# Patient Record
Sex: FEMALE | Race: WHITE | NOT HISPANIC OR LATINO | Employment: UNEMPLOYED | URBAN - METROPOLITAN AREA
[De-identification: names, ages, dates, MRNs, and addresses within clinical notes are randomized per-mention and may not be internally consistent; named-entity substitution may affect disease eponyms.]

---

## 2017-01-03 ENCOUNTER — TELEPHONE (OUTPATIENT)
Dept: UROGYNECOLOGY | Facility: CLINIC | Age: 32
End: 2017-01-03

## 2017-01-03 NOTE — TELEPHONE ENCOUNTER
Spoke with pt's mother regarding pt being sick pre surgery. Pt's mother Ruthie stated pt has been very sick with vomiting and wanted Dr Ventura to know due to her having surgery on 1/9/17. Informed pt's mother that Dr Ventura is out of clinic until next week but I'd relay the message to her and the NP Julianna Gomez and someone will get back with her. Pt voiced understanding and call ended.

## 2017-01-05 ENCOUNTER — OFFICE VISIT (OUTPATIENT)
Dept: INTERNAL MEDICINE | Facility: CLINIC | Age: 32
End: 2017-01-05
Payer: MEDICAID

## 2017-01-05 ENCOUNTER — HOSPITAL ENCOUNTER (OUTPATIENT)
Dept: CARDIOLOGY | Facility: CLINIC | Age: 32
Discharge: HOME | End: 2017-01-05
Payer: MEDICAID

## 2017-01-05 ENCOUNTER — TELEPHONE (OUTPATIENT)
Dept: UROGYNECOLOGY | Facility: CLINIC | Age: 32
End: 2017-01-05

## 2017-01-05 ENCOUNTER — HOSPITAL ENCOUNTER (OUTPATIENT)
Dept: RADIOLOGY | Facility: HOSPITAL | Age: 32
Discharge: HOME | End: 2017-01-05
Attending: INTERNAL MEDICINE
Payer: MEDICAID

## 2017-01-05 DIAGNOSIS — I10 ESSENTIAL HYPERTENSION: ICD-10-CM

## 2017-01-05 DIAGNOSIS — I10 ESSENTIAL HYPERTENSION: Primary | ICD-10-CM

## 2017-01-05 PROCEDURE — 99214 OFFICE O/P EST MOD 30 MIN: CPT | Mod: S$PBB,,, | Performed by: INTERNAL MEDICINE

## 2017-01-05 PROCEDURE — 99999 PR PBB SHADOW E&M-EST. PATIENT-LVL V: CPT | Mod: PBBFAC,,, | Performed by: INTERNAL MEDICINE

## 2017-01-05 PROCEDURE — 93010 ELECTROCARDIOGRAM REPORT: CPT | Mod: S$PBB,,, | Performed by: INTERNAL MEDICINE

## 2017-01-05 PROCEDURE — 71020 XR CHEST PA AND LATERAL: CPT | Mod: 26,,, | Performed by: RADIOLOGY

## 2017-01-05 PROCEDURE — 93005 ELECTROCARDIOGRAM TRACING: CPT | Mod: PBBFAC | Performed by: INTERNAL MEDICINE

## 2017-01-05 NOTE — TELEPHONE ENCOUNTER
----- Message from Benjamin Samuel sent at 1/5/2017  1:25 PM CST -----  Contact: pt mother Ruthie Ac  x_ 1st Request   _ 2nd Request   _ 3rd Request     Who: PAUL AC [[id number]]    Why: Pt mother is requesting a call back to confirm the date of the procedure scheduled    What Number to Call Back: [phone]    When to Expect a call back: (Before the end of the day)   -- if call after 3:00 call back will be tomorrow.

## 2017-01-05 NOTE — MR AVS SNAPSHOT
[facility] - Internal Medicine  [address]  [facility] [address]  Phone: [phone]  Fax: [phone]                  Maria Ines Ac   2017 11:00 AM   Office Visit    Description:  Female : 1985   Provider:  Luann Raymond MD   Department:  [facility]           Reason for Visit     Pre-op Exam           Diagnoses this Visit        Comments    Essential hypertension    -  Primary            To Do List           Future Appointments        Provider Department Dept Phone    2017 11:50 AM LAB, APPOINTMENT [facility] INTMED [facility] [phone]    2017 12:00 PM [facility] [id number] 485 LB LIMIT [facility] [phone]    2017 2:00 PM EKG, APPT [facility] - [phone]    2017 3:00 PM Julianna Gomze NP [facility] [phone]    2017 9:30 AM Julianna Gomez NP [facility] [phone]      Your Future Surgeries/Procedures     2017   Surgery with Ninoska Ventura DO   [facility]    [address]  [facility] [id number] [phone]              Goals (5 Years of Data)     None      [facility]     [facility] Care [facility] -  Assistance  Registered nurses in the [facility] provide clinical advisement, health education, appointment booking, and other advisory services.  Call for this free service at [phone].             Medications           Message regarding Medications     Verify the changes and/or additions to your medication regime listed below are the same as discussed with your clinician today.  If any of these changes or additions are incorrect, please notify your healthcare provider.             Verify that the below list of medications is an accurate representation of the medications you are currently taking.  If none reported, the list may be blank. If incorrect,  please contact your healthcare provider. Carry this list with you in case of emergency.           Current Medications     atorvastatin (LIPITOR) 40 MG tablet Take 40 mg by mouth once daily.    calcium-vitamin D3 500 mg(1,250mg) -200 unit per tablet Take 1 tablet by mouth 2 (two) times daily with meals.    dicyclomine (BENTYL) 10 MG capsule Take 2 capsules (20 mg total) by mouth 3 (three) times daily before meals.    docusate sodium (COLACE) 100 MG capsule Take 1 capsule (100 mg total) by mouth 2 (two) times daily.    estradiol (ESTRACE) 0.01 % (0.1 mg/gram) vaginal cream Place 1 g vaginally twice a week.    estradiol (VIVELLE-DOT) 0.1 mg/24 hr PTSW APPLY 1 PATCH EXTERNALLY TO THE SKIN 2 TIMES A WEEK    FARXIGA 10 mg Tab once daily.     fish oil-omega-3 fatty acids 300-1,000 mg capsule Take 2 g by mouth once daily.    GLUCOPHAGE 500 mg tablet 500 mg 2 (two) times daily with meals.     hydrocodone-ibuprofen 7.5-200 mg (VICOPROFEN) 7.5-200 mg per tablet Take 1 tablet by mouth every 8 (eight) hours as needed for Pain.    lurasidone (LATUDA) 60 mg Tab tablet Take 60 mg by mouth every evening.     metoprolol succinate (TOPROL-XL) 25 MG 24 hr tablet 100 mg once daily.     MULTIVIT-IRON-MIN-FOLIC ACID 3,500-18-0.4 UNIT-MG-MG ORAL CHEW Take by mouth.    nystatin-triamcinolone (MYCOLOG II) cream Apply topically 2 (two) times daily.    ONETOUCH ULTRA TEST Strp     oxybutynin (DITROPAN-XL) 10 MG 24 hr tablet Take 1 tablet (10 mg total) by mouth once daily.    phentermine (ADIPEX-P) 37.5 mg tablet Take 37.5 mg by mouth before breakfast.    PROTONIX 40 mg tablet Take 1 tablet (40 mg total) by mouth once daily. On an empty stomach and 30-45 minutes before breakfast    spironolactone (ALDACTONE) 25 MG tablet TK 1 T PO HS    SYNTHROID 50 mcg tablet Take 50 mcg by mouth before breakfast.     TANZEUM 50 mg/0.5 mL PnIj once a week.     TOPAMAX 25 mg tablet 25 mg once daily.     ZOLOFT 100 mg tablet 100 mg once daily.           "  Clinical Reference Information           Vital Signs - Last Recorded  Most recent update: 1/5/2017 11:16 AM by Iram Osorio MA    BP Pulse Ht Wt LMP SpO2    112/80 79 5' 2" (1.575 m) 100.9 kg (222 lb 7.1 oz) 11/17/2012 98%    BMI                40.69 kg/m2          Blood Pressure          Most Recent Value    BP  112/80      Allergies as of 1/5/2017     No Known Allergies      Immunizations Administered on Date of Encounter - 1/5/2017     None      Orders Placed During Today's Visit     Future Labs/Procedures Expected by Expires    APTT  1/5/2017 3/6/2018    CBC auto differential  1/5/2017 3/6/2018    Comprehensive metabolic panel  1/5/2017 1/5/2018    Protime-INR  1/5/2017 3/6/2018    X-Ray Chest PA And Lateral  1/5/2017 1/5/2018    EKG 12-lead  As directed 1/5/2018      [facility] Sign-Up     Activating your [facility] account is as easy as 1-2-3!     1) Visit [email or web address], select Sign Up Now, enter this activation code and your date of birth, then select Next.  [email or web address]  Expires: 2/19/2017 11:45 AM      2) Create a username and password to use when you visit [facility] in the future and select a security question in case you lose your password and select Next.    3) Enter your e-mail address and click Sign Up!    Additional Information  If you have questions, please e-mail [email or web address] or call [phone] to talk to our [facility] staff. Remember, [facility] is NOT to be used for urgent needs. For medical emergencies, dial 911.         "

## 2017-01-05 NOTE — TELEPHONE ENCOUNTER
Called Pt's mother back after consulting with Julianna regarding pt's new surgery day. Informed pt's mother the surgery day is set for 1/23/17 and Julianna will give her a call a week before surgery to confirm the time. Pt's mother voiced understanding and call ended.

## 2017-01-08 ENCOUNTER — TELEPHONE (OUTPATIENT)
Dept: INTERNAL MEDICINE | Facility: CLINIC | Age: 32
End: 2017-01-08

## 2017-01-08 VITALS
HEIGHT: 62 IN | OXYGEN SATURATION: 98 % | SYSTOLIC BLOOD PRESSURE: 112 MMHG | WEIGHT: 222.44 LBS | TEMPERATURE: 99 F | BODY MASS INDEX: 40.93 KG/M2 | HEART RATE: 79 BPM | DIASTOLIC BLOOD PRESSURE: 80 MMHG

## 2017-01-08 NOTE — PROGRESS NOTES
Subjective:       Patient ID: Maria Ines Ac is a 31 y.o. female.    Chief Complaint: Hypertension    HPI: She returns for management of hypertension.  She has had hypertension for over a year.  Current treatment has included medications outlined in medication list.  She denies chest pain or shortness of breath.  No palpitations.  Denies left arm or neck pain.  She is scheduled for surgery January 9, 2017, and is in need of a preop clearance.  She states nausea and vomiting have resolved    Past medical history: Hypertension, hyperlipidemia, diabetes, hypothyroidism, bipolar disorder, migraine headaches, status post hysterectomy      Medications: Synthroid 0.05 mg daily, metformin, Toprol-XL,Lipitor 40 mg daily, lurasidone,farxiga,topamax      NO KNOWN DRUG ALLERGIES        Review of Systems   Constitutional: Negative for chills, fatigue, fever and unexpected weight change.   Respiratory: Negative for chest tightness and shortness of breath.    Cardiovascular: Negative for chest pain and palpitations.   Gastrointestinal: Negative for abdominal pain and blood in stool.   Neurological: Negative for dizziness, syncope, numbness and headaches.       Objective:      Physical Exam   HENT:   Right Ear: External ear normal.   Left Ear: External ear normal.   Nose: Nose normal.   Mouth/Throat: Oropharynx is clear and moist.   Eyes: Pupils are equal, round, and reactive to light.   Neck: Normal range of motion.   Cardiovascular: Normal rate and regular rhythm.    No murmur heard.  Pulmonary/Chest: Breath sounds normal.   Abdominal: She exhibits no distension. There is no hepatosplenomegaly. There is no tenderness.   Lymphadenopathy:     She has no cervical adenopathy.     She has no axillary adenopathy.   Neurological: She has normal strength and normal reflexes. No cranial nerve deficit or sensory deficit.       Assessment:     assessment and plan: 1.  Hypertension: Check CMP  2.  Preop clearance: Check CBC, PT, PTT,  EKG, chest x-ray  3.  She was here for an urgent care only appointment.  She will return to clinic for a physical      Plan:       As above

## 2017-01-18 ENCOUNTER — TELEPHONE (OUTPATIENT)
Dept: UROGYNECOLOGY | Facility: CLINIC | Age: 32
End: 2017-01-18

## 2017-01-18 NOTE — TELEPHONE ENCOUNTER
----- Message from Marla Contreras sent at 1/17/2017  4:40 PM CST -----  Contact: pt   X_  1st Request  _  2nd Request  _  3rd Request        Who: PAUL ARTHUR [[id number]]    Why: pt is needing a call back in regards to what time her procedure is for on 1/23/17    What Number to Call Back: [phone]    When to Expect a call back: (Before the end of the day)   -- if call after 3:00 call back will be tomorrow.

## 2017-01-18 NOTE — TELEPHONE ENCOUNTER
Spoke to patient mother and informed them that a surgery schedule will call them the day before to let them know what time to come in for the surgery on Monday.

## 2017-01-23 ENCOUNTER — ANESTHESIA (OUTPATIENT)
Dept: SURGERY | Facility: OTHER | Age: 32
End: 2017-01-23
Payer: MEDICAID

## 2017-01-23 PROBLEM — N39.46 MIXED INCONTINENCE: Status: ACTIVE | Noted: 2017-01-23

## 2017-01-23 PROCEDURE — 25000003 PHARM REV CODE 250: Performed by: NURSE ANESTHETIST, CERTIFIED REGISTERED

## 2017-01-23 PROCEDURE — 63600175 PHARM REV CODE 636 W HCPCS: Performed by: NURSE ANESTHETIST, CERTIFIED REGISTERED

## 2017-01-23 PROCEDURE — 63600175 PHARM REV CODE 636 W HCPCS: Performed by: OBSTETRICS & GYNECOLOGY

## 2017-01-23 RX ORDER — GLYCOPYRROLATE 0.2 MG/ML
INJECTION INTRAMUSCULAR; INTRAVENOUS
Status: DISCONTINUED | OUTPATIENT
Start: 2017-01-23 | End: 2017-01-23

## 2017-01-23 RX ORDER — FENTANYL CITRATE 50 UG/ML
INJECTION, SOLUTION INTRAMUSCULAR; INTRAVENOUS
Status: DISCONTINUED | OUTPATIENT
Start: 2017-01-23 | End: 2017-01-23

## 2017-01-23 RX ORDER — ROCURONIUM BROMIDE 10 MG/ML
INJECTION, SOLUTION INTRAVENOUS
Status: DISCONTINUED | OUTPATIENT
Start: 2017-01-23 | End: 2017-01-23

## 2017-01-23 RX ORDER — PROPOFOL 10 MG/ML
VIAL (ML) INTRAVENOUS
Status: DISCONTINUED | OUTPATIENT
Start: 2017-01-23 | End: 2017-01-23

## 2017-01-23 RX ORDER — SODIUM CHLORIDE, SODIUM LACTATE, POTASSIUM CHLORIDE, CALCIUM CHLORIDE 600; 310; 30; 20 MG/100ML; MG/100ML; MG/100ML; MG/100ML
INJECTION, SOLUTION INTRAVENOUS CONTINUOUS PRN
Status: DISCONTINUED | OUTPATIENT
Start: 2017-01-23 | End: 2017-01-23

## 2017-01-23 RX ORDER — NEOSTIGMINE METHYLSULFATE 1 MG/ML
INJECTION, SOLUTION INTRAVENOUS
Status: DISCONTINUED | OUTPATIENT
Start: 2017-01-23 | End: 2017-01-23

## 2017-01-23 RX ORDER — LIDOCAINE HCL/PF 100 MG/5ML
SYRINGE (ML) INTRAVENOUS
Status: DISCONTINUED | OUTPATIENT
Start: 2017-01-23 | End: 2017-01-23

## 2017-01-23 RX ORDER — ONDANSETRON 2 MG/ML
INJECTION INTRAMUSCULAR; INTRAVENOUS
Status: DISCONTINUED | OUTPATIENT
Start: 2017-01-23 | End: 2017-01-23

## 2017-01-23 RX ORDER — MIDAZOLAM HYDROCHLORIDE 1 MG/ML
INJECTION INTRAMUSCULAR; INTRAVENOUS
Status: DISCONTINUED | OUTPATIENT
Start: 2017-01-23 | End: 2017-01-23

## 2017-01-23 RX ADMIN — GLYCOPYRROLATE 0.8 MG: 0.2 INJECTION, SOLUTION INTRAMUSCULAR; INTRAVENOUS at 03:01

## 2017-01-23 RX ADMIN — PROPOFOL 200 MG: 10 INJECTION, EMULSION INTRAVENOUS at 02:01

## 2017-01-23 RX ADMIN — MIDAZOLAM HYDROCHLORIDE 2 MG: 1 INJECTION, SOLUTION INTRAMUSCULAR; INTRAVENOUS at 02:01

## 2017-01-23 RX ADMIN — NEOSTIGMINE METHYLSULFATE 5 MG: 1 INJECTION INTRAVENOUS at 03:01

## 2017-01-23 RX ADMIN — ROCURONIUM BROMIDE 50 MG: 10 INJECTION, SOLUTION INTRAVENOUS at 02:01

## 2017-01-23 RX ADMIN — LIDOCAINE HYDROCHLORIDE 40 MG: 20 INJECTION, SOLUTION INTRAVENOUS at 02:01

## 2017-01-23 RX ADMIN — FENTANYL CITRATE 50 MCG: 50 INJECTION, SOLUTION INTRAMUSCULAR; INTRAVENOUS at 02:01

## 2017-01-23 RX ADMIN — ONDANSETRON 4 MG: 2 INJECTION INTRAMUSCULAR; INTRAVENOUS at 03:01

## 2017-01-23 RX ADMIN — SODIUM CHLORIDE, SODIUM LACTATE, POTASSIUM CHLORIDE, AND CALCIUM CHLORIDE: 600; 310; 30; 20 INJECTION, SOLUTION INTRAVENOUS at 02:01

## 2017-01-23 RX ADMIN — SODIUM CHLORIDE, SODIUM LACTATE, POTASSIUM CHLORIDE, AND CALCIUM CHLORIDE: 600; 310; 30; 20 INJECTION, SOLUTION INTRAVENOUS at 03:01

## 2017-01-23 RX ADMIN — FENTANYL CITRATE 50 MCG: 50 INJECTION, SOLUTION INTRAMUSCULAR; INTRAVENOUS at 03:01

## 2017-01-23 RX ADMIN — CEFAZOLIN SODIUM 2 G: 2 SOLUTION INTRAVENOUS at 02:01

## 2017-01-24 ENCOUNTER — HOSPITAL ENCOUNTER (EMERGENCY)
Facility: OTHER | Age: 32
Discharge: HOME | End: 2017-01-24
Attending: EMERGENCY MEDICINE
Payer: MEDICAID

## 2017-01-24 ENCOUNTER — TELEPHONE (OUTPATIENT)
Dept: UROGYNECOLOGY | Facility: CLINIC | Age: 32
End: 2017-01-24

## 2017-01-24 VITALS
HEIGHT: 62 IN | BODY MASS INDEX: 41.96 KG/M2 | RESPIRATION RATE: 33 BRPM | TEMPERATURE: 98 F | OXYGEN SATURATION: 92 % | SYSTOLIC BLOOD PRESSURE: 109 MMHG | HEART RATE: 72 BPM | WEIGHT: 228 LBS | DIASTOLIC BLOOD PRESSURE: 62 MMHG

## 2017-01-24 DIAGNOSIS — G62.9 NEUROPATHY: ICD-10-CM

## 2017-01-24 DIAGNOSIS — R51.9 HEADACHE: ICD-10-CM

## 2017-01-24 DIAGNOSIS — R06.02 SHORTNESS OF BREATH: ICD-10-CM

## 2017-01-24 DIAGNOSIS — R06.00 DYSPNEA, UNSPECIFIED TYPE: Primary | ICD-10-CM

## 2017-01-24 LAB
ALBUMIN SERPL BCP-MCNC: 3.6 G/DL
ALP SERPL-CCNC: 70 U/L
ALT SERPL W/O P-5'-P-CCNC: 13 U/L
ANION GAP SERPL CALC-SCNC: 7 MMOL/L
AST SERPL-CCNC: 14 U/L
B-HCG UR QL: NEGATIVE
BASOPHILS # BLD AUTO: 0.03 K/UL
BASOPHILS NFR BLD: 0.3 %
BILIRUB SERPL-MCNC: 0.2 MG/DL
BUN SERPL-MCNC: 8 MG/DL
CALCIUM SERPL-MCNC: 9.1 MG/DL
CHLORIDE SERPL-SCNC: 106 MMOL/L
CO2 SERPL-SCNC: 23 MMOL/L
CREAT SERPL-MCNC: 0.9 MG/DL
CTP QC/QA: YES
D DIMER PPP IA.FEU-MCNC: 0.56 MG/L FEU
DIFFERENTIAL METHOD: NORMAL
EOSINOPHIL # BLD AUTO: 0.3 K/UL
EOSINOPHIL NFR BLD: 2.9 %
ERYTHROCYTE [DISTWIDTH] IN BLOOD BY AUTOMATED COUNT: 13.7 %
EST. GFR  (AFRICAN AMERICAN): >60 ML/MIN/1.73 M^2
EST. GFR  (NON AFRICAN AMERICAN): >60 ML/MIN/1.73 M^2
GLUCOSE SERPL-MCNC: 90 MG/DL
HCT VFR BLD AUTO: 41.9 %
HGB BLD-MCNC: 13.5 G/DL
LYMPHOCYTES # BLD AUTO: 2.4 K/UL
LYMPHOCYTES NFR BLD: 25.1 %
MCH RBC QN AUTO: 28.1 PG
MCHC RBC AUTO-ENTMCNC: 32.2 %
MCV RBC AUTO: 87 FL
MONOCYTES # BLD AUTO: 0.5 K/UL
MONOCYTES NFR BLD: 5.8 %
NEUTROPHILS # BLD AUTO: 6.2 K/UL
NEUTROPHILS NFR BLD: 65.8 %
PLATELET # BLD AUTO: 232 K/UL
PMV BLD AUTO: 11.1 FL
POTASSIUM SERPL-SCNC: 3.9 MMOL/L
PROT SERPL-MCNC: 7.4 G/DL
RBC # BLD AUTO: 4.81 M/UL
SODIUM SERPL-SCNC: 136 MMOL/L
TSH SERPL DL<=0.005 MIU/L-ACNC: 0.86 UIU/ML
WBC # BLD AUTO: 9.39 K/UL

## 2017-01-24 PROCEDURE — 96360 HYDRATION IV INFUSION INIT: CPT

## 2017-01-24 PROCEDURE — 93005 ELECTROCARDIOGRAM TRACING: CPT

## 2017-01-24 PROCEDURE — 84443 ASSAY THYROID STIM HORMONE: CPT

## 2017-01-24 PROCEDURE — 93010 ELECTROCARDIOGRAM REPORT: CPT | Mod: ,,, | Performed by: INTERNAL MEDICINE

## 2017-01-24 PROCEDURE — 80053 COMPREHEN METABOLIC PANEL: CPT

## 2017-01-24 PROCEDURE — 99284 EMERGENCY DEPT VISIT MOD MDM: CPT | Mod: 25

## 2017-01-24 PROCEDURE — 25000003 PHARM REV CODE 250: Performed by: EMERGENCY MEDICINE

## 2017-01-24 PROCEDURE — 85379 FIBRIN DEGRADATION QUANT: CPT

## 2017-01-24 PROCEDURE — 81025 URINE PREGNANCY TEST: CPT | Performed by: EMERGENCY MEDICINE

## 2017-01-24 PROCEDURE — 25500020 PHARM REV CODE 255: Performed by: EMERGENCY MEDICINE

## 2017-01-24 PROCEDURE — 85025 COMPLETE CBC W/AUTO DIFF WBC: CPT

## 2017-01-24 RX ORDER — LURASIDONE HYDROCHLORIDE 80 MG/1
80 TABLET, FILM COATED ORAL DAILY
COMMUNITY
End: 2017-05-08

## 2017-01-24 RX ORDER — DOCUSATE SODIUM 100 MG/1
100 CAPSULE, LIQUID FILLED ORAL 2 TIMES DAILY
COMMUNITY
End: 2018-08-23

## 2017-01-24 RX ADMIN — SODIUM CHLORIDE 1000 ML: 0.9 INJECTION, SOLUTION INTRAVENOUS at 05:01

## 2017-01-24 RX ADMIN — IOHEXOL 75 ML: 350 INJECTION, SOLUTION INTRAVENOUS at 06:01

## 2017-01-24 NOTE — ED NOTES
"Pt to ED c/o SOB. Pt had bladder sling surgery yesterday, and reports started feeling SOB last night after being discharged home. Pt reports was able to sleep, and was still SOB after waking up this AM + headache and dizziness, right forearm and hand feeling "tingly and numb". Mother at bedside reports pt is confused, and did not know "where she was going" this morning. Pt reports feeling confused. Pt alert and oriented to person, place, month, age, birthday, and president. Pt not oriented to year- stated it was currently 2002. Pt resting comfortably and in no acute distress. Fall risk band applied per protocol.  "

## 2017-01-24 NOTE — TELEPHONE ENCOUNTER
----- Message from Tesha Carey sent at 1/24/2017  2:54 PM CST -----  Contact: mother  Patient's mother calling in to express patient is having symptoms of right hand numbness and trouble breathing. Per Jennie RN Supervisor patient was advised to go to ED for evaluation. Patient's mother verbalized understanding and said she will be going to [facility] ED.

## 2017-01-24 NOTE — ED PROVIDER NOTES
Encounter Date: 1/24/2017    SCRIBE #1 NOTE: I, Luisa Pepe, am scribing for, and in the presence of, Dr. Sandoval.       History     Chief Complaint   Patient presents with    Shortness of Breath     + increased SOB last night, recent bladder sx yesterday.      Review of patient's allergies indicates:  No Known Allergies  HPI Comments: Time seen by provider: 4:08 PM    This is a 32 y.o. female who presents to the ED on the referral of Dr. Ventura with complaint of SOB that began yesterday.  The patient reports associated dizziness, a frontal HA, mid-sternal CP, and numbness to the dorsal aspect of the right hand that began today.  As per her mother, the patient has been confused lately and has a non-productive cough.  The patient admits to mild dysuria; however, she states that she had outpatient transvaginal mesh surgery yesterday, which was performed by Dr. Ventura.  She reports no changes in appetite, fever, chills, congestion, abdominal pain, nausea, vomiting, neck pain, or gait abnormalities.  She mentions no identifying, alleviating, or exacerbating factors.  The patient denies any prior episodes of this symptomology.  As per her mother, the patient has history of bipolar disorder, NIDDM, thyroid disease, and high cholesterol.  They deny that the patient suffered any recent trauma, including falls or other injuries.  She admits that she has taken her pain medication today, but she is not sure as to what time she took the medication.  Her father admits that he has been sick recently.    The history is provided by the patient and a parent (mother and father).     Past Medical History   Diagnosis Date    Blood in stool     Constipation     Depression     Diabetes mellitus, type 2     Dysphagia     GERD (gastroesophageal reflux disease)     Hypertension     Palpitations     Premature menopause on hormone replacement therapy     Thyroid disease      No past medical history pertinent  negatives.  Past Surgical History   Procedure Laterality Date    Ooptherectomy      Right knee  scoped    Gallbladder surgery      Shoulder surgery  right    Carpal tunnel release       right    Hysterectomy  2013     Bess velasquez Dr. Champlain    Oophorectomy  2005     LSO- benign cyst    Oophorectomy  2013     RSO- endo    Colonoscopy N/A 8/30/2016     Procedure: COLONOSCOPY;  Surgeon: Slick Herron MD;  Location: [facility] ([address] [place] [address]);  Service: Endoscopy;  Laterality: N/A;  PM prep     Family History   Problem Relation Age of Onset    Breast cancer Mother     Cervical cancer Maternal Grandmother     Colon cancer Neg Hx     Ovarian cancer Neg Hx     Endometrial cancer Neg Hx     Vaginal cancer Neg Hx     Crohn's disease Neg Hx     Ulcerative colitis Neg Hx     Stomach cancer Neg Hx     Esophageal cancer Neg Hx     Irritable bowel syndrome Neg Hx     Celiac disease Neg Hx      Social History   Substance Use Topics    Smoking status: Never Smoker    Smokeless tobacco: Never Used    Alcohol use No     Review of Systems   Constitutional: Negative for activity change, appetite change, chills, diaphoresis and fever.   HENT: Negative for congestion, sore throat and trouble swallowing.    Eyes: Negative for photophobia and visual disturbance.   Respiratory: Positive for cough (non-productive) and shortness of breath. Negative for chest tightness.    Cardiovascular: Positive for chest pain (mid-sternal).   Gastrointestinal: Negative for abdominal pain, nausea and vomiting.   Endocrine: Negative for polydipsia and polyuria.   Genitourinary: Positive for dysuria. Negative for difficulty urinating and flank pain.   Musculoskeletal: Negative for back pain, gait problem and neck pain.   Skin: Negative for rash.   Neurological: Positive for dizziness, numbness (dorsum of right hand) and headaches. Negative for weakness.   Psychiatric/Behavioral: Positive for confusion.       Physical Exam   Initial  Vitals   BP Pulse Resp Temp SpO2   01/24/17 1600 01/24/17 1600 01/24/17 1600 01/24/17 1600 01/24/17 1600   111/79 77 18 98 °F (36.7 °C) 98 %     Physical Exam    Nursing note and vitals reviewed.  Constitutional: She appears well-developed. She is cooperative.  Non-toxic appearance. No distress.   Obese.  Non-toxic appearance.   HENT:   Head: Normocephalic and atraumatic.   Mouth/Throat: Oropharynx is clear and moist.   Eyes: Conjunctivae and EOM are normal. Pupils are equal, round, and reactive to light. Right eye exhibits no nystagmus. Left eye exhibits no nystagmus.   No nystagmus.   Neck: Normal range of motion and full passive range of motion without pain. Neck supple. No thyromegaly present. No JVD present.   Cardiovascular: Normal rate, regular rhythm, normal heart sounds and normal pulses.   Pulmonary/Chest: Effort normal and breath sounds normal. No respiratory distress.   Abdominal: Soft. Bowel sounds are normal. She exhibits no distension and no mass. There is no tenderness.   Obese abdomen.  Non-tender.  Williamson catheter in place.  Clear urine in bag.     Musculoskeletal: Normal range of motion.   Neurological: She is alert and oriented to person, place, and time. She has normal strength. A sensory deficit is present. No cranial nerve deficit.   No cranial nerve deficits.  Full  strength in bilateral upper extremities.  Full motor strength in ulnar, radial, and medial distributions bilaterally.  Decreased 2 point discrimination distal to wrist.   Skin: Skin is warm, dry and intact. No rash noted.   Psychiatric: Her speech is normal and behavior is normal. Judgment and thought content normal.   Flattened affect.         ED Course   Procedures  Labs Reviewed   COMPREHENSIVE METABOLIC PANEL - Abnormal; Notable for the following:        Result Value    Anion Gap 7 (*)     All other components within normal limits   D DIMER, QUANTITATIVE - Abnormal; Notable for the following:     D-Dimer 0.56 (*)     All  other components within normal limits   POCT URINE PREGNANCY - Normal   CBC W/ AUTO DIFFERENTIAL   TSH     Imaging Results         CTA Chest Non-Coronary - PE Study (Final result) Result time:  01/24/17 18:20:57    Final result by Brock Dan DO (01/24/17 18:20:57)    Impression:         1. No evidence to suggest pulmonary embolism.    2. Mild mosaic attenuation of the lung parenchyma otherwise essentially unremarkable study.      Electronically signed by: BROCK DAN D.O.  Date:     01/24/17  Time:    18:20     Narrative:    Ct angiogram of the chest utilizing a pulmonary embolism protocol    Clinical history: Shortness of breath    Comparison:  Plain films from earlier the same day.    Technique:  CT of the chest was acquired helically from the lung apices through the posterior costophrenic angles status post administration of 75 cc of Omnipaque 350.  Bolus timing was utilized to optimize opacification of the pulmonary arterial system. 3-D maximum intensity projection and multiplanar reconstructions were created from the source data set and interpreted.    Findings:   There is mild mosaic attenuation of the lung parenchyma diffusely.  No discrete pulmonary nodules or masses are identified.    The aorta demonstrates normal caliber and contour. There is good opacification of the pulmonary arterial system. No intraluminal filling defects are notified within the pulmonary arterial system to suggest pulmonary embolism. There is no pericardial effusion.  No enlarged mediastinal, hilar or axillary lymph nodes are identified.    There is evidence for prior cholecystectomy.    The osseous structures are unremarkable in appearance.            CT Head Without Contrast (Final result) Result time:  01/24/17 17:27:22    Final result by Sachin Buitrago MD (01/24/17 17:27:22)    Impression:        No acute intracranial abnormality identified.      Electronically signed by: SACHIN BUITRAGO MD, MD  Date:      01/24/17  Time:    17:27     Narrative:    COMPARISON: MRI brain 6/22/15    FINDINGS: The brain appears normally formed without evidence of hemorrhage, mass, midline shift or acute major vascular territory infarct.  Gray-white interface appears intact.  The ventricular system is normal in size for age and demonstrates no distortion by mass effect.      No extra-axial hemorrhage or mass. The extracranial structures are within normal limits.  No displaced calvarial fracture.  Visualized portions of the paranasal sinuses and mastoid air cells are clear. Visualized portions of the orbits are within normal limits.            X-Ray Chest PA And Lateral (Final result) Result time:  01/24/17 17:21:36    Final result by Jan Gaona MD (01/24/17 17:21:36)    Impression:         No significant change from prior study.          Electronically signed by: JAN GAONA MD  Date:     01/24/17  Time:    17:21     Narrative:    Chest PA and Lateral    Indication:.    Comparison:January 5, 2017.    Findings:     Heart and lungs unchanged when allowing for differences in technique and positioning.              EKG Readings: (Independently Interpreted)   Initial Reading: No STEMI.   Normal Sinus Rhythm at rate of 75.  Left axis deviation.  T wave inversions in lead III.  RBBB.  No STEMI.       X-Rays:   Independently Interpreted Readings:   Chest X-Ray: X-Ray Chest PA And Lateral: Trachea midline. No cardiomegaly. No pleural effusion. No pneumothorax. Increased bowel gas.  Decreased lung volumes.     Medical Decision Making:   Initial Assessment:   Urgent evaluation of 32-year-old female with history of htn, hld, dm, migraine headaches, hypothyroidism, bipolar disorder, endometriosis sp hysterectomy and BSO, here after urologic procedure yesterday presenting with multiple complaints including shortness of breath, headache, right hand numbness since 2 PM.  Patient has an indwelling Williamson in place after yesterday's surgery.  Patient denies any head trauma, not complaining of any fevers or chills, slow onset frontal headache, no cough or recent illness, no history of DVT or PE.  Per Epic Review, patient has followed-up with neurology- Dr. Laura for these daily headaches.  Vital signs within normal limits, on exam patient is nontoxic-appearing,  flat affect, neuro exam notable for diminished 2 point discrimination loss distal to right wrist, the full neuro testing intact with finger opposition and , Mental status notable for inability to recall year, but otherwise appropriate yet answers in very simple terms.  Given multiple complaints and seemingly diminished mental capacity, I doubt acute stroke, and have low suspicion for pulmonary issue, I will obtain labs, chest x-ray, d-dimer, head CT and reassess.  Clinical Tests:   Lab Tests: Ordered and Reviewed  Radiological Study: Ordered and Reviewed  Medical Tests: Ordered and Reviewed  ED Management:  Patient's labs without abnormalities, normal TSH, elevated d-dimer 0.56, decision made to obtain an CTA to ensure no PE.  CT head negative for acute CVA, and patient endorsing improvement in her paresthesias.    Discussed with her OB/GYN, need for UA testing, given that urine appears clear in the Williamson bag, but likely contaminated given recent procedure.   Patient currently follows with neurology, and mom agrees to follow with Dr. Ambrose for the neuropathy and headache concerns.   Other:   I have discussed this case with another health care provider.       <> Summary of the Discussion: 5:30 PM.  I received a call from Dr. Ventura and informed her of the patient's status.            Scribe Attestation:   Scribe #1: I performed the above scribed service and the documentation accurately describes the services I performed. I attest to the accuracy of the note.    Attending Attestation:           Physician Attestation for Scribe:  Physician Attestation Statement for Scribe #1: I,   Lori, reviewed documentation, as scribed by Luisa Pepe in my presence, and it is both accurate and complete.                 ED Course     Clinical Impression:     1. Dyspnea, unspecified type    2. Headache    3. Shortness of breath    4. Neuropathy        Disposition:   Disposition: Discharged  Condition: Stable       Cherie Sandoval MD  01/24/17 2517

## 2017-01-24 NOTE — TELEPHONE ENCOUNTER
Spoke with Maria Ines's mother, patient is s/p retropubic mid urethral sling 1/23/2017 now with complaints chest pain and changes in mental status.  Patient currently being seen in the ED with complaints of right arm numbness and Chest pain.

## 2017-01-24 NOTE — ED AVS SNAPSHOT
[facility]  [address]  [facility] [address]               Maria Ines Ac   2017  4:01 PM   ED    Description:  Female : 1985   Department:  [facility]           Your Care was Coordinated By:     Provider Role From To    Cherie Sandoval MD Attending Provider [phone] --      Reason for Visit     Shortness of Breath           Diagnoses this Visit        Comments    Dyspnea, unspecified type    -  Primary     Headache         Shortness of breath         Neuropathy           ED Disposition     ED Disposition Condition Comment    Discharge             To Do List           Follow-up Information     Follow up with Luann Raymond MD. Schedule an appointment as soon as possible for a visit in 2 days.    Specialty:  Internal Medicine    Contact information:    [address] [facility] [address]  [phone]          Follow up with Donovan Laura MD. Schedule an appointment as soon as possible for a visit in 2 days.    Specialty:  Neurology    Contact information:    [address] [facility] [address]  [phone]        [facility]     [facility] -  Assistance  Registered nurses in the [facility] provide clinical advisement, health education, appointment booking, and other advisory services.  Call for this free service at [phone].             Medications           Message regarding Medications     Verify the changes and/or additions to your medication regime listed below are the same as discussed with your clinician today.  If any of these changes or additions are incorrect, please notify your healthcare provider.        These medications were administered today        Dose Freq    sodium chloride 0.9% bolus 1,000 mL 1,000 mL ED 1 Time    Sig: Inject 1,000 mLs into the vein ED 1 Time.    Class: Normal    Route: Intravenous    omnipaque 350 iohexol 350 mg iodine/mL       Notes to Pharmacy: Created by cabinet override    omnipaque 350 iohexol 75 mL 75 mL IMG once as needed    Sig: Inject 75 mLs into the vein ONCE PRN for contrast.    Class: Normal    Route: Intravenous      STOP taking these medications     estradiol (ESTRACE) 0.01 % (0.1 mg/gram) vaginal cream Place 1 g vaginally twice a week.    ONETOUCH ULTRA TEST Strp            Verify that the below list of medications is an accurate representation of the medications you are currently taking.  If none reported, the list may be blank. If incorrect, please contact your healthcare provider. Carry this list with you in case of emergency.           Current Medications     atorvastatin (LIPITOR) 40 MG tablet Take 40 mg by mouth once daily.    BLOOD SUGAR DIAGNOSTIC (ACCU-CHEK AMAURY PLUS TEST STRP MISC) 1 strip by Misc.(Non-Drug; Combo Route) route 3 (three) times daily.    calcium-vitamin D3 500 mg(1,250mg) -200 unit per tablet Take 1 tablet by mouth 2 (two) times daily with meals.    estradiol (VIVELLE-DOT) 0.1 mg/24 hr PTSW APPLY 1 PATCH EXTERNALLY TO THE SKIN 2 TIMES A WEEK    FARXIGA 10 mg Tab Take 1 tablet by mouth once daily.     fish oil-omega-3 fatty acids 300-1,000 mg capsule Take 2 g by mouth once daily.    GLUCOPHAGE 500 mg tablet Take 500 mg by mouth 2 (two) times daily with meals.     hydrocodone-ibuprofen 7.5-200 mg (VICOPROFEN) 7.5-200 mg per tablet Take 1 tablet by mouth every 8 (eight) hours as needed for Pain.    lurasidone (LATUDA) 80 mg Tab tablet Take 80 mg by mouth once daily.    metoprolol succinate (TOPROL-XL) 25 MG 24 hr tablet Take 25 mg by mouth once daily.     MULTIVIT-IRON-MIN-FOLIC ACID 3,500-18-0.4 UNIT-MG-MG ORAL CHEW Take 1 tablet by mouth once daily.     nitrofurantoin, macrocrystal-monohydrate, (MACROBID) 100 MG capsule Take 1 capsule (100 mg total) by mouth every evening.    nystatin-triamcinolone (MYCOLOG II) cream Apply topically 2 (two) times daily.    oxybutynin (DITROPAN-XL) 10 MG 24 hr tablet  "Take 1 tablet (10 mg total) by mouth once daily.    phentermine (ADIPEX-P) 37.5 mg tablet Take 37.5 mg by mouth before breakfast.    PROTONIX 40 mg tablet Take 1 tablet (40 mg total) by mouth once daily. On an empty stomach and 30-45 minutes before breakfast    spironolactone (ALDACTONE) 25 MG tablet TAKE1 TABLET BY MOUTH AT BEDTIME    SYNTHROID 50 mcg tablet Take 50 mcg by mouth before breakfast.     TANZEUM 50 mg/0.5 mL PnIj Inject 50 mg into the skin once a week. On friday    TOPAMAX 25 mg tablet Take 25 mg by mouth once daily.     ZOLOFT 100 mg tablet Take 200 mg by mouth once daily.     docusate sodium (COLACE) 100 MG capsule Take 100 mg by mouth 2 (two) times daily.    omnipaque 350 iohexol 350 mg iodine/mL            Clinical Reference Information           Your Vitals Were     BP Pulse Temp Resp Height Weight    104/55 72 98 °F (36.7 °C) (Oral) 20 5' 2" (1.575 m) 103.4 kg (228 lb)    Last Period SpO2 BMI          11/17/2012 94% 41.7 kg/m2        Allergies as of 1/24/2017     No Known Allergies      Immunizations Administered on Date of Encounter - 1/24/2017     None      ED Micro, Lab, POCT     Start Ordered       Status Ordering Provider    01/24/17 1624 01/24/17 1623  CBC auto differential  STAT      Final result     01/24/17 1624 01/24/17 1623  Comprehensive metabolic panel  STAT      Final result     01/24/17 1624 01/24/17 1623  TSH  STAT      Final result     01/24/17 1624 01/24/17 1623  D dimer, quantitative  STAT      Final result     01/24/17 1607 01/24/17 1606  POCT urine pregnancy  Once      Final result       ED Imaging Orders     Start Ordered       Status Ordering Provider    01/24/17 1733 01/24/17 1732  CTA Chest Non-Coronary - PE Study  1 time imaging      Final result     01/24/17 1640 01/24/17 1640  X-Ray Chest PA And Lateral  1 time imaging      Final result     01/24/17 1624 01/24/17 1623  CT Head Without Contrast  1 time imaging      Final result         Discharge Instructions     " "    Headache, Unspecified    Headaches can be caused by a number of things. The cause of your headache isnt clear. But it doesnt seem to be a sign of any serious illness.  You could have a tension headache or a migraine headache.  Stress can cause a tension headache. This can happen if you tense the muscles of your shoulders, neck, and scalp without knowing it. If this stress lasts long enough, you may develop a tension headache.  It is not clear why migraines occur, but transient factors called" triggers" can raise the risk of having a migraine attack. Migraine triggers may include emotional stress or depression, or by hormone changes during the menstrual cycle. Other triggers include birth control pills and other medicines, alcohol or caffeine, foods with tyramine, eye strain, weather changes, missed meals, and lack of sleep or oversleeping.  Other causes of headache include:  · Viral illness with high fever  · Head injury with concussion  · Sinus, ear, or throat infection  · Dental pain and jaw joint (TMJ) pain  More serious but less common causes of headache include stroke, brain hemorrhage, brain tumor, meningitis, and encephalitis.  Home care  Follow these tips when taking care of yourself at home:  · Dont drive yourself home if you were given pain medicine for your headache. Instead, have someone else drive you home. Try to sleep when you get home. You should feel much better when you wake up.  · Apply heat to the back of your neck to ease a neck muscle spasm. Take care of a migraine headache by putting an ice pack on your forehead or at the base of your skull.  · If you have nausea or vomiting, eat a light diet until your headache eases.  · If you have a migraine headache, use sunglasses when in the daylight or around bright indoor lighting until your symptoms get better. Bright glaring light can make this type of headache worse.  Follow-up care  Follow up with your health care provider if your headache " doesnt get better within the next 24 hours. Talk with your provider If you have frequent headaches. He or she can help figure out a treatment plan. By knowing the earliest signs of headache, and starting treatment right away, you may be able to stop the pain yourself.  When to seek medical advice  Call your health care provider right away if any of these occur:  · Your head pain gets worse  · Your head pain doesnt get better within 24 hours  · You arent able to keep liquids down (repeated vomiting)  · Fever of 100.4ºF (38ºC) or higher, or as directed by your health care provider  · Stiff neck  · Extreme drowsiness, confusion, or fainting  · Dizziness or dizziness with spinning sensation (vertigo)  · Weakness in an arm or leg or one side of your face  · You have difficulty talking or seeing  © 5733-8256 [employer]. [address] [place] [address]. All rights reserved. This information is not intended as a substitute for professional medical care. Always follow your healthcare professional's instructions.          Discharge References/Attachments     SHORTNESS OF BREATH (DYSPNEA) (ENGLISH)      Your Scheduled Appointments     Feb 06, 2017 10:00 AM CST   Post OP with Julianna Gomez NP   [facility] ([facility])    [address] [place] [address]   [phone]            Feb 20, 2017  9:30 AM CST   Post OP with Julianna Gomez NP   [facility] ([facility])    [address] [place] [address]   [phone]              [facility] Sign-Up     Activating your [facility] account is as easy as 1-2-3!     1) Visit [email or web address], select Sign Up Now, enter this activation code and your date of birth, then select Next.  [email or web address]  Expires: 2/19/2017 11:45 AM      2) Create a username and password to use when you visit [facility] in the future and select a security question in case you lose your password and select Next.    3) Enter  your e-mail address and click Sign Up!    Additional Information  If you have questions, please e-mail [email or web address] or call [phone] to talk to our [facility] staff. Remember, [facility] is NOT to be used for urgent needs. For medical emergencies, dial 911.          [facility] complies with applicable Federal civil rights laws and does not discriminate on the basis of race, color, national origin, age, disability, or sex.        Language Assistance Services     ATTENTION: Language assistance services are available, free of charge. Please call [phone].      ATENCIÓN: Si habla español, tiene a muniz disposición servicios gratuitos de asistencia lingüística. Llame al [phone].     CHÚ Ý: N?u b?n nói Ti?ng Vi?t, có các d?ch v? h? tr? ngôn ng? mi?n phí dành cho b?n. G?i s? [phone].

## 2017-01-25 ENCOUNTER — OFFICE VISIT (OUTPATIENT)
Dept: UROGYNECOLOGY | Facility: CLINIC | Age: 32
End: 2017-01-25
Payer: MEDICAID

## 2017-01-25 DIAGNOSIS — Z98.890 POST-OPERATIVE STATE: Primary | ICD-10-CM

## 2017-01-25 DIAGNOSIS — R33.9 INCOMPLETE BLADDER EMPTYING: ICD-10-CM

## 2017-01-25 DIAGNOSIS — G89.18 POST-OP PAIN: ICD-10-CM

## 2017-01-25 PROCEDURE — 99024 POSTOP FOLLOW-UP VISIT: CPT | Mod: ,,, | Performed by: NURSE PRACTITIONER

## 2017-01-25 RX ORDER — IBUPROFEN 800 MG/1
800 TABLET ORAL 3 TIMES DAILY
Qty: 30 TABLET | Refills: 1 | Status: SHIPPED | OUTPATIENT
Start: 2017-01-25 | End: 2017-02-13 | Stop reason: SDUPTHER

## 2017-01-25 NOTE — PROGRESS NOTES
Urogyn follow up  01/25/2017  .  [facility]  [address] [facility] [address]    Maria Ines Ac  [id number]  1985      Maria Ines Ac is a 32 y.o.  here for a post op visit.      Past Medical History   Diagnosis Date    Blood in stool     Constipation     Depression     Diabetes mellitus, type 2     Dysphagia     GERD (gastroesophageal reflux disease)     Hypertension     Palpitations     Premature menopause on hormone replacement therapy     Thyroid disease        Past Surgical History   Procedure Laterality Date    Ooptherectomy      Right knee  scoped    Gallbladder surgery      Shoulder surgery  right    Carpal tunnel release       right    Hysterectomy  2013     Bess velasquez Dr. [place]    Oophorectomy  2005     LSO- benign cyst    Oophorectomy  2013     RSO- endo    Colonoscopy N/A 8/30/2016     Procedure: COLONOSCOPY;  Surgeon: Slick Herron MD;  Location: [facility] ([address] [place] [address]);  Service: Endoscopy;  Laterality: N/A;  PM prep       History since last visit: Was seen in ER yesterday for mental status changes.  Workup negative thus far.  Family states she has been more coherent today.  Complains of vaginal bleeding.   Bladder issues: cheng catheter in place.   Bowel issues: Denies constipation or straining.      Current Outpatient Prescriptions   Medication Sig    atorvastatin (LIPITOR) 40 MG tablet Take 40 mg by mouth once daily.    BLOOD SUGAR DIAGNOSTIC (ACCU-CHEK AMAURY PLUS TEST STRP MISC) 1 strip by Misc.(Non-Drug; Combo Route) route 3 (three) times daily.    calcium-vitamin D3 500 mg(1,250mg) -200 unit per tablet Take 1 tablet by mouth 2 (two) times daily with meals.    docusate sodium (COLACE) 100 MG capsule Take 100 mg by mouth 2 (two) times daily.    estradiol (VIVELLE-DOT) 0.1 mg/24 hr PTSW APPLY 1 PATCH EXTERNALLY TO THE SKIN 2 TIMES A WEEK (Patient taking differently: APPLY 1 PATCH EXTERNALLY TO THE SKIN 2 TIMES A WEEK ON  TUESDAY AND FRIDAY)    FARXIGA 10 mg Tab Take 1 tablet by mouth once daily.     fish oil-omega-3 fatty acids 300-1,000 mg capsule Take 2 g by mouth once daily.    GLUCOPHAGE 500 mg tablet Take 500 mg by mouth 2 (two) times daily with meals.     hydrocodone-ibuprofen 7.5-200 mg (VICOPROFEN) 7.5-200 mg per tablet Take 1 tablet by mouth every 8 (eight) hours as needed for Pain.    ibuprofen (ADVIL,MOTRIN) 800 MG tablet Take 1 tablet (800 mg total) by mouth 3 (three) times daily.    lurasidone (LATUDA) 80 mg Tab tablet Take 80 mg by mouth once daily.    metoprolol succinate (TOPROL-XL) 25 MG 24 hr tablet Take 25 mg by mouth once daily.     MULTIVIT-IRON-MIN-FOLIC ACID 3,500-18-0.4 UNIT-MG-MG ORAL CHEW Take 1 tablet by mouth once daily.     nitrofurantoin, macrocrystal-monohydrate, (MACROBID) 100 MG capsule Take 1 capsule (100 mg total) by mouth every evening.    nystatin-triamcinolone (MYCOLOG II) cream Apply topically 2 (two) times daily.    oxybutynin (DITROPAN-XL) 10 MG 24 hr tablet Take 1 tablet (10 mg total) by mouth once daily.    phentermine (ADIPEX-P) 37.5 mg tablet Take 37.5 mg by mouth before breakfast.    PROTONIX 40 mg tablet Take 1 tablet (40 mg total) by mouth once daily. On an empty stomach and 30-45 minutes before breakfast    spironolactone (ALDACTONE) 25 MG tablet TAKE1 TABLET BY MOUTH AT BEDTIME    SYNTHROID 50 mcg tablet Take 50 mcg by mouth before breakfast.     TANZEUM 50 mg/0.5 mL PnIj Inject 50 mg into the skin once a week. On friday    TOPAMAX 25 mg tablet Take 25 mg by mouth once daily.     ZOLOFT 100 mg tablet Take 200 mg by mouth once daily.      No current facility-administered medications for this visit.        ROS:  As per HPI.      Exam  Visit Vitals    LMP 11/17/2012     General: alert and oriented, no acute distress  Respiratory: normal respiratory effort  Abd: soft, non-tender, non-distended    Pelvic  Ext. Genitalia: normal external genitalia. Normal bartholin's and  skeens glands  Vagina: -- atrophy. Normal vaginal mucosa without lesions. Few clots noted in vagina.No discharge noted.  Incison with sutures intact.  Non-tender bladder base without palpable mass.  Cervix: absent  Uterus:  absent   Urethra: no masses or tenderness  Urethral meatus: no lesions, caruncle or prolapse.    Dr. Ventura present during exam.    Impression  1. Post-operative state     2. Post-op pain  ibuprofen (ADVIL,MOTRIN) 800 MG tablet   3. Incomplete bladder emptying       We reviewed the above issues and discussed options for short-term versus long-term management of her problems.   Plan:   1. Post op healing appropriately-- continue post op instructions.   2. Will keep cheng in until next week.   3. She will follow up with us in 1 weeks.  30 minutes were spent in face to face time with this patient  75 % of this time was spent in counseling and/or coordination of care    TRACEY Ryan-BC [facility]  Division of Female Pelvic Medicine and Reconstructive Surgery  Department of Obstetrics & Gynecology

## 2017-01-25 NOTE — DISCHARGE INSTRUCTIONS
"  Headache, Unspecified    Headaches can be caused by a number of things. The cause of your headache isnt clear. But it doesnt seem to be a sign of any serious illness.  You could have a tension headache or a migraine headache.  Stress can cause a tension headache. This can happen if you tense the muscles of your shoulders, neck, and scalp without knowing it. If this stress lasts long enough, you may develop a tension headache.  It is not clear why migraines occur, but transient factors called" triggers" can raise the risk of having a migraine attack. Migraine triggers may include emotional stress or depression, or by hormone changes during the menstrual cycle. Other triggers include birth control pills and other medicines, alcohol or caffeine, foods with tyramine, eye strain, weather changes, missed meals, and lack of sleep or oversleeping.  Other causes of headache include:  · Viral illness with high fever  · Head injury with concussion  · Sinus, ear, or throat infection  · Dental pain and jaw joint (TMJ) pain  More serious but less common causes of headache include stroke, brain hemorrhage, brain tumor, meningitis, and encephalitis.  Home care  Follow these tips when taking care of yourself at home:  · Dont drive yourself home if you were given pain medicine for your headache. Instead, have someone else drive you home. Try to sleep when you get home. You should feel much better when you wake up.  · Apply heat to the back of your neck to ease a neck muscle spasm. Take care of a migraine headache by putting an ice pack on your forehead or at the base of your skull.  · If you have nausea or vomiting, eat a light diet until your headache eases.  · If you have a migraine headache, use sunglasses when in the daylight or around bright indoor lighting until your symptoms get better. Bright glaring light can make this type of headache worse.  Follow-up care  Follow up with your health care provider if your headache " doesnt get better within the next 24 hours. Talk with your provider If you have frequent headaches. He or she can help figure out a treatment plan. By knowing the earliest signs of headache, and starting treatment right away, you may be able to stop the pain yourself.  When to seek medical advice  Call your health care provider right away if any of these occur:  · Your head pain gets worse  · Your head pain doesnt get better within 24 hours  · You arent able to keep liquids down (repeated vomiting)  · Fever of 100.4ºF (38ºC) or higher, or as directed by your health care provider  · Stiff neck  · Extreme drowsiness, confusion, or fainting  · Dizziness or dizziness with spinning sensation (vertigo)  · Weakness in an arm or leg or one side of your face  · You have difficulty talking or seeing  © 3009-5295 [employer]. [address] [place] [address]. All rights reserved. This information is not intended as a substitute for professional medical care. Always follow your healthcare professional's instructions.

## 2017-01-29 PROBLEM — N39.46 MIXED INCONTINENCE: Status: RESOLVED | Noted: 2017-01-23 | Resolved: 2017-01-29

## 2017-01-30 ENCOUNTER — OFFICE VISIT (OUTPATIENT)
Dept: UROGYNECOLOGY | Facility: CLINIC | Age: 32
End: 2017-01-30
Payer: MEDICAID

## 2017-01-30 VITALS
HEIGHT: 64 IN | SYSTOLIC BLOOD PRESSURE: 104 MMHG | DIASTOLIC BLOOD PRESSURE: 80 MMHG | BODY MASS INDEX: 39.06 KG/M2 | WEIGHT: 228.81 LBS

## 2017-01-30 DIAGNOSIS — Z98.890 POST-OPERATIVE STATE: Primary | ICD-10-CM

## 2017-01-30 DIAGNOSIS — R33.9 INCOMPLETE BLADDER EMPTYING: ICD-10-CM

## 2017-01-30 LAB — POC RESIDUAL URINE VOLUME: 50 ML (ref 0–100)

## 2017-01-30 PROCEDURE — 99214 OFFICE O/P EST MOD 30 MIN: CPT | Mod: PBBFAC | Performed by: NURSE PRACTITIONER

## 2017-01-30 PROCEDURE — 99999 PR PBB SHADOW E&M-EST. PATIENT-LVL IV: CPT | Mod: PBBFAC,,, | Performed by: NURSE PRACTITIONER

## 2017-01-30 PROCEDURE — 99024 POSTOP FOLLOW-UP VISIT: CPT | Mod: ,,, | Performed by: NURSE PRACTITIONER

## 2017-01-30 PROCEDURE — 87086 URINE CULTURE/COLONY COUNT: CPT

## 2017-01-30 PROCEDURE — 51700 IRRIGATION OF BLADDER: CPT | Mod: PBBFAC | Performed by: NURSE PRACTITIONER

## 2017-01-30 PROCEDURE — 51700 IRRIGATION OF BLADDER: CPT | Mod: S$PBB,,, | Performed by: NURSE PRACTITIONER

## 2017-01-30 PROCEDURE — 51798 US URINE CAPACITY MEASURE: CPT | Mod: PBBFAC | Performed by: NURSE PRACTITIONER

## 2017-01-31 NOTE — PROGRESS NOTES
The patient was placed in dorsal lithotomy position with feet in stirrups.  The bladder was filled with 240 mL sterile water to gravity with a 60 mL cheng tipped syringe, at which point she voiced a strong desire to void.  The catheter was removed. She voided 350 cc. Bladder scan showed 50 cc. She tolerated the procedure well.      TRACEY Ryan-BC

## 2017-02-01 LAB — BACTERIA UR CULT: NO GROWTH

## 2017-02-03 ENCOUNTER — DOCUMENTATION ONLY (OUTPATIENT)
Dept: REHABILITATION | Facility: HOSPITAL | Age: 32
End: 2017-02-03

## 2017-02-03 NOTE — PROGRESS NOTES
2/3/2017    Pt has not attended PT sessions since 11/17/2016 and will be discharged from PT services at this time, with goal status unknown.

## 2017-02-04 DIAGNOSIS — R35.0 URINARY FREQUENCY: ICD-10-CM

## 2017-02-04 DIAGNOSIS — N39.46 MIXED URGE AND STRESS INCONTINENCE: ICD-10-CM

## 2017-02-06 ENCOUNTER — OFFICE VISIT (OUTPATIENT)
Dept: UROGYNECOLOGY | Facility: CLINIC | Age: 32
End: 2017-02-06
Payer: MEDICAID

## 2017-02-06 VITALS
HEIGHT: 62 IN | BODY MASS INDEX: 41.38 KG/M2 | SYSTOLIC BLOOD PRESSURE: 108 MMHG | WEIGHT: 224.88 LBS | DIASTOLIC BLOOD PRESSURE: 80 MMHG

## 2017-02-06 DIAGNOSIS — N89.8 VAGINAL DISCHARGE: ICD-10-CM

## 2017-02-06 DIAGNOSIS — Z98.890 POST-OPERATIVE STATE: Primary | ICD-10-CM

## 2017-02-06 DIAGNOSIS — B37.2 YEAST DERMATITIS: ICD-10-CM

## 2017-02-06 DIAGNOSIS — N39.41 URGE INCONTINENCE: ICD-10-CM

## 2017-02-06 LAB
CANDIDA RRNA VAG QL PROBE: NEGATIVE
G VAGINALIS RRNA GENITAL QL PROBE: NEGATIVE
T VAGINALIS RRNA GENITAL QL PROBE: NEGATIVE

## 2017-02-06 PROCEDURE — 99024 POSTOP FOLLOW-UP VISIT: CPT | Mod: ,,, | Performed by: NURSE PRACTITIONER

## 2017-02-06 PROCEDURE — 99999 PR PBB SHADOW E&M-EST. PATIENT-LVL IV: CPT | Mod: PBBFAC,,, | Performed by: NURSE PRACTITIONER

## 2017-02-06 PROCEDURE — 87480 CANDIDA DNA DIR PROBE: CPT

## 2017-02-06 PROCEDURE — 99214 OFFICE O/P EST MOD 30 MIN: CPT | Mod: PBBFAC | Performed by: NURSE PRACTITIONER

## 2017-02-06 RX ORDER — OXYBUTYNIN CHLORIDE 15 MG/1
15 TABLET, EXTENDED RELEASE ORAL DAILY
Qty: 30 TABLET | Refills: 12 | Status: SHIPPED | OUTPATIENT
Start: 2017-02-06 | End: 2017-03-08

## 2017-02-06 RX ORDER — FLUCONAZOLE 150 MG/1
150 TABLET ORAL
Qty: 3 TABLET | Refills: 0 | Status: SHIPPED | OUTPATIENT
Start: 2017-02-06 | End: 2017-02-11

## 2017-02-06 RX ORDER — NYSTATIN AND TRIAMCINOLONE ACETONIDE 100000; 1 [USP'U]/G; MG/G
CREAM TOPICAL 2 TIMES DAILY
Qty: 30 G | Refills: 2 | Status: SHIPPED | OUTPATIENT
Start: 2017-02-06 | End: 2017-06-05 | Stop reason: SDUPTHER

## 2017-02-06 RX ORDER — NYSTATIN 100000 [USP'U]/G
POWDER TOPICAL 2 TIMES DAILY
Qty: 30 G | Refills: 3 | Status: SHIPPED | OUTPATIENT
Start: 2017-02-06 | End: 2018-08-23

## 2017-02-06 NOTE — MR AVS SNAPSHOT
[facility]  [address] [facility] [address]  Phone: [phone]                  Maria Ines Ac   2017 10:00 AM   Office Visit    Description:  Female : 1985   Provider:  Julianna Gomez NP   Department:  [facility]           Reason for Visit     Post-op Evaluation           Diagnoses this Visit        Comments    Vaginal discharge    -  Primary     Yeast dermatitis         Urge incontinence                To Do List           Future Appointments        Provider Department Dept Phone    2017 9:30 AM Julianna Gomez NP [facility] [phone]    3/6/2017 10:00 AM Julianna Gomez NP [facility] [phone]      Goals (5 Years of Data)     None       These Medications        Disp Refills Start End    fluconazole (DIFLUCAN) 150 MG Tab 3 tablet 0 2017    Take 1 tablet (150 mg total) by mouth every 48 hours. - Oral    Pharmacy: [facility] [address] [facility] Ph #: [phone]       nystatin (MYCOSTATIN) powder 30 g 3 2017     Apply topically 2 (two) times daily. - Topical (Top)    Pharmacy: [facility] [address] [facility] Ph #: [phone]       nystatin-triamcinolone (MYCOLOG II) cream 30 g 2 2017     Apply topically 2 (two) times daily. - Topical (Top)    Pharmacy: [facility] [address] AT [facility] Ph #: [phone]       oxybutynin (DITROPAN XL) 15 MG TR24 30 tablet 12 2017 3/8/2017    Take 1 tablet (15 mg total) by mouth once daily. - Oral    Pharmacy: [facility] [address] AT [facility] Ph #: [phone]         [facility]     [facility] Care [facility] - 24/7 Assistance  Registered nurses in the [facility] provide clinical advisement, health education, appointment booking, and other advisory services.  Call for this free service at [phone].             Medications           Message regarding Medications     Verify the changes and/or additions to your medication regime listed below are the same as discussed with your clinician today.  If any of these changes or additions are incorrect, please notify your healthcare provider.        START taking these NEW medications        Refills    fluconazole (DIFLUCAN) 150 MG Tab 0    Sig: Take 1 tablet (150 mg total) by mouth every 48 hours.    Class: Normal    Route: Oral    nystatin (MYCOSTATIN) powder 3    Sig: Apply topically 2 (two) times daily.    Class: Normal    Route: Topical (Top)    nystatin-triamcinolone (MYCOLOG II) cream 2    Sig: Apply topically 2 (two) times daily.    Class: Normal    Route: Topical (Top)    oxybutynin (DITROPAN XL) 15 MG TR24 12    Sig: Take 1 tablet (15 mg total) by mouth once daily.    Class: Normal    Route: Oral           Verify that the below list of medications is an accurate representation of the medications you are currently taking.  If none reported, the list may be blank. If incorrect, please contact your healthcare provider. Carry this list with you in case of emergency.           Current Medications     atorvastatin (LIPITOR) 40 MG tablet Take 40 mg by mouth once daily.    BLOOD SUGAR DIAGNOSTIC (ACCU-CHEK AMAURY PLUS TEST STRP MISC) 1 strip by Misc.(Non-Drug; Combo Route) route 3 (three) times daily.    calcium-vitamin D3 500 mg(1,250mg) -200 unit per tablet Take 1 tablet by mouth 2 (two) times daily with meals.    docusate sodium (COLACE) 100 MG capsule Take 100 mg by mouth 2 (two) times daily.    estradiol (VIVELLE-DOT) 0.1 mg/24 hr PTSW APPLY 1 PATCH EXTERNALLY TO THE SKIN 2 TIMES A WEEK    FARXIGA 10 mg Tab Take 1 tablet by mouth once  "daily.     fish oil-omega-3 fatty acids 300-1,000 mg capsule Take 2 g by mouth once daily.    GLUCOPHAGE 500 mg tablet Take 500 mg by mouth 2 (two) times daily with meals.     hydrocodone-ibuprofen 7.5-200 mg (VICOPROFEN) 7.5-200 mg per tablet Take 1 tablet by mouth every 8 (eight) hours as needed for Pain.    ibuprofen (ADVIL,MOTRIN) 800 MG tablet Take 1 tablet (800 mg total) by mouth 3 (three) times daily.    lurasidone (LATUDA) 80 mg Tab tablet Take 80 mg by mouth once daily.    metoprolol succinate (TOPROL-XL) 25 MG 24 hr tablet Take 25 mg by mouth once daily.     MULTIVIT-IRON-MIN-FOLIC ACID 3,500-18-0.4 UNIT-MG-MG ORAL CHEW Take 1 tablet by mouth once daily.     nystatin-triamcinolone (MYCOLOG II) cream Apply topically 2 (two) times daily.    phentermine (ADIPEX-P) 37.5 mg tablet Take 37.5 mg by mouth before breakfast.    PROTONIX 40 mg tablet Take 1 tablet (40 mg total) by mouth once daily. On an empty stomach and 30-45 minutes before breakfast    spironolactone (ALDACTONE) 25 MG tablet TAKE1 TABLET BY MOUTH AT BEDTIME    SYNTHROID 50 mcg tablet Take 50 mcg by mouth before breakfast.     TANZEUM 50 mg/0.5 mL PnIj Inject 50 mg into the skin once a week. On friday    TOPAMAX 25 mg tablet Take 25 mg by mouth once daily.     ZOLOFT 100 mg tablet Take 200 mg by mouth once daily.     fluconazole (DIFLUCAN) 150 MG Tab Take 1 tablet (150 mg total) by mouth every 48 hours.    nystatin (MYCOSTATIN) powder Apply topically 2 (two) times daily.    nystatin-triamcinolone (MYCOLOG II) cream Apply topically 2 (two) times daily.    oxybutynin (DITROPAN XL) 15 MG TR24 Take 1 tablet (15 mg total) by mouth once daily.           Clinical Reference Information           Your Vitals Were     BP Height Weight Last Period BMI    108/80 (BP Location: Left arm, Patient Position: Sitting) 5' 2" (1.575 m) 102 kg (224 lb 13.9 oz) 11/17/2012 41.13 kg/m2      Blood Pressure          Most Recent Value    BP  108/80      Allergies as of " 2/6/2017     No Known Allergies      Immunizations Administered on Date of Encounter - 2/6/2017     None      Orders Placed During Today's Visit      Normal Orders This Visit    Vaginosis Screen by DNA Probe       [facility] Sign-Up     Activating your [facility] account is as easy as 1-2-3!     1) Visit [email or web address], select Sign Up Now, enter this activation code and your date of birth, then select Next.  [email or web address]  Expires: 2/19/2017 11:45 AM      2) Create a username and password to use when you visit [facility] in the future and select a security question in case you lose your password and select Next.    3) Enter your e-mail address and click Sign Up!    Additional Information  If you have questions, please e-mail [email or web address] or call [phone] to talk to our [facility] staff. Remember, [facility] is NOT to be used for urgent needs. For medical emergencies, dial 911.         Instructions    1. Post op healing appropriately-- continue post op instructions.   2. Skin dermatitis  --diflucan 150 mg every other day for 3 doses  --mycolog II cream to vulva and groin twice daily  --apply nystop cream twice daily to groin--wash and dry between uses  3. Restart oxybutynin xl 15 mg  4. She will follow up with us in 4 weeks.       Language Assistance Services     ATTENTION: Language assistance services are available, free of charge. Please call [phone].      ATENCIÓN: Si habla singh, tiene a muniz disposición servicios gratuitos de asistencia lingüística. Llame al [phone].     CHÚ Ý: N?u b?n nói Ti?ng Vi?t, có các d?ch v? h? tr? ngôn ng? mi?n phí dành cho b?n. G?i s? [phone].         [facility] complies with applicable Federal civil rights laws and does not discriminate on the basis of race, color, national origin, age, disability, or sex.

## 2017-02-06 NOTE — PATIENT INSTRUCTIONS
1. Post op healing appropriately-- continue post op instructions.   2. Skin dermatitis  --diflucan 150 mg every other day for 3 doses  --mycolog II cream to vulva and groin twice daily  --apply nystop cream twice daily to groin--wash and dry between uses  3. Restart oxybutynin xl 15 mg  4. She will follow up with us in 4 weeks.

## 2017-02-06 NOTE — PROGRESS NOTES
"Urogyn follow up  01/25/2017  .  [facility]  [address] [address] [facility] [address]    Maria Ines Ac  [id number]  1985      Maria Ines Ac is a 32 y.o.  here for a post op visit.      Past Medical History   Diagnosis Date    Blood in stool     Constipation     Depression     Diabetes mellitus, type 2     Dysphagia     GERD (gastroesophageal reflux disease)     Hypertension     Palpitations     Premature menopause on hormone replacement therapy     Thyroid disease        Past Surgical History   Procedure Laterality Date    Ooptherectomy      Right knee  scoped    Gallbladder surgery      Shoulder surgery  right    Carpal tunnel release       right    Hysterectomy  2013     Bess velasquez Dr. Champlain    Oophorectomy  2005     LSO- benign cyst    Oophorectomy  2013     RSO- endo    Colonoscopy N/A 8/30/2016     Procedure: COLONOSCOPY;  Surgeon: Slick Herron MD;  Location: [facility] ([address] [place] [address]);  Service: Endoscopy;  Laterality: N/A;  PM prep       History since last visit: Complains of vaginal itching.  "I feel like something is coming out"  Bladder issues: Denies JACINDA, urgency, or frequency.  Has occasional UUI-- she stopped taking oxybutynin  Bowel issues: Denies constipation or straining.      Current Outpatient Prescriptions   Medication Sig    atorvastatin (LIPITOR) 40 MG tablet Take 40 mg by mouth once daily.    BLOOD SUGAR DIAGNOSTIC (ACCU-CHEK AMAURY PLUS TEST STRP MISC) 1 strip by Misc.(Non-Drug; Combo Route) route 3 (three) times daily.    calcium-vitamin D3 500 mg(1,250mg) -200 unit per tablet Take 1 tablet by mouth 2 (two) times daily with meals.    docusate sodium (COLACE) 100 MG capsule Take 100 mg by mouth 2 (two) times daily.    estradiol (VIVELLE-DOT) 0.1 mg/24 hr PTSW APPLY 1 PATCH EXTERNALLY TO THE SKIN 2 TIMES A WEEK (Patient taking differently: APPLY 1 PATCH EXTERNALLY TO THE SKIN 2 TIMES A WEEK ON TUESDAY AND FRIDAY)    FARXIGA " "10 mg Tab Take 1 tablet by mouth once daily.     fish oil-omega-3 fatty acids 300-1,000 mg capsule Take 2 g by mouth once daily.    GLUCOPHAGE 500 mg tablet Take 500 mg by mouth 2 (two) times daily with meals.     hydrocodone-ibuprofen 7.5-200 mg (VICOPROFEN) 7.5-200 mg per tablet Take 1 tablet by mouth every 8 (eight) hours as needed for Pain.    ibuprofen (ADVIL,MOTRIN) 800 MG tablet Take 1 tablet (800 mg total) by mouth 3 (three) times daily.    lurasidone (LATUDA) 80 mg Tab tablet Take 80 mg by mouth once daily.    metoprolol succinate (TOPROL-XL) 25 MG 24 hr tablet Take 25 mg by mouth once daily.     MULTIVIT-IRON-MIN-FOLIC ACID 3,500-18-0.4 UNIT-MG-MG ORAL CHEW Take 1 tablet by mouth once daily.     nystatin-triamcinolone (MYCOLOG II) cream Apply topically 2 (two) times daily.    phentermine (ADIPEX-P) 37.5 mg tablet Take 37.5 mg by mouth before breakfast.    PROTONIX 40 mg tablet Take 1 tablet (40 mg total) by mouth once daily. On an empty stomach and 30-45 minutes before breakfast    spironolactone (ALDACTONE) 25 MG tablet TAKE1 TABLET BY MOUTH AT BEDTIME    SYNTHROID 50 mcg tablet Take 50 mcg by mouth before breakfast.     TANZEUM 50 mg/0.5 mL PnIj Inject 50 mg into the skin once a week. On friday    TOPAMAX 25 mg tablet Take 25 mg by mouth once daily.     ZOLOFT 100 mg tablet Take 200 mg by mouth once daily.     nystatin (MYCOSTATIN) powder Apply topically 2 (two) times daily.    nystatin-triamcinolone (MYCOLOG II) cream Apply topically 2 (two) times daily.    oxybutynin (DITROPAN XL) 15 MG TR24 Take 1 tablet (15 mg total) by mouth once daily.     No current facility-administered medications for this visit.        ROS:  As per HPI.      Exam  Visit Vitals    /80 (BP Location: Left arm, Patient Position: Sitting)    Ht 5' 2" (1.575 m)    Wt 102 kg (224 lb 13.9 oz)    LMP 11/17/2012    BMI 41.13 kg/m2     General: alert and oriented, no acute distress  Respiratory: normal " respiratory effort  Abd: soft, non-tender, non-distended    Pelvic  Ext. Genitalia:Vagina with red raised rash extending to groin folds. Normal bartholin's and skeens glands  Vagina: -- atrophy. Normal vaginal mucosa without lesions  No discharge noted.  Incison with sutures intact.  Non-tender bladder base without palpable mass.  Cervix: absent  Uterus:  absent   Urethra: no masses or tenderness  Urethral meatus: no lesions, caruncle or prolapse.     cc.    Dr. Ventura present during exam.        Impression  1. Post-operative state     2. Vaginal discharge  Vaginosis Screen by DNA Probe   3. Yeast dermatitis  fluconazole (DIFLUCAN) 150 MG Tab    nystatin (MYCOSTATIN) powder    nystatin-triamcinolone (MYCOLOG II) cream   4. Urge incontinence  oxybutynin (DITROPAN XL) 15 MG TR24     We reviewed the above issues and discussed options for short-term versus long-term management of her problems.   Plan:   1. Post op healing appropriately-- continue post op instructions.   2. Skin dermatitis  --diflucan 150 mg every other day for 3 doses  --mycolog II cream to vulva and groin twice daily  --apply nystop cream twice daily to groin--wash and dry between uses  3. Restart oxybutynin xl 15 mg  4. She will follow up with us in 4 weeks.  30 minutes were spent in face to face time with this patient  75 % of this time was spent in counseling and/or coordination of care    TRACEY Ryan-BC [facility]  Division of Female Pelvic Medicine and Reconstructive Surgery  Department of Obstetrics & Gynecology

## 2017-02-13 ENCOUNTER — TELEPHONE (OUTPATIENT)
Dept: UROGYNECOLOGY | Facility: CLINIC | Age: 32
End: 2017-02-13

## 2017-02-13 DIAGNOSIS — G89.18 POST-OP PAIN: ICD-10-CM

## 2017-02-13 RX ORDER — IBUPROFEN 800 MG/1
TABLET ORAL
Qty: 30 TABLET | Refills: 0 | Status: SHIPPED | OUTPATIENT
Start: 2017-02-13 | End: 2017-02-21 | Stop reason: SDUPTHER

## 2017-02-13 RX ORDER — OXYBUTYNIN CHLORIDE 10 MG/1
TABLET, EXTENDED RELEASE ORAL
Qty: 30 TABLET | Refills: 11 | Status: SHIPPED | OUTPATIENT
Start: 2017-02-13 | End: 2018-01-15 | Stop reason: CLARIF

## 2017-02-13 NOTE — TELEPHONE ENCOUNTER
Spoke to pt and informed her that the pharmacy stated they had her rx ready for her this morning. Pt voiced understanding and call ended.

## 2017-02-13 NOTE — TELEPHONE ENCOUNTER
----- Message from Benjamin Samuel sent at 2/13/2017  9:36 AM CST -----  Contact: pt  x_ 1st Request   _ 2nd Request   _ 3rd Request     Who: PAUL ARTHUR [[id number]]    Why: pt pharmacy needs a call back in reference to the Rx  fluconazole (DIFLUCAN) 150 MG Tab at [facility] at [phone]    What Number to Call Back: [phone]    When to Expect a call back: (Before the end of the day)   -- if call after 3:00 call back will be tomorrow.

## 2017-02-13 NOTE — TELEPHONE ENCOUNTER
----- Message from Tesha Carey sent at 2/13/2017 11:20 AM CST -----  Contact: mom  Patient is calling to check the status of a prior authorization for her Rx for Flucononaze, Patient states the pharmacy has been waiting to receive the prior authorization. Patient can be reached at [phone].

## 2017-02-15 DIAGNOSIS — K58.0 IRRITABLE BOWEL SYNDROME WITH DIARRHEA: ICD-10-CM

## 2017-02-15 RX ORDER — DICYCLOMINE HYDROCHLORIDE 10 MG/1
CAPSULE ORAL
Qty: 60 CAPSULE | Refills: 0 | Status: SHIPPED | OUTPATIENT
Start: 2017-02-15 | End: 2017-07-07 | Stop reason: SDUPTHER

## 2017-02-20 ENCOUNTER — TELEPHONE (OUTPATIENT)
Dept: UROGYNECOLOGY | Facility: CLINIC | Age: 32
End: 2017-02-20

## 2017-02-20 NOTE — TELEPHONE ENCOUNTER
Returned pt call no answer. Left an voice message for pt to call the office back regarding having pain and scheduling apt.

## 2017-02-20 NOTE — TELEPHONE ENCOUNTER
----- Message from Loy Meyer sent at 2/17/2017  4:31 PM CST -----  Contact: mother  Pt is having pain in lower part of stomach. Pt would like to come in on 02/24/17 in the afternoon. Pt can be reached at [phone].

## 2017-02-20 NOTE — TELEPHONE ENCOUNTER
Left voice message on pts home and cell for pt to give the office a call back at [phone] to get R/s for her PO appointment today or on another date.

## 2017-02-21 DIAGNOSIS — G89.18 POST-OP PAIN: ICD-10-CM

## 2017-02-23 RX ORDER — IBUPROFEN 800 MG/1
TABLET ORAL
Qty: 30 TABLET | Refills: 0 | Status: SHIPPED | OUTPATIENT
Start: 2017-02-23 | End: 2017-03-02 | Stop reason: SDUPTHER

## 2017-03-01 ENCOUNTER — OFFICE VISIT (OUTPATIENT)
Dept: NEUROLOGY | Facility: CLINIC | Age: 32
End: 2017-03-01
Payer: MEDICAID

## 2017-03-01 VITALS
WEIGHT: 229.5 LBS | HEIGHT: 62 IN | HEART RATE: 81 BPM | SYSTOLIC BLOOD PRESSURE: 119 MMHG | BODY MASS INDEX: 42.23 KG/M2 | DIASTOLIC BLOOD PRESSURE: 81 MMHG

## 2017-03-01 DIAGNOSIS — R51.9 NONINTRACTABLE HEADACHE, UNSPECIFIED CHRONICITY PATTERN, UNSPECIFIED HEADACHE TYPE: Primary | ICD-10-CM

## 2017-03-01 PROCEDURE — 99999 PR PBB SHADOW E&M-EST. PATIENT-LVL IV: CPT | Mod: PBBFAC,,, | Performed by: PHYSICIAN ASSISTANT

## 2017-03-01 PROCEDURE — 99213 OFFICE O/P EST LOW 20 MIN: CPT | Mod: S$PBB,,, | Performed by: PHYSICIAN ASSISTANT

## 2017-03-01 PROCEDURE — 99214 OFFICE O/P EST MOD 30 MIN: CPT | Mod: PBBFAC | Performed by: PHYSICIAN ASSISTANT

## 2017-03-01 RX ORDER — AZITHROMYCIN 250 MG/1
TABLET, FILM COATED ORAL
Refills: 0 | COMMUNITY
Start: 2017-02-16 | End: 2017-03-06 | Stop reason: ALTCHOICE

## 2017-03-01 RX ORDER — BENZONATATE 100 MG/1
CAPSULE ORAL
Refills: 1 | COMMUNITY
Start: 2017-02-16 | End: 2017-09-08

## 2017-03-01 RX ORDER — FLUCONAZOLE 150 MG/1
TABLET ORAL
Refills: 0 | COMMUNITY
Start: 2017-02-14 | End: 2017-03-06 | Stop reason: ALTCHOICE

## 2017-03-01 RX ORDER — TOPIRAMATE 25 MG/1
25 TABLET, COATED ORAL DAILY
Qty: 30 TABLET | Refills: 6 | Status: SHIPPED | OUTPATIENT
Start: 2017-03-01 | End: 2017-10-18 | Stop reason: DRUGHIGH

## 2017-03-01 RX ORDER — BLOOD SUGAR DIAGNOSTIC
STRIP MISCELLANEOUS
Refills: 4 | COMMUNITY
Start: 2017-02-04 | End: 2019-05-16

## 2017-03-01 NOTE — PROGRESS NOTES
"[facility], Department of Neurology   [address] [place] [address]  Phone:[phone]  Fax: [phone]    Chief Complaint   Patient presents with    Follow-up         A/P:      ICD-10-CM ICD-9-CM   1. Nonintractable headache, unspecified chronicity pattern, unspecified headache type R51 784.0     Discussion with mother and patient again. States in past 2 weeks having HA lasting 5-8 mins. NO nidus identified. Mother states "do some labs, run some tests." Discussed with her that she has had a full work up in Jan 2017, and if she is feeling light headed/dizzy, despite the normal work up and not taking the medications (i.e., topamax) that was Rx'd, I am unclear what else she is looking for..      The patient's mother states "I didn't know she had all that work up. Okay, I am not concerned anymore."     I refilled her topamax, and gave a 6 month supply. Again, as her HA only last 5-8 minutes, I wouldn't make any medication changes. Will release all care back to her PCP  Including Rxs.         Patient agreed to above plan along with voiced understanding of medications.  F/U: PRN, can follow up with colleagues if needed d/t my departure from the practice.       S/O:   Presents with mother, whom again acts as primary historian. Having HA for past two weeks, lasting 5-8 mins, and resolving spontaneously. States last Friday, was at a resteraunt, had HA, went to stand up and felt dizzy/light headed. No vision changes, falls, confusion, etc mentioned. States she has been drinking fluids and eating all day. No pyrexia or illness. When asked, her mother states "I came because she needed topamax, and we were told we needed an appt." Asked when she went off topamax, both the patient and her mother didn't know. Patient's mother states "I am worried about her, and no one is doing anything, she hasn't had imaging or labs in forever" (to which I showed her the chart including recent labs and " "imaging).    Medications and allergies are up to date.     Reviewed her CBC/CMP/TSH and CT head (1/24/17): all unremarkable.     /81  Pulse 81  Ht 5' 2" (1.575 m)  Wt 104.1 kg (229 lb 8 oz)  LMP 11/17/2012  BMI 41.98 kg/m2    NAD  NC/AT  AOx3, speech fluent, affect flat  EOMI, VFF,  no facial weakness, shoulder shrug symmetrical, tongue in midline  5/5 bilat UE/LE  Patellar 1+ bilat  Gait is NOT wide based         Raul Chacon MPA, PA-C     03/01/2017 1:31 PM    CC: Luann Raymond MD        "

## 2017-03-01 NOTE — LETTER
March 1, 2017      Luann Raymond MD  [address]  [place] [address]           [facility]  [address]  [place] [address]  Phone: [phone]  Fax: [phone]          Patient: Maria Ines Ac   MR Number: [id number]   YOB: 1985   Date of Visit: 3/1/2017       Dear Dr. Luann Raymond:    Thank you for referring Maria Ines Ac to me for evaluation. Attached you will find relevant portions of my assessment and plan of care.    If you have questions, please do not hesitate to call me. I look forward to following Maria Ines Ac along with you.    Sincerely,    Raul Chacon PA-C    Enclosure  CC:  No Recipients    If you would like to receive this communication electronically, please contact [email or web address] or [phone] to request more information on [employer] access.    For providers and/or their staff who would like to refer a patient to [facility], please contact us through our one-stop-shop provider referral line, [facility], at [phone].    If you feel you have received this communication in error or would no longer like to receive these types of communications, please e-mail [email or web address]

## 2017-03-02 DIAGNOSIS — G89.18 POST-OP PAIN: ICD-10-CM

## 2017-03-06 ENCOUNTER — OFFICE VISIT (OUTPATIENT)
Dept: UROGYNECOLOGY | Facility: CLINIC | Age: 32
End: 2017-03-06
Payer: MEDICAID

## 2017-03-06 VITALS
SYSTOLIC BLOOD PRESSURE: 126 MMHG | DIASTOLIC BLOOD PRESSURE: 70 MMHG | BODY MASS INDEX: 42.63 KG/M2 | WEIGHT: 231.69 LBS | HEIGHT: 62 IN

## 2017-03-06 DIAGNOSIS — R33.9 INCOMPLETE BLADDER EMPTYING: ICD-10-CM

## 2017-03-06 DIAGNOSIS — N89.8 VAGINAL DISCHARGE: ICD-10-CM

## 2017-03-06 DIAGNOSIS — N39.46 MIXED INCONTINENCE: Primary | ICD-10-CM

## 2017-03-06 DIAGNOSIS — B37.31 YEAST VAGINITIS: ICD-10-CM

## 2017-03-06 LAB
BILIRUB SERPL-MCNC: NORMAL MG/DL
BLOOD URINE, POC: NORMAL
CANDIDA RRNA VAG QL PROBE: POSITIVE
COLOR, POC UA: YELLOW
G VAGINALIS RRNA GENITAL QL PROBE: NEGATIVE
GLUCOSE UR QL STRIP: 2
KETONES UR QL STRIP: NORMAL
LEUKOCYTE ESTERASE URINE, POC: NORMAL
NITRITE, POC UA: NORMAL
PH, POC UA: 5
PROTEIN, POC: NORMAL
SPECIFIC GRAVITY, POC UA: 1
T VAGINALIS RRNA GENITAL QL PROBE: NEGATIVE
UROBILINOGEN, POC UA: NORMAL

## 2017-03-06 PROCEDURE — 99215 OFFICE O/P EST HI 40 MIN: CPT | Mod: PBBFAC | Performed by: NURSE PRACTITIONER

## 2017-03-06 PROCEDURE — 99213 OFFICE O/P EST LOW 20 MIN: CPT | Mod: 24,S$PBB,, | Performed by: NURSE PRACTITIONER

## 2017-03-06 PROCEDURE — 99999 PR PBB SHADOW E&M-EST. PATIENT-LVL V: CPT | Mod: PBBFAC,,, | Performed by: NURSE PRACTITIONER

## 2017-03-06 PROCEDURE — 87480 CANDIDA DNA DIR PROBE: CPT

## 2017-03-06 PROCEDURE — 81002 URINALYSIS NONAUTO W/O SCOPE: CPT | Mod: PBBFAC | Performed by: NURSE PRACTITIONER

## 2017-03-06 PROCEDURE — 87086 URINE CULTURE/COLONY COUNT: CPT

## 2017-03-06 RX ORDER — IBUPROFEN 800 MG/1
TABLET ORAL
Qty: 30 TABLET | Refills: 0 | Status: SHIPPED | OUTPATIENT
Start: 2017-03-06 | End: 2017-06-05

## 2017-03-06 RX ORDER — AMOXICILLIN 500 MG/1
CAPSULE ORAL
Refills: 0 | COMMUNITY
Start: 2017-02-27 | End: 2017-06-05

## 2017-03-06 RX ORDER — FLUCONAZOLE 150 MG/1
150 TABLET ORAL
Qty: 3 TABLET | Refills: 0 | Status: SHIPPED | OUTPATIENT
Start: 2017-03-06 | End: 2017-03-11

## 2017-03-06 NOTE — MR AVS SNAPSHOT
[facility]  [address] [address]  Phone: [phone]                  Maria Ines Ac   3/6/2017 10:00 AM   Office Visit    Description:  Female : 1985   Provider:  Julianna Gomez NP   Department:  [facility]           Reason for Visit     Post-op Evaluation           Diagnoses this Visit        Comments    Mixed incontinence    -  Primary     Yeast vaginitis         Incomplete bladder emptying         Vaginal discharge                To Do List           Future Appointments        Provider Department Dept Phone    4/3/2017 11:30 AM Julianna Gomez NP [facility] [phone]      Goals (5 Years of Data)     None      Follow-Up and Disposition     Return in about 4 weeks (around 4/3/2017).       These Medications        Disp Refills Start End    fluconazole (DIFLUCAN) 150 MG Tab 3 tablet 0 3/6/2017 3/11/2017    Take 1 tablet (150 mg total) by mouth every 48 hours. - Oral    [facility]: [facility] [address] [place] [address] AT [facility] #: [phone]         [facility]     [facility] -  Assistance  Registered nurses in the [facility] provide clinical advisement, health education, appointment booking, and other advisory services.  Call for this free service at [phone].             Medications           Message regarding Medications     Verify the changes and/or additions to your medication regime listed below are the same as discussed with your clinician today.  If any of these changes or additions are incorrect, please notify your healthcare provider.        START taking these NEW medications        Refills    fluconazole (DIFLUCAN) 150 MG Tab 0    Sig: Take 1 tablet (150 mg total) by mouth every 48 hours.    Class: Normal    Route: Oral      STOP taking these medications     azithromycin  (Z-ANUSHKA) 250 MG tablet     hydrocodone-ibuprofen 7.5-200 mg (VICOPROFEN) 7.5-200 mg per tablet Take 1 tablet by mouth every 8 (eight) hours as needed for Pain.    TANZEUM 50 mg/0.5 mL PnIj Inject 50 mg into the skin once a week. On friday           Verify that the below list of medications is an accurate representation of the medications you are currently taking.  If none reported, the list may be blank. If incorrect, please contact your healthcare provider. Carry this list with you in case of emergency.           Current Medications     ACCU-CHEK AMAURY PLUS TEST STRP Strp USE TO TEST BLOOD GLUCOSE TID    amoxicillin (AMOXIL) 500 MG capsule TK ONE C PO  TID TAT    atorvastatin (LIPITOR) 40 MG tablet Take 40 mg by mouth once daily.    BLOOD SUGAR DIAGNOSTIC (ACCU-CHEK AMAURY PLUS TEST STRP MISC) 1 strip by Misc.(Non-Drug; Combo Route) route 3 (three) times daily.    calcium-vitamin D3 500 mg(1,250mg) -200 unit per tablet Take 1 tablet by mouth 2 (two) times daily with meals.    dicyclomine (BENTYL) 10 MG capsule TAKE ONE CAPSULE BY MOUTH TWICE DAILY    docusate sodium (COLACE) 100 MG capsule Take 100 mg by mouth 2 (two) times daily.    dulaglutide (TRULICITY) 0.75 mg/0.5 mL PnIj Inject 0.75 mg into the skin every 7 days.    estradiol (VIVELLE-DOT) 0.1 mg/24 hr PTSW APPLY 1 PATCH EXTERNALLY TO THE SKIN 2 TIMES A WEEK    FARXIGA 10 mg Tab Take 1 tablet by mouth once daily.     fish oil-omega-3 fatty acids 300-1,000 mg capsule Take 2 g by mouth once daily.    GLUCOPHAGE 500 mg tablet Take 500 mg by mouth 2 (two) times daily with meals.     ibuprofen (ADVIL,MOTRIN) 800 MG tablet TAKE 1 TABLET BY MOUTH THREE TIMES DAILY    lurasidone (LATUDA) 80 mg Tab tablet Take 80 mg by mouth once daily.    metoprolol succinate (TOPROL-XL) 25 MG 24 hr tablet Take 25 mg by mouth once daily.     MULTIVIT-IRON-MIN-FOLIC ACID 3,500-18-0.4 UNIT-MG-MG ORAL CHEW Take 1 tablet by mouth once daily.     nystatin (MYCOSTATIN) powder Apply topically  "2 (two) times daily.    nystatin-triamcinolone (MYCOLOG II) cream Apply topically 2 (two) times daily.    oxybutynin (DITROPAN XL) 15 MG TR24 Take 1 tablet (15 mg total) by mouth once daily.    phentermine (ADIPEX-P) 37.5 mg tablet Take 37.5 mg by mouth before breakfast.    PROTONIX 40 mg tablet Take 1 tablet (40 mg total) by mouth once daily. On an empty stomach and 30-45 minutes before breakfast    spironolactone (ALDACTONE) 25 MG tablet TAKE1 TABLET BY MOUTH AT BEDTIME    SYNTHROID 50 mcg tablet Take 50 mcg by mouth before breakfast.     TOPAMAX 25 mg tablet Take 1 tablet (25 mg total) by mouth once daily. 1 pill at bedtime    ZOLOFT 100 mg tablet Take 200 mg by mouth once daily.     benzonatate (TESSALON) 100 MG capsule TK 1 C PO TID PRF COUGH    fluconazole (DIFLUCAN) 150 MG Tab Take 1 tablet (150 mg total) by mouth every 48 hours.    oxybutynin (DITROPAN-XL) 10 MG 24 hr tablet TAKE 1 TABLET BY MOUTH EVERY DAY           Clinical Reference Information           Your Vitals Were     BP Height Weight Last Period BMI    126/70 5' 2" (1.575 m) 105.1 kg (231 lb 11.3 oz) 11/17/2012 42.38 kg/m2      Blood Pressure          Most Recent Value    BP  126/70      Allergies as of 3/6/2017     No Known Allergies      Immunizations Administered on Date of Encounter - 3/6/2017     None      Orders Placed During Today's Visit      Normal Orders This Visit    Ambulatory consult to Physical Therapy     POCT URINE DIPSTICK WITHOUT MICROSCOPE     Urine culture     Vaginosis Screen by DNA Probe          3/6/2017 10:37 AM - Phong Herron      Component Results     Component    Color    yellow    Spec Grav    1.000    pH, UA    5    WBC, UA    neg    Nitrite    neg    Protein    neg    Glucose, UA    2    Ketones, UA    neg    Urobilinogen    neg    Bilirubin    neg    Blood, UA    neg            Instructions    1. Post op healing appropriately-- continue post op instructions.   2. Yeast vaginitis  --diflucan 150 mg every other day for 3 " doses  3. Continue oxybutynin xl 15 mg  4. Stop wearing pads-- use panty liner instead  5. Start pelvic floor PT.  Gabby Quintero or Lauren Shepley ([facility]).  (p) [phone].   6. She will follow up with us in 4 weeks.       Language Assistance Services     ATTENTION: Language assistance services are available, free of charge. Please call [phone].      ATENCIÓN: Si habla español, tiene a muniz disposición servicios gratuitos de asistencia lingüística. Llame al [phone].     CHÚ Ý: N?u b?n nói Ti?ng Vi?t, có các d?ch v? h? tr? ngôn ng? mi?n phí dành cho b?n. G?i s? [phone].         [facility] complies with applicable Federal civil rights laws and does not discriminate on the basis of race, color, national origin, age, disability, or sex.

## 2017-03-06 NOTE — PROGRESS NOTES
Urogyn follow up  03/06/2017  .  [facility]  [address] [facility] [address]    Maria Ines Ac  [id number]  1985      Maria Ines Ac is a 32 y.o.  here for a post op visit.      Past Medical History:   Diagnosis Date    Blood in stool     Constipation     Depression     Diabetes mellitus, type 2     Dysphagia     GERD (gastroesophageal reflux disease)     Hypertension     Palpitations     Premature menopause on hormone replacement therapy     Thyroid disease        Past Surgical History:   Procedure Laterality Date    BLADDER SUSPENSION      CARPAL TUNNEL RELEASE      right    COLONOSCOPY N/A 8/30/2016    Procedure: COLONOSCOPY;  Surgeon: Slick Herron MD;  Location: [facility] ([address] [place] [address]);  Service: Endoscopy;  Laterality: N/A;  PM prep    GALLBLADDER SURGERY      HYSTERECTOMY  2013    Bess velasquez Dr. Champlain    OOPHORECTOMY  2005    LSO- benign cyst    OOPHORECTOMY  2013    RSO- endo    ooptherectomy      right knee  scoped    SHOULDER SURGERY  right       History since last visit:Denies pain or bleeding.  Bladder issues: 3 episodes of UUI in the past week. Does not always feel like she is emptying her bladder. Taking oxybutynin.  Bowel issues: Denies constipation or straining.      Current Outpatient Prescriptions   Medication Sig    ACCU-CHEK AMAURY PLUS TEST STRP Strp USE TO TEST BLOOD GLUCOSE TID    amoxicillin (AMOXIL) 500 MG capsule TK ONE C PO  TID TAT    atorvastatin (LIPITOR) 40 MG tablet Take 40 mg by mouth once daily.    BLOOD SUGAR DIAGNOSTIC (ACCU-CHEK AMAURY PLUS TEST STRP MISC) 1 strip by Misc.(Non-Drug; Combo Route) route 3 (three) times daily.    calcium-vitamin D3 500 mg(1,250mg) -200 unit per tablet Take 1 tablet by mouth 2 (two) times daily with meals.    dicyclomine (BENTYL) 10 MG capsule TAKE ONE CAPSULE BY MOUTH TWICE DAILY    docusate sodium (COLACE) 100 MG capsule Take 100 mg by mouth 2 (two) times daily.     dulaglutide (TRULICITY) 0.75 mg/0.5 mL PnIj Inject 0.75 mg into the skin every 7 days.    estradiol (VIVELLE-DOT) 0.1 mg/24 hr PTSW APPLY 1 PATCH EXTERNALLY TO THE SKIN 2 TIMES A WEEK (Patient taking differently: APPLY 1 PATCH EXTERNALLY TO THE SKIN 2 TIMES A WEEK ON TUESDAY AND FRIDAY)    FARXIGA 10 mg Tab Take 1 tablet by mouth once daily.     fish oil-omega-3 fatty acids 300-1,000 mg capsule Take 2 g by mouth once daily.    GLUCOPHAGE 500 mg tablet Take 500 mg by mouth 2 (two) times daily with meals.     ibuprofen (ADVIL,MOTRIN) 800 MG tablet TAKE 1 TABLET BY MOUTH THREE TIMES DAILY    lurasidone (LATUDA) 80 mg Tab tablet Take 80 mg by mouth once daily.    metoprolol succinate (TOPROL-XL) 25 MG 24 hr tablet Take 25 mg by mouth once daily.     MULTIVIT-IRON-MIN-FOLIC ACID 3,500-18-0.4 UNIT-MG-MG ORAL CHEW Take 1 tablet by mouth once daily.     nystatin (MYCOSTATIN) powder Apply topically 2 (two) times daily.    nystatin-triamcinolone (MYCOLOG II) cream Apply topically 2 (two) times daily.    oxybutynin (DITROPAN XL) 15 MG TR24 Take 1 tablet (15 mg total) by mouth once daily.    phentermine (ADIPEX-P) 37.5 mg tablet Take 37.5 mg by mouth before breakfast.    PROTONIX 40 mg tablet Take 1 tablet (40 mg total) by mouth once daily. On an empty stomach and 30-45 minutes before breakfast    spironolactone (ALDACTONE) 25 MG tablet TAKE1 TABLET BY MOUTH AT BEDTIME    SYNTHROID 50 mcg tablet Take 50 mcg by mouth before breakfast.     TOPAMAX 25 mg tablet Take 1 tablet (25 mg total) by mouth once daily. 1 pill at bedtime    ZOLOFT 100 mg tablet Take 200 mg by mouth once daily.     benzonatate (TESSALON) 100 MG capsule TK 1 C PO TID PRF COUGH    fluconazole (DIFLUCAN) 150 MG Tab Take 1 tablet (150 mg total) by mouth every 48 hours.    oxybutynin (DITROPAN-XL) 10 MG 24 hr tablet TAKE 1 TABLET BY MOUTH EVERY DAY     No current facility-administered medications for this visit.        ROS:  As per HPI.   "    Exam  /70  Ht 5' 2" (1.575 m)  Wt 105.1 kg (231 lb 11.3 oz)  LMP 11/17/2012  BMI 42.38 kg/m2  General: alert and oriented, no acute distress  Respiratory: normal respiratory effort  Abd: soft, non-tender, non-distended    Pelvic  Ext. Genitalia:Normal labia without lesions. Normal bartholin's and skeens glands  Vagina: -- atrophy. Normal vaginal mucosa without lesions Thick white discharge noted.  Incison healing with few sutures intact.  Non-tender bladder base without palpable mass.  Cervix: absent  Uterus:  absent   Urethra: no masses or tenderness  Urethral meatus: no lesions, caruncle or prolapse.     cc.    Dr. Ventura present during exam.        Impression  1. Mixed incontinence  POCT URINE DIPSTICK WITHOUT MICROSCOPE    Ambulatory consult to Physical Therapy   2. Yeast vaginitis  Ambulatory consult to Physical Therapy    fluconazole (DIFLUCAN) 150 MG Tab   3. Incomplete bladder emptying     4. Vaginal discharge  Vaginosis Screen by DNA Probe     We reviewed the above issues and discussed options for short-term versus long-term management of her problems.   Plan:   1. Post op healing appropriately-- continue post op instructions.   2. Yeast vaginitis  --diflucan 150 mg every other day for 3 doses  3. Continue oxybutynin xl 15 mg  4. Stop wearing pads-- use panty liner instead  5. Start pelvic floor PT.  Gabby Quintero or [facility] ([facility]  (p) [phone].   6. She will follow up with us in 4 weeks.      30 minutes were spent in face to face time with this patient  75 % of this time was spent in counseling and/or coordination of care    TRACEY Ryan-BC [facility]  Division of Female Pelvic Medicine and Reconstructive Surgery  Department of Obstetrics & Gynecology  "

## 2017-03-06 NOTE — PATIENT INSTRUCTIONS
1. Post op healing appropriately-- continue post op instructions.   2. Yeast vaginitis  --diflucan 150 mg every other day for 3 doses  3. Continue oxybutynin xl 15 mg  4. Stop wearing pads-- use panty liner instead  5. Start pelvic floor PT.  Gabby Quintero or Lauren Shepley ([facility]).  (p) [phone].   6. She will follow up with us in 4 weeks.

## 2017-03-07 LAB — BACTERIA UR CULT: NO GROWTH

## 2017-03-20 ENCOUNTER — CLINICAL SUPPORT (OUTPATIENT)
Dept: REHABILITATION | Facility: HOSPITAL | Age: 32
End: 2017-03-20
Attending: NURSE PRACTITIONER
Payer: MEDICAID

## 2017-03-20 DIAGNOSIS — E11.9 TYPE 2 DIABETES MELLITUS WITHOUT COMPLICATION: ICD-10-CM

## 2017-03-20 DIAGNOSIS — M62.838 SPASM OF MUSCLE: ICD-10-CM

## 2017-03-20 DIAGNOSIS — N81.89 WEAKNESS OF PELVIC FLOOR: Primary | ICD-10-CM

## 2017-03-20 DIAGNOSIS — N39.8 DYSFUNCTIONAL VOIDING OF URINE: ICD-10-CM

## 2017-03-20 PROCEDURE — 97163 PT EVAL HIGH COMPLEX 45 MIN: CPT | Mod: PO | Performed by: PHYSICAL THERAPIST

## 2017-03-20 PROCEDURE — 97110 THERAPEUTIC EXERCISES: CPT | Mod: PO | Performed by: PHYSICAL THERAPIST

## 2017-03-20 NOTE — PROGRESS NOTES
Patient[facility]   [facility] #:  [id number]    Date of treatment: 03/20/2017   Time in: 4:10  Time out: 5:00  # Visits: 1/12  Auth: (13) 12/31/7  POC expiration: 6/20/17    Outpatient Physical Therapy   Re-evaluation    Maria Ines is a 32 y.o. female evaluated on 03/20/2017    Physician:  Julianna Gomez NP   Diagnosis:   Encounter Diagnoses   Name Primary?    Spasm of muscle     Dysfunctional voiding of urine     Weakness of pelvic floor Yes        Treatment ordered: Physical Therapy     History     Medical History:   Past Medical History:   Diagnosis Date    Blood in stool     Constipation     Depression     Diabetes mellitus, type 2     Dysphagia     GERD (gastroesophageal reflux disease)     Hypertension     Palpitations     Premature menopause on hormone replacement therapy     Thyroid disease         Surgical History:   Past Surgical History:   Procedure Laterality Date    BLADDER SUSPENSION      CARPAL TUNNEL RELEASE      right    COLONOSCOPY N/A 8/30/2016    Procedure: COLONOSCOPY;  Surgeon: Slick Herron MD;  Location: [facility] ([address] [place] [address]);  Service: Endoscopy;  Laterality: N/A;  PM prep    GALLBLADDER SURGERY      HYSTERECTOMY  2013    Bess velasquez Dr. Champlain    OOPHORECTOMY  2005    LSO- benign cyst    OOPHORECTOMY  2013    RSO- endo    ooptherectomy      right knee  scoped    SHOULDER SURGERY  right        Medications:   Current Outpatient Prescriptions   Medication Sig    ACCU-CHEK AMAURY PLUS TEST STRP Strp USE TO TEST BLOOD GLUCOSE TID    amoxicillin (AMOXIL) 500 MG capsule TK ONE C PO  TID TAT    atorvastatin (LIPITOR) 40 MG tablet Take 40 mg by mouth once daily.    benzonatate (TESSALON) 100 MG capsule TK 1 C PO TID PRF COUGH    BLOOD SUGAR DIAGNOSTIC (ACCU-CHEK AMAURY PLUS TEST STRP MISC) 1 strip by Misc.(Non-Drug; Combo Route) route 3 (three) times daily.    calcium-vitamin D3 500 mg(1,250mg) -200 unit per tablet Take 1 tablet by mouth 2 (two)  times daily with meals.    dicyclomine (BENTYL) 10 MG capsule TAKE ONE CAPSULE BY MOUTH TWICE DAILY    docusate sodium (COLACE) 100 MG capsule Take 100 mg by mouth 2 (two) times daily.    dulaglutide (TRULICITY) 0.75 mg/0.5 mL PnIj Inject 0.75 mg into the skin every 7 days.    estradiol (VIVELLE-DOT) 0.1 mg/24 hr PTSW APPLY 1 PATCH EXTERNALLY TO THE SKIN 2 TIMES A WEEK (Patient taking differently: APPLY 1 PATCH EXTERNALLY TO THE SKIN 2 TIMES A WEEK ON TUESDAY AND FRIDAY)    FARXIGA 10 mg Tab Take 1 tablet by mouth once daily.     fish oil-omega-3 fatty acids 300-1,000 mg capsule Take 2 g by mouth once daily.    GLUCOPHAGE 500 mg tablet Take 500 mg by mouth 2 (two) times daily with meals.     ibuprofen (ADVIL,MOTRIN) 800 MG tablet TAKE 1 TABLET BY MOUTH THREE TIMES DAILY    lurasidone (LATUDA) 80 mg Tab tablet Take 80 mg by mouth once daily.    metoprolol succinate (TOPROL-XL) 25 MG 24 hr tablet Take 25 mg by mouth once daily.     MULTIVIT-IRON-MIN-FOLIC ACID 3,500-18-0.4 UNIT-MG-MG ORAL CHEW Take 1 tablet by mouth once daily.     nystatin (MYCOSTATIN) powder Apply topically 2 (two) times daily.    nystatin-triamcinolone (MYCOLOG II) cream Apply topically 2 (two) times daily.    oxybutynin (DITROPAN-XL) 10 MG 24 hr tablet TAKE 1 TABLET BY MOUTH EVERY DAY    phentermine (ADIPEX-P) 37.5 mg tablet Take 37.5 mg by mouth before breakfast.    PROTONIX 40 mg tablet Take 1 tablet (40 mg total) by mouth once daily. On an empty stomach and 30-45 minutes before breakfast    spironolactone (ALDACTONE) 25 MG tablet TAKE1 TABLET BY MOUTH AT BEDTIME    SYNTHROID 50 mcg tablet Take 50 mcg by mouth before breakfast.     TOPAMAX 25 mg tablet Take 1 tablet (25 mg total) by mouth once daily. 1 pill at bedtime    ZOLOFT 100 mg tablet Take 200 mg by mouth once daily.      No current facility-administered medications for this visit.        Allergies: Review of patient's allergies indicates:  No Known Allergies  "    OB/GYN History: ELISSA-BSO due to endometriosis and PCOS, recurrent vaginitis, vaginal atrophy,   Bladder/Bowel History: trouble emptying bladder completely, urinary incontinence and urethral hypermobility    Precautions: universal        Subjective     Pt is s/p retropubic sling 17. Since Sx, pt reports no pain with vaginal exams or BMs. Pt is now not fully emptying well with good sensation of incomplete emptying. Urinary leakage complete resolved with Sx.     Pain: Patient reports 0/10 with 0 being the lowest and 10 being the highest.     Previous treatment included PFPT 2 visits last year    Frequency of Urination: Daytime: every 30-60 min        Nighttime: 5, pt inconsistent answering if wakes to urge or not, pt wakes dry    Urine Stream: strong    Bladder Leakage: No    Frequency of Bowel Movements: once a day  Blanco Stool Chart: Type(s) 7 - new since Sx    Colon Leakage: No    Form of Protection: pad  Number of Pads required in 24 hours: 2 - just in case    Types of Fluid Intake: water, coke zero   Current Exercise: "sometimes walk"    Occupation: Pt is not working.     PLOF: Pt was independent with all ADLs and iADLs without pain, ambulating without pain or deviation.    Patient's Goals: empty bladder better    Objective     ORTHO SCREEN  Posture: decreased lumbar lordosis and forward head     Pelvic Alignment: no deviations noted in supine  SFMA Screen: Notable for DNA    ABDOMINALS  Scarring: abdominal incision to L with superficial restrictions. Vertical and horizontal lower abdominal incisions with superficial and deep restrictions, worse at intersection.    Palpation: tenderness over surgical area     Diastasis: 4/4.5/4 finger widths   Abdominal strength: Rectus 1/5     Transverse trace palpation, inconsistent       VAGINAL PELVIC FLOOR EXAM  EXTERNAL ASSESSMENT  Skin Condition: redness noted  Scarring: none  Sensation: WNL  Pain: Q-tip testing as follows: entire vestibule  Introitus: WNL "   Voluntary Contraction: nil  Voluntary Relaxation: nil  Involuntary Contraction: nil  Bearing Down: reflex tightening  Perineal Descent: DNA      INTERNAL ASSESSMENT  Pain: tender areas noted as follows: throughout, worse of post pubic rami where anchors attach  Sensation: able to localize pressure appropriately    Vaginal Vault: WNL  Muscle Bulk: hypertonus  Muscle Power: 0/5  Muscle Endurance: 0 sec  # Reps To Fatigue: n/a    Fast Contractions: n/a     Involuntary Contraction: nil  Bearing Down: nil  Quality of Contraction: slow rise, decreased hold, slow relaxation and incomplete relaxation  Specificity: patient contracts: full body brace   Coordination: tends to hold breath during PFM contration  Prolapse Check: DNA    Comments: pt with dehydrated tissues vaginally    SEMG EVALUATION: Deferred due to time constraints      Functional Limitations Reports - JUSTINE-6  Score: 8/18  Current: 44%  Goal: 6/18   <33%  Category: Other    G-Code Modifiers  CH  0% Impaired, limited, or restricted    CI  19% - 1%  Impaired, limited, or restricted    CJ  20% - 39% Impaired, limited, or restricted    CK  40% - 59% Impaired, limited, or restricted    CL  60% - 79% Impaired, limited, or restricted    CM  80% - 99% Impaired, limited, or restricted    CN  100% Impaired, limited, or restricted        TREATMENT  Therapeutic Exercise x10 min  TPR bilat pubococcygeus  Down training bilat - coordinated with DB      Education: Instructed on general anatomy/physiology of urinary/bowel system; discussed plan of care with patient; instructed in purpose of physical therapy and the  benefits/risks of treatment; instructed in risks of refusing treatment. Patient agreed to treatment plan. Maria Ines demonstrated and verbalized understanding of all instruction and was provided with a handout of HEP (see Patient Instructions).    Assessment     This is a 32 y.o. female referred to outpatient physical therapy and presents with a medical diagnosis of  mixed incontinence and demonstrates limitations as described in the problem list. Pt presented today with PFM hypertonicity, excessive PFM weakness, and pain associated with Sx. Pt will benefit from physcial therapy services in order to maximize pain free functional independence. The following goals were discussed with the patient and patient is in agreement with them as to be addressed in the treatment plan. Pt was given a HEP as described in Patient Instruction. Pt verbally understood the instructions as they were given and demonstrated proper form and technique during therapy. Pt was advise to perform these exercises free of pain and to stop performing them if pain occurs.     Prognosis: good    Medical necessity is demonstrated by the following IMPAIRMENTS/PROBLEM LIST:   adhered abdominal scar, poor trunk stability, pelvic floor tenderness, decreased pelvic muscle strength, decreased endurance of the pelvic muscles, decreased phasic ability of the pelvid cmuscles, increased tension of the pelvic muscles, poor quality of pelvic muscle contraction, increased frequency of urination, increased nocturia, incomplete urination, dysfunctional voiding and dysfunctional defecation    Co-morbidities and personal factors: ELISSA-BSO, endometriosis, PCOS, urethral hypermobility, vaginal atrophy, obesity, recurrent vaginitis, bipolar disorder  Activity Limitations: bladder emptying, protection of Sx  Participation restrictions: ADLs/iADLs uninterrupted by excessive urination, sleep uninterrupted by excessive nocturia  Clinical presentation: unstable  Complexity: high    Barriers to Learning: none - questionable emotional maturity  Educational/Spiritual/Cultural needs: none  Environmental Barriers: none noted  Abuse/Neglect: no signs  Nutritional Status: well developed, well nourished  Fall Risk: none    GOALS  Short Term Goals: 1 month  1. Pt will verbalize improved awareness of PFM activity as palpated by PT in order to  improve activity involvement with HEP.  2. Pt will tolerate HEP to improve impairments and independence with ADL's.    Long Term Goals: 3 months  1. Pt will report <33% on JUSTINE-6 to demonstrate a decrease in disability and improvement in independence with functional activities.   2. Able to maintain normal breathing pattern during pelvic floor muscle contraction.,   3. Able to maintain voiding interval of 2-3 hours for driving, movie, or work meeting.,   4. Urinate without crede/valsalva.,   5. Pt. to be I with techniques for double voiding.,   6. Pt to be I with self mgmt. of symptoms.    7. Pt to be I with HEP.    Plan     Pt will be treated by physical therapy 1 time(s) a week for 3 months for Pt Education, HEP, therapeutic exercises, neuromuscular re-education, therapeutic activity, gait training, manual therapy, and modalities (including SEMG) PRN to achieve established goals.     I agree with the above plan of care.  Julianna Gomez, TRACEY-BC

## 2017-03-20 NOTE — PATIENT INSTRUCTIONS
"Home Exercise Program: 03/20/2017      DIAPHRAGMATIC BREATHING     The diaphragm is a dome shaped muscle that forms the floor of the rib cage. It is the most efficient muscle for breathing and relaxation, although most people are not used to using the diaphragm. Diaphragmatic or belly breathing is an important technique to learn because it helps settle down or relax the autonomic nervous system. The correct use of diaphragmatic breathing can help to quiet brain activity resulting in the relaxation of all the muscles and organs of the body. This is accomplished by slow rhythmic breathing concentrated in the diaphragm muscle rather than the chest.    How to do proper relaxation breathing:     Start by lying on your back or reclining in a chair in a relaxed position. Place one hand on your chest and the other on your abdomen.   Relax your jaw by placing your tongue on the roof of your mouth and keeping your teeth slightly apart.    Take a deep breath in through your nose, letting the abdomen expand and rise while you keep your upper chest, neck and shoulders relaxed.    As you breathe out through your mouth, allow your abdomen and chest to fall. Exhale completely.   Remember to breathe slowly.  Do not force your breathing. Do not hold your breath.   Repeat for 10 minutes every day.                        Double Voiding - to be done after initial urine stream has ended    1. Sit comfortably on toilet.  2. Do a body scan - make sure your legs, buttocks, and abdominals are relaxed.  3. Take a couple deep, SLOW breaths to encourage your pelvic floor muscles to DROP.  4. Wait at least 2 minutes to see if a second urine stream begins.     Do not stop your urine stream or push/strain!        FROM HEP2GO:  Supine add stretch 3x30"  "

## 2017-03-23 ENCOUNTER — TELEPHONE (OUTPATIENT)
Dept: UROGYNECOLOGY | Facility: CLINIC | Age: 32
End: 2017-03-23

## 2017-03-23 NOTE — TELEPHONE ENCOUNTER
----- Message from Ileana Quinn MA sent at 3/23/2017  3:02 PM CDT -----  Contact: Ruthie pt mom  Ruthie states Maria Ines is in severe pelvic and vaginal pain. Ruthie would like a call back immediately in regards to this matter. She says Maria Ines was told by physical therapist that there is scar tissue growing over her stitches. Ruthie can be reached at [phone].

## 2017-03-24 ENCOUNTER — OFFICE VISIT (OUTPATIENT)
Dept: UROGYNECOLOGY | Facility: CLINIC | Age: 32
End: 2017-03-24
Payer: MEDICAID

## 2017-03-24 VITALS
BODY MASS INDEX: 42.96 KG/M2 | WEIGHT: 233.44 LBS | SYSTOLIC BLOOD PRESSURE: 120 MMHG | DIASTOLIC BLOOD PRESSURE: 90 MMHG | HEIGHT: 62 IN

## 2017-03-24 DIAGNOSIS — N39.41 URGE INCONTINENCE: ICD-10-CM

## 2017-03-24 DIAGNOSIS — R10.2 VAGINAL PAIN: ICD-10-CM

## 2017-03-24 DIAGNOSIS — N95.2 VAGINAL ATROPHY: Primary | ICD-10-CM

## 2017-03-24 DIAGNOSIS — M62.89 PELVIC FLOOR TENSION: ICD-10-CM

## 2017-03-24 PROCEDURE — 99213 OFFICE O/P EST LOW 20 MIN: CPT | Mod: PBBFAC | Performed by: NURSE PRACTITIONER

## 2017-03-24 PROCEDURE — 99024 POSTOP FOLLOW-UP VISIT: CPT | Mod: ,,, | Performed by: NURSE PRACTITIONER

## 2017-03-24 PROCEDURE — 99999 PR PBB SHADOW E&M-EST. PATIENT-LVL III: CPT | Mod: PBBFAC,,, | Performed by: NURSE PRACTITIONER

## 2017-03-24 RX ORDER — ESTRADIOL 0.1 MG/D
FILM, EXTENDED RELEASE TRANSDERMAL
Refills: 10 | COMMUNITY
Start: 2016-12-18 | End: 2017-06-05 | Stop reason: SDUPTHER

## 2017-03-24 RX ORDER — ATORVASTATIN CALCIUM 40 MG/1
TABLET, FILM COATED ORAL
Refills: 2 | COMMUNITY
Start: 2016-12-20 | End: 2017-06-05 | Stop reason: SDUPTHER

## 2017-03-24 RX ORDER — PANTOPRAZOLE SODIUM 40 MG/1
TABLET, DELAYED RELEASE ORAL
Refills: 11 | COMMUNITY
Start: 2016-12-20 | End: 2017-08-31 | Stop reason: SDUPTHER

## 2017-03-24 RX ORDER — METOPROLOL SUCCINATE 25 MG/1
TABLET, EXTENDED RELEASE ORAL
Refills: 1 | COMMUNITY
Start: 2016-12-20 | End: 2017-06-05 | Stop reason: SDUPTHER

## 2017-03-24 RX ORDER — SPIRONOLACTONE 25 MG/1
TABLET ORAL
Refills: 5 | COMMUNITY
Start: 2016-12-20 | End: 2017-06-05 | Stop reason: SDUPTHER

## 2017-03-24 RX ORDER — METFORMIN HYDROCHLORIDE 500 MG/1
TABLET ORAL
Refills: 4 | COMMUNITY
Start: 2016-12-20 | End: 2017-11-22 | Stop reason: SDUPTHER

## 2017-03-24 RX ORDER — LEVOTHYROXINE SODIUM 50 UG/1
TABLET ORAL
Refills: 8 | COMMUNITY
Start: 2016-12-20 | End: 2020-07-10

## 2017-03-24 NOTE — PATIENT INSTRUCTIONS
1. Post op well healed.   3. Vaginal atrophy:  Estrogen cream 0.5 g nightly x 2 weeks then twice weekly.  4. Continue oxybutynin xl 15 mg  5. Stop wearing pads-- use panty liner instead  6. Continue pelvic floor PT.  Gabby Quintero or Lauren Shepley ([facility]). (p) [phone].   7. Vaginal valium suppository-- use 1-2 times daily-- use before PT.  8. She will follow up with us in 2 weeks.

## 2017-03-24 NOTE — MR AVS SNAPSHOT
[facility]  [address] [address]  Phone: [phone]                  Maria Ines Ac   3/24/2017 10:00 AM   Office Visit    Description:  Female : 1985   Provider:  Julianna Gomez NP   Department:  [facility]           Reason for Visit     Pelvic Pain     Vaginal Pain     Dysuria           Diagnoses this Visit        Comments    Vaginal atrophy    -  Primary            To Do List           Goals (5 Years of Data)     None       These Medications        Disp Refills Start End    conjugated estrogens (PREMARIN) vaginal cream 30 g 0 3/24/2017     Use 0.5 gram of estrogen cream in vagina nightly x 2 weeks, then twice a week thereafter.    Pharmacy: [facility] [address] [address] AT [facility] #: [phone]         [facility] On Call     [facility] -  Assistance  Registered nurses in the [facility] provide clinical advisement, health education, appointment booking, and other advisory services.  Call for this free service at [phone].             Medications           START taking these NEW medications        Refills    conjugated estrogens (PREMARIN) vaginal cream 0    Sig: Use 0.5 gram of estrogen cream in vagina nightly x 2 weeks, then twice a week thereafter.    Class: Normal           Verify that the below list of medications is an accurate representation of the medications you are currently taking.  If none reported, the list may be blank. If incorrect, please contact your healthcare provider. Carry this list with you in case of emergency.           Current Medications     atorvastatin (LIPITOR) 40 MG tablet TK 1 T PO QD    estradiol (VIVELLE-DOT) 0.1 mg/24 hr PTSW NATALEE 1 PA EXT TO THE SKIN 2 TIMES A WEEK    levothyroxine (SYNTHROID) 50 MCG tablet TK 1 T PO QAM    metformin (GLUCOPHAGE) 500 MG tablet TK 1 T PO  BID     "metoprolol succinate (TOPROL-XL) 25 MG 24 hr tablet TK 1 T PO QD    pantoprazole (PROTONIX) 40 MG tablet TK 1 T PO  QD    spironolactone (ALDACTONE) 25 MG tablet TK 1 T PO HS    conjugated estrogens (PREMARIN) vaginal cream Use 0.5 gram of estrogen cream in vagina nightly x 2 weeks, then twice a week thereafter.           Clinical Reference Information           Your Vitals Were     BP Height Weight BMI       120/90 (BP Location: Right arm, Patient Position: Sitting) 5' 2" (1.575 m) 105.9 kg (233 lb 7.5 oz) 42.7 kg/m2       Blood Pressure          Most Recent Value    BP  (!)  120/90      Allergies as of 3/24/2017     No Known Allergies      Immunizations Administered on Date of Encounter - 3/24/2017     None      [facility] Sign-Up     Activating your [facility] account is as easy as 1-2-3!     1) Visit [email or web address], select Sign Up Now, enter this activation code and your date of birth, then select Next.  [email or web address]  Expires: 5/8/2017 11:07 AM      2) Create a username and password to use when you visit [facility] in the future and select a security question in case you lose your password and select Next.    3) Enter your e-mail address and click Sign Up!    Additional Information  If you have questions, please e-mail [email or web address] or call [phone] to talk to our [facility] staff. Remember, [facility] is NOT to be used for urgent needs. For medical emergencies, dial 911.         Instructions    1. Post op well healed.   3. Vaginal atrophy:  Estrogen cream 0.5 g nightly x 2 weeks then twice weekly.  4. Continue oxybutynin xl 15 mg  5. Stop wearing pads-- use panty liner instead  6. Continue pelvic floor PT.  Gabby Quintero or Lauren Shepley ([facility]). (p) [phone].   7. Vaginal valium suppository-- use 1-2 times daily-- use before PT.  8. She will follow up with us in 2 weeks.       Language Assistance Services     ATTENTION: Language assistance services are available, free of charge. Please " call [phone].      ATENCIÓN: Si habla español, tiene a muniz disposición servicios gratuitos de asistencia lingüística. Llame al [phone].     CHÚ Ý: N?u b?n nói Ti?ng Vi?t, có các d?ch v? h? tr? ngôn ng? mi?n phí dành cho b?n. G?i s? [phone].         [facility] complies with applicable Federal civil rights laws and does not discriminate on the basis of race, color, national origin, age, disability, or sex.

## 2017-03-24 NOTE — PROGRESS NOTES
Urogyn follow up  03/24/2017  .  [facility]  [address] [address]     Maria Ines Ac  [id number]  1985        Maria Ines Ac is a 32 y.o.  here for a post op visit.             Past Medical History:   Diagnosis Date    Blood in stool      Constipation      Depression      Diabetes mellitus, type 2      Dysphagia      GERD (gastroesophageal reflux disease)      Hypertension      Palpitations      Premature menopause on hormone replacement therapy      Thyroid disease                 Past Surgical History:   Procedure Laterality Date    BLADDER SUSPENSION        CARPAL TUNNEL RELEASE         right    COLONOSCOPY N/A 8/30/2016     Procedure: COLONOSCOPY; Surgeon: Slick Herron MD; Location: [facility] ([address] [place] [address]); Service: Endoscopy; Laterality: N/A; PM prep    GALLBLADDER SURGERY        HYSTERECTOMY   2013     Bess velasquez Dr. [place]amplain    OOPHORECTOMY   2005     LSO- benign cyst    OOPHORECTOMY   2013     RSO- endo    ooptherectomy        right knee   scoped    SHOULDER SURGERY   right         History since last visit:Denies vaginal bleeding.  Complains of vaginal pain- worse with PT.  Bladder issues: Reports + JACINDA occasionally.  + UUI -- seems to have improved.  Taking oxybutynin.  Bowel issues: Denies constipation or straining.  Complains of pain in lower abdomen and vagina.               Current Outpatient Prescriptions   Medication Sig    ACCU-CHEK AMAURY PLUS TEST STRP Strp USE TO TEST BLOOD GLUCOSE TID    amoxicillin (AMOXIL) 500 MG capsule TK ONE C PO TID TAT    atorvastatin (LIPITOR) 40 MG tablet Take 40 mg by mouth once daily.    BLOOD SUGAR DIAGNOSTIC (ACCU-CHEK AMAURY PLUS TEST STRP MISC) 1 strip by Misc.(Non-Drug; Combo Route) route 3 (three) times daily.    calcium-vitamin D3 500 mg(1,250mg) -200 unit per tablet Take 1 tablet by mouth 2 (two) times daily with meals.    dicyclomine (BENTYL) 10 MG capsule TAKE ONE  CAPSULE BY MOUTH TWICE DAILY    docusate sodium (COLACE) 100 MG capsule Take 100 mg by mouth 2 (two) times daily.    dulaglutide (TRULICITY) 0.75 mg/0.5 mL PnIj Inject 0.75 mg into the skin every 7 days.    estradiol (VIVELLE-DOT) 0.1 mg/24 hr PTSW APPLY 1 PATCH EXTERNALLY TO THE SKIN 2 TIMES A WEEK (Patient taking differently: APPLY 1 PATCH EXTERNALLY TO THE SKIN 2 TIMES A WEEK ON TUESDAY AND FRIDAY)    FARXIGA 10 mg Tab Take 1 tablet by mouth once daily.     fish oil-omega-3 fatty acids 300-1,000 mg capsule Take 2 g by mouth once daily.    GLUCOPHAGE 500 mg tablet Take 500 mg by mouth 2 (two) times daily with meals.     ibuprofen (ADVIL,MOTRIN) 800 MG tablet TAKE 1 TABLET BY MOUTH THREE TIMES DAILY    lurasidone (LATUDA) 80 mg Tab tablet Take 80 mg by mouth once daily.    metoprolol succinate (TOPROL-XL) 25 MG 24 hr tablet Take 25 mg by mouth once daily.     MULTIVIT-IRON-MIN-FOLIC ACID 3,500-18-0.4 UNIT-MG-MG ORAL CHEW Take 1 tablet by mouth once daily.     nystatin (MYCOSTATIN) powder Apply topically 2 (two) times daily.    nystatin-triamcinolone (MYCOLOG II) cream Apply topically 2 (two) times daily.    oxybutynin (DITROPAN XL) 15 MG TR24 Take 1 tablet (15 mg total) by mouth once daily.    phentermine (ADIPEX-P) 37.5 mg tablet Take 37.5 mg by mouth before breakfast.    PROTONIX 40 mg tablet Take 1 tablet (40 mg total) by mouth once daily. On an empty stomach and 30-45 minutes before breakfast    spironolactone (ALDACTONE) 25 MG tablet TAKE1 TABLET BY MOUTH AT BEDTIME    SYNTHROID 50 mcg tablet Take 50 mcg by mouth before breakfast.     TOPAMAX 25 mg tablet Take 1 tablet (25 mg total) by mouth once daily. 1 pill at bedtime    ZOLOFT 100 mg tablet Take 200 mg by mouth once daily.     benzonatate (TESSALON) 100 MG capsule TK 1 C PO TID PRF COUGH    fluconazole (DIFLUCAN) 150 MG Tab Take 1 tablet (150 mg total) by mouth every 48 hours.    oxybutynin (DITROPAN-XL) 10 MG 24 hr tablet TAKE 1  "TABLET BY MOUTH EVERY DAY      No current facility-administered medications for this visit.          ROS: As per HPI.      Exam  /70  Ht 5' 2" (1.575 m)  Wt 105.1 kg (231 lb 11.3 oz)  LMP 11/17/2012  BMI 42.38 kg/m2  General: alert and oriented, no acute distress  Respiratory: normal respiratory effort  Abd: soft, non-distended.  + TTP from RLQ along suprapubic area     Pelvic  Ext. Genitalia:Normal labia without lesions. Normal bartholin's and skeens glands.  Sharp pain all along vulva externally expending to gluteus  Vagina: -- atrophy. Normal vaginal mucosa without lesions No discharge noted. Incison well healed. Non-tender bladder base without palpable mass.  + TTP in bilateral levator ani.  Cervix: absent  Uterus: absent   Urethra: no masses or tenderness  Urethral meatus: no lesions, caruncle or prolapse.        Dr. Ventura present during exam.           Impression  1. Vaginal atrophy  conjugated estrogens (PREMARIN) vaginal cream   2. Vaginal pain     3. Pelvic floor tension     4. Urge incontinence       We reviewed the above issues and discussed options for short-term versus long-term management of her problems.   Plan:   1. Post op well healed.   3. Vaginal atrophy:  Estrogen cream 0.5 g nightly x 2 weeks then twice weekly.  4. Continue oxybutynin xl 15 mg  5. Stop wearing pads-- use panty liner instead  6. Continue pelvic floor PT.  Gabby Quintero or Lauren Shepley ([facility]). p) [phone].   7. Vaginal valium suppository-- use 1-2 times daily-- use before PT.  8. She will follow up with us in 4 weeks.        30 minutes were spent in face to face time with this patient  75 % of this time was spent in counseling and/or coordination of care     TRACEY Ryan-BC [facility]  Division of Female Pelvic Medicine and Reconstructive Surgery  Department of Obstetrics & Gynecology    "

## 2017-04-03 ENCOUNTER — TELEPHONE (OUTPATIENT)
Dept: REHABILITATION | Facility: HOSPITAL | Age: 32
End: 2017-04-03

## 2017-04-10 ENCOUNTER — CLINICAL SUPPORT (OUTPATIENT)
Dept: REHABILITATION | Facility: HOSPITAL | Age: 32
End: 2017-04-10
Attending: NURSE PRACTITIONER
Payer: MEDICAID

## 2017-04-10 ENCOUNTER — OFFICE VISIT (OUTPATIENT)
Dept: UROGYNECOLOGY | Facility: CLINIC | Age: 32
End: 2017-04-10
Payer: MEDICAID

## 2017-04-10 VITALS
WEIGHT: 232.13 LBS | BODY MASS INDEX: 42.72 KG/M2 | HEIGHT: 62 IN | DIASTOLIC BLOOD PRESSURE: 80 MMHG | SYSTOLIC BLOOD PRESSURE: 126 MMHG

## 2017-04-10 DIAGNOSIS — M62.838 SPASM OF MUSCLE: ICD-10-CM

## 2017-04-10 DIAGNOSIS — M62.838 MUSCLE SPASM: ICD-10-CM

## 2017-04-10 DIAGNOSIS — M62.89 PELVIC FLOOR TENSION: ICD-10-CM

## 2017-04-10 DIAGNOSIS — N39.8 DYSFUNCTIONAL VOIDING OF URINE: ICD-10-CM

## 2017-04-10 DIAGNOSIS — E66.9 OBESITY (BMI 30-39.9): ICD-10-CM

## 2017-04-10 DIAGNOSIS — N81.89 WEAKNESS OF PELVIC FLOOR: ICD-10-CM

## 2017-04-10 DIAGNOSIS — N76.0 VULVOVAGINITIS: Primary | ICD-10-CM

## 2017-04-10 PROCEDURE — 97140 MANUAL THERAPY 1/> REGIONS: CPT | Mod: PO | Performed by: PHYSICAL THERAPIST

## 2017-04-10 PROCEDURE — 97110 THERAPEUTIC EXERCISES: CPT | Mod: PO | Performed by: PHYSICAL THERAPIST

## 2017-04-10 PROCEDURE — 99215 OFFICE O/P EST HI 40 MIN: CPT | Mod: PBBFAC | Performed by: OBSTETRICS & GYNECOLOGY

## 2017-04-10 PROCEDURE — 87480 CANDIDA DNA DIR PROBE: CPT

## 2017-04-10 PROCEDURE — 99999 PR PBB SHADOW E&M-EST. PATIENT-LVL V: CPT | Mod: PBBFAC,,, | Performed by: OBSTETRICS & GYNECOLOGY

## 2017-04-10 PROCEDURE — 99024 POSTOP FOLLOW-UP VISIT: CPT | Mod: S$PBB,,, | Performed by: OBSTETRICS & GYNECOLOGY

## 2017-04-10 RX ORDER — TERCONAZOLE 8 MG/G
1 CREAM VAGINAL DAILY
Qty: 20 G | Refills: 0 | Status: SHIPPED | OUTPATIENT
Start: 2017-04-10 | End: 2018-08-23

## 2017-04-10 RX ORDER — OXYBUTYNIN CHLORIDE 15 MG/1
TABLET, EXTENDED RELEASE ORAL
Refills: 11 | COMMUNITY
Start: 2017-04-03 | End: 2017-06-05

## 2017-04-10 RX ORDER — FLUCONAZOLE 150 MG/1
150 TABLET ORAL DAILY
Qty: 1 TABLET | Refills: 0 | Status: SHIPPED | OUTPATIENT
Start: 2017-04-10 | End: 2017-04-11

## 2017-04-10 NOTE — PROGRESS NOTES
Urogyn follow up  03/24/2017  .  [facility]  [address] [address]     Maria Ines Ac  [id number]  1985        Maria Ines Ac is a 32 y.o.  here for  follow up.  She has a history of retropubic sling/ cystourethroscopy for DILIP 1/2017 with worsening urinary urgency.  She was seen The groin pain which was evaluated for 4 wks ago has resolved, she is using the vaginal suppositories a few times a week. She has persistent urinary urgency which has improved slightly since starting pelvic floor PT.      Interval  HP since the last visit:    1)  UI:  (--) JACINDA  (+) UUI- once a day   (+) pads: once a day  Daytime frequency: Q 4 -5 hours.  Nocturia: No:    (-) dysuria-  (--) hematuria,  (--) frequent UTIs.  (+) complete bladder emptying.     2)  POP:  Absent    Symptoms:(--)  .  (--) vaginal bleeding. (+) vaginal discharge. (-) sexually active.  (--) dyspareunia.   (--)  Vaginal dryness.  (+) vaginal estrogen use. Using trimosan with pure coconut oil.    3)  BM:  (-) constipation/straining.  (--) chronic diarrhea. (--) hematochezia.  (--) fecal incontinence.  (--) fecal smearing/urgency.  (--) incomplete evacuation.      4) pelvic pain improved with the vaginal suppositories, pain in groin region now resolved                 Past Medical History:   Diagnosis Date    Blood in stool      Constipation      Depression      Diabetes mellitus, type 2      Dysphagia      GERD (gastroesophageal reflux disease)      Hypertension      Palpitations      Premature menopause on hormone replacement therapy      Thyroid disease                 Past Surgical History:   Procedure Laterality Date    BLADDER SUSPENSION        CARPAL TUNNEL RELEASE         right    COLONOSCOPY N/A 8/30/2016     Procedure: COLONOSCOPY; Surgeon: Slick Herron MD; Location: [facility] ([address] [place] [address]); Service: Endoscopy; Laterality: N/A; PM prep    GALLBLADDER SURGERY        HYSTERECTOMY   2013      Bess velasquez Dr. [place]amplain    OOPHORECTOMY   2005     LSO- benign cyst    OOPHORECTOMY   2013     RSO- endo    ooptherectomy        right knee   scoped    SHOULDER SURGERY   right         History since last visit:Denies vaginal bleeding.  Complains of vaginal pain- improved since starting PT.  Bladder issues: Reports - JACINDA   + UUI +  seems to have improved.  Taking oxybutynin.  Bowel issues: Denies constipation or straining.  Complains of  Vaginal discharge              Current Outpatient Prescriptions   Medication Sig    ACCU-CHEK AMAURY PLUS TEST STRP Strp USE TO TEST BLOOD GLUCOSE TID    amoxicillin (AMOXIL) 500 MG capsule TK ONE C PO TID TAT    atorvastatin (LIPITOR) 40 MG tablet Take 40 mg by mouth once daily.    BLOOD SUGAR DIAGNOSTIC (ACCU-CHEK AMAURY PLUS TEST STRP MISC) 1 strip by Misc.(Non-Drug; Combo Route) route 3 (three) times daily.    calcium-vitamin D3 500 mg(1,250mg) -200 unit per tablet Take 1 tablet by mouth 2 (two) times daily with meals.    dicyclomine (BENTYL) 10 MG capsule TAKE ONE CAPSULE BY MOUTH TWICE DAILY    docusate sodium (COLACE) 100 MG capsule Take 100 mg by mouth 2 (two) times daily.    dulaglutide (TRULICITY) 0.75 mg/0.5 mL PnIj Inject 0.75 mg into the skin every 7 days.    estradiol (VIVELLE-DOT) 0.1 mg/24 hr PTSW APPLY 1 PATCH EXTERNALLY TO THE SKIN 2 TIMES A WEEK (Patient taking differently: APPLY 1 PATCH EXTERNALLY TO THE SKIN 2 TIMES A WEEK ON TUESDAY AND FRIDAY)    FARXIGA 10 mg Tab Take 1 tablet by mouth once daily.     fish oil-omega-3 fatty acids 300-1,000 mg capsule Take 2 g by mouth once daily.    GLUCOPHAGE 500 mg tablet Take 500 mg by mouth 2 (two) times daily with meals.     ibuprofen (ADVIL,MOTRIN) 800 MG tablet TAKE 1 TABLET BY MOUTH THREE TIMES DAILY    lurasidone (LATUDA) 80 mg Tab tablet Take 80 mg by mouth once daily.    metoprolol succinate (TOPROL-XL) 25 MG 24 hr tablet Take 25 mg by mouth once daily.     MULTIVIT-IRON-MIN-FOLIC ACID  "3,500-18-0.4 UNIT-MG-MG ORAL CHEW Take 1 tablet by mouth once daily.     nystatin (MYCOSTATIN) powder Apply topically 2 (two) times daily.    nystatin-triamcinolone (MYCOLOG II) cream Apply topically 2 (two) times daily.    oxybutynin (DITROPAN XL) 15 MG TR24 Take 1 tablet (15 mg total) by mouth once daily.    phentermine (ADIPEX-P) 37.5 mg tablet Take 37.5 mg by mouth before breakfast.    PROTONIX 40 mg tablet Take 1 tablet (40 mg total) by mouth once daily. On an empty stomach and 30-45 minutes before breakfast    spironolactone (ALDACTONE) 25 MG tablet TAKE1 TABLET BY MOUTH AT BEDTIME    SYNTHROID 50 mcg tablet Take 50 mcg by mouth before breakfast.     TOPAMAX 25 mg tablet Take 1 tablet (25 mg total) by mouth once daily. 1 pill at bedtime    ZOLOFT 100 mg tablet Take 200 mg by mouth once daily.     benzonatate (TESSALON) 100 MG capsule TK 1 C PO TID PRF COUGH    fluconazole (DIFLUCAN) 150 MG Tab Take 1 tablet (150 mg total) by mouth every 48 hours.    oxybutynin (DITROPAN-XL) 10 MG 24 hr tablet TAKE 1 TABLET BY MOUTH EVERY DAY      No current facility-administered medications for this visit.          ROS: As per HPI.      Exam  Vitals:    04/10/17 1044   BP: 126/80   Weight: 105.3 kg (232 lb 2.3 oz)   Height: 5' 2" (1.575 m)       General: alert and oriented, no acute distress  Respiratory: normal respiratory effort  Abd: soft, non-distended.  ND/NT     Pelvic  Ext. Genitalia:Normal labia without lesions. Normal bartholin's and skeens glands.  Sharp pain all along vulva externally expending to gluteus  Vagina: -- atrophy. Normal vaginal mucosa without lesions, + clumpy white discharge noted. Incison well healed. Non-tender bladder base without palpable mass.  + TTP in bilateral levator ani.  Cervix: absent  Uterus: absent   Urethra: no masses or tenderness  Urethral meatus: no lesions, caruncle or prolapse.     PVR: 75 mL,  Urine dipstick shows negative for all components.  Micro exam: not done.   "         Impression  1. Vulvovaginitis  POCT Vaginosis Screen by DNA Probe (Affirm)    terconazole (TERAZOL 3) 0.8 % vaginal cream    fluconazole (DIFLUCAN) 150 MG Tab   2. Pelvic floor tension     3. Obesity (BMI 30-39.9)     4. Muscle spasm     5. Dysfunctional voiding of urine         We reviewed the above issues and discussed options for short-term versus long-term management of her problems.     Plan:     1. Affirm today, clumpy vaginal discharge consistent with candida, terazole cream nightly for 5 days , Diflucan 150 mg one tablet today   3. Vaginal atrophy:  Estrogen cream 0.5 g 2 week aftercompleting course of terazole   4. Continue oxybutynin xl 15 mg  5. Stop wearing pads-- use panty liner instead  6. Continue pelvic floor PT.  Gabby Quintero or Lauren Shepley ([facility]). (p) [phone].   7. Vaginal valium suppository-- use 1-2 times daily-- use before PT.  8. She will follow up with us in 8 weeks. Discussed the benefits of SNS vs Botox vs PTNS       30 minutes were spent in face to face time with this patient  75 % of this time was spent in counseling and/or coordination of care     Ninoska Ventura DO  Female Pelvic Medicine and Reconstructive Surgery  [facility] [place], [place]

## 2017-04-10 NOTE — PROGRESS NOTES
"[facility]   [facility] #: [id number]   Diagnosis:   Encounter Diagnoses   Name Primary?    Spasm of muscle     Dysfunctional voiding of urine     Weakness of pelvic floor       Date of start of care: 3/20/17  Date of treatment: 04/10/2017   Time in: 9:05  Time out: 9:45  # Visits: 2/12  Auth: (13) 12/31/7  POC expiration: 6/20/17  Outpatient Physical Therapy   Daily Note    Subjective     Pt reports she has been trying to do her HEP "not every day but good". She hasn't noticed any changes in emptying her bladder better. Pt reports still pushing to empty fulling. Pt has been doing the double voiding with a second urine every time. Pt unable to answer which of the double voiding techniques she uses.     Pain: Current: 0/10 vaginally    Objective     TREATMENT  Therapeutic Exercise x30 min  DB supine 15'  DB prone 15'  Add stretch supine 3x1'          Manual Therapy x10 min  TPR bilat pubococcygeus 10'        Education: Discussed progression of plan of care with patient; educated pt in activity modification; reviewed HEP with pt. Pt demonstrated and verbalized understanding of all instruction and was provided with a handout of FULL HEP (see Patient Instructions). Provided pt with print out of next appt and contact information to call if she can't come to next appt.     Assessment     Pt tolerated treatment well with no visible adverse affects. Pt required max cueing for all exercises with limited recall of HEP. Pt with limited response to manual techniques as she continues to hold tension in her PFMs making release of the trigger points very difficult Poor DB technique with minimal improvements in prone (inhale/exhale 2"). Reinforced the importance of doing the entire HEP daily to have improvements and to stop pushing to urinate to avoid harming her Sx. Will continue to progress pt to tolerance to improve PFM control and strength in order to allow full bladder emptying.     Plan     Continue with " established POC, working toward PT goals.

## 2017-04-10 NOTE — PATIENT INSTRUCTIONS
1. Affirm today, clumpy vaginal discharge consistent with candida, terazole cream nightly for 5 days , Diflucan 150 mg one tablet today   3. Vaginal atrophy:  Estrogen cream 0.5 g 2 week after completing course of terazole   4. Continue oxybutynin xl 15 mg  5. Stop wearing pads-- use panty liner instead  6. Continue pelvic floor PT.  Gabby Quintero or Lauren Shepley ([facility]). (p) [phone].   7. Vaginal valium suppository-- use 1-2 times daily-- use before PT.  8. She will follow up with us in 8 weeks. Discussed the benefits of SNS vs Botox vs PTNS

## 2017-04-10 NOTE — PATIENT INSTRUCTIONS
Home Exercise Program: 04/10/2017      DIAPHRAGMATIC BREATHING ON YOUR TUMMY     The diaphragm is a dome shaped muscle that forms the floor of the rib cage. It is the most efficient muscle for breathing and relaxation, although most people are not used to using the diaphragm. Diaphragmatic or belly breathing is an important technique to learn because it helps settle down or relax the autonomic nervous system. The correct use of diaphragmatic breathing can help to quiet brain activity resulting in the relaxation of all the muscles and organs of the body. This is accomplished by slow rhythmic breathing concentrated in the diaphragm muscle rather than the chest.    How to do proper relaxation breathing:     Start by lying on your TUMMY in a relaxed position.    Relax your jaw by placing your tongue on the roof of your mouth and keeping your teeth slightly apart.    Take a deep breath in through your nose, letting the abdomen expand and rise while you keep your upper chest, neck and shoulders relaxed.    As you breathe out through your mouth, allow your abdomen and chest to fall. Exhale completely.   Remember to breathe slowly AND DEEP BREATHS.  Do not force your breathing. Do not hold your breath.   Repeat for 10 minutes every day.                        Double Voiding - to be done after initial urine stream has ended    1. Sit comfortably on toilet.  2. Do a body scan - make sure your legs, buttocks, and abdominals are relaxed.  3. Take a couple deep, SLOW breaths to encourage your pelvic floor muscles to DROP.  4. Wait at least 2 minutes to see if a second urine stream begins.     Do not stop your urine stream. Do not push to pee!        FROM HEP2GO:  Supine add stretch 3x1 minute

## 2017-04-10 NOTE — MR AVS SNAPSHOT
[facility]  [address] [facility] [address]  Phone: [phone]                  Maria Ines Ac   4/10/2017 11:00 AM   Office Visit    Description:  Female : 1985   Provider:  Ninoska Ventura DO   Department:  [facility]           Reason for Visit     Post-op Evaluation     Vaginal Itching           Diagnoses this Visit        Comments    Vulvovaginitis    -  Primary            To Do List           Future Appointments        Provider Department Dept Phone    2017 9:00 AM Teresa Quintero PT [facility] [phone]    2017 1:30 PM Luann Raymond MD [facility] - Internal Medicine [phone]      Goals (5 Years of Data)     None       These Medications        Disp Refills Start End    terconazole (TERAZOL 3) 0.8 % vaginal cream 20 g 0 4/10/2017     Place 1 applicator vaginally once daily. - Vaginal    Pharmacy: [facility] [address] [place] [address] AT [facility] Ph #: [phone]       fluconazole (DIFLUCAN) 150 MG Tab 1 tablet 0 4/10/2017 2017    Take 1 tablet (150 mg total) by mouth once daily. - Oral    Pharmacy: [facility] [address] [place] [address] AT [facility] Ph #: [phone]         [facility]     [facility] Care [facility] -  Assistance  Unless otherwise directed by your provider, please contact [facility], our nurse care line that is available for 24/ assistance.     Registered nurses in the [facility] provide: appointment scheduling, clinical advisement, health education, and other advisory services.  Call: [phone] (toll free)               Medications           Message regarding Medications     Verify the changes and/or additions to your medication regime listed below are the same as discussed with your clinician today.  If any of  these changes or additions are incorrect, please notify your healthcare provider.        START taking these NEW medications        Refills    terconazole (TERAZOL 3) 0.8 % vaginal cream 0    Sig: Place 1 applicator vaginally once daily.    Class: Normal    Route: Vaginal    fluconazole (DIFLUCAN) 150 MG Tab 0    Sig: Take 1 tablet (150 mg total) by mouth once daily.    Class: Normal    Route: Oral           Verify that the below list of medications is an accurate representation of the medications you are currently taking.  If none reported, the list may be blank. If incorrect, please contact your healthcare provider. Carry this list with you in case of emergency.           Current Medications     ACCU-CHEK AMAURY PLUS TEST STRP Strp USE TO TEST BLOOD GLUCOSE TID    amoxicillin (AMOXIL) 500 MG capsule TK ONE C PO  TID TAT    atorvastatin (LIPITOR) 40 MG tablet Take 40 mg by mouth once daily.    atorvastatin (LIPITOR) 40 MG tablet TK 1 T PO QD    benzonatate (TESSALON) 100 MG capsule TK 1 C PO TID PRF COUGH    BLOOD SUGAR DIAGNOSTIC (ACCU-CHEK AMAURY PLUS TEST STRP MISC) 1 strip by Misc.(Non-Drug; Combo Route) route 3 (three) times daily.    calcium-vitamin D3 500 mg(1,250mg) -200 unit per tablet Take 1 tablet by mouth 2 (two) times daily with meals.    conjugated estrogens (PREMARIN) vaginal cream Use 0.5 gram of estrogen cream in vagina nightly x 2 weeks, then twice a week thereafter.    dicyclomine (BENTYL) 10 MG capsule TAKE ONE CAPSULE BY MOUTH TWICE DAILY    docusate sodium (COLACE) 100 MG capsule Take 100 mg by mouth 2 (two) times daily.    dulaglutide (TRULICITY) 0.75 mg/0.5 mL PnIj Inject 0.75 mg into the skin every 7 days.    estradiol (VIVELLE-DOT) 0.1 mg/24 hr PTSW APPLY 1 PATCH EXTERNALLY TO THE SKIN 2 TIMES A WEEK    estradiol (VIVELLE-DOT) 0.1 mg/24 hr PTSW NATALEE 1 PA EXT TO THE SKIN 2 TIMES A WEEK    FARXIGA 10 mg Tab Take 1 tablet by mouth once daily.     fish oil-omega-3 fatty acids 300-1,000 mg capsule  "Take 2 g by mouth once daily.    fluconazole (DIFLUCAN) 150 MG Tab Take 1 tablet (150 mg total) by mouth once daily.    GLUCOPHAGE 500 mg tablet Take 500 mg by mouth 2 (two) times daily with meals.     ibuprofen (ADVIL,MOTRIN) 800 MG tablet TAKE 1 TABLET BY MOUTH THREE TIMES DAILY    levothyroxine (SYNTHROID) 50 MCG tablet TK 1 T PO QAM    lurasidone (LATUDA) 80 mg Tab tablet Take 80 mg by mouth once daily.    metformin (GLUCOPHAGE) 500 MG tablet TK 1 T PO  BID    metoprolol succinate (TOPROL-XL) 25 MG 24 hr tablet Take 25 mg by mouth once daily.     metoprolol succinate (TOPROL-XL) 25 MG 24 hr tablet TK 1 T PO QD    MULTIVIT-IRON-MIN-FOLIC ACID 3,500-18-0.4 UNIT-MG-MG ORAL CHEW Take 1 tablet by mouth once daily.     nystatin (MYCOSTATIN) powder Apply topically 2 (two) times daily.    nystatin-triamcinolone (MYCOLOG II) cream Apply topically 2 (two) times daily.    oxybutynin (DITROPAN XL) 15 MG TR24     oxybutynin (DITROPAN-XL) 10 MG 24 hr tablet TAKE 1 TABLET BY MOUTH EVERY DAY    pantoprazole (PROTONIX) 40 MG tablet TK 1 T PO  QD    phentermine (ADIPEX-P) 37.5 mg tablet Take 37.5 mg by mouth before breakfast.    PROTONIX 40 mg tablet Take 1 tablet (40 mg total) by mouth once daily. On an empty stomach and 30-45 minutes before breakfast    spironolactone (ALDACTONE) 25 MG tablet TAKE1 TABLET BY MOUTH AT BEDTIME    spironolactone (ALDACTONE) 25 MG tablet TK 1 T PO HS    SYNTHROID 50 mcg tablet Take 50 mcg by mouth before breakfast.     terconazole (TERAZOL 3) 0.8 % vaginal cream Place 1 applicator vaginally once daily.    TOPAMAX 25 mg tablet Take 1 tablet (25 mg total) by mouth once daily. 1 pill at bedtime    ZOLOFT 100 mg tablet Take 200 mg by mouth once daily.            Clinical Reference Information           Your Vitals Were     BP Height Weight Last Period BMI    126/80 5' 2" (1.575 m) 105.3 kg (232 lb 2.3 oz) 11/17/2012 42.46 kg/m2      Blood Pressure          Most Recent Value    BP  126/80      Allergies " as of 4/10/2017     No Known Allergies      Immunizations Administered on Date of Encounter - 4/10/2017     None      Orders Placed During Today's Visit      Normal Orders This Visit    POCT Vaginosis Screen by DNA Probe (Affirm)       Instructions    1. Affirm today, clumpy vaginal discharge consistent with candida, terazole cream nightly for 5 days , Diflucan 150 mg one tablet today   3. Vaginal atrophy:  Estrogen cream 0.5 g 2 week after completing course of terazole   4. Continue oxybutynin xl 15 mg  5. Stop wearing pads-- use panty liner instead  6. Continue pelvic floor PT.  Gabby Quintero or Lauren Shepley ([facility]). (p) [phone].   7. Vaginal valium suppository-- use 1-2 times daily-- use before PT.  8. She will follow up with us in 8 weeks. Discussed the benefits of SNS vs Botox vs PTNS         Language Assistance Services     ATTENTION: Language assistance services are available, free of charge. Please call [phone].      ATENCIÓN: Si navjot singh, tiene a muniz disposición servicios gratuitos de asistencia lingüística. Llame al [phone].     [facility] Ý: N?u b?n nói Ti?ng Vi?t, có các d?ch v? h? tr? ngôn ng? mi?n phí dành cho b?n. G?i s? [phone].         [facility] complies with applicable Federal civil rights laws and does not discriminate on the basis of race, color, national origin, age, disability, or sex.

## 2017-04-16 ENCOUNTER — TELEPHONE (OUTPATIENT)
Dept: UROGYNECOLOGY | Facility: CLINIC | Age: 32
End: 2017-04-16

## 2017-04-17 ENCOUNTER — TELEPHONE (OUTPATIENT)
Dept: UROGYNECOLOGY | Facility: CLINIC | Age: 32
End: 2017-04-17

## 2017-04-17 ENCOUNTER — CLINICAL SUPPORT (OUTPATIENT)
Dept: REHABILITATION | Facility: HOSPITAL | Age: 32
End: 2017-04-17
Attending: NURSE PRACTITIONER
Payer: MEDICAID

## 2017-04-17 DIAGNOSIS — N81.89 WEAKNESS OF PELVIC FLOOR: ICD-10-CM

## 2017-04-17 DIAGNOSIS — N39.8 DYSFUNCTIONAL VOIDING OF URINE: ICD-10-CM

## 2017-04-17 DIAGNOSIS — M62.838 SPASM OF MUSCLE: ICD-10-CM

## 2017-04-17 PROCEDURE — 97110 THERAPEUTIC EXERCISES: CPT | Mod: PO | Performed by: PHYSICAL THERAPIST

## 2017-04-17 NOTE — PATIENT INSTRUCTIONS
"Home Exercise Program: 4/17/17    Clams 2x10 bilat  Bridge 2x10  Hooklying add with pillow 2x10x5"  "

## 2017-04-17 NOTE — TELEPHONE ENCOUNTER
----- Message from Diandra Weiss sent at 4/17/2017  3:17 PM CDT -----  Contact: PAUL ARTHUR [[id number]]  _  1st Request  _  2nd Request  _  3rd Request        Who: PAUL ARTHUR [[id number]]    Why: Patient is returning a call in regards to results      What Number to Call Back: [phone]     When to Expect a call back: (Before the end of the day)   -- if call after 3:00 call back will be tomorrow.

## 2017-04-17 NOTE — PROGRESS NOTES
"[facility]   [facility] #: [id number]   Diagnosis:   Encounter Diagnoses   Name Primary?    Spasm of muscle     Dysfunctional voiding of urine     Weakness of pelvic floor       Date of start of care: 3/20/17  Date of treatment: 04/17/2017   Time in: 9:00  Time out: 10:00  # Visits: 3/12  Auth: (13) 12/31/7  POC expiration: 6/20/17  Outpatient Physical Therapy   Daily Note    Subjective     Pt reports she leaked yesterday - a couple drops with laughing. This is the first time this has happened in a while. Pt states she still pushes to empty bladder, unable to double void. She is still ingesting high volumes of water quickly at one time.     Pain: Current: 0/10 vaginally    Objective     Comment: erythema of perianal area that was damp    TREATMENT  Therapeutic Exercise with SEMG x60 min  PF Electrode: external perianal  Pt. Position: sidelying knees to chest  Kegels 5"W/10"R x10 - with graph visual  Kegels 10"W/10"R x15 - with [place] visual          WITHOUT SEMG:  Clams x10 bilat  Bridge x10  Hooklying add with pillow 10x5"      Did not perform today:  DB supine/prone 15'  Add stretch supine 3x1'        TPR bilat pubococcygeus 10'        Education: Discussed progression of plan of care with patient; educated pt in activity modification; reviewed HEP with pt. Pt demonstrated and verbalized understanding of all instruction and was provided with a handout of HEP (see Patient Instructions). Again reinforced the importance for pt to stop pushing to empty bladder and try the double voiding techniques discussed in PT. Pt verbalized understanding.    Assessment     Pt tolerated treatment well with no visible adverse affects. Some increased skin irritation and erythema noted under electrode placement perianally > hip. Pt had a very difficult time differentiating between PFM lift and drop voluntarily, however had a very strong reflexive tightening consistently when cued to leg go. Will continue SEMG training, " "trying electrode placement on the perineal body instead next visit. Pt did not respond particularly well to any verbal cue to improve lift nor with tug of war with leads. Pt was able to demo full, slightly unstable drop secondary to fatigue. DB continues to be ~2". Will continue to progress pt to tolerance to improve PFM control and strength in order to allow full bladder emptying.     Plan     Continue with established POC, working toward PT goals.          "

## 2017-04-19 ENCOUNTER — TELEPHONE (OUTPATIENT)
Dept: UROGYNECOLOGY | Facility: CLINIC | Age: 32
End: 2017-04-19

## 2017-04-19 NOTE — TELEPHONE ENCOUNTER
----- Message from Rafael Santiago sent at 4/19/2017  1:15 PM CDT -----  Pt returning your call [phone]

## 2017-04-26 DIAGNOSIS — N95.2 VAGINAL ATROPHY: ICD-10-CM

## 2017-04-26 NOTE — TELEPHONE ENCOUNTER
Pt Pharmacy sent a Fax requesting a refill of Premarin vaginal cream 30GM. Pt last seen on 4/10/17 by Dr. Ventura.

## 2017-05-01 ENCOUNTER — CLINICAL SUPPORT (OUTPATIENT)
Dept: REHABILITATION | Facility: HOSPITAL | Age: 32
End: 2017-05-01
Attending: NURSE PRACTITIONER
Payer: MEDICAID

## 2017-05-01 DIAGNOSIS — M62.838 SPASM OF MUSCLE: ICD-10-CM

## 2017-05-01 DIAGNOSIS — N39.8 DYSFUNCTIONAL VOIDING OF URINE: ICD-10-CM

## 2017-05-01 DIAGNOSIS — N81.89 WEAKNESS OF PELVIC FLOOR: ICD-10-CM

## 2017-05-01 PROCEDURE — 97110 THERAPEUTIC EXERCISES: CPT | Mod: PO | Performed by: PHYSICAL THERAPIST

## 2017-05-01 NOTE — PROGRESS NOTES
"[facility]   [facility] #: [id number]   Diagnosis:   Encounter Diagnoses   Name Primary?    Spasm of muscle     Dysfunctional voiding of urine     Weakness of pelvic floor       Date of start of care: 3/20/17  Date of treatment: 05/01/2017   Time in: 2:50  Time out: 3:50  # Visits: 4/12  Auth: (13) 12/31/7  POC expiration: 6/20/17  Outpatient Physical Therapy   Daily Note    Subjective     Pt reports she had no leakage this past week. Pt continues to voice difficulty emptying but denies pushing to empty. Pt says she's trying to drink her water slower than before, but that it's very hard. Pt reports compliance with HEP given last week.     Pain: Current: 0/10 vaginally    Objective     Comment: significant erythema and dampness of perianal area. No erythema of perineal body.    TREATMENT  Therapeutic Exercise with SEMG x60 min  PF Electrode: external perineal body  Pt. Position: supine, semi-trevino's, prone  Kegels in semi-trevino's 10"W/10"R x15 - with [employer] visual   DB supine/prone 35' - with mindfulness training, visualization, meditation        WITHOUT SEMG:  Down training with internal palpation 10'      Did not perform today:  Add stretch supine 3x1'        TPR bilat pubococcygeus 10'  Kegels 5"W/10"R x10 - with graph visual  Clams x10 bilat  Bridge x10  Hooklying add with pillow 10x5"      Education: Discussed progression of plan of care with patient; educated pt in activity modification; reviewed HEP with pt. Pt demonstrated and verbalized understanding of all instruction and was provided with a handout of HEP (see Patient Instructions).     Assessment     Pt tolerated treatment well with no visible adverse affects. Pt again had a very difficult time differentiating between PFM lift and drop voluntarily, however had a very strong reflexive tightening consistently when cued to leg go. Again, pt with highly unstable and elevated PFM activity at rest. With prolonged time, pt is able to bring the " activity down, however it remains moderately elevated. Questionable if this pt is appropriate for PFT at this time. Pt was able to perform DB for 2-3 seconds inconsistently when cued. Will perform re-assess next visit.     Plan     Continue with established POC, working toward PT goals.

## 2017-05-03 ENCOUNTER — TELEPHONE (OUTPATIENT)
Dept: INTERNAL MEDICINE | Facility: CLINIC | Age: 32
End: 2017-05-03

## 2017-05-03 NOTE — TELEPHONE ENCOUNTER
Please let them know that they will need to fax us a release of information form signed by the patient to release that.    Once we obtain that, please fax them copy of lab report and EKG of 1-5-17

## 2017-05-03 NOTE — TELEPHONE ENCOUNTER
----- Message from Toya Howell sent at 5/2/2017  2:53 PM CDT -----  Contact: [place]/ Regina/[phone]/fax  Regina said that she is calling in regards needing to get a copy of patient EKG and lab report. Please call and advise.               Thank you.

## 2017-05-04 DIAGNOSIS — N95.2 VAGINAL ATROPHY: ICD-10-CM

## 2017-05-04 NOTE — TELEPHONE ENCOUNTER
Pt Pharmacy sent a Fax requesting a refill of Premarin Vaginal Cream. Pt last seen on 4/10/17 by Dr Crawford.  .

## 2017-05-08 ENCOUNTER — OFFICE VISIT (OUTPATIENT)
Dept: INTERNAL MEDICINE | Facility: CLINIC | Age: 32
End: 2017-05-08
Payer: MEDICAID

## 2017-05-08 ENCOUNTER — LAB VISIT (OUTPATIENT)
Dept: LAB | Facility: HOSPITAL | Age: 32
End: 2017-05-08
Attending: INTERNAL MEDICINE
Payer: MEDICAID

## 2017-05-08 DIAGNOSIS — I10 ESSENTIAL HYPERTENSION: ICD-10-CM

## 2017-05-08 DIAGNOSIS — E11.9 TYPE 2 DIABETES MELLITUS WITHOUT COMPLICATION, WITHOUT LONG-TERM CURRENT USE OF INSULIN: Primary | ICD-10-CM

## 2017-05-08 DIAGNOSIS — E11.9 TYPE 2 DIABETES MELLITUS WITHOUT COMPLICATION, WITHOUT LONG-TERM CURRENT USE OF INSULIN: ICD-10-CM

## 2017-05-08 LAB
ALBUMIN SERPL BCP-MCNC: 4 G/DL
ALP SERPL-CCNC: 67 U/L
ALT SERPL W/O P-5'-P-CCNC: 13 U/L
ANION GAP SERPL CALC-SCNC: 12 MMOL/L
AST SERPL-CCNC: 18 U/L
BILIRUB SERPL-MCNC: 0.2 MG/DL
BUN SERPL-MCNC: 11 MG/DL
CALCIUM SERPL-MCNC: 9.8 MG/DL
CHLORIDE SERPL-SCNC: 100 MMOL/L
CO2 SERPL-SCNC: 20 MMOL/L
CREAT SERPL-MCNC: 1.3 MG/DL
EST. GFR  (AFRICAN AMERICAN): >60 ML/MIN/1.73 M^2
EST. GFR  (NON AFRICAN AMERICAN): 53.7 ML/MIN/1.73 M^2
GLUCOSE SERPL-MCNC: 84 MG/DL
POTASSIUM SERPL-SCNC: 4.2 MMOL/L
PROT SERPL-MCNC: 8.1 G/DL
SODIUM SERPL-SCNC: 132 MMOL/L

## 2017-05-08 PROCEDURE — 36415 COLL VENOUS BLD VENIPUNCTURE: CPT

## 2017-05-08 PROCEDURE — 80053 COMPREHEN METABOLIC PANEL: CPT

## 2017-05-08 PROCEDURE — 99999 PR PBB SHADOW E&M-EST. PATIENT-LVL III: CPT | Mod: PBBFAC,,, | Performed by: INTERNAL MEDICINE

## 2017-05-08 PROCEDURE — 99214 OFFICE O/P EST MOD 30 MIN: CPT | Mod: S$PBB,,, | Performed by: INTERNAL MEDICINE

## 2017-05-08 PROCEDURE — 83036 HEMOGLOBIN GLYCOSYLATED A1C: CPT

## 2017-05-08 PROCEDURE — 99213 OFFICE O/P EST LOW 20 MIN: CPT | Mod: PBBFAC | Performed by: INTERNAL MEDICINE

## 2017-05-08 NOTE — MR AVS SNAPSHOT
[facility]  [address]  [facility] [address]  Phone: [phone]  Fax: [phone]                  Maria Ines Ac   2017 1:30 PM   Office Visit    Description:  Female : 1985   Provider:  Luann Raymond MD   Department:  [facility]           Reason for Visit     Dizziness           Diagnoses this Visit        Comments    Type 2 diabetes mellitus without complication, without long-term current use of insulin    -  Primary            To Do List           Future Appointments        Provider Department Dept Phone    2017 3:00 PM Teresa Quintero, PT [facility] [phone]    2017 1:30 PM Ninoska Ventura DO [facility] [phone]    2017 3:00 PM Teresa Quintero, PT [facility] [phone]      Goals (5 Years of Data)     None      Follow-Up and Disposition     Return for EPP  4 months.      [facility]     [facility] -  Assistance  Unless otherwise directed by your provider, please contact [facility], our nurse care line that is available for  assistance.     Registered nurses in the [facility] provide: appointment scheduling, clinical advisement, health education, and other advisory services.  Call: [phone] (toll free)               Medications           Message regarding Medications     Verify the changes and/or additions to your medication regime listed below are the same as discussed with your clinician today.  If any of these changes or additions are incorrect, please notify your healthcare provider.        STOP taking these medications     lurasidone (LATUDA) 80 mg Tab tablet Take 80 mg by mouth once daily.           Verify that the below list of medications is an accurate representation of the medications you are currently taking.  If none reported, the list may be blank. If incorrect, please contact  your healthcare provider. Carry this list with you in case of emergency.           Current Medications     ACCU-CHEK AMAURY PLUS TEST STRP Strp USE TO TEST BLOOD GLUCOSE TID    amoxicillin (AMOXIL) 500 MG capsule TK ONE C PO  TID TAT    atorvastatin (LIPITOR) 40 MG tablet Take 40 mg by mouth once daily.    atorvastatin (LIPITOR) 40 MG tablet TK 1 T PO QD    benzonatate (TESSALON) 100 MG capsule TK 1 C PO TID PRF COUGH    BLOOD SUGAR DIAGNOSTIC (ACCU-CHEK AMAURY PLUS TEST STRP MISC) 1 strip by Misc.(Non-Drug; Combo Route) route 3 (three) times daily.    calcium-vitamin D3 500 mg(1,250mg) -200 unit per tablet Take 1 tablet by mouth 2 (two) times daily with meals.    conjugated estrogens (PREMARIN) vaginal cream Use 0.5 gram of estrogen cream in vagina nightly x 2 weeks, then twice a week thereafter.    conjugated estrogens (PREMARIN) vaginal cream Use 0.5 gram of estrogen cream in vagina nightly x 2 weeks, then twice a week thereafter.    dicyclomine (BENTYL) 10 MG capsule TAKE ONE CAPSULE BY MOUTH TWICE DAILY    docusate sodium (COLACE) 100 MG capsule Take 100 mg by mouth 2 (two) times daily.    dulaglutide (TRULICITY) 0.75 mg/0.5 mL PnIj Inject 0.75 mg into the skin every 7 days.    estradiol (VIVELLE-DOT) 0.1 mg/24 hr PTSW APPLY 1 PATCH EXTERNALLY TO THE SKIN 2 TIMES A WEEK    estradiol (VIVELLE-DOT) 0.1 mg/24 hr PTSW NATALEE 1 PA EXT TO THE SKIN 2 TIMES A WEEK    FARXIGA 10 mg Tab Take 1 tablet by mouth once daily.     fish oil-omega-3 fatty acids 300-1,000 mg capsule Take 2 g by mouth once daily.    GLUCOPHAGE 500 mg tablet Take 500 mg by mouth 2 (two) times daily with meals.     ibuprofen (ADVIL,MOTRIN) 800 MG tablet TAKE 1 TABLET BY MOUTH THREE TIMES DAILY    levothyroxine (SYNTHROID) 50 MCG tablet TK 1 T PO QAM    metformin (GLUCOPHAGE) 500 MG tablet TK 1 T PO  BID    metoprolol succinate (TOPROL-XL) 25 MG 24 hr tablet Take 25 mg by mouth once daily.     metoprolol succinate (TOPROL-XL) 25 MG 24 hr tablet TK 1 T PO  "QD    MULTIVIT-IRON-MIN-FOLIC ACID 3,500-18-0.4 UNIT-MG-MG ORAL CHEW Take 1 tablet by mouth once daily.     nystatin (MYCOSTATIN) powder Apply topically 2 (two) times daily.    nystatin-triamcinolone (MYCOLOG II) cream Apply topically 2 (two) times daily.    oxybutynin (DITROPAN XL) 15 MG TR24     oxybutynin (DITROPAN-XL) 10 MG 24 hr tablet TAKE 1 TABLET BY MOUTH EVERY DAY    pantoprazole (PROTONIX) 40 MG tablet TK 1 T PO  QD    phentermine (ADIPEX-P) 37.5 mg tablet Take 37.5 mg by mouth before breakfast.    PROTONIX 40 mg tablet Take 1 tablet (40 mg total) by mouth once daily. On an empty stomach and 30-45 minutes before breakfast    spironolactone (ALDACTONE) 25 MG tablet TAKE1 TABLET BY MOUTH AT BEDTIME    spironolactone (ALDACTONE) 25 MG tablet TK 1 T PO HS    SYNTHROID 50 mcg tablet Take 50 mcg by mouth before breakfast.     terconazole (TERAZOL 3) 0.8 % vaginal cream Place 1 applicator vaginally once daily.    TOPAMAX 25 mg tablet Take 1 tablet (25 mg total) by mouth once daily. 1 pill at bedtime    ZOLOFT 100 mg tablet Take 200 mg by mouth once daily.            Clinical Reference Information           Your Vitals Were     BP Pulse Temp Height Weight Last Period    130/80 (BP Location: Left arm, Patient Position: Sitting, BP Method: Manual) 73 98.4 °F (36.9 °C) (Oral) 5' 2" (1.575 m) 103 kg (227 lb) 11/17/2012    SpO2 BMI             93% 41.52 kg/m2         Blood Pressure          Most Recent Value    BP  130/80      Allergies as of 5/8/2017     No Known Allergies      Immunizations Administered on Date of Encounter - 5/8/2017     None      Orders Placed During Today's Visit     Future Labs/Procedures Expected by Expires    Comprehensive metabolic panel  5/8/2017 5/8/2018    Hemoglobin A1c  5/8/2017 5/8/2018      Language Assistance Services     ATTENTION: Language assistance services are available, free of charge. Please call [phone].      ATENCIÓN: Si tete lee a muniz disposición servicios " kyaos de asistencia lingüística. Catina wan [phone].     ARNIE Ý: N?u b?n nói Ti?ng Vi?t, có các d?ch v? h? tr? ngôn ng? mi?n phí dành cho b?n. G?i s? [phone].         [facility] complies with applicable Federal civil rights laws and does not discriminate on the basis of race, color, national origin, age, disability, or sex.

## 2017-05-09 LAB
ESTIMATED AVG GLUCOSE: 111 MG/DL
HBA1C MFR BLD HPLC: 5.5 %

## 2017-05-13 VITALS
SYSTOLIC BLOOD PRESSURE: 130 MMHG | HEART RATE: 73 BPM | WEIGHT: 227 LBS | HEIGHT: 62 IN | OXYGEN SATURATION: 95 % | TEMPERATURE: 98 F | DIASTOLIC BLOOD PRESSURE: 80 MMHG | BODY MASS INDEX: 41.77 KG/M2

## 2017-05-14 NOTE — PROGRESS NOTES
Subjective:       Patient ID: Maria Ines Ac is a 32 y.o. female.    Chief Complaint: Hypertension    HPI: She returns for management of hypertension.  She has had hypertension for over a year.  Current treatment has included medications outlined in medication list.  She denies chest pain or shortness of breath.  No palpitations.  Denies left arm or neck pain.  She has diabetes.  Denies polyuria, polydipsia    Past medical history: Hypertension, hyperlipidemia, diabetes, hypothyroidism, bipolar disorder, migraine headaches, status post hysterectomy      Medications: Synthroid 0.05 mg daily, metformin, Toprol-XL,Lipitor 40 mg daily,farxiga,topamax, trulicity      NO KNOWN DRUG ALLERGIES        Review of Systems   Constitutional: Negative for chills, fatigue, fever and unexpected weight change.   Respiratory: Negative for chest tightness and shortness of breath.    Cardiovascular: Negative for chest pain and palpitations.   Gastrointestinal: Negative for abdominal pain and blood in stool.   Neurological: Negative for dizziness, syncope, numbness and headaches.       Objective:      Physical Exam   HENT:   Right Ear: External ear normal.   Left Ear: External ear normal.   Nose: Nose normal.   Mouth/Throat: Oropharynx is clear and moist.   Eyes: Pupils are equal, round, and reactive to light.   Neck: Normal range of motion.   Cardiovascular: Normal rate and regular rhythm.    No murmur heard.  Pulmonary/Chest: Breath sounds normal.   Abdominal: She exhibits no distension. There is no hepatosplenomegaly. There is no tenderness.   Lymphadenopathy:     She has no cervical adenopathy.     She has no axillary adenopathy.   Neurological: She has normal strength and normal reflexes. No cranial nerve deficit or sensory deficit.       Assessment:     assessment and plan: 1.  Hypertension: Check CMP  2.  Diabetes: Check glycosylated hemoglobin.  She reports seeing a podiatrist within the past year      Plan:       As  above

## 2017-05-16 ENCOUNTER — CLINICAL SUPPORT (OUTPATIENT)
Dept: REHABILITATION | Facility: HOSPITAL | Age: 32
End: 2017-05-16
Attending: NURSE PRACTITIONER
Payer: MEDICAID

## 2017-05-16 DIAGNOSIS — N81.89 WEAKNESS OF PELVIC FLOOR: ICD-10-CM

## 2017-05-16 DIAGNOSIS — M62.838 SPASM OF MUSCLE: ICD-10-CM

## 2017-05-16 DIAGNOSIS — N39.8 DYSFUNCTIONAL VOIDING OF URINE: ICD-10-CM

## 2017-05-16 PROCEDURE — 97110 THERAPEUTIC EXERCISES: CPT | Mod: PO | Performed by: PHYSICAL THERAPIST

## 2017-05-16 NOTE — PROGRESS NOTES
"[facility]   [facility] #: [id number]   Diagnosis:   Encounter Diagnoses   Name Primary?    Spasm of muscle     Dysfunctional voiding of urine     Weakness of pelvic floor       Date of start of care: 3/20/17  Date of treatment: 05/16/2017   Time in: 3:05  Time out: 4:00  # Visits: 4/12  Auth: ([email or web address]) 12/31/7  POC expiration: 6/20/17  Outpatient Physical Therapy   Discharge Note      Subjective     Pt reports she had 1 incidence of stress leakage this past week when she was in the shower and bent over to wash her feet. She feels as though she does empty her bladder. She confirms double voiding by waiting extra time on the toilet with a medium second urine stream.     Pain: Current: 0/10 vaginally    Objective     Comment: significant erythema and dampness of perianal area. No erythema of perineal body.    Internal vaginal re-assess: significantly hypertonic PFMs with 1+/5 MMT bilaterally today.     Functional Limitations Reports - JUSTINE-6  Score: 2/18  Current: 11%  Goal: 6/18 <33% GOAL MET 5/16/17  Category: Other     G-Code Modifiers  CH 0% Impaired, limited, or restricted    CI 19% - 1% Impaired, limited, or restricted    CJ 20% - 39% Impaired, limited, or restricted    CK 40% - 59% Impaired, limited, or restricted    CL 60% - 79% Impaired, limited, or restricted    CM 80% - 99% Impaired, limited, or restricted    % Impaired, limited, or restricted           TREATMENT  Therapeutic Exercise with SEMG x50 min  PF Electrode: external perineal body  Pt. Position: supine, semi-trevino's, prone  Kegels in semi-trevino's 10"W/20"R 2x10  - with internal cueing x10, with TA cueing x10   Down training with internal palpation 5' - "drop into my hand"  DB prone 10' - cueing to slow speed         Did not perform today:  Add stretch supine 3x1'        TPR bilat pubococcygeus 10'  Kegels 5"W/10"R x10 - with graph visual  Clams x10 bilat  Bridge x10  Hooklying add with pillow 10x5"      Education: Discussed " "progression of plan of care with patient; educated pt in activity modification; reviewed HEP with pt. Pt demonstrated and verbalized understanding of all instruction and was not provided with a handout of HEP (see Patient Instructions). Reviewed entire HEP per previous handouts. All pt's questions answered today.     Assessment     Pt tolerated treatment well with no visible adverse affects from electrode placement or tx. Pt was able to demo a partial PFM relaxation with external cues on perineum, however pt was unsuccessful in actively haroon PFMs at any point today. DB in prone remains 2" inhale/exhale consistently, however this is minimally improved from beginning PFT.  Pt has met 1/2 STGs and 3/7 LTGs. Pt has partially met LTG #3. Pt has actively chosen to not comply with PT's recommendation to not perform valsalva voiding, not meeting LTG #4. Pt has maximized PT at this time and will d/c to HEP.       GOALS  Short Term Goals: 1 month  1. Pt will verbalize improved awareness of PFM activity as palpated by PT in order to improve activity involvement with HEP. NOT MET  2. Pt will tolerate HEP to improve impairments and independence with ADL's. MET 5/16/17     Long Term Goals: 3 months  1. Pt will report <33% on JUSTINE-6 to demonstrate a decrease in disability and improvement in independence with functional activities. MET 5/16/17  2. Able to maintain normal breathing pattern during pelvic floor muscle contraction., NOT MET  3. Able to maintain voiding interval of 2-3 hours for driving, movie, or work meeting., NOT MET - 1.5-2 hours  4. Urinate without crede/valsalva., NOT MET  5. Pt. to be I with techniques for double voiding., MET 5/16/17  6. Pt to be I with self mgmt. of symptoms. NOT MET  7. Pt to be I with HEP. MET, except for not performing valsalva voiding     Plan     D/C from PT.        "

## 2017-05-20 ENCOUNTER — TELEPHONE (OUTPATIENT)
Dept: INTERNAL MEDICINE | Facility: CLINIC | Age: 32
End: 2017-05-20

## 2017-05-20 NOTE — TELEPHONE ENCOUNTER
Have attempted to contact patient by phone numerous times about her lab result-no answer.Will mail letter asking her to contact our office

## 2017-05-24 RX ORDER — PANTOPRAZOLE SODIUM 40 MG/1
TABLET, DELAYED RELEASE ORAL
Qty: 90 TABLET | Refills: 0 | Status: SHIPPED | OUTPATIENT
Start: 2017-05-24 | End: 2017-06-05 | Stop reason: SDUPTHER

## 2017-06-05 ENCOUNTER — OFFICE VISIT (OUTPATIENT)
Dept: UROGYNECOLOGY | Facility: CLINIC | Age: 32
End: 2017-06-05
Payer: MEDICAID

## 2017-06-05 VITALS
HEIGHT: 62 IN | WEIGHT: 225.75 LBS | BODY MASS INDEX: 41.54 KG/M2 | DIASTOLIC BLOOD PRESSURE: 70 MMHG | SYSTOLIC BLOOD PRESSURE: 110 MMHG

## 2017-06-05 DIAGNOSIS — B37.2 YEAST DERMATITIS: Primary | ICD-10-CM

## 2017-06-05 DIAGNOSIS — N89.8 VAGINAL ITCHING: ICD-10-CM

## 2017-06-05 DIAGNOSIS — N39.41 URGE INCONTINENCE: ICD-10-CM

## 2017-06-05 PROCEDURE — 99213 OFFICE O/P EST LOW 20 MIN: CPT | Mod: PBBFAC | Performed by: NURSE PRACTITIONER

## 2017-06-05 PROCEDURE — 99999 PR PBB SHADOW E&M-EST. PATIENT-LVL III: CPT | Mod: PBBFAC,,, | Performed by: NURSE PRACTITIONER

## 2017-06-05 PROCEDURE — 99213 OFFICE O/P EST LOW 20 MIN: CPT | Mod: S$PBB,,, | Performed by: NURSE PRACTITIONER

## 2017-06-05 RX ORDER — NYSTATIN AND TRIAMCINOLONE ACETONIDE 100000; 1 [USP'U]/G; MG/G
CREAM TOPICAL 2 TIMES DAILY
Qty: 30 G | Refills: 2 | Status: SHIPPED | OUTPATIENT
Start: 2017-06-05 | End: 2018-08-23

## 2017-06-05 RX ORDER — SULFAMETHOXAZOLE AND TRIMETHOPRIM 800; 160 MG/1; MG/1
TABLET ORAL
Refills: 0 | COMMUNITY
Start: 2017-05-05 | End: 2017-06-05

## 2017-06-05 RX ORDER — PROMETHAZINE HYDROCHLORIDE 25 MG/1
TABLET ORAL
Refills: 0 | COMMUNITY
Start: 2017-05-05 | End: 2018-08-23

## 2017-06-05 RX ORDER — LURASIDONE HYDROCHLORIDE 80 MG/1
TABLET, FILM COATED ORAL
Refills: 2 | COMMUNITY
Start: 2017-05-09 | End: 2019-05-16

## 2017-06-05 RX ORDER — FLUCONAZOLE 150 MG/1
150 TABLET ORAL
Qty: 3 TABLET | Refills: 0 | Status: SHIPPED | OUTPATIENT
Start: 2017-06-05 | End: 2017-06-10

## 2017-06-05 RX ORDER — HYDROCODONE BITARTRATE AND ACETAMINOPHEN 5; 325 MG/1; MG/1
TABLET ORAL
Refills: 0 | COMMUNITY
Start: 2017-05-05 | End: 2017-06-19

## 2017-06-05 NOTE — PROGRESS NOTES
Urogyn follow up  03/24/2017  .  [facility]  [address] [id number]     Maria Ines Ac  [id number]  1985        Maria Ines Ac is a 32 y.o.  here for  follow up.  She has a history of retropubic sling/ cystourethroscopy for DILIP 1/2017 with worsening urinary urgency.  She was seen The groin pain which was evaluated for 4 wks ago has resolved, she is using the vaginal suppositories a few times a week. She has persistent urinary urgency which has improved slightly since starting pelvic floor PT.      Interval  HP since the last visit:    1)  UI:  (--) JACINDA  (+) UUI- once a day   (+) pads: once a day  Daytime frequency: Q 4 -5 hours.  Nocturia: No:    (-) dysuria-  (--) hematuria,  (--) frequent UTIs.  (+) complete bladder emptying.     2)  POP:  Absent    Symptoms:(--)  .  (--) vaginal bleeding. (+) vaginal discharge. (-) sexually active.  (--) dyspareunia.   (--)  Vaginal dryness.  (+) vaginal estrogen use. Using trimosan with pure coconut oil.    3)  BM:  (-) constipation/straining.  (--) chronic diarrhea. (--) hematochezia.  (--) fecal incontinence.  (--) fecal smearing/urgency.  (--) incomplete evacuation.      4) pelvic pain improved with the vaginal suppositories, pain in groin region now resolved                 Past Medical History:   Diagnosis Date    Blood in stool      Constipation      Depression      Diabetes mellitus, type 2      Dysphagia      GERD (gastroesophageal reflux disease)      Hypertension      Palpitations      Premature menopause on hormone replacement therapy      Thyroid disease                 Past Surgical History:   Procedure Laterality Date    BLADDER SUSPENSION        CARPAL TUNNEL RELEASE         right    COLONOSCOPY N/A 8/30/2016     Procedure: COLONOSCOPY; Surgeon: Slick Herron MD; Location: [facility] ([address] [place] [address]); Service: Endoscopy; Laterality: N/A; PM prep    GALLBLADDER SURGERY        HYSTERECTOMY   2013      Bess velasquez Dr. Champlain    OOPHORECTOMY   2005     [facility]- benign cyst    OOPHORECTOMY   2013     [facility]- endo    ooptherectomy        right knee   scoped    SHOULDER SURGERY   right         History since last visit:Denies vaginal bleeding.  Complains of vaginal itching x 1 week.  Not using estrogen cream.  Using liliana's butt paste.  Bladder issues: Reports - JACINDA   + UUI only when in the tub.  Taking oxybutynin.  Bowel issues: Denies constipation or straining.       Current Outpatient Prescriptions   Medication Sig    ACCU-CHEK AMAURY PLUS TEST STRP Strp USE TO TEST BLOOD GLUCOSE TID    atorvastatin (LIPITOR) 40 MG tablet Take 40 mg by mouth once daily.    benzonatate (TESSALON) 100 MG capsule TK 1 C PO TID PRF COUGH    BLOOD SUGAR DIAGNOSTIC (ACCU-CHEK AMAURY PLUS TEST STRP MISC) 1 strip by Misc.(Non-Drug; Combo Route) route 3 (three) times daily.    calcium-vitamin D3 500 mg(1,250mg) -200 unit per tablet Take 1 tablet by mouth 2 (two) times daily with meals.    conjugated estrogens (PREMARIN) vaginal cream Use 0.5 gram of estrogen cream in vagina nightly x 2 weeks, then twice a week thereafter.    dicyclomine (BENTYL) 10 MG capsule TAKE ONE CAPSULE BY MOUTH TWICE DAILY    docusate sodium (COLACE) 100 MG capsule Take 100 mg by mouth 2 (two) times daily.    dulaglutide (TRULICITY) 0.75 mg/0.5 mL PnIj Inject 0.75 mg into the skin every 7 days.    estradiol (VIVELLE-DOT) 0.1 mg/24 hr PTSW APPLY 1 PATCH EXTERNALLY TO THE SKIN 2 TIMES A WEEK (Patient taking differently: APPLY 1 PATCH EXTERNALLY TO THE SKIN 2 TIMES A WEEK ON TUESDAY AND FRIDAY)    FARXIGA 10 mg Tab Take 1 tablet by mouth once daily.     fish oil-omega-3 fatty acids 300-1,000 mg capsule Take 2 g by mouth once daily.    GLUCOPHAGE 500 mg tablet Take 500 mg by mouth 2 (two) times daily with meals.     hydrocodone-acetaminophen 5-325mg (NORCO) 5-325 mg per tablet TK 1 T PO  Q 4 TO 6 H PRN P    LATUDA 80 mg Tab tablet TK 1 T PO D     "metformin (GLUCOPHAGE) 500 MG tablet TK 1 T PO  BID    metoprolol succinate (TOPROL-XL) 25 MG 24 hr tablet Take 25 mg by mouth once daily.     MULTIVIT-IRON-MIN-FOLIC ACID 3,500-18-0.4 UNIT-MG-MG ORAL CHEW Take 1 tablet by mouth once daily.     nystatin (MYCOSTATIN) powder Apply topically 2 (two) times daily.    nystatin-triamcinolone (MYCOLOG II) cream Apply topically 2 (two) times daily.    oxybutynin (DITROPAN-XL) 10 MG 24 hr tablet TAKE 1 TABLET BY MOUTH EVERY DAY    pantoprazole (PROTONIX) 40 MG tablet TK 1 T PO  QD    phentermine (ADIPEX-P) 37.5 mg tablet Take 37.5 mg by mouth before breakfast.    promethazine (PHENERGAN) 25 MG tablet TK 1 T PO  Q 4 TO 6 H PRN P    spironolactone (ALDACTONE) 25 MG tablet TAKE1 TABLET BY MOUTH AT BEDTIME    terconazole (TERAZOL 3) 0.8 % vaginal cream Place 1 applicator vaginally once daily.    TOPAMAX 25 mg tablet Take 1 tablet (25 mg total) by mouth once daily. 1 pill at bedtime    ZOLOFT 100 mg tablet Take 200 mg by mouth once daily.     fluconazole (DIFLUCAN) 150 MG Tab Take 1 tablet (150 mg total) by mouth every 48 hours.    levothyroxine (SYNTHROID) 50 MCG tablet TK 1 T PO QAM     No current facility-administered medications for this visit.         Exam  Vitals:    06/05/17 1315   BP: 110/70   Weight: 102.4 kg (225 lb 12 oz)   Height: 5' 2" (1.575 m)       General: alert and oriented, no acute distress  Respiratory: normal respiratory effort  Abd: soft, non-distended.  ND/NT     Pelvic  Ext. Genitalia:  Labia reddened/ excoriated with red raised satellite lesions consistent with yeast dermatitis. Normal bartholin's and skeens glands.    Vagina: -- atrophy. Normal vaginal mucosa without lesions, No discharge  Cervix: absent  Uterus: absent   Urethra: no masses or tenderness  Urethral meatus: no lesions, caruncle or prolapse.             Impression  1. Yeast dermatitis  fluconazole (DIFLUCAN) 150 MG Tab    nystatin-triamcinolone (MYCOLOG II) cream   2. Vaginal " itching  POCT URINE DIPSTICK WITHOUT MICROSCOPE   3. Urge incontinence         We reviewed the above issues and discussed options for short-term versus long-term management of her problems.     Plan:     1. Diflucan 150 mg every 48 hours x 3 doses  2. mycolog II to labia twice daily x 2 weeks.  3. Vaginal atrophy:  Estrogen cream 0.5 g twice weekly  4. Continue oxybutynin xl 15 mg  5. Stop wearing pads-- use panty liner instead  6. Continue pelvic floor PT exercises  7. Vaginal valium suppository 1-2 times daily as needed.  8. Work on lowering your blood glucose  9. She will follow up with us in 2 weeks. Discussed the benefits of SNS vs Botox vs PTNS        30 minutes were spent in face to face time with this patient  75 % of this time was spent in counseling and/or coordination of care     TRACEY Ryan-BC  Female Pelvic Medicine and Reconstructive Surgery  [facility] [place], [place]

## 2017-06-05 NOTE — PATIENT INSTRUCTIONS
1. Diflucan 150 mg every 48 hours x 3 doses  2. mycolog II to labia twice daily x 2 weeks.  3. Vaginal atrophy:  Estrogen cream 0.5 g twice weekly  4. Continue oxybutynin xl 15 mg  5. Stop wearing pads-- use panty liner instead  6. Continue pelvic floor PT exercises  7. Vaginal valium suppository 1-2 times daily as needed.  8. Work on lowering your blood glucose  9. She will follow up with us in 2 weeks. Discussed the benefits of SNS vs Botox vs PTNS

## 2017-06-07 ENCOUNTER — OFFICE VISIT (OUTPATIENT)
Dept: GASTROENTEROLOGY | Facility: CLINIC | Age: 32
End: 2017-06-07
Payer: MEDICAID

## 2017-06-07 VITALS
HEIGHT: 62 IN | HEART RATE: 77 BPM | SYSTOLIC BLOOD PRESSURE: 114 MMHG | DIASTOLIC BLOOD PRESSURE: 81 MMHG | BODY MASS INDEX: 41.38 KG/M2 | WEIGHT: 224.88 LBS

## 2017-06-07 DIAGNOSIS — K58.0 IRRITABLE BOWEL SYNDROME WITH DIARRHEA: Primary | ICD-10-CM

## 2017-06-07 DIAGNOSIS — K21.9 GASTROESOPHAGEAL REFLUX DISEASE WITHOUT ESOPHAGITIS: ICD-10-CM

## 2017-06-07 DIAGNOSIS — R10.10 UPPER ABDOMINAL PAIN: ICD-10-CM

## 2017-06-07 DIAGNOSIS — R19.7 DIARRHEA, UNSPECIFIED TYPE: ICD-10-CM

## 2017-06-07 PROCEDURE — 99215 OFFICE O/P EST HI 40 MIN: CPT | Mod: PBBFAC | Performed by: NURSE PRACTITIONER

## 2017-06-07 PROCEDURE — 99214 OFFICE O/P EST MOD 30 MIN: CPT | Mod: S$PBB,,, | Performed by: NURSE PRACTITIONER

## 2017-06-07 PROCEDURE — 99999 PR PBB SHADOW E&M-EST. PATIENT-LVL V: CPT | Mod: PBBFAC,,, | Performed by: NURSE PRACTITIONER

## 2017-06-07 RX ORDER — CHOLESTYRAMINE 4 G/4.8G
4 POWDER, FOR SUSPENSION ORAL 2 TIMES DAILY
Qty: 60 PACKET | Refills: 1 | Status: SHIPPED | OUTPATIENT
Start: 2017-06-07 | End: 2017-08-03 | Stop reason: SDUPTHER

## 2017-06-07 NOTE — PROGRESS NOTES
[facility] Established Patient Visit    Reason for Visit:  The primary encounter diagnosis was Irritable bowel syndrome with diarrhea. Diagnoses of Upper abdominal pain, Diarrhea, unspecified type, and Gastroesophageal reflux disease without esophagitis were also pertinent to this visit.    PCP: Luann Raymond    HPI: This is a 32 y.o. female here for f/u IBS.  Ms. Ac is here with her father and was last seen in GI by myself in 12/2016 . She has been taking 20 mg bentyl TID and a daily probiotic (unsure of brand) since her last GI clinic visit with me. Bentyl helps sometimes with the abd cramping, but no change in stools. Currently with 5 loose BM/day. No blood, pus, mucus. Not black. Some nocturnal stools- twice in the last 5 months. Worse in the evening. Postprandial. W/ urgency. Currently taking pepto bismol twice daily w/o relief.      Abdominal pain- + chronic upper abd pain. 5/10 Cramping. Intermittent; daily. Not worsening. Better with bentyl- it shortens the length of time the pain lasts.  Worse immediately after meals lasting for 5 minutes. Sometimes better after BM.   No n/v/fevers.   Reflux - + hx GERD-retrosternal pyrosis with belching daily.  taking 40 mg Protonix once daily about 1 hour b/f breakfast and 75 mg Zantac HS.  Dysphagia/odynophagia - + hx dysphagia. S/p dilation x 2. No problems since most recent dilation in 8/2016.   GI bleeding - denies hematochezia, hematemesis, melena, BRBPR, black/tarry stools, and coffee ground emesis  NSAID usage - denies    ROS:  Constitutional: No fevers, no chills, No unintentional/unexplained weight loss, no fatigue,   ENT: No allergies  CV: No chest pain, no palpitations, no perif. edema, no sob on exertion  Pulm: No cough, No shortness of breath, no wheezes, no sputum  Ophtho: No vision changes  GI: see HPI; also no nausea, no vomiting, no change in appetite  Derm: No rash  Heme: No lymphadenopathy, No bruising  MSK: No arthritis, no muscle  pain, no muscle weakness  : No dysuria, No hematuria  Endo: No hot or cold intolerance  Neuro: No syncope, No seizure,     PMHX:  has a past medical history of Blood in stool; Constipation; Depression; Diabetes mellitus, type 2; Dysphagia; GERD (gastroesophageal reflux disease); Hypertension; Palpitations; Premature menopause on hormone replacement therapy; and Thyroid disease.    PSHX:  has a past surgical history that includes ooptherectomy; right knee (scoped); Gallbladder surgery; Shoulder surgery (right); Carpal tunnel release; Hysterectomy (2013); Oophorectomy (2005); Oophorectomy (2013); Colonoscopy (N/A, 8/30/2016); Bladder suspension; and Cholecystectomy.    The patient's social and family histories were reviewed by me and updated in the appropriate section of the electronic medical record.    Review of patient's allergies indicates:  No Known Allergies    Current Outpatient Prescriptions   Medication Sig    ACCU-CHEK AMAURY PLUS TEST STRP Strp USE TO TEST BLOOD GLUCOSE TID    atorvastatin (LIPITOR) 40 MG tablet Take 40 mg by mouth once daily.    benzonatate (TESSALON) 100 MG capsule TK 1 C PO TID PRF COUGH    BLOOD SUGAR DIAGNOSTIC (ACCU-CHEK AMAURY PLUS TEST STRP MISC) 1 strip by Misc.(Non-Drug; Combo Route) route 3 (three) times daily.    calcium-vitamin D3 500 mg(1,250mg) -200 unit per tablet Take 1 tablet by mouth 2 (two) times daily with meals.    conjugated estrogens (PREMARIN) vaginal cream Use 0.5 gram of estrogen cream in vagina nightly x 2 weeks, then twice a week thereafter.    dicyclomine (BENTYL) 10 MG capsule TAKE ONE CAPSULE BY MOUTH TWICE DAILY    dulaglutide (TRULICITY) 0.75 mg/0.5 mL PnIj Inject 0.75 mg into the skin every 7 days.    estradiol (VIVELLE-DOT) 0.1 mg/24 hr PTSW APPLY 1 PATCH EXTERNALLY TO THE SKIN 2 TIMES A WEEK (Patient taking differently: APPLY 1 PATCH EXTERNALLY TO THE SKIN 2 TIMES A WEEK ON TUESDAY AND FRIDAY)    FARXIGA 10 mg Tab Take 1 tablet by mouth once  "daily.     fish oil-omega-3 fatty acids 300-1,000 mg capsule Take 2 g by mouth once daily.    fluconazole (DIFLUCAN) 150 MG Tab Take 1 tablet (150 mg total) by mouth every 48 hours.    GLUCOPHAGE 500 mg tablet Take 500 mg by mouth 2 (two) times daily with meals.     hydrocodone-acetaminophen 5-325mg (NORCO) 5-325 mg per tablet TK 1 T PO  Q 4 TO 6 H PRN P    LATUDA 80 mg Tab tablet TK 1 T PO D    levothyroxine (SYNTHROID) 50 MCG tablet TK 1 T PO QAM    metformin (GLUCOPHAGE) 500 MG tablet TK 1 T PO  BID    metoprolol succinate (TOPROL-XL) 25 MG 24 hr tablet Take 25 mg by mouth once daily.     MULTIVIT-IRON-MIN-FOLIC ACID 3,500-18-0.4 UNIT-MG-MG ORAL CHEW Take 1 tablet by mouth once daily.     nystatin (MYCOSTATIN) powder Apply topically 2 (two) times daily.    nystatin-triamcinolone (MYCOLOG II) cream Apply topically 2 (two) times daily.    oxybutynin (DITROPAN-XL) 10 MG 24 hr tablet TAKE 1 TABLET BY MOUTH EVERY DAY    pantoprazole (PROTONIX) 40 MG tablet TK 1 T PO  QD    phentermine (ADIPEX-P) 37.5 mg tablet Take 37.5 mg by mouth before breakfast.    promethazine (PHENERGAN) 25 MG tablet TK 1 T PO  Q 4 TO 6 H PRN P    ranitidine (ZANTAC) 75 MG tablet Take 75 mg by mouth 2 (two) times daily.    spironolactone (ALDACTONE) 25 MG tablet TAKE1 TABLET BY MOUTH AT BEDTIME    terconazole (TERAZOL 3) 0.8 % vaginal cream Place 1 applicator vaginally once daily.    TOPAMAX 25 mg tablet Take 1 tablet (25 mg total) by mouth once daily. 1 pill at bedtime    ZOLOFT 100 mg tablet Take 200 mg by mouth once daily.     cholestyramine-aspartame (QUESTRAN LIGHT) 4 gram PwPk Take 1 packet (4 g total) by mouth 2 (two) times daily.    docusate sodium (COLACE) 100 MG capsule Take 100 mg by mouth 2 (two) times daily.     No current facility-administered medications for this visit.          Objective Findings:    Vital Signs:  /81   Pulse 77   Ht 5' 2" (1.575 m)   Wt 102 kg (224 lb 13.9 oz)   LMP 11/17/2012   " BMI 41.13 kg/m²  Body mass index is 41.13 kg/m².    Physical Exam:  General Appearance: Well appearing in no acute distress  Head:   Normocephalic, without obvious abnormality  Eyes:    No scleral icterus, EOMI  Throat: Lips, mucosa, and tongue normal; teeth and gums normal  Lungs: CTA bilaterally in anterior and posterior fields, no wheezes, no crackles.  Heart:  Regular rate and rhythm, S1, S2 normal, no murmurs heard  Abdomen: Soft, obese, non tender, non distended with positive bowel sounds in all four quadrants. No hepatosplenomegaly, ascites, or mass  Extremities:    2+ radial pulses, no clubbing, cyanosis or edema  Skin: No rash to exposed areas  Neurologic: A&Ox4      Labs:  Lab Results   Component Value Date    WBC 9.39 01/24/2017    HGB 13.5 01/24/2017    HCT 41.9 01/24/2017     01/24/2017    CHOL 163 09/07/2016    TRIG 203 (H) 09/07/2016    HDL 37 (L) 09/07/2016    ALT 13 05/08/2017    AST 18 05/08/2017     (L) 05/08/2017    K 4.2 05/08/2017     05/08/2017    CREATININE 1.3 05/08/2017    BUN 11 05/08/2017    CO2 20 (L) 05/08/2017    TSH 0.858 01/24/2017    INR 0.9 01/05/2017    HGBA1C 5.5 05/08/2017       Endoscopy:   8/30/2016 EGD Dr. Herron-normal esophagus. Dilation w/ 50 fr rogers. Normal stomach. Normal duodenum.  8/30/2016 Colonoscopy Dr. Herron-normal. Random bx-negative.   10/23/2014 EGD [place]-normal esophagus. Dilated with a 50 fr rogers. Normal stomach. Normal duodenum. bx-wnl  1/10/2013 EGD [facility]- normal study.   1/9/2013 colonoscopy [place]-2 mm sigmoid colon polyp. bx- Hyperplastic. Repeat in 10 years.  6/13/2012 esophageal manometry [place]-normal  4/2/2012 EGD [place]- normal duodenum. Gastritis. HH. White nummular lesions in the esophagus. bx-mild chronic inflammation/reactive changes. Otherwise WNL.    Assessment:    1. Irritable bowel syndrome with diarrhea    2. Upper abdominal pain    3. Diarrhea, unspecified type    4. Gastroesophageal reflux disease without  esophagitis          Recommendations:  1. IBS-D- also s/p louise. Trial questran BID. Can decrease dose if constipation occurs. abd us for abd pain. Continue bentyl, probiotics. Can stop Pepto bismol.  2.  GERD-continue daily Protonix. Switch HS Zantac to OTC Prilosec either 30-5 min b/f dinner or HS and use Zantac PRN.   Written instructions provided.    Return in about 1 month (around 7/7/2017) for IBS, GERD.    Order summary:  Orders Placed This Encounter    US Abdomen Complete    cholestyramine-aspartame (QUESTRAN LIGHT) 4 gram PwPk       Thank you for allowing me to participate in the care of Maria Inescody Mcclendone Doreenrehan Fan, APRN, FNP-C

## 2017-06-07 NOTE — PATIENT INSTRUCTIONS
For reflux continue to take 40 mg Protonix/pantoprazole every morning 30-45 minutes before breakfast.  Start taking over the counter Prilosec every evening either 30-45 minutes before dinner or before bedtime.  Take Zantac as needed.     An abdominal ultrasound has been ordered for the upper abdominal cramping. Continue taking bentyl/dicylcomine three times daily.    Questran/choletyramine has been ordered for the diarrhea. Take it twice daily. You may decrease it to once daily if you become constipated. Continue taking your daily probiotic.

## 2017-06-13 ENCOUNTER — HOSPITAL ENCOUNTER (OUTPATIENT)
Dept: RADIOLOGY | Facility: OTHER | Age: 32
Discharge: HOME | End: 2017-06-13
Attending: NURSE PRACTITIONER
Payer: MEDICAID

## 2017-06-13 ENCOUNTER — TELEPHONE (OUTPATIENT)
Dept: GASTROENTEROLOGY | Facility: CLINIC | Age: 32
End: 2017-06-13

## 2017-06-13 DIAGNOSIS — R10.10 UPPER ABDOMINAL PAIN: ICD-10-CM

## 2017-06-13 PROCEDURE — 76700 US EXAM ABDOM COMPLETE: CPT | Mod: 26,,, | Performed by: RADIOLOGY

## 2017-06-13 PROCEDURE — 76700 US EXAM ABDOM COMPLETE: CPT | Mod: TC

## 2017-06-19 ENCOUNTER — OFFICE VISIT (OUTPATIENT)
Dept: UROGYNECOLOGY | Facility: CLINIC | Age: 32
End: 2017-06-19
Payer: MEDICAID

## 2017-06-19 VITALS
WEIGHT: 226.44 LBS | SYSTOLIC BLOOD PRESSURE: 102 MMHG | BODY MASS INDEX: 41.67 KG/M2 | DIASTOLIC BLOOD PRESSURE: 70 MMHG | HEIGHT: 62 IN

## 2017-06-19 DIAGNOSIS — N90.89 VULVAR IRRITATION: Primary | ICD-10-CM

## 2017-06-19 PROBLEM — M62.838 SPASM OF MUSCLE: Status: RESOLVED | Noted: 2017-03-20 | Resolved: 2017-06-19

## 2017-06-19 PROBLEM — N39.8 DYSFUNCTIONAL VOIDING OF URINE: Status: RESOLVED | Noted: 2017-03-20 | Resolved: 2017-06-19

## 2017-06-19 PROCEDURE — 99215 OFFICE O/P EST HI 40 MIN: CPT | Mod: PBBFAC | Performed by: NURSE PRACTITIONER

## 2017-06-19 PROCEDURE — 99213 OFFICE O/P EST LOW 20 MIN: CPT | Mod: S$PBB,,, | Performed by: NURSE PRACTITIONER

## 2017-06-19 PROCEDURE — 99999 PR PBB SHADOW E&M-EST. PATIENT-LVL V: CPT | Mod: PBBFAC,,, | Performed by: NURSE PRACTITIONER

## 2017-06-19 RX ORDER — OXYBUTYNIN CHLORIDE 15 MG/1
TABLET, EXTENDED RELEASE ORAL
Refills: 8 | COMMUNITY
Start: 2017-05-28 | End: 2017-10-11 | Stop reason: ALTCHOICE

## 2017-06-19 NOTE — PATIENT INSTRUCTIONS
1. Shazia's Butt Paste daily to vulva.  3. Vaginal atrophy:  Estrogen cream 0.5 g twice weekly  4. Continue oxybutynin xl 15 mg  5. Stop wearing pads-- use panty liner instead  6. Continue pelvic floor PT exercises  7. Vaginal valium suppository 1-2 times daily as needed.  8. Work on lowering your blood glucose  9. She will follow up with us in January of 2018

## 2017-06-19 NOTE — PROGRESS NOTES
Urogyn follow up  06/19/2017  .  [facility]  [address] [facility] [address]     Maria Ines Ac  [id number]  1985        Maria Ines Ac is a 32 y.o.  here for  follow up.  She has a history of retropubic sling/ cystourethroscopy for DILIP 1/2017 with worsening urinary urgency.  She was seen The groin pain which was evaluated for 4 wks ago has resolved, she is using the vaginal suppositories a few times a week. She has persistent urinary urgency which has improved slightly since starting pelvic floor PT.      Interval  HP since the last visit:    1)  UI:  (--) JACINDA  (+) UUI- once a day   (+) pads: once a day  Daytime frequency: Q 4 -5 hours.  Nocturia: No:    (-) dysuria-  (--) hematuria,  (--) frequent UTIs.  (+) complete bladder emptying.     2)  POP:  Absent    Symptoms:(--)  .  (--) vaginal bleeding. (+) vaginal discharge. (-) sexually active.  (--) dyspareunia.   (--)  Vaginal dryness.  (+) vaginal estrogen use. Using trimosan with pure coconut oil.    3)  BM:  (-) constipation/straining.  (--) chronic diarrhea. (--) hematochezia.  (--) fecal incontinence.  (--) fecal smearing/urgency.  (--) incomplete evacuation.      4) pelvic pain improved with the vaginal suppositories, pain in groin region now resolved                 Past Medical History:   Diagnosis Date    Blood in stool      Constipation      Depression      Diabetes mellitus, type 2      Dysphagia      GERD (gastroesophageal reflux disease)      Hypertension      Palpitations      Premature menopause on hormone replacement therapy      Thyroid disease                 Past Surgical History:   Procedure Laterality Date    BLADDER SUSPENSION        CARPAL TUNNEL RELEASE         right    COLONOSCOPY N/A 8/30/2016     Procedure: COLONOSCOPY; Surgeon: Slick Herron MD; Location: [facility] ([address] [place] [address]); Service: Endoscopy; Laterality: N/A; PM prep    GALLBLADDER SURGERY        HYSTERECTOMY   2013      Bess velasquez Dr. [place]amplain    OOPHORECTOMY   2005     [facility]- benign cyst    OOPHORECTOMY   2013     [facility]- endo    ooptherectomy        right knee   scoped    SHOULDER SURGERY   right         History since last visit:Denies vaginal bleeding.  Complains of occsional vaginal itching.   Using liliana's butt paste.  Bladder issues: Reports - JACINDA   + UUI only when in the tub.  Taking oxybutynin.  Bowel issues: Denies constipation or straining.       Current Outpatient Prescriptions   Medication Sig    ACCU-CHEK AMAURY PLUS TEST STRP Strp USE TO TEST BLOOD GLUCOSE TID    atorvastatin (LIPITOR) 40 MG tablet Take 40 mg by mouth once daily.    benzonatate (TESSALON) 100 MG capsule TK 1 C PO TID PRF COUGH    BLOOD SUGAR DIAGNOSTIC (ACCU-CHEK AMAURY PLUS TEST STRP MISC) 1 strip by Misc.(Non-Drug; Combo Route) route 3 (three) times daily.    calcium-vitamin D3 500 mg(1,250mg) -200 unit per tablet Take 1 tablet by mouth 2 (two) times daily with meals.    cholestyramine-aspartame (QUESTRAN LIGHT) 4 gram PwPk Take 1 packet (4 g total) by mouth 2 (two) times daily.    conjugated estrogens (PREMARIN) vaginal cream Use 0.5 gram of estrogen cream in vagina nightly x 2 weeks, then twice a week thereafter.    dicyclomine (BENTYL) 10 MG capsule TAKE ONE CAPSULE BY MOUTH TWICE DAILY    docusate sodium (COLACE) 100 MG capsule Take 100 mg by mouth 2 (two) times daily.    dulaglutide (TRULICITY) 0.75 mg/0.5 mL PnIj Inject 0.75 mg into the skin every 7 days.    estradiol (VIVELLE-DOT) 0.1 mg/24 hr PTSW APPLY 1 PATCH EXTERNALLY TO THE SKIN 2 TIMES A WEEK (Patient taking differently: APPLY 1 PATCH EXTERNALLY TO THE SKIN 2 TIMES A WEEK ON TUESDAY AND FRIDAY)    FARXIGA 10 mg Tab Take 1 tablet by mouth once daily.     fish oil-omega-3 fatty acids 300-1,000 mg capsule Take 2 g by mouth once daily.    GLUCOPHAGE 500 mg tablet Take 500 mg by mouth 2 (two) times daily with meals.     LATUDA 80 mg Tab tablet TK 1 T PO D     "levothyroxine (SYNTHROID) 50 MCG tablet TK 1 T PO QAM    metformin (GLUCOPHAGE) 500 MG tablet TK 1 T PO  BID    metoprolol succinate (TOPROL-XL) 25 MG 24 hr tablet Take 25 mg by mouth once daily.     MULTIVIT-IRON-MIN-FOLIC ACID 3,500-18-0.4 UNIT-MG-MG ORAL CHEW Take 1 tablet by mouth once daily.     nystatin (MYCOSTATIN) powder Apply topically 2 (two) times daily.    nystatin-triamcinolone (MYCOLOG II) cream Apply topically 2 (two) times daily.    OMEPRAZOLE (PRILOSEC ORAL) Take by mouth.    oxybutynin (DITROPAN XL) 15 MG TR24     oxybutynin (DITROPAN-XL) 10 MG 24 hr tablet TAKE 1 TABLET BY MOUTH EVERY DAY    pantoprazole (PROTONIX) 40 MG tablet TK 1 T PO  QD    phentermine (ADIPEX-P) 37.5 mg tablet Take 37.5 mg by mouth before breakfast.    promethazine (PHENERGAN) 25 MG tablet TK 1 T PO  Q 4 TO 6 H PRN P    spironolactone (ALDACTONE) 25 MG tablet TAKE1 TABLET BY MOUTH AT BEDTIME    terconazole (TERAZOL 3) 0.8 % vaginal cream Place 1 applicator vaginally once daily.    TOPAMAX 25 mg tablet Take 1 tablet (25 mg total) by mouth once daily. 1 pill at bedtime    ZOLOFT 100 mg tablet Take 200 mg by mouth once daily.     ranitidine (ZANTAC) 75 MG tablet Take 75 mg by mouth 2 (two) times daily.     No current facility-administered medications for this visit.         Exam  Vitals:    06/19/17 1444   BP: 102/70   Weight: 102.7 kg (226 lb 6.6 oz)   Height: 5' 2" (1.575 m)       General: alert and oriented, no acute distress  Respiratory: normal respiratory effort  Abd: soft, non-distended.  ND/NT     Pelvic  Ext. Genitalia:  Labia slightly irritated.  No evidence of yeast. Normal bartholin's and skeens glands.    Vagina: -- atrophy. Normal vaginal mucosa without lesions, No discharge  Cervix: absent  Uterus: absent   Urethra: no masses or tenderness  Urethral meatus: no lesions, caruncle or prolapse.             Impression  1. Vulvar irritation         We reviewed the above issues and discussed options for " short-term versus long-term management of her problems.     Plan:     1. Shazia's Butt Paste daily to vulva.  3. Vaginal atrophy:  Estrogen cream 0.5 g twice weekly  4. Continue oxybutynin xl 15 mg  5. Stop wearing pads-- use panty liner instead  6. Continue pelvic floor PT exercises  7. Vaginal valium suppository 1-2 times daily as needed.  8. Work on lowering your blood glucose  9. She will follow up with us in January of 2018     30 minutes were spent in face to face time with this patient  75 % of this time was spent in counseling and/or coordination of care     TRACEY Ryan-BC  Female Pelvic Medicine and Reconstructive Surgery  [facility] [place], [place]

## 2017-06-20 ENCOUNTER — OFFICE VISIT (OUTPATIENT)
Dept: INTERNAL MEDICINE | Facility: CLINIC | Age: 32
End: 2017-06-20
Payer: MEDICAID

## 2017-06-20 VITALS
TEMPERATURE: 98 F | DIASTOLIC BLOOD PRESSURE: 74 MMHG | HEIGHT: 62 IN | BODY MASS INDEX: 41.59 KG/M2 | WEIGHT: 226 LBS | SYSTOLIC BLOOD PRESSURE: 115 MMHG | HEART RATE: 86 BPM

## 2017-06-20 DIAGNOSIS — J02.9 PHARYNGITIS, UNSPECIFIED ETIOLOGY: Primary | ICD-10-CM

## 2017-06-20 PROCEDURE — 99213 OFFICE O/P EST LOW 20 MIN: CPT | Mod: S$PBB,,, | Performed by: STUDENT IN AN ORGANIZED HEALTH CARE EDUCATION/TRAINING PROGRAM

## 2017-06-20 PROCEDURE — 99999 PR PBB SHADOW E&M-EST. PATIENT-LVL V: CPT | Mod: PBBFAC,,, | Performed by: STUDENT IN AN ORGANIZED HEALTH CARE EDUCATION/TRAINING PROGRAM

## 2017-06-20 PROCEDURE — 99215 OFFICE O/P EST HI 40 MIN: CPT | Mod: PBBFAC | Performed by: STUDENT IN AN ORGANIZED HEALTH CARE EDUCATION/TRAINING PROGRAM

## 2017-06-20 NOTE — PROGRESS NOTES
Subjective:       Patient ID: Maria Ines Ac is a 32 y.o. female.    Chief Complaint: Sore Throat    HPI   Ms Ac comes to clinic with her father for evaluation of a sore throat. She states it has been present for one day, symptoms started yesterday with runny nose, nonproductive cough which was worse at night, and sneezing. She reports a long history of seasonal allergies, for which she does not take any daily antihistamines. She denies any fevers. She lives at home with her parents who are feeling well, and spends a good deal of time around her 5 year old twins nephews, unsure if they have been sick recently.     Review of Systems   Constitutional: Negative for activity change, appetite change, fatigue, fever and unexpected weight change.   HENT: Positive for postnasal drip, rhinorrhea, sneezing, sore throat and trouble swallowing. Negative for congestion and hearing loss.    Eyes: Negative for discharge and visual disturbance.   Respiratory: Positive for cough. Negative for apnea, choking and shortness of breath.    Cardiovascular: Negative for chest pain, palpitations and leg swelling.   Gastrointestinal: Positive for diarrhea. Negative for abdominal distention, abdominal pain, blood in stool and nausea.   Endocrine: Negative for cold intolerance, heat intolerance, polydipsia, polyphagia and polyuria.   Genitourinary: Negative for difficulty urinating, dysuria, flank pain and hematuria.   Musculoskeletal: Negative for arthralgias, back pain, joint swelling and myalgias.   Skin: Negative for color change, pallor and rash.   Allergic/Immunologic: Negative for environmental allergies and food allergies.   Neurological: Negative for dizziness and headaches.   Hematological: Negative for adenopathy. Does not bruise/bleed easily.   Psychiatric/Behavioral: Negative for behavioral problems, decreased concentration, dysphoric mood and sleep disturbance. The patient is not nervous/anxious.        Objective:       Physical Exam   Constitutional: She is oriented to person, place, and time. She appears well-developed and well-nourished. No distress.   HENT:   Head: Normocephalic and atraumatic.   Right Ear: External ear normal.   Left Ear: External ear normal.   Mouth/Throat: No oropharyngeal exudate.   Eyes: Conjunctivae and EOM are normal. Pupils are equal, round, and reactive to light. Right eye exhibits no discharge. Left eye exhibits no discharge. No scleral icterus.   Neck: Normal range of motion. Neck supple. No JVD present. No tracheal deviation present. No thyromegaly present.   Cardiovascular: Normal rate and regular rhythm.  Exam reveals no gallop and no friction rub.    No murmur heard.  Pulmonary/Chest: Effort normal and breath sounds normal. No respiratory distress. She has no wheezes. She has no rales.   Abdominal: Soft. Bowel sounds are normal. She exhibits no distension and no mass. There is no tenderness. There is no rebound and no guarding.   Musculoskeletal: Normal range of motion. She exhibits no edema or tenderness.   Lymphadenopathy:     She has no cervical adenopathy.   Neurological: She is alert and oriented to person, place, and time.   Skin: Skin is warm and dry. No rash noted. She is not diaphoretic. No erythema. No pallor.   Psychiatric: She has a normal mood and affect. Her behavior is normal. Judgment and thought content normal.       Assessment:       1. Pharyngitis, unspecified etiology        Plan:       Maria Ines was seen today for sore throat.    Diagnoses and all orders for this visit:    Pharyngitis, unspecified etiology  - Likely viral +/- allergies in etiology, given symptoms and duration  - 1/4 centor criteria, no indication for strep testing  - Discussed conservative management at this time including:   Daily antihistamine (allegra, claritin, zyrtec)   Delsym for cough   Tylenol/Nsaid's for throat pain, along with salt water rinses  - Discussed that she should return to clinic for  further evaluation if she develops fevers or if symptoms worsen or persist after 7 days.      I have personally seen and examined patient and agree with the A/P as noted above.    Christopher Mcdaniel

## 2017-06-20 NOTE — PATIENT INSTRUCTIONS
1. Daily Antihistamine (Claritin or Allegra or Zyrtec)  2. Delsym for cough  3. Tylenol or NSAID's for throat pain, along with salt water gargle for throat

## 2017-07-07 ENCOUNTER — OFFICE VISIT (OUTPATIENT)
Dept: GASTROENTEROLOGY | Facility: CLINIC | Age: 32
End: 2017-07-07
Payer: MEDICAID

## 2017-07-07 VITALS
HEIGHT: 63 IN | SYSTOLIC BLOOD PRESSURE: 104 MMHG | DIASTOLIC BLOOD PRESSURE: 72 MMHG | WEIGHT: 222.44 LBS | BODY MASS INDEX: 39.41 KG/M2 | HEART RATE: 86 BPM

## 2017-07-07 DIAGNOSIS — K58.0 IRRITABLE BOWEL SYNDROME WITH DIARRHEA: ICD-10-CM

## 2017-07-07 DIAGNOSIS — K21.9 GASTROESOPHAGEAL REFLUX DISEASE WITHOUT ESOPHAGITIS: Primary | ICD-10-CM

## 2017-07-07 PROCEDURE — 99999 PR PBB SHADOW E&M-EST. PATIENT-LVL V: CPT | Mod: PBBFAC,,, | Performed by: NURSE PRACTITIONER

## 2017-07-07 PROCEDURE — 99214 OFFICE O/P EST MOD 30 MIN: CPT | Mod: S$PBB,,, | Performed by: NURSE PRACTITIONER

## 2017-07-07 PROCEDURE — 99215 OFFICE O/P EST HI 40 MIN: CPT | Mod: PBBFAC | Performed by: NURSE PRACTITIONER

## 2017-07-07 RX ORDER — DICYCLOMINE HYDROCHLORIDE 10 MG/1
20 CAPSULE ORAL 3 TIMES DAILY
Qty: 60 CAPSULE | Refills: 0 | Status: SHIPPED | OUTPATIENT
Start: 2017-07-07 | End: 2017-12-07 | Stop reason: SDUPTHER

## 2017-07-07 NOTE — PATIENT INSTRUCTIONS
For Diarrhea:  Stop questran since it did not help.  Avoid all sugar free/diet drinks and candy. Avoid all sugar substitutes like Splendas or Equals etc. Because they may cause diarrhea. You may take Stevia instead.   Continue your daily probiotic.     For abdominal pain:  Increase Bentyl/dicyclomine to 20 mg three times daily. You may decrease the dose if constipation occurs.    For acid reflux:  Take pantoprazole/Protonix 30 minutes before breakfast and Prilosec/omeprazole 30 minutes before dinner so that the medication can work best.

## 2017-07-07 NOTE — PROGRESS NOTES
[facility] Established Patient Visit    Reason for Visit:  The primary encounter diagnosis was Gastroesophageal reflux disease without esophagitis. A diagnosis of Irritable bowel syndrome with diarrhea was also pertinent to this visit.    PCP: Luann Raymond    HPI: This is a 32 y.o. female here for f/u IBS-D.  Ms. Ac is here with her father and was last seen in GI by myself about one month ago. Subsequent abd US was normal. She has been taking BID questran in diet soda with no improvement in diarrhea. Further questioning reveals she drinks about 12 diet sodas per day and also eats sugar free candy at times. She is currently having 4 loose-watery BM/day. She has been taking 10 mg bentyl TID and a daily probiotic.      Abdominal pain- + chronic upper abd pain. 5/10 Cramping. Intermittent; daily. Not worsening. Better with bentyl- it shortens the length of time the pain lasts.  Worse immediately after meals lasting for 5 minutes. Sometimes better after BM.   No n/v/fevers.   Reflux - + hx GERD-retrosternal pyrosis with belching every other day (improved from daily).  taking 40 mg Protonix immediately b/f breakfast. and 20 mg Prilosec immediately before dinner.   Dysphagia/odynophagia - + hx dysphagia. S/p dilation x 2. No problems since most recent dilation in 8/2016.   GI bleeding - denies hematochezia, hematemesis, melena, BRBPR, black/tarry stools, and coffee ground emesis  NSAID usage - denies    ROS:  Constitutional: No fevers, no chills, No unintentional/unexplained weight loss, no fatigue,   ENT: No allergies  CV: No chest pain, no palpitations, no perif. edema, no sob on exertion  Pulm: No cough, No shortness of breath, no wheezes, no sputum  Ophtho: No vision changes  GI: see HPI; also no nausea, no vomiting, no change in appetite  Derm: No rash  Heme: No lymphadenopathy, No bruising  MSK: No arthritis, no muscle pain, no muscle weakness  : No dysuria, No hematuria  Endo: No hot or cold  intolerance  Neuro: No syncope, No seizure,     PMHX:  has a past medical history of Blood in stool; Constipation; Depression; Diabetes mellitus, type 2; Dysphagia; GERD (gastroesophageal reflux disease); Hypertension; Palpitations; Premature menopause on hormone replacement therapy; and Thyroid disease.    PSHX:  has a past surgical history that includes ooptherectomy; right knee (scoped); Gallbladder surgery; Shoulder surgery (right); Carpal tunnel release; Hysterectomy (2013); Oophorectomy (2005); Oophorectomy (2013); Colonoscopy (N/A, 8/30/2016); Bladder suspension; and Cholecystectomy.    The patient's social and family histories were reviewed by me and updated in the appropriate section of the electronic medical record.    Review of patient's allergies indicates:  No Known Allergies    Current Outpatient Prescriptions   Medication Sig    ACCU-CHEK AMAURY PLUS TEST STRP Strp USE TO TEST BLOOD GLUCOSE TID    atorvastatin (LIPITOR) 40 MG tablet Take 40 mg by mouth once daily.    benzonatate (TESSALON) 100 MG capsule TK 1 C PO TID PRF COUGH    BLOOD SUGAR DIAGNOSTIC (ACCU-CHEK AMAURY PLUS TEST STRP MISC) 1 strip by Misc.(Non-Drug; Combo Route) route 3 (three) times daily.    calcium-vitamin D3 500 mg(1,250mg) -200 unit per tablet Take 1 tablet by mouth 2 (two) times daily with meals.    cholestyramine-aspartame (QUESTRAN LIGHT) 4 gram PwPk Take 1 packet (4 g total) by mouth 2 (two) times daily.    conjugated estrogens (PREMARIN) vaginal cream Use 0.5 gram of estrogen cream in vagina nightly x 2 weeks, then twice a week thereafter.    dicyclomine (BENTYL) 10 MG capsule Take 2 capsules (20 mg total) by mouth 3 (three) times daily.    docusate sodium (COLACE) 100 MG capsule Take 100 mg by mouth 2 (two) times daily.    dulaglutide (TRULICITY) 0.75 mg/0.5 mL PnIj Inject 0.75 mg into the skin every 7 days.    estradiol (VIVELLE-DOT) 0.1 mg/24 hr PTSW APPLY 1 PATCH EXTERNALLY TO THE SKIN 2 TIMES A WEEK (Patient  "taking differently: APPLY 1 PATCH EXTERNALLY TO THE SKIN 2 TIMES A WEEK ON TUESDAY AND FRIDAY)    FARXIGA 10 mg Tab Take 1 tablet by mouth once daily.     fish oil-omega-3 fatty acids 300-1,000 mg capsule Take 2 g by mouth once daily.    GLUCOPHAGE 500 mg tablet Take 500 mg by mouth 2 (two) times daily with meals.     LATUDA 80 mg Tab tablet TK 1 T PO D    levothyroxine (SYNTHROID) 50 MCG tablet TK 1 T PO QAM    metformin (GLUCOPHAGE) 500 MG tablet TK 1 T PO  BID    metoprolol succinate (TOPROL-XL) 25 MG 24 hr tablet Take 25 mg by mouth once daily.     MULTIVIT-IRON-MIN-FOLIC ACID 3,500-18-0.4 UNIT-MG-MG ORAL CHEW Take 1 tablet by mouth once daily.     nystatin (MYCOSTATIN) powder Apply topically 2 (two) times daily.    nystatin-triamcinolone (MYCOLOG II) cream Apply topically 2 (two) times daily.    OMEPRAZOLE (PRILOSEC ORAL) Take by mouth.    oxybutynin (DITROPAN XL) 15 MG TR24     oxybutynin (DITROPAN-XL) 10 MG 24 hr tablet TAKE 1 TABLET BY MOUTH EVERY DAY    pantoprazole (PROTONIX) 40 MG tablet TK 1 T PO  QD    phentermine (ADIPEX-P) 37.5 mg tablet Take 37.5 mg by mouth before breakfast.    promethazine (PHENERGAN) 25 MG tablet TK 1 T PO  Q 4 TO 6 H PRN P    ranitidine (ZANTAC) 75 MG tablet Take 75 mg by mouth 2 (two) times daily.    spironolactone (ALDACTONE) 25 MG tablet TAKE1 TABLET BY MOUTH AT BEDTIME    terconazole (TERAZOL 3) 0.8 % vaginal cream Place 1 applicator vaginally once daily.    TOPAMAX 25 mg tablet Take 1 tablet (25 mg total) by mouth once daily. 1 pill at bedtime    ZOLOFT 100 mg tablet Take 200 mg by mouth once daily.      No current facility-administered medications for this visit.          Objective Findings:    Vital Signs:  /72   Pulse 86   Ht 5' 3" (1.6 m)   Wt 100.9 kg (222 lb 7.1 oz)   LMP 11/17/2012   BMI 39.40 kg/m²  Body mass index is 39.4 kg/m².    Physical Exam:  General Appearance: Well appearing in no acute distress  Head:   Normocephalic, without " obvious abnormality  Eyes:    No scleral icterus, EOMI  Throat: Lips, mucosa, and tongue normal; teeth and gums normal  Extremities:    2+ radial pulses, no clubbing, cyanosis or edema  Skin: No rash to exposed areas  Neurologic: A&Ox4      Labs:  Lab Results   Component Value Date    WBC 9.39 01/24/2017    HGB 13.5 01/24/2017    HCT 41.9 01/24/2017     01/24/2017    CHOL 163 09/07/2016    TRIG 203 (H) 09/07/2016    HDL 37 (L) 09/07/2016    ALT 13 05/08/2017    AST 18 05/08/2017     (L) 05/08/2017    K 4.2 05/08/2017     05/08/2017    CREATININE 1.3 05/08/2017    BUN 11 05/08/2017    CO2 20 (L) 05/08/2017    TSH 0.858 01/24/2017    INR 0.9 01/05/2017    HGBA1C 5.5 05/08/2017       Endoscopy:   8/30/2016 EGD Dr. Herron-normal esophagus. Dilation w/ 50 fr rogers. Normal stomach. Normal duodenum.  8/30/2016 Colonoscopy Dr. Herron-normal. Random bx-negative.   10/23/2014 EGD [facility]-normal esophagus. Dilated with a 50 fr rogers. Normal stomach. Normal duodenum. bx-wnl  1/10/2013 EGD [facility]- normal study.   1/9/2013 colonoscopy [facility]-2 mm sigmoid colon polyp. bx- Hyperplastic. Repeat in 10 years.  6/13/2012 esophageal manometry [place]-normal  4/2/2012 EGD [facility]- normal duodenum. Gastritis. HH. White nummular lesions in the esophagus. bx-mild chronic inflammation/reactive changes. Otherwise WNL.    Assessment:    1. Gastroesophageal reflux disease without esophagitis    2. Irritable bowel syndrome with diarrhea          Recommendations:  1. IBS-D-still with abd pain and diarrhea.  increase bentyl to 20 mg TID. Can decrease if constipation ensues. Stop diet sodas/sugar free candy/sugar substitutes as they can cause diarrhea. Stop questran since it is not helping. Continue daily probiotic.   2.  GERD-some improvement. continue daily Protonix and Prilosec to be taken 30 min b/f meals for maximal efficacy.   Written instructions provided.    Return in about 2 months (around 9/7/2017) for  IBS-D.    Order summary:  Orders Placed This Encounter    dicyclomine (BENTYL) 10 MG capsule       Thank you for allowing me to participate in the care of TOBIAS Moyer, FNP-C

## 2017-07-10 ENCOUNTER — TELEPHONE (OUTPATIENT)
Dept: OBSTETRICS AND GYNECOLOGY | Facility: CLINIC | Age: 32
End: 2017-07-10

## 2017-07-10 NOTE — TELEPHONE ENCOUNTER
----- Message from India Navarro sent at 7/10/2017 11:26 AM CDT -----  Contact: Ruthienena Ac (Patient Mother)  x_ 1st Request  _ 2nd Request  _ 3rd Request    Who: Ruthie Ac (Patient Mother)    Why: Ruthie Ac (Patient Mother) is requesting for the patient to be seen soon in office since the patient was instructed to reschedule.     What Number to Call Back: [phone]    When to Expect a call back: (Before the end of the day)  -- if call after 3:00 call back will be tomorrow.

## 2017-07-10 NOTE — TELEPHONE ENCOUNTER
Patient was reschedule for wwe on 07/20/2017- a message to schedule appointment was sent to supervisor Fiordaliza LAYNE Patient was notified

## 2017-07-10 NOTE — TELEPHONE ENCOUNTER
----- Message from Teresa Ly sent at 7/10/2017  8:57 AM CDT -----  Contact: pt  X_  1st Request  _  2nd Request  _  3rd Request    Who:PAUL ARTHUR [[id number]]    Why: Patient states she is returning a call     What Number to Call Back: [phone]    When to Expect a call back: (Before the end of the day)   -- if call after 3:00 call back will be tomorrow.

## 2017-07-24 RX ORDER — ESTRADIOL 0.1 MG/D
FILM, EXTENDED RELEASE TRANSDERMAL
Qty: 8 PATCH | Refills: 0 | OUTPATIENT
Start: 2017-07-24

## 2017-08-01 DIAGNOSIS — N95.2 VAGINAL ATROPHY: ICD-10-CM

## 2017-08-01 NOTE — TELEPHONE ENCOUNTER
Left an message informing pt rx refill request was  Approved and to check with pharmacy availability.

## 2017-08-01 NOTE — TELEPHONE ENCOUNTER
Pt is requesting an refill on Premarin vaginal cream 30 gm. Pt last seen on 6/19/17 by Julianna, Please advise

## 2017-08-03 DIAGNOSIS — K58.0 IRRITABLE BOWEL SYNDROME WITH DIARRHEA: ICD-10-CM

## 2017-08-03 DIAGNOSIS — R19.7 DIARRHEA, UNSPECIFIED TYPE: ICD-10-CM

## 2017-08-03 RX ORDER — CHOLESTYRAMINE LIGHT 4 G/5.7G
POWDER, FOR SUSPENSION ORAL
Qty: 60 PACKET | Refills: 1 | Status: SHIPPED | OUTPATIENT
Start: 2017-08-03 | End: 2017-09-08

## 2017-08-15 ENCOUNTER — OFFICE VISIT (OUTPATIENT)
Dept: OBSTETRICS AND GYNECOLOGY | Facility: CLINIC | Age: 32
End: 2017-08-15
Payer: MEDICAID

## 2017-08-15 VITALS
HEIGHT: 63 IN | SYSTOLIC BLOOD PRESSURE: 114 MMHG | WEIGHT: 220.88 LBS | BODY MASS INDEX: 39.14 KG/M2 | DIASTOLIC BLOOD PRESSURE: 70 MMHG

## 2017-08-15 DIAGNOSIS — Z01.419 ENCOUNTER FOR GYNECOLOGICAL EXAMINATION: Primary | ICD-10-CM

## 2017-08-15 PROCEDURE — 99213 OFFICE O/P EST LOW 20 MIN: CPT | Mod: PBBFAC | Performed by: OBSTETRICS & GYNECOLOGY

## 2017-08-15 PROCEDURE — 99999 PR PBB SHADOW E&M-EST. PATIENT-LVL III: CPT | Mod: PBBFAC,,, | Performed by: OBSTETRICS & GYNECOLOGY

## 2017-08-15 PROCEDURE — 99395 PREV VISIT EST AGE 18-39: CPT | Mod: S$PBB,,, | Performed by: OBSTETRICS & GYNECOLOGY

## 2017-08-15 RX ORDER — METHYLPREDNISOLONE 4 MG/1
TABLET ORAL
Refills: 0 | COMMUNITY
Start: 2017-07-28 | End: 2017-09-08

## 2017-08-15 RX ORDER — ESTRADIOL 0.1 MG/D
FILM, EXTENDED RELEASE TRANSDERMAL
Qty: 26 PATCH | Refills: 3 | Status: SHIPPED | OUTPATIENT
Start: 2017-08-15 | End: 2017-11-22 | Stop reason: SDUPTHER

## 2017-08-15 RX ORDER — ONDANSETRON 4 MG/1
TABLET, ORALLY DISINTEGRATING ORAL
Refills: 0 | COMMUNITY
Start: 2017-07-27 | End: 2018-08-23

## 2017-08-15 RX ORDER — LANCETS
EACH MISCELLANEOUS
Refills: 3 | COMMUNITY
Start: 2017-08-03

## 2017-08-15 RX ORDER — CEPHALEXIN 500 MG/1
CAPSULE ORAL
Refills: 0 | COMMUNITY
Start: 2017-07-28 | End: 2018-01-18

## 2017-08-15 RX ORDER — HYDROCODONE BITARTRATE AND ACETAMINOPHEN 7.5; 325 MG/1; MG/1
TABLET ORAL
Refills: 0 | COMMUNITY
Start: 2017-07-27 | End: 2017-11-22

## 2017-08-15 RX ORDER — SULFAMETHOXAZOLE AND TRIMETHOPRIM 800; 160 MG/1; MG/1
TABLET ORAL
Refills: 0 | COMMUNITY
Start: 2017-06-23 | End: 2017-09-08

## 2017-08-15 RX ORDER — HYDROCODONE BITARTRATE AND ACETAMINOPHEN 10; 325 MG/1; MG/1
TABLET ORAL
Refills: 0 | COMMUNITY
Start: 2017-06-23 | End: 2018-03-28

## 2017-08-15 NOTE — PROGRESS NOTES
HPI: Pt is a 32 y.o.  female who presents for routine annual exam. She has hx of hysterectomy/BSO in 2012 for PCOS (???). On vivelle dot for HRT and doing well. Needs refill.     No complaints.     ROS:  GENERAL: Feeling well overall. Denies fever or chills.   SKIN: Denies rash or lesions.   HEAD: Denies head injury or headache.   NODES: Denies enlarged lymph nodes.   CHEST: Denies chest pain or shortness of breath.   CARDIOVASCULAR: Denies palpitations or left sided chest pain.   ABDOMEN: No abdominal pain, constipation, diarrhea, nausea, vomiting or rectal bleeding.   URINARY: No dysuria, hematuria, or burning on urination.  REPRODUCTIVE: See HPI.   BREASTS: Denies pain, lumps, or nipple discharge.   HEMATOLOGIC: No easy bruisability or excessive bleeding.   MUSCULOSKELETAL: Denies joint pain or swelling.   NEUROLOGIC: Denies syncope or weakness.   PSYCHIATRIC: Denies depression, anxiety or mood swings.    PE:   APPEARANCE: Well nourished, well developed, White female in no acute distress.  NODES: no cervical, supraclavicular, or inguinal lymphadenopathy  BREASTS: Symmetrical, no skin changes or visible lesions. No palpable masses, nipple discharge or adenopathy bilaterally.  ABDOMEN: Soft. No tenderness or masses. No distention. No hernias palpated. No CVA tenderness.  PELVIC: Normal external female genitalia without lesions. Normal hair distribution. Adequate perineal body, normal urethral meatus. Vagina moist and without lesions or discharge. No significant cystocele or rectocele. Uterus and cervix surgically absent. Vaginal cuff intact and appears normal. Bimanual exam revealed no masses, tenderness or abnormality.              Diagnosis:  1. Encounter for gynecological examination    2. Hx of hysterectomy with oophorectomy        Plan:     Orders Placed This Encounter    estradiol (VIVELLE-DOT) 0.1 mg/24 hr PTSW       Patient was counseled today on the new ACS guidelines for cervical cytology screening as  well as the current recommendations for breast cancer screening. She was counseled to follow up with her PCP for other routine health maintenance. Counseling session lasted approximately 10 minutes, and all her questions were answered.    Follow-up with me in 1 year for routine exam

## 2017-08-31 RX ORDER — PANTOPRAZOLE SODIUM 40 MG/1
40 TABLET, DELAYED RELEASE ORAL DAILY
Qty: 90 TABLET | Refills: 3 | Status: SHIPPED | OUTPATIENT
Start: 2017-08-31 | End: 2019-06-14 | Stop reason: SDUPTHER

## 2017-09-08 ENCOUNTER — LAB VISIT (OUTPATIENT)
Dept: LAB | Facility: HOSPITAL | Age: 32
End: 2017-09-08
Attending: INTERNAL MEDICINE
Payer: MEDICAID

## 2017-09-08 ENCOUNTER — OFFICE VISIT (OUTPATIENT)
Dept: INTERNAL MEDICINE | Facility: CLINIC | Age: 32
End: 2017-09-08
Payer: MEDICAID

## 2017-09-08 ENCOUNTER — TELEPHONE (OUTPATIENT)
Dept: ENDOSCOPY | Facility: HOSPITAL | Age: 32
End: 2017-09-08

## 2017-09-08 ENCOUNTER — OFFICE VISIT (OUTPATIENT)
Dept: GASTROENTEROLOGY | Facility: CLINIC | Age: 32
End: 2017-09-08
Payer: MEDICAID

## 2017-09-08 VITALS
HEIGHT: 62 IN | WEIGHT: 213 LBS | HEART RATE: 69 BPM | SYSTOLIC BLOOD PRESSURE: 124 MMHG | OXYGEN SATURATION: 96 % | TEMPERATURE: 99 F | DIASTOLIC BLOOD PRESSURE: 70 MMHG | BODY MASS INDEX: 39.2 KG/M2

## 2017-09-08 VITALS
BODY MASS INDEX: 39.76 KG/M2 | HEART RATE: 75 BPM | DIASTOLIC BLOOD PRESSURE: 74 MMHG | HEIGHT: 62 IN | WEIGHT: 216.06 LBS | SYSTOLIC BLOOD PRESSURE: 106 MMHG

## 2017-09-08 DIAGNOSIS — K21.9 GASTROESOPHAGEAL REFLUX DISEASE WITHOUT ESOPHAGITIS: ICD-10-CM

## 2017-09-08 DIAGNOSIS — R51.9 HEADACHE, UNSPECIFIED HEADACHE TYPE: Primary | ICD-10-CM

## 2017-09-08 DIAGNOSIS — E11.9 TYPE 2 DIABETES MELLITUS WITHOUT COMPLICATION, WITHOUT LONG-TERM CURRENT USE OF INSULIN: ICD-10-CM

## 2017-09-08 DIAGNOSIS — R13.14 PHARYNGOESOPHAGEAL DYSPHAGIA: ICD-10-CM

## 2017-09-08 DIAGNOSIS — K58.0 IRRITABLE BOWEL SYNDROME WITH DIARRHEA: Primary | ICD-10-CM

## 2017-09-08 DIAGNOSIS — I10 ESSENTIAL HYPERTENSION: ICD-10-CM

## 2017-09-08 LAB
ALBUMIN SERPL BCP-MCNC: 4.1 G/DL
ALP SERPL-CCNC: 64 U/L
ALT SERPL W/O P-5'-P-CCNC: 14 U/L
ANION GAP SERPL CALC-SCNC: 9 MMOL/L
AST SERPL-CCNC: 19 U/L
BILIRUB SERPL-MCNC: 0.3 MG/DL
BUN SERPL-MCNC: 9 MG/DL
CALCIUM SERPL-MCNC: 9.8 MG/DL
CHLORIDE SERPL-SCNC: 105 MMOL/L
CO2 SERPL-SCNC: 23 MMOL/L
CREAT SERPL-MCNC: 1.1 MG/DL
EST. GFR  (AFRICAN AMERICAN): >60 ML/MIN/1.73 M^2
EST. GFR  (NON AFRICAN AMERICAN): >60 ML/MIN/1.73 M^2
GLUCOSE SERPL-MCNC: 85 MG/DL
POTASSIUM SERPL-SCNC: 4.4 MMOL/L
PROT SERPL-MCNC: 8.5 G/DL
SODIUM SERPL-SCNC: 137 MMOL/L

## 2017-09-08 PROCEDURE — 80053 COMPREHEN METABOLIC PANEL: CPT

## 2017-09-08 PROCEDURE — 3008F BODY MASS INDEX DOCD: CPT | Mod: ,,, | Performed by: INTERNAL MEDICINE

## 2017-09-08 PROCEDURE — 99215 OFFICE O/P EST HI 40 MIN: CPT | Mod: PBBFAC | Performed by: NURSE PRACTITIONER

## 2017-09-08 PROCEDURE — 99214 OFFICE O/P EST MOD 30 MIN: CPT | Mod: S$PBB,,, | Performed by: INTERNAL MEDICINE

## 2017-09-08 PROCEDURE — 3074F SYST BP LT 130 MM HG: CPT | Mod: ,,, | Performed by: INTERNAL MEDICINE

## 2017-09-08 PROCEDURE — 3078F DIAST BP <80 MM HG: CPT | Mod: ,,, | Performed by: NURSE PRACTITIONER

## 2017-09-08 PROCEDURE — 99214 OFFICE O/P EST MOD 30 MIN: CPT | Mod: S$PBB,,, | Performed by: NURSE PRACTITIONER

## 2017-09-08 PROCEDURE — 99215 OFFICE O/P EST HI 40 MIN: CPT | Mod: PBBFAC,27 | Performed by: INTERNAL MEDICINE

## 2017-09-08 PROCEDURE — 3008F BODY MASS INDEX DOCD: CPT | Mod: ,,, | Performed by: NURSE PRACTITIONER

## 2017-09-08 PROCEDURE — 36415 COLL VENOUS BLD VENIPUNCTURE: CPT

## 2017-09-08 PROCEDURE — 99999 PR PBB SHADOW E&M-EST. PATIENT-LVL V: CPT | Mod: PBBFAC,,, | Performed by: NURSE PRACTITIONER

## 2017-09-08 PROCEDURE — 3044F HG A1C LEVEL LT 7.0%: CPT | Mod: ,,, | Performed by: INTERNAL MEDICINE

## 2017-09-08 PROCEDURE — 99999 PR PBB SHADOW E&M-EST. PATIENT-LVL V: CPT | Mod: PBBFAC,,, | Performed by: INTERNAL MEDICINE

## 2017-09-08 PROCEDURE — 3078F DIAST BP <80 MM HG: CPT | Mod: ,,, | Performed by: INTERNAL MEDICINE

## 2017-09-08 PROCEDURE — 83036 HEMOGLOBIN GLYCOSYLATED A1C: CPT

## 2017-09-08 PROCEDURE — 3074F SYST BP LT 130 MM HG: CPT | Mod: ,,, | Performed by: NURSE PRACTITIONER

## 2017-09-08 NOTE — PROGRESS NOTES
[facility] Established Patient Visit    Reason for Visit:  The primary encounter diagnosis was Irritable bowel syndrome with diarrhea. Diagnoses of Pharyngoesophageal dysphagia and Gastroesophageal reflux disease without esophagitis were also pertinent to this visit.    PCP: Luann Raymond    HPI: This is a 32 y.o. female here for f/u IBS-D.  Ms. Ac is here with her father and was last seen in GI by myself about 3 months ago. She has been previously evaluated per Dr. Herron in GI as well. She currently reports she is no longer taking questran (it did not help) and has increased her bentyl to 20 mg BID. She continues her daily probiotic and has not decreased her intake of diet sodas as previously advised. She states she did stop eating sugar free candy. She states the abdominal pain and diarrhea are unchanged from the last GI clinic visit.     Abdominal pain- + chronic upper abd pain. 5/10 Cramping. Intermittent; daily. Not worsening. Better with bentyl- it shortens the length of time the pain lasts.  Worse immediately after meals lasting for 5 minutes. Sometimes better after BM. + nausea with migraines-Takes Zofran with good control. No vomiting. No fevers.  Reflux - + hx GERD-retrosternal pyrosis with belching every two days (improved from QOD).  taking 40 mg Protonix 30 minutes b/f breakfast. and 20 mg Prilosec 30 mintues before dinner.   Dysphagia/odynophagia - + hx dysphagia. S/p dilation x 2 with improvement. Last dilation about 1 year ago. Currently with dysphagia to solids occurring once daily since last week. Solids get stuck in cervical esophagus. She feels she needs another dilation.  GI bleeding - denies hematochezia, hematemesis, melena, BRBPR, black/tarry stools, and coffee ground emesis  NSAID usage - denies    ROS:  Constitutional: No fevers, no chills, No unintentional/unexplained weight loss, no fatigue,   ENT: No allergies  CV: No chest pain, no palpitations, no perif. edema, no  sob on exertion  Pulm: No cough, No shortness of breath, no wheezes, no sputum  Ophtho: No vision changes  GI: see HPI; also + nausea associated w/ migraines, no vomiting, no change in appetite  Derm: No rash  Heme: No lymphadenopathy, No bruising  MSK: No arthritis, no muscle pain, no muscle weakness  : No dysuria, No hematuria  Endo: No hot or cold intolerance  Neuro: No syncope, No seizure,     PMHX:  has a past medical history of Blood in stool; Constipation; Depression; Diabetes mellitus, type 2; Dysphagia; GERD (gastroesophageal reflux disease); Hypertension; Palpitations; Premature menopause on hormone replacement therapy; and Thyroid disease.    PSHX:  has a past surgical history that includes ooptherectomy; right knee (scoped); Gallbladder surgery; Shoulder surgery (right); Carpal tunnel release; Hysterectomy (2013); Oophorectomy (2005); Oophorectomy (2013); Colonoscopy (N/A, 8/30/2016); Bladder suspension; and Cholecystectomy.    The patient's social and family histories were reviewed by me and updated in the appropriate section of the electronic medical record.    Review of patient's allergies indicates:  No Known Allergies    Current Outpatient Prescriptions   Medication Sig    ACCU-CHEK AMAURY PLUS TEST STRP Strp USE TO TEST BLOOD GLUCOSE TID    ACCU-CHEK SOFTCLIX LANCETS Misc USE TO TEST BLOOD GLUCOSE TID    atorvastatin (LIPITOR) 40 MG tablet Take 40 mg by mouth once daily.    BLOOD SUGAR DIAGNOSTIC (ACCU-CHEK AMAURY PLUS TEST STRP MISC) 1 strip by Misc.(Non-Drug; Combo Route) route 3 (three) times daily.    calcium-vitamin D3 500 mg(1,250mg) -200 unit per tablet Take 1 tablet by mouth 2 (two) times daily with meals.    cephALEXin (KEFLEX) 500 MG capsule TK ONE C PO  BID    conjugated estrogens (PREMARIN) vaginal cream Use 0.5 gram of estrogen cream in vagina nightly x 2 weeks, then twice a week thereafter.    dicyclomine (BENTYL) 10 MG capsule Take 2 capsules (20 mg total) by mouth 3 (three)  times daily.    dulaglutide (TRULICITY) 0.75 mg/0.5 mL PnIj Inject 0.75 mg into the skin every 7 days.    estradiol (VIVELLE-DOT) 0.1 mg/24 hr PTSW APPLY 1 PATCH EXTERNALLY TO THE SKIN 2 TIMES A WEEK    FARXIGA 10 mg Tab Take 1 tablet by mouth once daily.     fish oil-omega-3 fatty acids 300-1,000 mg capsule Take 2 g by mouth once daily.    GLUCOPHAGE 500 mg tablet Take 500 mg by mouth 2 (two) times daily with meals.     hydrocodone-acetaminophen 10-325mg (NORCO)  mg Tab TK 1 T PO Q 4 TO 6 H PRN P    hydrocodone-acetaminophen 7.5-325mg (NORCO) 7.5-325 mg per tablet TK 1 T PO Q 6 H PRN P. DO NOT TAKE MORE THAN 6 TS IN 24 H    LATUDA 80 mg Tab tablet TK 1 T PO D    levothyroxine (SYNTHROID) 50 MCG tablet TK 1 T PO QAM    metformin (GLUCOPHAGE) 500 MG tablet TK 1 T PO  BID    metoprolol succinate (TOPROL-XL) 25 MG 24 hr tablet Take 25 mg by mouth once daily.     MULTIVIT-IRON-MIN-FOLIC ACID 3,500-18-0.4 UNIT-MG-MG ORAL CHEW Take 1 tablet by mouth once daily.     nystatin (MYCOSTATIN) powder Apply topically 2 (two) times daily.    nystatin-triamcinolone (MYCOLOG II) cream Apply topically 2 (two) times daily.    OMEPRAZOLE (PRILOSEC ORAL) Take by mouth.    ondansetron (ZOFRAN-ODT) 4 MG TbDL DISSOLVE 1 T PO Q 8 H PRN N    oxybutynin (DITROPAN XL) 15 MG TR24     oxybutynin (DITROPAN-XL) 10 MG 24 hr tablet TAKE 1 TABLET BY MOUTH EVERY DAY    pantoprazole (PROTONIX) 40 MG tablet Take 1 tablet (40 mg total) by mouth once daily.    phentermine (ADIPEX-P) 37.5 mg tablet Take 37.5 mg by mouth before breakfast.    terconazole (TERAZOL 3) 0.8 % vaginal cream Place 1 applicator vaginally once daily.    TOPAMAX 25 mg tablet Take 1 tablet (25 mg total) by mouth once daily. 1 pill at bedtime    ZOLOFT 100 mg tablet Take 200 mg by mouth once daily.     docusate sodium (COLACE) 100 MG capsule Take 100 mg by mouth 2 (two) times daily.    promethazine (PHENERGAN) 25 MG tablet TK 1 T PO  Q 4 TO 6 H PRN P     "rifAXIMin (XIFAXAN) 550 mg Tab Take 1 tablet (550 mg total) by mouth 3 (three) times daily.     No current facility-administered medications for this visit.          Objective Findings:    Vital Signs:  /74   Pulse 75   Ht 5' 2" (1.575 m)   Wt 98 kg (216 lb 0.8 oz)   LMP 11/17/2012   BMI 39.52 kg/m²  Body mass index is 39.52 kg/m².    Physical Exam:  General Appearance: Well appearing in no acute distress  Head:   Normocephalic, without obvious abnormality  Eyes:    No scleral icterus, EOMI  Throat: Lips, mucosa, and tongue normal; teeth and gums normal  Lungs: CTA bilaterally in anterior and posterior fields, no wheezes, no crackles.  Heart:  Regular rate and rhythm, S1, S2 normal, no murmurs heard  Abdomen: Soft, obese, mild LUQ tenderness, non distended with positive bowel sounds in all four quadrants. No hepatosplenomegaly, ascites, or mass  Extremities:    2+ radial pulses, no clubbing, cyanosis or edema  Skin: No rash to exposed areas  Neurologic: A&Ox4      Labs:  Lab Results   Component Value Date    WBC 9.39 01/24/2017    HGB 13.5 01/24/2017    HCT 41.9 01/24/2017     01/24/2017    CHOL 163 09/07/2016    TRIG 203 (H) 09/07/2016    HDL 37 (L) 09/07/2016    ALT 13 05/08/2017    AST 18 05/08/2017     (L) 05/08/2017    K 4.2 05/08/2017     05/08/2017    CREATININE 1.3 05/08/2017    BUN 11 05/08/2017    CO2 20 (L) 05/08/2017    TSH 0.858 01/24/2017    INR 0.9 01/05/2017    HGBA1C 5.5 05/08/2017       Endoscopy:   8/30/2016 EGD Dr. Herron-normal esophagus. Dilation w/ 50 fr rogers. Normal stomach. Normal duodenum.  8/30/2016 Colonoscopy Dr. Herron-normal. Random bx-negative.   10/23/2014 EGD [facility]-normal esophagus. Dilated with a 50 fr rogers. Normal stomach. Normal duodenum. bx-wnl  1/10/2013 EGD [facility]- normal study.   1/9/2013 colonoscopy Gibbons-2 mm sigmoid colon polyp. bx- Hyperplastic. Repeat in 10 years.  6/13/2012 esophageal manometry [place]-normal  4/2/2012 EGD [facility]- " normal duodenum. Gastritis. HH. White nummular lesions in the esophagus. bx-mild chronic inflammation/reactive changes. Otherwise WNL.    Assessment:    1. Irritable bowel syndrome with diarrhea    2. Pharyngoesophageal dysphagia    3. Gastroesophageal reflux disease without esophagitis          Recommendations:  1. IBS-D-still with abd pain and diarrhea  Unchanged from the last GI clinic visit. Has not decreased diet soda intake as previously advised. importance of avoidinglimiting diet sodas reieterated. Continue bentyl and probiotics. Trial Xifaxan x 2 weeks as RX. Pt to call with update after course complete. If not better, will consider CT abd.  2.  Dysphagia-h/o dysphagia w/ improvement after dilation. Pt feels she needs repeat. EGD w/ dilation.   3.  GERD-some improvement. continue daily Protonix and Prilosec 30 min b/f meals. Stop carbonated beverages.      Written instructions provided.    F/u pending results.     Order summary:  Orders Placed This Encounter    rifAXIMin (XIFAXAN) 550 mg Tab    Case request GI: ESOPHAGOGASTRODUODENOSCOPY (EGD)       Thank you for allowing me to participate in the care of Maria Ines LiliaJeanette Fan, TOBIAS, FNP-C

## 2017-09-08 NOTE — PATIENT INSTRUCTIONS
Limit/avoid diet sodas because this can be causing a lot of your symptoms (diarrhea, abdominal discomfort, reflux).  Call with an update after completing the two week course of Xifaxan.  Continue dicyclomine/bentyl and your acid reflux medications.

## 2017-09-09 LAB
ESTIMATED AVG GLUCOSE: 100 MG/DL
HBA1C MFR BLD HPLC: 5.1 %

## 2017-09-11 DIAGNOSIS — E11.9 TYPE 2 DIABETES MELLITUS WITHOUT COMPLICATION: ICD-10-CM

## 2017-09-11 NOTE — PROGRESS NOTES
Subjective:       Patient ID: Maria Ines Ac is a 32 y.o. female.    Chief Complaint: Hypertension    HPI: She returns for management of hypertension.  She has had hypertension for over a year.  Current treatment has included medications outlined in medication list.  She denies chest pain or shortness of breath.  No palpitations.  Denies left arm or neck pain.  She has diabetes.  Denies polyuria, polydipsia    Past medical history: Hypertension, hyperlipidemia, diabetes, hypothyroidism, bipolar disorder, migraine headaches, status post hysterectomy      Medications: Synthroid 0.05 mg daily, metformin, Toprol-XL,Lipitor 40 mg daily,farxiga,topamax, trulicity      NO KNOWN DRUG ALLERGIES        Review of Systems   Constitutional: Negative for chills, fatigue, fever and unexpected weight change.   Respiratory: Negative for chest tightness and shortness of breath.    Cardiovascular: Negative for chest pain and palpitations.   Gastrointestinal: Negative for abdominal pain and blood in stool.   Neurological: Negative for dizziness, syncope, numbness and headaches.       Objective:      Physical Exam   HENT:   Right Ear: External ear normal.   Left Ear: External ear normal.   Nose: Nose normal.   Mouth/Throat: Oropharynx is clear and moist.   Eyes: Pupils are equal, round, and reactive to light.   Neck: Normal range of motion.   Cardiovascular: Normal rate and regular rhythm.    No murmur heard.  Pulmonary/Chest: Breath sounds normal.   Abdominal: She exhibits no distension. There is no hepatosplenomegaly. There is no tenderness.   Lymphadenopathy:     She has no cervical adenopathy.     She has no axillary adenopathy.   Neurological: She has normal strength and normal reflexes. No cranial nerve deficit or sensory deficit.       Assessment:     assessment and plan: 1.  Hypertension: Check CMP  2.  Diabetes: Check glycosylated hemoglobin and urine microalbumin      Plan:       As above

## 2017-09-12 ENCOUNTER — TELEPHONE (OUTPATIENT)
Dept: UROGYNECOLOGY | Facility: CLINIC | Age: 32
End: 2017-09-12

## 2017-09-12 NOTE — TELEPHONE ENCOUNTER
Left voice message informing the pt that Dr. Ventura is going to the [facility] in addition to seeing adins at [facility] and if she has any other questions give the office a call back at [phone].

## 2017-09-12 NOTE — TELEPHONE ENCOUNTER
----- Message from Rafael Santiago sent at 9/12/2017  9:37 AM CDT -----  PT HAS A APPT ON 11/22 SHE  RECEIVED A LETTER SAYING  IS MOVING TO THE [facility] SHE NEEDS TO SEE HER SHE IS UNABLE TO GO TO THE [facility] PLEASE CALL PT @ [phone]

## 2017-09-14 ENCOUNTER — HOSPITAL ENCOUNTER (OUTPATIENT)
Facility: HOSPITAL | Age: 32
Discharge: HOME | End: 2017-09-14
Attending: INTERNAL MEDICINE | Admitting: INTERNAL MEDICINE
Payer: MEDICAID

## 2017-09-14 ENCOUNTER — SURGERY (OUTPATIENT)
Age: 32
End: 2017-09-14

## 2017-09-14 ENCOUNTER — ANESTHESIA (OUTPATIENT)
Dept: ENDOSCOPY | Facility: HOSPITAL | Age: 32
End: 2017-09-14
Payer: MEDICAID

## 2017-09-14 ENCOUNTER — ANESTHESIA EVENT (OUTPATIENT)
Dept: ENDOSCOPY | Facility: HOSPITAL | Age: 32
End: 2017-09-14
Payer: MEDICAID

## 2017-09-14 VITALS
DIASTOLIC BLOOD PRESSURE: 62 MMHG | OXYGEN SATURATION: 94 % | HEART RATE: 76 BPM | BODY MASS INDEX: 39.2 KG/M2 | TEMPERATURE: 98 F | WEIGHT: 213 LBS | RESPIRATION RATE: 16 BRPM | RESPIRATION RATE: 34 BRPM | SYSTOLIC BLOOD PRESSURE: 98 MMHG | HEIGHT: 62 IN

## 2017-09-14 DIAGNOSIS — R13.14 PHARYNGOESOPHAGEAL DYSPHAGIA: Primary | ICD-10-CM

## 2017-09-14 LAB — POCT GLUCOSE: 103 MG/DL (ref 70–110)

## 2017-09-14 PROCEDURE — 37000008 HC ANESTHESIA 1ST 15 MINUTES: Performed by: INTERNAL MEDICINE

## 2017-09-14 PROCEDURE — 43450 DILATE ESOPHAGUS 1/MULT PASS: CPT | Performed by: INTERNAL MEDICINE

## 2017-09-14 PROCEDURE — C1726 CATH, BAL DIL, NON-VASCULAR: HCPCS | Performed by: INTERNAL MEDICINE

## 2017-09-14 PROCEDURE — 25000003 PHARM REV CODE 250: Performed by: INTERNAL MEDICINE

## 2017-09-14 PROCEDURE — D9220A PRA ANESTHESIA: Mod: CRNA,,, | Performed by: NURSE ANESTHETIST, CERTIFIED REGISTERED

## 2017-09-14 PROCEDURE — 37000009 HC ANESTHESIA EA ADD 15 MINS: Performed by: INTERNAL MEDICINE

## 2017-09-14 PROCEDURE — D9220A PRA ANESTHESIA: Mod: ANES,,, | Performed by: ANESTHESIOLOGY

## 2017-09-14 PROCEDURE — 43450 DILATE ESOPHAGUS 1/MULT PASS: CPT | Mod: 51,,, | Performed by: INTERNAL MEDICINE

## 2017-09-14 PROCEDURE — 43235 EGD DIAGNOSTIC BRUSH WASH: CPT | Performed by: INTERNAL MEDICINE

## 2017-09-14 PROCEDURE — 43235 EGD DIAGNOSTIC BRUSH WASH: CPT | Mod: ,,, | Performed by: INTERNAL MEDICINE

## 2017-09-14 PROCEDURE — 63600175 PHARM REV CODE 636 W HCPCS: Performed by: NURSE ANESTHETIST, CERTIFIED REGISTERED

## 2017-09-14 PROCEDURE — 82962 GLUCOSE BLOOD TEST: CPT | Performed by: INTERNAL MEDICINE

## 2017-09-14 RX ORDER — PROPOFOL 10 MG/ML
VIAL (ML) INTRAVENOUS
Status: DISCONTINUED | OUTPATIENT
Start: 2017-09-14 | End: 2017-09-14

## 2017-09-14 RX ORDER — SODIUM CHLORIDE 9 MG/ML
INJECTION, SOLUTION INTRAVENOUS CONTINUOUS
Status: DISCONTINUED | OUTPATIENT
Start: 2017-09-14 | End: 2017-09-14 | Stop reason: HOSPADM

## 2017-09-14 RX ORDER — PROPOFOL 10 MG/ML
VIAL (ML) INTRAVENOUS CONTINUOUS PRN
Status: DISCONTINUED | OUTPATIENT
Start: 2017-09-14 | End: 2017-09-14

## 2017-09-14 RX ORDER — LIDOCAINE HCL/PF 100 MG/5ML
SYRINGE (ML) INTRAVENOUS
Status: DISCONTINUED | OUTPATIENT
Start: 2017-09-14 | End: 2017-09-14

## 2017-09-14 RX ADMIN — PROPOFOL 150 MCG/KG/MIN: 10 INJECTION, EMULSION INTRAVENOUS at 02:09

## 2017-09-14 RX ADMIN — PROPOFOL 30 MG: 10 INJECTION, EMULSION INTRAVENOUS at 02:09

## 2017-09-14 RX ADMIN — LIDOCAINE HYDROCHLORIDE 50 MG: 20 INJECTION, SOLUTION INTRAVENOUS at 02:09

## 2017-09-14 RX ADMIN — SODIUM CHLORIDE: 900 INJECTION, SOLUTION INTRAVENOUS at 01:09

## 2017-09-14 RX ADMIN — SODIUM CHLORIDE: 900 INJECTION, SOLUTION INTRAVENOUS at 02:09

## 2017-09-14 NOTE — ANESTHESIA POSTPROCEDURE EVALUATION
"Anesthesia Post Evaluation    Patient: Maria Ines Ac    Procedure(s) Performed: Procedure(s) (LRB):  ESOPHAGOGASTRODUODENOSCOPY (EGD) (N/A)    Final Anesthesia Type: general  Patient location during evaluation: GI PACU  Patient participation: Yes- Able to Participate  Level of consciousness: awake and alert and oriented  Post-procedure vital signs: reviewed and stable  Pain management: adequate  Airway patency: patent  PONV status at discharge: No PONV  Anesthetic complications: no      Cardiovascular status: stable  Respiratory status: unassisted, spontaneous ventilation and room air  Hydration status: euvolemic  Follow-up not needed.        Visit Vitals  BP 98/62 (BP Location: Left arm, Patient Position: Lying)   Pulse 76   Temp 36.5 °C (97.7 °F) (Temporal)   Resp 16   Ht 5' 2" (1.575 m)   Wt 96.6 kg (213 lb)   LMP 11/17/2012   SpO2 (!) 94%   Breastfeeding? No   BMI 38.96 kg/m²       Pain/Mac Score: Pain Assessment Performed: Yes (9/14/2017  3:15 PM)  Mac Score: 10 (9/14/2017  3:28 PM)      "

## 2017-09-14 NOTE — ANESTHESIA PREPROCEDURE EVALUATION
09/14/2017  Maria Ines Ac is a 32 y.o., female   with a pre-operative diagnosis of Pharyngoesophageal dysphagia [R13.14] who is scheduled for Procedure(s) (LRB):  ESOPHAGOGASTRODUODENOSCOPY (EGD) (N/A).     Requested anesthesia type: General  Surgeon: Slick Herron MD  Allergies: Review of patient's allergies indicates:  No Known Allergies  Vital Sign Range:    Chronic Medications:   Prescriptions Prior to Admission   Medication Sig Dispense Refill Last Dose    ACCU-CHEK AMAURY PLUS TEST STRP Strp USE TO TEST BLOOD GLUCOSE TID  4 Taking    ACCU-CHEK SOFTCLIX LANCETS Misc USE TO TEST BLOOD GLUCOSE TID  3 Taking    atorvastatin (LIPITOR) 40 MG tablet Take 40 mg by mouth once daily.   Taking    BLOOD SUGAR DIAGNOSTIC (ACCU-CHEK AMAURY PLUS TEST STRP MISC) 1 strip by Misc.(Non-Drug; Combo Route) route 3 (three) times daily.   Taking    calcium-vitamin D3 500 mg(1,250mg) -200 unit per tablet Take 1 tablet by mouth 2 (two) times daily with meals.   Taking    cephALEXin (KEFLEX) 500 MG capsule TK ONE C PO  BID  0 Taking    conjugated estrogens (PREMARIN) vaginal cream Use 0.5 gram of estrogen cream in vagina nightly x 2 weeks, then twice a week thereafter. 30 g 0 Taking    dicyclomine (BENTYL) 10 MG capsule Take 2 capsules (20 mg total) by mouth 3 (three) times daily. 60 capsule 0 Taking    docusate sodium (COLACE) 100 MG capsule Take 100 mg by mouth 2 (two) times daily.   Not Taking    dulaglutide (TRULICITY) 0.75 mg/0.5 mL PnIj Inject 0.75 mg into the skin every 7 days.   Taking    estradiol (VIVELLE-DOT) 0.1 mg/24 hr PTSW APPLY 1 PATCH EXTERNALLY TO THE SKIN 2 TIMES A WEEK 26 patch 3 Taking    FARXIGA 10 mg Tab Take 1 tablet by mouth once daily.    Taking    fish oil-omega-3 fatty acids 300-1,000 mg capsule Take 2 g by mouth once daily.   Taking    GLUCOPHAGE 500 mg tablet Take 500 mg by  mouth 2 (two) times daily with meals.    Taking    hydrocodone-acetaminophen 10-325mg (NORCO)  mg Tab TK 1 T PO Q 4 TO 6 H PRN P  0 Taking    hydrocodone-acetaminophen 7.5-325mg (NORCO) 7.5-325 mg per tablet TK 1 T PO Q 6 H PRN P. DO NOT TAKE MORE THAN 6 TS IN 24 H  0 Taking    LATUDA 80 mg Tab tablet TK 1 T PO D  2 Taking    levothyroxine (SYNTHROID) 50 MCG tablet TK 1 T PO QAM  8 Taking    metformin (GLUCOPHAGE) 500 MG tablet TK 1 T PO  BID  4 Taking    metoprolol succinate (TOPROL-XL) 25 MG 24 hr tablet Take 25 mg by mouth once daily.    Taking    MULTIVIT-IRON-MIN-FOLIC ACID 3,500-18-0.4 UNIT-MG-MG ORAL CHEW Take 1 tablet by mouth once daily.    Taking    nystatin (MYCOSTATIN) powder Apply topically 2 (two) times daily. 30 g 3 Taking    nystatin-triamcinolone (MYCOLOG II) cream Apply topically 2 (two) times daily. 30 g 2 Taking    OMEPRAZOLE (PRILOSEC ORAL) Take by mouth.   Taking    ondansetron (ZOFRAN-ODT) 4 MG TbDL DISSOLVE 1 T PO Q 8 H PRN N  0 Taking    oxybutynin (DITROPAN XL) 15 MG TR24   8 Taking    oxybutynin (DITROPAN-XL) 10 MG 24 hr tablet TAKE 1 TABLET BY MOUTH EVERY DAY 30 tablet 11 Taking    pantoprazole (PROTONIX) 40 MG tablet Take 1 tablet (40 mg total) by mouth once daily. 90 tablet 3 Taking    phentermine (ADIPEX-P) 37.5 mg tablet Take 37.5 mg by mouth before breakfast.   Taking    promethazine (PHENERGAN) 25 MG tablet TK 1 T PO  Q 4 TO 6 H PRN P  0 Taking    rifAXIMin (XIFAXAN) 550 mg Tab Take 1 tablet (550 mg total) by mouth 3 (three) times daily. 42 tablet 0 Taking    terconazole (TERAZOL 3) 0.8 % vaginal cream Place 1 applicator vaginally once daily. 20 g 0 Taking    TOPAMAX 25 mg tablet Take 1 tablet (25 mg total) by mouth once daily. 1 pill at bedtime 30 tablet 6 Taking    ZOLOFT 100 mg tablet Take 200 mg by mouth once daily.    Taking     Current Medications:   No current facility-administered medications for this encounter.      Medical History:   Past Medical  History:   Diagnosis Date    Blood in stool     Constipation     Depression     Diabetes mellitus, type 2     Dysphagia     GERD (gastroesophageal reflux disease)     Hypertension     Palpitations     Premature menopause on hormone replacement therapy     Thyroid disease      .    Anesthesia Evaluation    I have reviewed the Patient Summary Reports.    I have reviewed the Nursing Notes.   I have reviewed the Medications.     Review of Systems  Anesthesia Hx:  No problems with previous Anesthesia  Denies Family Hx of Anesthesia complications.   Denies Personal Hx of Anesthesia complications.   Hematology/Oncology:  Hematology Normal   Oncology Normal     EENT/Dental:EENT/Dental Normal   Cardiovascular:   Hypertension, well controlled    Pulmonary:   Shortness of breath    Renal/:  Renal/ Normal     Hepatic/GI:   GERD    Musculoskeletal:  Musculoskeletal Normal    Neurological:   Headaches    Endocrine:   Diabetes, well controlled, type 2    Dermatological:  Skin Normal    Psych:   Psychiatric History depression          Physical Exam  General:  Morbid Obesity    Airway/Jaw/Neck:  Airway Findings: Mouth Opening: Normal Tongue: Normal  General Airway Assessment: Adult  Mallampati: II  Improves to II with phonation.  TM Distance: Normal, at least 6 cm  Jaw/Neck Findings:     Neck ROM: Normal ROM      Dental:  Dental Findings: In tact   Chest/Lungs:  Chest/Lungs Findings: Clear to auscultation, Normal Respiratory Rate     Heart/Vascular:  Heart Findings: Rate: Normal  Rhythm: Regular Rhythm  Sounds: Normal     Abdomen:  Abdomen Findings:  Normal, Soft, Nontender       Mental Status:  Mental Status Findings:  Cooperative, Alert and Oriented         Anesthesia Plan  Type of Anesthesia, risks & benefits discussed:  Anesthesia Type:  general, MAC  Patient's Preference: as indicated  Intra-op Monitoring Plan: standard ASA monitors  Intra-op Monitoring Plan Comments:   Post Op Pain Control Plan:   Post Op Pain  Control Plan Comments:   Induction:   IV  Beta Blocker:  Patient is not currently on a Beta-Blocker (No further documentation required).       Informed Consent: Patient understands risks and agrees with Anesthesia plan.  Questions answered. Anesthesia consent signed with patient.  ASA Score: 3     Day of Surgery Review of History & Physical:  There are no significant changes.  H&P update referred to the provider.     Anesthesia Plan Notes: Risks of dental and eye injury reviewed with patient, agrees to proceed. Reassurance given.        Ready For Surgery From Anesthesia Perspective.

## 2017-09-14 NOTE — PATIENT INSTRUCTIONS
Discharge Summary/Instructions after an Endoscopic Procedure  Patient Name: Maria Ines Ac  Patient MRN: [id number]  Patient YOB: 1985 Thursday, September 14, 2017  Slick Herron MD  RESTRICTIONS:  During your procedure today, you received medications for sedation.  These   medications may affect your judgment, balance and coordination.  Therefore,   for 24 hours, you have the following restrictions:   - DO NOT drive a car, operate machinery, make legal/financial decisions,   sign important papers or drink alcohol.    ACTIVITY:  The following day: return to full activity including work, except no heavy   lifting, straining or running for 3 days if polyps were removed.  DIET:  Eat and drink normally unless instructed otherwise.  TREATMENT FOR COMMON SIDE EFFECTS:  - Mild abdominal pain, belching, bloating or excessive gas: rest, eat   lightly and use a heating pad.  - Sore Throat: treat with throat lozenges and/or gargle with warm salt   water.  SYMPTOMS TO WATCH FOR AND REPORT TO YOUR PHYSICIAN:  1. Abdominal pain or bloating, other than gas cramps.  2. Chest pain.  3. Back pain.  4. Chills or fever occurring within 24 hours after the procedure.  5. Rectal bleeding, which would show as bright red, maroon, or black stools.   (A tablespoon of blood from the rectum is not serious, especially if   hemorrhoids are present.)  6. Vomiting.  7. Weakness or dizziness.  8. Because air was used during the procedure, expelling large amounts of air   from your rectum or belching is normal.  9. If a bowel prep was taken, you may not have a bowel movement for 1-3   days.  This is normal.  GO DIRECTLY TO THE EMERGENCY ROOM IF YOU HAVE ANY OF THE FOLLOWING:   Difficulty breathing   Chills and/or fever over 101 F   Persistent vomiting and/or vomiting blood   Severe abdominal pain   Severe chest pain   Black, tarry stools   Bleeding- more than one tablespoon  Your doctor recommends these additional instructions:  If any  biopsies were taken, your doctors clinic will call you in 1 to 2   weeks with any results.  You have a contact number available for emergencies.  The signs and symptoms   of potential delayed complications were discussed with you.  You may return   to normal activities tomorrow.  Written discharge instructions were   provided to you.   You are being discharged to home.   Resume your previous diet.   Continue your present medications.   Your physician has recommended a repeat upper endoscopy as needed for   retreatment.  For questions, problems or results please call your physician - Slick Herron MD at Work:  [phone].  [facility], EMERGENCY ROOM PHONE NUMBER: [phone]  IF A COMPLICATION OR EMERGENCY SITUATION ARISES AND YOU ARE UNABLE TO REACH   YOUR PHYSICIAN - GO DIRECTLY TO THE EMERGENCY ROOM.  Slick Herron MD  9/14/2017 3:10:52 PM  This report has been verified and signed electronically.

## 2017-09-14 NOTE — ANESTHESIA RELEASE NOTE
"Anesthesia Release from PACU Note    Patient: Maria Ines Ac    Procedure(s) Performed: Procedure(s) (LRB):  ESOPHAGOGASTRODUODENOSCOPY (EGD) (N/A)    Anesthesia type: GEN    Post pain: Adequate analgesia reported    Post assessment: no apparent anesthetic complications, tolerated procedure well and no evidence of recall    Post vital signs: BP 98/62 (BP Location: Left arm, Patient Position: Lying)   Pulse 76   Temp 36.5 °C (97.7 °F) (Temporal)   Resp 16   Ht 5' 2" (1.575 m)   Wt 96.6 kg (213 lb)   LMP 11/17/2012   SpO2 (!) 94%   Breastfeeding? No   BMI 38.96 kg/m²     Level of consciousness: awake, alert and oriented    Nausea/Vomiting: no nausea/no vomiting    Complications: none    Airway Patency: patent    Respiratory: unassisted, spontaneous ventilation, room air    Cardiovascular: stable and blood pressure at baseline    Hydration: euvolemic    "

## 2017-09-14 NOTE — TRANSFER OF CARE
"Anesthesia Transfer of Care Note    Patient: Maria Ines Ac    Procedure(s) Performed: Procedure(s) (LRB):  ESOPHAGOGASTRODUODENOSCOPY (EGD) (N/A)    Patient location: GI    Anesthesia Type: MAC    Transport from OR: Transported from OR on room air with adequate spontaneous ventilation    Post pain: adequate analgesia    Post assessment: no apparent anesthetic complications and tolerated procedure well    Post vital signs: stable    Level of consciousness: awake    Nausea/Vomiting: no nausea/vomiting    Complications: none    Transfer of care protocol was followed      Last vitals:   Visit Vitals  BP 96/60 (Patient Position: Lying)   Pulse 73   Temp 37 °C (98.6 °F) (Oral)   Resp 16   Ht 5' 2" (1.575 m)   Wt 96.6 kg (213 lb)   LMP 11/17/2012   SpO2 (!) 94%   Breastfeeding? No   BMI 38.96 kg/m²     "

## 2017-09-14 NOTE — H&P
Short Stay Endoscopy History and Physical    PCP - Luann Raymond MD     Procedure - EGD  ASA - per anesthesia  Mallampati - per anesthesia  History of Anesthesia problems - no  Family history Anesthesia problems -  no   Plan of anesthesia - General    HPI:  This is a 32 y.o. female here for evaluation of dysphagia and for dilation.    Reflux - yes  Dysphagia - yes  Abdominal pain - no  Diarrhea - no    ROS:  Constitutional: No fevers, chills, No weight loss  CV: No chest pain  Pulm: No cough, No shortness of breath  Ophtho: No vision changes  GI: see HPI  Derm: No rash    Medical History:  has a past medical history of Blood in stool; Constipation; Depression; Diabetes mellitus, type 2; Dysphagia; GERD (gastroesophageal reflux disease); Hypertension; Palpitations; Premature menopause on hormone replacement therapy; and Thyroid disease.    Surgical History:  has a past surgical history that includes ooptherectomy; right knee (scoped); Gallbladder surgery; Shoulder surgery (right); Carpal tunnel release; Hysterectomy (2013); Oophorectomy (2005); Oophorectomy (2013); Colonoscopy (N/A, 8/30/2016); Bladder suspension; and Cholecystectomy.    Family History: family history includes Breast cancer in her mother; Cervical cancer in her maternal grandmother.. Otherwise no colon cancer, inflammatory bowel disease, or GI malignancies.    Social History:  reports that she has never smoked. She has never used smokeless tobacco. She reports that she does not drink alcohol or use drugs.    Review of patient's allergies indicates:  No Known Allergies    Medications:   Prescriptions Prior to Admission   Medication Sig Dispense Refill Last Dose    atorvastatin (LIPITOR) 40 MG tablet Take 40 mg by mouth once daily.   9/13/2017 at Unknown time    calcium-vitamin D3 500 mg(1,250mg) -200 unit per tablet Take 1 tablet by mouth 2 (two) times daily with meals.   9/13/2017 at Unknown time    cephALEXin (KEFLEX) 500 MG capsule TK ONE C  PO  BID  0 9/13/2017 at Unknown time    dicyclomine (BENTYL) 10 MG capsule Take 2 capsules (20 mg total) by mouth 3 (three) times daily. 60 capsule 0 9/13/2017 at Unknown time    docusate sodium (COLACE) 100 MG capsule Take 100 mg by mouth 2 (two) times daily.   9/13/2017 at Unknown time    FARXIGA 10 mg Tab Take 1 tablet by mouth once daily.    9/13/2017 at Unknown time    fish oil-omega-3 fatty acids 300-1,000 mg capsule Take 2 g by mouth once daily.   9/13/2017 at Unknown time    GLUCOPHAGE 500 mg tablet Take 500 mg by mouth 2 (two) times daily with meals.    9/13/2017 at Unknown time    LATUDA 80 mg Tab tablet TK 1 T PO D  2 9/13/2017 at Unknown time    levothyroxine (SYNTHROID) 50 MCG tablet TK 1 T PO QAM  8 9/14/2017 at Unknown time    metoprolol succinate (TOPROL-XL) 25 MG 24 hr tablet Take 25 mg by mouth once daily.    9/14/2017 at Unknown time    MULTIVIT-IRON-MIN-FOLIC ACID 3,500-18-0.4 UNIT-MG-MG ORAL CHEW Take 1 tablet by mouth once daily.    9/13/2017 at Unknown time    OMEPRAZOLE (PRILOSEC ORAL) Take by mouth.   9/13/2017 at Unknown time    oxybutynin (DITROPAN-XL) 10 MG 24 hr tablet TAKE 1 TABLET BY MOUTH EVERY DAY 30 tablet 11 9/13/2017 at Unknown time    pantoprazole (PROTONIX) 40 MG tablet Take 1 tablet (40 mg total) by mouth once daily. 90 tablet 3 9/14/2017 at Unknown time    phentermine (ADIPEX-P) 37.5 mg tablet Take 37.5 mg by mouth before breakfast.   9/14/2017 at Unknown time    rifAXIMin (XIFAXAN) 550 mg Tab Take 1 tablet (550 mg total) by mouth 3 (three) times daily. 42 tablet 0 9/13/2017 at Unknown time    TOPAMAX 25 mg tablet Take 1 tablet (25 mg total) by mouth once daily. 1 pill at bedtime 30 tablet 6 9/14/2017 at Unknown time    ZOLOFT 100 mg tablet Take 200 mg by mouth once daily.    9/14/2017 at Unknown time    ACCU-CHEK AMAURY PLUS TEST STRP Strp USE TO TEST BLOOD GLUCOSE TID  4 Taking    ACCU-CHEK SOFTCLIX LANCETS Misc USE TO TEST BLOOD GLUCOSE TID  3 Taking     BLOOD SUGAR DIAGNOSTIC (ACCU-CHEK AMAURY PLUS TEST STRP MISC) 1 strip by Misc.(Non-Drug; Combo Route) route 3 (three) times daily.   Taking    conjugated estrogens (PREMARIN) vaginal cream Use 0.5 gram of estrogen cream in vagina nightly x 2 weeks, then twice a week thereafter. 30 g 0 Taking    dulaglutide (TRULICITY) 0.75 mg/0.5 mL PnIj Inject 0.75 mg into the skin every 7 days.   9/8/2017    estradiol (VIVELLE-DOT) 0.1 mg/24 hr PTSW APPLY 1 PATCH EXTERNALLY TO THE SKIN 2 TIMES A WEEK 26 patch 3 Taking    hydrocodone-acetaminophen 10-325mg (NORCO)  mg Tab TK 1 T PO Q 4 TO 6 H PRN P  0 More than a month at Unknown time    hydrocodone-acetaminophen 7.5-325mg (NORCO) 7.5-325 mg per tablet TK 1 T PO Q 6 H PRN P. DO NOT TAKE MORE THAN 6 TS IN 24 H  0 More than a month at Unknown time    metformin (GLUCOPHAGE) 500 MG tablet TK 1 T PO  BID  4 Taking    nystatin (MYCOSTATIN) powder Apply topically 2 (two) times daily. 30 g 3 Taking    nystatin-triamcinolone (MYCOLOG II) cream Apply topically 2 (two) times daily. 30 g 2 Taking    ondansetron (ZOFRAN-ODT) 4 MG TbDL DISSOLVE 1 T PO Q 8 H PRN N  0 More than a month at Unknown time    oxybutynin (DITROPAN XL) 15 MG TR24   8 Taking    promethazine (PHENERGAN) 25 MG tablet TK 1 T PO  Q 4 TO 6 H PRN P  0 More than a month at Unknown time    terconazole (TERAZOL 3) 0.8 % vaginal cream Place 1 applicator vaginally once daily. 20 g 0 Taking       Physical Exam:    Vital Signs:   Vitals:    09/14/17 1341   BP: 96/60   Pulse: 73   Resp: 16   Temp: 98.6 °F (37 °C)       General Appearance: Well appearing in no acute distress  Eyes:    No scleral icterus  ENT: Neck supple, Lips, mucosa, and tongue normal; teeth and gums normal  Lungs: CTA anteriorly  Heart:  Regular rate, S1, S2 normal, no murmurs heard.  Abdomen: Soft, non tender, non distended with normal bowel sounds. No hepatosplenomegaly, ascites, or mass.  Extremities: No edema  Skin: No rash    Labs:  Lab Results    Component Value Date    WBC 9.39 01/24/2017    HGB 13.5 01/24/2017    HCT 41.9 01/24/2017     01/24/2017    CHOL 163 09/07/2016    TRIG 203 (H) 09/07/2016    HDL 37 (L) 09/07/2016    ALT 14 09/08/2017    AST 19 09/08/2017     09/08/2017    K 4.4 09/08/2017     09/08/2017    CREATININE 1.1 09/08/2017    BUN 9 09/08/2017    CO2 23 09/08/2017    TSH 0.858 01/24/2017    INR 0.9 01/05/2017    HGBA1C 5.1 09/08/2017       I have explained the risks and benefits of endoscopy procedures to the patient including but not limited to bleeding, perforation, infection, and death.      Alex Garcia MD PGY-IV  Gastroenterology Fellow  p [phone]

## 2017-09-21 ENCOUNTER — TELEPHONE (OUTPATIENT)
Dept: ENDOSCOPY | Facility: HOSPITAL | Age: 32
End: 2017-09-21

## 2017-09-27 ENCOUNTER — TELEPHONE (OUTPATIENT)
Dept: OBSTETRICS AND GYNECOLOGY | Facility: CLINIC | Age: 32
End: 2017-09-27

## 2017-09-27 NOTE — TELEPHONE ENCOUNTER
----- Message from Rafael Santiago sent at 9/27/2017  9:21 AM CDT -----  Please call and speak with pt mother Ruthie [phone]

## 2017-09-27 NOTE — TELEPHONE ENCOUNTER
Advised pt mother that she has been on HRT for some time from Dr Wynn and that she should contact the [facility] doctor on this matter because with her being on them so long I would say this is not the HRT she is on. PT mother verbalized understanding

## 2017-09-27 NOTE — TELEPHONE ENCOUNTER
"Patient saw Dr Guerrero 08/15/2017 for annual exam and prescription for Vivelle patch. Her mother nichole has questions " If the patch is patch causing patient to desire men in [employer].  "

## 2017-09-28 ENCOUNTER — TELEPHONE (OUTPATIENT)
Dept: OBSTETRICS AND GYNECOLOGY | Facility: CLINIC | Age: 32
End: 2017-09-28

## 2017-09-28 NOTE — TELEPHONE ENCOUNTER
"----- Message from Diandra Abhishek sent at 9/28/2017  2:07 PM CDT -----  Contact: Ruthie ambrocio_  1st Request  _  2nd Request  _  3rd Request        Who: Ruthie     Why: patient's mother would like a call back in regards to her daughter "being overwhelmed with sexual feelings". She would like a call back to discuss    What Number to Call Back: [phone]    When to Expect a call back: (Before the end of the day)   -- if call after 3:00 call back will be tomorrow.    "

## 2017-09-28 NOTE — TELEPHONE ENCOUNTER
Patient is requesting a refill on her Topamax.Patient has been discharged from Neurology-was advised to follow up with Primary care for refills and follow up.Please advise.

## 2017-09-28 NOTE — TELEPHONE ENCOUNTER
Not appropriate- she has not been discharged from Neurology clinic, will be seen in a few weeks. It is the responsibility of the covering neurologist to refill until her appointment

## 2017-09-29 RX ORDER — TOPIRAMATE 25 MG/1
25 TABLET, COATED ORAL DAILY
Qty: 30 TABLET | Refills: 6 | OUTPATIENT
Start: 2017-09-29

## 2017-09-29 NOTE — TELEPHONE ENCOUNTER
Primary care refused medication refill-after notes stated by Ej Chacon PA-C that she is to refer to PCP for further refills.Please advise.

## 2017-10-03 DIAGNOSIS — R19.7 DIARRHEA, UNSPECIFIED TYPE: ICD-10-CM

## 2017-10-03 DIAGNOSIS — K58.0 IRRITABLE BOWEL SYNDROME WITH DIARRHEA: ICD-10-CM

## 2017-10-03 RX ORDER — TOPIRAMATE 25 MG/1
TABLET ORAL
Qty: 30 TABLET | Refills: 0 | OUTPATIENT
Start: 2017-10-03

## 2017-10-03 RX ORDER — CHOLESTYRAMINE LIGHT 4 G/5.7G
POWDER, FOR SUSPENSION ORAL
Refills: 0 | OUTPATIENT
Start: 2017-10-03

## 2017-10-09 ENCOUNTER — TELEPHONE (OUTPATIENT)
Dept: UROGYNECOLOGY | Facility: CLINIC | Age: 32
End: 2017-10-09

## 2017-10-09 NOTE — TELEPHONE ENCOUNTER
Spoke to pt and scheduled pt appointment with MAGDA Gomez on 10/11/17. Pt voiced understanding and call ended.

## 2017-10-09 NOTE — TELEPHONE ENCOUNTER
----- Message from Lo Baumann MA sent at 10/9/2017  3:50 PM CDT -----  Contact: Self  Pt states that she is having bladder leakage and is requesting a phone call.  The pt can be reached at [phone].  Thanks FL

## 2017-10-11 ENCOUNTER — OFFICE VISIT (OUTPATIENT)
Dept: UROGYNECOLOGY | Facility: CLINIC | Age: 32
End: 2017-10-11
Payer: MEDICAID

## 2017-10-11 VITALS
HEIGHT: 62 IN | WEIGHT: 217.63 LBS | BODY MASS INDEX: 40.05 KG/M2 | DIASTOLIC BLOOD PRESSURE: 70 MMHG | SYSTOLIC BLOOD PRESSURE: 100 MMHG

## 2017-10-11 DIAGNOSIS — B37.31 YEAST VAGINITIS: ICD-10-CM

## 2017-10-11 DIAGNOSIS — R39.15 URINARY URGENCY: Primary | ICD-10-CM

## 2017-10-11 LAB
BILIRUB SERPL-MCNC: NORMAL MG/DL
BLOOD URINE, POC: NORMAL
COLOR, POC UA: NORMAL
GLUCOSE UR QL STRIP: 500
KETONES UR QL STRIP: NORMAL
LEUKOCYTE ESTERASE URINE, POC: NORMAL
NITRITE, POC UA: NORMAL
PH, POC UA: 6
PROTEIN, POC: NORMAL
SPECIFIC GRAVITY, POC UA: 1
UROBILINOGEN, POC UA: NORMAL

## 2017-10-11 PROCEDURE — 87147 CULTURE TYPE IMMUNOLOGIC: CPT

## 2017-10-11 PROCEDURE — 51701 INSERT BLADDER CATHETER: CPT | Mod: S$PBB,,, | Performed by: NURSE PRACTITIONER

## 2017-10-11 PROCEDURE — 99215 OFFICE O/P EST HI 40 MIN: CPT | Mod: PBBFAC,25 | Performed by: NURSE PRACTITIONER

## 2017-10-11 PROCEDURE — 81002 URINALYSIS NONAUTO W/O SCOPE: CPT | Mod: PBBFAC | Performed by: NURSE PRACTITIONER

## 2017-10-11 PROCEDURE — 51701 INSERT BLADDER CATHETER: CPT | Mod: PBBFAC | Performed by: NURSE PRACTITIONER

## 2017-10-11 PROCEDURE — 87086 URINE CULTURE/COLONY COUNT: CPT

## 2017-10-11 PROCEDURE — 87088 URINE BACTERIA CULTURE: CPT

## 2017-10-11 PROCEDURE — 99999 PR PBB SHADOW E&M-EST. PATIENT-LVL V: CPT | Mod: PBBFAC,,, | Performed by: NURSE PRACTITIONER

## 2017-10-11 PROCEDURE — 99213 OFFICE O/P EST LOW 20 MIN: CPT | Mod: 25,S$PBB,, | Performed by: NURSE PRACTITIONER

## 2017-10-11 RX ORDER — FLUCONAZOLE 150 MG/1
150 TABLET ORAL
Qty: 3 TABLET | Refills: 0 | Status: SHIPPED | OUTPATIENT
Start: 2017-10-11 | End: 2017-10-16

## 2017-10-11 RX ORDER — SPIRONOLACTONE 25 MG/1
TABLET ORAL
Refills: 2 | COMMUNITY
Start: 2017-09-26 | End: 2022-10-03

## 2017-10-11 RX ORDER — LURASIDONE HYDROCHLORIDE 20 MG/1
TABLET, FILM COATED ORAL
Refills: 1 | COMMUNITY
Start: 2017-10-10 | End: 2017-10-11

## 2017-10-11 NOTE — PROGRESS NOTES
Urogyn follow up  10/11/2017  .  [facility]  [address] [address]     Maria Ines Ac  [id number]  1985        Maria Ines Ac is a 32 y.o.  here for  follow up.  She has a history of retropubic sling/ cystourethroscopy for DILIP 1/2017 with worsening urinary urgency.  She was seen The groin pain which was evaluated for 4 wks ago has resolved, she is using the vaginal suppositories a few times a week. She has persistent urinary urgency which has improved slightly since starting pelvic floor PT.      Interval  HP since the last visit:    1)  UI:  (+) JACINDA  (+) UUI  Several times da day. (+) pads: 3/ day  Daytime frequency: Q 1 hours.  Nocturia: 4/night    (--) dysuria-  (--) hematuria,  (--) frequent UTIs.  (+) complete bladder emptying. + suprapubic pressure    2)  POP:  Absent    Symptoms:(--)  .  (--) vaginal bleeding. (+) vaginal discharge. (-) sexually active.  (--) dyspareunia.   (--)  Vaginal dryness.  (+) vaginal estrogen use.     3)  BM:  (-) constipation/straining.  (--) chronic diarrhea. (--) hematochezia.  (--) fecal incontinence.  (--) fecal smearing/urgency.  (--) incomplete evacuation.      4) pelvic pain improved with the vaginal suppositories, pain in groin region now resolved                 Past Medical History:   Diagnosis Date    Blood in stool      Constipation      Depression      Diabetes mellitus, type 2      Dysphagia      GERD (gastroesophageal reflux disease)      Hypertension      Palpitations      Premature menopause on hormone replacement therapy      Thyroid disease                 Past Surgical History:   Procedure Laterality Date    BLADDER SUSPENSION        CARPAL TUNNEL RELEASE         right    COLONOSCOPY N/A 8/30/2016     Procedure: COLONOSCOPY; Surgeon: Slick Herron MD; Location: [facility] ([address] [place] [address]); Service: Endoscopy; Laterality: N/A; PM prep    GALLBLADDER SURGERY        HYSTERECTOMY   2013     endo,  Dr. Ashley Smith    OOPHORECTOMY   2005     [facility]- benign cyst    OOPHORECTOMY   2013     [facility]- endo    ooptherectomy        right knee   scoped    SHOULDER SURGERY   right              Current Outpatient Prescriptions   Medication Sig    ACCU-CHEK AMAURY PLUS TEST STRP Strp USE TO TEST BLOOD GLUCOSE TID    ACCU-CHEK SOFTCLIX LANCETS Misc USE TO TEST BLOOD GLUCOSE TID    atorvastatin (LIPITOR) 40 MG tablet Take 40 mg by mouth once daily.    BLOOD SUGAR DIAGNOSTIC (ACCU-CHEK AMAURY PLUS TEST STRP MISC) 1 strip by Misc.(Non-Drug; Combo Route) route 3 (three) times daily.    calcium-vitamin D3 500 mg(1,250mg) -200 unit per tablet Take 1 tablet by mouth 2 (two) times daily with meals.    cephALEXin (KEFLEX) 500 MG capsule TK ONE C PO  BID    conjugated estrogens (PREMARIN) vaginal cream Use 0.5 gram of estrogen cream in vagina nightly x 2 weeks, then twice a week thereafter.    dicyclomine (BENTYL) 10 MG capsule Take 2 capsules (20 mg total) by mouth 3 (three) times daily.    docusate sodium (COLACE) 100 MG capsule Take 100 mg by mouth 2 (two) times daily.    dulaglutide (TRULICITY) 0.75 mg/0.5 mL PnIj Inject 0.75 mg into the skin every 7 days.    estradiol (VIVELLE-DOT) 0.1 mg/24 hr PTSW APPLY 1 PATCH EXTERNALLY TO THE SKIN 2 TIMES A WEEK    FARXIGA 10 mg Tab Take 1 tablet by mouth once daily.     fish oil-omega-3 fatty acids 300-1,000 mg capsule Take 2 g by mouth once daily.    GLUCOPHAGE 500 mg tablet Take 500 mg by mouth 2 (two) times daily with meals.     hydrocodone-acetaminophen 7.5-325mg (NORCO) 7.5-325 mg per tablet TK 1 T PO Q 6 H PRN P. DO NOT TAKE MORE THAN 6 TS IN 24 H    LATUDA 80 mg Tab tablet TK 1 T PO D    levothyroxine (SYNTHROID) 50 MCG tablet TK 1 T PO QAM    metformin (GLUCOPHAGE) 500 MG tablet TK 1 T PO  BID    metoprolol succinate (TOPROL-XL) 25 MG 24 hr tablet Take 25 mg by mouth once daily.     MULTIVIT-IRON-MIN-FOLIC ACID 3,500-18-0.4 UNIT-MG-MG ORAL CHEW Take 1  "tablet by mouth once daily.     nystatin (MYCOSTATIN) powder Apply topically 2 (two) times daily.    nystatin-triamcinolone (MYCOLOG II) cream Apply topically 2 (two) times daily.    OMEPRAZOLE (PRILOSEC ORAL) Take by mouth.    ondansetron (ZOFRAN-ODT) 4 MG TbDL DISSOLVE 1 T PO Q 8 H PRN N    oxybutynin (DITROPAN-XL) 10 MG 24 hr tablet TAKE 1 TABLET BY MOUTH EVERY DAY    pantoprazole (PROTONIX) 40 MG tablet Take 1 tablet (40 mg total) by mouth once daily.    phentermine (ADIPEX-P) 37.5 mg tablet Take 37.5 mg by mouth before breakfast.    promethazine (PHENERGAN) 25 MG tablet TK 1 T PO  Q 4 TO 6 H PRN P    spironolactone (ALDACTONE) 25 MG tablet TK 1 T PO HS    terconazole (TERAZOL 3) 0.8 % vaginal cream Place 1 applicator vaginally once daily.    TOPAMAX 25 mg tablet Take 1 tablet (25 mg total) by mouth once daily. 1 pill at bedtime    ZOLOFT 100 mg tablet Take 200 mg by mouth once daily.     hydrocodone-acetaminophen 10-325mg (NORCO)  mg Tab TK 1 T PO Q 4 TO 6 H PRN P     No current facility-administered medications for this visit.         Exam  Vitals:    10/11/17 1409   BP: 100/70   BP Location: Right arm   Patient Position: Sitting   Weight: 98.7 kg (217 lb 9.5 oz)   Height: 5' 2" (1.575 m)       General: alert and oriented, no acute distress  Respiratory: normal respiratory effort  Abd: soft, non-distended.  ND/NT     Pelvic  Ext. Genitalia:  Labia slightly irritated.  No evidence of yeast. Normal bartholin's and skeens glands.    Vagina: -- atrophy. Normal vaginal mucosa without lesions, No discharge  Cervix: absent  Uterus: absent   Urethra: no masses or tenderness  Urethral meatus: no lesions, caruncle or prolapse.        PVR 80 mL     Impression  1. Urinary urgency  POCT URINE DIPSTICK WITHOUT MICROSCOPE       We reviewed the above issues and discussed options for short-term versus long-term management of her problems.     Plan:     1. Shazia's Butt Paste daily to vulva.  3. Vaginal " atrophy:  Estrogen cream 0.5 g twice weekly  4. Bladder diary x 3 days  5. Continue oxybutynin xl 15 mg--start myrbetriq 50 mg once filled  6. Urine culture today- cath specimen--will treat per sensitivity  7. Will start pelvic floor pt if urine negative  8. Vaginal valium suppository 1-2 times daily as needed.  9. Work on lowering your blood glucose  10. RTC on 10/16/2017     30 minutes were spent in face to face time with this patient  75 % of this time was spent in counseling and/or coordination of care     TRACEY Ryan-BC  Female Pelvic Medicine and Reconstructive Surgery  [facility] [place], [place]

## 2017-10-11 NOTE — PATIENT INSTRUCTIONS
1. Shazia's Butt Paste daily to vulva.  3. Vaginal atrophy:  Estrogen cream 0.5 g twice weekly  4. Bladder diary x 3 days  5. Continue oxybutynin xl 15 mg--start myrbetriq 50 mg once filled  6. Urine culture today- cath specimen--will treat per sensitivity  7. Will start pelvic floor pt if urine negative  8. Vaginal valium suppository 1-2 times daily as needed.  9. Work on lowering your blood glucose  10. RTC on 10/16/2017

## 2017-10-12 LAB — BACTERIA UR CULT: NORMAL

## 2017-10-13 ENCOUNTER — TELEPHONE (OUTPATIENT)
Dept: UROGYNECOLOGY | Facility: CLINIC | Age: 32
End: 2017-10-13

## 2017-10-13 DIAGNOSIS — N30.00 ACUTE CYSTITIS WITHOUT HEMATURIA: Primary | ICD-10-CM

## 2017-10-13 RX ORDER — AMOXICILLIN AND CLAVULANATE POTASSIUM 875; 125 MG/1; MG/1
1 TABLET, FILM COATED ORAL EVERY 12 HOURS
Qty: 14 TABLET | Refills: 0 | Status: SHIPPED | OUTPATIENT
Start: 2017-10-13 | End: 2018-01-18

## 2017-10-13 NOTE — TELEPHONE ENCOUNTER
Notified mother that we will change antibiotics due to urine culture results.  Julianna Gomez, FNP-BC

## 2017-10-16 ENCOUNTER — OFFICE VISIT (OUTPATIENT)
Dept: UROGYNECOLOGY | Facility: CLINIC | Age: 32
End: 2017-10-16
Payer: MEDICAID

## 2017-10-16 ENCOUNTER — LAB VISIT (OUTPATIENT)
Dept: LAB | Facility: OTHER | Age: 32
End: 2017-10-16
Attending: NURSE PRACTITIONER
Payer: MEDICAID

## 2017-10-16 VITALS
SYSTOLIC BLOOD PRESSURE: 100 MMHG | WEIGHT: 217.63 LBS | DIASTOLIC BLOOD PRESSURE: 70 MMHG | BODY MASS INDEX: 40.05 KG/M2 | HEIGHT: 62 IN

## 2017-10-16 DIAGNOSIS — N39.41 URGE INCONTINENCE: ICD-10-CM

## 2017-10-16 DIAGNOSIS — R35.0 URINARY FREQUENCY: Primary | ICD-10-CM

## 2017-10-16 DIAGNOSIS — R35.0 URINARY FREQUENCY: ICD-10-CM

## 2017-10-16 DIAGNOSIS — M62.89 PELVIC FLOOR TENSION: ICD-10-CM

## 2017-10-16 PROCEDURE — 83036 HEMOGLOBIN GLYCOSYLATED A1C: CPT

## 2017-10-16 PROCEDURE — 99999 PR PBB SHADOW E&M-EST. PATIENT-LVL V: CPT | Mod: PBBFAC,,, | Performed by: NURSE PRACTITIONER

## 2017-10-16 PROCEDURE — 99215 OFFICE O/P EST HI 40 MIN: CPT | Mod: PBBFAC | Performed by: NURSE PRACTITIONER

## 2017-10-16 PROCEDURE — 99213 OFFICE O/P EST LOW 20 MIN: CPT | Mod: S$PBB,,, | Performed by: NURSE PRACTITIONER

## 2017-10-16 PROCEDURE — 36415 COLL VENOUS BLD VENIPUNCTURE: CPT

## 2017-10-16 RX ORDER — OXYBUTYNIN CHLORIDE 15 MG/1
TABLET, EXTENDED RELEASE ORAL
Refills: 8 | COMMUNITY
Start: 2017-09-26 | End: 2017-11-22

## 2017-10-16 NOTE — PROGRESS NOTES
Urogyn follow up  10/16/2017  .  [facility]  [address] [facility] [address]     Maria Ines Ac  [id number]  1985        Maria Ines Ac is a 32 y.o.  here for  follow up.  She has a history of retropubic sling/ cystourethroscopy for DILIP 1/2017 with worsening urinary urgency.  She was seen The groin pain which was evaluated for 4 wks ago has resolved, she is using the vaginal suppositories a few times a week. She has persistent urinary urgency which has improved slightly since starting pelvic floor PT.      Interval  HP since the last visit:    1)  UI:  (+) JACINDA  (+) UUI  Several times da day. (+) pads: 3/ day  Daytime frequency: Q 1 hours.  Nocturia: 4/night    (--) dysuria-  (--) hematuria,  (--) frequent UTIs.  (+) complete bladder emptying. + suprapubic pressure    2)  POP:  Absent    Symptoms:(--)  .  (--) vaginal bleeding. (+) vaginal discharge. (-) sexually active.  (--) dyspareunia.   (--)  Vaginal dryness.  (+) vaginal estrogen use.     3)  BM:  (-) constipation/straining.  (--) chronic diarrhea. (--) hematochezia.  (--) fecal incontinence.  (--) fecal smearing/urgency.  (--) incomplete evacuation.      4) pelvic pain improved with the vaginal suppositories, pain in groin region now resolved     Review of bladder diary;    Drinking 8-12 ounces every hour while awake up to 11 regular cokes/day.  Voiding up to 27 times / day              Past Medical History:   Diagnosis Date    Blood in stool      Constipation      Depression      Diabetes mellitus, type 2      Dysphagia      GERD (gastroesophageal reflux disease)      Hypertension      Palpitations      Premature menopause on hormone replacement therapy      Thyroid disease                 Past Surgical History:   Procedure Laterality Date    BLADDER SUSPENSION        CARPAL TUNNEL RELEASE         right    COLONOSCOPY N/A 8/30/2016     Procedure: COLONOSCOPY; Surgeon: Slick Herron MD; [facility] ([facility]); Service: Endoscopy; Laterality: N/A; PM prep    GALLBLADDER SURGERY        HYSTERECTOMY   2013     [facility] Dr. Champlain    OOPHORECTOMY   2005     [facility]- benign cyst    OOPHORECTOMY   2013     [facility]- endo    ooptherectomy        right knee   scoped    SHOULDER SURGERY   right              Current Outpatient Prescriptions   Medication Sig    ACCU-CHEK AMAURY PLUS TEST STRP Strp USE TO TEST BLOOD GLUCOSE TID    ACCU-CHEK SOFTCLIX LANCETS Misc USE TO TEST BLOOD GLUCOSE TID    amoxicillin-clavulanate 875-125mg (AUGMENTIN) 875-125 mg per tablet Take 1 tablet by mouth every 12 (twelve) hours.    atorvastatin (LIPITOR) 40 MG tablet Take 40 mg by mouth once daily.    BLOOD SUGAR DIAGNOSTIC (ACCU-CHEK AMAURY PLUS TEST STRP MISC) 1 strip by Misc.(Non-Drug; Combo Route) route 3 (three) times daily.    calcium-vitamin D3 500 mg(1,250mg) -200 unit per tablet Take 1 tablet by mouth 2 (two) times daily with meals.    cephALEXin (KEFLEX) 500 MG capsule TK ONE C PO  BID    conjugated estrogens (PREMARIN) vaginal cream Use 0.5 gram of estrogen cream in vagina nightly x 2 weeks, then twice a week thereafter.    dicyclomine (BENTYL) 10 MG capsule Take 2 capsules (20 mg total) by mouth 3 (three) times daily.    docusate sodium (COLACE) 100 MG capsule Take 100 mg by mouth 2 (two) times daily.    dulaglutide (TRULICITY) 0.75 mg/0.5 mL PnIj Inject 0.75 mg into the skin every 7 days.    estradiol (VIVELLE-DOT) 0.1 mg/24 hr PTSW APPLY 1 PATCH EXTERNALLY TO THE SKIN 2 TIMES A WEEK    FARXIGA 10 mg Tab Take 1 tablet by mouth once daily.     fish oil-omega-3 fatty acids 300-1,000 mg capsule Take 2 g by mouth once daily.    GLUCOPHAGE 500 mg tablet Take 500 mg by mouth 2 (two) times daily with meals.     hydrocodone-acetaminophen 10-325mg (NORCO)  mg Tab TK 1 T PO Q 4 TO 6 H PRN P    hydrocodone-acetaminophen 7.5-325mg (NORCO) 7.5-325 mg per tablet TK 1 T PO Q 6 H PRN P. DO NOT TAKE MORE  "THAN 6 TS IN 24 H    LATUDA 80 mg Tab tablet TK 1 T PO D    levothyroxine (SYNTHROID) 50 MCG tablet TK 1 T PO QAM    metformin (GLUCOPHAGE) 500 MG tablet TK 1 T PO  BID    metoprolol succinate (TOPROL-XL) 25 MG 24 hr tablet Take 25 mg by mouth once daily.     mirabegron 50 mg Tb24 Take 1 tablet (50 mg total) by mouth once daily.    MULTIVIT-IRON-MIN-FOLIC ACID 3,500-18-0.4 UNIT-MG-MG ORAL CHEW Take 1 tablet by mouth once daily.     nystatin (MYCOSTATIN) powder Apply topically 2 (two) times daily.    nystatin-triamcinolone (MYCOLOG II) cream Apply topically 2 (two) times daily.    OMEPRAZOLE (PRILOSEC ORAL) Take by mouth.    ondansetron (ZOFRAN-ODT) 4 MG TbDL DISSOLVE 1 T PO Q 8 H PRN N    oxybutynin (DITROPAN XL) 15 MG TR24     oxybutynin (DITROPAN-XL) 10 MG 24 hr tablet TAKE 1 TABLET BY MOUTH EVERY DAY    pantoprazole (PROTONIX) 40 MG tablet Take 1 tablet (40 mg total) by mouth once daily.    phentermine (ADIPEX-P) 37.5 mg tablet Take 37.5 mg by mouth before breakfast.    promethazine (PHENERGAN) 25 MG tablet TK 1 T PO  Q 4 TO 6 H PRN P    spironolactone (ALDACTONE) 25 MG tablet TK 1 T PO HS    terconazole (TERAZOL 3) 0.8 % vaginal cream Place 1 applicator vaginally once daily.    ZOLOFT 100 mg tablet Take 200 mg by mouth once daily.     magnesium oxide (MAG-OX) 400 mg tablet Take 1 tablet (400 mg total) by mouth once daily.    topiramate (TOPAMAX) 50 MG tablet Take 2 tablets (100 mg total) by mouth once daily.     No current facility-administered medications for this visit.         Exam  Vitals:    10/16/17 1306   BP: 100/70   BP Location: Right arm   Patient Position: Sitting   Weight: 98.7 kg (217 lb 9.5 oz)   Height: 5' 2" (1.575 m)       General: alert and oriented, no acute distress  Respiratory: normal respiratory effort  Abd: soft, non-distended.  ND/NT     Pelvic--deferred       Impression  1. Urinary frequency  Hemoglobin A1c    Ambulatory consult to Physical Therapy   2. Pelvic floor " tension  Ambulatory consult to Physical Therapy   3. Urge incontinence  Ambulatory consult to Physical Therapy       We reviewed the above issues and discussed options for short-term versus long-term management of her problems.     Plan:     1. Shazia's Butt Paste daily to vulva.  3. Vaginal atrophy:  Estrogen cream 0.5 g twice weekly  4. Only have coke zero with meds at meal time-- limit 3/day  5. Continue oxybutynin xl 15 mg--start myrbetriq 50 mg once filled (we will try to get a prior authorization)  6. Limit water to 5 glasses/day  7. Will start pelvic floor pt with Gabby Cedeno ([facility]  p) [phone].   8. Vaginal valium suppository 1-2 times daily as needed.  9. Work on lowering your blood glucose  10. RTC 01/2018   30 minutes were spent in face to face time with this patient  75 % of this time was spent in counseling and/or coordination of care     Julianna Gomez, TRACEY-BC  Female Pelvic Medicine and Reconstructive Surgery  [facility] [place], [place]

## 2017-10-16 NOTE — PATIENT INSTRUCTIONS
1. Shazia's Butt Paste daily to vulva.  3. Vaginal atrophy:  Estrogen cream 0.5 g twice weekly  4. Only have coke zero with meds at meal time-- limit 3/day  5. Continue oxybutynin xl 15 mg--start myrbetriq 50 mg once filled (we will try to get a prior authorization)  6. Limit water to 5 glasses/day  7. Will start pelvic floor pt with Gabby Cedeno ([facility]).  (p) [phone].   8. Vaginal valium suppository 1-2 times daily as needed.  9. Work on lowering your blood glucose  10. RTC 01/2018

## 2017-10-17 LAB
ESTIMATED AVG GLUCOSE: 105 MG/DL
HBA1C MFR BLD HPLC: 5.3 %

## 2017-10-18 ENCOUNTER — OFFICE VISIT (OUTPATIENT)
Dept: NEUROLOGY | Facility: CLINIC | Age: 32
End: 2017-10-18
Payer: MEDICAID

## 2017-10-18 VITALS
HEART RATE: 82 BPM | SYSTOLIC BLOOD PRESSURE: 105 MMHG | DIASTOLIC BLOOD PRESSURE: 71 MMHG | HEIGHT: 62 IN | WEIGHT: 217.38 LBS | BODY MASS INDEX: 40 KG/M2

## 2017-10-18 DIAGNOSIS — G43.009 MIGRAINE WITHOUT AURA AND WITHOUT STATUS MIGRAINOSUS, NOT INTRACTABLE: Primary | ICD-10-CM

## 2017-10-18 PROCEDURE — 99999 PR PBB SHADOW E&M-EST. PATIENT-LVL V: CPT | Mod: PBBFAC,,, | Performed by: STUDENT IN AN ORGANIZED HEALTH CARE EDUCATION/TRAINING PROGRAM

## 2017-10-18 PROCEDURE — 99215 OFFICE O/P EST HI 40 MIN: CPT | Mod: PBBFAC | Performed by: STUDENT IN AN ORGANIZED HEALTH CARE EDUCATION/TRAINING PROGRAM

## 2017-10-18 PROCEDURE — 99214 OFFICE O/P EST MOD 30 MIN: CPT | Mod: S$PBB,,, | Performed by: STUDENT IN AN ORGANIZED HEALTH CARE EDUCATION/TRAINING PROGRAM

## 2017-10-18 RX ORDER — TOPIRAMATE 50 MG/1
100 TABLET, FILM COATED ORAL DAILY
Qty: 60 TABLET | Refills: 11 | Status: SHIPPED | OUTPATIENT
Start: 2017-10-18 | End: 2018-06-07 | Stop reason: SDUPTHER

## 2017-10-18 RX ORDER — LANOLIN ALCOHOL/MO/W.PET/CERES
400 CREAM (GRAM) TOPICAL DAILY
Refills: 0 | COMMUNITY
Start: 2017-10-18 | End: 2017-11-22

## 2017-10-18 NOTE — PATIENT INSTRUCTIONS
Increase topamax to 100 mg qd  Start Mg oxide 400 mg po qd  Counseled on decreasing caffeine, improving sleep  Continuing to exercise  Will work on decreasing Aleve use  Follow up in 6 months

## 2017-10-21 NOTE — PROGRESS NOTES
"NEUROLOGY OUTPATIENT CLINIC NOTE    Patient Name:  Maria Ines Ac  Patient MRN:  [id number]    HPI:  Patient is a very pleasant 32 y.o. female with PMHx of HTN, DM II with gastroparesis, obesity, and hypothyroidism who presents to [facility] 10/21/2017 for headaches.  Patient states that she has had headaches since about 03/2017 but they have gottne much worse over past 3-4 months.  She says they start in the middle of her forehead and radiate throughout the head to the occiput.  She describes the pain as sharp.  They are relieved by Aleve after about an hour but will recur, sometimes up to 4 times per day.  She notes associated n/v and rhinorrhea with headaches. She also notes dizziness associated with the headaches.  This is described as room spinning and is associated with tinnitus but not with hearing loss, ear fullness.  Denies recent infections, does not have sensation of hearing her pulse in her ear or other intrinsic sounds. Denies photophobia, phonophobia, lacrimation, injection of the eyes with headaches.  Unable to describe any triggers.  States that she had headaches during adolescence as well but they were not this frequent and usually would just go away with OTC NSAIDs.  She is accompanied by her dad who assists with the history and he notes that she drinks several Coke Zero soft drinks during the day, stating "one every hour."  Patient acknowledges drinking several soft drinks per day.  She also does not sleep very well and gets maybe 4 hours of sleep per night with a lot of tossing and turning.  Her father notes cell phone and tablet use before bed.  She does do a lot of walking during the day for exercise which father confirms.  Patient has not had any medication changes recently and was prescribed topiramate back in March 2017 and has not been titrated up--still on 25 mg po qHS which does not seem to be preventing these headaches.    ROS:  General:  No fever, no chills, no fatigue, " no change in weight  HEENT:  +headache, no changes in vision  Respiratory:  No cough, no SOB  Cardiovascular:  No chest pain, no palpitations  GI:  No abdominal pain, no c/d, +n/v with headache  :  No dysuria, no change in frequency, no hematuria  Skin:  No rashes, no pruritus, no wounds  Musculoskeletal:  No myalgias, no arthralgias  Hematologic:  No easy bruising or bleeding  Endocrine:  No heat or cold intolerance, no polyuria/polydipsia  Neuro:  No tremors, no focal weakness, no paresthesias  Psych:  No anxiety, no depression    PMHx:  Patient Active Problem List   Diagnosis    Abdominal pain, RLQ (right lower quadrant)    Dysphagia    Diabetes mellitus type II    Blood in stool    Constipation, chronic    Right hip pain    Migraine without aura    Gait abnormality    Hypertension    Palpitations    Chest pain of unknown etiology    Dyspnea on exertion    Urinary frequency    Vulvovaginitis    Obesity (BMI 30-39.9)    Muscle spasm     PSHx:  Past Surgical History:   Procedure Laterality Date    BLADDER SUSPENSION      CARPAL TUNNEL RELEASE      right    CHOLECYSTECTOMY      COLONOSCOPY N/A 8/30/2016    Procedure: COLONOSCOPY;  Surgeon: Slick Herron MD;  Location: [facility] ([address] [place] [address]);  Service: Endoscopy;  Laterality: N/A;  PM prep    GALLBLADDER SURGERY      HYSTERECTOMY  2013    Bess velasquez Dr. Champlain    OOPHORECTOMY  2005    [facility]- benign cyst    OOPHORECTOMY  2013    [facility]- endo    ooptherectomy      right knee  scoped    SHOULDER SURGERY  right     Medications:  Current Outpatient Prescriptions on File Prior to Visit   Medication Sig Dispense Refill    ACCU-CHEK AMAURY PLUS TEST STRP Strp USE TO TEST BLOOD GLUCOSE TID  4    ACCU-CHEK SOFTCLIX LANCETS Misc USE TO TEST BLOOD GLUCOSE TID  3    amoxicillin-clavulanate 875-125mg (AUGMENTIN) 875-125 mg per tablet Take 1 tablet by mouth every 12 (twelve) hours. 14 tablet 0    atorvastatin (LIPITOR) 40 MG tablet Take 40 mg by  mouth once daily.      BLOOD SUGAR DIAGNOSTIC (ACCU-CHEK AMAURY PLUS TEST STRP MISC) 1 strip by Misc.(Non-Drug; Combo Route) route 3 (three) times daily.      calcium-vitamin D3 500 mg(1,250mg) -200 unit per tablet Take 1 tablet by mouth 2 (two) times daily with meals.      cephALEXin (KEFLEX) 500 MG capsule TK ONE C PO  BID  0    conjugated estrogens (PREMARIN) vaginal cream Use 0.5 gram of estrogen cream in vagina nightly x 2 weeks, then twice a week thereafter. 30 g 0    dicyclomine (BENTYL) 10 MG capsule Take 2 capsules (20 mg total) by mouth 3 (three) times daily. 60 capsule 0    docusate sodium (COLACE) 100 MG capsule Take 100 mg by mouth 2 (two) times daily.      dulaglutide (TRULICITY) 0.75 mg/0.5 mL PnIj Inject 0.75 mg into the skin every 7 days.      estradiol (VIVELLE-DOT) 0.1 mg/24 hr PTSW APPLY 1 PATCH EXTERNALLY TO THE SKIN 2 TIMES A WEEK 26 patch 3    FARXIGA 10 mg Tab Take 1 tablet by mouth once daily.       fish oil-omega-3 fatty acids 300-1,000 mg capsule Take 2 g by mouth once daily.      GLUCOPHAGE 500 mg tablet Take 500 mg by mouth 2 (two) times daily with meals.       hydrocodone-acetaminophen 10-325mg (NORCO)  mg Tab TK 1 T PO Q 4 TO 6 H PRN P  0    hydrocodone-acetaminophen 7.5-325mg (NORCO) 7.5-325 mg per tablet TK 1 T PO Q 6 H PRN P. DO NOT TAKE MORE THAN 6 TS IN 24 H  0    LATUDA 80 mg Tab tablet TK 1 T PO D  2    levothyroxine (SYNTHROID) 50 MCG tablet TK 1 T PO QAM  8    metformin (GLUCOPHAGE) 500 MG tablet TK 1 T PO  BID  4    metoprolol succinate (TOPROL-XL) 25 MG 24 hr tablet Take 25 mg by mouth once daily.       mirabegron 50 mg Tb24 Take 1 tablet (50 mg total) by mouth once daily. 30 tablet 11    MULTIVIT-IRON-MIN-FOLIC ACID 3,500-18-0.4 UNIT-MG-MG ORAL CHEW Take 1 tablet by mouth once daily.       nystatin (MYCOSTATIN) powder Apply topically 2 (two) times daily. 30 g 3    nystatin-triamcinolone (MYCOLOG II) cream Apply topically 2 (two) times daily. 30 g 2  "   OMEPRAZOLE (PRILOSEC ORAL) Take by mouth.      ondansetron (ZOFRAN-ODT) 4 MG TbDL DISSOLVE 1 T PO Q 8 H PRN N  0    oxybutynin (DITROPAN XL) 15 MG TR24   8    oxybutynin (DITROPAN-XL) 10 MG 24 hr tablet TAKE 1 TABLET BY MOUTH EVERY DAY 30 tablet 11    pantoprazole (PROTONIX) 40 MG tablet Take 1 tablet (40 mg total) by mouth once daily. 90 tablet 3    phentermine (ADIPEX-P) 37.5 mg tablet Take 37.5 mg by mouth before breakfast.      promethazine (PHENERGAN) 25 MG tablet TK 1 T PO  Q 4 TO 6 H PRN P  0    spironolactone (ALDACTONE) 25 MG tablet TK 1 T PO HS  2    terconazole (TERAZOL 3) 0.8 % vaginal cream Place 1 applicator vaginally once daily. 20 g 0    ZOLOFT 100 mg tablet Take 200 mg by mouth once daily.        No current facility-administered medications on file prior to visit.        Allergies:  Review of patient's allergies indicates:  No Known Allergies    Physical Exam:  /71   Pulse 82   Ht 5' 2" (1.575 m)   Wt 98.6 kg (217 lb 6 oz)   LMP 11/17/2012   BMI 39.76 kg/m²   General:  Well-developed, obese, nad  HEENT:  NCAT, PERRL, EOMI, oropharygneal membranes non-erythematous/without exudate  Neck:  Supple, no palpable lad, no carotid bruits, no apparent JVD  Respiratory:  Symmetric expansion, no increased wob  CVS:  RRR, no m/r/g.  No LE edema.  Extremities warm, well-perfused  GI:  Abd soft, non-distended, non-tender to palpation, +BS, no HSM  Skin:  No visible rashes or wounds  Psych:  Pleasant, cooperative with exam.  Speech and thought content appropriate.  Neurologic Exam:  Mental Status:  AAOx3.  Converses easily.  Able to spell 'world' forward but not backward, not able to do serial 7s appropriately.  Possibly some baseline learning deficits but speech and thought content are generally appropriate.  Cranial Nerves:  PERRLA, EOMI.  Visual fields intact to confrontation.  Facial movement and sensation intact and symmetric.  Tongue protrudes midline, palate raises symmetrically.  " Trapezius and SCM strength 5/5 bilaterally.  Motor:  Normal muscle bulk and tone.  Strength 5/5 throughout.  Sensory:  Sensation intact to light touch at all extremities.  Vibratory sensation intact and symmetric at ankles and wrists.  Reflexes:  Reflexes 2+--biceps, brachioradialis, patellar, Achilles.  No ankle clonus.  Downgoing toe on Babinski.    Coordination:  No resting tremor noted, able to hold posture without tremor.  FTN, HTS, LYNN wnl--no ataxia, dysmetria, or dysdiadochokinesia.  Gait:  Appropriate gait, appropriate arm swing.  No shuffling, magnetic gait, imbalance, weakness, or foot drop noted.  Tandem gait with good balance.  *HINTS exam negative    Labs:  Results: CBC:   Lab Results   Component Value Date/Time    WBC 9.39 01/24/2017 04:45 PM    RBC 4.81 01/24/2017 04:45 PM    HGB 13.5 01/24/2017 04:45 PM    HCT 41.9 01/24/2017 04:45 PM     01/24/2017 04:45 PM    MCV 87 01/24/2017 04:45 PM    MCH 28.1 01/24/2017 04:45 PM    MCHC 32.2 01/24/2017 04:45 PM     CMP:   Lab Results   Component Value Date/Time    GLU 85 09/08/2017 03:38 PM    CALCIUM 9.8 09/08/2017 03:38 PM    ALBUMIN 4.1 09/08/2017 03:38 PM    PROT 8.5 (H) 09/08/2017 03:38 PM     09/08/2017 03:38 PM    K 4.4 09/08/2017 03:38 PM    CO2 23 09/08/2017 03:38 PM     09/08/2017 03:38 PM    BUN 9 09/08/2017 03:38 PM    CREATININE 1.1 09/08/2017 03:38 PM    ALKPHOS 64 09/08/2017 03:38 PM    ALT 14 09/08/2017 03:38 PM    AST 19 09/08/2017 03:38 PM    BILITOT 0.3 09/08/2017 03:38 PM     Imaging:  Imaging Results    None       Additional Diagnotic Testing:  N/a    ASSESSMENT/PLAN:  Patient is a 32 y.o. female with a PMHx of HTN, DM II with gastroparesis, obesity, and hypothyroidism who presents to [facility] 10/21/2017 due to headaches.    Migraine headache  -Will increase topiramate from 25 mg po qHS to 100 mg po qHS   -Increased dose may also help with sleep and weight loss  -Counseled on decreasing caffeine use and  increasing water intake  -Also discussed sleep hygiene--limiting screen time before bed, decreasing caffeine intake, continuing to exercise, going to bed/waking up at similar times each day  -Encouraged patient to continue good habits like walking for exercise  -Patient also advised to try to reduce amount of OTC Aleve she is taking to avoid rebound headaches  -Follow up in 6 months--patient has [facility]/Patient Portal and advised to message or call the office for continued headaches or progression of symptoms in the interim    Dizziness  -No nystagmus on exam, HINTS exam negative, dizziness stated to only occur with headaches per patient  -Hx given not c/w BPPV, Meniere's, Labyrinthitis, Vestibular neuritis, Superior canal dehiscence, Presyncope/Orthostatic hTN  -Suspect dizziness will improve with decreasing caffeine and controlling headaches  -Patient has very good glucose control with Hgb A1c 5.3%   -Pt will check blood glucose when she feels dizzy with a headache next few days to ensure symptoms are not related to hypoglycemia  -Will re-evaluate dizziness in follow up appt in 6 months    Mena Lambert MD  Pager:  [phone]  [facility] [place], [place] [id number] [phone]

## 2017-10-25 NOTE — PROGRESS NOTES
I have seen the patient, reviewed the Resident's history and physical, assessment and plan. I have personally interviewed and examined the patient at bedside and agree with the findings.     MD Zhang Arnold - Neurology

## 2017-10-26 ENCOUNTER — TELEPHONE (OUTPATIENT)
Dept: UROGYNECOLOGY | Facility: CLINIC | Age: 32
End: 2017-10-26

## 2017-10-26 NOTE — TELEPHONE ENCOUNTER
Patient authorization for Myrbetriq er 50 mg was denied by her insurance due to pt not trying and failing at least one drug in formulary (Tolterodine-ER, Trospium 20 mg).  [facility] #[phone]

## 2017-10-30 ENCOUNTER — CLINICAL SUPPORT (OUTPATIENT)
Dept: REHABILITATION | Facility: HOSPITAL | Age: 32
End: 2017-10-30
Payer: MEDICAID

## 2017-10-30 DIAGNOSIS — N81.89 PELVIC FLOOR WEAKNESS: Primary | ICD-10-CM

## 2017-10-30 DIAGNOSIS — R27.8 DECREASED COORDINATION: ICD-10-CM

## 2017-10-30 DIAGNOSIS — M79.18 MYOFASCIAL PAIN: ICD-10-CM

## 2017-10-30 DIAGNOSIS — N39.46 MIXED STRESS AND URGE URINARY INCONTINENCE: ICD-10-CM

## 2017-10-30 DIAGNOSIS — N95.2 VAGINAL ATROPHY: ICD-10-CM

## 2017-10-30 DIAGNOSIS — M62.89 PELVIC FLOOR DYSFUNCTION: ICD-10-CM

## 2017-10-30 PROCEDURE — 97110 THERAPEUTIC EXERCISES: CPT | Mod: PO | Performed by: PHYSICAL THERAPIST

## 2017-10-30 PROCEDURE — 97161 PT EVAL LOW COMPLEX 20 MIN: CPT | Mod: PO | Performed by: PHYSICAL THERAPIST

## 2017-10-30 RX ORDER — TOPIRAMATE 25 MG/1
TABLET ORAL
Refills: 6 | COMMUNITY
Start: 2017-08-23 | End: 2017-11-22

## 2017-10-30 NOTE — PROGRESS NOTES
"  OUTPATIENT PHYSICAL THERAPY TREATMENT NOTE     [facility] #:  [id number]    Diagnosis:   Encounter Diagnoses   Name Primary?    Mixed stress and urge urinary incontinence     Pelvic floor weakness Yes    Decreased coordination     Pelvic floor dysfunction     Myofascial pain         Initial visit: 10/30/17  Date of treatment: 10/30/2017     Time in: 11:00  Time out: 11:50  Billable time: 50 min  # Visits: 1/7  Auth: 8 (12/31/2017)  POC expiration: 1/30/2017    SUBJECTIVE     Pt reports see POC  Pain: 7/10 on TODD   Pain location: suprapubic    OBJECTIVE     TREATMENT  Therapeutic Exercise to develop multiple selection list strength, endurance, ROM, flexibility and core stabilization for 10 minutes including:   Hooklying add with pillow 10x5"   Hookyling ER GTB 10x5"  Supine glute set 10x5"  Wall squat 10x5" hold at bottom  Tandem stance 1x45" bilat      Education: Discussed progression of plan of care with patient; educated pt in activity modification; reviewed HEP with pt. Pt demonstrated and verbalized understanding of all instruction and was provided with a handout of HEP (see Patient Instructions). Pt provided with GTB for compliance with HEP.    ASSESSMENT      Pt tolerated entire treatment well with no visible adverse effects. See POC    Will the patient continue to benefit from skilled PT intervention? Yes  Progress towards goals:  good    PLAN     Continue with established Plan of Care, working toward established PT goals.         "

## 2017-10-30 NOTE — TELEPHONE ENCOUNTER
Pt Pharmacy sent a Fax requesting a refill of Premarin Vaginal Cream 30 GM . Pt last seen on 10/16/17 by MAGDA Gomez.

## 2017-10-30 NOTE — PLAN OF CARE
OUTPATIENT PHYSICAL THERAPY EVALUATION      Patient: Maria Ines [facility]   [facility] #:  [id number]    Date of treatment: 10/30/2017     Time in: 11:00  Time out: 11:50  Billable time: 50  # Visits: 1/7  Auth: 8 (12/31/2017)  POC expiration: 1/30/2017    HISTORY      Maria Ines is a 32 y.o. female evaluated on 10/30/2017    Physician:  Julianna Gomez NP   Diagnosis:   Encounter Diagnoses   Name Primary?    Mixed stress and urge urinary incontinence     Pelvic floor weakness Yes    Decreased coordination     Pelvic floor dysfunction     Myofascial pain       Treatment ordered: Physical Therapy  Medical History:   Past Medical History:   Diagnosis Date    Blood in stool     Constipation     Depression     Diabetes mellitus, type 2     Dysphagia     GERD (gastroesophageal reflux disease)     Hypertension     Palpitations     Premature menopause on hormone replacement therapy     Thyroid disease       Surgical History:   Past Surgical History:   Procedure Laterality Date    BLADDER SUSPENSION      CARPAL TUNNEL RELEASE      right    CHOLECYSTECTOMY      COLONOSCOPY N/A 8/30/2016    Procedure: COLONOSCOPY;  Surgeon: Slick Herron MD;  Location: [address]);  Service: Endoscopy;  Laterality: N/A;  PM prep    GALLBLADDER SURGERY      HYSTERECTOMY  2013    Bess velasquez Dr. Champlain    OOPHORECTOMY  2005    [facility]- benign cyst    OOPHORECTOMY  2013    [facility]- endo    ooptherectomy      right knee  scoped    SHOULDER SURGERY  right      Medications:   Current Outpatient Prescriptions   Medication Sig    ACCU-CHEK AMAURY PLUS TEST STRP Strp USE TO TEST BLOOD GLUCOSE TID    ACCU-CHEK SOFTCLIX LANCETS Misc USE TO TEST BLOOD GLUCOSE TID    amoxicillin-clavulanate 875-125mg (AUGMENTIN) 875-125 mg per tablet Take 1 tablet by mouth every 12 (twelve) hours.    atorvastatin (LIPITOR) 40 MG tablet Take 40 mg by mouth once daily.    BLOOD SUGAR DIAGNOSTIC (ACCU-CHEK AMAURY PLUS TEST STRP MISC)  1 strip by Misc.(Non-Drug; Combo Route) route 3 (three) times daily.    calcium-vitamin D3 500 mg(1,250mg) -200 unit per tablet Take 1 tablet by mouth 2 (two) times daily with meals.    cephALEXin (KEFLEX) 500 MG capsule TK ONE C PO  BID    conjugated estrogens (PREMARIN) vaginal cream Use 0.5 gram of estrogen cream in vagina nightly x 2 weeks, then twice a week thereafter.    dicyclomine (BENTYL) 10 MG capsule Take 2 capsules (20 mg total) by mouth 3 (three) times daily.    docusate sodium (COLACE) 100 MG capsule Take 100 mg by mouth 2 (two) times daily.    dulaglutide (TRULICITY) 0.75 mg/0.5 mL PnIj Inject 0.75 mg into the skin every 7 days.    estradiol (VIVELLE-DOT) 0.1 mg/24 hr PTSW APPLY 1 PATCH EXTERNALLY TO THE SKIN 2 TIMES A WEEK    FARXIGA 10 mg Tab Take 1 tablet by mouth once daily.     fish oil-omega-3 fatty acids 300-1,000 mg capsule Take 2 g by mouth once daily.    GLUCOPHAGE 500 mg tablet Take 500 mg by mouth 2 (two) times daily with meals.     hydrocodone-acetaminophen 10-325mg (NORCO)  mg Tab TK 1 T PO Q 4 TO 6 H PRN P    hydrocodone-acetaminophen 7.5-325mg (NORCO) 7.5-325 mg per tablet TK 1 T PO Q 6 H PRN P. DO NOT TAKE MORE THAN 6 TS IN 24 H    LATUDA 80 mg Tab tablet TK 1 T PO D    levothyroxine (SYNTHROID) 50 MCG tablet TK 1 T PO QAM    magnesium oxide (MAG-OX) 400 mg tablet Take 1 tablet (400 mg total) by mouth once daily.    metformin (GLUCOPHAGE) 500 MG tablet TK 1 T PO  BID    metoprolol succinate (TOPROL-XL) 25 MG 24 hr tablet Take 25 mg by mouth once daily.     mirabegron 50 mg Tb24 Take 1 tablet (50 mg total) by mouth once daily.    MULTIVIT-IRON-MIN-FOLIC ACID 3,500-18-0.4 UNIT-MG-MG ORAL CHEW Take 1 tablet by mouth once daily.     nystatin (MYCOSTATIN) powder Apply topically 2 (two) times daily.    nystatin-triamcinolone (MYCOLOG II) cream Apply topically 2 (two) times daily.    OMEPRAZOLE (PRILOSEC ORAL) Take by mouth.    ondansetron (ZOFRAN-ODT) 4 MG TbDL  DISSOLVE 1 T PO Q 8 H PRN N    oxybutynin (DITROPAN XL) 15 MG TR24     oxybutynin (DITROPAN-XL) 10 MG 24 hr tablet TAKE 1 TABLET BY MOUTH EVERY DAY    pantoprazole (PROTONIX) 40 MG tablet Take 1 tablet (40 mg total) by mouth once daily.    phentermine (ADIPEX-P) 37.5 mg tablet Take 37.5 mg by mouth before breakfast.    promethazine (PHENERGAN) 25 MG tablet TK 1 T PO  Q 4 TO 6 H PRN P    spironolactone (ALDACTONE) 25 MG tablet TK 1 T PO HS    terconazole (TERAZOL 3) 0.8 % vaginal cream Place 1 applicator vaginally once daily.    topiramate (TOPAMAX) 50 MG tablet Take 2 tablets (100 mg total) by mouth once daily.    ZOLOFT 100 mg tablet Take 200 mg by mouth once daily.      No current facility-administered medications for this visit.        Allergies: Review of patient's allergies indicates:  No Known Allergies     Precautions: universal    OB/GYN History: ELISSA-BSO due to endometriosis and PCOS, recurrent vaginitis, vaginal atrophy,     Bladder/Bowel History: trouble emptying bladder completely, urinary incontinence and urethral hypermobility    Abuse History: None reported     SUBJECTIVE     Date of onset: Started 8 weeks ago - however chart reviews shows this problem to occur over last year  History of current complaint: Pt reports bladder leakage when she coughs, sneezes, lifts, walking to bathroom with full bladder. Pt reports she is checking her glucose once per day and her numbers have been high. She is not reporting numbers to her doctor.    Patient's goals for therapy: To reduce leakage in order to perform her normal ADL's.  Pain: Current: 7/10 on TODD. Highest: 7/10. Lowest: 7/10. Sharp pain felt in the bladder that is relieved when she urinates.    Previous treatment included medical management. Had PT this calendar year.      Sexually active? No    Frequency of urination   Daytime: Every hour           Nighttime: 4 times per night due to urge. Pt experiences leakage during the  night.    Difficulty initiating urine stream: No  Urine stream: strong  Complete emptying: No  Bladder leakage: Yes  Frequency of incidents: Constant  Amount leaked (urine): drops    Frequency of bowel movements: once a day  Difficulty initiating BM: No  Quality/Shape of BM: [place] Stool Chart type(s) 7    Form of protection: pad  Number of pads required in 24 hours: 4 times per day, wears one at night    Fluid intake: coke zero, water  Diet: Normal, fruits and veggies  Current exercise: Walk once per day for a mile    Occupation: Pt is unemployed but cleans during the day.    Home Environment: Pt lives in a one story home with 0 steps to enter and has no medical equipment. Pt has no difficulty accessing home secondary to current condition.     PLOF: Pt was independent with all ADLs and iADLs without pain, ambulating without pain or deviation, driving independently.      OBJECTIVE     Chaperone: Physical Therapy student present  Consent signed: Yes    ORTHO SCREEN  Posture: flexed posturing  Pelvic alignment: no sign of deviations noted in supine  Pubic symphysis: no pain, unable to assess laxity due to excessive body habitus     ABDOMINALS  Scarring: Transverse suprapubic and vertical infraumbilical with impaired sensation (pins and needles)  Diastasis: 3 fingers above umbilicus, 3 fingers at umbilicus, 3 fingers below umbilicus   Abdominal strength: Rectus: 2/5     TA: Palpable      VAGINAL PELVIC FLOOR EXAM    EXTERNAL ASSESSMENT  Introitus: WNL  Skin condition: redness noted, reabsorption of inf labia minora bilat  Scarring: none   Sensation: WNL   Pain: none  Voluntary contraction: absent  Voluntary relaxation: absent, inconsistent results  Involuntary contraction: present but partial  Bearing down: absent  Perineal descent: DNA      INTERNAL ASSESSMENT  Palpation: dehydrated tissues  Pain: none   Sensation: able to sense generalized pressure, unable to identify location   Vaginal vault: roomy   Muscle Bulk:  overactive  Muscle Power: 0/5 voluntary contraction, 1/5 with cough, 2/5 with TA  Muscle Endurance: 3 sec  # Reps To Fatigue: DNA    Fast Contractions: DNA   Involuntary Contraction: contraction  Bearing Down: excursion, fair     Quality of contraction: slow rise, decreased hold, slow relaxation and incomplete relaxation   Specificity: WNL as pt has no accessory muscle use with voluntary contraction attempts   Coordination: tends to hold breath during PFM contration   Prolapse check: redundant tissue noted anteriorly        SEMG EVALUATION: Deferred due to time constraints            Functional Limitations Reports (G code) - JUSTINE 6  Score: 13/18  Current: 72%  Goal: 8/18   <44%  Category: Other    G-Code Modifiers  CH  0% Impaired, limited, or restricted    CI  19% - 1%  Impaired, limited, or restricted    CJ  20% - 39% Impaired, limited, or restricted    CK  40% - 59% Impaired, limited, or restricted    CL  60% - 79% Impaired, limited, or restricted    CM  80% - 99% Impaired, limited, or restricted    CN  100% Impaired, limited, or restricted        TREATMENT      Education: instructed on general anatomy/physiology of urinary/bowel system and pelvic floor muscles; anatomy/physiology of pelvic floor and isometric abdominal exercises; discussed plan of care with patient and parent/guardian; instructed in benefits/risks of treatment; instructed in alternative methods of treatment; instructed in risks of refusing treatment; patient a parent agreed to treatment plan. Maria Ines demonstrated and verbalized understanding of all instruction and was provided with a handout of HEP (see Patient Instructions).    ASSESSMENT      This is a 32 y.o. female referred to outpatient physical therapy and presents with a medical diagnosis of urinary frequency, pelvic floor tension, and urge incontinence. Patient will benefit from skilled physical therapy to address PFM weakness, decreased endurance, decreased sensation, decreased  coordination, and abdominal scar hypomobility in order to decrease her incontinent episodes and increase her ability to perform ADL's comfortably to improve her quality of life.     Pt presented with decreased PFM strength, endurance, sensation, and coordination all contributing to her urinary incontinence. Pt also demonstrated altered sensation over lower abdominal scars with adhesions present. Pt with questionable effort during voluntary PFM contraction as no accessory muscle use was noted and no activity of PFMs. Will trial intravaginal sensor for increased tactile cueing with biofeedback vs e-stim at next visit. Pt's spiritual, cultural and educational needs considered, and pt agreeable to plan of care and goals as stated below.    Prognosis: good    Educational/Spiritual/Cultural needs: none  Barriers to Learning: decreased memory/cognition  Environmental Barriers: none noted  Abuse/Neglect: no signs  Nutritional Status: obese   Fall Risk: None    Medical necessity is demonstrated by the following IMPAIRMENTS/PROBLEMS     History  Co-morbidities and personal factors that may impact the plan of care Examination  Body Structures and Functions, activity limitations and participation restrictions that may impact the   plan of care   Clinical Presentation Decision Making/ Complexity Score   Co-morbidities:   high BMI, prior abdominal surgery, prior pelvic surgery, recurrent urinary infections and DM2, gastroparesis, HTN, hypothyroid, TVH, Pharyngoesophageal dysphagia and Gastroesophageal reflux disease without esophagitis    Personal Factors:   consumption of bladder irritants, education level and poor medication/medical compliance Body Regions/Systems/Functions:  poor knowledge of body mechanics and posture, adhered abdominal scar, decreased pelvic muscle strength, decreased endurance of the pelvic muscles, decreased phasic ability of the pelvic muscles, poor quality of pelvic muscle contraction, increased  frequency of urination, increased nocturia, poor coordination of pelvic floor muscles during ADL's leading to urinary or fecal leakage, poor fluid intake, incomplete urination, dysfunctional voiding and chronic UTI due to dysfunctional voiding     Activity Limitations:  cough/sneeze/laugh/yell without leakage, bear down for BM and put off urge      Participation Restrictions:  all ADLs/iADLs uninterrupted by frequency/urgency/incontinence and bathroom mapping     stable low       GOALS   Short Term Goals: 1 month  1. Pt will verbalize improved awareness of PFM activity as palpated by PT in order to improve activity involvement with HEP.  2. Pt will demonstrate appropriate diaphragmatic breathing technique to prevent adverse affects to adjacent structures.   3. Pt will tolerate HEP to improve impairments and independence with ADL's.    Long Term Goals: 3 months  1. Pt will report <44% on JUSTINE-6 to demonstrate a decrease in disability and improvement in independence with functional activities.   2. Pt will report improved ability to perform ADLs (ie. dressing, bathing, functional transfers) with little (drops) to no urinary leakage 4/7 days per week.  3. Pt will verbalize improved awareness of PFM activity as palpated by PT in order to improve activity involvement with HEP.  4. Pt will report improved ability to perform iADLs (ie. driving, home chores, grocery shopping) with little (drops) to no urinary leakage 4/7 days per week.   5. Pt will report improved ability to cough/sneeze/laugh/yell with little (drops) to no urinary leakage 4/7 days per week.   6. Pt will report improved ability to sleep uninterrupted by excessive nocturia 4/7 days per week.   7. Pt will report a decrease in pad use to 2 pads per day.  8. Pt will be independent with double voiding techniques 100% of the time to ensure full bladder emptying and decrease pt's risk of infection.   9. Pt/family will be independent with HEP for continued  self-management of symptoms.    PLAN    Frequency: 1 time(s) every 2 week(s)  Duration: 3 months    Physical therapy may include patient education, HEP, therapeutic exercises, neuromuscular re-education, therapeutic activity, gait training, manual therapy, and modalities PRN to achieve established goals.     Tesha Hayes, SPT    I certify that I was present in the room directing the student in service delivery and guiding them using my skilled judgment. As the co-signing therapist I have reviewed the students documentation and am responsible for the treatment, assessment, and plan.     Therapist: Teresa Cedeno, PT  10/30/2017     I agree with the above plan of care.  Julianna Gomez, BOBP-BC

## 2017-10-30 NOTE — PATIENT INSTRUCTIONS
ADDUCTION: HOOKLYING    Lie with hips and knees bent with feet flat on the floor. Place a rolled up towel, ball, or pillow between your knees and press your knees together so that you squeeze the object firmly. Do 50% of a full contraction.    Hold for 5 seconds. Repeat 10 times. Do 3 set per day.    ABDUCTION/EXTERNAL ROTATION: HOOKLYING    Lie down on your back with your knees bent and feet flat on the floor. Place an elastic band around your knees. Separate your knees, then bring them back together slowly.    Hold for 5 seconds. Repeat 10 times. Do 3 sets per day.       GLUTE SQUEEZE supine    Lie face up and squeeze your buttocks together. Do not hold your breath.     Hold for 5 seconds. Repeat 10 times. Do 3 sets per day.    WALL SQUAT    Leaning up against a wall with your feet about a foot away from the wall. Slide your body downward and then return back to upright position. Knees should bend in line with the 2nd toe (ie. don't become knocked-knee). Don't let your knees pass in front of your toes. Don't bend down so far that you cause any knee pain.     Repeat 10 times. Do 3 sets per day.      Tandem Stand     front of a chair, table or counter top for support. Then place your feet so they are heel-to-toe (one foot in front of the other). Maintain your balance in this position while trying not to hold on.   Switch your feet so the other foot is in front.    Hold for 45 seconds. Repeat 3 times with each foot in front (6 total).

## 2017-11-03 ENCOUNTER — TELEPHONE (OUTPATIENT)
Dept: UROGYNECOLOGY | Facility: CLINIC | Age: 32
End: 2017-11-03

## 2017-11-03 ENCOUNTER — TELEPHONE (OUTPATIENT)
Dept: OBSTETRICS AND GYNECOLOGY | Facility: CLINIC | Age: 32
End: 2017-11-03

## 2017-11-03 NOTE — TELEPHONE ENCOUNTER
Spoke with pt's mother regarding her daughter having an allergic reaction to her hormone patch that was prescribed by , Pt's stated she called our office because no one was calling her back, explain to office that  office had just tried to contact her and she needs to give them a call back before they leave for the day. She voiced understanding and the call was ended.

## 2017-11-03 NOTE — TELEPHONE ENCOUNTER
----- Message from Heron Vaughan sent at 11/3/2017  4:38 PM CDT -----  _X  1st Request  _  2nd Request  _  3rd Request        Who: Ruthie(Pt's mom)    Why: Requesting a call back in regards to her daughter's hormone patch. She states her daughter had an allergic reaction to her hormone patch. Please call to discuss.    What Number to Call Back:[phone]    When to Expect a call back: (Within 24 hours)    Please return the call at earliest convenience. Thanks!

## 2017-11-03 NOTE — TELEPHONE ENCOUNTER
----- Message from Tesha Carey sent at 11/3/2017  1:32 PM CDT -----  Contact: mom  Patient's mom states that this patient is breaking out from her patches. Patient originally had the patch in the abdominal area but moved it to the arm to see if it was site oriented. Mom states both spots the patch has been on has broken out in a rash. Patient can be reached at [phone].

## 2017-11-22 ENCOUNTER — OFFICE VISIT (OUTPATIENT)
Dept: UROGYNECOLOGY | Facility: CLINIC | Age: 32
End: 2017-11-22
Payer: MEDICAID

## 2017-11-22 VITALS
DIASTOLIC BLOOD PRESSURE: 80 MMHG | HEIGHT: 62 IN | SYSTOLIC BLOOD PRESSURE: 110 MMHG | WEIGHT: 215.63 LBS | BODY MASS INDEX: 39.68 KG/M2

## 2017-11-22 DIAGNOSIS — R35.1 NOCTURIA: ICD-10-CM

## 2017-11-22 DIAGNOSIS — N39.41 URGE INCONTINENCE: Primary | ICD-10-CM

## 2017-11-22 DIAGNOSIS — N32.81 OAB (OVERACTIVE BLADDER): ICD-10-CM

## 2017-11-22 PROCEDURE — 99215 OFFICE O/P EST HI 40 MIN: CPT | Mod: PBBFAC,25 | Performed by: OBSTETRICS & GYNECOLOGY

## 2017-11-22 PROCEDURE — 51701 INSERT BLADDER CATHETER: CPT | Mod: PBBFAC | Performed by: OBSTETRICS & GYNECOLOGY

## 2017-11-22 PROCEDURE — 99214 OFFICE O/P EST MOD 30 MIN: CPT | Mod: S$PBB,25,, | Performed by: OBSTETRICS & GYNECOLOGY

## 2017-11-22 PROCEDURE — 51701 INSERT BLADDER CATHETER: CPT | Mod: S$PBB,,, | Performed by: OBSTETRICS & GYNECOLOGY

## 2017-11-22 PROCEDURE — 99999 PR PBB SHADOW E&M-EST. PATIENT-LVL V: CPT | Mod: PBBFAC,,, | Performed by: OBSTETRICS & GYNECOLOGY

## 2017-11-22 RX ORDER — ESTRADIOL 0.1 MG/D
FILM, EXTENDED RELEASE TRANSDERMAL
Qty: 26 PATCH | Refills: 3 | Status: SHIPPED | OUTPATIENT
Start: 2017-11-22 | End: 2018-03-19 | Stop reason: SINTOL

## 2017-11-22 RX ORDER — LURASIDONE HYDROCHLORIDE 20 MG/1
TABLET, FILM COATED ORAL
Refills: 1 | COMMUNITY
Start: 2017-11-08 | End: 2018-04-02

## 2017-11-22 NOTE — PROGRESS NOTES
Maria Ines Ac is a 32 y.o.  here for  follow up.  She has a history of retropubic sling/ cystourethroscopy for DILIP 1/2017. She has persistent urinary urgency which has not improved since starting pelvic floor PT. She is going to PT twice a week. Doing exercises at home as well.      Interval  HP since the last visit: (updates in bold)  Started oxybutynin xl 10 mg--start myrbetriq 50 mg once filled. She is tolerating the medication well, but it is not improving symptoms at all.   1)  UI:  (+) JACINDA rarely  (+) UUI  Several times da day. (+) pads: 4/ day  Daytime frequency: Q 1 hours.  Nocturia: 3/night    (+) dysuria (burning at beginning)  (--) hematuria,  (--) frequent UTIs.  not complete bladder emptying. No abdominal pain   2)  POP:  Absent    Symptoms:(--)  .  (--) vaginal bleeding. (+) vaginal discharge. (-) sexually active.  (--) dyspareunia.   (--)  Vaginal dryness.  (+) vaginal estrogen use. Also using Shazia's Butt Pste. Dryness improved.  3)  BM:  (-) constipation/straining.  (+) chronic diarrhea, 4 loose stools per day. (--) hematochezia.  (--) fecal incontinence.  (--) fecal smearing/urgency.  (--) incomplete evacuation.    4) pelvic pain improved with the vaginal suppositories, pain in groin region now resolved   Now having 4 coke zeros a day (was 11 regular cokes). 8 glasses of water each day.   Has not identified dietary triggers with her diary.            Past Medical History:   Diagnosis Date    Blood in stool      Constipation      Depression      Diabetes mellitus, type 2      Dysphagia      GERD (gastroesophageal reflux disease)      Hypertension      Palpitations      Premature menopause on hormone replacement therapy      Thyroid disease                 Past Surgical History:   Procedure Laterality Date    BLADDER SUSPENSION        CARPAL TUNNEL RELEASE         right    COLONOSCOPY N/A 8/30/2016     Procedure: COLONOSCOPY; Surgeon: Slick Herron MD; Location: [facility]  (4TH F[facility]); Service: Endoscopy; Laterality: N/A; PM prep    GALLBLADDER SURGERY        HYSTERECTOMY   2013     Bess velasquez Dr. [facility]amplain    OOPHORECTOMY   2005     [facility]- benign cyst    OOPHORECTOMY   2013     [facility]- endo    ooptherectomy        right knee   scoped    SHOULDER SURGERY   right         Current Outpatient Prescriptions   Medication Sig    ACCU-CHEK AMAURY PLUS TEST STRP Strp USE TO TEST BLOOD GLUCOSE TID    ACCU-CHEK SOFTCLIX LANCETS Misc USE TO TEST BLOOD GLUCOSE TID    amoxicillin-clavulanate 875-125mg (AUGMENTIN) 875-125 mg per tablet Take 1 tablet by mouth every 12 (twelve) hours.    atorvastatin (LIPITOR) 40 MG tablet Take 40 mg by mouth once daily.    BLOOD SUGAR DIAGNOSTIC (ACCU-CHEK AMAURY PLUS TEST STRP MISC) 1 strip by Misc.(Non-Drug; Combo Route) route 3 (three) times daily.    calcium-vitamin D3 500 mg(1,250mg) -200 unit per tablet Take 1 tablet by mouth 2 (two) times daily with meals.    cephALEXin (KEFLEX) 500 MG capsule TK ONE C PO  BID    conjugated estrogens (PREMARIN) vaginal cream Use 0.5 gram of estrogen cream in vagina nightly x 2 weeks, then twice a week thereafter.    dicyclomine (BENTYL) 10 MG capsule Take 2 capsules (20 mg total) by mouth 3 (three) times daily.    docusate sodium (COLACE) 100 MG capsule Take 100 mg by mouth 2 (two) times daily.    dulaglutide (TRULICITY) 0.75 mg/0.5 mL PnIj Inject 0.75 mg into the skin every 7 days.    estradiol (VIVELLE-DOT) 0.1 mg/24 hr PTSW APPLY 1 PATCH EXTERNALLY TO THE SKIN 2 TIMES A WEEK    FARXIGA 10 mg Tab Take 1 tablet by mouth once daily.     fish oil-omega-3 fatty acids 300-1,000 mg capsule Take 2 g by mouth once daily.    GLUCOPHAGE 500 mg tablet Take 500 mg by mouth 2 (two) times daily with meals.     hydrocodone-acetaminophen 10-325mg (NORCO)  mg Tab TK 1 T PO Q 4 TO 6 H PRN P    LATUDA 20 mg Tab tablet TK 1 T PO D    LATUDA 80 mg Tab tablet TK 1 T PO D    levothyroxine (SYNTHROID) 50 MCG tablet  "TK 1 T PO QAM    metoprolol succinate (TOPROL-XL) 25 MG 24 hr tablet Take 25 mg by mouth once daily.     MULTIVIT-IRON-MIN-FOLIC ACID 3,500-18-0.4 UNIT-MG-MG ORAL CHEW Take 1 tablet by mouth once daily.     nystatin (MYCOSTATIN) powder Apply topically 2 (two) times daily.    nystatin-triamcinolone (MYCOLOG II) cream Apply topically 2 (two) times daily.    OMEPRAZOLE (PRILOSEC ORAL) Take by mouth.    ondansetron (ZOFRAN-ODT) 4 MG TbDL DISSOLVE 1 T PO Q 8 H PRN N    oxybutynin (DITROPAN-XL) 10 MG 24 hr tablet TAKE 1 TABLET BY MOUTH EVERY DAY    pantoprazole (PROTONIX) 40 MG tablet Take 1 tablet (40 mg total) by mouth once daily.    phentermine (ADIPEX-P) 37.5 mg tablet Take 37.5 mg by mouth before breakfast.    promethazine (PHENERGAN) 25 MG tablet TK 1 T PO  Q 4 TO 6 H PRN P    spironolactone (ALDACTONE) 25 MG tablet TK 1 T PO HS    terconazole (TERAZOL 3) 0.8 % vaginal cream Place 1 applicator vaginally once daily.    topiramate (TOPAMAX) 50 MG tablet Take 2 tablets (100 mg total) by mouth once daily.    ZOLOFT 100 mg tablet Take 200 mg by mouth once daily.      No current facility-administered medications for this visit.         Exam  Vitals:    11/22/17 1423   BP: 110/80   BP Location: Right arm   Patient Position: Sitting   BP Method: Large (Manual)   Weight: 97.8 kg (215 lb 9.8 oz)   Height: 5' 2" (1.575 m)       General: alert and oriented, no acute distress  Respiratory: normal respiratory effort  Abd: soft, non-distended.  ND/NT     Pelvic:  PVR -60 mL  External genitalia normal vulvar irritation- resolved   No prolapse   + atrophy         Impression  1. Urge incontinence     2. Nocturia     3. OAB (overactive bladder)         We reviewed the above issues and discussed options for short-term versus long-term management of her problems.     Plan:     1. Shazia's Butt Paste daily to vulva.  3. Vaginal atrophy:  Estrogen cream 0.5 g twice weekly  4. Only have coke zero with meds at meal time-- " limit 3/day  5. Continue oxybutynin xl 10 mg and myrbetriq 50 mg once filled (we will try to get a prior authorization). Discussed long term options for controlling urinary urge: PTNS vs Botox vs SNS. Information pamphlets given.   6. Limit water to 5 glasses/day  7. Continue pelvic floor pt with Gabby Cedeno ([facility]  (p) [phone].   8. Vaginal valium suppository 1-2 times daily as needed.  9. Continue glucose A1C- 5.3  10. RTC 01/2018     30 minutes were spent in face to face time with this patient  75 % of this time was spent in counseling and/or coordination of care

## 2017-11-27 ENCOUNTER — CLINICAL SUPPORT (OUTPATIENT)
Dept: REHABILITATION | Facility: HOSPITAL | Age: 32
End: 2017-11-27
Payer: MEDICAID

## 2017-11-27 DIAGNOSIS — N81.89 PELVIC FLOOR WEAKNESS: ICD-10-CM

## 2017-11-27 DIAGNOSIS — N39.46 MIXED STRESS AND URGE URINARY INCONTINENCE: ICD-10-CM

## 2017-11-27 DIAGNOSIS — R27.8 DECREASED COORDINATION: ICD-10-CM

## 2017-11-27 DIAGNOSIS — M79.18 MYOFASCIAL PAIN: ICD-10-CM

## 2017-11-27 DIAGNOSIS — M62.89 PELVIC FLOOR DYSFUNCTION: ICD-10-CM

## 2017-11-27 PROCEDURE — 97110 THERAPEUTIC EXERCISES: CPT | Mod: PO | Performed by: PHYSICAL THERAPIST

## 2017-11-27 NOTE — PROGRESS NOTES
"  OUTPATIENT PHYSICAL THERAPY TREATMENT NOTE     Patient: [facility]   [facility] #:  [id number]    Diagnosis:   Encounter Diagnoses   Name Primary?    Mixed stress and urge urinary incontinence     Pelvic floor weakness     Decreased coordination     Pelvic floor dysfunction     Myofascial pain         Initial visit: 10/30/17  Date of treatment: 11/27/2017     Time in: 2:00  Time out: 2:45  Billable time: 45 min  # Visits: 2/7  Auth: 8 (12/31/2017)  POC expiration: 1/30/2017    SUBJECTIVE     Pt reports no change with symptoms. She is still having incontinent episodes on a daily basis. She reports being compliant with her HEP daily.    Pain: 0/10 on TODD   Pain location:    OBJECTIVE     TREATMENT  Therapeutic Exercise: to develop multiple selection list strength, endurance, ROM, flexibility and core stabilization for 45 minutes including:     Kegels with SEMG assist. Pt positioned in hooklyng with intravaginal probe placed by PT. Pt performed 5"W/10"R in hooklying x 2 and supine x 1. Pt demonstrated elevated resting baseline. Pt demonstrated a relaxation of the PF with verbal cues to "work" or "squeeze" and then returned to elevated baseline with verbal cueing to "rest' or "relax." Pt demonstrates no PFM coordination or active control.    Supine clam shells x 10  Glut Squeeze x 5  Wall squat x 5  Tandem stance x 20 sec B    Education: Discussed progression of plan of care with patient; educated pt in activity modification; reviewed HEP with pt. Pt demonstrated and verbalized understanding of all instruction and was not provided with a handout of HEP (see Patient Instructions).     Pt was given the soiled internal vaginal sensor, used in today's therapy visit, in a plastic biohazard bag. Instructed pt in directions to clean the device at home:  Remove device from plastic bag, and discard the plastic bag. Wash the device thoroughly with antibacterial soap and water. Let air dry whenever possible. Store in a " dry location.   Continue to bring the device to subsequent therapy visits unless otherwise instructed. Pt verbalized understanding of all instruction.     ASSESSMENT      Pt tolerated entire treatment well with no visible adverse effects. Pt continues to demonstrate poor PFM control with inability to follow verbal cues to squeeze or relax her PFM even with sensory feedback provided by internal vaginal probe. Pt was able to demonstrate 75% of HEP independently.    Potential small abrasion at 5 o'clock in vestibule however difficult to discern from tissue erythema vs. abrasion from internal vaginal sensor guard. Will monitor.    Will the patient continue to benefit from skilled PT intervention? Yes  Progress towards goals: fair    PLAN     Continue with established Plan of Care, working toward established PT goals.     Tesha Hayes, SPT  I certify that I was present in the room directing the student in service delivery and guiding them using my skilled judgment. As the co-signing therapist I have reviewed the students documentation and am responsible for the treatment, assessment, and plan.   Teresa Cedeno, PT

## 2017-12-07 DIAGNOSIS — R10.84 GENERALIZED ABDOMINAL PAIN: ICD-10-CM

## 2017-12-07 DIAGNOSIS — K58.0 IRRITABLE BOWEL SYNDROME WITH DIARRHEA: ICD-10-CM

## 2017-12-07 RX ORDER — DICYCLOMINE HYDROCHLORIDE 10 MG/1
CAPSULE ORAL
Qty: 180 CAPSULE | Refills: 0 | Status: SHIPPED | OUTPATIENT
Start: 2017-12-07 | End: 2018-01-03 | Stop reason: SDUPTHER

## 2017-12-12 ENCOUNTER — OFFICE VISIT (OUTPATIENT)
Dept: INTERNAL MEDICINE | Facility: CLINIC | Age: 32
End: 2017-12-12
Payer: MEDICAID

## 2017-12-12 VITALS
WEIGHT: 212.31 LBS | HEART RATE: 92 BPM | BODY MASS INDEX: 39.07 KG/M2 | OXYGEN SATURATION: 96 % | HEIGHT: 62 IN | DIASTOLIC BLOOD PRESSURE: 74 MMHG | SYSTOLIC BLOOD PRESSURE: 110 MMHG

## 2017-12-12 DIAGNOSIS — J06.9 VIRAL URI WITH COUGH: Primary | ICD-10-CM

## 2017-12-12 PROCEDURE — 99213 OFFICE O/P EST LOW 20 MIN: CPT | Mod: S$PBB,,, | Performed by: STUDENT IN AN ORGANIZED HEALTH CARE EDUCATION/TRAINING PROGRAM

## 2017-12-12 PROCEDURE — 99999 PR PBB SHADOW E&M-EST. PATIENT-LVL V: CPT | Mod: PBBFAC,,, | Performed by: STUDENT IN AN ORGANIZED HEALTH CARE EDUCATION/TRAINING PROGRAM

## 2017-12-12 PROCEDURE — 99215 OFFICE O/P EST HI 40 MIN: CPT | Mod: PBBFAC | Performed by: STUDENT IN AN ORGANIZED HEALTH CARE EDUCATION/TRAINING PROGRAM

## 2017-12-12 RX ORDER — OXYBUTYNIN CHLORIDE 15 MG/1
TABLET, EXTENDED RELEASE ORAL
Refills: 3 | COMMUNITY
Start: 2017-11-22 | End: 2018-01-15 | Stop reason: CLARIF

## 2017-12-12 NOTE — PROGRESS NOTES
Subjective:       Patient ID: Maria Ines Ac is a 32 y.o. female.    Chief Complaint: Cough    HPI: Patient is 31 yo female with HTN, HLD, hypothyroidism, migraines and bipolar disorder. She is here with 3 day history of URI like symptoms of congestion, rhinorrhea, productive cough of green sputum, HA, sore throat as well as diarrhea. Denies fever, myalgia, arthralgia, n/v, ear fullness. Her mother has similar symptoms. She otherwise has no complaints.     Review of Systems   Constitutional: Positive for chills. Negative for fatigue and fever.   HENT: Positive for rhinorrhea and sore throat. Negative for ear pain, postnasal drip, sneezing and tinnitus.    Eyes: Negative for photophobia and visual disturbance.   Respiratory: Positive for cough. Negative for chest tightness and shortness of breath.    Cardiovascular: Negative for chest pain, palpitations and leg swelling.   Gastrointestinal: Negative for abdominal pain, blood in stool, nausea and vomiting.   Genitourinary: Negative for dysuria, frequency and urgency.   Musculoskeletal: Negative for arthralgias and myalgias.   Skin: Negative for pallor and rash.   Neurological: Negative for dizziness, weakness and headaches.       Objective:       Vitals:    12/12/17 1439   BP: 110/74   Pulse: 92     Past Medical History:   Diagnosis Date    Blood in stool     Constipation     Depression     Diabetes mellitus, type 2     Dysphagia     GERD (gastroesophageal reflux disease)     Hypertension     Palpitations     Premature menopause on hormone replacement therapy     Thyroid disease      Past Surgical History:   Procedure Laterality Date    BLADDER SUSPENSION      CARPAL TUNNEL RELEASE      right    CHOLECYSTECTOMY      COLONOSCOPY N/A 8/30/2016    Procedure: COLONOSCOPY;  Surgeon: Slick Herron MD;  Location: [facility] ([address] [place] [address]);  Service: Endoscopy;  Laterality: N/A;  PM prep    GALLBLADDER SURGERY      HYSTERECTOMY  2013    Bess velasquez,  Dr. Ramirez    OOPHORECTOMY  2005    [facility]- benign cyst    OOPHORECTOMY  2013    [facility]- endo    ooptherectomy      right knee  scoped    SHOULDER SURGERY  right       Physical Exam   Constitutional: She is oriented to person, place, and time. She appears well-developed and well-nourished. No distress.   HENT:   Head: Normocephalic and atraumatic.   Right Ear: External ear normal.   Left Ear: External ear normal.   Nose: Nose normal.   Mouth/Throat: Oropharynx is clear and moist.   Eyes: EOM are normal. Pupils are equal, round, and reactive to light.   Neck: Normal range of motion. Neck supple. No JVD present.   Cardiovascular: Normal rate, regular rhythm and normal heart sounds.    No murmur heard.  Pulmonary/Chest: Effort normal and breath sounds normal. No respiratory distress. She has no wheezes. She has no rales.   Abdominal: Soft. Bowel sounds are normal.   Musculoskeletal: Normal range of motion. She exhibits no edema or deformity.   Neurological: She is alert and oriented to person, place, and time. She has normal reflexes. No cranial nerve deficit.   Skin: Skin is warm and dry. Capillary refill takes less than 2 seconds. No erythema.       Assessment:       1. Viral URI     Plan:       Symptoms most likely secondary to viral etiologies. Reasses if patient symptoms get worse/does not get better in 1-2 weeks.     1. Saline nasal spray (Oceans) or nasal rinse (NeilMed) at least 2-3 times/day  2. Flonase (fluticasone) or other steroid nasal spray - twice a day for 1 week, then once a day thereafter  3. Claritin (loratadine), Zyrtec (cetirizine), or Allegra (fexofenadine) once a day  4. Mucinex (guaifenesin) every 8-12 hours with plenty of water or Delsym for cough.   5. Hot soup, hot tea with honey and lemon.  6. Plenty of sleep!

## 2017-12-12 NOTE — PATIENT INSTRUCTIONS
For your symptoms (sinusitis / cold, congestion, cough):  1. Saline nasal spray (Oceans) or nasal rinse (NeilMed) at least 2-3 times/day  2. Flonase (fluticasone) or other steroid nasal spray - twice a day for 1 week, then once a day thereafter  3. Claritin (loratadine), Zyrtec (cetirizine), or Allegra (fexofenadine) once a day  4. Mucinex (guaifenesin) every 8-12 hours with plenty of water. If you are having a cough, you can use Mucinex-DM (guaifenesin-dextromethorphan).   5. Hot soup, hot tea with honey and lemon.  6. Plenty of sleep!    You can also take Delsym can also help for cough.     Do not take the Afrin nasal spray. It can increase your blood pressure.   If your symptoms get worse such fever, increased productive cough, chills, sob you may go to the ED or come back to primary care.

## 2018-01-03 DIAGNOSIS — R10.84 GENERALIZED ABDOMINAL PAIN: ICD-10-CM

## 2018-01-03 DIAGNOSIS — K58.0 IRRITABLE BOWEL SYNDROME WITH DIARRHEA: ICD-10-CM

## 2018-01-03 RX ORDER — DICYCLOMINE HYDROCHLORIDE 10 MG/1
CAPSULE ORAL
Qty: 180 CAPSULE | Refills: 5 | Status: SHIPPED | OUTPATIENT
Start: 2018-01-03 | End: 2018-06-28 | Stop reason: SDUPTHER

## 2018-01-08 DIAGNOSIS — Z13.5 DIABETIC RETINOPATHY SCREENING: ICD-10-CM

## 2018-01-15 ENCOUNTER — TELEPHONE (OUTPATIENT)
Dept: UROGYNECOLOGY | Facility: CLINIC | Age: 33
End: 2018-01-15

## 2018-01-15 ENCOUNTER — OFFICE VISIT (OUTPATIENT)
Dept: UROGYNECOLOGY | Facility: CLINIC | Age: 33
End: 2018-01-15
Payer: MEDICAID

## 2018-01-15 VITALS
HEIGHT: 62 IN | SYSTOLIC BLOOD PRESSURE: 100 MMHG | BODY MASS INDEX: 39.23 KG/M2 | WEIGHT: 213.19 LBS | DIASTOLIC BLOOD PRESSURE: 70 MMHG

## 2018-01-15 DIAGNOSIS — N39.41 URGE INCONTINENCE: ICD-10-CM

## 2018-01-15 DIAGNOSIS — N30.00 ACUTE CYSTITIS WITHOUT HEMATURIA: ICD-10-CM

## 2018-01-15 DIAGNOSIS — R39.15 URINARY URGENCY: Primary | ICD-10-CM

## 2018-01-15 LAB
BILIRUB SERPL-MCNC: ABNORMAL MG/DL
BLOOD URINE, POC: ABNORMAL
COLOR, POC UA: ABNORMAL
GLUCOSE UR QL STRIP: 1000
KETONES UR QL STRIP: ABNORMAL
LEUKOCYTE ESTERASE URINE, POC: ABNORMAL
NITRITE, POC UA: ABNORMAL
PH, POC UA: 5
PROTEIN, POC: ABNORMAL
SPECIFIC GRAVITY, POC UA: 1.01
UROBILINOGEN, POC UA: ABNORMAL

## 2018-01-15 PROCEDURE — 99215 OFFICE O/P EST HI 40 MIN: CPT | Mod: PBBFAC | Performed by: NURSE PRACTITIONER

## 2018-01-15 PROCEDURE — 87186 SC STD MICRODIL/AGAR DIL: CPT

## 2018-01-15 PROCEDURE — 81002 URINALYSIS NONAUTO W/O SCOPE: CPT | Mod: PBBFAC | Performed by: NURSE PRACTITIONER

## 2018-01-15 PROCEDURE — 87086 URINE CULTURE/COLONY COUNT: CPT

## 2018-01-15 PROCEDURE — 99999 PR PBB SHADOW E&M-EST. PATIENT-LVL V: CPT | Mod: PBBFAC,,, | Performed by: NURSE PRACTITIONER

## 2018-01-15 PROCEDURE — 87077 CULTURE AEROBIC IDENTIFY: CPT

## 2018-01-15 PROCEDURE — 99213 OFFICE O/P EST LOW 20 MIN: CPT | Mod: S$PBB,,, | Performed by: NURSE PRACTITIONER

## 2018-01-15 PROCEDURE — 87088 URINE BACTERIA CULTURE: CPT

## 2018-01-15 RX ORDER — SULFAMETHOXAZOLE AND TRIMETHOPRIM 800; 160 MG/1; MG/1
1 TABLET ORAL 2 TIMES DAILY
Qty: 6 TABLET | Refills: 0 | Status: SHIPPED | OUTPATIENT
Start: 2018-01-15 | End: 2018-01-29 | Stop reason: ALTCHOICE

## 2018-01-15 RX ORDER — TOLTERODINE 4 MG/1
4 CAPSULE, EXTENDED RELEASE ORAL DAILY
Qty: 30 CAPSULE | Refills: 11 | Status: SHIPPED | OUTPATIENT
Start: 2018-01-15 | End: 2018-01-29 | Stop reason: SDUPTHER

## 2018-01-15 NOTE — PATIENT INSTRUCTIONS
1. Shazia's Butt Paste daily to vulva.  3. Vaginal atrophy:  Estrogen cream 0.5 g twice weekly  4. Only have coke zero with meds at meal time-- limit 3/day  5. Stop oxybutynin. Start tolterodine 4 mg daily. Discussed long term options for controlling urinary urge: PTNS vs Botox vs SNS. Information pamphlets given.   6. Limit water to 3 bottles  7. Continue pelvic floor pt with Gabby Cedeno or Kala Carmichael ([facility]).  (p) [phone]. Please call to schedule  8. Vaginal valium suppository 1-2 times daily as needed.  9. Continue glucose A1C- 5.3  10. RTC 3 weeks

## 2018-01-15 NOTE — PROGRESS NOTES
Maria Ines Ac is a 32 y.o.  here for  follow up.  She has a history of retropubic sling/ cystourethroscopy for DILIP 1/2017. She has persistent urinary urgency which has not improved since starting pelvic floor PT. She is going to PT twice a week. Doing exercises at home as well.      Interval  HP since the last visit: (updates in bold)  Taking oxybutynin xl 10 mg and myrbetriq 50 mg per patient report-- spoke with pharmacy-- myrbetriq not filled  Needs to try tolterodine first. She is tolerating the medication well, but it is not improving symptoms at all. Symptoms worsened last week (nitrate positive)  1)  UI:  (+) JACINDA rarely  (+) UUI  Several times day. (+) pads: 4/ day  Daytime frequency: Q 1 hours.  Nocturia: 3/night    (+) dysuria (burning at beginning)  (--) hematuria,  (--) frequent UTIs.  not complete bladder emptying. No abdominal pain   2)  POP:  Absent    Symptoms:(--)  .  (--) vaginal bleeding. (+) vaginal discharge. (-) sexually active.  (--) dyspareunia.   (--)  Vaginal dryness.  (+) vaginal estrogen use. Also using Shazia's Butt Pste. Dryness improved.  3)  BM:  (-) constipation/straining.  (+) chronic diarrhea, 4 loose stools per day. (--) hematochezia.  (--) fecal incontinence.  (--) fecal smearing/urgency.  (--) incomplete evacuation.    4) pelvic pain improved with the vaginal suppositories, pain in groin region now resolved   Now having 4 coke zeros a day (was 11 regular cokes). 8 glasses of water each day.               Past Medical History:   Diagnosis Date    Blood in stool      Constipation      Depression      Diabetes mellitus, type 2      Dysphagia      GERD (gastroesophageal reflux disease)      Hypertension      Palpitations      Premature menopause on hormone replacement therapy      Thyroid disease                 Past Surgical History:   Procedure Laterality Date    BLADDER SUSPENSION        CARPAL TUNNEL RELEASE         right    COLONOSCOPY N/A 8/30/2016      Procedure: COLONOSCOPY; Surgeon: Slick Herron MD; Location: [facility] ([address] [place] [address]); Service: Endoscopy; Laterality: N/A; PM prep    GALLBLADDER SURGERY        HYSTERECTOMY   2013     Bess velasquez Dr. Champlain    OOPHORECTOMY   2005     [facility]- benign cyst    OOPHORECTOMY   2013     [facility]- endo    ooptherectomy        right knee   scoped    SHOULDER SURGERY   right         Current Outpatient Prescriptions   Medication Sig    ACCU-CHEK AMAURY PLUS TEST STRP Strp USE TO TEST BLOOD GLUCOSE TID    ACCU-CHEK SOFTCLIX LANCETS Misc USE TO TEST BLOOD GLUCOSE TID    amoxicillin-clavulanate 875-125mg (AUGMENTIN) 875-125 mg per tablet Take 1 tablet by mouth every 12 (twelve) hours.    atorvastatin (LIPITOR) 40 MG tablet Take 40 mg by mouth once daily.    BLOOD SUGAR DIAGNOSTIC (ACCU-CHEK AMAURY PLUS TEST STRP MISC) 1 strip by Misc.(Non-Drug; Combo Route) route 3 (three) times daily.    calcium-vitamin D3 500 mg(1,250mg) -200 unit per tablet Take 1 tablet by mouth 2 (two) times daily with meals.    cephALEXin (KEFLEX) 500 MG capsule TK ONE C PO  BID    conjugated estrogens (PREMARIN) vaginal cream Use 0.5 gram of estrogen cream in vagina nightly x 2 weeks, then twice a week thereafter.    dicyclomine (BENTYL) 10 MG capsule TAKE 2 CAPSULES(20 MG) BY MOUTH THREE TIMES DAILY BEFORE MEALS    docusate sodium (COLACE) 100 MG capsule Take 100 mg by mouth 2 (two) times daily.    dulaglutide (TRULICITY) 0.75 mg/0.5 mL PnIj Inject 0.75 mg into the skin every 7 days.    estradiol (VIVELLE-DOT) 0.1 mg/24 hr PTSW APPLY 1 PATCH EXTERNALLY TO THE SKIN 2 TIMES A WEEK    FARXIGA 10 mg Tab Take 1 tablet by mouth once daily.     fish oil-omega-3 fatty acids 300-1,000 mg capsule Take 2 g by mouth once daily.    GLUCOPHAGE 500 mg tablet Take 500 mg by mouth 2 (two) times daily with meals.     hydrocodone-acetaminophen 10-325mg (NORCO)  mg Tab TK 1 T PO Q 4 TO 6 H PRN P    LATUDA 20 mg Tab tablet TK 1 T PO D    LATUDA  "80 mg Tab tablet TK 1 T PO D    levothyroxine (SYNTHROID) 50 MCG tablet TK 1 T PO QAM    metoprolol succinate (TOPROL-XL) 25 MG 24 hr tablet Take 25 mg by mouth once daily.     MULTIVIT-IRON-MIN-FOLIC ACID 3,500-18-0.4 UNIT-MG-MG ORAL CHEW Take 1 tablet by mouth once daily.     nystatin (MYCOSTATIN) powder Apply topically 2 (two) times daily.    nystatin-triamcinolone (MYCOLOG II) cream Apply topically 2 (two) times daily.    OMEPRAZOLE (PRILOSEC ORAL) Take by mouth.    ondansetron (ZOFRAN-ODT) 4 MG TbDL DISSOLVE 1 T PO Q 8 H PRN N    pantoprazole (PROTONIX) 40 MG tablet Take 1 tablet (40 mg total) by mouth once daily.    phentermine (ADIPEX-P) 37.5 mg tablet Take 37.5 mg by mouth before breakfast.    promethazine (PHENERGAN) 25 MG tablet TK 1 T PO  Q 4 TO 6 H PRN P    spironolactone (ALDACTONE) 25 MG tablet TK 1 T PO HS    terconazole (TERAZOL 3) 0.8 % vaginal cream Place 1 applicator vaginally once daily.    topiramate (TOPAMAX) 50 MG tablet Take 2 tablets (100 mg total) by mouth once daily.    ZOLOFT 100 mg tablet Take 200 mg by mouth once daily.     sulfamethoxazole-trimethoprim 800-160mg (BACTRIM DS) 800-160 mg Tab Take 1 tablet by mouth 2 (two) times daily.    tolterodine (DETROL LA) 4 MG 24 hr capsule Take 1 capsule (4 mg total) by mouth once daily.     No current facility-administered medications for this visit.         Exam  Vitals:    01/15/18 1324   BP: 100/70   BP Location: Right arm   Patient Position: Sitting   Weight: 96.7 kg (213 lb 3 oz)   Height: 5' 2" (1.575 m)       General: alert and oriented, no acute distress  Respiratory: normal respiratory effort  Abd: soft, non-distended.  ND/NT     Pelvic:deferred         Impression  1. Urinary urgency  POCT URINE DIPSTICK WITHOUT MICROSCOPE   2. Acute cystitis without hematuria  Urine culture    sulfamethoxazole-trimethoprim 800-160mg (BACTRIM DS) 800-160 mg Tab   3. Urge incontinence  tolterodine (DETROL LA) 4 MG 24 hr capsule       We " reviewed the above issues and discussed options for short-term versus long-term management of her problems.     Plan:     1. Shazia's Butt Paste daily to vulva.  3. Vaginal atrophy:  Estrogen cream 0.5 g twice weekly  4. Only have coke zero with meds at meal time-- limit 3/day  5. Stop oxybutynin. Start tolterodine 4 mg daily. Discussed long term options for controlling urinary urge: PTNS vs Botox vs SNS. Information pamphlets given.   6. Limit water to 3 bottles  7. Continue pelvic floor pt with Gabby Cedeno or Kala Carmichael ([facility]).  (p) [phone]. Please call to schedule  8. Vaginal valium suppository 1-2 times daily as needed.  9. Continue glucose A1C- 5.3  10. RTC 3 weeks    30 minutes were spent in face to face time with this patient  75 % of this time was spent in counseling and/or coordination of care

## 2018-01-16 NOTE — TELEPHONE ENCOUNTER
Called pt;s mother to informed her a new prescription was sent to her daughters pharmacy to replace mirabegron, no answer left message to contact the office.

## 2018-01-18 ENCOUNTER — OFFICE VISIT (OUTPATIENT)
Dept: INTERNAL MEDICINE | Facility: CLINIC | Age: 33
End: 2018-01-18
Payer: MEDICAID

## 2018-01-18 DIAGNOSIS — E03.9 HYPOTHYROIDISM, UNSPECIFIED TYPE: ICD-10-CM

## 2018-01-18 DIAGNOSIS — I10 HYPERTENSION, UNSPECIFIED TYPE: ICD-10-CM

## 2018-01-18 DIAGNOSIS — E11.9 TYPE 2 DIABETES MELLITUS WITHOUT COMPLICATION, WITHOUT LONG-TERM CURRENT USE OF INSULIN: Primary | ICD-10-CM

## 2018-01-18 LAB — BACTERIA UR CULT: NORMAL

## 2018-01-18 PROCEDURE — 99215 OFFICE O/P EST HI 40 MIN: CPT | Mod: PBBFAC | Performed by: INTERNAL MEDICINE

## 2018-01-18 PROCEDURE — 99214 OFFICE O/P EST MOD 30 MIN: CPT | Mod: S$PBB,,, | Performed by: INTERNAL MEDICINE

## 2018-01-18 PROCEDURE — 99999 PR PBB SHADOW E&M-EST. PATIENT-LVL V: CPT | Mod: PBBFAC,,, | Performed by: INTERNAL MEDICINE

## 2018-01-21 VITALS
TEMPERATURE: 98 F | SYSTOLIC BLOOD PRESSURE: 110 MMHG | OXYGEN SATURATION: 96 % | DIASTOLIC BLOOD PRESSURE: 72 MMHG | BODY MASS INDEX: 38.83 KG/M2 | WEIGHT: 211 LBS | HEART RATE: 95 BPM | HEIGHT: 62 IN

## 2018-01-21 NOTE — PROGRESS NOTES
Subjective:       Patient ID: Maria Ines Ac is a 33 y.o. female.    Chief Complaint: Hypertension    HPI  She returns for management of hypertension.  She has had hypertension for over a year.  Current treatment has included medications outlined in medication list.  She denies chest pain or shortness of breath.  No palpitations.  Denies left arm or neck pain.  She has diabetes.  Denies polyuria, polydipsia    Past medical history: Hypertension, hyperlipidemia, diabetes, hypothyroidism, bipolar disorder, migraine headaches, status post hysterectomy      Medications: Synthroid 0.05 mg daily, metformin, Toprol-XL,Lipitor 40 mg daily,farxiga,topamax, trulicity,latuda      NO KNOWN DRUG ALLERGIES        Review of Systems   Constitutional: Negative for chills, fatigue, fever and unexpected weight change.   Respiratory: Negative for chest tightness and shortness of breath.    Cardiovascular: Negative for chest pain and palpitations.   Gastrointestinal: Negative for abdominal pain and blood in stool.   Neurological: Negative for dizziness, syncope, numbness and headaches.       Objective:      Physical Exam   HENT:   Right Ear: External ear normal.   Left Ear: External ear normal.   Nose: Nose normal.   Mouth/Throat: Oropharynx is clear and moist.   Eyes: Pupils are equal, round, and reactive to light.   Neck: Normal range of motion.   Cardiovascular: Normal rate and regular rhythm.    No murmur heard.  Pulmonary/Chest: Breath sounds normal.   Abdominal: She exhibits no distension. There is no hepatosplenomegaly. There is no tenderness.   Lymphadenopathy:     She has no cervical adenopathy.     She has no axillary adenopathy.   Neurological: She has normal strength and normal reflexes. No cranial nerve deficit or sensory deficit.       Assessment/Plan       Assessment and plan: 1.  Hypertension: Check CMP, lipid panel, CBC  2.  Diabetes: Check glycosylated hemoglobin and TSH.  She reports having her eye exam within  the past year

## 2018-01-22 ENCOUNTER — LAB VISIT (OUTPATIENT)
Dept: LAB | Facility: HOSPITAL | Age: 33
End: 2018-01-22
Attending: INTERNAL MEDICINE
Payer: MEDICAID

## 2018-01-22 DIAGNOSIS — E11.9 TYPE 2 DIABETES MELLITUS WITHOUT COMPLICATION, WITHOUT LONG-TERM CURRENT USE OF INSULIN: ICD-10-CM

## 2018-01-22 DIAGNOSIS — E03.9 HYPOTHYROIDISM, UNSPECIFIED TYPE: ICD-10-CM

## 2018-01-22 LAB
ALBUMIN SERPL BCP-MCNC: 4.2 G/DL
ALP SERPL-CCNC: 55 U/L
ALT SERPL W/O P-5'-P-CCNC: 17 U/L
ANION GAP SERPL CALC-SCNC: 9 MMOL/L
AST SERPL-CCNC: 16 U/L
BASOPHILS # BLD AUTO: 0.04 K/UL
BASOPHILS NFR BLD: 0.5 %
BILIRUB SERPL-MCNC: 0.3 MG/DL
BUN SERPL-MCNC: 10 MG/DL
CALCIUM SERPL-MCNC: 10.3 MG/DL
CHLORIDE SERPL-SCNC: 107 MMOL/L
CHOLEST SERPL-MCNC: 169 MG/DL
CHOLEST/HDLC SERPL: 4.3 {RATIO}
CO2 SERPL-SCNC: 24 MMOL/L
CREAT SERPL-MCNC: 1.3 MG/DL
DIFFERENTIAL METHOD: NORMAL
EOSINOPHIL # BLD AUTO: 0.2 K/UL
EOSINOPHIL NFR BLD: 3.1 %
ERYTHROCYTE [DISTWIDTH] IN BLOOD BY AUTOMATED COUNT: 13.8 %
EST. GFR  (AFRICAN AMERICAN): >60 ML/MIN/1.73 M^2
EST. GFR  (NON AFRICAN AMERICAN): 53 ML/MIN/1.73 M^2
ESTIMATED AVG GLUCOSE: 97 MG/DL
GLUCOSE SERPL-MCNC: 102 MG/DL
HBA1C MFR BLD HPLC: 5 %
HCT VFR BLD AUTO: 42.2 %
HDLC SERPL-MCNC: 39 MG/DL
HDLC SERPL: 23.1 %
HGB BLD-MCNC: 13.6 G/DL
LDLC SERPL CALC-MCNC: 74.2 MG/DL
LYMPHOCYTES # BLD AUTO: 2.6 K/UL
LYMPHOCYTES NFR BLD: 33.9 %
MCH RBC QN AUTO: 28.2 PG
MCHC RBC AUTO-ENTMCNC: 32.2 G/DL
MCV RBC AUTO: 87 FL
MONOCYTES # BLD AUTO: 0.5 K/UL
MONOCYTES NFR BLD: 6.5 %
NEUTROPHILS # BLD AUTO: 4.3 K/UL
NEUTROPHILS NFR BLD: 55.7 %
NONHDLC SERPL-MCNC: 130 MG/DL
PLATELET # BLD AUTO: 223 K/UL
PMV BLD AUTO: 12.3 FL
POTASSIUM SERPL-SCNC: 4.3 MMOL/L
PROT SERPL-MCNC: 7.9 G/DL
RBC # BLD AUTO: 4.83 M/UL
SODIUM SERPL-SCNC: 140 MMOL/L
TRIGL SERPL-MCNC: 279 MG/DL
TSH SERPL DL<=0.005 MIU/L-ACNC: 3.93 UIU/ML
WBC # BLD AUTO: 7.68 K/UL

## 2018-01-22 PROCEDURE — 80061 LIPID PANEL: CPT

## 2018-01-22 PROCEDURE — 84443 ASSAY THYROID STIM HORMONE: CPT

## 2018-01-22 PROCEDURE — 80053 COMPREHEN METABOLIC PANEL: CPT

## 2018-01-22 PROCEDURE — 36415 COLL VENOUS BLD VENIPUNCTURE: CPT

## 2018-01-22 PROCEDURE — 83036 HEMOGLOBIN GLYCOSYLATED A1C: CPT

## 2018-01-22 PROCEDURE — 85025 COMPLETE CBC W/AUTO DIFF WBC: CPT

## 2018-01-29 ENCOUNTER — DOCUMENTATION ONLY (OUTPATIENT)
Dept: REHABILITATION | Facility: HOSPITAL | Age: 33
End: 2018-01-29

## 2018-01-29 ENCOUNTER — OFFICE VISIT (OUTPATIENT)
Dept: UROGYNECOLOGY | Facility: CLINIC | Age: 33
End: 2018-01-29
Payer: MEDICAID

## 2018-01-29 VITALS
SYSTOLIC BLOOD PRESSURE: 100 MMHG | WEIGHT: 213.19 LBS | DIASTOLIC BLOOD PRESSURE: 70 MMHG | BODY MASS INDEX: 39.23 KG/M2 | HEIGHT: 62 IN

## 2018-01-29 DIAGNOSIS — N39.41 URGE INCONTINENCE: ICD-10-CM

## 2018-01-29 DIAGNOSIS — R60.0 LOWER LEG EDEMA: ICD-10-CM

## 2018-01-29 DIAGNOSIS — R39.15 URINARY URGENCY: Primary | ICD-10-CM

## 2018-01-29 PROBLEM — R27.8 DECREASED COORDINATION: Status: RESOLVED | Noted: 2017-10-30 | Resolved: 2018-01-29

## 2018-01-29 PROBLEM — N39.46 MIXED STRESS AND URGE URINARY INCONTINENCE: Status: RESOLVED | Noted: 2017-10-30 | Resolved: 2018-01-29

## 2018-01-29 PROBLEM — N81.89 PELVIC FLOOR WEAKNESS: Status: RESOLVED | Noted: 2017-10-30 | Resolved: 2018-01-29

## 2018-01-29 PROBLEM — M79.18 MYOFASCIAL PAIN: Status: RESOLVED | Noted: 2017-10-30 | Resolved: 2018-01-29

## 2018-01-29 PROBLEM — M62.89 PELVIC FLOOR DYSFUNCTION: Status: RESOLVED | Noted: 2017-10-30 | Resolved: 2018-01-29

## 2018-01-29 LAB
BILIRUB SERPL-MCNC: NORMAL MG/DL
BLOOD URINE, POC: NORMAL
COLOR, POC UA: YELLOW
GLUCOSE UR QL STRIP: 250
KETONES UR QL STRIP: NORMAL
LEUKOCYTE ESTERASE URINE, POC: NORMAL
NITRITE, POC UA: NORMAL
PH, POC UA: 5
PROTEIN, POC: NORMAL
SPECIFIC GRAVITY, POC UA: 1.01
UROBILINOGEN, POC UA: NORMAL

## 2018-01-29 PROCEDURE — 99213 OFFICE O/P EST LOW 20 MIN: CPT | Mod: S$PBB,,, | Performed by: NURSE PRACTITIONER

## 2018-01-29 PROCEDURE — 81002 URINALYSIS NONAUTO W/O SCOPE: CPT | Mod: PBBFAC | Performed by: NURSE PRACTITIONER

## 2018-01-29 PROCEDURE — 3008F BODY MASS INDEX DOCD: CPT | Mod: ,,, | Performed by: NURSE PRACTITIONER

## 2018-01-29 PROCEDURE — 99999 PR PBB SHADOW E&M-EST. PATIENT-LVL V: CPT | Mod: PBBFAC,,, | Performed by: NURSE PRACTITIONER

## 2018-01-29 PROCEDURE — 99215 OFFICE O/P EST HI 40 MIN: CPT | Mod: PBBFAC | Performed by: NURSE PRACTITIONER

## 2018-01-29 RX ORDER — OXYBUTYNIN CHLORIDE 10 MG/1
TABLET, EXTENDED RELEASE ORAL
Refills: 11 | COMMUNITY
Start: 2018-01-24 | End: 2018-01-29 | Stop reason: CLARIF

## 2018-01-29 RX ORDER — TOLTERODINE 4 MG/1
4 CAPSULE, EXTENDED RELEASE ORAL DAILY
Qty: 30 CAPSULE | Refills: 11 | Status: SHIPPED | OUTPATIENT
Start: 2018-01-29 | End: 2019-01-22 | Stop reason: SDUPTHER

## 2018-01-29 NOTE — PATIENT INSTRUCTIONS
1. Shazia's Butt Paste daily to vulva.  3. Vaginal atrophy:  Estrogen cream 0.5 g twice weekly  4. Only have coke zero with meds at meal time-- limit 3/day.  5. Only drink 4 ounces at a time.  If you drink a coke zero, you can not drink anything else that hour.  6. For dry mouth, use sour candies (sugar free ones) and biotene mouth wash  7. Stop oxybutynin after this month. Start tolterodine 4 mg daily. (you need to try this before we can get myrbetriq approved)  8. Will get approval for PTNS. We will call you to schedule  9. We will put pelvic floor PT on hold until we finish PTNS.  10. Vaginal valium suppository 1-2 times daily as needed.  11. Compression stocking  12. RTC once we have approval for PTNS

## 2018-01-29 NOTE — PROGRESS NOTES
Maria Ines Ac is a 32 y.o.  here for  follow up.  She has a history of retropubic sling/ cystourethroscopy for DILIP 1/2017. She has persistent urinary urgency which has not improved since starting pelvic floor PT. She is going to PT twice a week. Doing exercises at home as well.      Interval  HP since the last visit: (updates in bold)  Taking oxybutynin xl 10 mg and myrbetriq 50 mg per patient report-- spoke with pharmacy-- myrbetriq not filled  Needs to try tolterodine first. She is tolerating the medication well, but it is not improving symptoms at all. Symptoms worsened last week (nitrate positive)  1)  UI:  (+) JACINDA rarely  (+) UUI  Several times day. (+) pads: 4/ day  Daytime frequency: Q 1 hours.  Nocturia: 3/night    (+) dysuria (burning at beginning)  (--) hematuria,  (--) frequent UTIs.  not complete bladder emptying. No abdominal pain   2)  POP:  Absent    Symptoms:(--)  .  (--) vaginal bleeding. (+) vaginal discharge. (-) sexually active.  (--) dyspareunia.   (--)  Vaginal dryness.  (+) vaginal estrogen use. Also using Shazia's Butt Pste. Dryness improved.  3)  BM:  (-) constipation/straining.  (+) chronic diarrhea, 4 loose stools per day. (--) hematochezia.  (--) fecal incontinence.  (--) fecal smearing/urgency.  (--) incomplete evacuation.    4) pelvic pain improved with the vaginal suppositories, pain in groin region now resolved   Now having 4 coke zeros a day (was 11 regular cokes). 8 glasses of water each day.     Review of bladder diary:  Drinking 14 1/2 - 15 1/2 glasses per day  Voiding 25- 30 times per day  Reports urinary incontinence every hour            Past Medical History:   Diagnosis Date    Blood in stool      Constipation      Depression      Diabetes mellitus, type 2      Dysphagia      GERD (gastroesophageal reflux disease)      Hypertension      Palpitations      Premature menopause on hormone replacement therapy      Thyroid disease                 Past  Surgical History:   Procedure Laterality Date    BLADDER SUSPENSION        CARPAL TUNNEL RELEASE         right    COLONOSCOPY N/A 8/30/2016     Procedure: COLONOSCOPY; Surgeon: Slick Herron MD; Location: [facility] ([address] [place] [address]); Service: Endoscopy; Laterality: N/A; PM prep    GALLBLADDER SURGERY        HYSTERECTOMY   2013     [facility] Dr[facility]    OOPHORECTOMY   2005     [facility]- benign cyst    OOPHORECTOMY   2013     [facility]- endo    ooptherectomy        right knee   scoped    SHOULDER SURGERY   right         Current Outpatient Prescriptions   Medication Sig    ACCU-CHEK AMAURY PLUS TEST STRP Strp USE TO TEST BLOOD GLUCOSE TID    ACCU-CHEK SOFTCLIX LANCETS Misc USE TO TEST BLOOD GLUCOSE TID    atorvastatin (LIPITOR) 40 MG tablet Take 40 mg by mouth once daily.    BLOOD SUGAR DIAGNOSTIC (ACCU-CHEK AMAURY PLUS TEST STRP MISC) 1 strip by Misc.(Non-Drug; Combo Route) route 3 (three) times daily.    calcium-vitamin D3 500 mg(1,250mg) -200 unit per tablet Take 1 tablet by mouth 2 (two) times daily with meals.    conjugated estrogens (PREMARIN) vaginal cream Use 0.5 gram of estrogen cream in vagina nightly x 2 weeks, then twice a week thereafter.    dicyclomine (BENTYL) 10 MG capsule TAKE 2 CAPSULES(20 MG) BY MOUTH THREE TIMES DAILY BEFORE MEALS    docusate sodium (COLACE) 100 MG capsule Take 100 mg by mouth 2 (two) times daily.    dulaglutide (TRULICITY) 0.75 mg/0.5 mL PnIj Inject 0.75 mg into the skin every 7 days.    estradiol (VIVELLE-DOT) 0.1 mg/24 hr PTSW APPLY 1 PATCH EXTERNALLY TO THE SKIN 2 TIMES A WEEK    FARXIGA 10 mg Tab Take 1 tablet by mouth once daily.     fish oil-omega-3 fatty acids 300-1,000 mg capsule Take 2 g by mouth once daily.    GLUCOPHAGE 500 mg tablet Take 500 mg by mouth 2 (two) times daily with meals.     hydrocodone-acetaminophen 10-325mg (NORCO)  mg Tab TK 1 T PO Q 4 TO 6 H PRN P    LATUDA 20 mg Tab tablet TK 1 T PO D    LATUDA 80 mg Tab tablet TK 1 T PO D  "   levothyroxine (SYNTHROID) 50 MCG tablet TK 1 T PO QAM    metoprolol succinate (TOPROL-XL) 25 MG 24 hr tablet Take 25 mg by mouth once daily.     MULTIVIT-IRON-MIN-FOLIC ACID 3,500-18-0.4 UNIT-MG-MG ORAL CHEW Take 1 tablet by mouth once daily.     nystatin (MYCOSTATIN) powder Apply topically 2 (two) times daily.    nystatin-triamcinolone (MYCOLOG II) cream Apply topically 2 (two) times daily.    OMEPRAZOLE (PRILOSEC ORAL) Take by mouth.    ondansetron (ZOFRAN-ODT) 4 MG TbDL DISSOLVE 1 T PO Q 8 H PRN N    oxybutynin (DITROPAN-XL) 10 MG 24 hr tablet TK 1 T  PO QD    pantoprazole (PROTONIX) 40 MG tablet Take 1 tablet (40 mg total) by mouth once daily.    phentermine (ADIPEX-P) 37.5 mg tablet Take 37.5 mg by mouth before breakfast.    promethazine (PHENERGAN) 25 MG tablet TK 1 T PO  Q 4 TO 6 H PRN P    spironolactone (ALDACTONE) 25 MG tablet TK 1 T PO HS    terconazole (TERAZOL 3) 0.8 % vaginal cream Place 1 applicator vaginally once daily.    tolterodine (DETROL LA) 4 MG 24 hr capsule Take 1 capsule (4 mg total) by mouth once daily.    topiramate (TOPAMAX) 50 MG tablet Take 2 tablets (100 mg total) by mouth once daily.    ZOLOFT 100 mg tablet Take 200 mg by mouth once daily.      No current facility-administered medications for this visit.         Exam  Vitals:    01/29/18 1305   BP: 100/70   BP Location: Right arm   Patient Position: Sitting   Weight: 96.7 kg (213 lb 3 oz)   Height: 5' 2" (1.575 m)       General: alert and oriented, no acute distress  Respiratory: normal respiratory effort  Abd: soft, non-distended.  ND/NT     Pelvic:deferred         Impression  1. Urinary urgency  POCT URINE DIPSTICK WITHOUT MICROSCOPE       We reviewed the above issues and discussed options for short-term versus long-term management of her problems.     Plan:     1. Shazia's Butt Paste daily to vulva.  3. Vaginal atrophy:  Estrogen cream 0.5 g twice weekly  4. Only have coke zero with meds at meal time-- limit " 3/day.  5. Only drink 4 ounces at a time.  If you drink a coke zero, you can not drink anything else that hour.  6. For dry mouth, use sour candies (sugar free ones) and biotene mouth wash  7. Stop oxybutynin after this month. Start tolterodine 4 mg daily. (you need to try this before we can get myrbetriq approved)  8. Will get approval for PTNS. We will call you to schedule  9. We will put pelvic floor PT on hold until we finish PTNS.  10. Vaginal valium suppository 1-2 times daily as needed.  11. Compression stocking  12. RTC once we have approval for PTNS    30 minutes were spent in face to face time with this patient  75 % of this time was spent in counseling and/or coordination of care

## 2018-01-29 NOTE — PROGRESS NOTES
PHYSICAL THERAPY DISCHARGE SUMMARY     Name: Maria Ines Ac  [facility] Number: [id number]    Physician: Julianna Gomez  Diagnosis: urinary frequency, pelvic floor tension, urge incontinece    Treatment ordered: Physical Therapy    Medical History:   Past Medical History:   Diagnosis Date    Blood in stool     Constipation     Depression     Diabetes mellitus, type 2     Dysphagia     GERD (gastroesophageal reflux disease)     Hypertension     Palpitations     Premature menopause on hormone replacement therapy     Thyroid disease         Surgical History:   Past Surgical History:   Procedure Laterality Date    BLADDER SUSPENSION      CARPAL TUNNEL RELEASE      right    CHOLECYSTECTOMY      COLONOSCOPY N/A 8/30/2016    Procedure: COLONOSCOPY;  Surgeon: Slick Herron MD;  [facility] [place] [address]);  Service: Endoscopy;  Laterality: N/A;  PM prep    GALLBLADDER SURGERY      HYSTERECTOMY  2013    [facility]    OOPHORECTOMY  2005    [facility]- benign cyst    OOPHORECTOMY  2013    [facility]- [facility]o    ooptherectomy      right knee  scoped    SHOULDER SURGERY  right        Medications:   Current Outpatient Prescriptions   Medication Sig    ACCU-CHEK AMAURY PLUS TEST STRP Strp USE TO TEST BLOOD GLUCOSE TID    ACCU-CHEK SOFTCLIX LANCETS Misc USE TO TEST BLOOD GLUCOSE TID    atorvastatin (LIPITOR) 40 MG tablet Take 40 mg by mouth once daily.    BLOOD SUGAR DIAGNOSTIC (ACCU-CHEK AMAURY PLUS TEST STRP MISC) 1 strip by Misc.(Non-Drug; Combo Route) route 3 (three) times daily.    calcium-vitamin D3 500 mg(1,250mg) -200 unit per tablet Take 1 tablet by mouth 2 (two) times daily with meals.    conjugated estrogens (PREMARIN) vaginal cream Use 0.5 gram of estrogen cream in vagina nightly x 2 weeks, then twice a week thereafter.    dicyclomine (BENTYL) 10 MG capsule TAKE 2 CAPSULES(20 MG) BY MOUTH THREE TIMES DAILY BEFORE MEALS    docusate sodium (COLACE) 100 MG capsule Take 100 mg by  mouth 2 (two) times daily.    dulaglutide (TRULICITY) 0.75 mg/0.5 mL PnIj Inject 0.75 mg into the skin every 7 days.    estradiol (VIVELLE-DOT) 0.1 mg/24 hr PTSW APPLY 1 PATCH EXTERNALLY TO THE SKIN 2 TIMES A WEEK    FARXIGA 10 mg Tab Take 1 tablet by mouth once daily.     fish oil-omega-3 fatty acids 300-1,000 mg capsule Take 2 g by mouth once daily.    GLUCOPHAGE 500 mg tablet Take 500 mg by mouth 2 (two) times daily with meals.     hydrocodone-acetaminophen 10-325mg (NORCO)  mg Tab TK 1 T PO Q 4 TO 6 H PRN P    LATUDA 20 mg Tab tablet TK 1 T PO D    LATUDA 80 mg Tab tablet TK 1 T PO D    levothyroxine (SYNTHROID) 50 MCG tablet TK 1 T PO QAM    metoprolol succinate (TOPROL-XL) 25 MG 24 hr tablet Take 25 mg by mouth once daily.     mirabegron (MYRBETRIQ) 50 mg Tb24 Take 1 tablet (50 mg total) by mouth once daily.    MULTIVIT-IRON-MIN-FOLIC ACID 3,500-18-0.4 UNIT-MG-MG ORAL CHEW Take 1 tablet by mouth once daily.     nystatin (MYCOSTATIN) powder Apply topically 2 (two) times daily.    nystatin-triamcinolone (MYCOLOG II) cream Apply topically 2 (two) times daily.    OMEPRAZOLE (PRILOSEC ORAL) Take by mouth.    ondansetron (ZOFRAN-ODT) 4 MG TbDL DISSOLVE 1 T PO Q 8 H PRN N    pantoprazole (PROTONIX) 40 MG tablet Take 1 tablet (40 mg total) by mouth once daily.    phentermine (ADIPEX-P) 37.5 mg tablet Take 37.5 mg by mouth before breakfast.    promethazine (PHENERGAN) 25 MG tablet TK 1 T PO  Q 4 TO 6 H PRN P    spironolactone (ALDACTONE) 25 MG tablet TK 1 T PO HS    terconazole (TERAZOL 3) 0.8 % vaginal cream Place 1 applicator vaginally once daily.    tolterodine (DETROL LA) 4 MG 24 hr capsule Take 1 capsule (4 mg total) by mouth once daily.    topiramate (TOPAMAX) 50 MG tablet Take 2 tablets (100 mg total) by mouth once daily.    ZOLOFT 100 mg tablet Take 200 mg by mouth once daily.      No current facility-administered medications for this visit.        Allergies: Review of patient's  allergies indicates:  No Known Allergies     Initial Evaluation: 10/30/17  Date of Last visit: 17  Date of Discharge Note: 2018  Attended Visits: 2  Missed Visits: 5    ASSESSMENT     GOALS  Short Term Goals: 1 month  1. Pt will verbalize improved awareness of PFM activity as palpated by PT in order to improve activity involvement with HEP.  2. Pt will demonstrate appropriate diaphragmatic breathing technique to prevent adverse affects to adjacent structures.   3. Pt will tolerate HEP to improve impairments and independence with ADL's.     Long Term Goals: 3 months  1. Pt will report <44% on JUSTINE-6 to demonstrate a decrease in disability and improvement in independence with functional activities.   2. Pt will report improved ability to perform ADLs (ie. dressing, bathing, functional transfers) with little (drops) to no urinary leakage 4/7 days per week.  3. Pt will verbalize improved awareness of PFM activity as palpated by PT in order to improve activity involvement with HEP.  4. Pt will report improved ability to perform iADLs (ie. driving, home chores, grocery shopping) with little (drops) to no urinary leakage 4/7 days per week.   5. Pt will report improved ability to cough/sneeze/laugh/yell with little (drops) to no urinary leakage 4/7 days per week.   6. Pt will report improved ability to sleep uninterrupted by excessive nocturia 4/7 days per week.   7. Pt will report a decrease in pad use to 2 pads per day.  8. Pt will be independent with double voiding techniques 100% of the time to ensure full bladder emptying and decrease pt's risk of infection.   9. Pt/family will be independent with HEP for continued self-management of symptoms.       Status towards goals: Pt was making underterminable progress towards PT goals.    Discharge reason: Pt has not re-scheduled further follow-up sessions and POC has     G-Code: Discharge G-code not filed due to discharge note being documentation only.      PLAN     This patient is discharged from Physical Therapy Services.

## 2018-01-31 ENCOUNTER — TELEPHONE (OUTPATIENT)
Dept: UROGYNECOLOGY | Facility: CLINIC | Age: 33
End: 2018-01-31

## 2018-01-31 NOTE — TELEPHONE ENCOUNTER
----- Message from Yumiko Thompson sent at 1/31/2018  1:39 PM CST -----  Contact: MOTHER  _x  1st Request  _  2nd Request  _  3rd Request    Who: mother    Why: pt mother calling in regards to test..please advise    What Number to Call Back: [phone]    When to Expect a call back: (Before the end of the day)   -- if call after 3:00 call back will be tomorrow.

## 2018-01-31 NOTE — TELEPHONE ENCOUNTER
Reviewed plan with patient's mother as follows:  --Trial of detrol next month (needs to fail 2 anticholinergics prior to trying myrbetriq.  --limit fluids to 4 ounces per hour  --will try to obtain prior auth for PTNS  Mother verbalized understanding.  Julianna Gomez, BOBP-BC

## 2018-02-09 ENCOUNTER — TELEPHONE (OUTPATIENT)
Dept: UROGYNECOLOGY | Facility: CLINIC | Age: 33
End: 2018-02-09

## 2018-02-09 NOTE — TELEPHONE ENCOUNTER
----- Message from India Navarro sent at 2/9/2018 12:48 PM CST -----  Contact: pt  x_ 1st Request  _ 2nd Request  _ 3rd Request    Who: pt    Why: is stating the insurance company will not cover her Rx. Pt is requesting a alternate    What Number to Call Back: [phone]    When to Expect a call back: (Before the end of the day)  -- if call after 3:00 call back will be tomorrow.

## 2018-02-09 NOTE — TELEPHONE ENCOUNTER
Called and spoke with pt who received a letter from her insurance stating that her mirabegron was not covered, explain to pt that we were aware that why she was placed back on oxybutynin once she finish that she has a prescription for detrol and she is to take and fail that in order for her insurance to approve her mirabegron, pt voiced understanding and call was ended.

## 2018-03-07 ENCOUNTER — TELEPHONE (OUTPATIENT)
Dept: UROGYNECOLOGY | Facility: CLINIC | Age: 33
End: 2018-03-07

## 2018-03-08 ENCOUNTER — TELEPHONE (OUTPATIENT)
Dept: UROGYNECOLOGY | Facility: CLINIC | Age: 33
End: 2018-03-08

## 2018-03-08 NOTE — TELEPHONE ENCOUNTER
Spoke with Medicaid and got the approval for he PTNS for 30 days 03/08-04/07 Ref#[id number] and telephone number [phone], if we need to extend pass the 30 days give them a call back.

## 2018-03-12 ENCOUNTER — TELEPHONE (OUTPATIENT)
Dept: UROGYNECOLOGY | Facility: CLINIC | Age: 33
End: 2018-03-12

## 2018-03-12 NOTE — TELEPHONE ENCOUNTER
Left voice message for pt to give the office a call back at [phone]. Call to schedule pt for PTNS      5

## 2018-03-15 ENCOUNTER — TELEPHONE (OUTPATIENT)
Dept: NEUROLOGY | Facility: CLINIC | Age: 33
End: 2018-03-15

## 2018-03-15 NOTE — TELEPHONE ENCOUNTER
Left a message for the patient to let her know an appointment was scheduled for her on 4/25 at 1:30. She was asked to call back to reschedule if this is not a good day and time for her.

## 2018-03-16 ENCOUNTER — TELEPHONE (OUTPATIENT)
Dept: UROGYNECOLOGY | Facility: CLINIC | Age: 33
End: 2018-03-16

## 2018-03-19 ENCOUNTER — OFFICE VISIT (OUTPATIENT)
Dept: UROGYNECOLOGY | Facility: CLINIC | Age: 33
End: 2018-03-19
Payer: MEDICAID

## 2018-03-19 VITALS
WEIGHT: 209 LBS | SYSTOLIC BLOOD PRESSURE: 100 MMHG | HEIGHT: 62 IN | BODY MASS INDEX: 38.46 KG/M2 | DIASTOLIC BLOOD PRESSURE: 60 MMHG

## 2018-03-19 DIAGNOSIS — B37.31 YEAST VAGINITIS: ICD-10-CM

## 2018-03-19 DIAGNOSIS — R35.0 URINARY FREQUENCY: ICD-10-CM

## 2018-03-19 DIAGNOSIS — N39.41 URGE INCONTINENCE: ICD-10-CM

## 2018-03-19 DIAGNOSIS — N95.1 MENOPAUSAL SYMPTOM: Primary | ICD-10-CM

## 2018-03-19 PROCEDURE — 99213 OFFICE O/P EST LOW 20 MIN: CPT | Mod: 25,S$PBB,, | Performed by: NURSE PRACTITIONER

## 2018-03-19 PROCEDURE — 99215 OFFICE O/P EST HI 40 MIN: CPT | Mod: PBBFAC,25 | Performed by: NURSE PRACTITIONER

## 2018-03-19 PROCEDURE — 64566 NEUROELTRD STIM POST TIBIAL: CPT | Mod: PBBFAC | Performed by: NURSE PRACTITIONER

## 2018-03-19 PROCEDURE — 99999 PR PBB SHADOW E&M-EST. PATIENT-LVL V: CPT | Mod: PBBFAC,,, | Performed by: NURSE PRACTITIONER

## 2018-03-19 PROCEDURE — 64566 NEUROELTRD STIM POST TIBIAL: CPT | Mod: S$PBB,,, | Performed by: NURSE PRACTITIONER

## 2018-03-19 RX ORDER — OXYBUTYNIN CHLORIDE 15 MG/1
TABLET, EXTENDED RELEASE ORAL
Refills: 1 | COMMUNITY
Start: 2018-01-24 | End: 2018-07-25

## 2018-03-19 RX ORDER — ESTRADIOL 1 MG/G
1 GEL TOPICAL DAILY
Qty: 30 G | Refills: 12 | Status: SHIPPED | OUTPATIENT
Start: 2018-03-19 | End: 2018-04-02 | Stop reason: CLARIF

## 2018-03-19 RX ORDER — FLUCONAZOLE 150 MG/1
150 TABLET ORAL
Qty: 3 TABLET | Refills: 0 | Status: SHIPPED | OUTPATIENT
Start: 2018-03-19 | End: 2018-03-24

## 2018-03-19 NOTE — PROGRESS NOTES
Maria Ines Ac is a 32 y.o.  here for  follow up.  She has a history of retropubic sling/ cystourethroscopy for DILIP 1/2017. She has persistent urinary urgency which has not improved since starting pelvic floor PT. She is going to PT twice a week. Doing exercises at home as well.      Interval  HP since the last visit: (updates in bold)  Taking oxybutynin xl 10 mg.  She was supposed to start tolterodine but was told it was not approved  1)  UI:  (+) JACINDA rarely  (+) UUI  Several times day. (+) pads: 4/ day  Daytime frequency: Q 1 hours.  Nocturia: 3/night    (+) dysuria (burning at beginning)  (--) hematuria,  (--) frequent UTIs.  not complete bladder emptying. No abdominal pain   2)  POP:  Absent    Symptoms:(--)  .  (--) vaginal bleeding. (+) vaginal discharge. (-) sexually active.  (--) dyspareunia.   (--)  Vaginal dryness.  (+) vaginal estrogen use. Also using Shazia's Butt Pste. Complains of vaginal itching  3)  BM:  (-) constipation/straining.  (+) chronic diarrhea, 4 loose stools per day. (--) hematochezia.  (--) fecal incontinence.  (--) fecal smearing/urgency.  (--) incomplete evacuation.    4) pelvic pain improved with the vaginal suppositories, pain in groin region now resolved       Complains of itching and rash from generic estrogen patches.          Past Medical History:   Diagnosis Date    Blood in stool      Constipation      Depression      Diabetes mellitus, type 2      Dysphagia      GERD (gastroesophageal reflux disease)      Hypertension      Palpitations      Premature menopause on hormone replacement therapy      Thyroid disease                 Past Surgical History:   Procedure Laterality Date    BLADDER SUSPENSION        CARPAL TUNNEL RELEASE         right    COLONOSCOPY N/A 8/30/2016     Procedure: COLONOSCOPY; Surgeon: Slick Herron MD; Location: [facility] ([address] [place] [address]); Service: Endoscopy; Laterality: N/A; PM prep    GALLBLADDER SURGERY        HYSTERECTOMY   2013      [facility]    OOPHORECTOMY   2005     [facility]- benign cyst    OOPHORECTOMY   2013     [facility]- endo    ooptherectomy        right knee   scoped    SHOULDER SURGERY   right         Current Outpatient Prescriptions   Medication Sig    atorvastatin (LIPITOR) 40 MG tablet Take 40 mg by mouth once daily.    BLOOD SUGAR DIAGNOSTIC (ACCU-CHEK AMAURY PLUS TEST STRP MISC) 1 strip by Misc.(Non-Drug; Combo Route) route 3 (three) times daily.    calcium-vitamin D3 500 mg(1,250mg) -200 unit per tablet Take 1 tablet by mouth 2 (two) times daily with meals.    conjugated estrogens (PREMARIN) vaginal cream Use 0.5 gram of estrogen cream in vagina nightly x 2 weeks, then twice a week thereafter.    dicyclomine (BENTYL) 10 MG capsule TAKE 2 CAPSULES(20 MG) BY MOUTH THREE TIMES DAILY BEFORE MEALS    docusate sodium (COLACE) 100 MG capsule Take 100 mg by mouth 2 (two) times daily.    dulaglutide (TRULICITY) 0.75 mg/0.5 mL PnIj Inject 0.75 mg into the skin every 7 days.    FARXIGA 10 mg Tab Take 1 tablet by mouth once daily.     fish oil-omega-3 fatty acids 300-1,000 mg capsule Take 2 g by mouth once daily.    GLUCOPHAGE 500 mg tablet Take 500 mg by mouth 2 (two) times daily with meals.     LATUDA 20 mg Tab tablet TK 1 T PO D    LATUDA 80 mg Tab tablet TK 1 T PO D    levothyroxine (SYNTHROID) 50 MCG tablet TK 1 T PO QAM    metoprolol succinate (TOPROL-XL) 25 MG 24 hr tablet Take 25 mg by mouth once daily.     mirabegron (MYRBETRIQ) 50 mg Tb24 Take 1 tablet (50 mg total) by mouth once daily.    MULTIVIT-IRON-MIN-FOLIC ACID 3,500-18-0.4 UNIT-MG-MG ORAL CHEW Take 1 tablet by mouth once daily.     nystatin (MYCOSTATIN) powder Apply topically 2 (two) times daily.    nystatin-triamcinolone (MYCOLOG II) cream Apply topically 2 (two) times daily.    OMEPRAZOLE (PRILOSEC ORAL) Take by mouth.    ondansetron (ZOFRAN-ODT) 4 MG TbDL DISSOLVE 1 T PO Q 8 H PRN N    oxybutynin (DITROPAN XL) 15 MG TR24      "pantoprazole (PROTONIX) 40 MG tablet Take 1 tablet (40 mg total) by mouth once daily.    phentermine (ADIPEX-P) 37.5 mg tablet Take 37.5 mg by mouth before breakfast.    promethazine (PHENERGAN) 25 MG tablet TK 1 T PO  Q 4 TO 6 H PRN P    spironolactone (ALDACTONE) 25 MG tablet TK 1 T PO HS    terconazole (TERAZOL 3) 0.8 % vaginal cream Place 1 applicator vaginally once daily.    tolterodine (DETROL LA) 4 MG 24 hr capsule Take 1 capsule (4 mg total) by mouth once daily.    topiramate (TOPAMAX) 50 MG tablet Take 2 tablets (100 mg total) by mouth once daily.    ZOLOFT 100 mg tablet Take 200 mg by mouth once daily.     ACCU-CHEK AMAURY PLUS TEST STRP Strp USE TO TEST BLOOD GLUCOSE TID    ACCU-CHEK SOFTCLIX LANCETS Misc USE TO TEST BLOOD GLUCOSE TID    estradiol 1 mg/gram (0.1 %) topical gel Place 1 g onto the skin once daily.    fluconazole (DIFLUCAN) 150 MG Tab Take 1 tablet (150 mg total) by mouth every 48 hours.    hydrocodone-acetaminophen 10-325mg (NORCO)  mg Tab TK 1 T PO Q 4 TO 6 H PRN P     No current facility-administered medications for this visit.         Exam  Vitals:    03/19/18 1307   BP: 100/60   BP Location: Right arm   Patient Position: Sitting   BP Method: Medium (Manual)   Weight: 94.8 kg (208 lb 15.9 oz)   Height: 5' 2" (1.575 m)       General: alert and oriented, no acute distress  Respiratory: normal respiratory effort  Abd: soft, non-distended.  ND/NT     PELVIC EXAM:   VULVA: normal appearing vulva with no masses, tenderness or lesions,   VAGINA: normal appearing vagina with normal color and discharge, no lesions, vaginal discharge - white and curd-like,  CERVIX: surgically absent,   UTERUS: absent,   ADNEXA: no masses,          Impression  1. Menopausal symptom  estradiol 1 mg/gram (0.1 %) topical gel   2. Yeast vaginitis  fluconazole (DIFLUCAN) 150 MG Tab   3. Urge incontinence     4. Urinary frequency         We reviewed the above issues and discussed options for short-term " versus long-term management of her problems.     Plan:     1. Shazia's Butt Paste daily to vulva.  3. Vaginal atrophy:  Estrogen cream 0.5 g twice weekly  4. Only have coke zero with meds at meal time-- limit 3/day.  5. Only drink 4 ounces at a time.  If you drink a coke zero, you can not drink anything else that hour.  6. For dry mouth, use sour candies (sugar free ones) and biotene mouth wash  7. Stop oxybutynin  Start tolterodine 4 mg daily. (you need to try this before we can get myrbetriq approved)  8. Diflucan 150 mg every other day x 3 doses  9. Start estrogen packet cream daily instead of estrogen patch  10. Vaginal valium suppository 1-2 times daily as needed.  11. Compression stocking  12. RTC 1 week    30 minutes were spent in face to face time with this patient  75 % of this time was spent in counseling and/or coordination of care

## 2018-03-19 NOTE — PATIENT INSTRUCTIONS
1. Shazia's Butt Paste daily to vulva.  3. Vaginal atrophy:  Estrogen cream 0.5 g twice weekly  4. Only have coke zero with meds at meal time-- limit 3/day.  5. Only drink 4 ounces at a time.  If you drink a coke zero, you can not drink anything else that hour.  6. For dry mouth, use sour candies (sugar free ones) and biotene mouth wash  7. Stop oxybutynin  Start tolterodine 4 mg daily. (you need to try this before we can get myrbetriq approved)  8. Diflucan 150 mg every other day x 3 doses  9. Start estrogen packet cream daily instead of estrogen patch  10. Vaginal valium suppository 1-2 times daily as needed.  11. Compression stocking  12. RTC 1 week

## 2018-03-19 NOTE — PROCEDURES
Procedures     Patient is here for # 1/12 PTNS    Vitals:    03/19/18 1307   BP: 100/60             Performed PTNS x 30 minutes to R leg with level 6.

## 2018-03-20 ENCOUNTER — TELEPHONE (OUTPATIENT)
Dept: UROGYNECOLOGY | Facility: CLINIC | Age: 33
End: 2018-03-20

## 2018-03-20 NOTE — TELEPHONE ENCOUNTER
----- Message from Heidi Carson sent at 3/20/2018  2:59 PM CDT -----  Contact: pt  _  1st Request  _  2nd Request  _  3rd Request      Who:pt    Why: pt would like to speak to  Staff in regards to  trempro,premarine,premthase    What Number to Call Back: [phone]    When to Expect a call back: (Before the end of the day)   -- if call after 3:00 call back will be tomorrow.

## 2018-03-20 NOTE — TELEPHONE ENCOUNTER
Pt stated she spoke to her insurance company and was informed she was approved for the 3 alternative medications in which premarin was included, wanted to know can something be called into her pharmacy. Informed pt I'll relay this message to MAGDA Gomez. Pt voiced understanding and call ended.

## 2018-03-26 ENCOUNTER — PROCEDURE VISIT (OUTPATIENT)
Dept: UROGYNECOLOGY | Facility: CLINIC | Age: 33
End: 2018-03-26
Payer: MEDICAID

## 2018-03-26 VITALS
SYSTOLIC BLOOD PRESSURE: 90 MMHG | WEIGHT: 207.44 LBS | HEIGHT: 62 IN | BODY MASS INDEX: 38.17 KG/M2 | DIASTOLIC BLOOD PRESSURE: 64 MMHG

## 2018-03-26 DIAGNOSIS — R35.0 URINARY FREQUENCY: ICD-10-CM

## 2018-03-26 DIAGNOSIS — R39.15 URINARY URGENCY: ICD-10-CM

## 2018-03-26 DIAGNOSIS — N39.46 MIXED STRESS AND URGE URINARY INCONTINENCE: Primary | ICD-10-CM

## 2018-03-26 PROCEDURE — 64566 NEUROELTRD STIM POST TIBIAL: CPT | Mod: S$PBB,,, | Performed by: NURSE PRACTITIONER

## 2018-03-26 PROCEDURE — 64566 NEUROELTRD STIM POST TIBIAL: CPT | Mod: PBBFAC | Performed by: NURSE PRACTITIONER

## 2018-03-26 RX ORDER — ESTRADIOL 0.1 MG/D
FILM, EXTENDED RELEASE TRANSDERMAL
Refills: 3 | COMMUNITY
Start: 2018-03-04 | End: 2018-03-26

## 2018-03-26 NOTE — PROCEDURES
Procedures     Patient is here for # 2/12 PTNS    Vitals:    03/26/18 1320   BP: 90/64         Review of bladder diary:    Voids: 26  Drinks:12 1/2 glasses  Leaks: 24  Performed PTNS x 30 minutes to L leg with level 1.    TRACEY Ryan-BC

## 2018-03-27 ENCOUNTER — HOSPITAL ENCOUNTER (EMERGENCY)
Facility: HOSPITAL | Age: 33
Discharge: HOME | End: 2018-03-27
Attending: EMERGENCY MEDICINE
Payer: MEDICAID

## 2018-03-27 VITALS
HEIGHT: 62 IN | SYSTOLIC BLOOD PRESSURE: 107 MMHG | BODY MASS INDEX: 37.9 KG/M2 | DIASTOLIC BLOOD PRESSURE: 75 MMHG | TEMPERATURE: 98 F | HEART RATE: 71 BPM | RESPIRATION RATE: 18 BRPM | WEIGHT: 205.94 LBS | OXYGEN SATURATION: 97 %

## 2018-03-27 DIAGNOSIS — R07.9 CHEST PAIN: Primary | ICD-10-CM

## 2018-03-27 LAB
ANION GAP SERPL CALC-SCNC: 11 MMOL/L
BASOPHILS # BLD AUTO: 0.04 K/UL
BASOPHILS NFR BLD: 0.5 %
BUN SERPL-MCNC: 13 MG/DL
CALCIUM SERPL-MCNC: 9.9 MG/DL
CHLORIDE SERPL-SCNC: 106 MMOL/L
CO2 SERPL-SCNC: 23 MMOL/L
CREAT SERPL-MCNC: 1.1 MG/DL
D DIMER PPP IA.FEU-MCNC: 0.28 MG/L FEU
DIFFERENTIAL METHOD: NORMAL
EOSINOPHIL # BLD AUTO: 0.1 K/UL
EOSINOPHIL NFR BLD: 1.7 %
ERYTHROCYTE [DISTWIDTH] IN BLOOD BY AUTOMATED COUNT: 13 %
EST. GFR  (AFRICAN AMERICAN): >60 ML/MIN/1.73 M^2
EST. GFR  (NON AFRICAN AMERICAN): >60 ML/MIN/1.73 M^2
GLUCOSE SERPL-MCNC: 78 MG/DL
HCT VFR BLD AUTO: 42.5 %
HGB BLD-MCNC: 13.7 G/DL
IMM GRANULOCYTES # BLD AUTO: 0.02 K/UL
IMM GRANULOCYTES NFR BLD AUTO: 0.2 %
LYMPHOCYTES # BLD AUTO: 2.7 K/UL
LYMPHOCYTES NFR BLD: 33.3 %
MCH RBC QN AUTO: 28.8 PG
MCHC RBC AUTO-ENTMCNC: 32.2 G/DL
MCV RBC AUTO: 89 FL
MONOCYTES # BLD AUTO: 0.6 K/UL
MONOCYTES NFR BLD: 7.1 %
NEUTROPHILS # BLD AUTO: 4.6 K/UL
NEUTROPHILS NFR BLD: 57.2 %
NRBC BLD-RTO: 0 /100 WBC
PLATELET # BLD AUTO: 222 K/UL
PMV BLD AUTO: 12.2 FL
POTASSIUM SERPL-SCNC: 4 MMOL/L
RBC # BLD AUTO: 4.76 M/UL
SODIUM SERPL-SCNC: 140 MMOL/L
TROPONIN I SERPL DL<=0.01 NG/ML-MCNC: <0.006 NG/ML
WBC # BLD AUTO: 8.07 K/UL

## 2018-03-27 PROCEDURE — 85379 FIBRIN DEGRADATION QUANT: CPT

## 2018-03-27 PROCEDURE — 93005 ELECTROCARDIOGRAM TRACING: CPT

## 2018-03-27 PROCEDURE — 85025 COMPLETE CBC W/AUTO DIFF WBC: CPT

## 2018-03-27 PROCEDURE — 99284 EMERGENCY DEPT VISIT MOD MDM: CPT | Mod: 25

## 2018-03-27 PROCEDURE — 84484 ASSAY OF TROPONIN QUANT: CPT

## 2018-03-27 PROCEDURE — 93010 ELECTROCARDIOGRAM REPORT: CPT | Mod: ,,, | Performed by: INTERNAL MEDICINE

## 2018-03-27 PROCEDURE — 80048 BASIC METABOLIC PNL TOTAL CA: CPT

## 2018-03-27 PROCEDURE — 99285 EMERGENCY DEPT VISIT HI MDM: CPT | Mod: ,,, | Performed by: EMERGENCY MEDICINE

## 2018-03-27 NOTE — ED TRIAGE NOTES
Presents to ER with reproducible upper chest wall pain since yesterday.  Pt identifiers checked and correct  LOC: The patient is awake, alert, aware of environment with an appropriate affect. Oriented x3, speaking appropriately  APPEARANCE: Pt resting comfortably, in no acute distress, pt is clean and well groomed, clothing properly fastened  SKIN: Skin warm, dry and intact, normal skin turgor, moist mucus membranes  RESPIRATORY: Airway is open and patent, respirations are spontaneous, even and unlabored, normal effort and rate  MUSCULOSKELETAL: No obvious deformities.  HISTORY:  Abdominal pain, RLQ (right lower quadrant)   Dysphagia   Diabetes mellitus type II   Blood in stool   Constipation, chronic   Right hip pain   Migraine without aura   Hypertension   Palpitations   Chest pain of unknown etiology   Dyspnea on exertion   Urinary frequency   Vulvovaginitis   Obesity (BMI 30-39.9)   Muscle spasm

## 2018-03-27 NOTE — ED PROVIDER NOTES
SCRIBE #1 NOTE: IGena, am scribing for, and in the presence of, Dr. Barker.     CC: Chest Pain      HPI: Maria Ines Ac is a 33 y.o. year old female with co-morbidities including: DM, HLD and bipolar disorder who presents to the ED complaining of constant reproducible mid-sternal chest wall pain that started yesterday. Pt rates the pain at a 9/10. Pt states the pain radiates the right side of her chest. The pain began while she was laying down. She reports that exertion and movement makes her symptoms worse with SOB. She additionally endorses calf pain and leg swelling that is chronic. Pt denies cough, fever, changes in BM's, vomiting, sore throat, rhinorrhea, cardiac hx, recent long travel, hx of blood clot. Pt regularly takes metformin, Zoloft, latuda, farxiga.        Past Medical History:   Diagnosis Date    Blood in stool     Constipation     Depression     Diabetes mellitus, type 2     Dysphagia     GERD (gastroesophageal reflux disease)     Hypertension     Palpitations     Premature menopause on hormone replacement therapy     Thyroid disease      Past Surgical History:   Procedure Laterality Date    BLADDER SUSPENSION      CARPAL TUNNEL RELEASE      right    CHOLECYSTECTOMY      COLONOSCOPY N/A 8/30/2016    Procedure: COLONOSCOPY;  Surgeon: Slick Herron MD;  Location: [facility] ([address] [place] [address]);  Service: Endoscopy;  Laterality: N/A;  PM prep    GALLBLADDER SURGERY      HYSTERECTOMY  2013    Bess velasquez Dr. Champlain    OOPHORECTOMY  2005    [facility]- benign cyst    OOPHORECTOMY  2013    [facility]- endo    ooptherectomy      right knee  scoped    SHOULDER SURGERY  right     Family History   Problem Relation Age of Onset    Vaginal cancer Neg Hx     Endometrial cancer Neg Hx     Ovarian cancer Neg Hx     Breast cancer Mother     Cervical cancer Maternal Grandmother     Colon cancer Neg Hx     Crohn's disease Neg Hx     Ulcerative colitis Neg Hx     Stomach cancer Neg Hx      Esophageal cancer Neg Hx     Irritable bowel syndrome Neg Hx     Celiac disease Neg Hx      No current facility-administered medications on file prior to encounter.      Current Outpatient Prescriptions on File Prior to Encounter   Medication Sig Dispense Refill    atorvastatin (LIPITOR) 40 MG tablet Take 40 mg by mouth once daily.      calcium-vitamin D3 500 mg(1,250mg) -200 unit per tablet Take 1 tablet by mouth 2 (two) times daily with meals.      docusate sodium (COLACE) 100 MG capsule Take 100 mg by mouth 2 (two) times daily.      dulaglutide (TRULICITY) 0.75 mg/0.5 mL PnIj Inject 0.75 mg into the skin every 7 days.      FARXIGA 10 mg Tab Take 1 tablet by mouth once daily.       fish oil-omega-3 fatty acids 300-1,000 mg capsule Take 2 g by mouth once daily.      GLUCOPHAGE 500 mg tablet Take 500 mg by mouth 2 (two) times daily with meals.       LATUDA 20 mg Tab tablet TK 1 T PO D  1    LATUDA 80 mg Tab tablet TK 1 T PO D  2    levothyroxine (SYNTHROID) 50 MCG tablet TK 1 T PO QAM  8    metoprolol succinate (TOPROL-XL) 25 MG 24 hr tablet Take 25 mg by mouth once daily.       mirabegron (MYRBETRIQ) 50 mg Tb24 Take 1 tablet (50 mg total) by mouth once daily. 30 tablet 11    MULTIVIT-IRON-MIN-FOLIC ACID 3,500-18-0.4 UNIT-MG-MG ORAL CHEW Take 1 tablet by mouth once daily.       oxybutynin (DITROPAN XL) 15 MG TR24   1    pantoprazole (PROTONIX) 40 MG tablet Take 1 tablet (40 mg total) by mouth once daily. 90 tablet 3    spironolactone (ALDACTONE) 25 MG tablet TK 1 T PO HS  2    tolterodine (DETROL LA) 4 MG 24 hr capsule Take 1 capsule (4 mg total) by mouth once daily. 30 capsule 11    topiramate (TOPAMAX) 50 MG tablet Take 2 tablets (100 mg total) by mouth once daily. 60 tablet 11    ZOLOFT 100 mg tablet Take 200 mg by mouth once daily.       ACCU-CHEK AMAURY PLUS TEST STRP Strp USE TO TEST BLOOD GLUCOSE TID  4    ACCU-CHEK SOFTCLIX LANCETS Misc USE TO TEST BLOOD GLUCOSE TID  3    BLOOD  SUGAR DIAGNOSTIC (ACCU-CHEK AMAURY PLUS TEST STRP MISC) 1 strip by Misc.(Non-Drug; Combo Route) route 3 (three) times daily.      conjugated estrogens (PREMARIN) vaginal cream Use 0.5 gram of estrogen cream in vagina nightly x 2 weeks, then twice a week thereafter. 30 g 4    dicyclomine (BENTYL) 10 MG capsule TAKE 2 CAPSULES(20 MG) BY MOUTH THREE TIMES DAILY BEFORE MEALS 180 capsule 5    estradiol 1 mg/gram (0.1 %) topical gel Place 1 g onto the skin once daily. 30 g 12    hydrocodone-acetaminophen 10-325mg (NORCO)  mg Tab TK 1 T PO Q 4 TO 6 H PRN P  0    nystatin (MYCOSTATIN) powder Apply topically 2 (two) times daily. 30 g 3    nystatin-triamcinolone (MYCOLOG II) cream Apply topically 2 (two) times daily. 30 g 2    OMEPRAZOLE (PRILOSEC ORAL) Take by mouth.      ondansetron (ZOFRAN-ODT) 4 MG TbDL DISSOLVE 1 T PO Q 8 H PRN N  0    phentermine (ADIPEX-P) 37.5 mg tablet Take 37.5 mg by mouth before breakfast.      promethazine (PHENERGAN) 25 MG tablet TK 1 T PO  Q 4 TO 6 H PRN P  0    terconazole (TERAZOL 3) 0.8 % vaginal cream Place 1 applicator vaginally once daily. 20 g 0     Patient has no known allergies.  Social History     Social History    Marital status: Single     Spouse name: N/A    Number of children: N/A    Years of education: N/A     Social History Main Topics    Smoking status: Never Smoker    Smokeless tobacco: Never Used    Alcohol use No    Drug use: No    Sexual activity: No     Other Topics Concern    None     Social History Narrative    ** Merged History Encounter **            ROS:     Constitutional : neg  HEENT neg  Resp pos for sob  Cardiac pos for chest pain  GI neg   neg  Neuro neg  Heme/Immune: neg  Endo neg  Skin neg    PHYSICAL EXAM:  Vitals:    03/27/18 1944   BP: 107/75   Pulse: 71   Resp: 18   Temp: 97.8 °F (36.6 °C)         PHYSICAL EXAM:   general: comfortable, in no acute distress  VS: triage VS reviewed  HEENT: NC/AT, PERRL, EOMI  Neck: trachea  midline  CV: RRR, no m/r/g, + mid sternal reproducible chest pain to palpation no LE pitting edema/erythema + b/l calf tenderness  Resp: CTAB  ABD:  ND, + normal BS, NT  Renal: No CVAT  Neuro: AAO x 3, 5/5 muscle strength in upper and lower extremities, sensation grossly intact, face symmetric, speech normal  Ext: no deformity, +2 symmetrical peripheral pulses          DATA & INTERVENTIONS:    LABS reviewed:  Labs Reviewed   BASIC METABOLIC PANEL   CBC W/ AUTO DIFFERENTIAL   D DIMER, QUANTITATIVE   TROPONIN I       RADIOLOGY reviewed:  Imaging Results          X-Ray Chest PA And Lateral (Final result)  Result time 03/27/18 19:06:27    Final result by Malick Buitrago MD (03/27/18 19:06:27)                 Impression:      No detrimental change or radiographic acute intrathoracic process seen.      Electronically signed by: Malick Buitrago MD  Date:    03/27/2018  Time:    19:06             Narrative:    EXAMINATION:  XR CHEST PA AND LATERAL    CLINICAL HISTORY:  Chest pain, unspecified    TECHNIQUE:  PA and lateral views of the chest were performed.    COMPARISON:  Chest radiograph and CT thorax 01/24/2017.    FINDINGS:  EKG stickers overlie the chest.  Large body habitus.  Cardiomediastinal silhouette is within normal limits.  The lungs are well expanded and clear.  No pleural effusion or pneumothorax.  No acute osseous process seen.  Cholecystectomy clips.                                MEDICATIONS/FLUIDS:  Medications - No data to display      MDM:   32 yo F w/ continuous midsternal chest pain since yesterday w/ sob. + chronic lower extremity pain, no edema/erythema present on physical exam  Vitals stable, patietn looks comfortable  EKG w/ hr 74, nsr, + incomplete RBBB w/ TWI avr, V1-V4, III (except V2 all other changes were present on EKG from Jan 2017    Will check d-dimer, trop, cxr, cbc/bmp      8:20 pm: trop/d-dimer/cbc/bmp neg  cxr no acute      The patient has been carefully educated about symptoms and  conditions that should prompt immediate return to the ED for recheck or further evaluation. Told to return immediately for any new or worsening or progressive symptoms. In addition, patient told to return to the ED if they are unable to obtain adequate o/p follow-up as they have been directed. Patient expressed good undertanding of these instructions.      IMPRESSION:  1.) chest pain of unknown etiology      Dispo: Discharge    Critical Care Time: N/A         Leighann Barker MD  03/28/18 6789

## 2018-03-28 ENCOUNTER — OFFICE VISIT (OUTPATIENT)
Dept: GASTROENTEROLOGY | Facility: CLINIC | Age: 33
End: 2018-03-28
Payer: MEDICAID

## 2018-03-28 VITALS
WEIGHT: 208.75 LBS | HEIGHT: 62 IN | BODY MASS INDEX: 38.42 KG/M2 | DIASTOLIC BLOOD PRESSURE: 75 MMHG | HEART RATE: 93 BPM | SYSTOLIC BLOOD PRESSURE: 113 MMHG

## 2018-03-28 DIAGNOSIS — R13.19 ESOPHAGEAL DYSPHAGIA: ICD-10-CM

## 2018-03-28 DIAGNOSIS — K21.9 GASTROESOPHAGEAL REFLUX DISEASE WITHOUT ESOPHAGITIS: Primary | ICD-10-CM

## 2018-03-28 DIAGNOSIS — K58.0 IRRITABLE BOWEL SYNDROME WITH DIARRHEA: ICD-10-CM

## 2018-03-28 PROCEDURE — 99215 OFFICE O/P EST HI 40 MIN: CPT | Mod: PBBFAC | Performed by: INTERNAL MEDICINE

## 2018-03-28 PROCEDURE — 99999 PR PBB SHADOW E&M-EST. PATIENT-LVL V: CPT | Mod: PBBFAC,,, | Performed by: INTERNAL MEDICINE

## 2018-03-28 PROCEDURE — 99215 OFFICE O/P EST HI 40 MIN: CPT | Mod: S$PBB,,, | Performed by: INTERNAL MEDICINE

## 2018-03-28 NOTE — PROGRESS NOTES
Subjective:       Patient ID: Maria Ines Ac is a 33 y.o. female.    Chief Complaint: Follow-up    HPI  Review of Systems   Constitutional: Negative for activity change, appetite change, chills, diaphoresis, fatigue, fever and unexpected weight change.   HENT: Positive for sore throat. Negative for congestion, ear pain, mouth sores, nosebleeds, postnasal drip, rhinorrhea, sinus pressure, trouble swallowing and voice change.    Eyes: Negative for pain.   Respiratory: Positive for cough. Negative for shortness of breath and wheezing.    Cardiovascular: Negative for chest pain, palpitations and leg swelling.   Genitourinary: Negative for difficulty urinating, dysuria, flank pain, hematuria and menstrual problem.   Musculoskeletal: Negative for arthralgias, back pain, gait problem, joint swelling, myalgias and neck pain.   Skin: Negative for rash.   Neurological: Negative for dizziness, tremors, syncope, numbness and headaches.   Hematological: Negative for adenopathy. Does not bruise/bleed easily.   Psychiatric/Behavioral: Negative for agitation, behavioral problems, confusion, decreased concentration and dysphoric mood. The patient is not nervous/anxious.        Objective:      Physical Exam   Constitutional: She is oriented to person, place, and time. She appears well-developed and well-nourished. No distress.   HENT:   Head: Normocephalic and atraumatic.   Right Ear: External ear normal.   Left Ear: External ear normal.   Nose: Nose normal.   Mouth/Throat: Oropharynx is clear and moist. No oropharyngeal exudate.   Eyes: Conjunctivae are normal. Pupils are equal, round, and reactive to light. No scleral icterus.   Neck: Normal range of motion. Neck supple. No thyromegaly present.   Cardiovascular: Normal rate, regular rhythm, normal heart sounds and intact distal pulses.  Exam reveals no gallop.    No murmur heard.  Pulmonary/Chest: Effort normal and breath sounds normal. She has no wheezes. She has no rales.    Abdominal: Soft. Bowel sounds are normal. She exhibits no distension and no mass. There is no tenderness. There is no rebound and no guarding.   Musculoskeletal: Normal range of motion. She exhibits no edema or tenderness.   Lymphadenopathy:     She has no cervical adenopathy.   Neurological: She is alert and oriented to person, place, and time. No cranial nerve deficit.   Skin: Skin is warm and dry. No rash noted.   Psychiatric: She has a normal mood and affect. Her behavior is normal. Judgment and thought content normal.       Assessment:       1. Gastroesophageal reflux disease without esophagitis    2. Esophageal dysphagia    3. Irritable bowel syndrome with diarrhea        Plan:         HISTORY OF PRESENT ILLNESS:  This is a followup visit for Maria Ines.  She is a   33-year-old woman with IBS, diarrhea predominant, and subjective dysphagia.    She comes in today with a chief complaint of reflux.  She is having bad acid   reflux.  She has a long history of heartburn and she describes typical heartburn   and pyrosis.  She also describes regurgitation.  These symptoms seem to be   worse in the morning, but they are present all day long and she has nocturnal   episodes as well.  Sometimes the episodes wake her up at night.  They are   happening despite taking Protonix every morning and Prilosec every evening.  She   has associated with the reflux, some sore throat and coughing.    The dysphagia is doing better.  We did an empiric dilation of a normal esophagus   last September and the dysphagia is not much of an issue.  She continues to   have diarrhea.  She has had in the past.  I have used Bentyl and Questran for   this in the past.  The Questran did not help.  The Bentyl did seem to help.  She   has four bowel movements today.  They are all loose to watery.  This is similar   to what she has had in the past.  She also describes definitely early satiety   and postprandial nausea and discomfort.    ASSESSMENT AND  DECISION MAKIN.  Gastroesophageal reflux.  At her endoscopy, she has had shown sign of   esophagitis.  Subjectively, she is describing significant reflux.  I wonder if   there is a component of diabetic gastroparesis.  Also, reflux may be made worse   by her obesity.  If there is no gastroparesis and this is true refractory   reflux, then one might consider fundoplication that will be more complicating in   her case because she already has subjective dysphagia and likely an esophageal   dysmotility disorder.  If she does have gastroparesis, then one could cautiously   try low doses of Reglan to see if that helped her symptoms.  I do not think we   need an EGD at this time since we just did one 16 months ago but I think a   gastric emptying scan would be helpful.  2.  Esophageal dysphagia.  This is subjective dysphagia, likely a motility   problem.  No reason for further evaluation.  Four years ago, she did have a   normal barium swallow, one might consider motility testing if this problem gets   worse.  3.  IBS with diarrhea.  This problem continues.  Since her main problem is   reflux we will address that now if we get that improved, then we will talk to   her more about the diarrhea.  She did have a colonoscopy with random biopsies in   May of 2016.    RECOMMENDATIONS:  1.  Gastric emptying scan.  2.  Follow up after that.      LEONID  dd: 2018 15:50:48 (CDT)  td: 2018 12:19:36 (CDT)  Doc ID   #[id number]  Job ID #[id number]    CC:

## 2018-04-02 ENCOUNTER — PROCEDURE VISIT (OUTPATIENT)
Dept: UROGYNECOLOGY | Facility: CLINIC | Age: 33
End: 2018-04-02
Payer: MEDICAID

## 2018-04-02 VITALS
SYSTOLIC BLOOD PRESSURE: 100 MMHG | HEIGHT: 62 IN | DIASTOLIC BLOOD PRESSURE: 70 MMHG | BODY MASS INDEX: 38.67 KG/M2 | WEIGHT: 210.13 LBS

## 2018-04-02 DIAGNOSIS — N95.1 MENOPAUSAL SYMPTOM: ICD-10-CM

## 2018-04-02 DIAGNOSIS — R35.0 URINARY FREQUENCY: ICD-10-CM

## 2018-04-02 DIAGNOSIS — N39.41 URGE INCONTINENCE: Primary | ICD-10-CM

## 2018-04-02 PROCEDURE — 64566 NEUROELTRD STIM POST TIBIAL: CPT | Mod: PBBFAC | Performed by: NURSE PRACTITIONER

## 2018-04-02 PROCEDURE — 64566 NEUROELTRD STIM POST TIBIAL: CPT | Mod: S$PBB,,, | Performed by: NURSE PRACTITIONER

## 2018-04-02 RX ORDER — LURASIDONE HYDROCHLORIDE 40 MG/1
TABLET, FILM COATED ORAL
Refills: 1 | Status: ON HOLD | COMMUNITY
Start: 2018-03-30 | End: 2018-11-05

## 2018-04-02 NOTE — PROCEDURES
Procedures     Patient is here for # 3/12 PTNS    Vitals:    04/02/18 1310   BP: 100/70         Review of bladder diary:    Voiding 24 times/ day  Leaking 24 times/ day  Drinking 7 1/2 -9 1/2 glasses/day      Performed PTNS x 30 minutes to R leg with level 3.    She has started detrol this week.    Julianna Gomez, TRACEY-BC

## 2018-04-06 ENCOUNTER — HOSPITAL ENCOUNTER (OUTPATIENT)
Dept: RADIOLOGY | Facility: HOSPITAL | Age: 33
Discharge: HOME | End: 2018-04-06
Attending: INTERNAL MEDICINE
Payer: MEDICAID

## 2018-04-06 DIAGNOSIS — K21.9 GASTROESOPHAGEAL REFLUX DISEASE WITHOUT ESOPHAGITIS: ICD-10-CM

## 2018-04-06 PROCEDURE — 78264 GASTRIC EMPTYING IMG STUDY: CPT | Mod: 26,,, | Performed by: RADIOLOGY

## 2018-04-06 PROCEDURE — A9541 TC99M SULFUR COLLOID: HCPCS

## 2018-04-06 PROCEDURE — 78264 GASTRIC EMPTYING IMG STUDY: CPT | Mod: TC

## 2018-04-09 ENCOUNTER — PROCEDURE VISIT (OUTPATIENT)
Dept: UROGYNECOLOGY | Facility: CLINIC | Age: 33
End: 2018-04-09
Payer: MEDICAID

## 2018-04-09 ENCOUNTER — TELEPHONE (OUTPATIENT)
Dept: UROGYNECOLOGY | Facility: CLINIC | Age: 33
End: 2018-04-09

## 2018-04-09 VITALS
DIASTOLIC BLOOD PRESSURE: 80 MMHG | WEIGHT: 211.19 LBS | BODY MASS INDEX: 38.86 KG/M2 | SYSTOLIC BLOOD PRESSURE: 106 MMHG | HEIGHT: 62 IN

## 2018-04-09 DIAGNOSIS — N39.41 URGE INCONTINENCE: ICD-10-CM

## 2018-04-09 DIAGNOSIS — N32.81 OVERACTIVE BLADDER: Primary | ICD-10-CM

## 2018-04-09 PROCEDURE — 64566 NEUROELTRD STIM POST TIBIAL: CPT | Mod: S$PBB,,, | Performed by: NURSE PRACTITIONER

## 2018-04-09 PROCEDURE — 64566 NEUROELTRD STIM POST TIBIAL: CPT | Mod: PBBFAC | Performed by: NURSE PRACTITIONER

## 2018-04-09 NOTE — TELEPHONE ENCOUNTER
Pharmacy sent a request for Premarin cream. Request denied by MAGDA Gomez Premarin tablets were sent on 4/2/18 by MAGDA Gomez.

## 2018-04-09 NOTE — PROCEDURES
Procedures     Patient is here for # 4/12 PTNS    Vitals:    04/09/18 1302   BP: 106/80         Review of bladder diary:    Voiding 23-24 times daily  Drinking 5 -9 1/2 glasses/day  Leaking 23-24 times daily      Performed PTNS x 30 minutes to L leg with level 5.    TRACEY Ryan-BC

## 2018-04-10 ENCOUNTER — TELEPHONE (OUTPATIENT)
Dept: GASTROENTEROLOGY | Facility: CLINIC | Age: 33
End: 2018-04-10

## 2018-04-10 NOTE — TELEPHONE ENCOUNTER
----- Message from Slick Herron MD sent at 4/6/2018  2:22 PM CDT -----  Please call, the emptying scan was normal.

## 2018-04-16 ENCOUNTER — OFFICE VISIT (OUTPATIENT)
Dept: UROGYNECOLOGY | Facility: CLINIC | Age: 33
End: 2018-04-16
Payer: MEDICAID

## 2018-04-16 VITALS
SYSTOLIC BLOOD PRESSURE: 100 MMHG | HEIGHT: 62 IN | DIASTOLIC BLOOD PRESSURE: 70 MMHG | WEIGHT: 211.44 LBS | BODY MASS INDEX: 38.91 KG/M2

## 2018-04-16 DIAGNOSIS — N39.41 URGE INCONTINENCE: Primary | ICD-10-CM

## 2018-04-16 PROCEDURE — 99214 OFFICE O/P EST MOD 30 MIN: CPT | Mod: PBBFAC | Performed by: NURSE PRACTITIONER

## 2018-04-16 PROCEDURE — 99499 UNLISTED E&M SERVICE: CPT | Mod: S$PBB,,, | Performed by: NURSE PRACTITIONER

## 2018-04-16 PROCEDURE — 99999 PR PBB SHADOW E&M-EST. PATIENT-LVL IV: CPT | Mod: PBBFAC,,, | Performed by: NURSE PRACTITIONER

## 2018-04-16 PROCEDURE — 64566 NEUROELTRD STIM POST TIBIAL: CPT | Mod: S$PBB,,, | Performed by: NURSE PRACTITIONER

## 2018-04-16 PROCEDURE — 64566 NEUROELTRD STIM POST TIBIAL: CPT | Mod: PBBFAC | Performed by: NURSE PRACTITIONER

## 2018-04-16 NOTE — PROCEDURES
Procedures     Patient is here for # 5/12 PTNS    Vitals:    04/16/18 1312   BP: 100/70         Review of bladder diary:  Voiding 24 times/ day  Drinking 7 1/2 glasses/ day  Leaking 24 times/ day      Performed PTNS x 30 minutes to R leg with level 8.  Patient tolerated without any complaints.    TRACEY Ryan-BC

## 2018-04-23 ENCOUNTER — OFFICE VISIT (OUTPATIENT)
Dept: UROGYNECOLOGY | Facility: CLINIC | Age: 33
End: 2018-04-23
Payer: MEDICAID

## 2018-04-23 VITALS
SYSTOLIC BLOOD PRESSURE: 110 MMHG | DIASTOLIC BLOOD PRESSURE: 80 MMHG | HEIGHT: 62 IN | BODY MASS INDEX: 39.11 KG/M2 | WEIGHT: 212.5 LBS

## 2018-04-23 DIAGNOSIS — N95.2 VAGINAL ATROPHY: ICD-10-CM

## 2018-04-23 DIAGNOSIS — N39.41 URGE INCONTINENCE: Primary | ICD-10-CM

## 2018-04-23 PROCEDURE — 99214 OFFICE O/P EST MOD 30 MIN: CPT | Mod: PBBFAC | Performed by: NURSE PRACTITIONER

## 2018-04-23 PROCEDURE — 99213 OFFICE O/P EST LOW 20 MIN: CPT | Mod: 25,S$PBB,, | Performed by: NURSE PRACTITIONER

## 2018-04-23 PROCEDURE — 64566 NEUROELTRD STIM POST TIBIAL: CPT | Mod: PBBFAC | Performed by: NURSE PRACTITIONER

## 2018-04-23 PROCEDURE — 64566 NEUROELTRD STIM POST TIBIAL: CPT | Mod: S$PBB,,, | Performed by: NURSE PRACTITIONER

## 2018-04-23 PROCEDURE — 99999 PR PBB SHADOW E&M-EST. PATIENT-LVL IV: CPT | Mod: PBBFAC,,, | Performed by: NURSE PRACTITIONER

## 2018-04-24 NOTE — PATIENT INSTRUCTIONS
1. Shazia's Butt Paste daily to vulva.  3. Vaginal atrophy:  Estrogen cream 0.5 g twice weekly  4. Only have coke zero with meds at meal time-- limit 3/day.  5. Only drink 4 ounces at a time.  If you drink a coke zero, you can not drink anything else that hour.  6. For dry mouth, use sour candies (sugar free ones) and biotene mouth wash  7. Continue tolterodine 4 mg daily.   8. Vaginal valium suppository 1-2 times daily as needed.  9. Compression stocking  10. RTC 1 week

## 2018-04-24 NOTE — PROCEDURES
Procedures     Patient is here for # 6/12 PTNS    Vitals:    04/23/18 1445   BP: 110/80         Review of bladder diary:  Patient did not bring bladder diaries.  States she is not drinking more than 4 ounces of fluid per hour. Reports voiding every hour and still leaking several times daily.    Performed PTNS x 30 minutes to L leg with level 1.    Patient tolerated without complaints.  TRACEY Ryan-BC

## 2018-04-24 NOTE — PROGRESS NOTES
Maria Ines Ac is a 32 y.o.  here for  follow up.  She has a history of retropubic sling/ cystourethroscopy for DILIP 1/2017. She has persistent urinary urgency which has not improved since starting pelvic floor PT.  She is here for #6/12 PTNS     Interval  HP since the last visit: (updates in bold)    1)  UI:  (+) JACINDA rarely  (+) UUI  Several times day. (+) pads: 4/ day  Daytime frequency: Q 1 hours.  Nocturia: 3/night    (--)dysuria  (--) hematuria,  (--) frequent UTIs.  not complete bladder emptying. No abdominal pain   2)  POP:  Absent    Symptoms:(--)  .  (--) vaginal bleeding. (+) vaginal discharge. (-) sexually active.  (--) dyspareunia.   (--)  Vaginal dryness.  (+) vaginal estrogen use. Also using Shazia's Butt Pste. Complains of vaginal itching  3)  BM:  (-) constipation/straining.  (+) chronic diarrhea, 4 loose stools per day. (--) hematochezia.  (--) fecal incontinence.  (--) fecal smearing/urgency.  (--) incomplete evacuation.    4) pelvic pain improved with the vaginal suppositories, pain in groin region now resolved             Past Medical History:   Diagnosis Date    Blood in stool      Constipation      Depression      Diabetes mellitus, type 2      Dysphagia      GERD (gastroesophageal reflux disease)      Hypertension      Palpitations      Premature menopause on hormone replacement therapy      Thyroid disease                 Past Surgical History:   Procedure Laterality Date    BLADDER SUSPENSION        CARPAL TUNNEL RELEASE         right    COLONOSCOPY N/A 8/30/2016     Procedure: COLONOSCOPY; Surgeon: Slick Herron MD; Location: [facility] ([address] [place] [address]); Service: Endoscopy; Laterality: N/A; PM prep    GALLBLADDER SURGERY        HYSTERECTOMY   2013     ron, Dr. Ashley Smith    OOPHORECTOMY   2005     [facility]- benign cyst    OOPHORECTOMY   2013     [facility]- endo    ooptherectomy        right knee   scoped    SHOULDER SURGERY   right         Current Outpatient  Prescriptions   Medication Sig    ACCU-CHEK AMAURY PLUS TEST STRP Strp USE TO TEST BLOOD GLUCOSE TID    ACCU-CHEK SOFTCLIX LANCETS Misc USE TO TEST BLOOD GLUCOSE TID    atorvastatin (LIPITOR) 40 MG tablet Take 40 mg by mouth once daily.    BLOOD SUGAR DIAGNOSTIC (ACCU-CHEK AMAURY PLUS TEST STRP MISC) 1 strip by Misc.(Non-Drug; Combo Route) route 3 (three) times daily.    calcium-vitamin D3 500 mg(1,250mg) -200 unit per tablet Take 1 tablet by mouth 2 (two) times daily with meals.    conjugated estrogens (PREMARIN) vaginal cream Use 0.5 gram of estrogen cream in vagina nightly x 2 weeks, then twice a week thereafter.    dicyclomine (BENTYL) 10 MG capsule TAKE 2 CAPSULES(20 MG) BY MOUTH THREE TIMES DAILY BEFORE MEALS    docusate sodium (COLACE) 100 MG capsule Take 100 mg by mouth 2 (two) times daily.    dulaglutide (TRULICITY) 0.75 mg/0.5 mL PnIj Inject 0.75 mg into the skin every 7 days.    estrogens, conjugated, (PREMARIN) 0.3 MG tablet Take 1 tablet (0.3 mg total) by mouth once daily.    FARXIGA 10 mg Tab Take 1 tablet by mouth once daily.     fish oil-omega-3 fatty acids 300-1,000 mg capsule Take 2 g by mouth once daily.    GLUCOPHAGE 500 mg tablet Take 500 mg by mouth 2 (two) times daily with meals.     LATUDA 40 mg Tab tablet TK 1 T PO  QAM    LATUDA 80 mg Tab tablet TK 1 T PO D    levothyroxine (SYNTHROID) 50 MCG tablet TK 1 T PO QAM    metoprolol succinate (TOPROL-XL) 25 MG 24 hr tablet Take 25 mg by mouth once daily.     mirabegron (MYRBETRIQ) 50 mg Tb24 Take 1 tablet (50 mg total) by mouth once daily.    MULTIVIT-IRON-MIN-FOLIC ACID 3,500-18-0.4 UNIT-MG-MG ORAL CHEW Take 1 tablet by mouth once daily.     nystatin (MYCOSTATIN) powder Apply topically 2 (two) times daily.    nystatin-triamcinolone (MYCOLOG II) cream Apply topically 2 (two) times daily.    OMEPRAZOLE (PRILOSEC ORAL) Take by mouth.    ondansetron (ZOFRAN-ODT) 4 MG TbDL DISSOLVE 1 T PO Q 8 H PRN N    oxybutynin (DITROPAN XL)  "15 MG TR24     pantoprazole (PROTONIX) 40 MG tablet Take 1 tablet (40 mg total) by mouth once daily.    phentermine (ADIPEX-P) 37.5 mg tablet Take 37.5 mg by mouth before breakfast.    promethazine (PHENERGAN) 25 MG tablet TK 1 T PO  Q 4 TO 6 H PRN P    spironolactone (ALDACTONE) 25 MG tablet TK 1 T PO HS    terconazole (TERAZOL 3) 0.8 % vaginal cream Place 1 applicator vaginally once daily.    tolterodine (DETROL LA) 4 MG 24 hr capsule Take 1 capsule (4 mg total) by mouth once daily.    topiramate (TOPAMAX) 50 MG tablet Take 2 tablets (100 mg total) by mouth once daily.    ZOLOFT 100 mg tablet Take 200 mg by mouth once daily.      No current facility-administered medications for this visit.         Exam  Vitals:    04/23/18 1445   BP: 110/80   BP Location: Right arm   Patient Position: Sitting   BP Method: Large (Manual)   Weight: 96.4 kg (212 lb 8.4 oz)   Height: 5' 2" (1.575 m)       General: alert and oriented, no acute distress  Respiratory: normal respiratory effort  Abd: soft, non-distended.  ND/NT     PELVIC EXAM: --deferred     Impression  1. Urge incontinence     2. Vaginal atrophy         We reviewed the above issues and discussed options for short-term versus long-term management of her problems.     Plan:     1. Shazia's Butt Paste daily to vulva.  3. Vaginal atrophy:  Estrogen cream 0.5 g twice weekly  4. Only have coke zero with meds at meal time-- limit 3/day.  5. Only drink 4 ounces at a time.  If you drink a coke zero, you can not drink anything else that hour.  6. For dry mouth, use sour candies (sugar free ones) and biotene mouth wash  7. Continue tolterodine 4 mg daily.   8. Vaginal valium suppository 1-2 times daily as needed.  9. Compression stocking  10. RTC 1 week    30 minutes were spent in face to face time with this patient  75 % of this time was spent in counseling and/or coordination of care        "

## 2018-04-25 ENCOUNTER — OFFICE VISIT (OUTPATIENT)
Dept: NEUROLOGY | Facility: CLINIC | Age: 33
End: 2018-04-25
Payer: MEDICAID

## 2018-04-25 VITALS
BODY MASS INDEX: 38.62 KG/M2 | DIASTOLIC BLOOD PRESSURE: 79 MMHG | WEIGHT: 209.88 LBS | SYSTOLIC BLOOD PRESSURE: 104 MMHG | HEIGHT: 62 IN | HEART RATE: 84 BPM

## 2018-04-25 DIAGNOSIS — R51.9 UNCONTROLLED MORNING HEADACHE: Primary | ICD-10-CM

## 2018-04-25 DIAGNOSIS — G44.52 NEW DAILY PERSISTENT HEADACHE: ICD-10-CM

## 2018-04-25 DIAGNOSIS — G43.109 MIGRAINOUS VERTIGO: ICD-10-CM

## 2018-04-25 PROCEDURE — 99215 OFFICE O/P EST HI 40 MIN: CPT | Mod: PBBFAC | Performed by: STUDENT IN AN ORGANIZED HEALTH CARE EDUCATION/TRAINING PROGRAM

## 2018-04-25 PROCEDURE — 99999 PR PBB SHADOW E&M-EST. PATIENT-LVL V: CPT | Mod: PBBFAC,,, | Performed by: STUDENT IN AN ORGANIZED HEALTH CARE EDUCATION/TRAINING PROGRAM

## 2018-04-25 PROCEDURE — 99214 OFFICE O/P EST MOD 30 MIN: CPT | Mod: S$PBB,,, | Performed by: STUDENT IN AN ORGANIZED HEALTH CARE EDUCATION/TRAINING PROGRAM

## 2018-04-25 RX ORDER — AMITRIPTYLINE HYDROCHLORIDE 25 MG/1
25 TABLET, FILM COATED ORAL NIGHTLY PRN
Qty: 30 TABLET | Refills: 11 | Status: SHIPPED | OUTPATIENT
Start: 2018-04-25 | End: 2018-07-25

## 2018-04-25 RX ORDER — METHYLPREDNISOLONE 4 MG/1
TABLET ORAL
Qty: 7 TABLET | Refills: 0 | Status: SHIPPED | OUTPATIENT
Start: 2018-04-25 | End: 2018-05-16

## 2018-04-25 NOTE — ASSESSMENT & PLAN NOTE
-Encouraged patient to continue her working out routine  -Noted patient can have 2-3 Coke Zeros per day   -Will continue topiramate for now as patient has a previous hx of migraines per Dr. Gee   -Patient has also had some weight loss since increasing the topiramate   -Will discuss tapering off topiramate on next visit  -Advised patient to avoid taking so much Aleve, counseled on rebound headache  -Pt will try to avoid taking OTC meds for headache as possible for next wk or will alternate Aleve and Tylenol or Excedrin Migraine  -Medrol dosepak x 7 days while trying to wean off of NSAIDs for the next week.  Short course, pt with very good glucose control at baseline.  -Ordered polysomnography due to concern for sleep apnea w/ am headaches, + family history

## 2018-04-25 NOTE — ASSESSMENT & PLAN NOTE
-Patient notes that her dizziness only occurs with headaches, not interfering with function  -Will continue to try treating headaches and maintain topiramate dosing for now  -Will re-evaluate for other causes of vertigo again on next visit, 2-3 months

## 2018-04-25 NOTE — PROGRESS NOTES
"NEUROLOGY OUTPATIENT CLINIC NOTE    Patient Name:  Maria Ines Ac  Patient MRN:  [id number]    Interval History 04/25/18:  Patient notes continued headaches, states that she has headache currently estimates pain of 6/10.  She has been doing wonderfully with cutting down on caffeine and walking a lot and has actually lost 10 lbs.  Now drinks 3 Coke Zero cans per day instead of several ("one every hour") mentioned at last visit.  She continues to have poor sleep.  Notes that she often wakes up with her headache.  It is generally in the forehead region and radiates toward crown of head becoming a holocephalic headache.  Still has dizziness, described as room spinning, that occurs with her headache.  She currently takes Aleve and has her first Coke Zero of the day when she has her headache, estimates taking 3 Aleve per day.  States she is having headache every day.  Did not notice major change with the topiramate despite increasing up to 100 mg qHS.  No changes otherwise.  Some recent stress related to her mother being sick and in the hospital.  Patient denies feeling more stressed than usual.  Discussed rebound headache, caffeine withdrawal headache, and ZAC with am headache as potential causes of patient's continued daily am headaches.  Her father is with her and states that he thinks she may have some vision changes and needs some glasses, thinks she is straining to see and having headaches due to straining her eyes.    HPI--from initial visit 10/18/18:  Patient is a very pleasant 32 y.o. female with PMHx of HTN, DM II with gastroparesis, obesity, and hypothyroidism who presents to [facility] [place] [facility] 10/21/2017 for headaches.  Patient states that she has had headaches since about 03/2017 but they have gottne much worse over past 3-4 months.  She says they start in the middle of her forehead and radiate throughout the head to the occiput.  She describes the pain as sharp.  They are relieved by Aleve after about " "an hour but will recur, sometimes up to 4 times per day.  She notes associated n/v and rhinorrhea with headaches. She also notes dizziness associated with the headaches.  This is described as room spinning and is associated with tinnitus but not with hearing loss, ear fullness.  Denies recent infections, does not have sensation of hearing her pulse in her ear or other intrinsic sounds. Denies photophobia, phonophobia, lacrimation, injection of the eyes with headaches.  Unable to describe any triggers.  States that she had headaches during adolescence as well but they were not this frequent and usually would just go away with OTC NSAIDs.  She is accompanied by her dad who assists with the history and he notes that she drinks several Coke Zero soft drinks during the day, stating "one every hour."  Patient acknowledges drinking several soft drinks per day.  She also does not sleep very well and gets maybe 4 hours of sleep per night with a lot of tossing and turning.  Her father notes cell phone and tablet use before bed.  She does do a lot of walking during the day for exercise which father confirms.  Patient has not had any medication changes recently and was prescribed topiramate back in March 2017 and has not been titrated up--still on 25 mg po qHS which does not seem to be preventing these headaches.    ROS:  General:  No fever, no chills, no fatigue, +10 lb weight loss (intentional)  HEENT:  + headache, no changes in vision  Respiratory:  No cough, no SOB  Cardiovascular:  No chest pain, no palpitations  GI:  No abdominal pain, no n/v/c/d  :  No dysuria, no change in frequency, no hematuria  Skin:  No rashes, no pruritus, no wounds  Musculoskeletal:  No myalgias, no arthralgias  Hematologic:  No easy bruising or bleeding  Endocrine:  No heat or cold intolerance, no polyuria/polydipsia  Neuro:  No tremors, no focal weakness, no paresthesias  Psych:  No anxiety, no depression    PMHx:  Patient Active Problem List "   Diagnosis    Abdominal pain, RLQ (right lower quadrant)    Dysphagia    Diabetes mellitus type II    Blood in stool    Constipation, chronic    Right hip pain    Migraine without aura    Hypertension    Palpitations    Chest pain of unknown etiology    Dyspnea on exertion    Urinary frequency    Vulvovaginitis    Obesity (BMI 30-39.9)    Muscle spasm    Gastroesophageal reflux disease without esophagitis    Irritable bowel syndrome with diarrhea     PSHx:  Past Surgical History:   Procedure Laterality Date    BLADDER SUSPENSION      CARPAL TUNNEL RELEASE      right    CHOLECYSTECTOMY      COLONOSCOPY N/A 8/30/2016    Procedure: COLONOSCOPY;  Surgeon: Slick Herron MD;  Location: [facility] ([address] [place] [address]);  Service: Endoscopy;  Laterality: N/A;  PM prep    GALLBLADDER SURGERY      HYSTERECTOMY  2013    Bess velasquez Dr. Champlain    OOPHORECTOMY  2005    [facility]- benign cyst    OOPHORECTOMY  2013    [facility]- endo    ooptherectomy      right knee  scoped    SHOULDER SURGERY  right     Medications:  Current Outpatient Prescriptions on File Prior to Visit   Medication Sig Dispense Refill    ACCU-CHEK AMAURY PLUS TEST STRP Strp USE TO TEST BLOOD GLUCOSE TID  4    ACCU-CHEK SOFTCLIX LANCETS Misc USE TO TEST BLOOD GLUCOSE TID  3    atorvastatin (LIPITOR) 40 MG tablet Take 40 mg by mouth once daily.      BLOOD SUGAR DIAGNOSTIC (ACCU-CHEK AMAURY PLUS TEST STRP MISC) 1 strip by Misc.(Non-Drug; Combo Route) route 3 (three) times daily.      calcium-vitamin D3 500 mg(1,250mg) -200 unit per tablet Take 1 tablet by mouth 2 (two) times daily with meals.      conjugated estrogens (PREMARIN) vaginal cream Use 0.5 gram of estrogen cream in vagina nightly x 2 weeks, then twice a week thereafter. 30 g 4    dicyclomine (BENTYL) 10 MG capsule TAKE 2 CAPSULES(20 MG) BY MOUTH THREE TIMES DAILY BEFORE MEALS 180 capsule 5    docusate sodium (COLACE) 100 MG capsule Take 100 mg by mouth 2 (two) times daily.       dulaglutide (TRULICITY) 0.75 mg/0.5 mL PnIj Inject 0.75 mg into the skin every 7 days.      estrogens, conjugated, (PREMARIN) 0.3 MG tablet Take 1 tablet (0.3 mg total) by mouth once daily. 30 tablet 11    FARXIGA 10 mg Tab Take 1 tablet by mouth once daily.       fish oil-omega-3 fatty acids 300-1,000 mg capsule Take 2 g by mouth once daily.      GLUCOPHAGE 500 mg tablet Take 500 mg by mouth 2 (two) times daily with meals.       LATUDA 40 mg Tab tablet TK 1 T PO  QAM  1    LATUDA 80 mg Tab tablet TK 1 T PO D  2    levothyroxine (SYNTHROID) 50 MCG tablet TK 1 T PO QAM  8    metoprolol succinate (TOPROL-XL) 25 MG 24 hr tablet Take 25 mg by mouth once daily.       mirabegron (MYRBETRIQ) 50 mg Tb24 Take 1 tablet (50 mg total) by mouth once daily. 30 tablet 11    MULTIVIT-IRON-MIN-FOLIC ACID 3,500-18-0.4 UNIT-MG-MG ORAL CHEW Take 1 tablet by mouth once daily.       nystatin (MYCOSTATIN) powder Apply topically 2 (two) times daily. 30 g 3    nystatin-triamcinolone (MYCOLOG II) cream Apply topically 2 (two) times daily. 30 g 2    OMEPRAZOLE (PRILOSEC ORAL) Take by mouth.      ondansetron (ZOFRAN-ODT) 4 MG TbDL DISSOLVE 1 T PO Q 8 H PRN N  0    oxybutynin (DITROPAN XL) 15 MG TR24   1    pantoprazole (PROTONIX) 40 MG tablet Take 1 tablet (40 mg total) by mouth once daily. 90 tablet 3    phentermine (ADIPEX-P) 37.5 mg tablet Take 37.5 mg by mouth before breakfast.      promethazine (PHENERGAN) 25 MG tablet TK 1 T PO  Q 4 TO 6 H PRN P  0    spironolactone (ALDACTONE) 25 MG tablet TK 1 T PO HS  2    terconazole (TERAZOL 3) 0.8 % vaginal cream Place 1 applicator vaginally once daily. 20 g 0    tolterodine (DETROL LA) 4 MG 24 hr capsule Take 1 capsule (4 mg total) by mouth once daily. 30 capsule 11    topiramate (TOPAMAX) 50 MG tablet Take 2 tablets (100 mg total) by mouth once daily. 60 tablet 11    ZOLOFT 100 mg tablet Take 200 mg by mouth once daily.        No current facility-administered medications on  "file prior to visit.      Allergies:  Review of patient's allergies indicates:  No Known Allergies    Physical Exam:  /79   Pulse 84   Ht 5' 2" (1.575 m)   Wt 95.2 kg (209 lb 14.1 oz)   LMP 11/17/2012 (LMP Unknown)   BMI 38.39 kg/m²   General:  Well-developed, obese, nad  HEENT:  NCAT, PERRL, EOMI, oropharygneal membranes non-erythematous/without exudate  Neck:  Supple, normal ROM without nuchal rigidity  Respiratory:  Symmetric expansion, no increased wob  CVS:  No LE edema.  Extremities warm, well-perfused.  GI:  Abd soft, non-distended, non-tender to palpation  Skin:  No visible rashes or wounds  Psych: Very pleasant, cooperative with exam.   Neurologic Exam:  Mental Status:  AAOx3. Converses easily. Able to spell 'world' forward but not backward. Possibly some baseline ID but speech, thought content appropriate.  Cranial Nerves:  PERRLA, EOMI.  Visual fields intact to confrontation. Facial movement intact and symmetric. Tongue protrudes midline, palate raises symmetrically.  Trapezius 5/5 bilaterally.  Motor:  Normal muscle bulk and tone.  Strength 5/5 throughout.  Sensory:  Sensation intact to light touch at all extremities.  Vibratory sensation intact and symmetric at BUE/BLE digits.  Reflexes:  Reflexes 2+--biceps, brachioradialis, patellar, Achilles.  No ankle clonus.     Coordination:  No resting tremor noted.  FTN, HTS, LYNN wnl--no ataxia, dysmetria, or dysdiadochokinesia.  Gait:  Appropriate gait, appropriate arm swing.  No shuffling, magnetic gait, imbalance, weakness, or foot drop noted.    Labs:  Results: CBC:   Lab Results   Component Value Date/Time    WBC 8.07 03/27/2018 06:04 PM    RBC 4.76 03/27/2018 06:04 PM    HGB 13.7 03/27/2018 06:04 PM    HCT 42.5 03/27/2018 06:04 PM     03/27/2018 06:04 PM    MCV 89 03/27/2018 06:04 PM    MCH 28.8 03/27/2018 06:04 PM    MCHC 32.2 03/27/2018 06:04 PM     CMP:   Lab Results   Component Value Date/Time    GLU 78 03/27/2018 06:04 PM    CALCIUM " 9.9 03/27/2018 06:04 PM    ALBUMIN 4.2 01/22/2018 07:43 AM    PROT 7.9 01/22/2018 07:43 AM     03/27/2018 06:04 PM    K 4.0 03/27/2018 06:04 PM    CO2 23 03/27/2018 06:04 PM     03/27/2018 06:04 PM    BUN 13 03/27/2018 06:04 PM    CREATININE 1.1 03/27/2018 06:04 PM    ALKPHOS 55 01/22/2018 07:43 AM    ALT 17 01/22/2018 07:43 AM    AST 16 01/22/2018 07:43 AM    BILITOT 0.3 01/22/2018 07:43 AM     Imaging:  Imaging Results    None       Additional Diagnotic Testing:  N/a    ASSESSMENT/PLAN:  Patient is a 32 y.o. female with a PMHx of HTN, DM II with gastroparesis, obesity, and hypothyroidism who presents to [facility] 4/25/2018 for 6 month follow up appointment due to daily headache.    Problem List Items Addressed This Visit        Neuro    New daily persistent headache    Overview     Originally diagnosed as migraine headache with Dr. Gee.  Now gives hx of headache in forehead that becomes holocephalic.  It specifically presents in the mornings. Suspect some components of rebound headache and caffeine withdrawal headache.  Question whether patient has sleep apnea.         Current Assessment & Plan     -Encouraged patient to continue her working out routine  -Noted patient can have 2-3 Coke Zeros per day   -Will continue topiramate for now as patient has a previous hx of migraines per Dr. Gee   -Patient has also had some weight loss since increasing the topiramate   -Will discuss tapering off topiramate on next visit  -Advised patient to avoid taking so much Aleve, counseled on rebound headache  -Pt will try to avoid taking OTC meds for headache as possible for next wk or will alternate Aleve and Tylenol or Excedrin Migraine  -Medrol dosepak x 7 days while trying to wean off of NSAIDs for the next week.  Short course, pt with very good glucose control at baseline.  -Ordered polysomnography due to concern for sleep apnea w/ am headaches, + family history  -Patient also has an  "Ophthalmology appt coming up, defer to them to address vision changes which may contribute to tension headache         Migrainous vertigo    Overview     Pt reports feeling "room spinning" during her headaches.         Current Assessment & Plan     -Patient notes that her dizziness only occurs with headaches, not interfering with function  -Will continue to try treating headaches and maintain topiramate dosing for now  -Will re-evaluate for other causes of vertigo again on next visit, 2-3 months           Other Visit Diagnoses     Uncontrolled morning headache    -  Primary    Relevant Orders    Polysomnogram (CPAP will be added if patient meets diagnostic criteria.)        Mena Lambert MD  Pager:  [phone] [place] [address]  [phone]  "

## 2018-04-25 NOTE — PATIENT INSTRUCTIONS
Order sleep study.  Change to amitriptyline 25 mg at night for headaches.  Ok to continue 2-3 caffeine drinks per day for now.  Try to cut back on Aleve--alternate with Tylenol or Excedrine Migraine.  Will prescribed Medrol Dosepak while patient is cutting back on Aleve.

## 2018-04-30 ENCOUNTER — PROCEDURE VISIT (OUTPATIENT)
Dept: UROGYNECOLOGY | Facility: CLINIC | Age: 33
End: 2018-04-30
Payer: MEDICAID

## 2018-04-30 VITALS
DIASTOLIC BLOOD PRESSURE: 70 MMHG | SYSTOLIC BLOOD PRESSURE: 110 MMHG | HEIGHT: 62 IN | BODY MASS INDEX: 38.67 KG/M2 | WEIGHT: 210.13 LBS

## 2018-04-30 DIAGNOSIS — N32.81 OVERACTIVE BLADDER: ICD-10-CM

## 2018-04-30 DIAGNOSIS — N39.41 URGE INCONTINENCE: Primary | ICD-10-CM

## 2018-04-30 PROCEDURE — 64566 NEUROELTRD STIM POST TIBIAL: CPT | Mod: S$PBB,,, | Performed by: NURSE PRACTITIONER

## 2018-04-30 PROCEDURE — 64566 NEUROELTRD STIM POST TIBIAL: CPT | Mod: PBBFAC | Performed by: NURSE PRACTITIONER

## 2018-04-30 NOTE — PROCEDURES
Procedures     Patient is here for # 7/12 PTNS    Vitals:    04/30/18 1252   BP: 110/70         Review of bladder diary:  Patient voiding every hour and leaking every hours.  Not following recommendations for fluid intake.  Drinking 16 ounces every other hour.  Total intake 7 1/2 glasses daily.      Performed PTNS x 30 minutes to L leg with level 5.

## 2018-05-04 ENCOUNTER — TELEPHONE (OUTPATIENT)
Dept: SLEEP MEDICINE | Facility: CLINIC | Age: 33
End: 2018-05-04

## 2018-05-07 ENCOUNTER — OFFICE VISIT (OUTPATIENT)
Dept: UROGYNECOLOGY | Facility: CLINIC | Age: 33
End: 2018-05-07
Payer: MEDICAID

## 2018-05-07 VITALS
WEIGHT: 209.44 LBS | DIASTOLIC BLOOD PRESSURE: 70 MMHG | BODY MASS INDEX: 38.54 KG/M2 | SYSTOLIC BLOOD PRESSURE: 110 MMHG | HEIGHT: 62 IN

## 2018-05-07 DIAGNOSIS — N39.41 URGE INCONTINENCE: Primary | ICD-10-CM

## 2018-05-07 DIAGNOSIS — R35.0 URINARY FREQUENCY: ICD-10-CM

## 2018-05-07 DIAGNOSIS — R39.15 URINARY URGENCY: ICD-10-CM

## 2018-05-07 PROCEDURE — 99999 PR PBB SHADOW E&M-EST. PATIENT-LVL IV: CPT | Mod: PBBFAC,,, | Performed by: NURSE PRACTITIONER

## 2018-05-07 PROCEDURE — 64566 NEUROELTRD STIM POST TIBIAL: CPT | Mod: PBBFAC | Performed by: NURSE PRACTITIONER

## 2018-05-07 PROCEDURE — 64566 NEUROELTRD STIM POST TIBIAL: CPT | Mod: S$PBB,,, | Performed by: NURSE PRACTITIONER

## 2018-05-07 PROCEDURE — 99214 OFFICE O/P EST MOD 30 MIN: CPT | Mod: PBBFAC,25 | Performed by: NURSE PRACTITIONER

## 2018-05-07 PROCEDURE — 99499 UNLISTED E&M SERVICE: CPT | Mod: S$PBB,,, | Performed by: NURSE PRACTITIONER

## 2018-05-07 NOTE — PROCEDURES
Procedures     Patient is here for # 8/12 PTNS    Vitals:    05/07/18 1302   BP: 110/70         Review of bladder diary:  Voiding 24 times/day  Leaking 24 times/day  Drinking 3 glasses/day (she is limiting to 4 oz at a time)    She does not notice improvement on detrol.  Will try to fill myrbetriq.    Performed PTNS x 30 minutes to L leg with level 1.    TRACEY Ryan-BC

## 2018-05-10 ENCOUNTER — TELEPHONE (OUTPATIENT)
Dept: SLEEP MEDICINE | Facility: CLINIC | Age: 33
End: 2018-05-10

## 2018-05-15 ENCOUNTER — PATIENT OUTREACH (OUTPATIENT)
Dept: ADMINISTRATIVE | Facility: HOSPITAL | Age: 33
End: 2018-05-15

## 2018-05-15 DIAGNOSIS — Z79.4 TYPE 2 DIABETES MELLITUS WITHOUT COMPLICATION, WITH LONG-TERM CURRENT USE OF INSULIN: ICD-10-CM

## 2018-05-15 DIAGNOSIS — E11.9 TYPE 2 DIABETES MELLITUS WITHOUT COMPLICATION, WITH LONG-TERM CURRENT USE OF INSULIN: ICD-10-CM

## 2018-05-15 DIAGNOSIS — Z13.5 SCREENING FOR DIABETIC RETINOPATHY: Primary | ICD-10-CM

## 2018-05-15 NOTE — PROGRESS NOTES
VM left reminded pt of RTC appt with PCP 5-24-18, also left number to schedule EYE EXAM appt. I spoke with Opth Scheduling and new orders were requested for Photo Eye Exam and Optometry. She stated they were outdated.

## 2018-05-16 ENCOUNTER — OFFICE VISIT (OUTPATIENT)
Dept: UROGYNECOLOGY | Facility: CLINIC | Age: 33
End: 2018-05-16
Payer: MEDICAID

## 2018-05-16 VITALS
DIASTOLIC BLOOD PRESSURE: 70 MMHG | SYSTOLIC BLOOD PRESSURE: 110 MMHG | HEIGHT: 62 IN | WEIGHT: 207 LBS | BODY MASS INDEX: 38.09 KG/M2

## 2018-05-16 DIAGNOSIS — N39.41 URGE INCONTINENCE: Primary | ICD-10-CM

## 2018-05-16 PROCEDURE — 99999 PR PBB SHADOW E&M-EST. PATIENT-LVL IV: CPT | Mod: PBBFAC,,, | Performed by: NURSE PRACTITIONER

## 2018-05-16 PROCEDURE — 99214 OFFICE O/P EST MOD 30 MIN: CPT | Mod: PBBFAC | Performed by: NURSE PRACTITIONER

## 2018-05-16 PROCEDURE — 64566 NEUROELTRD STIM POST TIBIAL: CPT | Mod: PBBFAC | Performed by: NURSE PRACTITIONER

## 2018-05-16 PROCEDURE — 64566 NEUROELTRD STIM POST TIBIAL: CPT | Mod: S$PBB,,, | Performed by: NURSE PRACTITIONER

## 2018-05-16 PROCEDURE — 99499 UNLISTED E&M SERVICE: CPT | Mod: S$PBB,,, | Performed by: NURSE PRACTITIONER

## 2018-05-23 ENCOUNTER — OFFICE VISIT (OUTPATIENT)
Dept: UROGYNECOLOGY | Facility: CLINIC | Age: 33
End: 2018-05-23
Payer: MEDICAID

## 2018-05-23 VITALS
SYSTOLIC BLOOD PRESSURE: 90 MMHG | HEIGHT: 62 IN | BODY MASS INDEX: 38.01 KG/M2 | WEIGHT: 206.56 LBS | DIASTOLIC BLOOD PRESSURE: 60 MMHG

## 2018-05-23 DIAGNOSIS — R35.0 URINARY FREQUENCY: ICD-10-CM

## 2018-05-23 DIAGNOSIS — N39.46 MIXED STRESS AND URGE URINARY INCONTINENCE: Primary | ICD-10-CM

## 2018-05-23 PROCEDURE — 99214 OFFICE O/P EST MOD 30 MIN: CPT | Mod: PBBFAC,25 | Performed by: NURSE PRACTITIONER

## 2018-05-23 PROCEDURE — 64566 NEUROELTRD STIM POST TIBIAL: CPT | Mod: PBBFAC | Performed by: NURSE PRACTITIONER

## 2018-05-23 PROCEDURE — 99499 UNLISTED E&M SERVICE: CPT | Mod: S$PBB,,, | Performed by: NURSE PRACTITIONER

## 2018-05-23 PROCEDURE — 64566 NEUROELTRD STIM POST TIBIAL: CPT | Mod: S$PBB,,, | Performed by: NURSE PRACTITIONER

## 2018-05-23 PROCEDURE — 99999 PR PBB SHADOW E&M-EST. PATIENT-LVL IV: CPT | Mod: PBBFAC,,, | Performed by: NURSE PRACTITIONER

## 2018-05-23 NOTE — PROCEDURES
Procedures     Patient is here for # 10/12 PTNS    Vitals:    05/23/18 1414   BP: 90/60         Review of bladder diary:    Drinking 3 glases/day  Voiding 23-24 times daily  Leaking 24 times daily      Performed PTNS x 30 minutes to L leg with level 5.    Julianna Gomez, TRACEY-BC

## 2018-05-24 ENCOUNTER — LAB VISIT (OUTPATIENT)
Dept: LAB | Facility: HOSPITAL | Age: 33
End: 2018-05-24
Attending: INTERNAL MEDICINE
Payer: MEDICAID

## 2018-05-24 ENCOUNTER — OFFICE VISIT (OUTPATIENT)
Dept: INTERNAL MEDICINE | Facility: CLINIC | Age: 33
End: 2018-05-24
Payer: MEDICAID

## 2018-05-24 ENCOUNTER — TELEPHONE (OUTPATIENT)
Dept: SLEEP MEDICINE | Facility: CLINIC | Age: 33
End: 2018-05-24

## 2018-05-24 DIAGNOSIS — E11.9 DIABETES MELLITUS WITHOUT COMPLICATION: Primary | ICD-10-CM

## 2018-05-24 DIAGNOSIS — E11.9 DIABETES MELLITUS WITHOUT COMPLICATION: ICD-10-CM

## 2018-05-24 DIAGNOSIS — I10 HYPERTENSION, UNSPECIFIED TYPE: ICD-10-CM

## 2018-05-24 LAB
ALBUMIN SERPL BCP-MCNC: 4.1 G/DL
ALP SERPL-CCNC: 57 U/L
ALT SERPL W/O P-5'-P-CCNC: 16 U/L
ANION GAP SERPL CALC-SCNC: 8 MMOL/L
AST SERPL-CCNC: 15 U/L
BILIRUB SERPL-MCNC: 0.2 MG/DL
BUN SERPL-MCNC: 12 MG/DL
CALCIUM SERPL-MCNC: 9.9 MG/DL
CHLORIDE SERPL-SCNC: 108 MMOL/L
CO2 SERPL-SCNC: 25 MMOL/L
CREAT SERPL-MCNC: 1.2 MG/DL
EST. GFR  (AFRICAN AMERICAN): >60 ML/MIN/1.73 M^2
EST. GFR  (NON AFRICAN AMERICAN): 59 ML/MIN/1.73 M^2
ESTIMATED AVG GLUCOSE: 103 MG/DL
GLUCOSE SERPL-MCNC: 84 MG/DL
HBA1C MFR BLD HPLC: 5.2 %
POTASSIUM SERPL-SCNC: 3.9 MMOL/L
PROT SERPL-MCNC: 7.9 G/DL
SODIUM SERPL-SCNC: 141 MMOL/L

## 2018-05-24 PROCEDURE — 83036 HEMOGLOBIN GLYCOSYLATED A1C: CPT

## 2018-05-24 PROCEDURE — 99999 PR PBB SHADOW E&M-EST. PATIENT-LVL V: CPT | Mod: PBBFAC,,, | Performed by: INTERNAL MEDICINE

## 2018-05-24 PROCEDURE — 99214 OFFICE O/P EST MOD 30 MIN: CPT | Mod: S$PBB,,, | Performed by: INTERNAL MEDICINE

## 2018-05-24 PROCEDURE — 80053 COMPREHEN METABOLIC PANEL: CPT

## 2018-05-24 PROCEDURE — 36415 COLL VENOUS BLD VENIPUNCTURE: CPT

## 2018-05-24 PROCEDURE — 99215 OFFICE O/P EST HI 40 MIN: CPT | Mod: PBBFAC | Performed by: INTERNAL MEDICINE

## 2018-05-24 RX ORDER — METOPROLOL SUCCINATE 25 MG/1
25 TABLET, EXTENDED RELEASE ORAL DAILY
Qty: 30 TABLET | Refills: 4 | Status: SHIPPED | OUTPATIENT
Start: 2018-05-24 | End: 2018-12-18 | Stop reason: SDUPTHER

## 2018-05-25 NOTE — PROCEDURES
Patient is here for # 9/12 PTNS    Vitals:    05/16/18 1316   BP: 110/70         Review of bladder diary:    Drinking 3-4 glasses/day  Voiding 24 times / day  Leaking 24 times/day      Performed PTNS x 30 minutes to L leg with level 2.  Julianna Gomez, BOBP-BC

## 2018-05-27 VITALS
SYSTOLIC BLOOD PRESSURE: 110 MMHG | OXYGEN SATURATION: 97 % | WEIGHT: 207 LBS | TEMPERATURE: 98 F | BODY MASS INDEX: 38.09 KG/M2 | HEIGHT: 62 IN | DIASTOLIC BLOOD PRESSURE: 60 MMHG | HEART RATE: 96 BPM

## 2018-05-27 NOTE — PROGRESS NOTES
Subjective:       Patient ID: Maria Ines Ac is a 33 y.o. female.    Chief Complaint: Hypertension    HPI  She returns for management of hypertension.  She has had hypertension for over a year.  Current treatment has included medications outlined in medication list.  She denies chest pain or shortness of breath.  No palpitations.  Denies left arm or neck pain. She has diabetes.  Denies polyuria, polydipsia    Past medical history: Hypertension, hyperlipidemia, diabetes, hypothyroidism, bipolar disorder, migraine headaches, status post hysterectomy      Medications: Synthroid 0.05 mg daily, metformin, Toprol-XL,Lipitor 40 mg daily,farxiga,topamax, trulicity,latuda      NO KNOWN DRUG ALLERGIES       Review of Systems   Constitutional: Negative for chills, fatigue, fever and unexpected weight change.   Respiratory: Negative for chest tightness and shortness of breath.    Cardiovascular: Negative for chest pain and palpitations.   Gastrointestinal: Negative for abdominal pain and blood in stool.   Neurological: Negative for dizziness, syncope, numbness and headaches.       Objective:      Physical Exam   HENT:   Right Ear: External ear normal.   Left Ear: External ear normal.   Nose: Nose normal.   Mouth/Throat: Oropharynx is clear and moist.   Eyes: Pupils are equal, round, and reactive to light.   Neck: Normal range of motion.   Cardiovascular: Normal rate and regular rhythm.    No murmur heard.  Pulmonary/Chest: Breath sounds normal.   Abdominal: She exhibits no distension. There is no hepatosplenomegaly. There is no tenderness.   Lymphadenopathy:     She has no cervical adenopathy.     She has no axillary adenopathy.   Neurological: She has normal strength and normal reflexes. No cranial nerve deficit or sensory deficit.       Assessment/Plan       Assessment and plan:  1.  Hypertension:  Check CMP  2.  Diabetes:  Check A1c

## 2018-05-28 ENCOUNTER — TELEPHONE (OUTPATIENT)
Dept: SLEEP MEDICINE | Facility: CLINIC | Age: 33
End: 2018-05-28

## 2018-05-28 ENCOUNTER — PROCEDURE VISIT (OUTPATIENT)
Dept: UROGYNECOLOGY | Facility: CLINIC | Age: 33
End: 2018-05-28
Payer: MEDICAID

## 2018-05-28 VITALS
HEIGHT: 62 IN | DIASTOLIC BLOOD PRESSURE: 70 MMHG | BODY MASS INDEX: 38.25 KG/M2 | WEIGHT: 207.88 LBS | SYSTOLIC BLOOD PRESSURE: 110 MMHG

## 2018-05-28 DIAGNOSIS — N39.41 URGE INCONTINENCE: Primary | ICD-10-CM

## 2018-05-28 DIAGNOSIS — R35.0 URINARY FREQUENCY: ICD-10-CM

## 2018-05-28 PROCEDURE — 64566 NEUROELTRD STIM POST TIBIAL: CPT | Mod: S$PBB,,, | Performed by: NURSE PRACTITIONER

## 2018-05-28 PROCEDURE — 64566 NEUROELTRD STIM POST TIBIAL: CPT | Mod: PBBFAC | Performed by: NURSE PRACTITIONER

## 2018-05-28 NOTE — PROGRESS NOTES
Patient is here for #11/12 PTNS.    Review of bladder diary:    Drinkin glasses/day  Voidin-26 times/day  Leakin times/day    Performed PTNS for 30 minutes to right leg with level 6.

## 2018-05-30 ENCOUNTER — TELEPHONE (OUTPATIENT)
Dept: NEUROLOGY | Facility: CLINIC | Age: 33
End: 2018-05-30

## 2018-06-06 ENCOUNTER — PROCEDURE VISIT (OUTPATIENT)
Dept: UROGYNECOLOGY | Facility: CLINIC | Age: 33
End: 2018-06-06
Payer: MEDICAID

## 2018-06-06 VITALS
SYSTOLIC BLOOD PRESSURE: 110 MMHG | BODY MASS INDEX: 38.3 KG/M2 | DIASTOLIC BLOOD PRESSURE: 60 MMHG | WEIGHT: 208.13 LBS | HEIGHT: 62 IN

## 2018-06-06 DIAGNOSIS — N39.41 URGE INCONTINENCE: Primary | ICD-10-CM

## 2018-06-06 DIAGNOSIS — R35.1 NOCTURIA: ICD-10-CM

## 2018-06-06 PROCEDURE — 64566 NEUROELTRD STIM POST TIBIAL: CPT | Mod: S$PBB,,, | Performed by: NURSE PRACTITIONER

## 2018-06-06 PROCEDURE — 64566 NEUROELTRD STIM POST TIBIAL: CPT | Mod: PBBFAC | Performed by: NURSE PRACTITIONER

## 2018-06-06 RX ORDER — OXCARBAZEPINE 150 MG/1
TABLET, FILM COATED ORAL
Refills: 1 | COMMUNITY
Start: 2018-05-29 | End: 2018-08-23

## 2018-06-07 RX ORDER — TOPIRAMATE 50 MG/1
100 TABLET, FILM COATED ORAL DAILY
Qty: 60 TABLET | Refills: 11 | Status: SHIPPED | OUTPATIENT
Start: 2018-06-07 | End: 2019-06-26 | Stop reason: SDUPTHER

## 2018-06-07 NOTE — TELEPHONE ENCOUNTER
Spoke to the patient to let her know an appointment will be scheduled for her when the July schedule is opened.

## 2018-06-07 NOTE — TELEPHONE ENCOUNTER
----- Message from Kathrin Hill sent at 6/6/2018 10:51 AM CDT -----  Patient Requesting Appointment.     Reason for appt.:follow up  When is the first available appointment?No available appts in the system  Communication Preference:phone# [phone]  Additional Information:

## 2018-06-07 NOTE — TELEPHONE ENCOUNTER
----- Message from Kathrin Hill sent at 6/7/2018 11:24 AM CDT -----  Rx Refill/Request     Is this a Refill or New Rx:  refill  Rx Name and Strength: topiramate (TOPAMAX) 50 MG tablet   Preferred Pharmacy with phone number:     [id number]  [facility] [address] AT [facility] & [address] [address]  Phone: [phone] Fax: [phone]    Communication Preference:phone# [phone]  Additional Information:

## 2018-06-10 NOTE — PROCEDURES
Procedures     Patient is here for # 12/12 PTNS    Vitals:    06/06/18 1321   BP: 110/60         Review of bladder diary:  Voiding q 1 hour with reported leaking every hour.  Drinking approximately 6 glasses/day.    Performed PTNS x 30 minutes to L leg with level 6.    TRACEY Ryan-BC

## 2018-06-14 ENCOUNTER — HOSPITAL ENCOUNTER (OUTPATIENT)
Dept: SLEEP MEDICINE | Facility: OTHER | Age: 33
Discharge: HOME | End: 2018-06-14
Attending: INTERNAL MEDICINE
Payer: MEDICAID

## 2018-06-14 DIAGNOSIS — G47.8 SLEEP AROUSAL DISORDER: ICD-10-CM

## 2018-06-14 DIAGNOSIS — R51.9 UNCONTROLLED MORNING HEADACHE: ICD-10-CM

## 2018-06-14 PROCEDURE — 95810 POLYSOM 6/> YRS 4/> PARAM: CPT

## 2018-06-14 PROCEDURE — 95810 POLYSOM 6/> YRS 4/> PARAM: CPT | Mod: 26,,, | Performed by: INTERNAL MEDICINE

## 2018-06-15 NOTE — PROGRESS NOTES
This is a Screen study for 33 year old Maria Ines Ac  The procedure was explained to the patient upon arrival. This included the set - up process and what to expect during the night.   It was also explained to the patient that the test will be interpreted by a physician and the results will be sent to her PCP.  Her EKG appeared to be NSR.  Occasional soft snoring.  She had a hard time sleep in the first half of the study, she stated she was not tired and the bed was uncomfortable.  She did not meet medicare criteria for a split night study,  A thank you letter was given to the patient upon leaving the sleep lab.

## 2018-06-28 ENCOUNTER — OFFICE VISIT (OUTPATIENT)
Dept: GASTROENTEROLOGY | Facility: CLINIC | Age: 33
End: 2018-06-28
Payer: MEDICAID

## 2018-06-28 VITALS
DIASTOLIC BLOOD PRESSURE: 71 MMHG | HEIGHT: 62 IN | BODY MASS INDEX: 37.73 KG/M2 | SYSTOLIC BLOOD PRESSURE: 102 MMHG | WEIGHT: 205 LBS | HEART RATE: 100 BPM

## 2018-06-28 DIAGNOSIS — K21.9 GASTROESOPHAGEAL REFLUX DISEASE WITHOUT ESOPHAGITIS: Primary | ICD-10-CM

## 2018-06-28 DIAGNOSIS — R10.84 GENERALIZED ABDOMINAL PAIN: ICD-10-CM

## 2018-06-28 DIAGNOSIS — K58.0 IRRITABLE BOWEL SYNDROME WITH DIARRHEA: ICD-10-CM

## 2018-06-28 PROCEDURE — 99215 OFFICE O/P EST HI 40 MIN: CPT | Mod: PBBFAC | Performed by: NURSE PRACTITIONER

## 2018-06-28 PROCEDURE — 99214 OFFICE O/P EST MOD 30 MIN: CPT | Mod: S$PBB,,, | Performed by: NURSE PRACTITIONER

## 2018-06-28 PROCEDURE — 99999 PR PBB SHADOW E&M-EST. PATIENT-LVL V: CPT | Mod: PBBFAC,,, | Performed by: NURSE PRACTITIONER

## 2018-06-28 RX ORDER — DICYCLOMINE HYDROCHLORIDE 10 MG/1
10 CAPSULE ORAL
Qty: 180 CAPSULE | Refills: 5 | Status: SHIPPED | OUTPATIENT
Start: 2018-06-28 | End: 2018-08-20

## 2018-06-28 NOTE — LETTER
June 28, 2018      Luann Raymond MD  [address]  [place] [address]           [facility]  [address]  [place] [address]  Phone: [phone]  Fax: [phone]          Patient: Maria Ines Ac   MR Number: [id number]   YOB: 1985   Date of Visit: 6/28/2018       Dear Dr. Luann Raymond:    Thank you for referring Maria Ines Ac to me for evaluation. Attached you will find relevant portions of my assessment and plan of care.    If you have questions, please do not hesitate to call me. I look forward to following Maria Ines Ac along with you.    Sincerely,    Lisa Aranda M.D.  Enclosure  CC:  No Recipients    If you would like to receive this communication electronically, please contact [email or web address] or [phone] to request more information on [facility] [employer] access.    For providers and/or their staff who would like to refer a patient to [facility], please contact us through our one-stop-shop provider referral line, [facility], at [phone].    If you feel you have received this communication in error or would no longer like to receive these types of communications, please e-mail [email or web address]

## 2018-06-28 NOTE — PATIENT INSTRUCTIONS
Take the Protonix 40 mg 30- 45 minutes before breakfast and increase the Prilosec to 40 mg (two tablets) and take it 30-45 minutes before dinner every day.    Also, start taking over the counter gaviscon with alginic acid for the reflux.    Increase the bentyl 10 mg to four times a day to help with abdominal pain.    Ask your neurologist about taking the elavil every night because this can help with the abdominal discomfort as well.

## 2018-06-28 NOTE — PROGRESS NOTES
[facility] Established Patient Visit    Reason for Visit:  Diagnoses of Irritable bowel syndrome with diarrhea and Generalized abdominal pain were pertinent to this visit.    PCP: Luann Raymond    HPI:   This is a 33 y.o. female here for f/u IBS-D.  Ms. Ac is here with her father and was last seen in GI by Dr. Herron in 3/2018. I have also seen her in clinic in the past. She has had a normal GES since her last visit with Dr. Herron. She currently reports:    pyrosis and regurgitation daily. Taking Protonix 40 mg with breakfast and omeprazole 20 mg with dinner with out much improvement.  Has decreased sodas to 3 per day.     IBS-D- still having 4 loose-watery BM/day. Will have rare intermittent constipation. Takes colace with relief.  Taking bentyl 10 mg once daily with slight improvement. Still with upper abd cramping pain. Daily. No improvement with two week course of xifaxan last year.  5/10 Cramping. I Worse immediately after meals . Sometimes better after BM. + nausea with migraines-Takes Zofran with good control. No vomiting. No fevers.    Dysphagia/odynophagia - + hx dysphagia. S/p dilation x 3 with improvement. Swallowing well since last EGD w/ dilation in 9/2017     GI bleeding - denies hematochezia, hematemesis, melena, BRBPR, black/tarry stools, and coffee ground emesis    NSAID usage - aleve once or twice daily for 1 week tooth ache. Usually avoids.    ROS:  Constitutional: No fevers, no chills, No unintentional/unexplained weight loss, no fatigue,   ENT: No allergies  CV: No chest pain, no palpitations, no perif. edema, no sob on exertion  Pulm: No cough, No shortness of breath, no wheezes, no sputum  Ophtho: No vision changes  GI: see HPI; also + nausea associated w/ migraines, no vomiting, no change in appetite  Derm: No rash  Heme: No lymphadenopathy, No bruising  MSK: No arthritis, no muscle pain, no muscle weakness  : No dysuria, No hematuria  Endo: No hot or cold  intolerance  Neuro: No syncope, No seizure,     PMHX:  has a past medical history of Blood in stool; Constipation; Depression; Diabetes mellitus, type 2; Dysphagia; GERD (gastroesophageal reflux disease); Hypertension; Palpitations; Premature menopause on hormone replacement therapy; and Thyroid disease.    PSHX:  has a past surgical history that includes ooptherectomy; right knee (scoped); Gallbladder surgery; Shoulder surgery (right); Carpal tunnel release; Hysterectomy (2013); Oophorectomy (2005); Oophorectomy (2013); Colonoscopy (N/A, 8/30/2016); Bladder suspension; and Cholecystectomy.    The patient's social and family histories were reviewed by me and updated in the appropriate section of the electronic medical record.    Review of patient's allergies indicates:  No Known Allergies    Current Outpatient Prescriptions   Medication Sig    ACCU-CHEK AMAURY PLUS TEST STRP Strp USE TO TEST BLOOD GLUCOSE TID    ACCU-CHEK SOFTCLIX LANCETS Misc USE TO TEST BLOOD GLUCOSE TID    amitriptyline (ELAVIL) 25 MG tablet Take 1 tablet (25 mg total) by mouth nightly as needed for Insomnia.    atorvastatin (LIPITOR) 40 MG tablet Take 40 mg by mouth once daily.    BLOOD SUGAR DIAGNOSTIC (ACCU-CHEK AMAURY PLUS TEST STRP MISC) 1 strip by Misc.(Non-Drug; Combo Route) route 3 (three) times daily.    calcium-vitamin D3 500 mg(1,250mg) -200 unit per tablet Take 1 tablet by mouth 2 (two) times daily with meals.    conjugated estrogens (PREMARIN) vaginal cream Use 0.5 gram of estrogen cream in vagina nightly x 2 weeks, then twice a week thereafter.    dicyclomine (BENTYL) 10 MG capsule Take 1 capsule (10 mg total) by mouth 4 (four) times daily before meals and nightly.    docusate sodium (COLACE) 100 MG capsule Take 100 mg by mouth 2 (two) times daily.    dulaglutide (TRULICITY) 0.75 mg/0.5 mL PnIj Inject 0.75 mg into the skin every 7 days.    estrogens, conjugated, (PREMARIN) 0.3 MG tablet Take 1 tablet (0.3 mg total) by mouth  "once daily.    FARXIGA 10 mg Tab Take 1 tablet by mouth once daily.     fish oil-omega-3 fatty acids 300-1,000 mg capsule Take 2 g by mouth once daily.    GLUCOPHAGE 500 mg tablet Take 500 mg by mouth 2 (two) times daily with meals.     LATUDA 40 mg Tab tablet TK 1 T PO  QAM    LATUDA 80 mg Tab tablet TK 1 T PO D    levothyroxine (SYNTHROID) 50 MCG tablet TK 1 T PO QAM    metoprolol succinate (TOPROL-XL) 25 MG 24 hr tablet Take 1 tablet (25 mg total) by mouth once daily.    mirabegron (MYRBETRIQ) 50 mg Tb24 Take 1 tablet (50 mg total) by mouth once daily.    mirabegron 50 mg Tb24 Take 1 tablet (50 mg total) by mouth once daily.    MULTIVIT-IRON-MIN-FOLIC ACID 3,500-18-0.4 UNIT-MG-MG ORAL CHEW Take 1 tablet by mouth once daily.     nystatin (MYCOSTATIN) powder Apply topically 2 (two) times daily.    nystatin-triamcinolone (MYCOLOG II) cream Apply topically 2 (two) times daily.    OMEPRAZOLE (PRILOSEC ORAL) Take by mouth.    ondansetron (ZOFRAN-ODT) 4 MG TbDL DISSOLVE 1 T PO Q 8 H PRN N    OXcarbazepine (TRILEPTAL) 150 MG Tab TK 1 T PO BID    oxybutynin (DITROPAN XL) 15 MG TR24     pantoprazole (PROTONIX) 40 MG tablet Take 1 tablet (40 mg total) by mouth once daily.    phentermine (ADIPEX-P) 37.5 mg tablet Take 37.5 mg by mouth before breakfast.    promethazine (PHENERGAN) 25 MG tablet TK 1 T PO  Q 4 TO 6 H PRN P    spironolactone (ALDACTONE) 25 MG tablet TK 1 T PO HS    terconazole (TERAZOL 3) 0.8 % vaginal cream Place 1 applicator vaginally once daily.    tolterodine (DETROL LA) 4 MG 24 hr capsule Take 1 capsule (4 mg total) by mouth once daily.    topiramate (TOPAMAX) 50 MG tablet Take 2 tablets (100 mg total) by mouth once daily.    ZOLOFT 100 mg tablet Take 200 mg by mouth once daily.      No current facility-administered medications for this visit.          Objective Findings:    Vital Signs:  /71   Pulse 100   Ht 5' 2" (1.575 m)   Wt 93 kg (205 lb 0.4 oz)   LMP 11/17/2012 (LMP " Unknown)   BMI 37.50 kg/m²  Body mass index is 37.5 kg/m².    Physical Exam:  General Appearance: Well appearing in no acute distress  Head:   Normocephalic, without obvious abnormality  Eyes:    No scleral icterus, EOMI  Throat: Lips, mucosa, and tongue normal; teeth and gums normal  Lungs: CTA bilaterally in anterior and posterior fields, no wheezes, no crackles.  Heart:  Regular rate and rhythm, S1, S2 normal, no murmurs heard  Abdomen: Soft, obese, non tender, non distended with positive bowel sounds in all four quadrants. No hepatosplenomegaly, ascites, or mass  Extremities:    2+ radial pulses, no clubbing, cyanosis or edema  Skin: No rash to exposed areas  Neurologic: A&Ox4      Labs:  Lab Results   Component Value Date    WBC 8.07 03/27/2018    HGB 13.7 03/27/2018    HCT 42.5 03/27/2018     03/27/2018    CHOL 169 01/22/2018    TRIG 279 (H) 01/22/2018    HDL 39 (L) 01/22/2018    ALT 16 05/24/2018    AST 15 05/24/2018     05/24/2018    K 3.9 05/24/2018     05/24/2018    CREATININE 1.2 05/24/2018    BUN 12 05/24/2018    CO2 25 05/24/2018    TSH 3.931 01/22/2018    INR 0.9 01/05/2017    HGBA1C 5.2 05/24/2018     Imaging:  GES 4/6/18: nl   abd us 6/13/17: NL. S/p louise  CT abd/pelvis 12/10/2012: mildly prominent cecal wall likely r/t nondistention  Endoscopy:   9/14/2017 EGD Dr. Herron-NL esophagus. Dilated with a 50 fr rogers. Nl stomach. Nl duodenum.  8/30/2016 EGD Dr. Herron-normal esophagus. Dilation w/ 50 fr rogers. Normal stomach. Normal duodenum.  8/30/2016 Colonoscopy Dr. Herron-normal. Random bx-negative.   10/23/2014 EGD [facility]-normal esophagus. Dilated with a 50 fr rogers. Normal stomach. Normal duodenum. bx-wnl  1/10/2013 EGD [facility]- normal study.   1/9/2013 colonoscopy [facility]-2 mm sigmoid colon polyp. bx- Hyperplastic. Repeat in 10 years.  6/13/2012 esophageal manometry [place]-normal  4/2/2012 EGD [facility]- normal duodenum. Gastritis. HH. White nummular lesions in the  esophagus. bx-mild chronic inflammation/reactive changes. Otherwise WNL.    Assessment:    1. Irritable bowel syndrome with diarrhea    2. Generalized abdominal pain          Recommendations:  1,e . IBS-D-still with abd pain and diarrhea  Unchanged from the last GI clinic visit. Increase bentyl 10 mg QID. Take IBgard PRN. Pt to s/w neurologist about increasing elavil, as this may help her GI s/s.   3.  GERD-take pantoprazole 30-45 min b/f breakfast for maximal efficacy. increase Prilosec to 40 mg 30-45 min b/f dinner. Take otc gaviscon with alginic acid PRN.    Written instructions provided.    F/u in 3 months for IBS, GERD.    Order summary:  Orders Placed This Encounter    dicyclomine (BENTYL) 10 MG capsule       Thank you for allowing me to participate in the care of Maria Ines Fan, APRN, FNP-C

## 2018-06-30 ENCOUNTER — HOSPITAL ENCOUNTER (EMERGENCY)
Facility: HOSPITAL | Age: 33
Discharge: HOME | End: 2018-06-30
Attending: EMERGENCY MEDICINE
Payer: MEDICAID

## 2018-06-30 VITALS
OXYGEN SATURATION: 98 % | HEIGHT: 62 IN | HEART RATE: 68 BPM | TEMPERATURE: 98 F | WEIGHT: 205 LBS | SYSTOLIC BLOOD PRESSURE: 123 MMHG | BODY MASS INDEX: 37.73 KG/M2 | DIASTOLIC BLOOD PRESSURE: 86 MMHG | RESPIRATION RATE: 17 BRPM

## 2018-06-30 DIAGNOSIS — R10.13 EPIGASTRIC PAIN: Primary | ICD-10-CM

## 2018-06-30 DIAGNOSIS — R07.89 CHEST DISCOMFORT: ICD-10-CM

## 2018-06-30 DIAGNOSIS — N39.0 URINARY TRACT INFECTION WITHOUT HEMATURIA, SITE UNSPECIFIED: ICD-10-CM

## 2018-06-30 LAB
ALBUMIN SERPL BCP-MCNC: 4.2 G/DL
ALP SERPL-CCNC: 64 U/L
ALT SERPL W/O P-5'-P-CCNC: 14 U/L
AMPHET+METHAMPHET UR QL: NEGATIVE
ANION GAP SERPL CALC-SCNC: 9 MMOL/L
AST SERPL-CCNC: 13 U/L
B-HCG UR QL: NEGATIVE
BACTERIA #/AREA URNS AUTO: ABNORMAL /HPF
BARBITURATES UR QL SCN>200 NG/ML: NEGATIVE
BASOPHILS # BLD AUTO: 0.04 K/UL
BASOPHILS NFR BLD: 0.6 %
BENZODIAZ UR QL SCN>200 NG/ML: NEGATIVE
BILIRUB SERPL-MCNC: 0.2 MG/DL
BILIRUB UR QL STRIP: NEGATIVE
BUN SERPL-MCNC: 10 MG/DL
BZE UR QL SCN: NEGATIVE
CALCIUM SERPL-MCNC: 10 MG/DL
CANNABINOIDS UR QL SCN: NEGATIVE
CHLORIDE SERPL-SCNC: 110 MMOL/L
CLARITY UR REFRACT.AUTO: ABNORMAL
CO2 SERPL-SCNC: 22 MMOL/L
COLOR UR AUTO: YELLOW
CREAT SERPL-MCNC: 1.1 MG/DL
CREAT UR-MCNC: 79 MG/DL
CTP QC/QA: YES
DIFFERENTIAL METHOD: NORMAL
EOSINOPHIL # BLD AUTO: 0.1 K/UL
EOSINOPHIL NFR BLD: 1.4 %
ERYTHROCYTE [DISTWIDTH] IN BLOOD BY AUTOMATED COUNT: 13.1 %
EST. GFR  (AFRICAN AMERICAN): >60 ML/MIN/1.73 M^2
EST. GFR  (NON AFRICAN AMERICAN): >60 ML/MIN/1.73 M^2
GLUCOSE SERPL-MCNC: 85 MG/DL
GLUCOSE UR QL STRIP: ABNORMAL
HCT VFR BLD AUTO: 42.3 %
HGB BLD-MCNC: 13.6 G/DL
HGB UR QL STRIP: NEGATIVE
IMM GRANULOCYTES # BLD AUTO: 0.03 K/UL
IMM GRANULOCYTES NFR BLD AUTO: 0.5 %
KETONES UR QL STRIP: NEGATIVE
LEUKOCYTE ESTERASE UR QL STRIP: ABNORMAL
LIPASE SERPL-CCNC: 56 U/L
LYMPHOCYTES # BLD AUTO: 2 K/UL
LYMPHOCYTES NFR BLD: 32.2 %
MCH RBC QN AUTO: 28.6 PG
MCHC RBC AUTO-ENTMCNC: 32.2 G/DL
MCV RBC AUTO: 89 FL
METHADONE UR QL SCN>300 NG/ML: NEGATIVE
MICROSCOPIC COMMENT: ABNORMAL
MONOCYTES # BLD AUTO: 0.5 K/UL
MONOCYTES NFR BLD: 8.2 %
NEUTROPHILS # BLD AUTO: 3.6 K/UL
NEUTROPHILS NFR BLD: 57.1 %
NITRITE UR QL STRIP: POSITIVE
NRBC BLD-RTO: 0 /100 WBC
OPIATES UR QL SCN: NEGATIVE
PCP UR QL SCN>25 NG/ML: NEGATIVE
PH UR STRIP: 6 [PH] (ref 5–8)
PLATELET # BLD AUTO: 241 K/UL
PMV BLD AUTO: 11.6 FL
POCT GLUCOSE: 106 MG/DL (ref 70–110)
POTASSIUM SERPL-SCNC: 4.1 MMOL/L
PROT SERPL-MCNC: 8 G/DL
PROT UR QL STRIP: NEGATIVE
RBC # BLD AUTO: 4.76 M/UL
RBC #/AREA URNS AUTO: 1 /HPF (ref 0–4)
SALICYLATES SERPL-MCNC: <5 MG/DL
SODIUM SERPL-SCNC: 141 MMOL/L
SP GR UR STRIP: 1.01 (ref 1–1.03)
SQUAMOUS #/AREA URNS AUTO: 2 /HPF
TOXICOLOGY INFORMATION: NORMAL
TROPONIN I SERPL DL<=0.01 NG/ML-MCNC: 0.01 NG/ML
TROPONIN I SERPL DL<=0.01 NG/ML-MCNC: <0.006 NG/ML
TSH SERPL DL<=0.005 MIU/L-ACNC: 2.15 UIU/ML
URN SPEC COLLECT METH UR: ABNORMAL
UROBILINOGEN UR STRIP-ACNC: NEGATIVE EU/DL
WBC # BLD AUTO: 6.25 K/UL
WBC #/AREA URNS AUTO: 74 /HPF (ref 0–5)
YEAST UR QL AUTO: ABNORMAL

## 2018-06-30 PROCEDURE — 96360 HYDRATION IV INFUSION INIT: CPT

## 2018-06-30 PROCEDURE — 83690 ASSAY OF LIPASE: CPT

## 2018-06-30 PROCEDURE — 80307 DRUG TEST PRSMV CHEM ANLYZR: CPT

## 2018-06-30 PROCEDURE — 87186 SC STD MICRODIL/AGAR DIL: CPT

## 2018-06-30 PROCEDURE — 85025 COMPLETE CBC W/AUTO DIFF WBC: CPT

## 2018-06-30 PROCEDURE — 87088 URINE BACTERIA CULTURE: CPT

## 2018-06-30 PROCEDURE — 93005 ELECTROCARDIOGRAM TRACING: CPT

## 2018-06-30 PROCEDURE — 84443 ASSAY THYROID STIM HORMONE: CPT

## 2018-06-30 PROCEDURE — 81001 URINALYSIS AUTO W/SCOPE: CPT | Mod: 59

## 2018-06-30 PROCEDURE — 84484 ASSAY OF TROPONIN QUANT: CPT

## 2018-06-30 PROCEDURE — 99284 EMERGENCY DEPT VISIT MOD MDM: CPT | Mod: 25

## 2018-06-30 PROCEDURE — 80053 COMPREHEN METABOLIC PANEL: CPT

## 2018-06-30 PROCEDURE — 81025 URINE PREGNANCY TEST: CPT | Performed by: PHYSICIAN ASSISTANT

## 2018-06-30 PROCEDURE — 87086 URINE CULTURE/COLONY COUNT: CPT

## 2018-06-30 PROCEDURE — 93010 ELECTROCARDIOGRAM REPORT: CPT | Mod: ,,, | Performed by: INTERNAL MEDICINE

## 2018-06-30 PROCEDURE — 82962 GLUCOSE BLOOD TEST: CPT

## 2018-06-30 PROCEDURE — 99284 EMERGENCY DEPT VISIT MOD MDM: CPT | Mod: ,,, | Performed by: PHYSICIAN ASSISTANT

## 2018-06-30 PROCEDURE — 25000003 PHARM REV CODE 250: Performed by: PHYSICIAN ASSISTANT

## 2018-06-30 PROCEDURE — 87077 CULTURE AEROBIC IDENTIFY: CPT

## 2018-06-30 RX ORDER — CEPHALEXIN 500 MG/1
500 CAPSULE ORAL
Status: COMPLETED | OUTPATIENT
Start: 2018-06-30 | End: 2018-06-30

## 2018-06-30 RX ORDER — CEPHALEXIN 500 MG/1
500 CAPSULE ORAL 4 TIMES DAILY
Qty: 27 CAPSULE | Refills: 0 | Status: SHIPPED | OUTPATIENT
Start: 2018-06-30 | End: 2018-07-07

## 2018-06-30 RX ORDER — FAMOTIDINE 20 MG/1
20 TABLET, FILM COATED ORAL 2 TIMES DAILY
Qty: 28 TABLET | Refills: 0 | Status: SHIPPED | OUTPATIENT
Start: 2018-06-30 | End: 2018-08-23

## 2018-06-30 RX ADMIN — ALUMINUM HYDROXIDE, MAGNESIUM HYDROXIDE, AND SIMETHICONE 50 ML: 200; 200; 20 SUSPENSION ORAL at 04:06

## 2018-06-30 RX ADMIN — CEPHALEXIN 500 MG: 500 CAPSULE ORAL at 05:06

## 2018-06-30 RX ADMIN — SODIUM CHLORIDE 1000 ML: 0.9 INJECTION, SOLUTION INTRAVENOUS at 04:06

## 2018-06-30 NOTE — PROVIDER PROGRESS NOTES - EMERGENCY DEPT.
Encounter Date: 6/30/2018    ED Physician Progress Notes         EKG - STEMI Decision  Initial Reading: No STEMI present.     Normal sinus rhythm. Multiple chronic changes are unchanged from prior EKGs

## 2018-06-30 NOTE — ED PROVIDER NOTES
Encounter Date: 6/30/2018       History     Chief Complaint   Patient presents with    Chest Pain     intermittent CP x 1 week. Having nausea, sweating and SOB w/ pain. Sternal pain radiating to left jaw. Feels like her CBG was low this am - 106 now.      Patient is a 33-year-old female with history of bipolar disorder, GERD, thyroid disease, type 2 diabetes is presenting to the ER for evaluation of chest pain.  Patient states that this chest discomfort has been present for the last 1 week intermittently.  She describes it as sharp pain. She states she does have associated shortness of breath sometimes.  She also complains of abdominal pain that has been present for the last couple of days.  Denies nausea vomiting.  She has had diarrhea today, 4 episodes.  No blood in stool or black tarry stool.  She denies cough congestion URI symptoms. No sore throat or runny nose. No prior cardiac history.  No stent placement.  No prior history of PE or DVT.  Does not use blood thinners.    Patient states that she has been taking Aleve and Advil every 4-6 hours for the last week for a toothache.  No prior history of PUD.  No tinnitus.  She has had a recent change in medication.  She was initiated on Trileptal about 3 weeks ago by Psychiatry.  No recent travel or sick contact.  No recent antibiotic use.  Denies fever chills at home      The history is provided by the patient and a relative.     Review of patient's allergies indicates:  No Known Allergies  Past Medical History:   Diagnosis Date    Blood in stool     Constipation     Depression     Diabetes mellitus, type 2     Dysphagia     GERD (gastroesophageal reflux disease)     Hypertension     Palpitations     Premature menopause on hormone replacement therapy     Thyroid disease      Past Surgical History:   Procedure Laterality Date    BLADDER SUSPENSION      CARPAL TUNNEL RELEASE      right    CHOLECYSTECTOMY      COLONOSCOPY N/A 8/30/2016    Procedure:  COLONOSCOPY;  Surgeon: Slick Herron MD;  Location: [facility] ([address] [place] [address]);  Service: Endoscopy;  Laterality: N/A;  PM prep    GALLBLADDER SURGERY      HYSTERECTOMY  2013    Bess velasquez Dr. Champlain    OOPHORECTOMY  2005    [facility]- benign cyst    OOPHORECTOMY  2013    [facility]- endo    ooptherectomy      right knee  scoped    SHOULDER SURGERY  right     Family History   Problem Relation Age of Onset    Breast cancer Mother     Esophageal cancer Mother 63    Cervical cancer Maternal Grandmother     Colon cancer Brother 40    Vaginal cancer Neg Hx     Endometrial cancer Neg Hx     Ovarian cancer Neg Hx     Crohn's disease Neg Hx     Ulcerative colitis Neg Hx     Stomach cancer Neg Hx     Irritable bowel syndrome Neg Hx     Celiac disease Neg Hx      Social History   Substance Use Topics    Smoking status: Never Smoker    Smokeless tobacco: Never Used    Alcohol use No     Review of Systems   Constitutional: Negative for chills and fever.   HENT: Positive for dental problem. Negative for congestion.    Respiratory: Positive for shortness of breath. Negative for cough.    Cardiovascular: Positive for chest pain. Negative for palpitations and leg swelling.   Gastrointestinal: Positive for abdominal pain and diarrhea. Negative for blood in stool, constipation, nausea and vomiting.   Genitourinary: Negative for dysuria, flank pain and hematuria.   Musculoskeletal: Negative for back pain.   Skin: Negative for wound.   Allergic/Immunologic: Negative for immunocompromised state.   Neurological: Negative for dizziness, weakness and headaches.   Psychiatric/Behavioral: Negative for confusion.       Physical Exam     Initial Vitals [06/30/18 1508]   BP Pulse Resp Temp SpO2   103/63 92 16 98.2 °F (36.8 °C) 97 %      MAP       --         Physical Exam    Constitutional: She appears well-developed and well-nourished. She is not diaphoretic. No distress.   HENT:   Head: Normocephalic and atraumatic.    Mouth/Throat: Oropharynx is clear and moist.   Eyes: Conjunctivae and EOM are normal. Pupils are equal, round, and reactive to light.   Neck: Neck supple.   Cardiovascular: Normal rate, regular rhythm, normal heart sounds and intact distal pulses.   Pulmonary/Chest: Breath sounds normal. No respiratory distress.   Abdominal: Soft. Normal appearance. She exhibits no distension. There is tenderness in the epigastric area. There is no rigidity, no rebound, no guarding and no CVA tenderness.   Neurological: She is alert and oriented to person, place, and time.   Skin: Skin is warm and dry.         ED Course   Procedures  Labs Reviewed   COMPREHENSIVE METABOLIC PANEL - Abnormal; Notable for the following:        Result Value    CO2 22 (*)     All other components within normal limits   URINALYSIS, REFLEX TO URINE CULTURE - Abnormal; Notable for the following:     Appearance, UA Hazy (*)     Glucose, UA 3+ (*)     Nitrite, UA Positive (*)     Leukocytes, UA 2+ (*)     All other components within normal limits    Narrative:     Preferred Collection Type->Urine, Clean Catch   SALICYLATE LEVEL - Abnormal; Notable for the following:     Salicylate Lvl <5.0 (*)     All other components within normal limits   URINALYSIS MICROSCOPIC - Abnormal; Notable for the following:     WBC, UA 74 (*)     Bacteria, UA Many (*)     All other components within normal limits    Narrative:     Preferred Collection Type->Urine, Clean Catch   CULTURE, URINE   CBC W/ AUTO DIFFERENTIAL   LIPASE   TROPONIN I   DRUG SCREEN PANEL, URINE EMERGENCY    Narrative:     Preferred Collection Type->Urine, Clean Catch   TSH   TROPONIN I   POCT URINE PREGNANCY   POCT GLUCOSE          Imaging Results          X-Ray Chest PA And Lateral (Final result)  Result time 06/30/18 16:41:16    Final result by Jason Canada MD (06/30/18 16:41:16)                 Impression:      1. No convincing acute cardiopulmonary process, noting hypoventilatory  exam.      Electronically signed by: Jason Canada MD  Date:    06/30/2018  Time:    16:41             Narrative:    EXAMINATION:  XR CHEST PA AND LATERAL    CLINICAL HISTORY:  Other chest pain    TECHNIQUE:  PA and lateral views of the chest were performed.    COMPARISON:  03/27/2018    FINDINGS:  Body habitus limits evaluation.  The cardiomediastinal silhouette is not enlarged.  There is no pleural effusion.  The trachea is midline.  The lungs are symmetrically expanded bilaterally without evidence of acute parenchymal process.  Slight vascular crowding projects over the right lower lung zone, likely related to shallow inspiratory effort.  No large focal consolidation seen.  There is no pneumothorax.  The osseous structures are unremarkable.  Postsurgical change overlies the right upper quadrant.                                       APC / Resident Notes:   Patient was seen in the ER promptly upon arrival.  She is afebrile, no acute distress. Physical examination reveals minimal tenderness on palpation over the epigastric region.  Abdomen otherwise soft, nondistended.  Lung auscultation clear.  IV access was established labs ordered fluids given.  Patient was given a GI cocktail in ED.    Laboratory studies show normal white count 6.2.  Hemoglobin stable.  Chemistries are unremarkable. Lipase was normal. Salicylate level unremarkable. Cardiac workup unremarkable. Urinalysis does reveal 74 WBC, negative for blood.  Given Keflex in ED and will prescribed home on the same.    X-ray of chest unremarkable.  Upon reassessment after the GI cocktail was given she does have significant alleviation of her discomfort.  I do feel patient's symptoms likely secondary to gastritis given the excessive use of Advil and Aleve.  I did advise patient stop using these medications.  Tried use over-the-counter Orajel for the toothache.  She was given Pepcid in addition to her Protonix that she takes.  She was advised to have a bland  diet.  Repeat troponin was unremarkable. I do feel that she is stable for outpatient therapy.      The care of this patient was overseen by attending physician who agrees with treatment, plan, and disposition.           Attending Attestation:             Attending ED Notes:   PATIENT IS HERE FOR GASTRITIS TYPE SYMPTOMS CONSISTENT WITH PREVIOUS RESOLVED WITH GI COCKTAIL.  WE WILL DO 2 TROPONINS ARE NEGATIVE WILL GO HOME.  PAIN IS FROM EPIGASTRIC AREA TO CHEST.  PAIN FREE THROUGHOUT STAY AFTER GI COCKTAIL.  EKG NO CHANGE.  SHE HAS HAD HER GALLBLADDER TAKEN OUT.  PATIENT UNDERSTANDS WHEN TO RETURN WANT TO FOLLOW-UP.  NONTOXIC APPEARS WELL WITH NORMAL EXAM.  NO ABDOMINAL TENDERNESS. NO UTI OR VAGINAL DISCHARGE SYMPTOMS REPORTED TO ME.             Clinical Impression:   The primary encounter diagnosis was Epigastric pain. Diagnoses of Chest discomfort and Urinary tract infection without hematuria, site unspecified were also pertinent to this visit.      Disposition:   Disposition: Discharged  Condition: Stable                        Sunita Lizarraga PA-C  06/30/18 0286

## 2018-06-30 NOTE — LETTER
July 9, 2018    [facility] [id number]                   [address]  [place] [address]  Phone: [phone]  Fax: [phone]   Dear Mr. Maria Ines Ac:    I am following up from your recent emergency room visit. I reviewed your test results that were not available during your visit and there is an abnormal result that may require a change in your treatment. I attempted to contact you at the number [email or web address] in your chart but I was unable to reach you. Please contact us at your earliest convenience [phone].        Sincerely,        Sara Yan PA-C

## 2018-06-30 NOTE — ED NOTES
"States she feels "jittery".  Been taking advil and aleve every 4-6 hours for last week for a tooth ache.  Had cap that feel off but has dental appt Monday.  Started trileptal 3 weeks ago, 150mg BID by  (at [facility])     Reports diarrhea since yesterday.  +CP Mother states she had been sweating in the house that was "cold"  "

## 2018-07-01 NOTE — DISCHARGE INSTRUCTIONS
STOP taking Advil / Aleve / Motrin as you are taking this in excess.Try topical orajel for toothache. Take medication as prescribed.

## 2018-07-02 ENCOUNTER — TELEPHONE (OUTPATIENT)
Dept: NEUROLOGY | Facility: CLINIC | Age: 33
End: 2018-07-02

## 2018-07-02 LAB — BACTERIA UR CULT: NORMAL

## 2018-07-02 NOTE — TELEPHONE ENCOUNTER
----- Message from Shaun Rodriguez sent at 7/2/2018 11:25 AM CDT -----  Contact: Ruthie (Mother) [phone]  Needs Advice    Reason for call:   The patient would like to speak to someone regarding her medication     Communication Preference:PHONE     Additional Information: Please contact the patient' mom to discuss further.

## 2018-07-17 NOTE — PROVIDER PROGRESS NOTES - EMERGENCY DEPT.
Encounter Date: 6/30/2018    ED Physician Progress Notes        Physician Note:   3:31 PM  Patient came to the emergency department with a letter that told her she had abnormal lab results.  Patient states that she still has dysuria despite finishing Keflex from recent ED visit 8 days ago.  Otherwise she feels okay.  I discussed urine culture results with patient and that Keflex has intermediate sensitivity.  I will prescribe patient on Macrobid bid for 5 days.  She follows up with a urologist for many urological conditions, and she was advised to follow up with them in 3 days if her symptoms not improved.  I called in Macrobid to [facility] of her choice in [place].  All questions answered.    3:33 PM  Krystle Gonzales PA-C  Emergent Department  [facility]  Spectralink #[id number] or #[phone]

## 2018-07-20 DIAGNOSIS — K58.0 IRRITABLE BOWEL SYNDROME WITH DIARRHEA: ICD-10-CM

## 2018-07-20 DIAGNOSIS — R10.84 GENERALIZED ABDOMINAL PAIN: ICD-10-CM

## 2018-07-20 RX ORDER — DICYCLOMINE HYDROCHLORIDE 10 MG/1
CAPSULE ORAL
Qty: 180 CAPSULE | Refills: 0 | Status: SHIPPED | OUTPATIENT
Start: 2018-07-20 | End: 2018-07-25 | Stop reason: SDUPTHER

## 2018-07-25 ENCOUNTER — OFFICE VISIT (OUTPATIENT)
Dept: UROGYNECOLOGY | Facility: CLINIC | Age: 33
End: 2018-07-25
Payer: MEDICAID

## 2018-07-25 VITALS
WEIGHT: 203.69 LBS | SYSTOLIC BLOOD PRESSURE: 110 MMHG | HEIGHT: 62 IN | DIASTOLIC BLOOD PRESSURE: 80 MMHG | BODY MASS INDEX: 37.48 KG/M2

## 2018-07-25 DIAGNOSIS — N39.41 URGE INCONTINENCE: Primary | ICD-10-CM

## 2018-07-25 DIAGNOSIS — R35.0 URINARY FREQUENCY: ICD-10-CM

## 2018-07-25 PROBLEM — N76.0 VULVOVAGINITIS: Status: RESOLVED | Noted: 2017-04-10 | Resolved: 2018-07-25

## 2018-07-25 PROCEDURE — 99999 PR PBB SHADOW E&M-EST. PATIENT-LVL V: CPT | Mod: PBBFAC,,, | Performed by: NURSE PRACTITIONER

## 2018-07-25 PROCEDURE — 99213 OFFICE O/P EST LOW 20 MIN: CPT | Mod: S$PBB,,, | Performed by: NURSE PRACTITIONER

## 2018-07-25 PROCEDURE — 99215 OFFICE O/P EST HI 40 MIN: CPT | Mod: PBBFAC | Performed by: NURSE PRACTITIONER

## 2018-07-25 RX ORDER — NITROFURANTOIN 25; 75 MG/1; MG/1
CAPSULE ORAL
Refills: 0 | COMMUNITY
Start: 2018-07-17 | End: 2018-08-23

## 2018-07-25 NOTE — PROGRESS NOTES
Maria Ines Ac is a 32 y.o.  here for  follow up.  She has a history of retropubic sling/ cystourethroscopy for DILIP 1/2017. She has persistent urinary urgency which has not improved since starting pelvic floor PT or PTNS. Interval  HP since the last visit: (updates in bold)    1)  UI:  (+) JACINDA rarely  (+) UUI  Several times day. (+) pads: 4/ day  Daytime frequency: Q 1 hours.  Nocturia: every hour.  Wakes up wet.    (--)dysuria  (--) hematuria,  (--) frequent UTIs.  not complete bladder emptying. No abdominal pain.  Taking detrol-- did not get myrbetriq filled.      2)  POP:  Absent    Symptoms:(--)  .  (--) vaginal bleeding. (+) vaginal discharge. (-) sexually active.  (--) dyspareunia.   (--)  Vaginal dryness.  (+) vaginal estrogen use. Also using Shazia's Butt Paste. Denies vaginal pain or itching.    3)  BM:  (-) constipation/straining.  (--) chronic diarrhea (--) hematochezia.  (--) fecal incontinence.  (--) fecal smearing/urgency.  (--) incomplete evacuation.      4) pelvic pain improved with the vaginal suppositories          Past Medical History:   Diagnosis Date    Blood in stool      Constipation      Depression      Diabetes mellitus, type 2      Dysphagia      GERD (gastroesophageal reflux disease)      Hypertension      Palpitations      Premature menopause on hormone replacement therapy      Thyroid disease                 Past Surgical History:   Procedure Laterality Date    BLADDER SUSPENSION        CARPAL TUNNEL RELEASE         right    COLONOSCOPY N/A 8/30/2016     Procedure: COLONOSCOPY; Surgeon: Slick Herron MD; Location: [facility] ([address] [place] [address]); Service: Endoscopy; Laterality: N/A; PM prep    GALLBLADDER SURGERY        HYSTERECTOMY   2013     ron, Dr. Ashley Smith    OOPHORECTOMY   2005     [facility]- benign cyst    OOPHORECTOMY   2013     [facility]- endo    ooptherectomy        right knee   scoped    SHOULDER SURGERY   right         Current Outpatient  Prescriptions   Medication Sig    ACCU-CHEK AMAURY PLUS TEST STRP Strp USE TO TEST BLOOD GLUCOSE TID    ACCU-CHEK SOFTCLIX LANCETS Misc USE TO TEST BLOOD GLUCOSE TID    atorvastatin (LIPITOR) 40 MG tablet Take 40 mg by mouth once daily.    BLOOD SUGAR DIAGNOSTIC (ACCU-CHEK AMAURY PLUS TEST STRP MISC) 1 strip by Misc.(Non-Drug; Combo Route) route 3 (three) times daily.    calcium-vitamin D3 500 mg(1,250mg) -200 unit per tablet Take 1 tablet by mouth 2 (two) times daily with meals.    conjugated estrogens (PREMARIN) vaginal cream Use 0.5 gram of estrogen cream in vagina nightly x 2 weeks, then twice a week thereafter.    dicyclomine (BENTYL) 10 MG capsule Take 1 capsule (10 mg total) by mouth 4 (four) times daily before meals and nightly.    docusate sodium (COLACE) 100 MG capsule Take 100 mg by mouth 2 (two) times daily.    dulaglutide (TRULICITY) 0.75 mg/0.5 mL PnIj Inject 0.75 mg into the skin every 7 days.    estrogens, conjugated, (PREMARIN) 0.3 MG tablet Take 1 tablet (0.3 mg total) by mouth once daily.    famotidine (PEPCID) 20 MG tablet Take 1 tablet (20 mg total) by mouth 2 (two) times daily. for 14 days    FARXIGA 10 mg Tab Take 1 tablet by mouth once daily.     fish oil-omega-3 fatty acids 300-1,000 mg capsule Take 2 g by mouth once daily.    GLUCOPHAGE 500 mg tablet Take 500 mg by mouth 2 (two) times daily with meals.     LATUDA 40 mg Tab tablet TK 1 T PO  QAM    LATUDA 80 mg Tab tablet TK 1 T PO D    levothyroxine (SYNTHROID) 50 MCG tablet TK 1 T PO QAM    metoprolol succinate (TOPROL-XL) 25 MG 24 hr tablet Take 1 tablet (25 mg total) by mouth once daily.    mirabegron (MYRBETRIQ) 50 mg Tb24 Take 1 tablet (50 mg total) by mouth once daily.    MULTIVIT-IRON-MIN-FOLIC ACID 3,500-18-0.4 UNIT-MG-MG ORAL CHEW Take 1 tablet by mouth once daily.     nitrofurantoin, macrocrystal-monohydrate, (MACROBID) 100 MG capsule TK ONE C PO BID FOR 5 DAYS    nystatin (MYCOSTATIN) powder Apply topically 2  "(two) times daily.    nystatin-triamcinolone (MYCOLOG II) cream Apply topically 2 (two) times daily.    ondansetron (ZOFRAN-ODT) 4 MG TbDL DISSOLVE 1 T PO Q 8 H PRN N    OXcarbazepine (TRILEPTAL) 150 MG Tab TK 1 T PO BID    pantoprazole (PROTONIX) 40 MG tablet Take 1 tablet (40 mg total) by mouth once daily.    phentermine (ADIPEX-P) 37.5 mg tablet Take 37.5 mg by mouth before breakfast.    promethazine (PHENERGAN) 25 MG tablet TK 1 T PO  Q 4 TO 6 H PRN P    spironolactone (ALDACTONE) 25 MG tablet TK 1 T PO HS    terconazole (TERAZOL 3) 0.8 % vaginal cream Place 1 applicator vaginally once daily.    tolterodine (DETROL LA) 4 MG 24 hr capsule Take 1 capsule (4 mg total) by mouth once daily.    topiramate (TOPAMAX) 50 MG tablet Take 2 tablets (100 mg total) by mouth once daily.    ZOLOFT 100 mg tablet Take 200 mg by mouth once daily.      No current facility-administered medications for this visit.         Exam  Vitals:    07/25/18 1358   BP: 110/80   BP Location: Right arm   Patient Position: Sitting   BP Method: Large (Manual)   Weight: 92.4 kg (203 lb 11.3 oz)   Height: 5' 2" (1.575 m)       Well Woman:  Pap:post hysterectomy  Mammo:n/a  Colonoscopy:n/a  Dexa:n/a    General: alert and oriented, no acute distress  Respiratory: normal respiratory effort  Abd: soft, non-distended.  ND/NT     PELVIC EXAM: --deferred     Impression  1. Urge incontinence  mirabegron (MYRBETRIQ) 50 mg Tb24   2. Urinary frequency  mirabegron (MYRBETRIQ) 50 mg Tb24       We reviewed the above issues and discussed options for short-term versus long-term management of her problems.     Plan:     1. Shazia's Butt Paste daily to vulva.  3. Vaginal atrophy:  Estrogen cream 0.5 g twice weekly  4. Only have coke zero with meds at meal time-- limit 3/day.  5. Only drink 4 ounces at a time.  If you drink a coke zero, you can not drink anything else that hour.  6. For dry mouth, use sour candies (sugar free ones) and biotene mouth " wash  7. Continue tolterodine 4 mg daily. Add myrbetriq 50 mg daily for combo therapy.  8. Vaginal valium suppository 1-2 times daily as needed.  9. Compression stocking  10. 3 months    30 minutes were spent in face to face time with this patient  75 % of this time was spent in counseling and/or coordination of care

## 2018-07-25 NOTE — PATIENT INSTRUCTIONS
1. Shazia's Butt Paste daily to vulva.  3. Vaginal atrophy:  Estrogen cream 0.5 g twice weekly  4. Only have coke zero with meds at meal time-- limit 3/day.  5. Only drink 4 ounces at a time.  If you drink a coke zero, you can not drink anything else that hour.  6. For dry mouth, use sour candies (sugar free ones) and biotene mouth wash  7. Continue tolterodine 4 mg daily. Add myrbetriq 50 mg daily for combo therapy.  8. Vaginal valium suppository 1-2 times daily as needed.  9. Compression stocking  10. 3 months

## 2018-08-01 ENCOUNTER — OFFICE VISIT (OUTPATIENT)
Dept: NEUROLOGY | Facility: CLINIC | Age: 33
End: 2018-08-01
Payer: MEDICAID

## 2018-08-01 VITALS — HEIGHT: 62 IN | WEIGHT: 204.81 LBS | BODY MASS INDEX: 37.69 KG/M2

## 2018-08-01 DIAGNOSIS — E66.01 SEVERE OBESITY (BMI 35.0-39.9) WITH COMORBIDITY: ICD-10-CM

## 2018-08-01 DIAGNOSIS — G43.109 MIGRAINOUS VERTIGO: ICD-10-CM

## 2018-08-01 DIAGNOSIS — G47.8 SLEEP AROUSAL DISORDER: ICD-10-CM

## 2018-08-01 DIAGNOSIS — G44.52 NEW DAILY PERSISTENT HEADACHE: Primary | ICD-10-CM

## 2018-08-01 DIAGNOSIS — E11.8 TYPE 2 DIABETES MELLITUS WITH COMPLICATION, WITHOUT LONG-TERM CURRENT USE OF INSULIN: ICD-10-CM

## 2018-08-01 PROCEDURE — 99214 OFFICE O/P EST MOD 30 MIN: CPT | Mod: S$PBB,,, | Performed by: STUDENT IN AN ORGANIZED HEALTH CARE EDUCATION/TRAINING PROGRAM

## 2018-08-01 PROCEDURE — 99999 PR PBB SHADOW E&M-EST. PATIENT-LVL V: CPT | Mod: PBBFAC,,, | Performed by: STUDENT IN AN ORGANIZED HEALTH CARE EDUCATION/TRAINING PROGRAM

## 2018-08-01 PROCEDURE — 99215 OFFICE O/P EST HI 40 MIN: CPT | Mod: PBBFAC | Performed by: STUDENT IN AN ORGANIZED HEALTH CARE EDUCATION/TRAINING PROGRAM

## 2018-08-01 RX ORDER — LURASIDONE HYDROCHLORIDE 60 MG/1
60 TABLET, FILM COATED ORAL
Refills: 0 | COMMUNITY
Start: 2018-07-26 | End: 2018-08-23

## 2018-08-01 RX ORDER — IBUPROFEN 800 MG/1
800 TABLET ORAL EVERY 4 HOURS PRN
Refills: 0 | COMMUNITY
Start: 2018-07-24 | End: 2024-03-15

## 2018-08-01 RX ORDER — HYDROCODONE BITARTRATE AND ACETAMINOPHEN 5; 325 MG/1; MG/1
325 TABLET ORAL
Refills: 0 | COMMUNITY
Start: 2018-07-24 | End: 2018-08-23

## 2018-08-01 NOTE — PROGRESS NOTES
"NEUROLOGY OUTPATIENT CLINIC NOTE    Patient Name:  Maria Ines Ac  Patient MRN:  [id number]    Interval History 08/03/18:  Patient still having daily frontal headache.  Takes OTC Aleve daily and headache goes away within an hour.  Again, discussed rebound headache.  Concern for some level of ID in this patient and her father with poor understanding as well.  She is very pleasant but tends to nod head to all questions unless asked more than once or asked in a leading manner.  Reinforced that patient needs to try to cut down on NSAID use to 3 times weekly and explained that she is almost certainly having daily headaches due to some level of medication dependence.      Interval History 04/25/18:  Patient notes continued headaches, states that she has headache currently estimates pain of 6/10.  She has been doing wonderfully with cutting down on caffeine and walking a lot and has actually lost 10 lbs.  Now drinks 3 Coke Zero cans per day instead of several ("one every hour") mentioned at last visit.  She continues to have poor sleep.  Notes that she often wakes up with her headache.  It is generally in the forehead region and radiates toward crown of head becoming a holocephalic headache.  Still has dizziness, described as room spinning, that occurs with her headache.  She currently takes Aleve and has her first Coke Zero of the day when she has her headache, estimates taking 3 Aleve per day.  States she is having headache every day.  Did not notice major change with the topiramate despite increasing up to 100 mg qHS.  No changes otherwise.  Some recent stress related to her mother being sick and in the hospital.  Patient denies feeling more stressed than usual.  Discussed rebound headache, caffeine withdrawal headache, and ZAC with am headache as potential causes of patient's continued daily am headaches.  Her father is with her and states that he thinks she may have some vision changes and needs some glasses, " "thinks she is straining to see and having headaches due to straining her eyes.     HPI--from initial visit 10/18/18:  Patient is a very pleasant 32 y.o. female with PMHx of HTN, DM II with gastroparesis, obesity, and hypothyroidism who presents to [facility] 10/21/2017 for headaches.  Patient states that she has had headaches since about 03/2017 but they have gottne much worse over past 3-4 months.  She says they start in the middle of her forehead and radiate throughout the head to the occiput.  She describes the pain as sharp.  They are relieved by Aleve after about an hour but will recur, sometimes up to 4 times per day.  She notes associated n/v and rhinorrhea with headaches. She also notes dizziness associated with the headaches.  This is described as room spinning and is associated with tinnitus but not with hearing loss, ear fullness.  Denies recent infections, does not have sensation of hearing her pulse in her ear or other intrinsic sounds. Denies photophobia, phonophobia, lacrimation, injection of the eyes with headaches.  Unable to describe any triggers.  States that she had headaches during adolescence as well but they were not this frequent and usually would just go away with OTC NSAIDs.  She is accompanied by her dad who assists with the history and he notes that she drinks several Coke Zero soft drinks during the day, stating "one every hour."  Patient acknowledges drinking several soft drinks per day.  She also does not sleep very well and gets maybe 4 hours of sleep per night with a lot of tossing and turning.  Her father notes cell phone and tablet use before bed.  She does do a lot of walking during the day for exercise which father confirms.  Patient has not had any medication changes recently and was prescribed topiramate back in March 2017 and has not been titrated up--still on 25 mg po qHS which does not seem to be preventing these headaches.    ROS:  General:  No fever, no chills, " no fatigue  HEENT:  + headache, no changes in vision  Respiratory:  No cough, no SOB  Cardiovascular:  No chest pain, no palpitations  GI:  No abdominal pain, no n/v/c/d  :  No dysuria, no change in frequency, no hematuria  Skin:  No rashes, no pruritus, no wounds  Musculoskeletal:  No myalgias, no arthralgias  Hematologic:  No easy bruising or bleeding  Neuro:  No tremors, no focal weakness, no paresthesias  Psych:  No anxiety, no depression    PMHx:  Patient Active Problem List   Diagnosis    Abdominal pain, RLQ (right lower quadrant)    Dysphagia    Diabetes mellitus type II    Blood in stool    Constipation, chronic    Right hip pain    New daily persistent headache    Hypertension    Palpitations    Chest pain of unknown etiology    Dyspnea on exertion    Urinary frequency    Obesity (BMI 30-39.9)    Muscle spasm    Gastroesophageal reflux disease without esophagitis    Irritable bowel syndrome with diarrhea    Migrainous vertigo    Uncontrolled morning headache    Sleep arousal disorder     PSHx:  Past Surgical History:   Procedure Laterality Date    BLADDER SUSPENSION      CARPAL TUNNEL RELEASE      right    CHOLECYSTECTOMY      COLONOSCOPY N/A 8/30/2016    Procedure: COLONOSCOPY;  Surgeon: Slick Herron MD;  Location: [facility] ([address] [place] [address]);  Service: Endoscopy;  Laterality: N/A;  PM prep    GALLBLADDER SURGERY      HYSTERECTOMY  2013    Bess velasquez Dr. Champlain    OOPHORECTOMY  2005    [facility]- benign cyst    OOPHORECTOMY  2013    [facility]- endo    ooptherectomy      right knee  scoped    SHOULDER SURGERY  right     Medications:  Current Outpatient Prescriptions on File Prior to Visit   Medication Sig Dispense Refill    ACCU-CHEK AMAURY PLUS TEST STRP Strp USE TO TEST BLOOD GLUCOSE TID  4    ACCU-CHEK SOFTCLIX LANCETS Misc USE TO TEST BLOOD GLUCOSE TID  3    atorvastatin (LIPITOR) 40 MG tablet Take 40 mg by mouth once daily.      BLOOD SUGAR DIAGNOSTIC (ACCU-CHEK AMAURY PLUS  TEST STRP MISC) 1 strip by Misc.(Non-Drug; Combo Route) route 3 (three) times daily.      calcium-vitamin D3 500 mg(1,250mg) -200 unit per tablet Take 1 tablet by mouth 2 (two) times daily with meals.      conjugated estrogens (PREMARIN) vaginal cream Use 0.5 gram of estrogen cream in vagina nightly x 2 weeks, then twice a week thereafter. 30 g 4    dicyclomine (BENTYL) 10 MG capsule Take 1 capsule (10 mg total) by mouth 4 (four) times daily before meals and nightly. 180 capsule 5    docusate sodium (COLACE) 100 MG capsule Take 100 mg by mouth 2 (two) times daily.      dulaglutide (TRULICITY) 0.75 mg/0.5 mL PnIj Inject 0.75 mg into the skin every 7 days.      estrogens, conjugated, (PREMARIN) 0.3 MG tablet Take 1 tablet (0.3 mg total) by mouth once daily. 30 tablet 11    famotidine (PEPCID) 20 MG tablet Take 1 tablet (20 mg total) by mouth 2 (two) times daily. for 14 days 28 tablet 0    FARXIGA 10 mg Tab Take 1 tablet by mouth once daily.       fish oil-omega-3 fatty acids 300-1,000 mg capsule Take 2 g by mouth once daily.      GLUCOPHAGE 500 mg tablet Take 500 mg by mouth 2 (two) times daily with meals.       LATUDA 40 mg Tab tablet TK 1 T PO  QAM  1    LATUDA 80 mg Tab tablet TK 1 T PO D  2    levothyroxine (SYNTHROID) 50 MCG tablet TK 1 T PO QAM  8    metoprolol succinate (TOPROL-XL) 25 MG 24 hr tablet Take 1 tablet (25 mg total) by mouth once daily. 30 tablet 4    mirabegron (MYRBETRIQ) 50 mg Tb24 Take 1 tablet (50 mg total) by mouth once daily. 30 tablet 11    MULTIVIT-IRON-MIN-FOLIC ACID 3,500-18-0.4 UNIT-MG-MG ORAL CHEW Take 1 tablet by mouth once daily.       nitrofurantoin, macrocrystal-monohydrate, (MACROBID) 100 MG capsule TK ONE C PO BID FOR 5 DAYS  0    nystatin (MYCOSTATIN) powder Apply topically 2 (two) times daily. 30 g 3    nystatin-triamcinolone (MYCOLOG II) cream Apply topically 2 (two) times daily. 30 g 2    ondansetron (ZOFRAN-ODT) 4 MG TbDL DISSOLVE 1 T PO Q 8 H PRN N  0     OXcarbazepine (TRILEPTAL) 150 MG Tab TK 1 T PO BID  1    pantoprazole (PROTONIX) 40 MG tablet Take 1 tablet (40 mg total) by mouth once daily. 90 tablet 3    phentermine (ADIPEX-P) 37.5 mg tablet Take 37.5 mg by mouth before breakfast.      promethazine (PHENERGAN) 25 MG tablet TK 1 T PO  Q 4 TO 6 H PRN P  0    spironolactone (ALDACTONE) 25 MG tablet TK 1 T PO HS  2    terconazole (TERAZOL 3) 0.8 % vaginal cream Place 1 applicator vaginally once daily. 20 g 0    tolterodine (DETROL LA) 4 MG 24 hr capsule Take 1 capsule (4 mg total) by mouth once daily. 30 capsule 11    topiramate (TOPAMAX) 50 MG tablet Take 2 tablets (100 mg total) by mouth once daily. 60 tablet 11    ZOLOFT 100 mg tablet Take 200 mg by mouth once daily.        No current facility-administered medications on file prior to visit.      Allergies:  Review of patient's allergies indicates:  No Known Allergies    Social Hx:   Patient lives at home with her parents.  Some concern for ID, though no documentation.  She is not working, often plays video games and goes on walks at home during the day.  Her mother currently has cancer, but she and her father (who always accompanies her on visits) are hopeful that she is almost done with chemotherapy and has a hysterectomy coming up which may be curative.  Patient has never used tobacco, EtOH, or illicits.    Physical Exam:  General:  Well-developed, well-nourished, nad  HEENT:  NCAT, PERRL, EOMI, oropharygneal membranes non-erythematous/without exudate  Neck:  Supple, normal ROM without nuchal rigidity  Respiratory:  Symmetric expansion, no increased wob, CTAB  CVS:  No LE edema. Extremities warm, well-perfused.  GI:  Abd soft, non-distended  Skin:  No visible rashes or wounds  Psych:  Pleasant, cooperative with exam.  Speech and thought content appropriate.  Neurologic Exam:  Mental Status:  AAOx3.  Converses easily.  Able to spell 'world' forward and backward with 1 error.  Difficulty with serial 7s,  inattention with poor math as well.  Cranial Nerves:  PERRLA, EOMI. Facial movement intact and symmetric.  Tongue protrudes midline, palate raises symmetrically.  Trapezius 5/5 bilaterally.  Motor:  Normal muscle bulk and tone.  Strength 5/5 throughout.  Sensory:  Sensation intact to light touch at all extremities.  Vibratory sensation intact and symmetric at BUE/BLE digits.  Reflexes:  Reflexes 2+--biceps, brachioradialis, patellar.  No ankle clonus.  Coordination:  No resting tremor or myoclonus.  FTN, HTS, LYNN wnl--no ataxia, dysmetria, or dysdiadochokinesia.  Gait:  Appropriate gait, appropriate arm swing.  No shuffling, magnetic gait, imbalance, weakness, or foot drop noted.    Labs:  Results: CBC:   Lab Results   Component Value Date/Time    WBC 6.25 06/30/2018 03:58 PM    RBC 4.76 06/30/2018 03:58 PM    HGB 13.6 06/30/2018 03:58 PM    HCT 42.3 06/30/2018 03:58 PM     06/30/2018 03:58 PM    MCV 89 06/30/2018 03:58 PM    MCH 28.6 06/30/2018 03:58 PM    MCHC 32.2 06/30/2018 03:58 PM     CMP:   Lab Results   Component Value Date/Time    GLU 85 06/30/2018 03:58 PM    CALCIUM 10.0 06/30/2018 03:58 PM    ALBUMIN 4.2 06/30/2018 03:58 PM    PROT 8.0 06/30/2018 03:58 PM     06/30/2018 03:58 PM    K 4.1 06/30/2018 03:58 PM    CO2 22 (L) 06/30/2018 03:58 PM     06/30/2018 03:58 PM    BUN 10 06/30/2018 03:58 PM    CREATININE 1.1 06/30/2018 03:58 PM    ALKPHOS 64 06/30/2018 03:58 PM    ALT 14 06/30/2018 03:58 PM    AST 13 06/30/2018 03:58 PM    BILITOT 0.2 06/30/2018 03:58 PM     Imaging:  Imaging Results    None       Additional Diagnotic Testing:  N/a    ASSESSMENT/PLAN:  Patient is a 33 y.o. female with a PMHx of HTN, DM II with gastroparesis, obesity, and hypothyroidism who presents to [facility] 8/1/2018 for follow up of daily frontal headaches.    Problem List Items Addressed This Visit        Neuro    New daily persistent headache - Primary    Overview     Originally diagnosed as  "migraine headache with Dr. Gee.  Now gives hx of headache in forehead that becomes holocephalic.  It specifically presents in the mornings. Suspect components of rebound headache and caffeine withdrawal headache.  No ZAC on PSG.         Current Assessment & Plan     -Patient continues to take NSAIDs (Aleve) daily.  Counseled again on rebound headache.   -Pt doing well with limiting caffeine intake to one coke per day.   -Polysomnography done since last visit negative for ZAC.  -Patient will try to cut down to NSAIDs to no more than 3 days per week.   -Given difficulty of patient cutting down on NSAIDs when discussed previously, will call in 3-4 weeks to check in.  -Pt's father reports they have an ENT appt to look into whether pt may have sinusitis as well  -Given hx of DM II, cannot give steroid taper to help with withdrawal headaches w/ weaning NSAIDs         Migrainous vertigo    Overview     Pt reports feeling "room spinning" during her headaches.         Current Assessment & Plan     -Discontinue amitriptyline as pt was told that it will interfere with other medication and has been ineffective  -Continue topiramate 50 mg po bid  -Suspect primarily rebound headaches at this point--if well-controlled w/ NSAID monitoring, will trial off topiramate            Endocrine    Diabetes mellitus, type II    Severe obesity (BMI 35.0-39.9) with comorbidity       Other    Sleep arousal disorder    Overview     Polysomnography negative for ZAC as suspected initially, but concerning for sleep arousal disorder.             Mena Lambert MD  Pager:  [phone] [place] [address]  [phone]  "

## 2018-08-01 NOTE — PATIENT INSTRUCTIONS
-Only take Aleve or ibuprofen or other over the counter medications 3 days per week.  Try to decrease medication use slowly.  -Will call in 3-4 weeks to check up on symptoms

## 2018-08-03 NOTE — ASSESSMENT & PLAN NOTE
-Discontinue amitriptyline as pt was told that it will interfere with other medication and has been ineffective  -Continue topiramate 50 mg po bid  -Suspect primarily rebound headaches at this point--if well-controlled w/ NSAID monitoring, will trial off topiramate

## 2018-08-03 NOTE — ASSESSMENT & PLAN NOTE
-Patient continues to take NSAIDs (Aleve) daily.  Counseled again on rebound headache.   -Pt doing well with limiting caffeine intake to one coke per day.   -Polysomnography done since last visit negative for ZAC.  -Patient will try to cut down to NSAIDs to no more than 3 days per week.   -Given difficulty of patient cutting down on NSAIDs when discussed previously, will call in 3-4 weeks to check in.  -Pt's father reports they have an ENT appt to look into whether pt may have sinusitis as well  -Given hx of DM II, cannot give steroid taper to help with withdrawal headaches w/ weaning NSAIDs

## 2018-08-16 ENCOUNTER — OFFICE VISIT (OUTPATIENT)
Dept: OBSTETRICS AND GYNECOLOGY | Facility: CLINIC | Age: 33
End: 2018-08-16
Payer: MEDICAID

## 2018-08-16 VITALS
BODY MASS INDEX: 36.92 KG/M2 | SYSTOLIC BLOOD PRESSURE: 116 MMHG | HEIGHT: 62 IN | WEIGHT: 200.63 LBS | DIASTOLIC BLOOD PRESSURE: 70 MMHG

## 2018-08-16 DIAGNOSIS — Z01.419 WELL WOMAN EXAM WITH ROUTINE GYNECOLOGICAL EXAM: Primary | ICD-10-CM

## 2018-08-16 DIAGNOSIS — B37.31 VAGINAL YEAST INFECTION: Primary | ICD-10-CM

## 2018-08-16 PROCEDURE — 99395 PREV VISIT EST AGE 18-39: CPT | Mod: S$PBB,,, | Performed by: OBSTETRICS & GYNECOLOGY

## 2018-08-16 PROCEDURE — 99213 OFFICE O/P EST LOW 20 MIN: CPT | Mod: PBBFAC | Performed by: OBSTETRICS & GYNECOLOGY

## 2018-08-16 PROCEDURE — 99999 PR PBB SHADOW E&M-EST. PATIENT-LVL III: CPT | Mod: PBBFAC,,, | Performed by: OBSTETRICS & GYNECOLOGY

## 2018-08-16 NOTE — PROGRESS NOTES
Subjective:       Patient ID: Maria Ines Ac is a 33 y.o. female.    Chief Complaint:  Well Woman      History of Present Illness  HPI  SUBJECTIVE:   33 y.o. female  here for annual. Vaginal itching for months. Reports vaginal discharge as well. History of TLH/BSO for endometriosis in 2013 - currently on premarin and doing well.    complains of vaginal itching or irritation.  complains of vaginal discharge.    She is not sexually active.  She uses no method for contraception.    History of abnormal pap: No  Last Pap: N/A s/p hyst for endometriosis  Last MMG: N/A  Last Colonoscopy:  - normal    FH: Mother breast cancer early 50s. Brother colon cancer early 40s. MGM cervical cancer.    GYN & OB History  Patient's last menstrual period was 2012 (lmp unknown).   Date of Last Pap: No result found    OB History    Para Term  AB Living   0 0 0 0 0 0   SAB TAB Ectopic Multiple Live Births   0 0 0 0               Review of Systems  Review of Systems   Constitutional: Negative for chills, diaphoresis, fatigue and fever.   Respiratory: Negative for cough and shortness of breath.    Cardiovascular: Negative for chest pain and palpitations.   Gastrointestinal: Negative for abdominal pain, constipation, diarrhea, nausea and vomiting.   Genitourinary: Negative for pelvic pain, vaginal bleeding, vaginal discharge, vaginal pain and urinary incontinence.   Neurological: Negative for headaches.   Psychiatric/Behavioral: Negative for depression. The patient is not nervous/anxious.    Breast: Negative for breast mass, breast pain, nipple discharge and skin changes          Objective:    Physical Exam:   Constitutional: She is oriented to person, place, and time. She appears well-developed and well-nourished. No distress.    HENT:   Head: Normocephalic and atraumatic.     Neck: Normal range of motion.     Pulmonary/Chest: Effort normal. She exhibits no mass and no tenderness. Right breast exhibits  no inverted nipple, no mass, no nipple discharge, no skin change, no tenderness and no swelling. Left breast exhibits no inverted nipple, no mass, no nipple discharge, no skin change, no tenderness and no swelling. Breasts are symmetrical.        Abdominal: Soft. She exhibits no distension. There is no tenderness.     Genitourinary:   Genitourinary Comments: Normal external female genitalia. Vagina normal, cuff intact and without lesions. Uterus/cervix surgically absent. No adnexal masses palpated.             Musculoskeletal: Normal range of motion and moves all extremeties.       Neurological: She is alert and oriented to person, place, and time.    Skin: Skin is warm.    Psychiatric: She has a normal mood and affect. Her behavior is normal. Judgment and thought content normal.          Assessment:        1. Well woman exam with routine gynecological exam             Plan:      Maria Ines was seen today for well woman.    Diagnoses and all orders for this visit:    Well woman exam with routine gynecological exam  - Pap guidelines discussed. Pap not indicated.  - MMG age 38.   - Cscope per PCP.  - Contraception - N/A s/p hyst.  - STD screening - declined.    No orders of the defined types were placed in this encounter.      Follow-up in about 1 year (around 8/16/2019) for annual.

## 2018-08-17 ENCOUNTER — LAB VISIT (OUTPATIENT)
Dept: LAB | Facility: HOSPITAL | Age: 33
End: 2018-08-17
Attending: OBSTETRICS & GYNECOLOGY
Payer: MEDICAID

## 2018-08-17 DIAGNOSIS — B37.31 VAGINAL YEAST INFECTION: ICD-10-CM

## 2018-08-17 PROCEDURE — 87660 TRICHOMONAS VAGIN DIR PROBE: CPT

## 2018-08-20 ENCOUNTER — TELEPHONE (OUTPATIENT)
Dept: OBSTETRICS AND GYNECOLOGY | Facility: CLINIC | Age: 33
End: 2018-08-20

## 2018-08-20 DIAGNOSIS — R10.84 GENERALIZED ABDOMINAL PAIN: ICD-10-CM

## 2018-08-20 DIAGNOSIS — K58.0 IRRITABLE BOWEL SYNDROME WITH DIARRHEA: ICD-10-CM

## 2018-08-20 RX ORDER — DICYCLOMINE HYDROCHLORIDE 10 MG/1
CAPSULE ORAL
Qty: 180 CAPSULE | Refills: 0 | Status: SHIPPED | OUTPATIENT
Start: 2018-08-20 | End: 2018-09-22 | Stop reason: SDUPTHER

## 2018-08-20 NOTE — TELEPHONE ENCOUNTER
----- Message from Mae Dao sent at 8/20/2018  3:29 PM CDT -----  Contact: PAUL ARTHUR [[id number]]            Name of Who is Calling:PAUL ARTHUR [[id number]]    What is the request in detail: Pt is returning clinical staff call regarding lab results.  Please contact pt to further discuss and advise.    Can the clinic reply by [facility]: No      What Number to Call Back if not in [facility]: [phone]

## 2018-08-20 NOTE — TELEPHONE ENCOUNTER
----- Message from Enedelia Clark MD sent at 8/20/2018 12:39 PM CDT -----  Please let patient know that her vaginosis swab was negative for yeast, BV and trichomonas.

## 2018-08-20 NOTE — TELEPHONE ENCOUNTER
Left vm on house phone. Called mobile number and spoke with pt mother. I asked pt mother to have her daughter call us back. She verbalized understanding.

## 2018-08-22 ENCOUNTER — TELEPHONE (OUTPATIENT)
Dept: OBSTETRICS AND GYNECOLOGY | Facility: CLINIC | Age: 33
End: 2018-08-22

## 2018-08-22 NOTE — TELEPHONE ENCOUNTER
----- Message from Teresita Gudino sent at 8/22/2018  2:34 PM CDT -----  Contact: self  Pt returning a missed call from Monday, she can be reached at [phone].

## 2018-08-23 ENCOUNTER — OFFICE VISIT (OUTPATIENT)
Dept: CARDIOLOGY | Facility: CLINIC | Age: 33
End: 2018-08-23
Payer: MEDICAID

## 2018-08-23 ENCOUNTER — TELEPHONE (OUTPATIENT)
Dept: CARDIOLOGY | Facility: CLINIC | Age: 33
End: 2018-08-23

## 2018-08-23 ENCOUNTER — TELEPHONE (OUTPATIENT)
Dept: NEUROLOGY | Facility: CLINIC | Age: 33
End: 2018-08-23

## 2018-08-23 VITALS
WEIGHT: 203.94 LBS | HEIGHT: 62 IN | BODY MASS INDEX: 37.53 KG/M2 | DIASTOLIC BLOOD PRESSURE: 73 MMHG | SYSTOLIC BLOOD PRESSURE: 111 MMHG | HEART RATE: 88 BPM

## 2018-08-23 DIAGNOSIS — R07.9 CHEST PAIN, UNSPECIFIED TYPE: Primary | ICD-10-CM

## 2018-08-23 DIAGNOSIS — E78.5 HYPERLIPIDEMIA, UNSPECIFIED HYPERLIPIDEMIA TYPE: ICD-10-CM

## 2018-08-23 DIAGNOSIS — K21.9 GASTROESOPHAGEAL REFLUX DISEASE WITHOUT ESOPHAGITIS: ICD-10-CM

## 2018-08-23 DIAGNOSIS — E11.8 TYPE 2 DIABETES MELLITUS WITH COMPLICATION, WITHOUT LONG-TERM CURRENT USE OF INSULIN: ICD-10-CM

## 2018-08-23 DIAGNOSIS — E66.01 SEVERE OBESITY (BMI 35.0-39.9) WITH COMORBIDITY: ICD-10-CM

## 2018-08-23 DIAGNOSIS — I10 HYPERTENSION, UNSPECIFIED TYPE: ICD-10-CM

## 2018-08-23 DIAGNOSIS — R07.9 CHEST PAIN, UNSPECIFIED TYPE: ICD-10-CM

## 2018-08-23 DIAGNOSIS — R07.89 CHEST PAIN, NON-CARDIAC: Primary | ICD-10-CM

## 2018-08-23 PROCEDURE — 93010 ELECTROCARDIOGRAM REPORT: CPT | Mod: S$PBB,,, | Performed by: INTERNAL MEDICINE

## 2018-08-23 PROCEDURE — 99214 OFFICE O/P EST MOD 30 MIN: CPT | Mod: S$PBB,,, | Performed by: INTERNAL MEDICINE

## 2018-08-23 PROCEDURE — 99213 OFFICE O/P EST LOW 20 MIN: CPT | Mod: PBBFAC,25 | Performed by: INTERNAL MEDICINE

## 2018-08-23 PROCEDURE — 93005 ELECTROCARDIOGRAM TRACING: CPT | Mod: PBBFAC | Performed by: INTERNAL MEDICINE

## 2018-08-23 PROCEDURE — 99999 PR PBB SHADOW E&M-EST. PATIENT-LVL III: CPT | Mod: PBBFAC,,, | Performed by: INTERNAL MEDICINE

## 2018-08-23 RX ORDER — OMEPRAZOLE 20 MG/1
20 TABLET, DELAYED RELEASE ORAL DAILY
COMMUNITY
End: 2024-02-21

## 2018-08-23 NOTE — TELEPHONE ENCOUNTER
----- Message from Sharon Guerrero sent at 8/22/2018  2:30 PM CDT -----  Contact: self @ [phone]  Pt says Dr Lambert wanted her to call to let her know how she is doing.  Pls call to discuss pts condition.

## 2018-08-23 NOTE — Clinical Note
Thank you for referring Maria Ines Ac for follow-up of chest pain. Please see my note for details of this encounter. If you have any questions, please contact me.  Thank you again for the referral.

## 2018-08-23 NOTE — PROGRESS NOTES
"  Subjective:   Patient ID:  Maria Ines Ac is a 33 y.o. female who presents for follow-up of Chest Pain (x 1 week) and Essential hypertension      HPI: Patient with polycystic ovarian disease, chest pain of unknown etiology, hypertension, palpitations, and dyspnea on exertion.  Patient returns c/o sharp chest pain lasting an hour.  Was seen in ED at the end of June with no evidence of cardiac problem.  She gets the chest pain when she lies down at night and feels like hot water running up and down chest. When she has chest pain it is "hard to breath" and "hurts to breath". Will get lightheaded at night.     Past Medical History:   Diagnosis Date    Blood in stool     Constipation     Depression     Diabetes mellitus, type 2     Dysphagia     GERD (gastroesophageal reflux disease)     Hypertension     Palpitations     Premature menopause on hormone replacement therapy     Thyroid disease        Outpatient Medications Prior to Visit   Medication Sig Dispense Refill    ACCU-CHEK AMAURY PLUS TEST STRP Strp USE TO TEST BLOOD GLUCOSE TID  4    ACCU-CHEK SOFTCLIX LANCETS Misc USE TO TEST BLOOD GLUCOSE TID  3    atorvastatin (LIPITOR) 40 MG tablet Take 40 mg by mouth once daily.      BLOOD SUGAR DIAGNOSTIC (ACCU-CHEK AMAURY PLUS TEST STRP MISC) 1 strip by Misc.(Non-Drug; Combo Route) route 3 (three) times daily.      calcium-vitamin D3 500 mg(1,250mg) -200 unit per tablet Take 1 tablet by mouth 2 (two) times daily with meals.      conjugated estrogens (PREMARIN) vaginal cream Use 0.5 gram of estrogen cream in vagina nightly x 2 weeks, then twice a week thereafter. 30 g 4    dicyclomine (BENTYL) 10 MG capsule TAKE 2 CAPSULES(20 MG) BY MOUTH THREE TIMES DAILY BEFORE MEALS 180 capsule 0    dulaglutide (TRULICITY) 0.75 mg/0.5 mL PnIj Inject 0.75 mg into the skin every 7 days.      estrogens, conjugated, (PREMARIN) 0.3 MG tablet Take 1 tablet (0.3 mg total) by mouth once daily. 30 tablet 11    FARXIGA " 10 mg Tab Take 1 tablet by mouth once daily.       fish oil-omega-3 fatty acids 300-1,000 mg capsule Take 2 g by mouth once daily.      GLUCOPHAGE 500 mg tablet Take 500 mg by mouth 2 (two) times daily with meals.       ibuprofen (ADVIL,MOTRIN) 800 MG tablet 800 mg every 4 (four) hours as needed.   0    LATUDA 40 mg Tab tablet TK 1 T PO  QAM  1    LATUDA 80 mg Tab tablet TK 1 T PO D  2    levothyroxine (SYNTHROID) 50 MCG tablet TK 1 T PO QAM  8    metoprolol succinate (TOPROL-XL) 25 MG 24 hr tablet Take 1 tablet (25 mg total) by mouth once daily. 30 tablet 4    MULTIVIT-IRON-MIN-FOLIC ACID 3,500-18-0.4 UNIT-MG-MG ORAL CHEW Take 1 tablet by mouth once daily.       omeprazole (PRILOSEC OTC) 20 MG tablet Take 20 mg by mouth once daily.      pantoprazole (PROTONIX) 40 MG tablet Take 1 tablet (40 mg total) by mouth once daily. 90 tablet 3    spironolactone (ALDACTONE) 25 MG tablet TK 1 T PO HS  2    tolterodine (DETROL LA) 4 MG 24 hr capsule Take 1 capsule (4 mg total) by mouth once daily. 30 capsule 11    topiramate (TOPAMAX) 50 MG tablet Take 2 tablets (100 mg total) by mouth once daily. 60 tablet 11    ZOLOFT 100 mg tablet Take 200 mg by mouth once daily.       mirabegron (MYRBETRIQ) 50 mg Tb24 Take 1 tablet (50 mg total) by mouth once daily. 30 tablet 11    docusate sodium (COLACE) 100 MG capsule Take 100 mg by mouth 2 (two) times daily.      famotidine (PEPCID) 20 MG tablet Take 1 tablet (20 mg total) by mouth 2 (two) times daily. for 14 days 28 tablet 0    HYDROcodone-acetaminophen (NORCO) 5-325 mg per tablet 325 tablets.  0    LATUDA 60 mg Tab tablet 60 mg.  0    nitrofurantoin, macrocrystal-monohydrate, (MACROBID) 100 MG capsule TK ONE C PO BID FOR 5 DAYS  0    nystatin (MYCOSTATIN) powder Apply topically 2 (two) times daily. 30 g 3    nystatin-triamcinolone (MYCOLOG II) cream Apply topically 2 (two) times daily. 30 g 2    ondansetron (ZOFRAN-ODT) 4 MG TbDL DISSOLVE 1 T PO Q 8 H PRN N  0  "   OXcarbazepine (TRILEPTAL) 150 MG Tab TK 1 T PO BID  1    phentermine (ADIPEX-P) 37.5 mg tablet Take 37.5 mg by mouth before breakfast.      promethazine (PHENERGAN) 25 MG tablet TK 1 T PO  Q 4 TO 6 H PRN P  0    terconazole (TERAZOL 3) 0.8 % vaginal cream Place 1 applicator vaginally once daily. 20 g 0     No facility-administered medications prior to visit.        ROS  - Not done.  The way the patient is answering, not sure answers would be reliable.  Objective:   Physical Exam   Constitutional: She is oriented to person, place, and time. She appears well-developed and well-nourished.   /73 (BP Location: Left arm, Patient Position: Sitting, BP Method: X-Large (Automatic))   Pulse 88   Ht 5' 2" (1.575 m)   Wt 92.5 kg (203 lb 14.8 oz)   LMP 11/17/2012 (LMP Unknown)   BMI 37.30 kg/m²   Obese     Neck: Neck supple. No JVD present. Carotid bruit is not present.   Cardiovascular: Normal rate, regular rhythm, normal heart sounds and intact distal pulses. Exam reveals no gallop and no friction rub.   No murmur heard.  Pulmonary/Chest: Effort normal and breath sounds normal. She has no wheezes. She has no rales.   Abdominal: Soft. Bowel sounds are normal. She exhibits no abdominal bruit. There is no hepatomegaly. There is no tenderness.   Musculoskeletal: She exhibits no edema.   Neurological: She is alert and oriented to person, place, and time. She has normal strength.   Skin: No cyanosis. Nails show no clubbing.   Psychiatric: Her affect is blunt.         Lab Results   Component Value Date     06/30/2018    K 4.1 06/30/2018    BUN 10 06/30/2018    CREATININE 1.1 06/30/2018    GLU 85 06/30/2018    HGBA1C 5.2 05/24/2018    CHOL 169 01/22/2018    HDL 39 (L) 01/22/2018    LDLCALC 74.2 01/22/2018    TRIG 279 (H) 01/22/2018    CHOLHDL 23.1 01/22/2018    HGB 13.6 06/30/2018    HCT 42.3 06/30/2018     06/30/2018    INR 0.9 01/05/2017       Assessment:     1. Chest pain, non-cardiac    2. " Hyperlipidemia, unspecified hyperlipidemia type    3. Severe obesity (BMI 35.0-39.9) with comorbidity    4. Type 2 diabetes mellitus with complication, without long-term current use of insulin    5. Hypertension, unspecified type    6. Gastroesophageal reflux disease without esophagitis      The patient's symptoms are not indicative of myocardial ischemia, and are more compatible with GERD.  Her cardica evaluation 2 years was unremarkable, and there is no suggestion that repeating that evaluation would result in a different outcome.  Discussed with patient and father.  No further testing or additional treatment at this time. The importance of weight loss was again stressed.  The patient should return as needed.  Plan:     Maria Ines was seen today for chest pain and essential hypertension.    Diagnoses and all orders for this visit:    Chest pain, non-cardiac    Hyperlipidemia, unspecified hyperlipidemia type    Severe obesity (BMI 35.0-39.9) with comorbidity    Type 2 diabetes mellitus with complication, without long-term current use of insulin    Hypertension, unspecified type    Gastroesophageal reflux disease without esophagitis    Other orders  -     omeprazole (PRILOSEC OTC) 20 MG tablet; Take 20 mg by mouth once daily.          Miki Brown MD  Consultative Cardiology

## 2018-08-23 NOTE — TELEPHONE ENCOUNTER
Left a message for the patient to see if she want to leave a detailed message for Dr. Lambert. She was asked to return my call.

## 2018-08-29 ENCOUNTER — OFFICE VISIT (OUTPATIENT)
Dept: PODIATRY | Facility: CLINIC | Age: 33
End: 2018-08-29
Payer: MEDICAID

## 2018-08-29 VITALS
DIASTOLIC BLOOD PRESSURE: 70 MMHG | HEART RATE: 92 BPM | WEIGHT: 203 LBS | SYSTOLIC BLOOD PRESSURE: 102 MMHG | HEIGHT: 62 IN | BODY MASS INDEX: 37.36 KG/M2

## 2018-08-29 DIAGNOSIS — E11.49 TYPE II DIABETES MELLITUS WITH NEUROLOGICAL MANIFESTATIONS: Primary | ICD-10-CM

## 2018-08-29 PROCEDURE — 99999 PR PBB SHADOW E&M-EST. PATIENT-LVL III: CPT | Mod: PBBFAC,,, | Performed by: PODIATRIST

## 2018-08-29 PROCEDURE — 99213 OFFICE O/P EST LOW 20 MIN: CPT | Mod: PBBFAC | Performed by: PODIATRIST

## 2018-08-29 PROCEDURE — 99202 OFFICE O/P NEW SF 15 MIN: CPT | Mod: S$PBB,,, | Performed by: PODIATRIST

## 2018-08-29 NOTE — LETTER
August 29, 2018      Luann Raymond MD  [address]  [place] [address]           [facility]  [address]  [place] [address]  Phone: [phone]          Patient: Maria Ines Ac   MR Number: [id number]   YOB: 1985   Date of Visit: 8/29/2018       Dear Dr. Luann Raymond:    Thank you for referring Maria Ines Ac to me for evaluation. Attached you will find relevant portions of my assessment and plan of care.    If you have questions, please do not hesitate to call me. I look forward to following Maria Ines Ac along with you.    Sincerely,    Justen Solis, JANAK    Enclosure  CC:  No Recipients    If you would like to receive this communication electronically, please contact [email or web address] or [phone] to request more information on [facility] Link access.    For providers and/or their staff who would like to refer a patient to [facility], please contact us through our one-stop-shop provider referral line, [facility] , at [phone].    If you feel you have received this communication in error or would no longer like to receive these types of communications, please e-mail [email or web address]

## 2018-08-29 NOTE — PROGRESS NOTES
Subjective:      Patient ID: Maria Ines Ac is a 33 y.o. female.    Chief Complaint: Diabetes Mellitus (dr villegas 05/24/2018) and Diabetic Foot Exam    Maria Ines is a 33 y.o. female who presents to the clinic upon referral from Dr. Villegas  for evaluation and treatment of diabetic feet. Maria Ines has a past medical history of Blood in stool, Constipation, Depression, Diabetes mellitus, type 2, Dysphagia, GERD (gastroesophageal reflux disease), Hypertension, Palpitations, Premature menopause on hormone replacement therapy, and Thyroid disease. Patient relates no major problem with feet. Only complaints today consist of Diabetes, increased risk amputation needing evaluation/management/optomization of foot care.  No current symptoms.    PCP: Luann Villegas MD    Date Last Seen by PCP:   Chief Complaint   Patient presents with    Diabetes Mellitus     dr villegas 05/24/2018    Diabetic Foot Exam         Current shoe gear: Casual shoes    Hemoglobin A1C   Date Value Ref Range Status   05/24/2018 5.2 4.0 - 5.6 % Final     Comment:     According to ADA guidelines, hemoglobin A1c <7.0% represents  optimal control in non-pregnant diabetic patients. Different  metrics may apply to specific patient populations.   Standards of Medical Care in Diabetes-2016.  For the purpose of screening for the presence of diabetes:  <5.7%     Consistent with the absence of diabetes  5.7-6.4%  Consistent with increasing risk for diabetes   (prediabetes)  >or=6.5%  Consistent with diabetes  Currently, no consensus exists for use of hemoglobin A1c  for diagnosis of diabetes for children.  This Hemoglobin A1c assay has significant interference with fetal   hemoglobin   (HbF). The results are invalid for patients with abnormal amounts of   HbF,   including those with known Hereditary Persistence   of Fetal Hemoglobin. Heterozygous hemoglobin variants (HbAS, HbAC,   HbAD, HbAE, HbA2) do not significantly interfere with this assay;    however, presence of multiple variants in a sample may impact the %   interference.     01/22/2018 5.0 4.0 - 5.6 % Final     Comment:     According to ADA guidelines, hemoglobin A1c <7.0% represents  optimal control in non-pregnant diabetic patients. Different  metrics may apply to specific patient populations.   Standards of Medical Care in Diabetes-2016.  For the purpose of screening for the presence of diabetes:  <5.7%     Consistent with the absence of diabetes  5.7-6.4%  Consistent with increasing risk for diabetes   (prediabetes)  >or=6.5%  Consistent with diabetes  Currently, no consensus exists for use of hemoglobin A1c  for diagnosis of diabetes for children.  This Hemoglobin A1c assay has significant interference with fetal   hemoglobin   (HbF). The results are invalid for patients with abnormal amounts of   HbF,   including those with known Hereditary Persistence   of Fetal Hemoglobin. Heterozygous hemoglobin variants (HbAS, HbAC,   HbAD, HbAE, HbA2) do not significantly interfere with this assay;   however, presence of multiple variants in a sample may impact the %   interference.     10/16/2017 5.3 4.0 - 5.6 % Final     Comment:     According to ADA guidelines, hemoglobin A1c <7.0% represents  optimal control in non-pregnant diabetic patients. Different  metrics may apply to specific patient populations.   Standards of Medical Care in Diabetes-2016.  For the purpose of screening for the presence of diabetes:  <5.7%     Consistent with the absence of diabetes  5.7-6.4%  Consistent with increasing risk for diabetes   (prediabetes)  >or=6.5%  Consistent with diabetes  Currently, no consensus exists for use of hemoglobin A1c  for diagnosis of diabetes for children.  This Hemoglobin A1c assay has significant interference with fetal   hemoglobin   (HbF). The results are invalid for patients with abnormal amounts of   HbF,   including those with known Hereditary Persistence   of Fetal Hemoglobin.  Heterozygous hemoglobin variants (HbAS, HbAC,   HbAD, HbAE, HbA2) do not significantly interfere with this assay;   however, presence of multiple variants in a sample may impact the %   interference.             Review of Systems   Constitution: Negative for chills, diaphoresis, fever, malaise/fatigue and night sweats.   Cardiovascular: Negative for claudication, cyanosis, leg swelling and syncope.   Skin: Negative for color change, dry skin, nail changes, rash, suspicious lesions and unusual hair distribution.   Musculoskeletal: Negative for falls, joint pain, joint swelling, muscle cramps, muscle weakness and stiffness.   Gastrointestinal: Negative for constipation, diarrhea, nausea and vomiting.   Neurological: Positive for numbness, paresthesias and sensory change. Negative for brief paralysis, disturbances in coordination, focal weakness and tremors.           Objective:      Physical Exam   Constitutional: She is oriented to person, place, and time. She appears well-developed and well-nourished. She is cooperative. No distress.   Cardiovascular:   Pulses:       Popliteal pulses are 2+ on the right side, and 2+ on the left side.        Dorsalis pedis pulses are 2+ on the right side, and 2+ on the left side.        Posterior tibial pulses are 2+ on the right side, and 2+ on the left side.   Capillary refill 3 seconds all toes/distal feet, all toes/both feet warm to touch.      Negative lymphadenopathy bilateral popliteal fossa and tarsal tunnel.      Negavie lower extremity edema bilateral.     Musculoskeletal:        Right ankle: She exhibits normal range of motion, no swelling, no ecchymosis, no deformity, no laceration and normal pulse. Achilles tendon normal. Achilles tendon exhibits no pain, no defect and normal Noel's test results.   Normal angle, base, station of gait. All ten toes without clubbing, cyanosis, or signs of ischemia.  No pain to palpation bilateral lower extremities.  Range of motion,  stability, muscle strength, and muscle tone normal bilateral feet and legs.     Lymphadenopathy: No inguinal adenopathy noted on the right or left side.   Negative lymphadenopathy bilateral popliteal fossa and tarsal tunnel.    Negative lymphangitic streaking bilateral feet/ankles/legs.   Neurological: She is alert and oriented to person, place, and time. She has normal strength. She displays no atrophy and no tremor. No sensory deficit. She exhibits normal muscle tone. Gait normal.   Reflex Scores:       Patellar reflexes are 2+ on the right side and 2+ on the left side.       Achilles reflexes are 2+ on the right side and 2+ on the left side.  Diminished/loss of protective sensation all toes bilateral to 10 gram monofilament.    Paresthesias all  Toes intermittently.   Skin: Skin is warm, dry and intact. Capillary refill takes 2 to 3 seconds. No abrasion, no bruising, no burn, no ecchymosis, no laceration, no lesion and no rash noted. She is not diaphoretic. No cyanosis or erythema. No pallor. Nails show no clubbing.     Skin is normal age and health appropriate color, turgor, texture, and temperature bilateral lower extremities without ulceration, hyperpigmentation, discoloration, masses nodules or cords palpated.  No ecchymosis, erythema, edema, or cardinal signs of infection bilateral lower extremities.    All toenails normal color and trophic qualities.    Psychiatric: She has a normal mood and affect.             Assessment:       Encounter Diagnosis   Name Primary?    Type II diabetes mellitus with neurological manifestations Yes         Plan:       Maria Ines was seen today for diabetes mellitus and diabetic foot exam.    Diagnoses and all orders for this visit:    Type II diabetes mellitus with neurological manifestations      I counseled the patient on her conditions, their implications and medical management.        - Shoe inspection. Diabetic Foot Education. Patient reminded of the importance of good  nutrition and blood sugar control to help prevent podiatric complications of diabetes. Patient instructed on proper foot hygeine. We discussed wearing proper shoe gear, daily foot inspections, never walking without protective shoe gear, never putting sharp instruments to feet, routine podiatric visits at least annually.      Discussed conservative treatment with shoes of adequate dimensions, material, and style to alleviate symptoms and delay or prevent surgical intervention.          Follow-up in about 1 year (around 8/29/2019).

## 2018-08-30 ENCOUNTER — OFFICE VISIT (OUTPATIENT)
Dept: INTERNAL MEDICINE | Facility: CLINIC | Age: 33
End: 2018-08-30
Payer: MEDICAID

## 2018-08-30 DIAGNOSIS — N64.4 BREAST PAIN: Primary | ICD-10-CM

## 2018-08-30 PROCEDURE — 99999 PR PBB SHADOW E&M-EST. PATIENT-LVL V: CPT | Mod: PBBFAC,,, | Performed by: INTERNAL MEDICINE

## 2018-08-30 PROCEDURE — 99213 OFFICE O/P EST LOW 20 MIN: CPT | Mod: S$PBB,,, | Performed by: INTERNAL MEDICINE

## 2018-08-30 PROCEDURE — 99215 OFFICE O/P EST HI 40 MIN: CPT | Mod: PBBFAC | Performed by: INTERNAL MEDICINE

## 2018-08-30 RX ORDER — LURASIDONE HYDROCHLORIDE 60 MG/1
TABLET, FILM COATED ORAL
Refills: 0 | COMMUNITY
Start: 2018-08-23 | End: 2018-10-24

## 2018-09-03 VITALS
WEIGHT: 203.06 LBS | TEMPERATURE: 99 F | HEIGHT: 62 IN | HEART RATE: 96 BPM | DIASTOLIC BLOOD PRESSURE: 64 MMHG | SYSTOLIC BLOOD PRESSURE: 102 MMHG | BODY MASS INDEX: 37.37 KG/M2

## 2018-09-03 NOTE — PROGRESS NOTES
Subjective:       Patient ID: Maria Ines Ac is a 33 y.o. female.    Chief Complaint: Breast Pain    HPI   she complains of right breast pain.  Denies nipple discharge  Review of Systems   Constitutional: Negative for chills, fatigue, fever and unexpected weight change.   Respiratory: Negative for chest tightness and shortness of breath.    Cardiovascular: Negative for chest pain and palpitations.   Gastrointestinal: Negative for abdominal pain and blood in stool.   Neurological: Negative for dizziness, syncope, numbness and headaches.       Objective:      Physical Exam   HENT:   Right Ear: External ear normal.   Left Ear: External ear normal.   Nose: Nose normal.   Mouth/Throat: Oropharynx is clear and moist.   Eyes: Pupils are equal, round, and reactive to light.   Neck: Normal range of motion.   Cardiovascular: Normal rate and regular rhythm.   No murmur heard.  Pulmonary/Chest: Breath sounds normal.   Abdominal: She exhibits no distension. There is no hepatosplenomegaly. There is no tenderness.   Lymphadenopathy:     She has no cervical adenopathy.     She has no axillary adenopathy.   Neurological: She has normal strength and normal reflexes. No cranial nerve deficit or sensory deficit.      Right breast no masses palpated  Assessment/Plan      Assessment and plan: Breast pain:  Schedule  Appointment with breast Center.  She was here for urgent care only appointment.  She will return to clinic for a physical

## 2018-09-22 DIAGNOSIS — R10.84 GENERALIZED ABDOMINAL PAIN: ICD-10-CM

## 2018-09-22 DIAGNOSIS — K58.0 IRRITABLE BOWEL SYNDROME WITH DIARRHEA: ICD-10-CM

## 2018-09-24 ENCOUNTER — OFFICE VISIT (OUTPATIENT)
Dept: INTERNAL MEDICINE | Facility: CLINIC | Age: 33
End: 2018-09-24
Payer: MEDICAID

## 2018-09-24 ENCOUNTER — LAB VISIT (OUTPATIENT)
Dept: LAB | Facility: HOSPITAL | Age: 33
End: 2018-09-24
Attending: INTERNAL MEDICINE
Payer: MEDICAID

## 2018-09-24 VITALS
HEIGHT: 62 IN | SYSTOLIC BLOOD PRESSURE: 110 MMHG | HEART RATE: 96 BPM | BODY MASS INDEX: 37.53 KG/M2 | WEIGHT: 203.94 LBS | DIASTOLIC BLOOD PRESSURE: 78 MMHG

## 2018-09-24 DIAGNOSIS — I10 HYPERTENSION, UNSPECIFIED TYPE: ICD-10-CM

## 2018-09-24 DIAGNOSIS — E11.9 DIABETES MELLITUS WITHOUT COMPLICATION: Primary | ICD-10-CM

## 2018-09-24 DIAGNOSIS — E11.9 DIABETES MELLITUS WITHOUT COMPLICATION: ICD-10-CM

## 2018-09-24 LAB
ALBUMIN SERPL BCP-MCNC: 4.3 G/DL
ALP SERPL-CCNC: 58 U/L
ALT SERPL W/O P-5'-P-CCNC: 14 U/L
ANION GAP SERPL CALC-SCNC: 7 MMOL/L
AST SERPL-CCNC: 15 U/L
BILIRUB SERPL-MCNC: 0.2 MG/DL
BUN SERPL-MCNC: 13 MG/DL
CALCIUM SERPL-MCNC: 9.9 MG/DL
CHLORIDE SERPL-SCNC: 108 MMOL/L
CO2 SERPL-SCNC: 25 MMOL/L
CREAT SERPL-MCNC: 1.2 MG/DL
EST. GFR  (AFRICAN AMERICAN): >60 ML/MIN/1.73 M^2
EST. GFR  (NON AFRICAN AMERICAN): 59 ML/MIN/1.73 M^2
ESTIMATED AVG GLUCOSE: 100 MG/DL
GLUCOSE SERPL-MCNC: 90 MG/DL
HBA1C MFR BLD HPLC: 5.1 %
POTASSIUM SERPL-SCNC: 4.3 MMOL/L
PROT SERPL-MCNC: 8.2 G/DL
SODIUM SERPL-SCNC: 140 MMOL/L

## 2018-09-24 PROCEDURE — 36415 COLL VENOUS BLD VENIPUNCTURE: CPT

## 2018-09-24 PROCEDURE — 99214 OFFICE O/P EST MOD 30 MIN: CPT | Mod: S$PBB,,, | Performed by: INTERNAL MEDICINE

## 2018-09-24 PROCEDURE — 83036 HEMOGLOBIN GLYCOSYLATED A1C: CPT

## 2018-09-24 PROCEDURE — 99215 OFFICE O/P EST HI 40 MIN: CPT | Mod: PBBFAC | Performed by: INTERNAL MEDICINE

## 2018-09-24 PROCEDURE — 80053 COMPREHEN METABOLIC PANEL: CPT

## 2018-09-24 PROCEDURE — 99999 PR PBB SHADOW E&M-EST. PATIENT-LVL V: CPT | Mod: PBBFAC,,, | Performed by: INTERNAL MEDICINE

## 2018-09-24 RX ORDER — DICYCLOMINE HYDROCHLORIDE 10 MG/1
CAPSULE ORAL
Qty: 180 CAPSULE | Refills: 0 | Status: SHIPPED | OUTPATIENT
Start: 2018-09-24 | End: 2018-10-24 | Stop reason: SDUPTHER

## 2018-09-28 NOTE — PROGRESS NOTES
Subjective:       Patient ID: Maria Ines Ac is a 33 y.o. female.    Chief Complaint: Hypertension    HPI  She returns for management of hypertension.  She has had hypertension for over a year.  Current treatment has included medications outlined in medication list.  She denies chest pain or shortness of breath.  No palpitations.  Denies left arm or neck pain. She has diabetes.  Denies polyuria, polydipsia    Past medical history: Hypertension, hyperlipidemia, diabetes, hypothyroidism, bipolar disorder, migraine headaches, status post hysterectomy      Medications: Synthroid 0.05 mg daily, metformin, Toprol-XL 25mg daily,Lipitor 40 mg daily,farxiga,topamax, trulicity,latuda      NO KNOWN DRUG ALLERGIES     Review of Systems   Constitutional: Negative for chills, fatigue, fever and unexpected weight change.   Respiratory: Negative for chest tightness and shortness of breath.    Cardiovascular: Negative for chest pain and palpitations.   Gastrointestinal: Negative for abdominal pain and blood in stool.   Neurological: Negative for dizziness, syncope, numbness and headaches.       Objective:      Physical Exam   HENT:   Right Ear: External ear normal.   Left Ear: External ear normal.   Nose: Nose normal.   Mouth/Throat: Oropharynx is clear and moist.   Eyes: Pupils are equal, round, and reactive to light.   Neck: Normal range of motion.   Cardiovascular: Normal rate and regular rhythm.   No murmur heard.  Pulmonary/Chest: Breath sounds normal.   Abdominal: She exhibits no distension. There is no hepatosplenomegaly. There is no tenderness.   Lymphadenopathy:     She has no cervical adenopathy.     She has no axillary adenopathy.   Neurological: She has normal strength and normal reflexes. No cranial nerve deficit or sensory deficit.       Assessment/Plan       Assessment and plan:  1.  Hypertension:  Check CMP  2.  Diabetes:  Check A1c and urine microalbumin.  She reports having her eye exam within the past  year

## 2018-10-05 ENCOUNTER — TELEPHONE (OUTPATIENT)
Dept: ENDOSCOPY | Facility: HOSPITAL | Age: 33
End: 2018-10-05

## 2018-10-05 NOTE — TELEPHONE ENCOUNTER
----- Message from Teresita Kong MA sent at 10/5/2018  3:53 PM CDT -----  Contact: [phone]  Patient Returning Call from [facility]    Who Left Message for Patient: Lizz    Communication Preference: [phone]    Additional Information: not sure what the call was regarding

## 2018-10-05 NOTE — TELEPHONE ENCOUNTER
----- Message from Marla Richardson sent at 10/5/2018 12:30 PM CDT -----  Contact: Self- [phone]  Mita- pt called to schedule her f/u appt- pt has Medicaid and unable to schedule- please contact pt at [phone]

## 2018-10-24 ENCOUNTER — OFFICE VISIT (OUTPATIENT)
Dept: UROGYNECOLOGY | Facility: CLINIC | Age: 33
End: 2018-10-24
Payer: MEDICAID

## 2018-10-24 VITALS
WEIGHT: 201.25 LBS | DIASTOLIC BLOOD PRESSURE: 80 MMHG | SYSTOLIC BLOOD PRESSURE: 100 MMHG | BODY MASS INDEX: 37.04 KG/M2 | HEIGHT: 62 IN

## 2018-10-24 DIAGNOSIS — R10.84 GENERALIZED ABDOMINAL PAIN: ICD-10-CM

## 2018-10-24 DIAGNOSIS — R35.0 URINARY FREQUENCY: Primary | ICD-10-CM

## 2018-10-24 DIAGNOSIS — N39.41 URINARY INCONTINENCE, URGE: ICD-10-CM

## 2018-10-24 DIAGNOSIS — K58.0 IRRITABLE BOWEL SYNDROME WITH DIARRHEA: ICD-10-CM

## 2018-10-24 PROCEDURE — 99999 PR PBB SHADOW E&M-EST. PATIENT-LVL IV: CPT | Mod: PBBFAC,,, | Performed by: NURSE PRACTITIONER

## 2018-10-24 PROCEDURE — 99214 OFFICE O/P EST MOD 30 MIN: CPT | Mod: S$PBB,,, | Performed by: NURSE PRACTITIONER

## 2018-10-24 PROCEDURE — 99214 OFFICE O/P EST MOD 30 MIN: CPT | Mod: PBBFAC | Performed by: NURSE PRACTITIONER

## 2018-10-24 RX ORDER — DICYCLOMINE HYDROCHLORIDE 10 MG/1
CAPSULE ORAL
Qty: 180 CAPSULE | Refills: 0 | Status: SHIPPED | OUTPATIENT
Start: 2018-10-24 | End: 2018-11-26 | Stop reason: SDUPTHER

## 2018-10-24 NOTE — PROGRESS NOTES
Maria Ines Ac is a 32 y.o.  here for  follow up.  She has a history of retropubic sling/ cystourethroscopy for DILIP 1/2017. She has persistent urinary urgency/ urge incontincence which has not improved since starting pelvic floor PT or PTNS.     Interval  HPI since the last visit: (updates in bold)    1)  UI:  (+) JACINDA rarely  (+) UUI  Several times day. (+) pads: 4/ day  Daytime frequency: Q 1 hours.  Nocturia: every hour.  Wakes up wet.    (--)dysuria  (--) hematuria,  (--) frequent UTIs.  not complete bladder emptying. No abdominal pain.  Taking detrol and myrbetriq--no change in UI      2)  POP:  Absent    Symptoms:(--)  .  (--) vaginal bleeding. (+) vaginal discharge. (-) sexually active.  (--) dyspareunia.   (--)  Vaginal dryness.  (+) vaginal estrogen use. Also using Shazia's Butt Paste. Denies vaginal pain or itching.    3)  BM:  (--) constipation/straining.  (--) chronic diarrhea (--) hematochezia.  (--) fecal incontinence.  (--) fecal smearing/urgency.  (--) incomplete evacuation.      4) pelvic pain despite using vaginal suppositories twice daily          Past Medical History:   Diagnosis Date    Blood in stool      Constipation      Depression      Diabetes mellitus, type 2      Dysphagia      GERD (gastroesophageal reflux disease)      Hypertension      Palpitations      Premature menopause on hormone replacement therapy      Thyroid disease                 Past Surgical History:   Procedure Laterality Date    BLADDER SUSPENSION        CARPAL TUNNEL RELEASE         right    COLONOSCOPY N/A 8/30/2016     Procedure: COLONOSCOPY; Surgeon: Slick Herron MD; Location: [facility] ([address] [place] [address]); Service: Endoscopy; Laterality: N/A; PM prep    GALLBLADDER SURGERY        HYSTERECTOMY   2013     ron, Dr. Ashley Smith    OOPHORECTOMY   2005     [facility]- benign cyst    OOPHORECTOMY   2013     [facility]- endo    ooptherectomy        right knee   scoped    SHOULDER SURGERY   right          Current Outpatient Medications   Medication Sig    ACCU-CHEK AMAURY PLUS TEST STRP Strp USE TO TEST BLOOD GLUCOSE TID    ACCU-CHEK SOFTCLIX LANCETS Misc USE TO TEST BLOOD GLUCOSE TID    atorvastatin (LIPITOR) 40 MG tablet Take 40 mg by mouth once daily.    BLOOD SUGAR DIAGNOSTIC (ACCU-CHEK AMAURY PLUS TEST STRP MISC) 1 strip by Misc.(Non-Drug; Combo Route) route 3 (three) times daily.    calcium-vitamin D3 500 mg(1,250mg) -200 unit per tablet Take 1 tablet by mouth 2 (two) times daily with meals.    conjugated estrogens (PREMARIN) vaginal cream Use 0.5 gram of estrogen cream in vagina nightly x 2 weeks, then twice a week thereafter.    dicyclomine (BENTYL) 10 MG capsule TAKE 2 CAPSULES(20 MG) BY MOUTH THREE TIMES DAILY BEFORE MEALS    dulaglutide (TRULICITY) 0.75 mg/0.5 mL PnIj Inject 0.75 mg into the skin every 7 days.    estrogens, conjugated, (PREMARIN) 0.3 MG tablet Take 1 tablet (0.3 mg total) by mouth once daily.    FARXIGA 10 mg Tab Take 1 tablet by mouth once daily.     fish oil-omega-3 fatty acids 300-1,000 mg capsule Take 2 g by mouth once daily.    GLUCOPHAGE 500 mg tablet Take 500 mg by mouth 2 (two) times daily with meals.     ibuprofen (ADVIL,MOTRIN) 800 MG tablet 800 mg every 4 (four) hours as needed.     LATUDA 40 mg Tab tablet TK 1 T PO  QAM    LATUDA 80 mg Tab tablet TK 1 T PO D    levothyroxine (SYNTHROID) 50 MCG tablet TK 1 T PO QAM    metoprolol succinate (TOPROL-XL) 25 MG 24 hr tablet Take 1 tablet (25 mg total) by mouth once daily.    mirabegron (MYRBETRIQ) 50 mg Tb24 Take 1 tablet (50 mg total) by mouth once daily.    MULTIVIT-IRON-MIN-FOLIC ACID 3,500-18-0.4 UNIT-MG-MG ORAL CHEW Take 1 tablet by mouth once daily.     omeprazole (PRILOSEC OTC) 20 MG tablet Take 20 mg by mouth once daily.    pantoprazole (PROTONIX) 40 MG tablet Take 1 tablet (40 mg total) by mouth once daily.    spironolactone (ALDACTONE) 25 MG tablet TK 1 T PO HS    tolterodine (DETROL LA) 4 MG 24  "hr capsule Take 1 capsule (4 mg total) by mouth once daily.    topiramate (TOPAMAX) 50 MG tablet Take 2 tablets (100 mg total) by mouth once daily.    ZOLOFT 100 mg tablet Take 200 mg by mouth once daily.      No current facility-administered medications for this visit.         Exam  Vitals:    10/24/18 1418   BP: 100/80   BP Location: Right arm   Patient Position: Sitting   BP Method: Medium (Manual)   Weight: 91.3 kg (201 lb 4.5 oz)   Height: 5' 2" (1.575 m)       Well Woman:  Pap:post hysterectomy  Mammo:n/a  Colonoscopy:n/a  Dexa:n/a    General: alert and oriented, no acute distress  Respiratory: normal respiratory effort  Abd: soft, non-distended.  ND/NT     PELVIC EXAM:   VULVA: normal appearing vulva with no masses, tenderness or lesions,   VAGINA: normal appearing vagina with normal color and discharge, no lesions, no mesh visible/ palpable,  CERVIX: surgically absent,   UTERUS: absent,   ADNEXA: no masses,   RECTAL: deferred         Impression  No diagnosis found.    We reviewed the above issues and discussed options for short-term versus long-term management of her problems.     Plan:     1. Shazia's Butt Paste daily to vulva.  3. Vaginal atrophy:  Estrogen cream 0.5 g twice weekly  4. Only have coke zero with meds at meal time-- limit 3/day.  5. Only drink 4 ounces at a time.  If you drink a coke zero, you can not drink anything else that hour.  6. For dry mouth, use sour candies (sugar free ones) and biotene mouth wash  7. Continue tolterodine 4 mg daily. Add myrbetriq 50 mg daily for combo therapy.  8. Vaginal valium suppository 1-2 times daily as needed.  9. Compression stocking  10. Consult with Dr. Ventura-- will repeat suds/ cysto    30 minutes were spent in face to face time with this patient  75 % of this time was spent in counseling and/or coordination of care        "

## 2018-10-26 ENCOUNTER — OFFICE VISIT (OUTPATIENT)
Dept: GASTROENTEROLOGY | Facility: CLINIC | Age: 33
End: 2018-10-26
Payer: MEDICAID

## 2018-10-26 ENCOUNTER — TELEPHONE (OUTPATIENT)
Dept: ENDOSCOPY | Facility: HOSPITAL | Age: 33
End: 2018-10-26

## 2018-10-26 VITALS
HEIGHT: 62 IN | HEART RATE: 91 BPM | BODY MASS INDEX: 37.56 KG/M2 | DIASTOLIC BLOOD PRESSURE: 75 MMHG | SYSTOLIC BLOOD PRESSURE: 103 MMHG | WEIGHT: 204.13 LBS

## 2018-10-26 DIAGNOSIS — R13.19 ESOPHAGEAL DYSPHAGIA: Primary | ICD-10-CM

## 2018-10-26 PROCEDURE — 99999 PR PBB SHADOW E&M-EST. PATIENT-LVL V: CPT | Mod: PBBFAC,,, | Performed by: NURSE PRACTITIONER

## 2018-10-26 PROCEDURE — 99214 OFFICE O/P EST MOD 30 MIN: CPT | Mod: S$PBB,,, | Performed by: NURSE PRACTITIONER

## 2018-10-26 PROCEDURE — 99215 OFFICE O/P EST HI 40 MIN: CPT | Mod: PBBFAC | Performed by: NURSE PRACTITIONER

## 2018-10-26 NOTE — PROGRESS NOTES
[facility] Established Patient Visit    Reason for Visit:  The encounter diagnosis was Esophageal dysphagia.    PCP: Luann Raymond    HPI:   This is a 33 y.o. female here for f/u IBS-D.  Ms. Ac is here with her mother and was last seen in GI by myself about 3 months ago. She has been seen by Dr. Herron as well.     GERD. Pyrosis and regurgitation daily. Taking Protonix 40 mg with breakfast and omeprazole 40 mg (otc) with dinner with out much improvement.  Has decreased sodas to 3 per day.     IBS-D- still having 4 loose-watery BM/day. Taking bentyl 10 mg TID daily with out improvement. Still with upper abd cramping pain. Daily. No improvement with two week course of xifaxan last year.  5/10 Cramping. Worse immediately after meals . Sometimes better after BM. + nausea with migraines-Takes Zofran with good control. No vomiting. No fevers. Has not tried IBgard as previously advised.    Dysphagia/odynophagia - + hx dysphagia. S/p dilation x 3 with improvement. Was swallowing well after last EGD w/ dilation in 9/2017, but started having recurrence last month. Occurring Daily. Solids and liquids get stuck in upper esophagus. Washes with fluids for relief. Has not had to go to ED for this. Some coughing and choking when eating. Feels like materials go down the wrong pipe at times.      GI bleeding - denies hematochezia, hematemesis, melena, BRBPR, black/tarry stools, and coffee ground emesis    NSAID usage - aleve once or twice daily for tooth ache.    ROS:  Constitutional: No fevers, no chills, No unintentional/unexplained weight loss, no fatigue,   ENT: No allergies  CV: No chest pain, no palpitations, no perif. edema, no sob on exertion  Pulm: No cough, No shortness of breath, no wheezes, no sputum  Ophtho: No vision changes  GI: see HPI; also + nausea associated w/ migraines, no vomiting, no change in appetite  Derm: No rash  Heme: No lymphadenopathy, No bruising  MSK: No arthritis, no muscle pain,  no muscle weakness  : No dysuria, No hematuria  Endo: No hot or cold intolerance  Neuro: No syncope, No seizure,     PMHX:  has a past medical history of Blood in stool, Constipation, Depression, Diabetes mellitus, type 2, Dysphagia, GERD (gastroesophageal reflux disease), Hypertension, Palpitations, Premature menopause on hormone replacement therapy, and Thyroid disease.    PSHX:  has a past surgical history that includes ooptherectomy; right knee (scoped); Gallbladder surgery; Shoulder surgery (right); Carpal tunnel release; Hysterectomy (2013); Oophorectomy (2005); Oophorectomy (2013); Bladder suspension; Cholecystectomy; ESOPHAGOGASTRODUODENOSCOPY (EGD) (N/A, 9/14/2017); RETROPUBIC SLING (N/A, 1/23/2017); CYSTOSCOPY (N/A, 1/23/2017); ESOPHAGOGASTRODUODENOSCOPY (EGD) (N/A, 8/30/2016); COLONOSCOPY (N/A, 8/30/2016); ESOPHAGOGASTRODUODENOSCOPY (EGD) (N/A, 10/23/2014); EGD (ESOPHAGOGASTRODUODENOSCOPY) (N/A, 1/10/2013); and COLONOSCOPY (N/A, 1/9/2013).    The patient's social and family histories were reviewed by me and updated in the appropriate section of the electronic medical record.    Review of patient's allergies indicates:  No Known Allergies    Current Outpatient Medications   Medication Sig    ACCU-CHEK AMAURY PLUS TEST STRP Strp USE TO TEST BLOOD GLUCOSE TID    ACCU-CHEK SOFTCLIX LANCETS Misc USE TO TEST BLOOD GLUCOSE TID    atorvastatin (LIPITOR) 40 MG tablet Take 40 mg by mouth once daily.    BLOOD SUGAR DIAGNOSTIC (ACCU-CHEK AMAURY PLUS TEST STRP MISC) 1 strip by Misc.(Non-Drug; Combo Route) route 3 (three) times daily.    calcium-vitamin D3 500 mg(1,250mg) -200 unit per tablet Take 1 tablet by mouth 2 (two) times daily with meals.    conjugated estrogens (PREMARIN) vaginal cream Use 0.5 gram of estrogen cream in vagina nightly x 2 weeks, then twice a week thereafter.    dicyclomine (BENTYL) 10 MG capsule TAKE 2 CAPSULES(20 MG) BY MOUTH THREE TIMES DAILY BEFORE MEALS    dulaglutide (TRULICITY)  "0.75 mg/0.5 mL PnIj Inject 0.75 mg into the skin every 7 days.    estrogens, conjugated, (PREMARIN) 0.3 MG tablet Take 1 tablet (0.3 mg total) by mouth once daily.    FARXIGA 10 mg Tab Take 1 tablet by mouth once daily.     fish oil-omega-3 fatty acids 300-1,000 mg capsule Take 2 g by mouth once daily.    GLUCOPHAGE 500 mg tablet Take 500 mg by mouth 2 (two) times daily with meals.     ibuprofen (ADVIL,MOTRIN) 800 MG tablet 800 mg every 4 (four) hours as needed.     LATUDA 40 mg Tab tablet TK 1 T PO  QAM    LATUDA 80 mg Tab tablet TK 1 T PO D    levothyroxine (SYNTHROID) 50 MCG tablet TK 1 T PO QAM    metoprolol succinate (TOPROL-XL) 25 MG 24 hr tablet Take 1 tablet (25 mg total) by mouth once daily.    mirabegron (MYRBETRIQ) 50 mg Tb24 Take 1 tablet (50 mg total) by mouth once daily.    MULTIVIT-IRON-MIN-FOLIC ACID 3,500-18-0.4 UNIT-MG-MG ORAL CHEW Take 1 tablet by mouth once daily.     omeprazole (PRILOSEC OTC) 20 MG tablet Take 20 mg by mouth once daily.    pantoprazole (PROTONIX) 40 MG tablet Take 1 tablet (40 mg total) by mouth once daily.    spironolactone (ALDACTONE) 25 MG tablet TK 1 T PO HS    tolterodine (DETROL LA) 4 MG 24 hr capsule Take 1 capsule (4 mg total) by mouth once daily.    topiramate (TOPAMAX) 50 MG tablet Take 2 tablets (100 mg total) by mouth once daily.    ZOLOFT 100 mg tablet Take 200 mg by mouth once daily.      No current facility-administered medications for this visit.          Objective Findings:    Vital Signs:  /75   Pulse 91   Ht 5' 2" (1.575 m)   Wt 92.6 kg (204 lb 2.3 oz)   LMP 11/17/2012 (LMP Unknown)   BMI 37.34 kg/m²  Body mass index is 37.34 kg/m².    Physical Exam:  General Appearance: Well appearing in no acute distress  Head:   Normocephalic, without obvious abnormality  Eyes:    No scleral icterus, EOMI  Throat: Lips, mucosa, and tongue normal; teeth and gums normal  Lungs: CTA bilaterally in anterior and posterior fields, no wheezes, no " crackles.  Heart:  Regular rate and rhythm, S1, S2 normal, no murmurs heard  Abdomen: Soft, mild diffuse tenderness, non distended with positive bowel sounds in all four quadrants. No hepatosplenomegaly, ascites, or mass  Extremities:    2+ radial pulses, no clubbing, cyanosis or edema  Skin: No rash to exposed areas  Neurologic: A&Ox4      Labs:  Lab Results   Component Value Date    WBC 6.25 06/30/2018    HGB 13.6 06/30/2018    HCT 42.3 06/30/2018     06/30/2018    CHOL 169 01/22/2018    TRIG 279 (H) 01/22/2018    HDL 39 (L) 01/22/2018    ALT 14 09/24/2018    AST 15 09/24/2018     09/24/2018    K 4.3 09/24/2018     09/24/2018    CREATININE 1.2 09/24/2018    BUN 13 09/24/2018    CO2 25 09/24/2018    TSH 2.151 06/30/2018    INR 0.9 01/05/2017    HGBA1C 5.1 09/24/2018     Imaging:  GES 4/6/18: nl   abd us 6/13/17: NL. S/p louise  CT abd/pelvis 12/10/2012: mildly prominent cecal wall likely r/t nondistention  Endoscopy:   9/14/2017 EGD Dr. Herron-NL esophagus. Dilated with a 50 fr rogers. Nl stomach. Nl duodenum.  8/30/2016 EGD Dr. Herron-normal esophagus. Dilation w/ 50 fr rogers. Normal stomach. Normal duodenum.  8/30/2016 Colonoscopy Dr. Herron-normal. Random bx-negative.   10/23/2014 EGD [facility]-normal esophagus. Dilated with a 50 fr rogers. Normal stomach. Normal duodenum. bx-wnl  1/10/2013 EGD [facility]- normal study.   1/9/2013 colonoscopy [facility]-2 mm sigmoid colon polyp. bx- Hyperplastic. Repeat in 10 years.  6/13/2012 esophageal manometry [place]-normal  4/2/2012 EGD [facility]- normal duodenum. Gastritis. HH. White nummular lesions in the esophagus. bx-mild chronic inflammation/reactive changes. Otherwise WNL.    Assessment:    1. Esophageal dysphagia          Recommendations:  1. dysphagia MBSS and esophagram with 13 mm BT. esophageal manometry. Pt consented today during clinic. Consider rpt empiric dilation if studies unrevealing.  2. IBS-M- diarrhea. Eliminate artificial sugars (ie diet  sodas). Start a daily fiber supplement. OTC IBgard as previously advised.  3. GERD- continue PPI BID. Add gaviscon w/ alginic acid up to QID.     F/u in 3-6 months for IBS, GERD, dysphagia w/ Dr. Herron.    Order summary:  Orders Placed This Encounter    Fl Modified Barium Swallow Speech    FL Esophagram Complete    SLP video swallow    Case request GI: MANOMETRY, ESOPHAGUS, WITH IMPEDANCE MEASUREMENT       Thank you for allowing me to participate in the care of Maria Ines Fan, APRN, FNP-C

## 2018-10-26 NOTE — PATIENT INSTRUCTIONS
Take over the counter gaviscon with alginic acid up to 4 times daily for reflux (in addition to the pantoprazole and omeprazole).    Take over the counter IBgard for abdominal pain.    Take a daily fiber supplement for diarrhea (Metamucil, or Fibercon, or Citrucel etc)    Eliminate  artificial sweeteners (splenda, equal, stevia, truvia, crystal lite, diet sodas, sugar free candy/gum etc) from your diet because they can cause diarrhea.

## 2018-10-29 ENCOUNTER — TELEPHONE (OUTPATIENT)
Dept: SPEECH THERAPY | Facility: HOSPITAL | Age: 33
End: 2018-10-29

## 2018-10-30 ENCOUNTER — TELEPHONE (OUTPATIENT)
Dept: UROGYNECOLOGY | Facility: CLINIC | Age: 33
End: 2018-10-30

## 2018-10-30 NOTE — TELEPHONE ENCOUNTER
Returned pt call no answer, Left voice message for pt to give the office a call back at [phone].

## 2018-10-30 NOTE — TELEPHONE ENCOUNTER
----- Message from Tona Garduno sent at 10/30/2018 11:33 AM CDT -----  Contact: Patient's Mother (Ruthie Ac)   Patient's mother called to request a call back regarding a doctor referral for a second opinion. The mother can be reached at [phone].

## 2018-11-01 ENCOUNTER — TELEPHONE (OUTPATIENT)
Dept: UROGYNECOLOGY | Facility: CLINIC | Age: 33
End: 2018-11-01

## 2018-11-01 NOTE — TELEPHONE ENCOUNTER
----- Message from Itzel López sent at 11/1/2018  1:02 PM CDT -----  Name of Who is Calling: Ruthie (Mother)    What is the request in detail: Returning a call.       Can the clinic reply by [facility]:   No       What Number to Call Back if not in [place]:[phone]

## 2018-11-01 NOTE — TELEPHONE ENCOUNTER
Returned pt call no answer, Left voice message for pt to give the office a call back at [phone].

## 2018-11-05 ENCOUNTER — HOSPITAL ENCOUNTER (OUTPATIENT)
Facility: HOSPITAL | Age: 33
Discharge: HOME | End: 2018-11-05
Attending: INTERNAL MEDICINE | Admitting: INTERNAL MEDICINE
Payer: MEDICAID

## 2018-11-05 VITALS
DIASTOLIC BLOOD PRESSURE: 72 MMHG | WEIGHT: 204 LBS | SYSTOLIC BLOOD PRESSURE: 122 MMHG | OXYGEN SATURATION: 96 % | HEART RATE: 91 BPM | BODY MASS INDEX: 37.54 KG/M2 | RESPIRATION RATE: 16 BRPM | HEIGHT: 62 IN | TEMPERATURE: 98 F

## 2018-11-05 DIAGNOSIS — K21.9 GERD (GASTROESOPHAGEAL REFLUX DISEASE): ICD-10-CM

## 2018-11-05 PROCEDURE — 25000003 PHARM REV CODE 250: Performed by: INTERNAL MEDICINE

## 2018-11-05 PROCEDURE — 91010 ESOPHAGUS MOTILITY STUDY: CPT | Performed by: INTERNAL MEDICINE

## 2018-11-05 PROCEDURE — 91010 ESOPHAGUS MOTILITY STUDY: CPT | Mod: 26,,, | Performed by: INTERNAL MEDICINE

## 2018-11-05 RX ORDER — LIDOCAINE HYDROCHLORIDE 20 MG/ML
JELLY TOPICAL ONCE
Status: COMPLETED | OUTPATIENT
Start: 2018-11-05 | End: 2018-11-05

## 2018-11-05 RX ADMIN — LIDOCAINE HYDROCHLORIDE 10 ML: 20 JELLY TOPICAL at 07:11

## 2018-11-05 NOTE — PLAN OF CARE
ID band applied and verified. POC and procedure reviewed with patient.  Patient verbalizes understanding and has no questions at this time.

## 2018-11-05 NOTE — [employer] PATIENT INSTRUCTIONS
Discharge Summary/Instructions after an Endoscopic Procedure  Patient Name: Maria Ines Ac  Patient MRN: [id number]  Patient YOB: 1985 Monday, November 05, 2018  Slick Herron MD  RESTRICTIONS:  During your procedure today, you received medications for sedation.  These   medications may affect your judgment, balance and coordination.  Therefore,   for 24 hours, you have the following restrictions:   - DO NOT drive a car, operate machinery, make legal/financial decisions,   sign important papers or drink alcohol.    ACTIVITY:  Today: no heavy lifting, straining or running due to procedural   sedation/anesthesia.  The following day: return to full activity including work.  DIET:  Eat and drink normally unless instructed otherwise.     TREATMENT FOR COMMON SIDE EFFECTS:  - Mild abdominal pain, nausea, belching, bloating or excessive gas:  rest,   eat lightly and use a heating pad.  - Sore Throat: treat with throat lozenges and/or gargle with warm salt   water.  - Because air was used during the procedure, expelling large amounts of air   from your rectum or belching is normal.  - If a bowel prep was taken, you may not have a bowel movement for 1-3 days.    This is normal.  SYMPTOMS TO WATCH FOR AND REPORT TO YOUR PHYSICIAN:  1. Abdominal pain or bloating, other than gas cramps.  2. Chest pain.  3. Back pain.  4. Signs of infection such as: chills or fever occurring within 24 hours   after the procedure.  5. Rectal bleeding, which would show as bright red, maroon, or black stools.   (A tablespoon of blood from the rectum is not serious, especially if   hemorrhoids are present.)  6. Vomiting.  7. Weakness or dizziness.  GO DIRECTLY TO THE NEAREST EMERGENCY ROOM IF YOU HAVE ANY OF THE FOLLOWING:      Difficulty breathing              Chills and/or fever over 101 F   Persistent vomiting and/or vomiting blood   Severe abdominal pain   Severe chest pain   Black, tarry stools   Bleeding- more than one  tablespoon   Any other symptom or condition that you feel may need urgent attention  Your doctor recommends these additional instructions:  If any biopsies were taken, your doctors clinic will contact you in 1 to 2   weeks with any results.  - Discharge patient to home.   - Patient has a contact number available for emergencies.  The signs and   symptoms of potential delayed complications were discussed with the   patient.  Return to normal activities tomorrow.  Written discharge   instructions were provided to the patient.   - Resume previous diet.   - Return to referring physician as previously scheduled.  For questions, problems or results please call your physician - Slick Herron MD at Work:  [phone].  [facility] [place], EMERGENCY ROOM PHONE NUMBER: [phone]  IF A COMPLICATION OR EMERGENCY SITUATION ARISES AND YOU ARE UNABLE TO REACH   YOUR PHYSICIAN - GO DIRECTLY TO THE EMERGENCY ROOM.  Slick Herron MD  11/5/2018 1:02:21 PM  This report has been verified and signed electronically.  [employer]

## 2018-11-05 NOTE — DISCHARGE INSTRUCTIONS
Esophageal Manometry     A catheter measures pressure along the esophagus.     Esophageal manometry is a test to measure the strength and function of the esophagus (the food pipe). Results can help identify causes of heartburn, swallowing problems, or chest pain. The test can also help plan surgery and determine the success of previous surgery.  Preparing for the test  Be sure to talk to your healthcare provider about any medicines you take. Some medicines can affect the test results. Also ask any questions you have about the risks of the test. These include irritation to the nose and throat. Be sure not to smoke, eat, or drink for up to 12 hours before the test.  During the test  Manometry takes about an hour. Usually you lie down during the test. Your nose and throat are numbed. Then a soft, thin tube is placed through the nose and down the esophagus. At first you may notice a gagging feeling. You will be asked to swallow several times. Holes along the tube measure the pressure while you swallow. Measurements are printed out as tracings, much like a heart test tracing. After the test, another catheter may be left in the esophagus for up to 24 hours to measure acid (pH) levels.  After esophageal manometry  Youll probably discuss the results of the test with your healthcare provider at another appointment. This is because time is needed to review the tracings. You may have a mild sore throat for a short time. As soon as the numbness in your throat is gone, you can return to eating and your normal activities.  Date Last Reviewed: 6/1/2016  © 3062-2557 [employer]. [address] [place] [address], [place] [address]. All rights reserved. This information is not intended as a substitute for professional medical care. Always follow your healthcare professional's instructions.

## 2018-11-26 DIAGNOSIS — R10.84 GENERALIZED ABDOMINAL PAIN: ICD-10-CM

## 2018-11-26 DIAGNOSIS — K58.0 IRRITABLE BOWEL SYNDROME WITH DIARRHEA: ICD-10-CM

## 2018-11-26 RX ORDER — DICYCLOMINE HYDROCHLORIDE 10 MG/1
CAPSULE ORAL
Qty: 180 CAPSULE | Refills: 0 | Status: SHIPPED | OUTPATIENT
Start: 2018-11-26 | End: 2018-12-28 | Stop reason: SDUPTHER

## 2018-11-27 ENCOUNTER — HOSPITAL ENCOUNTER (OUTPATIENT)
Dept: RADIOLOGY | Facility: HOSPITAL | Age: 33
Discharge: HOME | End: 2018-11-27
Attending: NURSE PRACTITIONER
Payer: MEDICAID

## 2018-11-27 ENCOUNTER — CLINICAL SUPPORT (OUTPATIENT)
Dept: SPEECH THERAPY | Facility: HOSPITAL | Age: 33
End: 2018-11-27
Attending: NURSE PRACTITIONER
Payer: MEDICAID

## 2018-11-27 DIAGNOSIS — R13.19 ESOPHAGEAL DYSPHAGIA: ICD-10-CM

## 2018-11-27 PROCEDURE — 92611 MOTION FLUOROSCOPY/SWALLOW: CPT | Mod: GN | Performed by: SPEECH-LANGUAGE PATHOLOGIST

## 2018-11-27 PROCEDURE — 74230 X-RAY XM SWLNG FUNCJ C+: CPT | Mod: TC

## 2018-11-27 PROCEDURE — 74220 X-RAY XM ESOPHAGUS 1CNTRST: CPT | Mod: 26,,, | Performed by: RADIOLOGY

## 2018-11-27 PROCEDURE — 74230 X-RAY XM SWLNG FUNCJ C+: CPT | Mod: 26,,, | Performed by: RADIOLOGY

## 2018-11-27 PROCEDURE — 74220 X-RAY XM ESOPHAGUS 1CNTRST: CPT | Mod: TC

## 2018-11-27 NOTE — PROGRESS NOTES
Referring provider: April Fan  Reason for visit:  Modified barium swallow study (CPT 02143)    Report Summary   --Date: 11/27/2018  --Purpose: to evaluate anatomy and physiology of the oropharyngeal swallow, to determine effectiveness of rehabilitation strategies, and to determine diet consistency and intervention recommendations.   --Diet recommendations: regular as tolerated    Subjective / History    Maria Ines Ac is a 33 y.o. female referred by Dr. Fan for Modified Barium Swallow Study (24630).  She is currently on a regular diet.  She reports difficulty swallowing solids, liquids, and pills.  She reports she has not made any changes to her diet as a result of her dysphagia because no item in particular is more difficult to swallow.  She reports sensation intermittently that food/drink go down the wrong pipe and that she chokes/coughs.  She denies ever needing the Heimlich, history of aspiration pna, or significant weight loss resulting from the dysphagia.  She does endorse some weight loss but isn't sure how much.  She does report regurgitation.      Past Medical History:   Diagnosis Date    Blood in stool     Constipation     Depression     Diabetes mellitus, type 2     Dysphagia     GERD (gastroesophageal reflux disease)     Hypertension     Palpitations     Premature menopause on hormone replacement therapy     Thyroid disease      Current Outpatient Medications on File Prior to Visit   Medication Sig Dispense Refill    ACCU-CHEK AMAURY PLUS TEST STRP Strp USE TO TEST BLOOD GLUCOSE TID  4    ACCU-CHEK SOFTCLIX LANCETS Misc USE TO TEST BLOOD GLUCOSE TID  3    atorvastatin (LIPITOR) 40 MG tablet Take 40 mg by mouth once daily.      BLOOD SUGAR DIAGNOSTIC (ACCU-CHEK AMAURY PLUS TEST STRP MISC) 1 strip by Misc.(Non-Drug; Combo Route) route 3 (three) times daily.      calcium-vitamin D3 500 mg(1,250mg) -200 unit per tablet Take 1 tablet by mouth 2 (two) times daily with meals.       conjugated estrogens (PREMARIN) vaginal cream Use 0.5 gram of estrogen cream in vagina nightly x 2 weeks, then twice a week thereafter. 30 g 4    dicyclomine (BENTYL) 10 MG capsule TAKE 2 CAPSULES(20 MG) BY MOUTH THREE TIMES DAILY BEFORE MEALS 180 capsule 0    dulaglutide (TRULICITY) 0.75 mg/0.5 mL PnIj Inject 0.75 mg into the skin every 7 days.      estrogens, conjugated, (PREMARIN) 0.3 MG tablet Take 1 tablet (0.3 mg total) by mouth once daily. 30 tablet 11    FARXIGA 10 mg Tab Take 1 tablet by mouth once daily.       fish oil-omega-3 fatty acids 300-1,000 mg capsule Take 2 g by mouth once daily.      GLUCOPHAGE 500 mg tablet Take 500 mg by mouth 2 (two) times daily with meals.       ibuprofen (ADVIL,MOTRIN) 800 MG tablet 800 mg every 4 (four) hours as needed.   0    LATUDA 80 mg Tab tablet TK 1 T PO D  2    levothyroxine (SYNTHROID) 50 MCG tablet TK 1 T PO QAM  8    metoprolol succinate (TOPROL-XL) 25 MG 24 hr tablet Take 1 tablet (25 mg total) by mouth once daily. 30 tablet 4    mirabegron (MYRBETRIQ) 50 mg Tb24 Take 1 tablet (50 mg total) by mouth once daily. 30 tablet 11    MULTIVIT-IRON-MIN-FOLIC ACID 3,500-18-0.4 UNIT-MG-MG ORAL CHEW Take 1 tablet by mouth once daily.       omeprazole (PRILOSEC OTC) 20 MG tablet Take 20 mg by mouth once daily.      pantoprazole (PROTONIX) 40 MG tablet Take 1 tablet (40 mg total) by mouth once daily. 90 tablet 3    spironolactone (ALDACTONE) 25 MG tablet TK 1 T PO HS  2    tolterodine (DETROL LA) 4 MG 24 hr capsule Take 1 capsule (4 mg total) by mouth once daily. 30 capsule 11    topiramate (TOPAMAX) 50 MG tablet Take 2 tablets (100 mg total) by mouth once daily. 60 tablet 11    ZOLOFT 100 mg tablet Take 200 mg by mouth once daily.        No current facility-administered medications on file prior to visit.        Objective   Lateral videofluorographic views were obtained. The radiologist and speech pathologist were present for the entire procedure.  "    Consistencies assessed / method of administration  Thin liquid/6 mL  Thin liquid/cup sip  Thin liquid/sequential cup sips  Applesauce/tsp  Cracker  Barium tablet w/ water    Oral phase  - Grossly WNL with adequate/timely lip closure, tongue control during bolus hold, bolus preparation, and bolus transport/lingual motion.    - Mild posterior tongue residue with solids only    Pharyngeal phase  - Timely initiation  - Adequate soft palate elevation  - Adequate laryngeal elevation and anterior hyoid excursion  - Complete tongue base retraction  - Complete epiglottic movement  - Adequate laryngeal vestibular closure: no pen/asp observed  - Adequate pharyngeal stripping wave  - Minimal pharyngeal residue: posterior tongue, posterior pharyngeal wall; cleared with repeat swallow  - Unobstructed PE segment opening    Strategies/therapeutic interventions attempted  multiple swallows per bolus.  Strategies were effective in decreasing/eliminating risk for pharyngeal residue.  Strategy was pt-initiated.  On 1-2 trials pt pointed to her larynx as the location of "food/drink feeling stuck" but entire bolus was noted to have already cleared.     Rosenbek Penetration-Aspiration Scale (Rosenbek et al 1996)  Best Trial: 1. Contrast does not enter airway.   Worst Trial: 1. Contrast does not enter airway.     Assessment / Impressions   The results of this MBSS indicate that patient demonstrates oropharyngeal swallowing function appropriate for a full PO diet w/o significant restriction.  No aspiration observed.     Recommendations / POC   -Diet: recommend regular as tolerated   -Therapeutic technique: recommend alternate solid with liquid wash and multiple swallows per bolus   -Contact clinician or referring provider for repeat MBSS if swallowing status changes or worsens    "

## 2018-11-28 ENCOUNTER — PROCEDURE VISIT (OUTPATIENT)
Dept: UROGYNECOLOGY | Facility: CLINIC | Age: 33
End: 2018-11-28
Payer: MEDICAID

## 2018-11-28 VITALS
HEIGHT: 62 IN | DIASTOLIC BLOOD PRESSURE: 80 MMHG | WEIGHT: 205.5 LBS | BODY MASS INDEX: 37.81 KG/M2 | SYSTOLIC BLOOD PRESSURE: 118 MMHG

## 2018-11-28 DIAGNOSIS — N39.41 URINARY INCONTINENCE, URGE: ICD-10-CM

## 2018-11-28 DIAGNOSIS — R35.0 URINARY FREQUENCY: ICD-10-CM

## 2018-11-28 LAB
BILIRUB SERPL-MCNC: NORMAL MG/DL
BLOOD URINE, POC: NORMAL
COLOR, POC UA: NORMAL
GLUCOSE UR QL STRIP: 1000
KETONES UR QL STRIP: NORMAL
LEUKOCYTE ESTERASE URINE, POC: NORMAL
NITRITE, POC UA: NORMAL
PH, POC UA: 5
PROTEIN, POC: NORMAL
SPECIFIC GRAVITY, POC UA: 1
UROBILINOGEN, POC UA: NORMAL

## 2018-11-28 PROCEDURE — 51784 ANAL/URINARY MUSCLE STUDY: CPT | Mod: PBBFAC | Performed by: OBSTETRICS & GYNECOLOGY

## 2018-11-28 PROCEDURE — 51797 INTRAABDOMINAL PRESSURE TEST: CPT | Mod: 26,S$PBB,, | Performed by: OBSTETRICS & GYNECOLOGY

## 2018-11-28 PROCEDURE — 51741 ELECTRO-UROFLOWMETRY FIRST: CPT | Mod: PBBFAC | Performed by: OBSTETRICS & GYNECOLOGY

## 2018-11-28 PROCEDURE — 51728 CYSTOMETROGRAM W/VP: CPT | Mod: 26,S$PBB,, | Performed by: OBSTETRICS & GYNECOLOGY

## 2018-11-28 PROCEDURE — 51728 CYSTOMETROGRAM W/VP: CPT | Mod: PBBFAC | Performed by: OBSTETRICS & GYNECOLOGY

## 2018-11-28 PROCEDURE — 51784 ANAL/URINARY MUSCLE STUDY: CPT | Mod: 26,S$PBB,51, | Performed by: OBSTETRICS & GYNECOLOGY

## 2018-11-28 PROCEDURE — 81002 URINALYSIS NONAUTO W/O SCOPE: CPT | Mod: PBBFAC

## 2018-11-28 PROCEDURE — 51741 ELECTRO-UROFLOWMETRY FIRST: CPT | Mod: 26,S$PBB,51, | Performed by: OBSTETRICS & GYNECOLOGY

## 2018-11-28 PROCEDURE — 52000 CYSTOURETHROSCOPY: CPT | Mod: S$PBB,51,, | Performed by: OBSTETRICS & GYNECOLOGY

## 2018-11-28 PROCEDURE — 52000 CYSTOURETHROSCOPY: CPT | Mod: PBBFAC | Performed by: OBSTETRICS & GYNECOLOGY

## 2018-11-28 PROCEDURE — 51797 INTRAABDOMINAL PRESSURE TEST: CPT | Mod: PBBFAC | Performed by: OBSTETRICS & GYNECOLOGY

## 2018-11-28 RX ORDER — LIDOCAINE HYDROCHLORIDE 20 MG/ML
JELLY TOPICAL ONCE
Status: COMPLETED | OUTPATIENT
Start: 2018-11-28 | End: 2018-11-28

## 2018-11-28 RX ORDER — CIPROFLOXACIN 500 MG/1
500 TABLET ORAL
Status: COMPLETED | OUTPATIENT
Start: 2018-11-28 | End: 2018-11-28

## 2018-11-28 RX ADMIN — LIDOCAINE HYDROCHLORIDE 5 ML: 20 JELLY TOPICAL at 10:11

## 2018-11-28 RX ADMIN — CIPROFLOXACIN 500 MG: 500 TABLET ORAL at 10:11

## 2018-11-30 ENCOUNTER — TELEPHONE (OUTPATIENT)
Dept: GASTROENTEROLOGY | Facility: CLINIC | Age: 33
End: 2018-11-30

## 2018-11-30 NOTE — TELEPHONE ENCOUNTER
----- Message from Marla Richardson sent at 11/30/2018  4:44 PM CST -----  Contact: Self- [phone]  Mita- pt returning a missed call regarding test results- please contact pt at [phone]

## 2018-11-30 NOTE — TELEPHONE ENCOUNTER
Please call pt to inform: esophagram, esophageal manometry, and mbss all normal.    Thanks,  Tiff, NP

## 2018-12-04 ENCOUNTER — TELEPHONE (OUTPATIENT)
Dept: UROGYNECOLOGY | Facility: CLINIC | Age: 33
End: 2018-12-04

## 2018-12-04 NOTE — TELEPHONE ENCOUNTER
----- Message from India Navarro sent at 12/4/2018 12:07 PM CST -----  Contact: pt            Name of Who is Calling: pt      What is the request in detail: is needing to discuss the test she previously taken      Can the clinic reply by [facility]: no      What Number [facility] Back [facility]: [phone]

## 2018-12-04 NOTE — TELEPHONE ENCOUNTER
Attempted to return pt's call regarding test, no answer left message for her to contact the office.

## 2018-12-05 NOTE — TELEPHONE ENCOUNTER
----- Message from Shahla Christian MA sent at 12/4/2018  4:01 PM CST -----  Contact: nichole   Please call pt's mother Nichole she is requesting the results of pt's cystoscopy states her daughter stated she spoke to her in big words and she can't remember what the treatment plan was suppose to be. Please Advise  ----- Message -----  From: Hiral Vyas  Sent: 12/4/2018   3:12 PM  To: Audrey Jeter Staff    Name of Who is Calling: nichole pt mother       What is the request in detail: Patient mother was returning a missed call back from the staff       Can the clinic reply by [facility]: no      What Number to Call Back if not in [facility]: [phone]

## 2018-12-05 NOTE — TELEPHONE ENCOUNTER
Called and spoke with the patients mother.  Results of the urodynamic and cysto. Findings reviewed with her mother c/w valsalva voiding pattern ( R/o Fowlers syndrome).

## 2018-12-06 NOTE — TELEPHONE ENCOUNTER
Spoke with pt's mother Ruthie and scheduled her a follow up appointment for 12/20/18 at 330 pm she voiced understanding and call was ended.

## 2018-12-18 RX ORDER — METOPROLOL SUCCINATE 25 MG/1
TABLET, EXTENDED RELEASE ORAL
Qty: 30 TABLET | Refills: 0 | Status: SHIPPED | OUTPATIENT
Start: 2018-12-18 | End: 2019-01-15 | Stop reason: SDUPTHER

## 2018-12-20 ENCOUNTER — OFFICE VISIT (OUTPATIENT)
Dept: UROGYNECOLOGY | Facility: CLINIC | Age: 33
End: 2018-12-20
Payer: MEDICAID

## 2018-12-20 VITALS
HEIGHT: 62 IN | WEIGHT: 200.38 LBS | DIASTOLIC BLOOD PRESSURE: 82 MMHG | BODY MASS INDEX: 36.87 KG/M2 | SYSTOLIC BLOOD PRESSURE: 112 MMHG

## 2018-12-20 DIAGNOSIS — N39.41 URGE INCONTINENCE: Primary | ICD-10-CM

## 2018-12-20 PROCEDURE — 99212 OFFICE O/P EST SF 10 MIN: CPT | Mod: PBBFAC | Performed by: OBSTETRICS & GYNECOLOGY

## 2018-12-20 PROCEDURE — 99999 PR PBB SHADOW E&M-EST. PATIENT-LVL II: CPT | Mod: PBBFAC,,, | Performed by: OBSTETRICS & GYNECOLOGY

## 2018-12-20 PROCEDURE — 99213 OFFICE O/P EST LOW 20 MIN: CPT | Mod: S$PBB,,, | Performed by: OBSTETRICS & GYNECOLOGY

## 2018-12-24 NOTE — PROGRESS NOTES
Maria Ines Ac is a 32 y.o.  here for  follow up.  She has a history of retropubic sling/ cystourethroscopy for DILIP 1/2017. She has persistent urinary urgency/ urge incontincence which has not improved since starting pelvic floor PT or PTNS.     Interval  HPI since the last visit: (updates in bold)    1)  UI:  (+) JACINDA rarely  (+) UUI  Several times day. (+) pads: 4/ day  Daytime frequency: Q 1 hours.  Nocturia: every hour.  Wakes up wet.    (--)dysuria  (--) hematuria,  (--) frequent UTIs.  not complete bladder emptying. No abdominal pain.  Taking detrol and myrbetriq--no change in UI      2)  POP:  Absent    Symptoms:(--)  .  (--) vaginal bleeding. (+) vaginal discharge. (-) sexually active.  (--) dyspareunia.   (--)  Vaginal dryness.  (+) vaginal estrogen use. Also using Shazia's Butt Paste. Denies vaginal pain or itching.    3)  BM:  (--) constipation/straining.  (--) chronic diarrhea (--) hematochezia.  (--) fecal incontinence.  (--) fecal smearing/urgency.  (--) incomplete evacuation.      4) pelvic pain despite using vaginal suppositories twice daily    Continue to drink between 3-4 cokes and several teas per day.            Past Medical History:   Diagnosis Date    Blood in stool      Constipation      Depression      Diabetes mellitus, type 2      Dysphagia      GERD (gastroesophageal reflux disease)      Hypertension      Palpitations      Premature menopause on hormone replacement therapy      Thyroid disease                 Past Surgical History:   Procedure Laterality Date    BLADDER SUSPENSION        CARPAL TUNNEL RELEASE         right    COLONOSCOPY N/A 8/30/2016     Procedure: COLONOSCOPY; Surgeon: Slick Herron MD; Location: [facility] ([address] [place] [address]); Service: Endoscopy; Laterality: N/A; PM prep    GALLBLADDER SURGERY        HYSTERECTOMY   2013     ron, Dr. Michael Smithlain    OOPHORECTOMY   2005     LSO- benign cyst    OOPHORECTOMY   2013     [facility]- endo     ooptherectomy        right knee   scoped    SHOULDER SURGERY   right         Current Outpatient Medications   Medication Sig    ACCU-CHEK AMAURY PLUS TEST STRP Strp USE TO TEST BLOOD GLUCOSE TID    ACCU-CHEK SOFTCLIX LANCETS Misc USE TO TEST BLOOD GLUCOSE TID    atorvastatin (LIPITOR) 40 MG tablet Take 40 mg by mouth once daily.    BLOOD SUGAR DIAGNOSTIC (ACCU-CHEK AMAURY PLUS TEST STRP MISC) 1 strip by Misc.(Non-Drug; Combo Route) route 3 (three) times daily.    calcium-vitamin D3 500 mg(1,250mg) -200 unit per tablet Take 1 tablet by mouth 2 (two) times daily with meals.    conjugated estrogens (PREMARIN) vaginal cream Use 0.5 gram of estrogen cream in vagina nightly x 2 weeks, then twice a week thereafter.    dulaglutide (TRULICITY) 0.75 mg/0.5 mL PnIj Inject 0.75 mg into the skin every 7 days.    estrogens, conjugated, (PREMARIN) 0.3 MG tablet Take 1 tablet (0.3 mg total) by mouth once daily.    FARXIGA 10 mg Tab Take 1 tablet by mouth once daily.     fish oil-omega-3 fatty acids 300-1,000 mg capsule Take 2 g by mouth once daily.    GLUCOPHAGE 500 mg tablet Take 500 mg by mouth 2 (two) times daily with meals.     ibuprofen (ADVIL,MOTRIN) 800 MG tablet 800 mg every 4 (four) hours as needed.     LATUDA 80 mg Tab tablet TK 1 T PO D    levothyroxine (SYNTHROID) 50 MCG tablet TK 1 T PO QAM    metoprolol succinate (TOPROL-XL) 25 MG 24 hr tablet TAKE 1 TABLET(25 MG) BY MOUTH EVERY DAY    mirabegron (MYRBETRIQ) 50 mg Tb24 Take 1 tablet (50 mg total) by mouth once daily.    MULTIVIT-IRON-MIN-FOLIC ACID 3,500-18-0.4 UNIT-MG-MG ORAL CHEW Take 1 tablet by mouth once daily.     omeprazole (PRILOSEC OTC) 20 MG tablet Take 20 mg by mouth once daily.    pantoprazole (PROTONIX) 40 MG tablet Take 1 tablet (40 mg total) by mouth once daily.    spironolactone (ALDACTONE) 25 MG tablet TK 1 T PO HS    tolterodine (DETROL LA) 4 MG 24 hr capsule Take 1 capsule (4 mg total) by mouth once daily.    topiramate  "(TOPAMAX) 50 MG tablet Take 2 tablets (100 mg total) by mouth once daily.    ZOLOFT 100 mg tablet Take 200 mg by mouth once daily.     dicyclomine (BENTYL) 10 MG capsule TAKE 2 CAPSULES(20 MG) BY MOUTH THREE TIMES DAILY BEFORE MEALS     No current facility-administered medications for this visit.         Exam  Vitals:    12/20/18 1506   BP: 112/82   BP Location: Left arm   Patient Position: Sitting   BP Method: Medium (Manual)   Weight: 90.9 kg (200 lb 6.4 oz)   Height: 5' 2" (1.575 m)       Well Woman:  Pap:post hysterectomy  Mammo:n/a  Colonoscopy:n/a  Dexa:n/a    General: alert and oriented, no acute distress  Respiratory: normal respiratory effort  Abd: soft, non-distended.  ND/NT     PELVIC EXAM:   VULVA: normal appearing vulva with no masses, tenderness or lesions,   VAGINA: normal appearing vagina with normal color and discharge, no lesions, no mesh visible/ palpable,  CERVIX: surgically absent,   UTERUS: absent,   ADNEXA: no masses  RECTAL: deferred       Impression  1. Urge incontinence         We reviewed the above issues and discussed options for short-term versus long-term management of her problems.     Plan:     1. Bs/p oudreaux's Butt Paste daily to vulva.  3. Vaginal atrophy:  Estrogen cream 0.5 g twice weekly  4. Only have coke zero with meds at meal time-- limit 3/day.  5. Only drink 4 ounces at a time.  If you drink a coke zero, you can not drink anything else that hour.  6. For dry mouth, use sour candies (sugar free ones) and biotene mouth wash  7. Continue tolterodine 4 mg daily. Add myrbetriq 50 mg daily for combo therapy.  8. Vaginal valium suppository 1-2 times daily as needed.  9. Compression stocking  10.  reviewed normal voiding patterns: incorporation voiding bench     30 minutes were spent in face to face time with this patient  75 % of this time was spent in counseling and/or coordination of care       "

## 2018-12-26 ENCOUNTER — HOSPITAL ENCOUNTER (OUTPATIENT)
Dept: RADIOLOGY | Facility: HOSPITAL | Age: 33
Discharge: HOME | End: 2018-12-26
Attending: NURSE PRACTITIONER
Payer: MEDICAID

## 2018-12-26 ENCOUNTER — TELEPHONE (OUTPATIENT)
Dept: SURGERY | Facility: CLINIC | Age: 33
End: 2018-12-26

## 2018-12-26 ENCOUNTER — OFFICE VISIT (OUTPATIENT)
Dept: SURGERY | Facility: CLINIC | Age: 33
End: 2018-12-26
Payer: MEDICAID

## 2018-12-26 VITALS
TEMPERATURE: 98 F | HEART RATE: 91 BPM | HEIGHT: 62 IN | DIASTOLIC BLOOD PRESSURE: 72 MMHG | WEIGHT: 197.56 LBS | BODY MASS INDEX: 36.35 KG/M2 | SYSTOLIC BLOOD PRESSURE: 101 MMHG

## 2018-12-26 DIAGNOSIS — N64.4 BREAST PAIN: ICD-10-CM

## 2018-12-26 DIAGNOSIS — Z91.89 AT HIGH RISK FOR BREAST CANCER: ICD-10-CM

## 2018-12-26 DIAGNOSIS — N64.59 BREAST THICKENING: ICD-10-CM

## 2018-12-26 DIAGNOSIS — Z12.39 BREAST CANCER SCREENING, HIGH RISK PATIENT: ICD-10-CM

## 2018-12-26 DIAGNOSIS — N64.4 BREAST PAIN: Primary | ICD-10-CM

## 2018-12-26 DIAGNOSIS — Z80.41 FAMILY HISTORY OF OVARIAN CANCER: ICD-10-CM

## 2018-12-26 DIAGNOSIS — N63.0 BREAST NODULE: ICD-10-CM

## 2018-12-26 DIAGNOSIS — Z80.3 FAMILY HISTORY OF BREAST CANCER: ICD-10-CM

## 2018-12-26 PROCEDURE — 76642 ULTRASOUND BREAST LIMITED: CPT | Mod: TC,50,PO

## 2018-12-26 PROCEDURE — 99999 PR PBB SHADOW E&M-EST. PATIENT-LVL III: CPT | Mod: PBBFAC,,, | Performed by: NURSE PRACTITIONER

## 2018-12-26 PROCEDURE — 77066 DX MAMMO INCL CAD BI: CPT | Mod: TC,PO

## 2018-12-26 PROCEDURE — 77066 DX MAMMO INCL CAD BI: CPT | Mod: 26,,, | Performed by: RADIOLOGY

## 2018-12-26 PROCEDURE — 99213 OFFICE O/P EST LOW 20 MIN: CPT | Mod: PBBFAC,25,PO | Performed by: NURSE PRACTITIONER

## 2018-12-26 PROCEDURE — 77062 BREAST TOMOSYNTHESIS BI: CPT | Mod: 26,,, | Performed by: RADIOLOGY

## 2018-12-26 PROCEDURE — 76642 ULTRASOUND BREAST LIMITED: CPT | Mod: 26,50,, | Performed by: RADIOLOGY

## 2018-12-26 PROCEDURE — 99202 OFFICE O/P NEW SF 15 MIN: CPT | Mod: S$PBB,,, | Performed by: NURSE PRACTITIONER

## 2018-12-26 NOTE — LETTER
December 26, 2018      Luann [place] MD  [address]  [place] [address]           [facility]  [address]  [facility] [address]  Phone: [phone]  Fax: [phone]          Patient: Maria Ines Ac   MR Number: [id number]   YOB: 1985   Date of Visit: 12/26/2018       Dear Dr. Luann [place]:    Thank you for referring Maria Ines Ac to me for evaluation. Attached you will find relevant portions of my assessment and plan of care.    If you have questions, please do not hesitate to call me. I look forward to following Maria Ines Ac along with you.    Sincerely,    Romeo Moore, DNP    Enclosure  CC:  No Recipients    If you would like to receive this communication electronically, please contact [email or web address] or [phone] to request more information on [facility] Link access.    For providers and/or their staff who would like to refer a patient to [facility], please contact us through our one-stop-shop provider referral line, [facility], at [phone].    If you feel you have received this communication in error or would no longer like to receive these types of communications, please e-mail [email or web address]

## 2018-12-26 NOTE — PROGRESS NOTES
"Patient ID: Maria Ines Ac is a 33 y.o. female.    Chief Complaint: Breast Pain (Bilateral )        HPI:  New patient to the breast center presents today with her father Julien for breast pain, referred by Dr. Luann [place] (PCP).      Patient reports bilateral breast pain, onset about 3 months ago (2018), upper regions of both breasts (same area on both breasts), is constant, 9/10.  States, "I feel like my breasts have both grown since September."  Has tried taking Aleve without relief.  Wears underwire bra.  Does not perform BSE but denies noticing any breast mass.  Denies noticing any skin changes, nipple discharge, or nipple retraction.  Does not feel that pain is in her chest, denies chest pain and shortness of breath.      Breast History:    Personal history of breast cancer:  no  Personal history of breast biopsy:  no prior biopsy  Personal history of breast surgery:  no    Breast Imaging  No previous history of breast imaging.    GYN History:  Age of menarche:  10 y/o  Uterus and ovaries:  S/p hysterectomy and BSO ("they took everything") at around 28 y/o  Menopause:  yes, surgical   HRT usage:  current user, estrogen-only, has been using for more than 10 year(s), plans to keep using for a while    Personal history of ovarian cancer:  no    Other Oncologic History:  Personal history of other cancer not abovementioned:  no  Personal history of genetic testing:  no  Personal history of thoracic radiation between 8 and 27 y/o:  no    Social History:  Tobacco use:  denies  Alcohol use:  denies    Family History:    Family Oncologic History    Ashkenazi Orthodox ancestry:  no    Family History   Problem Relation Age of Onset    Breast cancer Mother 50        alive at 64, unilateral    Esophageal cancer Mother 63    Ovarian cancer Mother 63    Cervical cancer Maternal Grandmother         unknown age of onset,  at 80    Colon cancer Brother 40    Breast cancer Paternal Aunt        "  unknown age of onset, alive at 70    Breast cancer Maternal Aunt 72        alive at 72       History of genetic testing in relatives:  no    Patient's Breast Cancer Risk Assessment Scores:  Taking into account the above information, the patient's breast cancer risk assessment scores were calculated today as follows:  StepheniepauSilvestrejaniajeff lifetime risk:  23.7%-24.3% (depends upon length of time remaining on HRT, which is unknown to patient at this time)      Review of Systems - See HPI.  Objective:   Physical Exam   Pulmonary/Chest: She exhibits no mass, no edema and no deformity. Right breast exhibits tenderness. Right breast exhibits no inverted nipple, no nipple discharge and no skin change. Left breast exhibits tenderness. Left breast exhibits no inverted nipple, no nipple discharge and no skin change. Breasts are symmetrical. There is no breast swelling.   Breathing non-labored.    Right breast with TTP at 2:00, 4:00, 7:00, and 10:00.  2:00 with 1.5cm x 4mm linear thickening, mobile.  Areas marked for imaging and are of low level of clinical suspicion.    Right LOQ with breast tissue slightly thicker than contralateral corresponding area, of low level of clinical suspicion.    Left breast with TTP at 12:00, 1:00, 2:00, and 3:00.  12:00 with 2.5cm thickening, mobile.  2:00 with small nodule.  3:00 with 1cm thickening, mobile.  Areas marked for imaging and are of low level of clinical suspicion.       Lymphadenopathy:     She has no cervical adenopathy.     She has no axillary adenopathy.        Right: No supraclavicular adenopathy present.        Left: No supraclavicular adenopathy present.     Father left room for CBE.  Assessment:       1. Breast pain    2. Breast cancer screening, high risk patient    3. Family history of breast cancer    4. Family history of ovarian cancer    5. At high risk for breast cancer    6. Breast nodule    7. Breast thickening          Plan:   Right breast with TTP at 2:00, 4:00, 7:00, and  "10:00.  2:00 with 1.5cm x 4mm linear thickening, mobile.  Areas marked for imaging and are of low level of clinical suspicion.  Right LOQ with breast tissue slightly thicker than contralateral corresponding area, of low level of clinical suspicion.  Left breast with TTP at 12:00, 1:00, 2:00, and 3:00.  12:00 with 2.5cm thickening, mobile.  2:00 with small nodule.  3:00 with 1cm thickening, mobile.  Areas marked for imaging and are of low level of clinical suspicion.  Bilat diag mmg and bilat limited breast U/S today.  Will f/u pending results.  Pt to RTC in 3 months for repeat CBE if imaging normal.    Patient reports bilateral breast pain, onset about 3 months ago, upper regions of both breasts (same area on both breasts), is constant, 9/10.  States, "I feel like my breasts have both grown since September."  Has tried taking Aleve without relief.  Wears underwire bra.  Does not perform BSE but denies noticing any breast mass.  Denies noticing any skin changes, nipple discharge, or nipple retraction.  Does not feel that pain is in her chest, denies chest pain and shortness of breath.    Tenderness on bilateral CBE today was multifocal and rather diffuse.  Likely of benign etiology.  If imaging ordered today is normal, pt to RTC in 3 months to reevaluate symptoms and for CBE.    High-Risk Counseling    Counseled patient regarding her being at increased risk for breast cancer based on risk factors which patient and I discussed today and which were factored into the Tyrer-Cuzick risk assessment model, which estimated a lifetime risk of breast cancer of 23.7%-24.3% for this patient as of today.  Discussed with patient that there are several models available for stratifying breast cancer risk, and Tyrer-Cuzick is presently the model utilized by [facility] and is a model recommended per current NCCN guidelines.    Discussed with patient that many factors can influence an individuals risk for breast cancer, " including both modifiable and non-modifiable risk factors.  Counseled patient regarding modifiable risk factors for breast cancer as recommended by NCCN, including exercising, maintaining a healthful BMI, limiting alcohol consumption to less than one drink per day, and refraining from smoking.      Discussed with patient current NCCN guideline recommendations for increased breast cancer screening in individuals with a lifetime risk of breast cancer >20%, to include semiannual CBE, along with annual mammogram and annual breast MRI, which would alternate, staggered six months apart.  Risks and benefits of increased screening with breast MRI were discussed.  Discussed with patient option of speaking with Medical Oncology regarding taking a pharmacologic agent such as tamoxifen or an AI to reduce breast cancer risk.  Discussed with patient option to speak with a breast surgeon regarding risk-reducing mastectomy, though this is generally only considered in women with a genetic mutation conferring a high risk for breast cancer or with a compelling family history.    Patients family history of cancer warrants a recommendation for genetic counseling for this patient, per current NCCN guidelines.  Discussed with patient and father that an individual affected by cancer is usually the more ideal candidate for genetic testing; for this patient, this would likely be her mother, who had both breast and ovarian cancers.      High-Risk Plan    After a thorough discussion, patient elects to proceed with increased breast cancer screenings to include semiannual CBE, along with annual mammogram and annual breast MRI, which would alternate, staggered six months apart, an ambulatory referral to Medical Oncology to discuss chemoprevention, an ambulatory referral to a breast surgeon to discuss prophylactic mastectomy, and setting up a referral for genetic counseling through [facility].  Patient is very concerned about her breast cancer  risk and is motivated to do all of the above.    Bilat breast MRI order placed, to occur in approximately 6 months.  Serum creatinine order placed, to occur shortly before breast MRI, secondary to hypertension and diabetes.  Pt to RTC near time of breast MRI as well, for CBE.        Encouraged breast awareness, including monthly breast self-exams.  Advised patient to RTC with any interval changes or concerns.  Questions were encouraged and answered to patient's satisfaction, and patient verbalized understanding of information and agreement with the plan.

## 2018-12-26 NOTE — TELEPHONE ENCOUNTER
Called patient.  Discussed that breast imaging was reported as negative.  Discussed what this means.    Advised pt to RTC in 3 months for repeat CBE or sooner with any interval breast-related changes or concerns.  Also should RTC sooner if breast pain remains constant or rated 9/10.  Advised pt that NSAIDs or Tylenol may provide some relief.  Encouraged pt to wear well-supporting bra.    Also discussed w/ pt that I will plan to see her in clinic in 6 months as well, for repeat CBE, with breast MRI near that time.    Pt has an appt w/ Dr. Child (breast surgeon) as she desires a discussion regarding prophylactic mastectomy.  Will contact Medical Oncology regarding pt's desire to discuss chemoprevention options.  Will complete and fax in [employer] paperwork for genetic counseling, per pt's request.

## 2018-12-27 ENCOUNTER — TELEPHONE (OUTPATIENT)
Dept: SURGERY | Facility: CLINIC | Age: 33
End: 2018-12-27

## 2018-12-27 NOTE — TELEPHONE ENCOUNTER
Contacted regarding scheduling 3 month follow up with Romeo Moore NP.  The patient is scheduled to be seen on Tuesday 3/26/19 9:30 am.  The patient voiced understanding of appointment date and time.  Reminder letter mailed to the patient.

## 2018-12-28 ENCOUNTER — TELEPHONE (OUTPATIENT)
Dept: HEMATOLOGY/ONCOLOGY | Facility: CLINIC | Age: 33
End: 2018-12-28

## 2018-12-28 DIAGNOSIS — R10.84 GENERALIZED ABDOMINAL PAIN: ICD-10-CM

## 2018-12-28 DIAGNOSIS — K58.0 IRRITABLE BOWEL SYNDROME WITH DIARRHEA: ICD-10-CM

## 2018-12-28 RX ORDER — DICYCLOMINE HYDROCHLORIDE 10 MG/1
CAPSULE ORAL
Qty: 180 CAPSULE | Refills: 0 | Status: SHIPPED | OUTPATIENT
Start: 2018-12-28 | End: 2019-01-24 | Stop reason: SDUPTHER

## 2018-12-28 NOTE — TELEPHONE ENCOUNTER
Attempted to reach patient at both mobile and home numbers to discuss arranging Consult appointment with Dr. Guevara to discuss chemo prevention options in patient with high family risk of breast/ovarian cancer. Patient with no answer at either numbers. VM left at both numbers, with office number requesting return call to arrange. Will mail out tentative appointment slip in extended hours slots.

## 2019-01-02 ENCOUNTER — TELEPHONE (OUTPATIENT)
Dept: UROGYNECOLOGY | Facility: CLINIC | Age: 34
End: 2019-01-02

## 2019-01-02 DIAGNOSIS — R39.89 DARK YELLOW-COLORED URINE: Primary | ICD-10-CM

## 2019-01-02 NOTE — TELEPHONE ENCOUNTER
Spoke with pt's mother who states her daughter urine is really yellow and it is typically clear, she think she may be having onset of a UTI, pt's mother denies her having any symptoms of pain, frequency, burning nausea or vomiting. Pt not currently taking anything to help treat a UTI but will drop off a urine sample today at [facility] in [place] to rule out UTI, pt mother will give office a call back once she has been able to drop off culture voiced understanding and call was ended.

## 2019-01-02 NOTE — TELEPHONE ENCOUNTER
----- Message from Maria Ines Monsivais sent at 12/31/2018 12:37 PM CST -----  Contact: Ruthie   Name of Who is Calling: Ruthie patient's mother       What is the request in detail: Ruthie patient's mother states patient has symptoms of a UTI, requesting an antibiotic be called in to the [address] [address], [place] [address], [phone]. Please contact to further discuss and advise      Can the clinic reply by [facility]: No       What [facility] Back [facility]: [phone]

## 2019-01-04 ENCOUNTER — TELEPHONE (OUTPATIENT)
Dept: HEMATOLOGY/ONCOLOGY | Facility: CLINIC | Age: 34
End: 2019-01-04

## 2019-01-04 NOTE — TELEPHONE ENCOUNTER
Contacted patient to discuss upcoming consultation appointment on Tuesday 1/8 at 430, pt confirmed.

## 2019-01-08 ENCOUNTER — INITIAL CONSULT (OUTPATIENT)
Dept: HEMATOLOGY/ONCOLOGY | Facility: CLINIC | Age: 34
End: 2019-01-08
Payer: MEDICAID

## 2019-01-08 VITALS
WEIGHT: 196.88 LBS | HEART RATE: 75 BPM | RESPIRATION RATE: 18 BRPM | HEIGHT: 62 IN | BODY MASS INDEX: 36.23 KG/M2 | TEMPERATURE: 98 F | DIASTOLIC BLOOD PRESSURE: 78 MMHG | SYSTOLIC BLOOD PRESSURE: 110 MMHG | OXYGEN SATURATION: 98 %

## 2019-01-08 DIAGNOSIS — Z80.3 FAMILY HISTORY OF BREAST CANCER: ICD-10-CM

## 2019-01-08 PROCEDURE — 99204 PR OFFICE/OUTPT VISIT, NEW, LEVL IV, 45-59 MIN: ICD-10-PCS | Mod: S$PBB,,, | Performed by: INTERNAL MEDICINE

## 2019-01-08 PROCEDURE — 99215 OFFICE O/P EST HI 40 MIN: CPT | Mod: PBBFAC | Performed by: INTERNAL MEDICINE

## 2019-01-08 PROCEDURE — 99204 OFFICE O/P NEW MOD 45 MIN: CPT | Mod: S$PBB,,, | Performed by: INTERNAL MEDICINE

## 2019-01-08 PROCEDURE — 99999 PR PBB SHADOW E&M-EST. PATIENT-LVL V: CPT | Mod: PBBFAC,,, | Performed by: INTERNAL MEDICINE

## 2019-01-08 PROCEDURE — 99999 PR PBB SHADOW E&M-EST. PATIENT-LVL V: ICD-10-PCS | Mod: PBBFAC,,, | Performed by: INTERNAL MEDICINE

## 2019-01-08 NOTE — PROGRESS NOTES
Subjective:       Patient ID: Maria Ines Ac is a 33 y.o. female.    Chief Complaint: Advice Only    HPI     REFERRING PHYSICIAN:  Romeo Moore NP.    REASON FOR REFERRAL:  Increased risk of breast cancer.    HISTORY OF PRESENT ILLNESS:  Ms. Ac is a 33-year-old female who is referred   by her primary care physician and the genetic counselor to be evaluated for   possible chemoprevention.  Even though she does not have a history of prior   biopsies or breast cancer, she has a strong family history.  Specifically, her   mother who is alive at 64, has a history of breast cancer at 50, esophageal   cancer at 63 and ovarian cancer at 64.  A maternal grandmother has a history of   cervical cancer while a brother had colon cancer at 40.  A paternal aunt had   breast cancer at 40 while a maternal aunt had breast cancer at 70.    The patient has a history of prior hysterectomy and bilateral   salpingo-oophorectomy at age 29.    SOCIAL HISTORY:  She does not work and stays home with her parents.  She does   not smoke and does not drink alcohol.    The Natacha model predicts for a five-year risk of 0.7% while the lifetime risk is   calculated at 20.3%.      Review of Systems      Overall she feels well. She denies any anxiety, depression, easy bruising, fevers, chills, night  sweats, weight loss, nausea, vomiting, diarrhea, constipation, diplopia,     blurred vision, headache, chest pain, palpitations, shortness of breath, breast pain, abdominal pain, extremity pain, or difficulty ambulating.  The remainder of the ten-point ROS, including general, skin, lymph, H/N, cardiorespiratory, GI, , Neuro, Endocrine, and psychiatric is negative.     Objective:      Physical Exam      She is alert, oriented to time, place, person, pleasant, well      nourished, in no acute physical distress.                                    VITAL SIGNS:  Reviewed                                      HEENT:  Normal.  There are no nasal, oral,  lip, gingival, auricular, lid,    or conjunctival lesions.  Mucosae are moist and pink, and there is no        thrush.  Pupils are equal, reactive to light and accommodation.              Extraocular muscle movements are intact.  Dentition is good.  There is no frontal or maxillary tenderness.                                     NECK:  Supple without JVD, adenopathy, or thyromegaly.                       LUNGS:  Clear to auscultation without wheezing, rales, or rhonchi.           CARDIOVASCULAR:  Reveals an S1, S2, no murmurs, no rubs, no gallops.         ABDOMEN:  Soft, nontender, without organomegaly.  Bowel sounds are    present.                                                                     EXTREMITIES:  No cyanosis, clubbing, or edema.                               BREASTS:  both breasts are large and pendulous.  No masses are palpated                                     LYMPHATIC:  There is no cervical, axillary, or supraclavicular adenopathy.   SKIN:  Warm and moist, without petechiae, rashes, induration, or ecchymoses.           NEUROLOGIC:  DTRs are 0-1+ bilaterally, symmetrical, motor function is 5/5,  and cranial nerves are  within normal limits.    Assessment:       Family history of breast cancer   Plan:        The JESSICA model predicts for a 5 year risk of 0.7 % while her life time risk is 20.3 %.  Based on her 5 year risk, she does not qualify for chemoprevention and I told her so.  I have asked her to follow up with her PCP;  based on her life time risk she should be getting yearly mammograms AND yearly breast MRIs, spaced every 6 months.  Also, based on her family history of colon cancer, she should get a colonoscopy now and every 5 years.  The above were explained to her and her father.  I will be happy to see her again on a prn basis.

## 2019-01-08 NOTE — LETTER
January 8, 2019      Romeo Moore, ISA  [address]  [facility] [address]           [facility]  [address]  [facility] [address]  Phone: [phone]          Patient: Maria Ines Ac   MR Number: [id number]   YOB: 1985   Date of Visit: 1/8/2019       Dear Romeo Moore:    Thank you for referring Maria Ines Ac to me for evaluation. Attached you will find relevant portions of my assessment and plan of care.    If you have questions, please do not hesitate to call me. I look forward to following Maria Ines Ac along with you.    Sincerely,    Colton Pond MD    Enclosure  CC:  No Recipients    If you would like to receive this communication electronically, please contact [email or web address] or [phone] to request more information on [employer] Link access.    For providers and/or their staff who would like to refer a patient to [facility], please contact us through our one-stop-shop provider referral line, [facility], at [phone].    If you feel you have received this communication in error or would no longer like to receive these types of communications, please e-mail [email or web address]

## 2019-01-09 ENCOUNTER — OFFICE VISIT (OUTPATIENT)
Dept: NEUROLOGY | Facility: CLINIC | Age: 34
End: 2019-01-09
Payer: MEDICAID

## 2019-01-09 VITALS
SYSTOLIC BLOOD PRESSURE: 103 MMHG | WEIGHT: 196.19 LBS | BODY MASS INDEX: 36.1 KG/M2 | HEIGHT: 62 IN | DIASTOLIC BLOOD PRESSURE: 70 MMHG | HEART RATE: 84 BPM

## 2019-01-09 DIAGNOSIS — G44.52 NEW DAILY PERSISTENT HEADACHE: ICD-10-CM

## 2019-01-09 PROCEDURE — 99999 PR PBB SHADOW E&M-EST. PATIENT-LVL IV: CPT | Mod: PBBFAC,,, | Performed by: STUDENT IN AN ORGANIZED HEALTH CARE EDUCATION/TRAINING PROGRAM

## 2019-01-09 PROCEDURE — 99999 PR PBB SHADOW E&M-EST. PATIENT-LVL IV: ICD-10-PCS | Mod: PBBFAC,,, | Performed by: STUDENT IN AN ORGANIZED HEALTH CARE EDUCATION/TRAINING PROGRAM

## 2019-01-09 PROCEDURE — 99213 PR OFFICE/OUTPT VISIT, EST, LEVL III, 20-29 MIN: ICD-10-PCS | Mod: S$PBB,,, | Performed by: STUDENT IN AN ORGANIZED HEALTH CARE EDUCATION/TRAINING PROGRAM

## 2019-01-09 PROCEDURE — 99213 OFFICE O/P EST LOW 20 MIN: CPT | Mod: S$PBB,,, | Performed by: STUDENT IN AN ORGANIZED HEALTH CARE EDUCATION/TRAINING PROGRAM

## 2019-01-09 PROCEDURE — 99214 OFFICE O/P EST MOD 30 MIN: CPT | Mod: PBBFAC | Performed by: STUDENT IN AN ORGANIZED HEALTH CARE EDUCATION/TRAINING PROGRAM

## 2019-01-09 RX ORDER — GABAPENTIN 300 MG/1
300 CAPSULE ORAL 3 TIMES DAILY PRN
Qty: 90 CAPSULE | Refills: 11 | Status: SHIPPED | OUTPATIENT
Start: 2019-01-09 | End: 2019-08-12 | Stop reason: SDUPTHER

## 2019-01-13 NOTE — PROGRESS NOTES
"NEUROLOGY OUTPATIENT CLINIC NOTE    Patient Name:  Maria Ines Ac  Patient MRN:  [id number]    Interval History 01/09/19:  Patient still having daily headaches with dizziness.  Still using NSAIDs daily, drinks 4 Coke Zero drinks per day.  Again, reinforced that she will continue to have daily headaches with this regimen.  Patient seems to have some questionable ability to understand and comply, would like to try different approach of using an alternative to NSAIDs that she can try every other day for now in order to help her wean off of NSAIDs.  Father who accompanies her to every visit similarly not able to reinforce limiting NSAIDs, possibly 2/2 poor understanding.  Restated this idea of medication overuse headache, will trial gabapentin every other day prn headache instead of NSAIDs.  Also advised cutting down on caffeine slowly.  Patient notes no new features to headache.  Occasional dizziness with headaches.    Interval History 08/03/18:  Patient still having daily frontal headache.  Takes OTC Aleve daily and headache goes away within an hour.  Again, discussed rebound headache.  Concern for some level of ID in this patient and her father with poor understanding as well.  She is very pleasant but tends to nod head to all questions unless asked more than once or asked in a leading manner.  Reinforced that patient needs to try to cut down on NSAID use to 3 times weekly and explained that she is almost certainly having daily headaches due to some level of medication dependence.      Interval History 04/25/18:  Patient notes continued headaches, states that she has headache currently estimates pain of 6/10.  She has been doing wonderfully with cutting down on caffeine and walking a lot and has actually lost 10 lbs.  Now drinks 3 Coke Zero cans per day instead of several ("one every hour") mentioned at last visit.  She continues to have poor sleep.  Notes that she often wakes up with her headache.  It is " generally in the forehead region and radiates toward crown of head becoming a holocephalic headache.  Still has dizziness, described as room spinning, that occurs with her headache.  She currently takes Aleve and has her first Coke Zero of the day when she has her headache, estimates taking 3 Aleve per day.  States she is having headache every day.  Did not notice major change with the topiramate despite increasing up to 100 mg qHS.  No changes otherwise.  Some recent stress related to her mother being sick and in the hospital.  Patient denies feeling more stressed than usual.  Discussed rebound headache, caffeine withdrawal headache, and ZAC with am headache as potential causes of patient's continued daily am headaches.  Her father is with her and states that he thinks she may have some vision changes and needs some glasses, thinks she is straining to see and having headaches due to straining her eyes.     HPI--from initial visit 10/18/18:  Patient is a very pleasant 32 y.o. female with PMHx of HTN, DM II with gastroparesis, obesity, and hypothyroidism who presents to [facility]/2017 for headaches.  Patient states that she has had headaches since about 03/2017 but they have gottne much worse over past 3-4 months.  She says they start in the middle of her forehead and radiate throughout the head to the occiput.  She describes the pain as sharp.  They are relieved by Aleve after about an hour but will recur, sometimes up to 4 times per day.  She notes associated n/v and rhinorrhea with headaches. She also notes dizziness associated with the headaches.  This is described as room spinning and is associated with tinnitus but not with hearing loss, ear fullness.  Denies recent infections, does not have sensation of hearing her pulse in her ear or other intrinsic sounds. Denies photophobia, phonophobia, lacrimation, injection of the eyes with headaches.  Unable to describe any triggers.  States that she  "had headaches during adolescence as well but they were not this frequent and usually would just go away with OTC NSAIDs.  She is accompanied by her dad who assists with the history and he notes that she drinks several Coke Zero soft drinks during the day, stating "one every hour."  Patient acknowledges drinking several soft drinks per day.  She also does not sleep very well and gets maybe 4 hours of sleep per night with a lot of tossing and turning.  Her father notes cell phone and tablet use before bed.  She does do a lot of walking during the day for exercise which father confirms.  Patient has not had any medication changes recently and was prescribed topiramate back in March 2017 and has not been titrated up--still on 25 mg po qHS which does not seem to be preventing these headaches.    ROS:  General:  No fever, no chills, no fatigue  HEENT:  + headache, no changes in vision  Respiratory:  No cough, no SOB  Cardiovascular:  No chest pain, no palpitations  GI:  No abdominal pain, no n/v/c/d  :  No dysuria, no change in frequency, no hematuria  Skin:  No rashes, no pruritus, no wounds  Musculoskeletal:  No myalgias, no arthralgias  Hematologic:  No easy bruising or bleeding  Neuro:  No tremors, no focal weakness, no paresthesias  Psych:  No anxiety, no depression    PMHx:  Patient Active Problem List   Diagnosis    Abdominal pain, RLQ (right lower quadrant)    Dysphagia    Diabetes mellitus, type II    Constipation, chronic    Right hip pain    New daily persistent headache    Hypertension    Palpitations    Chest pain, non-cardiac    Dyspnea on exertion    Urinary frequency    Severe obesity (BMI 35.0-39.9) with comorbidity    Muscle spasm    Gastroesophageal reflux disease without esophagitis    Irritable bowel syndrome with diarrhea    Migrainous vertigo    Uncontrolled morning headache    Sleep arousal disorder    Hyperlipidemia    GERD (gastroesophageal reflux disease)    Family history " of breast cancer     PSHx:  Past Surgical History:   Procedure Laterality Date    BLADDER SUSPENSION      CARPAL TUNNEL RELEASE      right    CHOLECYSTECTOMY      COLONOSCOPY N/A 8/30/2016    Performed [facility] at [facility] (4TH FLR)    COLONOSCOPY N/A 1/9/2013    Performed by Gabriele Gibbons [facility] at [facility] (4TH FLR)    CYSTOSCOPY N/A 1/23/2017    Per[facility] by [facility]ka [facility]ura [facility] at [facility]    EGD (ESOPHAGOGASTRODUODENOSCOPY) N/A 1/10/2013    Performed by Darryl Cuello MD at [facility] ENDO (2ND FLR)    ESOPHAGOGASTRODUODENOSCOPY (EGD) N/A 9/14/2017    Performed by Slick Herron MD at [facility] ENDO (4TH FLR)    ESOPHAGOGASTRODUODENOSCOPY (EGD) N/A 8/30/2016    Performed by Slick Herron [facility] at [facility] END[facility] (4TH FLR)    ESOPHAGOGASTRODUODENOSCOPY (EGD) N/A 10/23/2014    Performed by Slick Herron MD at [facility] END[facility] (4TH FLR)    GALLBLADDER SURGERY      HYSTERECTOMY  2013    ron, Bess jacobs, Dr. Ramirez    MANOMETRY, ESOPHAGUS, WITH IMPEDANCE MEASUREMENT N/A 11/5/2018    Performed by Slick Herron MD at [facility] END[facility] (4TH FLR)    OOPHORECTOMY  2005    [facility]- benign cyst    OOPHORECTOMY  2013    [facility]- endo    ooptherectomy      RETROPUBIC SLING N/A 1/23/2017    Performed by [facility]ka [facility]ura [facility] at [facility] OR    right knee  scoped    SHOULDER SURGERY  right     Medications:  Current Outpatient Medications on File Prior to Visit   Medication Sig Dispense Refill    ACCU-CHEK AMAURY PLUS TEST STRP Strp USE TO TEST BLOOD GLUCOSE TID  4    ACCU-CHEK SOFTCLIX LANCETS Misc USE TO TEST BLOOD GLUCOSE TID  3    atorvastatin (LIPITOR) 40 MG tablet Take 40 mg by mouth once daily.      BLOOD SUGAR DIAGNOSTIC (ACCU-CHEK AMAURY PLUS TEST STRP MISC) 1 strip by Misc.(Non-Drug; Combo Route) route 3 (three) times daily.      calcium-vitamin D3 500 mg(1,250mg) -200 unit per tablet Take 1 tablet by mouth 2 (two) times daily with meals.      conjugated estrogens (PREMARIN) vaginal cream Use 0.5 gram of  estrogen cream in vagina nightly x 2 weeks, then twice a week thereafter. 30 g 4    dicyclomine (BENTYL) 10 MG capsule TAKE 2 CAPSULES(20 MG) BY MOUTH THREE TIMES DAILY BEFORE MEALS 180 capsule 0    dulaglutide (TRULICITY) 0.75 mg/0.5 mL PnIj Inject 0.75 mg into the skin every 7 days.      estrogens, conjugated, (PREMARIN) 0.3 MG tablet Take 1 tablet (0.3 mg total) by mouth once daily. 30 tablet 11    FARXIGA 10 mg Tab Take 1 tablet by mouth once daily.       fish oil-omega-3 fatty acids 300-1,000 mg capsule Take 2 g by mouth once daily.      GLUCOPHAGE 500 mg tablet Take 500 mg by mouth 2 (two) times daily with meals.       ibuprofen (ADVIL,MOTRIN) 800 MG tablet 800 mg every 4 (four) hours as needed.   0    LATUDA 80 mg Tab tablet TK 1 T PO D  2    levothyroxine (SYNTHROID) 50 MCG tablet TK 1 T PO QAM  8    metoprolol succinate (TOPROL-XL) 25 MG 24 hr tablet TAKE 1 TABLET(25 MG) BY MOUTH EVERY DAY 30 tablet 0    mirabegron (MYRBETRIQ) 50 mg Tb24 Take 1 tablet (50 mg total) by mouth once daily. 30 tablet 11    MULTIVIT-IRON-MIN-FOLIC ACID 3,500-18-0.4 UNIT-MG-MG ORAL CHEW Take 1 tablet by mouth once daily.       omeprazole (PRILOSEC OTC) 20 MG tablet Take 20 mg by mouth once daily.      pantoprazole (PROTONIX) 40 MG tablet Take 1 tablet (40 mg total) by mouth once daily. 90 tablet 3    spironolactone (ALDACTONE) 25 MG tablet TK 1 T PO HS  2    tolterodine (DETROL LA) 4 MG 24 hr capsule Take 1 capsule (4 mg total) by mouth once daily. 30 capsule 11    topiramate (TOPAMAX) 50 MG tablet Take 2 tablets (100 mg total) by mouth once daily. 60 tablet 11    ZOLOFT 100 mg tablet Take 200 mg by mouth once daily.        No current facility-administered medications on file prior to visit.      Allergies:  Review of patient's allergies indicates:  No Known Allergies    Social Hx:   Patient lives at home with her parents.  Some concern for ID, though no documentation.  She is not working, often plays video games  and goes on walks at home during the day.  Her mother recently went through chemotherapy and has some residual health problems, but she and her father (who always accompanies her on visits) are managing well and note that stress levels have been improving since patient's mother finished chemotherapy.  Patient has never used tobacco, EtOH, or illicits.    Physical Exam:  General:  Well-developed, well-nourished, nad  HEENT:  NCAT, PERRL, EOMI, oropharygneal membranes non-erythematous/without exudate  Neck:  Supple, normal ROM without nuchal rigidity  Respiratory:  Symmetric expansion, no increased wob  CVS:  No LE edema. Extremities warm, well-perfused.  GI:  Abd soft, non-distended  Skin:  No visible rashes or wounds  Psych:  Pleasant, cooperative with exam.  Speech and thought content appropriate.  Neurologic Exam:  Mental Status:  AAOx3.  Converses easily.  Able to spell 'world' forward and backward with 1 error. Recent, remote recall 3/3 and 2/3 respectively.  Cranial Nerves:  PERRLA, EOMI. Facial movement intact, symmetric.  Tongue protrudes midline, palate raises symmetrically.  Trapezius 5/5 bilaterally.  Motor:  Normal muscle bulk and tone.  Strength 5/5 throughout.  Sensory:  Sensation intact to light touch at all extremities.  Vibratory sensation intact and symmetric at BUE/BLE digits.  Reflexes:  Reflexes 2+--biceps, brachioradialis, patellar.  No ankle clonus.  Coordination:  No resting tremor or myoclonus.  FTN, HTS, LYNN wnl--no ataxia, dysmetria, or dysdiadochokinesia.  Gait:  Appropriate gait, appropriate arm swing.  No shuffling, magnetic gait, imbalance, weakness, or foot drop noted.    Labs:  Results: CBC:   Lab Results   Component Value Date/Time    WBC 6.25 06/30/2018 03:58 PM    RBC 4.76 06/30/2018 03:58 PM    HGB 13.6 06/30/2018 03:58 PM    HCT 42.3 06/30/2018 03:58 PM     06/30/2018 03:58 PM    MCV 89 06/30/2018 03:58 PM    MCH 28.6 06/30/2018 03:58 PM    MCHC 32.2 06/30/2018 03:58 PM      CMP:   Lab Results   Component Value Date/Time    GLU 90 2018 11:51 AM    CALCIUM 9.9 2018 11:51 AM    ALBUMIN 4.3 2018 11:51 AM    PROT 8.2 2018 11:51 AM     2018 11:51 AM    K 4.3 2018 11:51 AM    CO2 25 2018 11:51 AM     2018 11:51 AM    BUN 13 2018 11:51 AM    CREATININE 1.2 2018 11:51 AM    ALKPHOS 58 2018 11:51 AM    ALT 14 2018 11:51 AM    AST 15 2018 11:51 AM    BILITOT 0.2 2018 11:51 AM     Imagin17 CT head w/o contrast:  No acute intracranial abnormality identified.    Additional Diagnotic Testing:  N/a    ASSESSMENT/PLAN:  Patient is a 33 y.o. female with a PMHx of HTN, DM II with gastroparesis, obesity, and hypothyroidism who presents to [facility] for follow up of daily frontal headaches.    Problem List Items Addressed This Visit        1 - High    New daily persistent headache    Overview     Originally diagnosed as migraine headache with Dr. Gee.  Now gives hx of headache in forehead that becomes holocephalic. Medication overuse and caffeine withdrawal headache components suspected as of 19 visit.         Current Assessment & Plan     -Counseled again on limiting NSAID use to < 3 days per week   -Counseled again on trying to limit caffeine  -Will trial gabapentin 300 mg prn headache and asked that patient alternate with NSAIDs to help her cut back on NSAID use  -Patient instructed to call if no improvement in 3-4 weeks, can trial metoclopramide as prn while trying to cut down on NSAID and caffeine use           Mena Lambert MD  Pager:  [phone], [place] [id number] [phone]

## 2019-01-13 NOTE — ASSESSMENT & PLAN NOTE
-Counseled again on limiting NSAID use to < 3 days per week   -Counseled again on trying to limit caffeine  -Will trial gabapentin 300 mg prn headache and asked that patient alternate with NSAIDs to help her cut back on NSAID use

## 2019-01-15 ENCOUNTER — LAB VISIT (OUTPATIENT)
Dept: LAB | Facility: HOSPITAL | Age: 34
End: 2019-01-15
Attending: OBSTETRICS & GYNECOLOGY
Payer: MEDICAID

## 2019-01-15 ENCOUNTER — OFFICE VISIT (OUTPATIENT)
Dept: INTERNAL MEDICINE | Facility: CLINIC | Age: 34
End: 2019-01-15
Payer: MEDICAID

## 2019-01-15 DIAGNOSIS — R39.89 DARK YELLOW-COLORED URINE: ICD-10-CM

## 2019-01-15 DIAGNOSIS — E11.9 DIABETES MELLITUS WITHOUT COMPLICATION: Primary | ICD-10-CM

## 2019-01-15 DIAGNOSIS — E11.9 DIABETES MELLITUS WITHOUT COMPLICATION: ICD-10-CM

## 2019-01-15 DIAGNOSIS — I10 HYPERTENSION, UNSPECIFIED TYPE: ICD-10-CM

## 2019-01-15 LAB
ALBUMIN/CREAT UR: NORMAL UG/MG
BACTERIA #/AREA URNS AUTO: ABNORMAL /HPF
BILIRUB UR QL STRIP: NEGATIVE
CLARITY UR REFRACT.AUTO: CLEAR
COLOR UR AUTO: YELLOW
CREAT UR-MCNC: 28 MG/DL
GLUCOSE UR QL STRIP: ABNORMAL
HGB UR QL STRIP: NEGATIVE
KETONES UR QL STRIP: NEGATIVE
LEUKOCYTE ESTERASE UR QL STRIP: ABNORMAL
MICROALBUMIN UR DL<=1MG/L-MCNC: <2.5 UG/ML
MICROSCOPIC COMMENT: ABNORMAL
NITRITE UR QL STRIP: POSITIVE
NON-SQ EPI CELLS #/AREA URNS AUTO: <1 /HPF
PH UR STRIP: 5 [PH] (ref 5–8)
PROT UR QL STRIP: NEGATIVE
SP GR UR STRIP: 1 (ref 1–1.03)
SQUAMOUS #/AREA URNS AUTO: 1 /HPF
URN SPEC COLLECT METH UR: ABNORMAL
WBC #/AREA URNS AUTO: 4 /HPF (ref 0–5)
YEAST UR QL AUTO: ABNORMAL

## 2019-01-15 PROCEDURE — 81001 URINALYSIS AUTO W/SCOPE: CPT

## 2019-01-15 PROCEDURE — 99214 OFFICE O/P EST MOD 30 MIN: CPT | Mod: S$PBB,,, | Performed by: INTERNAL MEDICINE

## 2019-01-15 PROCEDURE — 99999 PR PBB SHADOW E&M-EST. PATIENT-LVL V: ICD-10-PCS | Mod: PBBFAC,,, | Performed by: INTERNAL MEDICINE

## 2019-01-15 PROCEDURE — 82043 UR ALBUMIN QUANTITATIVE: CPT

## 2019-01-15 PROCEDURE — 99215 OFFICE O/P EST HI 40 MIN: CPT | Mod: PBBFAC | Performed by: INTERNAL MEDICINE

## 2019-01-15 PROCEDURE — 99999 PR PBB SHADOW E&M-EST. PATIENT-LVL V: CPT | Mod: PBBFAC,,, | Performed by: INTERNAL MEDICINE

## 2019-01-15 PROCEDURE — 99214 PR OFFICE/OUTPT VISIT, EST, LEVL IV, 30-39 MIN: ICD-10-PCS | Mod: S$PBB,,, | Performed by: INTERNAL MEDICINE

## 2019-01-15 RX ORDER — METOPROLOL SUCCINATE 25 MG/1
TABLET, EXTENDED RELEASE ORAL
Qty: 30 TABLET | Refills: 4 | Status: SHIPPED | OUTPATIENT
Start: 2019-01-15 | End: 2019-09-13 | Stop reason: SDUPTHER

## 2019-01-15 NOTE — PROGRESS NOTES
I have seen the patient, reviewed the Resident's history and physical, assessment and plan. I have personally interviewed and examined the patient at bedside and agree with the findings.     Significant component of rebound headache with treatment complicated by cognitive impairment, would be reluctant to try steroid taper in setting of DM, will attempt prn GBP to help transition off OTC medications.    MD Zhang Arnold - Neurology

## 2019-01-17 ENCOUNTER — LAB VISIT (OUTPATIENT)
Dept: LAB | Facility: HOSPITAL | Age: 34
End: 2019-01-17
Attending: INTERNAL MEDICINE
Payer: MEDICAID

## 2019-01-17 DIAGNOSIS — E11.9 DIABETES MELLITUS WITHOUT COMPLICATION: ICD-10-CM

## 2019-01-17 LAB
ALBUMIN SERPL BCP-MCNC: 4.3 G/DL
ALP SERPL-CCNC: 64 U/L
ALT SERPL W/O P-5'-P-CCNC: 14 U/L
ANION GAP SERPL CALC-SCNC: 8 MMOL/L
AST SERPL-CCNC: 15 U/L
BILIRUB SERPL-MCNC: 0.3 MG/DL
BUN SERPL-MCNC: 10 MG/DL
CALCIUM SERPL-MCNC: 9.6 MG/DL
CHLORIDE SERPL-SCNC: 107 MMOL/L
CHOLEST SERPL-MCNC: 156 MG/DL
CHOLEST/HDLC SERPL: 4.2 {RATIO}
CO2 SERPL-SCNC: 25 MMOL/L
CREAT SERPL-MCNC: 1.3 MG/DL
EST. GFR  (AFRICAN AMERICAN): >60 ML/MIN/1.73 M^2
EST. GFR  (NON AFRICAN AMERICAN): 53 ML/MIN/1.73 M^2
ESTIMATED AVG GLUCOSE: 103 MG/DL
GLUCOSE SERPL-MCNC: 94 MG/DL
HBA1C MFR BLD HPLC: 5.2 %
HDLC SERPL-MCNC: 37 MG/DL
HDLC SERPL: 23.7 %
LDLC SERPL CALC-MCNC: 78.8 MG/DL
NONHDLC SERPL-MCNC: 119 MG/DL
POTASSIUM SERPL-SCNC: 4.2 MMOL/L
PROT SERPL-MCNC: 8 G/DL
SODIUM SERPL-SCNC: 140 MMOL/L
TRIGL SERPL-MCNC: 201 MG/DL

## 2019-01-17 PROCEDURE — 80053 COMPREHEN METABOLIC PANEL: CPT

## 2019-01-17 PROCEDURE — 36415 COLL VENOUS BLD VENIPUNCTURE: CPT

## 2019-01-17 PROCEDURE — 83036 HEMOGLOBIN GLYCOSYLATED A1C: CPT

## 2019-01-17 PROCEDURE — 80061 LIPID PANEL: CPT

## 2019-01-19 VITALS
BODY MASS INDEX: 36.6 KG/M2 | HEIGHT: 62 IN | WEIGHT: 198.88 LBS | HEART RATE: 81 BPM | OXYGEN SATURATION: 98 % | SYSTOLIC BLOOD PRESSURE: 112 MMHG | DIASTOLIC BLOOD PRESSURE: 74 MMHG

## 2019-01-19 NOTE — PROGRESS NOTES
Subjective:       Patient ID: Maria Ines Ac is a 33 y.o. female.    Chief Complaint: Hypertension    HPI  She returns for management of hypertension.  She has had hypertension for over a year.  Current treatment has included medications outlined in medication list.  She denies chest pain or shortness of breath.  No palpitations.  Denies left arm or neck pain. She has diabetes.  Denies polyuria, polydipsia    Past medical history: Hypertension, hyperlipidemia, diabetes, hypothyroidism, bipolar disorder, migraine headaches, status post hysterectomy, increased risk breast cancer      Medications: Synthroid 0.05 mg daily, metformin, Toprol-XL 25mg daily,Lipitor 40 mg daily,farxiga,topamax, trulicity,latuda      NO KNOWN DRUG ALLERGIES       Review of Systems   Constitutional: Negative for chills, fatigue, fever and unexpected weight change.   Respiratory: Negative for chest tightness and shortness of breath.    Cardiovascular: Negative for chest pain and palpitations.   Gastrointestinal: Negative for abdominal pain and blood in stool.   Neurological: Negative for dizziness, syncope, numbness and headaches.       Objective:      Physical Exam   HENT:   Right Ear: External ear normal.   Left Ear: External ear normal.   Nose: Nose normal.   Mouth/Throat: Oropharynx is clear and moist.   Eyes: Pupils are equal, round, and reactive to light.   Neck: Normal range of motion.   Cardiovascular: Normal rate and regular rhythm.   No murmur heard.  Pulmonary/Chest: Breath sounds normal.   Abdominal: She exhibits no distension. There is no hepatosplenomegaly. There is no tenderness.   Lymphadenopathy:     She has no cervical adenopathy.     She has no axillary adenopathy.   Neurological: She has normal strength and normal reflexes. No cranial nerve deficit or sensory deficit.       Assessment/Plan       Assessment and plan:  1.  Hypertension:  Check CMP and lipid panel  2.  Diabetes:  Check A1c and urine microalbumin.   Discussed podiatry appointment, she declined

## 2019-01-22 DIAGNOSIS — N39.41 URGE INCONTINENCE: ICD-10-CM

## 2019-01-22 DIAGNOSIS — R39.15 URINARY URGENCY: ICD-10-CM

## 2019-01-22 RX ORDER — TOLTERODINE 4 MG/1
4 CAPSULE, EXTENDED RELEASE ORAL DAILY
Qty: 30 CAPSULE | Refills: 11 | Status: SHIPPED | OUTPATIENT
Start: 2019-01-22 | End: 2019-05-23 | Stop reason: ALTCHOICE

## 2019-01-22 NOTE — TELEPHONE ENCOUNTER
Pt Pharmacy sent a Fax requesting a refill of Tolterodine Tart Er 4 mg Capsules. Pt last seen on 12/20/18 by Dr. Ventura.

## 2019-01-24 DIAGNOSIS — R10.84 GENERALIZED ABDOMINAL PAIN: ICD-10-CM

## 2019-01-24 DIAGNOSIS — K58.0 IRRITABLE BOWEL SYNDROME WITH DIARRHEA: ICD-10-CM

## 2019-01-24 RX ORDER — DICYCLOMINE HYDROCHLORIDE 10 MG/1
CAPSULE ORAL
Qty: 180 CAPSULE | Refills: 0 | Status: SHIPPED | OUTPATIENT
Start: 2019-01-24 | End: 2019-02-20 | Stop reason: SDUPTHER

## 2019-01-30 ENCOUNTER — OFFICE VISIT (OUTPATIENT)
Dept: GASTROENTEROLOGY | Facility: CLINIC | Age: 34
End: 2019-01-30
Payer: MEDICAID

## 2019-01-30 ENCOUNTER — DOCUMENTATION ONLY (OUTPATIENT)
Dept: SURGERY | Facility: CLINIC | Age: 34
End: 2019-01-30

## 2019-01-30 VITALS
SYSTOLIC BLOOD PRESSURE: 120 MMHG | WEIGHT: 196.19 LBS | HEIGHT: 62 IN | HEART RATE: 98 BPM | DIASTOLIC BLOOD PRESSURE: 81 MMHG | BODY MASS INDEX: 36.1 KG/M2

## 2019-01-30 DIAGNOSIS — K21.9 GASTROESOPHAGEAL REFLUX DISEASE WITHOUT ESOPHAGITIS: ICD-10-CM

## 2019-01-30 DIAGNOSIS — R13.19 ESOPHAGEAL DYSPHAGIA: ICD-10-CM

## 2019-01-30 DIAGNOSIS — K58.0 IRRITABLE BOWEL SYNDROME WITH DIARRHEA: Primary | ICD-10-CM

## 2019-01-30 PROCEDURE — 99214 PR OFFICE/OUTPT VISIT, EST, LEVL IV, 30-39 MIN: ICD-10-PCS | Mod: S$PBB,,, | Performed by: INTERNAL MEDICINE

## 2019-01-30 PROCEDURE — 99999 PR PBB SHADOW E&M-EST. PATIENT-LVL III: CPT | Mod: PBBFAC,,, | Performed by: INTERNAL MEDICINE

## 2019-01-30 PROCEDURE — 99213 OFFICE O/P EST LOW 20 MIN: CPT | Mod: PBBFAC | Performed by: INTERNAL MEDICINE

## 2019-01-30 PROCEDURE — 99214 OFFICE O/P EST MOD 30 MIN: CPT | Mod: S$PBB,,, | Performed by: INTERNAL MEDICINE

## 2019-01-30 PROCEDURE — 99999 PR PBB SHADOW E&M-EST. PATIENT-LVL III: ICD-10-PCS | Mod: PBBFAC,,, | Performed by: INTERNAL MEDICINE

## 2019-01-30 NOTE — PROGRESS NOTES
Received fax from Lili Harris, genetic counselor with [facility], on 1/30/2019, with recommendations from genetic counseling session on this pt previously seen by me.  Per JANET Harris's report, pt meets guidelines for testing of the BRCA1/BRCA2, MLH1, MSH2, MSH6, PMS2, and EPCAM genes, with the pt-elected test being the Breast/GYN Cancer Panel through [facility].     Please see the [facility] report, which will be scanned into [employer], for complete report including recommendations and instructions.      Pt will need to come in to sign consent and  test kit, which MIRIAN Blanco will facilitate.  When pt comes in to sign consent, I will place her miscellaneous send-out lab order.

## 2019-01-30 NOTE — PROGRESS NOTES
HISTORY OF PRESENT ILLNESS:  Maria Ines is here with her father.  She is here for   a followup and counseling session.  She is a 34-year-old woman I have seen a   number of times in the office.  My impression in the past has been irritable   bowel syndrome and reflux.    She has had extensive workup through our department over the years.  She has a   gastric emptying scan that was normal.  An EGD in 2017 was normal.  A Jain   dilator was passed empirically.  An upper GI was done in  and was normal.    Colonoscopy with random biopsies was done and was normal in .  Esophageal   manometry was technically normal, done last year.    Her symptoms continue as they have in the past.  She starts off by saying she   has dysphagia.  This is present every day.  It is worse for meat and pills.    There are no prolonged impactions.  It always rinses down with water.  It is the   same as in the past.  She may have gotten a very brief relief after her   dilation two years ago, but it was not sustained.  This is not progressive or   more severe over time.    She also continues with heartburn.  She describes pyrosis often after meals.    This is despite taking her PPI twice a day, although the father tells us that   her PPI use is not quite consistent, may not be before meals and there may be   missed doses.  When she does have heartburn, she will try drinking milk to   relieve it.  She continues with the four soft stools per day.  This is very   similar to what she has had in the past.  There has been no change in that over   time.    ASSESSMENT AND DECISION MAKIN.  IBS, diarrhea.  No further workup is needed.  We have done a colonoscopy.  I   have reassured her.  I think that for the time being, I would encourage her to   work on increasing the fiber in her diet.  We went over her diet while she was   here.  For instance in the morning, she has a Pop-Tarts and Coke Zero.  She will   have three additional Coke Zeros  during the day.  She does drink water, but her   vegetable and fruit intake is minimal.  So I think that increasing fiber is   something simple she could do to help with that now.  2.  Gastroesophageal reflux.  I reinforced the importance of timing of the PPI   intake.  It should be before meals.  When she has breakthrough symptoms, I will   encourage her to do Tums or Gaviscon rather than milk.  I do not think there is   anything else we can do.  For instance, because of her dysphagia, we would not   consider antireflux surgery on her.  3.  Dysphagia.  Technically manometry was normal, although they were on 20% of   the swallows, weak swallows or fragmented peristalsis.  I think that goes along   with the diabetes.  There is this subtle dysmotility of the esophagus.  There is   nothing I can do about this.  There is no way to improve it over time.  It will   get worse.  She will continue to have to eat slowly and rinse often during   meals, but I tried to give her reassurance that this will not get worse and   would not lead to any other additional problems.    RECOMMENDATION:  1.  Increase fiber.  2.  Reinforce appropriate timing of PPI intake.  3.  Use antacids for breakthrough reflux.  4.  Reassured about the dysphagia and urged just to eat slowly and chew   carefully.    Future followup can be in primary care.  No specific reason to have a followup   here in Gastroenterology.    A 30 minute appointment greater than half time in face-to-face counseling.      LEONID  dd: 01/30/2019 10:32:54 (CST)  td: 01/31/2019 03:44:48 (CST)  Doc ID   #[id number]  Job ID #[id number]    CC:

## 2019-01-31 ENCOUNTER — LAB VISIT (OUTPATIENT)
Dept: LAB | Facility: HOSPITAL | Age: 34
End: 2019-01-31
Payer: MEDICAID

## 2019-01-31 ENCOUNTER — OFFICE VISIT (OUTPATIENT)
Dept: SURGERY | Facility: CLINIC | Age: 34
End: 2019-01-31
Payer: MEDICAID

## 2019-01-31 VITALS
HEART RATE: 103 BPM | BODY MASS INDEX: 36.29 KG/M2 | HEIGHT: 62 IN | WEIGHT: 197.19 LBS | TEMPERATURE: 97 F | DIASTOLIC BLOOD PRESSURE: 68 MMHG | SYSTOLIC BLOOD PRESSURE: 114 MMHG

## 2019-01-31 DIAGNOSIS — Z80.41 FAMILY HX OF OVARIAN MALIGNANCY: ICD-10-CM

## 2019-01-31 DIAGNOSIS — Z80.0 FAMILY HISTORY OF COLON CANCER: ICD-10-CM

## 2019-01-31 DIAGNOSIS — Z80.3 FAMILY HISTORY OF BREAST CANCER: ICD-10-CM

## 2019-01-31 DIAGNOSIS — Z13.79 GENETIC TESTING: ICD-10-CM

## 2019-01-31 DIAGNOSIS — Z13.79 GENETIC TESTING: Primary | ICD-10-CM

## 2019-01-31 DIAGNOSIS — Z80.41 FAMILY HISTORY OF OVARIAN CANCER: ICD-10-CM

## 2019-01-31 DIAGNOSIS — Z80.3 FAMILY HISTORY OF BREAST CANCER: Primary | ICD-10-CM

## 2019-01-31 PROCEDURE — 99213 OFFICE O/P EST LOW 20 MIN: CPT | Mod: PBBFAC,PO | Performed by: SURGERY

## 2019-01-31 PROCEDURE — 36415 COLL VENOUS BLD VENIPUNCTURE: CPT

## 2019-01-31 PROCEDURE — 99213 OFFICE O/P EST LOW 20 MIN: CPT | Mod: S$PBB,,, | Performed by: SURGERY

## 2019-01-31 PROCEDURE — 99213 PR OFFICE/OUTPT VISIT, EST, LEVL III, 20-29 MIN: ICD-10-PCS | Mod: S$PBB,,, | Performed by: SURGERY

## 2019-01-31 PROCEDURE — 99999 PR PBB SHADOW E&M-EST. PATIENT-LVL III: CPT | Mod: PBBFAC,,, | Performed by: SURGERY

## 2019-01-31 PROCEDURE — 99999 PR PBB SHADOW E&M-EST. PATIENT-LVL III: ICD-10-PCS | Mod: PBBFAC,,, | Performed by: SURGERY

## 2019-01-31 RX ORDER — PHENTERMINE HYDROCHLORIDE 37.5 MG/1
37.5 CAPSULE ORAL EVERY MORNING
COMMUNITY
End: 2020-01-06

## 2019-01-31 NOTE — PROGRESS NOTES
"BREAST HIGH RISK CLINIC  [facility]  Breast Surgery      Date of Visit:  01/31/2019    Referring provider:  No referring provider defined for this encounter.    PCP:  Luann Raymond MD    HIGH RISK    Maria Ines Ac is a 34 y.o. postmenopausal (ELISSA with BSO in 2012 for benign pathology) female referred for evaluation of increased risk of breast cancer based on family history.  Here today to discussed options of high risk screening and risk reduction.    See previous consult with NATALEE Romeo Moore for further breast and GYN history.     Patient's TC score is 20% and 5 year risk is 0.7%. She was seen by hematology/oncology to discuss chemoprophylaxis, but was told she did not meet criteria.     Today, she claims she is interested in bilateral mastectomies, but she wants this primarily for bilateral breast pain that she has experienced over the past couple of months. She has been on Premarin (0.3 mg qd) since 2012. She has no other complaints at this time.       Family History is significant for the following:  Mother:  Breast cancer (50), ovarian cancer (64), esophageal cancer (63)   Father:  N/A  Brother:  Colon cancer (40)  Maternal aunt:  Breast cancer (70)  Paternal aunt:  Breast cancer (40)    Social History:   Smoking:  N/A  Drinking:  N/A    Review of Systems  A comprehensive review of systems was negative.      Objective:   /68   Pulse 103   Temp 96.5 °F (35.8 °C) (Oral)   Ht 5' 2" (1.575 m)   Wt 89.4 kg (197 lb 3.2 oz)   LMP 11/17/2012 (LMP Unknown)   BMI 36.07 kg/m²     General: NAD  Chest: nonlabored breathing, no obvious deformity  Heart: regular rate  Abdomen: soft, nondistended  Extremities: no edema noted  Breast: pendulous; no palpable masses, skin changes, nipple retraction, or nipple discharge; no axillary lymphadenopathy       Imaging  Mammograms:  12/26/18 - BI-RADS 1  Ultrasound:  12/26/18 - BI-RADS 1    MRI N/A     Assessment:     This is a 34 y.o. female with an " increased risk of breast cancer based on family history.     Plan:     - Given family history of mother with ovarian cancer, agree with her undergoing genetic testing, which she is consenting for today.   - Would not recommend prophylactic mastectomy at this time given her current risk; however, if there is a genetic mutation identified on testing surgical options would certainly need to be re-examined.   - Breast pain possibly related to current HRT; consider altering, decreasing, or discontinuing dose.   - Agree with continued high risk surveillance with yearly MRI, yearly mammogram, and yearly CBEs.   - Continue f/u with NATALEE for now.    I have personally taken the history and examined this patient and agree with the resident's note as stated above.  Pt felt not to be a candidate for chemoprevention for risk reduction after consult and evaluation by Dr. Pond.  Will obtain genetic testing since her mother had had a history of ovarian CA.  Currently on Premarin.  If genetic testing is positive, then would consider recommending B prophylactic mastectomies.  Otherwise, if genetic testing is negative, then would recommend at high risk for breast cancer screening only at this time with incorporation of annual screening breast MRI alternating every 6 months with annual screening breast B Mammography.

## 2019-02-10 PROBLEM — Z80.41 FAMILY HX OF OVARIAN MALIGNANCY: Status: ACTIVE | Noted: 2019-02-10

## 2019-02-19 ENCOUNTER — TELEPHONE (OUTPATIENT)
Dept: UROGYNECOLOGY | Facility: CLINIC | Age: 34
End: 2019-02-19

## 2019-02-19 NOTE — TELEPHONE ENCOUNTER
----- Message from Radha Meyer sent at 2/19/2019  1:00 PM CST -----  Contact: Ruthie ( mother )   Name of Who is Calling: Ruthie ( mother )       What is the request in detail:Ruthie ( mother )  is requesting a call from staff she states she has questions about the hormone pills   ..... Please contact to further discuss and advise.     Can the clinic reply by [facility]: no     What [facility] Back [facility]: [phone]

## 2019-02-20 DIAGNOSIS — R10.84 GENERALIZED ABDOMINAL PAIN: ICD-10-CM

## 2019-02-20 DIAGNOSIS — K58.0 IRRITABLE BOWEL SYNDROME WITH DIARRHEA: ICD-10-CM

## 2019-02-21 RX ORDER — DICYCLOMINE HYDROCHLORIDE 10 MG/1
CAPSULE ORAL
Qty: 180 CAPSULE | Refills: 0 | Status: SHIPPED | OUTPATIENT
Start: 2019-02-21 | End: 2019-03-25 | Stop reason: SDUPTHER

## 2019-03-20 ENCOUNTER — OFFICE VISIT (OUTPATIENT)
Dept: UROGYNECOLOGY | Facility: CLINIC | Age: 34
End: 2019-03-20
Payer: MEDICAID

## 2019-03-20 VITALS
WEIGHT: 194.69 LBS | HEIGHT: 62 IN | DIASTOLIC BLOOD PRESSURE: 80 MMHG | BODY MASS INDEX: 35.83 KG/M2 | SYSTOLIC BLOOD PRESSURE: 132 MMHG

## 2019-03-20 DIAGNOSIS — N39.41 URGE INCONTINENCE OF URINE: Primary | ICD-10-CM

## 2019-03-20 DIAGNOSIS — N30.00 ACUTE CYSTITIS WITHOUT HEMATURIA: ICD-10-CM

## 2019-03-20 PROCEDURE — 87086 URINE CULTURE/COLONY COUNT: CPT

## 2019-03-20 PROCEDURE — 99214 PR OFFICE/OUTPT VISIT, EST, LEVL IV, 30-39 MIN: ICD-10-PCS | Mod: S$PBB,,, | Performed by: OBSTETRICS & GYNECOLOGY

## 2019-03-20 PROCEDURE — 99999 PR PBB SHADOW E&M-EST. PATIENT-LVL III: ICD-10-PCS | Mod: PBBFAC,,, | Performed by: OBSTETRICS & GYNECOLOGY

## 2019-03-20 PROCEDURE — 99214 OFFICE O/P EST MOD 30 MIN: CPT | Mod: S$PBB,,, | Performed by: OBSTETRICS & GYNECOLOGY

## 2019-03-20 PROCEDURE — 99213 OFFICE O/P EST LOW 20 MIN: CPT | Mod: PBBFAC | Performed by: OBSTETRICS & GYNECOLOGY

## 2019-03-20 PROCEDURE — 99999 PR PBB SHADOW E&M-EST. PATIENT-LVL III: CPT | Mod: PBBFAC,,, | Performed by: OBSTETRICS & GYNECOLOGY

## 2019-03-20 RX ORDER — NITROFURANTOIN 25; 75 MG/1; MG/1
100 CAPSULE ORAL 2 TIMES DAILY
Qty: 14 CAPSULE | Refills: 0 | Status: SHIPPED | OUTPATIENT
Start: 2019-03-20 | End: 2019-05-06 | Stop reason: ALTCHOICE

## 2019-03-20 RX ORDER — DULAGLUTIDE 1.5 MG/.5ML
INJECTION, SOLUTION SUBCUTANEOUS
Refills: 5 | COMMUNITY
Start: 2019-02-18 | End: 2022-11-14

## 2019-03-20 RX ORDER — ERTUGLIFLOZIN 15 MG/1
TABLET, FILM COATED ORAL
Refills: 9 | COMMUNITY
Start: 2019-02-21 | End: 2019-09-12

## 2019-03-20 RX ORDER — PHENTERMINE HYDROCHLORIDE 37.5 MG/1
TABLET ORAL
Refills: 3 | COMMUNITY
Start: 2019-02-22 | End: 2019-05-16

## 2019-03-20 NOTE — PROGRESS NOTES
Maria Ines Ac is a 34 Y O F nulliparous female who  has a past medical history of Blood in stool, Constipation, Depression, Diabetes mellitus, type 2, Dysphagia, GERD (gastroesophageal reflux disease), Hypertension, Palpitations, Premature menopause on hormone replacement therapy, and Thyroid disease. and intellectual  disability (ID)  here for follow up.  She has a remote history of a complete hysterectomy many years ago and more recently history of retropubic sling/ cystourethroscopy for DILIP 1/2017. She has persistent urinary urgency/ urge incontincence which has not improved since starting pelvic floor PT or full course of PTNS (monthly maintenance not approved by insurance).  She is currently on dual therapy ( Detrol and Myrbetriq) without much improvement in her symptoms.   She denies snoring or previous evaluation for sleep apnea.     Interval  HPI since the last visit: (updates in bold)    1)  UI:  (+) JACINDA rarely  (+) UUI  Several times day. (+) pads: 4/ day  Daytime frequency: Q 1 hours.  Nocturia: every hour.  Wakes up wet.    (--)dysuria  (--) hematuria,  (--) frequent UTIs.  not complete bladder emptying. No abdominal pain.  Taking detrol and myrbetriq--no change in UI      2)  POP:  Absent    Symptoms:(--)  .  (--) vaginal bleeding. (+) vaginal discharge. (-) sexually active.  (--) dyspareunia.   (--)  Vaginal dryness.  (+) vaginal estrogen use. Also using Shazia's Butt Paste. Denies vaginal pain or itching.    3)  BM:  (--) constipation/straining.  (--) chronic diarrhea (--) hematochezia.  (--) fecal incontinence.  (--) fecal smearing/urgency.  (--) incomplete evacuation.      4) pelvic pain despite using vaginal suppositories twice daily    Continue to drink between 3-4 cokes and several teas per day.    5) DM-A1C 5.2     INITIAL SUDS: 12/2016    2.  FILLING PHASE:  · 1st desire: 150 mL  · Normal desire:  268 mL  · Strong desire:  558 mL  · Urgency:  750 mL  · EMG activity during filling:    Normal   · Detrusor contractions observed: No.       3.  URETHRAL FUNCTION/STORAGE PHASE:     a.  WITHOUT prolapse reduction:  · CLPP (300 mL): Positive  at  113 cm H20  · VLPP (300 mL): Negative  at  44 cm H20   · CLPP (MAX ):    Negative  at  99 cm H20  · VLPP (MAX):     Negative  at  30 cm H20  b.  WITH prolapse reduction:  · CLPP (MAX ):  Positive  at  24 cm H20  · VLPP (MAX):    Negative  at  23 cm H20     These findings are consistent with Positive urodynamic stress incontinence .     Assessment:  UF  Normal .  PF  Not significant prolapse.  Compliance: 3.3 .  Max capacity elevated capacity with normal PVR once catheter removed.  DO (--).  JACINDA (+).       Plan: Recommend a mid-urethral sling and cystourethroscopy      REPEAT SUDS 12/2018     Plan:  Valsalva voiding pattern.  There is a question of urethral contractions concerning for Llamas's syndrome. She may benefit from FUDS to evaluate the bladder neck.  JACINDA only at high volumes, no DI noted             Past Medical History:   Diagnosis Date    Blood in stool      Constipation      Depression      Diabetes mellitus, type 2      Dysphagia      GERD (gastroesophageal reflux disease)      Hypertension      Palpitations      Premature menopause on hormone replacement therapy      Thyroid disease                 Past Surgical History:   Procedure Laterality Date    BLADDER SUSPENSION        CARPAL TUNNEL RELEASE         right    COLONOSCOPY N/A 8/30/2016     Procedure: COLONOSCOPY; Surgeon: Slick Herron MD; Location: [facility] ([address] [place] [address]); Service: Endoscopy; Laterality: N/A; PM prep    GALLBLADDER SURGERY        HYSTERECTOMY   2013     Bess velasquez Dr. Champlain    OOPHORECTOMY   2005     LSO- benign cyst    OOPHORECTOMY   2013     [facility]- endo    ooptherectomy        right knee   scoped    SHOULDER SURGERY   right         Current Outpatient Medications   Medication Sig    ACCU-CHEK AMAURY PLUS TEST STRP Strp USE TO TEST BLOOD GLUCOSE  TID    ACCU-CHEK SOFTCLIX LANCETS [facility] USE TO TEST BLOOD GLUCOSE TID    atorvastatin (LIPITOR) 40 MG tablet Take 40 mg by mouth once daily.    BLOOD SUGAR DIAGNOSTIC (ACCU-CHEK AMAURY PLUS TEST STRP MISC) 1 strip by Misc.(Non-Drug; Combo Route) route 3 (three) times daily.    calcium-vitamin D3 500 mg(1,250mg) -200 unit per tablet Take 1 tablet by mouth 2 (two) times daily with meals.    conjugated estrogens (PREMARIN) vaginal cream Use 0.5 gram of estrogen cream in vagina nightly x 2 weeks, then twice a week thereafter.    dulaglutide (TRULICITY) 0.75 mg/0.5 mL PnIj Inject 0.75 mg into the skin every 7 days.    estrogens, conjugated, (PREMARIN) 0.3 MG tablet Take 1 tablet (0.3 mg total) by mouth once daily.    FARXIGA 10 mg Tab Take 1 tablet by mouth once daily.     fish oil-omega-3 fatty acids 300-1,000 mg capsule Take 2 g by mouth once daily.    gabapentin (NEURONTIN) 300 MG capsule Take 1 capsule (300 mg total) by mouth 3 (three) times daily as needed (Take for headache).    GLUCOPHAGE 500 mg tablet Take 500 mg by mouth 2 (two) times daily with meals.     ibuprofen (ADVIL,MOTRIN) 800 MG tablet 800 mg every 4 (four) hours as needed.     LATUDA 80 mg Tab tablet TK 1 T PO D    levothyroxine (SYNTHROID) 50 MCG tablet TK 1 T PO QAM    metoprolol succinate (TOPROL-XL) 25 MG 24 hr tablet TAKE 1 TABLET(25 MG) BY MOUTH EVERY DAY    mirabegron (MYRBETRIQ) 50 mg Tb24 Take 1 tablet (50 mg total) by mouth once daily.    MULTIVIT-IRON-MIN-FOLIC ACID 3,500-18-0.4 UNIT-MG-MG ORAL CHEW Take 1 tablet by mouth once daily.     omeprazole (PRILOSEC OTC) 20 MG tablet Take 20 mg by mouth once daily.    pantoprazole (PROTONIX) 40 MG tablet Take 1 tablet (40 mg total) by mouth once daily.    phentermine (ADIPEX-P) 37.5 MG capsule Take 37.5 mg by mouth every morning.    phentermine (ADIPEX-P) 37.5 mg tablet TK 1 T PO  QD    spironolactone (ALDACTONE) 25 MG tablet TK 1 T PO HS    STEGLATRO 15 mg Tab TK 1 T PO  QAM  "   tolterodine (DETROL LA) 4 MG 24 hr capsule Take 1 capsule (4 mg total) by mouth once daily.    topiramate (TOPAMAX) 50 MG tablet Take 2 tablets (100 mg total) by mouth once daily.    TRULICITY 1.5 mg/0.5 mL PnIj INJECT 1 PEN INTO THE SKIN Q WEEK    ZOLOFT 100 mg tablet Take 200 mg by mouth once daily.     dicyclomine (BENTYL) 10 MG capsule TAKE 2 CAPSULES(20 MG) BY MOUTH THREE TIMES DAILY BEFORE MEALS    nitrofurantoin, macrocrystal-monohydrate, (MACROBID) 100 MG capsule Take 1 capsule (100 mg total) by mouth 2 (two) times daily.     No current facility-administered medications for this visit.         Exam  Vitals:    03/20/19 1330   BP: 132/80   BP Location: Left arm   Patient Position: Sitting   Weight: 88.3 kg (194 lb 10.7 oz)   Height: 5' 2" (1.575 m)       Well Woman:  Pap:post hysterectomy  Mammo:n/a  Colonoscopy:n/a  Dexa:n/a    General: alert and oriented, no acute distress  Respiratory: normal respiratory effort  Abd: soft, non-distended.  ND/NT     PELVIC EXAM:   VULVA: normal appearing vulva with no masses, tenderness or lesions,   VAGINA: normal appearing vagina with normal color and discharge, no lesions, no mesh visible/ palpable, atrophic, levator tenderness   CERVIX: surgically absent,   UTERUS: absent,   ADNEXA: no masses  RECTAL: deferred   PVR -90 mL, cloudy            Impression  1. Urge incontinence of urine  Ambulatory consult to Urology   2. Acute cystitis without hematuria  Urine culture       We reviewed the above issues and discussed options for short-term versus long-term management of her problems.     Plan:     1. Bs/p oudreaux's Butt Paste daily to vulva.  3. Vaginal atrophy:  Estrogen cream 0.5 g twice weekly  4. Urine culture, will treat with macrobid until cx resulted   5. Long discussion of decreasing bladder irritants. Only have coke zero with meds at meal time-- limit 3/day.  6. Only drink 4 ounces at a time.  If you drink a coke zero, you can not drink anything else that " hour.  7. For dry mouth, use sour candies (sugar free ones) and biotene mouth wash  8. Continue tolterodine 4 mg daily + myrbetriq 50 mg daily for combo therapy.  9. Vaginal valium suppository 1-2 times daily as needed  10. Compression stocking  11.  reviewed normal voiding patterns: not clear if there is any benefit to sending her to PT again, for now will incorporate voiding bench     Long discussion of additional advanced treatment options for UUI  including Botox and interstim (SNS). Botox is a prescription medicine that is injected into the bladder muscle and used to treat overactive bladder  symptoms such as a strong need to urinate with leakage or wetting accidents or urianting often (frequency ) or the strong need to urinate immediately which can not be deferred. There is a 6-10 % risk of urinary retention which usually resolved by 8 wks.  There is also a risk of UTI. UTI risks can be higher in patients with diabetes.  It requires a  prior authorization. We reccomend to start ABX 2 days before and 3 days after.   InterStim therapy helps control urinary problems through an implanted devices that sends mild electrical impulse by a thin wire (lead) the sacral nerves that control the bladder, sphincter and plevic floor muscles often called a bladder pacemaker. It is FDA approved for the treatment of urinary retention and symptoms of OAB urge incontinence urgency and frequency for patient who have failed or were unable to tolerate more conservative treatments.  Like with any implanted devise there is a risk of infection or pain at the site implanted. Given the degree of ID, I'm reluctant to proceed with Botox due to the risk of urinary retention with the need for self catheterization. She does have some bothersome nocturia ( upwards of 5 times per night), but doesn't have symptoms of sleep apnea although I don't have an official sleep study.  Also it is not clear to me that she is a good candidate for interStim  given her ID  I have asked her to  see Dr. Tee for a second opinion.     35 minutes were spent in face to face time with this patient  75 % of this time was spent in counseling and/or coordination of care

## 2019-03-20 NOTE — LETTER
March 26, 2019        Zena Tee MD  [address]  [facility] [address]             [facility] [place], [facility] [address]  [address] [address]  Phone: [phone]   Patient: Maria Ines Ac   MR Number: [id number]   YOB: 1985   Date of Visit: 3/20/2019       Dear Dr. Tee:    I am referring  Maria Ines Ac for evaluation of her persistent urge urinary incontinence despite multiple interventions. I've attached the provider summary.       If you have questions, please do not hesitate to call me. I look forward to following Maria Ines along with you.    Sincerely,      Ninoska Ventura, DO           CC  No Recipients

## 2019-03-20 NOTE — PATIENT INSTRUCTIONS
UTI   --UA and Culture  --Vaginal estrogen has been shown to decrease the recurrence of urinary tract infections in post menopausal women  --Change pads often: Q 2-3 hours and add barrier cream daily (Shazia's butt paste vs dessitin)   --start macrobid twice a day for 7 days.     To see Dr. Tee for a second opinion

## 2019-03-21 LAB
BACTERIA UR CULT: NORMAL
BACTERIA UR CULT: NORMAL

## 2019-03-25 ENCOUNTER — TELEPHONE (OUTPATIENT)
Dept: UROGYNECOLOGY | Facility: CLINIC | Age: 34
End: 2019-03-25

## 2019-03-25 DIAGNOSIS — K58.0 IRRITABLE BOWEL SYNDROME WITH DIARRHEA: ICD-10-CM

## 2019-03-25 DIAGNOSIS — R10.84 GENERALIZED ABDOMINAL PAIN: ICD-10-CM

## 2019-03-25 RX ORDER — DICYCLOMINE HYDROCHLORIDE 10 MG/1
CAPSULE ORAL
Qty: 180 CAPSULE | Refills: 2 | Status: SHIPPED | OUTPATIENT
Start: 2019-03-25 | End: 2019-06-17 | Stop reason: SDUPTHER

## 2019-03-25 NOTE — TELEPHONE ENCOUNTER
----- Message from Ninoska Ventura DO sent at 3/23/2019  9:56 AM CDT -----  Please call the patient to let her know that the urine results did show some bacteria but not enough to be considered an infection.  She is to see Dr. Tee in one month for a second opinion. Thank you

## 2019-03-25 NOTE — TELEPHONE ENCOUNTER
Attempted to contact pt to relay negative urine culture results, keep appointment with  no answer left message.

## 2019-03-26 ENCOUNTER — OFFICE VISIT (OUTPATIENT)
Dept: SURGERY | Facility: CLINIC | Age: 34
End: 2019-03-26
Payer: MEDICAID

## 2019-03-26 VITALS
HEIGHT: 62 IN | BODY MASS INDEX: 35.15 KG/M2 | WEIGHT: 191 LBS | SYSTOLIC BLOOD PRESSURE: 116 MMHG | TEMPERATURE: 99 F | DIASTOLIC BLOOD PRESSURE: 81 MMHG | HEART RATE: 92 BPM

## 2019-03-26 DIAGNOSIS — Z80.41 FAMILY HISTORY OF OVARIAN CANCER: ICD-10-CM

## 2019-03-26 DIAGNOSIS — N64.4 BREAST PAIN: Primary | ICD-10-CM

## 2019-03-26 DIAGNOSIS — Z80.3 FAMILY HISTORY OF BREAST CANCER: ICD-10-CM

## 2019-03-26 DIAGNOSIS — Z91.89 AT HIGH RISK FOR BREAST CANCER: ICD-10-CM

## 2019-03-26 PROBLEM — N39.41 URGE INCONTINENCE OF URINE: Status: ACTIVE | Noted: 2019-03-26

## 2019-03-26 PROCEDURE — 99999 PR PBB SHADOW E&M-EST. PATIENT-LVL III: ICD-10-PCS | Mod: PBBFAC,,, | Performed by: NURSE PRACTITIONER

## 2019-03-26 PROCEDURE — 99213 PR OFFICE/OUTPT VISIT, EST, LEVL III, 20-29 MIN: ICD-10-PCS | Mod: S$PBB,,, | Performed by: NURSE PRACTITIONER

## 2019-03-26 PROCEDURE — 99213 OFFICE O/P EST LOW 20 MIN: CPT | Mod: S$PBB,,, | Performed by: NURSE PRACTITIONER

## 2019-03-26 PROCEDURE — 99999 PR PBB SHADOW E&M-EST. PATIENT-LVL III: CPT | Mod: PBBFAC,,, | Performed by: NURSE PRACTITIONER

## 2019-03-26 PROCEDURE — 99213 OFFICE O/P EST LOW 20 MIN: CPT | Mod: PBBFAC,PO | Performed by: NURSE PRACTITIONER

## 2019-03-27 ENCOUNTER — TELEPHONE (OUTPATIENT)
Dept: UROGYNECOLOGY | Facility: CLINIC | Age: 34
End: 2019-03-27

## 2019-03-27 ENCOUNTER — TELEPHONE (OUTPATIENT)
Dept: SURGERY | Facility: CLINIC | Age: 34
End: 2019-03-27

## 2019-03-27 ENCOUNTER — TELEPHONE (OUTPATIENT)
Dept: OBSTETRICS AND GYNECOLOGY | Facility: CLINIC | Age: 34
End: 2019-03-27

## 2019-03-27 NOTE — TELEPHONE ENCOUNTER
----- Message from Marla Contreras sent at 3/27/2019 11:47 AM CDT -----  Contact: pt   Name of Who is Calling: PAUL ARTHUR [[id number]]    What is the request in detail: Patient is requesting a call back in regards to a letter that was sent for her medication.Please contact to further discuss and advise      Can the clinic reply by [facility]:     What Number to Call Back if not in [facility]: [phone].

## 2019-03-27 NOTE — TELEPHONE ENCOUNTER
Called [facility] [phone] regarding status of patient Genetic Testing . Per [facility] testing is on hold waiting on Medicaid to give approval.

## 2019-03-27 NOTE — TELEPHONE ENCOUNTER
----- Message from April [facility] sent at 3/27/2019 12:10 PM CDT -----  Name of Who is Calling:Self        What is the request in detail: Pt is requesting a call back from clinical staff about scheduling. Pt stated she had a hysterectomy and suffers with her thyroids. Pt stated she was referred by another physician to Dr. Brian. Please contact to further discuss and advise.        Can the clinic reply by [facility]: No      What Number to [facility] Back [facility]: [phone]

## 2019-03-27 NOTE — TELEPHONE ENCOUNTER
Pt called to schedule a survivorship appointment for high risk breast cancer to talk about hormones. Pt scheduled on 5/16

## 2019-03-28 ENCOUNTER — TELEPHONE (OUTPATIENT)
Dept: UROGYNECOLOGY | Facility: CLINIC | Age: 34
End: 2019-03-28

## 2019-03-28 NOTE — TELEPHONE ENCOUNTER
----- Message from Rafael Santiago sent at 3/28/2019 11:11 AM CDT -----  PLEASE CALL PT MOTHER [phone]

## 2019-03-28 NOTE — TELEPHONE ENCOUNTER
Pt's stated medicaid is no longer covering tolterodine and wanted to know what alternative she can take. Informed pt's mother to contact medicaid to see what alternative they will approve and call the office back at [phone] with that information so that MAGDA Gomez can send out another rx to her pharmacy. Pt's mother voiced understanding and call ended.

## 2019-04-10 ENCOUNTER — OFFICE VISIT (OUTPATIENT)
Dept: NEUROLOGY | Facility: CLINIC | Age: 34
End: 2019-04-10
Payer: MEDICAID

## 2019-04-10 VITALS
HEART RATE: 114 BPM | HEIGHT: 62 IN | WEIGHT: 196.19 LBS | BODY MASS INDEX: 36.1 KG/M2 | SYSTOLIC BLOOD PRESSURE: 95 MMHG | DIASTOLIC BLOOD PRESSURE: 60 MMHG

## 2019-04-10 DIAGNOSIS — G44.40 MEDICATION OVERUSE HEADACHE: Primary | ICD-10-CM

## 2019-04-10 PROCEDURE — 99213 OFFICE O/P EST LOW 20 MIN: CPT | Mod: PBBFAC | Performed by: STUDENT IN AN ORGANIZED HEALTH CARE EDUCATION/TRAINING PROGRAM

## 2019-04-10 PROCEDURE — 99212 PR OFFICE/OUTPT VISIT, EST, LEVL II, 10-19 MIN: ICD-10-PCS | Mod: S$PBB,,, | Performed by: STUDENT IN AN ORGANIZED HEALTH CARE EDUCATION/TRAINING PROGRAM

## 2019-04-10 PROCEDURE — 99999 PR PBB SHADOW E&M-EST. PATIENT-LVL III: CPT | Mod: PBBFAC,,, | Performed by: STUDENT IN AN ORGANIZED HEALTH CARE EDUCATION/TRAINING PROGRAM

## 2019-04-10 PROCEDURE — 99212 OFFICE O/P EST SF 10 MIN: CPT | Mod: S$PBB,,, | Performed by: STUDENT IN AN ORGANIZED HEALTH CARE EDUCATION/TRAINING PROGRAM

## 2019-04-10 PROCEDURE — 99999 PR PBB SHADOW E&M-EST. PATIENT-LVL III: ICD-10-PCS | Mod: PBBFAC,,, | Performed by: STUDENT IN AN ORGANIZED HEALTH CARE EDUCATION/TRAINING PROGRAM

## 2019-04-10 RX ORDER — LANOLIN ALCOHOL/MO/W.PET/CERES
400 CREAM (GRAM) TOPICAL 2 TIMES DAILY
Refills: 0 | COMMUNITY
Start: 2019-04-10 | End: 2023-11-14 | Stop reason: SDUPTHER

## 2019-04-10 NOTE — PATIENT INSTRUCTIONS
-STOP ALEVE/ADVIL/TYLENOL COLD TURKEY--will have a bad headache for a few days with this  -Will try compounded cream--should not be more than a $10 co pay.  The company will send it to your pharmacy.  -Starting magnesium 400 mg nightly, can increase to twice daily

## 2019-04-12 NOTE — PROGRESS NOTES
NEUROLOGY OUTPATIENT CLINIC NOTE    Patient Name:  Maria Ines Ac  Patient MRN:  [id number]    Interval History (04/10/19):  Patient is still having daily headaches, however still using NSAIDs and drinking 4 Coke Zero drinks daily as well.  This is the fourth time we have discussed rebound headaches.  Patient and her father seemed to have better understanding with Dr. Echeverria's phrasing that taking the Aleve or Advil sets the patient up for another headache the next day.  We have agreed that patient will try stopping NSAIDs altogether tomorrow and I will call her Sunday to check in on her and ensure she is doing all right with the plan.  Currently taking topiramate, gabapentin regularly.  Will start magnesium daily as well, counseled on side effect of possible diarrhea.  Reinforced good lifestyle habits that patient has--frequent exercise, 8 hours of sleep daily, regular meals with healthy diet, frequent water intake.  Talked at length about caffeine reduction and eliminating daily NSAIDs.  Pt is aware that we cannot give her the medrol dosepak to help her wean off of NSAIDs due to her having diabetes and concern for increasing blood glucose with any steroid.  Headaches have not changed in character from initial visit.     HPI--from initial visit 10/18/18:  Patient is a very pleasant 32 y.o. female with PMHx of HTN, DM II with gastroparesis, obesity, and hypothyroidism who presents to [facility] 10/21/2017 for headaches.  Patient states that she has had headaches since about 03/2017 but they have gottne much worse over past 3-4 months.  She says they start in the middle of her forehead and radiate throughout the head to the occiput.  She describes the pain as sharp.  They are relieved by Aleve after about an hour but will recur, sometimes up to 4 times per day.  She notes associated n/v and rhinorrhea with headaches. She also notes dizziness associated with the headaches.  This is described as room  "spinning and is associated with tinnitus but not with hearing loss, ear fullness.  Denies recent infections, does not have sensation of hearing her pulse in her ear or other intrinsic sounds. Denies photophobia, phonophobia, lacrimation, injection of the eyes with headaches.  Unable to describe any triggers.  States that she had headaches during adolescence as well but they were not this frequent and usually would just go away with OTC NSAIDs.  She is accompanied by her dad who assists with the history and he notes that she drinks several Coke Zero soft drinks during the day, stating "one every hour."  Patient acknowledges drinking several soft drinks per day.  She also does not sleep very well and gets maybe 4 hours of sleep per night with a lot of tossing and turning.  Her father notes cell phone and tablet use before bed.  She does do a lot of walking during the day for exercise which father confirms.  Patient has not had any medication changes recently and was prescribed topiramate back in March 2017 and has not been titrated up--still on 25 mg po qHS which does not seem to be preventing these headaches.    ROS:  General:  No fever, no chills, no fatigue  HEENT:  + headache, no changes in vision  Respiratory:  No cough, no SOB  Cardiovascular:  No chest pain, no palpitations  GI:  No abdominal pain, no n/v/c/d  :  No dysuria, no change in frequency, no hematuria  Skin:  No rashes, no pruritus, no wounds  Musculoskeletal:  No myalgias, no arthralgias  Hematologic:  No easy bruising or bleeding  Neuro:  No tremors, no focal weakness, no paresthesias  Psych:  No anxiety, no depression    PMHx:  Patient Active Problem List   Diagnosis    Abdominal pain, RLQ (right lower quadrant)    Dysphagia    Diabetes mellitus, type II    Constipation, chronic    Right hip pain    New daily persistent headache    Hypertension    Palpitations    Chest pain, non-cardiac    Dyspnea on exertion    Urinary frequency    " Severe obesity (BMI 35.0-39.9) with comorbidity    Muscle spasm    Gastroesophageal reflux disease without esophagitis    Irritable bowel syndrome with diarrhea    Migrainous vertigo    Uncontrolled morning headache    Sleep arousal disorder    Hyperlipidemia    GERD (gastroesophageal reflux disease)    Family history of breast cancer    Family hx of ovarian malignancy    Urge incontinence of urine     PSHx:  Past Surgical History:   Procedure Laterality Date    BLADDER SUSPENSION      CARPAL TUNNEL RELEASE      right    CHOLECYSTECTOMY      COLONOSCOPY N/A 8/30/2016    [facility] (4[facility] F[facility])    COLONOSCOPY N/A 1/9/2013    [facility]bons [facility] at [facility] (4[facility] FLR)    CYSTOSCOPY N/A 1/23/2017    [facility] at [facility]    EGD (ESOPHAGOGASTRODUODENOSCOPY) N/A 1/10/2013    Performed by Darryl Cuello [facility] at [facility] (2ND FLR)    ESOPHAGOGASTRODUODENOSCOPY (EGD) N/A 9/14/2017    Per[facility] at [facility] (4TH FLR)    ESOPHAGOGASTRODUODENOSCOPY (EGD) N/A 8/30/2016    Per[facility] at [facility] (4TH FLR)    ESOPHAGOGASTRODUODENOSCOPY (EGD) N/A 10/23/2014    Per[facility] at [facility] (4TH FLR)    GALLBLADDER SURGERY      HYSTERECTOMY  2013    ron, Bess jacobs, Dr. Ramirez    MANOMETRY, ESOPHAGUS, WITH IMPEDANCE MEASUREMENT N/A 11/5/2018    Per[facility] at [facility] (4TH FLR)    OOPHORECTOMY  2005    [facility]- benign cyst    OOPHORECTOMY  2013    [facility]- endo    ooptherectomy      RETROPUBIC SLING N/A 1/23/2017    [facility]    right knee  scoped    SHOULDER SURGERY  right     Medications:  Current Outpatient Medications on File Prior to Visit   Medication Sig Dispense Refill    ACCU-CHEK AMAURY PLUS TEST STRP Strp USE TO TEST BLOOD GLUCOSE TID  4    ACCU-CHEK SOFTCLIX LANCETS Misc USE TO TEST BLOOD GLUCOSE TID  3     atorvastatin (LIPITOR) 40 MG tablet Take 40 mg by mouth once daily.      BLOOD SUGAR DIAGNOSTIC (ACCU-CHEK AMAURY PLUS TEST STRP MISC) 1 strip by Misc.(Non-Drug; Combo Route) route 3 (three) times daily.      calcium-vitamin D3 500 mg(1,250mg) -200 unit per tablet Take 1 tablet by mouth 2 (two) times daily with meals.      conjugated estrogens (PREMARIN) vaginal cream Use 0.5 gram of estrogen cream in vagina nightly x 2 weeks, then twice a week thereafter. 30 g 4    dicyclomine (BENTYL) 10 MG capsule TAKE 2 CAPSULES(20 MG) BY MOUTH THREE TIMES DAILY BEFORE MEALS 180 capsule 2    dulaglutide (TRULICITY) 0.75 mg/0.5 mL PnIj Inject 0.75 mg into the skin every 7 days.      fish oil-omega-3 fatty acids 300-1,000 mg capsule Take 2 g by mouth once daily.      gabapentin (NEURONTIN) 300 MG capsule Take 1 capsule (300 mg total) by mouth 3 (three) times daily as needed (Take for headache). 90 capsule 11    GLUCOPHAGE 500 mg tablet Take 500 mg by mouth 2 (two) times daily with meals.       ibuprofen (ADVIL,MOTRIN) 800 MG tablet 800 mg every 4 (four) hours as needed.   0    LATUDA 80 mg Tab tablet TK 1 T PO D  2    levothyroxine (SYNTHROID) 50 MCG tablet TK 1 T PO QAM  8    metoprolol succinate (TOPROL-XL) 25 MG 24 hr tablet TAKE 1 TABLET(25 MG) BY MOUTH EVERY DAY 30 tablet 4    mirabegron (MYRBETRIQ) 50 mg Tb24 Take 1 tablet (50 mg total) by mouth once daily. 30 tablet 11    MULTIVIT-IRON-MIN-FOLIC ACID 3,500-18-0.4 UNIT-MG-MG ORAL CHEW Take 1 tablet by mouth once daily.       nitrofurantoin, macrocrystal-monohydrate, (MACROBID) 100 MG capsule Take 1 capsule (100 mg total) by mouth 2 (two) times daily. 14 capsule 0    omeprazole (PRILOSEC OTC) 20 MG tablet Take 20 mg by mouth once daily.      pantoprazole (PROTONIX) 40 MG tablet Take 1 tablet (40 mg total) by mouth once daily. 90 tablet 3    phentermine (ADIPEX-P) 37.5 MG capsule Take 37.5 mg by mouth every morning.      phentermine (ADIPEX-P) 37.5 mg  "tablet TK 1 T PO  QD  3    spironolactone (ALDACTONE) 25 MG tablet TK 1 T PO HS  2    STEGLATRO 15 mg Tab TK 1 T PO  QAM  9    tolterodine (DETROL LA) 4 MG 24 hr capsule Take 1 capsule (4 mg total) by mouth once daily. 30 capsule 11    topiramate (TOPAMAX) 50 MG tablet Take 2 tablets (100 mg total) by mouth once daily. 60 tablet 11    TRULICITY 1.5 mg/0.5 mL PnIj INJECT 1 PEN INTO THE SKIN Q WEEK  5    ZOLOFT 100 mg tablet Take 200 mg by mouth once daily.        No current facility-administered medications on file prior to visit.      Allergies:  Review of patient's allergies indicates:  No Known Allergies    Social Hx:   Patient lives at home with her parents.  Some concern for ID, though no documentation.  She is not working, often plays video games and goes on walks at home during the day.  Her mother recently went through chemotherapy and has some residual health problems, but she and her father (who always accompanies her on visits) are managing well and note that stress levels have been improving since patient's mother finished chemotherapy.  Patient has never used tobacco, EtOH, or illicits.  She is doing exceptionally well with BG control and weight loss of a few pounds since initial visit.    Physical Exam:  BP 95/60   Pulse (!) 114   Ht 5' 2" (1.575 m)   Wt 89 kg (196 lb 3.4 oz)   LMP 11/17/2012 (LMP Unknown)   BMI 35.89 kg/m²   General:  Well-developed, well-nourished, nad  HEENT:  NCAT, PERRL, EOMI, oropharygneal membranes non-erythematous/without exudate  Neck:  Supple, normal ROM without nuchal rigidity  Respiratory:  Symmetric expansion, no increased wob  CVS:  No LE edema. Extremities warm, well-perfused.  GI:  Abd soft, non-distended  Skin:  No visible rashes or wounds  Psych:  Pleasant, cooperative with exam.  Speech and thought content appropriate.  Neurologic Exam:  Mental Status:  AAOx3.  Converses easily.   Cranial Nerves:  PERRLA, EOMI. Facial movement intact, symmetric.  Tongue " protrudes midline, palate raises symmetrically.  Trapezius 5/5 bilaterally.  Motor:  Normal muscle bulk and tone.  Strength 5/5 throughout.  Sensory:  Sensation intact to light touch at all extremities.  Vibratory sensation intact and symmetric at BUE/BLE digits.  Reflexes:  Reflexes 2+--biceps, brachioradialis, patellar.  No ankle clonus.  Coordination:  No resting tremor or myoclonus.  FTN, HTS, LYNN wnl--no ataxia, dysmetria, or dysdiadochokinesia.  Gait:  Appropriate gait, appropriate arm swing.  No shuffling, magnetic gait, imbalance, weakness, or foot drop noted.    Labs:  Results: CBC:   Lab Results   Component Value Date/Time    WBC 6.25 2018 03:58 PM    RBC 4.76 2018 03:58 PM    HGB 13.6 2018 03:58 PM    HCT 42.3 2018 03:58 PM     2018 03:58 PM    MCV 89 2018 03:58 PM    MCH 28.6 2018 03:58 PM    MCHC 32.2 2018 03:58 PM     CMP:   Lab Results   Component Value Date/Time    GLU 94 2019 11:01 AM    CALCIUM 9.6 2019 11:01 AM    ALBUMIN 4.3 2019 11:01 AM    PROT 8.0 2019 11:01 AM     2019 11:01 AM    K 4.2 2019 11:01 AM    CO2 25 2019 11:01 AM     2019 11:01 AM    BUN 10 2019 11:01 AM    CREATININE 1.3 2019 11:01 AM    ALKPHOS 64 2019 11:01 AM    ALT 14 2019 11:01 AM    AST 15 2019 11:01 AM    BILITOT 0.3 2019 11:01 AM     Imagin17 CT head w/o contrast:  No acute intracranial abnormality identified.    Additional Diagnotic Testing:  N/a    ASSESSMENT/PLAN:  Patient is a 34 y.o. female with a PMHx of HTN, DM II with gastroparesis, obesity, and hypothyroidism who presents to [facility] 04/10/19 for follow up of daily frontal headaches.    Problem List Items Addressed This Visit        1 - High    New daily persistent headache    Overview     Originally diagnosed as migraine headache with Dr. Gee.  Now gives hx of headache in forehead that  becomes holocephalic. Medication overuse and caffeine withdrawal headache components suspected with several discussions about not using NSAIDs and cutting down on caffeine.         Current Assessment & Plan     -Counseled again on limiting NSAID use to < 3 days per week        -Will call patient this coming Sunday 4 pm to check up on how she is doing with being off of NSAIDs  -Counseled again on trying to limit caffeine  -Continue gabapentin 300 mg tid   -Will start magnesium 400 mg up to bid, counseled on side effect of possible diarrhea        Mena Lambert MD  Pager:  [phone] [place], [place] [id number] [phone]

## 2019-04-15 ENCOUNTER — TELEPHONE (OUTPATIENT)
Dept: SURGERY | Facility: CLINIC | Age: 34
End: 2019-04-15

## 2019-04-18 ENCOUNTER — TELEPHONE (OUTPATIENT)
Dept: INTERNAL MEDICINE | Facility: CLINIC | Age: 34
End: 2019-04-18

## 2019-04-18 NOTE — TELEPHONE ENCOUNTER
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 So when she bends down it hurts and pops. Offered pt an appointment for same day, but there was none available for her to get to in time and we had only one available for today. So I suggested UC to her since there was no appointments available, she stated she will probably go to the one in [place], because that's where she lives also gave her their phone number to call in case. [phone].    Please advise!

## 2019-04-18 NOTE — TELEPHONE ENCOUNTER
----- Message from Reny Gracia sent at 4/18/2019  2:47 PM CDT -----  Contact: self/[phone]  .1 Patient would like to get medical advice.  Symptoms (please be specific):problem with the back of the neck  How long has patient had these symptoms: a week  [facility] [address] [facility] [place] - [address] AT [facility] [phone] (Phone) [phone] (Fax)  Any drug allergies:no  Comments: Patient would like to get medical advice.

## 2019-04-22 ENCOUNTER — OFFICE VISIT (OUTPATIENT)
Dept: URGENT CARE | Facility: CLINIC | Age: 34
End: 2019-04-22
Payer: MEDICAID

## 2019-04-22 VITALS
OXYGEN SATURATION: 99 % | RESPIRATION RATE: 18 BRPM | BODY MASS INDEX: 35.15 KG/M2 | TEMPERATURE: 98 F | HEIGHT: 62 IN | WEIGHT: 191 LBS | HEART RATE: 84 BPM | DIASTOLIC BLOOD PRESSURE: 84 MMHG | SYSTOLIC BLOOD PRESSURE: 116 MMHG

## 2019-04-22 DIAGNOSIS — S16.1XXA STRAIN OF NECK MUSCLE, INITIAL ENCOUNTER: Primary | ICD-10-CM

## 2019-04-22 PROCEDURE — 99214 OFFICE O/P EST MOD 30 MIN: CPT | Mod: S$GLB,,, | Performed by: PHYSICIAN ASSISTANT

## 2019-04-22 PROCEDURE — 99214 PR OFFICE/OUTPT VISIT, EST, LEVL IV, 30-39 MIN: ICD-10-PCS | Mod: S$GLB,,, | Performed by: PHYSICIAN ASSISTANT

## 2019-04-22 RX ORDER — KETOROLAC TROMETHAMINE 30 MG/ML
30 INJECTION, SOLUTION INTRAMUSCULAR; INTRAVENOUS
Status: COMPLETED | OUTPATIENT
Start: 2019-04-22 | End: 2019-04-22

## 2019-04-22 RX ADMIN — KETOROLAC TROMETHAMINE 30 MG: 30 INJECTION, SOLUTION INTRAMUSCULAR; INTRAVENOUS at 05:04

## 2019-04-22 NOTE — PATIENT INSTRUCTIONS
-Apply heat to area for relief.  -Continue aleve for pain.    Please follow up with your primary care provider within 2-5 days if your signs and symptoms have not resolved or worsen.     If your condition worsens or fails to improve we recommend that you receive another evaluation at the emergency room immediately or contact your primary medical clinic to discuss your concerns.   You must understand that you have received an Urgent Care treatment only and that you may be released before all of your medical problems are known or treated. You, the patient, will arrange for follow up care as instructed.         Neck Sprain or Strain  A sudden force that causes turning or bending of the neck can cause sprain or strain. An example would be the force from a car accident. This can stretch or tear muscles called a strain. It can also stretch or tear ligaments called a sprain. Either of these can cause neck pain. Sometimes neck pain occurs after a simple awkward movement. In either case, muscle spasm is commonly present and contributes to the pain.     Unless you had a forceful physical injury (for example, a car accident or fall), X-rays are usually not ordered for the initial evaluation of neck pain. If pain continues and dose not respond to medical treatment, X-rays and other tests may be performed at a later time.  Home care  · You may feel more soreness and spasm the first few days after the injury. Rest until symptoms begin to improve.  · When lying down, use a comfortable pillow or a rolled towel that supports the head and keeps the spine in a neutral position. The position of the head should not be tilted forward or backward.  · Apply an ice pack over the injured area for 15 to 20 minutes every 3 to 6 hours. You should do this for the first 24 to 48 hours. You can make an ice pack by filling a plastic bag that seals at the top with ice cubes and then wrapping it with a thin towel. After 48 hours, apply heat (warm  shower or warm bath) for 15 to 20 minutes several times a day, or alternate ice and heat.  · You may use over-the-counter pain medicine to control pain, unless another pain medicine was prescribed. If you have chronic liver or kidney disease or ever had a stomach ulcer or GI bleeding, talk with your healthcare provider before using these medicines.  · If a soft cervical collar was prescribed, it should be worn only for periods of increased pain. It should not be worn for more than 3 hours a day, or for a period longer than 1 to 2 weeks.  Follow-up care  Follow up with your healthcare provider as directed. Physical therapy may be needed.  Sometimes fractures dont show up on the first X-ray. Bruises and sprains can sometimes hurt as much as a fracture. These injuries can take time to heal completely. If your symptoms dont improve or they get worse, talk with your healthcare provider. You may need a repeat X-ray or other tests. If X-rays were taken, you will be told of any new findings that may affect your care.  Call 911  Call 911 if you have:  · Neck swelling, difficulty or painful swallowing  · Difficulty breathing  · Chest pain  When to seek medical advice  Call your healthcare provider right away if any of these occur:  · Pain becomes worse or spreads into your arms  · Weakness or numbness in one or both arms  Date Last Reviewed: 11/19/2015  © 9563-4819 [employer]. [address] [place] [address], [place] [address]. All rights reserved. This information is not intended as a substitute for professional medical care. Always follow your healthcare professional's instructions.

## 2019-04-22 NOTE — PROGRESS NOTES
"Subjective:       Patient ID: Maria Ines cA is a 34 y.o. female.    Vitals:  height is 5' 2" (1.575 m) and weight is 86.6 kg (191 lb). Her temperature is 97.8 °F (36.6 °C). Her blood pressure is 116/84 and her pulse is 84. Her respiration is 18 and oxygen saturation is 99%.     Chief Complaint: Neck Pain    Neck Pain    This is a new problem. The current episode started 1 to 4 weeks ago (1 WEEK). The problem occurs constantly. The problem has been gradually worsening. The pain is associated with an unknown factor. The quality of the pain is described as aching. The pain is at a severity of 10/10. The pain is severe. The symptoms are aggravated by bending and twisting. The pain is same all the time. Pertinent negatives include no chest pain, fever, headaches or weakness. She has tried NSAIDs for the symptoms. The treatment provided no relief.       Constitution: Negative for chills, fatigue and fever.   HENT: Negative for congestion and sore throat.    Neck: Positive for neck pain. Negative for painful lymph nodes.   Cardiovascular: Negative for chest pain and leg swelling.   Eyes: Negative for double vision and blurred vision.   Respiratory: Negative for cough and shortness of breath.    Gastrointestinal: Negative for nausea, vomiting and diarrhea.   Genitourinary: Negative for dysuria, frequency, urgency and history of kidney stones.   Musculoskeletal: Negative for joint pain, joint swelling, muscle cramps and muscle ache.   Skin: Negative for color change, pale, rash, erythema and bruising.   Allergic/Immunologic: Negative for seasonal allergies.   Neurological: Negative for dizziness, history of vertigo, light-headedness, passing out and headaches.   Hematologic/Lymphatic: Negative for swollen lymph nodes.   Psychiatric/Behavioral: Negative for nervous/anxious, sleep disturbance and depression. The patient is not nervous/anxious.        Objective:      Physical Exam   Constitutional: She is oriented to " person, place, and time. Vital signs are normal. She appears well-developed and well-nourished. She does not appear ill. No distress.   HENT:   Head: Normocephalic and atraumatic.   Right Ear: External ear normal.   Left Ear: External ear normal.   Nose: Nose normal.   Eyes: Conjunctivae, EOM and lids are normal. Right eye exhibits no discharge. Left eye exhibits no discharge.   Neck: Normal range of motion. Neck supple. Muscular tenderness (bilateral; mild) present. No spinous process tenderness present. No neck rigidity. No edema, no erythema and normal range of motion present.   Cardiovascular: Normal rate, regular rhythm and normal heart sounds. Exam reveals no gallop and no friction rub.   No murmur heard.  Pulmonary/Chest: Effort normal and breath sounds normal. No stridor. No respiratory distress. She has no decreased breath sounds. She has no wheezes. She has no rhonchi. She has no rales.   Musculoskeletal: Normal range of motion.   Neurological: She is alert and oriented to person, place, and time. She has normal strength. No sensory deficit.   Skin: Skin is warm and dry. No bruising and no rash noted. She is not diaphoretic. No erythema. No pallor.   Psychiatric: She has a normal mood and affect. Her behavior is normal.   Nursing note and vitals reviewed.      Assessment:       1. Strain of neck muscle, initial encounter        Plan:         Strain of neck muscle, initial encounter  -     ketorolac injection 30 mg      Patient Instructions   -Apply heat to area for relief.  -Continue aleve for pain.    Please follow up with your primary care provider within 2-5 days if your signs and symptoms have not resolved or worsen.     If your condition worsens or fails to improve we recommend that you receive another evaluation at the emergency room immediately or contact your primary medical clinic to discuss your concerns.   You must understand that you have received an Urgent Care treatment only and that you may  be released before all of your medical problems are known or treated. You, the patient, will arrange for follow up care as instructed.         Neck Sprain or Strain  A sudden force that causes turning or bending of the neck can cause sprain or strain. An example would be the force from a car accident. This can stretch or tear muscles called a strain. It can also stretch or tear ligaments called a sprain. Either of these can cause neck pain. Sometimes neck pain occurs after a simple awkward movement. In either case, muscle spasm is commonly present and contributes to the pain.     Unless you had a forceful physical injury (for example, a car accident or fall), X-rays are usually not ordered for the initial evaluation of neck pain. If pain continues and dose not respond to medical treatment, X-rays and other tests may be performed at a later time.  Home care  · You may feel more soreness and spasm the first few days after the injury. Rest until symptoms begin to improve.  · When lying down, use a comfortable pillow or a rolled towel that supports the head and keeps the spine in a neutral position. The position of the head should not be tilted forward or backward.  · Apply an ice pack over the injured area for 15 to 20 minutes every 3 to 6 hours. You should do this for the first 24 to 48 hours. You can make an ice pack by filling a plastic bag that seals at the top with ice cubes and then wrapping it with a thin towel. After 48 hours, apply heat (warm shower or warm bath) for 15 to 20 minutes several times a day, or alternate ice and heat.  · You may use over-the-counter pain medicine to control pain, unless another pain medicine was prescribed. If you have chronic liver or kidney disease or ever had a stomach ulcer or GI bleeding, talk with your healthcare provider before using these medicines.  · If a soft cervical collar was prescribed, it should be worn only for periods of increased pain. It should not be worn for  more than 3 hours a day, or for a period longer than 1 to 2 weeks.  Follow-up care  Follow up with your healthcare provider as directed. Physical therapy may be needed.  Sometimes fractures dont show up on the first X-ray. Bruises and sprains can sometimes hurt as much as a fracture. These injuries can take time to heal completely. If your symptoms dont improve or they get worse, talk with your healthcare provider. You may need a repeat X-ray or other tests. If X-rays were taken, you will be told of any new findings that may affect your care.  Call 911  Call 911 if you have:  · Neck swelling, difficulty or painful swallowing  · Difficulty breathing  · Chest pain  When to seek medical advice  Call your healthcare provider right away if any of these occur:  · Pain becomes worse or spreads into your arms  · Weakness or numbness in one or both arms  Date Last Reviewed: 11/19/2015  © 6527-6336 [employer]. [address] [place] [address], [place] [address]. All rights reserved. This information is not intended as a substitute for professional medical care. Always follow your healthcare professional's instructions.

## 2019-04-23 ENCOUNTER — TELEPHONE (OUTPATIENT)
Dept: UROGYNECOLOGY | Facility: CLINIC | Age: 34
End: 2019-04-23

## 2019-04-23 ENCOUNTER — OFFICE VISIT (OUTPATIENT)
Dept: SURGERY | Facility: CLINIC | Age: 34
End: 2019-04-23
Payer: MEDICAID

## 2019-04-23 VITALS
HEART RATE: 102 BPM | HEIGHT: 62 IN | SYSTOLIC BLOOD PRESSURE: 109 MMHG | TEMPERATURE: 97 F | BODY MASS INDEX: 35.7 KG/M2 | DIASTOLIC BLOOD PRESSURE: 74 MMHG | WEIGHT: 194 LBS

## 2019-04-23 DIAGNOSIS — Z80.3 FAMILY HISTORY OF BREAST CANCER: ICD-10-CM

## 2019-04-23 DIAGNOSIS — N64.4 BREAST PAIN: Primary | ICD-10-CM

## 2019-04-23 DIAGNOSIS — Z80.41 FAMILY HISTORY OF OVARIAN CANCER: ICD-10-CM

## 2019-04-23 DIAGNOSIS — Z80.0 FAMILY HISTORY OF COLON CANCER: ICD-10-CM

## 2019-04-23 PROCEDURE — 99213 PR OFFICE/OUTPT VISIT, EST, LEVL III, 20-29 MIN: ICD-10-PCS | Mod: S$PBB,,, | Performed by: NURSE PRACTITIONER

## 2019-04-23 PROCEDURE — 99999 PR PBB SHADOW E&M-EST. PATIENT-LVL III: CPT | Mod: PBBFAC,,, | Performed by: NURSE PRACTITIONER

## 2019-04-23 PROCEDURE — 99213 OFFICE O/P EST LOW 20 MIN: CPT | Mod: PBBFAC,PO | Performed by: NURSE PRACTITIONER

## 2019-04-23 PROCEDURE — 99213 OFFICE O/P EST LOW 20 MIN: CPT | Mod: S$PBB,,, | Performed by: NURSE PRACTITIONER

## 2019-04-23 PROCEDURE — 99999 PR PBB SHADOW E&M-EST. PATIENT-LVL III: ICD-10-PCS | Mod: PBBFAC,,, | Performed by: NURSE PRACTITIONER

## 2019-04-23 NOTE — TELEPHONE ENCOUNTER
----- Message from Shahla Christian MA sent at 4/23/2019  4:02 PM CDT -----  Contact: self       ----- Message -----  From: Kerry Cole  Sent: 4/23/2019   3:53 PM  To: [facility] Staff    Patient is calling to schedule a follow up for August. The patient can be reached at [phone].

## 2019-04-23 NOTE — PROGRESS NOTES
"Patient ID: Maria Ines Ac is a 34 y.o. female.    Chief Complaint: breast pain        HPI:  Established patient presents today for breast swelling/pain.    Breast History:      Personal history of breast cancer:  no  Personal history of breast biopsy:  no  Personal history of breast surgery:  no     Breast Imaging    [facility]  [facility] report reviewed:  Breasts almost entirely fatty  BI-RADS 1  TC lifetime risk 26.62%     Interval Breast History     At initial visit with me on 18, pt reported bilateral breast pain, onset 2018, upper regions of both breasts (same area on both breasts), constant, 9/10.  Stated, "I feel like my breasts have both grown since September."  Had tried Aleve without relief.  Was wearing underwire bra.    At visit with me on 3/26/19, pt reported above bilat breast pain was unchanged.  Had still only tried Aleve with no relief.  Was still wearing bras with underwire.     Today, pt reports that above pain is continued and unchanged.  Advil and Aleve do not help the pain.  Still wearing underwire bras.  Pain is bilateral and encompassing entirety of both breasts and is constant.  Breasts (NOT breast skin) are both "swollen," onset 1 week ago.  No redness or warmth to breasts.  No fever or chills.  Patient denies palpable breast mass, breast-related skin changes, nipple discharge and nipple retraction.     (Pt has seen breast surgeon Dr. Child and medical oncologist Dr. Pond.)     GYN History:  Age of menarche:  8 y/o  Uterus and ovaries:  S/p hysterectomy and BSO ("they took everything") at around 30 y/o  Menopause:  yes, surgical   HRT usage:  current user, estrogen-only, has been using for more than 10 yrs       Other Oncologic History:  Personal history of other cancer not abovementioned:  no  Personal history of genetic testing:  no     Social History:  Tobacco use:  denies  Alcohol use:  denies     Family Oncologic " History:     Ashkenazi Rastafarian ancestry:  no    Family History   Problem Relation Age of Onset    Breast cancer Mother 50        alive at 64, unilateral    Esophageal cancer Mother 63    Ovarian cancer Mother 63    Cervical cancer Maternal Grandmother         unknown age of onset,  at 80    Colon cancer Brother 40    Breast cancer Paternal Aunt         unknown age of onset, alive at 70    Breast cancer Maternal Aunt 72        alive at 72     History of genetic testing in relatives:  no      Review of Systems - See HPI.  No unexplained fatigue or recurrent infections.  Objective:   Physical Exam   Pulmonary/Chest: Effort normal. No respiratory distress. She exhibits no mass, no edema and no deformity. Right breast exhibits no inverted nipple, no mass, no nipple discharge, no skin change and no tenderness. Left breast exhibits no inverted nipple, no mass, no nipple discharge, no skin change and no tenderness. No breast swelling. Breasts are symmetrical.   Genitourinary: No breast swelling.   Lymphadenopathy:     She has no cervical adenopathy.     She has no axillary adenopathy.        Right: No supraclavicular adenopathy present.        Left: No supraclavicular adenopathy present.     Assessment:       1. Breast pain    2. Family history of breast cancer    3. Family history of ovarian cancer    4. Family history of colon cancer          Plan:     Bilat, diffuse/non-focal breast pain is not suggestive of pathology given recent normal breast imaging and lack of change in pain since imaging, as well as no abnormal CBE findings.  Breasts not swollen on exam.  Clinically DL today.  Offered pt option of breast MRI now instead of waiting for high-risk screening breast MRI around 19, but pt declined and prefers to wait until 2019 for breast MRI.  Advised pt to RTC sooner than planned with any changes or concerns.    Bilat breast MRI due around 2019 along with next CBE.  Serum creatinine level  needs to be collected within the 4 weeks preceding the breast MRI, but before the day of the breast MRI.    Received notice from [facility] today that [facility] is an out-of-network lab with pt's health plan.  Discussed this with pt today.  She prefers to proceed with [facility] with [facility] testing through [facility].  [facility] is to contact each pt prior to running test if they determine that pt will incur any out-of-pocket expense.  Patient signed informed consent form and test form(s) and picked up test kit for genetic testing.  Test kit and [employer] pedigree will be sent to [facility].  Post-test genetic counseling will be performed by [facility] genetic counselor once results available.    Encouraged breast awareness, including monthly breast self-exams.  Advised patient to RTC with any interval changes or concerns.  Questions were encouraged and answered to patient's satisfaction, and patient verbalized understanding of information and agreement with the plan.

## 2019-04-29 ENCOUNTER — OFFICE VISIT (OUTPATIENT)
Dept: UROLOGY | Facility: CLINIC | Age: 34
End: 2019-04-29
Payer: MEDICAID

## 2019-04-29 VITALS
DIASTOLIC BLOOD PRESSURE: 81 MMHG | SYSTOLIC BLOOD PRESSURE: 107 MMHG | BODY MASS INDEX: 36.02 KG/M2 | WEIGHT: 195.75 LBS | HEIGHT: 62 IN | HEART RATE: 83 BPM

## 2019-04-29 DIAGNOSIS — N39.46 MIXED STRESS AND URGE URINARY INCONTINENCE: Primary | ICD-10-CM

## 2019-04-29 DIAGNOSIS — R35.0 URINARY FREQUENCY: ICD-10-CM

## 2019-04-29 DIAGNOSIS — E11.8 TYPE 2 DIABETES MELLITUS WITH COMPLICATION, WITHOUT LONG-TERM CURRENT USE OF INSULIN: ICD-10-CM

## 2019-04-29 PROCEDURE — 99213 OFFICE O/P EST LOW 20 MIN: CPT | Mod: PBBFAC,25 | Performed by: UROLOGY

## 2019-04-29 PROCEDURE — 51701 INSERT BLADDER CATHETER: CPT | Mod: S$PBB,,, | Performed by: UROLOGY

## 2019-04-29 PROCEDURE — 99999 PR PBB SHADOW E&M-EST. PATIENT-LVL III: ICD-10-PCS | Mod: PBBFAC,,, | Performed by: UROLOGY

## 2019-04-29 PROCEDURE — 87088 URINE BACTERIA CULTURE: CPT

## 2019-04-29 PROCEDURE — 87186 SC STD MICRODIL/AGAR DIL: CPT

## 2019-04-29 PROCEDURE — 87086 URINE CULTURE/COLONY COUNT: CPT

## 2019-04-29 PROCEDURE — 81002 URINALYSIS NONAUTO W/O SCOPE: CPT | Mod: PBBFAC | Performed by: UROLOGY

## 2019-04-29 PROCEDURE — 99215 OFFICE O/P EST HI 40 MIN: CPT | Mod: S$PBB,25,, | Performed by: UROLOGY

## 2019-04-29 PROCEDURE — 51701 INSERT BLADDER CATHETER: CPT | Mod: PBBFAC | Performed by: UROLOGY

## 2019-04-29 PROCEDURE — 99999 PR PBB SHADOW E&M-EST. PATIENT-LVL III: CPT | Mod: PBBFAC,,, | Performed by: UROLOGY

## 2019-04-29 PROCEDURE — 51701 PR INSERTION OF NON-INDWELLING BLADDER CATHETERIZATION FOR RESIDUAL UR: ICD-10-PCS | Mod: S$PBB,,, | Performed by: UROLOGY

## 2019-04-29 PROCEDURE — 99215 PR OFFICE/OUTPT VISIT, EST, LEVL V, 40-54 MIN: ICD-10-PCS | Mod: S$PBB,25,, | Performed by: UROLOGY

## 2019-04-29 PROCEDURE — 87077 CULTURE AEROBIC IDENTIFY: CPT

## 2019-04-29 NOTE — PATIENT INSTRUCTIONS
Urodynamics Studies     The bladder holds urine until it leaves the body through the urethra.     Urodynamics studies are a series of tests that give your doctor a close look at the working of your bladder and urethra. The tests can help your doctor learn about any problems storing urine or voiding (eliminating) urine from your body.  Understanding the lower urinary tract  The lower part of the urinary tract has several parts.  · The bladder stores urine until youre ready to release it.  · The urethra is the tube that carries urine from the bladder out of the body.  · The sphincter is made up of muscles around the opening of the bladder. The sphincter muscles tighten to hold urine in the bladder. They relax to let urine flow. Signals from the brain tell the sphincter when to tighten and relax. These signals also tell the bladder when to contract to let urine flow out of the body.  Why you need a urodynamics study  This test may be ordered if you:  · Are incontinent (leak urine)  · Have a bladder that does not empty all the way.  · Have symptoms such as the need to urinate often or a constant strong need to urinate  · Have intermittent or weak urine stream  · Have persistent urinary tract infections  Preparing for the study  · Tell your doctor about any medicine youre taking. Ask if you should stop them before the study.  · Keep a diary of your bathroom habits. Do this for a few days before the study. This diary can be a helpful part of the evaluation.  · Ask if you need to arrive for the study with a full bladder.  Date Last Reviewed: 1/1/2017  © 7819-0844 [employer]. [address] [place] [address], [place] [address]. All rights reserved. This information is not intended as a substitute for professional medical care. Always follow your healthcare professional's instructions.        Urodynamic Studies     The equipment used for the study varies depending upon the facility and what tests are done.      Urodynamic studies may be done in your doctors office, a clinic, or a hospital. The studies may take up to an hour or more. This depends on which tests your doctor does. The tests are generally painless. You wont need sedating medicine.  Tests that may be done  Uroflowmetry. This measures the amount and speed of urine you void from your bladder. You urinate into a funnel. Its attached to a computer that records your urine flow over time. The amount of urine left in your bladder after you void may also be measured right after this test.  Cystometry. This test evaluates how much your bladder can hold. It also measures how strong your bladder muscle is and how well the signals work that tell you when your bladder is full. Your healthcare provider fills your bladder with sterile water or saline solution, through a catheter. Your doctor will instruct you to report any sensations you feel. Mention if theyre similar to symptoms youve felt at home. Your doctor may ask you to cough, stand and walk, or bear down during this test.  Electromyogram. This helps evaluate the muscle contractions that control urination, such as sphincter muscle contractions. Your healthcare provider may place electrode patches or wires near your rectum or urethra to make the recording. He or she may ask you to try to tighten or relax your sphincter muscles during this test.  Pressure flow study. This test measures your detrusor, urethral, and abdominal pressures. Detrusor is the muscle surrounding the bladder walls that relaxes to allow your bladder to fill, and and contracts to squeeze out urine. A pressure flow study is often done after cystometry. Youre asked to urinate while a probe in your urethra measures pressures.  Video cystourethrography. This takes video pictures of urine flow through your urinary tract. It can help identify blockages or other problems. The bladder is filled with an X-ray contrast fluid. Then X-ray video  pictures are taken as the fluid is urinated out. Ultrasound imaging may also be combined with routine urodynamic studies.  Ambulatory urodynamics. This test can be used to evaluate you while doing usual activities.  Getting your results  After the study, youll get dressed and return to the consultation room. Test results may be ready soon after the study is finished. Or, you may return to your doctors office in a few days for your results. Your doctor can talk with you about the study report and your options.   Date Last Reviewed: 1/1/2017  © 8970-8835 [employer]. [address] [place] [address]. All rights reserved. This information is not intended as a substitute for professional medical care. Always follow your healthcare professional's instructions.

## 2019-04-29 NOTE — PROGRESS NOTES
CHIEF COMPLAINT:    Mrs. Ac is a 34 y.o. female presenting for a consultation at the request of Dr. Mena Ventura. Patient presents with mixed incontinence after sling concerned for Llamas's syndrome.    PRESENTING ILLNESS:    Maria Ines Ac is a 34 y.o. female who presents with her mother.  She states she has stress incontinence uses 4 pads a day.  Duration at least 2 years.  She is status post retropubic sling with Dr. Crawford in 1/23/2017.  She also has dysuria for the past month.  And treated with macrobid.  Patient is a poor historian and her mother has her own health issues and cannot recall when her symptoms started.      Hysterectomy BSO, endometriosis,    REVIEW OF SYSTEMS:    Review of Systems   Constitutional: Negative.    HENT: Negative.    Eyes: Negative.    Respiratory: Negative.    Cardiovascular: Negative.    Gastrointestinal: Negative.    Genitourinary: Positive for urgency.        Mixed incontinence   Musculoskeletal: Negative.    Skin: Negative.    Neurological: Negative.    Endo/Heme/Allergies: Negative.    Psychiatric/Behavioral:        Mentally challenged       PATIENT HISTORY:    Past Medical History:   Diagnosis Date    Blood in stool     Constipation     Depression     Diabetes mellitus, type 2     Dysphagia     GERD (gastroesophageal reflux disease)     Hypertension     Palpitations     Premature menopause on hormone replacement therapy     Thyroid disease        Past Surgical History:   Procedure Laterality Date    BLADDER SUSPENSION      CARPAL TUNNEL RELEASE      right    CHOLECYSTECTOMY      COLONOSCOPY N/A 8/30/2016    [facility]formed by [facility]on [facility] at [facility]O (4TH FLR)    COLONOSCOPY N/A 1/9/2013    Performed by Gabriele Gibbons MD at [facility] ENDO (4TH FLR)    CYSTOSCOPY N/A 1/23/2017    [facility] at [facility]    EGD (ESOPHAGOGASTRODUODENOSCOPY) N/A 1/10/2013    Performed by Darryl Cuello MD at [facility] ENDO (2ND FLR)     ESOPHAGOGASTRODUODENOSCOPY (EGD) N/A 2017    [facility] by [facility] at [facility] (4[facility] FLR)    ESOPHAGOGASTRODUODENOSCOPY (EGD) N/A 2016    Performed by [facility] at [facility] ENDO (4TH FLR)    ESOPHAGOGASTRODUODENOSCOPY (EGD) N/A 10/23/2014    Performed by [facility]lick [facility] at [facility] ENDO (4TH FLR)    GALLBLADDER SURGERY      HYSTERECTOMY      Bess velasquez Dr. Champlain    MANOMETRY, ESOPHAGUS, WITH IMPEDANCE MEASUREMENT N/A 2018    Per[facility] by [facility] at [facility] ENDO (4TH FLR)    OOPHORECTOMY      LSO- benign cyst    OOPHORECTOMY      RSO- endo    ooptherectomy      RETROPUBIC SLING N/A 2017    [facility] at [facility]    right knee  scoped    SHOULDER SURGERY  right       Family History   Problem Relation Age of Onset    Breast cancer Mother 50        alive at 64, unilateral    Esophageal cancer Mother 63    Ovarian cancer Mother 63    Cervical cancer Maternal Grandmother         unknown age of onset,  at 80    Colon cancer Brother 40    Breast cancer Paternal Aunt         unknown age of onset, alive at 70    Breast cancer Maternal Aunt 72        alive at 72     Socioeconomic History    Marital status: Single   Tobacco Use    Smoking status: Never Smoker    Smokeless tobacco: Never Used   Substance and Sexual Activity    Alcohol use: No    Drug use: No    Sexual activity: Never     Birth control/protection: None       Allergies:  Patient has no known allergies.    Medications:  Outpatient Encounter Medications as of 2019   Medication Sig Dispense Refill    ACCU-CHEK AMAURY PLUS TEST STRP Strp USE TO TEST BLOOD GLUCOSE TID  4    ACCU-CHEK SOFTCLIX LANCETS Misc USE TO TEST BLOOD GLUCOSE TID  3    atorvastatin (LIPITOR) 40 MG tablet Take 40 mg by mouth once daily.      BLOOD SUGAR DIAGNOSTIC (ACCU-CHEK AMAURY PLUS TEST STRP MISC) 1 strip by Misc.(Non-Drug; Combo Route) route 3 (three) times daily.       calcium-vitamin D3 500 mg(1,250mg) -200 unit per tablet Take 1 tablet by mouth 2 (two) times daily with meals.      conjugated estrogens (PREMARIN) vaginal cream Use 0.5 gram of estrogen cream in vagina nightly x 2 weeks, then twice a week thereafter. 30 g 4    dicyclomine (BENTYL) 10 MG capsule TAKE 2 CAPSULES(20 MG) BY MOUTH THREE TIMES DAILY BEFORE MEALS 180 capsule 2    dulaglutide (TRULICITY) 0.75 mg/0.5 mL PnIj Inject 0.75 mg into the skin every 7 days.      fish oil-omega-3 fatty acids 300-1,000 mg capsule Take 2 g by mouth once daily.      gabapentin (NEURONTIN) 300 MG capsule Take 1 capsule (300 mg total) by mouth 3 (three) times daily as needed (Take for headache). 90 capsule 11    GLUCOPHAGE 500 mg tablet Take 500 mg by mouth 2 (two) times daily with meals.       ibuprofen (ADVIL,MOTRIN) 800 MG tablet 800 mg every 4 (four) hours as needed.   0    LATUDA 80 mg Tab tablet TK 1 T PO D  2    levothyroxine (SYNTHROID) 50 MCG tablet TK 1 T PO QAM  8    magnesium oxide (MAG-OX) 400 mg (241.3 mg magnesium) tablet Take 1 tablet (400 mg total) by mouth 2 (two) times daily.  0    metoprolol succinate (TOPROL-XL) 25 MG 24 hr tablet TAKE 1 TABLET(25 MG) BY MOUTH EVERY DAY 30 tablet 4    mirabegron (MYRBETRIQ) 50 mg Tb24 Take 1 tablet (50 mg total) by mouth once daily. 30 tablet 11    MULTIVIT-IRON-MIN-FOLIC ACID 3,500-18-0.4 UNIT-MG-MG ORAL CHEW Take 1 tablet by mouth once daily.       nitrofurantoin, macrocrystal-monohydrate, (MACROBID) 100 MG capsule Take 1 capsule (100 mg total) by mouth 2 (two) times daily. 14 capsule 0    omeprazole (PRILOSEC OTC) 20 MG tablet Take 20 mg by mouth once daily.      pantoprazole (PROTONIX) 40 MG tablet Take 1 tablet (40 mg total) by mouth once daily. 90 tablet 3    phentermine (ADIPEX-P) 37.5 MG capsule Take 37.5 mg by mouth every morning.      phentermine (ADIPEX-P) 37.5 mg tablet TK 1 T PO  QD  3    spironolactone (ALDACTONE) 25 MG tablet TK 1 T PO HS  2     STEGLATRO 15 mg Tab TK 1 T PO  QAM  9    tolterodine (DETROL LA) 4 MG 24 hr capsule Take 1 capsule (4 mg total) by mouth once daily. 30 capsule 11    topiramate (TOPAMAX) 50 MG tablet Take 2 tablets (100 mg total) by mouth once daily. 60 tablet 11    TRULICITY 1.5 mg/0.5 mL PnIj INJECT 1 PEN INTO THE SKIN Q WEEK  5    ZOLOFT 100 mg tablet Take 200 mg by mouth once daily.        No facility-administered encounter medications on file as of 4/29/2019.          PHYSICAL EXAMINATION:    The patient generally appears in good health, is appropriately interactive, and is in no apparent distress.    Skin: No lesions.    Mental: Cooperative with normal affect.    Neuro: Grossly intact.    HEENT: Normal. No evidence of lymphadenopathy.    Chest:  normal inspiratory effort.    Abdomen:  Soft, non-tender. No masses or organomegaly. Bladder is not palpable. No evidence of flank discomfort. No evidence of inguinal hernia.    Extremities: No clubbing, cyanosis, or edema    Normal external female genitalia  Urethral meatus is normal  Urethra and bladder are nontender to bimanual exam  Well supported anteriorly and posteriorly   Uterus and cervix are normal  No adnexal masses  PVR by catheterization was 70 ml  Urethral mobility was -20 to positive 30 degrees.     LABS:    Lab Results   Component Value Date    BUN 10 01/17/2019    CREATININE 1.3 01/17/2019     UA 1.005, pH 5, + leuk, tr protein, 1000 glucose, otherwise, negative    IMPRESSION:    Encounter Diagnoses   Name Primary?    Mixed stress and urge urinary incontinence Yes    Type 2 diabetes mellitus with complication, without long-term current use of insulin        PLAN:    1.  The catheterized specimen was sent for culture  2.  FUDS      Copy to: Dr. Ventura

## 2019-04-29 NOTE — LETTER
April 29, 2019      Ninoska Ventura, DO  [address]  [facility] [address]           [facility] - Urology 4th Floor  [address]  [place] [address]  Phone: [phone]          Patient: Maria Ines Ac   MR Number: [id number]   YOB: 1985   Date of Visit: 4/29/2019       Dear Dr. Ninoska Ventura:    Thank you for referring Maria Ines Ac to me for evaluation. Attached you will find relevant portions of my assessment and plan of care.    If you have questions, please do not hesitate to call me. I look forward to following Maria Ines Ac along with you.    Sincerely,    Zena Tee MD    Enclosure  CC:  No Recipients    If you would like to receive this communication electronically, please contact [email or web address] or [phone] to request more information on "[employer] Link access.    For providers and/or their staff who would like to refer a patient to [facility], please contact us through our one-stop-shop provider referral line, [facility], at [phone].    If you feel you have received this communication in error or would no longer like to receive these types of communications, please e-mail [email or web address]

## 2019-04-30 ENCOUNTER — TELEPHONE (OUTPATIENT)
Dept: UROGYNECOLOGY | Facility: CLINIC | Age: 34
End: 2019-04-30

## 2019-04-30 ENCOUNTER — TELEPHONE (OUTPATIENT)
Dept: UROLOGY | Facility: CLINIC | Age: 34
End: 2019-04-30

## 2019-04-30 DIAGNOSIS — N39.46 MIXED STRESS AND URGE URINARY INCONTINENCE: Primary | ICD-10-CM

## 2019-04-30 DIAGNOSIS — R35.0 URINARY FREQUENCY: ICD-10-CM

## 2019-04-30 NOTE — TELEPHONE ENCOUNTER
Spoke with pt's mom and informed her I will check the faxes on tomorrow and once the prior authorization form is completed she'll be notified with the decision. Pt's mom voiced understanding and call ended.

## 2019-04-30 NOTE — TELEPHONE ENCOUNTER
----- Message from Tona Garduno sent at 4/30/2019  1:55 PM CDT -----  Contact: Patient   Patient called with a request for Premarin. The patient has exhausted her refill for this prescription and the pharmacy notified the patient stating they been trying to reach the provider to complete a prior authorization and hasn't been successful. The patient is having hot flashes really bad and would like if this prescription refill is approved so that she can begin taking them as soon as possible. The patient can be reached at [phone].

## 2019-05-01 ENCOUNTER — HOSPITAL ENCOUNTER (EMERGENCY)
Facility: HOSPITAL | Age: 34
Discharge: HOME | End: 2019-05-01
Attending: EMERGENCY MEDICINE
Payer: MEDICAID

## 2019-05-01 VITALS
HEART RATE: 62 BPM | OXYGEN SATURATION: 100 % | WEIGHT: 191 LBS | BODY MASS INDEX: 34.93 KG/M2 | RESPIRATION RATE: 16 BRPM | SYSTOLIC BLOOD PRESSURE: 107 MMHG | DIASTOLIC BLOOD PRESSURE: 71 MMHG | TEMPERATURE: 99 F

## 2019-05-01 DIAGNOSIS — N95.2 VAGINAL ATROPHY: ICD-10-CM

## 2019-05-01 DIAGNOSIS — M54.2 NECK PAIN: ICD-10-CM

## 2019-05-01 DIAGNOSIS — M48.02 FORAMINAL STENOSIS OF CERVICAL REGION: Primary | ICD-10-CM

## 2019-05-01 PROCEDURE — 99284 EMERGENCY DEPT VISIT MOD MDM: CPT | Mod: ,,, | Performed by: PHYSICIAN ASSISTANT

## 2019-05-01 PROCEDURE — 25000003 PHARM REV CODE 250: Performed by: PHYSICIAN ASSISTANT

## 2019-05-01 PROCEDURE — 99284 PR EMERGENCY DEPT VISIT,LEVEL IV: ICD-10-PCS | Mod: ,,, | Performed by: PHYSICIAN ASSISTANT

## 2019-05-01 PROCEDURE — 99283 EMERGENCY DEPT VISIT LOW MDM: CPT

## 2019-05-01 RX ORDER — ACETAMINOPHEN 500 MG
1000 TABLET ORAL
Status: COMPLETED | OUTPATIENT
Start: 2019-05-01 | End: 2019-05-01

## 2019-05-01 RX ORDER — MAG HYDROX/ALUMINUM HYD/SIMETH 200-200-20
15 SUSPENSION, ORAL (FINAL DOSE FORM) ORAL
Status: COMPLETED | OUTPATIENT
Start: 2019-05-01 | End: 2019-05-01

## 2019-05-01 RX ORDER — DICLOFENAC SODIUM 10 MG/G
2 GEL TOPICAL 3 TIMES DAILY PRN
Qty: 100 G | Refills: 0 | Status: SHIPPED | OUTPATIENT
Start: 2019-05-01 | End: 2019-09-12

## 2019-05-01 RX ORDER — LIDOCAINE 50 MG/G
1 PATCH TOPICAL
Status: DISCONTINUED | OUTPATIENT
Start: 2019-05-01 | End: 2019-05-01 | Stop reason: HOSPADM

## 2019-05-01 RX ADMIN — ACETAMINOPHEN 1000 MG: 500 TABLET ORAL at 06:05

## 2019-05-01 RX ADMIN — LIDOCAINE 1 PATCH: 50 PATCH TOPICAL at 06:05

## 2019-05-01 RX ADMIN — ALUMINUM HYDROXIDE, MAGNESIUM HYDROXIDE, AND SIMETHICONE 15 ML: 200; 200; 20 SUSPENSION ORAL at 08:05

## 2019-05-01 NOTE — ED NOTES
Patient identifiers verified and correct for Ms Ac  C/C: Neck and back pain x 9 days SEE NN  APPEARANCE: awake and alert in NAD.  SKIN: warm, dry and intact. No breakdown or bruising.  MUSCULOSKELETAL: Patient moving all extremities spontaneously, no obvious swelling or deformities noted. Ambulates independently.  RESPIRATORY: Denies shortness of breath.Respirations unlabored.   CARDIAC: Denies CP, 2+ distal pulses; no peripheral edema  ABDOMEN: S/ND/NT, Denies nausea  : voids spontaneously, denies difficulty  Neurologic: AAO x 4; follows commands equal strength in all extremities; denies numbness/tingling. Denies dizziness States neck and back pain

## 2019-05-01 NOTE — ED PROVIDER NOTES
"Encounter Date: 5/1/2019       History     Chief Complaint   Patient presents with    Back Pain     denies fall or injury    Neck Pain     Ms Ac is a 34yoF who presents for continued neck pain; pertinent PMHx GERD, DM 2, Hx headaches. Patient presents with mother. Patient has been seen by two providers in the past two days for cervical neck pain. She reports helping her mother move when she felt a "pop" in her neck and now hears crepitus with neck movement. Pain is present to midline C spine. She also endorses onset of neck right 5th digit numbness several days after this occurred. Numbness is present globally to R 5th digit and medial aspect of right hand. Unrelieved with naprosyn, heating pad or stretching. Pain has not increased though is constant since onset.   Denies fever/chills, vision changes, current headache, N/V, RUE weakness, other trauma.    The patients available PMH, PSH, Social History, medications, allergies, and triage vital signs were reviewed just prior to their medical evaluation.  A ten point review of systems was completed and is negative except as documented above.  Patient denies any other acute medical complaint.          Review of patient's allergies indicates:  No Known Allergies  Past Medical History:   Diagnosis Date    Blood in stool     Constipation     Cystitis     Depression     Diabetes mellitus, type 2     Dysphagia     GERD (gastroesophageal reflux disease)     Headache     Hypertension     Palpitations     Premature menopause on hormone replacement therapy     Thyroid disease      Past Surgical History:   Procedure Laterality Date    BLADDER SUSPENSION      CARPAL TUNNEL RELEASE      right    CHOLECYSTECTOMY      COLONOSCOPY N/A 8/30/2016    Performed by [facility] at [facility] ENDO (4TH FLR)    COLONOSCOPY N/A 1/9/2013    Performed by [facility]le Gibbons MD at [facility] ENDO (4TH FLR)    CYSTOSCOPY N/A 1/23/2017    Performed by Ninoska Ventura DO at " [facility] OR    EGD (ESOPHAGOGASTRODUODENOSCOPY) N/A 1/10/2013    Performed by [facility] MD at [facility] ENDO (2ND FLR)    ESOPHAGOGASTRODUODENOSCOPY (EGD) N/A 2017    Performed by [facility] MD at [facility] ENDO (4TH FLR)    ESOPHAGOGASTRODUODENOSCOPY (EGD) N/A 2016    Performed by [facility] MD at [facility] ENDO (4TH FLR)    ESOPHAGOGASTRODUODENOSCOPY (EGD) N/A 10/23/2014    Performed by [facility]lick [facility] MD at [facility] ENDO (4TH FLR)    GALLBLADDER SURGERY      HYSTERECTOMY      endo, Bess jacobs, Dr. Ramirez    MANOMETRY, ESOPHAGUS, WITH IMPEDANCE MEASUREMENT N/A 2018    Performed by [facility] MD at [facility] ENDO (4TH FLR)    OOPHORECTOMY      LSO- benign cyst    OOPHORECTOMY      RSO- endo    ooptherectomy      RETROPUBIC SLING N/A 2017    Performed by [facility]ka [facility] DO at [facility] OR    right knee  scoped    SHOULDER SURGERY  right     Family History   Problem Relation Age of Onset    Breast cancer Mother 50        alive at 64, unilateral    Esophageal cancer Mother 63    Ovarian cancer Mother 63    Cervical cancer Maternal Grandmother         unknown age of onset,  at 80    Colon cancer Brother 40    Breast cancer Paternal Aunt         unknown age of onset, alive at 70    Breast cancer Maternal Aunt 72        alive at 72    Vaginal cancer Neg Hx     Endometrial cancer Neg Hx     Crohn's disease Neg Hx     Ulcerative colitis Neg Hx     Stomach cancer Neg Hx     Irritable bowel syndrome Neg Hx     Celiac disease Neg Hx      Social History     Tobacco Use    Smoking status: Never Smoker    Smokeless tobacco: Never Used   Substance Use Topics    Alcohol use: No    Drug use: No     Review of Systems   Constitutional: Negative for chills and fever.   Eyes: Negative for visual disturbance.   Respiratory: Negative for shortness of breath.    Cardiovascular: Negative for chest pain.   Gastrointestinal: Negative for abdominal pain, nausea and vomiting.    Musculoskeletal: Positive for neck pain. Negative for neck stiffness.   Skin: Negative for pallor, rash and wound.   Neurological: Positive for numbness. Negative for dizziness, weakness and headaches.   Psychiatric/Behavioral: Negative for agitation and confusion.       Physical Exam     Initial Vitals [05/01/19 1609]   BP Pulse Resp Temp SpO2   106/62 102 20 97.7 °F (36.5 °C) 99 %      MAP       --         Physical Exam    Vitals reviewed.  Constitutional: She appears well-developed and well-nourished. She is not diaphoretic. No distress.   Well-appearing female in NAD, VSS, afebrile, 99% on RA.     HENT:   Head: Normocephalic and atraumatic.   Right Ear: External ear normal.   Left Ear: External ear normal.   Nose: Nose normal.   Mouth/Throat: Oropharynx is clear and moist.   Eyes: Conjunctivae and EOM are normal. Pupils are equal, round, and reactive to light. No scleral icterus.   Neck: Normal range of motion. No JVD present.   Axial loading: worsening pain to midline C spine and paresthesias down posterior RUE  TTP over lower C spinous process   Cardiovascular: Normal rate, regular rhythm, normal heart sounds and intact distal pulses.   Pulmonary/Chest: Breath sounds normal.   Abdominal: Soft. There is no tenderness.   Musculoskeletal: She exhibits tenderness. She exhibits no edema.   Lymphadenopathy:     She has no cervical adenopathy.   Neurological: She is alert and oriented to person, place, and time. She has normal strength. A sensory deficit is present. No cranial nerve deficit.   Numbness to right 5th digit and medial aspect of right hand  Remainder of RUE neuro exam unremarkable, LUE unremarkable   Skin: Skin is warm and dry. Capillary refill takes less than 2 seconds. No rash noted. No erythema. No pallor.   No overlying skin changes, good cap refill. Radial pulse intact   Psychiatric: She has a normal mood and affect. Her behavior is normal. Judgment and thought content normal.         ED Course    Procedures  Labs Reviewed - No data to display       Imaging Results          MRI Cervical Spine Without Contrast (Final result)  Result time 05/01/19 19:03:49    Final result by Ivan Patton MD (05/01/19 19:03:49)                 Impression:      Mild cervical spondylosis resulting in mild right neural foraminal narrowing at C4-C5 and C5-C6.  The findings are mildly progressed from the prior exam.    Electronically signed by resident: Enedelia Woody  Date:    05/01/2019  Time:    18:34    Electronically signed by: Ivan Patton MD  Date:    05/01/2019  Time:    19:03             Narrative:    EXAMINATION:  MRI CERVICAL SPINE WITHOUT CONTRAST    CLINICAL HISTORY:  Neck pain, prior xray, abn neuro exam;midline TTP with new right 5th digit numbness, 2 weeks of strain symptoms;    TECHNIQUE:  Multiplanar, multisequence MR images of the cervical spine were performed without the administration of contrast.    COMPARISON:  MRI cervical spine 08/24/2011    FINDINGS:  Alignment: The cervical alignment is unremarkable.    Vertebrae: Normal marrow signal. No fracture.  The ligaments are within normal limits.    Discs: Normal height and signal.    Cord: Normal.    Skull base and craniocervical junction: Normal.    Degenerative findings:    Small central posterior disc osteophyte complex and right uncovertebral spurring at C4-C5 resulting in mild right neural foraminal narrowing without significant spinal canal stenosis.Posterior disc osteophyte complex and right uncovertebral spurring at C5-C6 resulting in mild right neural foraminal narrowing without significant spinal canal stenosis.  The remainder of the cervical spine is without significant degenerative change.    Paraspinal muscles & soft tissues: Unremarkable.                                 Medical Decision Making:   History:   Old Medical Records: I decided to obtain old medical records.  Old Records Summarized: records from clinic visits and records from previous  "admission(s).  Initial Assessment:   Patient presents for continued neck pain x 3 weeks after hearing "pop" from straining. +numbness to right 5th digit, no weakness. +pain with axial loading VSS, afebrile.   Differential Diagnosis:   DDx includes cervical stenosis, radiculopathy, OA. Physical exam and history taking lower clinical suspicion for spinal fracture, myelopathy/central canal stenosis, meningitis, electrolyte disturbance.   Clinical Tests:   Radiological Study: Ordered and Reviewed  ED Management:  Given tylenol and lidocaine patch. MRI shows "Mild cervical spondylosis resulting in mild right neural foraminal narrowing at C4-C5 and C5-C6.  The findings are mildly progressed from the prior exam". No evidence for cord compression.   Given voltaren gel Rx and recommended PT eval via referral from PCP. Patient agreed to plan of care and voiced understanding. Discharged in stable condition with strict ED return precautions.    Florencia Diamond PA-C  05/01/2019    I discussed the following case, diagnosis and plan of care with attending physician.                        Clinical Impression:       ICD-10-CM ICD-9-CM   1. Foraminal stenosis of cervical region M99.81 723.0   2. Neck pain M54.2 723.1         Disposition:   Disposition: Discharged  Condition: Stable                        Florencia Diamond PA-C  05/01/19 2024    "

## 2019-05-01 NOTE — ED TRIAGE NOTES
Patient states neck pain and mid back pain onset last Monday, taking Aleve (2) daily. Deneis injury.  Went to Urgent Care for same.

## 2019-05-02 LAB — BACTERIA UR CULT: NORMAL

## 2019-05-02 NOTE — DISCHARGE INSTRUCTIONS
Ask primary provider about physical therapy for nerve compression in neck. Return to the ED immediately if you develop worsening numbness, weakness or fever.    Our goal in the emergency department is to always give you outstanding care and exceptional service. You may receive a survey by mail or e-mail in the next week regarding your experience in our ED. We would greatly appreciate your completing and returning the survey. Your feedback provides us with a way to recognize our staff who give very good care and it helps us learn how to improve when your experience was below our aspiration of excellence.

## 2019-05-03 ENCOUNTER — TELEPHONE (OUTPATIENT)
Dept: UROLOGY | Facility: CLINIC | Age: 34
End: 2019-05-03

## 2019-05-06 ENCOUNTER — TELEPHONE (OUTPATIENT)
Dept: UROLOGY | Facility: CLINIC | Age: 34
End: 2019-05-06

## 2019-05-06 ENCOUNTER — TELEPHONE (OUTPATIENT)
Dept: UROGYNECOLOGY | Facility: CLINIC | Age: 34
End: 2019-05-06

## 2019-05-06 DIAGNOSIS — N30.90 BLADDER INFECTION: Primary | ICD-10-CM

## 2019-05-06 RX ORDER — SULFAMETHOXAZOLE AND TRIMETHOPRIM 800; 160 MG/1; MG/1
1 TABLET ORAL 2 TIMES DAILY
Qty: 14 TABLET | Refills: 0 | Status: SHIPPED | OUTPATIENT
Start: 2019-05-06 | End: 2019-05-13

## 2019-05-06 NOTE — TELEPHONE ENCOUNTER
----- Message from Rafael Santiago sent at 5/6/2019  4:38 PM CDT -----  Please call pt she needs to schedule a follow up appt [phone]

## 2019-05-06 NOTE — TELEPHONE ENCOUNTER
Spoke with pt and schedule a follow up appointment, appointment letter placed in the mail, pt voiced understanding and call was ended.

## 2019-05-07 ENCOUNTER — TELEPHONE (OUTPATIENT)
Dept: PODIATRY | Facility: CLINIC | Age: 34
End: 2019-05-07

## 2019-05-07 NOTE — TELEPHONE ENCOUNTER
----- Message from Levon Michel sent at 5/7/2019 12:45 PM CDT -----  Contact: self  Needs Advice    Reason for call: Pt ask for a call to schedule her f/u in Aug        Communication Preference: Phone     Additional Information: No solutions on or before 11/30/2019

## 2019-05-09 ENCOUNTER — DOCUMENTATION ONLY (OUTPATIENT)
Dept: SURGERY | Facility: CLINIC | Age: 34
End: 2019-05-09

## 2019-05-09 NOTE — PROGRESS NOTES
[facility] [place] faxing to Michael Bray with [employer] ([phone]) clinical notes printed by me for prior auth for genetic testing per [employer] request.

## 2019-05-16 ENCOUNTER — OFFICE VISIT (OUTPATIENT)
Dept: OBSTETRICS AND GYNECOLOGY | Facility: CLINIC | Age: 34
End: 2019-05-16
Attending: OBSTETRICS & GYNECOLOGY
Payer: MEDICAID

## 2019-05-16 ENCOUNTER — LAB VISIT (OUTPATIENT)
Dept: LAB | Facility: OTHER | Age: 34
End: 2019-05-16
Attending: OBSTETRICS & GYNECOLOGY
Payer: MEDICAID

## 2019-05-16 VITALS
SYSTOLIC BLOOD PRESSURE: 110 MMHG | BODY MASS INDEX: 33.27 KG/M2 | HEIGHT: 64 IN | DIASTOLIC BLOOD PRESSURE: 72 MMHG | WEIGHT: 194.88 LBS

## 2019-05-16 DIAGNOSIS — Z80.3 FAMILY HISTORY OF BREAST CANCER: ICD-10-CM

## 2019-05-16 DIAGNOSIS — Z91.89 AT HIGH RISK FOR BREAST CANCER: ICD-10-CM

## 2019-05-16 DIAGNOSIS — N95.1 MENOPAUSAL SYMPTOM: Primary | ICD-10-CM

## 2019-05-16 DIAGNOSIS — N95.1 MENOPAUSAL SYMPTOM: ICD-10-CM

## 2019-05-16 LAB — ESTRADIOL SERPL-MCNC: 13 PG/ML

## 2019-05-16 PROCEDURE — 99214 OFFICE O/P EST MOD 30 MIN: CPT | Mod: S$GLB,,, | Performed by: OBSTETRICS & GYNECOLOGY

## 2019-05-16 PROCEDURE — 82670 ASSAY OF TOTAL ESTRADIOL: CPT

## 2019-05-16 PROCEDURE — 36415 COLL VENOUS BLD VENIPUNCTURE: CPT

## 2019-05-16 PROCEDURE — 99214 PR OFFICE/OUTPT VISIT, EST, LEVL IV, 30-39 MIN: ICD-10-PCS | Mod: S$GLB,,, | Performed by: OBSTETRICS & GYNECOLOGY

## 2019-05-16 RX ORDER — ZIPRASIDONE HYDROCHLORIDE 20 MG/1
CAPSULE ORAL 2 TIMES DAILY
Refills: 1 | COMMUNITY
Start: 2019-05-08 | End: 2019-09-05 | Stop reason: DRUGHIGH

## 2019-05-16 RX ORDER — ESTROGENS, CONJUGATED 0.3 MG/1
TABLET, FILM COATED ORAL
Refills: 11 | COMMUNITY
Start: 2019-03-20 | End: 2019-09-12

## 2019-05-17 RX ORDER — ESTRADIOL 1 MG/1
1 TABLET ORAL DAILY
Qty: 30 TABLET | Refills: 11 | Status: SHIPPED | OUTPATIENT
Start: 2019-05-17 | End: 2019-09-12 | Stop reason: ALTCHOICE

## 2019-05-18 NOTE — PROGRESS NOTES
SUBJECTIVE:   34 y.o. female  presents today to discuss menopausal symptoms. Patient's last menstrual period was 2012 (lmp unknown)..  She had hysterectomy/BSO in 2012 for endometriosis. She is at high risk for breast cancer- Tyrer- Cuzick score 26.62%  She has had breast MRI  She reports having hot flashes and night sweats on current dose of ERT. Her diabetes is well controlled. She wants to discuss changing her ERT    Past Medical History:   Diagnosis Date    Blood in stool     Constipation     Cystitis     Depression     Diabetes mellitus, type 2     Dysphagia     Endometriosis     GERD (gastroesophageal reflux disease)     Headache     Hypertension     Palpitations     Premature menopause on hormone replacement therapy     Thyroid disease      Past Surgical History:   Procedure Laterality Date    BLADDER SUSPENSION      CARPAL TUNNEL RELEASE      right    CHOLECYSTECTOMY      COLONOSCOPY N/A 2016    Performed by [facility]lick Herron MD at [facility] ENDO (4TH FLR)    COLONOSCOPY N/A 2013    [facility] ([facility]    CYSTOSCOPY N/A 2017    Performed by [facility]ka [facility]ura DO at [facility] OR    EGD (ESOPHAGOGASTRODUODENOSCOPY) N/A 1/10/2013    Performed by Darryl Cuello MD at [facility] ENDO (2ND FLR)    ESOPHAGOGASTRODUODENOSCOPY (EGD) N/A 2017    Performed by Slick Herron MD at [facility] ENDO (4TH FLR)    ESOPHAGOGASTRODUODENOSCOPY (EGD) N/A 2016    Performed by Slick Herron MD at [facility] ENDO (4TH FLR)    ESOPHAGOGASTRODUODENOSCOPY (EGD) N/A 10/23/2014    Performed by Slick Herron MD at [facility] ENDO (4TH FLR)    GALLBLADDER SURGERY      HYSTERECTOMY      Bess velasquez Dr. Champlain    MANOMETRY, ESOPHAGUS, WITH IMPEDANCE MEASUREMENT N/A 2018    Performed by Slick Herron MD at [facility] ENDO (4TH FLR)    OOPHORECTOMY      LSO- benign cyst    OOPHORECTOMY      RSO- endo    ooptherectomy      RETROPUBIC SLING  N/A 2017    [facility] at [facility] OR    right knee  scoped    SHOULDER SURGERY  right     Social History     Socioeconomic History    Marital status: Single     Spouse name: Not on file    Number of children: Not on file    Years of education: Not on file    Highest education level: Not on file   Occupational History    Not on file   Social Needs    Financial resource strain: Not on file    Food insecurity:     Worry: Not on file     Inability: Not on file    Transportation needs:     Medical: Not on file     Non-medical: Not on file   Tobacco Use    Smoking status: Never Smoker    Smokeless tobacco: Never Used   Substance and Sexual Activity    Alcohol use: No    Drug use: No    Sexual activity: Never     Birth control/protection: None   Lifestyle    Physical activity:     Days per week: Not on file     Minutes per session: Not on file    Stress: Not on file   Relationships    Social connections:     Talks on phone: Not on file     Gets together: Not on file     Attends [employer] service: Not on file     Active member of club or organization: Not on file     Attends meetings of clubs or organizations: Not on file     Relationship status: Not on file   Other Topics Concern    Not on file   Social History Narrative    ** Merged History Encounter **          Family History   Problem Relation Age of Onset    Breast cancer Mother 50        alive at 64, unilateral    Esophageal cancer Mother 63    Ovarian cancer Mother 63    Cervical cancer Maternal Grandmother         unknown age of onset,  at 80    Colon cancer Brother 40    Breast cancer Paternal Aunt         unknown age of onset, alive at 70    Breast cancer Maternal Aunt 72        alive at 72    Vaginal cancer Neg Hx     Endometrial cancer Neg Hx     Crohn's disease Neg Hx     Ulcerative colitis Neg Hx     Stomach cancer Neg Hx     Irritable bowel syndrome Neg Hx     Celiac disease Neg Hx      OB  History    Para Term  AB Living   0 0 0 0 0 0   SAB TAB Ectopic Multiple Live Births   0 0 0 0             Current Outpatient Medications   Medication Sig Dispense Refill    ACCU-CHEK SOFTCLIX LANCETS [facility] USE TO TEST BLOOD GLUCOSE TID  3    atorvastatin (LIPITOR) 40 MG tablet Take 40 mg by mouth once daily.      BLOOD SUGAR DIAGNOSTIC (ACCU-CHEK AMAURY PLUS TEST STRP MISC) 1 strip by Misc.(Non-Drug; Combo Route) route 3 (three) times daily.      calcium-vitamin D3 500 mg(1,250mg) -200 unit per tablet Take 1 tablet by mouth 2 (two) times daily with meals.      conjugated estrogens (PREMARIN) vaginal cream Use 0.5 gram of estrogen cream in vagina nightly x 2 weeks, then twice a week thereafter. 30 g 4    diclofenac sodium (VOLTAREN) 1 % Gel Apply 2 g topically 3 (three) times daily as needed. 100 g 0    dicyclomine (BENTYL) 10 MG capsule TAKE 2 CAPSULES(20 MG) BY MOUTH THREE TIMES DAILY BEFORE MEALS 180 capsule 2    estradiol (ESTRACE) 1 MG tablet Take 1 tablet (1 mg total) by mouth once daily. 30 tablet 11    fish oil-omega-3 fatty acids 300-1,000 mg capsule Take 2 g by mouth once daily.      gabapentin (NEURONTIN) 300 MG capsule Take 1 capsule (300 mg total) by mouth 3 (three) times daily as needed (Take for headache). 90 capsule 11    GLUCOPHAGE 500 mg tablet Take 500 mg by mouth 2 (two) times daily with meals.       ibuprofen (ADVIL,MOTRIN) 800 MG tablet 800 mg every 4 (four) hours as needed.   0    levothyroxine (SYNTHROID) 50 MCG tablet TK 1 T PO QAM  8    magnesium oxide (MAG-OX) 400 mg (241.3 mg magnesium) tablet Take 1 tablet (400 mg total) by mouth 2 (two) times daily.  0    metoprolol succinate (TOPROL-XL) 25 MG 24 hr tablet TAKE 1 TABLET(25 MG) BY MOUTH EVERY DAY 30 tablet 4    mirabegron (MYRBETRIQ) 50 mg Tb24 Take 1 tablet (50 mg total) by mouth once daily. 30 tablet 11    MULTIVIT-IRON-MIN-FOLIC ACID 3,500-18-0.4 UNIT-MG-MG ORAL CHEW Take 1 tablet by mouth once daily.        omeprazole (PRILOSEC OTC) 20 MG tablet Take 20 mg by mouth once daily.      pantoprazole (PROTONIX) 40 MG tablet Take 1 tablet (40 mg total) by mouth once daily. 90 tablet 3    phentermine (ADIPEX-P) 37.5 MG capsule Take 37.5 mg by mouth every morning.      PREMARIN 0.3 mg tablet   11    spironolactone (ALDACTONE) 25 MG tablet TK 1 T PO HS  2    STEGLATRO 15 mg Tab TK 1 T PO  QAM  9    tolterodine (DETROL LA) 4 MG 24 hr capsule Take 1 capsule (4 mg total) by mouth once daily. 30 capsule 11    topiramate (TOPAMAX) 50 MG tablet Take 2 tablets (100 mg total) by mouth once daily. 60 tablet 11    TRULICITY 1.5 mg/0.5 mL PnIj INJECT 1 PEN INTO THE SKIN Q WEEK  5    ziprasidone (GEODON) 20 MG Cap   1    ZOLOFT 100 mg tablet Take 200 mg by mouth once daily.        No current facility-administered medications for this visit.      Allergies: Patient has no known allergies.     The ASCVD Risk score (Zita DC Jr., et al., 2013) failed to calculate for the following reasons:    The 2013 ASCVD risk score is only valid for ages 40 to 79      ROS:  Constitutional: no weight loss, weight gain, fever, fatigue  Eyes:  No vision changes, glasses/contacts  ENT/Mouth: No ulcers, sinus problems, ears ringing, headache  Cardiovascular: No inability to lie flat, chest pain, exercise intolerance, swelling, heart palpitations  Respiratory: No wheezing, coughing blood, shortness of breath, or cough  Gastrointestinal: No diarrhea, bloody stool, nausea/vomiting, constipation, gas, hemorrhoids  Genitourinary: No blood in urine, painful urination, urgency of urination, frequency of urination, incomplete emptying, incontinence, abnormal bleeding, painful periods, heavy periods, vaginal discharge, vaginal odor, painful intercourse, sexual problems, bleeding after intercourse.  Musculoskeletal: No muscle weakness  Skin/Breast: No painful breasts, nipple discharge, masses, rash, ulcers  Neurological: No passing out, seizures, numbness,  headache  Endocrine: + diabetes, hypothyroid, hyperthyroid, +hot flashes, hair loss, abnormal hair growth, acne  Psychiatric: No depression, crying  Hematologic: No bruises, bleeding, swollen lymph nodes, anemia.      Physical Exam  deferred    ASSESSMENT:   Menopausal symptoms- surgical menopause- premature  At high risk for breast cancer    PLAN: Face to face time 25 minutes- majority spent in counseling and arranging follow up  Counseled patient and her mother on menopausal symptoms and premature, surgical menopause- discussed risk of heart disease and bone loss with early menopause.   Counseled her on risks/benefits of ERT in light of elevated T-C score  Recommend that Estradiol level be checked- likely low so not getting benefit of Premarin . Will change to Estrace and to different dose depending on labs.   A full discussion of the benefit-risk ratio of hormonal replacement therapy was carried out. Improvement in vasomotor and other climacteric symptoms is discussed, including possible improvements in sleep and mood. Reduction of risk for osteoporosis was explained.. All of her questions about this therapy were answered.  Follow up in 3 months

## 2019-05-21 ENCOUNTER — LAB VISIT (OUTPATIENT)
Dept: LAB | Facility: HOSPITAL | Age: 34
End: 2019-05-21
Attending: INTERNAL MEDICINE
Payer: MEDICAID

## 2019-05-21 ENCOUNTER — OFFICE VISIT (OUTPATIENT)
Dept: INTERNAL MEDICINE | Facility: CLINIC | Age: 34
End: 2019-05-21
Payer: MEDICAID

## 2019-05-21 ENCOUNTER — TELEPHONE (OUTPATIENT)
Dept: OBSTETRICS AND GYNECOLOGY | Facility: CLINIC | Age: 34
End: 2019-05-21

## 2019-05-21 DIAGNOSIS — E11.9 DIABETES MELLITUS WITHOUT COMPLICATION: ICD-10-CM

## 2019-05-21 DIAGNOSIS — I10 HYPERTENSION, UNSPECIFIED TYPE: ICD-10-CM

## 2019-05-21 DIAGNOSIS — N95.1 MENOPAUSAL SYMPTOM: Primary | ICD-10-CM

## 2019-05-21 DIAGNOSIS — M54.2 NECK PAIN: Primary | ICD-10-CM

## 2019-05-21 LAB
ALBUMIN SERPL BCP-MCNC: 4.4 G/DL (ref 3.5–5.2)
ALP SERPL-CCNC: 51 U/L (ref 55–135)
ALT SERPL W/O P-5'-P-CCNC: 18 U/L (ref 10–44)
ANION GAP SERPL CALC-SCNC: 7 MMOL/L (ref 8–16)
AST SERPL-CCNC: 16 U/L (ref 10–40)
BILIRUB SERPL-MCNC: 0.3 MG/DL (ref 0.1–1)
BUN SERPL-MCNC: 8 MG/DL (ref 6–20)
CALCIUM SERPL-MCNC: 10.2 MG/DL (ref 8.7–10.5)
CHLORIDE SERPL-SCNC: 104 MMOL/L (ref 95–110)
CO2 SERPL-SCNC: 27 MMOL/L (ref 23–29)
CREAT SERPL-MCNC: 1.1 MG/DL (ref 0.5–1.4)
EST. GFR  (AFRICAN AMERICAN): >60 ML/MIN/1.73 M^2
EST. GFR  (NON AFRICAN AMERICAN): >60 ML/MIN/1.73 M^2
ESTIMATED AVG GLUCOSE: 100 MG/DL (ref 68–131)
GLUCOSE SERPL-MCNC: 84 MG/DL (ref 70–110)
HBA1C MFR BLD HPLC: 5.1 % (ref 4–5.6)
POTASSIUM SERPL-SCNC: 4.4 MMOL/L (ref 3.5–5.1)
PROT SERPL-MCNC: 7.8 G/DL (ref 6–8.4)
SODIUM SERPL-SCNC: 138 MMOL/L (ref 136–145)

## 2019-05-21 PROCEDURE — 83036 HEMOGLOBIN GLYCOSYLATED A1C: CPT

## 2019-05-21 PROCEDURE — 99999 PR PBB SHADOW E&M-EST. PATIENT-LVL IV: CPT | Mod: PBBFAC,,, | Performed by: INTERNAL MEDICINE

## 2019-05-21 PROCEDURE — 99214 OFFICE O/P EST MOD 30 MIN: CPT | Mod: PBBFAC | Performed by: INTERNAL MEDICINE

## 2019-05-21 PROCEDURE — 99214 OFFICE O/P EST MOD 30 MIN: CPT | Mod: S$PBB,,, | Performed by: INTERNAL MEDICINE

## 2019-05-21 PROCEDURE — 80053 COMPREHEN METABOLIC PANEL: CPT

## 2019-05-21 PROCEDURE — 99214 PR OFFICE/OUTPT VISIT, EST, LEVL IV, 30-39 MIN: ICD-10-PCS | Mod: S$PBB,,, | Performed by: INTERNAL MEDICINE

## 2019-05-21 PROCEDURE — 99999 PR PBB SHADOW E&M-EST. PATIENT-LVL IV: ICD-10-PCS | Mod: PBBFAC,,, | Performed by: INTERNAL MEDICINE

## 2019-05-21 PROCEDURE — 36415 COLL VENOUS BLD VENIPUNCTURE: CPT

## 2019-05-21 NOTE — TELEPHONE ENCOUNTER
MD reviewed lab results 5/17/2019. MD orders received for patient to d/c Premarin 0.3mg daily and begin Estradiol 1mg orally daily. MD e-prescribed Rx to [facility] pharmacy in [place]. Patient has picked up medication per pharmacist as of 05/17/2019. MD orders received for repeat labs, Free Testosterone and Estradiol in 3 months. MD orders read back and repeated. RN has placed orders in system. RN left patient voicemail re: lab appt and provided contact information to speak regarding new Rx/any questions patient may have. VANDANA Chandler.

## 2019-05-22 ENCOUNTER — TELEPHONE (OUTPATIENT)
Dept: UROLOGY | Facility: CLINIC | Age: 34
End: 2019-05-22

## 2019-05-22 DIAGNOSIS — E11.9 DIABETES MELLITUS WITHOUT COMPLICATION: Primary | ICD-10-CM

## 2019-05-23 ENCOUNTER — HOSPITAL ENCOUNTER (OUTPATIENT)
Facility: HOSPITAL | Age: 34
Discharge: HOME | End: 2019-05-23
Attending: UROLOGY | Admitting: UROLOGY
Payer: MEDICAID

## 2019-05-23 ENCOUNTER — TELEPHONE (OUTPATIENT)
Dept: UROLOGY | Facility: CLINIC | Age: 34
End: 2019-05-23

## 2019-05-23 VITALS
WEIGHT: 193 LBS | TEMPERATURE: 99 F | OXYGEN SATURATION: 96 % | HEART RATE: 123 BPM | DIASTOLIC BLOOD PRESSURE: 71 MMHG | HEIGHT: 62 IN | BODY MASS INDEX: 35.51 KG/M2 | SYSTOLIC BLOOD PRESSURE: 100 MMHG | RESPIRATION RATE: 18 BRPM

## 2019-05-23 DIAGNOSIS — E11.69 TYPE 2 DIABETES MELLITUS WITH OTHER SPECIFIED COMPLICATION, WITHOUT LONG-TERM CURRENT USE OF INSULIN: Primary | ICD-10-CM

## 2019-05-23 DIAGNOSIS — R39.11 HESITANCY OF MICTURITION: ICD-10-CM

## 2019-05-23 DIAGNOSIS — N39.41 URGE INCONTINENCE: ICD-10-CM

## 2019-05-23 DIAGNOSIS — N32.9 BLADDER DISORDER: ICD-10-CM

## 2019-05-23 DIAGNOSIS — N39.498 OTHER URINARY INCONTINENCE: ICD-10-CM

## 2019-05-23 PROCEDURE — 76000 PR  FLUOROSCOPE EXAMINATION: ICD-10-PCS | Mod: 26,59,, | Performed by: UROLOGY

## 2019-05-23 PROCEDURE — 51784 PR ANAL/URINARY MUSCLE STUDY: ICD-10-PCS | Mod: 26,51,, | Performed by: UROLOGY

## 2019-05-23 PROCEDURE — 76000 FLUOROSCOPY <1 HR PHYS/QHP: CPT | Mod: 26,59,, | Performed by: UROLOGY

## 2019-05-23 PROCEDURE — 51741 ELECTRO-UROFLOWMETRY FIRST: CPT

## 2019-05-23 PROCEDURE — 51728 CYSTOMETROGRAM W/VP: CPT | Mod: 26,,, | Performed by: UROLOGY

## 2019-05-23 PROCEDURE — 51741 ELECTRO-UROFLOWMETRY FIRST: CPT | Mod: 26,51,, | Performed by: UROLOGY

## 2019-05-23 PROCEDURE — 51797 INTRAABDOMINAL PRESSURE TEST: CPT

## 2019-05-23 PROCEDURE — 51784 ANAL/URINARY MUSCLE STUDY: CPT | Mod: 26,51,, | Performed by: UROLOGY

## 2019-05-23 PROCEDURE — 36000705 HC OR TIME LEV I EA ADD 15 MIN: Performed by: UROLOGY

## 2019-05-23 PROCEDURE — 51728 PR COMPLEX CYSTOMETROGRAM VOIDING PRESSURE STUDIES: ICD-10-PCS | Mod: 26,,, | Performed by: UROLOGY

## 2019-05-23 PROCEDURE — 51741 PR UROFLOWMETRY, COMPLEX: ICD-10-PCS | Mod: 26,51,, | Performed by: UROLOGY

## 2019-05-23 PROCEDURE — 51797 PR VOIDING PRESS STUDY INTRA-ABDOMINAL VOID: ICD-10-PCS | Mod: 26,,, | Performed by: UROLOGY

## 2019-05-23 PROCEDURE — 27200973 HC CYSTO SUPPLY IV (URODYNAMICS): Performed by: UROLOGY

## 2019-05-23 PROCEDURE — 51797 INTRAABDOMINAL PRESSURE TEST: CPT | Mod: 26,,, | Performed by: UROLOGY

## 2019-05-23 PROCEDURE — 36000704 HC OR TIME LEV I 1ST 15 MIN: Performed by: UROLOGY

## 2019-05-23 RX ORDER — BETHANECHOL CHLORIDE 25 MG/1
25 TABLET ORAL 3 TIMES DAILY
Qty: 90 TABLET | Refills: 11 | Status: SHIPPED | OUTPATIENT
Start: 2019-05-23 | End: 2019-06-24 | Stop reason: SINTOL

## 2019-05-23 NOTE — OP NOTE
Date of procedure:  5/23/2019      Fluoro Urodynamic Report    Indication:  Concern for Llamas's syndrome    Patient was taken to the Urodynamic Suite with a comfortably full bladder and asked to perform a free uroflow.  Next, the patient was prepped and the urinary residual was drained with a 14 Fr catheter.  A 7 Fr dual lumen catheter was placed to measure intravesical pressures.  A 10 Fr balloon manometer was placed into the rectum for abdominal pressure measurements.  Patch EMG electrodes were placed on the perineum.  The patient was connected to the [employer] Urodynamic machine, using a multichannel technique.  The bladder was filled with Cysto ConRay at room temperature at a rate of 30 ml/min.  Patient is filled to urgnecy.  Filling is performed with the patient in the seated position.  Abdominal leak point pressures are checked at 1st desire, then serially at 50cc increments first with Valsalva then with coughing.  The patient was then asked to sit and void for a pressure flow study.    The following are the results of the study:  1.  Uroflow       Q max:  77.8 ml/sec       Voided volume:  1124.9 ml       Pattern of the curve:  continuous    2.  PVR:  30 ml    3.  CMG       Sensation:         First Desire:  97 ml         Normal Desire:  133 ml         Strong Desire:  185 ml         Urgency:  382 ml       Capacity:  Filled to 700 ml       Abnormal Contractions:  none       Compliance:  normal    4.  Abdominal Leak Point Pressure:  No stress incontinence demonstrated    5.  EMG:  Normal guarding reflex, no change during the attempt to void    6.  Voiding phase       Q max:  The test was stopped as she could not void, she had a hypotonic bladder.  When the nurse stepped away to get a catheter to drain, the patient voided and emptied to completion.     7.  Fluoroscopy:  Bladder was smooth, there was not increase in sphincter or pelvic floor.  The bladder did not generate a pressure to void during after she was  given permission to do so.  There was no VU reflux at any point.     8.  Analysis:  Decreased sensation and increased capacity.  Does empty to completion, doubt this is Llamas's syndrome.      9.  Recommendations:       A.  Lucía will sometimes help with sensation.         B.  Discussed with the patient and her father       C.  Follow up in 1 month.

## 2019-05-23 NOTE — PLAN OF CARE
Raven Fallon notified of heart rate 123. I informed her that the patient is on Adipex. The patient is stable and in no apparent distress, denies chest pain or shortness of breath. She is in the waiting room waiting for Raven to get her.

## 2019-05-23 NOTE — PLAN OF CARE
Patient assigned to this writer by charge nurse This writer is completing a chart review and reviewing MD orders.

## 2019-05-23 NOTE — SUBJECTIVE & OBJECTIVE
Past Medical History:   Diagnosis Date    Blood in stool     Constipation     Cystitis     Depression     Diabetes mellitus, type 2     Dysphagia     Endometriosis     GERD (gastroesophageal reflux disease)     Headache     Hypertension     Palpitations     Premature menopause on hormone replacement therapy     Thyroid disease        Past Surgical History:   Procedure Laterality Date    BLADDER SUSPENSION      CARPAL TUNNEL RELEASE      right    CHOLECYSTECTOMY      COLONOSCOPY N/A 8/30/2016    Performed by [facility] MD at [facility] ENDO (4TH FLR)    COLONOSCOPY N/A 1/9/2013    Performed by Gab[facility]le Gibbons MD at [facility] ENDO (4TH FLR)    CYSTOSCOPY N/A 1/23/2017    Performed by [facility]ka [facility]ura DO at [facility] OR    EGD (ESOPHAGOGASTRODUODENOSCOPY) N/A 1/10/2013    Performed by Darryl Cuello MD at [facility] ENDO (2ND FLR)    ESOPHAGOGASTRODUODENOSCOPY (EGD) N/A 9/14/2017    Performed by S[facility] MD at [facility] ENDO (4TH FLR)    ESOPHAGOGASTRODUODENOSCOPY (EGD) N/A 8/30/2016    Performed by Slick [facility] MD at [facility] ENDO (4TH FLR)    ESOPHAGOGASTRODUODENOSCOPY (EGD) N/A 10/23/2014    Performed by Slick [facility]on MD at [facility] ENDO (4TH FLR)    GALLBLADDER SURGERY      HYSTERECTOMY  2013    ron, Dr. Ashley Smith    MANOMETRY, ESOPHAGUS, WITH IMPEDANCE MEASUREMENT N/A 11/5/2018    Performed by [facility] MD at [facility] ENDO (4TH FLR)    OOPHORECTOMY  2005    LSO- benign cyst    OOPHORECTOMY  2013    RSO- endo    ooptherectomy      RETROPUBIC SLING N/A 1/23/2017    Performed by N[facility]ka [facility]ura DO at [facility] OR    right knee  scoped    SHOULDER SURGERY  right       Review of patient's allergies indicates:  No Known Allergies    Family History     Problem Relation (Age of Onset)    Breast cancer Mother (50), Paternal Aunt, Maternal Aunt (72)    Cervical cancer Maternal Grandmother    Colon cancer Brother (40)    Esophageal cancer Mother (63)    Ovarian cancer Mother (63)           Tobacco Use    Smoking status: Never Smoker    Smokeless tobacco: Never Used   Substance and Sexual Activity    Alcohol use: No    Drug use: No    Sexual activity: Never     Birth control/protection: None       Review of Systems    Objective:     Temp:  [98.6 °F (37 °C)] 98.6 °F (37 °C)  Pulse:  [123] 123  Resp:  [18] 18  SpO2:  [96 %] 96 %  BP: (100)/(71) 100/71     Body mass index is 35.3 kg/m².    No intake/output data recorded.       Drains          None          Physical Exam   Constitutional: She appears well-developed and well-nourished.   HENT:   Head: Normocephalic.   Eyes: Pupils are equal, round, and reactive to light.   Neck: Normal range of motion.   Pulmonary/Chest: Effort normal.   Abdominal: Soft.   Musculoskeletal: Normal range of motion.   Neurological: She is alert.   Skin: Skin is warm and dry.     Psychiatric: She has a normal mood and affect. Her behavior is normal.       Significant Labs:    BMP:  Recent Labs   Lab 05/21/19  1139      K 4.4      CO2 27   BUN 8   CREATININE 1.1   CALCIUM 10.2

## 2019-05-23 NOTE — HPI
CHIEF COMPLAINT:     Mrs. Ac is a 34 y.o. female presenting for a consultation at the request of Dr. Mena Ventura. Patient presents with mixed incontinence after sling concerned for Llamas's syndrome.     PRESENTING ILLNESS:     Maria Ines Ac is a 34 y.o. female who presents with her mother.  She states she has stress incontinence uses 4 pads a day.  Duration at least 2 years.  She is status post retropubic sling with Dr. Crawford in 1/23/2017.  She also has dysuria for the past month.  And treated with macrobid.  Patient is a poor historian and her mother has her own health issues and cannot recall when her symptoms started.       Hysterectomy BSO, endometriosis     REVIEW OF SYSTEMS:     Review of Systems   Constitutional: Negative.    HENT: Negative.    Eyes: Negative.    Respiratory: Negative.    Cardiovascular: Negative.    Gastrointestinal: Negative.    Genitourinary: Positive for urgency.        Mixed incontinence   Musculoskeletal: Negative.    Skin: Negative.    Neurological: Negative.    Endo/Heme/Allergies: Negative.    Psychiatric/Behavioral:        Mentally challenged

## 2019-05-23 NOTE — H&P
[facility]  Urology  History & Physical    Patient Name: Maria Ines Ac  MRN: [id number]  Admission Date: 5/23/2019  Code Status: No Order   Attending Provider: Zena Tee MD  Primary Care Physician: Luann Raymond MD  Principal Problem:<principal problem not specified>    Subjective:     HPI:  CHIEF COMPLAINT:     Mrs. Ac is a 34 y.o. female presenting for a consultation at the request of Dr. Mena Ventura. Patient presents with mixed incontinence after sling concerned for Llamas's syndrome.     PRESENTING ILLNESS:     Maria Ines Ac is a 34 y.o. female who presents with her mother.  She states she has stress incontinence uses 4 pads a day.  Duration at least 2 years.  She is status post retropubic sling with Dr. Crawford in 1/23/2017.  She also has dysuria for the past month.  And treated with macrobid.  Patient is a poor historian and her mother has her own health issues and cannot recall when her symptoms started.       Hysterectomy BSO, endometriosis     REVIEW OF SYSTEMS:     Review of Systems   Constitutional: Negative.    HENT: Negative.    Eyes: Negative.    Respiratory: Negative.    Cardiovascular: Negative.    Gastrointestinal: Negative.    Genitourinary: Positive for urgency.        Mixed incontinence   Musculoskeletal: Negative.    Skin: Negative.    Neurological: Negative.    Endo/Heme/Allergies: Negative.    Psychiatric/Behavioral:        Mentally challenged      Past Medical History:   Diagnosis Date    Blood in stool     Constipation     Cystitis     Depression     Diabetes mellitus, type 2     Dysphagia     Endometriosis     GERD (gastroesophageal reflux disease)     Headache     Hypertension     Palpitations     Premature menopause on hormone replacement therapy     Thyroid disease        Past Surgical History:   Procedure Laterality Date    BLADDER SUSPENSION      CARPAL TUNNEL RELEASE      right    CHOLECYSTECTOMY      COLONOSCOPY  N/A 8/30/2016    [facility]formed by [facility] at [facility] ENDO (4TH FLR)    COLONOSCOPY N/A 1/9/2013    Performed by Gabriele Gibbons MD at [facility] ENDO (4TH FLR)    CYSTOSCOPY N/A 1/23/2017    Performed by [facility] DO at [facility] OR    EGD (ESOPHAGOGASTRODUODENOSCOPY) N/A 1/10/2013    Performed by Darryl Cuello MD at [facility] ENDO (2ND FLR)    ESOPHAGOGASTRODUODENOSCOPY (EGD) N/A 9/14/2017    Performed by Slick Herron MD at [facility] ENDO (4TH FLR)    ESOPHAGOGASTRODUODENOSCOPY (EGD) N/A 8/30/2016    Performed by Slick Herron MD at [facility] ENDO (4TH FLR)    ESOPHAGOGASTRODUODENOSCOPY (EGD) N/A 10/23/2014    Performed by Slick Herron MD at [facility] ENDO (4TH FLR)    GALLBLADDER SURGERY      HYSTERECTOMY  2013    ron, Bess jacobs, Dr. Ramirez    MANOMETRY, ESOPHAGUS, WITH IMPEDANCE MEASUREMENT N/A 11/5/2018    Performed by Slick Herron MD at [facility] ENDO (4TH FLR)    OOPHORECTOMY  2005    LSO- benign cyst    OOPHORECTOMY  2013    [facility]- endo    ooptherectomy      RETROPUBIC SLING N/A 1/23/2017    Performed by [facility]ka [facility] DO at [facility] OR    right knee  scoped    SHOULDER SURGERY  right       Review of patient's allergies indicates:  No Known Allergies    Family History     Problem Relation (Age of Onset)    Breast cancer Mother (50), Paternal Aunt, Maternal Aunt (72)    Cervical cancer Maternal Grandmother    Colon cancer Brother (40)    Esophageal cancer Mother (63)    Ovarian cancer Mother (63)          Tobacco Use    Smoking status: Never Smoker    Smokeless tobacco: Never Used   Substance and Sexual Activity    Alcohol use: No    Drug use: No    Sexual activity: Never     Birth control/protection: None       Review of Systems    Objective:     Temp:  [98.6 °F (37 °C)] 98.6 °F (37 °C)  Pulse:  [123] 123  Resp:  [18] 18  SpO2:  [96 %] 96 %  BP: (100)/(71) 100/71     Body mass index is 35.3 kg/m².    No intake/output data recorded.       Drains          None          Physical Exam    Constitutional: She appears well-developed and well-nourished.   HENT:   Head: Normocephalic.   Eyes: Pupils are equal, round, and reactive to light.   Neck: Normal range of motion.   Pulmonary/Chest: Effort normal.   Abdominal: Soft.   Musculoskeletal: Normal range of motion.   Neurological: She is alert.   Skin: Skin is warm and dry.     Psychiatric: She has a normal mood and affect. Her behavior is normal.       Significant Labs:    BMP:  Recent Labs   Lab 05/21/19  1139      K 4.4      CO2 27   BUN 8   CREATININE 1.1   CALCIUM 10.2       Assessment and Plan:     Urge incontinence of urine  For FUDS      Zena Tee MD  Urology  [facility]

## 2019-05-24 ENCOUNTER — OFFICE VISIT (OUTPATIENT)
Dept: UROGYNECOLOGY | Facility: CLINIC | Age: 34
End: 2019-05-24
Payer: MEDICAID

## 2019-05-24 VITALS
DIASTOLIC BLOOD PRESSURE: 70 MMHG | BODY MASS INDEX: 36.02 KG/M2 | WEIGHT: 195.75 LBS | SYSTOLIC BLOOD PRESSURE: 94 MMHG | HEIGHT: 62 IN

## 2019-05-24 DIAGNOSIS — N39.41 URGE INCONTINENCE: Primary | ICD-10-CM

## 2019-05-24 PROCEDURE — 99215 OFFICE O/P EST HI 40 MIN: CPT | Mod: PBBFAC | Performed by: OBSTETRICS & GYNECOLOGY

## 2019-05-24 PROCEDURE — 99999 PR PBB SHADOW E&M-EST. PATIENT-LVL V: ICD-10-PCS | Mod: PBBFAC,,, | Performed by: OBSTETRICS & GYNECOLOGY

## 2019-05-24 PROCEDURE — 99213 OFFICE O/P EST LOW 20 MIN: CPT | Mod: S$PBB,,, | Performed by: OBSTETRICS & GYNECOLOGY

## 2019-05-24 PROCEDURE — 99999 PR PBB SHADOW E&M-EST. PATIENT-LVL V: CPT | Mod: PBBFAC,,, | Performed by: OBSTETRICS & GYNECOLOGY

## 2019-05-24 PROCEDURE — 99213 PR OFFICE/OUTPT VISIT, EST, LEVL III, 20-29 MIN: ICD-10-PCS | Mod: S$PBB,,, | Performed by: OBSTETRICS & GYNECOLOGY

## 2019-05-24 NOTE — PROGRESS NOTES
Maria Ines Ac is a 34 Y O F nulliparous female who  has a past medical history of Blood in stool, Constipation, Cystitis, Depression, Diabetes mellitus, type 2, Dysphagia, Endometriosis, GERD (gastroesophageal reflux disease), Headache, Hypertension, Palpitations, Premature menopause on hormone replacement therapy, and Thyroid disease. and intellectual  disability (ID)  here for follow up.  She has a remote history of a complete hysterectomy many years ago and more recently history of retropubic sling/ cystourethroscopy for DILIP 1/2017. She has persistent urinary urgency/ urge incontincence which has not improved since starting pelvic floor PT or full course of PTNS (monthly maintenance not approved by insurance).  She is currently on dual therapy ( Detrol and Myrbetriq) without much improvement in her symptoms.   She denies snoring or previous evaluation for sleep apnea.         Interval  HPI since the last visit: (updates in bold): she has seen Dr. Tee with repeat suds and cysto.     1)  UI:  (+) JACINDA rarely  (+) UUI  Several times day. (+) pads: 4/ day  Daytime frequency: Q 1 hours.  Nocturia: every hour.  Wakes up wet.    (--)dysuria  (--) hematuria,  (--) frequent UTIs.  not complete bladder emptying. No abdominal pain.  Taking detrol and myrbetriq--no change in UI      2)  POP:  Absent    Symptoms:(--)  .  (--) vaginal bleeding. (+) vaginal discharge. (-) sexually active.  (--) dyspareunia.   (--)  Vaginal dryness.  (+) vaginal estrogen use. Also using Shazia's Butt Paste. Denies vaginal pain or itching.    3)  BM:  (--) constipation/straining.  (--) chronic diarrhea (--) hematochezia.  (--) fecal incontinence.  (--) fecal smearing/urgency.  (--) incomplete evacuation.      4) pelvic pain despite using vaginal suppositories twice daily    Continue to drink between 3-4 cokes and several teas per day.    5) DM-A1C 5.2     INITIAL SUDS: 12/2016    2.  FILLING PHASE:  · 1st desire: 150 mL  · Normal  desire:  268 mL  · Strong desire:  558 mL  · Urgency:  750 mL  · EMG activity during filling:   Normal   · Detrusor contractions observed: No.       3.  URETHRAL FUNCTION/STORAGE PHASE:     a.  WITHOUT prolapse reduction:  · CLPP (300 mL): Positive  at  113 cm H20  · VLPP (300 mL): Negative  at  44 cm H20   · CLPP (MAX ):    Negative  at  99 cm H20  · VLPP (MAX):     Negative  at  30 cm H20  b.  WITH prolapse reduction:  · CLPP (MAX ):  Positive  at  24 cm H20  · VLPP (MAX):    Negative  at  23 cm H20     These findings are consistent with Positive urodynamic stress incontinence .     Assessment:  UF  Normal .  PF  Not significant prolapse.  Compliance: 3.3 .  Max capacity elevated capacity with normal PVR once catheter removed.  DO (--).  JACINDA (+).       Plan: Recommend a mid-urethral sling and cystourethroscopy      REPEAT SUDS 12/2018     Plan:  Valsalva voiding pattern.  There is a question of urethral contractions concerning for Llamas's syndrome. She may benefit from FUDS to evaluate the bladder neck.  JACINDA only at high volumes, no DI noted             Past Medical History:   Diagnosis Date    Blood in stool      Constipation      Depression      Diabetes mellitus, type 2      Dysphagia      GERD (gastroesophageal reflux disease)      Hypertension      Palpitations      Premature menopause on hormone replacement therapy      Thyroid disease                 Past Surgical History:   Procedure Laterality Date    BLADDER SUSPENSION        CARPAL TUNNEL RELEASE         right    COLONOSCOPY N/A 8/30/2016     Procedure: COLONOSCOPY; Surgeon: Slick Herron MD; Location: [address]); Service: Endoscopy; Laterality: N/A; PM prep    GALLBLADDER SURGERY        HYSTERECTOMY   2013     Bess velasquez Dr. [place]amplain    OOPHORECTOMY   2005     [facility]- benign cyst    OOPHORECTOMY   2013     [facility]- endo    ooptherectomy        right knee   scoped    SHOULDER SURGERY   right         Current Outpatient  Medications   Medication Sig    ACCU-CHEK SOFTCLIX LANCETS [facility] USE TO TEST BLOOD GLUCOSE TID    atorvastatin (LIPITOR) 40 MG tablet Take 40 mg by mouth once daily.    bethanechol (URECHOLINE) 25 MG Tab Take 1 tablet (25 mg total) by mouth 3 (three) times daily.    BLOOD SUGAR DIAGNOSTIC (ACCU-CHEK AMAURY PLUS TEST STRP MISC) 1 strip by Misc.(Non-Drug; Combo Route) route 3 (three) times daily.    calcium-vitamin D3 500 mg(1,250mg) -200 unit per tablet Take 1 tablet by mouth 2 (two) times daily with meals.    conjugated estrogens (PREMARIN) vaginal cream Use 0.5 gram of estrogen cream in vagina nightly x 2 weeks, then twice a week thereafter.    diclofenac sodium (VOLTAREN) 1 % Gel Apply 2 g topically 3 (three) times daily as needed.    dicyclomine (BENTYL) 10 MG capsule TAKE 2 CAPSULES(20 MG) BY MOUTH THREE TIMES DAILY BEFORE MEALS    estradiol (ESTRACE) 1 MG tablet Take 1 tablet (1 mg total) by mouth once daily.    fish oil-omega-3 fatty acids 300-1,000 mg capsule Take 2 g by mouth once daily.    gabapentin (NEURONTIN) 300 MG capsule Take 1 capsule (300 mg total) by mouth 3 (three) times daily as needed (Take for headache).    GLUCOPHAGE 500 mg tablet Take 500 mg by mouth 2 (two) times daily with meals.     ibuprofen (ADVIL,MOTRIN) 800 MG tablet 800 mg every 4 (four) hours as needed.     levothyroxine (SYNTHROID) 50 MCG tablet TK 1 T PO QAM    magnesium oxide (MAG-OX) 400 mg (241.3 mg magnesium) tablet Take 1 tablet (400 mg total) by mouth 2 (two) times daily.    metoprolol succinate (TOPROL-XL) 25 MG 24 hr tablet TAKE 1 TABLET(25 MG) BY MOUTH EVERY DAY    MULTIVIT-IRON-MIN-FOLIC ACID 3,500-18-0.4 UNIT-MG-MG ORAL CHEW Take 1 tablet by mouth once daily.     omeprazole (PRILOSEC OTC) 20 MG tablet Take 20 mg by mouth once daily.    pantoprazole (PROTONIX) 40 MG tablet Take 1 tablet (40 mg total) by mouth once daily.    phentermine (ADIPEX-P) 37.5 MG capsule Take 37.5 mg by mouth every morning.     "PREMARIN 0.3 mg tablet     spironolactone (ALDACTONE) 25 MG tablet TK 1 T PO HS    STEGLATRO 15 mg Tab TK 1 T PO  QAM    topiramate (TOPAMAX) 50 MG tablet Take 2 tablets (100 mg total) by mouth once daily.    TRULICITY 1.5 mg/0.5 mL PnIj INJECT 1 PEN INTO THE SKIN Q WEEK    ziprasidone (GEODON) 20 MG Cap     ZOLOFT 100 mg tablet Take 200 mg by mouth once daily.      No current facility-administered medications for this visit.         Exam  Vitals:    05/24/19 1445   BP: 94/70   BP Location: Left arm   Patient Position: Sitting   Weight: 88.8 kg (195 lb 12.3 oz)   Height: 5' 2" (1.575 m)       Well Woman:  Pap:post hysterectomy  Mammo:n/a  Colonoscopy:n/a  Dexa:n/a    General: alert and oriented, no acute distress  Respiratory: normal respiratory effort  Abd: soft, non-distended.  ND/NT     PELVIC EXAM:   VULVA: normal appearing vulva with no masses, tenderness or lesions,   VAGINA: normal appearing vagina with normal color and discharge, no lesions, no mesh visible/ palpable, atrophic, levator tenderness   CERVIX: surgically absent,   UTERUS: absent,   ADNEXA: no masses  RECTAL: deferred   PVR -90 mL, cloudy            Impression  No diagnosis found.    We reviewed the above issues and discussed options for short-term versus long-term management of her problems.     Plan:     1. Bs/p oudreaux's Butt Paste daily to vulva  2. Bethenecol as prescribed by Dr. Tee   3. Vaginal atrophy:  Estrogen cream 0.5 g twice weekly   4. Long discussion of decreasing bladder irritants. Only have coke zero with meds at meal time-- limit 3/day.  5. Only drink 4 ounces at a time.  If you drink a coke zero, you can not drink anything else that hour.  6. For dry mouth, use sour candies (sugar free ones) and biotene mouth wash  7. Stop tolterodine 4 mg daily + myrbetriq 50 mg daily for combo therapy.  8. Vaginal valium suppository 1-2 times daily as needed, consider Neurontin next visit.   9. Compression stocking "       Approximately 20 min were spent in consult, 90 % in discussion.     Ninoska Ventura DO  Female Pelvic Medicine and Reconstructive Surgery  [facility] [place], [place]

## 2019-05-24 NOTE — PATIENT INSTRUCTIONS
1. Shazia's Butt Paste daily to vulva  2. Bethenecol as prescribed by Dr. Tee   3. Vaginal atrophy:  Estrogen cream 0.5 g twice weekly   4. Long discussion of decreasing bladder irritants. Only have coke zero with meds at meal time-- limit 3/day.  5. Only drink 4 ounces at a time.  If you drink a coke zero, you can not drink anything else that hour.  6. For dry mouth, use sour candies (sugar free ones) and biotene mouth wash  7. Stop tolterodine 4 mg daily + myrbetriq 50 mg daily for combo therapy.  8. Vaginal valium suppository 1-2 times daily as needed, consider Neurontin next visit.   9. Compression stocking

## 2019-05-26 VITALS
BODY MASS INDEX: 35.62 KG/M2 | TEMPERATURE: 98 F | HEART RATE: 96 BPM | WEIGHT: 193.56 LBS | OXYGEN SATURATION: 98 % | HEIGHT: 62 IN | DIASTOLIC BLOOD PRESSURE: 70 MMHG | SYSTOLIC BLOOD PRESSURE: 100 MMHG

## 2019-05-26 NOTE — PROGRESS NOTES
Subjective:       Patient ID: Maria Ines Ac is a 34 y.o. female.    Chief Complaint: Hypertension    HPI  She returns for management of hypertension.  She has had hypertension for over a year.  Current treatment has included medications outlined in medication list.  She denies chest pain or shortness of breath.  No palpitations.  Denies left arm or neck pain. She has diabetes.  Denies polyuria, polydipsia    Past medical history: Hypertension, hyperlipidemia, diabetes, hypothyroidism, bipolar disorder, migraine headaches, status post hysterectomy, increased risk breast cancer      Medications: Synthroid 0.05 mg daily, metformin, Toprol-XL 25mg daily,Lipitor 40 mg daily,topamax, trulicity      NO KNOWN DRUG ALLERGIES       Review of Systems   Constitutional: Negative for chills, fatigue, fever and unexpected weight change.   Respiratory: Negative for chest tightness and shortness of breath.    Cardiovascular: Negative for chest pain and palpitations.   Gastrointestinal: Negative for abdominal pain and blood in stool.   Neurological: Negative for dizziness, syncope, numbness and headaches.       Objective:      Physical Exam   HENT:   Right Ear: External ear normal.   Left Ear: External ear normal.   Nose: Nose normal.   Mouth/Throat: Oropharynx is clear and moist.   Eyes: Pupils are equal, round, and reactive to light.   Neck: Normal range of motion.   Cardiovascular: Normal rate and regular rhythm.   No murmur heard.  Pulmonary/Chest: Breath sounds normal.   Abdominal: She exhibits no distension. There is no hepatosplenomegaly. There is no tenderness.   Lymphadenopathy:     She has no cervical adenopathy.     She has no axillary adenopathy.   Neurological: She has normal strength and normal reflexes. No cranial nerve deficit or sensory deficit.       Assessment/Plan       Assessment and plan:  1.  Hypertension:  Check CMP  2.  Diabetes:  Check A1c.  She reports having her eye exam within the past year

## 2019-05-30 ENCOUNTER — DOCUMENTATION ONLY (OUTPATIENT)
Dept: SURGERY | Facility: CLINIC | Age: 34
End: 2019-05-30

## 2019-05-30 ENCOUNTER — OFFICE VISIT (OUTPATIENT)
Dept: PODIATRY | Facility: CLINIC | Age: 34
End: 2019-05-30
Payer: MEDICAID

## 2019-05-30 VITALS
BODY MASS INDEX: 35.88 KG/M2 | HEIGHT: 62 IN | SYSTOLIC BLOOD PRESSURE: 121 MMHG | WEIGHT: 195 LBS | HEART RATE: 106 BPM | DIASTOLIC BLOOD PRESSURE: 80 MMHG

## 2019-05-30 DIAGNOSIS — B35.1 ONYCHOMYCOSIS DUE TO DERMATOPHYTE: ICD-10-CM

## 2019-05-30 DIAGNOSIS — E11.49 TYPE II DIABETES MELLITUS WITH NEUROLOGICAL MANIFESTATIONS: Primary | ICD-10-CM

## 2019-05-30 LAB — HM FOOT EXAM: NORMAL

## 2019-05-30 PROCEDURE — 99499 UNLISTED E&M SERVICE: CPT | Mod: S$PBB,,, | Performed by: PODIATRIST

## 2019-05-30 PROCEDURE — 11721 PR DEBRIDEMENT OF NAILS, 6 OR MORE: ICD-10-PCS | Mod: S$PBB,,, | Performed by: PODIATRIST

## 2019-05-30 PROCEDURE — 11721 DEBRIDE NAIL 6 OR MORE: CPT | Mod: Q9,PBBFAC | Performed by: PODIATRIST

## 2019-05-30 PROCEDURE — 99499 NO LOS: ICD-10-PCS | Mod: S$PBB,,, | Performed by: PODIATRIST

## 2019-05-30 PROCEDURE — 99999 PR PBB SHADOW E&M-EST. PATIENT-LVL III: ICD-10-PCS | Mod: PBBFAC,,, | Performed by: PODIATRIST

## 2019-05-30 PROCEDURE — 99999 PR PBB SHADOW E&M-EST. PATIENT-LVL III: CPT | Mod: PBBFAC,,, | Performed by: PODIATRIST

## 2019-05-30 PROCEDURE — 99213 OFFICE O/P EST LOW 20 MIN: CPT | Mod: PBBFAC,25 | Performed by: PODIATRIST

## 2019-05-30 PROCEDURE — 11721 DEBRIDE NAIL 6 OR MORE: CPT | Mod: S$PBB,,, | Performed by: PODIATRIST

## 2019-05-30 RX ORDER — CICLOPIROX 80 MG/ML
SOLUTION TOPICAL NIGHTLY
Qty: 6.6 ML | Refills: 11 | Status: SHIPPED | OUTPATIENT
Start: 2019-05-30

## 2019-05-30 NOTE — PROGRESS NOTES
Genetic testing results to be faxed to [facility] genetic counselor with request for genetic counselor to provide post-test counseling to pt.

## 2019-05-30 NOTE — PROGRESS NOTES
Subjective:      Patient ID: Maria Ines Ac is a 34 y.o. female.    Chief Complaint: Diabetic Foot Exam (5/21/19 dr ronen villegas)    Maria Ines is a 34 y.o. female who presents to the clinic for evaluation and treatment of high risk feet. Maria Ines has a past medical history of Blood in stool, Constipation, Cystitis, Depression, Diabetes mellitus, type 2, Dysphagia, Endometriosis, GERD (gastroesophageal reflux disease), Headache, Hypertension, Palpitations, Premature menopause on hormone replacement therapy, and Thyroid disease. The patient's chief complaint is long, thick toenails. Gradual onset, worsening over past several weeks, aggravated by increased weight bearing, shoe gear, pressure.  No previous medical treatment.  OTC pain med not helping. Denies trauma, surgery nails..    PCP: Ronen Villegas MD    Date Last Seen by PCP:   Chief Complaint   Patient presents with    Diabetic Foot Exam     5/21/19 dr ronen villegas         Current shoe gear:  Affected Foot: Casual shoes     Unaffected Foot: Casual shoes    Hemoglobin A1C   Date Value Ref Range Status   05/21/2019 5.1 4.0 - 5.6 % Final     Comment:     ADA Screening Guidelines:  5.7-6.4%  Consistent with prediabetes  >or=6.5%  Consistent with diabetes  High levels of fetal hemoglobin interfere with the HbA1C  assay. Heterozygous hemoglobin variants (HbS, HgC, etc)do  not significantly interfere with this assay.   However, presence of multiple variants may affect accuracy.     01/17/2019 5.2 4.0 - 5.6 % Final     Comment:     ADA Screening Guidelines:  5.7-6.4%  Consistent with prediabetes  >or=6.5%  Consistent with diabetes  High levels of fetal hemoglobin interfere with the HbA1C  assay. Heterozygous hemoglobin variants (HbS, HgC, etc)do  not significantly interfere with this assay.   However, presence of multiple variants may affect accuracy.     09/24/2018 5.1 4.0 - 5.6 % Final     Comment:     ADA Screening Guidelines:  5.7-6.4%  Consistent with  prediabetes  >or=6.5%  Consistent with diabetes  High levels of fetal hemoglobin interfere with the HbA1C  assay. Heterozygous hemoglobin variants (HbS, HgC, etc)do  not significantly interfere with this assay.   However, presence of multiple variants may affect accuracy.         Review of Systems   Constitution: Negative for chills, diaphoresis, fever, malaise/fatigue and night sweats.   Cardiovascular: Negative for claudication, cyanosis, leg swelling and syncope.   Skin: Positive for nail changes. Negative for color change, dry skin, rash, suspicious lesions and unusual hair distribution.   Musculoskeletal: Negative for falls, joint pain, joint swelling, muscle cramps, muscle weakness and stiffness.   Gastrointestinal: Negative for constipation, diarrhea, nausea and vomiting.   Neurological: Positive for numbness, paresthesias and sensory change. Negative for brief paralysis, disturbances in coordination, focal weakness and tremors.           Objective:      Physical Exam   Constitutional: She is oriented to person, place, and time. She appears well-developed and well-nourished. She is cooperative. No distress.   Cardiovascular:   Pulses:       Popliteal pulses are 2+ on the right side, and 2+ on the left side.        Dorsalis pedis pulses are 2+ on the right side, and 2+ on the left side.        Posterior tibial pulses are 2+ on the right side, and 2+ on the left side.   Capillary refill 3 seconds all toes/distal feet, all toes/both feet warm to touch.      Negative lymphadenopathy bilateral popliteal fossa and tarsal tunnel.      Negavie lower extremity edema bilateral.     Musculoskeletal:        Right ankle: She exhibits normal range of motion, no swelling, no ecchymosis, no deformity, no laceration and normal pulse. Achilles tendon normal. Achilles tendon exhibits no pain, no defect and normal Noel's test results.   Normal angle, base, station of gait. All ten toes without clubbing, cyanosis, or signs of  ischemia.  No pain to palpation bilateral lower extremities.  Range of motion, stability, muscle strength, and muscle tone normal bilateral feet and legs.    Lymphadenopathy: No inguinal adenopathy noted on the right or left side.   Negative lymphadenopathy bilateral popliteal fossa and tarsal tunnel.    Negative lymphangitic streaking bilateral feet/ankles/legs.   Neurological: She is alert and oriented to person, place, and time. She has normal strength. She displays no atrophy and no tremor. A sensory deficit is present. She exhibits normal muscle tone. Gait normal.   Reflex Scores:       Patellar reflexes are 2+ on the right side and 2+ on the left side.       Achilles reflexes are 2+ on the right side and 2+ on the left side.  Diminished/loss of protective sensation all toes bilateral to 10 gram monofilament.    Paresthesias, and burning bilateral feet with no clearly identified trigger or source.     Skin: Skin is warm, dry and intact. Capillary refill takes 2 to 3 seconds. No abrasion, no bruising, no burn, no ecchymosis, no laceration, no lesion and no rash noted. She is not diaphoretic. No cyanosis or erythema. No pallor. Nails show no clubbing.     Skin is normal age and health appropriate color, turgor, texture, and temperature bilateral lower extremities without ulceration, hyperpigmentation, discoloration, masses nodules or cords palpated.  No ecchymosis, erythema, edema, or cardinal signs of infection bilateral lower extremities.    Toenails 1st, 2nd, 3rd, 4th, 5th  bilateral are hypertrophic thickened 2-3 mm, dystrophic, discolored tanish brown with tan, gray crumbly subungual debris.  Long, not tender to distal nail plate pressure, without periungual skin abnormality of each.     Psychiatric: She has a normal mood and affect.             Assessment:       Encounter Diagnoses   Name Primary?    Type II diabetes mellitus with neurological manifestations Yes    Onychomycosis due to dermatophyte           Plan:       Maria Ines was seen today for diabetic foot exam.    Diagnoses and all orders for this visit:    Type II diabetes mellitus with neurological manifestations    Onychomycosis due to dermatophyte    Other orders  -     ciclopirox (PENLAC) 8 % Soln; Apply topically nightly.      I counseled the patient on her conditions, their implications and medical management.    With the patient's permission, I debrided all ten toenails with a sterile nipper and curette, removing all offending nail and debris.  Patient tolerated the procedure well and related significant relief.    Rx penlac.      The patient has received literature on basic diabetic foot care.  Patient will inspect feet daily, wear protective shoe gear when ambulatory, and apply moisturizer to skin as needed to maintain elasticity and help prevent ulceration.       Discussed conservative treatment with shoes of adequate dimensions, material, and style to alleviate symptoms and delay or prevent surgical intervention.     Follow up in about 1 year (around 5/30/2020).

## 2019-05-31 ENCOUNTER — TELEPHONE (OUTPATIENT)
Dept: UROLOGY | Facility: CLINIC | Age: 34
End: 2019-05-31

## 2019-05-31 PROBLEM — R53.1 WEAKNESS: Status: ACTIVE | Noted: 2019-05-31

## 2019-05-31 PROBLEM — M54.2 PAINFUL CERVICAL ROM: Status: ACTIVE | Noted: 2019-05-31

## 2019-05-31 NOTE — TELEPHONE ENCOUNTER
----- Message from Maria Victoria Starr sent at 5/31/2019 10:19 AM CDT -----  Contact: Ruthie (mom): [phone]  Needs Advice    Reason for call:  Pt's mom states per the pt bethanechol (URECHOLINE) 25 MG  Causes her bladder to hurt would like to speak with nurse         Communication Preference: Ruthie (mom): [phone]

## 2019-05-31 NOTE — TELEPHONE ENCOUNTER
I had instructed Jeanmarie to call the patient's mother and instruct her to hold the bethanechol over the weekend and we can see if the bladder pain resolves.

## 2019-05-31 NOTE — TELEPHONE ENCOUNTER
----- Message from Haylee Yi LPN sent at 5/31/2019 11:36 AM CDT -----  Contact: Ruthie (mom): [phone]      ----- Message -----  From: Maria Victoria Starr  Sent: 5/31/2019  10:19 AM  To: Randal Freitas Staff    Needs Advice    Reason for call:  Pt's mom states per the pt bethanechol (URECHOLINE) 25 MG  Causes her bladder to hurt would like to speak with nurse         Communication Preference: Ruthie (mom): [phone]

## 2019-06-06 ENCOUNTER — OFFICE VISIT (OUTPATIENT)
Dept: UROLOGY | Facility: CLINIC | Age: 34
End: 2019-06-06
Payer: MEDICARE

## 2019-06-06 VITALS
BODY MASS INDEX: 34.44 KG/M2 | SYSTOLIC BLOOD PRESSURE: 123 MMHG | WEIGHT: 188.25 LBS | DIASTOLIC BLOOD PRESSURE: 87 MMHG | HEART RATE: 100 BPM

## 2019-06-06 DIAGNOSIS — R35.0 URINARY FREQUENCY: ICD-10-CM

## 2019-06-06 DIAGNOSIS — N39.41 URGE INCONTINENCE OF URINE: Primary | ICD-10-CM

## 2019-06-06 PROCEDURE — 81002 URINALYSIS NONAUTO W/O SCOPE: CPT | Mod: PBBFAC | Performed by: UROLOGY

## 2019-06-06 PROCEDURE — 51701 PR INSERTION OF NON-INDWELLING BLADDER CATHETERIZATION FOR RESIDUAL UR: ICD-10-PCS | Mod: S$PBB,,, | Performed by: UROLOGY

## 2019-06-06 PROCEDURE — 99213 OFFICE O/P EST LOW 20 MIN: CPT | Mod: S$PBB,25,, | Performed by: UROLOGY

## 2019-06-06 PROCEDURE — 99212 OFFICE O/P EST SF 10 MIN: CPT | Mod: PBBFAC,25 | Performed by: UROLOGY

## 2019-06-06 PROCEDURE — 51701 INSERT BLADDER CATHETER: CPT | Mod: PBBFAC | Performed by: UROLOGY

## 2019-06-06 PROCEDURE — 51701 INSERT BLADDER CATHETER: CPT | Mod: S$PBB,,, | Performed by: UROLOGY

## 2019-06-06 PROCEDURE — 99213 PR OFFICE/OUTPT VISIT, EST, LEVL III, 20-29 MIN: ICD-10-PCS | Mod: S$PBB,25,, | Performed by: UROLOGY

## 2019-06-06 PROCEDURE — 99999 PR PBB SHADOW E&M-EST. PATIENT-LVL II: ICD-10-PCS | Mod: PBBFAC,,, | Performed by: UROLOGY

## 2019-06-06 PROCEDURE — 99999 PR PBB SHADOW E&M-EST. PATIENT-LVL II: CPT | Mod: PBBFAC,,, | Performed by: UROLOGY

## 2019-06-06 NOTE — PROGRESS NOTES
CHIEF COMPLAINT:    Mrs. Ac is a 34 y.o. female presenting for a follow up after starting bethanechol.      PRESENTING ILLNESS:    Maria Ines Ac is a 34 y.o. female who returns accompanied by her mother.  She states that she feels like she is emptying better.  She was on bethanechol but she had abdominal pain that was actually suprapubic tenderness.  She discontinued its use and still has some suprapubic tenderness though it is not as bad as it was.   No longer having incontinence.      REVIEW OF SYSTEMS:    Review of Systems   Constitutional: Negative.    HENT: Negative.    Eyes: Negative.    Respiratory: Negative.    Cardiovascular: Negative.    Gastrointestinal: Positive for abdominal pain.   Genitourinary: Negative.    Musculoskeletal: Negative.    Skin: Negative.    Neurological: Negative.    Endo/Heme/Allergies: Negative.    Psychiatric/Behavioral: Negative.        PATIENT HISTORY:    Past Medical History:   Diagnosis Date    Blood in stool     Constipation     Cystitis     Depression     Diabetes mellitus, type 2     Dysphagia     Endometriosis     GERD (gastroesophageal reflux disease)     Headache     Hypertension     Palpitations     Premature menopause on hormone replacement therapy     Thyroid disease        Past Surgical History:   Procedure Laterality Date    BLADDER SUSPENSION      CARPAL TUNNEL RELEASE      right    CHOLECYSTECTOMY      COLONOSCOPY N/A 8/30/2016    Performed by [facility]on MD at [facility] ENDO (4TH FLR)    COLONOSCOPY N/A 1/9/2013    Performed by Gabriele Gibbons MD at [facility] ENDO (4TH FLR)    CYSTOSCOPY N/A 1/23/2017    Performed by Ninoska Ventura DO at [facility] [place] OR    EGD (ESOPHAGOGASTRODUODENOSCOPY) N/A 1/10/2013    Performed by Darryl Cuello MD at [facility] ENDO (2ND FLR)    ESOPHAGOGASTRODUODENOSCOPY (EGD) N/A 9/14/2017    Performed by Slick Herron MD at [facility] ENDO (4TH FLR)    ESOPHAGOGASTRODUODENOSCOPY (EGD) N/A 8/30/2016    Performed by  Slick Herron MD at [facility] ENDO (4TH FLR)    ESOPHAGOGASTRODUODENOSCOPY (EGD) N/A 10/23/2014    Performed by Slick Herron MD at [facility] ENDO (4TH FLR)    GALLBLADDER SURGERY      HYSTERECTOMY      ron, Bess jacobs, Dr. Ramirez    MANOMETRY, ESOPHAGUS, WITH IMPEDANCE MEASUREMENT N/A 2018    Performed by Slick Herron MD at [facility] ENDO (4TH FLR)    OOPHORECTOMY      LSO- benign cyst    OOPHORECTOMY      RSO- endo    ooptherectomy      RETROPUBIC SLING N/A 2017    Performed by [facility]ka [facility]ura [facility] at [facility] [place] OR    right knee  scoped    SHOULDER SURGERY  right    URODYNAMIC STUDY, FLUOROSCOPIC N/A 2019    Performed by Zena Tee MD at [facility] OR 1ST FLR       Family History   Problem Relation Age of Onset    Breast cancer Mother 50        alive at 64, unilateral    Esophageal cancer Mother 63    Ovarian cancer Mother 63    Cervical cancer Maternal Grandmother         unknown age of onset,  at 80    Colon cancer Brother 40    Breast cancer Paternal Aunt         unknown age of onset, alive at 70    Breast cancer Maternal Aunt 72        alive at 72     Socioeconomic History    Marital status: Single   Tobacco Use    Smoking status: Never Smoker    Smokeless tobacco: Never Used   Substance and Sexual Activity    Alcohol use: No    Drug use: No    Sexual activity: Never     Birth control/protection: None   Lifestyle       Allergies:  Patient has no known allergies.    Medications:  Outpatient Encounter Medications as of 2019   Medication Sig Dispense Refill    ACCU-CHEK SOFTCLIX LANCETS Misc USE TO TEST BLOOD GLUCOSE TID  3    atorvastatin (LIPITOR) 40 MG tablet Take 40 mg by mouth once daily.      bethanechol (URECHOLINE) 25 MG Tab Take 1 tablet (25 mg total) by mouth 3 (three) times daily. 90 tablet 11    BLOOD SUGAR DIAGNOSTIC (ACCU-CHEK AMAURY PLUS TEST STRP MISC) 1 strip by Misc.(Non-Drug; Combo Route) route 3 (three) times daily.       calcium-vitamin D3 500 mg(1,250mg) -200 unit per tablet Take 1 tablet by mouth 2 (two) times daily with meals.      ciclopirox (PENLAC) 8 % Soln Apply topically nightly. 6.6 mL 11    conjugated estrogens (PREMARIN) vaginal cream Use 0.5 gram of estrogen cream in vagina nightly x 2 weeks, then twice a week thereafter. 30 g 4    diclofenac sodium (VOLTAREN) 1 % Gel Apply 2 g topically 3 (three) times daily as needed. 100 g 0    dicyclomine (BENTYL) 10 MG capsule TAKE 2 CAPSULES(20 MG) BY MOUTH THREE TIMES DAILY BEFORE MEALS 180 capsule 2    estradiol (ESTRACE) 1 MG tablet Take 1 tablet (1 mg total) by mouth once daily. 30 tablet 11    fish oil-omega-3 fatty acids 300-1,000 mg capsule Take 2 g by mouth once daily.      gabapentin (NEURONTIN) 300 MG capsule Take 1 capsule (300 mg total) by mouth 3 (three) times daily as needed (Take for headache). 90 capsule 11    GLUCOPHAGE 500 mg tablet Take 500 mg by mouth 2 (two) times daily with meals.       ibuprofen (ADVIL,MOTRIN) 800 MG tablet 800 mg every 4 (four) hours as needed.   0    levothyroxine (SYNTHROID) 50 MCG tablet TK 1 T PO QAM  8    magnesium oxide (MAG-OX) 400 mg (241.3 mg magnesium) tablet Take 1 tablet (400 mg total) by mouth 2 (two) times daily.  0    metoprolol succinate (TOPROL-XL) 25 MG 24 hr tablet TAKE 1 TABLET(25 MG) BY MOUTH EVERY DAY 30 tablet 4    MULTIVIT-IRON-MIN-FOLIC ACID 3,500-18-0.4 UNIT-MG-MG ORAL CHEW Take 1 tablet by mouth once daily.       omeprazole (PRILOSEC OTC) 20 MG tablet Take 20 mg by mouth once daily.      pantoprazole (PROTONIX) 40 MG tablet Take 1 tablet (40 mg total) by mouth once daily. 90 tablet 3    phentermine (ADIPEX-P) 37.5 MG capsule Take 37.5 mg by mouth every morning.      PREMARIN 0.3 mg tablet   11    spironolactone (ALDACTONE) 25 MG tablet TK 1 T PO HS  2    STEGLATRO 15 mg Tab TK 1 T PO  QAM  9    topiramate (TOPAMAX) 50 MG tablet Take 2 tablets (100 mg total) by mouth once daily. 60 tablet 11     TRULICITY 1.5 mg/0.5 mL PnIj INJECT 1 PEN INTO THE SKIN Q WEEK  5    ziprasidone (GEODON) 20 MG Cap   1    ZOLOFT 100 mg tablet Take 200 mg by mouth once daily.        No facility-administered encounter medications on file as of 6/6/2019.          PHYSICAL EXAMINATION:    The patient generally appears in good health, is appropriately interactive, and is in no apparent distress.    Skin: No lesions.    Mental: Cooperative with normal affect.    Neuro: Grossly intact.    HEENT: Normal. No evidence of lymphadenopathy.    Chest:  normal inspiratory effort.    Abdomen:  Soft, non-tender. No masses or organomegaly. Bladder is not palpable. No evidence of flank discomfort. No evidence of inguinal hernia.    Extremities: No clubbing, cyanosis, or edema    Normal external female genitalia  Urethral meatus is normal  Urethra and bladder are nontender to bimanual exam  Well supported anteriorly and posteriorly   Uterus and cervix are surgically absent  No adnexal masses  PVR by catheterization was 20 ml    LABS:    Lab Results   Component Value Date    BUN 8 05/21/2019    CREATININE 1.1 05/21/2019     UA 1.005, pH 5, 1000 glucose, otherwise, negative    IMPRESSION:    Encounter Diagnoses   Name Primary?    Urge incontinence of urine Yes    Urinary frequency        PLAN:    1.  The catheterized specimen was sent for culture  2.  Take the bethanechol 25 mg once a day.    3.  She will let me know how she tolerates it.       Copy to:  Dr. Steffany Ventura.

## 2019-06-14 RX ORDER — PANTOPRAZOLE SODIUM 40 MG/1
TABLET, DELAYED RELEASE ORAL
Qty: 90 TABLET | Refills: 0 | Status: SHIPPED | OUTPATIENT
Start: 2019-06-14 | End: 2019-07-11 | Stop reason: SDUPTHER

## 2019-06-17 DIAGNOSIS — K58.0 IRRITABLE BOWEL SYNDROME WITH DIARRHEA: ICD-10-CM

## 2019-06-17 DIAGNOSIS — R10.84 GENERALIZED ABDOMINAL PAIN: ICD-10-CM

## 2019-06-17 RX ORDER — DICYCLOMINE HYDROCHLORIDE 10 MG/1
CAPSULE ORAL
Qty: 180 CAPSULE | Refills: 0 | Status: SHIPPED | OUTPATIENT
Start: 2019-06-17 | End: 2019-09-13 | Stop reason: SDUPTHER

## 2019-06-18 ENCOUNTER — LAB VISIT (OUTPATIENT)
Dept: LAB | Facility: HOSPITAL | Age: 34
End: 2019-06-18
Attending: NURSE PRACTITIONER
Payer: MEDICARE

## 2019-06-18 ENCOUNTER — DOCUMENTATION ONLY (OUTPATIENT)
Dept: SURGERY | Facility: CLINIC | Age: 34
End: 2019-06-18

## 2019-06-18 DIAGNOSIS — Z12.39 BREAST CANCER SCREENING, HIGH RISK PATIENT: ICD-10-CM

## 2019-06-18 LAB
CREAT SERPL-MCNC: 1.1 MG/DL (ref 0.5–1.4)
EST. GFR  (AFRICAN AMERICAN): >60 ML/MIN/1.73 M^2
EST. GFR  (NON AFRICAN AMERICAN): >60 ML/MIN/1.73 M^2

## 2019-06-18 PROCEDURE — 36415 COLL VENOUS BLD VENIPUNCTURE: CPT

## 2019-06-18 PROCEDURE — 82565 ASSAY OF CREATININE: CPT

## 2019-06-18 NOTE — PROGRESS NOTES
Received notice from [facility] that pt was unable to be reached for her genetic test results disclosure appt that was scheduled for 1/30/19 with genetic counselor Lili Harris.  [facility].  Called pt at [phone], and spoke with her mother, who is listed as pt's contact and stated pt is not currently available; provided [facility] contact info with recommendation to call to set up post-test genetic counseling appt.

## 2019-06-24 ENCOUNTER — TELEPHONE (OUTPATIENT)
Dept: UROLOGY | Facility: CLINIC | Age: 34
End: 2019-06-24

## 2019-06-24 ENCOUNTER — OFFICE VISIT (OUTPATIENT)
Dept: UROLOGY | Facility: CLINIC | Age: 34
End: 2019-06-24
Payer: MEDICARE

## 2019-06-24 VITALS
WEIGHT: 197.06 LBS | SYSTOLIC BLOOD PRESSURE: 115 MMHG | DIASTOLIC BLOOD PRESSURE: 79 MMHG | BODY MASS INDEX: 36.05 KG/M2 | HEART RATE: 85 BPM

## 2019-06-24 DIAGNOSIS — R39.15 URINARY URGENCY: ICD-10-CM

## 2019-06-24 DIAGNOSIS — E11.43 TYPE 2 DIABETES MELLITUS WITH DIABETIC AUTONOMIC NEUROPATHY, WITHOUT LONG-TERM CURRENT USE OF INSULIN: ICD-10-CM

## 2019-06-24 DIAGNOSIS — R33.9 INCOMPLETE EMPTYING OF BLADDER: Primary | ICD-10-CM

## 2019-06-24 DIAGNOSIS — R33.9 INCOMPLETE BLADDER EMPTYING: ICD-10-CM

## 2019-06-24 DIAGNOSIS — R35.0 URINARY FREQUENCY: Primary | ICD-10-CM

## 2019-06-24 PROCEDURE — 99999 PR PBB SHADOW E&M-EST. PATIENT-LVL IV: ICD-10-PCS | Mod: PBBFAC,,, | Performed by: UROLOGY

## 2019-06-24 PROCEDURE — 99213 PR OFFICE/OUTPT VISIT, EST, LEVL III, 20-29 MIN: ICD-10-PCS | Mod: S$PBB,,, | Performed by: UROLOGY

## 2019-06-24 PROCEDURE — 99213 OFFICE O/P EST LOW 20 MIN: CPT | Mod: S$PBB,,, | Performed by: UROLOGY

## 2019-06-24 PROCEDURE — 99214 OFFICE O/P EST MOD 30 MIN: CPT | Mod: PBBFAC | Performed by: UROLOGY

## 2019-06-24 PROCEDURE — 99999 PR PBB SHADOW E&M-EST. PATIENT-LVL IV: CPT | Mod: PBBFAC,,, | Performed by: UROLOGY

## 2019-06-24 PROCEDURE — 81002 URINALYSIS NONAUTO W/O SCOPE: CPT | Mod: PBBFAC | Performed by: UROLOGY

## 2019-06-24 NOTE — PROGRESS NOTES
CHIEF COMPLAINT:    Mrs. Ac is a 34 y.o. female presenting for a follow up on incomplete bladder emptying secondary to diabetes mellitus.    PRESENTING ILLNESS:    Maria Ines Ac is a 34 y.o. female who states that she experienced abdominal pain with the bethanechol 25 mg qd.  She continues to have urgency, frequency symptoms.  She is interested in further therapy.    REVIEW OF SYSTEMS:    Review of Systems   Constitutional: Negative.    HENT: Negative.    Eyes: Negative.    Respiratory: Negative.    Cardiovascular: Negative.    Gastrointestinal: Negative.    Genitourinary: Positive for frequency and urgency.   Musculoskeletal: Negative.    Skin: Negative.    Neurological: Negative.    Endo/Heme/Allergies:        Diabetic   Psychiatric/Behavioral: Negative.        PATIENT HISTORY:    Past Medical History:   Diagnosis Date    Blood in stool     Constipation     Cystitis     Depression     Diabetes mellitus, type 2     Dysphagia     Endometriosis     GERD (gastroesophageal reflux disease)     Headache     Hypertension     Palpitations     Premature menopause on hormone replacement therapy     Thyroid disease        Past Surgical History:   Procedure Laterality Date    BLADDER SUSPENSION      CARPAL TUNNEL RELEASE      right    CHOLECYSTECTOMY      COLONOSCOPY N/A 8/30/2016    Performed by Slick Herron MD at [facility] ENDO (4TH FLR)    COLONOSCOPY N/A 1/9/2013    Performed by Gabriele Gibbons MD at [facility] ENDO (4TH FLR)    CYSTOSCOPY N/A 1/23/2017    Performed by Ninoska Ventura DO at [facility] OR    EGD (ESOPHAGOGASTRODUODENOSCOPY) N/A 1/10/2013    Performed by Darryl Cuello MD at [facility] ENDO (2ND FLR)    ESOPHAGOGASTRODUODENOSCOPY (EGD) N/A 9/14/2017    Performed by Slick Herron MD at [facility] ENDO (4TH FLR)    ESOPHAGOGASTRODUODENOSCOPY (EGD) N/A 8/30/2016    Performed by Slick Herron MD at [facility] ENDO (4TH FLR)    ESOPHAGOGASTRODUODENOSCOPY (EGD) N/A 10/23/2014    Performed by  Slick Herron MD at [facility] ENDO (4TH FLR)    GALLBLADDER SURGERY      HYSTERECTOMY      ron, Dr. Ashley Smith    MANOMETRY, ESOPHAGUS, WITH IMPEDANCE MEASUREMENT N/A 2018    Performed by Slick Herron MD at [facility] ENDO (4TH FLR)    OOPHORECTOMY      LSO- benign cyst    OOPHORECTOMY      RSO- endo    ooptherectomy      RETROPUBIC SLING N/A 2017    Performed by N[facility]ka [facility] DO at [facility] OR    right knee  scoped    SHOULDER SURGERY  right    URODYNAMIC STUDY, FLUOROSCOPIC N/A 2019    Performed by Zena Tee MD at [facility] OR 1ST FLR       Family History   Problem Relation Age of Onset    Breast cancer Mother 50        alive at 64, unilateral    Esophageal cancer Mother 63    Ovarian cancer Mother 63    Cervical cancer Maternal Grandmother         unknown age of onset,  at 80    Colon cancer Brother 40    Breast cancer Paternal Aunt         unknown age of onset, alive at 70    Breast cancer Maternal Aunt 72        alive at 72     Socioeconomic History    Marital status: Single   Tobacco Use    Smoking status: Never Smoker    Smokeless tobacco: Never Used   Substance and Sexual Activity    Alcohol use: No    Drug use: No    Sexual activity: Never     Birth control/protection: None       Allergies:  Patient has no known allergies.    Medications:  Outpatient Encounter Medications as of 2019   Medication Sig Dispense Refill    ACCU-CHEK SOFTCLIX LANCETS [facility] USE TO TEST BLOOD GLUCOSE TID  3    atorvastatin (LIPITOR) 40 MG tablet Take 40 mg by mouth once daily.      BLOOD SUGAR DIAGNOSTIC (ACCU-CHEK AMAURY PLUS TEST STRP MISC) 1 strip by Misc.(Non-Drug; Combo Route) route 3 (three) times daily.      calcium-vitamin D3 500 mg(1,250mg) -200 unit per tablet Take 1 tablet by mouth 2 (two) times daily with meals.      ciclopirox (PENLAC) 8 % Soln Apply topically nightly. 6.6 mL 11    conjugated estrogens (PREMARIN) vaginal cream Use 0.5 gram  of estrogen cream in vagina nightly x 2 weeks, then twice a week thereafter. 30 g 4    diclofenac sodium (VOLTAREN) 1 % Gel Apply 2 g topically 3 (three) times daily as needed. 100 g 0    dicyclomine (BENTYL) 10 MG capsule TAKE 2 CAPSULES(20 MG) BY MOUTH THREE TIMES DAILY BEFORE MEALS 180 capsule 0    estradiol (ESTRACE) 1 MG tablet Take 1 tablet (1 mg total) by mouth once daily. 30 tablet 11    fish oil-omega-3 fatty acids 300-1,000 mg capsule Take 2 g by mouth once daily.      gabapentin (NEURONTIN) 300 MG capsule Take 1 capsule (300 mg total) by mouth 3 (three) times daily as needed (Take for headache). 90 capsule 11    GLUCOPHAGE 500 mg tablet Take 500 mg by mouth 2 (two) times daily with meals.       ibuprofen (ADVIL,MOTRIN) 800 MG tablet 800 mg every 4 (four) hours as needed.   0    levothyroxine (SYNTHROID) 50 MCG tablet TK 1 T PO QAM  8    magnesium oxide (MAG-OX) 400 mg (241.3 mg magnesium) tablet Take 1 tablet (400 mg total) by mouth 2 (two) times daily.  0    metoprolol succinate (TOPROL-XL) 25 MG 24 hr tablet TAKE 1 TABLET(25 MG) BY MOUTH EVERY DAY 30 tablet 4    MULTIVIT-IRON-MIN-FOLIC ACID 3,500-18-0.4 UNIT-MG-MG ORAL CHEW Take 1 tablet by mouth once daily.       omeprazole (PRILOSEC OTC) 20 MG tablet Take 20 mg by mouth once daily.      pantoprazole (PROTONIX) 40 MG tablet TAKE 1 TABLET BY MOUTH EVERY DAY 90 tablet 0    phentermine (ADIPEX-P) 37.5 MG capsule Take 37.5 mg by mouth every morning.      PREMARIN 0.3 mg tablet   11    spironolactone (ALDACTONE) 25 MG tablet TK 1 T PO HS  2    STEGLATRO 15 mg Tab TK 1 T PO  QAM  9    TRULICITY 1.5 mg/0.5 mL PnIj INJECT 1 PEN INTO THE SKIN Q WEEK  5    ziprasidone (GEODON) 20 MG Cap   1    ZOLOFT 100 mg tablet Take 200 mg by mouth once daily.       [DISCONTINUED] bethanechol (URECHOLINE) 25 MG Tab Take 1 tablet (25 mg total) by mouth 3 (three) times daily. 90 tablet 11    topiramate (TOPAMAX) 50 MG tablet Take 2 tablets (100 mg total)  by mouth once daily. 60 tablet 11     No facility-administered encounter medications on file as of 6/24/2019.          PHYSICAL EXAMINATION:    The patient generally appears in good health, is appropriately interactive, and is in no apparent distress.    Skin: No lesions.    Mental: Cooperative with normal affect.    Neuro: Grossly intact.    HEENT: Normal. No evidence of lymphadenopathy.    Chest:  normal inspiratory effort.    Abdomen:  Soft, non-tender. No masses or organomegaly. Bladder is not palpable. No evidence of flank discomfort. No evidence of inguinal hernia.    Extremities: No clubbing, cyanosis, or edema      LABS:    Lab Results   Component Value Date    BUN 8 05/21/2019    CREATININE 1.1 06/18/2019     UA 1.000, pH 5, 500 glucose, otherwise, negative (on ertigluflozin)    IMPRESSION:    Encounter Diagnoses   Name Primary?    Incomplete emptying of bladder Yes    Type 2 diabetes mellitus with diabetic autonomic neuropathy, without long-term current use of insulin        PLAN:    1.  HgA1c  2.  Discussed InterStim vs. Self catheterization and she would like to try the InterStim.  She has a [employer] and uses functions like looking things up on the Internet.  Discussed how the procedure is done and she states she can do it.  3.  Consent signed.

## 2019-06-24 NOTE — H&P (VIEW-ONLY)
CHIEF COMPLAINT:    Mrs. Ac is a 34 y.o. female presenting for a follow up on incomplete bladder emptying secondary to diabetes mellitus.    PRESENTING ILLNESS:    Maria Ines Ac is a 34 y.o. female who states that she experienced abdominal pain with the bethanechol 25 mg qd.  She continues to have urgency, frequency symptoms.  She is interested in further therapy.    REVIEW OF SYSTEMS:    Review of Systems   Constitutional: Negative.    HENT: Negative.    Eyes: Negative.    Respiratory: Negative.    Cardiovascular: Negative.    Gastrointestinal: Negative.    Genitourinary: Positive for frequency and urgency.   Musculoskeletal: Negative.    Skin: Negative.    Neurological: Negative.    Endo/Heme/Allergies:        Diabetic   Psychiatric/Behavioral: Negative.        PATIENT HISTORY:    Past Medical History:   Diagnosis Date    Blood in stool     Constipation     Cystitis     Depression     Diabetes mellitus, type 2     Dysphagia     Endometriosis     GERD (gastroesophageal reflux disease)     Headache     Hypertension     Palpitations     Premature menopause on hormone replacement therapy     Thyroid disease        Past Surgical History:   Procedure Laterality Date    BLADDER SUSPENSION      CARPAL TUNNEL RELEASE      right    CHOLECYSTECTOMY      COLONOSCOPY N/A 8/30/2016    Performed by Slick [facility] MD at [facility] ENDO (4TH FLR)    COLONOSCOPY N/A 1/9/2013    Performed by Gabriele Gibbons MD at [facility] ENDO (4TH FLR)    CYSTOSCOPY N/A 1/23/2017    Performed by [facility]ka [facility] DO at [facility] OR    EGD (ESOPHAGOGASTRODUODENOSCOPY) N/A 1/10/2013    Performed by Darryl Cuello MD at [facility] ENDO (2ND FLR)    ESOPHAGOGASTRODUODENOSCOPY (EGD) N/A 9/14/2017    Performed by Slick Herron MD at [facility] ENDO (4TH FLR)    ESOPHAGOGASTRODUODENOSCOPY (EGD) N/A 8/30/2016    Performed by Slick Herron MD at [facility] ENDO (4TH FLR)    ESOPHAGOGASTRODUODENOSCOPY (EGD) N/A 10/23/2014    Performed by  Slick Herron MD at [facility] ENDO (4TH FLR)    GALLBLADDER SURGERY      HYSTERECTOMY      ron, Dr. Ashley Smith    MANOMETRY, ESOPHAGUS, WITH IMPEDANCE MEASUREMENT N/A 2018    Performed by Slick Herron MD at [facility] ENDO (4TH FLR)    OOPHORECTOMY      LSO- benign cyst    OOPHORECTOMY      RSO- endo    ooptherectomy      RETROPUBIC SLING N/A 2017    Performed by [facility] DO at [facility] OR    right knee  scoped    SHOULDER SURGERY  right    URODYNAMIC STUDY, FLUOROSCOPIC N/A 2019    Performed by Zena Tee MD at [facility] OR 1ST FLR       Family History   Problem Relation Age of Onset    Breast cancer Mother 50        alive at 64, unilateral    Esophageal cancer Mother 63    Ovarian cancer Mother 63    Cervical cancer Maternal Grandmother         unknown age of onset,  at 80    Colon cancer Brother 40    Breast cancer Paternal Aunt         unknown age of onset, alive at 70    Breast cancer Maternal Aunt 72        alive at 72     Socioeconomic History    Marital status: Single   Tobacco Use    Smoking status: Never Smoker    Smokeless tobacco: Never Used   Substance and Sexual Activity    Alcohol use: No    Drug use: No    Sexual activity: Never     Birth control/protection: None       Allergies:  Patient has no known allergies.    Medications:  Outpatient Encounter Medications as of 2019   Medication Sig Dispense Refill    ACCU-CHEK SOFTCLIX LANCETS [facility] USE TO TEST BLOOD GLUCOSE TID  3    atorvastatin (LIPITOR) 40 MG tablet Take 40 mg by mouth once daily.      BLOOD SUGAR DIAGNOSTIC (ACCU-CHEK AMAURY PLUS TEST STRP MISC) 1 strip by Misc.(Non-Drug; Combo Route) route 3 (three) times daily.      calcium-vitamin D3 500 mg(1,250mg) -200 unit per tablet Take 1 tablet by mouth 2 (two) times daily with meals.      ciclopirox (PENLAC) 8 % Soln Apply topically nightly. 6.6 mL 11    conjugated estrogens (PREMARIN) vaginal cream Use 0.5 gram  of estrogen cream in vagina nightly x 2 weeks, then twice a week thereafter. 30 g 4    diclofenac sodium (VOLTAREN) 1 % Gel Apply 2 g topically 3 (three) times daily as needed. 100 g 0    dicyclomine (BENTYL) 10 MG capsule TAKE 2 CAPSULES(20 MG) BY MOUTH THREE TIMES DAILY BEFORE MEALS 180 capsule 0    estradiol (ESTRACE) 1 MG tablet Take 1 tablet (1 mg total) by mouth once daily. 30 tablet 11    fish oil-omega-3 fatty acids 300-1,000 mg capsule Take 2 g by mouth once daily.      gabapentin (NEURONTIN) 300 MG capsule Take 1 capsule (300 mg total) by mouth 3 (three) times daily as needed (Take for headache). 90 capsule 11    GLUCOPHAGE 500 mg tablet Take 500 mg by mouth 2 (two) times daily with meals.       ibuprofen (ADVIL,MOTRIN) 800 MG tablet 800 mg every 4 (four) hours as needed.   0    levothyroxine (SYNTHROID) 50 MCG tablet TK 1 T PO QAM  8    magnesium oxide (MAG-OX) 400 mg (241.3 mg magnesium) tablet Take 1 tablet (400 mg total) by mouth 2 (two) times daily.  0    metoprolol succinate (TOPROL-XL) 25 MG 24 hr tablet TAKE 1 TABLET(25 MG) BY MOUTH EVERY DAY 30 tablet 4    MULTIVIT-IRON-MIN-FOLIC ACID 3,500-18-0.4 UNIT-MG-MG ORAL CHEW Take 1 tablet by mouth once daily.       omeprazole (PRILOSEC OTC) 20 MG tablet Take 20 mg by mouth once daily.      pantoprazole (PROTONIX) 40 MG tablet TAKE 1 TABLET BY MOUTH EVERY DAY 90 tablet 0    phentermine (ADIPEX-P) 37.5 MG capsule Take 37.5 mg by mouth every morning.      PREMARIN 0.3 mg tablet   11    spironolactone (ALDACTONE) 25 MG tablet TK 1 T PO HS  2    STEGLATRO 15 mg Tab TK 1 T PO  QAM  9    TRULICITY 1.5 mg/0.5 mL PnIj INJECT 1 PEN INTO THE SKIN Q WEEK  5    ziprasidone (GEODON) 20 MG Cap   1    ZOLOFT 100 mg tablet Take 200 mg by mouth once daily.       [DISCONTINUED] bethanechol (URECHOLINE) 25 MG Tab Take 1 tablet (25 mg total) by mouth 3 (three) times daily. 90 tablet 11    topiramate (TOPAMAX) 50 MG tablet Take 2 tablets (100 mg total)  by mouth once daily. 60 tablet 11     No facility-administered encounter medications on file as of 6/24/2019.          PHYSICAL EXAMINATION:    The patient generally appears in good health, is appropriately interactive, and is in no apparent distress.    Skin: No lesions.    Mental: Cooperative with normal affect.    Neuro: Grossly intact.    HEENT: Normal. No evidence of lymphadenopathy.    Chest:  normal inspiratory effort.    Abdomen:  Soft, non-tender. No masses or organomegaly. Bladder is not palpable. No evidence of flank discomfort. No evidence of inguinal hernia.    Extremities: No clubbing, cyanosis, or edema      LABS:    Lab Results   Component Value Date    BUN 8 05/21/2019    CREATININE 1.1 06/18/2019     UA 1.000, pH 5, 500 glucose, otherwise, negative (on ertigluflozin)    IMPRESSION:    Encounter Diagnoses   Name Primary?    Incomplete emptying of bladder Yes    Type 2 diabetes mellitus with diabetic autonomic neuropathy, without long-term current use of insulin        PLAN:    1.  HgA1c  2.  Discussed InterStim vs. Self catheterization and she would like to try the InterStim.  She has a [employer] and uses functions like looking things up on the Internet.  Discussed how the procedure is done and she states she can do it.  3.  Consent signed.

## 2019-06-25 ENCOUNTER — TELEPHONE (OUTPATIENT)
Dept: INTERNAL MEDICINE | Facility: CLINIC | Age: 34
End: 2019-06-25

## 2019-06-25 ENCOUNTER — ANESTHESIA EVENT (OUTPATIENT)
Dept: SURGERY | Facility: HOSPITAL | Age: 34
End: 2019-06-25
Payer: MEDICARE

## 2019-06-25 DIAGNOSIS — Z01.818 PREOP TESTING: Primary | ICD-10-CM

## 2019-06-25 NOTE — PRE ADMISSION SCREENING
"Anesthesia Assessment: Preoperative EQUATION    Planned Procedure: Procedure(s) (LRB):  INSERTION, NEUROSTIMULATOR, TEMPORARY, SACRAL (N/A)  Requested Anesthesia Type:Monitor Anesthesia Care  Surgeon: Zena Tee MD  Service: Urology  Known or anticipated Date of Surgery:7/2/2019    Surgeon notes: reviewed and Urinary frequency Incomplete bladder emptying Urinary urgency  Previous anesthesia records:9/14/17-EGD-General-no apparent anesthetic complications and tolerated procedure well    Anesthesia Hx:  No problems with previous Anesthesia  Denies Family Hx of Anesthesia complications.   Denies Personal Hx of Anesthesia complications.     Last PCP note: within 3 months , within [facility] , focused visit   Subspecialty notes: Cardiology: General, Gastroenterology, Hematology/Oncology, Breast Surgery Visit/OB GYN/Podiatry/Urogynecology/Urology  Tests already available:  Results have been reviewed.Labs-6/24/19-A1c/6/18/19-Creatinine, serum/ 5/21/19-A1c/CMP/ 5/16/19-Estradiol/ 4/23/19-BRCA-"in process'/ CXR-6/30/18/ EKG-8/23/18      Plan:    Testing:  T&S     Patient  has previously scheduled Medical Appointment:Appointments on 6/26/19/ 6/27/19/ & 7/1/19, prior to surgery date    Navigation: Phone Completed  Tests Scheduled. Lab-T&S (Sign & Held)-ordered             Consults scheduled. Request sent to [facility] PCP  for "Clearance"-PENDING(recent visit on 5/21/19).               Results will be tracked by Preop Clinic.               Thu New RN  6/25/19  "

## 2019-06-25 NOTE — TELEPHONE ENCOUNTER
----- Message from Nancy Bangura sent at 6/25/2019  3:36 PM CDT -----  Contact: Preop [phone]  Preop is requesting to have a surgery clearance for patient, it needs to be put in Epic.    Please advise, thanks

## 2019-06-25 NOTE — ANESTHESIA PREPROCEDURE EVALUATION
" Anesthesia Assessment: Preoperative EQUATION    Planned Procedure: Procedure(s) (LRB):  INSERTION, NEUROSTIMULATOR, TEMPORARY, SACRAL (N/A)  Requested Anesthesia Type:Monitor Anesthesia Care  Surgeon: Zena Tee MD  Service: Urology  Known or anticipated Date of Surgery:7/2/2019    Surgeon notes: reviewed and Urinary frequency Incomplete bladder emptying Urinary urgency  Previous anesthesia records:9/14/17-EGD-General-no apparent anesthetic complications and tolerated procedure well    Anesthesia Hx:  No problems with previous Anesthesia  Denies Family Hx of Anesthesia complications.   Denies Personal Hx of Anesthesia complications.     Last PCP note: within 3 months , within [facility] , focused visit   Subspecialty notes: Cardiology: General, Gastroenterology, Hematology/Oncology, Breast Surgery Visit/OB GYN/Podiatry/Urogynecology/Urology  Tests already available:  Results have been reviewed.Labs-6/24/19-A1c/6/18/19-Creatinine, serum/ 5/21/19-A1c/CMP/ 5/16/19-Estradiol/ 4/23/19-BRCA-"in process'/ CXR-6/30/18/ EKG-8/23/18      Plan:    Testing:  T&S     Patient  has previously scheduled Medical Appointment:Appointments on 6/26/19/ 6/27/19/ & 7/1/19, prior to surgery date    Navigation: Phone Completed  Tests Scheduled. Lab-T&S (Sign & Held)-ordered             Consults scheduled. Request sent to [facility] PCP  for "Clearance"-PENDING(recent visit on 5/21/19).               Results will be tracked by Preop Clinic.     Plan reviewed with .              Thu New RN  6/25/19      Addendum: [facility] PCP -"Clearance" in chart note as of 6/25/19.  Thu New RN  6/25/19 07/02/2019  Maria Ines Ac is a 34 y.o., female.  [facility]  Anesthesia Pre-Operative Evaluation         Patient Name: Maria Ines Ac  YOB: 1985  MRN: " [id number]    SUBJECTIVE:     Pre-operative evaluation for Procedure(s) (LRB):  INSERTION, NEUROSTIMULATOR, TEMPORARY, SACRAL (N/A)     07/02/2019    Maria Ines Ac is a 34 y.o. female w/ a significant PMHx of HTN, DM, Depression, GERD, Hypothyroidism    Patient now presents for the above procedure(s).      LDA: None documented.    Prev airway: Documented using Rosado MAC 3, easy ventilation and ET size 7. Other procedures we're purely done with MAC (no intubation)    Drips: None documented.    Patient Active Problem List   Diagnosis    Abdominal pain, RLQ (right lower quadrant)    Dysphagia    Diabetes mellitus, type II    Constipation, chronic    Right hip pain    New daily persistent headache    Hypertension    Palpitations    Chest pain, non-cardiac    Dyspnea on exertion    Urinary frequency    Severe obesity (BMI 35.0-39.9) with comorbidity    Muscle spasm    Gastroesophageal reflux disease without esophagitis    Irritable bowel syndrome with diarrhea    Migrainous vertigo    Uncontrolled morning headache    Sleep arousal disorder    Hyperlipidemia    GERD (gastroesophageal reflux disease)    Family history of breast cancer    Family hx of ovarian malignancy    Weakness    Painful cervical ROM    Incomplete emptying of bladder       Review of patient's allergies indicates:  No Known Allergies    Current Outpatient Medications:    Current Facility-Administered Medications:     0.9%  NaCl infusion, , Intravenous, Continuous, Jerome Gillespie MD, Last Rate: 10 mL/hr at 07/02/19 1252    gentamicin (GARAMYCIN) 240 mg in sodium chloride 0.9% 100 mL IVPB, 240 mg, Intravenous, On Call Procedure, Jerome Gillespie MD    vancomycin 1.5 g in dextrose 5 % 250 mL IVPB (ready to mix), 1,500 mg, Intravenous, On Call Procedure, Jerome Gillespie MD, Last Rate: 166.7 mL/hr at 07/02/19 1335, 1,500 mg at 07/02/19 1335    Past Surgical History:   Procedure Laterality Date    BLADDER SUSPENSION       CARPAL TUNNEL RELEASE      right    CHOLECYSTECTOMY      COLONOSCOPY N/A 8/30/2016    [facility] at [facility] ENDO (4TH FLR)    COLONOSCOPY N/A 1/9/2013    [facility]formed by Gabriele Gibbons [facility] at [facility] ENDO (4TH FLR)    CYSTOSCOPY N/A 1/23/2017    Performed by [facility]ka [facility]ura DO at [facility] OR    EGD (ESOPHAGOGASTRODUODENOSCOPY) N/A 1/10/2013    Performed by Darryl Cuello [facility] at [facility] ENDO (2ND FLR)    ESOPHAGOGASTRODUODENOSCOPY (EGD) N/A 9/14/2017    Performed by [facility] at [facility] ENDO (4TH FLR)    ESOPHAGOGASTRODUODENOSCOPY (EGD) N/A 8/30/2016    Performed by Slick eHrron MD at [facility] ENDO (4TH FLR)    ESOPHAGOGASTRODUODENOSCOPY (EGD) N/A 10/23/2014    Performed by [facility] at [facility] ENDO (4TH FLR)    GALLBLADDER SURGERY      HYSTERECTOMY  2013    endo, Bess jacobs, Dr. Ramirez    MANOMETRY, ESOPHAGUS, WITH IMPEDANCE MEASUREMENT N/A 11/5/2018    Performed by [facility] at [facility] ENDO (4TH FLR)    OOPHORECTOMY  2005    LSO- benign cyst    OOPHORECTOMY  2013    RSO- endo    ooptherectomy      RETROPUBIC SLING N/A 1/23/2017    Performed by [facility]ura [facility] at [facility] OR    right knee  scoped    SHOULDER SURGERY  right    URODYNAMIC STUDY, FLUOROSCOPIC N/A 5/23/2019    Performed by [facility]ee [facility] at [facility] OR 1ST FLR       Social History     Socioeconomic History    Marital status: Single     Spouse name: Not on file    Number of children: Not on file    Years of education: Not on file    Highest education level: Not on file   Occupational History    Not on file   Social Needs    Financial resource strain: Not on file    Food insecurity:     Worry: Not on file     Inability: Not on file    Transportation needs:     Medical: Not on file     Non-medical: Not on file   Tobacco Use    Smoking status: Never Smoker    Smokeless tobacco: Never Used   Substance and Sexual Activity    Alcohol use: No    Drug use: No    Sexual activity: Never      Birth control/protection: None   Lifestyle    Physical activity:     Days per week: Not on file     Minutes per session: Not on file    Stress: Not on file   Relationships    Social connections:     Talks on phone: Not on file     Gets together: Not on file     Attends Jew service: Not on file     Active member of club or organization: Not on file     Attends meetings of clubs or organizations: Not on file     Relationship status: Not on file   Other Topics Concern    Not on file   Social History Narrative    ** Merged History Encounter **            OBJECTIVE:     Vital Signs Range (Last 24H):  Temp:  [36.5 °C (97.7 °F)]   Pulse:  [81]   Resp:  [20]   BP: (97)/(58)   SpO2:  [95 %]       Significant Labs:  Lab Results   Component Value Date    WBC 6.25 2018    HGB 13.6 2018    HCT 42.3 2018     2018    CHOL 156 2019    TRIG 201 (H) 2019    HDL 37 (L) 2019    ALT 18 2019    AST 16 2019     2019    K 4.4 2019     2019    CREATININE 1.1 2019    BUN 8 2019    CO2 27 2019    TSH 2.151 2018    INR 0.9 2017    HGBA1C 5.1 2019       Diagnostic Studies: No relevant studies.    EK/2018 NSR 85.LAD, incomp RBBB    2D ECHO:  Results for orders placed or performed during the hospital encounter of 16   2D echo with color flow doppler   Result Value Ref Range    QEF 55 55 - 65    Diastolic Dysfunction No      2016 nuc stress neg for ischemia      ASSESSMENT/PLAN:       Anesthesia Evaluation    I have reviewed the Patient Summary Reports.    I have reviewed the Nursing Notes.   I have reviewed the Medications.     Review of Systems  Anesthesia Hx:  No problems with previous Anesthesia  History of prior surgery of interest to airway management or planning: Denies Family Hx of Anesthesia complications.   Denies Personal Hx of Anesthesia complications.   Social:  Non-Smoker, No Alcohol  Use    Hematology/Oncology:  Hematology Normal   Oncology Normal     EENT/Dental:EENT/Dental Normal   Cardiovascular:   Exercise tolerance: good Hypertension, well controlled Denies MI.  Denies CAD.    hyperlipidemia ECG has been reviewed. Atypical chest pain neg ischemic workup in past   Pulmonary:   Shortness of breath Denies Sleep Apnea.    Renal/:   Chronic Renal Disease, CRI    Hepatic/GI:   GERD, well controlled    Neurological:   Denies CVA. Headaches Denies Seizures. Neurogenic bladder   Endocrine:   Diabetes, well controlled, type 2 Hypothyroidism    Psych:   depression      Thu New RN  6/25/19    Physical Exam  General:  Well nourished, Obesity    Airway/Jaw/Neck:  Airway Findings: Mouth Opening: Small, but > 3cm Tongue: Large  General Airway Assessment: Adult  Mallampati: III  Improves to II with phonation.  TM Distance: Normal, at least 6 cm  Jaw/Neck Findings:  Micrognathia: Negative Neck ROM: Normal ROM  Neck Findings:  Girth Increased      Dental:  Dental Findings: In tact, Periodontal disease, Mild   Chest/Lungs:  Chest/Lungs Findings: Clear to auscultation, Normal Respiratory Rate     Heart/Vascular:  Heart Findings: Rate: Normal  Rhythm: Regular Rhythm  Sounds: Normal     Abdomen:  Abdomen Findings:  Normal     Musculoskeletal:  Musculoskeletal Findings:    Skin:  Skin Findings:     Mental Status:  Mental Status Findings:  Alert and Oriented, Cooperative         Anesthesia Plan  Type of Anesthesia, risks & benefits discussed:  Anesthesia Type:  MAC, general  Patient's Preference:   Intra-op Monitoring Plan: standard ASA monitors  Intra-op Monitoring Plan Comments:   Post Op Pain Control Plan: multimodal analgesia and IV/PO Opioids PRN  Post Op Pain Control Plan Comments:   Induction:   IV  Beta Blocker:  Patient is not currently on a Beta-Blocker (No further documentation required).       Informed Consent: Patient understands risks and agrees with Anesthesia plan.  Questions answered.  Anesthesia consent signed with patient.  ASA Score: 2     Day of Surgery Review of History & Physical:    H&P update referred to the surgeon.     Anesthesia Plan Notes: Plan MAC. Airway management/support if indicated. All questions answered. Informed consent obtained. Pt agrees to proceed.           Ready For Surgery From Anesthesia Perspective.

## 2019-06-26 ENCOUNTER — PATIENT MESSAGE (OUTPATIENT)
Dept: SURGERY | Facility: CLINIC | Age: 34
End: 2019-06-26

## 2019-06-26 ENCOUNTER — DOCUMENTATION ONLY (OUTPATIENT)
Dept: SURGERY | Facility: CLINIC | Age: 34
End: 2019-06-26

## 2019-06-26 DIAGNOSIS — Z80.0 FAMILY HISTORY OF COLON CANCER: ICD-10-CM

## 2019-06-26 DIAGNOSIS — Z86.010 HISTORY OF COLON POLYPS: ICD-10-CM

## 2019-06-26 DIAGNOSIS — Z86.010 HX OF COLONIC POLYP: Primary | ICD-10-CM

## 2019-06-26 RX ORDER — TOPIRAMATE 50 MG/1
100 TABLET, FILM COATED ORAL DAILY
Qty: 60 TABLET | Refills: 11 | Status: SHIPPED | OUTPATIENT
Start: 2019-06-26 | End: 2019-09-10 | Stop reason: SDUPTHER

## 2019-06-26 NOTE — PROGRESS NOTES
Pt underwent post-test genetic counseling with [facility] genetic counselor Sheeba George on 6/21/19.  See Media for full report.  Summary below.    BRCA+Convio was negative.    Results report from [employer] included in packet.  -Integrated [facility]ACAnalysis with [employer] Hereditary Cancer panel was negative and with no VUS identified.  -Pt's breast cancer risk is elevated.  -Pt is recommended to undergo colonoscopy Q5 yrs beginning at 37 y/o or 10 yrs younger than earliest CRC dx in family, based on her personal/family hx.    ---    Messaged pt.  Placed referral to Colorectal Surgery for further eval and mgmt of CRC risk.    Pt is established with this breast center's high-risk clinic.

## 2019-06-27 ENCOUNTER — HOSPITAL ENCOUNTER (OUTPATIENT)
Dept: RADIOLOGY | Facility: HOSPITAL | Age: 34
Discharge: HOME | End: 2019-06-27
Attending: NURSE PRACTITIONER
Payer: MEDICARE

## 2019-06-27 ENCOUNTER — TELEPHONE (OUTPATIENT)
Dept: GASTROENTEROLOGY | Facility: CLINIC | Age: 34
End: 2019-06-27

## 2019-06-27 ENCOUNTER — OFFICE VISIT (OUTPATIENT)
Dept: SURGERY | Facility: CLINIC | Age: 34
End: 2019-06-27
Payer: MEDICARE

## 2019-06-27 VITALS
BODY MASS INDEX: 35.15 KG/M2 | TEMPERATURE: 98 F | HEART RATE: 94 BPM | WEIGHT: 191 LBS | HEIGHT: 62 IN | DIASTOLIC BLOOD PRESSURE: 75 MMHG | SYSTOLIC BLOOD PRESSURE: 110 MMHG

## 2019-06-27 DIAGNOSIS — Z12.39 BREAST CANCER SCREENING, HIGH RISK PATIENT: Primary | ICD-10-CM

## 2019-06-27 DIAGNOSIS — Z80.41 FAMILY HISTORY OF OVARIAN CANCER: ICD-10-CM

## 2019-06-27 DIAGNOSIS — Z80.3 FAMILY HISTORY OF BREAST CANCER: ICD-10-CM

## 2019-06-27 DIAGNOSIS — N64.4 BREAST PAIN: ICD-10-CM

## 2019-06-27 DIAGNOSIS — Z12.39 BREAST CANCER SCREENING, HIGH RISK PATIENT: ICD-10-CM

## 2019-06-27 PROCEDURE — 99213 OFFICE O/P EST LOW 20 MIN: CPT | Mod: S$PBB,,, | Performed by: NURSE PRACTITIONER

## 2019-06-27 PROCEDURE — 77049 MRI BREAST C-+ W/CAD BI: CPT | Mod: 26,,, | Performed by: RADIOLOGY

## 2019-06-27 PROCEDURE — 99213 OFFICE O/P EST LOW 20 MIN: CPT | Mod: PBBFAC,PO | Performed by: NURSE PRACTITIONER

## 2019-06-27 PROCEDURE — 99999 PR PBB SHADOW E&M-EST. PATIENT-LVL III: ICD-10-PCS | Mod: PBBFAC,,, | Performed by: NURSE PRACTITIONER

## 2019-06-27 PROCEDURE — 77049 MRI BREAST BILATERAL W W/O CONTRAST: ICD-10-PCS | Mod: 26,,, | Performed by: RADIOLOGY

## 2019-06-27 PROCEDURE — 25500020 PHARM REV CODE 255: Performed by: NURSE PRACTITIONER

## 2019-06-27 PROCEDURE — A9577 INJ MULTIHANCE: HCPCS | Performed by: NURSE PRACTITIONER

## 2019-06-27 PROCEDURE — 99999 PR PBB SHADOW E&M-EST. PATIENT-LVL III: CPT | Mod: PBBFAC,,, | Performed by: NURSE PRACTITIONER

## 2019-06-27 PROCEDURE — 77049 MRI BREAST C-+ W/CAD BI: CPT | Mod: TC

## 2019-06-27 PROCEDURE — 99213 PR OFFICE/OUTPT VISIT, EST, LEVL III, 20-29 MIN: ICD-10-PCS | Mod: S$PBB,,, | Performed by: NURSE PRACTITIONER

## 2019-06-27 RX ADMIN — GADOBENATE DIMEGLUMINE 19 ML: 529 INJECTION, SOLUTION INTRAVENOUS at 10:06

## 2019-06-27 NOTE — TELEPHONE ENCOUNTER
----- Message from Marla Richardson sent at 6/27/2019 12:49 PM CDT -----  Contact: Self- [phone]  Gopal- pt called to schedule f/u appt for ibs- please contact pt at [phone]

## 2019-06-27 NOTE — PROGRESS NOTES
"Patient ID: Maria Ines Ac is a 34 y.o. female.    Chief Complaint: Follow-up (Follow up CBE & Breast MRI )        HPI:  Established patient presents today for breast cancer screening.  High-risk pt.  Last seen in clinic by me on 19.    Breast History:      2019 Integrated BRACAnalysis with [employer] Hereditary Cancer panel was negative and with no VUS identified.    Personal history of breast cancer:  no  Personal history of breast biopsy:  no  Personal history of breast surgery:  no     Breast Imaging    18 [facility] Resume mmg and [facility] US report reviewed:  Breasts almost entirely fatty  BI-RADS 1  TC lifetime risk 26.62%     Interval Breast History     At initial visit with me on 18, pt reported bilateral breast pain, onset 2018, upper regions of both breasts (same area on both breasts), constant, /10 (decreased from previously reported 9/10).  Has not tried anything besides Aleve and Advil, which did not help.  Has not tried wearing a sports bra.  "Pain is getting a little bit better."   Breasts (NOT breast skin) are both "swollen," onset ~2 mos ago; not worsening/changing.  No redness or warmth to breasts.  No fever or chills.  Patient denies palpable breast mass, breast-related skin changes, nipple discharge and nipple retraction.  No other breast-related concerns.      GYN History:  Age of menarche:  10 y/o    Uterus and ovaries:  S/p hysterectomy and BSO ("they took everything") at around 28 y/o  Menopause:  yes, surgical   HRT usage:  current user, estrogen-only, has been using for more than 10 yrs     Other Oncologic History:  Personal history of other cancer not abovementioned:  no  Personal history of genetic testing:  yes, see above     Social History:  Tobacco use:  denies  Alcohol use:  denies     Family Oncologic History:     Ashkenazi [place] ancestry:  no  History of genetic testing in relatives:  no    Family History   Problem Relation Age of Onset    " "Breast cancer Mother 50        alive at 64, unilateral    Esophageal cancer Mother 63    Ovarian cancer Mother 63    Cervical cancer Maternal Grandmother         unknown age of onset,  at 80    Colon cancer Brother 40    Breast cancer Paternal Aunt         unknown age of onset, alive at 70    Breast cancer Maternal Aunt 72        alive at 72           Review of Systems - See HPI.  Objective:   Physical Exam   Pulmonary/Chest: Effort normal. No respiratory distress. She exhibits no mass, no edema and no deformity. Right breast exhibits no inverted nipple, no mass, no nipple discharge, no skin change and no tenderness. Left breast exhibits tenderness. Left breast exhibits no inverted nipple, no mass, no nipple discharge and no skin change. No breast swelling. Breasts are symmetrical.   Upper region of L breast diffusely TTP with no mass or skin change appreciated.  Not suggestive of pathology.   Genitourinary: No breast swelling.   Lymphadenopathy:     She has no cervical adenopathy.     She has no axillary adenopathy.        Right: No supraclavicular adenopathy present.        Left: No supraclavicular adenopathy present.     Assessment:       1. Breast cancer screening, high risk patient    2. Family history of breast cancer    3. Family history of ovarian cancer    4. Breast pain          Plan:     Clinically DL today.    At initial visit with me on 18, pt reported bilateral breast pain, onset 2018, upper regions of both breasts (same area on both breasts), constant, 8/10 (decreased from previously reported 9/10).  Has not tried anything besides Aleve and Advil, which did not help.  Has not tried wearing a sports bra.  "Pain is getting a little bit better."   Breasts (NOT breast skin) are both "swollen," onset ~2 mos ago; not worsening/changing.  No redness or warmth to breasts.  No fever or chills.  Patient denies palpable breast mass, breast-related skin changes, nipple discharge and nipple " retraction.  No other breast-related concerns.   Bilateral breast pain and reported swelling not suggestive of pathology given clinically DL (breasts do not appear swollen).  Advised pt to try a well supporting and compressive sports bra.    Bilat breast MRI today after clinic visit.    RTC around 12/27/19 for CBE and bilat mmg.  Order for mmg will need to be placed at next visit.    Pt will f/u with Dr. Herron regarding CRC screening.  Advised pt to f/u with him in near future and to bring genetic test results report and [employer] paperwork with her to next visit (provided pt with paperwork).    Encouraged breast awareness, including monthly breast self-exams.  Advised patient to RTC with any interval changes or concerns.  Questions were encouraged and answered to patient's satisfaction, and patient verbalized understanding of information and agreement with the plan.

## 2019-06-29 NOTE — PROGRESS NOTES
I have personally seen and examined the patient  along with the resident, and agree with assessment and recommendations.      Alicia Echeverria MD  [facility]

## 2019-07-01 ENCOUNTER — TELEPHONE (OUTPATIENT)
Dept: UROLOGY | Facility: CLINIC | Age: 34
End: 2019-07-01

## 2019-07-02 ENCOUNTER — ANESTHESIA (OUTPATIENT)
Dept: SURGERY | Facility: HOSPITAL | Age: 34
End: 2019-07-02
Payer: MEDICARE

## 2019-07-02 ENCOUNTER — TELEPHONE (OUTPATIENT)
Dept: UROLOGY | Facility: CLINIC | Age: 34
End: 2019-07-02

## 2019-07-02 ENCOUNTER — HOSPITAL ENCOUNTER (OUTPATIENT)
Facility: HOSPITAL | Age: 34
Discharge: HOME | End: 2019-07-02
Attending: UROLOGY | Admitting: UROLOGY
Payer: MEDICARE

## 2019-07-02 VITALS
BODY MASS INDEX: 35.15 KG/M2 | RESPIRATION RATE: 20 BRPM | HEIGHT: 62 IN | TEMPERATURE: 98 F | WEIGHT: 191 LBS | OXYGEN SATURATION: 96 % | SYSTOLIC BLOOD PRESSURE: 103 MMHG | DIASTOLIC BLOOD PRESSURE: 63 MMHG | HEART RATE: 93 BPM

## 2019-07-02 DIAGNOSIS — Z01.818 PREOP TESTING: ICD-10-CM

## 2019-07-02 DIAGNOSIS — R33.9 INCOMPLETE BLADDER EMPTYING: ICD-10-CM

## 2019-07-02 DIAGNOSIS — R39.15 URINARY URGENCY: ICD-10-CM

## 2019-07-02 DIAGNOSIS — R33.9 INCOMPLETE EMPTYING OF BLADDER: Primary | ICD-10-CM

## 2019-07-02 DIAGNOSIS — R35.0 URINARY FREQUENCY: Primary | ICD-10-CM

## 2019-07-02 LAB
POCT GLUCOSE: 74 MG/DL (ref 70–110)
POCT GLUCOSE: 92 MG/DL (ref 70–110)

## 2019-07-02 PROCEDURE — 64581 PR IMPLANTATION, NEUROSTIM ELECT ARRAY, OPEN, SACRAL NERVE: ICD-10-PCS | Mod: ,,, | Performed by: UROLOGY

## 2019-07-02 PROCEDURE — 00630 ANES PX LUMBAR REGION NOS: CPT | Performed by: UROLOGY

## 2019-07-02 PROCEDURE — 63600175 PHARM REV CODE 636 W HCPCS: Performed by: ANESTHESIOLOGY

## 2019-07-02 PROCEDURE — D9220A PRA ANESTHESIA: ICD-10-PCS | Mod: CRNA,,, | Performed by: NURSE ANESTHETIST, CERTIFIED REGISTERED

## 2019-07-02 PROCEDURE — C1894 INTRO/SHEATH, NON-LASER: HCPCS | Performed by: UROLOGY

## 2019-07-02 PROCEDURE — C1767 GENERATOR, NEURO NON-RECHARG: HCPCS | Performed by: UROLOGY

## 2019-07-02 PROCEDURE — 25000003 PHARM REV CODE 250: Performed by: NURSE ANESTHETIST, CERTIFIED REGISTERED

## 2019-07-02 PROCEDURE — 63600175 PHARM REV CODE 636 W HCPCS: Performed by: STUDENT IN AN ORGANIZED HEALTH CARE EDUCATION/TRAINING PROGRAM

## 2019-07-02 PROCEDURE — 63600175 PHARM REV CODE 636 W HCPCS: Performed by: NURSE ANESTHETIST, CERTIFIED REGISTERED

## 2019-07-02 PROCEDURE — 64581 OPN IMPLTJ NEA SACRAL NERVE: CPT | Mod: ,,, | Performed by: UROLOGY

## 2019-07-02 PROCEDURE — 71000044 HC DOSC ROUTINE RECOVERY FIRST HOUR: Performed by: UROLOGY

## 2019-07-02 PROCEDURE — 25000003 PHARM REV CODE 250: Performed by: UROLOGY

## 2019-07-02 PROCEDURE — 82962 GLUCOSE BLOOD TEST: CPT | Performed by: UROLOGY

## 2019-07-02 PROCEDURE — 25000003 PHARM REV CODE 250: Performed by: STUDENT IN AN ORGANIZED HEALTH CARE EDUCATION/TRAINING PROGRAM

## 2019-07-02 PROCEDURE — D9220A PRA ANESTHESIA: Mod: ANES,,, | Performed by: ANESTHESIOLOGY

## 2019-07-02 PROCEDURE — 82962 GLUCOSE BLOOD TEST: CPT | Mod: 91 | Performed by: UROLOGY

## 2019-07-02 PROCEDURE — 27201423 OPTIME MED/SURG SUP & DEVICES STERILE SUPPLY: Performed by: UROLOGY

## 2019-07-02 PROCEDURE — 37000008 HC ANESTHESIA 1ST 15 MINUTES: Performed by: UROLOGY

## 2019-07-02 PROCEDURE — 76000 PR  FLUOROSCOPE EXAMINATION: ICD-10-PCS | Mod: 26,,, | Performed by: UROLOGY

## 2019-07-02 PROCEDURE — D9220A PRA ANESTHESIA: Mod: CRNA,,, | Performed by: NURSE ANESTHETIST, CERTIFIED REGISTERED

## 2019-07-02 PROCEDURE — 36000708 HC OR TIME LEV III 1ST 15 MIN: Performed by: UROLOGY

## 2019-07-02 PROCEDURE — 36000709 HC OR TIME LEV III EA ADD 15 MIN: Performed by: UROLOGY

## 2019-07-02 PROCEDURE — D9220A PRA ANESTHESIA: ICD-10-PCS | Mod: ANES,,, | Performed by: ANESTHESIOLOGY

## 2019-07-02 PROCEDURE — C1778 LEAD, NEUROSTIMULATOR: HCPCS | Performed by: UROLOGY

## 2019-07-02 PROCEDURE — 37000009 HC ANESTHESIA EA ADD 15 MINS: Performed by: UROLOGY

## 2019-07-02 PROCEDURE — 71000015 HC POSTOP RECOV 1ST HR: Performed by: UROLOGY

## 2019-07-02 PROCEDURE — 76000 FLUOROSCOPY <1 HR PHYS/QHP: CPT | Mod: 26,,, | Performed by: UROLOGY

## 2019-07-02 DEVICE — LEAD KIT TINED: Type: IMPLANTABLE DEVICE | Site: BACK | Status: FUNCTIONAL

## 2019-07-02 RX ORDER — BACITRACIN 50000 [IU]/1
INJECTION, POWDER, FOR SOLUTION INTRAMUSCULAR
Status: DISCONTINUED | OUTPATIENT
Start: 2019-07-02 | End: 2019-07-02 | Stop reason: HOSPADM

## 2019-07-02 RX ORDER — ACETAMINOPHEN 10 MG/ML
INJECTION, SOLUTION INTRAVENOUS
Status: DISCONTINUED | OUTPATIENT
Start: 2019-07-02 | End: 2019-07-02

## 2019-07-02 RX ORDER — MIDAZOLAM HYDROCHLORIDE 1 MG/ML
INJECTION, SOLUTION INTRAMUSCULAR; INTRAVENOUS
Status: DISCONTINUED | OUTPATIENT
Start: 2019-07-02 | End: 2019-07-02

## 2019-07-02 RX ORDER — SODIUM CHLORIDE 0.9 % (FLUSH) 0.9 %
10 SYRINGE (ML) INJECTION
Status: DISCONTINUED | OUTPATIENT
Start: 2019-07-02 | End: 2019-07-02 | Stop reason: HOSPADM

## 2019-07-02 RX ORDER — CEPHALEXIN 500 MG/1
500 CAPSULE ORAL EVERY 8 HOURS
Qty: 15 CAPSULE | Refills: 0 | Status: SHIPPED | OUTPATIENT
Start: 2019-07-02 | End: 2019-07-07

## 2019-07-02 RX ORDER — HYDROCODONE BITARTRATE AND ACETAMINOPHEN 5; 325 MG/1; MG/1
1 TABLET ORAL EVERY 4 HOURS PRN
Qty: 10 TABLET | Refills: 0 | Status: SHIPPED | OUTPATIENT
Start: 2019-07-02 | End: 2019-07-07 | Stop reason: SDUPTHER

## 2019-07-02 RX ORDER — PROPOFOL 10 MG/ML
INJECTION, EMULSION INTRAVENOUS CONTINUOUS PRN
Status: DISCONTINUED | OUTPATIENT
Start: 2019-07-02 | End: 2019-07-02

## 2019-07-02 RX ORDER — DIPHENHYDRAMINE HYDROCHLORIDE 50 MG/ML
INJECTION INTRAMUSCULAR; INTRAVENOUS
Status: DISCONTINUED | OUTPATIENT
Start: 2019-07-02 | End: 2019-07-02

## 2019-07-02 RX ORDER — LIDOCAINE HYDROCHLORIDE 10 MG/ML
1 INJECTION, SOLUTION EPIDURAL; INFILTRATION; INTRACAUDAL; PERINEURAL ONCE
Status: COMPLETED | OUTPATIENT
Start: 2019-07-02 | End: 2019-07-02

## 2019-07-02 RX ORDER — FENTANYL CITRATE 50 UG/ML
50 INJECTION, SOLUTION INTRAMUSCULAR; INTRAVENOUS EVERY 5 MIN PRN
Status: DISCONTINUED | OUTPATIENT
Start: 2019-07-02 | End: 2019-07-02 | Stop reason: HOSPADM

## 2019-07-02 RX ORDER — HYDROCODONE BITARTRATE AND ACETAMINOPHEN 5; 325 MG/1; MG/1
1 TABLET ORAL EVERY 4 HOURS PRN
Status: DISCONTINUED | OUTPATIENT
Start: 2019-07-02 | End: 2019-07-02 | Stop reason: HOSPADM

## 2019-07-02 RX ORDER — FENTANYL CITRATE 50 UG/ML
INJECTION, SOLUTION INTRAMUSCULAR; INTRAVENOUS
Status: DISCONTINUED | OUTPATIENT
Start: 2019-07-02 | End: 2019-07-02

## 2019-07-02 RX ORDER — SODIUM CHLORIDE 9 MG/ML
INJECTION, SOLUTION INTRAVENOUS CONTINUOUS
Status: DISCONTINUED | OUTPATIENT
Start: 2019-07-02 | End: 2019-07-02 | Stop reason: HOSPADM

## 2019-07-02 RX ORDER — ONDANSETRON 2 MG/ML
INJECTION INTRAMUSCULAR; INTRAVENOUS
Status: DISCONTINUED | OUTPATIENT
Start: 2019-07-02 | End: 2019-07-02

## 2019-07-02 RX ORDER — LIDOCAINE HYDROCHLORIDE AND EPINEPHRINE 10; 10 MG/ML; UG/ML
INJECTION, SOLUTION INFILTRATION; PERINEURAL
Status: DISCONTINUED | OUTPATIENT
Start: 2019-07-02 | End: 2019-07-02 | Stop reason: HOSPADM

## 2019-07-02 RX ADMIN — FENTANYL CITRATE 25 MCG: 50 INJECTION, SOLUTION INTRAMUSCULAR; INTRAVENOUS at 04:07

## 2019-07-02 RX ADMIN — VANCOMYCIN HYDROCHLORIDE 1500 MG: 1.5 INJECTION, POWDER, LYOPHILIZED, FOR SOLUTION INTRAVENOUS at 01:07

## 2019-07-02 RX ADMIN — GENTAMICIN SULFATE 240 MG: 40 INJECTION, SOLUTION INTRAMUSCULAR; INTRAVENOUS at 02:07

## 2019-07-02 RX ADMIN — SODIUM CHLORIDE: 0.9 INJECTION, SOLUTION INTRAVENOUS at 12:07

## 2019-07-02 RX ADMIN — ONDANSETRON 4 MG: 2 INJECTION INTRAMUSCULAR; INTRAVENOUS at 02:07

## 2019-07-02 RX ADMIN — LIDOCAINE HYDROCHLORIDE 10 MG: 10 INJECTION, SOLUTION EPIDURAL; INFILTRATION; INTRACAUDAL; PERINEURAL at 12:07

## 2019-07-02 RX ADMIN — MIDAZOLAM HYDROCHLORIDE 2 MG: 1 INJECTION, SOLUTION INTRAMUSCULAR; INTRAVENOUS at 02:07

## 2019-07-02 RX ADMIN — HYDROCODONE BITARTRATE AND ACETAMINOPHEN 1 TABLET: 5; 325 TABLET ORAL at 05:07

## 2019-07-02 RX ADMIN — FENTANYL CITRATE 25 MCG: 50 INJECTION, SOLUTION INTRAMUSCULAR; INTRAVENOUS at 03:07

## 2019-07-02 RX ADMIN — FENTANYL CITRATE 50 MCG: 50 INJECTION INTRAMUSCULAR; INTRAVENOUS at 05:07

## 2019-07-02 RX ADMIN — FENTANYL CITRATE 50 MCG: 50 INJECTION INTRAMUSCULAR; INTRAVENOUS at 04:07

## 2019-07-02 RX ADMIN — FENTANYL CITRATE 50 MCG: 50 INJECTION, SOLUTION INTRAMUSCULAR; INTRAVENOUS at 02:07

## 2019-07-02 RX ADMIN — DIPHENHYDRAMINE HYDROCHLORIDE 25 MG: 50 INJECTION, SOLUTION INTRAMUSCULAR; INTRAVENOUS at 03:07

## 2019-07-02 RX ADMIN — SODIUM CHLORIDE, SODIUM GLUCONATE, SODIUM ACETATE, POTASSIUM CHLORIDE, MAGNESIUM CHLORIDE, SODIUM PHOSPHATE, DIBASIC, AND POTASSIUM PHOSPHATE: .53; .5; .37; .037; .03; .012; .00082 INJECTION, SOLUTION INTRAVENOUS at 04:07

## 2019-07-02 RX ADMIN — SODIUM CHLORIDE: 0.9 INJECTION, SOLUTION INTRAVENOUS at 02:07

## 2019-07-02 RX ADMIN — PROPOFOL 50 MCG/KG/MIN: 10 INJECTION, EMULSION INTRAVENOUS at 02:07

## 2019-07-02 RX ADMIN — ACETAMINOPHEN 1000 MG: 10 INJECTION, SOLUTION INTRAVENOUS at 02:07

## 2019-07-02 NOTE — OP NOTE
[facility]  Operative Note    Date: 07/02/2019    Pre-Op Diagnosis: incomplete bladder emptying  Patient Active Problem List    Diagnosis Date Noted    Incomplete emptying of bladder 06/24/2019    Weakness 05/31/2019    Painful cervical ROM 05/31/2019    Family hx of ovarian malignancy 02/10/2019    Family history of breast cancer 01/08/2019    GERD (gastroesophageal reflux disease) 11/05/2018    Hyperlipidemia 08/23/2018    Sleep arousal disorder     Uncontrolled morning headache     Migrainous vertigo 04/25/2018    Gastroesophageal reflux disease without esophagitis 03/28/2018    Irritable bowel syndrome with diarrhea 03/28/2018    Severe obesity (BMI 35.0-39.9) with comorbidity 04/10/2017    Muscle spasm 04/10/2017    Urinary frequency 10/15/2016    Hypertension 09/07/2016    Palpitations 09/07/2016    Chest pain, non-cardiac 09/07/2016    Dyspnea on exertion 09/07/2016    New daily persistent headache 12/24/2014    Right hip pain 02/08/2013    Abdominal pain, RLQ (right lower quadrant) 11/30/2012    Dysphagia 11/30/2012    Diabetes mellitus, type II 11/30/2012    Constipation, chronic 11/30/2012      Post-Op Diagnosis: same    Procedure(s) Performed:   1.  Incision for implantation of sacral nerve neurostimulator electrodes (transforaminal placement) (40829)  2.  Fluoro < 1 h (16675)    Specimen(s): none    Staff Surgeon: Zena Tee MD    Assistant Surgeon: Jerome Gillespie MD    Anesthesia: Monitored Local Anesthesia with Sedation    Indications: Maria Ines Ac is a 34 y.o. female with incomplete bladder emptying who presents for [facility] stage I    Findings:   Lead placed on right side, pocket created on the left side  Good sensory and motor function consistent with S3 stimulation seen    Stimulation at 2.3 A    0 - none  1 - great toe and anabell, perineum  2 - great toe and anabell, perineum  3 - great toe and anabell, perineum    Estimated Blood Loss:  minimal    Drains: none    Complications:  None    Procedure in Detail:  After informed consent was obtained, the patient was transferred to the operating room and placed in the prone position.  Pillows were placed under lower abdomen to flatten sacrum and under shins to allow the toes to dangle freely.  Anesthesia was administered.  The patient was prepped and draped in the usual sterile fashion and preoperative antibiotics were confirmed.  Timeout was performed.      The C-arm was draped and moved into AP position to provide fluoroscopic mapping of the sacral region. The cross-hair technique was used to find the level of S3 which was marked horizontally. A point 2 cm lateral and 2 cm superior to the marked S3 level was marked as the entry point for the foramen needle. 1% lidocaine with epinephrine was injected into this area where the finder needles were to be placed. A foramen needle was introduced on the left until the S3 foramen was identified and penetrated. The depth of the needle was confirmed and adjusted fluoroscopically. There was good motor function but questionable patient localization of sensation. The contralateral side was used in the same fashion. Proper needle position was confirmed by patient identification of location of sensation, direct observation of the lifting of the perineum and observation of plantar flexion of the great toe utilizing the external test stimulator. There was better motor and sensory function on this side. The foramen needle stylet was removed and a directional guide was placed and confirmed fluoroscopically. The needle was removed keeping the directional guide in place.  An incision was made peripherally to the directional guide through the fascial layer. The lead introducer sheath with dilator was placed over the directional guide and directed into the foramen ensuring the radiopaque marker did not extend beyond the anterior edge of the sacrum.  The dilator was unlocked  and removed along with the directional guide keeping the introducer sheath in place.     The lead was then placed through the introducer sheath to the first white line.  Position was checked fluoroscopically.  The lead was then further introduced until 3 electrodes were visible below the sacrum.  Each electrode was tested for location of patient sensation, visualization of anabell, and plantar flexion of the great toe.  After satisfactory positioning was confirmed, the introducer sheath was retracted under continuous fluoro, deploying lead tines into the perisacral tissue.    A 3 cm incision was made sharply using a 15 blade into subcutaneous tissue posterior to the iliac crest and lateral to the sacrum on the left side. Bovie electrocautery and blunt dissection was used until the gluteal fascia was identified.  Hemostasis was excellent.  This created the pocket.      A tunneling trocar with sheath was placed from the lead exit site subcutaneously to the incised pocket site. The trocar was removed and the lead was fed through the sheath and pulled out at the pocket site. The lead was cleansed of bodily fluids, dried and a protective boot was placed over the lead.  The lead was inserted into the temporary percutaneous extension and the metal bands were aligned.  The 4 setscrews were tightened with the hex wrench.  The boot was pushed over the connection and a 2-0 prolene secured the connection at the boot grooves on either side.      Another tunnel was made subcutaneously using the tunneling trocar to a puncture site above the contralateral buttock. The percutaneous extension was placed through the sheath and exposed and connected to the twist lock gray cable.  The wounds were irrigated with bacitracin solution.  They were then closed in a 2 layers fashion using 3-0 vicryl deep dermal sutures and a running 4-0 monocryl subcuticular suture.  Mastisol, steristips, a telfa and tegaderm were then used as dressings.       The patient tolerated the procedure well and was transferred to the recovery room in stable condition.      Disposition:  The patient will follow up with Dr. Tee in 2 weeks.  Prescriptions were given for norco and keflex. The patient will meet with the [employer] rep in the recovery room.      MD FATOUMATA Rico was present for the entire case and agree with the above note.

## 2019-07-02 NOTE — TRANSFER OF CARE
"Anesthesia Transfer of Care Note    Patient: Maria Ines Ac    Procedure(s) Performed: Procedure(s) (LRB):  INSERTION, NEUROSTIMULATOR, TEMPORARY, SACRAL (N/A)  PROGRAMMING-STIMULATOR (N/A)    Patient location: PACU    Anesthesia Type: MAC    Transport from OR: Transported from OR on room air with adequate spontaneous ventilation    Post pain: adequate analgesia    Post assessment: no apparent anesthetic complications and tolerated procedure well    Post vital signs: stable    Level of consciousness: awake    Nausea/Vomiting: no nausea/vomiting    Complications: none    Transfer of care protocol was followed      Last vitals:   Visit Vitals  BP (!) 97/58 (BP Location: Left arm, Patient Position: Lying)   Pulse 81   Temp 36.5 °C (97.7 °F) (Oral)   Resp 20   Ht 5' 2" (1.575 m)   Wt 86.6 kg (191 lb)   LMP 11/17/2012 (LMP Unknown)   SpO2 95%   Breastfeeding? No   BMI 34.93 kg/m²     "

## 2019-07-02 NOTE — INTERVAL H&P NOTE
The patient has been examined and the H&P has been reviewed:    I concur with the findings and no changes have occurred since H&P was written.     Consents previously signed in clinic. The patient voices understanding and all questions have been answered.  The patient agrees to proceed as planned.     Anesthesia/Surgery risks, benefits and alternative options discussed and understood by patient/family.          Active Hospital Problems    Diagnosis  POA    Incomplete emptying of bladder [R33.9]  Yes      Resolved Hospital Problems   No resolved problems to display.     Patient seen in holding.  No changes in clinical condition.  Proceed with planned procedure.

## 2019-07-02 NOTE — PLAN OF CARE
Patient states they are ready to be discharged. Instructions and prescription given to patient and family. Both verbalize understanding. Patient tolerating po liquids with no difficulty. Patient states pain is at a tolerable level for them. Anesthesia consent and surgical consent in chart upon patient's discharge from [facility].

## 2019-07-02 NOTE — DISCHARGE SUMMARY
[facility]  Discharge Note  Short Stay    Admit Date: 7/2/2019    Discharge Date and Time: 07/02/2019 4:50 PM      Attending Physician: Zena Tee MD     Discharge Provider: Jerome Gillespie MD    Diagnoses:  Active Hospital Problems    Diagnosis  POA    *Incomplete emptying of bladder [R33.9]  Yes    Diabetes mellitus, type II [E11.9]  Yes      Resolved Hospital Problems   No resolved problems to display.       Discharged Condition: stable    Hospital Course: Patient was admitted for interstim stage I and tolerated the procedure well with no complications. The patient was discharged home in good condition on the same day.       Final Diagnoses: Same as principal problem.    Disposition: Home or Self Care    Follow up/Patient Instructions:    Medications:  Reconciled Home Medications:   Current Discharge Medication List      START taking these medications    Details   cephALEXin (KEFLEX) 500 MG capsule Take 1 capsule (500 mg total) by mouth every 8 (eight) hours. for 5 days  Qty: 15 capsule, Refills: 0      HYDROcodone-acetaminophen (NORCO) 5-325 mg per tablet Take 1 tablet by mouth every 4 (four) hours as needed.  Qty: 10 tablet, Refills: 0         CONTINUE these medications which have NOT CHANGED    Details   atorvastatin (LIPITOR) 40 MG tablet Take 40 mg by mouth once daily.      calcium-vitamin D3 500 mg(1,250mg) -200 unit per tablet Take 1 tablet by mouth 2 (two) times daily with meals.      ciclopirox (PENLAC) 8 % Soln Apply topically nightly.  Qty: 6.6 mL, Refills: 11      conjugated estrogens (PREMARIN) vaginal cream Use 0.5 gram of estrogen cream in vagina nightly x 2 weeks, then twice a week thereafter.  Qty: 30 g, Refills: 4    Associated Diagnoses: Vaginal atrophy      diclofenac sodium (VOLTAREN) 1 % Gel Apply 2 g topically 3 (three) times daily as needed.  Qty: 100 g, Refills: 0      dicyclomine (BENTYL) 10 MG capsule TAKE 2 CAPSULES(20 MG) BY MOUTH THREE TIMES DAILY BEFORE  MEALS  Qty: 180 capsule, Refills: 0    Associated Diagnoses: Irritable bowel syndrome with diarrhea; Generalized abdominal pain      estradiol (ESTRACE) 1 MG tablet Take 1 tablet (1 mg total) by mouth once daily.  Qty: 30 tablet, Refills: 11      fish oil-omega-3 fatty acids 300-1,000 mg capsule Take 2 g by mouth once daily.      gabapentin (NEURONTIN) 300 MG capsule Take 1 capsule (300 mg total) by mouth 3 (three) times daily as needed (Take for headache).  Qty: 90 capsule, Refills: 11      GLUCOPHAGE 500 mg tablet Take 500 mg by mouth 2 (two) times daily with meals.       ibuprofen (ADVIL,MOTRIN) 800 MG tablet 800 mg every 4 (four) hours as needed.   Refills: 0      levothyroxine (SYNTHROID) 50 MCG tablet TK 1 T PO QAM  Refills: 8      magnesium oxide (MAG-OX) 400 mg (241.3 mg magnesium) tablet Take 1 tablet (400 mg total) by mouth 2 (two) times daily.  Refills: 0      metoprolol succinate (TOPROL-XL) 25 MG 24 hr tablet TAKE 1 TABLET(25 MG) BY MOUTH EVERY DAY  Qty: 30 tablet, Refills: 4      MULTIVIT-IRON-MIN-FOLIC ACID 3,500-18-0.4 UNIT-MG-MG ORAL CHEW Take 1 tablet by mouth once daily.       omeprazole (PRILOSEC OTC) 20 MG tablet Take 20 mg by mouth once daily.      pantoprazole (PROTONIX) 40 MG tablet TAKE 1 TABLET BY MOUTH EVERY DAY  Qty: 90 tablet, Refills: 0      phentermine (ADIPEX-P) 37.5 MG capsule Take 37.5 mg by mouth every morning.       PREMARIN 0.3 mg tablet Refills: 11      spironolactone (ALDACTONE) 25 MG tablet TK 1 T PO HS  Refills: 2      STEGLATRO 15 mg Tab TK 1 T PO  QAM-for diabetes  Refills: 9      topiramate (TOPAMAX) 50 MG tablet Take 2 tablets (100 mg total) by mouth once daily.  Qty: 60 tablet, Refills: 11      ziprasidone (GEODON) 20 MG Cap 2 (two) times daily.   Refills: 1      ZOLOFT 100 mg tablet Take 200 mg by mouth once daily.       ACCU-CHEK SOFTCLIX LANCETS [facility] USE TO TEST BLOOD GLUCOSE TID  Refills: 3      BLOOD SUGAR DIAGNOSTIC (ACCU-CHEK AMAURY PLUS TEST STRP MISC) 1 strip by  Misc.(Non-Drug; Combo Route) route 3 (three) times daily.      TRULICITY 1.5 mg/0.5 mL PnIj INJECT 1 PEN INTO THE SKIN Q WEEK  Refills: 5           Discharge Procedure Orders   TSH   Standing Status: Future Number of Occurrences: 1 Standing Exp. Date: 08/23/20     Notify your health care provider if you experience any of the following:  temperature >100.4     Notify your health care provider if you experience any of the following:  persistent nausea and vomiting or diarrhea     Notify your health care provider if you experience any of the following:  severe uncontrolled pain     Notify your health care provider if you experience any of the following:  redness, tenderness, or signs of infection (pain, swelling, redness, odor or green/yellow discharge around incision site)     Notify your health care provider if you experience any of the following:  difficulty breathing or increased cough     Notify your health care provider if you experience any of the following:  severe persistent headache     Notify your health care provider if you experience any of the following:  worsening rash     Notify your health care provider if you experience any of the following:  persistent dizziness, light-headedness, or visual disturbances     Notify your health care provider if you experience any of the following:  increased confusion or weakness     Remove dressing in 48 hours     As above.

## 2019-07-02 NOTE — DISCHARGE INSTRUCTIONS

## 2019-07-02 NOTE — ANESTHESIA POSTPROCEDURE EVALUATION
Anesthesia Post Evaluation    Patient: Maria Ines Ac    Procedure(s) Performed: Procedure(s) (LRB):  INSERTION, NEUROSTIMULATOR, TEMPORARY, SACRAL (N/A)  PROGRAMMING-STIMULATOR (N/A)    Final Anesthesia Type: MAC  Patient location during evaluation: PACU  Patient participation: Yes- Able to Participate  Level of consciousness: awake and alert  Post-procedure vital signs: reviewed and stable  Pain management: adequate  Airway patency: patent  PONV status at discharge: No PONV  Anesthetic complications: no      Cardiovascular status: blood pressure returned to baseline and hemodynamically stable  Respiratory status: spontaneous ventilation, unassisted and room air  Follow-up not needed.          Vitals Value Taken Time   /63 7/2/2019  5:46 PM   Temp 36.6 °C (97.9 °F) 7/2/2019  5:45 PM   Pulse 93 7/2/2019  5:51 PM   Resp 28 7/2/2019  5:51 PM   SpO2 97 % 7/2/2019  5:51 PM   Vitals shown include unvalidated device data.      No case tracking events are documented in the log.      Pain/Mac Score: Pain Rating Prior to Med Admin: 7 (7/2/2019  5:01 PM)  Pain Rating Post Med Admin: 5 (7/2/2019  5:07 PM)

## 2019-07-03 ENCOUNTER — ANESTHESIA EVENT (OUTPATIENT)
Dept: SURGERY | Facility: HOSPITAL | Age: 34
End: 2019-07-03
Payer: MEDICARE

## 2019-07-03 NOTE — ANESTHESIA PREPROCEDURE EVALUATION
" Anesthesia Assessment: Preoperative EQUATION    Planned Procedure: Procedure(s) (LRB):  INSERTION, NEUROSTIMULATOR, PERMANENT, SACRAL (N/A)  Requested Anesthesia Type:Monitor Anesthesia Care  Surgeon: Zena Tee MD  Service: Urology  Known or anticipated Date of Surgery:7/16/2019    Surgeon notes: reviewed and Urinary frequency Incomplete bladder emptying  Urinary urgency    Previous anesthesia records:7/2/19-Insertion, Neurostimulator, Temporary, Sacral-MAC-no apparent anesthetic complications and tolerated procedure well    Anesthesia Hx:  No problems with previous Anesthesia  History of prior surgery of interest to airway management or planning: Denies Family Hx of Anesthesia complications.   Denies Personal Hx of Anesthesia complications.     Last PCP note: within 3 months , within [facility] , focused visit   Subspecialty notes: Cardiology: General, Gastroenterology, Hematology/Oncology, Neurology, OB/GYN/Podiatry/Breast Surgery Visit/Sleep Medicine/Urogynecology/Urology/Breast Surgery visit/  Tests already available:  Results have been reviewed.Labs-7/2/19-POCT glucose/TSH/6/24/19-A1c/ 6/18/19-Creatinine,serum/5/21/19-A1c/CMP/5/16/19-Estradol/4/23/19-BRCAnalysis w/myRISK/ CXR-6/30/18/ EKG-8/23/18      Plan:    Testing:  None needed at this time      Patient  has previously scheduled Medical Appointment:Appointments on 7/8/19 & 7/10/19, prior to surgery date.    Navigation: Phone Completed                                     Straight Line to surgery.               No tests, anesthesia preop clinic visit, or consult required.                Thu New RN 7/3/19         Plan reviewed with Dr. Clark:NO POC visit needed & NO "Clearance" needed prior to this surgery.  Thu New RN  7/5/19 07/03/2019  Maria Ines Ac is a 34 y.o., female.    Anesthesia Evaluation         Review of Systems  Anesthesia " Hx:  No problems with previous Anesthesia Family Hx of Anesthesia complications: Family Anesthesia Complications are Mother problem with PONV & aspiration.  Denies Personal Hx of Anesthesia complications.   Social:  Non-Smoker, No Alcohol Use    EENT/Dental:   Wears glasses for distance   Cardiovascular:   Exercise tolerance: good Hypertension Walks x1 mile/day   Pulmonary:   Shortness of breath    Renal/:   Chronic Renal Disease Urinary frequency   Hepatic/GI:   GERD, well controlled    Neurological:   Headaches    Endocrine:   Diabetes, well controlled, type 2    Psych:   Psychiatric History anxiety depression      Thu New RN  7/3/19    Physical Exam  General:  Well nourished, Obesity    Airway/Jaw/Neck:  Airway Findings: Mouth Opening: Small, but > 3cm Tongue: Large  General Airway Assessment: Adult  Mallampati: III  Improves to II with phonation.  TM Distance: Normal, at least 6 cm  Jaw/Neck Findings:  Micrognathia: Negative Neck ROM: Normal ROM  Neck Findings:  Girth Increased      Dental:  Dental Findings: In tact, Periodontal disease, Mild   Chest/Lungs:  Chest/Lungs Findings: Clear to auscultation, Normal Respiratory Rate     Heart/Vascular:  Heart Findings: Rate: Normal  Rhythm: Regular Rhythm  Sounds: Normal     Abdomen:  Abdomen Findings:  Normal     Musculoskeletal:  Musculoskeletal Findings:    Skin:  Skin Findings:     Mental Status:  Mental Status Findings:  Alert and Oriented, Cooperative         Anesthesia Plan  Type of Anesthesia, risks & benefits discussed:  Anesthesia Type:  general  Patient's Preference: General  Intra-op Monitoring Plan: standard ASA monitors  Intra-op Monitoring Plan Comments:   Post Op Pain Control Plan:   Post Op Pain Control Plan Comments: Per primary service  Induction:   IV  Beta Blocker:  Patient is not currently on a Beta-Blocker (No further documentation required).       Informed Consent: Patient understands risks and agrees with Anesthesia plan.  Questions  answered. Anesthesia consent signed with patient.  ASA Score: 3     Day of Surgery Review of History & Physical:    H&P update referred to the surgeon.         Ready For Surgery From Anesthesia Perspective.

## 2019-07-03 NOTE — PRE ADMISSION SCREENING
Anesthesia Assessment: Preoperative EQUATION    Planned Procedure: Procedure(s) (LRB):  INSERTION, NEUROSTIMULATOR, PERMANENT, SACRAL (N/A)  Requested Anesthesia Type:Monitor Anesthesia Care  Surgeon: Zena Tee MD  Service: Urology  Known or anticipated Date of Surgery:7/16/2019    Surgeon notes: reviewed and Urinary frequency Incomplete bladder emptying  Urinary urgency    Previous anesthesia records:7/2/19-Insertion, Neurostimulator, Temporary, Sacral-MAC-no apparent anesthetic complications and tolerated procedure well    Anesthesia Hx:  No problems with previous Anesthesia  History of prior surgery of interest to airway management or planning: Denies Family Hx of Anesthesia complications.   Denies Personal Hx of Anesthesia complications.     Last PCP note: within 3 months , within [facility] , focused visit   Subspecialty notes: Cardiology: General, Gastroenterology, Hematology/Oncology, Neurology, OB/GYN/Podiatry/Breast Surgery Visit/Sleep Medicine/Urogynecology/Urology/Breast Surgery visit/  Tests already available:  Results have been reviewed.Labs-7/2/19-POCT glucose/TSH/6/24/19-A1c/ 6/18/19-Creatinine,serum/5/21/19-A1c/CMP/5/16/19-Estradol/4/23/19-BRCAnalysis w/myRISK/ CXR-6/30/18/ EKG-8/23/18      Plan:    Testing:  None needed at this time      Patient  has previously scheduled Medical Appointment:Appointments on 7/8/19 & 7/10/19, prior to surgery date.    Navigation: Phone Completed                                     Straight Line to surgery.               No tests, anesthesia preop clinic visit, or consult required.                Thu New RN 7/3/19

## 2019-07-07 ENCOUNTER — HOSPITAL ENCOUNTER (EMERGENCY)
Facility: HOSPITAL | Age: 34
Discharge: HOME | End: 2019-07-07
Attending: EMERGENCY MEDICINE
Payer: MEDICARE

## 2019-07-07 VITALS
TEMPERATURE: 99 F | OXYGEN SATURATION: 98 % | DIASTOLIC BLOOD PRESSURE: 70 MMHG | HEIGHT: 62 IN | RESPIRATION RATE: 18 BRPM | BODY MASS INDEX: 35.15 KG/M2 | WEIGHT: 191 LBS | SYSTOLIC BLOOD PRESSURE: 111 MMHG | HEART RATE: 89 BPM

## 2019-07-07 DIAGNOSIS — G89.18 POST-OP PAIN: ICD-10-CM

## 2019-07-07 DIAGNOSIS — K62.89 RECTAL PAIN: Primary | ICD-10-CM

## 2019-07-07 PROCEDURE — 99284 EMERGENCY DEPT VISIT MOD MDM: CPT

## 2019-07-07 PROCEDURE — 25000003 PHARM REV CODE 250: Performed by: PHYSICIAN ASSISTANT

## 2019-07-07 PROCEDURE — 99284 PR EMERGENCY DEPT VISIT,LEVEL IV: ICD-10-PCS | Mod: ,,, | Performed by: PHYSICIAN ASSISTANT

## 2019-07-07 PROCEDURE — 99284 EMERGENCY DEPT VISIT MOD MDM: CPT | Mod: ,,, | Performed by: PHYSICIAN ASSISTANT

## 2019-07-07 RX ORDER — HYDROCODONE BITARTRATE AND ACETAMINOPHEN 5; 325 MG/1; MG/1
1 TABLET ORAL EVERY 6 HOURS PRN
Qty: 7 TABLET | Refills: 0 | Status: ON HOLD | OUTPATIENT
Start: 2019-07-07 | End: 2019-07-30 | Stop reason: SDUPTHER

## 2019-07-07 RX ORDER — DOCUSATE SODIUM 100 MG/1
100 CAPSULE, LIQUID FILLED ORAL 2 TIMES DAILY
Qty: 60 CAPSULE | Refills: 0 | Status: SHIPPED | OUTPATIENT
Start: 2019-07-07 | End: 2023-11-21

## 2019-07-07 RX ORDER — HYDROCODONE BITARTRATE AND ACETAMINOPHEN 5; 325 MG/1; MG/1
1 TABLET ORAL
Status: COMPLETED | OUTPATIENT
Start: 2019-07-07 | End: 2019-07-07

## 2019-07-07 RX ADMIN — HYDROCODONE BITARTRATE AND ACETAMINOPHEN 1 TABLET: 5; 325 TABLET ORAL at 06:07

## 2019-07-07 NOTE — ED NOTES
".  Patient identifiers for Maria Ines Ac 34 y.o. female checked and correct.  Chief Complaint   Patient presents with    Rectal Pain     Pt states "I had surgery and my butt hurts." Neuro stimulator placed 7/2/19. denies fever, chills.      Past Medical History:   Diagnosis Date    Blood in stool     Constipation     Cystitis     Depression     Diabetes mellitus, type 2     Dysphagia     Endometriosis     GERD (gastroesophageal reflux disease)     Headache     Hypertension     Palpitations     Premature menopause on hormone replacement therapy     Thyroid disease      Allergies reported: Review of patient's allergies indicates:  No Known Allergies      LOC: Patient is awake, alert, and aware of environment with an appropriate affect. Patient is oriented x 3 and speaking appropriately.  APPEARANCE: Patient resting comfortably and in no acute distress. Patient is clean and well groomed, patient's clothing is properly fastened.  SKIN: The skin is warm and dry. Patient has normal skin turgor and moist mucus membranes. Skin is intact; no bruising or breakdown noted. No skin breakdown to buttocks, no bruising.   MUSKULOSKELETAL: Patient is moving all extremities well, no obvious deformities noted. Pulses intact. Pt reports buttock pain since yesterday.   RESPIRATORY: Airway is open and patent. Respirations are spontaneous and non-labored with normal effort and rate, BBS=clear  CARDIAC: Patient has a normal rate and rhythm. No peripheral edema noted.   ABDOMEN: No distention noted. Bowel sounds active in all 4 quadrants. Soft and non-tender upon palpation.  NEUROLOGICAL: PERRL. Facial expression is symmetrical. Hand grasps are equal bilaterally. Normal sensation in all extremities when touched with finger.          "

## 2019-07-07 NOTE — DISCHARGE INSTRUCTIONS
Please schedule an appointment with your urology doctor for follow-up. If you start to have any new or worsening symptoms, please come back to the emergency department.

## 2019-07-07 NOTE — ED TRIAGE NOTES
Pt presents with right buttock pain that started yesterday. Pt had sacral neuro stimulator on July 2 nd.

## 2019-07-07 NOTE — ED PROVIDER NOTES
"Encounter Date: 7/7/2019       History     Chief Complaint   Patient presents with    Rectal Pain     Pt states "I had surgery and my butt hurts." Neuro stimulator placed 7/2/19. denies fever, chills.      34-year-old female with DM 2, depression hypertension presents for rectal pain s/p sacral nerve stimulator for urinary retention.  Stimulator was placed on 7/2/2019 (Dr. Tee).  Pain is worse with sitting and walking, some improvement with home [place], however she has ran out of medication.  She denies difficulty with bowel movements, bloody or dark stool, abdominal pain, nausea/vomiting, back pain, fevers, skin changes or dysuria.        Review of patient's allergies indicates:  No Known Allergies  Past Medical History:   Diagnosis Date    Blood in stool     Constipation     Cystitis     Depression     Diabetes mellitus, type 2     Dysphagia     Endometriosis     GERD (gastroesophageal reflux disease)     Headache     Hypertension     Palpitations     Premature menopause on hormone replacement therapy     Thyroid disease      Past Surgical History:   Procedure Laterality Date    BLADDER SUSPENSION      CARPAL TUNNEL RELEASE      right    CHOLECYSTECTOMY      COLONOSCOPY N/A 8/30/2016    Performed by Slick Herron [facility] at [facility] (4TH FLR)    COLONOSCOPY N/A 1/9/2013    Performed by Gabriele Gibbons MD at [facility] (4TH FLR)    CYSTOSCOPY N/A 1/23/2017    Performed by Ninoska Ventura DO at [facility] OR    EGD (ESOPHAGOGASTRODUODENOSCOPY) N/A 1/10/2013    Performed by Darryl Cuello MD at [facility] ENDO (2ND FLR)    ESOPHAGOGASTRODUODENOSCOPY (EGD) N/A 9/14/2017    Performed by Slick Herron MD at [facility] END[facility] (4TH FLR)    ESOPHAGOGASTRODUODENOSCOPY (EGD) N/A 8/30/2016    Performed by Slick Herron [facility] at [facility] (4TH FLR)    ESOPHAGOGASTRODUODENOSCOPY (EGD) N/A 10/23/2014    Performed by Slick Herron [facility] at [facility] (4TH FLR)    GALLBLADDER SURGERY      HYSTERECTOMY  2013    endo, " Dr. Ashley Smith    INSERTION, NEUROSTIMULATOR, TEMPORARY, SACRAL N/A 2019    Performed by Zena [facility]ee MD at [facility] OR 2ND FLR    MANOMETRY, ESOPHAGUS, WITH IMPEDANCE MEASUREMENT N/A 2018    Performed by Slick Herron MD at [facility] ENDO (4TH FLR)    OOPHORECTOMY      [facility]O- benign cyst    OOPHORECTOMY      [facility]- endo    ooptherectomy      PROGRAMMING-STIMULATOR N/A 2019    Performed by Zena Tee MD at [facility] OR 2ND FLR    RETROPUBIC SLING N/A 2017    Performed by [facility]ka [facility]ura [facility] at [facility] OR    right knee  scoped    SHOULDER SURGERY  right    URODYNAMIC STUDY, FLUOROSCOPIC N/A 2019    Performed by Zena Tee MD at [facility] OR 1ST FLR     Family History   Problem Relation Age of Onset    Breast cancer Mother 50        alive at 64, unilateral    Esophageal cancer Mother 63    Ovarian cancer Mother 63    Anesthesia problems Mother     Cervical cancer Maternal Grandmother         unknown age of onset,  at 80    Colon cancer Brother 40    Breast cancer Paternal Aunt         unknown age of onset, alive at 70    Breast cancer Maternal Aunt 72        alive at 72    Vaginal cancer Neg Hx     Endometrial cancer Neg Hx     Crohn's disease Neg Hx     Ulcerative colitis Neg Hx     Stomach cancer Neg Hx     Irritable bowel syndrome Neg Hx     Celiac disease Neg Hx      Social History     Tobacco Use    Smoking status: Never Smoker    Smokeless tobacco: Never Used   Substance Use Topics    Alcohol use: No    Drug use: No     Review of Systems   Constitutional: Negative for chills, diaphoresis, fatigue and fever.   Respiratory: Negative for cough and shortness of breath.    Cardiovascular: Negative for chest pain and palpitations.   Gastrointestinal: Positive for rectal pain. Negative for abdominal distention, abdominal pain, anal bleeding, blood in stool, constipation, diarrhea, nausea and vomiting.   Endocrine: Negative for polydipsia  and polyuria.   Genitourinary: Negative for dysuria, flank pain, hematuria, pelvic pain and urgency.   Musculoskeletal: Negative for back pain and myalgias.   Skin: Negative for color change and rash.   Allergic/Immunologic: Negative for food allergies and immunocompromised state.   Neurological: Negative for light-headedness and headaches.       Physical Exam     Initial Vitals [07/07/19 1458]   BP Pulse Resp Temp SpO2   118/79 104 16 98.7 °F (37.1 °C) 97 %      MAP       --         Physical Exam    Vitals reviewed.  Constitutional: She appears well-developed and well-nourished. She is not diaphoretic. No distress.   Family member at bedside   HENT:   Head: Normocephalic and atraumatic.   Eyes: EOM are normal. Pupils are equal, round, and reactive to light.   Neck: Normal range of motion. Neck supple.   Cardiovascular: Normal rate, regular rhythm, normal heart sounds and intact distal pulses. Exam reveals no gallop and no friction rub.    No murmur heard.  Pulmonary/Chest: Breath sounds normal. No respiratory distress. She has no wheezes. She has no rhonchi. She has no rales. She exhibits no tenderness.   Abdominal: Soft. Bowel sounds are normal. She exhibits no distension. There is no tenderness.   Genitourinary: Rectal exam shows tenderness. Rectal exam shows no external hemorrhoid, no internal hemorrhoid, no fissure, no mass and anal tone normal.   Genitourinary Comments: RN present as chaperone for rectal exam.  Small amount of stool around the rectum, otherwise normal appearing.  There is tenderness to palpation of the entire rectum as well as the entirety of the buttocks.  Nerve stimulator in place the midline lumbar spine.  Very small amount of dried blood   Musculoskeletal: Normal range of motion.   Neurological: She is alert and oriented to person, place, and time.   Skin: Skin is warm and dry. No abscess noted. No erythema.   Psychiatric: She has a normal mood and affect.         ED Course    Procedures  Labs Reviewed - No data to display       Imaging Results    None          Medical Decision Making:   History:   Old Medical Records: I decided to obtain old medical records.  Other:   I have discussed this case with another health care provider.       <> Summary of the Discussion: Discussed this patient with Urology resident on-call.  He suspects that her pain is due to her stimulator.  Suggests turning of the stimulator in seeing if pain improves.       APC / Resident Notes:   34-year-old female presenting for rectal pain s/p insertion of sacral nerve stimulator.  Her vitals are normal and she appears well her exam notable for sacral stimulator in place, she does have diffuse tenderness however I do not see any signs of infection.  DDx includes postop pain, muscle spasm, pain drip stimulator.  Will discuss with Urology.    Discussed with Urology resident on-call Dr. Gillespie.  Recommends turning the stimulator off to see if pain improves.    Similar turned off by the patient.  Observed her for 30 min, however no improvement pain.  Will give Norco.  Will discharge patient with several Norco and instructed to follow up with the Urology Clinic next week. Stressed the importance of follow-up, strict ED return precautions given.  Patient voiced understanding and is comfortable with discharge. I discussed this patient with my supervising physician.    Lucila Garcia PA-C           Attending Attestation:     Physician Attestation Statement for NP/PA:   I discussed this assessment and plan of this patient with the NP/PA, but I did not personally examine the patient. The face to face encounter was performed by the NP/PA.                     Clinical Impression:       ICD-10-CM ICD-9-CM   1. Rectal pain K62.89 569.42   2. Post-op pain G89.18 338.18         Disposition:   Disposition: Discharged  Condition: Stable                        Lucila Garcia PA-C  07/07/19 2101

## 2019-07-10 ENCOUNTER — OFFICE VISIT (OUTPATIENT)
Dept: UROLOGY | Facility: CLINIC | Age: 34
End: 2019-07-10
Payer: MEDICARE

## 2019-07-10 VITALS
BODY MASS INDEX: 35.4 KG/M2 | WEIGHT: 193.56 LBS | DIASTOLIC BLOOD PRESSURE: 77 MMHG | HEART RATE: 88 BPM | SYSTOLIC BLOOD PRESSURE: 106 MMHG

## 2019-07-10 DIAGNOSIS — B37.31 VAGINAL YEAST INFECTION: ICD-10-CM

## 2019-07-10 DIAGNOSIS — J06.9 UPPER RESPIRATORY TRACT INFECTION, UNSPECIFIED TYPE: Primary | ICD-10-CM

## 2019-07-10 DIAGNOSIS — Z98.890 POST-OPERATIVE STATE: ICD-10-CM

## 2019-07-10 PROCEDURE — 99213 OFFICE O/P EST LOW 20 MIN: CPT | Mod: PBBFAC | Performed by: UROLOGY

## 2019-07-10 PROCEDURE — 99999 PR PBB SHADOW E&M-EST. PATIENT-LVL III: ICD-10-PCS | Mod: PBBFAC,,, | Performed by: UROLOGY

## 2019-07-10 PROCEDURE — 81002 URINALYSIS NONAUTO W/O SCOPE: CPT | Mod: PBBFAC | Performed by: UROLOGY

## 2019-07-10 PROCEDURE — 99999 PR PBB SHADOW E&M-EST. PATIENT-LVL III: CPT | Mod: PBBFAC,,, | Performed by: UROLOGY

## 2019-07-10 PROCEDURE — 99024 POSTOP FOLLOW-UP VISIT: CPT | Mod: POP,,, | Performed by: UROLOGY

## 2019-07-10 PROCEDURE — 99024 PR POST-OP FOLLOW-UP VISIT: ICD-10-PCS | Mod: POP,,, | Performed by: UROLOGY

## 2019-07-10 RX ORDER — SULFAMETHOXAZOLE AND TRIMETHOPRIM 800; 160 MG/1; MG/1
1 TABLET ORAL 2 TIMES DAILY
Qty: 14 TABLET | Refills: 0 | Status: SHIPPED | OUTPATIENT
Start: 2019-07-10 | End: 2019-07-17

## 2019-07-10 RX ORDER — FLUCONAZOLE 150 MG/1
150 TABLET ORAL ONCE
Qty: 1 TABLET | Refills: 1 | Status: SHIPPED | OUTPATIENT
Start: 2019-07-10 | End: 2019-07-10

## 2019-07-10 NOTE — H&P (VIEW-ONLY)
CHIEF COMPLAINT:    Mrs. Ac is a 34 y.o. female presenting for a follow up after Stage I InterStim performed on 7/2/2019  PRESENTING ILLNESS:    Maria Ines Ac is a 34 y.o. female who returns to clinic accompanied by her mother.  She states she is very improved.  Review of the chart reveals that she went to the ER due to anal pain which got better when the program was changed.  She is feeling it in the perineum now.  While the frequency and nocturia have markedly improved, she still has urge incontinence episodes that are similar to baseline.  Nevertheless, the overall improvement is > 50% and she would like to have the second stage done.          Baseline  After Implant  Voids per day   40   5  Voids per night 10   4  Urgency  5   5  Leaks in 24 hours 4   4  Character of leaks Heavy   Moderate  Pad use   4   4    She wanted to know if there was anything she could have for an URI, has been coughing up a greenish discharge.     Allergies:  Patient has no known allergies.    Medications:  Outpatient Encounter Medications as of 7/10/2019   Medication Sig Dispense Refill    ACCU-CHEK SOFTCLIX LANCETS [facility] USE TO TEST BLOOD GLUCOSE TID  3    atorvastatin (LIPITOR) 40 MG tablet Take 40 mg by mouth once daily.      BLOOD SUGAR DIAGNOSTIC (ACCU-CHEK AMAURY PLUS TEST STRP MISC) 1 strip by Misc.(Non-Drug; Combo Route) route 3 (three) times daily.      calcium-vitamin D3 500 mg(1,250mg) -200 unit per tablet Take 1 tablet by mouth 2 (two) times daily with meals.      ciclopirox (PENLAC) 8 % Soln Apply topically nightly. 6.6 mL 11    conjugated estrogens (PREMARIN) vaginal cream Use 0.5 gram of estrogen cream in vagina nightly x 2 weeks, then twice a week thereafter. 30 g 4    diclofenac sodium (VOLTAREN) 1 % Gel Apply 2 g topically 3 (three) times daily as needed. 100 g 0    dicyclomine (BENTYL) 10 MG capsule TAKE 2 CAPSULES(20 MG) BY MOUTH THREE TIMES DAILY BEFORE MEALS 180 capsule 0    docusate sodium  (COLACE) 100 MG capsule Take 1 capsule (100 mg total) by mouth 2 (two) times daily. 60 capsule 0    estradiol (ESTRACE) 1 MG tablet Take 1 tablet (1 mg total) by mouth once daily. 30 tablet 11    fish oil-omega-3 fatty acids 300-1,000 mg capsule Take 2 g by mouth once daily.      gabapentin (NEURONTIN) 300 MG capsule Take 1 capsule (300 mg total) by mouth 3 (three) times daily as needed (Take for headache). 90 capsule 11    GLUCOPHAGE 500 mg tablet Take 500 mg by mouth 2 (two) times daily with meals.       HYDROcodone-acetaminophen (NORCO) 5-325 mg per tablet Take 1 tablet by mouth every 6 (six) hours as needed. 7 tablet 0    ibuprofen (ADVIL,MOTRIN) 800 MG tablet 800 mg every 4 (four) hours as needed.   0    levothyroxine (SYNTHROID) 50 MCG tablet TK 1 T PO QAM  8    magnesium oxide (MAG-OX) 400 mg (241.3 mg magnesium) tablet Take 1 tablet (400 mg total) by mouth 2 (two) times daily.  0    metoprolol succinate (TOPROL-XL) 25 MG 24 hr tablet TAKE 1 TABLET(25 MG) BY MOUTH EVERY DAY 30 tablet 4    MULTIVIT-IRON-MIN-FOLIC ACID 3,500-18-0.4 UNIT-MG-MG ORAL CHEW Take 1 tablet by mouth once daily.       omeprazole (PRILOSEC OTC) 20 MG tablet Take 20 mg by mouth once daily.      pantoprazole (PROTONIX) 40 MG tablet TAKE 1 TABLET BY MOUTH EVERY DAY 90 tablet 0    phentermine (ADIPEX-P) 37.5 MG capsule Take 37.5 mg by mouth every morning.       PREMARIN 0.3 mg tablet   11    spironolactone (ALDACTONE) 25 MG tablet TK 1 T PO HS  2    STEGLATRO 15 mg Tab TK 1 T PO  QAM-for diabetes  9    topiramate (TOPAMAX) 50 MG tablet Take 2 tablets (100 mg total) by mouth once daily. (Patient taking differently: Take 100 mg by mouth every evening. ) 60 tablet 11    TRULICITY 1.5 mg/0.5 mL PnIj INJECT 1 PEN INTO THE SKIN Q WEEK  5    ziprasidone (GEODON) 20 MG Cap 2 (two) times daily.   1    ZOLOFT 100 mg tablet Take 200 mg by mouth once daily.       fluconazole (DIFLUCAN) 150 MG Tab Take 1 tablet (150 mg total) by  mouth once. for 1 dose 1 tablet 1    sulfamethoxazole-trimethoprim 800-160mg (BACTRIM DS) 800-160 mg Tab Take 1 tablet by mouth 2 (two) times daily. for 7 days 14 tablet 0     No facility-administered encounter medications on file as of 7/10/2019.          PHYSICAL EXAMINATION:    The patient generally appears in good health, is appropriately interactive, and is in no apparent distress.    Skin: No lesions.    Mental: Cooperative with normal affect.    Neuro: Grossly intact.    HEENT: Normal. No evidence of lymphadenopathy.    Chest:  normal inspiratory effort.    Abdomen:  Soft, non-tender. No masses or organomegaly. Bladder is not palpable. No evidence of flank discomfort. No evidence of inguinal hernia.    Extremities: No clubbing, cyanosis, or edema    Back:  Incisions healing well.  The dressings were removed except for the steri strips.  The connector was redressed and afixed to the skin with tegaderm x 2.     PVR by bladder scan is 35 ml (which is a marked improvement.)      LABS:    Lab Results   Component Value Date    BUN 8 05/21/2019    CREATININE 1.1 06/18/2019     UA 1.005, pH 5, 1000 glucose, otherwise, negative (on ertigluflozin)    IMPRESSION:    Encounter Diagnoses   Name Primary?    Upper respiratory tract infection, unspecified type Yes    Vaginal yeast infection        PLAN:    1. Bactrim DS sent as well as Diflucan  2.  Consent signed for stage II InterStim

## 2019-07-10 NOTE — PROGRESS NOTES
CHIEF COMPLAINT:    Mrs. Ac is a 34 y.o. female presenting for a follow up after Stage I InterStim performed on 7/2/2019  PRESENTING ILLNESS:    Maria Ines Ac is a 34 y.o. female who returns to clinic accompanied by her mother.  She states she is very improved.  Review of the chart reveals that she went to the ER due to anal pain which got better when the program was changed.  She is feeling it in the perineum now.  While the frequency and nocturia have markedly improved, she still has urge incontinence episodes that are similar to baseline.  Nevertheless, the overall improvement is > 50% and she would like to have the second stage done.          Baseline  After Implant  Voids per day   40   5  Voids per night 10   4  Urgency  5   5  Leaks in 24 hours 4   4  Character of leaks Heavy   Moderate  Pad use   4   4    She wanted to know if there was anything she could have for an URI, has been coughing up a greenish discharge.     Allergies:  Patient has no known allergies.    Medications:  Outpatient Encounter Medications as of 7/10/2019   Medication Sig Dispense Refill    ACCU-CHEK SOFTCLIX LANCETS [facility] USE TO TEST BLOOD GLUCOSE TID  3    atorvastatin (LIPITOR) 40 MG tablet Take 40 mg by mouth once daily.      BLOOD SUGAR DIAGNOSTIC (ACCU-CHEK AMAURY PLUS TEST STRP MISC) 1 strip by Misc.(Non-Drug; Combo Route) route 3 (three) times daily.      calcium-vitamin D3 500 mg(1,250mg) -200 unit per tablet Take 1 tablet by mouth 2 (two) times daily with meals.      ciclopirox (PENLAC) 8 % Soln Apply topically nightly. 6.6 mL 11    conjugated estrogens (PREMARIN) vaginal cream Use 0.5 gram of estrogen cream in vagina nightly x 2 weeks, then twice a week thereafter. 30 g 4    diclofenac sodium (VOLTAREN) 1 % Gel Apply 2 g topically 3 (three) times daily as needed. 100 g 0    dicyclomine (BENTYL) 10 MG capsule TAKE 2 CAPSULES(20 MG) BY MOUTH THREE TIMES DAILY BEFORE MEALS 180 capsule 0    docusate sodium  (COLACE) 100 MG capsule Take 1 capsule (100 mg total) by mouth 2 (two) times daily. 60 capsule 0    estradiol (ESTRACE) 1 MG tablet Take 1 tablet (1 mg total) by mouth once daily. 30 tablet 11    fish oil-omega-3 fatty acids 300-1,000 mg capsule Take 2 g by mouth once daily.      gabapentin (NEURONTIN) 300 MG capsule Take 1 capsule (300 mg total) by mouth 3 (three) times daily as needed (Take for headache). 90 capsule 11    GLUCOPHAGE 500 mg tablet Take 500 mg by mouth 2 (two) times daily with meals.       HYDROcodone-acetaminophen (NORCO) 5-325 mg per tablet Take 1 tablet by mouth every 6 (six) hours as needed. 7 tablet 0    ibuprofen (ADVIL,MOTRIN) 800 MG tablet 800 mg every 4 (four) hours as needed.   0    levothyroxine (SYNTHROID) 50 MCG tablet TK 1 T PO QAM  8    magnesium oxide (MAG-OX) 400 mg (241.3 mg magnesium) tablet Take 1 tablet (400 mg total) by mouth 2 (two) times daily.  0    metoprolol succinate (TOPROL-XL) 25 MG 24 hr tablet TAKE 1 TABLET(25 MG) BY MOUTH EVERY DAY 30 tablet 4    MULTIVIT-IRON-MIN-FOLIC ACID 3,500-18-0.4 UNIT-MG-MG ORAL CHEW Take 1 tablet by mouth once daily.       omeprazole (PRILOSEC OTC) 20 MG tablet Take 20 mg by mouth once daily.      pantoprazole (PROTONIX) 40 MG tablet TAKE 1 TABLET BY MOUTH EVERY DAY 90 tablet 0    phentermine (ADIPEX-P) 37.5 MG capsule Take 37.5 mg by mouth every morning.       PREMARIN 0.3 mg tablet   11    spironolactone (ALDACTONE) 25 MG tablet TK 1 T PO HS  2    STEGLATRO 15 mg Tab TK 1 T PO  QAM-for diabetes  9    topiramate (TOPAMAX) 50 MG tablet Take 2 tablets (100 mg total) by mouth once daily. (Patient taking differently: Take 100 mg by mouth every evening. ) 60 tablet 11    TRULICITY 1.5 mg/0.5 mL PnIj INJECT 1 PEN INTO THE SKIN Q WEEK  5    ziprasidone (GEODON) 20 MG Cap 2 (two) times daily.   1    ZOLOFT 100 mg tablet Take 200 mg by mouth once daily.       fluconazole (DIFLUCAN) 150 MG Tab Take 1 tablet (150 mg total) by  mouth once. for 1 dose 1 tablet 1    sulfamethoxazole-trimethoprim 800-160mg (BACTRIM DS) 800-160 mg Tab Take 1 tablet by mouth 2 (two) times daily. for 7 days 14 tablet 0     No facility-administered encounter medications on file as of 7/10/2019.          PHYSICAL EXAMINATION:    The patient generally appears in good health, is appropriately interactive, and is in no apparent distress.    Skin: No lesions.    Mental: Cooperative with normal affect.    Neuro: Grossly intact.    HEENT: Normal. No evidence of lymphadenopathy.    Chest:  normal inspiratory effort.    Abdomen:  Soft, non-tender. No masses or organomegaly. Bladder is not palpable. No evidence of flank discomfort. No evidence of inguinal hernia.    Extremities: No clubbing, cyanosis, or edema    Back:  Incisions healing well.  The dressings were removed except for the steri strips.  The connector was redressed and afixed to the skin with tegaderm x 2.     PVR by bladder scan is 35 ml (which is a marked improvement.)      LABS:    Lab Results   Component Value Date    BUN 8 05/21/2019    CREATININE 1.1 06/18/2019     UA 1.005, pH 5, 1000 glucose, otherwise, negative (on ertigluflozin)    IMPRESSION:    Encounter Diagnoses   Name Primary?    Upper respiratory tract infection, unspecified type Yes    Vaginal yeast infection        PLAN:    1. Bactrim DS sent as well as Diflucan  2.  Consent signed for stage II InterStim

## 2019-07-11 RX ORDER — PANTOPRAZOLE SODIUM 40 MG/1
TABLET, DELAYED RELEASE ORAL
Qty: 90 TABLET | Refills: 0 | Status: SHIPPED | OUTPATIENT
Start: 2019-07-11 | End: 2020-04-29

## 2019-07-15 ENCOUNTER — TELEPHONE (OUTPATIENT)
Dept: OTOLARYNGOLOGY | Facility: CLINIC | Age: 34
End: 2019-07-15

## 2019-07-15 ENCOUNTER — TELEPHONE (OUTPATIENT)
Dept: UROLOGY | Facility: CLINIC | Age: 34
End: 2019-07-15

## 2019-07-15 NOTE — TELEPHONE ENCOUNTER
----- Message from Ana Gale LPN sent at 7/15/2019  2:30 PM CDT -----  Contact: Mother/ [phone]      ----- Message -----  From: Beth Whittington  Sent: 7/15/2019   2:03 PM  To: Randal Freitas Staff    Patient mother Ruthie would like a call back to make other appt for the patient surgery due to other doctor appt in Psychiatry.

## 2019-07-15 NOTE — TELEPHONE ENCOUNTER
Per pt's mother pt's surgery will be moved to 7/30 due to other appts on 7/25 that can't be missed.

## 2019-07-15 NOTE — TELEPHONE ENCOUNTER
Pt was scheduled for stage 2 interstim on 7/16 but when I called her today to give her the arrival time, the pt stated that she has a sinus infection. After informing Dr. Tee of this she says that we should push the surgery back 1 week to July 23. Pt agreed to this date and verbalized understanding.

## 2019-07-15 NOTE — TELEPHONE ENCOUNTER
----- Message from Dorie Lawton sent at 7/15/2019  2:45 PM CDT -----  Contact: pt mother  Please call above pt mother at [phone] needs appointment ASAP before surgery with urology waiting on a call back thanks

## 2019-07-15 NOTE — TELEPHONE ENCOUNTER
Pt called to r/s surgery from 7/23 to 7/25 with Dr. Tee stating that date no longer works. Pt's surgery has been moved to 7/25.

## 2019-07-16 ENCOUNTER — ANESTHESIA (OUTPATIENT)
Dept: SURGERY | Facility: HOSPITAL | Age: 34
End: 2019-07-16
Payer: MEDICARE

## 2019-07-23 DIAGNOSIS — K58.0 IRRITABLE BOWEL SYNDROME WITH DIARRHEA: ICD-10-CM

## 2019-07-23 DIAGNOSIS — R10.84 GENERALIZED ABDOMINAL PAIN: ICD-10-CM

## 2019-07-24 ENCOUNTER — TELEPHONE (OUTPATIENT)
Dept: UROLOGY | Facility: CLINIC | Age: 34
End: 2019-07-24

## 2019-07-24 ENCOUNTER — TELEPHONE (OUTPATIENT)
Dept: INTERNAL MEDICINE | Facility: CLINIC | Age: 34
End: 2019-07-24

## 2019-07-24 NOTE — TELEPHONE ENCOUNTER
----- Message from Josemanuel Raman sent at 7/24/2019  3:06 PM CDT -----  Contact: Mother Ruthie [phone]  She is requesting an extension for out patient physical therapy for her neck.    Thank you

## 2019-07-24 NOTE — TELEPHONE ENCOUNTER
----- Message from Marla Richardson sent at 7/24/2019  4:22 PM CDT -----  Contact: Self- [phone]  Gopal- pt called to schedule an ep appt- states she's having complaints of reflux- please contact pt at [phone]

## 2019-07-24 NOTE — TELEPHONE ENCOUNTER
Spoke with mother and informed her of Dr. Raymond message and for PT to give us a call for the order. Patient's mother voiced an understanding.

## 2019-07-24 NOTE — TELEPHONE ENCOUNTER
----- Message from Rhoda Liz sent at 7/23/2019  4:39 PM CDT -----  Contact: pt   Needs Advice    Reason for call: pt called stated the Interstim is not helping pt.  Please call her        Communication Preference: [phone]    Additional Information:

## 2019-07-24 NOTE — TELEPHONE ENCOUNTER
----- Message from Manisha Vega sent at 7/24/2019 12:02 PM CDT -----  Contact: Patient   Needs Advice    Reason for call: Patient is asking to have a nurse call her back in reference to her nerve stimulator        Communication Preference: [phone]     Additional Information: please contact the patient

## 2019-07-26 RX ORDER — DICYCLOMINE HYDROCHLORIDE 10 MG/1
CAPSULE ORAL
Qty: 180 CAPSULE | Refills: 0 | OUTPATIENT
Start: 2019-07-26

## 2019-07-29 ENCOUNTER — TELEPHONE (OUTPATIENT)
Dept: UROLOGY | Facility: CLINIC | Age: 34
End: 2019-07-29

## 2019-07-30 ENCOUNTER — HOSPITAL ENCOUNTER (OUTPATIENT)
Facility: HOSPITAL | Age: 34
Discharge: HOME | End: 2019-07-30
Attending: UROLOGY | Admitting: UROLOGY
Payer: MEDICARE

## 2019-07-30 VITALS
BODY MASS INDEX: 35.15 KG/M2 | DIASTOLIC BLOOD PRESSURE: 61 MMHG | OXYGEN SATURATION: 97 % | SYSTOLIC BLOOD PRESSURE: 102 MMHG | WEIGHT: 191 LBS | HEART RATE: 86 BPM | RESPIRATION RATE: 16 BRPM | HEIGHT: 62 IN | TEMPERATURE: 98 F

## 2019-07-30 DIAGNOSIS — K59.09 CONSTIPATION, CHRONIC: ICD-10-CM

## 2019-07-30 DIAGNOSIS — I10 HYPERTENSION, UNSPECIFIED TYPE: ICD-10-CM

## 2019-07-30 DIAGNOSIS — E11.8 TYPE 2 DIABETES MELLITUS WITH COMPLICATION, WITHOUT LONG-TERM CURRENT USE OF INSULIN: ICD-10-CM

## 2019-07-30 DIAGNOSIS — R33.9 INCOMPLETE EMPTYING OF BLADDER: Primary | ICD-10-CM

## 2019-07-30 DIAGNOSIS — E66.01 SEVERE OBESITY (BMI 35.0-39.9) WITH COMORBIDITY: ICD-10-CM

## 2019-07-30 LAB
POCT GLUCOSE: 83 MG/DL (ref 70–110)
POCT GLUCOSE: 90 MG/DL (ref 70–110)

## 2019-07-30 PROCEDURE — D9220A PRA ANESTHESIA: Mod: CRNA,,, | Performed by: NURSE ANESTHETIST, CERTIFIED REGISTERED

## 2019-07-30 PROCEDURE — 25000003 PHARM REV CODE 250: Performed by: STUDENT IN AN ORGANIZED HEALTH CARE EDUCATION/TRAINING PROGRAM

## 2019-07-30 PROCEDURE — 88300 SURGICAL PATH GROSS: CPT | Mod: 26,,, | Performed by: PATHOLOGY

## 2019-07-30 PROCEDURE — D9220A PRA ANESTHESIA: Mod: ANES,,, | Performed by: ANESTHESIOLOGY

## 2019-07-30 PROCEDURE — 82962 GLUCOSE BLOOD TEST: CPT | Performed by: UROLOGY

## 2019-07-30 PROCEDURE — 95971 PR ANALYZE NEUROSTIM,SIMPLE/PROG: ICD-10-PCS | Mod: 51,,, | Performed by: UROLOGY

## 2019-07-30 PROCEDURE — 37000008 HC ANESTHESIA 1ST 15 MINUTES: Performed by: UROLOGY

## 2019-07-30 PROCEDURE — 63600175 PHARM REV CODE 636 W HCPCS: Performed by: ANESTHESIOLOGY

## 2019-07-30 PROCEDURE — 64590 PR IMPLANT PERIPH/GASTRIC NEUROSTIM/RECEIVER: ICD-10-PCS | Mod: 58,,, | Performed by: UROLOGY

## 2019-07-30 PROCEDURE — 64590 INS/RPL PRPH SAC/GSTR NPG/R: CPT | Mod: 58,,, | Performed by: UROLOGY

## 2019-07-30 PROCEDURE — 63600175 PHARM REV CODE 636 W HCPCS: Performed by: NURSE ANESTHETIST, CERTIFIED REGISTERED

## 2019-07-30 PROCEDURE — 25000003 PHARM REV CODE 250: Performed by: UROLOGY

## 2019-07-30 PROCEDURE — 71000044 HC DOSC ROUTINE RECOVERY FIRST HOUR: Performed by: UROLOGY

## 2019-07-30 PROCEDURE — 71000016 HC POSTOP RECOV ADDL HR: Performed by: UROLOGY

## 2019-07-30 PROCEDURE — 37000009 HC ANESTHESIA EA ADD 15 MINS: Performed by: UROLOGY

## 2019-07-30 PROCEDURE — 71000015 HC POSTOP RECOV 1ST HR: Performed by: UROLOGY

## 2019-07-30 PROCEDURE — 63600175 PHARM REV CODE 636 W HCPCS: Performed by: STUDENT IN AN ORGANIZED HEALTH CARE EDUCATION/TRAINING PROGRAM

## 2019-07-30 PROCEDURE — 36000709 HC OR TIME LEV III EA ADD 15 MIN: Performed by: UROLOGY

## 2019-07-30 PROCEDURE — 36000708 HC OR TIME LEV III 1ST 15 MIN: Performed by: UROLOGY

## 2019-07-30 PROCEDURE — C1767 GENERATOR, NEURO NON-RECHARG: HCPCS | Performed by: UROLOGY

## 2019-07-30 PROCEDURE — 88300 TISSUE SPECIMEN TO PATHOLOGY - SURGERY: ICD-10-PCS | Mod: 26,,, | Performed by: PATHOLOGY

## 2019-07-30 PROCEDURE — D9220A PRA ANESTHESIA: ICD-10-PCS | Mod: ANES,,, | Performed by: ANESTHESIOLOGY

## 2019-07-30 PROCEDURE — 95971 ALYS SMPL SP/PN NPGT W/PRGRM: CPT | Mod: 51,,, | Performed by: UROLOGY

## 2019-07-30 PROCEDURE — S0028 INJECTION, FAMOTIDINE, 20 MG: HCPCS | Performed by: ANESTHESIOLOGY

## 2019-07-30 PROCEDURE — 82962 GLUCOSE BLOOD TEST: CPT | Mod: 91 | Performed by: UROLOGY

## 2019-07-30 PROCEDURE — 25000003 PHARM REV CODE 250: Performed by: ANESTHESIOLOGY

## 2019-07-30 PROCEDURE — D9220A PRA ANESTHESIA: ICD-10-PCS | Mod: CRNA,,, | Performed by: NURSE ANESTHETIST, CERTIFIED REGISTERED

## 2019-07-30 PROCEDURE — C1787 PATIENT PROGR, NEUROSTIM: HCPCS | Performed by: UROLOGY

## 2019-07-30 PROCEDURE — 88300 SURGICAL PATH GROSS: CPT | Performed by: PATHOLOGY

## 2019-07-30 DEVICE — SYS INTERSTIM X RECHARGE FREE
Type: IMPLANTABLE DEVICE | Site: BACK | Status: NON-FUNCTIONAL
Removed: 2023-02-28

## 2019-07-30 RX ORDER — SODIUM CHLORIDE 0.9 % (FLUSH) 0.9 %
3 SYRINGE (ML) INJECTION
Status: DISCONTINUED | OUTPATIENT
Start: 2019-07-30 | End: 2019-07-30 | Stop reason: HOSPADM

## 2019-07-30 RX ORDER — MIDAZOLAM HYDROCHLORIDE 1 MG/ML
INJECTION, SOLUTION INTRAMUSCULAR; INTRAVENOUS
Status: DISCONTINUED | OUTPATIENT
Start: 2019-07-30 | End: 2019-07-30

## 2019-07-30 RX ORDER — HYDROCODONE BITARTRATE AND ACETAMINOPHEN 5; 325 MG/1; MG/1
1 TABLET ORAL ONCE AS NEEDED
Status: COMPLETED | OUTPATIENT
Start: 2019-07-30 | End: 2019-07-30

## 2019-07-30 RX ORDER — PROPOFOL 10 MG/ML
VIAL (ML) INTRAVENOUS CONTINUOUS PRN
Status: DISCONTINUED | OUTPATIENT
Start: 2019-07-30 | End: 2019-07-30

## 2019-07-30 RX ORDER — BACITRACIN 50000 [IU]/1
INJECTION, POWDER, FOR SOLUTION INTRAMUSCULAR
Status: DISCONTINUED | OUTPATIENT
Start: 2019-07-30 | End: 2019-07-30 | Stop reason: HOSPADM

## 2019-07-30 RX ORDER — FAMOTIDINE 10 MG/ML
20 INJECTION INTRAVENOUS
Status: COMPLETED | OUTPATIENT
Start: 2019-07-30 | End: 2019-07-30

## 2019-07-30 RX ORDER — ONDANSETRON 2 MG/ML
INJECTION INTRAMUSCULAR; INTRAVENOUS
Status: DISCONTINUED | OUTPATIENT
Start: 2019-07-30 | End: 2019-07-30

## 2019-07-30 RX ORDER — ACETAMINOPHEN 10 MG/ML
INJECTION, SOLUTION INTRAVENOUS
Status: DISCONTINUED | OUTPATIENT
Start: 2019-07-30 | End: 2019-07-30

## 2019-07-30 RX ORDER — HYDROCODONE BITARTRATE AND ACETAMINOPHEN 5; 325 MG/1; MG/1
1 TABLET ORAL EVERY 6 HOURS PRN
Qty: 7 TABLET | Refills: 0 | Status: SHIPPED | OUTPATIENT
Start: 2019-07-30 | End: 2019-08-08

## 2019-07-30 RX ORDER — BUPIVACAINE HYDROCHLORIDE 2.5 MG/ML
INJECTION, SOLUTION EPIDURAL; INFILTRATION; INTRACAUDAL
Status: DISCONTINUED | OUTPATIENT
Start: 2019-07-30 | End: 2019-07-30 | Stop reason: HOSPADM

## 2019-07-30 RX ORDER — MEPERIDINE HYDROCHLORIDE 50 MG/ML
12.5 INJECTION INTRAMUSCULAR; INTRAVENOUS; SUBCUTANEOUS ONCE AS NEEDED
Status: DISCONTINUED | OUTPATIENT
Start: 2019-07-30 | End: 2019-07-30 | Stop reason: HOSPADM

## 2019-07-30 RX ORDER — VANCOMYCIN HCL IN 5 % DEXTROSE 1G/250ML
1000 PLASTIC BAG, INJECTION (ML) INTRAVENOUS ONCE
Status: COMPLETED | OUTPATIENT
Start: 2019-07-30 | End: 2019-07-30

## 2019-07-30 RX ORDER — ONDANSETRON 2 MG/ML
4 INJECTION INTRAMUSCULAR; INTRAVENOUS DAILY PRN
Status: DISCONTINUED | OUTPATIENT
Start: 2019-07-30 | End: 2019-07-30 | Stop reason: HOSPADM

## 2019-07-30 RX ORDER — CEPHALEXIN 500 MG/1
500 CAPSULE ORAL EVERY 6 HOURS
Qty: 20 CAPSULE | Refills: 0 | Status: SHIPPED | OUTPATIENT
Start: 2019-07-30 | End: 2019-08-04

## 2019-07-30 RX ORDER — PROPOFOL 10 MG/ML
VIAL (ML) INTRAVENOUS
Status: DISCONTINUED | OUTPATIENT
Start: 2019-07-30 | End: 2019-07-30

## 2019-07-30 RX ORDER — SODIUM CHLORIDE 9 MG/ML
INJECTION, SOLUTION INTRAVENOUS CONTINUOUS PRN
Status: DISCONTINUED | OUTPATIENT
Start: 2019-07-30 | End: 2019-07-30

## 2019-07-30 RX ORDER — POLYETHYLENE GLYCOL 3350 17 G/17G
17 POWDER, FOR SOLUTION ORAL DAILY
Qty: 238 G | Refills: 0 | Status: SHIPPED | OUTPATIENT
Start: 2019-07-30 | End: 2020-07-10

## 2019-07-30 RX ORDER — FENTANYL CITRATE 50 UG/ML
INJECTION, SOLUTION INTRAMUSCULAR; INTRAVENOUS
Status: DISCONTINUED | OUTPATIENT
Start: 2019-07-30 | End: 2019-07-30

## 2019-07-30 RX ORDER — LIDOCAINE HCL/PF 100 MG/5ML
SYRINGE (ML) INTRAVENOUS
Status: DISCONTINUED | OUTPATIENT
Start: 2019-07-30 | End: 2019-07-30

## 2019-07-30 RX ORDER — LIDOCAINE HYDROCHLORIDE 10 MG/ML
INJECTION, SOLUTION EPIDURAL; INFILTRATION; INTRACAUDAL; PERINEURAL
Status: DISCONTINUED | OUTPATIENT
Start: 2019-07-30 | End: 2019-07-30 | Stop reason: HOSPADM

## 2019-07-30 RX ORDER — HYDROMORPHONE HYDROCHLORIDE 1 MG/ML
0.2 INJECTION, SOLUTION INTRAMUSCULAR; INTRAVENOUS; SUBCUTANEOUS EVERY 5 MIN PRN
Status: DISCONTINUED | OUTPATIENT
Start: 2019-07-30 | End: 2019-07-30 | Stop reason: HOSPADM

## 2019-07-30 RX ADMIN — PROPOFOL 30 MG: 10 INJECTION, EMULSION INTRAVENOUS at 07:07

## 2019-07-30 RX ADMIN — FAMOTIDINE 20 MG: 10 INJECTION, SOLUTION INTRAVENOUS at 06:07

## 2019-07-30 RX ADMIN — PROPOFOL 40 MG: 10 INJECTION, EMULSION INTRAVENOUS at 07:07

## 2019-07-30 RX ADMIN — PROPOFOL 50 MCG/KG/MIN: 10 INJECTION, EMULSION INTRAVENOUS at 07:07

## 2019-07-30 RX ADMIN — LIDOCAINE HYDROCHLORIDE 40 MG: 20 INJECTION, SOLUTION INTRAVENOUS at 07:07

## 2019-07-30 RX ADMIN — FENTANYL CITRATE 25 MCG: 50 INJECTION, SOLUTION INTRAMUSCULAR; INTRAVENOUS at 07:07

## 2019-07-30 RX ADMIN — SODIUM CHLORIDE: 0.9 INJECTION, SOLUTION INTRAVENOUS at 06:07

## 2019-07-30 RX ADMIN — HYDROMORPHONE HYDROCHLORIDE 0.2 MG: 1 INJECTION, SOLUTION INTRAMUSCULAR; INTRAVENOUS; SUBCUTANEOUS at 09:07

## 2019-07-30 RX ADMIN — PROPOFOL 30 MG: 10 INJECTION, EMULSION INTRAVENOUS at 08:07

## 2019-07-30 RX ADMIN — ONDANSETRON 4 MG: 2 INJECTION INTRAMUSCULAR; INTRAVENOUS at 07:07

## 2019-07-30 RX ADMIN — HYDROCODONE BITARTRATE AND ACETAMINOPHEN 1 TABLET: 5; 325 TABLET ORAL at 09:07

## 2019-07-30 RX ADMIN — VANCOMYCIN HYDROCHLORIDE 1000 MG: 1 INJECTION, POWDER, LYOPHILIZED, FOR SOLUTION INTRAVENOUS at 06:07

## 2019-07-30 RX ADMIN — GENTAMICIN SULFATE 240 MG: 40 INJECTION, SOLUTION INTRAMUSCULAR; INTRAVENOUS at 05:07

## 2019-07-30 RX ADMIN — MIDAZOLAM HYDROCHLORIDE 2 MG: 1 INJECTION, SOLUTION INTRAMUSCULAR; INTRAVENOUS at 07:07

## 2019-07-30 RX ADMIN — ACETAMINOPHEN 1000 MG: 10 INJECTION, SOLUTION INTRAVENOUS at 07:07

## 2019-07-30 NOTE — ANESTHESIA POSTPROCEDURE EVALUATION
Anesthesia Post Evaluation    Patient: Maria Ines Ac    Procedure(s) Performed: Procedure(s) (LRB):  INSERTION, NEUROSTIMULATOR, PERMANENT, SACRAL (N/A)    Final Anesthesia Type: general  Patient location during evaluation: PACU  Patient participation: Yes- Able to Participate  Level of consciousness: awake and alert  Post-procedure vital signs: reviewed and stable  Pain management: adequate  Airway patency: patent  PONV status at discharge: No PONV  Anesthetic complications: no      Cardiovascular status: blood pressure returned to baseline and hemodynamically stable  Respiratory status: unassisted, spontaneous ventilation and room air  Hydration status: euvolemic  Follow-up not needed.          Vitals Value Taken Time   /61 7/30/2019 10:26 AM   Temp 36.7 °C (98 °F) 7/30/2019 10:15 AM   Pulse 86 7/30/2019 10:27 AM   Resp 16 7/30/2019 10:15 AM   SpO2 96 % 7/30/2019 10:27 AM   Vitals shown include unvalidated device data.      No case tracking events are documented in the log.      Pain/Mac Score: Pain Rating Prior to Med Admin: 4 (7/30/2019  9:42 AM)  Mac Score: 10 (7/30/2019  9:00 AM)

## 2019-07-30 NOTE — DISCHARGE INSTRUCTIONS
Leave dressing in place for 48 hours - may remove afterwards and shower, no submerging in water for 2 weeks (bath, pool, etc).  Call Dr. Tee's office for any signs of infection: redness, heat, or swelling at the incision site, fevers, pus or green/yellow drainage from incision site.  No driving while taking pain medication.    Treating Incontinence in Women: Special Therapies     During biofeedback, sensors send signals from your pelvic floor muscles to a computer screen.     Your doctor will discuss your options for treating your urinary incontinence. These depend on the cause of your problem and any other health issues you have. Usually behavioral changes are tried first, followed by various medicines. If these methods are unsuccessful, one or more of the therapies described below may be part of your treatment plan.  Biofeedback  This technique is taught by a nurse or physical therapist. During the therapy, a small sensor is placed in your vagina or rectum. Another sensor is placed on your stomach. Other types of sensors are also available. These sensors read signals from the pelvic floor muscles. When you contract or relax your muscles, these signals are shown as images on a computer screen. Using the images, you can learn to relax or contract certain muscles. This can help you strengthen and better control these muscles. And it can help you learn pelvic floor muscle exercises.  Electrical stimulation  This is a painless therapy that uses a tiny amount of electric current. It helps strengthen very weak or damaged pelvic floor muscles. The electric current is sent through the muscles of the pelvic floor and bladder. This causes the muscles to contract. In time, this helps make the muscles stronger.  Stimulator implants  This technique is used to treat urge incontinence. A small device is implanted under the skin near the stomach. This device gives off mild electrical signals. These block extra signals that are  being sent to the bladder muscle. This helps the bladder work more normally.          Recovery After Procedural Sedation (Adult)  You have been given medicine by vein to make you sleep during your surgery. This may have included both a pain medicine and sleeping medicine. Most of the effects have worn off. But you may still have some drowsiness for the next 6 to 8 hours.  Home care  Follow these guidelines when you get home:  · For the next 8 hours, you should be watched by a responsible adult. This person should make sure your condition is not getting worse.  · Don't drink any alcohol for the next 24 hours.  · Don't drive, operate dangerous machinery, or make important business or personal decisions during the next 24 hours.  Note: Your healthcare provider may tell you not to take any medicine by mouth for pain or sleep in the next 4 hours. These medicines may react with the medicines you were given in the hospital. This could cause a much stronger response than usual.  Follow-up care  Follow up with your healthcare provider if you are not alert and back to your usual level of activity within 12 hours.  When to seek medical advice  Call your healthcare provider right away if any of these occur:  · Drowsiness gets worse  · Weakness or dizziness gets worse  · Repeated vomiting  · You can't be awakened

## 2019-07-30 NOTE — OP NOTE
[facility]  Operative Note    Date: 07/30/2019    Pre-Op Diagnosis: incomplete bladder emptying, urge incontinence    Patient Active Problem List   Diagnosis    Abdominal pain, RLQ (right lower quadrant)    Dysphagia    Diabetes mellitus, type II    Constipation, chronic    Right hip pain    New daily persistent headache    Hypertension    Palpitations    Chest pain, non-cardiac    Dyspnea on exertion    Severe obesity (BMI 35.0-39.9) with comorbidity    Muscle spasm    Gastroesophageal reflux disease without esophagitis    Irritable bowel syndrome with diarrhea    Migrainous vertigo    Uncontrolled morning headache    Sleep arousal disorder    Hyperlipidemia    GERD (gastroesophageal reflux disease)    Family history of breast cancer    Family hx of ovarian malignancy    Weakness    Painful cervical ROM    Incomplete emptying of bladder       Post-Op Diagnosis: same    Procedure(s) Performed:   1.  Insertion of peripheral neurostimulator pulse generator (65205)  2.  Electronic analysis of implanted neurostimulator pulse generator system with intraoperative or subsequent programming (04555)    Specimen(s): temporary lead (gross only)    Staff Surgeon: Zena Tee MD    Assistant Surgeon: Gavin Leon MD    Anesthesia: Monitored Local Anesthesia with Sedation    Indications: Maria Ines Ac is a 34 y.o. female with history of incomplete emptying, urinary frequency, urgency and urge incontinence. She had stage I interstim placement and has >50% improvement in her symptoms. She would like to proceed with stage II.     Findings:   Generator placed on left side  No impedance identified    Estimated Blood Loss: minimal    Drains: none    Complications:  None    Procedure in Detail:  After informed consent was obtained, the patient was transferred to the operating suite and placed in the prone position.  Anesthesia was administered.  The patient received preoperative  antibiotics.  The patient was then prepped and draped in the usual sterile fashion.  Timeout was performed and preoperative antibiotics were confirmed.      1% lidocaine and 0.25% marcaine (1:1) was injected over the right hip along the previous incision.  This was then opened sharply using a 15 blade.  Bovie electrocautery and blunt dissection were used to open the hip pocket.  The lead which was connected to the percutaneous extension was identified and elevated.  The extension was cut and removed from the field, ensuring sterility.  The subcutaneous pocket anterior to the muscle surface was enlarged and hemostasis was established.      The sutures holding the protective boot were cut and the protective boot was retracted. The set screws were exposed and loosened with a hex wrench.  The boot was removed and discarded. The lead was cleansed of body fluid and dried and then inserted into the header of the neurostimulator until the blue tip was visualized at the distal window.  The single set screw was tightened.  The subcutaneous pocket was developed.  The patient had some bleeding that was not controlled with electrocautery.  Several figure of 8 sutures were placed with 3-0 vicryl on an RB1 needle so hemostasis was obtained.  The incision was extended a small amount in order to gain adequate visualization and to facilitate the placement of the IPG.  The neurostimulator was placed into the subcutaneous pocket with the etched identification side placed upwards. The programming head was placed over the implanted neurostimulator in a sterile cover to ensure adequate lead connection and that parameters were within normal limits.  Impedances were confirmed to be within normal limits (>50 and <4,000).      The wound was wiped with a bacitracin soaked Ray arik that was just damp and then closed in 2 layers, using 3-0 vicryl deep dermal sutures and a running 4-0 monocryl subcuticular superficially.  Steristips, telfa and a  tegaderm were applied.      The patient tolerated the procedure well and was transferred to the recovery room in stable condition.      Disposition:  The patient will follow up with Dr. Tee in 2 weeks.  Prescriptions were given for keflex, hydrocodone, miralax.  The patient will meet with the [employer] rep in the recovery room.      I was present for the entire case and agree with the above note.    Gavin Leon MD    I was present for the entire case and agree with the above note.

## 2019-07-30 NOTE — INTERVAL H&P NOTE
The patient has been examined and the H&P has been reviewed:    I concur with the findings and changes have been noted since the H&P was written: Improved URI, no N/V, chest pain, SOB, dysuria, hematuria.     Anesthesia/Surgery risks, benefits and alternative options discussed and understood by patient/family.    The patient had a > 50% improvement of symptoms, particularly the frequency and urgency.  Will proceed with planned Stage II InterStim placement.       Active Hospital Problems    Diagnosis  POA    *Incomplete emptying of bladder [R33.9]  Yes    Severe obesity (BMI 35.0-39.9) with comorbidity [E66.01]  Yes    Hypertension [I10]  Yes    Diabetes mellitus, type II [E11.9]  Yes    Constipation, chronic [K59.09]  Yes      Resolved Hospital Problems   No resolved problems to display.

## 2019-07-30 NOTE — PLAN OF CARE
Patient assigned to this writer by charge nurse. The patient is in the waiting room but, will be escorted to [facility] room 46.This writer is completing a chart review and reviewing MD orders.

## 2019-07-30 NOTE — TRANSFER OF CARE
"Anesthesia Transfer of Care Note    Patient: Maria Ines Ac    Procedure(s) Performed: Procedure(s) (LRB):  INSERTION, NEUROSTIMULATOR, PERMANENT, SACRAL (N/A)    Patient location: [facility]    Anesthesia Type: general    Transport from OR: Transported from OR on 2-3 L/min O2 by NC with adequate spontaneous ventilation    Post pain: adequate analgesia    Post assessment: no apparent anesthetic complications and tolerated procedure well    Post vital signs: stable    Level of consciousness: awake    Nausea/Vomiting: no nausea/vomiting    Complications: none    Transfer of care protocol was followed      Last vitals:   Visit Vitals  /66 (BP Location: Right arm, Patient Position: Lying)   Pulse 88   Temp 36.9 °C (98.4 °F) (Oral)   Resp 18   Ht 5' 2" (1.575 m)   Wt 86.6 kg (191 lb)   LMP 11/17/2012 (LMP Unknown)   SpO2 97%   Breastfeeding? No   BMI 34.93 kg/m²     "

## 2019-07-30 NOTE — DISCHARGE SUMMARY
[facility]  Discharge Note  Short Stay    Admit Date: 7/30/2019    Discharge Date and Time: 07/30/2019 8:48 AM      Attending Physician: Zena Tee MD     Discharge Provider: Gavin Leon    Diagnoses:  Active Hospital Problems    Diagnosis  POA    *Incomplete emptying of bladder [R33.9]  Yes    Severe obesity (BMI 35.0-39.9) with comorbidity [E66.01]  Yes    Hypertension [I10]  Yes    Diabetes mellitus, type II [E11.9]  Yes    Constipation, chronic [K59.09]  Yes      Resolved Hospital Problems   No resolved problems to display.       Discharged Condition: good    Hospital Course: Patient was admitted for interstim stage II and tolerated the procedure well with no complications. The patient was discharged home in good condition on the same day.       Final Diagnoses: Same as principal problem.    Disposition: Home or Self Care    Follow up/Patient Instructions:    Medications:  Reconciled Home Medications:   Current Discharge Medication List      START taking these medications    Details   cephALEXin (KEFLEX) 500 MG capsule Take 1 capsule (500 mg total) by mouth every 6 (six) hours. for 5 days  Qty: 20 capsule, Refills: 0      polyethylene glycol (GLYCOLAX) 17 gram PwPk Take 17 g by mouth once daily.  Qty: 30 packet, Refills: 0         CONTINUE these medications which have CHANGED    Details   HYDROcodone-acetaminophen (NORCO) 5-325 mg per tablet Take 1 tablet by mouth every 6 (six) hours as needed.  Qty: 7 tablet, Refills: 0         CONTINUE these medications which have NOT CHANGED    Details   atorvastatin (LIPITOR) 40 MG tablet Take 40 mg by mouth once daily.      calcium-vitamin D3 500 mg(1,250mg) -200 unit per tablet Take 1 tablet by mouth 2 (two) times daily with meals.      ciclopirox (PENLAC) 8 % Soln Apply topically nightly.  Qty: 6.6 mL, Refills: 11      conjugated estrogens (PREMARIN) vaginal cream Use 0.5 gram of estrogen cream in vagina nightly x 2 weeks, then twice a week  thereafter.  Qty: 30 g, Refills: 4    Associated Diagnoses: Vaginal atrophy      diclofenac sodium (VOLTAREN) 1 % Gel Apply 2 g topically 3 (three) times daily as needed.  Qty: 100 g, Refills: 0      dicyclomine (BENTYL) 10 MG capsule TAKE 2 CAPSULES(20 MG) BY MOUTH THREE TIMES DAILY BEFORE MEALS  Qty: 180 capsule, Refills: 0    Associated Diagnoses: Irritable bowel syndrome with diarrhea; Generalized abdominal pain      docusate sodium (COLACE) 100 MG capsule Take 1 capsule (100 mg total) by mouth 2 (two) times daily.  Qty: 60 capsule, Refills: 0      estradiol (ESTRACE) 1 MG tablet Take 1 tablet (1 mg total) by mouth once daily.  Qty: 30 tablet, Refills: 11      fish oil-omega-3 fatty acids 300-1,000 mg capsule Take 2 g by mouth once daily.      gabapentin (NEURONTIN) 300 MG capsule Take 1 capsule (300 mg total) by mouth 3 (three) times daily as needed (Take for headache).  Qty: 90 capsule, Refills: 11      GLUCOPHAGE 500 mg tablet Take 500 mg by mouth 2 (two) times daily with meals.       ibuprofen (ADVIL,MOTRIN) 800 MG tablet 800 mg every 4 (four) hours as needed.   Refills: 0      levothyroxine (SYNTHROID) 50 MCG tablet TK 1 T PO QAM  Refills: 8      magnesium oxide (MAG-OX) 400 mg (241.3 mg magnesium) tablet Take 1 tablet (400 mg total) by mouth 2 (two) times daily.  Refills: 0      metoprolol succinate (TOPROL-XL) 25 MG 24 hr tablet TAKE 1 TABLET(25 MG) BY MOUTH EVERY DAY  Qty: 30 tablet, Refills: 4      MULTIVIT-IRON-MIN-FOLIC ACID 3,500-18-0.4 UNIT-MG-MG ORAL CHEW Take 1 tablet by mouth once daily.       omeprazole (PRILOSEC OTC) 20 MG tablet Take 20 mg by mouth once daily.      pantoprazole (PROTONIX) 40 MG tablet TAKE 1 TABLET BY MOUTH EVERY DAY  Qty: 90 tablet, Refills: 0    Comments: **Patient requests 90 days supply**      phentermine (ADIPEX-P) 37.5 MG capsule Take 37.5 mg by mouth every morning.       spironolactone (ALDACTONE) 25 MG tablet TK 1 T PO HS  Refills: 2      STEGLATRO 15 mg Tab TK 1 T PO   QAM-for diabetes  Refills: 9      topiramate (TOPAMAX) 50 MG tablet Take 2 tablets (100 mg total) by mouth once daily.  Qty: 60 tablet, Refills: 11      ziprasidone (GEODON) 20 MG Cap 2 (two) times daily.   Refills: 1      ZOLOFT 100 mg tablet Take 200 mg by mouth once daily.       ACCU-CHEK SOFTCLIX LANCETS Misc USE TO TEST BLOOD GLUCOSE TID  Refills: 3      BLOOD SUGAR DIAGNOSTIC (ACCU-CHEK AMAURY PLUS TEST STRP MISC) 1 strip by Misc.(Non-Drug; Combo Route) route 3 (three) times daily.      PREMARIN 0.3 mg tablet Refills: 11      TRULICITY 1.5 mg/0.5 mL PnIj INJECT 1 PEN INTO THE SKIN Q WEEK  Refills: 5           Discharge Procedure Orders   Call MD for:  persistent nausea and vomiting or diarrhea     Call MD for:  severe uncontrolled pain     Call MD for:  persistent dizziness, light-headedness, or visual disturbances     Call MD for:   Order Comments: Temperature > 101F     Follow-up Information     Zena Tee MD In 2 weeks.    Specialty:  Urology  Contact information:  [address]  [facility] [address] [phone]                 Agree with the above.

## 2019-07-30 NOTE — PLAN OF CARE
Pt tolerating PO intake. Denies nausea. Rates pain 3/10 - states this is tolerable for d/c. Meets PADSS criteria for d/c. Reviewed instructions with mother and patient. Awaiting bedside delivery from pharmacy.

## 2019-07-30 NOTE — PLAN OF CARE
The patient was  escorted to [facility] room 46. The patient is currently  changing into a hospital gown.

## 2019-08-01 ENCOUNTER — NURSE TRIAGE (OUTPATIENT)
Dept: ADMINISTRATIVE | Facility: CLINIC | Age: 34
End: 2019-08-01

## 2019-08-02 NOTE — TELEPHONE ENCOUNTER
Reason for Disposition   Difficult to awaken or acting confused (e.g., disoriented, slurred speech)    Protocols used: DIZZINESS - [id number]

## 2019-08-05 NOTE — PROGRESS NOTES
CHIEF COMPLAINT:    Mrs. Ac is a 34 y.o. female presenting for a post op follow up after second stage InterStim on 7/30/2019    PRESENTING ILLNESS:    Maria Ines Ac is a 34 y.o. female who returns stating that her urinary symptoms are still significant.  She still wears 4 pads a day and they get wet.  She has pain overlying the IPG, though there are no wound issues.  She denies any exudate or edema.      Allergies:  Patient has no known allergies.    Medications:  Outpatient Encounter Medications as of 8/8/2019   Medication Sig Dispense Refill    ACCU-CHEK SOFTCLIX LANCETS [facility] USE TO TEST BLOOD GLUCOSE TID  3    atorvastatin (LIPITOR) 40 MG tablet Take 40 mg by mouth once daily.      BLOOD SUGAR DIAGNOSTIC (ACCU-CHEK AMAURY PLUS TEST STRP MISC) 1 strip by Misc.(Non-Drug; Combo Route) route 3 (three) times daily.      calcium-vitamin D3 500 mg(1,250mg) -200 unit per tablet Take 1 tablet by mouth 2 (two) times daily with meals.      ciclopirox (PENLAC) 8 % Soln Apply topically nightly. 6.6 mL 11    conjugated estrogens (PREMARIN) vaginal cream Use 0.5 gram of estrogen cream in vagina nightly x 2 weeks, then twice a week thereafter. 30 g 4    diclofenac sodium (VOLTAREN) 1 % Gel Apply 2 g topically 3 (three) times daily as needed. 100 g 0    dicyclomine (BENTYL) 10 MG capsule TAKE 2 CAPSULES(20 MG) BY MOUTH THREE TIMES DAILY BEFORE MEALS 180 capsule 0    docusate sodium (COLACE) 100 MG capsule Take 1 capsule (100 mg total) by mouth 2 (two) times daily. 60 capsule 0    estradiol (ESTRACE) 1 MG tablet Take 1 tablet (1 mg total) by mouth once daily. 30 tablet 11    FARXIGA 10 mg Tab TK 1 T PO QD  7    fish oil-omega-3 fatty acids 300-1,000 mg capsule Take 2 g by mouth once daily.      gabapentin (NEURONTIN) 300 MG capsule Take 1 capsule (300 mg total) by mouth 3 (three) times daily as needed (Take for headache). 90 capsule 11    GLUCOPHAGE 500 mg tablet Take 500 mg by mouth 2 (two) times daily  with meals.       ibuprofen (ADVIL,MOTRIN) 800 MG tablet 800 mg every 4 (four) hours as needed.   0    levothyroxine (SYNTHROID) 50 MCG tablet TK 1 T PO QAM  8    magnesium oxide (MAG-OX) 400 mg (241.3 mg magnesium) tablet Take 1 tablet (400 mg total) by mouth 2 (two) times daily.  0    metoprolol succinate (TOPROL-XL) 25 MG 24 hr tablet TAKE 1 TABLET(25 MG) BY MOUTH EVERY DAY 30 tablet 4    MULTIVIT-IRON-MIN-FOLIC ACID 3,500-18-0.4 UNIT-MG-MG ORAL CHEW Take 1 tablet by mouth once daily.       omeprazole (PRILOSEC OTC) 20 MG tablet Take 20 mg by mouth once daily.      pantoprazole (PROTONIX) 40 MG tablet TAKE 1 TABLET BY MOUTH EVERY DAY 90 tablet 0    phentermine (ADIPEX-P) 37.5 MG capsule Take 37.5 mg by mouth every morning.       polyethylene glycol (GLYCOLAX) 17 gram/dose powder Mix 1 capful (17 g) with liquid and take by mouth once daily. 238 g 0    PREMARIN 0.3 mg tablet   11    spironolactone (ALDACTONE) 25 MG tablet TK 1 T PO HS  2    STEGLATRO 15 mg Tab TK 1 T PO  QAM-for diabetes  9    topiramate (TOPAMAX) 50 MG tablet Take 2 tablets (100 mg total) by mouth once daily. (Patient taking differently: Take 100 mg by mouth every evening. ) 60 tablet 11    traZODone (DESYREL) 50 MG tablet TK 1 T PO QHS  1    TRULICITY 1.5 mg/0.5 mL PnIj INJECT 1 PEN INTO THE SKIN Q WEEK  5    ziprasidone (GEODON) 20 MG Cap 2 (two) times daily.   1    ZOLOFT 100 mg tablet Take 200 mg by mouth once daily.       [] cephALEXin (KEFLEX) 500 MG capsule Take 1 capsule (500 mg total) by mouth every 6 (six) hours. for 5 days 20 capsule 0    [DISCONTINUED] HYDROcodone-acetaminophen (NORCO) 5-325 mg per tablet Take 1 tablet by mouth every 6 (six) hours as needed. 7 tablet 0     No facility-administered encounter medications on file as of 2019.          PHYSICAL EXAMINATION:    The patient generally appears in good health, is appropriately interactive, and is in no apparent distress.    Skin: No  lesions.    Mental: Cooperative with normal affect.    Neuro: Grossly intact.    HEENT: Normal. No evidence of lymphadenopathy.    Chest:  normal inspiratory effort.    Abdomen:  Soft, non-tender. No masses or organomegaly. Bladder is not palpable. No evidence of flank discomfort. No evidence of inguinal hernia.    Extremities: No clubbing, cyanosis, or edema    Back:  Healing well.  No exudate, erythema or edema.  Steri strips were still in place.  She is tender over the unit.      LABS:    Lab Results   Component Value Date    BUN 8 05/21/2019    CREATININE 1.1 06/18/2019     UA 1,010, pH 6, 1000 glucose, otherwise, negative.      IMPRESSION:    Encounter Diagnoses   Name Primary?    Post-operative state Yes       PLAN:    1.  Accucheck was 86  2.  Changed from program 3 which was at 2.3 (stimulation in the buttocks), to Program 5, to 2.3  Stimulation is now in the vagina.    3.  Follow up in 1 month.

## 2019-08-08 ENCOUNTER — OFFICE VISIT (OUTPATIENT)
Dept: UROLOGY | Facility: CLINIC | Age: 34
End: 2019-08-08
Payer: MEDICARE

## 2019-08-08 VITALS
BODY MASS INDEX: 36.92 KG/M2 | HEIGHT: 62 IN | WEIGHT: 200.63 LBS | HEART RATE: 106 BPM | SYSTOLIC BLOOD PRESSURE: 111 MMHG | DIASTOLIC BLOOD PRESSURE: 77 MMHG

## 2019-08-08 DIAGNOSIS — R81 GLYCOSURIA: ICD-10-CM

## 2019-08-08 DIAGNOSIS — Z98.890 POST-OPERATIVE STATE: Primary | ICD-10-CM

## 2019-08-08 LAB — GLUCOSE SERPL-MCNC: 86 MG/DL (ref 70–110)

## 2019-08-08 PROCEDURE — 99024 POSTOP FOLLOW-UP VISIT: CPT | Mod: POP,,, | Performed by: UROLOGY

## 2019-08-08 PROCEDURE — 81002 URINALYSIS NONAUTO W/O SCOPE: CPT | Mod: PBBFAC | Performed by: UROLOGY

## 2019-08-08 PROCEDURE — 99999 PR PBB SHADOW E&M-EST. PATIENT-LVL IV: CPT | Mod: PBBFAC,,, | Performed by: UROLOGY

## 2019-08-08 PROCEDURE — 95971 ALYS SMPL SP/PN NPGT W/PRGRM: CPT | Mod: PBBFAC | Performed by: UROLOGY

## 2019-08-08 PROCEDURE — 95971 ALYS SMPL SP/PN NPGT W/PRGRM: CPT | Mod: S$PBB,,, | Performed by: UROLOGY

## 2019-08-08 PROCEDURE — 99214 OFFICE O/P EST MOD 30 MIN: CPT | Mod: PBBFAC | Performed by: UROLOGY

## 2019-08-08 PROCEDURE — 99999 PR PBB SHADOW E&M-EST. PATIENT-LVL IV: ICD-10-PCS | Mod: PBBFAC,,, | Performed by: UROLOGY

## 2019-08-08 PROCEDURE — 82962 GLUCOSE BLOOD TEST: CPT | Mod: PBBFAC | Performed by: UROLOGY

## 2019-08-08 PROCEDURE — 95971 PR ANALYZE NEUROSTIM,SIMPLE/PROG: ICD-10-PCS | Mod: S$PBB,,, | Performed by: UROLOGY

## 2019-08-08 PROCEDURE — 99024 PR POST-OP FOLLOW-UP VISIT: ICD-10-PCS | Mod: POP,,, | Performed by: UROLOGY

## 2019-08-08 RX ORDER — TRAZODONE HYDROCHLORIDE 50 MG/1
TABLET ORAL
Refills: 1 | COMMUNITY
Start: 2019-07-26 | End: 2020-01-06

## 2019-08-08 RX ORDER — DAPAGLIFLOZIN 10 MG/1
10 TABLET, FILM COATED ORAL DAILY
Refills: 7 | COMMUNITY
Start: 2019-07-18

## 2019-08-12 ENCOUNTER — OFFICE VISIT (OUTPATIENT)
Dept: NEUROLOGY | Facility: CLINIC | Age: 34
End: 2019-08-12
Payer: MEDICARE

## 2019-08-12 ENCOUNTER — OFFICE VISIT (OUTPATIENT)
Dept: OTOLARYNGOLOGY | Facility: CLINIC | Age: 34
End: 2019-08-12
Payer: MEDICARE

## 2019-08-12 VITALS
BODY MASS INDEX: 37.53 KG/M2 | WEIGHT: 203.94 LBS | DIASTOLIC BLOOD PRESSURE: 75 MMHG | HEIGHT: 62 IN | SYSTOLIC BLOOD PRESSURE: 108 MMHG | HEART RATE: 88 BPM

## 2019-08-12 DIAGNOSIS — R09.81 NASAL CONGESTION: ICD-10-CM

## 2019-08-12 DIAGNOSIS — J01.90 ACUTE NON-RECURRENT SINUSITIS, UNSPECIFIED LOCATION: ICD-10-CM

## 2019-08-12 DIAGNOSIS — M54.81 BILATERAL OCCIPITAL NEURALGIA: Primary | ICD-10-CM

## 2019-08-12 DIAGNOSIS — J01.90 ACUTE NON-RECURRENT SINUSITIS, UNSPECIFIED LOCATION: Primary | ICD-10-CM

## 2019-08-12 DIAGNOSIS — G44.52 NEW DAILY PERSISTENT HEADACHE: ICD-10-CM

## 2019-08-12 PROCEDURE — 99215 OFFICE O/P EST HI 40 MIN: CPT | Mod: PBBFAC,27 | Performed by: STUDENT IN AN ORGANIZED HEALTH CARE EDUCATION/TRAINING PROGRAM

## 2019-08-12 PROCEDURE — 99215 OFFICE O/P EST HI 40 MIN: CPT | Mod: S$PBB,GC,, | Performed by: STUDENT IN AN ORGANIZED HEALTH CARE EDUCATION/TRAINING PROGRAM

## 2019-08-12 PROCEDURE — 99999 PR PBB SHADOW E&M-EST. PATIENT-LVL V: CPT | Mod: PBBFAC,GC,, | Performed by: STUDENT IN AN ORGANIZED HEALTH CARE EDUCATION/TRAINING PROGRAM

## 2019-08-12 PROCEDURE — 99203 PR OFFICE/OUTPT VISIT, NEW, LEVL III, 30-44 MIN: ICD-10-PCS | Mod: S$PBB,,, | Performed by: NURSE PRACTITIONER

## 2019-08-12 PROCEDURE — 99215 PR OFFICE/OUTPT VISIT, EST, LEVL V, 40-54 MIN: ICD-10-PCS | Mod: S$PBB,GC,, | Performed by: STUDENT IN AN ORGANIZED HEALTH CARE EDUCATION/TRAINING PROGRAM

## 2019-08-12 PROCEDURE — 99999 PR PBB SHADOW E&M-EST. PATIENT-LVL II: CPT | Mod: PBBFAC,,, | Performed by: NURSE PRACTITIONER

## 2019-08-12 PROCEDURE — 99212 OFFICE O/P EST SF 10 MIN: CPT | Mod: PBBFAC | Performed by: NURSE PRACTITIONER

## 2019-08-12 PROCEDURE — 99203 OFFICE O/P NEW LOW 30 MIN: CPT | Mod: S$PBB,,, | Performed by: NURSE PRACTITIONER

## 2019-08-12 PROCEDURE — 99999 PR PBB SHADOW E&M-EST. PATIENT-LVL V: ICD-10-PCS | Mod: PBBFAC,GC,, | Performed by: STUDENT IN AN ORGANIZED HEALTH CARE EDUCATION/TRAINING PROGRAM

## 2019-08-12 PROCEDURE — 99999 PR PBB SHADOW E&M-EST. PATIENT-LVL II: ICD-10-PCS | Mod: PBBFAC,,, | Performed by: NURSE PRACTITIONER

## 2019-08-12 RX ORDER — GABAPENTIN 300 MG/1
CAPSULE ORAL
Qty: 120 CAPSULE | Refills: 11 | Status: SHIPPED | OUTPATIENT
Start: 2019-08-12 | End: 2019-09-10 | Stop reason: SDUPTHER

## 2019-08-12 RX ORDER — FLUTICASONE PROPIONATE 50 MCG
SPRAY, SUSPENSION (ML) NASAL
Qty: 48 ML | Refills: 1 | Status: SHIPPED | OUTPATIENT
Start: 2019-08-12 | End: 2020-02-24

## 2019-08-12 RX ORDER — FLUTICASONE PROPIONATE 50 MCG
2 SPRAY, SUSPENSION (ML) NASAL DAILY
Qty: 1 BOTTLE | Refills: 1 | Status: SHIPPED | OUTPATIENT
Start: 2019-08-12 | End: 2019-08-12 | Stop reason: SDUPTHER

## 2019-08-12 RX ORDER — AMOXICILLIN 500 MG/1
500 TABLET, FILM COATED ORAL EVERY 12 HOURS
Qty: 20 TABLET | Refills: 0 | Status: SHIPPED | OUTPATIENT
Start: 2019-08-12 | End: 2019-08-22

## 2019-08-12 NOTE — PATIENT INSTRUCTIONS
Blateral occipital nerve blocks--next Tuesday 1 pm (August 20th)  Will increase gabapentin to 300 mg twice daily, 600 mg at night

## 2019-08-12 NOTE — PROGRESS NOTES
Subjective:      Maria Ines Ac is a 34 y.o. female who was self-referred for nasal congestion.    Mrs. Ac reports nasal congestion with associated yellow-green nasal discharge, post-nasal drip, frontal and maxillary sinus pressure, headache, and halitosis for the past month. She denies itchy nose and eyes and sneezing. She has not tried any antibiotics or nasal sprays. She has tried pseudophed with tylenol without benefit.    Current sinonasal medications as above.  She does not regularly use nasal decongestant sprays.    She does not recall previously having allergy testing.    She denies a history of asthma.    She relates a history of reflux symptoms which is currently managed with protonix and prilosec.  She has previously had an EGD.    She denies have a diagnosis of obstructive sleep apnea.     She has previously had sinonasal surgery consisting of balloon sinuplasty.    She does not recall a prior history of nasal trauma.    SNOT-22 score = 77, NOSE score = 80%      Past Medical History  She has a past medical history of Blood in stool, Constipation, Cystitis, Depression, Diabetes mellitus, type 2, Dysphagia, Endometriosis, GERD (gastroesophageal reflux disease), Headache, Hypertension, Palpitations, Premature menopause on hormone replacement therapy, and Thyroid disease.    Past Surgical History  She has a past surgical history that includes ooptherectomy; right knee (scoped); Gallbladder surgery; Shoulder surgery (right); Carpal tunnel release; Hysterectomy (2013); Oophorectomy (2005); Oophorectomy (2013); Colonoscopy (N/A, 8/30/2016); Bladder suspension; Cholecystectomy; Esophageal manometry with measurement of impedance (N/A, 11/5/2018); Fluoroscopic urodynamic study (N/A, 5/23/2019); and Implantation of permanent sacral nerve stimulator (N/A, 7/30/2019).    Family History  Her family history includes Anesthesia problems in her mother; Breast cancer in her paternal aunt; Breast cancer (age  of onset: 50) in her mother; Breast cancer (age of onset: 72) in her maternal aunt; Cervical cancer in her maternal grandmother; Colon cancer (age of onset: 40) in her brother; Esophageal cancer (age of onset: 63) in her mother; Ovarian cancer (age of onset: 63) in her mother.    Social History  She reports that she has never smoked. She has never used smokeless tobacco. She reports that she does not drink alcohol or use drugs.    Allergies  She has No Known Allergies.    Medications   She has a current medication list which includes the following prescription(s): accu-chek softclix lancets, atorvastatin, blood sugar diagnostic, calcium-vitamin d3, ciclopirox, conjugated estrogens, diclofenac sodium, dicyclomine, docusate sodium, estradiol, farxiga, fish oil-omega-3 fatty acids, gabapentin, glucophage, ibuprofen, levothyroxine, magnesium oxide, metoprolol succinate, multivit-iron-min-folic acid, omeprazole, pantoprazole, adipex-p, polyethylene glycol, premarin, spironolactone, steglatro, topiramate, trazodone, trulicity, ziprasidone, zoloft, amoxicillin, and fluticasone propionate.    Review of Systems  Review of Systems   Constitutional: Negative for chills, fatigue and fever.   HENT: Positive for congestion, postnasal drip, rhinorrhea, sinus pressure and sinus pain. Negative for ear pain, facial swelling, nosebleeds, sneezing, sore throat and tinnitus.    Eyes: Negative for photophobia, redness, itching and visual disturbance.   Respiratory: Negative for apnea, cough, shortness of breath, wheezing and stridor.    Cardiovascular: Negative for chest pain and palpitations.   Gastrointestinal: Negative for diarrhea, nausea and vomiting.   Endocrine: Negative.    Genitourinary: Negative for decreased urine volume, dysuria and frequency.   Musculoskeletal: Negative for arthralgias, myalgias and neck stiffness.   Skin: Negative for rash and wound.   Allergic/Immunologic: Negative for environmental allergies, food  allergies and immunocompromised state.   Neurological: Positive for headaches. Negative for dizziness, syncope, weakness and light-headedness.   Hematological: Negative for adenopathy. Does not bruise/bleed easily.   Psychiatric/Behavioral: Negative for confusion, decreased concentration and sleep disturbance.          Objective:     LMP 11/17/2012 (LMP Unknown)        Constitutional:   She is oriented to person, place, and time. Vital signs are normal. She appears well-developed and well-nourished. She appears alert. Normal speech.      Head:  Normocephalic and atraumatic.     Ears:    Right Ear: No lacerations. No drainage, swelling or tenderness. No foreign bodies. No mastoid tenderness. Tympanic membrane is not injected, not scarred, not perforated, not erythematous, not retracted and not bulging. Tympanic membrane mobility is normal. No middle ear effusion. No hemotympanum.   Left Ear: No lacerations. No drainage, swelling or tenderness. No foreign bodies. No mastoid tenderness. Tympanic membrane is not injected, not scarred, not perforated, not erythematous, not retracted and not bulging. Tympanic membrane mobility is normal.  No middle ear effusion. No hemotympanum.     Nose:  Mucosal edema and rhinorrhea present. No nose lacerations, sinus tenderness, septal deviation, nasal septal hematoma or polyps. No epistaxis.  No foreign bodies. Right sinus exhibits maxillary sinus tenderness and frontal sinus tenderness. Left sinus exhibits maxillary sinus tenderness and frontal sinus tenderness.         Mouth/Throat  Oropharynx clear and moist without lesions or asymmetry, normal uvula midline and lips, teeth, and gums normal. No uvula swelling, oral lesions, trismus, mucous membrane lesions or xerostomia. No oropharyngeal exudate, posterior oropharyngeal edema or posterior oropharyngeal erythema.     Neck:  Neck normal without thyromegaly masses, asymmetry, normal tracheal structure, crepitus, and tenderness and no  adenopathy.     Cardiovascular:   Normal heart sounds and normal pulses.      Pulmonary/Chest:   Effort normal and breath sounds normal.     Psychiatric:   She has a normal mood and affect. Her speech is normal and behavior is normal.     Neurological:   She is alert and oriented to person, place, and time. No cranial nerve deficit.     Skin:   No abrasions, lacerations, lesions, or rashes.       Procedure    None        Data Reviewed    WBC (K/uL)   Date Value   06/30/2018 6.25     Eosinophil% (%)   Date Value   06/30/2018 1.4     Eos # (K/uL)   Date Value   06/30/2018 0.1     Platelets (K/uL)   Date Value   06/30/2018 241     Glucose (mg/dL)   Date Value   05/21/2019 84     No results found for: IGE    No sinus imaging available.       Assessment:     1. Acute non-recurrent sinusitis, unspecified location    2. Nasal congestion         Plan:     I had a long discussion with the patient regarding her condition and the further workup and management options.    Ms. Ac has acute sinusitis.   I ordered Amoxicillin for sinusitis.  I ordered fluticasone for nasal congestion.   Stop pseudophed.    Follow up if symptoms worsen or fail to improve.

## 2019-08-13 ENCOUNTER — PATIENT OUTREACH (OUTPATIENT)
Dept: ADMINISTRATIVE | Facility: OTHER | Age: 34
End: 2019-08-13

## 2019-08-13 ENCOUNTER — OFFICE VISIT (OUTPATIENT)
Dept: GASTROENTEROLOGY | Facility: CLINIC | Age: 34
End: 2019-08-13
Payer: MEDICARE

## 2019-08-13 VITALS — WEIGHT: 201.5 LBS | BODY MASS INDEX: 37.08 KG/M2 | HEIGHT: 62 IN

## 2019-08-13 DIAGNOSIS — K21.9 GASTROESOPHAGEAL REFLUX DISEASE WITHOUT ESOPHAGITIS: ICD-10-CM

## 2019-08-13 DIAGNOSIS — K58.0 IRRITABLE BOWEL SYNDROME WITH DIARRHEA: Primary | ICD-10-CM

## 2019-08-13 DIAGNOSIS — R13.19 ESOPHAGEAL DYSPHAGIA: ICD-10-CM

## 2019-08-13 DIAGNOSIS — Z80.0 FAMILY HISTORY OF COLON CANCER: ICD-10-CM

## 2019-08-13 PROCEDURE — 99999 PR PBB SHADOW E&M-EST. PATIENT-LVL III: ICD-10-PCS | Mod: PBBFAC,,, | Performed by: INTERNAL MEDICINE

## 2019-08-13 PROCEDURE — 99213 OFFICE O/P EST LOW 20 MIN: CPT | Mod: PBBFAC | Performed by: INTERNAL MEDICINE

## 2019-08-13 PROCEDURE — 99214 OFFICE O/P EST MOD 30 MIN: CPT | Mod: S$PBB,,, | Performed by: INTERNAL MEDICINE

## 2019-08-13 PROCEDURE — 99214 PR OFFICE/OUTPT VISIT, EST, LEVL IV, 30-39 MIN: ICD-10-PCS | Mod: S$PBB,,, | Performed by: INTERNAL MEDICINE

## 2019-08-13 PROCEDURE — 99999 PR PBB SHADOW E&M-EST. PATIENT-LVL III: CPT | Mod: PBBFAC,,, | Performed by: INTERNAL MEDICINE

## 2019-08-13 NOTE — PROGRESS NOTES
Subjective:       Patient ID: Maria Ines Ac is a 34 y.o. female.    Chief Complaint: Follow-up    HPI  Review of Systems   Constitutional: Negative for activity change, appetite change, chills, diaphoresis, fatigue, fever and unexpected weight change.   HENT: Negative for congestion, ear pain, mouth sores, nosebleeds, postnasal drip, rhinorrhea, sinus pressure, sore throat, trouble swallowing and voice change.    Eyes: Negative for pain.   Respiratory: Negative for cough, shortness of breath and wheezing.    Cardiovascular: Negative for chest pain, palpitations and leg swelling.   Genitourinary: Negative for difficulty urinating, dysuria, flank pain, hematuria and menstrual problem.   Musculoskeletal: Negative for arthralgias, back pain, gait problem, joint swelling, myalgias and neck pain.   Skin: Negative for rash.   Neurological: Negative for dizziness, tremors, syncope, numbness and headaches.   Hematological: Negative for adenopathy. Does not bruise/bleed easily.   Psychiatric/Behavioral: Negative for agitation, behavioral problems, confusion, decreased concentration and dysphoric mood. The patient is not nervous/anxious.        Objective:      Physical Exam   Abdominal: Soft. Normal appearance and bowel sounds are normal. She exhibits no shifting dullness, no distension, no fluid wave, no abdominal bruit and no mass. There is no hepatosplenomegaly. There is no tenderness.       Assessment:       1. Irritable bowel syndrome with diarrhea    2. Gastroesophageal reflux disease without esophagitis    3. Esophageal dysphagia    4. Family history of colon cancer        Plan:         Maria Ines is here today for follow-up.  I saw her 6 months ago for gastroesophageal reflux and IBS.    I reviewed her current symptoms.  Curiously the diarrhea has resolved and she complains of constipation.  She is having a hard stool.  It occurs about every other day.  She tried a stool softener but that does not seem to help.   She has to strain in this often leads to a small amount of bright red blood.  She thinks her have been some changes in her other medications and this may be pertinent.  Her baseline chronic dysphagia is not that big of a problem now.  She does complain of reflux.  She describes pyrosis.  It is mostly present in the evenings.  It is worse after spicy food.  It is no different than it has been in the past.  It is persistent despite taking PPI twice a day per    Her past workup has been extensive.  She has had a esophageal manometry which showed mild dysmotility.  She had a colonoscopy 3 years ago with random biopsies and this was normal. EGD was last year with empiric dilation and that was normal.    Her family history is significant for brother had colon cancer and  from that at age 41.    1.  Irritable bowel syndrome.  In the past this has been diarrhea predominant now it appears to be constipation dominant.  This may very well have to do with some of her other medications.  I recommended that she simply add Metamucil every day to her diet.  And again I encouraged her to drink less Coke Zero and more water.  2.  Gastroesophageal reflux think this is more functional heartburn.  She has never had esophagitis.  I but if we did a Bravo test to be negative for reflux.  But nonetheless she has predictably symptomatic.  I recommended that she simply use Gaviscon every evening and try to take it about the same time that she gets the heartburn.  Hopefully this will reduce the symptoms.  3.  Dysphagia she does have some mild dysmotility.  This may explain the dysphagia.  I do not think any further intervention or workup needs to be done.  4.  Family history of colon cancer although her brother certainly had cancer at a very young age the patient has recently undergone the myriad add genetic testing and fortunately she came back negative on that.  Nonetheless I think she should have her next screening colonoscopy at about  the age of 38 which would be in 4 years.    25 min appointment greater than half the time face-to-face counseling

## 2019-08-14 ENCOUNTER — TELEPHONE (OUTPATIENT)
Dept: NEUROLOGY | Facility: CLINIC | Age: 34
End: 2019-08-14

## 2019-08-14 NOTE — TELEPHONE ENCOUNTER
----- Message from Tamela Sky sent at 8/14/2019  1:30 PM CDT -----  Contact: pt  Needs Advice    Reason for call: Pt calling to schedule appt for nerve block appt orders placed.         Communication Preference:[phone]    Additional Information:Pt would like to come on a Monday

## 2019-08-15 ENCOUNTER — OFFICE VISIT (OUTPATIENT)
Dept: OBSTETRICS AND GYNECOLOGY | Facility: CLINIC | Age: 34
End: 2019-08-15
Attending: OBSTETRICS & GYNECOLOGY
Payer: MEDICARE

## 2019-08-15 VITALS
WEIGHT: 194.88 LBS | DIASTOLIC BLOOD PRESSURE: 62 MMHG | HEIGHT: 64 IN | SYSTOLIC BLOOD PRESSURE: 104 MMHG | BODY MASS INDEX: 33.27 KG/M2

## 2019-08-15 DIAGNOSIS — G89.29 CHRONIC BACK PAIN: ICD-10-CM

## 2019-08-15 DIAGNOSIS — N89.8 VAGINAL ODOR: ICD-10-CM

## 2019-08-15 DIAGNOSIS — Z91.89 AT HIGH RISK FOR BREAST CANCER: ICD-10-CM

## 2019-08-15 DIAGNOSIS — N64.4 BREAST PAIN: ICD-10-CM

## 2019-08-15 DIAGNOSIS — M54.9 CHRONIC BACK PAIN: ICD-10-CM

## 2019-08-15 DIAGNOSIS — N95.1 MENOPAUSAL SYMPTOM: Primary | ICD-10-CM

## 2019-08-15 PROCEDURE — 99213 OFFICE O/P EST LOW 20 MIN: CPT | Mod: S$GLB,,, | Performed by: OBSTETRICS & GYNECOLOGY

## 2019-08-15 PROCEDURE — 87801 DETECT AGNT MULT DNA AMPLI: CPT

## 2019-08-15 PROCEDURE — 87661 TRICHOMONAS VAGINALIS AMPLIF: CPT

## 2019-08-15 PROCEDURE — 87481 CANDIDA DNA AMP PROBE: CPT | Mod: 59

## 2019-08-15 PROCEDURE — 99213 PR OFFICE/OUTPT VISIT, EST, LEVL III, 20-29 MIN: ICD-10-PCS | Mod: S$GLB,,, | Performed by: OBSTETRICS & GYNECOLOGY

## 2019-08-15 NOTE — PROGRESS NOTES
"SUBJECTIVE:   34 y.o. female   Presents today for follow up on menopausal symptoms and breast pain. . Patient's last menstrual period was 2012 (lmp unknown)..  She reports hot flashes and night sweats are not better. She also reports vaginal itching- she is using Premarin vaginal cream twice weekly. .    She reprots her breasts "hurt all of the time" and they have for years. She did not get a sports bra because she "could not find one that fits."  She reports upper back pain and indentations from her bra straps.     Past Medical History:   Diagnosis Date    Blood in stool     Constipation     Cystitis     Depression     Diabetes mellitus, type 2     Dysphagia     Endometriosis     GERD (gastroesophageal reflux disease)     Headache     Hypertension     Palpitations     Premature menopause on hormone replacement therapy     Thyroid disease      Past Surgical History:   Procedure Laterality Date    BLADDER SUSPENSION      CARPAL TUNNEL RELEASE      right    CHOLECYSTECTOMY      COLONOSCOPY N/A 2016    Performed [facility] at [facility] (4[facility] FLR)    COLONOSCOPY N/A 2013    Performed by Gabriele Gibbons MD at [facility] ([facility] FLR)    CYSTOSCOPY N/A 2017    [facility]ka [facility] at [facility]    EGD (ESOPHAGOGASTRODUODENOSCOPY) N/A 1/10/2013    Performed by Darryl Cuello MD at [facility] (2ND FLR)    ESOPHAGOGASTRODUODENOSCOPY (EGD) N/A 2017    Performed by Slick Herron MD at [facility] (4TH FLR)    ESOPHAGOGASTRODUODENOSCOPY (EGD) N/A 2016    Performed by Slick Herron MD at [facility] (4TH FLR)    ESOPHAGOGASTRODUODENOSCOPY (EGD) N/A 10/23/2014    Performed by Slick Herron MD at [facility] (4TH FLR)    GALLBLADDER SURGERY      HYSTERECTOMY      Bess velasquez Dr. Champlain    INSERTION, NEUROSTIMULATOR, PERMANENT, SACRAL N/A 2019    Performed by Zena Tee [facility] at [facility] OR 2ND FLR    INSERTION, NEUROSTIMULATOR, " TEMPORARY, SACRAL N/A 7/2/2019    Per[facility] at [facility] OR 2ND FLR    MANOMETRY, ESOPHAGUS, WITH IMPEDANCE MEASUREMENT N/A 11/5/2018    Performed by Slick Herron MD at [facility] ENDO (4TH FLR)    OOPHORECTOMY  2005    [facility]- benign cyst    OOPHORECTOMY  2013    [facility]- endo    ooptherectomy      PROGRAMMING-STIMULATOR N/A 7/2/2019    Performed by [facility]ena Tee MD at [facility] OR 2ND FLR    RETROPUBIC SLING N/A 1/23/2017    Per[facility] at [facility] OR    right knee  scoped    SHOULDER SURGERY  right    URODYNAMIC STUDY, FLUOROSCOPIC N/A 5/23/2019    Performed by [facility]ena Tee MD at [facility] OR 1ST FLR     Social History     Socioeconomic History    Marital status: Single     Spouse name: Not on file    Number of children: Not on file    Years of education: Not on file    Highest education level: Not on file   Occupational History    Not on file   Social Needs    Financial resource strain: Not on file    Food insecurity:     Worry: Not on file     Inability: Not on file    Transportation needs:     Medical: Not on file     Non-medical: Not on file   Tobacco Use    Smoking status: Never Smoker    Smokeless tobacco: Never Used   Substance and Sexual Activity    Alcohol use: No    Drug use: No    Sexual activity: Never     Birth control/protection: None   Lifestyle    Physical activity:     Days per week: Not on file     Minutes per session: Not on file    Stress: Not on file   Relationships    Social connections:     Talks on phone: Not on file     Gets together: Not on file     Attends [employer] service: Not on file     Active member of club or organization: Not on file     Attends meetings of clubs or organizations: Not on file     Relationship status: Not on file   Other Topics Concern    Not on file   Social History Narrative    ** Merged History Encounter **          Family History   Problem Relation Age of Onset    Breast cancer Mother 50        alive at  64, unilateral    Esophageal cancer Mother 63    Ovarian cancer Mother 63    Anesthesia problems Mother     Cervical cancer Maternal Grandmother         unknown age of onset,  at 80    Colon cancer Brother 40    Breast cancer Paternal Aunt         unknown age of onset, alive at 70    Breast cancer Maternal Aunt 72        alive at 72    Vaginal cancer Neg Hx     Endometrial cancer Neg Hx     Crohn's disease Neg Hx     Ulcerative colitis Neg Hx     Stomach cancer Neg Hx     Irritable bowel syndrome Neg Hx     Celiac disease Neg Hx      OB History    Para Term  AB Living   0 0 0 0 0 0   SAB TAB Ectopic Multiple Live Births   0 0 0 0             Current Outpatient Medications   Medication Sig Dispense Refill    ACCU-CHEK SOFTCLIX LANCETS Misc USE TO TEST BLOOD GLUCOSE TID  3    amoxicillin (AMOXIL) 500 MG Tab Take 1 tablet (500 mg total) by mouth every 12 (twelve) hours. for 10 days 20 tablet 0    atorvastatin (LIPITOR) 40 MG tablet Take 40 mg by mouth once daily.      BLOOD SUGAR DIAGNOSTIC (ACCU-CHEK AMAURY PLUS TEST STRP MISC) 1 strip by Misc.(Non-Drug; Combo Route) route 3 (three) times daily.      calcium-vitamin D3 500 mg(1,250mg) -200 unit per tablet Take 1 tablet by mouth 2 (two) times daily with meals.      ciclopirox (PENLAC) 8 % Soln Apply topically nightly. 6.6 mL 11    conjugated estrogens (PREMARIN) vaginal cream Use 0.5 gram of estrogen cream in vagina nightly x 2 weeks, then twice a week thereafter. 30 g 4    diclofenac sodium (VOLTAREN) 1 % Gel Apply 2 g topically 3 (three) times daily as needed. 100 g 0    dicyclomine (BENTYL) 10 MG capsule TAKE 2 CAPSULES(20 MG) BY MOUTH THREE TIMES DAILY BEFORE MEALS 180 capsule 0    docusate sodium (COLACE) 100 MG capsule Take 1 capsule (100 mg total) by mouth 2 (two) times daily. 60 capsule 0    estradiol (ESTRACE) 1 MG tablet Take 1 tablet (1 mg total) by mouth once daily. 30 tablet 11    FARXIGA 10 mg Tab TK 1 T PO  QD  7    fish oil-omega-3 fatty acids 300-1,000 mg capsule Take 2 g by mouth once daily.      fluticasone propionate (FLONASE) 50 mcg/actuation nasal spray SHAKE LIQUID AND USE 2 SPRAYS(100 MCG) IN EACH NOSTRIL EVERY DAY 48 mL 1    gabapentin (NEURONTIN) 300 MG capsule Take 300 mg (1 tablet) twice during the day, then two tablets (600 mg) at night before bed. 120 capsule 11    GLUCOPHAGE 500 mg tablet Take 500 mg by mouth 2 (two) times daily with meals.       ibuprofen (ADVIL,MOTRIN) 800 MG tablet 800 mg every 4 (four) hours as needed.   0    levothyroxine (SYNTHROID) 50 MCG tablet TK 1 T PO QAM  8    magnesium oxide (MAG-OX) 400 mg (241.3 mg magnesium) tablet Take 1 tablet (400 mg total) by mouth 2 (two) times daily.  0    metoprolol succinate (TOPROL-XL) 25 MG 24 hr tablet TAKE 1 TABLET(25 MG) BY MOUTH EVERY DAY 30 tablet 4    MULTIVIT-IRON-MIN-FOLIC ACID 3,500-18-0.4 UNIT-MG-MG ORAL CHEW Take 1 tablet by mouth once daily.       omeprazole (PRILOSEC OTC) 20 MG tablet Take 20 mg by mouth once daily.      pantoprazole (PROTONIX) 40 MG tablet TAKE 1 TABLET BY MOUTH EVERY DAY 90 tablet 0    phentermine (ADIPEX-P) 37.5 MG capsule Take 37.5 mg by mouth every morning.       polyethylene glycol (GLYCOLAX) 17 gram/dose powder Mix 1 capful (17 g) with liquid and take by mouth once daily. 238 g 0    PREMARIN 0.3 mg tablet   11    spironolactone (ALDACTONE) 25 MG tablet TK 1 T PO HS  2    STEGLATRO 15 mg Tab TK 1 T PO  QAM-for diabetes  9    topiramate (TOPAMAX) 50 MG tablet Take 2 tablets (100 mg total) by mouth once daily. (Patient taking differently: Take 100 mg by mouth every evening. ) 60 tablet 11    traZODone (DESYREL) 50 MG tablet TK 1 T PO QHS  1    TRULICITY 1.5 mg/0.5 mL PnIj INJECT 1 PEN INTO THE SKIN Q WEEK  5    ziprasidone (GEODON) 20 MG Cap 2 (two) times daily.   1    ZOLOFT 100 mg tablet Take 200 mg by mouth once daily.        No current facility-administered medications for this visit.       Allergies: Patient has no known allergies.     The ASCVD Risk score (Zita ATIF Mora., et al., 2013) failed to calculate for the following reasons:    The 2013 ASCVD risk score is only valid for ages 40 to 79      ROS:  Constitutional: no weight loss, weight gain, fever, fatigue  Eyes:  No vision changes, glasses/contacts  ENT/Mouth: No ulcers, sinus problems, ears ringing, headache  Cardiovascular: No inability to lie flat, chest pain, exercise intolerance, swelling, heart palpitations  Respiratory: No wheezing, coughing blood, shortness of breath, or cough  Gastrointestinal: No diarrhea, bloody stool, nausea/vomiting, constipation, gas, hemorrhoids  Genitourinary: No blood in urine, painful urination, urgency of urination, frequency of urination, incomplete emptying, incontinence, abnormal bleeding, painful periods, heavy periods, vaginal discharge, vaginal odor, painful intercourse, sexual problems, bleeding after intercourse<+vaginal dryness  Musculoskeletal: No muscle weakness  Skin/Breast: +painful breasts, nipple discharge, masses, rash, ulcers  Neurological: No passing out, seizures, numbness, headache  Endocrine: No diabetes, hypothyroid, hyperthyroid,+ hot flashes, hair loss, abnormal hair growth, acne  Psychiatric: No depression, crying  Hematologic: No bruises, bleeding, swollen lymph nodes, anemia.      Physical Exam:   Constitutional: She is oriented to person, place, and time. She appears well-developed and well-nourished.      Neck: Normal range of motion. No tracheal deviation present. No thyromegaly present.    Cardiovascular: Exam reveals no edema.     Pulmonary/Chest: Effort normal. She exhibits no mass, no tenderness, no deformity and no retraction. Right breast exhibits no inverted nipple, no mass, no nipple discharge, no skin change, no tenderness, presence, no bleeding and no swelling. Left breast exhibits no inverted nipple, no mass, no nipple discharge, no skin change, no tenderness,  presence, no bleeding and no swelling. Breasts are symmetrical.        Abdominal: Soft. She exhibits no distension and no mass. There is no tenderness. There is no rebound and no guarding. No hernia. Hernia confirmed negative in the left inguinal area.     Genitourinary: Rectal exam shows no external hemorrhoid. There is no rash, tenderness or lesion on the right labia. There is no rash, tenderness or lesion on the left labia. Uterus is absent. No no adexnal prolapse. Right adnexum displays no mass, no tenderness and no fullness. Left adnexum displays no mass, no tenderness and no fullness. No tenderness, bleeding, rectocele, cystocele or unspecified prolapse of vaginal walls in the vagina. No vaginal discharge found. Vaginal cuff normal.Cervix exhibits absence.           Musculoskeletal: Normal range of motion and moves all extremeties. She exhibits no edema.      Lymphadenopathy:        Right: No inguinal adenopathy present.        Left: No inguinal adenopathy present.    Neurological: She is alert and oriented to person, place, and time.    Skin: No rash noted. No erythema. No pallor.    Psychiatric: She has a normal mood and affect. Her behavior is normal. Judgment and thought content normal.         ASSESSMENT:   Menopausal symptoms  At high risk for breast cancer  Breast pain  Vaginal itching  PLAN:   Estradiol level today  Counseled patient that she may need a higher dose of estradiol to control her menopausal symptoms  Counseled patient on consideration of a breast reduction-I do not think her pain is related to the use of hormones as this is longstanding pain and she has back pain as well as indentations from her bra straps.  I am not sure how this would impact future imaging.  Will try to set up a consult with a breast surgeon to discuss these issues with this patient  Follow-up affirm

## 2019-08-16 LAB
BACTERIAL VAGINOSIS DNA: NEGATIVE
CANDIDA GLABRATA DNA: POSITIVE
CANDIDA KRUSEI DNA: NEGATIVE
CANDIDA RRNA VAG QL PROBE: POSITIVE
T VAGINALIS RRNA GENITAL QL PROBE: NEGATIVE

## 2019-08-17 ENCOUNTER — TELEPHONE (OUTPATIENT)
Dept: OBSTETRICS AND GYNECOLOGY | Facility: HOSPITAL | Age: 34
End: 2019-08-17

## 2019-08-17 RX ORDER — TERCONAZOLE 4 MG/G
1 CREAM VAGINAL NIGHTLY
Qty: 45 G | Refills: 1 | Status: SHIPPED | OUTPATIENT
Start: 2019-08-17 | End: 2019-10-24

## 2019-08-17 RX ORDER — ESTRADIOL 2 MG/1
2 TABLET ORAL DAILY
Qty: 30 TABLET | Refills: 11 | Status: SHIPPED | OUTPATIENT
Start: 2019-08-17 | End: 2019-09-10 | Stop reason: SDUPTHER

## 2019-08-18 PROBLEM — M54.81 BILATERAL OCCIPITAL NEURALGIA: Status: ACTIVE | Noted: 2019-08-18

## 2019-08-18 NOTE — PROGRESS NOTES
NEUROLOGY OUTPATIENT CLINIC NOTE    Patient Name:  Maria Ines Ac  Patient MRN:  [id number]    Interval History 08/12/19:  Patient is not accompanied by her father today as is typical for her, he passed away recently due to massive heart attack per her.  She is seeing psychiatry and has been started on an antidepressant to help deal with it all.  Still taking Aleve on a daily basis, which is actually progress from when she had been taking Aleve throughout the day.  Today she is more concerned about headache coming from the back of the head, neck tightness/soreness.  Headache is sharp, shooting in character.  Tends to lead to her normal tension headache.  Discussed trying to get off of Aleve altogether but will plan for occipital nerve block to help with her pain and help her wean off medication.     HPI--from initial visit 10/18/18:  Patient is a very pleasant 32 y.o. female with PMHx of HTN, DM II with gastroparesis, obesity, and hypothyroidism who presents to [facility] 10/21/2017 for headaches.  Patient states that she has had headaches since about 03/2017 but they have gottne much worse over past 3-4 months.  She says they start in the middle of her forehead and radiate throughout the head to the occiput.  She describes the pain as sharp.  They are relieved by Aleve after about an hour but will recur, sometimes up to 4 times per day.  She notes associated n/v and rhinorrhea with headaches. She also notes dizziness associated with the headaches.  This is described as room spinning and is associated with tinnitus but not with hearing loss, ear fullness.  Denies recent infections, does not have sensation of hearing her pulse in her ear or other intrinsic sounds. Denies photophobia, phonophobia, lacrimation, injection of the eyes with headaches.  Unable to describe any triggers.  States that she had headaches during adolescence as well but they were not this frequent and usually would just go away  "with OTC NSAIDs.  She is accompanied by her dad who assists with the history and he notes that she drinks several Coke Zero soft drinks during the day, stating "one every hour."  Patient acknowledges drinking several soft drinks per day.  She also does not sleep very well and gets maybe 4 hours of sleep per night with a lot of tossing and turning.  Her father notes cell phone and tablet use before bed.  She does do a lot of walking during the day for exercise which father confirms.  Patient has not had any medication changes recently and was prescribed topiramate back in March 2017 and has not been titrated up--still on 25 mg po qHS which does not seem to be preventing these headaches.    ROS:  General:  No fever, no chills, no fatigue  HEENT:  + headache, no changes in vision  Respiratory:  No cough, no SOB  Cardiovascular:  No chest pain, no palpitations  GI:  No abdominal pain, no n/v/c/d  :  No dysuria, no change in frequency, no hematuria  Skin:  No rashes, no pruritus, no wounds  Musculoskeletal:  No myalgias, no arthralgias  Hematologic:  No easy bruising or bleeding  Neuro:  No tremors, no focal weakness, no paresthesias  Psych:  No anxiety, no depression    PMHx:  Patient Active Problem List   Diagnosis    Abdominal pain, RLQ (right lower quadrant)    Dysphagia    Diabetes mellitus, type II    Constipation, chronic    Right hip pain    New daily persistent headache    Hypertension    Palpitations    Chest pain, non-cardiac    Dyspnea on exertion    Severe obesity (BMI 35.0-39.9) with comorbidity    Muscle spasm    Gastroesophageal reflux disease without esophagitis    Irritable bowel syndrome with diarrhea    Migrainous vertigo    Uncontrolled morning headache    Sleep arousal disorder    Hyperlipidemia    GERD (gastroesophageal reflux disease)    Family history of breast cancer    Family hx of ovarian malignancy    Weakness    Painful cervical ROM    Incomplete emptying of bladder "     PSHx:  Past Surgical History:   Procedure Laterality Date    BLADDER SUSPENSION      CARPAL TUNNEL RELEASE      right    CHOLECYSTECTOMY      COLONOSCOPY N/A 8/30/2016    Performed by Slick Herron MD at [facility] ENDO (4TH FLR)    COLONOSCOPY N/A 1/9/2013    Performed by Gabriele Gibbons MD at [facility] ENDO (4TH FLR)    CYSTOSCOPY N/A 1/23/2017    Performed by Ninoska Ventura DO at [facility] OR    EGD (ESOPHAGOGASTRODUODENOSCOPY) N/A 1/10/2013    Performed by Darryl Cuello MD at [facility] ENDO (2ND FLR)    ESOPHAGOGASTRODUODENOSCOPY (EGD) N/A 9/14/2017    Performed by Slick Herron MD at [facility] ENDO (4TH FLR)    ESOPHAGOGASTRODUODENOSCOPY (EGD) N/A 8/30/2016    Performed by Slick Herron MD at [facility] ENDO (4TH FLR)    ESOPHAGOGASTRODUODENOSCOPY (EGD) N/A 10/23/2014    Performed by Slick Herron MD at [facility] ENDO (4TH FLR)    GALLBLADDER SURGERY      HYSTERECTOMY  2013    endo, Dr. Ashley Smith    INSERTION, NEUROSTIMULATOR, PERMANENT, SACRAL N/A 7/30/2019    Performed by Zena Tee MD at [facility] OR 2ND FLR    INSERTION, NEUROSTIMULATOR, TEMPORARY, SACRAL N/A 7/2/2019    Performed by Zena Tee MD at [facility] OR 2ND FLR    MANOMETRY, ESOPHAGUS, WITH IMPEDANCE MEASUREMENT N/A 11/5/2018    Performed by Slick Herron MD at [facility] ENDO (4TH FLR)    OOPHORECTOMY  2005    [facility]- benign cyst    OOPHORECTOMY  2013    [facility]- endo    ooptherectomy      PROGRAMMING-STIMULATOR N/A 7/2/2019    Performed by Zena Tee MD at [facility] OR 2ND FLR    RETROPUBIC SLING N/A 1/23/2017    Performed by Ninoska Ventura DO at [facility] OR    right knee  scoped    SHOULDER SURGERY  right    URODYNAMIC STUDY, FLUOROSCOPIC N/A 5/23/2019    Performed by Zena Tee MD at [facility] OR 1ST FLR     Medications:  Current Outpatient Medications on File Prior to Visit   Medication Sig Dispense Refill    ACCU-CHEK SOFTCLIX LANCETS Misc USE TO TEST BLOOD GLUCOSE TID  3    atorvastatin (LIPITOR) 40 MG tablet Take 40  mg by mouth once daily.      BLOOD SUGAR DIAGNOSTIC (ACCU-CHEK AMAURY PLUS TEST STRP MISC) 1 strip by Misc.(Non-Drug; Combo Route) route 3 (three) times daily.      calcium-vitamin D3 500 mg(1,250mg) -200 unit per tablet Take 1 tablet by mouth 2 (two) times daily with meals.      ciclopirox (PENLAC) 8 % Soln Apply topically nightly. 6.6 mL 11    conjugated estrogens (PREMARIN) vaginal cream Use 0.5 gram of estrogen cream in vagina nightly x 2 weeks, then twice a week thereafter. 30 g 4    diclofenac sodium (VOLTAREN) 1 % Gel Apply 2 g topically 3 (three) times daily as needed. 100 g 0    dicyclomine (BENTYL) 10 MG capsule TAKE 2 CAPSULES(20 MG) BY MOUTH THREE TIMES DAILY BEFORE MEALS 180 capsule 0    docusate sodium (COLACE) 100 MG capsule Take 1 capsule (100 mg total) by mouth 2 (two) times daily. 60 capsule 0    estradiol (ESTRACE) 1 MG tablet Take 1 tablet (1 mg total) by mouth once daily. 30 tablet 11    FARXIGA 10 mg Tab TK 1 T PO QD  7    fish oil-omega-3 fatty acids 300-1,000 mg capsule Take 2 g by mouth once daily.      GLUCOPHAGE 500 mg tablet Take 500 mg by mouth 2 (two) times daily with meals.       ibuprofen (ADVIL,MOTRIN) 800 MG tablet 800 mg every 4 (four) hours as needed.   0    levothyroxine (SYNTHROID) 50 MCG tablet TK 1 T PO QAM  8    magnesium oxide (MAG-OX) 400 mg (241.3 mg magnesium) tablet Take 1 tablet (400 mg total) by mouth 2 (two) times daily.  0    metoprolol succinate (TOPROL-XL) 25 MG 24 hr tablet TAKE 1 TABLET(25 MG) BY MOUTH EVERY DAY 30 tablet 4    MULTIVIT-IRON-MIN-FOLIC ACID 3,500-18-0.4 UNIT-MG-MG ORAL CHEW Take 1 tablet by mouth once daily.       omeprazole (PRILOSEC OTC) 20 MG tablet Take 20 mg by mouth once daily.      pantoprazole (PROTONIX) 40 MG tablet TAKE 1 TABLET BY MOUTH EVERY DAY 90 tablet 0    phentermine (ADIPEX-P) 37.5 MG capsule Take 37.5 mg by mouth every morning.       polyethylene glycol (GLYCOLAX) 17 gram/dose powder Mix 1 capful (17 g) with  "liquid and take by mouth once daily. 238 g 0    PREMARIN 0.3 mg tablet   11    spironolactone (ALDACTONE) 25 MG tablet TK 1 T PO HS  2    STEGLATRO 15 mg Tab TK 1 T PO  QAM-for diabetes  9    topiramate (TOPAMAX) 50 MG tablet Take 2 tablets (100 mg total) by mouth once daily. (Patient taking differently: Take 100 mg by mouth every evening. ) 60 tablet 11    traZODone (DESYREL) 50 MG tablet TK 1 T PO QHS  1    TRULICITY 1.5 mg/0.5 mL PnIj INJECT 1 PEN INTO THE SKIN Q WEEK  5    ziprasidone (GEODON) 20 MG Cap 2 (two) times daily.   1    ZOLOFT 100 mg tablet Take 200 mg by mouth once daily.        No current facility-administered medications on file prior to visit.      Allergies:  Review of patient's allergies indicates:  No Known Allergies    Social Hx:   Patient lives at home with her parents--dad has recently passed away.  Some concern for ID, though no documentation.  She is not working, often plays video games and goes on walks at home during the day.  Her mother recently went through chemotherapy and has some residual health problems..  Patient has never used tobacco, EtOH, or illicits.  She is doing exceptionally well with BG control and weight loss of a few pounds since initial visit.    Physical Exam:  /75   Pulse 88   Ht 5' 2" (1.575 m)   Wt 92.5 kg (203 lb 14.8 oz)   LMP 11/17/2012 (LMP Unknown)   BMI 37.30 kg/m²   General:  Well-developed, well-nourished, nad  HEENT:  NCAT, PERRL, EOMI, oropharygneal membranes non-erythematous/without exudate  Neck:  Supple, normal ROM without nuchal rigidity.  *Pain with palpation of occipital nerves, + Tinel's  Respiratory:  Symmetric expansion, no increased wob  CVS:  No LE edema. Extremities warm, well-perfused.  GI:  Abd soft, non-distended  Skin:  No visible rashes or wounds  Psych:  Pleasant, cooperative with exam.  Speech and thought content appropriate.  Neurologic Exam:  Mental Status:  AAOx3.  Converses easily.   Cranial Nerves:  PERRLA, EOMI. " Facial movement intact, symmetric.  Tongue protrudes midline, palate raises symmetrically.  Trapezius 5/5 bilaterally.  Motor:  Normal muscle bulk and tone.  Strength 5/5 throughout.  Sensory:  Sensation intact to light touch at all extremities.  Vibratory sensation intact and symmetric at BUE/BLE digits.  Reflexes:  Reflexes 2+--biceps, brachioradialis, patellar.  No ankle clonus.  Coordination:  No resting tremor or myoclonus.  FTN, HTS, LYNN wnl--no ataxia, dysmetria, or dysdiadochokinesia.  Gait:  Appropriate gait, appropriate arm swing.  No shuffling, magnetic gait, imbalance, weakness, or foot drop noted.    Labs:  Results: CBC:   Lab Results   Component Value Date/Time    WBC 6.25 2018 03:58 PM    RBC 4.76 2018 03:58 PM    HGB 13.6 2018 03:58 PM    HCT 42.3 2018 03:58 PM     2018 03:58 PM    MCV 89 2018 03:58 PM    MCH 28.6 2018 03:58 PM    MCHC 32.2 2018 03:58 PM     CMP:   Lab Results   Component Value Date/Time    GLU 84 2019 11:39 AM    CALCIUM 10.2 2019 11:39 AM    ALBUMIN 4.4 2019 11:39 AM    PROT 7.8 2019 11:39 AM     2019 11:39 AM    K 4.4 2019 11:39 AM    CO2 27 2019 11:39 AM     2019 11:39 AM    BUN 8 2019 11:39 AM    CREATININE 1.1 2019 03:46 PM    ALKPHOS 51 (L) 2019 11:39 AM    ALT 18 2019 11:39 AM    AST 16 2019 11:39 AM    BILITOT 0.3 2019 11:39 AM     Imagin17 CT head w/o contrast:  No acute intracranial abnormality identified.    Additional Diagnotic Testing:  N/a    ASSESSMENT/PLAN:  Patient is a 34 y.o. female with a PMHx of HTN, DM II with gastroparesis, obesity, and hypothyroidism who presents to [facility] 19 for follow up of headaches, now with occipital neuralgia component.    Problem List Items Addressed This Visit        1 - High    New daily persistent headache    Overview     Originally diagnosed as migraine  headache with Dr. Gee.  Now gives hx of headache in forehead that becomes holocephalic. Medication overuse and caffeine withdrawal headache components suspected as of 01/09/19 visit.         Current Assessment & Plan     -Counseled again on medication overuse headache  -Will trial occipital nerve block to reduce need for Aleve  -Steroids relatively contraindicated systemically due to DM II  -Continue home topiramate 100 mg qHS, gabapentin 300 mg bid with 600 mg qHS--will decrease dose of Mg due to diarrhea         Bilateral occipital neuralgia - Primary    Overview     New issue, noted August 2019         Current Assessment & Plan     -Plan for occipital nerve block this coming Tuesday 1 pm  -Continue gabapentin, will consider increasing dose as tolerated for now to 900 mg nightly with 300 mg continued during the day.  Can consider increasing daily dose to 600 mg bid as needed.         Relevant Orders    Nerve block        Mena Lambert MD  Pager:  [phone] [place] [address]  [phone]

## 2019-08-18 NOTE — ASSESSMENT & PLAN NOTE
-Counseled again on medication overuse headache  -Will trial occipital nerve block to reduce need for Aleve  -Steroids relatively contraindicated systemically due to DM II  -Continue home topiramate 100 mg qHS, will decrease dose of Mg due to diarrhea

## 2019-08-19 ENCOUNTER — TELEPHONE (OUTPATIENT)
Dept: SURGERY | Facility: CLINIC | Age: 34
End: 2019-08-19

## 2019-08-19 NOTE — TELEPHONE ENCOUNTER
SPOKE WITH PT ABOUT GETTING SCHEULED FROM HER RECALL LETTER FOR HER CBE/MAMMO----- Message from Lexii Michel sent at 8/19/2019  3:00 PM CDT -----  Reason for call:Pt calling to schedule Cbe and mmg, pt doesn't have any mammo orders.        Communication Preference:[phone]    Additional Information:

## 2019-08-20 ENCOUNTER — PATIENT MESSAGE (OUTPATIENT)
Dept: OBSTETRICS AND GYNECOLOGY | Facility: CLINIC | Age: 34
End: 2019-08-20

## 2019-08-21 ENCOUNTER — TELEPHONE (OUTPATIENT)
Dept: OBSTETRICS AND GYNECOLOGY | Facility: CLINIC | Age: 34
End: 2019-08-21

## 2019-08-21 ENCOUNTER — TELEPHONE (OUTPATIENT)
Dept: SURGERY | Facility: CLINIC | Age: 34
End: 2019-08-21

## 2019-08-21 ENCOUNTER — PATIENT MESSAGE (OUTPATIENT)
Dept: OBSTETRICS AND GYNECOLOGY | Facility: CLINIC | Age: 34
End: 2019-08-21

## 2019-08-21 ENCOUNTER — TELEPHONE (OUTPATIENT)
Dept: NEUROLOGY | Facility: CLINIC | Age: 34
End: 2019-08-21

## 2019-08-21 NOTE — TELEPHONE ENCOUNTER
RN lvm for patient with return call requested re: plan of care. Contact information provided. VANDANA Chandler

## 2019-08-21 NOTE — TELEPHONE ENCOUNTER
Left a message for the patient to let her know I will cancel her appointment. I will call her back with another appointment.

## 2019-08-21 NOTE — TELEPHONE ENCOUNTER
----- Message from Kerry Ruiz sent at 8/21/2019 11:52 AM CDT -----  Contact: Patient  Needs Advice    Reason for call: Patient would like to reschedule appt on the 23rd         Communication Preference: [phone]    Additional Information:

## 2019-08-21 NOTE — TELEPHONE ENCOUNTER
RN Navigator lvm for patient requesting return call regarding plan of care. RN Navigator will continue to try patient. VANDANA Chandler

## 2019-08-21 NOTE — TELEPHONE ENCOUNTER
----- Message from Radha Meyer sent at 8/21/2019 12:30 PM CDT -----  Contact: Ruthie ( mother )   Name of Who is Calling : Ruthie ( mother )    What is the request in detail :      Ruthie ( mother ) is returning a call from staff in regards to the patients referrals to see another doctor     .....Please contact to further discuss and advise.    Can the clinic reply by [facility] : yes    What Number to Call Back : [phone]

## 2019-08-21 NOTE — TELEPHONE ENCOUNTER
----- Message from Kerry Ruiz sent at 8/21/2019 11:52 AM CDT -----  Contact: Patient  Needs Advice    Reason for call: Patient would like to reschedule appt on the 23rd         Communication Preference: [phone]    Additional Information:

## 2019-08-21 NOTE — TELEPHONE ENCOUNTER
----- Message from Tiana Veronica RN sent at 8/21/2019 11:54 AM CDT -----  Regarding: RE: Can you help me schedule this patient?  If she is not genetic positive, she will still end up needing to go to plastics for a breast reduction surgery.  She can see Dr. Harrell as a 2nd opinion for high risk or even Sharon at [facility].  It sounds like the family needs a better supportive discussion.      Tiana    ----- Message -----  From: Bella Vogel MA  Sent: 8/21/2019  11:29 AM  To: Tiana Veronica RN  Subject: RE: Can you help me schedule this patient?       So am I scheduling her with  at [facility]?    Bella  ----- Message -----  From: Tiana Veronica RN  Sent: 8/21/2019  11:27 AM  To: Bella Vogel MA  Subject: FW: Can you help me schedule this patient?           ----- Message -----  From: Altagracia Rubin RN  Sent: 8/21/2019   8:41 AM  To: Tiana Veronica RN  Subject: Can you help me schedule this patient?           Patient is high-risk, chronic back pain/breast pain, taking Estradiol daily. Dr. Brian believes pt would greatly benefit from a consult with Dr. Harrell regarding options for breast reduction surgery and also understanding options re: her breast cancer risk. Pt has seen Romeo Moore (TC 26.62%). Patient and mother concerned re: breast cancer risk, uncertain re: options, reduction now..    I realize Julio Cesar is not plastics, but her approach is supportive, she is an advocate and helps those who need additional clarification. Pt has been coming with her father. Mother came last time and was shocked to hear pt high risk. Patient has a learning disability, slower to understand. Can you help me get her in at [facility]?

## 2019-08-21 NOTE — TELEPHONE ENCOUNTER
Contacted the patient regarding the message below.  The patient is scheduled to see  on Thursday 9/5/19 at 8:30 am at [facility].  The patient voiced understanding of appointment date, time, and location.  Reminder letter mailed to the patient.

## 2019-08-22 ENCOUNTER — TELEPHONE (OUTPATIENT)
Dept: OBSTETRICS AND GYNECOLOGY | Facility: CLINIC | Age: 34
End: 2019-08-22

## 2019-08-22 NOTE — TELEPHONE ENCOUNTER
RN returned Ruthie's call to further discuss plan of care. RN lvm with contact information provided. VANDANA Chandler.

## 2019-08-22 NOTE — TELEPHONE ENCOUNTER
----- Message from Magalys Lowry MA sent at 8/21/2019  1:02 PM CDT -----  Contact: Ruthie ( mother )      ----- Message -----  From: Radha Meyer  Sent: 8/21/2019  12:30 PM  To: [facility] Staff     Name of Who is Calling : Ruthie ( mother )    What is the request in detail :      Ruthie ( mother ) is returning a call from staff in regards to the patients referrals to see another doctor     .....Please contact to further discuss and advise.    Can the clinic reply by [facility] : yes    What Number to Call Back : [phone]

## 2019-08-22 NOTE — TELEPHONE ENCOUNTER
RN Navigator explained plan of care to patient's mother, Ruthie. RN Navigator answered all questions and relevant concerns. Patient's mother verbalized understanding re: plan of care. VANDANA Chandler.

## 2019-08-23 ENCOUNTER — PATIENT MESSAGE (OUTPATIENT)
Dept: UROLOGY | Facility: CLINIC | Age: 34
End: 2019-08-23

## 2019-08-23 ENCOUNTER — OFFICE VISIT (OUTPATIENT)
Dept: NEUROLOGY | Facility: CLINIC | Age: 34
End: 2019-08-23
Payer: MEDICARE

## 2019-08-23 VITALS
DIASTOLIC BLOOD PRESSURE: 74 MMHG | BODY MASS INDEX: 36.92 KG/M2 | HEIGHT: 62 IN | WEIGHT: 200.63 LBS | SYSTOLIC BLOOD PRESSURE: 99 MMHG | HEART RATE: 92 BPM

## 2019-08-23 DIAGNOSIS — M54.81 BILATERAL OCCIPITAL NEURALGIA: ICD-10-CM

## 2019-08-23 PROCEDURE — 99999 PR PBB SHADOW E&M-EST. PATIENT-LVL IV: ICD-10-PCS | Mod: PBBFAC,GC,, | Performed by: STUDENT IN AN ORGANIZED HEALTH CARE EDUCATION/TRAINING PROGRAM

## 2019-08-23 PROCEDURE — 64405 PR NERVE BLOCK INJ, ANES/STEROID, OCCIPITAL: ICD-10-PCS | Mod: S$PBB,50,, | Performed by: PSYCHIATRY & NEUROLOGY

## 2019-08-23 PROCEDURE — 64450 NJX AA&/STRD OTHER PN/BRANCH: CPT | Mod: PBBFAC | Performed by: PSYCHIATRY & NEUROLOGY

## 2019-08-23 PROCEDURE — 64450 NJX AA&/STRD OTHER PN/BRANCH: CPT | Mod: PBBFAC | Performed by: STUDENT IN AN ORGANIZED HEALTH CARE EDUCATION/TRAINING PROGRAM

## 2019-08-23 PROCEDURE — 64405 NJX AA&/STRD GR OCPL NRV: CPT | Mod: S$PBB,50,, | Performed by: PSYCHIATRY & NEUROLOGY

## 2019-08-23 PROCEDURE — 99214 OFFICE O/P EST MOD 30 MIN: CPT | Mod: PBBFAC | Performed by: STUDENT IN AN ORGANIZED HEALTH CARE EDUCATION/TRAINING PROGRAM

## 2019-08-23 PROCEDURE — 99999 PR PBB SHADOW E&M-EST. PATIENT-LVL IV: CPT | Mod: PBBFAC,GC,, | Performed by: STUDENT IN AN ORGANIZED HEALTH CARE EDUCATION/TRAINING PROGRAM

## 2019-08-23 PROCEDURE — 99499 NO LOS: ICD-10-PCS | Mod: S$PBB,GC,, | Performed by: PSYCHIATRY & NEUROLOGY

## 2019-08-23 PROCEDURE — 76942 PR U/S GUIDANCE FOR NEEDLE GUIDANCE: ICD-10-PCS | Mod: 26,S$PBB,, | Performed by: PSYCHIATRY & NEUROLOGY

## 2019-08-23 PROCEDURE — 76942 ECHO GUIDE FOR BIOPSY: CPT | Mod: PBBFAC | Performed by: PSYCHIATRY & NEUROLOGY

## 2019-08-23 PROCEDURE — 64450 PR NERVE BLOCK INJ, ANES/STEROID, OTHER PERIPHERAL: ICD-10-PCS | Mod: S$PBB,50,51, | Performed by: PSYCHIATRY & NEUROLOGY

## 2019-08-23 PROCEDURE — 64405 NJX AA&/STRD GR OCPL NRV: CPT | Mod: PBBFAC | Performed by: PSYCHIATRY & NEUROLOGY

## 2019-08-23 PROCEDURE — 99499 UNLISTED E&M SERVICE: CPT | Mod: S$PBB,GC,, | Performed by: PSYCHIATRY & NEUROLOGY

## 2019-08-23 PROCEDURE — 76942 ECHO GUIDE FOR BIOPSY: CPT | Mod: 26,S$PBB,, | Performed by: PSYCHIATRY & NEUROLOGY

## 2019-08-23 PROCEDURE — 64450 NJX AA&/STRD OTHER PN/BRANCH: CPT | Mod: S$PBB,50,51, | Performed by: PSYCHIATRY & NEUROLOGY

## 2019-08-23 NOTE — PROGRESS NOTES
Nerve block--bilateral greater and lesser occipital nerves  Date/Time: 8/23/2019 2:18 PM  Performed by: Mena Lambert MD  Authorized by: Mena Lambert MD   Consent Done: Yes  Indications: pain relief  Body area: head  Nerve: lesser occipital  Laterality: Bilateral.  Patient sedated: no  Patient position: prone  Needle size: 27 G  Location technique: ultrasound guidance and anatomical landmarks  Local Anesthetic: bupivacaine 0.5% without epinephrine, methylprednisolone 40 mh/mL, topical anesthetic and lidocaine 2% without epinephrine  Anesthetic total: 4 mL  Outcome: pain unchanged  Patient tolerance: Patient tolerated the procedure well with no immediate complications  Comments: Bupivicaine Lot -[id number], Exp [id number]  Lidocaine Lot [id number] Exp 08/22  Methylprednisolone 40 mg/mL Lot [id number] Exp 04/20 x 2 bottles    Patient given a pain diary to track response to occipital nerve block, will follow up in clinic in 3 months.  Will call patient prior to return to clinic to see if she should also be scheduled for another nerve block at that time.

## 2019-08-28 ENCOUNTER — TELEPHONE (OUTPATIENT)
Dept: NEUROLOGY | Facility: CLINIC | Age: 34
End: 2019-08-28

## 2019-08-28 NOTE — TELEPHONE ENCOUNTER
----- Message from Kathrin Hill sent at 8/28/2019 12:31 PM CDT -----  Contact: Ruthie (mother) @ [phone]  Calling to speak with someone in Dr. Lambert' office regarding the patient having severe headaches, no more so than before the nerve block. Please call.

## 2019-08-29 ENCOUNTER — TELEPHONE (OUTPATIENT)
Dept: NEUROLOGY | Facility: HOSPITAL | Age: 34
End: 2019-08-29

## 2019-08-29 NOTE — TELEPHONE ENCOUNTER
No voicemail available.  Called regarding patient having worsening headaches after nerve block.    MALATHI Lambert MD  PGY4, Neurology  Pager:  [phone]

## 2019-09-03 DIAGNOSIS — R69 DIAGNOSIS DEFERRED: Primary | ICD-10-CM

## 2019-09-05 ENCOUNTER — OFFICE VISIT (OUTPATIENT)
Dept: SURGERY | Facility: CLINIC | Age: 34
End: 2019-09-05
Attending: SURGERY
Payer: MEDICARE

## 2019-09-05 VITALS
SYSTOLIC BLOOD PRESSURE: 106 MMHG | HEIGHT: 62 IN | HEART RATE: 90 BPM | WEIGHT: 200.63 LBS | BODY MASS INDEX: 36.92 KG/M2 | DIASTOLIC BLOOD PRESSURE: 63 MMHG

## 2019-09-05 DIAGNOSIS — Z12.39 BREAST CANCER SCREENING, HIGH RISK PATIENT: Primary | ICD-10-CM

## 2019-09-05 PROCEDURE — 99213 OFFICE O/P EST LOW 20 MIN: CPT | Mod: S$GLB,,, | Performed by: SURGERY

## 2019-09-05 PROCEDURE — 99213 PR OFFICE/OUTPT VISIT, EST, LEVL III, 20-29 MIN: ICD-10-PCS | Mod: S$GLB,,, | Performed by: SURGERY

## 2019-09-05 RX ORDER — EXENATIDE 2 MG/.85ML
INJECTION, SUSPENSION, EXTENDED RELEASE SUBCUTANEOUS
Refills: 7 | COMMUNITY
Start: 2019-08-20 | End: 2019-09-12

## 2019-09-05 RX ORDER — ZIPRASIDONE HYDROCHLORIDE 60 MG/1
CAPSULE ORAL
COMMUNITY
Start: 2019-08-28 | End: 2019-10-24 | Stop reason: ALTCHOICE

## 2019-09-05 NOTE — PROGRESS NOTES
"Patient who is established with Romeo Moore NP for high risk screening presents for bilateral breast pain.  Has seen Dr. Guevara and Dr. Child in the past.      All imaging up to date.  MRI 6/2019 negative.  Strong family history.  Genetic testing negative.    Pain is worse at the end of the day and reports significant back pain as well.  Possible related to large breast size.    /63   Pulse 90   Ht 5' 2" (1.575 m)   Wt 91 kg (200 lb 9.9 oz)   LMP 11/17/2012 (LMP Unknown)   BMI 36.69 kg/m²   Breasts: breasts appear normal, no suspicious masses, no skin or nipple changes or axillary nodes. Large breasts bilaterally.      Refer to plastics to consider reduction  Do not recommend mastectomy for breast pain.  Continue high risk screening.  Can f/u with Romeo as scheduled.  "

## 2019-09-05 NOTE — LETTER
September 5, 2019      Virginia Brian MD  [address]   [address]  [facility] [address]           [facility] [address] [facility] [address]  [address], [address]  [facility] [address]  Phone: [phone]  Fax: [phone]          Patient: Maria Ines Ac   MR Number: [id number]   YOB: 1985   Date of Visit: 9/5/2019       Dear Dr. Virginia Brian:    Thank you for referring Maria Ines Ac to me for evaluation. Attached you will find relevant portions of my assessment and plan of care.    If you have questions, please do not hesitate to call me. I look forward to following Maria Ines Ac along with you.    Sincerely,    Anahi Harrell MD    Enclosure  CC:  No Recipients    If you would like to receive this communication electronically, please contact [email or web address] or [phone] to request more information on [employer] Link access.    For providers and/or their staff who would like to refer a patient to [facility], please contact us through our one-stop-shop provider referral line, [facility], at [phone].    If you feel you have received this communication in error or would no longer like to receive these types of communications, please e-mail [email or web address]

## 2019-09-09 ENCOUNTER — TELEPHONE (OUTPATIENT)
Dept: SURGERY | Facility: CLINIC | Age: 34
End: 2019-09-09

## 2019-09-09 NOTE — PROGRESS NOTES
Left message for pt to call back about getting r/s for another provider as Romeo' last day will be 10/25, pt's last mammo was 12/26/18

## 2019-09-10 ENCOUNTER — PATIENT OUTREACH (OUTPATIENT)
Dept: ADMINISTRATIVE | Facility: OTHER | Age: 34
End: 2019-09-10

## 2019-09-10 ENCOUNTER — TELEPHONE (OUTPATIENT)
Dept: SURGERY | Facility: CLINIC | Age: 34
End: 2019-09-10

## 2019-09-10 RX ORDER — GABAPENTIN 300 MG/1
CAPSULE ORAL
Qty: 120 CAPSULE | Refills: 11 | Status: SHIPPED | OUTPATIENT
Start: 2019-09-10 | End: 2019-11-04 | Stop reason: SDUPTHER

## 2019-09-10 RX ORDER — TOPIRAMATE 50 MG/1
100 TABLET, FILM COATED ORAL NIGHTLY
Qty: 60 TABLET | Refills: 11 | Status: SHIPPED | OUTPATIENT
Start: 2019-09-10 | End: 2020-09-01 | Stop reason: SDUPTHER

## 2019-09-10 NOTE — TELEPHONE ENCOUNTER
----- Message from Sharon Guerrero sent at 9/10/2019 11:20 AM CDT -----  Contact: [facility]    [phone] or fax  [phone]  Calling to request new prescriptions for topiramate (TOPAMAX) 50 MG tablet and gabapentin (NEURONTIN) 300 MG capsule.  Pt appears to be changing to a mail order pharmacy.

## 2019-09-10 NOTE — TELEPHONE ENCOUNTER
Faxed received from [facility] mail delivery pharmacy for new prescription for estrsdiol 2mg

## 2019-09-11 ENCOUNTER — TELEPHONE (OUTPATIENT)
Dept: CARDIOLOGY | Facility: CLINIC | Age: 34
End: 2019-09-11

## 2019-09-11 ENCOUNTER — TELEPHONE (OUTPATIENT)
Dept: SURGERY | Facility: CLINIC | Age: 34
End: 2019-09-11

## 2019-09-11 ENCOUNTER — OFFICE VISIT (OUTPATIENT)
Dept: PLASTIC SURGERY | Facility: CLINIC | Age: 34
End: 2019-09-11
Payer: MEDICARE

## 2019-09-11 VITALS
HEART RATE: 91 BPM | BODY MASS INDEX: 37.56 KG/M2 | WEIGHT: 204.13 LBS | HEIGHT: 62 IN | DIASTOLIC BLOOD PRESSURE: 71 MMHG | SYSTOLIC BLOOD PRESSURE: 105 MMHG

## 2019-09-11 DIAGNOSIS — G89.29 CHRONIC UPPER BACK PAIN: ICD-10-CM

## 2019-09-11 DIAGNOSIS — R21 EXCORIATED RASH: ICD-10-CM

## 2019-09-11 DIAGNOSIS — M54.2 CHRONIC NECK PAIN: ICD-10-CM

## 2019-09-11 DIAGNOSIS — M54.9 CHRONIC UPPER BACK PAIN: ICD-10-CM

## 2019-09-11 DIAGNOSIS — G89.29 CHRONIC NECK PAIN: ICD-10-CM

## 2019-09-11 DIAGNOSIS — N62 MACROMASTIA: Primary | ICD-10-CM

## 2019-09-11 PROCEDURE — 99203 OFFICE O/P NEW LOW 30 MIN: CPT | Mod: HCNC,S$GLB,, | Performed by: SURGERY

## 2019-09-11 PROCEDURE — 3008F BODY MASS INDEX DOCD: CPT | Mod: HCNC,CPTII,S$GLB, | Performed by: SURGERY

## 2019-09-11 PROCEDURE — 99999 PR PBB SHADOW E&M-EST. PATIENT-LVL III: ICD-10-PCS | Mod: PBBFAC,HCNC,, | Performed by: SURGERY

## 2019-09-11 PROCEDURE — 3074F SYST BP LT 130 MM HG: CPT | Mod: HCNC,CPTII,S$GLB, | Performed by: SURGERY

## 2019-09-11 PROCEDURE — 3008F PR BODY MASS INDEX (BMI) DOCUMENTED: ICD-10-PCS | Mod: HCNC,CPTII,S$GLB, | Performed by: SURGERY

## 2019-09-11 PROCEDURE — 99499 UNLISTED E&M SERVICE: CPT | Mod: HCNC,S$GLB,, | Performed by: SURGERY

## 2019-09-11 PROCEDURE — 99499 RISK ADDL DX/OHS AUDIT: ICD-10-PCS | Mod: HCNC,S$GLB,, | Performed by: SURGERY

## 2019-09-11 PROCEDURE — 99999 PR PBB SHADOW E&M-EST. PATIENT-LVL III: CPT | Mod: PBBFAC,HCNC,, | Performed by: SURGERY

## 2019-09-11 PROCEDURE — 3078F DIAST BP <80 MM HG: CPT | Mod: HCNC,CPTII,S$GLB, | Performed by: SURGERY

## 2019-09-11 PROCEDURE — 3074F PR MOST RECENT SYSTOLIC BLOOD PRESSURE < 130 MM HG: ICD-10-PCS | Mod: HCNC,CPTII,S$GLB, | Performed by: SURGERY

## 2019-09-11 PROCEDURE — 99203 PR OFFICE/OUTPT VISIT, NEW, LEVL III, 30-44 MIN: ICD-10-PCS | Mod: HCNC,S$GLB,, | Performed by: SURGERY

## 2019-09-11 PROCEDURE — 3078F PR MOST RECENT DIASTOLIC BLOOD PRESSURE < 80 MM HG: ICD-10-PCS | Mod: HCNC,CPTII,S$GLB, | Performed by: SURGERY

## 2019-09-11 RX ORDER — ESTRADIOL 2 MG/1
2 TABLET ORAL DAILY
Qty: 90 TABLET | Refills: 3 | Status: SHIPPED | OUTPATIENT
Start: 2019-09-11 | End: 2020-12-09

## 2019-09-11 NOTE — LETTER
Zhang Campos - Plastic Surg [facility]  [address]  [facility] [address]  Phone: [phone]  Fax: [phone] September 11, 2019      Anahi Harrell MD  [id number]  [facility]  Breast [facility] [address]    Patient: Maria Ines Ac   MR Number: [id number]   YOB: 1985   Date of Visit: 9/11/2019     Dear Dr. Harrell:    Thank you for referring Maria Ines Ac to me for evaluation. Attached you will find relevant portions of my assessment and plan of care.    Ms. Ac presents to the Plastic Surgery Clinic with a chief complaint of chronic neck and back pain secondary to heavy pendulous breasts.  She has been complaining of this breast pain for many years.  She has tried nonsteroidal anti-inflammatory medications and Tylenol without relief of the pain.  The patient also complains of deep shoulder grooving secondary to brassiere strap and macerating rashes underneath her breasts treated with medicated creams and ointments.      The patient's past medical history is significant for bipolar disease.  She was admitted to the hospital three years ago.  She sees her psychiatrist every six weeks and she is due to have a psychiatric appointment tomorrow.  She also has a pacemaker for neurogenic bladder, high blood pressure and diabetes.      Physical examination shows the patient is awake, alert and oriented.  The patient is 5 feet 2, weighs 200 pounds and has 44 H cup breasts.  Her shoulders show evidence of grooving.  Her skin underneath her breasts show evidence of previous rashes.  She has macromastia.  The plan with her is she has a significant risk for bilateral breast reduction, especially with her severe bipolar disease.      I will not do any surgery on her unless she is totally cleared and recommended for surgery by her psychiatrist.  She and her mother totally understand this.  She is also high-risk secondary to her diabetes and high blood  pressure.  If she gets all the appropriate clearances at that point we will consider doing a breast reduction.    If you have questions, please do not hesitate to call me. I look forward to following Maria Ines Ac along with you.    Sincerely,    Joaquin Worthington MD  Section of Plastic Surgery  Department of Surgery  [facility]    ANTOINETTE/flavio

## 2019-09-12 ENCOUNTER — OFFICE VISIT (OUTPATIENT)
Dept: CARDIOLOGY | Facility: CLINIC | Age: 34
End: 2019-09-12
Attending: INTERNAL MEDICINE
Payer: MEDICARE

## 2019-09-12 ENCOUNTER — HOSPITAL ENCOUNTER (OUTPATIENT)
Dept: CARDIOLOGY | Facility: OTHER | Age: 34
Discharge: HOME | End: 2019-09-12
Attending: INTERNAL MEDICINE
Payer: MEDICARE

## 2019-09-12 VITALS
BODY MASS INDEX: 37.11 KG/M2 | DIASTOLIC BLOOD PRESSURE: 72 MMHG | OXYGEN SATURATION: 95 % | HEART RATE: 62 BPM | HEIGHT: 62 IN | SYSTOLIC BLOOD PRESSURE: 104 MMHG | WEIGHT: 201.69 LBS

## 2019-09-12 DIAGNOSIS — E66.01 SEVERE OBESITY (BMI 35.0-39.9) WITH COMORBIDITY: ICD-10-CM

## 2019-09-12 DIAGNOSIS — Z01.818 PREOPERATIVE CLEARANCE: Primary | ICD-10-CM

## 2019-09-12 DIAGNOSIS — E78.5 HYPERLIPIDEMIA, UNSPECIFIED HYPERLIPIDEMIA TYPE: ICD-10-CM

## 2019-09-12 DIAGNOSIS — E11.8 TYPE 2 DIABETES MELLITUS WITH COMPLICATION, WITHOUT LONG-TERM CURRENT USE OF INSULIN: ICD-10-CM

## 2019-09-12 DIAGNOSIS — I45.10 RBBB: ICD-10-CM

## 2019-09-12 DIAGNOSIS — R69 DIAGNOSIS DEFERRED: ICD-10-CM

## 2019-09-12 DIAGNOSIS — I10 ESSENTIAL HYPERTENSION: ICD-10-CM

## 2019-09-12 PROCEDURE — 99499 RISK ADDL DX/OHS AUDIT: ICD-10-PCS | Mod: HCNC,S$GLB,, | Performed by: INTERNAL MEDICINE

## 2019-09-12 PROCEDURE — 3044F PR MOST RECENT HEMOGLOBIN A1C LEVEL <7.0%: ICD-10-PCS | Mod: HCNC,CPTII,S$GLB, | Performed by: INTERNAL MEDICINE

## 2019-09-12 PROCEDURE — 99214 OFFICE O/P EST MOD 30 MIN: CPT | Mod: HCNC,S$GLB,, | Performed by: INTERNAL MEDICINE

## 2019-09-12 PROCEDURE — 99214 PR OFFICE/OUTPT VISIT, EST, LEVL IV, 30-39 MIN: ICD-10-PCS | Mod: HCNC,S$GLB,, | Performed by: INTERNAL MEDICINE

## 2019-09-12 PROCEDURE — 3044F HG A1C LEVEL LT 7.0%: CPT | Mod: HCNC,CPTII,S$GLB, | Performed by: INTERNAL MEDICINE

## 2019-09-12 PROCEDURE — 3008F BODY MASS INDEX DOCD: CPT | Mod: HCNC,CPTII,S$GLB, | Performed by: INTERNAL MEDICINE

## 2019-09-12 PROCEDURE — 99499 UNLISTED E&M SERVICE: CPT | Mod: HCNC,S$GLB,, | Performed by: INTERNAL MEDICINE

## 2019-09-12 PROCEDURE — 3078F DIAST BP <80 MM HG: CPT | Mod: HCNC,CPTII,S$GLB, | Performed by: INTERNAL MEDICINE

## 2019-09-12 PROCEDURE — 3074F PR MOST RECENT SYSTOLIC BLOOD PRESSURE < 130 MM HG: ICD-10-PCS | Mod: HCNC,CPTII,S$GLB, | Performed by: INTERNAL MEDICINE

## 2019-09-12 PROCEDURE — 3074F SYST BP LT 130 MM HG: CPT | Mod: HCNC,CPTII,S$GLB, | Performed by: INTERNAL MEDICINE

## 2019-09-12 PROCEDURE — 3008F PR BODY MASS INDEX (BMI) DOCUMENTED: ICD-10-PCS | Mod: HCNC,CPTII,S$GLB, | Performed by: INTERNAL MEDICINE

## 2019-09-12 PROCEDURE — 3078F PR MOST RECENT DIASTOLIC BLOOD PRESSURE < 80 MM HG: ICD-10-PCS | Mod: HCNC,CPTII,S$GLB, | Performed by: INTERNAL MEDICINE

## 2019-09-12 NOTE — PROGRESS NOTES
"Subjective:   Patient ID:  Maria Ines Ac is a 34 y.o. female is a new patient who presents for evaluation of Chest Pain; Palpitations; Shortness of Breath; and Surgery Clearance    HPI:   Pre op evaluation for breast reduction. Dr. Brian  Phillips a murmur.  DM, Morbid obesity.   Patient would get SOB on going up a flight of stair.   No chest pain, Orthopnea, PND of heart failure symptoms.   c/o palpitations or fluttering in the chest      Patient Active Problem List   Diagnosis    Abdominal pain, RLQ (right lower quadrant)    Dysphagia    Diabetes mellitus, type II    Constipation, chronic    Right hip pain    New daily persistent headache    Hypertension    Palpitations    Chest pain, non-cardiac    Dyspnea on exertion    Severe obesity (BMI 35.0-39.9) with comorbidity    Muscle spasm    Gastroesophageal reflux disease without esophagitis    Irritable bowel syndrome with diarrhea    Migrainous vertigo    Uncontrolled morning headache    Sleep arousal disorder    Hyperlipidemia    GERD (gastroesophageal reflux disease)    Family history of breast cancer    Family hx of ovarian malignancy    Weakness    Painful cervical ROM    Incomplete emptying of bladder    Bilateral occipital neuralgia     /72   Pulse 62   Ht 5' 2" (1.575 m)   Wt 91.5 kg (201 lb 11.2 oz)   LMP 11/17/2012 (LMP Unknown)   SpO2 95%   BMI 36.89 kg/m²   Body mass index is 36.89 kg/m².  CrCl cannot be calculated (Patient's most recent lab result is older than the maximum 7 days allowed.).    Lab Results   Component Value Date     05/21/2019    K 4.4 05/21/2019     05/21/2019    CO2 27 05/21/2019    BUN 8 05/21/2019    CREATININE 1.1 06/18/2019    GLU 84 05/21/2019    HGBA1C 5.1 06/24/2019    AST 16 05/21/2019    ALT 18 05/21/2019    ALBUMIN 4.4 05/21/2019    PROT 7.8 05/21/2019    BILITOT 0.3 05/21/2019    WBC 6.25 06/30/2018    HGB 13.6 06/30/2018    HCT 42.3 06/30/2018    MCV 89 06/30/2018    "  06/30/2018    INR 0.9 01/05/2017    TSH 2.181 07/02/2019    CHOL 156 01/17/2019    HDL 37 (L) 01/17/2019    LDLCALC 78.8 01/17/2019    TRIG 201 (H) 01/17/2019       Current Outpatient Medications   Medication Sig    ACCU-CHEK SOFTCLIX LANCETS [facility] USE TO TEST BLOOD GLUCOSE TID    atorvastatin (LIPITOR) 40 MG tablet Take 40 mg by mouth once daily.    BLOOD SUGAR DIAGNOSTIC (ACCU-CHEK AMAURY PLUS TEST STRP MISC) 1 strip by Misc.(Non-Drug; Combo Route) route 3 (three) times daily.    calcium-vitamin D3 500 mg(1,250mg) -200 unit per tablet Take 1 tablet by mouth 2 (two) times daily with meals.    ciclopirox (PENLAC) 8 % Soln Apply topically nightly.    conjugated estrogens (PREMARIN) vaginal cream Use 0.5 gram of estrogen cream in vagina nightly x 2 weeks, then twice a week thereafter.    dicyclomine (BENTYL) 10 MG capsule TAKE 2 CAPSULES(20 MG) BY MOUTH THREE TIMES DAILY BEFORE MEALS    docusate sodium (COLACE) 100 MG capsule Take 1 capsule (100 mg total) by mouth 2 (two) times daily.    estradiol (ESTRACE) 2 MG tablet Take 1 tablet (2 mg total) by mouth once daily.    FARXIGA 10 mg Tab TK 1 T PO QD    fish oil-omega-3 fatty acids 300-1,000 mg capsule Take 2 g by mouth once daily.    fluticasone propionate (FLONASE) 50 mcg/actuation nasal spray SHAKE LIQUID AND USE 2 SPRAYS(100 MCG) IN EACH NOSTRIL EVERY DAY    gabapentin (NEURONTIN) 300 MG capsule Take 300 mg (1 tablet) twice during the day, then two tablets (600 mg) at night before bed.    GLUCOPHAGE 500 mg tablet Take 500 mg by mouth 2 (two) times daily with meals.     ibuprofen (ADVIL,MOTRIN) 800 MG tablet 800 mg every 4 (four) hours as needed.     levothyroxine (SYNTHROID) 50 MCG tablet TK 1 T PO QAM    magnesium oxide (MAG-OX) 400 mg (241.3 mg magnesium) tablet Take 1 tablet (400 mg total) by mouth 2 (two) times daily.    metoprolol succinate (TOPROL-XL) 25 MG 24 hr tablet TAKE 1 TABLET(25 MG) BY MOUTH EVERY DAY     MULTIVIT-IRON-MIN-FOLIC ACID 3,500-18-0.4 UNIT-MG-MG ORAL CHEW Take 1 tablet by mouth once daily.     omeprazole (PRILOSEC OTC) 20 MG tablet Take 20 mg by mouth once daily.    pantoprazole (PROTONIX) 40 MG tablet TAKE 1 TABLET BY MOUTH EVERY DAY    phentermine (ADIPEX-P) 37.5 MG capsule Take 37.5 mg by mouth every morning.     polyethylene glycol (GLYCOLAX) 17 gram/dose powder Mix 1 capful (17 g) with liquid and take by mouth once daily.    spironolactone (ALDACTONE) 25 MG tablet TK 1 T PO HS    terconazole (TERAZOL 7) 0.4 % Crea Place 1 applicator vaginally every evening.    topiramate (TOPAMAX) 50 MG tablet Take 2 tablets (100 mg total) by mouth every evening.    traZODone (DESYREL) 50 MG tablet TK 1 T PO QHS    TRULICITY 1.5 mg/0.5 mL PnIj INJECT 1 PEN INTO THE SKIN Q WEEK    ziprasidone (GEODON) 60 MG Cap     ZOLOFT 100 mg tablet Take 200 mg by mouth once daily.      No current facility-administered medications for this visit.        Review of Systems   Constitution: Negative for chills, decreased appetite, malaise/fatigue, night sweats, weight gain and weight loss.   Eyes: Negative for blurred vision, double vision, visual disturbance and visual halos.   Cardiovascular: Positive for dyspnea on exertion. Negative for chest pain, claudication, cyanosis, irregular heartbeat, leg swelling, near-syncope, orthopnea, palpitations, paroxysmal nocturnal dyspnea and syncope.   Respiratory: Negative for cough, hemoptysis, snoring, sputum production and wheezing.    Endocrine: Negative for cold intolerance, heat intolerance, polydipsia and polyphagia.   Hematologic/Lymphatic: Negative for adenopathy and bleeding problem. Does not bruise/bleed easily.   Skin: Negative for flushing, itching, poor wound healing and rash.   Musculoskeletal: Negative for arthritis, back pain, falls, gout, joint pain, joint swelling, muscle cramps, muscle weakness, myalgias, neck pain and stiffness.   Gastrointestinal: Negative for  bloating, abdominal pain, anorexia, diarrhea, dysphagia, excessive appetite, flatus, hematemesis, jaundice, melena and nausea.   Genitourinary: Negative for hesitancy and incomplete emptying.   Neurological: Negative for aphonia, brief paralysis, difficulty with concentration, disturbances in coordination, excessive daytime sleepiness, dizziness, focal weakness, light-headedness, loss of balance and weakness.   Psychiatric/Behavioral: Negative for altered mental status, depression, hallucinations, hypervigilance, memory loss, substance abuse and suicidal ideas. The patient does not have insomnia and is not nervous/anxious.        Objective:   Physical Exam   Constitutional: She is oriented to person, place, and time. She appears well-developed and well-nourished. No distress.   HENT:   Head: Normocephalic and atraumatic.   Nose: Nose normal.   Mouth/Throat: Oropharynx is clear and moist. No oropharyngeal exudate.   Eyes: Pupils are equal, round, and reactive to light. Conjunctivae and EOM are normal. Right eye exhibits no discharge. Left eye exhibits no discharge. No scleral icterus.   Neck: Normal range of motion. Neck supple. No JVD present. No tracheal deviation present. No thyromegaly present.   Cardiovascular: Normal rate, regular rhythm, normal heart sounds and intact distal pulses. Exam reveals no gallop and no friction rub.   No murmur heard.  Pulmonary/Chest: Effort normal and breath sounds normal. No stridor. No respiratory distress. She has no wheezes. She has no rales. She exhibits no tenderness.   Abdominal: Soft. Bowel sounds are normal. She exhibits no distension and no mass. There is no tenderness. There is no rebound and no guarding.   Musculoskeletal: Normal range of motion. She exhibits no edema or tenderness.   Lymphadenopathy:     She has no cervical adenopathy.   Neurological: She is alert and oriented to person, place, and time. She has normal reflexes. No cranial nerve deficit. She exhibits  normal muscle tone. Coordination normal.   Skin: Skin is warm. No rash noted. She is not diaphoretic. No erythema. No pallor.   Psychiatric: She has a normal mood and affect. Her behavior is normal. Judgment and thought content normal.       Assessment:     1. Preoperative clearance    2. Severe obesity (BMI 35.0-39.9) with comorbidity    3. RBBB    4. Type 2 diabetes mellitus with complication, without long-term current use of insulin    5. Hyperlipidemia, unspecified hyperlipidemia type    6. Essential hypertension        Plan:   Given poor exercise tolerance and multiple risk factors will consider stress test.    Maria Ines was seen today for chest pain, palpitations, shortness of breath and surgery clearance.    Diagnoses and all orders for this visit:    Preoperative clearance  -     Stress Echo Which stress agent will be used? Treadmill Exercise; Future  -     Holter monitor - 48 hour; Future  -     Echo Color Flow Doppler? No; 2D? Yes; Limited Follow-Up Exam? Yes; Bubble Contrast? Yes; Future    Severe obesity (BMI 35.0-39.9) with comorbidity    RBBB    Type 2 diabetes mellitus with complication, without long-term current use of insulin    Hyperlipidemia, unspecified hyperlipidemia type    Essential hypertension      RTC prn.

## 2019-09-12 NOTE — PROGRESS NOTES
Ms. Ac presents to Plastic Surgery Clinic with a chief complaint of chronic   neck and back pain secondary to heavy pendulous breasts.  She has been   complaining of this breast pain for many years.  She has tried nonsteroidal   anti-inflammatory medications and Tylenol without relief of the pain.  The   patient also complains of deep shoulder grooving secondary to brassiere strap   and macerating rashes underneath her breasts treated with medicated creams and   ointments.  The patient's past medical history is significant for bipolar   disease.  She was admitted to the hospital three years ago.  She sees her   psychiatrist every six weeks and she is due to have a psychiatric appointment   tomorrow.  She also has a pacemaker for neurogenic bladder, high blood pressure   and diabetes.  Physical examination shows the patient is awake, alert and   oriented.  The patient is 5 feet 2, weighs 200 pounds and has 44 H cup breasts.    Her shoulders show evidence of grooving.  Her skin underneath her breasts show   evidence of previous rashes.  She has macromastia.  The plan with her is she has   a significant risk for bilateral breast reduction, especially with her severe   bipolar disease.  I will not do any surgery on her unless she is totally cleared   and recommended for surgery by her psychiatrist.  She and her mother totally   understand this.  She is also high-risk secondary to her diabetes and high blood   pressure.  If she gets all the appropriate clearances at that point, we will   consider doing a breast reduction.      KASSIDY  dd: 09/12/2019 12:49:39 (CDT)  td: 09/13/2019 03:52:15 (CDT)  Doc ID   #[id number]  Job ID #[id number]    CC:

## 2019-09-13 DIAGNOSIS — R10.84 GENERALIZED ABDOMINAL PAIN: ICD-10-CM

## 2019-09-13 DIAGNOSIS — K58.0 IRRITABLE BOWEL SYNDROME WITH DIARRHEA: ICD-10-CM

## 2019-09-13 RX ORDER — METOPROLOL SUCCINATE 25 MG/1
TABLET, EXTENDED RELEASE ORAL
Qty: 30 TABLET | Refills: 0 | Status: SHIPPED | OUTPATIENT
Start: 2019-09-13 | End: 2019-10-24

## 2019-09-13 NOTE — TELEPHONE ENCOUNTER
----- Message from Martha Jordan sent at 9/12/2019  5:22 PM CDT -----  Contact: [facility], [phone]  Called in regards to Dicyclomine 10 mg prescription refill requested.

## 2019-09-13 NOTE — TELEPHONE ENCOUNTER
Approved Medications      metoprolol succinate (TOPROL-XL) 25 MG 24 hr tablet         Sig: TAKE 1 TABLET(25 MG) BY MOUTH EVERY DAY    Disp:  30 tablet    Refills:  0    Start: 9/13/2019    Class: Normal    Authorized by: Luann Raymond MD        To be filled at: [facility] - [facility] [address]        Left message for patient to inform them that medication has been approved.

## 2019-09-16 NOTE — PROGRESS NOTES
CHIEF COMPLAINT:    Mrs. Ac is a 34 y.o. female presenting for a follow up after stage II InterStim 7/30/2019.    PRESENTING ILLNESS:    Maria Ines Ac is a 34 y.o. female who returns accompanied by her mother.  She states wears 4 pads a day.  The stimulation is in her Buttocks.  She is using program #7 and it was on 4.6 A.  She states she continues to have significant urgency and urge incontinence.      REVIEW OF SYSTEMS:    Review of Systems   Constitutional: Negative.    HENT: Negative.    Eyes: Negative.    Respiratory: Negative.    Cardiovascular: Negative.    Gastrointestinal: Negative.    Genitourinary: Positive for frequency and urgency.   Musculoskeletal: Negative.    Skin: Negative.    Neurological: Negative.    Endo/Heme/Allergies: Negative.    Psychiatric/Behavioral: Negative.        PATIENT HISTORY:    Past Medical History:   Diagnosis Date    Blood in stool     Constipation     Cystitis     Depression     Diabetes mellitus, type 2     Dysphagia     Endometriosis     GERD (gastroesophageal reflux disease)     Headache     Hypertension     Palpitations     Premature menopause on hormone replacement therapy     Thyroid disease        Past Surgical History:   Procedure Laterality Date    BLADDER SUSPENSION      CARPAL TUNNEL RELEASE      right    CHOLECYSTECTOMY      COLONOSCOPY N/A 8/30/2016    Performed by [facility]on [facility] at [facility] ENDO (4TH FLR)    COLONOSCOPY N/A 1/9/2013    Performed by Gabriele Gibbons MD at [facility] ENDO (4TH FLR)    CYSTOSCOPY N/A 1/23/2017    Performed by [facility]ka [facility]ura [facility] at [facility] OR    EGD (ESOPHAGOGASTRODUODENOSCOPY) N/A 1/10/2013    Performed by Darryl Cuello MD at [facility] ENDO (2ND FLR)    ESOPHAGOGASTRODUODENOSCOPY (EGD) N/A 9/14/2017    Performed by Slick Herron MD at [facility] ENDO (4TH FLR)    ESOPHAGOGASTRODUODENOSCOPY (EGD) N/A 8/30/2016    Performed by Slick Herron MD at [facility] ENDO (4TH FLR)    ESOPHAGOGASTRODUODENOSCOPY (EGD) N/A  10/23/2014    Performed by [facility] at [facility] ENDO (4TH FLR)    GALLBLADDER SURGERY      HYSTERECTOMY      ron, Dr. Ashley Smith    INSERTION, NEUROSTIMULATOR, PERMANENT, SACRAL N/A 2019    Performed by Zena Tee MD at [facility] OR 2ND FLR    INSERTION, NEUROSTIMULATOR, TEMPORARY, SACRAL N/A 2019    Performed by Zena Tee MD at [facility] OR 2ND FLR    MANOMETRY, ESOPHAGUS, WITH IMPEDANCE MEASUREMENT N/A 2018    Performed by Slick Herron MD at [facility] ENDO (4TH FLR)    OOPHORECTOMY      LSO- benign cyst    OOPHORECTOMY      [facility]- endo    ooptherectomy      PROGRAMMING-STIMULATOR N/A 2019    Performed by Zena Tee MD at [facility] OR 2ND FLR    RETROPUBIC SLING N/A 2017    [facility] OR    right knee  scoped    SHOULDER SURGERY  right    URODYNAMIC STUDY, FLUOROSCOPIC N/A 2019    Performed by Zena Tee MD at [facility] OR 1ST FLR       Family History   Problem Relation Age of Onset    Breast cancer Mother 50        alive at 64, unilateral    Esophageal cancer Mother 63    Ovarian cancer Mother 63    Anesthesia problems Mother     Cervical cancer Maternal Grandmother         unknown age of onset,  at 80    Colon cancer Brother 40    Breast cancer Paternal Aunt         unknown age of onset, alive at 70    Breast cancer Maternal Aunt 72        alive at 72     Socioeconomic History    Marital status: Single   Tobacco Use    Smoking status: Never Smoker    Smokeless tobacco: Never Used   Substance and Sexual Activity    Alcohol use: No    Drug use: No    Sexual activity: Never     Birth control/protection: None   Other Topics Concern    Not on file   Social History Narrative    ** Merged History Encounter **            Allergies:  Patient has no known allergies.    Medications:  Outpatient Encounter Medications as of 2019   Medication Sig Dispense Refill    ACCU-CHEK AMAURY PLUS  TEST STRP Strp USE TO TEST BLOOD GLUCOSE TID  9    ACCU-CHEK SOFTCLIX LANCETS Misc USE TO TEST BLOOD GLUCOSE TID  3    atorvastatin (LIPITOR) 40 MG tablet Take 40 mg by mouth once daily.      BLOOD SUGAR DIAGNOSTIC (ACCU-CHEK AMAURY PLUS TEST STRP MISC) 1 strip by Misc.(Non-Drug; Combo Route) route 3 (three) times daily.      calcium-vitamin D3 500 mg(1,250mg) -200 unit per tablet Take 1 tablet by mouth 2 (two) times daily with meals.      ciclopirox (PENLAC) 8 % Soln Apply topically nightly. 6.6 mL 11    conjugated estrogens (PREMARIN) vaginal cream Use 0.5 gram of estrogen cream in vagina nightly x 2 weeks, then twice a week thereafter. 30 g 4    dicyclomine (BENTYL) 10 MG capsule TAKE 2 CAPSULES(20 MG) BY MOUTH THREE TIMES DAILY BEFORE MEALS 180 capsule 0    docusate sodium (COLACE) 100 MG capsule Take 1 capsule (100 mg total) by mouth 2 (two) times daily. 60 capsule 0    estradiol (ESTRACE) 2 MG tablet Take 1 tablet (2 mg total) by mouth once daily. 90 tablet 3    FARXIGA 10 mg Tab TK 1 T PO QD  7    fish oil-omega-3 fatty acids 300-1,000 mg capsule Take 2 g by mouth once daily.      flu vacc [id number] 6mos up,PF, (FLUARIX QUAD [id number], PF,) 60 mcg (15 mcg x 4)/0.5 mL Syrg Inject 0.5 mLs into the muscle once. For one dose. for 1 dose 0.5 mL 0    fluticasone propionate (FLONASE) 50 mcg/actuation nasal spray SHAKE LIQUID AND USE 2 SPRAYS(100 MCG) IN EACH NOSTRIL EVERY DAY 48 mL 1    gabapentin (NEURONTIN) 300 MG capsule Take 300 mg (1 tablet) twice during the day, then two tablets (600 mg) at night before bed. 120 capsule 11    GLUCOPHAGE 500 mg tablet Take 500 mg by mouth 2 (two) times daily with meals.       ibuprofen (ADVIL,MOTRIN) 800 MG tablet 800 mg every 4 (four) hours as needed.   0    levothyroxine (SYNTHROID) 50 MCG tablet TK 1 T PO QAM  8    magnesium oxide (MAG-OX) 400 mg (241.3 mg magnesium) tablet Take 1 tablet (400 mg total) by mouth 2 (two) times daily.  0    metoprolol  succinate (TOPROL-XL) 25 MG 24 hr tablet TAKE 1 TABLET(25 MG) BY MOUTH EVERY DAY 30 tablet 0    MULTIVIT-IRON-MIN-FOLIC ACID 3,500-18-0.4 UNIT-MG-MG ORAL CHEW Take 1 tablet by mouth once daily.       omeprazole (PRILOSEC OTC) 20 MG tablet Take 20 mg by mouth once daily.      pantoprazole (PROTONIX) 40 MG tablet TAKE 1 TABLET BY MOUTH EVERY DAY 90 tablet 0    phentermine (ADIPEX-P) 37.5 MG capsule Take 37.5 mg by mouth every morning.       polyethylene glycol (GLYCOLAX) 17 gram/dose powder Mix 1 capful (17 g) with liquid and take by mouth once daily. 238 g 0    spironolactone (ALDACTONE) 25 MG tablet TK 1 T PO HS  2    terconazole (TERAZOL 7) 0.4 % Crea Place 1 applicator vaginally every evening. 45 g 1    topiramate (TOPAMAX) 50 MG tablet Take 2 tablets (100 mg total) by mouth every evening. 60 tablet 11    traZODone (DESYREL) 50 MG tablet TK 1 T PO QHS  1    TRULICITY 1.5 mg/0.5 mL PnIj INJECT 1 PEN INTO THE SKIN Q WEEK  5    ziprasidone (GEODON) 60 MG Cap       ziprasidone (GEODON) 80 MG capsule       ZOLOFT 100 mg tablet Take 200 mg by mouth once daily.       [DISCONTINUED] dicyclomine (BENTYL) 10 MG capsule TAKE 2 CAPSULES(20 MG) BY MOUTH THREE TIMES DAILY BEFORE MEALS 180 capsule 0     No facility-administered encounter medications on file as of 9/18/2019.          PHYSICAL EXAMINATION:    The patient generally appears in good health, is appropriately interactive, and is in no apparent distress.    Skin: No lesions.    Mental: Cooperative with normal affect.    Neuro: Grossly intact.    HEENT: Normal. No evidence of lymphadenopathy.    Chest:  normal inspiratory effort.    Abdomen:  Soft, non-tender. No masses or organomegaly. Bladder is not palpable. No evidence of flank discomfort. No evidence of inguinal hernia.    Extremities: No clubbing, cyanosis, or edema      LABS:    Lab Results   Component Value Date    BUN 13 09/17/2019    CREATININE 1.2 09/17/2019     UA 1.000, pH 7, tr protein, 500  glucose, otherwise, negative  HgA1c was 5.1 yesterday    IMPRESSION:    Encounter Diagnoses   Name Primary?    Incomplete emptying of bladder Yes    Urge incontinence        PLAN:    1.  Encouraged her to continue doing what she is doing to manage her glucose  2.  Interrogated the InterStim.  Programs 1, 2, 6 have vaginal stimulation with buttocks stimulation.  Left on program 6.    Discussed she can go between 1, 2 and 6   3.  Follow up in 3 months.

## 2019-09-17 ENCOUNTER — IMMUNIZATION (OUTPATIENT)
Dept: PHARMACY | Facility: CLINIC | Age: 34
End: 2019-09-17
Payer: MEDICARE

## 2019-09-17 ENCOUNTER — OFFICE VISIT (OUTPATIENT)
Dept: INTERNAL MEDICINE | Facility: CLINIC | Age: 34
End: 2019-09-17
Payer: MEDICARE

## 2019-09-17 ENCOUNTER — LAB VISIT (OUTPATIENT)
Dept: LAB | Facility: HOSPITAL | Age: 34
End: 2019-09-17
Attending: OBSTETRICS & GYNECOLOGY
Payer: MEDICARE

## 2019-09-17 DIAGNOSIS — I10 HYPERTENSION, UNSPECIFIED TYPE: ICD-10-CM

## 2019-09-17 DIAGNOSIS — E11.9 DIABETES MELLITUS WITHOUT COMPLICATION: ICD-10-CM

## 2019-09-17 DIAGNOSIS — N95.1 MENOPAUSAL SYMPTOM: ICD-10-CM

## 2019-09-17 DIAGNOSIS — E11.9 DIABETES MELLITUS WITHOUT COMPLICATION: Primary | ICD-10-CM

## 2019-09-17 LAB
ALBUMIN SERPL BCP-MCNC: 3.9 G/DL (ref 3.5–5.2)
ALP SERPL-CCNC: 75 U/L (ref 55–135)
ALT SERPL W/O P-5'-P-CCNC: 19 U/L (ref 10–44)
ANION GAP SERPL CALC-SCNC: 7 MMOL/L (ref 8–16)
AST SERPL-CCNC: 20 U/L (ref 10–40)
BASOPHILS # BLD AUTO: 0.04 K/UL (ref 0–0.2)
BASOPHILS NFR BLD: 0.5 % (ref 0–1.9)
BILIRUB SERPL-MCNC: 0.2 MG/DL (ref 0.1–1)
BUN SERPL-MCNC: 13 MG/DL (ref 6–20)
CALCIUM SERPL-MCNC: 9.1 MG/DL (ref 8.7–10.5)
CHLORIDE SERPL-SCNC: 108 MMOL/L (ref 95–110)
CO2 SERPL-SCNC: 24 MMOL/L (ref 23–29)
CREAT SERPL-MCNC: 1.2 MG/DL (ref 0.5–1.4)
DIFFERENTIAL METHOD: NORMAL
EOSINOPHIL # BLD AUTO: 0.2 K/UL (ref 0–0.5)
EOSINOPHIL NFR BLD: 3 % (ref 0–8)
ERYTHROCYTE [DISTWIDTH] IN BLOOD BY AUTOMATED COUNT: 13.6 % (ref 11.5–14.5)
EST. GFR  (AFRICAN AMERICAN): >60 ML/MIN/1.73 M^2
EST. GFR  (NON AFRICAN AMERICAN): 58 ML/MIN/1.73 M^2
ESTIMATED AVG GLUCOSE: 100 MG/DL (ref 68–131)
ESTRADIOL SERPL-MCNC: 60 PG/ML
GLUCOSE SERPL-MCNC: 87 MG/DL (ref 70–110)
HBA1C MFR BLD HPLC: 5.1 % (ref 4–5.6)
HCT VFR BLD AUTO: 40.7 % (ref 37–48.5)
HGB BLD-MCNC: 13.2 G/DL (ref 12–16)
LYMPHOCYTES # BLD AUTO: 2.1 K/UL (ref 1–4.8)
LYMPHOCYTES NFR BLD: 28.1 % (ref 18–48)
MCH RBC QN AUTO: 28.8 PG (ref 27–31)
MCHC RBC AUTO-ENTMCNC: 32.4 G/DL (ref 32–36)
MCV RBC AUTO: 89 FL (ref 82–98)
MONOCYTES # BLD AUTO: 0.7 K/UL (ref 0.3–1)
MONOCYTES NFR BLD: 9.1 % (ref 4–15)
NEUTROPHILS # BLD AUTO: 4.5 K/UL (ref 1.8–7.7)
NEUTROPHILS NFR BLD: 59.3 % (ref 38–73)
PLATELET # BLD AUTO: 244 K/UL (ref 150–350)
PMV BLD AUTO: 11.3 FL (ref 9.2–12.9)
POTASSIUM SERPL-SCNC: 4.4 MMOL/L (ref 3.5–5.1)
PROT SERPL-MCNC: 7.9 G/DL (ref 6–8.4)
RBC # BLD AUTO: 4.58 M/UL (ref 4–5.4)
SODIUM SERPL-SCNC: 139 MMOL/L (ref 136–145)
WBC # BLD AUTO: 7.58 K/UL (ref 3.9–12.7)

## 2019-09-17 PROCEDURE — 80053 COMPREHEN METABOLIC PANEL: CPT | Mod: HCNC

## 2019-09-17 PROCEDURE — 99214 PR OFFICE/OUTPT VISIT, EST, LEVL IV, 30-39 MIN: ICD-10-PCS | Mod: HCNC,S$GLB,, | Performed by: INTERNAL MEDICINE

## 2019-09-17 PROCEDURE — 3078F DIAST BP <80 MM HG: CPT | Mod: HCNC,CPTII,S$GLB, | Performed by: INTERNAL MEDICINE

## 2019-09-17 PROCEDURE — 3044F PR MOST RECENT HEMOGLOBIN A1C LEVEL <7.0%: ICD-10-PCS | Mod: HCNC,CPTII,S$GLB, | Performed by: INTERNAL MEDICINE

## 2019-09-17 PROCEDURE — 3074F PR MOST RECENT SYSTOLIC BLOOD PRESSURE < 130 MM HG: ICD-10-PCS | Mod: HCNC,CPTII,S$GLB, | Performed by: INTERNAL MEDICINE

## 2019-09-17 PROCEDURE — 99999 PR PBB SHADOW E&M-EST. PATIENT-LVL V: ICD-10-PCS | Mod: PBBFAC,HCNC,, | Performed by: INTERNAL MEDICINE

## 2019-09-17 PROCEDURE — 99214 OFFICE O/P EST MOD 30 MIN: CPT | Mod: HCNC,S$GLB,, | Performed by: INTERNAL MEDICINE

## 2019-09-17 PROCEDURE — 3044F HG A1C LEVEL LT 7.0%: CPT | Mod: HCNC,CPTII,S$GLB, | Performed by: INTERNAL MEDICINE

## 2019-09-17 PROCEDURE — 82670 ASSAY OF TOTAL ESTRADIOL: CPT | Mod: HCNC

## 2019-09-17 PROCEDURE — 3074F SYST BP LT 130 MM HG: CPT | Mod: HCNC,CPTII,S$GLB, | Performed by: INTERNAL MEDICINE

## 2019-09-17 PROCEDURE — 3078F PR MOST RECENT DIASTOLIC BLOOD PRESSURE < 80 MM HG: ICD-10-PCS | Mod: HCNC,CPTII,S$GLB, | Performed by: INTERNAL MEDICINE

## 2019-09-17 PROCEDURE — 99999 PR PBB SHADOW E&M-EST. PATIENT-LVL V: CPT | Mod: PBBFAC,HCNC,, | Performed by: INTERNAL MEDICINE

## 2019-09-17 PROCEDURE — 3008F BODY MASS INDEX DOCD: CPT | Mod: HCNC,CPTII,S$GLB, | Performed by: INTERNAL MEDICINE

## 2019-09-17 PROCEDURE — 83036 HEMOGLOBIN GLYCOSYLATED A1C: CPT | Mod: HCNC

## 2019-09-17 PROCEDURE — 3008F PR BODY MASS INDEX (BMI) DOCUMENTED: ICD-10-PCS | Mod: HCNC,CPTII,S$GLB, | Performed by: INTERNAL MEDICINE

## 2019-09-17 PROCEDURE — 85025 COMPLETE CBC W/AUTO DIFF WBC: CPT | Mod: HCNC

## 2019-09-17 PROCEDURE — 36415 COLL VENOUS BLD VENIPUNCTURE: CPT | Mod: HCNC

## 2019-09-17 RX ORDER — ZIPRASIDONE HYDROCHLORIDE 80 MG/1
80 CAPSULE ORAL 2 TIMES DAILY WITH MEALS
COMMUNITY
Start: 2019-09-12 | End: 2021-05-18

## 2019-09-17 RX ORDER — BLOOD SUGAR DIAGNOSTIC
STRIP MISCELLANEOUS
Refills: 9 | COMMUNITY
Start: 2019-09-07

## 2019-09-17 RX ORDER — DICYCLOMINE HYDROCHLORIDE 10 MG/1
CAPSULE ORAL
Qty: 180 CAPSULE | Refills: 0 | Status: SHIPPED | OUTPATIENT
Start: 2019-09-17

## 2019-09-18 ENCOUNTER — OFFICE VISIT (OUTPATIENT)
Dept: UROLOGY | Facility: CLINIC | Age: 34
End: 2019-09-18
Payer: MEDICARE

## 2019-09-18 ENCOUNTER — HOSPITAL ENCOUNTER (OUTPATIENT)
Dept: CARDIOLOGY | Facility: CLINIC | Age: 34
Discharge: HOME | End: 2019-09-18
Attending: INTERNAL MEDICINE
Payer: MEDICARE

## 2019-09-18 VITALS
BODY MASS INDEX: 39.01 KG/M2 | HEART RATE: 88 BPM | WEIGHT: 212 LBS | SYSTOLIC BLOOD PRESSURE: 110 MMHG | HEIGHT: 62 IN | DIASTOLIC BLOOD PRESSURE: 74 MMHG

## 2019-09-18 VITALS
BODY MASS INDEX: 38.94 KG/M2 | WEIGHT: 211.63 LBS | HEIGHT: 62 IN | HEART RATE: 99 BPM | DIASTOLIC BLOOD PRESSURE: 74 MMHG | SYSTOLIC BLOOD PRESSURE: 110 MMHG

## 2019-09-18 DIAGNOSIS — Z01.818 PREOPERATIVE CLEARANCE: ICD-10-CM

## 2019-09-18 DIAGNOSIS — R33.9 INCOMPLETE EMPTYING OF BLADDER: Primary | ICD-10-CM

## 2019-09-18 DIAGNOSIS — N39.41 URGE INCONTINENCE: ICD-10-CM

## 2019-09-18 LAB
ASCENDING AORTA: 2.68 CM
AV INDEX (PROSTH): 0.74
AV MEAN GRADIENT: 3 MMHG
AV PEAK GRADIENT: 6 MMHG
AV VALVE AREA: 2.87 CM2
AV VELOCITY RATIO: 0.77
BSA FOR ECHO PROCEDURE: 2.05 M2
CV ECHO LV RWT: 0.44 CM
DOP CALC AO PEAK VEL: 1.25 M/S
DOP CALC AO VTI: 21.56 CM
DOP CALC LVOT AREA: 3.9 CM2
DOP CALC LVOT DIAMETER: 2.22 CM
DOP CALC LVOT PEAK VEL: 0.96 M/S
DOP CALC LVOT STROKE VOLUME: 61.9 CM3
DOP CALCLVOT PEAK VEL VTI: 16 CM
E WAVE DECELERATION TIME: 127.01 MSEC
E/A RATIO: 0.76
E/E' RATIO: 6.74 M/S
ECHO LV POSTERIOR WALL: 0.9 CM (ref 0.6–1.1)
FRACTIONAL SHORTENING: 32 % (ref 28–44)
INTERVENTRICULAR SEPTUM: 0.9 CM (ref 0.6–1.1)
LA MAJOR: 4.68 CM
LA MINOR: 4.95 CM
LA WIDTH: 2.48 CM
LEFT ATRIUM SIZE: 3.35 CM
LEFT ATRIUM VOLUME INDEX: 17.3 ML/M2
LEFT ATRIUM VOLUME: 33.98 CM3
LEFT INTERNAL DIMENSION IN SYSTOLE: 2.77 CM (ref 2.1–4)
LEFT VENTRICLE DIASTOLIC VOLUME INDEX: 37.15 ML/M2
LEFT VENTRICLE DIASTOLIC VOLUME: 72.79 ML
LEFT VENTRICLE MASS INDEX: 58 G/M2
LEFT VENTRICLE SYSTOLIC VOLUME INDEX: 14.7 ML/M2
LEFT VENTRICLE SYSTOLIC VOLUME: 28.81 ML
LEFT VENTRICULAR INTERNAL DIMENSION IN DIASTOLE: 4.07 CM (ref 3.5–6)
LEFT VENTRICULAR MASS: 112.79 G
LV LATERAL E/E' RATIO: 5.82 M/S
LV SEPTAL E/E' RATIO: 8 M/S
MV PEAK A VEL: 0.84 M/S
MV PEAK E VEL: 0.64 M/S
PULM VEIN S/D RATIO: 1.11
PV PEAK D VEL: 0.38 M/S
PV PEAK S VEL: 0.42 M/S
RA MAJOR: 3.68 CM
RA PRESSURE: 3 MMHG
RA WIDTH: 1.9 CM
RIGHT VENTRICULAR END-DIASTOLIC DIMENSION: 2.27 CM
SINUS: 2.86 CM
STJ: 2.32 CM
TDI LATERAL: 0.11 M/S
TDI SEPTAL: 0.08 M/S
TDI: 0.1 M/S
TRICUSPID ANNULAR PLANE SYSTOLIC EXCURSION: 1.75 CM

## 2019-09-18 PROCEDURE — 93306 ECHO (CUPID ONLY): ICD-10-PCS | Mod: HCNC,S$GLB,, | Performed by: INTERNAL MEDICINE

## 2019-09-18 PROCEDURE — 99999 PR PBB SHADOW E&M-EST. PATIENT-LVL IV: CPT | Mod: PBBFAC,HCNC,, | Performed by: UROLOGY

## 2019-09-18 PROCEDURE — 81002 PR URINALYSIS NONAUTO W/O SCOPE: ICD-10-PCS | Mod: HCNC,S$GLB,, | Performed by: UROLOGY

## 2019-09-18 PROCEDURE — 3008F BODY MASS INDEX DOCD: CPT | Mod: HCNC,CPTII,S$GLB, | Performed by: UROLOGY

## 2019-09-18 PROCEDURE — 99213 OFFICE O/P EST LOW 20 MIN: CPT | Mod: HCNC,25,S$GLB, | Performed by: UROLOGY

## 2019-09-18 PROCEDURE — 3074F PR MOST RECENT SYSTOLIC BLOOD PRESSURE < 130 MM HG: ICD-10-PCS | Mod: HCNC,CPTII,S$GLB, | Performed by: UROLOGY

## 2019-09-18 PROCEDURE — 93306 TTE W/DOPPLER COMPLETE: CPT | Mod: HCNC,S$GLB,, | Performed by: INTERNAL MEDICINE

## 2019-09-18 PROCEDURE — 3078F PR MOST RECENT DIASTOLIC BLOOD PRESSURE < 80 MM HG: ICD-10-PCS | Mod: HCNC,CPTII,S$GLB, | Performed by: UROLOGY

## 2019-09-18 PROCEDURE — 99999 PR PBB SHADOW E&M-EST. PATIENT-LVL IV: ICD-10-PCS | Mod: PBBFAC,HCNC,, | Performed by: UROLOGY

## 2019-09-18 PROCEDURE — 99213 PR OFFICE/OUTPT VISIT, EST, LEVL III, 20-29 MIN: ICD-10-PCS | Mod: HCNC,25,S$GLB, | Performed by: UROLOGY

## 2019-09-18 PROCEDURE — 96374 THER/PROPH/DIAG INJ IV PUSH: CPT | Mod: HCNC,S$GLB,, | Performed by: INTERNAL MEDICINE

## 2019-09-18 PROCEDURE — 96374 ECHO (CUPID ONLY): ICD-10-PCS | Mod: HCNC,S$GLB,, | Performed by: INTERNAL MEDICINE

## 2019-09-18 PROCEDURE — 95970 PR ANALYZE NEUROSTIM,NO REPROG: ICD-10-PCS | Mod: HCNC,S$GLB,, | Performed by: UROLOGY

## 2019-09-18 PROCEDURE — 3008F PR BODY MASS INDEX (BMI) DOCUMENTED: ICD-10-PCS | Mod: HCNC,CPTII,S$GLB, | Performed by: UROLOGY

## 2019-09-18 PROCEDURE — 3074F SYST BP LT 130 MM HG: CPT | Mod: HCNC,CPTII,S$GLB, | Performed by: UROLOGY

## 2019-09-18 PROCEDURE — 81002 URINALYSIS NONAUTO W/O SCOPE: CPT | Mod: HCNC,S$GLB,, | Performed by: UROLOGY

## 2019-09-18 PROCEDURE — 95970 ALYS NPGT W/O PRGRMG: CPT | Mod: HCNC,S$GLB,, | Performed by: UROLOGY

## 2019-09-18 PROCEDURE — 3078F DIAST BP <80 MM HG: CPT | Mod: HCNC,CPTII,S$GLB, | Performed by: UROLOGY

## 2019-09-18 NOTE — NURSING
Patient identified via spelling of name and date of birth. Patient denies blood transfusion reaction.   20 gauge saline lock started in right ac under aseptic technique. Bubble study with and without valsalva. Optison 3ml IVP used as contrast for 2 d imaging. Saline lock d/milka and pressure dressing applied. Tolerated procedure well.

## 2019-09-19 ENCOUNTER — TELEPHONE (OUTPATIENT)
Dept: CARDIOLOGY | Facility: CLINIC | Age: 34
End: 2019-09-19

## 2019-09-19 ENCOUNTER — PATIENT MESSAGE (OUTPATIENT)
Dept: OBSTETRICS AND GYNECOLOGY | Facility: CLINIC | Age: 34
End: 2019-09-19

## 2019-09-19 NOTE — TELEPHONE ENCOUNTER
----- Message from Dorcas Holley MD sent at 9/19/2019 11:29 AM CDT -----  plz let pt. Know that echo (ultrasound of the heart) is normal.

## 2019-09-20 ENCOUNTER — PATIENT MESSAGE (OUTPATIENT)
Dept: PLASTIC SURGERY | Facility: CLINIC | Age: 34
End: 2019-09-20

## 2019-09-22 VITALS
HEIGHT: 62 IN | WEIGHT: 207.69 LBS | HEART RATE: 92 BPM | BODY MASS INDEX: 38.22 KG/M2 | OXYGEN SATURATION: 98 % | SYSTOLIC BLOOD PRESSURE: 107 MMHG | DIASTOLIC BLOOD PRESSURE: 68 MMHG

## 2019-09-22 NOTE — PROGRESS NOTES
Subjective:       Patient ID: Maria Ines Ac is a 34 y.o. female.    Chief Complaint: Hypertension    HPI  She returns for management of hypertension.  She has had hypertension for over a year.  Current treatment has included medications outlined in medication list.  She denies chest pain or shortness of breath.  No palpitations.  Denies left arm or neck pain. She has diabetes.  Denies polyuria, polydipsia    Past medical history: Hypertension, hyperlipidemia, diabetes, hypothyroidism, bipolar disorder, migraine headaches, status post hysterectomy, increased risk breast cancer      Medications: Synthroid 0.05 mg daily, metformin, Toprol-XL 25mg daily,Lipitor 40 mg daily, trulicity      NO KNOWN DRUG ALLERGIES    Review of Systems   Constitutional: Negative for chills, fatigue, fever and unexpected weight change.   Respiratory: Negative for chest tightness and shortness of breath.    Cardiovascular: Negative for chest pain and palpitations.   Gastrointestinal: Negative for abdominal pain and blood in stool.   Neurological: Negative for dizziness, syncope, numbness and headaches.       Objective:      Physical Exam   HENT:   Right Ear: External ear normal.   Left Ear: External ear normal.   Nose: Nose normal.   Mouth/Throat: Oropharynx is clear and moist.   Eyes: Pupils are equal, round, and reactive to light.   Neck: Normal range of motion.   Cardiovascular: Normal rate and regular rhythm.   No murmur heard.  Pulmonary/Chest: Breath sounds normal.   Abdominal: She exhibits no distension. There is no hepatosplenomegaly. There is no tenderness.   Lymphadenopathy:     She has no cervical adenopathy.     She has no axillary adenopathy.   Neurological: She has normal strength and normal reflexes. No cranial nerve deficit or sensory deficit.       Assessment/Plan       Assessment and plan:  1.  Hypertension:  Check CMP and CBC  2.  Diabetes:  Check A1c

## 2019-09-23 ENCOUNTER — CLINICAL SUPPORT (OUTPATIENT)
Dept: CARDIOLOGY | Facility: HOSPITAL | Age: 34
End: 2019-09-23
Attending: INTERNAL MEDICINE
Payer: MEDICARE

## 2019-09-23 DIAGNOSIS — Z01.818 PREOPERATIVE CLEARANCE: ICD-10-CM

## 2019-09-23 PROCEDURE — 93227 XTRNL ECG REC<48 HR R&I: CPT | Mod: HCNC,,, | Performed by: INTERNAL MEDICINE

## 2019-09-23 PROCEDURE — 93227 HOLTER MONITOR - 48 HOUR (CUPID ONLY): ICD-10-PCS | Mod: HCNC,,, | Performed by: INTERNAL MEDICINE

## 2019-09-23 PROCEDURE — 93225 XTRNL ECG REC<48 HRS REC: CPT | Mod: HCNC

## 2019-09-25 ENCOUNTER — HOSPITAL ENCOUNTER (OUTPATIENT)
Dept: CARDIOLOGY | Facility: CLINIC | Age: 34
Discharge: HOME | End: 2019-09-25
Attending: INTERNAL MEDICINE
Payer: MEDICARE

## 2019-09-25 ENCOUNTER — TELEPHONE (OUTPATIENT)
Dept: CARDIOLOGY | Facility: CLINIC | Age: 34
End: 2019-09-25

## 2019-09-25 VITALS — WEIGHT: 200 LBS | BODY MASS INDEX: 36.8 KG/M2 | HEIGHT: 62 IN

## 2019-09-25 DIAGNOSIS — Z01.818 PREOPERATIVE CLEARANCE: ICD-10-CM

## 2019-09-25 LAB
ASCENDING AORTA: 2.47 CM
AV INDEX (PROSTH): 0.73
AV MEAN GRADIENT: 3 MMHG
AV PEAK GRADIENT: 6 MMHG
AV VALVE AREA: 2.38 CM2
AV VELOCITY RATIO: 0.69
BSA FOR ECHO PROCEDURE: 1.99 M2
CV ECHO LV RWT: 0.34 CM
CV STRESS BASE HR: 84 BPM
DIASTOLIC BLOOD PRESSURE: 70 MMHG
DOP CALC AO PEAK VEL: 1.25 M/S
DOP CALC AO VTI: 21.56 CM
DOP CALC LVOT AREA: 3.3 CM2
DOP CALC LVOT DIAMETER: 2.04 CM
DOP CALC LVOT PEAK VEL: 0.86 M/S
DOP CALC LVOT STROKE VOLUME: 51.35 CM3
DOP CALCLVOT PEAK VEL VTI: 15.72 CM
E WAVE DECELERATION TIME: 153.79 MSEC
E/A RATIO: 1
E/E' RATIO: 8.13 M/S
ECHO LV POSTERIOR WALL: 0.68 CM (ref 0.6–1.1)
FRACTIONAL SHORTENING: 28 % (ref 28–44)
INTERVENTRICULAR SEPTUM: 0.72 CM (ref 0.6–1.1)
IVRT: 0.12 MSEC
LA MAJOR: 5.25 CM
LA MINOR: 5.47 CM
LA WIDTH: 2.93 CM
LEFT ATRIUM SIZE: 3.5 CM
LEFT ATRIUM VOLUME INDEX: 24.4 ML/M2
LEFT ATRIUM VOLUME: 46.7 CM3
LEFT INTERNAL DIMENSION IN SYSTOLE: 2.84 CM (ref 2.1–4)
LEFT VENTRICLE DIASTOLIC VOLUME INDEX: 35.74 ML/M2
LEFT VENTRICLE DIASTOLIC VOLUME: 68.31 ML
LEFT VENTRICLE MASS INDEX: 40 G/M2
LEFT VENTRICLE SYSTOLIC VOLUME INDEX: 16 ML/M2
LEFT VENTRICLE SYSTOLIC VOLUME: 30.64 ML
LEFT VENTRICULAR INTERNAL DIMENSION IN DIASTOLE: 3.96 CM (ref 3.5–6)
LEFT VENTRICULAR MASS: 77.05 G
LV LATERAL E/E' RATIO: 7.22 M/S
LV SEPTAL E/E' RATIO: 9.29 M/S
MV PEAK A VEL: 0.65 M/S
MV PEAK E VEL: 0.65 M/S
OHS CV CPX 1 MINUTE RECOVERY HEART RATE: 127 BPM
OHS CV CPX 85 PERCENT MAX PREDICTED HEART RATE MALE: 148
OHS CV CPX ESTIMATED METS: 9
OHS CV CPX MAX PREDICTED HEART RATE: 174
OHS CV CPX PATIENT IS FEMALE: 1
OHS CV CPX PATIENT IS MALE: 0
OHS CV CPX PEAK DIASTOLIC BLOOD PRESSURE: 100 MMHG
OHS CV CPX PEAK HEAR RATE: 150 BPM
OHS CV CPX PEAK RATE PRESSURE PRODUCT: NORMAL
OHS CV CPX PEAK SYSTOLIC BLOOD PRESSURE: 191 MMHG
OHS CV CPX PERCENT MAX PREDICTED HEART RATE ACHIEVED: 86
OHS CV CPX RATE PRESSURE PRODUCT PRESENTING: 8148
PISA TR MAX VEL: 1.61 M/S
PULM VEIN S/D RATIO: 1.26
PV PEAK D VEL: 0.39 M/S
PV PEAK S VEL: 0.49 M/S
RA MAJOR: 4.5 CM
RA PRESSURE: 3 MMHG
RA WIDTH: 2.81 CM
RIGHT VENTRICULAR END-DIASTOLIC DIMENSION: 3.09 CM
RV TISSUE DOPPLER FREE WALL SYSTOLIC VELOCITY 1 (APICAL 4 CHAMBER VIEW): 9.37 CM/S
SINUS: 2.71 CM
STJ: 2.16 CM
STRESS ECHO POST EXERCISE DUR MIN: 5 MINUTES
STRESS ECHO POST EXERCISE DUR SEC: 43 SECONDS
SYSTOLIC BLOOD PRESSURE: 97 MMHG
TDI LATERAL: 0.09 M/S
TDI SEPTAL: 0.07 M/S
TDI: 0.08 M/S
TR MAX PG: 10 MMHG
TRICUSPID ANNULAR PLANE SYSTOLIC EXCURSION: 1.98 CM
TV REST PULMONARY ARTERY PRESSURE: 13 MMHG

## 2019-09-25 PROCEDURE — 93351 STRESS ECHO (CUPID ONLY): ICD-10-PCS | Mod: HCNC,S$GLB,, | Performed by: INTERNAL MEDICINE

## 2019-09-25 PROCEDURE — 93352 STRESS ECHO (CUPID ONLY): ICD-10-PCS | Mod: HCNC,S$GLB,, | Performed by: INTERNAL MEDICINE

## 2019-09-25 PROCEDURE — 93352 ADMIN ECG CONTRAST AGENT: CPT | Mod: HCNC,S$GLB,, | Performed by: INTERNAL MEDICINE

## 2019-09-25 PROCEDURE — 93351 STRESS TTE COMPLETE: CPT | Mod: HCNC,S$GLB,, | Performed by: INTERNAL MEDICINE

## 2019-09-25 NOTE — PROGRESS NOTES
Procedure explained. 22 g sl started in left forearm for optison use. optison given ivp via sl for imaging  Pre and post ex2d. Denies transfusion rxn. Tolerated well. Sl d/milka after. Pressure applied.

## 2019-09-25 NOTE — TELEPHONE ENCOUNTER
----- Message from Dorcas Holley MD sent at 9/25/2019  1:35 PM CDT -----  plz let pt. Know that echo (ultrasound of the heart) is normal. There is no evidence of hole in the heart

## 2019-09-25 NOTE — PROGRESS NOTES
plz let pt. Know that echo (ultrasound of the heart) is normal. There is no evidence of hole in the heart

## 2019-09-26 ENCOUNTER — IMMUNIZATION (OUTPATIENT)
Dept: PHARMACY | Facility: CLINIC | Age: 34
End: 2019-09-26
Payer: MEDICARE

## 2019-09-27 ENCOUNTER — TELEPHONE (OUTPATIENT)
Dept: CARDIOLOGY | Facility: CLINIC | Age: 34
End: 2019-09-27

## 2019-09-27 ENCOUNTER — TELEPHONE (OUTPATIENT)
Dept: PLASTIC SURGERY | Facility: CLINIC | Age: 34
End: 2019-09-27

## 2019-09-27 NOTE — TELEPHONE ENCOUNTER
Left message for pt to call about paperwork; it has not been received by the office and would like to verify with the pt where paperwork was left.

## 2019-09-30 ENCOUNTER — TELEPHONE (OUTPATIENT)
Dept: CARDIOLOGY | Facility: CLINIC | Age: 34
End: 2019-09-30

## 2019-09-30 LAB
OHS CV EVENT MONITOR DAY: 0
OHS CV HOLTER LENGTH DECIMAL HOURS: 48
OHS CV HOLTER LENGTH HOURS: 48
OHS CV HOLTER LENGTH MINUTES: 0

## 2019-10-01 ENCOUNTER — TELEPHONE (OUTPATIENT)
Dept: OBSTETRICS AND GYNECOLOGY | Facility: CLINIC | Age: 34
End: 2019-10-01

## 2019-10-01 NOTE — TELEPHONE ENCOUNTER
RN left voicemail for patient to discuss further treatment of her hot flashes. RN left return contact information. VANDANA Chandler

## 2019-10-02 DIAGNOSIS — N95.2 VAGINAL ATROPHY: ICD-10-CM

## 2019-10-04 ENCOUNTER — PATIENT OUTREACH (OUTPATIENT)
Dept: ADMINISTRATIVE | Facility: OTHER | Age: 34
End: 2019-10-04

## 2019-10-07 ENCOUNTER — OFFICE VISIT (OUTPATIENT)
Dept: OTOLARYNGOLOGY | Facility: CLINIC | Age: 34
End: 2019-10-07
Payer: MEDICARE

## 2019-10-07 DIAGNOSIS — J01.91 ACUTE RECURRENT SINUSITIS, UNSPECIFIED LOCATION: Primary | ICD-10-CM

## 2019-10-07 DIAGNOSIS — J34.89 SINUS PAIN: ICD-10-CM

## 2019-10-07 PROCEDURE — 31231 NASAL/SINUS ENDOSCOPY: ICD-10-PCS | Mod: HCNC,S$GLB,, | Performed by: NURSE PRACTITIONER

## 2019-10-07 PROCEDURE — 99213 OFFICE O/P EST LOW 20 MIN: CPT | Mod: 25,HCNC,S$GLB, | Performed by: NURSE PRACTITIONER

## 2019-10-07 PROCEDURE — 99999 PR PBB SHADOW E&M-EST. PATIENT-LVL II: CPT | Mod: PBBFAC,HCNC,, | Performed by: NURSE PRACTITIONER

## 2019-10-07 PROCEDURE — 99999 PR PBB SHADOW E&M-EST. PATIENT-LVL II: ICD-10-PCS | Mod: PBBFAC,HCNC,, | Performed by: NURSE PRACTITIONER

## 2019-10-07 PROCEDURE — 31231 NASAL ENDOSCOPY DX: CPT | Mod: HCNC,S$GLB,, | Performed by: NURSE PRACTITIONER

## 2019-10-07 PROCEDURE — 99213 PR OFFICE/OUTPT VISIT, EST, LEVL III, 20-29 MIN: ICD-10-PCS | Mod: 25,HCNC,S$GLB, | Performed by: NURSE PRACTITIONER

## 2019-10-07 RX ORDER — AMOXICILLIN AND CLAVULANATE POTASSIUM 875; 125 MG/1; MG/1
1 TABLET, FILM COATED ORAL 2 TIMES DAILY
Qty: 20 TABLET | Refills: 0 | Status: SHIPPED | OUTPATIENT
Start: 2019-10-07 | End: 2019-10-17

## 2019-10-07 NOTE — PROCEDURES
"Nasal/sinus endoscopy  Date/Time: 10/7/2019 2:00 PM    Time out: Immediately prior to procedure a "time out" was called to verify the correct patient, procedure, equipment, support staff and site/side marked as required.  Performed by: Lyubov Goldsmith NP  Authorized by: Lyubov Goldsmith NP     Consent Done?:  Yes (Verbal)  Anesthesia:     Local anesthetic:  Lidocaine 4% and French-Synephrine 1/2%    Type of Endoscope:  Flexible    Patient tolerance:  Patient tolerated the procedure well with no immediate complications  Nose:     Procedure Performed:  Nasal Endoscopy  External:      No external nasal deformity  Intranasal:      Mucosa no polyps     Mucosa ulcers not present     No mucosa lesions present     Turbinates not enlarged     No septum gross deformity  Nasopharynx:      No mucosa lesions     Adenoids not present     Posterior choanae not patent     Eustachian tube not patent     Slight purlent drainage noted in left middle meatus.  No polyps noted.   Mucosal edema noted.   Examined with scope # [id number]      "

## 2019-10-07 NOTE — PROGRESS NOTES
Subjective:      Maria Ines is a 34 y.o. female who comes for follow-up of sinusitis.  Her last visit with me was on 8/12/2019.  Ms. Ac reports continued yellow nasal drainage and sinus pressure. She denies fever.  She denies any improvement after completing antibiotics.       The patient's medications, allergies, past medical, surgical, social and family histories were reviewed and updated as appropriate.    A detailed review of systems was obtained with pertinent positives as per the above HPI, and otherwise negative.        Objective:     LMP 11/17/2012 (LMP Unknown)        Constitutional:   She is oriented to person, place, and time. Vital signs are normal. She appears well-developed and well-nourished. She appears alert.     Head:  Normocephalic and atraumatic.     Ears:    Right Ear: No lacerations. No drainage, swelling or tenderness. No foreign bodies. No mastoid tenderness. Tympanic membrane is not injected, not scarred, not perforated, not erythematous, not retracted and not bulging. Tympanic membrane mobility is normal. No middle ear effusion. No hemotympanum.   Left Ear: No lacerations. No drainage, swelling or tenderness. No foreign bodies. No mastoid tenderness. Tympanic membrane is not injected, not scarred, not perforated, not erythematous, not retracted and not bulging. Tympanic membrane mobility is normal.  No middle ear effusion. No hemotympanum.     Nose:  Mucosal edema present. No rhinorrhea, nose lacerations, sinus tenderness, septal deviation, nasal septal hematoma or polyps. No epistaxis.  No foreign bodies. No turbinate hypertrophy.  Right sinus exhibits maxillary sinus tenderness. Right sinus exhibits no frontal sinus tenderness. Left sinus exhibits maxillary sinus tenderness. Left sinus exhibits no frontal sinus tenderness.     Mouth/Throat  Oropharynx clear and moist without lesions or asymmetry, normal uvula midline and lips, teeth, and gums normal. No uvula swelling, oral lesions,  trismus, mucous membrane lesions or xerostomia. No oropharyngeal exudate, posterior oropharyngeal edema or posterior oropharyngeal erythema.     Neck:  Neck normal without thyromegaly masses, asymmetry, normal tracheal structure, crepitus, and tenderness and no adenopathy.     Neurological:   She is alert and oriented to person, place, and time. No cranial nerve deficit.     Skin:   No abrasions, lacerations, lesions, or rashes.       Procedure    Nasal endoscopy performed.  See procedure note.      Data Reviewed    WBC (K/uL)   Date Value   09/17/2019 7.58     Eosinophil% (%)   Date Value   09/17/2019 3.0     Eos # (K/uL)   Date Value   09/17/2019 0.2     Platelets (K/uL)   Date Value   09/17/2019 244     Glucose (mg/dL)   Date Value   09/17/2019 87     No results found for: IGE    No sinus imaging available.      Assessment:     1. Acute recurrent sinusitis, unspecified location         Plan:   Maria Ines was seen today for sinus problem.    Diagnoses and all orders for this visit:    Acute recurrent sinusitis, unspecified location  -     amoxicillin-clavulanate 875-125mg (AUGMENTIN) 875-125 mg per tablet; Take 1 tablet by mouth 2 (two) times daily. for 10 days    - I recommend nasal saline rinse with distilled water 2 times daily.  -If not improve, will considering sinus imaging.      Follow up if symptoms worsen or fail to improve.

## 2019-10-14 ENCOUNTER — TELEPHONE (OUTPATIENT)
Dept: OBSTETRICS AND GYNECOLOGY | Facility: CLINIC | Age: 34
End: 2019-10-14

## 2019-10-14 NOTE — TELEPHONE ENCOUNTER
RN confirmed Testosterone injection for 10/17/19 at 2pm per patient's availability. RN will re-discuss medication counseling at patient's appt for clearer understanding. VANDANA Chandler.

## 2019-10-14 NOTE — TELEPHONE ENCOUNTER
----- Message from Ronni Ferguson sent at 10/14/2019  4:20 PM CDT -----  Contact: PAUL ARTHUR [[id number]]   Name of Who is Calling:     What is the request in detail: patient request call back in reference to appointment Please contact to further discuss and advise      Can the clinic reply by [facility]:     [facility]  [phone]

## 2019-10-22 ENCOUNTER — PATIENT OUTREACH (OUTPATIENT)
Dept: ADMINISTRATIVE | Facility: OTHER | Age: 34
End: 2019-10-22

## 2019-10-24 ENCOUNTER — TELEPHONE (OUTPATIENT)
Dept: CARDIOLOGY | Facility: CLINIC | Age: 34
End: 2019-10-24

## 2019-10-24 ENCOUNTER — OFFICE VISIT (OUTPATIENT)
Dept: CARDIOLOGY | Facility: CLINIC | Age: 34
End: 2019-10-24
Payer: MEDICARE

## 2019-10-24 VITALS
WEIGHT: 208.5 LBS | BODY MASS INDEX: 38.37 KG/M2 | DIASTOLIC BLOOD PRESSURE: 75 MMHG | HEIGHT: 62 IN | HEART RATE: 93 BPM | SYSTOLIC BLOOD PRESSURE: 104 MMHG

## 2019-10-24 DIAGNOSIS — I10 ESSENTIAL HYPERTENSION: ICD-10-CM

## 2019-10-24 DIAGNOSIS — E11.8 TYPE 2 DIABETES MELLITUS WITH COMPLICATION, WITHOUT LONG-TERM CURRENT USE OF INSULIN: ICD-10-CM

## 2019-10-24 DIAGNOSIS — R00.0 SINUS TACHYCARDIA: ICD-10-CM

## 2019-10-24 DIAGNOSIS — E66.01 SEVERE OBESITY (BMI 35.0-39.9) WITH COMORBIDITY: ICD-10-CM

## 2019-10-24 DIAGNOSIS — I49.3 FREQUENT PVCS: ICD-10-CM

## 2019-10-24 DIAGNOSIS — R07.9 CHEST PAIN, UNSPECIFIED TYPE: Primary | ICD-10-CM

## 2019-10-24 PROCEDURE — 3074F PR MOST RECENT SYSTOLIC BLOOD PRESSURE < 130 MM HG: ICD-10-PCS | Mod: HCNC,CPTII,S$GLB, | Performed by: INTERNAL MEDICINE

## 2019-10-24 PROCEDURE — 3074F SYST BP LT 130 MM HG: CPT | Mod: HCNC,CPTII,S$GLB, | Performed by: INTERNAL MEDICINE

## 2019-10-24 PROCEDURE — 3044F HG A1C LEVEL LT 7.0%: CPT | Mod: HCNC,CPTII,S$GLB, | Performed by: INTERNAL MEDICINE

## 2019-10-24 PROCEDURE — 3078F PR MOST RECENT DIASTOLIC BLOOD PRESSURE < 80 MM HG: ICD-10-PCS | Mod: HCNC,CPTII,S$GLB, | Performed by: INTERNAL MEDICINE

## 2019-10-24 PROCEDURE — 3078F DIAST BP <80 MM HG: CPT | Mod: HCNC,CPTII,S$GLB, | Performed by: INTERNAL MEDICINE

## 2019-10-24 PROCEDURE — 3044F PR MOST RECENT HEMOGLOBIN A1C LEVEL <7.0%: ICD-10-PCS | Mod: HCNC,CPTII,S$GLB, | Performed by: INTERNAL MEDICINE

## 2019-10-24 PROCEDURE — 99214 PR OFFICE/OUTPT VISIT, EST, LEVL IV, 30-39 MIN: ICD-10-PCS | Mod: HCNC,S$GLB,, | Performed by: INTERNAL MEDICINE

## 2019-10-24 PROCEDURE — 99499 UNLISTED E&M SERVICE: CPT | Mod: HCNC,S$GLB,, | Performed by: INTERNAL MEDICINE

## 2019-10-24 PROCEDURE — 99214 OFFICE O/P EST MOD 30 MIN: CPT | Mod: HCNC,S$GLB,, | Performed by: INTERNAL MEDICINE

## 2019-10-24 PROCEDURE — 3008F BODY MASS INDEX DOCD: CPT | Mod: HCNC,CPTII,S$GLB, | Performed by: INTERNAL MEDICINE

## 2019-10-24 PROCEDURE — 3008F PR BODY MASS INDEX (BMI) DOCUMENTED: ICD-10-PCS | Mod: HCNC,CPTII,S$GLB, | Performed by: INTERNAL MEDICINE

## 2019-10-24 PROCEDURE — 99499 RISK ADDL DX/OHS AUDIT: ICD-10-PCS | Mod: HCNC,S$GLB,, | Performed by: INTERNAL MEDICINE

## 2019-10-24 RX ORDER — TRIAMCINOLONE ACETONIDE 1 MG/G
CREAM TOPICAL
Refills: 2 | COMMUNITY
Start: 2019-10-21 | End: 2021-01-29

## 2019-10-24 RX ORDER — FLUCONAZOLE 150 MG/1
TABLET ORAL
Refills: 1 | COMMUNITY
Start: 2019-10-21 | End: 2020-05-12

## 2019-10-28 NOTE — PROGRESS NOTES
Subjective:   Patient ID:  Maria Ines Ac is a 34 y.o. female who presents for follow-up of Results and Palpitations    Maria Ines Ac is a 34 y.o. female is a new patient who presents for evaluation of Chest Pain; Palpitations; Shortness of Breath; and Surgery Clearance     HPI:   Pre op evaluation for breast reduction. Dr. Brian  [place] a murmur.  DM, Morbid obesity.   Patient would get SOB on going up a flight of stair.   No chest pain, Orthopnea, PND of heart failure symptoms.   c/o palpitations or fluttering in the chest       HPI:   Return follow up to discuss her holter patient still c/o significant palpitations on and off  On holter there is frequent PVCs and sinus tachycardia    There were very frequent PVCs totalling 6794 and averaging 141.54 per hour. The ventricular arrhythmias percentage was 2.4. There were 1 couplets.    No chest pain, Orthopnea, PND of heart failure symptoms.     Echo:    · Concentric left ventricular remodeling.  · Normal left ventricular systolic function. The estimated ejection fraction is 60%  · Grade I (mild) left ventricular diastolic dysfunction consistent with impaired relaxation. Normal left atrial pressure.  · Normal right ventricular systolic function.  · Normal central venous pressure (3 mm Hg).  · Patient's study rereviewed 9/25/2019- bubble study was performed and there was no evidence of right to left shunt    Patient Active Problem List   Diagnosis    Abdominal pain, RLQ (right lower quadrant)    Dysphagia    Diabetes mellitus, type II    Constipation, chronic    Right hip pain    New daily persistent headache    Hypertension    Palpitations    Chest pain, non-cardiac    Dyspnea on exertion    Severe obesity (BMI 35.0-39.9) with comorbidity    Muscle spasm    Gastroesophageal reflux disease without esophagitis    Irritable bowel syndrome with diarrhea    Migrainous vertigo    Uncontrolled morning headache    Sleep arousal disorder     "Hyperlipidemia    GERD (gastroesophageal reflux disease)    Family history of breast cancer    Family hx of ovarian malignancy    Weakness    Painful cervical ROM    Incomplete emptying of bladder    Bilateral occipital neuralgia     /75   Pulse 93   Ht 5' 2" (1.575 m)   Wt 94.6 kg (208 lb 8 oz)   LMP 11/17/2012 (LMP Unknown)   BMI 38.14 kg/m²   Body mass index is 38.14 kg/m².  CrCl cannot be calculated (Patient's most recent lab result is older than the maximum 7 days allowed.).    Lab Results   Component Value Date     09/17/2019    K 4.4 09/17/2019     09/17/2019    CO2 24 09/17/2019    BUN 13 09/17/2019    CREATININE 1.2 09/17/2019    GLU 87 09/17/2019    HGBA1C 5.1 09/17/2019    AST 20 09/17/2019    ALT 19 09/17/2019    ALBUMIN 3.9 09/17/2019    PROT 7.9 09/17/2019    BILITOT 0.2 09/17/2019    WBC 7.58 09/17/2019    HGB 13.2 09/17/2019    HCT 40.7 09/17/2019    MCV 89 09/17/2019     09/17/2019    INR 0.9 01/05/2017    TSH 2.181 07/02/2019    CHOL 156 01/17/2019    HDL 37 (L) 01/17/2019    LDLCALC 78.8 01/17/2019    TRIG 201 (H) 01/17/2019       Current Outpatient Medications   Medication Sig    ACCU-CHEK AMAURY PLUS TEST STRP Strp USE TO TEST BLOOD GLUCOSE TID    ACCU-CHEK SOFTCLIX LANCETS Misc USE TO TEST BLOOD GLUCOSE TID    atorvastatin (LIPITOR) 40 MG tablet Take 40 mg by mouth once daily.    BLOOD SUGAR DIAGNOSTIC (ACCU-CHEK AMAURY PLUS TEST STRP MISC) 1 strip by Misc.(Non-Drug; Combo Route) route 3 (three) times daily.    calcium-vitamin D3 500 mg(1,250mg) -200 unit per tablet Take 1 tablet by mouth 2 (two) times daily with meals.    ciclopirox (PENLAC) 8 % Soln Apply topically nightly.    conjugated estrogens (PREMARIN) vaginal cream USE 0.5 GRAM IN VAGINA NIGHTLY FOR 2 WEEKS, THEN USE TWICE A WEEK    dicyclomine (BENTYL) 10 MG capsule TAKE 2 CAPSULES(20 MG) BY MOUTH THREE TIMES DAILY BEFORE MEALS    docusate sodium (COLACE) 100 MG capsule Take 1 capsule (100 " mg total) by mouth 2 (two) times daily.    estradiol (ESTRACE) 2 MG tablet Take 1 tablet (2 mg total) by mouth once daily.    FARXIGA 10 mg Tab TK 1 T PO QD    fish oil-omega-3 fatty acids 300-1,000 mg capsule Take 2 g by mouth once daily.    fluconazole (DIFLUCAN) 150 MG Tab TK 1 T PO TID A WEEK    fluticasone propionate (FLONASE) 50 mcg/actuation nasal spray SHAKE LIQUID AND USE 2 SPRAYS(100 MCG) IN EACH NOSTRIL EVERY DAY    gabapentin (NEURONTIN) 300 MG capsule Take 300 mg (1 tablet) twice during the day, then two tablets (600 mg) at night before bed.    GLUCOPHAGE 500 mg tablet Take 500 mg by mouth 2 (two) times daily with meals.     ibuprofen (ADVIL,MOTRIN) 800 MG tablet 800 mg every 4 (four) hours as needed.     levothyroxine (SYNTHROID) 50 MCG tablet TK 1 T PO QAM    magnesium oxide (MAG-OX) 400 mg (241.3 mg magnesium) tablet Take 1 tablet (400 mg total) by mouth 2 (two) times daily.    MULTIVIT-IRON-MIN-FOLIC ACID 3,500-18-0.4 UNIT-MG-MG ORAL CHEW Take 1 tablet by mouth once daily.     omeprazole (PRILOSEC OTC) 20 MG tablet Take 20 mg by mouth once daily.    pantoprazole (PROTONIX) 40 MG tablet TAKE 1 TABLET BY MOUTH EVERY DAY    phentermine (ADIPEX-P) 37.5 MG capsule Take 37.5 mg by mouth every morning.     polyethylene glycol (GLYCOLAX) 17 gram/dose powder Mix 1 capful (17 g) with liquid and take by mouth once daily.    spironolactone (ALDACTONE) 25 MG tablet TK 1 T PO HS    topiramate (TOPAMAX) 50 MG tablet Take 2 tablets (100 mg total) by mouth every evening.    traZODone (DESYREL) 50 MG tablet TK 1 T PO QHS    triamcinolone acetonide 0.1% (KENALOG) 0.1 % cream MIX WITH LAMISIL CREAM IN AQUAPHOR TO REACH 4OZ  AND APPLY ONCE DAILY    TRULICITY 1.5 mg/0.5 mL PnIj INJECT 1 PEN INTO THE SKIN Q WEEK    ziprasidone (GEODON) 80 MG capsule     ZOLOFT 100 mg tablet Take 200 mg by mouth once daily.     metoprolol tartrate (LOPRESSOR) 50 MG Tab Take 1 tablet (50 mg total) by mouth 2 (two)  times daily.     No current facility-administered medications for this visit.        Review of Systems   Constitution: Negative for chills, decreased appetite, malaise/fatigue, night sweats, weight gain and weight loss.   Eyes: Negative for blurred vision, double vision, visual disturbance and visual halos.   Cardiovascular: Negative for chest pain, claudication, cyanosis, dyspnea on exertion, irregular heartbeat, leg swelling, near-syncope, orthopnea, palpitations, paroxysmal nocturnal dyspnea and syncope.   Respiratory: Negative for cough, hemoptysis, snoring, sputum production and wheezing.    Endocrine: Negative for cold intolerance, heat intolerance, polydipsia and polyphagia.   Hematologic/Lymphatic: Negative for adenopathy and bleeding problem. Does not bruise/bleed easily.   Skin: Negative for flushing, itching, poor wound healing and rash.   Musculoskeletal: Negative for arthritis, back pain, falls, gout, joint pain, joint swelling, muscle cramps, muscle weakness, myalgias, neck pain and stiffness.   Gastrointestinal: Negative for bloating, abdominal pain, anorexia, diarrhea, dysphagia, excessive appetite, flatus, hematemesis, jaundice, melena and nausea.   Genitourinary: Negative for hesitancy and incomplete emptying.   Neurological: Negative for aphonia, brief paralysis, difficulty with concentration, disturbances in coordination, excessive daytime sleepiness, dizziness, focal weakness, light-headedness, loss of balance and weakness.   Psychiatric/Behavioral: Negative for altered mental status, depression, hallucinations, hypervigilance, memory loss, substance abuse and suicidal ideas. The patient does not have insomnia and is not nervous/anxious.        Objective:   Physical Exam   Constitutional: She is oriented to person, place, and time. She appears well-developed and well-nourished. No distress.   HENT:   Head: Normocephalic and atraumatic.   Nose: Nose normal.   Mouth/Throat: Oropharynx is clear and  moist. No oropharyngeal exudate.   Eyes: Pupils are equal, round, and reactive to light. Conjunctivae and EOM are normal. Right eye exhibits no discharge. Left eye exhibits no discharge. No scleral icterus.   Neck: Normal range of motion. Neck supple. No JVD present. No tracheal deviation present. No thyromegaly present.   Cardiovascular: Normal rate, regular rhythm, normal heart sounds and intact distal pulses. Exam reveals no gallop and no friction rub.   No murmur heard.  Pulmonary/Chest: Effort normal and breath sounds normal. No stridor. No respiratory distress. She has no wheezes. She has no rales. She exhibits no tenderness.   Abdominal: Soft. Bowel sounds are normal. She exhibits no distension and no mass. There is no tenderness. There is no rebound and no guarding.   Musculoskeletal: Normal range of motion. She exhibits no edema or tenderness.   Lymphadenopathy:     She has no cervical adenopathy.   Neurological: She is alert and oriented to person, place, and time. She has normal reflexes. No cranial nerve deficit. She exhibits normal muscle tone. Coordination normal.   Skin: Skin is warm. No rash noted. She is not diaphoretic. No erythema. No pallor.   Psychiatric: She has a normal mood and affect. Her behavior is normal. Judgment and thought content normal.       Assessment:     1. Chest pain, unspecified type    2. Type 2 diabetes mellitus with complication, without long-term current use of insulin    3. Severe obesity (BMI 35.0-39.9) with comorbidity    4. Essential hypertension    5. Frequent PVCs    6. Sinus tachycardia        Plan:   Adipax may be contributing to her sx increase MTP and change it to bid dosing    Maria Ines was seen today for results and palpitations.    Diagnoses and all orders for this visit:    Chest pain, unspecified type    Type 2 diabetes mellitus with complication, without long-term current use of insulin    Severe obesity (BMI 35.0-39.9) with comorbidity    Essential  hypertension    Frequent PVCs    Sinus tachycardia    Other orders  -     metoprolol tartrate (LOPRESSOR) 50 MG Tab; Take 1 tablet (50 mg total) by mouth 2 (two) times daily.      RTC 10 wks

## 2019-10-31 ENCOUNTER — PATIENT OUTREACH (OUTPATIENT)
Dept: ADMINISTRATIVE | Facility: OTHER | Age: 34
End: 2019-10-31

## 2019-11-03 ENCOUNTER — HOSPITAL ENCOUNTER (EMERGENCY)
Facility: HOSPITAL | Age: 34
Discharge: HOME | End: 2019-11-03
Attending: EMERGENCY MEDICINE
Payer: MEDICARE

## 2019-11-03 VITALS
BODY MASS INDEX: 37.73 KG/M2 | RESPIRATION RATE: 16 BRPM | HEIGHT: 62 IN | WEIGHT: 205 LBS | TEMPERATURE: 98 F | OXYGEN SATURATION: 97 % | DIASTOLIC BLOOD PRESSURE: 70 MMHG | HEART RATE: 85 BPM | SYSTOLIC BLOOD PRESSURE: 115 MMHG

## 2019-11-03 DIAGNOSIS — R07.9 CHEST PAIN: Primary | ICD-10-CM

## 2019-11-03 DIAGNOSIS — R00.2 PALPITATION: ICD-10-CM

## 2019-11-03 DIAGNOSIS — N95.2 VAGINAL ATROPHY: ICD-10-CM

## 2019-11-03 LAB
ALBUMIN SERPL BCP-MCNC: 3.9 G/DL (ref 3.5–5.2)
ALP SERPL-CCNC: 98 U/L (ref 55–135)
ALT SERPL W/O P-5'-P-CCNC: 38 U/L (ref 10–44)
ANION GAP SERPL CALC-SCNC: 11 MMOL/L (ref 8–16)
AST SERPL-CCNC: 40 U/L (ref 10–40)
B-HCG UR QL: NEGATIVE
BASOPHILS # BLD AUTO: 0.05 K/UL (ref 0–0.2)
BASOPHILS NFR BLD: 0.6 % (ref 0–1.9)
BILIRUB SERPL-MCNC: 0.2 MG/DL (ref 0.1–1)
BUN SERPL-MCNC: 17 MG/DL (ref 6–20)
CALCIUM SERPL-MCNC: 9.9 MG/DL (ref 8.7–10.5)
CHLORIDE SERPL-SCNC: 103 MMOL/L (ref 95–110)
CO2 SERPL-SCNC: 19 MMOL/L (ref 23–29)
CREAT SERPL-MCNC: 1.1 MG/DL (ref 0.5–1.4)
CTP QC/QA: YES
DIFFERENTIAL METHOD: ABNORMAL
EOSINOPHIL # BLD AUTO: 0.2 K/UL (ref 0–0.5)
EOSINOPHIL NFR BLD: 1.9 % (ref 0–8)
ERYTHROCYTE [DISTWIDTH] IN BLOOD BY AUTOMATED COUNT: 13.3 % (ref 11.5–14.5)
EST. GFR  (AFRICAN AMERICAN): >60 ML/MIN/1.73 M^2
EST. GFR  (NON AFRICAN AMERICAN): >60 ML/MIN/1.73 M^2
GLUCOSE SERPL-MCNC: 95 MG/DL (ref 70–110)
HCT VFR BLD AUTO: 45.7 % (ref 37–48.5)
HGB BLD-MCNC: 14.2 G/DL (ref 12–16)
IMM GRANULOCYTES # BLD AUTO: 0.03 K/UL (ref 0–0.04)
IMM GRANULOCYTES NFR BLD AUTO: 0.4 % (ref 0–0.5)
LYMPHOCYTES # BLD AUTO: 2.6 K/UL (ref 1–4.8)
LYMPHOCYTES NFR BLD: 31.4 % (ref 18–48)
MCH RBC QN AUTO: 27.6 PG (ref 27–31)
MCHC RBC AUTO-ENTMCNC: 31.1 G/DL (ref 32–36)
MCV RBC AUTO: 89 FL (ref 82–98)
MONOCYTES # BLD AUTO: 0.6 K/UL (ref 0.3–1)
MONOCYTES NFR BLD: 6.8 % (ref 4–15)
NEUTROPHILS # BLD AUTO: 4.8 K/UL (ref 1.8–7.7)
NEUTROPHILS NFR BLD: 58.9 % (ref 38–73)
NRBC BLD-RTO: 0 /100 WBC
PLATELET # BLD AUTO: 294 K/UL (ref 150–350)
PMV BLD AUTO: 11.2 FL (ref 9.2–12.9)
POTASSIUM SERPL-SCNC: 3.8 MMOL/L (ref 3.5–5.1)
PROT SERPL-MCNC: 8.1 G/DL (ref 6–8.4)
RBC # BLD AUTO: 5.14 M/UL (ref 4–5.4)
SODIUM SERPL-SCNC: 133 MMOL/L (ref 136–145)
TROPONIN I SERPL DL<=0.01 NG/ML-MCNC: <0.006 NG/ML (ref 0–0.03)
WBC # BLD AUTO: 8.22 K/UL (ref 3.9–12.7)

## 2019-11-03 PROCEDURE — 93010 EKG 12-LEAD: ICD-10-PCS | Mod: HCNC,,, | Performed by: INTERNAL MEDICINE

## 2019-11-03 PROCEDURE — 85025 COMPLETE CBC W/AUTO DIFF WBC: CPT | Mod: HCNC

## 2019-11-03 PROCEDURE — 84484 ASSAY OF TROPONIN QUANT: CPT | Mod: HCNC

## 2019-11-03 PROCEDURE — 80053 COMPREHEN METABOLIC PANEL: CPT | Mod: HCNC

## 2019-11-03 PROCEDURE — 93005 ELECTROCARDIOGRAM TRACING: CPT | Mod: HCNC

## 2019-11-03 PROCEDURE — 99284 EMERGENCY DEPT VISIT MOD MDM: CPT | Mod: HCNC,,, | Performed by: PHYSICIAN ASSISTANT

## 2019-11-03 PROCEDURE — 99284 PR EMERGENCY DEPT VISIT,LEVEL IV: ICD-10-PCS | Mod: HCNC,,, | Performed by: PHYSICIAN ASSISTANT

## 2019-11-03 PROCEDURE — 99285 EMERGENCY DEPT VISIT HI MDM: CPT | Mod: 25,HCNC

## 2019-11-03 PROCEDURE — 93010 ELECTROCARDIOGRAM REPORT: CPT | Mod: HCNC,,, | Performed by: INTERNAL MEDICINE

## 2019-11-03 PROCEDURE — 81025 URINE PREGNANCY TEST: CPT | Mod: HCNC | Performed by: PHYSICIAN ASSISTANT

## 2019-11-03 PROCEDURE — 25000003 PHARM REV CODE 250: Mod: HCNC | Performed by: PHYSICIAN ASSISTANT

## 2019-11-03 RX ORDER — MAG HYDROX/ALUMINUM HYD/SIMETH 200-200-20
30 SUSPENSION, ORAL (FINAL DOSE FORM) ORAL
Status: COMPLETED | OUTPATIENT
Start: 2019-11-03 | End: 2019-11-03

## 2019-11-03 RX ADMIN — ALUMINUM HYDROXIDE, MAGNESIUM HYDROXIDE, AND SIMETHICONE 30 ML: 200; 200; 20 SUSPENSION ORAL at 07:11

## 2019-11-04 ENCOUNTER — OFFICE VISIT (OUTPATIENT)
Dept: NEUROLOGY | Facility: CLINIC | Age: 34
End: 2019-11-04
Payer: MEDICARE

## 2019-11-04 VITALS
SYSTOLIC BLOOD PRESSURE: 108 MMHG | HEART RATE: 98 BPM | HEIGHT: 62 IN | DIASTOLIC BLOOD PRESSURE: 73 MMHG | BODY MASS INDEX: 37.73 KG/M2 | WEIGHT: 205 LBS

## 2019-11-04 DIAGNOSIS — M54.81 BILATERAL OCCIPITAL NEURALGIA: Primary | ICD-10-CM

## 2019-11-04 DIAGNOSIS — G43.109 MIGRAINOUS VERTIGO: ICD-10-CM

## 2019-11-04 DIAGNOSIS — G47.8 SLEEP AROUSAL DISORDER: ICD-10-CM

## 2019-11-04 DIAGNOSIS — G43.719 INTRACTABLE CHRONIC MIGRAINE WITHOUT AURA AND WITHOUT STATUS MIGRAINOSUS: ICD-10-CM

## 2019-11-04 PROCEDURE — 99999 PR PBB SHADOW E&M-EST. PATIENT-LVL V: CPT | Mod: PBBFAC,HCNC,GC, | Performed by: STUDENT IN AN ORGANIZED HEALTH CARE EDUCATION/TRAINING PROGRAM

## 2019-11-04 PROCEDURE — 3008F PR BODY MASS INDEX (BMI) DOCUMENTED: ICD-10-PCS | Mod: HCNC,CPTII,GC,S$GLB | Performed by: STUDENT IN AN ORGANIZED HEALTH CARE EDUCATION/TRAINING PROGRAM

## 2019-11-04 PROCEDURE — 3074F PR MOST RECENT SYSTOLIC BLOOD PRESSURE < 130 MM HG: ICD-10-PCS | Mod: HCNC,CPTII,GC,S$GLB | Performed by: STUDENT IN AN ORGANIZED HEALTH CARE EDUCATION/TRAINING PROGRAM

## 2019-11-04 PROCEDURE — 99213 PR OFFICE/OUTPT VISIT, EST, LEVL III, 20-29 MIN: ICD-10-PCS | Mod: HCNC,GC,S$GLB, | Performed by: STUDENT IN AN ORGANIZED HEALTH CARE EDUCATION/TRAINING PROGRAM

## 2019-11-04 PROCEDURE — 3074F SYST BP LT 130 MM HG: CPT | Mod: HCNC,CPTII,GC,S$GLB | Performed by: STUDENT IN AN ORGANIZED HEALTH CARE EDUCATION/TRAINING PROGRAM

## 2019-11-04 PROCEDURE — 3008F BODY MASS INDEX DOCD: CPT | Mod: HCNC,CPTII,GC,S$GLB | Performed by: STUDENT IN AN ORGANIZED HEALTH CARE EDUCATION/TRAINING PROGRAM

## 2019-11-04 PROCEDURE — 3078F PR MOST RECENT DIASTOLIC BLOOD PRESSURE < 80 MM HG: ICD-10-PCS | Mod: HCNC,CPTII,GC,S$GLB | Performed by: STUDENT IN AN ORGANIZED HEALTH CARE EDUCATION/TRAINING PROGRAM

## 2019-11-04 PROCEDURE — 3078F DIAST BP <80 MM HG: CPT | Mod: HCNC,CPTII,GC,S$GLB | Performed by: STUDENT IN AN ORGANIZED HEALTH CARE EDUCATION/TRAINING PROGRAM

## 2019-11-04 PROCEDURE — 99999 PR PBB SHADOW E&M-EST. PATIENT-LVL V: ICD-10-PCS | Mod: PBBFAC,HCNC,GC, | Performed by: STUDENT IN AN ORGANIZED HEALTH CARE EDUCATION/TRAINING PROGRAM

## 2019-11-04 PROCEDURE — 99213 OFFICE O/P EST LOW 20 MIN: CPT | Mod: HCNC,GC,S$GLB, | Performed by: STUDENT IN AN ORGANIZED HEALTH CARE EDUCATION/TRAINING PROGRAM

## 2019-11-04 RX ORDER — GABAPENTIN 300 MG/1
CAPSULE ORAL
Qty: 150 CAPSULE | Refills: 11 | Status: SHIPPED | OUTPATIENT
Start: 2019-11-04 | End: 2020-12-07 | Stop reason: SDUPTHER

## 2019-11-04 NOTE — ASSESSMENT & PLAN NOTE
-Continue magnesium, topiramate  -Increasing gabapentin to 300 mg bid with 900 mg qHS dose to help with sleep

## 2019-11-04 NOTE — ED TRIAGE NOTES
Maria Ines Ac, a 34 y.o. female presents to the ED w/ complaint of chest pain and palpations since this today    Triage note:  Chief Complaint   Patient presents with    Chest Pain     Review of patient's allergies indicates:  No Known Allergies  Past Medical History:   Diagnosis Date    Blood in stool     Constipation     Cystitis     Depression     Diabetes mellitus, type 2     Dysphagia     Endometriosis     GERD (gastroesophageal reflux disease)     Headache     Hypertension     Palpitations     Premature menopause on hormone replacement therapy     Thyroid disease

## 2019-11-04 NOTE — ASSESSMENT & PLAN NOTE
-Pt has tried topiramate, gabapentin, magnesium, anti-hypertensives  -Steroids contraindicated to break migraine cycle 2/2 DM   -Did not tolerate bilateral occipital nerve blocks well on last visit  -Plan to increase gabapentin to 300 mg bid with 900 mg qHS to help with sleep  -Patient to continue good water intake, exercise.  Will cut down on caffeine, NSAID use.  -Would be an excellent candidate for Botox, will work on approval and plan for Botox at next visit.

## 2019-11-04 NOTE — ED PROVIDER NOTES
Encounter Date: 11/3/2019       History     Chief Complaint   Patient presents with    Chest Pain     Ms Ac is a 34yoF who presents for chest pain and palpitations; pertinent PMHx dm 2, GERD, HTN, palpitations, h/o cholecystectomy, hysterectomy.  Patient sources chest pain upon waking up this morning that has been constant throughout the day.  Pain is central substernal with occasional radiation to right shoulder.  Lying flat makes the pain worse.  No alleviation with position changes.  Associated with mild shortness of breath that is worse on exertion.  Patient reports she has had this pain every day for several months.  No differing quality to today's pain compared to prior episodes.  Palpitations are described as fast heartbeat, unchanged throughout the day.  Of note, she sees cardiology for palpitations.  Holter monitor showed frequent PVCs.  Echo stress test was unremarkable for ischemia.  Her metoprolol was increased last week.  Does not report alleviation of the symptoms with medication increase.  Denies fever, chills, cough, headache, vision change, dizziness, weakness, increased belching, nausea.  The patients available PMH, PSH, Social History, medications, allergies, and triage vital signs were reviewed just prior to their medical evaluation.  A ten point review of systems was completed and is negative except as documented above.  Patient denies any other acute medical complaint.    Please be advised this text was dictated with KeyLemon software and may contain errors due to translation.           Review of patient's allergies indicates:  No Known Allergies  Past Medical History:   Diagnosis Date    Blood in stool     Constipation     Cystitis     Depression     Diabetes mellitus, type 2     Dysphagia     Endometriosis     GERD (gastroesophageal reflux disease)     Headache     Hypertension     Palpitations     Premature menopause on hormone replacement therapy     Thyroid disease       Past Surgical History:   Procedure Laterality Date    BLADDER SUSPENSION      CARPAL TUNNEL RELEASE      right    CHOLECYSTECTOMY      COLONOSCOPY N/A 2016    Procedure: COLONOSCOPY;  Surgeon: Slick Herron MD;  Location: [facility] ENDO (4TH F[facility]);  Service: Endoscopy;  Laterality: N/A;  PM prep    ESOPHAGEAL MANOMETRY WITH MEASUREMENT OF IMPEDANCE N/A 2018    Procedure: MANOMETRY, ESOPHAGUS, WITH IMPEDANCE MEASUREMENT;  Surgeon: Slick Herron MD;  Location: [facility] ENDO (4TH F[facility]);  Service: Endoscopy;  Laterality: N/A;    FLUOROSCOPIC URODYNAMIC STUDY N/A 2019    Procedure: URODYNAMIC STUDY, FLUOROSCOPIC;  Surgeon: Zena Tee MD;  Location: [facility] OR 1ST FLR;  Service: Urology;  Laterality: N/A;  1 hour    GALLBLADDER SURGERY      HYSTERECTOMY      Bess velasquez Dr. Champlain    IMPLANTATION OF PERMANENT SACRAL NERVE STIMULATOR N/A 2019    Procedure: INSERTION, NEUROSTIMULATOR, PERMANENT, SACRAL;  Surgeon: Zena Tee MD;  Location: [facility] OR 2ND FLR;  Service: Urology;  Laterality: N/A;  30 min    OOPHORECTOMY      LSO- benign cyst    OOPHORECTOMY      RSO- endo    ooptherectomy      right knee  scoped    SHOULDER SURGERY  right     Family History   Problem Relation Age of Onset    Breast cancer Mother 50        alive at 64, unilateral    Esophageal cancer Mother 63    Ovarian cancer Mother 63    Anesthesia problems Mother     Cervical cancer Maternal Grandmother         unknown age of onset,  at 80    Colon cancer Brother 40    Breast cancer Paternal Aunt         unknown age of onset, alive at 70    Breast cancer Maternal Aunt 72        alive at 72    Vaginal cancer Neg Hx     Endometrial cancer Neg Hx     Crohn's disease Neg Hx     Ulcerative colitis Neg Hx     Stomach cancer Neg Hx     Irritable bowel syndrome Neg Hx     Celiac disease Neg Hx      Social History     Tobacco Use    Smoking status: Never Smoker    Smokeless  tobacco: Never Used   Substance Use Topics    Alcohol use: No    Drug use: No     Review of Systems   Constitutional: Negative for chills and fever.   HENT: Negative for sore throat and trouble swallowing.    Eyes: Negative for visual disturbance.   Respiratory: Positive for shortness of breath. Negative for cough, chest tightness and wheezing.    Cardiovascular: Positive for chest pain and palpitations. Negative for leg swelling.   Gastrointestinal: Negative for abdominal pain, nausea and vomiting.   Musculoskeletal: Negative for back pain and neck pain.   Skin: Negative for pallor and rash.   Neurological: Negative for dizziness, weakness, light-headedness, numbness and headaches.   Psychiatric/Behavioral: Negative for confusion.       Physical Exam     Initial Vitals [11/03/19 1813]   BP Pulse Resp Temp SpO2   115/70 85 16 97.5 °F (36.4 °C) 97 %      MAP       --         Physical Exam    Vitals reviewed.  Constitutional: She appears well-developed and well-nourished. She is not diaphoretic. No distress.   Well-appearing female in NAD, VSS, afebrile, 97% on RA.     HENT:   Head: Normocephalic and atraumatic.   Right Ear: External ear normal.   Left Ear: External ear normal.   Nose: Nose normal.   Mouth/Throat: Oropharynx is clear and moist. No oropharyngeal exudate.   Eyes: Conjunctivae and EOM are normal. Pupils are equal, round, and reactive to light. No scleral icterus.   Neck: Normal range of motion.   Cardiovascular: Normal rate, regular rhythm and intact distal pulses.   Pulmonary/Chest: Breath sounds normal. No respiratory distress. She has no wheezes. She has no rhonchi. She has no rales. She exhibits tenderness (Reproducible sternal tenderness on exam).   Abdominal: Soft. Bowel sounds are normal. There is no tenderness.   Musculoskeletal: Normal range of motion. She exhibits no edema.   Neurological: She is alert and oriented to person, place, and time. She has normal strength. No cranial nerve deficit  or sensory deficit.   Skin: Skin is warm and dry. Capillary refill takes less than 2 seconds. No rash noted. No erythema. No pallor.   Equal radial pulses bilaterally   Psychiatric: She has a normal mood and affect. Her behavior is normal. Judgment and thought content normal.         ED Course   Procedures  Labs Reviewed   CBC W/ AUTO DIFFERENTIAL - Abnormal; Notable for the following components:       Result Value    Mean Corpuscular Hemoglobin Conc 31.1 (*)     All other components within normal limits   COMPREHENSIVE METABOLIC PANEL - Abnormal; Notable for the following components:    Sodium 133 (*)     CO2 19 (*)     All other components within normal limits   TROPONIN I   POCT URINE PREGNANCY          Imaging Results          X-Ray Chest PA And Lateral (Final result)  Result time 11/03/19 19:36:12    Final result by Jason Canada MD (11/03/19 19:36:12)                 Impression:      1. Mildly prominent central hilar interstitial attenuation noting subtle peribronchial thickening.  Finding may reflect early viral airways process or reactive airways process/bronchitis.  Correlation recommended.      Electronically signed by: Jason Canada MD  Date:    11/03/2019  Time:    19:36             Narrative:    EXAMINATION:  XR CHEST PA AND LATERAL    CLINICAL HISTORY:  Chest pain, unspecified    TECHNIQUE:  PA and lateral views of the chest were performed.    COMPARISON:  06/30/2018    FINDINGS:  The cardiomediastinal silhouette is prominent, magnified by technique, stable..  There is no pleural effusion.  The trachea is midline.  The lungs are symmetrically expanded bilaterally with mildly prominent central hilar interstitial attenuation.  No large focal consolidation seen.  There is no pneumothorax.  The osseous structures are unremarkable.                                 Medical Decision Making:   History:   Old Medical Records: I decided to obtain old medical records.  Old Records Summarized: records from  clinic visits and records from previous admission(s).  Initial Assessment:   Patient presents for continued substernal chest pain that is reproducible on exam, constant throughout the day, associated with SOB.  Per patient, She experiences this every day.  No change in today's presentation compared to other days.  VSS, afebrile  Differential Diagnosis:   DDx includes GERD/dyspepsia, musculoskeletal chest pain, costochondritis. Physical exam and history taking lower clinical suspicion for PNA, pericarditis, ACS, aortic dissection.   Independently Interpreted Test(s):   I have ordered and independently interpreted X-rays - see prior notes.  I have ordered and independently interpreted EKG Reading(s) - see prior notes  Clinical Tests:   Lab Tests: Ordered and Reviewed  Radiological Study: Ordered and Reviewed  Medical Tests: Ordered and Reviewed  ED Management:  EKG continues to show a right bundle branch block, normal intervals, no acute ST changes.  Labs are largely unremarkable, troponin WNL.  Given time frame of symptoms, screening troponin appropriate.  Chest x-ray without acute findings, no consolidation on our read.  Noted central hilar interstitial attenuation per Radiology.  Patient has no infectious symptoms, LTAB.  Given Maalox for symptoms, patient reports symptoms continued without improvement, no worsening or change in quality of chronic symptoms. At this time, I do not suspect emergent etiology of symptoms given time frame in recent cardiologic workup.  S symptoms seem reproducible, recommended Tylenol for musculoskeletal discomfort.  Patient should follow up with Cardiology and primary provider as symptoms have continued. Patient agreed to plan of care and voiced understanding. Discharged in stable condition with strict ED return precautions.    Florencia Diamond PA-C  11/04/2019    I discussed the following case, diagnosis and plan of care with attending physician.                Attending Attestation:      Physician Attestation Statement for NP/PA:   I discussed this assessment and plan of this patient with the NP/PA, but I did not personally examine the patient. The face to face encounter was performed by the NP/PA.                     Clinical Impression:       ICD-10-CM ICD-9-CM   1. Chest pain R07.9 786.50   2. Palpitation R00.2 785.1         Disposition:   Disposition: Discharged  Condition: Stable                        Florencia Diamond PA-C  11/04/19 0026       Alana Pierson MD  11/06/19 0965

## 2019-11-04 NOTE — PROGRESS NOTES
NEUROLOGY OUTPATIENT CLINIC NOTE    Patient Name:  Maria Ines Ac  Patient MRN:  [id number]    Interval History 11/04/19:  Patient is accompanied by her mother today.  She is still having daily headaches.  Drinks plenty of water, exercises during the day.  Does state that she has cut down to taking Aleve only once daily which is an improvement.  Still drinking 4 Cokes per day, difficulty cutting down on this.  Not sleeping well recently either, has been seeing psychiatry to help her with coping with depression since her father's death.  States that the occipital nerve block made her headache much worse for a day or two then she had headaches for about a week after that of 8/10 pain.  Her headaches now are daily and improve for about an hour with Aleve but return and persist for up to 8 hours a day.  Notes the typical headache now is left-sided, retro-orbital and with shooting pain throughout the left side of the head.  Associated with nausea, photophobia, phonophobia.  Discussed increase in gabapentin with starting approval process for Botox.    HPI--from initial visit 10/18/18:  Patient is a very pleasant 32 y.o. female with PMHx of HTN, DM II with gastroparesis, obesity, and hypothyroidism who presents to [facility] 10/21/2017 for headaches.  Patient states that she has had headaches since about 03/2017 but they have gottne much worse over past 3-4 months.  She says they start in the middle of her forehead and radiate throughout the head to the occiput.  She describes the pain as sharp.  They are relieved by Aleve after about an hour but will recur, sometimes up to 4 times per day.  She notes associated n/v and rhinorrhea with headaches. She also notes dizziness associated with the headaches.  This is described as room spinning and is associated with tinnitus but not with hearing loss, ear fullness.  Denies recent infections, does not have sensation of hearing her pulse in her ear or other intrinsic  "sounds. Denies photophobia, phonophobia, lacrimation, injection of the eyes with headaches.  Unable to describe any triggers.  States that she had headaches during adolescence as well but they were not this frequent and usually would just go away with OTC NSAIDs.  She is accompanied by her dad who assists with the history and he notes that she drinks several Coke Zero soft drinks during the day, stating "one every hour."  Patient acknowledges drinking several soft drinks per day.  She also does not sleep very well and gets maybe 4 hours of sleep per night with a lot of tossing and turning.  Her father notes cell phone and tablet use before bed.  She does do a lot of walking during the day for exercise which father confirms.  Patient has not had any medication changes recently and was prescribed topiramate back in March 2017 and has not been titrated up--still on 25 mg po qHS which does not seem to be preventing these headaches.    ROS:  General:  No fever, no chills, no fatigue  HEENT:  + headache, no changes in vision  Respiratory:  No cough, no SOB  Cardiovascular:  No chest pain, no palpitations  GI:  No abdominal pain, no n/v/c/d  :  No dysuria, no change in frequency, no hematuria  Skin:  No rashes, no pruritus, no wounds  Musculoskeletal:  No myalgias, no arthralgias  Hematologic:  No easy bruising or bleeding  Neuro:  No tremors, no focal weakness, no paresthesias  Psych:  No anxiety, no depression    PMHx:  Patient Active Problem List   Diagnosis    Abdominal pain, RLQ (right lower quadrant)    Dysphagia    Diabetes mellitus, type II    Constipation, chronic    Right hip pain    New daily persistent headache    Hypertension    Palpitations    Chest pain, non-cardiac    Dyspnea on exertion    Severe obesity (BMI 35.0-39.9) with comorbidity    Muscle spasm    Gastroesophageal reflux disease without esophagitis    Irritable bowel syndrome with diarrhea    Migrainous vertigo    Uncontrolled " morning headache    Sleep arousal disorder    Hyperlipidemia    GERD (gastroesophageal reflux disease)    Family history of breast cancer    Family hx of ovarian malignancy    Weakness    Painful cervical ROM    Incomplete emptying of bladder    Bilateral occipital neuralgia     PSHx:  Past Surgical History:   Procedure Laterality Date    BLADDER SUSPENSION      CARPAL TUNNEL RELEASE      right    CHOLECYSTECTOMY      COLONOSCOPY N/A 8/30/2016    Procedure: COLONOSCOPY;  Surgeon: Slick Herron MD;  Location: [facility] (4TH F[facility]);  Service: Endoscopy;  Laterality: N/A;  PM prep    ESOPHAGEAL MANOMETRY WITH MEASUREMENT OF IMPEDANCE N/A 11/5/2018    Procedure: MANOMETRY, ESOPHAGUS, WITH IMPEDANCE MEASUREMENT;  Surgeon: Slick Herron MD;  Location: [facility] ENDO (4TH F[facility]);  Service: Endoscopy;  Laterality: N/A;    FLUOROSCOPIC URODYNAMIC STUDY N/A 5/23/2019    Procedure: URODYNAMIC STUDY, FLUOROSCOPIC;  Surgeon: Zena Tee MD;  [facility];  Service: Urology;  Laterality: N/A;  1 hour    GALLBLADDER SURGERY      HYSTERECTOMY  2013    Bess velasquez Dr. Champlain    IMPLANTATION OF PERMANENT SACRAL NERVE STIMULATOR N/A 7/30/2019    Procedure: INSERTION, NEUROSTIMULATOR, PERMANENT, SACRAL;  Surgeon: Zena Tee MD;  Location: [facility] OR 2ND FLR;  Service: Urology;  Laterality: N/A;  30 min    OOPHORECTOMY  2005    LSO- benign cyst    OOPHORECTOMY  2013    RSO- endo    ooptherectomy      right knee  scoped    SHOULDER SURGERY  right     Medications:  Current Outpatient Medications on File Prior to Visit   Medication Sig Dispense Refill    ACCU-CHEK AMAURY PLUS TEST STRP Strp USE TO TEST BLOOD GLUCOSE TID  9    ACCU-CHEK SOFTCLIX LANCETS Misc USE TO TEST BLOOD GLUCOSE TID  3    atorvastatin (LIPITOR) 40 MG tablet Take 40 mg by mouth once daily.      BLOOD SUGAR DIAGNOSTIC (ACCU-CHEK AMAURY PLUS TEST STRP MISC) 1 strip by Misc.(Non-Drug; Combo Route) route 3 (three) times  daily.      calcium-vitamin D3 500 mg(1,250mg) -200 unit per tablet Take 1 tablet by mouth 2 (two) times daily with meals.      ciclopirox (PENLAC) 8 % Soln Apply topically nightly. 6.6 mL 11    conjugated estrogens (PREMARIN) vaginal cream USE 0.5 GRAM VAGINALLY EVERY NIGHT FOR 2 WEEKS THEN USE VAGINALLY 2 TIMES A WEEK 30 g 0    dicyclomine (BENTYL) 10 MG capsule TAKE 2 CAPSULES(20 MG) BY MOUTH THREE TIMES DAILY BEFORE MEALS 180 capsule 0    docusate sodium (COLACE) 100 MG capsule Take 1 capsule (100 mg total) by mouth 2 (two) times daily. 60 capsule 0    estradiol (ESTRACE) 2 MG tablet Take 1 tablet (2 mg total) by mouth once daily. 90 tablet 3    FARXIGA 10 mg Tab TK 1 T PO QD  7    fish oil-omega-3 fatty acids 300-1,000 mg capsule Take 2 g by mouth once daily.      fluconazole (DIFLUCAN) 150 MG Tab TK 1 T PO TID A WEEK  1    fluticasone propionate (FLONASE) 50 mcg/actuation nasal spray SHAKE LIQUID AND USE 2 SPRAYS(100 MCG) IN EACH NOSTRIL EVERY DAY 48 mL 1    GLUCOPHAGE 500 mg tablet Take 500 mg by mouth 2 (two) times daily with meals.       ibuprofen (ADVIL,MOTRIN) 800 MG tablet 800 mg every 4 (four) hours as needed.   0    levothyroxine (SYNTHROID) 50 MCG tablet TK 1 T PO QAM  8    magnesium oxide (MAG-OX) 400 mg (241.3 mg magnesium) tablet Take 1 tablet (400 mg total) by mouth 2 (two) times daily.  0    metoprolol tartrate (LOPRESSOR) 50 MG Tab Take 1 tablet (50 mg total) by mouth 2 (two) times daily. 120 tablet 3    MULTIVIT-IRON-MIN-FOLIC ACID 3,500-18-0.4 UNIT-MG-MG ORAL CHEW Take 1 tablet by mouth once daily.       omeprazole (PRILOSEC OTC) 20 MG tablet Take 20 mg by mouth once daily.      pantoprazole (PROTONIX) 40 MG tablet TAKE 1 TABLET BY MOUTH EVERY DAY 90 tablet 0    phentermine (ADIPEX-P) 37.5 MG capsule Take 37.5 mg by mouth every morning.       polyethylene glycol (GLYCOLAX) 17 gram/dose powder Mix 1 capful (17 g) with liquid and take by mouth once daily. 238 g 0     "spironolactone (ALDACTONE) 25 MG tablet TK 1 T PO HS  2    topiramate (TOPAMAX) 50 MG tablet Take 2 tablets (100 mg total) by mouth every evening. 60 tablet 11    traZODone (DESYREL) 50 MG tablet TK 1 T PO QHS  1    triamcinolone acetonide 0.1% (KENALOG) 0.1 % cream MIX WITH LAMISIL CREAM IN AQUAPHOR TO REACH 4OZ  AND APPLY ONCE DAILY  2    TRULICITY 1.5 mg/0.5 mL PnIj INJECT 1 PEN INTO THE SKIN Q WEEK  5    ziprasidone (GEODON) 80 MG capsule       ZOLOFT 100 mg tablet Take 200 mg by mouth once daily.       [DISCONTINUED] gabapentin (NEURONTIN) 300 MG capsule Take 300 mg (1 tablet) twice during the day, then two tablets (600 mg) at night before bed. 120 capsule 11     Current Facility-Administered Medications on File Prior to Visit   Medication Dose Route Frequency Provider Last Rate Last Dose    [COMPLETED] aluminum-magnesium hydroxide-simethicone 200-200-20 mg/5 mL suspension 30 mL  30 mL Oral ED 1 Time Florencia Diamond PA-C   30 mL at 11/03/19 1927     Allergies:  Review of patient's allergies indicates:  No Known Allergies    Social Hx:   Patient lives at home with her mom--dad has recently passed away of MI.  Some concern for intellectual disability, though no documentation in the chart.  She is not working, often plays video games and goes on walks at home during the day.  Her mother recently went through chemotherapy and has some residual health problems. Patient has never used tobacco, EtOH, or illicits.  She is doing exceptionally well with BG control and weight loss of a few pounds since initial visit.    Physical Exam:  /73   Pulse 98   Ht 5' 2" (1.575 m)   Wt 93 kg (205 lb)   LMP 11/17/2012 (LMP Unknown)   BMI 37.49 kg/m²   General:  Well-developed, well-nourished, nad  HEENT:  NCAT, PERRL, EOMI, oropharygneal membranes non-erythematous/without exudate  Neck:  Supple, normal ROM without nuchal rigidity.  Respiratory:  Symmetric expansion, no increased wob  CVS:  No LE edema. " Extremities warm, well-perfused.  GI:  Abd soft, non-distended  Skin:  No visible rashes or wounds  Psych:  Pleasant, cooperative with exam.  Speech and thought content appropriate.  Neurologic Exam:  Mental Status:  AAOx3.  Converses easily. Spells 'earth' correctly forward, backward with 1 error.  Recent recall 3/3, remote recall 2/3.  Cranial Nerves:  PERRLA, EOMI. Facial movement intact, symmetric. Tongue protrudes midline, palate raises symmetrically.  Trapezius 5/5 bilaterally.  Motor:  Normal muscle bulk and tone.  Strength 5/5 throughout.  No pronator drift.  Sensory:  Sensation intact to light touch at all extremities.  Vibratory sensation intact and symmetric at BUE/BLE digits.  Reflexes:  Reflexes 2+--biceps, brachioradialis, patellar.  No ankle clonus.  Coordination:  No resting tremor or myoclonus.  FTN, HTS, LYNN wnl--no ataxia, dysmetria, or dysdiadochokinesia.  Gait:  Appropriate gait, appropriate arm swing.  No shuffling, magnetic gait, imbalance, weakness, or foot drop noted.    Labs:  Results: CBC:   Lab Results   Component Value Date/Time    WBC 8.22 2019 07:06 PM    RBC 5.14 2019 07:06 PM    HGB 14.2 2019 07:06 PM    HCT 45.7 2019 07:06 PM     2019 07:06 PM    MCV 89 2019 07:06 PM    MCH 27.6 2019 07:06 PM    MCHC 31.1 (L) 2019 07:06 PM     CMP:   Lab Results   Component Value Date/Time    GLU 95 2019 07:06 PM    CALCIUM 9.9 2019 07:06 PM    ALBUMIN 3.9 2019 07:06 PM    PROT 8.1 2019 07:06 PM     (L) 2019 07:06 PM    K 3.8 2019 07:06 PM    CO2 19 (L) 2019 07:06 PM     2019 07:06 PM    BUN 17 2019 07:06 PM    CREATININE 1.1 2019 07:06 PM    ALKPHOS 98 2019 07:06 PM    ALT 38 2019 07:06 PM    AST 40 2019 07:06 PM    BILITOT 0.2 2019 07:06 PM     Imagin17 CT head w/o contrast:  No acute intracranial abnormality identified.    Additional  "Diagnotic Testing:  N/a    ASSESSMENT/PLAN:  Patient is a 34 y.o. female with a PMHx of HTN, DM II with gastroparesis, obesity, and hypothyroidism who presents to [facility] [place] [facility] 11/04/19 for follow up of headaches.    Problem List Items Addressed This Visit        1 - High    Intractable chronic migraine without aura and without status migrainosus    Overview     Migraine headache tried on topiramate, gabapentin, anti-hypertensives, NSAIDs.  Steroids and opioids contraindicated 2/2 DM with gastroparesis.  Headaches daily ( > 15 days per month) and occurring up to 8 hours per day, significantly disabling.         Current Assessment & Plan     -Pt has tried topiramate, gabapentin, magnesium, anti-hypertensives  -Steroids contraindicated to break migraine cycle 2/2 DM   -Did not tolerate bilateral occipital nerve blocks well on last visit  -Plan to increase gabapentin to 300 mg bid with 900 mg qHS to help with sleep  -Patient to continue good water intake, exercise.  Will cut down on caffeine, NSAID use.  -Would be an excellent candidate for Botox, will work on approval and plan for Botox at next visit.            2     Migrainous vertigo    Overview     Pt reports feeling "room spinning" during her headaches.         Current Assessment & Plan     -Continue magnesium, topiramate  -Increasing gabapentin to 300 mg bid with 900 mg qHS dose to help with sleep         Sleep arousal disorder    Overview     Polysomnography negative for ZAC as suspected initially, but concerning for sleep arousal disorder.         Current Assessment & Plan     -Will trial increase in nightly dose of gabapentin to help with sleep         Bilateral occipital neuralgia - Primary    Overview     New issue, noted August 2019         Relevant Medications    gabapentin (NEURONTIN) 300 MG capsule        Mena Lambert MD  Pager:  [phone] [place] [address]  [phone]  "

## 2019-11-04 NOTE — PATIENT INSTRUCTIONS
-Will get insurance approval for Botox, should be approved in a month or two  -Increase gabapentin to 300 mg twice during the day, but 900 mg (THREE TABLETS) at night to help with sleep  -Keep trying to cut down on caffeine slowly  -Keep trying to use no more than one Aleve or Advil type medication per day

## 2019-11-04 NOTE — DISCHARGE INSTRUCTIONS
Follow-up with Cardiology.  Return to the ER if you develop worsening pain, severe shortness of breath or fever.    Our goal in the emergency department is to always give you outstanding care and exceptional service. You may receive a survey by mail or e-mail in the next week regarding your experience in our ED. We would greatly appreciate your completing and returning the survey. Your feedback provides us with a way to recognize our staff who give very good care and it helps us learn how to improve when your experience was below our aspiration of excellence.

## 2019-11-06 DIAGNOSIS — G43.719 INTRACTABLE TRANSFORMED MIGRAINE WITHOUT AURA AND WITHOUT STATUS MIGRAINOSUS: Primary | ICD-10-CM

## 2019-11-08 ENCOUNTER — PATIENT OUTREACH (OUTPATIENT)
Dept: ADMINISTRATIVE | Facility: OTHER | Age: 34
End: 2019-11-08

## 2019-11-11 ENCOUNTER — OFFICE VISIT (OUTPATIENT)
Dept: URGENT CARE | Facility: CLINIC | Age: 34
End: 2019-11-11
Payer: MEDICARE

## 2019-11-11 VITALS
DIASTOLIC BLOOD PRESSURE: 80 MMHG | HEART RATE: 101 BPM | RESPIRATION RATE: 16 BRPM | TEMPERATURE: 99 F | BODY MASS INDEX: 37.73 KG/M2 | OXYGEN SATURATION: 97 % | SYSTOLIC BLOOD PRESSURE: 121 MMHG | WEIGHT: 205 LBS | HEIGHT: 62 IN

## 2019-11-11 DIAGNOSIS — J06.9 VIRAL URI WITH COUGH: Primary | ICD-10-CM

## 2019-11-11 PROCEDURE — 99214 OFFICE O/P EST MOD 30 MIN: CPT | Mod: S$GLB,,, | Performed by: NURSE PRACTITIONER

## 2019-11-11 PROCEDURE — 3074F PR MOST RECENT SYSTOLIC BLOOD PRESSURE < 130 MM HG: ICD-10-PCS | Mod: CPTII,S$GLB,, | Performed by: NURSE PRACTITIONER

## 2019-11-11 PROCEDURE — 3079F DIAST BP 80-89 MM HG: CPT | Mod: CPTII,S$GLB,, | Performed by: NURSE PRACTITIONER

## 2019-11-11 PROCEDURE — 3008F PR BODY MASS INDEX (BMI) DOCUMENTED: ICD-10-PCS | Mod: CPTII,S$GLB,, | Performed by: NURSE PRACTITIONER

## 2019-11-11 PROCEDURE — 3074F SYST BP LT 130 MM HG: CPT | Mod: CPTII,S$GLB,, | Performed by: NURSE PRACTITIONER

## 2019-11-11 PROCEDURE — 3008F BODY MASS INDEX DOCD: CPT | Mod: CPTII,S$GLB,, | Performed by: NURSE PRACTITIONER

## 2019-11-11 PROCEDURE — 99214 PR OFFICE/OUTPT VISIT, EST, LEVL IV, 30-39 MIN: ICD-10-PCS | Mod: S$GLB,,, | Performed by: NURSE PRACTITIONER

## 2019-11-11 PROCEDURE — 3079F PR MOST RECENT DIASTOLIC BLOOD PRESSURE 80-89 MM HG: ICD-10-PCS | Mod: CPTII,S$GLB,, | Performed by: NURSE PRACTITIONER

## 2019-11-11 RX ORDER — PROMETHAZINE HYDROCHLORIDE AND DEXTROMETHORPHAN HYDROBROMIDE 6.25; 15 MG/5ML; MG/5ML
5 SYRUP ORAL 4 TIMES DAILY PRN
Qty: 140 ML | Refills: 0 | Status: SHIPPED | OUTPATIENT
Start: 2019-11-11 | End: 2019-11-18

## 2019-11-11 RX ORDER — BENZONATATE 100 MG/1
100 CAPSULE ORAL 3 TIMES DAILY PRN
Qty: 20 CAPSULE | Refills: 0 | Status: SHIPPED | OUTPATIENT
Start: 2019-11-11 | End: 2019-11-21

## 2019-11-11 NOTE — PROGRESS NOTES
"Subjective:       Patient ID: Maria Ines Ac is a 34 y.o. female.    Vitals:  height is 5' 2" (1.575 m) and weight is 93 kg (205 lb). Her temperature is 98.5 °F (36.9 °C). Her blood pressure is 121/80 and her pulse is 101. Her respiration is 16 and oxygen saturation is 97%.     Chief Complaint: URI    Cough, congestion, post nasal drip, and headache that started on Saturday.     URI    This is a new problem. The current episode started in the past 7 days. The problem has been unchanged. There has been no fever. Associated symptoms include congestion, coughing, rhinorrhea and a sore throat. Pertinent negatives include no diarrhea, dysuria, ear pain, headaches, nausea, rash, sinus pain, swollen glands, vomiting or wheezing. Treatments tried: Nyquil, Dayquil  The treatment provided mild relief.       Constitution: Negative for chills, sweating, fatigue and fever.   HENT: Positive for congestion, postnasal drip and sore throat. Negative for ear pain, sinus pain, sinus pressure and voice change.    Neck: Negative for painful lymph nodes.   Eyes: Negative for eye redness.   Respiratory: Positive for cough. Negative for chest tightness, sputum production, stridor, wheezing and asthma.    Gastrointestinal: Negative for nausea, vomiting and diarrhea.   Genitourinary: Negative for dysuria.   Musculoskeletal: Negative for muscle ache.   Skin: Negative for rash.   Allergic/Immunologic: Negative for seasonal allergies and asthma.   Neurological: Negative for headaches.   Hematologic/Lymphatic: Negative for swollen lymph nodes.       Objective:      Physical Exam   Constitutional: She is oriented to person, place, and time. Vital signs are normal. She appears well-developed and well-nourished. She is cooperative.  Non-toxic appearance. She does not have a sickly appearance. She does not appear ill. No distress.   HENT:   Head: Normocephalic and atraumatic.   Right Ear: Hearing, tympanic membrane, external ear and ear " canal normal.   Left Ear: Hearing, tympanic membrane, external ear and ear canal normal.   Nose: Mucosal edema and rhinorrhea present. No nasal deformity. No epistaxis. Right sinus exhibits no maxillary sinus tenderness and no frontal sinus tenderness. Left sinus exhibits no maxillary sinus tenderness and no frontal sinus tenderness.   Mouth/Throat: Uvula is midline and mucous membranes are normal. No trismus in the jaw. Normal dentition. No uvula swelling. Posterior oropharyngeal erythema present. No oropharyngeal exudate or posterior oropharyngeal edema. Tonsils are 0 on the right. Tonsils are 0 on the left. No tonsillar exudate.   Eyes: Conjunctivae and lids are normal. No scleral icterus.   Neck: Trachea normal, full passive range of motion without pain and phonation normal. Neck supple. No neck rigidity. No edema and no erythema present.   Cardiovascular: Normal rate, regular rhythm, normal heart sounds, intact distal pulses and normal pulses.   Pulmonary/Chest: Effort normal and breath sounds normal. No respiratory distress. She has no decreased breath sounds. She has no wheezes.   Abdominal: Normal appearance and bowel sounds are normal. There is no tenderness.   Musculoskeletal: Normal range of motion. She exhibits no edema or deformity.   Neurological: She is alert and oriented to person, place, and time. No cranial nerve deficit or sensory deficit. She exhibits normal muscle tone. Coordination normal.   Skin: Skin is warm, dry, intact, not diaphoretic and not pale. Capillary refill takes less than 2 seconds.   Psychiatric: She has a normal mood and affect. Her speech is normal and behavior is normal. Judgment and thought content normal. Cognition and memory are normal.   Nursing note and vitals reviewed.        Assessment:       1. Viral URI with cough        Plan:         Viral URI with cough    Other orders  -     benzonatate (TESSALON) 100 MG capsule; Take 1 capsule (100 mg total) by mouth 3 (three)  times daily as needed for Cough.  Dispense: 20 capsule; Refill: 0  -     promethazine-dextromethorphan (PROMETHAZINE-DM) 6.25-15 mg/5 mL Syrp; Take 5 mLs by mouth 4 (four) times daily as needed (cough).  Dispense: 140 mL; Refill: 0      Patient Instructions       Drink plenty of fluids such as water/ GatorAide/Smart Water.  You can rotate Tylenol (1000mg) every 4hrs with Ibuprofen (600mg) every 6 hours if you have fever or body aches.     You can use Coricidin cough/cold medication on the shelf to help with your symptoms. Follow instructions on the label.     You must understand that you've received an Urgent Care treatment only and that you may be released before all your medical problems are known or treated. You, the patient, will arrange for follow up care as instructed.  If your condition worsens we recommend that you receive another evaluation at the emergency room immediately or contact your primary medical clinics after hours call service to discuss your concerns.  Please return here or go to the Emergency Department for any concerns or worsening of condition.    Viral Upper Respiratory Illness (Adult)  You have a viral upper respiratory illness (URI), which is another term for the common cold. This illness is contagious during the first few days. It is spread through the air by coughing and sneezing. It may also be spread by direct contact (touching the sick person and then touching your own eyes, nose, or mouth). Frequent handwashing will decrease risk of spread. Most viral illnesses go away within 7 to 10 days with rest and simple home remedies. Sometimes the illness may last for several weeks. Antibiotics will not kill a virus, and they are generally not prescribed for this condition.    Home care  · If symptoms are severe, rest at home for the first 2 to 3 days. When you resume activity, don't let yourself get too tired.  · Avoid being exposed to cigarette smoke (yours or others).  · You may use  acetaminophen or ibuprofen to control pain and fever, unless another medicine was prescribed. (Note: If you have chronic liver or kidney disease, have ever had a stomach ulcer or gastrointestinal bleeding, or are taking blood-thinning medicines, talk with your healthcare provider before using these medicines.) Aspirin should never be given to anyone under 18 years of age who is ill with a viral infection or fever. It may cause severe liver or brain damage.  · Your appetite may be poor, so a light diet is fine. Avoid dehydration by drinking 6 to 8 glasses of fluids per day (water, soft drinks, juices, tea, or soup). Extra fluids will help loosen secretions in the nose and lungs.  · Over-the-counter cold medicines will not shorten the length of time youre sick, but they may be helpful for the following symptoms: cough, sore throat, and nasal and sinus congestion. (Note: Do not use decongestants if you have high blood pressure.)  Follow-up care  Follow up with your healthcare provider, or as advised.  When to seek medical advice  Call your healthcare provider right away if any of these occur:  · Cough with lots of colored sputum (mucus)  · Severe headache; face, neck, or ear pain  · Difficulty swallowing due to throat pain  · Fever of 100.4°F (38°C)  Call 911, or get immediate medical care  Call emergency services right away if any of these occur:  · Chest pain, shortness of breath, wheezing, or difficulty breathing  · Coughing up blood  · Inability to swallow due to throat pain  Date Last Reviewed: 9/13/2015  © 9788-0233 [employer]. [address] [place] [address], [place] [address]. All rights reserved. This information is not intended as a substitute for professional medical care. Always follow your healthcare professional's instructions.

## 2019-11-11 NOTE — PATIENT INSTRUCTIONS
Drink plenty of fluids such as water/ GatorAide/Smart Water.  You can rotate Tylenol (1000mg) every 4hrs with Ibuprofen (600mg) every 6 hours if you have fever or body aches.     You can use Coricidin cough/cold medication on the shelf to help with your symptoms. Follow instructions on the label.     You must understand that you've received an Urgent Care treatment only and that you may be released before all your medical problems are known or treated. You, the patient, will arrange for follow up care as instructed.  If your condition worsens we recommend that you receive another evaluation at the emergency room immediately or contact your primary medical clinics after hours call service to discuss your concerns.  Please return here or go to the Emergency Department for any concerns or worsening of condition.    Viral Upper Respiratory Illness (Adult)  You have a viral upper respiratory illness (URI), which is another term for the common cold. This illness is contagious during the first few days. It is spread through the air by coughing and sneezing. It may also be spread by direct contact (touching the sick person and then touching your own eyes, nose, or mouth). Frequent handwashing will decrease risk of spread. Most viral illnesses go away within 7 to 10 days with rest and simple home remedies. Sometimes the illness may last for several weeks. Antibiotics will not kill a virus, and they are generally not prescribed for this condition.    Home care  · If symptoms are severe, rest at home for the first 2 to 3 days. When you resume activity, don't let yourself get too tired.  · Avoid being exposed to cigarette smoke (yours or others).  · You may use acetaminophen or ibuprofen to control pain and fever, unless another medicine was prescribed. (Note: If you have chronic liver or kidney disease, have ever had a stomach ulcer or gastrointestinal bleeding, or are taking blood-thinning medicines, talk with your  healthcare provider before using these medicines.) Aspirin should never be given to anyone under 18 years of age who is ill with a viral infection or fever. It may cause severe liver or brain damage.  · Your appetite may be poor, so a light diet is fine. Avoid dehydration by drinking 6 to 8 glasses of fluids per day (water, soft drinks, juices, tea, or soup). Extra fluids will help loosen secretions in the nose and lungs.  · Over-the-counter cold medicines will not shorten the length of time youre sick, but they may be helpful for the following symptoms: cough, sore throat, and nasal and sinus congestion. (Note: Do not use decongestants if you have high blood pressure.)  Follow-up care  Follow up with your healthcare provider, or as advised.  When to seek medical advice  Call your healthcare provider right away if any of these occur:  · Cough with lots of colored sputum (mucus)  · Severe headache; face, neck, or ear pain  · Difficulty swallowing due to throat pain  · Fever of 100.4°F (38°C)  Call 911, or get immediate medical care  Call emergency services right away if any of these occur:  · Chest pain, shortness of breath, wheezing, or difficulty breathing  · Coughing up blood  · Inability to swallow due to throat pain  Date Last Reviewed: 9/13/2015  © 0406-2153 [employer]. [address] [place] [address], [place] [address]. All rights reserved. This information is not intended as a substitute for professional medical care. Always follow your healthcare professional's instructions.

## 2019-11-14 ENCOUNTER — PATIENT MESSAGE (OUTPATIENT)
Dept: OBSTETRICS AND GYNECOLOGY | Facility: CLINIC | Age: 34
End: 2019-11-14

## 2019-11-16 ENCOUNTER — PATIENT OUTREACH (OUTPATIENT)
Dept: ADMINISTRATIVE | Facility: OTHER | Age: 34
End: 2019-11-16

## 2019-11-18 ENCOUNTER — OFFICE VISIT (OUTPATIENT)
Dept: INTERNAL MEDICINE | Facility: CLINIC | Age: 34
End: 2019-11-18
Payer: MEDICARE

## 2019-11-18 VITALS
DIASTOLIC BLOOD PRESSURE: 82 MMHG | HEIGHT: 62 IN | SYSTOLIC BLOOD PRESSURE: 116 MMHG | BODY MASS INDEX: 38.46 KG/M2 | HEART RATE: 98 BPM | WEIGHT: 209 LBS | TEMPERATURE: 98 F | OXYGEN SATURATION: 94 %

## 2019-11-18 DIAGNOSIS — J06.9 UPPER RESPIRATORY TRACT INFECTION, UNSPECIFIED TYPE: Primary | ICD-10-CM

## 2019-11-18 PROCEDURE — 3079F DIAST BP 80-89 MM HG: CPT | Mod: HCNC,CPTII,S$GLB, | Performed by: FAMILY MEDICINE

## 2019-11-18 PROCEDURE — 99214 PR OFFICE/OUTPT VISIT, EST, LEVL IV, 30-39 MIN: ICD-10-PCS | Mod: HCNC,S$GLB,, | Performed by: FAMILY MEDICINE

## 2019-11-18 PROCEDURE — 99999 PR PBB SHADOW E&M-EST. PATIENT-LVL III: CPT | Mod: PBBFAC,HCNC,, | Performed by: FAMILY MEDICINE

## 2019-11-18 PROCEDURE — 99999 PR PBB SHADOW E&M-EST. PATIENT-LVL III: ICD-10-PCS | Mod: PBBFAC,HCNC,, | Performed by: FAMILY MEDICINE

## 2019-11-18 PROCEDURE — 3008F PR BODY MASS INDEX (BMI) DOCUMENTED: ICD-10-PCS | Mod: HCNC,CPTII,S$GLB, | Performed by: FAMILY MEDICINE

## 2019-11-18 PROCEDURE — 99214 OFFICE O/P EST MOD 30 MIN: CPT | Mod: HCNC,S$GLB,, | Performed by: FAMILY MEDICINE

## 2019-11-18 PROCEDURE — 3079F PR MOST RECENT DIASTOLIC BLOOD PRESSURE 80-89 MM HG: ICD-10-PCS | Mod: HCNC,CPTII,S$GLB, | Performed by: FAMILY MEDICINE

## 2019-11-18 PROCEDURE — 3008F BODY MASS INDEX DOCD: CPT | Mod: HCNC,CPTII,S$GLB, | Performed by: FAMILY MEDICINE

## 2019-11-18 PROCEDURE — 3074F SYST BP LT 130 MM HG: CPT | Mod: HCNC,CPTII,S$GLB, | Performed by: FAMILY MEDICINE

## 2019-11-18 PROCEDURE — 3074F PR MOST RECENT SYSTOLIC BLOOD PRESSURE < 130 MM HG: ICD-10-PCS | Mod: HCNC,CPTII,S$GLB, | Performed by: FAMILY MEDICINE

## 2019-11-18 RX ORDER — DOXYCYCLINE 100 MG/1
100 CAPSULE ORAL EVERY 12 HOURS
Qty: 20 CAPSULE | Refills: 0 | Status: SHIPPED | OUTPATIENT
Start: 2019-11-18 | End: 2019-11-23

## 2019-11-18 RX ORDER — NEOMYCIN/POLYMYXIN B/HYDROCORT 3.5-10K-1
SUSPENSION, DROPS(FINAL DOSAGE FORM)(ML) OPHTHALMIC (EYE)
Refills: 0 | COMMUNITY
Start: 2019-10-30 | End: 2020-12-09

## 2019-11-18 RX ORDER — PHENTERMINE HYDROCHLORIDE 37.5 MG/1
TABLET ORAL
Refills: 3 | COMMUNITY
Start: 2019-10-30 | End: 2020-12-09

## 2019-11-18 RX ORDER — HYDROXYZINE HYDROCHLORIDE 10 MG/1
TABLET, FILM COATED ORAL
Refills: 3 | COMMUNITY
Start: 2019-11-11 | End: 2021-05-18

## 2019-11-18 RX ORDER — METOPROLOL SUCCINATE 25 MG/1
TABLET, EXTENDED RELEASE ORAL
COMMUNITY
Start: 2019-11-11 | End: 2020-01-06

## 2019-11-18 NOTE — PROGRESS NOTES
Subjective:       Patient ID: Maria Ines Ac is a 34 y.o. female.    Chief Complaint: Cough (x1 wk); Nasal Congestion (x1 wk); and Otalgia (x1 wk )    HPI    34yoF here with mother. C/o cough prod of mucus, congestion, ear pain x 1wk. No fever today and denies subj fever at home. Feels about the same today as has in the past week. Went to UC last week and dx viral uri with cough. Has tried nyquil, robitussin, benzonatate with minimal relief. Pt states that she was around her godson last week that had similar symptoms.     Review of Systems   Constitutional: Negative for appetite change, chills, fatigue and fever.   HENT: Positive for congestion, ear pain, postnasal drip, rhinorrhea and sore throat. Negative for sinus pressure, sinus pain, sneezing and trouble swallowing.    Eyes: Negative for discharge, redness and itching.   Respiratory: Positive for cough. Negative for shortness of breath and wheezing.    Gastrointestinal: Positive for diarrhea. Negative for nausea and vomiting.         Past Medical History:   Diagnosis Date    Blood in stool     Constipation     Cystitis     Depression     Diabetes mellitus, type 2     Dysphagia     Endometriosis     GERD (gastroesophageal reflux disease)     Headache     Hypertension     Palpitations     Premature menopause on hormone replacement therapy     Thyroid disease         Prior to Admission medications    Medication Sig Start Date End Date Taking? Authorizing Provider   ACCU-CHEK AMAURY PLUS TEST STRP Strp USE TO TEST BLOOD GLUCOSE TID 9/7/19  Yes Historical Provider, MD   ACCU-CHEK SOFTCLIX LANCETS Misc USE TO TEST BLOOD GLUCOSE TID 8/3/17  Yes Historical Provider, MD   atorvastatin (LIPITOR) 40 MG tablet Take 40 mg by mouth once daily.   Yes Historical Provider, MD   benzonatate (TESSALON) 100 MG capsule Take 1 capsule (100 mg total) by mouth 3 (three) times daily as needed for Cough. 11/11/19 11/21/19 Yes Eliazar De NP   BLOOD SUGAR DIAGNOSTIC  (ACCU-CHEK AMAURY PLUS TEST STRP MISC) 1 strip by Misc.(Non-Drug; Combo Route) route 3 (three) times daily.   Yes Historical Provider, MD   calcium-vitamin D3 500 mg(1,250mg) -200 unit per tablet Take 1 tablet by mouth 2 (two) times daily with meals.   Yes Historical Provider, MD   ciclopirox (PENLAC) 8 % Soln Apply topically nightly. 5/30/19  Yes Justen Solis DPM   conjugated estrogens (PREMARIN) vaginal cream USE 0.5 GRAM VAGINALLY EVERY NIGHT FOR 2 WEEKS THEN USE VAGINALLY 2 TIMES A WEEK 11/4/19  Yes Julianna Gomez NP   dicyclomine (BENTYL) 10 MG capsule TAKE 2 CAPSULES(20 MG) BY MOUTH THREE TIMES DAILY BEFORE MEALS 9/17/19  Yes April Fan NP   docusate sodium (COLACE) 100 MG capsule Take 1 capsule (100 mg total) by mouth 2 (two) times daily. 7/7/19  Yes Lucila Garcia PA-C   estradiol (ESTRACE) 2 MG tablet Take 1 tablet (2 mg total) by mouth once daily. 9/11/19  Yes Virginia Brian MD   FARXIGA 10 mg Tab TK 1 T PO QD 7/18/19  Yes Historical Provider, MD   fish oil-omega-3 fatty acids 300-1,000 mg capsule Take 2 g by mouth once daily.   Yes Historical Provider, MD   fluconazole (DIFLUCAN) 150 MG Tab TK 1 T PO TID A WEEK 10/21/19  Yes Historical Provider, MD   fluticasone propionate (FLONASE) 50 mcg/actuation nasal spray SHAKE LIQUID AND USE 2 SPRAYS(100 MCG) IN EACH NOSTRIL EVERY DAY 8/12/19  Yes Lyubov Goldsmith NP   gabapentin (NEURONTIN) 300 MG capsule Take 300 mg (1 tablet) twice during the day, then three tablets (900 mg) at night before bed. 11/4/19  Yes Mena Lambert MD   GLUCOPHAGE 500 mg tablet Take 500 mg by mouth 2 (two) times daily with meals.  6/23/16  Yes Historical Provider, MD   hydrOXYzine HCl (ATARAX) 10 MG Tab TK 1 T PO BID PRF ITCH 11/11/19  Yes Historical Provider, MD   ibuprofen (ADVIL,MOTRIN) 800 MG tablet 800 mg every 4 (four) hours as needed.  7/24/18  Yes Historical Provider, MD   levothyroxine (SYNTHROID) 50 MCG tablet TK 1 T PO QAM 12/20/16   Yes Historical Provider, MD   magnesium oxide (MAG-OX) 400 mg (241.3 mg magnesium) tablet Take 1 tablet (400 mg total) by mouth 2 (two) times daily. 4/10/19  Yes Mena Lambert MD   metoprolol succinate (TOPROL-XL) 25 MG 24 hr tablet  11/11/19  Yes Historical Provider, MD   metoprolol tartrate (LOPRESSOR) 50 MG Tab Take 1 tablet (50 mg total) by mouth 2 (two) times daily. 10/24/19 10/23/20 Yes Dorcas Holley MD   MULTIVIT-IRON-MIN-FOLIC ACID 3,500-18-0.4 UNIT-MG-MG ORAL CHEW Take 1 tablet by mouth once daily.    Yes Historical Provider, MD   neomycin-polymyxin-hydrocortisone (CORTISPORIN) 3.5-10,000-10 mg-unit-mg/mL ophthalmic suspension INTILL 1 DROP INTO AFFECTED EYE QID 10/30/19  Yes Historical Provider, MD   omeprazole (PRILOSEC OTC) 20 MG tablet Take 20 mg by mouth once daily.   Yes Historical Provider, MD   pantoprazole (PROTONIX) 40 MG tablet TAKE 1 TABLET BY MOUTH EVERY DAY 7/11/19  Yes Slick Herron MD   phentermine (ADIPEX-P) 37.5 MG capsule Take 37.5 mg by mouth every morning.    Yes Historical Provider, MD   phentermine (ADIPEX-P) 37.5 mg tablet TK 1 T PO  QAM 10/30/19  Yes Historical Provider, MD   polyethylene glycol (GLYCOLAX) 17 gram/dose powder Mix 1 capful (17 g) with liquid and take by mouth once daily. 7/30/19  Yes Gavin Leon MD   promethazine-dextromethorphan (PROMETHAZINE-DM) 6.25-15 mg/5 mL Syrp Take 5 mLs by mouth 4 (four) times daily as needed (cough). 11/11/19 11/18/19 Yes Eliazar De NP   spironolactone (ALDACTONE) 25 MG tablet TK 1 T PO HS 9/26/17  Yes Historical Provider, MD   topiramate (TOPAMAX) 50 MG tablet Take 2 tablets (100 mg total) by mouth every evening. 9/10/19 9/9/20 Yes Mena Lambert MD   traZODone (DESYREL) 50 MG tablet TK 1 T PO QHS 7/26/19  Yes Historical Provider, MD   triamcinolone acetonide 0.1% (KENALOG) 0.1 % cream MIX WITH LAMISIL CREAM IN AQUAPHOR TO REACH 4OZ  AND APPLY ONCE DAILY 10/21/19  Yes Historical Provider, MD  "  TRULICITY 1.5 mg/0.5 mL PnIj INJECT 1 PEN INTO THE SKIN Q WEEK 2/18/19  Yes Historical Provider, MD   ziprasidone (GEODON) 80 MG capsule  9/12/19  Yes Historical Provider, MD   ZOLOFT 100 mg tablet Take 200 mg by mouth once daily.  6/21/16  Yes Historical Provider, MD        Past medical history, surgical history, and family medical history reviewed and updated as appropriate.    Medications and allergies reviewed.     Objective:          Vitals:    11/18/19 1319   BP: 116/82   BP Location: Left arm   Patient Position: Sitting   BP Method: Medium (Manual)   Pulse: 98   Temp: 98 °F (36.7 °C)   SpO2: (!) 94%   Weight: 94.8 kg (209 lb)   Height: 5' 2" (1.575 m)     Body mass index is 38.23 kg/m².  Physical Exam   Constitutional: She appears well-developed and well-nourished.   HENT:   Head: Normocephalic and atraumatic.   Right Ear: Hearing, tympanic membrane, external ear and ear canal normal.   Left Ear: Hearing, tympanic membrane, external ear and ear canal normal.   Nose: Mucosal edema and rhinorrhea present. Right sinus exhibits no maxillary sinus tenderness and no frontal sinus tenderness. Left sinus exhibits no maxillary sinus tenderness and no frontal sinus tenderness.   Mouth/Throat: Uvula is midline. Posterior oropharyngeal erythema present. No oropharyngeal exudate or posterior oropharyngeal edema. No tonsillar exudate.   Eyes: Pupils are equal, round, and reactive to light. EOM are normal.   Cardiovascular: Normal rate, regular rhythm, normal heart sounds and intact distal pulses.   No murmur heard.  Pulmonary/Chest: Effort normal. No respiratory distress. She has no wheezes.   Congestion noted in upper lung fields     Vitals reviewed.      Lab Results   Component Value Date    WBC 8.22 11/03/2019    HGB 14.2 11/03/2019    HCT 45.7 11/03/2019     11/03/2019    CHOL 156 01/17/2019    TRIG 201 (H) 01/17/2019    HDL 37 (L) 01/17/2019    ALT 38 11/03/2019    AST 40 11/03/2019     (L) 11/03/2019 "    K 3.8 11/03/2019     11/03/2019    CREATININE 1.1 11/03/2019    BUN 17 11/03/2019    CO2 19 (L) 11/03/2019    TSH 2.181 07/02/2019    INR 0.9 01/05/2017    HGBA1C 5.1 09/17/2019       Assessment:       1. Upper respiratory tract infection, unspecified type        Plan:   Maria Ines was seen today for cough, nasal congestion and otalgia.    Diagnoses and all orders for this visit:    For mild cough and cold symptoms, over the counter medicines that help include:    Flonase nasal spray for congestion;  Robitussin DM for cough;  Ibuprofen or tylenol for feverishness or aches;  Allegra or Zyrtec for nasal discharge.  Please note some patients cannot take ibuprofen due to kidney disease, gastrointestinal problem or other drug interactions.    Warning signs include:  Worsening cough or shortness of breath;  High fever;  Lengthy time to recovery more than 5-7 days    Will try abx if above treatment do not work. Believe this to be viral in origin at this time. F/u as needed.    Upper respiratory tract infection, unspecified type  -     doxycycline (VIBRAMYCIN) 100 MG Cap; Take 1 capsule (100 mg total) by mouth every 12 (twelve) hours. for 5 days        Health maintenance reviewed with patient.     Follow up if symptoms worsen or fail to improve.    Keanu Ordonez MD  Family Medicine  [facility]  11/18/2019

## 2019-11-18 NOTE — PATIENT INSTRUCTIONS
For mild cough and cold symptoms, over the counter medicines that help include:    Flonase nasal spray for congestion;  Robitussin DM for cough;  Ibuprofen or tylenol for feverishness or aches;  Allegra or Zyrtec for nasal discharge.  Please note some patients cannot take ibuprofen due to kidney disease, gastrointestinal problem or other drug interactions.    Warning signs include:  Worsening cough or shortness of breath;  High fever;  Lengthy time to recovery more than 5-7 days    Schedule urgent care if not improving or for other concerns.    Thank you.

## 2019-11-19 ENCOUNTER — TELEPHONE (OUTPATIENT)
Dept: CARDIOLOGY | Facility: CLINIC | Age: 34
End: 2019-11-19

## 2019-11-19 ENCOUNTER — OFFICE VISIT (OUTPATIENT)
Dept: OTOLARYNGOLOGY | Facility: CLINIC | Age: 34
End: 2019-11-19
Payer: MEDICARE

## 2019-11-19 DIAGNOSIS — J01.90 ACUTE VIRAL SINUSITIS: Primary | ICD-10-CM

## 2019-11-19 DIAGNOSIS — B97.89 ACUTE VIRAL SINUSITIS: Primary | ICD-10-CM

## 2019-11-19 PROCEDURE — 99999 PR PBB SHADOW E&M-EST. PATIENT-LVL II: ICD-10-PCS | Mod: PBBFAC,HCNC,, | Performed by: NURSE PRACTITIONER

## 2019-11-19 PROCEDURE — 99213 PR OFFICE/OUTPT VISIT, EST, LEVL III, 20-29 MIN: ICD-10-PCS | Mod: HCNC,S$GLB,, | Performed by: NURSE PRACTITIONER

## 2019-11-19 PROCEDURE — 99999 PR PBB SHADOW E&M-EST. PATIENT-LVL II: CPT | Mod: PBBFAC,HCNC,, | Performed by: NURSE PRACTITIONER

## 2019-11-19 PROCEDURE — 99213 OFFICE O/P EST LOW 20 MIN: CPT | Mod: HCNC,S$GLB,, | Performed by: NURSE PRACTITIONER

## 2019-11-19 NOTE — PROGRESS NOTES
Subjective:      Maria Ines Ac is a 34 y.o. female who was self-referred for sinusitis.    Ms. Ac presents with complaint of sinusitis. She reports symptoms of nasal congestion, frontal headache, clear nasal drainage, and cough. She denies fever, sinus pain or fatigue. She has been using fluticasone and nasal saline rinse once daily. She states she believes she caught a cold from her nephew who she was caring for who had been sick.     Current sinonasal medications as above.  She does not regularly use nasal decongestant sprays.     She does not recall previously having allergy testing.     She denies a history of asthma.     She relates a history of reflux symptoms which is currently managed with omeprazole.  She has previously had an EGD.     She denies have a diagnosis of obstructive sleep apnea.      She has previously had sinonasal surgery consisting of balloon sinuplasty.     She does not recall a prior history of nasal trauma.    Past Medical History  She has a past medical history of Blood in stool, Constipation, Cystitis, Depression, Diabetes mellitus, type 2, Dysphagia, Endometriosis, GERD (gastroesophageal reflux disease), Headache, Hypertension, Palpitations, Premature menopause on hormone replacement therapy, and Thyroid disease.    Past Surgical History  She has a past surgical history that includes ooptherectomy; right knee (scoped); Gallbladder surgery; Shoulder surgery (right); Carpal tunnel release; Hysterectomy (2013); Oophorectomy (2005); Oophorectomy (2013); Colonoscopy (N/A, 8/30/2016); Bladder suspension; Cholecystectomy; Esophageal manometry with measurement of impedance (N/A, 11/5/2018); Fluoroscopic urodynamic study (N/A, 5/23/2019); and Implantation of permanent sacral nerve stimulator (N/A, 7/30/2019).    Family History  Her family history includes Anesthesia problems in her mother; Breast cancer in her paternal aunt; Breast cancer (age of onset: 50) in her mother; Breast  cancer (age of onset: 72) in her maternal aunt; Cervical cancer in her maternal grandmother; Colon cancer (age of onset: 40) in her brother; Esophageal cancer (age of onset: 63) in her mother; Ovarian cancer (age of onset: 63) in her mother.    Social History  She reports that she has never smoked. She has never used smokeless tobacco. She reports that she does not drink alcohol or use drugs.    Allergies  She has No Known Allergies.    Medications   She has a current medication list which includes the following prescription(s): accu-chek luisa plus test strp, accu-chek softclix lancets, atorvastatin, benzonatate, blood sugar diagnostic, calcium-vitamin d3, ciclopirox, conjugated estrogens, dicyclomine, docusate sodium, doxycycline, estradiol, farxiga, fish oil-omega-3 fatty acids, fluconazole, fluticasone propionate, gabapentin, glucophage, hydroxyzine hcl, ibuprofen, levothyroxine, magnesium oxide, metoprolol succinate, inv metoprolol tartrate, multivit-iron-min-folic acid, neomycin-polymyxin-hydrocortisone, omeprazole, pantoprazole, adipex-p, phentermine, polyethylene glycol, spironolactone, topiramate, trazodone, triamcinolone acetonide 0.1%, trulicity, ziprasidone, and zoloft, and the following Facility-Administered Medications: onabotulinumtoxina.    Review of Systems  Review of Systems   Constitutional: Negative for chills, fatigue and fever.   HENT: Positive for congestion, postnasal drip and rhinorrhea. Negative for facial swelling, nosebleeds, sinus pressure, sinus pain, sneezing, sore throat and tinnitus.    Eyes: Negative for photophobia, redness, itching and visual disturbance.   Respiratory: Positive for cough. Negative for apnea, shortness of breath, wheezing and stridor.    Cardiovascular: Negative for chest pain and palpitations.   Gastrointestinal: Negative for diarrhea, nausea and vomiting.   Endocrine: Negative.    Genitourinary: Negative for decreased urine volume, dysuria and frequency.    Musculoskeletal: Negative for arthralgias, myalgias and neck stiffness.   Skin: Negative for rash and wound.   Allergic/Immunologic: Negative for environmental allergies, food allergies and immunocompromised state.   Neurological: Positive for headaches. Negative for dizziness, syncope, weakness and light-headedness.   Hematological: Negative for adenopathy. Does not bruise/bleed easily.   Psychiatric/Behavioral: Negative for confusion, decreased concentration and sleep disturbance.          Objective:     LMP 11/17/2012 (LMP Unknown)        Constitutional:   She is oriented to person, place, and time. Vital signs are normal. She appears well-developed and well-nourished. She appears alert. Normal speech.      Head:  Normocephalic and atraumatic.     Ears:    Right Ear: No lacerations. No drainage, swelling or tenderness. No foreign bodies. No mastoid tenderness. Tympanic membrane is not injected, not scarred, not perforated, not erythematous, not retracted and not bulging. Tympanic membrane mobility is normal. No middle ear effusion. No hemotympanum.   Left Ear: No lacerations. No drainage, swelling or tenderness. No foreign bodies. No mastoid tenderness. Tympanic membrane is not injected, not scarred, not perforated, not erythematous, not retracted and not bulging. Tympanic membrane mobility is normal.  No middle ear effusion. No hemotympanum.     Nose:  Mucosal edema and rhinorrhea present. No nose lacerations, sinus tenderness, septal deviation, nasal septal hematoma or polyps. No epistaxis.  No foreign bodies. No turbinate hypertrophy.  Right sinus exhibits no maxillary sinus tenderness and no frontal sinus tenderness. Left sinus exhibits no maxillary sinus tenderness and no frontal sinus tenderness.     Mouth/Throat  Oropharynx clear and moist without lesions or asymmetry, normal uvula midline and lips, teeth, and gums normal. No uvula swelling, oral lesions, trismus, mucous membrane lesions or xerostomia.  No oropharyngeal exudate, posterior oropharyngeal edema or posterior oropharyngeal erythema.     Neck:  Neck normal without thyromegaly masses, asymmetry, normal tracheal structure, crepitus, and tenderness and no adenopathy.     Psychiatric:   She has a normal mood and affect. Her speech is normal and behavior is normal.     Neurological:   She is alert and oriented to person, place, and time. No cranial nerve deficit.     Skin:   No abrasions, lacerations, lesions, or rashes.       Procedure    None      Data Reviewed    WBC (K/uL)   Date Value   11/03/2019 8.22     Eosinophil% (%)   Date Value   11/03/2019 1.9     Eos # (K/uL)   Date Value   11/03/2019 0.2     Platelets (K/uL)   Date Value   11/03/2019 294     Glucose (mg/dL)   Date Value   11/03/2019 95     No results found for: IGE    No sinus imaging available.       Assessment:     1. Acute viral sinusitis         Plan:     I had a long discussion with the patient regarding her condition and the further workup and management options.    I recommend increase nasal saline rinse to at least two time daily with distilled water.  Continue fluticasone 2 sprays each nostril daily.    Follow up if symptoms worsen or fail to improve.

## 2019-11-19 NOTE — TELEPHONE ENCOUNTER
Spoke with the patient mother Ruthie and she stated the patient is at an appt. Will call the patient back later.

## 2019-11-20 ENCOUNTER — TELEPHONE (OUTPATIENT)
Dept: OBSTETRICS AND GYNECOLOGY | Facility: CLINIC | Age: 34
End: 2019-11-20

## 2019-11-20 NOTE — TELEPHONE ENCOUNTER
RN phoned patient to re-schedule injection, voicemail left requesting return call.  VANDANA Chandler.    ----- Message from Magalys Lowry MA sent at 11/20/2019  3:16 PM CST -----  Contact: PAUL ARTHUR [[id number]]      ----- Message -----  From: Quentin Clark Patient Care Assistant  Sent: 11/20/2019   3:00 PM CST  To: [facility] BELL Staff    Name of Who is Calling: PAUL ARTHUR [[id number]]    What is the request in detail:Requesting to reschedule injection. Please contact to further discuss and advise       Can the clinic reply by [facility]: No  What [facility]   [phone]

## 2019-11-20 NOTE — TELEPHONE ENCOUNTER
----- Message from Quentin Clark, Patient Care Assistant sent at 11/20/2019  3:00 PM CST -----  Contact: PAUL ARTHUR [[id number]]  Name of Who is Calling: PAUL ARTHUR [[id number]]    What is the request in detail:Requesting to reschedule injection. Please contact to further discuss and advise       Can the clinic reply by [facility]: No  What Number to Call Back if not in [facility]:   [phone]

## 2019-11-24 ENCOUNTER — PATIENT OUTREACH (OUTPATIENT)
Dept: ADMINISTRATIVE | Facility: OTHER | Age: 34
End: 2019-11-24

## 2019-12-01 ENCOUNTER — PATIENT OUTREACH (OUTPATIENT)
Dept: ADMINISTRATIVE | Facility: OTHER | Age: 34
End: 2019-12-01

## 2019-12-02 ENCOUNTER — OFFICE VISIT (OUTPATIENT)
Dept: GASTROENTEROLOGY | Facility: CLINIC | Age: 34
End: 2019-12-02
Payer: MEDICARE

## 2019-12-02 VITALS
WEIGHT: 207.25 LBS | HEIGHT: 62 IN | HEART RATE: 95 BPM | SYSTOLIC BLOOD PRESSURE: 114 MMHG | DIASTOLIC BLOOD PRESSURE: 79 MMHG | BODY MASS INDEX: 38.14 KG/M2

## 2019-12-02 DIAGNOSIS — K21.9 GASTROESOPHAGEAL REFLUX DISEASE WITHOUT ESOPHAGITIS: Primary | ICD-10-CM

## 2019-12-02 DIAGNOSIS — K58.1 IRRITABLE BOWEL SYNDROME WITH CONSTIPATION: ICD-10-CM

## 2019-12-02 DIAGNOSIS — R13.19 ESOPHAGEAL DYSPHAGIA: ICD-10-CM

## 2019-12-02 PROCEDURE — 3008F BODY MASS INDEX DOCD: CPT | Mod: HCNC,CPTII,S$GLB, | Performed by: INTERNAL MEDICINE

## 2019-12-02 PROCEDURE — 3078F DIAST BP <80 MM HG: CPT | Mod: HCNC,CPTII,S$GLB, | Performed by: INTERNAL MEDICINE

## 2019-12-02 PROCEDURE — 3008F PR BODY MASS INDEX (BMI) DOCUMENTED: ICD-10-PCS | Mod: HCNC,CPTII,S$GLB, | Performed by: INTERNAL MEDICINE

## 2019-12-02 PROCEDURE — 99214 PR OFFICE/OUTPT VISIT, EST, LEVL IV, 30-39 MIN: ICD-10-PCS | Mod: HCNC,S$GLB,, | Performed by: INTERNAL MEDICINE

## 2019-12-02 PROCEDURE — 99214 OFFICE O/P EST MOD 30 MIN: CPT | Mod: HCNC,S$GLB,, | Performed by: INTERNAL MEDICINE

## 2019-12-02 PROCEDURE — 3078F PR MOST RECENT DIASTOLIC BLOOD PRESSURE < 80 MM HG: ICD-10-PCS | Mod: HCNC,CPTII,S$GLB, | Performed by: INTERNAL MEDICINE

## 2019-12-02 PROCEDURE — 3074F PR MOST RECENT SYSTOLIC BLOOD PRESSURE < 130 MM HG: ICD-10-PCS | Mod: HCNC,CPTII,S$GLB, | Performed by: INTERNAL MEDICINE

## 2019-12-02 PROCEDURE — 99999 PR PBB SHADOW E&M-EST. PATIENT-LVL III: ICD-10-PCS | Mod: PBBFAC,HCNC,, | Performed by: INTERNAL MEDICINE

## 2019-12-02 PROCEDURE — 99999 PR PBB SHADOW E&M-EST. PATIENT-LVL III: CPT | Mod: PBBFAC,HCNC,, | Performed by: INTERNAL MEDICINE

## 2019-12-02 PROCEDURE — 3074F SYST BP LT 130 MM HG: CPT | Mod: HCNC,CPTII,S$GLB, | Performed by: INTERNAL MEDICINE

## 2019-12-02 NOTE — PROGRESS NOTES
Subjective:       Patient ID: Maria Ines Ac is a 34 y.o. female.    Chief Complaint: Gastroesophageal Reflux    HPI  Review of Systems    Objective:      Physical Exam   Abdominal: Soft. Normal appearance. She exhibits no shifting dullness, no distension, no fluid wave, no abdominal bruit and no mass. Bowel sounds are increased. There is no hepatosplenomegaly.       Assessment:       1. Gastroesophageal reflux disease without esophagitis    2. Esophageal dysphagia    3. Irritable bowel syndrome with constipation        Plan:         Maria Ines is here for a follow-up visit.  She is a 34-year-old woman with multiple gastrointestinal problems which include IBS, functional heartburn, esophageal dysmotility, and family history of colon cancer.    She still complains of reflux she describes a burning sensation in her throat.  It is more for throat pain rather than a substernal pain. This is worse after spicy food.   she is having this despite being on Protonix in the morning and Prilosec in the evening although admits it would be much worse if she were not on these medicine.  Temporary relief is obtained by either Tums or Gaviscon . she gets this most every night.  She still has dysphagia.  This is more common for solid foods like meat and rice.  When his happen she has to bring up the bolus because it  will not go down.  It is happening about once a day.  The dilation we did 2 years ago helped her.  We did a Jain dilation then.  Her bowel movements are back to the constipation pattern.  She is on Colace and Metamucil every day.  On her last visit she was more of a predominant diarrhea pattern.  Almost daily  she is having about bowel movement.  And she complains of generalized mid abdominal pain. This is worse after spicy foods she does feel like this is a gas pain.  She describes significant distention and gurgling.    Assessment  One gastroesophageal reflux 1 of done endoscopies in the past we have not  found any esophagitis per I think she just has a very hypersensitive esophagus. I think we can only do the current medical therapy.  I do not think would be appropriate to look into aggressive approach is like anti-reflux surgery.  She would only have worsening of her other issues.  But I it encouraged her to go ahead and take that Tums in the evening for as needed relief.  2.  Esophageal dysphagia.  Although I think this is most likely dysmotility.  The Jain dilation helped her before some happy to set that up again.  We did do manometry on her a couple years ago.  It showed a small percentage of week contractions on the esophagus but nothing more specific.  3.  IBS.  I would like to go ahead and check her for a breath test for bacterial overgrowth.    25 min appointment greater half time face-to-face counseling

## 2019-12-04 ENCOUNTER — TELEPHONE (OUTPATIENT)
Dept: ENDOSCOPY | Facility: HOSPITAL | Age: 34
End: 2019-12-04

## 2019-12-04 NOTE — TELEPHONE ENCOUNTER
Contacted Pt to schedule EGD, Pt is not available at this time,  left message for Pt to call back to schedule, number provided [phone].

## 2019-12-05 ENCOUNTER — TELEPHONE (OUTPATIENT)
Dept: ENDOSCOPY | Facility: HOSPITAL | Age: 34
End: 2019-12-05

## 2019-12-05 NOTE — TELEPHONE ENCOUNTER
----- Message from Maria Ines Uriostegui sent at 12/5/2019  3:08 PM CST -----  Contact: pt#[phone]  Pt is calling to schedule breath test. Please call

## 2019-12-08 DIAGNOSIS — N95.2 VAGINAL ATROPHY: ICD-10-CM

## 2019-12-11 ENCOUNTER — PATIENT OUTREACH (OUTPATIENT)
Dept: ADMINISTRATIVE | Facility: OTHER | Age: 34
End: 2019-12-11

## 2019-12-13 ENCOUNTER — TELEPHONE (OUTPATIENT)
Dept: NEUROLOGY | Facility: CLINIC | Age: 34
End: 2019-12-13

## 2019-12-13 NOTE — TELEPHONE ENCOUNTER
----- Message from Lencho Howard sent at 12/12/2019  3:56 PM CST -----  Contact: Patient @ [phone]  Patient calling to get an update on the Botox approval, pls advise

## 2019-12-13 NOTE — TELEPHONE ENCOUNTER
I never scheduled her a botox appointment to get approved. I can try to schedule for next time your in clinic. Please advise.

## 2019-12-16 ENCOUNTER — OFFICE VISIT (OUTPATIENT)
Dept: UROLOGY | Facility: CLINIC | Age: 34
End: 2019-12-16
Payer: MEDICARE

## 2019-12-16 VITALS
WEIGHT: 212.5 LBS | BODY MASS INDEX: 39.11 KG/M2 | HEART RATE: 98 BPM | SYSTOLIC BLOOD PRESSURE: 107 MMHG | DIASTOLIC BLOOD PRESSURE: 73 MMHG | HEIGHT: 62 IN

## 2019-12-16 DIAGNOSIS — N39.46 MIXED STRESS AND URGE URINARY INCONTINENCE: Primary | ICD-10-CM

## 2019-12-16 PROCEDURE — 3074F PR MOST RECENT SYSTOLIC BLOOD PRESSURE < 130 MM HG: ICD-10-PCS | Mod: HCNC,CPTII,S$GLB, | Performed by: UROLOGY

## 2019-12-16 PROCEDURE — 99999 PR PBB SHADOW E&M-EST. PATIENT-LVL V: ICD-10-PCS | Mod: PBBFAC,HCNC,, | Performed by: UROLOGY

## 2019-12-16 PROCEDURE — 3008F BODY MASS INDEX DOCD: CPT | Mod: HCNC,CPTII,S$GLB, | Performed by: UROLOGY

## 2019-12-16 PROCEDURE — 3078F PR MOST RECENT DIASTOLIC BLOOD PRESSURE < 80 MM HG: ICD-10-PCS | Mod: HCNC,CPTII,S$GLB, | Performed by: UROLOGY

## 2019-12-16 PROCEDURE — 81002 URINALYSIS NONAUTO W/O SCOPE: CPT | Mod: HCNC,S$GLB,, | Performed by: UROLOGY

## 2019-12-16 PROCEDURE — 81002 PR URINALYSIS NONAUTO W/O SCOPE: ICD-10-PCS | Mod: HCNC,S$GLB,, | Performed by: UROLOGY

## 2019-12-16 PROCEDURE — 95972 PR PRG SPNL GEN CMPLX: ICD-10-PCS | Mod: HCNC,S$GLB,, | Performed by: UROLOGY

## 2019-12-16 PROCEDURE — 99213 OFFICE O/P EST LOW 20 MIN: CPT | Mod: HCNC,25,S$GLB, | Performed by: UROLOGY

## 2019-12-16 PROCEDURE — 95972 ALYS CPLX SP/PN NPGT W/PRGRM: CPT | Mod: HCNC,S$GLB,, | Performed by: UROLOGY

## 2019-12-16 PROCEDURE — 99213 PR OFFICE/OUTPT VISIT, EST, LEVL III, 20-29 MIN: ICD-10-PCS | Mod: HCNC,25,S$GLB, | Performed by: UROLOGY

## 2019-12-16 PROCEDURE — 3074F SYST BP LT 130 MM HG: CPT | Mod: HCNC,CPTII,S$GLB, | Performed by: UROLOGY

## 2019-12-16 PROCEDURE — 51798 PR MEAS,POST-VOID RES,US,NON-IMAGING: ICD-10-PCS | Mod: HCNC,S$GLB,, | Performed by: UROLOGY

## 2019-12-16 PROCEDURE — 99999 PR PBB SHADOW E&M-EST. PATIENT-LVL V: CPT | Mod: PBBFAC,HCNC,, | Performed by: UROLOGY

## 2019-12-16 PROCEDURE — 51798 US URINE CAPACITY MEASURE: CPT | Mod: HCNC,S$GLB,, | Performed by: UROLOGY

## 2019-12-16 PROCEDURE — 3078F DIAST BP <80 MM HG: CPT | Mod: HCNC,CPTII,S$GLB, | Performed by: UROLOGY

## 2019-12-16 PROCEDURE — 3008F PR BODY MASS INDEX (BMI) DOCUMENTED: ICD-10-PCS | Mod: HCNC,CPTII,S$GLB, | Performed by: UROLOGY

## 2019-12-16 RX ORDER — LIDOCAINE HYDROCHLORIDE 20 MG/ML
JELLY TOPICAL ONCE
Status: CANCELLED | OUTPATIENT
Start: 2019-12-16 | End: 2019-12-16

## 2019-12-16 RX ORDER — AMOXICILLIN 250 MG/1
500 CAPSULE ORAL ONCE
Status: CANCELLED | OUTPATIENT
Start: 2019-12-16 | End: 2019-12-16

## 2019-12-16 NOTE — PATIENT INSTRUCTIONS
Only programs, 2, 6, A, B are usable due to 0 being broken.    Urodynamics Studies     The bladder holds urine until it leaves the body through the urethra.     Urodynamics studies are a series of tests that give your doctor a close look at the working of your bladder and urethra. The tests can help your doctor learn about any problems storing urine or voiding (eliminating) urine from your body.  Understanding the lower urinary tract  The lower part of the urinary tract has several parts.  · The bladder stores urine until youre ready to release it.  · The urethra is the tube that carries urine from the bladder out of the body.  · The sphincter is made up of muscles around the opening of the bladder. The sphincter muscles tighten to hold urine in the bladder. They relax to let urine flow. Signals from the brain tell the sphincter when to tighten and relax. These signals also tell the bladder when to contract to let urine flow out of the body.  Why you need a urodynamics study  This test may be ordered if you:  · Are incontinent (leak urine)  · Have a bladder that does not empty all the way.  · Have symptoms such as the need to urinate often or a constant strong need to urinate  · Have intermittent or weak urine stream  · Have persistent urinary tract infections  Preparing for the study  · Tell your doctor about any medicine youre taking. Ask if you should stop them before the study.  · Keep a diary of your bathroom habits. Do this for a few days before the study. This diary can be a helpful part of the evaluation.  · Ask if you need to arrive for the study with a full bladder.  Date Last Reviewed: 1/1/2017 © 2000-2017 [employer]. [address] [place] [address], [place] [address]. All rights reserved. This information is not intended as a substitute for professional medical care. Always follow your healthcare professional's instructions.          Urodynamic Studies     The equipment used for the study varies  depending upon the facility and what tests are done.     Urodynamic studies may be done in your doctors office, a clinic, or a hospital. The studies may take up to an hour or more. This depends on which tests your doctor does. The tests are generally painless. You wont need sedating medicine.  Tests that may be done  Uroflowmetry. This measures the amount and speed of urine you void from your bladder. You urinate into a funnel. Its attached to a computer that records your urine flow over time. The amount of urine left in your bladder after you void may also be measured right after this test.  Cystometry. This test evaluates how much your bladder can hold. It also measures how strong your bladder muscle is and how well the signals work that tell you when your bladder is full. Your healthcare provider fills your bladder with sterile water or saline solution, through a catheter. Your doctor will instruct you to report any sensations you feel. Mention if theyre similar to symptoms youve felt at home. Your doctor may ask you to cough, stand and walk, or bear down during this test.  Electromyogram. This helps evaluate the muscle contractions that control urination, such as sphincter muscle contractions. Your healthcare provider may place electrode patches or wires near your rectum or urethra to make the recording. He or she may ask you to try to tighten or relax your sphincter muscles during this test.  Pressure flow study. This test measures your detrusor, urethral, and abdominal pressures. Detrusor is the muscle surrounding the bladder walls that relaxes to allow your bladder to fill, and and contracts to squeeze out urine. A pressure flow study is often done after cystometry. Youre asked to urinate while a probe in your urethra measures pressures.  Video cystourethrography. This takes video pictures of urine flow through your urinary tract. It can help identify blockages or other problems. The bladder is filled  with an X-ray contrast fluid. Then X-ray video pictures are taken as the fluid is urinated out. Ultrasound imaging may also be combined with routine urodynamic studies.  Ambulatory urodynamics. This test can be used to evaluate you while doing usual activities.  Getting your results  After the study, youll get dressed and return to the consultation room. Test results may be ready soon after the study is finished. Or, you may return to your doctors office in a few days for your results. Your doctor can talk with you about the study report and your options.   Date Last Reviewed: 1/1/2017  © 7169-3989 [employer]. [address] [place] [address], [place] [address]. All rights reserved. This information is not intended as a substitute for professional medical care. Always follow your healthcare professional's instructions.

## 2019-12-16 NOTE — PROGRESS NOTES
CHIEF COMPLAINT:    Mrs. Ac is a 34 y.o. female presenting for a follow up on incomplete bladder emptying, status post InterStim    PRESENTING ILLNESS:    Maria Ines Ac is a 34 y.o. female who returns stating that she has had both urge and stress incontinence.  She does not feel the stimulation of the InterStim. She brought the controller and the antennae today.     REVIEW OF SYSTEMS:    Review of Systems   Constitutional: Negative.    HENT: Negative.    Eyes: Negative.    Respiratory: Negative.    Cardiovascular: Negative.    Gastrointestinal: Negative.    Genitourinary: Positive for frequency and urgency.        Mixed incontinence   Musculoskeletal: Negative.    Skin: Negative.    Neurological: Negative.    Endo/Heme/Allergies: Negative.    Psychiatric/Behavioral: Negative.        PATIENT HISTORY:    Past Medical History:   Diagnosis Date    Blood in stool     Constipation     Cystitis     Depression     Diabetes mellitus, type 2     Dysphagia     Endometriosis     GERD (gastroesophageal reflux disease)     Headache     Hypertension     Palpitations     Premature menopause on hormone replacement therapy     Thyroid disease        Past Surgical History:   Procedure Laterality Date    BLADDER SUSPENSION      CARPAL TUNNEL RELEASE      right    CHOLECYSTECTOMY      COLONOSCOPY N/A 8/30/2016    Procedure: COLONOSCOPY;  Surgeon: Slick Herron MD;  Location: [address]);  Service: Endoscopy;  Laterality: N/A;  PM prep    ESOPHAGEAL MANOMETRY WITH MEASUREMENT OF IMPEDANCE N/A 11/5/2018    Procedure: MANOMETRY, ESOPHAGUS, WITH IMPEDANCE MEASUREMENT;  Surgeon: Slick Herron MD;  Location: [address]);  Service: Endoscopy;  Laterality: N/A;    FLUOROSCOPIC URODYNAMIC STUDY N/A 5/23/2019    Procedure: URODYNAMIC STUDY, FLUOROSCOPIC;  Surgeon: Zena Tee MD;  Location: [address];  Service: Urology;  Laterality: N/A;  1 hour    GALLBLADDER SURGERY       HYSTERECTOMY      Bess velasquez Dr. Champlain    IMPLANTATION OF PERMANENT SACRAL NERVE STIMULATOR N/A 2019    Procedure: INSERTION, NEUROSTIMULATOR, PERMANENT, SACRAL;  Surgeon: Zena Tee MD;  Location: [facility] OR [address] [place] [address];  Service: Urology;  Laterality: N/A;  30 min    OOPHORECTOMY      LSO- benign cyst    OOPHORECTOMY      RSO- endo    ooptherectomy      right knee  scoped    SHOULDER SURGERY  right       Family History   Problem Relation Age of Onset    Breast cancer Mother 50        alive at 64, unilateral    Esophageal cancer Mother 63    Ovarian cancer Mother 63    Anesthesia problems Mother     Cervical cancer Maternal Grandmother         unknown age of onset,  at 80    Colon cancer Brother 40    Breast cancer Paternal Aunt         unknown age of onset, alive at 70    Breast cancer Maternal Aunt 72        alive at 72       Social History     Socioeconomic History    Marital status: Single   Tobacco Use    Smoking status: Never Smoker    Smokeless tobacco: Never Used   Substance and Sexual Activity    Alcohol use: No    Drug use: No    Sexual activity: Never     Birth control/protection: None       Allergies:  Patient has no known allergies.    Medications:  Outpatient Encounter Medications as of 2019   Medication Sig Dispense Refill    ACCU-CHEK AMAURY PLUS TEST STRP Strp USE TO TEST BLOOD GLUCOSE TID  9    ACCU-CHEK SOFTCLIX LANCETS Misc USE TO TEST BLOOD GLUCOSE TID  3    atorvastatin (LIPITOR) 40 MG tablet Take 40 mg by mouth once daily.      BLOOD SUGAR DIAGNOSTIC (ACCU-CHEK AMAURY PLUS TEST STRP MISC) 1 strip by Misc.(Non-Drug; Combo Route) route 3 (three) times daily.      calcium-vitamin D3 500 mg(1,250mg) -200 unit per tablet Take 1 tablet by mouth 2 (two) times daily with meals.      ciclopirox (PENLAC) 8 % Soln Apply topically nightly. 6.6 mL 11    conjugated estrogens (PREMARIN) vaginal cream USE 0.5 GRAM VAGINALLY EVERY NIGHT FOR  2 WEEKS THEN USE VAGINALLY 2 TIMES A WEEK 30 g 0    dicyclomine (BENTYL) 10 MG capsule TAKE 2 CAPSULES(20 MG) BY MOUTH THREE TIMES DAILY BEFORE MEALS 180 capsule 0    docusate sodium (COLACE) 100 MG capsule Take 1 capsule (100 mg total) by mouth 2 (two) times daily. 60 capsule 0    estradiol (ESTRACE) 2 MG tablet Take 1 tablet (2 mg total) by mouth once daily. 90 tablet 3    FARXIGA 10 mg Tab TK 1 T PO QD  7    fish oil-omega-3 fatty acids 300-1,000 mg capsule Take 2 g by mouth once daily.      fluconazole (DIFLUCAN) 150 MG Tab TK 1 T PO TID A WEEK  1    fluticasone propionate (FLONASE) 50 mcg/actuation nasal spray SHAKE LIQUID AND USE 2 SPRAYS(100 MCG) IN EACH NOSTRIL EVERY DAY 48 mL 1    gabapentin (NEURONTIN) 300 MG capsule Take 300 mg (1 tablet) twice during the day, then three tablets (900 mg) at night before bed. 150 capsule 11    GLUCOPHAGE 500 mg tablet Take 500 mg by mouth 2 (two) times daily with meals.       hydrOXYzine HCl (ATARAX) 10 MG Tab TK 1 T PO BID PRF ITCH  3    ibuprofen (ADVIL,MOTRIN) 800 MG tablet 800 mg every 4 (four) hours as needed.   0    levothyroxine (SYNTHROID) 50 MCG tablet TK 1 T PO QAM  8    magnesium oxide (MAG-OX) 400 mg (241.3 mg magnesium) tablet Take 1 tablet (400 mg total) by mouth 2 (two) times daily.  0    metoprolol succinate (TOPROL-XL) 25 MG 24 hr tablet       metoprolol tartrate (LOPRESSOR) 50 MG Tab Take 1 tablet (50 mg total) by mouth 2 (two) times daily. 120 tablet 3    MULTIVIT-IRON-MIN-FOLIC ACID 3,500-18-0.4 UNIT-MG-MG ORAL CHEW Take 1 tablet by mouth once daily.       neomycin-polymyxin-hydrocortisone (CORTISPORIN) 3.5-10,000-10 mg-unit-mg/mL ophthalmic suspension INTILL 1 DROP INTO AFFECTED EYE QID  0    omeprazole (PRILOSEC OTC) 20 MG tablet Take 20 mg by mouth once daily.      pantoprazole (PROTONIX) 40 MG tablet TAKE 1 TABLET BY MOUTH EVERY DAY 90 tablet 0    phentermine (ADIPEX-P) 37.5 MG capsule Take 37.5 mg by mouth every morning.        phentermine (ADIPEX-P) 37.5 mg tablet TK 1 T PO  QAM  3    polyethylene glycol (GLYCOLAX) 17 gram/dose powder Mix 1 capful (17 g) with liquid and take by mouth once daily. 238 g 0    spironolactone (ALDACTONE) 25 MG tablet TK 1 T PO HS  2    topiramate (TOPAMAX) 50 MG tablet Take 2 tablets (100 mg total) by mouth every evening. 60 tablet 11    traZODone (DESYREL) 50 MG tablet TK 1 T PO QHS  1    triamcinolone acetonide 0.1% (KENALOG) 0.1 % cream MIX WITH LAMISIL CREAM IN AQUAPHOR TO REACH 4OZ  AND APPLY ONCE DAILY  2    TRULICITY 1.5 mg/0.5 mL PnIj INJECT 1 PEN INTO THE SKIN Q WEEK  5    ziprasidone (GEODON) 80 MG capsule       ZOLOFT 100 mg tablet Take 200 mg by mouth once daily.        Facility-Administered Encounter Medications as of 12/16/2019   Medication Dose Route Frequency Provider Last Rate Last Dose    onabotulinumtoxina injection 200 Units  200 Units Intramuscular Q90 Days Bello Shirley MD             PHYSICAL EXAMINATION:    The patient generally appears in good health, is appropriately interactive, and is in no apparent distress.    Skin: No lesions.    Mental: Cooperative with normal affect.    Neuro: Grossly intact.    HEENT: Normal. No evidence of lymphadenopathy.    Chest:  normal inspiratory effort.    Abdomen:  Soft, non-tender. No masses or organomegaly. Bladder is not palpable. No evidence of flank discomfort. No evidence of inguinal hernia.    Extremities: No clubbing, cyanosis, or edema    PVR by bladder scan was 52 ml performed by nurse Perez.      LABS:    Lab Results   Component Value Date    BUN 17 11/03/2019    CREATININE 1.1 11/03/2019     UA 1.010, pH 5, 500 glucose, otherwise, negative (patient is on Farxiga)        IMPRESSION:    Encounter Diagnoses   Name Primary?    Mixed stress and urge urinary incontinence Yes       PLAN:    1. Interrogation:      All lead pairs were checked at 1.0 A, 210 pulse width and ranged from 1230-9836, except lead 0 which is > 4000 ohms  (broken)      Program % Used Leads  Energy PW Hz Sensation   Changed  Program 6   3+, 2-, 1- 7.8 A  210 14 None  Program A   C+, 3-, 2- 2.0 A  210 14 Buttocks  Program B   1+, 2-, 3- 7 A  210 14 Vagina    Only programs, 2, 6, A, B are usable due to 0 being broken.    Left on program:  B    iCon was reprogrammed.  Reviewed on how to use the iCon.    2.  As she has mixed incontinence and the stress component was not demonstrated in the past, ordered SUDS.  Discussed the difference between the two types of incontinence and how they are treated differently.

## 2019-12-23 ENCOUNTER — PATIENT OUTREACH (OUTPATIENT)
Dept: ADMINISTRATIVE | Facility: OTHER | Age: 34
End: 2019-12-23

## 2019-12-27 ENCOUNTER — OFFICE VISIT (OUTPATIENT)
Dept: SURGERY | Facility: CLINIC | Age: 34
End: 2019-12-27
Payer: MEDICARE

## 2019-12-27 ENCOUNTER — HOSPITAL ENCOUNTER (OUTPATIENT)
Dept: RADIOLOGY | Facility: HOSPITAL | Age: 34
Discharge: HOME | End: 2019-12-27
Attending: INTERNAL MEDICINE
Payer: MEDICARE

## 2019-12-27 VITALS
SYSTOLIC BLOOD PRESSURE: 116 MMHG | WEIGHT: 207 LBS | HEART RATE: 82 BPM | BODY MASS INDEX: 38.09 KG/M2 | TEMPERATURE: 98 F | HEIGHT: 62 IN | DIASTOLIC BLOOD PRESSURE: 74 MMHG

## 2019-12-27 DIAGNOSIS — Z12.31 BREAST CANCER SCREENING BY MAMMOGRAM: Primary | ICD-10-CM

## 2019-12-27 DIAGNOSIS — Z12.31 BREAST CANCER SCREENING BY MAMMOGRAM: ICD-10-CM

## 2019-12-27 PROCEDURE — 77063 MAMMO DIGITAL SCREENING BILAT WITH TOMOSYNTHESIS_CAD: ICD-10-PCS | Mod: 26,HCNC,, | Performed by: RADIOLOGY

## 2019-12-27 PROCEDURE — 77063 BREAST TOMOSYNTHESIS BI: CPT | Mod: 26,HCNC,, | Performed by: RADIOLOGY

## 2019-12-27 PROCEDURE — 77067 MAMMO DIGITAL SCREENING BILAT WITH TOMOSYNTHESIS_CAD: ICD-10-PCS | Mod: 26,HCNC,, | Performed by: RADIOLOGY

## 2019-12-27 PROCEDURE — 99999 PR PBB SHADOW E&M-EST. PATIENT-LVL III: CPT | Mod: PBBFAC,HCNC,, | Performed by: PHYSICIAN ASSISTANT

## 2019-12-27 PROCEDURE — 3008F PR BODY MASS INDEX (BMI) DOCUMENTED: ICD-10-PCS | Mod: HCNC,CPTII,S$GLB, | Performed by: PHYSICIAN ASSISTANT

## 2019-12-27 PROCEDURE — 77067 SCR MAMMO BI INCL CAD: CPT | Mod: TC,HCNC,PO

## 2019-12-27 PROCEDURE — 3074F PR MOST RECENT SYSTOLIC BLOOD PRESSURE < 130 MM HG: ICD-10-PCS | Mod: HCNC,CPTII,S$GLB, | Performed by: PHYSICIAN ASSISTANT

## 2019-12-27 PROCEDURE — 3078F PR MOST RECENT DIASTOLIC BLOOD PRESSURE < 80 MM HG: ICD-10-PCS | Mod: HCNC,CPTII,S$GLB, | Performed by: PHYSICIAN ASSISTANT

## 2019-12-27 PROCEDURE — 3074F SYST BP LT 130 MM HG: CPT | Mod: HCNC,CPTII,S$GLB, | Performed by: PHYSICIAN ASSISTANT

## 2019-12-27 PROCEDURE — 99213 PR OFFICE/OUTPT VISIT, EST, LEVL III, 20-29 MIN: ICD-10-PCS | Mod: HCNC,S$GLB,, | Performed by: PHYSICIAN ASSISTANT

## 2019-12-27 PROCEDURE — 99213 OFFICE O/P EST LOW 20 MIN: CPT | Mod: HCNC,S$GLB,, | Performed by: PHYSICIAN ASSISTANT

## 2019-12-27 PROCEDURE — 77067 SCR MAMMO BI INCL CAD: CPT | Mod: 26,HCNC,, | Performed by: RADIOLOGY

## 2019-12-27 PROCEDURE — 3008F BODY MASS INDEX DOCD: CPT | Mod: HCNC,CPTII,S$GLB, | Performed by: PHYSICIAN ASSISTANT

## 2019-12-27 PROCEDURE — 3078F DIAST BP <80 MM HG: CPT | Mod: HCNC,CPTII,S$GLB, | Performed by: PHYSICIAN ASSISTANT

## 2019-12-27 PROCEDURE — 99999 PR PBB SHADOW E&M-EST. PATIENT-LVL III: ICD-10-PCS | Mod: PBBFAC,HCNC,, | Performed by: PHYSICIAN ASSISTANT

## 2019-12-27 RX ORDER — DICLOFENAC SODIUM 10 MG/G
2 GEL TOPICAL 4 TIMES DAILY
Qty: 1 TUBE | Refills: 1 | Status: SHIPPED | OUTPATIENT
Start: 2019-12-27

## 2019-12-27 NOTE — PROGRESS NOTES
[facility]  [facility]  12/27/2019    SUBJECTIVE:   Ms. Maria Ines Ac is a 34 y.o. female who presents today for follow up breast cancer screening.    History of Present Illness: Patient was previously seen by MAGDA Moore with a TC score of 26%.    She had negative genetic testing through [facility] in 2019 and she has mostly fatty breast tissue.  She denies any history of breast biopsies.  She denies any nipple discharge or nipple inversion.  She denies palpating any breast masses bilaterally.   Patient does complain of bilateral breast pain. She states it is worse at night when laying down and Advil provides moderate relief.  She also states that she noticed mild improvement after reducing her caffeine intake.    Past Medical History:   Diagnosis Date    Blood in stool     Constipation     Cystitis     Depression     Diabetes mellitus, type 2     Dysphagia     Endometriosis     GERD (gastroesophageal reflux disease)     Headache     Hypertension     Palpitations     Premature menopause on hormone replacement therapy     Thyroid disease      Past Surgical History:   Procedure Laterality Date    BLADDER SUSPENSION      CARPAL TUNNEL RELEASE      right    CHOLECYSTECTOMY      COLONOSCOPY N/A 8/30/2016    Procedure: COLONOSCOPY;  Surgeon: Slick Herron MD;  Location: [facility] ([address] [place] [address]);  Service: Endoscopy;  Laterality: N/A;  PM prep    ESOPHAGEAL MANOMETRY WITH MEASUREMENT OF IMPEDANCE N/A 11/5/2018    Procedure: MANOMETRY, ESOPHAGUS, WITH IMPEDANCE MEASUREMENT;  Surgeon: Slick Herron MD;  [facility]  Service: Endoscopy;  Laterality: N/A;    FLUOROSCOPIC URODYNAMIC STUDY N/A 5/23/2019    Procedure: URODYNAMIC STUDY, FLUOROSCOPIC;  Surgeon: Zena Tee MD;  Location: [address];  Service: Urology;  Laterality: N/A;  1 hour    GALLBLADDER SURGERY      HYSTERECTOMY  2013    Bess velasquez Dr. Champlain    IMPLANTATION OF  PERMANENT SACRAL NERVE STIMULATOR N/A 7/30/2019    Procedure: INSERTION, NEUROSTIMULATOR, PERMANENT, SACRAL;  Surgeon: Zena Tee MD;  Location: [facility] OR [address] [place] [address];  Service: Urology;  Laterality: N/A;  30 min    OOPHORECTOMY  2005    LSO- benign cyst    OOPHORECTOMY  2013    RSO- endo    ooptherectomy      right knee  scoped    SHOULDER SURGERY  right     Social History     Socioeconomic History    Marital status: Single     Spouse name: Not on file    Number of children: Not on file    Years of education: Not on file    Highest education level: Not on file   Occupational History    Not on file   Social Needs    Financial resource strain: Not on file    Food insecurity:     Worry: Not on file     Inability: Not on file    Transportation needs:     Medical: Not on file     Non-medical: Not on file   Tobacco Use    Smoking status: Never Smoker    Smokeless tobacco: Never Used   Substance and Sexual Activity    Alcohol use: No    Drug use: No    Sexual activity: Never     Birth control/protection: None   Lifestyle    Physical activity:     Days per week: Not on file     Minutes per session: Not on file    Stress: Not on file   Relationships    Social connections:     Talks on phone: Not on file     Gets together: Not on file     Attends Hindu service: Not on file     Active member of club or organization: Not on file     Attends meetings of clubs or organizations: Not on file     Relationship status: Not on file   Other Topics Concern    Not on file   Social History Narrative    ** Merged History Encounter **          Review of patient's allergies indicates:  No Known Allergies   Family History: Mother had breast cancer at age 50 and ovarian cancer at 63; now 65.  Maternal grandmother had cervical cancer. Brother had colon cancer. Paternal aunt had breast cancer. Maternal aunt had breast cancer.  Genetic testing: negative (via [facility] in 2019)  OB/Gyn history:    Number of pregnancies &  age at first gestation:    Age of menarche & menopause:9; 29 (total hysterectomy).   Body mass index is 37.86 kg/m².   Contraceptive Use/ HRT: estrogen only for >10 years.     Breastfeeding:N/A   Number of previous biopsies:0    Tyrer-Cuzick Score:  19.6% (re-calculated in clinic today).    Review of Systems: Denies any chest pain or shortness of breath.  Denies any fever or chills.  See HPI/ Interval History for other systems reviewed.    OBJECTIVE:   Vitals:    19 1408   BP: 116/74   Pulse: 82   Temp: 97.9 °F (36.6 °C)      Physical Exam:  HEENT: Normocephalic, atraumatic.    General: alert and oriented; no acute distress.  Breast: No masses present bilaterally.  Normal color and contour of right and left breast.  No nipple inversion or discharge bilaterally.    Lymph: No palpable adjacent axillary lymph nodes.      ASSESSMENT:  Ms. Maria Ines Ac is a 34 y.o. year old female with a family history of breast and ovarian cancer who presents today for high risk breast cancer screening.    PLAN:   We discussed that her most recent Tyrer-Cuzick score is <20 (19.6%) so she is actually not considered high risk and does not need increased screening.  Patient states she would be more comfortable continuing annual mammograms and breast exams due to her family history.    I recommended over the counter vitamin E and flaxseed tablets for her breast pain along with diclofenac gel.    She is scheduled for a mammogram today and is requesting to see me back in one year. There was nothing concerning on clinical breast exam today.    ~Sharon Smith PA-C      Surgical Oncology            2019

## 2020-01-02 ENCOUNTER — HOSPITAL ENCOUNTER (EMERGENCY)
Facility: HOSPITAL | Age: 35
Discharge: HOME | End: 2020-01-02
Attending: EMERGENCY MEDICINE
Payer: MEDICARE

## 2020-01-02 ENCOUNTER — PATIENT OUTREACH (OUTPATIENT)
Dept: ADMINISTRATIVE | Facility: OTHER | Age: 35
End: 2020-01-02

## 2020-01-02 VITALS
TEMPERATURE: 97 F | HEIGHT: 62 IN | DIASTOLIC BLOOD PRESSURE: 73 MMHG | OXYGEN SATURATION: 97 % | WEIGHT: 207 LBS | RESPIRATION RATE: 18 BRPM | SYSTOLIC BLOOD PRESSURE: 116 MMHG | HEART RATE: 86 BPM | BODY MASS INDEX: 38.09 KG/M2

## 2020-01-02 DIAGNOSIS — S60.221A CONTUSION OF RIGHT HAND, INITIAL ENCOUNTER: Primary | ICD-10-CM

## 2020-01-02 LAB
B-HCG UR QL: NEGATIVE
CTP QC/QA: YES

## 2020-01-02 PROCEDURE — 99284 EMERGENCY DEPT VISIT MOD MDM: CPT | Mod: HCNC,,, | Performed by: PHYSICIAN ASSISTANT

## 2020-01-02 PROCEDURE — 99284 PR EMERGENCY DEPT VISIT,LEVEL IV: ICD-10-PCS | Mod: HCNC,,, | Performed by: PHYSICIAN ASSISTANT

## 2020-01-02 PROCEDURE — 99283 EMERGENCY DEPT VISIT LOW MDM: CPT | Mod: 25,HCNC

## 2020-01-02 PROCEDURE — 81025 URINE PREGNANCY TEST: CPT | Mod: HCNC | Performed by: PHYSICIAN ASSISTANT

## 2020-01-02 PROCEDURE — 25000003 PHARM REV CODE 250: Mod: HCNC | Performed by: PHYSICIAN ASSISTANT

## 2020-01-02 RX ORDER — ACETAMINOPHEN 325 MG/1
650 TABLET ORAL
Status: COMPLETED | OUTPATIENT
Start: 2020-01-02 | End: 2020-01-02

## 2020-01-02 RX ADMIN — ACETAMINOPHEN 650 MG: 325 TABLET ORAL at 07:01

## 2020-01-03 NOTE — DISCHARGE INSTRUCTIONS
Please follow discharge instructions provided. Please make sure to follow up with your PCP to discuss today's Emergency Department visit and for further evaluation and management. Please return to the Emergency Department if your symptoms worsen or you develop any additional concerning symptoms.

## 2020-01-04 NOTE — ED PROVIDER NOTES
Encounter Date: 1/2/2020       History     Chief Complaint   Patient presents with    Hand Pain     Pt states that she hit her righthand at the gym this morning and she is now having pain.  Pt        Hand Injury    The incident occurred today. The incident occurred at the gym. Injury mechanism: dropped weight on hand. The pain is present in the right hand. The quality of the pain is described as aching and throbbing. The pain is at a severity of 5/10. The pain has been constant since the incident. Pertinent negatives include no fever. She reports no foreign bodies present. The symptoms are aggravated by movement, use and palpation. She has tried nothing for the symptoms.     Review of patient's allergies indicates:  No Known Allergies  Past Medical History:   Diagnosis Date    Blood in stool     Constipation     Cystitis     Depression     Diabetes mellitus, type 2     Dysphagia     Endometriosis     GERD (gastroesophageal reflux disease)     Headache     Hypertension     Palpitations     Premature menopause on hormone replacement therapy     Thyroid disease      Past Surgical History:   Procedure Laterality Date    BLADDER SUSPENSION      CARPAL TUNNEL RELEASE      right    CHOLECYSTECTOMY      COLONOSCOPY N/A 8/30/2016    Procedure: COLONOSCOPY;  Surgeon: Slick Herron MD;  Location: [address]);  Service: Endoscopy;  Laterality: N/A;  PM prep    ESOPHAGEAL MANOMETRY WITH MEASUREMENT OF IMPEDANCE N/A 11/5/2018    Procedure: MANOMETRY, ESOPHAGUS, WITH IMPEDANCE MEASUREMENT;  Surgeon: Slick Herorn MD;  [facility]  Service: Endoscopy;  Laterality: N/A;    FLUOROSCOPIC URODYNAMIC STUDY N/A 5/23/2019    Procedure: URODYNAMIC STUDY, FLUOROSCOPIC;  Surgeon: Zena Tee MD;  Location: [address];  Service: Urology;  Laterality: N/A;  1 hour    GALLBLADDER SURGERY      HYSTERECTOMY  2013    Bess velasquez Dr. Champlain    IMPLANTATION OF PERMANENT SACRAL  NERVE STIMULATOR N/A 2019    Procedure: INSERTION, NEUROSTIMULATOR, PERMANENT, SACRAL;  Surgeon: Zena Tee MD;  Location: [facility] OR [address] [place] [address];  Service: Urology;  Laterality: N/A;  30 min    OOPHORECTOMY      LSO- benign cyst    OOPHORECTOMY      RSO- endo    ooptherectomy      right knee  scoped    SHOULDER SURGERY  right     Family History   Problem Relation Age of Onset    Breast cancer Mother 50        alive at 64, unilateral    Esophageal cancer Mother 63    Ovarian cancer Mother 63    Anesthesia problems Mother     Cervical cancer Maternal Grandmother         unknown age of onset,  at 80    Colon cancer Brother 40    Breast cancer Paternal Aunt         unknown age of onset, alive at 70    Breast cancer Maternal Aunt 72        alive at 72    Vaginal cancer Neg Hx     Endometrial cancer Neg Hx     Crohn's disease Neg Hx     Ulcerative colitis Neg Hx     Stomach cancer Neg Hx     Irritable bowel syndrome Neg Hx     Celiac disease Neg Hx      Social History     Tobacco Use    Smoking status: Never Smoker    Smokeless tobacco: Never Used   Substance Use Topics    Alcohol use: No    Drug use: No     Review of Systems   Constitutional: Negative for fever.   HENT: Negative for sore throat.    Respiratory: Negative for shortness of breath.    Cardiovascular: Negative for chest pain.   Gastrointestinal: Negative for nausea.   Genitourinary: Negative for dysuria.   Musculoskeletal: Positive for arthralgias (hand pain). Negative for back pain.   Skin: Negative for rash.   Neurological: Negative for weakness.   Hematological: Does not bruise/bleed easily.       Physical Exam     Initial Vitals [20 1825]   BP Pulse Resp Temp SpO2   134/83 88 16 97.4 °F (36.3 °C) 96 %      MAP       --         Physical Exam    Constitutional: She appears well-developed and well-nourished. She is cooperative.  Non-toxic appearance. No distress.   HENT:   Head: Normocephalic and  atraumatic.   Eyes: Conjunctivae are normal.   Neck: Normal range of motion. Neck supple.   Cardiovascular: Normal rate.   Pulmonary/Chest: Effort normal.   Abdominal: Soft. There is no tenderness.   Musculoskeletal: Normal range of motion.   Neurological: She is alert and oriented to person, place, and time.   Skin: Skin is warm, dry and intact. Bruising noted. No rash noted.              ED Course   Procedures  Labs Reviewed   POCT URINE PREGNANCY          Imaging Results          X-Ray Hand 3 View Right (Final result)  Result time 01/02/20 20:52:39    Final result by Jan Gaona MD (01/02/20 20:52:39)                 Impression:      No acute fracture.      Electronically signed by: Jan Gaona MD  Date:    01/02/2020  Time:    20:52             Narrative:    EXAMINATION:  XR HAND COMPLETE 3 VIEW RIGHT    CLINICAL HISTORY:  hand pain;    TECHNIQUE:  PA, lateral, and oblique views of the right hand were performed.    COMPARISON:  None    FINDINGS:  No fracture or dislocation.  Mild degenerative narrowing at the IP joints of the digits.      Soft tissues are unremarkable.                                 Medical Decision Making:   Clinical Tests:   Lab Tests: Ordered and Reviewed  Radiological Study: Ordered  ED Management:  X-rays negative for acute fracture. Symptoms most likely due to contusion. Will d/c home with PCP f/u as needed. Pt verbalizes understanding and is agreeable with plan. Return instructions given.                                 Clinical Impression:       ICD-10-CM ICD-9-CM   1. Contusion of right hand, initial encounter S60.221A 923.20         Disposition:   Disposition: Discharged  Condition: Stable                     Apolinar Pryor PA-C  01/04/20 0330     Take over the counter pain medication

## 2020-01-06 ENCOUNTER — OFFICE VISIT (OUTPATIENT)
Dept: CARDIOLOGY | Facility: CLINIC | Age: 35
End: 2020-01-06
Payer: MEDICARE

## 2020-01-06 VITALS
HEART RATE: 91 BPM | HEIGHT: 62 IN | SYSTOLIC BLOOD PRESSURE: 113 MMHG | BODY MASS INDEX: 40.29 KG/M2 | WEIGHT: 218.94 LBS | OXYGEN SATURATION: 96 % | DIASTOLIC BLOOD PRESSURE: 70 MMHG

## 2020-01-06 DIAGNOSIS — I49.3 FREQUENT PVCS: Primary | ICD-10-CM

## 2020-01-06 DIAGNOSIS — E66.01 SEVERE OBESITY (BMI 35.0-39.9) WITH COMORBIDITY: ICD-10-CM

## 2020-01-06 DIAGNOSIS — N95.2 VAGINAL ATROPHY: ICD-10-CM

## 2020-01-06 DIAGNOSIS — I10 HYPERTENSION, UNSPECIFIED TYPE: ICD-10-CM

## 2020-01-06 DIAGNOSIS — E78.5 HYPERLIPIDEMIA, UNSPECIFIED HYPERLIPIDEMIA TYPE: ICD-10-CM

## 2020-01-06 PROCEDURE — 3008F PR BODY MASS INDEX (BMI) DOCUMENTED: ICD-10-PCS | Mod: HCNC,CPTII,S$GLB, | Performed by: INTERNAL MEDICINE

## 2020-01-06 PROCEDURE — 3078F DIAST BP <80 MM HG: CPT | Mod: HCNC,CPTII,S$GLB, | Performed by: INTERNAL MEDICINE

## 2020-01-06 PROCEDURE — 99214 OFFICE O/P EST MOD 30 MIN: CPT | Mod: HCNC,S$GLB,, | Performed by: INTERNAL MEDICINE

## 2020-01-06 PROCEDURE — 3078F PR MOST RECENT DIASTOLIC BLOOD PRESSURE < 80 MM HG: ICD-10-PCS | Mod: HCNC,CPTII,S$GLB, | Performed by: INTERNAL MEDICINE

## 2020-01-06 PROCEDURE — 3008F BODY MASS INDEX DOCD: CPT | Mod: HCNC,CPTII,S$GLB, | Performed by: INTERNAL MEDICINE

## 2020-01-06 PROCEDURE — 99999 PR PBB SHADOW E&M-EST. PATIENT-LVL III: ICD-10-PCS | Mod: PBBFAC,HCNC,, | Performed by: INTERNAL MEDICINE

## 2020-01-06 PROCEDURE — 3074F SYST BP LT 130 MM HG: CPT | Mod: HCNC,CPTII,S$GLB, | Performed by: INTERNAL MEDICINE

## 2020-01-06 PROCEDURE — 99999 PR PBB SHADOW E&M-EST. PATIENT-LVL III: CPT | Mod: PBBFAC,HCNC,, | Performed by: INTERNAL MEDICINE

## 2020-01-06 PROCEDURE — 99214 PR OFFICE/OUTPT VISIT, EST, LEVL IV, 30-39 MIN: ICD-10-PCS | Mod: HCNC,S$GLB,, | Performed by: INTERNAL MEDICINE

## 2020-01-06 PROCEDURE — 3074F PR MOST RECENT SYSTOLIC BLOOD PRESSURE < 130 MM HG: ICD-10-PCS | Mod: HCNC,CPTII,S$GLB, | Performed by: INTERNAL MEDICINE

## 2020-01-06 RX ORDER — METOPROLOL TARTRATE 100 MG/1
100 TABLET ORAL 2 TIMES DAILY
Qty: 120 TABLET | Refills: 3 | Status: SHIPPED | OUTPATIENT
Start: 2020-01-06 | End: 2020-07-02 | Stop reason: SDUPTHER

## 2020-01-06 RX ORDER — TRAZODONE HYDROCHLORIDE 100 MG/1
TABLET ORAL
Refills: 1 | COMMUNITY
Start: 2019-12-09

## 2020-01-06 NOTE — PATIENT INSTRUCTIONS
Plan to increase your metoprolol for your increase palpitations.     You are currently taking metoprolol 50 mg twice daily.   Start by increasing to 100 mg in the morning (2 tablet) and 50 mg (1 tablet in the evening). If you continue to have palpitations, increase to 100 mg twice daily.

## 2020-01-06 NOTE — PROGRESS NOTES
Cardiology Consults Clinic Note    Subjective:   Chief Complaint: Chest pain, unspecified type (10 weeks fu)  Last Clinic Visit: 10/28/19    History of Present Illness: Maria Ines Ac is a 34 y.o. female here for follow up due to palpitations. She was seen in October for surgery clearance and management of palpitations.   48 hr Holter completed in September 2019 predominately sinus tachycardia with very frequent PVCs totalling 6794 and averaging 141.54 per hour. The ventricular arrhythmias percentage was 2.4. There were 1 couplets.     Today, reports that palpitations have increased in duration and in frequency. Describes palpitations as a fluttering sensation, occurring several times daily, and lasting up to an hour.  Reports that they occur most frequently at nighttime or with exercise.   She denies chest pain, SOB, lightheadedness, presyncope, or syncope. She does have MCK[place].   BP and lipids at goal on high intensity statin.   Started on metoprolol 50 mg BID at last visit. Does not find that this has helped to suppress the palpitations. Denies drinking caffeine.   Walks daily. Has gains approximately 10 lb since October.      Echo (9/25/19):  · Concentric left ventricular remodeling.  · Normal left ventricular systolic function. The estimated ejection fraction is 60%  · Grade I (mild) left ventricular diastolic dysfunction consistent with impaired relaxation. Normal left atrial pressure.  · Normal right ventricular systolic function.  · Normal central venous pressure (3 mm Hg).  · Patient's study rereviewed 9/25/2019- bubble study was performed and there was no evidence of right to left shunt      Review of Systems   Constitution: Positive for weight gain. Negative for decreased appetite, fever and malaise/fatigue.   HENT: Negative for congestion, hearing loss and nosebleeds.    Eyes: Negative for visual disturbance.   Cardiovascular: Positive for dyspnea on exertion and palpitations. Negative for  chest pain, irregular heartbeat, leg swelling and syncope.   Respiratory: Negative for cough, shortness of breath and sleep disturbances due to breathing.    Endocrine: Negative for cold intolerance and heat intolerance.   Hematologic/Lymphatic: Negative for bleeding problem. Does not bruise/bleed easily.   Gastrointestinal: Negative for bloating, abdominal pain, change in bowel habit and heartburn.   Neurological: Negative for dizziness, loss of balance and numbness.   Psychiatric/Behavioral: Negative for altered mental status. The patient is not nervous/anxious.      Medications:  Current Outpatient Medications on File Prior to Visit   Medication Sig    ACCU-CHEK AMAUYR PLUS TEST STRP Strp USE TO TEST BLOOD GLUCOSE TID    ACCU-CHEK SOFTCLIX LANCETS Misc USE TO TEST BLOOD GLUCOSE TID    atorvastatin (LIPITOR) 40 MG tablet Take 40 mg by mouth once daily.    BLOOD SUGAR DIAGNOSTIC (ACCU-CHEK AMAURY PLUS TEST STRP MISC) 1 strip by Misc.(Non-Drug; Combo Route) route 3 (three) times daily.    calcium-vitamin D3 500 mg(1,250mg) -200 unit per tablet Take 1 tablet by mouth 2 (two) times daily with meals.    ciclopirox (PENLAC) 8 % Soln Apply topically nightly.    conjugated estrogens (PREMARIN) vaginal cream USE 0.5 GRAM VAGINALLY EVERY NIGHT FOR 2 WEEKS THEN USE VAGINALLY 2 TIMES A WEEK    diclofenac sodium (VOLTAREN) 1 % Gel Apply 2 g topically 4 (four) times daily. As needed for breast pain    dicyclomine (BENTYL) 10 MG capsule TAKE 2 CAPSULES(20 MG) BY MOUTH THREE TIMES DAILY BEFORE MEALS    docusate sodium (COLACE) 100 MG capsule Take 1 capsule (100 mg total) by mouth 2 (two) times daily.    estradiol (ESTRACE) 2 MG tablet Take 1 tablet (2 mg total) by mouth once daily.    FARXIGA 10 mg Tab TK 1 T PO QD    fish oil-omega-3 fatty acids 300-1,000 mg capsule Take 2 g by mouth once daily.    fluconazole (DIFLUCAN) 150 MG Tab TK 1 T PO TID A WEEK    fluticasone propionate (FLONASE) 50 mcg/actuation nasal spray  SHAKE LIQUID AND USE 2 SPRAYS(100 MCG) IN EACH NOSTRIL EVERY DAY    gabapentin (NEURONTIN) 300 MG capsule Take 300 mg (1 tablet) twice during the day, then three tablets (900 mg) at night before bed.    GLUCOPHAGE 500 mg tablet Take 500 mg by mouth 2 (two) times daily with meals.     hydrOXYzine HCl (ATARAX) 10 MG Tab TK 1 T PO BID PRF ITCH    ibuprofen (ADVIL,MOTRIN) 800 MG tablet 800 mg every 4 (four) hours as needed.     levothyroxine (SYNTHROID) 50 MCG tablet TK 1 T PO QAM    magnesium oxide (MAG-OX) 400 mg (241.3 mg magnesium) tablet Take 1 tablet (400 mg total) by mouth 2 (two) times daily.    MULTIVIT-IRON-MIN-FOLIC ACID 3,500-18-0.4 UNIT-MG-MG ORAL CHEW Take 1 tablet by mouth once daily.     neomycin-polymyxin-hydrocortisone (CORTISPORIN) 3.5-10,000-10 mg-unit-mg/mL ophthalmic suspension INTILL 1 DROP INTO AFFECTED EYE QID    omeprazole (PRILOSEC OTC) 20 MG tablet Take 20 mg by mouth once daily.    pantoprazole (PROTONIX) 40 MG tablet TAKE 1 TABLET BY MOUTH EVERY DAY    phentermine (ADIPEX-P) 37.5 mg tablet TK 1 T PO  QAM    polyethylene glycol (GLYCOLAX) 17 gram/dose powder Mix 1 capful (17 g) with liquid and take by mouth once daily.    spironolactone (ALDACTONE) 25 MG tablet TK 1 T PO HS    topiramate (TOPAMAX) 50 MG tablet Take 2 tablets (100 mg total) by mouth every evening.    traZODone (DESYREL) 100 MG tablet TK 1 T PO HS    triamcinolone acetonide 0.1% (KENALOG) 0.1 % cream MIX WITH LAMISIL CREAM IN AQUAPHOR TO REACH 4OZ  AND APPLY ONCE DAILY    TRULICITY 1.5 mg/0.5 mL PnIj INJECT 1 PEN INTO THE SKIN Q WEEK    ziprasidone (GEODON) 80 MG capsule Take 80 mg by mouth 2 (two) times daily with meals.     ZOLOFT 100 mg tablet Take 200 mg by mouth once daily.     [DISCONTINUED] metoprolol tartrate (LOPRESSOR) 50 MG Tab Take 1 tablet (50 mg total) by mouth 2 (two) times daily.    [DISCONTINUED] metoprolol succinate (TOPROL-XL) 25 MG 24 hr tablet     [DISCONTINUED] phentermine (ADIPEX-P)  "37.5 MG capsule Take 37.5 mg by mouth every morning.     [DISCONTINUED] traZODone (DESYREL) 50 MG tablet TK 1 T PO QHS     Current Facility-Administered Medications on File Prior to Visit   Medication    onabotulinumtoxina injection 200 Units     Family History:  Maria Ines's family history includes Anesthesia problems in her mother; Breast cancer in her paternal aunt; Breast cancer (age of onset: 50) in her mother; Breast cancer (age of onset: 72) in her maternal aunt; Cervical cancer in her maternal grandmother; Colon cancer (age of onset: 40) in her brother; Esophageal cancer (age of onset: 63) in her mother; Ovarian cancer (age of onset: 63) in her mother.    Social History:  Maria Ines reports that she has never smoked. She has never used smokeless tobacco. She reports that she does not drink alcohol or use drugs.    Objective:   /70 (BP Location: Left arm, Patient Position: Sitting, BP Method: X-Large (Automatic))   Pulse 91   Ht 5' 2" (1.575 m)   Wt 99.3 kg (218 lb 14.7 oz)   LMP 11/17/2012 (LMP Unknown)   SpO2 96%   BMI 40.04 kg/m²     Physical Exam   Constitutional: She is oriented to person, place, and time. Vital signs are normal. She appears well-developed and well-nourished.   HENT:   Head: Normocephalic.   Eyes: Pupils are equal, round, and reactive to light.   Neck: Normal range of motion. Neck supple. No JVD present. Carotid bruit is not present.   Cardiovascular: Normal rate, regular rhythm, S1 normal, S2 normal, normal heart sounds and intact distal pulses. PMI is not displaced. Exam reveals no gallop and no friction rub.   No murmur heard.  Pulses:       Radial pulses are 2+ on the right side, and 2+ on the left side.        Dorsalis pedis pulses are 2+ on the right side, and 2+ on the left side.        Posterior tibial pulses are 1+ on the right side, and 1+ on the left side.   Pulmonary/Chest: Effort normal and breath sounds normal. No accessory muscle usage. She has no decreased " breath sounds. She has no wheezes.   Abdominal: Soft. Normal appearance and bowel sounds are normal. She exhibits no distension, no fluid wave and no ascites. There is no hepatosplenomegaly. There is no tenderness.   Musculoskeletal: Normal range of motion. She exhibits no edema.   Neurological: She is alert and oriented to person, place, and time. She has normal strength.   Skin: Skin is warm, dry and intact. No ecchymosis and no rash noted. No erythema.   Psychiatric: She has a normal mood and affect. Her speech is normal and behavior is normal. Judgment and thought content normal. Cognition and memory are normal.   Vitals reviewed.      EKG:  My independent visualization of most recent EKG is normal sinus rhythm at 84 bpm with incomplete RBBB. Unchanged from previous EKG. Abnormal EKG.     Lipids:  Recent Labs   Lab 01/17/19  1101   LDL Cholesterol 78.8   HDL 37 L   Cholesterol 156      Renal:  Recent Labs   Lab 11/03/19  1906   Creatinine 1.1   Potassium 3.8   CO2 19 L   BUN, Bld 17     Liver:  Recent Labs   Lab 11/03/19  1906   AST 40   ALT 38       Assessment:     1. Frequent PVCs; symptomatic with increase duration and frequency   2. Hypertension, unspecified type; controlled   3. Hyperlipidemia, unspecified hyperlipidemia type; LDL at goal on high intensity statin   4. Severe obesity (BMI 35.0-39.9) with comorbidity; gaining weight     Plan:     Ms. Ac was seen today for palpitations.     Diagnoses and associated orders include:     Frequent PVCs  -    Increase metoprolol tartrate (LOPRESSOR) 100 MG tablet; Take 1 tablet (100 mg total) by mouth 2 (two) times daily.  Dispense: 120 tablet; Refill: 3    Hypertension, unspecified type    Hyperlipidemia, unspecified hyperlipidemia type    Severe obesity (BMI 35.0-39.9) with comorbidity    Ms. Ac was seen today for palpitations. Reports that they have increased in duration and frequency.  Denies any associated symptoms, other than being bothersome.  Increase metoprolol tartrate to 100 mg BID.   Encouraged increased exercise for at least 30 minutes daily, 5 days/week.       Follow up in 10 weeks.     This patient was seen and discussed with Dr. Holley.     Betsy COLLADO, [facility]  01/06/2020  2:03 PM      Follow-up:     Future Appointments   Date Time Provider Department Center   1/8/2020  2:15 PM MARTIN, UROLOGY [facility] UROLOGY [facility]   1/17/2020 10:20 AM Luann Raymond MD [facility] IM [facility] PCW   1/27/2020  9:00 AM Lyubov Goldsmith NP [facility] ENT [facility]   1/27/2020  9:30 AM Mena Lambert MD [facility] NEURO [facility]   2/26/2020 10:30 AM Slick Herron MD [facility] GASTRO [facility]   3/19/2020 11:00 AM Dorcas Holley MD [facility] CARDIO [facility]

## 2020-01-06 NOTE — LETTER
January 8, 2020      Luann Raymond MD  [address]  [place] [address]           [facility] - Cardiology  [address]  [place] [address]  Phone: [phone]          Patient: Maria Ines Ac   MR Number: [id number]   YOB: 1985   Date of Visit: 1/6/2020       Dear Dr. Luann Raymond:    Thank you for referring Maria Ines Ac to me for evaluation. Attached you will find relevant portions of my assessment and plan of care.    If you have questions, please do not hesitate to call me. I look forward to following Maria Ines Ac along with you.    Sincerely,    Dorcas Holley MD    Enclosure  CC:  No Recipients    If you would like to receive this communication electronically, please contact [email or web address] or [phone] to request more information on [employer] Link access.    For providers and/or their staff who would like to refer a patient to [facility], please contact us through our one-stop-shop provider referral line, [facility], at [phone].    If you feel you have received this communication in error or would no longer like to receive these types of communications, please e-mail [email or web address]

## 2020-01-08 ENCOUNTER — PROCEDURE VISIT (OUTPATIENT)
Dept: UROLOGY | Facility: CLINIC | Age: 35
End: 2020-01-08
Payer: MEDICARE

## 2020-01-08 VITALS
SYSTOLIC BLOOD PRESSURE: 118 MMHG | TEMPERATURE: 98 F | HEIGHT: 62 IN | BODY MASS INDEX: 39.01 KG/M2 | WEIGHT: 212 LBS | DIASTOLIC BLOOD PRESSURE: 68 MMHG | HEART RATE: 88 BPM

## 2020-01-08 DIAGNOSIS — N39.8 DYSFUNCTIONAL VOIDING OF URINE: Primary | ICD-10-CM

## 2020-01-08 DIAGNOSIS — N39.46 MIXED STRESS AND URGE URINARY INCONTINENCE: ICD-10-CM

## 2020-01-08 PROCEDURE — 51728 PR COMPLEX CYSTOMETROGRAM VOIDING PRESSURE STUDIES: ICD-10-PCS | Mod: 26,HCNC,S$GLB, | Performed by: UROLOGY

## 2020-01-08 PROCEDURE — 51784 ANAL/URINARY MUSCLE STUDY: CPT | Mod: 26,HCNC,51,S$GLB | Performed by: UROLOGY

## 2020-01-08 PROCEDURE — 51797 INTRAABDOMINAL PRESSURE TEST: CPT | Mod: 26,HCNC,S$GLB, | Performed by: UROLOGY

## 2020-01-08 PROCEDURE — 51797 PR VOIDING PRESS STUDY INTRA-ABDOMINAL VOID: ICD-10-PCS | Mod: 26,HCNC,S$GLB, | Performed by: UROLOGY

## 2020-01-08 PROCEDURE — 51728 CYSTOMETROGRAM W/VP: CPT | Mod: 26,HCNC,S$GLB, | Performed by: UROLOGY

## 2020-01-08 PROCEDURE — 51784 PR ANAL/URINARY MUSCLE STUDY: ICD-10-PCS | Mod: 26,HCNC,51,S$GLB | Performed by: UROLOGY

## 2020-01-08 RX ORDER — AMOXICILLIN 250 MG/1
500 CAPSULE ORAL ONCE
Status: COMPLETED | OUTPATIENT
Start: 2020-01-08 | End: 2020-01-08

## 2020-01-08 RX ADMIN — AMOXICILLIN 500 MG: 250 CAPSULE ORAL at 03:01

## 2020-01-08 NOTE — PATIENT INSTRUCTIONS
SIMPLE URODYNAMIC STUDY (SUDS) DISCHARGE INSTRUCTIONS    You have had a procedure that will require time to properly heal. Follow the instructions you have been given on how to care for yourself once you are home. Below is additional information to help in your recovery.    ACTIVITY  · There are no restrictions in activity. Start doing again the things you did before the procedure.  · You may experience a slight burning sensation. You may notice a small amount of blood in your urine. This will clear up within a day. Call the clinic if this continues beyond 48 hours.     DIET  · Continue your normal diet. You may eat the same foods you ate before your procedure.  · Drink plenty of fluids during the first 24-48 hours following your procedure.     MEDICATIONS  · Resume all other previous medications from your prescribing physician.  · Continue any pre-procedure antibiotics until they are all gone.     SIGNS AND SYMPTOMS TO REPORT TO THE DOCTOR  · Chills or fever greater than 101° F within 24 hours of procedure.  · Changes in urination, such as increased bleeding, foul smell, cloudy urine, or painful urination.  · Call your doctor with any questions or concerns.     For any emergency situation, call [phone] immediately or go to your nearest emergency room.     [facility]  [phone]    After hours or on the weekends call [phone] and ask to speak with an urology resident on-call with any questions or concerns

## 2020-01-08 NOTE — PROCEDURES
Procedures     Urodynamic Report    Indication:  Mixed incontinence in a patient status post retropubic sling and InterStim placement.      Patient was taken to the Urodynamic Suite with a comfortably full bladder and asked to perform a free uroflow.  Next, the patient was prepped and the urinary residual was drained with a 14 Fr catheter.  A 7 Fr dual lumen catheter was placed to measure intravesical pressures.  A 10 Fr balloon manometer was placed into the rectum for abdominal pressure measurements.  Patch EMG electrodes were placed on the perineum.  The patient was connected to the [employer] Urodynamic machine, using a multichannel technique, the data were interpreted.  The bladder was filled with sterile water at room temperature at a rate of 30 ml/min.  Patient is filled to urgency.  Filling is performed with the patient in the seated position.  Abdominal leak point pressures are checked at 1st desire, then serially at 50cc increments first with Valsalva then with coughing.  The patient was then asked to sit and void for a pressure flow study.    The following are the results of the study:  1.  Uroflow       Q max:  Could not void    2.  Amount in bladder at the beginning of the test:  200 ml    3.  CMG       Sensation:         First Desire:  29.9 ml         Normal Desire:  81.4 ml         Strong Desire:  252.9 ml         Urgency:  390.4 ml       Capacity:  563.3 ml       Abnormal Contractions:  none       Compliance:  normal    4.  Abdominal Leak Point Pressure:       Valsalva:  Did not leak       Cough:  129.2 cm H2O at 253.4 ml infused    5.  EMG:  Normal guarding reflex, increased during voiding    6.  Voiding phase       Q max:   143 ml/sec       P det at Q max:  9.6 cm H2O       Pattern of the curve:  normal       Voided volume:  532.3 ml       PVR:  30 ml    7.  Analysis:  Increased sensation, normal capacity, stress incontinence, dysfunctional voider, empties well. (history of variable emptying)    8.   Recommendations:       A.  Physical therapy        B.  Follow up in 6 months.

## 2020-01-13 ENCOUNTER — PATIENT MESSAGE (OUTPATIENT)
Dept: ADMINISTRATIVE | Facility: HOSPITAL | Age: 35
End: 2020-01-13

## 2020-01-13 ENCOUNTER — PATIENT OUTREACH (OUTPATIENT)
Dept: ADMINISTRATIVE | Facility: HOSPITAL | Age: 35
End: 2020-01-13

## 2020-01-13 NOTE — PROGRESS NOTES
Chart Reviewed- Vaccines Updated- Spoke with patient, she is going to call to schedule her Eye Exam - Urine Micro Albumin to be collected at PCP visit.

## 2020-01-15 DIAGNOSIS — G43.719 INTRACTABLE TRANSFORMED MIGRAINE WITHOUT AURA AND WITHOUT STATUS MIGRAINOSUS: Primary | ICD-10-CM

## 2020-01-17 ENCOUNTER — TELEPHONE (OUTPATIENT)
Dept: INTERNAL MEDICINE | Facility: CLINIC | Age: 35
End: 2020-01-17

## 2020-01-17 NOTE — TELEPHONE ENCOUNTER
Left message for patient to call and reschedule appointment, Dr. Raymond out of office today. Patient rescheduled.

## 2020-01-26 ENCOUNTER — PATIENT OUTREACH (OUTPATIENT)
Dept: ADMINISTRATIVE | Facility: OTHER | Age: 35
End: 2020-01-26

## 2020-01-27 ENCOUNTER — TELEPHONE (OUTPATIENT)
Dept: NEUROLOGY | Facility: CLINIC | Age: 35
End: 2020-01-27

## 2020-01-27 NOTE — TELEPHONE ENCOUNTER
Left a message for the patient to let her know her appointment was rescheduled to 2/10 at 1:00. She was asked to call back to reschedule if this is not a good day and time for her.

## 2020-01-27 NOTE — TELEPHONE ENCOUNTER
----- Message from Evy Padron sent at 1/27/2020 11:48 AM CST -----  Contact: pt @[phone]  Pt called in to r/s appt . Please follow up

## 2020-02-05 ENCOUNTER — OFFICE VISIT (OUTPATIENT)
Dept: INTERNAL MEDICINE | Facility: CLINIC | Age: 35
End: 2020-02-05
Payer: MEDICARE

## 2020-02-05 VITALS
HEIGHT: 62 IN | WEIGHT: 218.69 LBS | BODY MASS INDEX: 40.25 KG/M2 | SYSTOLIC BLOOD PRESSURE: 110 MMHG | DIASTOLIC BLOOD PRESSURE: 74 MMHG | OXYGEN SATURATION: 96 % | HEART RATE: 86 BPM

## 2020-02-05 DIAGNOSIS — E11.9 DIABETES MELLITUS WITHOUT COMPLICATION: Primary | ICD-10-CM

## 2020-02-05 DIAGNOSIS — I10 HYPERTENSION, UNSPECIFIED TYPE: ICD-10-CM

## 2020-02-05 PROCEDURE — 3074F PR MOST RECENT SYSTOLIC BLOOD PRESSURE < 130 MM HG: ICD-10-PCS | Mod: HCNC,CPTII,S$GLB, | Performed by: INTERNAL MEDICINE

## 2020-02-05 PROCEDURE — 3078F PR MOST RECENT DIASTOLIC BLOOD PRESSURE < 80 MM HG: ICD-10-PCS | Mod: HCNC,CPTII,S$GLB, | Performed by: INTERNAL MEDICINE

## 2020-02-05 PROCEDURE — 3078F DIAST BP <80 MM HG: CPT | Mod: HCNC,CPTII,S$GLB, | Performed by: INTERNAL MEDICINE

## 2020-02-05 PROCEDURE — 3008F PR BODY MASS INDEX (BMI) DOCUMENTED: ICD-10-PCS | Mod: HCNC,CPTII,S$GLB, | Performed by: INTERNAL MEDICINE

## 2020-02-05 PROCEDURE — 99499 UNLISTED E&M SERVICE: CPT | Mod: HCNC,S$GLB,, | Performed by: INTERNAL MEDICINE

## 2020-02-05 PROCEDURE — 99999 PR PBB SHADOW E&M-EST. PATIENT-LVL V: CPT | Mod: PBBFAC,HCNC,, | Performed by: INTERNAL MEDICINE

## 2020-02-05 PROCEDURE — 3008F BODY MASS INDEX DOCD: CPT | Mod: HCNC,CPTII,S$GLB, | Performed by: INTERNAL MEDICINE

## 2020-02-05 PROCEDURE — 3044F PR MOST RECENT HEMOGLOBIN A1C LEVEL <7.0%: ICD-10-PCS | Mod: HCNC,CPTII,S$GLB, | Performed by: INTERNAL MEDICINE

## 2020-02-05 PROCEDURE — 3074F SYST BP LT 130 MM HG: CPT | Mod: HCNC,CPTII,S$GLB, | Performed by: INTERNAL MEDICINE

## 2020-02-05 PROCEDURE — 99499 RISK ADDL DX/OHS AUDIT: ICD-10-PCS | Mod: HCNC,S$GLB,, | Performed by: INTERNAL MEDICINE

## 2020-02-05 PROCEDURE — 99999 PR PBB SHADOW E&M-EST. PATIENT-LVL V: ICD-10-PCS | Mod: PBBFAC,HCNC,, | Performed by: INTERNAL MEDICINE

## 2020-02-05 PROCEDURE — 99214 PR OFFICE/OUTPT VISIT, EST, LEVL IV, 30-39 MIN: ICD-10-PCS | Mod: HCNC,S$GLB,, | Performed by: INTERNAL MEDICINE

## 2020-02-05 PROCEDURE — 99214 OFFICE O/P EST MOD 30 MIN: CPT | Mod: HCNC,S$GLB,, | Performed by: INTERNAL MEDICINE

## 2020-02-05 PROCEDURE — 3044F HG A1C LEVEL LT 7.0%: CPT | Mod: HCNC,CPTII,S$GLB, | Performed by: INTERNAL MEDICINE

## 2020-02-05 RX ORDER — HYDROXYZINE HYDROCHLORIDE 25 MG/1
TABLET, FILM COATED ORAL
COMMUNITY
Start: 2020-01-13 | End: 2020-07-10

## 2020-02-05 RX ORDER — METOPROLOL TARTRATE 50 MG/1
TABLET ORAL
Status: ON HOLD | COMMUNITY
Start: 2020-01-30 | End: 2020-02-13 | Stop reason: HOSPADM

## 2020-02-05 RX ORDER — BETAMETHASONE DIPROPIONATE 0.5 MG/G
OINTMENT, AUGMENTED TOPICAL
COMMUNITY
Start: 2020-01-13 | End: 2020-12-09

## 2020-02-05 RX ORDER — PHENTERMINE HYDROCHLORIDE 37.5 MG/1
CAPSULE ORAL
COMMUNITY
Start: 2020-01-07

## 2020-02-05 RX ORDER — LEVOTHYROXINE SODIUM 75 UG/1
75 TABLET ORAL
COMMUNITY
Start: 2020-01-16

## 2020-02-06 ENCOUNTER — PATIENT OUTREACH (OUTPATIENT)
Dept: ADMINISTRATIVE | Facility: OTHER | Age: 35
End: 2020-02-06

## 2020-02-10 NOTE — H&P (VIEW-ONLY)
Subjective:       Patient ID: Maria Ines Ac is a 35 y.o. female.    Chief Complaint: Hypertension    HPI  She returns for management of hypertension.  She has had hypertension for over a year.  Current treatment has included medications outlined in medication list.  She denies chest pain or shortness of breath.  No palpitations.  Denies left arm or neck pain. She has diabetes.  Denies polyuria, polydipsia    Past medical history: Hypertension, hyperlipidemia, diabetes, hypothyroidism, bipolar disorder, migraine headaches, status post hysterectomy, increased risk breast cancer      Medications: Synthroid 0.05 mg daily, metformin, Toprol-XL 25mg daily,Lipitor 40 mg daily, trulicity      NO KNOWN DRUG ALLERGIES       Review of Systems   Constitutional: Negative for chills, fatigue, fever and unexpected weight change.   Respiratory: Negative for chest tightness and shortness of breath.    Cardiovascular: Negative for chest pain and palpitations.   Gastrointestinal: Negative for abdominal pain and blood in stool.   Neurological: Negative for dizziness, syncope, numbness and headaches.       Objective:      Physical Exam   HENT:   Right Ear: External ear normal.   Left Ear: External ear normal.   Nose: Nose normal.   Mouth/Throat: Oropharynx is clear and moist.   Eyes: Pupils are equal, round, and reactive to light.   Neck: Normal range of motion.   Cardiovascular: Normal rate and regular rhythm.   No murmur heard.  Pulmonary/Chest: Breath sounds normal.   Abdominal: She exhibits no distension. There is no hepatosplenomegaly. There is no tenderness.   Lymphadenopathy:     She has no cervical adenopathy.     She has no axillary adenopathy.   Neurological: She has normal strength and normal reflexes. No cranial nerve deficit or sensory deficit.       Assessment/Plan       Assessment and plan:  1.  Hypertension:  Check CMP and lipid panel  2.  Diabetes:  Check A1c and urine microalbumin

## 2020-02-11 ENCOUNTER — TELEPHONE (OUTPATIENT)
Dept: NEUROLOGY | Facility: CLINIC | Age: 35
End: 2020-02-11

## 2020-02-11 NOTE — TELEPHONE ENCOUNTER
----- Message from Donna Alves sent at 2/10/2020  1:21 PM CST -----  Contact: Pt  Reason: Pt calling to reschedule appt from today     Communication: [phone]

## 2020-02-13 ENCOUNTER — HOSPITAL ENCOUNTER (OUTPATIENT)
Facility: HOSPITAL | Age: 35
Discharge: HOME | End: 2020-02-13
Attending: INTERNAL MEDICINE | Admitting: INTERNAL MEDICINE
Payer: MEDICARE

## 2020-02-13 ENCOUNTER — PATIENT OUTREACH (OUTPATIENT)
Dept: ADMINISTRATIVE | Facility: OTHER | Age: 35
End: 2020-02-13

## 2020-02-13 ENCOUNTER — ANESTHESIA EVENT (OUTPATIENT)
Dept: ENDOSCOPY | Facility: HOSPITAL | Age: 35
End: 2020-02-13
Payer: MEDICARE

## 2020-02-13 ENCOUNTER — ANESTHESIA (OUTPATIENT)
Dept: ENDOSCOPY | Facility: HOSPITAL | Age: 35
End: 2020-02-13
Payer: MEDICARE

## 2020-02-13 VITALS
WEIGHT: 218 LBS | DIASTOLIC BLOOD PRESSURE: 61 MMHG | SYSTOLIC BLOOD PRESSURE: 125 MMHG | HEART RATE: 90 BPM | TEMPERATURE: 98 F | HEIGHT: 62 IN | BODY MASS INDEX: 40.12 KG/M2 | RESPIRATION RATE: 18 BRPM | OXYGEN SATURATION: 94 %

## 2020-02-13 DIAGNOSIS — R13.10 DYSPHAGIA: ICD-10-CM

## 2020-02-13 DIAGNOSIS — R13.19 ESOPHAGEAL DYSPHAGIA: Primary | ICD-10-CM

## 2020-02-13 LAB — POCT GLUCOSE: 97 MG/DL (ref 70–110)

## 2020-02-13 PROCEDURE — 27201012 HC FORCEPS, HOT/COLD, DISP: Mod: HCNC | Performed by: INTERNAL MEDICINE

## 2020-02-13 PROCEDURE — 43239 EGD BIOPSY SINGLE/MULTIPLE: CPT | Mod: HCNC | Performed by: INTERNAL MEDICINE

## 2020-02-13 PROCEDURE — 37000009 HC ANESTHESIA EA ADD 15 MINS: Mod: HCNC | Performed by: INTERNAL MEDICINE

## 2020-02-13 PROCEDURE — 43450 DILATE ESOPHAGUS 1/MULT PASS: CPT | Mod: HCNC | Performed by: INTERNAL MEDICINE

## 2020-02-13 PROCEDURE — E9220 PRA ENDO ANESTHESIA: HCPCS | Mod: HCNC,,, | Performed by: NURSE ANESTHETIST, CERTIFIED REGISTERED

## 2020-02-13 PROCEDURE — 00731 ANES UPR GI NDSC PX NOS: CPT | Mod: HCNC | Performed by: INTERNAL MEDICINE

## 2020-02-13 PROCEDURE — E9220 PRA ENDO ANESTHESIA: ICD-10-PCS | Mod: HCNC,,, | Performed by: NURSE ANESTHETIST, CERTIFIED REGISTERED

## 2020-02-13 PROCEDURE — 88305 TISSUE EXAM BY PATHOLOGIST: CPT | Mod: HCNC | Performed by: PATHOLOGY

## 2020-02-13 PROCEDURE — 25000003 PHARM REV CODE 250: Mod: HCNC | Performed by: NURSE ANESTHETIST, CERTIFIED REGISTERED

## 2020-02-13 PROCEDURE — 43450 PR DILATE ESOPHAGUS: ICD-10-PCS | Mod: 59,HCNC,, | Performed by: INTERNAL MEDICINE

## 2020-02-13 PROCEDURE — 43239 PR EGD, FLEX, W/BIOPSY, SGL/MULTI: ICD-10-PCS | Mod: HCNC,,, | Performed by: INTERNAL MEDICINE

## 2020-02-13 PROCEDURE — 63600175 PHARM REV CODE 636 W HCPCS: Mod: HCNC | Performed by: NURSE ANESTHETIST, CERTIFIED REGISTERED

## 2020-02-13 PROCEDURE — 88305 TISSUE EXAM BY PATHOLOGIST: CPT | Mod: 26,HCNC,, | Performed by: PATHOLOGY

## 2020-02-13 PROCEDURE — 37000008 HC ANESTHESIA 1ST 15 MINUTES: Mod: HCNC | Performed by: INTERNAL MEDICINE

## 2020-02-13 PROCEDURE — 63600175 PHARM REV CODE 636 W HCPCS: Mod: HCNC | Performed by: INTERNAL MEDICINE

## 2020-02-13 PROCEDURE — 43450 DILATE ESOPHAGUS 1/MULT PASS: CPT | Mod: 59,HCNC,, | Performed by: INTERNAL MEDICINE

## 2020-02-13 PROCEDURE — 43239 EGD BIOPSY SINGLE/MULTIPLE: CPT | Mod: HCNC,,, | Performed by: INTERNAL MEDICINE

## 2020-02-13 PROCEDURE — 88305 TISSUE EXAM BY PATHOLOGIST: ICD-10-PCS | Mod: 26,HCNC,, | Performed by: PATHOLOGY

## 2020-02-13 RX ORDER — PROPOFOL 10 MG/ML
VIAL (ML) INTRAVENOUS
Status: DISCONTINUED | OUTPATIENT
Start: 2020-02-13 | End: 2020-02-13

## 2020-02-13 RX ORDER — SODIUM CHLORIDE 9 MG/ML
INJECTION, SOLUTION INTRAVENOUS CONTINUOUS
Status: DISCONTINUED | OUTPATIENT
Start: 2020-02-13 | End: 2020-02-13 | Stop reason: HOSPADM

## 2020-02-13 RX ORDER — PROPOFOL 10 MG/ML
VIAL (ML) INTRAVENOUS CONTINUOUS PRN
Status: DISCONTINUED | OUTPATIENT
Start: 2020-02-13 | End: 2020-02-13

## 2020-02-13 RX ORDER — GLYCOPYRROLATE 0.2 MG/ML
INJECTION INTRAMUSCULAR; INTRAVENOUS
Status: DISCONTINUED | OUTPATIENT
Start: 2020-02-13 | End: 2020-02-13

## 2020-02-13 RX ORDER — LIDOCAINE HYDROCHLORIDE 20 MG/ML
INJECTION INTRAVENOUS
Status: DISCONTINUED | OUTPATIENT
Start: 2020-02-13 | End: 2020-02-13

## 2020-02-13 RX ADMIN — GLYCOPYRROLATE 0.2 MG: 0.2 INJECTION, SOLUTION INTRAMUSCULAR; INTRAVENOUS at 02:02

## 2020-02-13 RX ADMIN — PROPOFOL 200 MCG/KG/MIN: 10 INJECTION, EMULSION INTRAVENOUS at 02:02

## 2020-02-13 RX ADMIN — LIDOCAINE HYDROCHLORIDE 100 MG: 20 INJECTION, SOLUTION INTRAVENOUS at 02:02

## 2020-02-13 RX ADMIN — SODIUM CHLORIDE: 0.9 INJECTION, SOLUTION INTRAVENOUS at 02:02

## 2020-02-13 RX ADMIN — PROPOFOL 100 MG: 10 INJECTION, EMULSION INTRAVENOUS at 02:02

## 2020-02-13 RX ADMIN — TOPICAL ANESTHETIC 1 EACH: 200 SPRAY DENTAL; PERIODONTAL at 02:02

## 2020-02-13 NOTE — DISCHARGE INSTRUCTIONS

## 2020-02-13 NOTE — [employer] PATIENT INSTRUCTIONS
Discharge Summary/Instructions after an Endoscopic Procedure  Patient Name: Maria Ines Ac  Patient MRN: [id number]  Patient YOB: 1985 Thursday, February 13, 2020  Slick Herron MD  RESTRICTIONS:  During your procedure today, you received medications for sedation.  These   medications may affect your judgment, balance and coordination.  Therefore,   for 24 hours, you have the following restrictions:   - DO NOT drive a car, operate machinery, make legal/financial decisions,   sign important papers or drink alcohol.    ACTIVITY:  Today: no heavy lifting, straining or running due to procedural   sedation/anesthesia.  The following day: return to full activity including work.  DIET:  Eat and drink normally unless instructed otherwise.     TREATMENT FOR COMMON SIDE EFFECTS:  - Mild abdominal pain, nausea, belching, bloating or excessive gas:  rest,   eat lightly and use a heating pad.  - Sore Throat: treat with throat lozenges and/or gargle with warm salt   water.  - Because air was used during the procedure, expelling large amounts of air   from your rectum or belching is normal.  - If a bowel prep was taken, you may not have a bowel movement for 1-3 days.    This is normal.  SYMPTOMS TO WATCH FOR AND REPORT TO YOUR PHYSICIAN:  1. Abdominal pain or bloating, other than gas cramps.  2. Chest pain.  3. Back pain.  4. Signs of infection such as: chills or fever occurring within 24 hours   after the procedure.  5. Rectal bleeding, which would show as bright red, maroon, or black stools.   (A tablespoon of blood from the rectum is not serious, especially if   hemorrhoids are present.)  6. Vomiting.  7. Weakness or dizziness.  GO DIRECTLY TO THE NEAREST EMERGENCY ROOM IF YOU HAVE ANY OF THE FOLLOWING:      Difficulty breathing              Chills and/or fever over 101 F   Persistent vomiting and/or vomiting blood   Severe abdominal pain   Severe chest pain   Black, tarry stools   Bleeding- more than one  tablespoon   Any other symptom or condition that you feel may need urgent attention  Your doctor recommends these additional instructions:  If any biopsies were taken, your doctors clinic will contact you in 1 to 2   weeks with any results.  - Patient has a contact number available for emergencies.  The signs and   symptoms of potential delayed complications were discussed with the   patient.  Return to normal activities tomorrow.  Written discharge   instructions were provided to the patient.   - Discharge patient to home (ambulatory).   - Resume previous diet.   - Continue present medications.   - Await pathology results.   - Repeat upper endoscopy PRN for retreatment.  For questions, problems or results please call your physician - Slick Herron MD at Work:  [phone].  [facility] [place], EMERGENCY ROOM PHONE NUMBER: [phone]  IF A COMPLICATION OR EMERGENCY SITUATION ARISES AND YOU ARE UNABLE TO REACH   YOUR PHYSICIAN - GO DIRECTLY TO THE EMERGENCY ROOM.  Slick Herron MD  2/13/2020 2:32:05 PM  This report has been verified and signed electronically.  [employer]

## 2020-02-13 NOTE — ANESTHESIA PREPROCEDURE EVALUATION
02/13/2020  Maria Ines Ac is a 35 y.o., female.  Past Medical History:   Diagnosis Date    Blood in stool     Constipation     Cystitis     Depression     Diabetes mellitus, type 2     Dysphagia     Endometriosis     GERD (gastroesophageal reflux disease)     Headache     Hypertension     Palpitations     Premature menopause on hormone replacement therapy     Thyroid disease      Past Surgical History:   Procedure Laterality Date    BLADDER SUSPENSION      CARPAL TUNNEL RELEASE      right    CHOLECYSTECTOMY      COLONOSCOPY N/A 8/30/2016    Procedure: COLONOSCOPY;  Surgeon: Slick Herron MD;  Location: [facility] (4TH F[facility]);  Service: Endoscopy;  Laterality: N/A;  PM prep    ESOPHAGEAL MANOMETRY WITH MEASUREMENT OF IMPEDANCE N/A 11/5/2018    Procedure: MANOMETRY, ESOPHAGUS, WITH IMPEDANCE MEASUREMENT;  Surgeon: Slick Herron MD;  Location: [facility] (4TH F[facility]);  Service: Endoscopy;  Laterality: N/A;    FLUOROSCOPIC URODYNAMIC STUDY N/A 5/23/2019    Procedure: URODYNAMIC STUDY, FLUOROSCOPIC;  Surgeon: Zena Tee MD;  [facility];  Service: Urology;  Laterality: N/A;  1 hour    GALLBLADDER SURGERY      HYSTERECTOMY  2013    Bess velasquez Dr. Champlain    IMPLANTATION OF PERMANENT SACRAL NERVE STIMULATOR N/A 7/30/2019    Procedure: INSERTION, NEUROSTIMULATOR, PERMANENT, SACRAL;  Surgeon: Zena Tee MD;  Location: [facility] OR 2ND FLR;  Service: Urology;  Laterality: N/A;  30 min    OOPHORECTOMY  2005    LSO- benign cyst    OOPHORECTOMY  2013    RSO- endo    ooptherectomy      right knee  scoped    SHOULDER SURGERY  right     Patient Active Problem List   Diagnosis    Abdominal pain, RLQ (right lower quadrant)    Dysphagia    Diabetes mellitus, type II    Constipation, chronic    Right hip pain    Intractable chronic migraine without aura and  without status migrainosus    Hypertension    Palpitations    Chest pain, non-cardiac    Dyspnea on exertion    Dysfunctional voiding of urine    Severe obesity (BMI 35.0-39.9) with comorbidity    Muscle spasm    Mixed stress and urge urinary incontinence    Gastroesophageal reflux disease without esophagitis    Irritable bowel syndrome with diarrhea    Migrainous vertigo    Uncontrolled morning headache    Sleep arousal disorder    Hyperlipidemia    GERD (gastroesophageal reflux disease)    Family history of breast cancer    Family hx of ovarian malignancy    Weakness    Painful cervical ROM    Incomplete emptying of bladder    Bilateral occipital neuralgia         Anesthesia Evaluation    I have reviewed the Patient Summary Reports.     I have reviewed the Medications.     Review of Systems  Anesthesia Hx:  No problems with previous Anesthesia  Denies Family Hx of Anesthesia complications.   Denies Personal Hx of Anesthesia complications.   Hematology/Oncology:  Hematology Normal   Oncology Normal     EENT/Dental:EENT/Dental Normal   Cardiovascular:   Hypertension    Pulmonary:   Shortness of breath Dyspnea on exertion   Renal/:  Renal/ Normal     Hepatic/GI:   GERD    Musculoskeletal:  Musculoskeletal Normal    Neurological:   Headaches    Endocrine:   Diabetes, type 2    Dermatological:  Skin Normal    Psych:   Psychiatric History depression          Physical Exam  General:  Well nourished    Airway/Jaw/Neck:  Airway Findings: Mouth Opening: Normal Tongue: Normal  General Airway Assessment: Adult  Mallampati: I  TM Distance: Normal, at least 6 cm  Jaw/Neck Findings:     Neck ROM: Normal ROM      Dental:  Dental Findings: In tact   Chest/Lungs:  Chest/Lungs Findings: Clear to auscultation, Normal Respiratory Rate     Heart/Vascular:  Heart Findings: Rate: Normal  Rhythm: Regular Rhythm  Sounds: Normal     Abdomen:  Abdomen Findings:  Normal       Mental Status:  Mental Status Findings:   Cooperative, Alert and Oriented         Anesthesia Plan  Type of Anesthesia, risks & benefits discussed:  Anesthesia Type:  general  Patient's Preference:   Intra-op Monitoring Plan: standard ASA monitors  Intra-op Monitoring Plan Comments:   Post Op Pain Control Plan: multimodal analgesia  Post Op Pain Control Plan Comments:   Induction:   IV  Beta Blocker:  Patient is not currently on a Beta-Blocker (No further documentation required).       Informed Consent: Patient understands risks and agrees with Anesthesia plan.  Questions answered. Anesthesia consent signed with patient.  ASA Score: 2     Day of Surgery Review of History & Physical:  There are no significant changes.          Ready For Surgery From Anesthesia Perspective.

## 2020-02-13 NOTE — ANESTHESIA POSTPROCEDURE EVALUATION
Anesthesia Post Evaluation    Patient: Maria Ines Ac    Procedure(s) Performed: Procedure(s) (LRB):  EGD (ESOPHAGOGASTRODUODENOSCOPY) (N/A)    Final Anesthesia Type: general    Patient location during evaluation: GI PACU  Patient participation: Yes- Able to Participate  Level of consciousness: awake and alert and oriented  Post-procedure vital signs: reviewed and stable  Pain management: adequate  Airway patency: patent    PONV status at discharge: No PONV  Anesthetic complications: no      Cardiovascular status: blood pressure returned to baseline and hemodynamically stable  Respiratory status: unassisted  Hydration status: euvolemic  Follow-up not needed.          Vitals Value Taken Time   /62 2/13/2020  1:21 PM   Temp 36.3 °C (97.3 °F) 2/13/2020  1:21 PM   Pulse 85 2/13/2020  1:21 PM   Resp 16 2/13/2020  1:21 PM   SpO2 96 % 2/13/2020  1:21 PM         No case tracking events are documented in the log.      Pain/Mac Score: No data recorded

## 2020-02-19 ENCOUNTER — TELEPHONE (OUTPATIENT)
Dept: NEUROLOGY | Facility: CLINIC | Age: 35
End: 2020-02-19

## 2020-02-20 ENCOUNTER — TELEPHONE (OUTPATIENT)
Dept: ENDOSCOPY | Facility: HOSPITAL | Age: 35
End: 2020-02-20

## 2020-02-22 ENCOUNTER — PATIENT OUTREACH (OUTPATIENT)
Dept: ADMINISTRATIVE | Facility: OTHER | Age: 35
End: 2020-02-22

## 2020-02-23 DIAGNOSIS — J01.90 ACUTE NON-RECURRENT SINUSITIS, UNSPECIFIED LOCATION: ICD-10-CM

## 2020-02-23 DIAGNOSIS — R09.81 NASAL CONGESTION: ICD-10-CM

## 2020-02-24 RX ORDER — FLUTICASONE PROPIONATE 50 MCG
SPRAY, SUSPENSION (ML) NASAL
Qty: 48 G | Refills: 2 | Status: SHIPPED | OUTPATIENT
Start: 2020-02-24 | End: 2020-11-23

## 2020-02-27 ENCOUNTER — TELEPHONE (OUTPATIENT)
Dept: NEUROLOGY | Facility: CLINIC | Age: 35
End: 2020-02-27

## 2020-02-27 NOTE — TELEPHONE ENCOUNTER
----- Message from Evy Padron sent at 2/13/2020  4:51 PM CST -----  Contact: @[phone]  Pt called in to r/s botox. Please call back @[phone]

## 2020-03-02 ENCOUNTER — PATIENT OUTREACH (OUTPATIENT)
Dept: ADMINISTRATIVE | Facility: OTHER | Age: 35
End: 2020-03-02

## 2020-03-03 ENCOUNTER — LAB VISIT (OUTPATIENT)
Dept: LAB | Facility: HOSPITAL | Age: 35
End: 2020-03-03
Attending: INTERNAL MEDICINE
Payer: MEDICARE

## 2020-03-03 ENCOUNTER — PROCEDURE VISIT (OUTPATIENT)
Dept: NEUROLOGY | Facility: CLINIC | Age: 35
End: 2020-03-03
Payer: MEDICARE

## 2020-03-03 VITALS
WEIGHT: 220.44 LBS | HEART RATE: 88 BPM | HEIGHT: 62 IN | DIASTOLIC BLOOD PRESSURE: 71 MMHG | SYSTOLIC BLOOD PRESSURE: 99 MMHG | BODY MASS INDEX: 40.57 KG/M2

## 2020-03-03 DIAGNOSIS — E11.9 DIABETES MELLITUS WITHOUT COMPLICATION: ICD-10-CM

## 2020-03-03 DIAGNOSIS — R51.9 CHRONIC DAILY HEADACHE: ICD-10-CM

## 2020-03-03 DIAGNOSIS — G47.8 SLEEP AROUSAL DISORDER: ICD-10-CM

## 2020-03-03 LAB
ALBUMIN SERPL BCP-MCNC: 3.8 G/DL (ref 3.5–5.2)
ALP SERPL-CCNC: 61 U/L (ref 55–135)
ALT SERPL W/O P-5'-P-CCNC: 13 U/L (ref 10–44)
ANION GAP SERPL CALC-SCNC: 9 MMOL/L (ref 8–16)
AST SERPL-CCNC: 14 U/L (ref 10–40)
BILIRUB SERPL-MCNC: 0.2 MG/DL (ref 0.1–1)
BUN SERPL-MCNC: 12 MG/DL (ref 6–20)
CALCIUM SERPL-MCNC: 9.6 MG/DL (ref 8.7–10.5)
CHLORIDE SERPL-SCNC: 107 MMOL/L (ref 95–110)
CHOLEST SERPL-MCNC: 168 MG/DL (ref 120–199)
CHOLEST/HDLC SERPL: 3.9 {RATIO} (ref 2–5)
CO2 SERPL-SCNC: 22 MMOL/L (ref 23–29)
CREAT SERPL-MCNC: 1.2 MG/DL (ref 0.5–1.4)
EST. GFR  (AFRICAN AMERICAN): >60 ML/MIN/1.73 M^2
EST. GFR  (NON AFRICAN AMERICAN): 57.9 ML/MIN/1.73 M^2
GLUCOSE SERPL-MCNC: 82 MG/DL (ref 70–110)
HDLC SERPL-MCNC: 43 MG/DL (ref 40–75)
HDLC SERPL: 25.6 % (ref 20–50)
LDLC SERPL CALC-MCNC: 57 MG/DL (ref 63–159)
NONHDLC SERPL-MCNC: 125 MG/DL
POTASSIUM SERPL-SCNC: 3.7 MMOL/L (ref 3.5–5.1)
PROT SERPL-MCNC: 7.6 G/DL (ref 6–8.4)
SODIUM SERPL-SCNC: 138 MMOL/L (ref 136–145)
TRIGL SERPL-MCNC: 340 MG/DL (ref 30–150)

## 2020-03-03 PROCEDURE — 80053 COMPREHEN METABOLIC PANEL: CPT | Mod: HCNC

## 2020-03-03 PROCEDURE — 83036 HEMOGLOBIN GLYCOSYLATED A1C: CPT | Mod: HCNC

## 2020-03-03 PROCEDURE — 64615 PR CHEMODENERVATION OF MUSCLE FOR CHRONIC MIGRAINE: ICD-10-PCS | Mod: HCNC,S$GLB,, | Performed by: PSYCHIATRY & NEUROLOGY

## 2020-03-03 PROCEDURE — 36415 COLL VENOUS BLD VENIPUNCTURE: CPT | Mod: HCNC

## 2020-03-03 PROCEDURE — 80061 LIPID PANEL: CPT | Mod: HCNC

## 2020-03-03 PROCEDURE — 64615 CHEMODENERV MUSC MIGRAINE: CPT | Mod: HCNC,S$GLB,, | Performed by: PSYCHIATRY & NEUROLOGY

## 2020-03-04 LAB
ESTIMATED AVG GLUCOSE: 108 MG/DL (ref 68–131)
HBA1C MFR BLD HPLC: 5.4 % (ref 4–5.6)

## 2020-03-04 NOTE — PROCEDURES
"Maria Ines Ac is a 35 y.o. female patient.    Pulse: 88 (03/03/20 0804)  BP: 99/71 (03/03/20 0804)  Weight: 100 kg (220 lb 7.4 oz) (03/03/20 0804)  Height: 5' 2" (157.5 cm) (03/03/20 0804)       Procedures  BOTOX was performed as an indicated therapy for intractable chronic migraine headaches given that the patient failed more than 2 headache medications    Botulinum Toxin Injection Procedure   Pre-operative diagnosis: Chronic migraine   Post-operative diagnosis: Same   Procedure: Chemical neurolysis   After risks and benefits were explained including bleeding, infection, worsening of pain, damage to the areas being injected, weakness of muscles, loss of muscle control, dysphagia if injecting the head or neck, facial droop if injecting the facial area, painful injection, allergic or other reaction to the medications being injected, and the failure of the procedure to help the problem, a signed consent was obtained. Time out was called with confirmation of name and date of birth as well as the treatment site with a witness in the room.  The patient was placed in a comfortable area and the sites to be treated were identified.  Next, a 30 gauge needle was used to inject the medication in the area to be treated.   Area(s) injected:   Total Botox used: 155 Units   Botox wastage: 45 Units   Injection sites:    muscle bilaterally ( a total of 10 units divided into 2 sites)   Procerus muscle (5 units)   Frontalis muscle bilaterally (a total of 20 units divided into 4 sites)   Temporalis muscle bilaterally (a total of 40 units divided into 8 sites)   Occipitalis muscle bilaterally (a total of 30 units divided into 6 sites)   Cervical paraspinal muscles (a total of 20 units divided into 4 sites)   Trapezius muscle bilaterally (a total of 30 units divided into 6 sites)   Complications: none   RTC for the next Botox injection: 3 months     The patient tolerated the procedure well and did not experience any " complications.     Problem List Items Addressed This Visit     None        Mena Lambert  3/4/2020

## 2020-03-10 ENCOUNTER — TELEPHONE (OUTPATIENT)
Dept: GASTROENTEROLOGY | Facility: CLINIC | Age: 35
End: 2020-03-10

## 2020-03-10 LAB — FINAL PATHOLOGIC DIAGNOSIS: NORMAL

## 2020-03-10 NOTE — TELEPHONE ENCOUNTER
----- Message from Slick Herron MD sent at 3/10/2020  2:49 PM CDT -----  The biopsy showed mild gastritis, but no bacterial infection

## 2020-03-18 ENCOUNTER — PATIENT OUTREACH (OUTPATIENT)
Dept: ADMINISTRATIVE | Facility: OTHER | Age: 35
End: 2020-03-18

## 2020-03-18 ENCOUNTER — TELEPHONE (OUTPATIENT)
Dept: OTOLARYNGOLOGY | Facility: CLINIC | Age: 35
End: 2020-03-18

## 2020-03-19 ENCOUNTER — TELEPHONE (OUTPATIENT)
Dept: GASTROENTEROLOGY | Facility: CLINIC | Age: 35
End: 2020-03-19

## 2020-03-19 NOTE — TELEPHONE ENCOUNTER
----- Message from Slick Herron MD sent at 3/19/2020 12:40 PM CDT -----  Ok, we can do an appt later  ----- Message -----  From: Krystle Mcintosh MA  Sent: 3/19/2020  12:20 PM CDT  To: Slick Herron MD    Spoke with mother.  They do not have the capability to do a video visit.  She is not taking Zofran or Phenergan.  Mother stated she is not having nausea, she is having pain in her stomach.   She would like an appointment scheduled once everything settles down.  ----- Message -----  From: Slick Herron MD  Sent: 3/19/2020   9:20 AM CDT  To: Krystle Mcintosh MA    Doesn't need appt  I can do telemed  I don't see either zofran or phenergan on her meds  So I can also call something in for her  ----- Message -----  From: Krystle Mcintosh MA  Sent: 3/18/2020   3:17 PM CDT  To: Slick Herron MD        ----- Message -----  From: Rhoda Liz  Sent: 3/18/2020   3:11 PM CDT  To: Gopal BOCANEGRA Staff    Pt mom called in for an appt for pt.  She stated she is have is vomiting,and nausea. She still the medication is not helping. Call back [phone],

## 2020-04-15 ENCOUNTER — TELEPHONE (OUTPATIENT)
Dept: PODIATRY | Facility: CLINIC | Age: 35
End: 2020-04-15

## 2020-04-15 NOTE — TELEPHONE ENCOUNTER
----- Message from Ino Monsivais sent at 4/14/2020  5:02 PM CDT -----  Contact: Patient  Patient called in and wanted to schedule an appointment and would like a call back from the office. She can be reached at    [phone]

## 2020-04-24 ENCOUNTER — TELEPHONE (OUTPATIENT)
Dept: OTOLARYNGOLOGY | Facility: CLINIC | Age: 35
End: 2020-04-24

## 2020-04-24 NOTE — TELEPHONE ENCOUNTER
----- Message from Renea Shetty MA sent at 4/24/2020  4:41 PM CDT -----  Contact: patient  Charis Bass you want to see if this patient would like to see Dr. Jasso?  ----- Message -----  From: Dorie Lawton  Sent: 4/24/2020   4:36 PM CDT  To: Agus Mcarthur Staff    Please call above patient at [phone] would like to schedule appointment having sinus problems waiting on a call from the nurse thanks.

## 2020-04-27 ENCOUNTER — TELEPHONE (OUTPATIENT)
Dept: OTOLARYNGOLOGY | Facility: CLINIC | Age: 35
End: 2020-04-27

## 2020-04-29 RX ORDER — PANTOPRAZOLE SODIUM 40 MG/1
TABLET, DELAYED RELEASE ORAL
Qty: 90 TABLET | Refills: 0 | Status: SHIPPED | OUTPATIENT
Start: 2020-04-29 | End: 2020-07-30 | Stop reason: SDUPTHER

## 2020-05-11 ENCOUNTER — OFFICE VISIT (OUTPATIENT)
Dept: UROLOGY | Facility: CLINIC | Age: 35
End: 2020-05-11
Payer: MEDICARE

## 2020-05-11 ENCOUNTER — TELEPHONE (OUTPATIENT)
Dept: UROLOGY | Facility: CLINIC | Age: 35
End: 2020-05-11

## 2020-05-11 VITALS
HEART RATE: 84 BPM | BODY MASS INDEX: 40.21 KG/M2 | SYSTOLIC BLOOD PRESSURE: 101 MMHG | WEIGHT: 218.5 LBS | DIASTOLIC BLOOD PRESSURE: 75 MMHG | HEIGHT: 62 IN

## 2020-05-11 DIAGNOSIS — N89.8 VAGINAL DISCHARGE: Primary | ICD-10-CM

## 2020-05-11 DIAGNOSIS — N39.41 URGE INCONTINENCE: ICD-10-CM

## 2020-05-11 DIAGNOSIS — R33.9 INCOMPLETE EMPTYING OF BLADDER: ICD-10-CM

## 2020-05-11 PROCEDURE — 3078F PR MOST RECENT DIASTOLIC BLOOD PRESSURE < 80 MM HG: ICD-10-PCS | Mod: HCNC,CPTII,S$GLB, | Performed by: UROLOGY

## 2020-05-11 PROCEDURE — 3008F BODY MASS INDEX DOCD: CPT | Mod: HCNC,CPTII,S$GLB, | Performed by: UROLOGY

## 2020-05-11 PROCEDURE — 3078F DIAST BP <80 MM HG: CPT | Mod: HCNC,CPTII,S$GLB, | Performed by: UROLOGY

## 2020-05-11 PROCEDURE — 99999 PR PBB SHADOW E&M-EST. PATIENT-LVL III: CPT | Mod: PBBFAC,HCNC,, | Performed by: UROLOGY

## 2020-05-11 PROCEDURE — 87481 CANDIDA DNA AMP PROBE: CPT | Mod: 59,HCNC

## 2020-05-11 PROCEDURE — 3074F SYST BP LT 130 MM HG: CPT | Mod: HCNC,CPTII,S$GLB, | Performed by: UROLOGY

## 2020-05-11 PROCEDURE — 99213 PR OFFICE/OUTPT VISIT, EST, LEVL III, 20-29 MIN: ICD-10-PCS | Mod: 25,HCNC,S$GLB, | Performed by: UROLOGY

## 2020-05-11 PROCEDURE — 87801 DETECT AGNT MULT DNA AMPLI: CPT | Mod: HCNC

## 2020-05-11 PROCEDURE — 3008F PR BODY MASS INDEX (BMI) DOCUMENTED: ICD-10-PCS | Mod: HCNC,CPTII,S$GLB, | Performed by: UROLOGY

## 2020-05-11 PROCEDURE — 3074F PR MOST RECENT SYSTOLIC BLOOD PRESSURE < 130 MM HG: ICD-10-PCS | Mod: HCNC,CPTII,S$GLB, | Performed by: UROLOGY

## 2020-05-11 PROCEDURE — 95971 ALYS SMPL SP/PN NPGT W/PRGRM: CPT | Mod: HCNC,S$GLB,, | Performed by: UROLOGY

## 2020-05-11 PROCEDURE — 99213 OFFICE O/P EST LOW 20 MIN: CPT | Mod: 25,HCNC,S$GLB, | Performed by: UROLOGY

## 2020-05-11 PROCEDURE — 87661 TRICHOMONAS VAGINALIS AMPLIF: CPT | Mod: HCNC

## 2020-05-11 PROCEDURE — 95971 PR ANALYZE NEUROSTIM,SIMPLE/PROG: ICD-10-PCS | Mod: HCNC,S$GLB,, | Performed by: UROLOGY

## 2020-05-11 PROCEDURE — 99999 PR PBB SHADOW E&M-EST. PATIENT-LVL III: ICD-10-PCS | Mod: PBBFAC,HCNC,, | Performed by: UROLOGY

## 2020-05-11 NOTE — PROGRESS NOTES
CHIEF COMPLAINT:    Mrs. Ac is a 35 y.o. female presenting for a follow up on incomplete bladder emptying.    PRESENTING ILLNESS:    Maria Ines Ac is a 35 y.o. female who returns for follow up.  She states she does not feel the level of stimulation.  She has worsening of the urgency, has occasional urge incontinence.  She also has a clear vaginal discharge with some vaginal pain for the past week.  No trauma, no recent antibiotic use.     She and her family have been sheltering for the [place] [address] quarantine.  Coping well.     REVIEW OF SYSTEMS:    Review of Systems   Constitutional: Negative.    HENT: Negative.    Eyes: Negative.    Respiratory: Negative.    Cardiovascular: Negative.    Gastrointestinal: Negative.    Genitourinary: Positive for urgency.   Musculoskeletal: Negative.    Skin: Negative.    Neurological: Negative.    Endo/Heme/Allergies: Negative.    Psychiatric/Behavioral: Negative.        PATIENT HISTORY:    Past Medical History:   Diagnosis Date    Blood in stool     Constipation     Cystitis     Depression     Diabetes mellitus, type 2     Dysphagia     Endometriosis     GERD (gastroesophageal reflux disease)     Headache     Hypertension     Palpitations     Premature menopause on hormone replacement therapy     Thyroid disease        Past Surgical History:   Procedure Laterality Date    BLADDER SUSPENSION      CARPAL TUNNEL RELEASE      right    CHOLECYSTECTOMY      COLONOSCOPY N/A 8/30/2016    Procedure: COLONOSCOPY;  Surgeon: Slick Herron MD;  Location: [address]);  Service: Endoscopy;  Laterality: N/A;  PM prep    ESOPHAGEAL MANOMETRY WITH MEASUREMENT OF IMPEDANCE N/A 11/5/2018    Procedure: MANOMETRY, ESOPHAGUS, WITH IMPEDANCE MEASUREMENT;  Surgeon: Slick Herron MD;  Location: [address]);  Service: Endoscopy;  Laterality: N/A;    ESOPHAGOGASTRODUODENOSCOPY N/A 2/13/2020    Procedure: EGD (ESOPHAGOGASTRODUODENOSCOPY);  Surgeon: Slick Herron  MD;  Location: [facility] (4TH F[facility]);  Service: Endoscopy;  Laterality: N/A;  pt has cardiology appt on 19-will call back after this appt to schedule-tb    FLUOROSCOPIC URODYNAMIC STUDY N/A 2019    Procedure: URODYNAMIC STUDY, FLUOROSCOPIC;  Surgeon: Zena Tee MD;  Location: [facility] OR 1ST FLR;  Service: Urology;  Laterality: N/A;  1 hour    GALLBLADDER SURGERY      HYSTERECTOMY      Bess velasquez Dr. Champlain    IMPLANTATION OF PERMANENT SACRAL NERVE STIMULATOR N/A 2019    Procedure: INSERTION, NEUROSTIMULATOR, PERMANENT, SACRAL;  Surgeon: Zena Tee MD;  Location: [facility] OR 2ND FLR;  Service: Urology;  Laterality: N/A;  30 min    OOPHORECTOMY      LSO- benign cyst    OOPHORECTOMY      RSO- endo    ooptherectomy      right knee  scoped    SHOULDER SURGERY  right       Family History   Problem Relation Age of Onset    Breast cancer Mother 50        alive at 64, unilateral    Esophageal cancer Mother 63    Ovarian cancer Mother 63    Anesthesia problems Mother     Cervical cancer Maternal Grandmother         unknown age of onset,  at 80    Colon cancer Brother 40    Breast cancer Paternal Aunt         unknown age of onset, alive at 70    Breast cancer Maternal Aunt 72        alive at 72     Socioeconomic History    Marital status: Single   Tobacco Use    Smoking status: Never Smoker    Smokeless tobacco: Never Used   Substance and Sexual Activity    Alcohol use: No    Drug use: No    Sexual activity: Never     Birth control/protection: None   Social History Narrative    ** Merged History Encounter **       Allergies:  Patient has no known allergies.    Medications:  Outpatient Encounter Medications as of 2020   Medication Sig Dispense Refill    ACCU-CHEK AMAURY PLUS TEST STRP Strp USE TO TEST BLOOD GLUCOSE TID  9    ACCU-CHEK SOFTCLIX LANCETS Misc USE TO TEST BLOOD GLUCOSE TID  3    atorvastatin (LIPITOR) 40 MG tablet Take 40 mg by mouth  once daily.      augmented betamethasone dipropionate (DIPROLENE-AF) 0.05 % ointment NATALEE TO HANDS [facility]      BLOOD SUGAR DIAGNOSTIC (ACCU-CHEK AMAURY PLUS TEST STRP MISC) 1 strip by Misc.(Non-Drug; Combo Route) route 3 (three) times daily.      calcium-vitamin D3 500 mg(1,250mg) -200 unit per tablet Take 1 tablet by mouth 2 (two) times daily with meals.      ciclopirox (PENLAC) 8 % Soln Apply topically nightly. 6.6 mL 11    conjugated estrogens (PREMARIN) vaginal cream USE 0.5 GRAM VAGINALLY EVERY NIGHT FOR 2 WEEKS THEN USE VAGINALLY 2 TIMES A WEEK 30 g 0    diclofenac sodium (VOLTAREN) 1 % Gel Apply 2 g topically 4 (four) times daily. As needed for breast pain 1 Tube 1    dicyclomine (BENTYL) 10 MG capsule TAKE 2 CAPSULES(20 MG) BY MOUTH THREE TIMES DAILY BEFORE MEALS 180 capsule 0    docusate sodium (COLACE) 100 MG capsule Take 1 capsule (100 mg total) by mouth 2 (two) times daily. 60 capsule 0    estradiol (ESTRACE) 2 MG tablet Take 1 tablet (2 mg total) by mouth once daily. 90 tablet 3    FARXIGA 10 mg Tab TK 1 T PO QD  7    fish oil-omega-3 fatty acids 300-1,000 mg capsule Take 2 g by mouth once daily.      fluconazole (DIFLUCAN) 150 MG Tab TK 1 T PO TID A WEEK  1    fluticasone propionate (FLONASE) 50 mcg/actuation nasal spray SHAKE LIQUID AND USE 2 SPRAYS(100 MCG) IN EACH NOSTRIL EVERY DAY 48 g 2    gabapentin (NEURONTIN) 300 MG capsule Take 300 mg (1 tablet) twice during the day, then three tablets (900 mg) at night before bed. 150 capsule 11    GLUCOPHAGE 500 mg tablet Take 500 mg by mouth 2 (two) times daily with meals.       hydrOXYzine HCl (ATARAX) 10 MG Tab TK 1 T PO BID PRF ITCH  3    hydrOXYzine HCl (ATARAX) 25 MG tablet       ibuprofen (ADVIL,MOTRIN) 800 MG tablet 800 mg every 4 (four) hours as needed.   0    INV metoprolol tartrate (LOPRESSOR) 100 MG tablet Take 1 tablet (100 mg total) by mouth 2 (two) times daily. 120 tablet 3    levothyroxine (SYNTHROID) 50 MCG tablet TK 1 T PO  QAM  8    levothyroxine (SYNTHROID) 75 MCG tablet       magnesium oxide (MAG-OX) 400 mg (241.3 mg magnesium) tablet Take 1 tablet (400 mg total) by mouth 2 (two) times daily.  0    MULTIVIT-IRON-MIN-FOLIC ACID 3,500-18-0.4 UNIT-MG-MG ORAL CHEW Take 1 tablet by mouth once daily.       neomycin-polymyxin-hydrocortisone (CORTISPORIN) 3.5-10,000-10 mg-unit-mg/mL ophthalmic suspension INTILL 1 DROP INTO AFFECTED EYE QID  0    omeprazole (PRILOSEC OTC) 20 MG tablet Take 20 mg by mouth once daily.      pantoprazole (PROTONIX) 40 MG tablet TAKE 1 TABLET BY MOUTH EVERY DAY 90 tablet 0    phentermine (ADIPEX-P) 37.5 mg tablet TK 1 T PO  QAM  3    phentermine 37.5 MG capsule TK 1 C PO ONCE D IN THE MORNING      polyethylene glycol (GLYCOLAX) 17 gram/dose powder Mix 1 capful (17 g) with liquid and take by mouth once daily. 238 g 0    spironolactone (ALDACTONE) 25 MG tablet TK 1 T PO HS  2    topiramate (TOPAMAX) 50 MG tablet Take 2 tablets (100 mg total) by mouth every evening. 60 tablet 11    traZODone (DESYREL) 100 MG tablet TK 1 T PO HS  1    triamcinolone acetonide 0.1% (KENALOG) 0.1 % cream MIX WITH LAMISIL CREAM IN AQUAPHOR TO REACH 4OZ  AND APPLY ONCE DAILY  2    TRULICITY 1.5 mg/0.5 mL PnIj INJECT 1 PEN INTO THE SKIN Q WEEK  5    ziprasidone (GEODON) 80 MG capsule Take 80 mg by mouth 2 (two) times daily with meals.       ZOLOFT 100 mg tablet Take 200 mg by mouth once daily.        Facility-Administered Encounter Medications as of 5/11/2020   Medication Dose Route Frequency Provider Last Rate Last Dose    onabotulinumtoxina injection 200 Units  200 Units Intramuscular Q90 Days Bello Shirley MD        onabotulinumtoxina injection 200 Units  200 Units Intramuscular Q90 Days Mena Lambert MD   200 Units at 03/05/20 0819         PHYSICAL EXAMINATION:    The patient generally appears in good health, is appropriately interactive, and is in no apparent distress.    Skin: No  lesions.    Mental: Cooperative with normal affect.    Neuro: Grossly intact.    HEENT: Normal. No evidence of lymphadenopathy.    Chest:  normal inspiratory effort.    Abdomen:  Soft, non-tender. No masses or organomegaly. Bladder is not palpable. No evidence of flank discomfort. No evidence of inguinal hernia.    Extremities: No clubbing, cyanosis, or edema    Normal external female genitalia  Urethral meatus is normal  Urethra and bladder are nontender to bimanual exam  Well supported anteriorly and posteriorly   Uterus and cervix are normal  No adnexal masses  PVR by catheterization was 140 ml     LABS:    Lab Results   Component Value Date    BUN 12 03/03/2020    CREATININE 1.2 03/03/2020     UA 1.010, pH 5, 500 glucose, otherwise, negative (on Trulicity)      IMPRESSION:    Encounter Diagnoses   Name Primary?    Vaginal discharge Yes    Urge incontinence     Incomplete emptying of bladder        PLAN:    1. Interrogation:        All lead pairs were checked at 1.0 A, 210 pulse width and ranged from 985-1356, except lead 0 which is > 4000 ohms (broken)        Program         % Used           Leads              Energy            PW      Hz        Sensation   Changed  Program 6                               3+, 2-, 1-          7.8 A                210      14        none  Increased 7.9 sensation in r buttocks  Program A                               C+, 3-, 2-         2.0 A                210      14        Buttocks  Program B                               1+, 2-, 3-          7 A                   210      14        Vagina     Only programs, 2, 6, A, B are usable due to 0 being broken.     Left on program:  B     2.  Vaginosis screen ordered  3.  Follow up in 3 months

## 2020-05-11 NOTE — TELEPHONE ENCOUNTER
----- Message from Honey Plascencia RN sent at 5/8/2020  4:14 PM CDT -----  Contact: pt       ----- Message -----  From: Maria Ines Uriostegui  Sent: 5/8/2020  12:22 PM CDT  To: Randal Freitas Staff    Pt is calling regarding appt for pacemaker for her bladder. She states that it needs to be check. Please call pt @[phone]

## 2020-05-12 ENCOUNTER — TELEPHONE (OUTPATIENT)
Dept: UROLOGY | Facility: HOSPITAL | Age: 35
End: 2020-05-12

## 2020-05-12 DIAGNOSIS — B37.31 VAGINAL CANDIDIASIS: Primary | ICD-10-CM

## 2020-05-12 LAB
BACTERIAL VAGINOSIS DNA: NEGATIVE
CANDIDA GLABRATA DNA: POSITIVE
CANDIDA KRUSEI DNA: NEGATIVE
CANDIDA RRNA VAG QL PROBE: NEGATIVE
T VAGINALIS RRNA GENITAL QL PROBE: NEGATIVE

## 2020-05-12 RX ORDER — FLUCONAZOLE 150 MG/1
150 TABLET ORAL ONCE
Qty: 1 TABLET | Refills: 1 | Status: SHIPPED | OUTPATIENT
Start: 2020-05-12 | End: 2020-05-12

## 2020-05-25 ENCOUNTER — OFFICE VISIT (OUTPATIENT)
Dept: GASTROENTEROLOGY | Facility: CLINIC | Age: 35
End: 2020-05-25
Payer: MEDICARE

## 2020-05-25 VITALS
HEART RATE: 96 BPM | WEIGHT: 218.5 LBS | BODY MASS INDEX: 40.21 KG/M2 | HEIGHT: 62 IN | DIASTOLIC BLOOD PRESSURE: 69 MMHG | SYSTOLIC BLOOD PRESSURE: 102 MMHG

## 2020-05-25 DIAGNOSIS — K58.0 IRRITABLE BOWEL SYNDROME WITH DIARRHEA: ICD-10-CM

## 2020-05-25 DIAGNOSIS — R13.19 ESOPHAGEAL DYSPHAGIA: Primary | ICD-10-CM

## 2020-05-25 DIAGNOSIS — K21.9 GASTROESOPHAGEAL REFLUX DISEASE WITHOUT ESOPHAGITIS: ICD-10-CM

## 2020-05-25 PROCEDURE — 3074F PR MOST RECENT SYSTOLIC BLOOD PRESSURE < 130 MM HG: ICD-10-PCS | Mod: HCNC,CPTII,S$GLB, | Performed by: INTERNAL MEDICINE

## 2020-05-25 PROCEDURE — 3074F SYST BP LT 130 MM HG: CPT | Mod: HCNC,CPTII,S$GLB, | Performed by: INTERNAL MEDICINE

## 2020-05-25 PROCEDURE — 99999 PR PBB SHADOW E&M-EST. PATIENT-LVL III: CPT | Mod: PBBFAC,HCNC,, | Performed by: INTERNAL MEDICINE

## 2020-05-25 PROCEDURE — 3078F DIAST BP <80 MM HG: CPT | Mod: HCNC,CPTII,S$GLB, | Performed by: INTERNAL MEDICINE

## 2020-05-25 PROCEDURE — 99214 PR OFFICE/OUTPT VISIT, EST, LEVL IV, 30-39 MIN: ICD-10-PCS | Mod: HCNC,S$GLB,, | Performed by: INTERNAL MEDICINE

## 2020-05-25 PROCEDURE — 99214 OFFICE O/P EST MOD 30 MIN: CPT | Mod: HCNC,S$GLB,, | Performed by: INTERNAL MEDICINE

## 2020-05-25 PROCEDURE — 3008F BODY MASS INDEX DOCD: CPT | Mod: HCNC,CPTII,S$GLB, | Performed by: INTERNAL MEDICINE

## 2020-05-25 PROCEDURE — 99999 PR PBB SHADOW E&M-EST. PATIENT-LVL III: ICD-10-PCS | Mod: PBBFAC,HCNC,, | Performed by: INTERNAL MEDICINE

## 2020-05-25 PROCEDURE — 3078F PR MOST RECENT DIASTOLIC BLOOD PRESSURE < 80 MM HG: ICD-10-PCS | Mod: HCNC,CPTII,S$GLB, | Performed by: INTERNAL MEDICINE

## 2020-05-25 PROCEDURE — 3008F PR BODY MASS INDEX (BMI) DOCUMENTED: ICD-10-PCS | Mod: HCNC,CPTII,S$GLB, | Performed by: INTERNAL MEDICINE

## 2020-05-25 NOTE — PROGRESS NOTES
Subjective:       Patient ID: Maria Ines Ac is a 35 y.o. female.    Chief Complaint: Follow-up    HPI  Review of Systems   Constitutional: Negative.    Respiratory: Negative.    Cardiovascular: Negative.        Objective:      Physical Exam   Abdominal: Soft. Normal appearance and bowel sounds are normal. She exhibits no shifting dullness, no distension, no fluid wave, no abdominal bruit and no mass. There is no hepatosplenomegaly. There is no tenderness.       Assessment:       1. Esophageal dysphagia    2. Gastroesophageal reflux disease without esophagitis    3. Irritable bowel syndrome with diarrhea        Plan:       this is a follow-up visit.  35-year-old woman that I have seen in the past for chronic refractory GI symptoms.  She has had an extensive workup.    She continues to complain of dysphagia.  She 1st mentioned it occurring for meats like pork.  She points to the supraclavicular area.  She never has to bring up the bolus for relief.  It always rinse is down.  It happens most every day per but on further questioning this dysphagia can occur for soft foods like yogurt and even liquids.  This is similar to what she has had in the past.  We did dilate her esophagus in February and that did not benefit her symptoms.    She continues to complain of acid reflux.  She describes typical heartburn and pyrosis.  Symptoms are probably worse at night.  This is despite taking Protonix in the morning and Prilosec in the evening.  She will do milk her Tums for relief.    She still has the abdominal cramping pain and she still has diarrhea.  At this is gone back and forth all on her IBS between diarrhea and constipation.  She is taking MiraLax every day.  But right now consistently she has loose bowel movements up to 4 per day.  And she also complains of gas pains.    Current GI medications include the aggressive PPI dosing, Bentyl twice a day, and MiraLax.    Past GI workup includes gastric emptying scan  which was normal, manometry which was essentially normal except for a few drops swallows, colonoscopy normal , multiple EGDs normal.  Lactulose breath test normal.  Other imaging normal.    Assessment  1.  Esophageal dysphagia.  As mention she there has already been workup.  This probably relates to poor esophageal function although it is possible there could be some spasm.  When I proposed is getting her off the Bentyl which she takes twice a day and having her take a Levsin immediately before each meal.  I have asked her to follow her esophageal symptoms but also her IBS symptoms to see if she feels like there is a net benefit from this.  2.  Gastroesophageal reflux.  I think it is more accurate call this functional heartburn.  already aggressively treating this.  Even if we did a 24 hr impedance study now on treatment, I doubt the results would give us anything that is going to change our treatment strategy.  For instance even if there was some reflux I can not imagine recommending anti-reflux surgery.  I think surely the majority of her symptoms would probably get worse after that.  I tried to give her reassurance that we know that no trauma or damage to the esophagus is occurring.  Realistically she is just going to have to live with the symptoms.  3.  Irritable bowel syndrome.  Certainly being on MiraLax is in helping things.  I have asked her to stop this.  If she goes back to constipation that will have to find some compromise.  But again I can imagine any further workup needing to be done.    25 min appointment greater than half time face-to-face counseling

## 2020-06-02 ENCOUNTER — PATIENT OUTREACH (OUTPATIENT)
Dept: ADMINISTRATIVE | Facility: HOSPITAL | Age: 35
End: 2020-06-02

## 2020-06-02 ENCOUNTER — TELEPHONE (OUTPATIENT)
Dept: ADMINISTRATIVE | Facility: HOSPITAL | Age: 35
End: 2020-06-02

## 2020-06-02 ENCOUNTER — OFFICE VISIT (OUTPATIENT)
Dept: INTERNAL MEDICINE | Facility: CLINIC | Age: 35
End: 2020-06-02
Payer: MEDICARE

## 2020-06-02 ENCOUNTER — LAB VISIT (OUTPATIENT)
Dept: LAB | Facility: HOSPITAL | Age: 35
End: 2020-06-02
Attending: INTERNAL MEDICINE
Payer: MEDICARE

## 2020-06-02 DIAGNOSIS — E11.9 DIABETES MELLITUS WITHOUT COMPLICATION: ICD-10-CM

## 2020-06-02 DIAGNOSIS — E11.9 DIABETES MELLITUS WITHOUT COMPLICATION: Primary | ICD-10-CM

## 2020-06-02 DIAGNOSIS — I10 HYPERTENSION, UNSPECIFIED TYPE: ICD-10-CM

## 2020-06-02 PROCEDURE — 3008F PR BODY MASS INDEX (BMI) DOCUMENTED: ICD-10-PCS | Mod: HCNC,CPTII,S$GLB, | Performed by: INTERNAL MEDICINE

## 2020-06-02 PROCEDURE — 99999 PR PBB SHADOW E&M-EST. PATIENT-LVL V: ICD-10-PCS | Mod: PBBFAC,HCNC,, | Performed by: INTERNAL MEDICINE

## 2020-06-02 PROCEDURE — 3008F BODY MASS INDEX DOCD: CPT | Mod: HCNC,CPTII,S$GLB, | Performed by: INTERNAL MEDICINE

## 2020-06-02 PROCEDURE — 3044F HG A1C LEVEL LT 7.0%: CPT | Mod: HCNC,CPTII,S$GLB, | Performed by: INTERNAL MEDICINE

## 2020-06-02 PROCEDURE — 3074F PR MOST RECENT SYSTOLIC BLOOD PRESSURE < 130 MM HG: ICD-10-PCS | Mod: HCNC,CPTII,S$GLB, | Performed by: INTERNAL MEDICINE

## 2020-06-02 PROCEDURE — 99214 PR OFFICE/OUTPT VISIT, EST, LEVL IV, 30-39 MIN: ICD-10-PCS | Mod: HCNC,S$GLB,, | Performed by: INTERNAL MEDICINE

## 2020-06-02 PROCEDURE — 3078F PR MOST RECENT DIASTOLIC BLOOD PRESSURE < 80 MM HG: ICD-10-PCS | Mod: HCNC,CPTII,S$GLB, | Performed by: INTERNAL MEDICINE

## 2020-06-02 PROCEDURE — 3078F DIAST BP <80 MM HG: CPT | Mod: HCNC,CPTII,S$GLB, | Performed by: INTERNAL MEDICINE

## 2020-06-02 PROCEDURE — 36415 COLL VENOUS BLD VENIPUNCTURE: CPT | Mod: HCNC

## 2020-06-02 PROCEDURE — 80053 COMPREHEN METABOLIC PANEL: CPT | Mod: HCNC

## 2020-06-02 PROCEDURE — 99999 PR PBB SHADOW E&M-EST. PATIENT-LVL V: CPT | Mod: PBBFAC,HCNC,, | Performed by: INTERNAL MEDICINE

## 2020-06-02 PROCEDURE — 99214 OFFICE O/P EST MOD 30 MIN: CPT | Mod: HCNC,S$GLB,, | Performed by: INTERNAL MEDICINE

## 2020-06-02 PROCEDURE — 3044F PR MOST RECENT HEMOGLOBIN A1C LEVEL <7.0%: ICD-10-PCS | Mod: HCNC,CPTII,S$GLB, | Performed by: INTERNAL MEDICINE

## 2020-06-02 PROCEDURE — 3074F SYST BP LT 130 MM HG: CPT | Mod: HCNC,CPTII,S$GLB, | Performed by: INTERNAL MEDICINE

## 2020-06-02 PROCEDURE — 83036 HEMOGLOBIN GLYCOSYLATED A1C: CPT | Mod: HCNC

## 2020-06-02 RX ORDER — CLOTRIMAZOLE AND BETAMETHASONE DIPROPIONATE 10; .64 MG/G; MG/G
CREAM TOPICAL 2 TIMES DAILY PRN
Qty: 15 G | Refills: 0 | Status: SHIPPED | OUTPATIENT
Start: 2020-06-02 | End: 2020-07-10

## 2020-06-02 RX ORDER — METOPROLOL SUCCINATE 25 MG/1
TABLET, EXTENDED RELEASE ORAL
COMMUNITY
Start: 2020-03-09 | End: 2020-07-10

## 2020-06-02 RX ORDER — FLUCONAZOLE 150 MG/1
TABLET ORAL
COMMUNITY
Start: 2020-05-12 | End: 2020-12-09

## 2020-06-02 NOTE — PROGRESS NOTES
Portal message sent of need to call our office to schedule her Podiatry appointment.- Prior Foot Exam Updated.

## 2020-06-03 ENCOUNTER — TELEPHONE (OUTPATIENT)
Dept: NEUROLOGY | Facility: CLINIC | Age: 35
End: 2020-06-03

## 2020-06-03 LAB
ALBUMIN SERPL BCP-MCNC: 3.9 G/DL (ref 3.5–5.2)
ALP SERPL-CCNC: 62 U/L (ref 55–135)
ALT SERPL W/O P-5'-P-CCNC: 22 U/L (ref 10–44)
ANION GAP SERPL CALC-SCNC: 8 MMOL/L (ref 8–16)
AST SERPL-CCNC: 23 U/L (ref 10–40)
BILIRUB SERPL-MCNC: 0.2 MG/DL (ref 0.1–1)
BUN SERPL-MCNC: 9 MG/DL (ref 6–20)
CALCIUM SERPL-MCNC: 9.3 MG/DL (ref 8.7–10.5)
CHLORIDE SERPL-SCNC: 105 MMOL/L (ref 95–110)
CO2 SERPL-SCNC: 25 MMOL/L (ref 23–29)
CREAT SERPL-MCNC: 1.1 MG/DL (ref 0.5–1.4)
EST. GFR  (AFRICAN AMERICAN): >60 ML/MIN/1.73 M^2
EST. GFR  (NON AFRICAN AMERICAN): >60 ML/MIN/1.73 M^2
ESTIMATED AVG GLUCOSE: 103 MG/DL (ref 68–131)
GLUCOSE SERPL-MCNC: 90 MG/DL (ref 70–110)
HBA1C MFR BLD HPLC: 5.2 % (ref 4–5.6)
POTASSIUM SERPL-SCNC: 4.5 MMOL/L (ref 3.5–5.1)
PROT SERPL-MCNC: 7.9 G/DL (ref 6–8.4)
SODIUM SERPL-SCNC: 138 MMOL/L (ref 136–145)

## 2020-06-03 NOTE — TELEPHONE ENCOUNTER
----- Message from Shaun Rodriguez sent at 6/3/2020 12:14 PM CDT -----  Contact: Pt. [phone]  The patient would like to speak to someone regarding scheduling her follow up appointment. Please contact the patient to discuss further.

## 2020-06-03 NOTE — TELEPHONE ENCOUNTER
Left message for the patient to return call about appointment. She is scheduled for 6/12 at 10:00.

## 2020-06-07 VITALS
BODY MASS INDEX: 40.08 KG/M2 | SYSTOLIC BLOOD PRESSURE: 103 MMHG | TEMPERATURE: 99 F | WEIGHT: 217.81 LBS | DIASTOLIC BLOOD PRESSURE: 71 MMHG | OXYGEN SATURATION: 95 % | HEART RATE: 92 BPM | HEIGHT: 62 IN

## 2020-06-07 NOTE — PROGRESS NOTES
Subjective:       Patient ID: Maria Ines Ac is a 35 y.o. female.    Chief Complaint: Hypertension    HPI  She returns for management of hypertension.  She has had hypertension for over a year.  Current treatment has included medications outlined in medication list.  She denies chest pain or shortness of breath.  No palpitations.  Denies left arm or neck pain.       Past medical history: Hypertension, hyperlipidemia, diabetes, hypothyroidism, bipolar disorder, migraine headaches, status post hysterectomy, increased risk breast cancer      Medications: Synthroid 0.05 mg daily, metformin, Toprol-XL 25mg daily,Lipitor 40 mg daily, trulicity      NO KNOWN DRUG ALLERGIES    Review of Systems   Constitutional: Negative for chills, fatigue, fever and unexpected weight change.   Respiratory: Negative for chest tightness and shortness of breath.    Cardiovascular: Negative for chest pain and palpitations.   Gastrointestinal: Negative for abdominal pain and blood in stool.   Neurological: Negative for dizziness, syncope, numbness and headaches.       Objective:      Physical Exam   HENT:   Right Ear: External ear normal.   Left Ear: External ear normal.   Nose: Nose normal.   Mouth/Throat: Oropharynx is clear and moist.   Eyes: Pupils are equal, round, and reactive to light.   Neck: Normal range of motion.   Cardiovascular: Normal rate and regular rhythm.   No murmur heard.  Pulmonary/Chest: Breath sounds normal.   Abdominal: She exhibits no distension. There is no hepatosplenomegaly. There is no tenderness.   Lymphadenopathy:     She has no cervical adenopathy.     She has no axillary adenopathy.   Neurological: She has normal strength and normal reflexes. No cranial nerve deficit or sensory deficit.       Assessment/Plan           assessment and plan:  Hypertension:  Check CMP and A1c

## 2020-06-12 ENCOUNTER — OFFICE VISIT (OUTPATIENT)
Dept: NEUROLOGY | Facility: CLINIC | Age: 35
End: 2020-06-12
Payer: MEDICARE

## 2020-06-12 VITALS
SYSTOLIC BLOOD PRESSURE: 101 MMHG | HEART RATE: 87 BPM | HEIGHT: 62 IN | DIASTOLIC BLOOD PRESSURE: 74 MMHG | BODY MASS INDEX: 40.35 KG/M2 | WEIGHT: 219.25 LBS

## 2020-06-12 DIAGNOSIS — G47.8 SLEEP AROUSAL DISORDER: ICD-10-CM

## 2020-06-12 DIAGNOSIS — G43.109 MIGRAINOUS VERTIGO: ICD-10-CM

## 2020-06-12 DIAGNOSIS — G43.719 INTRACTABLE CHRONIC MIGRAINE WITHOUT AURA AND WITHOUT STATUS MIGRAINOSUS: ICD-10-CM

## 2020-06-12 DIAGNOSIS — H81.13 BENIGN PAROXYSMAL POSITIONAL VERTIGO DUE TO BILATERAL VESTIBULAR DISORDER: ICD-10-CM

## 2020-06-12 DIAGNOSIS — G44.40 MEDICATION OVERUSE HEADACHE: Primary | ICD-10-CM

## 2020-06-12 PROCEDURE — 99999 PR PBB SHADOW E&M-EST. PATIENT-LVL III: ICD-10-PCS | Mod: PBBFAC,HCNC,GC, | Performed by: STUDENT IN AN ORGANIZED HEALTH CARE EDUCATION/TRAINING PROGRAM

## 2020-06-12 PROCEDURE — 3078F DIAST BP <80 MM HG: CPT | Mod: HCNC,CPTII,GC,S$GLB | Performed by: STUDENT IN AN ORGANIZED HEALTH CARE EDUCATION/TRAINING PROGRAM

## 2020-06-12 PROCEDURE — 3074F PR MOST RECENT SYSTOLIC BLOOD PRESSURE < 130 MM HG: ICD-10-PCS | Mod: HCNC,CPTII,GC,S$GLB | Performed by: STUDENT IN AN ORGANIZED HEALTH CARE EDUCATION/TRAINING PROGRAM

## 2020-06-12 PROCEDURE — 3078F PR MOST RECENT DIASTOLIC BLOOD PRESSURE < 80 MM HG: ICD-10-PCS | Mod: HCNC,CPTII,GC,S$GLB | Performed by: STUDENT IN AN ORGANIZED HEALTH CARE EDUCATION/TRAINING PROGRAM

## 2020-06-12 PROCEDURE — 3074F SYST BP LT 130 MM HG: CPT | Mod: HCNC,CPTII,GC,S$GLB | Performed by: STUDENT IN AN ORGANIZED HEALTH CARE EDUCATION/TRAINING PROGRAM

## 2020-06-12 PROCEDURE — 3008F BODY MASS INDEX DOCD: CPT | Mod: HCNC,CPTII,GC,S$GLB | Performed by: STUDENT IN AN ORGANIZED HEALTH CARE EDUCATION/TRAINING PROGRAM

## 2020-06-12 PROCEDURE — 99999 PR PBB SHADOW E&M-EST. PATIENT-LVL III: CPT | Mod: PBBFAC,HCNC,GC, | Performed by: STUDENT IN AN ORGANIZED HEALTH CARE EDUCATION/TRAINING PROGRAM

## 2020-06-12 PROCEDURE — 99214 PR OFFICE/OUTPT VISIT, EST, LEVL IV, 30-39 MIN: ICD-10-PCS | Mod: HCNC,GC,S$GLB, | Performed by: STUDENT IN AN ORGANIZED HEALTH CARE EDUCATION/TRAINING PROGRAM

## 2020-06-12 PROCEDURE — 3008F PR BODY MASS INDEX (BMI) DOCUMENTED: ICD-10-PCS | Mod: HCNC,CPTII,GC,S$GLB | Performed by: STUDENT IN AN ORGANIZED HEALTH CARE EDUCATION/TRAINING PROGRAM

## 2020-06-12 PROCEDURE — 99214 OFFICE O/P EST MOD 30 MIN: CPT | Mod: HCNC,GC,S$GLB, | Performed by: STUDENT IN AN ORGANIZED HEALTH CARE EDUCATION/TRAINING PROGRAM

## 2020-06-12 RX ORDER — DIVALPROEX SODIUM 500 MG/1
500 TABLET, DELAYED RELEASE ORAL EVERY 12 HOURS
Qty: 28 TABLET | Refills: 0 | Status: SHIPPED | OUTPATIENT
Start: 2020-06-12 | End: 2020-07-01 | Stop reason: SDUPTHER

## 2020-06-12 NOTE — PROGRESS NOTES
NEUROLOGY OUTPATIENT CLINIC NOTE    Patient Name:  Maria Ines Ac  Patient MRN:  [id number]    Interval History 2020:  Patient returns to clinic for headache management.  She is not accompanied by family today.  On her last visit, she got Botox injections.  Notes no changes since this. States that she still has daily headaches, often she gets one with waking up and then later on in the evening she may have another headache.  She is taking one Aleve per day, which is improved from her previous use of up to 4 Aleve per day.  She has frontal headaches that tend to radiate throughout the head, sharp/throbbing in character, associated with dizziness, n/v.  In addition to cutting down on the OTC NSAIDs, patient is drinking only 3 Coke Zeros per day rather than several throughout the day.  Diet is balanced, she has good control of her DM.  She walks a lot during the day for exercise.  Her father, with whom she was very close, suddenly  of a heart attack about a year ago and patient is still dealing with that--sees a counselor.  Sleep has been suffering lately, notes that she has trouble falling asleep and trouble staying asleep.  Has been told she snores, unsure about whether she wakes up gasping for breath.  ESS score of 13, giving her dx of EDS.  Patient additionally has dizziness that comes on with changes in position, states that room spins when she rolls over in bed, either direction, and tends to last for a minute or two then goes away.  No recent head trauma, no change in hearing, no imbalance otherwise.  Does have some dizziness described as less intense spinning with headaches.      Current medications:  Gabapentin 300 mg bid, gabapentin 900 mg qHS, topiramate 100 mg qHS.  Previous medications/procedures tried for headaches:  Botox, Occipital nerve block, magnesium oxide 400 mg bid, hydroxyzine prn, NSAIDs, antidepressant (on per other provider), anti-hypertensive contraindicated    HPI--from  "initial visit 10/18/18:  Patient is a very pleasant 32 y.o. female with PMHx of HTN, DM II with gastroparesis, obesity, and hypothyroidism who presents to [facility] 10/21/2017 for headaches.  Patient states that she has had headaches since about 03/2017 but they have gotten much worse over past 3-4 months.  She says they start in the middle of her forehead and radiate throughout the head to the occiput.  She describes the pain as sharp.  They are relieved by Aleve after about an hour but will recur, sometimes up to 4 times per day.  She notes associated n/v and rhinorrhea with headaches. She also notes dizziness associated with the headaches.  This is described as room spinning and is associated with tinnitus but not with hearing loss, ear fullness.  Denies recent infections, does not have sensation of hearing her pulse in her ear or other intrinsic sounds. Denies photophobia, phonophobia, lacrimation, injection of the eyes with headaches.  Unable to describe any triggers.  States that she had headaches during adolescence as well but they were not this frequent and usually would just go away with OTC NSAIDs.  She is accompanied by her dad who assists with the history and he notes that she drinks several Coke Zero soft drinks during the day, stating "one every hour."  Patient acknowledges drinking several soft drinks per day.  She also does not sleep very well and gets maybe 4 hours of sleep per night with a lot of tossing and turning.  Her father notes cell phone and tablet use before bed.  She does do a lot of walking during the day for exercise which father confirms.  Patient has not had any medication changes recently and was prescribed topiramate back in March 2017 and has not been titrated up--still on 25 mg po qHS which does not seem to be preventing these headaches.    ROS:  General:  No fever, no chills, no fatigue  HEENT:  + headache, no changes in vision  Respiratory:  No cough, no " SOB  Cardiovascular:  No chest pain, no palpitations  GI:  No abdominal pain, no n/v/c/d  :  No increased frequency, no urgency  Skin:  No rashes, no pruritus, no wounds  Musculoskeletal:  No myalgias, no arthralgias  Neuro:  No tremors, no focal weakness, no paresthesias  Psych:  No anxiety, + depression--seeing therapist    PMHx:  Patient Active Problem List   Diagnosis    Abdominal pain, RLQ (right lower quadrant)    Dysphagia    Diabetes mellitus, type II    Constipation, chronic    Right hip pain    Intractable chronic migraine without aura and without status migrainosus    Hypertension    Palpitations    Chest pain, non-cardiac    Dyspnea on exertion    Dysfunctional voiding of urine    Severe obesity (BMI 35.0-39.9) with comorbidity    Muscle spasm    Mixed stress and urge urinary incontinence    Gastroesophageal reflux disease without esophagitis    Irritable bowel syndrome with diarrhea    Migrainous vertigo    Uncontrolled morning headache    Sleep arousal disorder    Hyperlipidemia    GERD (gastroesophageal reflux disease)    Family history of breast cancer    Family hx of ovarian malignancy    Weakness    Painful cervical ROM    Incomplete emptying of bladder    Bilateral occipital neuralgia     PSHx:  Past Surgical History:   Procedure Laterality Date    BLADDER SUSPENSION      CARPAL TUNNEL RELEASE      right    CHOLECYSTECTOMY      COLONOSCOPY N/A 8/30/2016    Procedure: COLONOSCOPY;  Surgeon: Slick Herron MD;  Location: [address]);  Service: Endoscopy;  Laterality: N/A;  PM prep    ESOPHAGEAL MANOMETRY WITH MEASUREMENT OF IMPEDANCE N/A 11/5/2018    Procedure: MANOMETRY, ESOPHAGUS, WITH IMPEDANCE MEASUREMENT;  Surgeon: Slick Herron MD;  Location: [address]);  Service: Endoscopy;  Laterality: N/A;    ESOPHAGOGASTRODUODENOSCOPY N/A 2/13/2020    Procedure: EGD (ESOPHAGOGASTRODUODENOSCOPY);  Surgeon: Slick Herron MD;  Location: [address]  [facility]);  Service: Endoscopy;  Laterality: N/A;  pt has cardiology appt on 1/6/19-will call back after this appt to schedule-tb    FLUOROSCOPIC URODYNAMIC STUDY N/A 5/23/2019    Procedure: URODYNAMIC STUDY, FLUOROSCOPIC;  Surgeon: Zena Tee MD;  Location: [facility] OR 1ST FLR;  Service: Urology;  Laterality: N/A;  1 hour    GALLBLADDER SURGERY      HYSTERECTOMY  2013    Bess velasquez Dr. [place]am[place]    IMPLANTATION OF PERMANENT SACRAL NERVE STIMULATOR N/A 7/30/2019    Procedure: INSERTION, NEUROSTIMULATOR, PERMANENT, SACRAL;  Surgeon: Zena Tee MD;  Location: [facility] OR 2ND FLR;  Service: Urology;  Laterality: N/A;  30 min    OOPHORECTOMY  2005    [facility]- benign cyst    OOPHORECTOMY  2013    [facility]- endo    ooptherectomy      right knee  scoped    SHOULDER SURGERY  right     Medications:  Current Outpatient Medications on File Prior to Visit   Medication Sig Dispense Refill    ACCU-CHEK AMAURY PLUS TEST STRP Strp USE TO TEST BLOOD GLUCOSE TID  9    ACCU-CHEK SOFTCLIX LANCETS Misc USE TO TEST BLOOD GLUCOSE TID  3    atorvastatin (LIPITOR) 40 MG tablet Take 40 mg by mouth once daily.      augmented betamethasone dipropionate (DIPROLENE-AF) 0.05 % ointment NATALEE TO HANDS [facility]      BLOOD SUGAR DIAGNOSTIC (ACCU-CHEK AMAURY PLUS TEST STRP MISC) 1 strip by Misc.(Non-Drug; Combo Route) route 3 (three) times daily.      calcium-vitamin D3 500 mg(1,250mg) -200 unit per tablet Take 1 tablet by mouth 2 (two) times daily with meals.      ciclopirox (PENLAC) 8 % Soln Apply topically nightly. 6.6 mL 11    clotrimazole-betamethasone 1-0.05% (LOTRISONE) cream Apply topically 2 (two) times daily as needed. 15 g 0    conjugated estrogens (PREMARIN) vaginal cream USE 0.5 GRAM VAGINALLY EVERY NIGHT FOR 2 WEEKS THEN USE VAGINALLY 2 TIMES A WEEK 30 g 0    diclofenac sodium (VOLTAREN) 1 % Gel Apply 2 g topically 4 (four) times daily. As needed for breast pain 1 Tube 1    dicyclomine (BENTYL) 10 MG capsule TAKE 2  CAPSULES(20 MG) BY MOUTH THREE TIMES DAILY BEFORE MEALS 180 capsule 0    docusate sodium (COLACE) 100 MG capsule Take 1 capsule (100 mg total) by mouth 2 (two) times daily. 60 capsule 0    estradiol (ESTRACE) 2 MG tablet Take 1 tablet (2 mg total) by mouth once daily. 90 tablet 3    FARXIGA 10 mg Tab TK 1 T PO QD  7    fish oil-omega-3 fatty acids 300-1,000 mg capsule Take 2 g by mouth once daily.      fluconazole (DIFLUCAN) 150 MG Tab       fluticasone propionate (FLONASE) 50 mcg/actuation nasal spray SHAKE LIQUID AND USE 2 SPRAYS(100 MCG) IN EACH NOSTRIL EVERY DAY 48 g 2    gabapentin (NEURONTIN) 300 MG capsule Take 300 mg (1 tablet) twice during the day, then three tablets (900 mg) at night before bed. 150 capsule 11    GLUCOPHAGE 500 mg tablet Take 500 mg by mouth 2 (two) times daily with meals.       hydrOXYzine HCl (ATARAX) 10 MG Tab TK 1 T PO BID PRF ITCH  3    hydrOXYzine HCl (ATARAX) 25 MG tablet       ibuprofen (ADVIL,MOTRIN) 800 MG tablet 800 mg every 4 (four) hours as needed.   0    INV metoprolol tartrate (LOPRESSOR) 100 MG tablet Take 1 tablet (100 mg total) by mouth 2 (two) times daily. 120 tablet 3    levothyroxine (SYNTHROID) 50 MCG tablet TK 1 T PO QAM  8    levothyroxine (SYNTHROID) 75 MCG tablet       magnesium oxide (MAG-OX) 400 mg (241.3 mg magnesium) tablet Take 1 tablet (400 mg total) by mouth 2 (two) times daily.  0    metoprolol succinate (TOPROL-XL) 25 MG 24 hr tablet       MULTIVIT-IRON-MIN-FOLIC ACID 3,500-18-0.4 UNIT-MG-MG ORAL CHEW Take 1 tablet by mouth once daily.       neomycin-polymyxin-hydrocortisone (CORTISPORIN) 3.5-10,000-10 mg-unit-mg/mL ophthalmic suspension INTILL 1 DROP INTO AFFECTED EYE QID  0    omeprazole (PRILOSEC OTC) 20 MG tablet Take 20 mg by mouth once daily.      pantoprazole (PROTONIX) 40 MG tablet TAKE 1 TABLET BY MOUTH EVERY DAY 90 tablet 0    phentermine (ADIPEX-P) 37.5 mg tablet TK 1 T PO  QAM  3    phentermine 37.5 MG capsule TK 1 C PO  "ONCE D IN THE MORNING      polyethylene glycol (GLYCOLAX) 17 gram/dose powder Mix 1 capful (17 g) with liquid and take by mouth once daily. 238 g 0    spironolactone (ALDACTONE) 25 MG tablet TK 1 T PO HS  2    topiramate (TOPAMAX) 50 MG tablet Take 2 tablets (100 mg total) by mouth every evening. 60 tablet 11    traZODone (DESYREL) 100 MG tablet TK 1 T PO HS  1    triamcinolone acetonide 0.1% (KENALOG) 0.1 % cream MIX WITH LAMISIL CREAM IN AQUAPHOR TO REACH 4OZ  AND APPLY ONCE DAILY  2    TRULICITY 1.5 mg/0.5 mL PnIj INJECT 1 PEN INTO THE SKIN Q WEEK  5    ziprasidone (GEODON) 80 MG capsule Take 80 mg by mouth 2 (two) times daily with meals.       ZOLOFT 100 mg tablet Take 200 mg by mouth once daily.        Current Facility-Administered Medications on File Prior to Visit   Medication Dose Route Frequency Provider Last Rate Last Dose    onabotulinumtoxina injection 200 Units  200 Units Intramuscular Q90 Days Bello Shirley MD        onabotulinumtoxina injection 200 Units  200 Units Intramuscular Q90 Days Mena Lambert MD   200 Units at 03/05/20 0819     Allergies:  Review of patient's allergies indicates:  No Known Allergies    Social Hx:   Patient lives at home with her mom--dad has recently passed away of MI.  Some concern for intellectual disability, though no documentation in the chart.  She is not working, often plays video games and goes on walks at home during the day.  Her mother recently went through chemotherapy and has some residual health problems. Patient has never used tobacco, EtOH, or illicits.  She is doing exceptionally well with BG control and weight loss of a few pounds since initial visit.  Has also weaned down on Coke Zero use as well as OTC NSAID use.    Physical Exam:  /74   Pulse 87   Ht 5' 2" (1.575 m)   Wt 99.5 kg (219 lb 4 oz)   LMP 11/17/2012 (LMP Unknown) Comment: Neg UPT  BMI 40.10 kg/m²   General:  Well-developed, well-nourished, nad  HEENT:  NCAT, " PERRL, EOMI, oropharygneal membranes non-erythematous/without exudate  Neck:  Supple, normal ROM without nuchal rigidity.  Respiratory:  Symmetric expansion, no increased wob  CVS:  No LE edema. Extremities warm, well-perfused.  GI:  Abd soft, non-distended  Skin:  No visible rashes or wounds  Psych:  Pleasant, cooperative with exam.  Speech and thought content appropriate.  Neurologic Exam:  Mental Status:  AAOx3.  Converses easily.  Cranial Nerves:  PERRLA, EOMI. Facial movement intact, symmetric. Tongue protrudes midline, palate raises symmetrically.  Trapezius 5/5 bilaterally.    [place]-Hallpike maneuver reproduces symptoms of dizziness bilaterally, no notable nystagmus with maneuvers.  Motor:  Normal muscle bulk and tone.  Strength 5/5 throughout.  No pronator drift.  Sensory:  Sensation intact to light touch at all extremities.   Reflexes:  Reflexes 2+--biceps, brachioradialis, patellar.  No ankle clonus.  Coordination:  No resting tremor or myoclonus.  FTN, HTS, LYNN wnl--no ataxia, dysmetria, or dysdiadochokinesia.  Gait:  Appropriate gait, appropriate arm swing.  No shuffling, magnetic gait, imbalance, weakness, or foot drop noted.    Labs:  Results: CBC:   Lab Results   Component Value Date/Time    WBC 8.22 11/03/2019 07:06 PM    RBC 5.14 11/03/2019 07:06 PM    HGB 14.2 11/03/2019 07:06 PM    HCT 45.7 11/03/2019 07:06 PM     11/03/2019 07:06 PM    MCV 89 11/03/2019 07:06 PM    MCH 27.6 11/03/2019 07:06 PM    MCHC 31.1 (L) 11/03/2019 07:06 PM     CMP:   Lab Results   Component Value Date/Time    GLU 90 06/02/2020 03:10 PM    CALCIUM 9.3 06/02/2020 03:10 PM    ALBUMIN 3.9 06/02/2020 03:10 PM    PROT 7.9 06/02/2020 03:10 PM     06/02/2020 03:10 PM    K 4.5 06/02/2020 03:10 PM    CO2 25 06/02/2020 03:10 PM     06/02/2020 03:10 PM    BUN 9 06/02/2020 03:10 PM    CREATININE 1.1 06/02/2020 03:10 PM    ALKPHOS 62 06/02/2020 03:10 PM    ALT 22 06/02/2020 03:10 PM    AST 23 06/02/2020 03:10 PM     "BILITOT 0.2 2020 03:10 PM     Imagin17 CT head w/o contrast:  No acute intracranial abnormality identified.  Previously reviewed and discussed with patient.    Additional Diagnotic Testing:  N/a    ASSESSMENT/PLAN:  Patient is a 35 y.o. female with a PMHx of HTN, DM II with gastroparesis, obesity, and hypothyroidism who presents to [facility] 2020 for follow up of headaches.    Problem List Items Addressed This Visit        1 - High    Intractable chronic migraine without aura and without status migrainosus    Overview     Migraine headache tried on topiramate, gabapentin, anti-hypertensives, NSAIDs.  Steroids and opioids contraindicated 2/2 DM with gastroparesis.  Headaches daily ( > 15 days per month) and occurring up to 8 hours per day, significantly disabling.         Current Assessment & Plan     -Continue topiramate 100 mg qHS  -Continue gabapentin 300 mg bid, 900 mg qHS  -Tried Botox w/o improvement, tried occipital nerve block w/o improvement    Will try 2 wk course valproate for rebound headache, pt to wean further off of OTC NSAIDs.  May consider CGRP injectable as next option if patient continues to have severe headaches with weaning off of daily Aleve.            2     Migrainous vertigo    Overview     Pt reports feeling "room spinning" during her headaches.         Current Assessment & Plan     -Treating migraine per above  -Additional suspected BPPV, [place]-Hallpike reproduces symptoms         Sleep arousal disorder    Overview     Polysomnography negative for ZAC as suspected initially, but concerning for sleep arousal disorder.         Current Assessment & Plan     -Sleep clinic referral in, will call to try to get in for sleep study to consider possible sleep apnea causing AM headaches w/ ESS score 13 and noted hx of snoring         Medication overuse headache - Primary    Current Assessment & Plan     Patient has decreased her use of OTC NSAIDs, down to 1 Aleve per " day.    -Will try 2 wk course of valproate 500 mg bid  -Discussed having pt use calendar to thiago days that she takes Aleve, limit to 3 days per wk         Relevant Medications    divalproex (DEPAKOTE) 500 MG TbEC       4     Benign paroxysmal positional vertigo due to bilateral vestibular disorder    Current Assessment & Plan     -[place]-Hallpike maneuver reproduces symptoms bilaterally  -Discussed Epley maneuver, showed pt video, printed out instructions for doing maneuver at home.  Asked patient to let me know if she is not noticing improvement, may consider outpatient PT order for vestibular rehab.             Mena Lambert MD  Pager:  [phone] [place] [address]  [phone]

## 2020-06-12 NOTE — PATIENT INSTRUCTIONS
-Will call Sleep Clinic to get evaluation for whether you have Sleep Apnea  -Will continue gabapentin 300 mg twice daily, 900 mg at night  -Will continue topiramate 100 mg at night--may be able to take this one off if headaches improve with stopping Aleve  -2 week course of valproate (brand name Depakote) to break up migraines and try to take Aleve only 3 days per week  -Dizziness:  Will print out exercises for Epley maneuver--you can look up the maneuver on [email or web address], would try to do on both sides    *May think about an injectable for migraine later on (Aimovig, Ajovy, Emgality)

## 2020-06-12 NOTE — ASSESSMENT & PLAN NOTE
-Continue topiramate 100 mg qHS  -Continue gabapentin 300 mg bid, 900 mg qHS  -Tried Botox w/o improvement, tried occipital nerve block w/o improvement    Will try 2 wk course valproate for rebound headache, pt to wean further off of OTC NSAIDs.  May consider CGRP injectable as next option if patient continues to have severe headaches with weaning off of daily Aleve.

## 2020-06-12 NOTE — ASSESSMENT & PLAN NOTE
-Sleep clinic referral in, will call to try to get in for sleep study to consider possible sleep apnea causing AM headaches w/ ESS score 13 and noted hx of snoring

## 2020-06-12 NOTE — ASSESSMENT & PLAN NOTE
Patient has decreased her use of OTC NSAIDs, down to 1 Aleve per day.    -Will try 2 wk course of valproate 500 mg bid  -Discussed having pt use calendar to thiago days that she takes Aleve, limit to 3 days per wk

## 2020-06-12 NOTE — ASSESSMENT & PLAN NOTE
-[place]-Hallpike maneuver reproduces symptoms bilaterally  -Discussed Epley maneuver, showed pt video, printed out instructions for doing maneuver at home.  Asked patient to let me know if she is not noticing improvement, may consider outpatient PT order for vestibular rehab.

## 2020-06-24 ENCOUNTER — OFFICE VISIT (OUTPATIENT)
Dept: PODIATRY | Facility: CLINIC | Age: 35
End: 2020-06-24
Payer: MEDICARE

## 2020-06-24 ENCOUNTER — TELEPHONE (OUTPATIENT)
Dept: NEUROLOGY | Facility: CLINIC | Age: 35
End: 2020-06-24

## 2020-06-24 VITALS
HEART RATE: 90 BPM | WEIGHT: 219 LBS | DIASTOLIC BLOOD PRESSURE: 65 MMHG | BODY MASS INDEX: 40.3 KG/M2 | HEIGHT: 62 IN | SYSTOLIC BLOOD PRESSURE: 111 MMHG

## 2020-06-24 DIAGNOSIS — E11.9 DIABETES MELLITUS WITHOUT COMPLICATION: ICD-10-CM

## 2020-06-24 DIAGNOSIS — L60.9 DISEASE OF NAIL: ICD-10-CM

## 2020-06-24 DIAGNOSIS — E11.42 DIABETIC POLYNEUROPATHY ASSOCIATED WITH TYPE 2 DIABETES MELLITUS: Primary | ICD-10-CM

## 2020-06-24 PROCEDURE — 99499 NO LOS: ICD-10-PCS | Mod: HCNC,S$GLB,, | Performed by: PODIATRIST

## 2020-06-24 PROCEDURE — 11721 PR DEBRIDEMENT OF NAILS, 6 OR MORE: ICD-10-PCS | Mod: Q9,HCNC,S$GLB, | Performed by: PODIATRIST

## 2020-06-24 PROCEDURE — 99999 PR PBB SHADOW E&M-EST. PATIENT-LVL V: CPT | Mod: PBBFAC,HCNC,, | Performed by: PODIATRIST

## 2020-06-24 PROCEDURE — 99499 UNLISTED E&M SERVICE: CPT | Mod: HCNC,S$GLB,, | Performed by: PODIATRIST

## 2020-06-24 PROCEDURE — 99999 PR PBB SHADOW E&M-EST. PATIENT-LVL V: ICD-10-PCS | Mod: PBBFAC,HCNC,, | Performed by: PODIATRIST

## 2020-06-24 PROCEDURE — 11721 DEBRIDE NAIL 6 OR MORE: CPT | Mod: Q9,HCNC,S$GLB, | Performed by: PODIATRIST

## 2020-06-24 NOTE — PROGRESS NOTES
Subjective:      Patient ID: Maria Ines Ac is a 35 y.o. female.    Chief Complaint: Diabetes Mellitus (06/02/2020 dr ronen villegas) and Diabetic Foot Exam    Maria Ines is a 35 y.o. female who presents to the clinic for evaluation and treatment of high risk feet. Maria Ines has a past medical history of Blood in stool, Constipation, Cystitis, Depression, Diabetes mellitus, type 2, Dysphagia, Endometriosis, GERD (gastroesophageal reflux disease), Headache, Hypertension, Palpitations, Premature menopause on hormone replacement therapy, and Thyroid disease. The patient's chief complaint is long, thick toenails. This patient has documented high risk feet requiring routine maintenance secondary to peripheral neuropathy.    PCP: Ronen Villegas MD    Date Last Seen by PCP: 06/02/2020 dr ronen villegas    Current shoe gear:  Affected Foot: Tennis shoes     Unaffected Foot: Tennis shoes    Hemoglobin A1C   Date Value Ref Range Status   06/02/2020 5.2 4.0 - 5.6 % Final     Comment:     ADA Screening Guidelines:  5.7-6.4%  Consistent with prediabetes  >or=6.5%  Consistent with diabetes  High levels of fetal hemoglobin interfere with the HbA1C  assay. Heterozygous hemoglobin variants (HbS, HgC, etc)do  not significantly interfere with this assay.   However, presence of multiple variants may affect accuracy.     03/03/2020 5.4 4.0 - 5.6 % Final     Comment:     ADA Screening Guidelines:  5.7-6.4%  Consistent with prediabetes  >or=6.5%  Consistent with diabetes  High levels of fetal hemoglobin interfere with the HbA1C  assay. Heterozygous hemoglobin variants (HbS, HgC, etc)do  not significantly interfere with this assay.   However, presence of multiple variants may affect accuracy.     09/17/2019 5.1 4.0 - 5.6 % Final     Comment:     ADA Screening Guidelines:  5.7-6.4%  Consistent with prediabetes  >or=6.5%  Consistent with diabetes  High levels of fetal hemoglobin interfere with the HbA1C  assay. Heterozygous hemoglobin  variants (HbS, HgC, etc)do  not significantly interfere with this assay.   However, presence of multiple variants may affect accuracy.         Review of Systems   Constitution: Negative for chills, fever and malaise/fatigue.   HENT: Negative for hearing loss.    Cardiovascular: Negative for claudication.   Respiratory: Negative for shortness of breath.    Skin: Positive for dry skin and nail changes. Negative for flushing and rash.   Musculoskeletal: Negative for joint pain and myalgias.   Neurological: Positive for numbness, paresthesias and sensory change. Negative for loss of balance.   Psychiatric/Behavioral: Negative for altered mental status.           Objective:      Physical Exam  Cardiovascular:      Pulses:           Dorsalis pedis pulses are 2+ on the right side and 2+ on the left side.        Posterior tibial pulses are 2+ on the right side and 2+ on the left side.      Comments: No edema noted to b/L LEs  Musculoskeletal:      Comments: Adequate joint ROM noted to all lower extremity muscle groups with no pain or crepitation noted. Muscle strength is 5/5 in all groups bilaterally.     Feet:      Right foot:      Protective Sensation: 5 sites tested. 0 sites sensed.      Left foot:      Protective Sensation: 5 sites tested. 0 sites sensed.   Skin:     Comments: Normal skin tugor noted.   No open lesion noted b/L  Skin temp is warm to warm from proximal to distal b/L.  Webspaces clean, dry, and intact  Xerosis Bilaterally. No open lesions noted.      Neurological:      Comments: Intact gross sensation noted to b/L LEs         Nails x10 are elongated by  5-7mm's, thickened by 1-2 mm's, dystrophic, and are yellowish in  coloration . Xerosis Bilaterally. No open lesions noted.             Assessment:       Encounter Diagnoses   Name Primary?    Diabetes mellitus without complication     Diabetic polyneuropathy associated with type 2 diabetes mellitus Yes    Disease of nail          Plan:       Maria Ines  was seen today for diabetes mellitus and diabetic foot exam.    Diagnoses and all orders for this visit:    Diabetic polyneuropathy associated with type 2 diabetes mellitus  -     Ambulatory referral/consult to Neurology; Future  -     DIABETIC SHOES FOR HOME USE    Diabetes mellitus without complication  -     Ambulatory referral/consult to Podiatry    Disease of nail      I counseled the patient on her conditions, their implications and medical management.    Rx diabetic shoes    - Shoe inspection. Diabetic Foot Education. Patient reminded of the importance of good nutrition and blood sugar control to help prevent podiatric complications of diabetes. Patient instructed on proper foot hygeine. We discussed wearing proper shoe gear, daily foot inspections, never walking without protective shoe gear, never putting sharp instruments to feet, routine podiatric nail visits every 2-3 months.      - With patient's permission, nails were aggressively reduced and debrided x 10 to their soft tissue attachment mechanically and with electric , removing all offending nail and debris. Patient relates relief following the procedure. She will continue to monitor the areas daily, inspect her feet, wear protective shoe gear when ambulatory, moisturizer to maintain skin integrity and follow in this office in approximately 2-3 months, sooner p.r.n.

## 2020-06-24 NOTE — LETTER
June 24, 2020      Luann Raymond MD  [address]  [place] [address]           [facility] Podiat[facility]  [address]  [place] [address]  Phone: [phone]          Patient: Maria Ines Ac   MR Number: [id number]   YOB: 1985   Date of Visit: 6/24/2020       Dear Dr. Luann Raymond:    Thank you for referring Maria Ines Ac to me for evaluation. Attached you will find relevant portions of my assessment and plan of care.    If you have questions, please do not hesitate to call me. I look forward to following Maria Ines Ac along with you.    Sincerely,    Jeison Santiago, JANAK    Enclosure  CC:  No Recipients    If you would like to receive this communication electronically, please contact [email or web address] or [phone] to request more information on [employer] Link access.    For providers and/or their staff who would like to refer a patient to [facility], please contact us through our one-stop-shop provider referral line, [facility], [place], at [phone].    If you feel you have received this communication in error or would no longer like to receive these types of communications, please e-mail [email or web address]

## 2020-07-01 DIAGNOSIS — G44.40 MEDICATION OVERUSE HEADACHE: ICD-10-CM

## 2020-07-01 RX ORDER — DIVALPROEX SODIUM 500 MG/1
500 TABLET, DELAYED RELEASE ORAL EVERY 12 HOURS
Qty: 28 TABLET | Refills: 0 | Status: SHIPPED | OUTPATIENT
Start: 2020-07-01 | End: 2020-07-10

## 2020-07-01 NOTE — TELEPHONE ENCOUNTER
----- Message from Emile Singh sent at 7/1/2020  1:33 PM CDT -----  Pharmacy Calling    Reason for call: Refill    Pharmacy Name: [facility]    Prescription Name: divalproex (DEPAKOTE) 500 MG TbEC and trulicity    Phone Number: [phone]    Additional Information:

## 2020-07-10 ENCOUNTER — OFFICE VISIT (OUTPATIENT)
Dept: CARDIOLOGY | Facility: CLINIC | Age: 35
End: 2020-07-10
Payer: MEDICARE

## 2020-07-10 VITALS
SYSTOLIC BLOOD PRESSURE: 103 MMHG | WEIGHT: 219.81 LBS | DIASTOLIC BLOOD PRESSURE: 73 MMHG | OXYGEN SATURATION: 96 % | HEART RATE: 87 BPM | BODY MASS INDEX: 40.45 KG/M2 | HEIGHT: 62 IN

## 2020-07-10 DIAGNOSIS — E11.8 TYPE 2 DIABETES MELLITUS WITH COMPLICATION, WITHOUT LONG-TERM CURRENT USE OF INSULIN: ICD-10-CM

## 2020-07-10 DIAGNOSIS — I10 ESSENTIAL HYPERTENSION: ICD-10-CM

## 2020-07-10 DIAGNOSIS — R07.9 CHEST PAIN, UNSPECIFIED TYPE: Primary | ICD-10-CM

## 2020-07-10 DIAGNOSIS — I49.3 FREQUENT PVCS: ICD-10-CM

## 2020-07-10 DIAGNOSIS — E66.01 MORBID OBESITY DUE TO EXCESS CALORIES: ICD-10-CM

## 2020-07-10 PROCEDURE — 3078F DIAST BP <80 MM HG: CPT | Mod: HCNC,CPTII,S$GLB, | Performed by: INTERNAL MEDICINE

## 2020-07-10 PROCEDURE — 3008F BODY MASS INDEX DOCD: CPT | Mod: HCNC,CPTII,S$GLB, | Performed by: INTERNAL MEDICINE

## 2020-07-10 PROCEDURE — 99499 UNLISTED E&M SERVICE: CPT | Mod: HCNC,S$GLB,, | Performed by: INTERNAL MEDICINE

## 2020-07-10 PROCEDURE — 3074F SYST BP LT 130 MM HG: CPT | Mod: HCNC,CPTII,S$GLB, | Performed by: INTERNAL MEDICINE

## 2020-07-10 PROCEDURE — 3078F PR MOST RECENT DIASTOLIC BLOOD PRESSURE < 80 MM HG: ICD-10-PCS | Mod: HCNC,CPTII,S$GLB, | Performed by: INTERNAL MEDICINE

## 2020-07-10 PROCEDURE — 3044F HG A1C LEVEL LT 7.0%: CPT | Mod: HCNC,CPTII,S$GLB, | Performed by: INTERNAL MEDICINE

## 2020-07-10 PROCEDURE — 99999 PR PBB SHADOW E&M-EST. PATIENT-LVL V: CPT | Mod: PBBFAC,HCNC,, | Performed by: INTERNAL MEDICINE

## 2020-07-10 PROCEDURE — 3008F PR BODY MASS INDEX (BMI) DOCUMENTED: ICD-10-PCS | Mod: HCNC,CPTII,S$GLB, | Performed by: INTERNAL MEDICINE

## 2020-07-10 PROCEDURE — 99499 RISK ADDL DX/OHS AUDIT: ICD-10-PCS | Mod: HCNC,S$GLB,, | Performed by: INTERNAL MEDICINE

## 2020-07-10 PROCEDURE — 3074F PR MOST RECENT SYSTOLIC BLOOD PRESSURE < 130 MM HG: ICD-10-PCS | Mod: HCNC,CPTII,S$GLB, | Performed by: INTERNAL MEDICINE

## 2020-07-10 PROCEDURE — 99999 PR PBB SHADOW E&M-EST. PATIENT-LVL V: ICD-10-PCS | Mod: PBBFAC,HCNC,, | Performed by: INTERNAL MEDICINE

## 2020-07-10 PROCEDURE — 3044F PR MOST RECENT HEMOGLOBIN A1C LEVEL <7.0%: ICD-10-PCS | Mod: HCNC,CPTII,S$GLB, | Performed by: INTERNAL MEDICINE

## 2020-07-10 PROCEDURE — 99214 OFFICE O/P EST MOD 30 MIN: CPT | Mod: HCNC,S$GLB,, | Performed by: INTERNAL MEDICINE

## 2020-07-10 PROCEDURE — 99214 PR OFFICE/OUTPT VISIT, EST, LEVL IV, 30-39 MIN: ICD-10-PCS | Mod: HCNC,S$GLB,, | Performed by: INTERNAL MEDICINE

## 2020-07-30 RX ORDER — PANTOPRAZOLE SODIUM 40 MG/1
40 TABLET, DELAYED RELEASE ORAL DAILY
Qty: 90 TABLET | Refills: 0 | Status: SHIPPED | OUTPATIENT
Start: 2020-07-30 | End: 2020-10-30 | Stop reason: SDUPTHER

## 2020-08-13 ENCOUNTER — OFFICE VISIT (OUTPATIENT)
Dept: UROLOGY | Facility: CLINIC | Age: 35
End: 2020-08-13
Payer: MEDICARE

## 2020-08-13 VITALS
WEIGHT: 217.13 LBS | BODY MASS INDEX: 39.96 KG/M2 | HEART RATE: 78 BPM | HEIGHT: 62 IN | SYSTOLIC BLOOD PRESSURE: 113 MMHG | DIASTOLIC BLOOD PRESSURE: 77 MMHG

## 2020-08-13 DIAGNOSIS — R33.9 INCOMPLETE BLADDER EMPTYING: ICD-10-CM

## 2020-08-13 DIAGNOSIS — R35.1 NOCTURIA: ICD-10-CM

## 2020-08-13 DIAGNOSIS — R39.15 URINARY URGENCY: Primary | ICD-10-CM

## 2020-08-13 PROCEDURE — 3078F DIAST BP <80 MM HG: CPT | Mod: HCNC,CPTII,S$GLB, | Performed by: UROLOGY

## 2020-08-13 PROCEDURE — 95970 ALYS NPGT W/O PRGRMG: CPT | Mod: HCNC,59,S$GLB, | Performed by: UROLOGY

## 2020-08-13 PROCEDURE — 95970 PR ANALYZE NEUROSTIM,NO REPROG: ICD-10-PCS | Mod: HCNC,59,S$GLB, | Performed by: UROLOGY

## 2020-08-13 PROCEDURE — 3008F BODY MASS INDEX DOCD: CPT | Mod: HCNC,CPTII,S$GLB, | Performed by: UROLOGY

## 2020-08-13 PROCEDURE — 87086 URINE CULTURE/COLONY COUNT: CPT | Mod: HCNC

## 2020-08-13 PROCEDURE — 3074F PR MOST RECENT SYSTOLIC BLOOD PRESSURE < 130 MM HG: ICD-10-PCS | Mod: HCNC,CPTII,S$GLB, | Performed by: UROLOGY

## 2020-08-13 PROCEDURE — 99999 PR PBB SHADOW E&M-EST. PATIENT-LVL V: ICD-10-PCS | Mod: PBBFAC,HCNC,, | Performed by: UROLOGY

## 2020-08-13 PROCEDURE — 3008F PR BODY MASS INDEX (BMI) DOCUMENTED: ICD-10-PCS | Mod: HCNC,CPTII,S$GLB, | Performed by: UROLOGY

## 2020-08-13 PROCEDURE — 99213 PR OFFICE/OUTPT VISIT, EST, LEVL III, 20-29 MIN: ICD-10-PCS | Mod: HCNC,S$GLB,, | Performed by: UROLOGY

## 2020-08-13 PROCEDURE — 99999 PR PBB SHADOW E&M-EST. PATIENT-LVL V: CPT | Mod: PBBFAC,HCNC,, | Performed by: UROLOGY

## 2020-08-13 PROCEDURE — 99213 OFFICE O/P EST LOW 20 MIN: CPT | Mod: HCNC,S$GLB,, | Performed by: UROLOGY

## 2020-08-13 PROCEDURE — 3074F SYST BP LT 130 MM HG: CPT | Mod: HCNC,CPTII,S$GLB, | Performed by: UROLOGY

## 2020-08-13 PROCEDURE — 3078F PR MOST RECENT DIASTOLIC BLOOD PRESSURE < 80 MM HG: ICD-10-PCS | Mod: HCNC,CPTII,S$GLB, | Performed by: UROLOGY

## 2020-08-13 RX ORDER — OXYBUTYNIN CHLORIDE 5 MG/1
5 TABLET ORAL NIGHTLY
Qty: 30 TABLET | Refills: 3 | Status: SHIPPED | OUTPATIENT
Start: 2020-08-13 | End: 2020-12-17 | Stop reason: SDUPTHER

## 2020-08-13 NOTE — PROGRESS NOTES
CHIEF COMPLAINT:    Mrs. Ac is a 35 y.o. female presenting for a follow up on urinary urgency, frequency, nocturia    PRESENTING ILLNESS:    Maria Ines Ac is a 35 y.o. female who returns for follow up.  She states that she is not feeling the interStim and she has nocturia x 4.  When she was here in May, we interrogated the unit.  She has some dysuria and suprapubic tenderness.      REVIEW OF SYSTEMS:    Review of Systems   Constitutional: Negative.    HENT: Negative.    Eyes: Negative.    Respiratory: Negative.    Cardiovascular: Negative.    Gastrointestinal: Negative.    Genitourinary:        Nocturia   Musculoskeletal: Negative.    Skin: Negative.    Neurological: Negative.    Endo/Heme/Allergies: Negative.    Psychiatric/Behavioral: Negative.        PATIENT HISTORY:    Past Medical History:   Diagnosis Date    Blood in stool     Constipation     Cystitis     Depression     Diabetes mellitus, type 2     Dysphagia     Endometriosis     GERD (gastroesophageal reflux disease)     Headache     Hypertension     Palpitations     Premature menopause on hormone replacement therapy     Thyroid disease        Past Surgical History:   Procedure Laterality Date    BLADDER SUSPENSION      CARPAL TUNNEL RELEASE      right    CHOLECYSTECTOMY      COLONOSCOPY N/A 8/30/2016    Procedure: COLONOSCOPY;  Surgeon: Slick Herron MD;  Location: [address]);  Service: Endoscopy;  Laterality: N/A;  PM prep    ESOPHAGEAL MANOMETRY WITH MEASUREMENT OF IMPEDANCE N/A 11/5/2018    Procedure: MANOMETRY, ESOPHAGUS, WITH IMPEDANCE MEASUREMENT;  Surgeon: Slick Herron MD;  Location: [address]);  Service: Endoscopy;  Laterality: N/A;    ESOPHAGOGASTRODUODENOSCOPY N/A 2/13/2020    Procedure: EGD (ESOPHAGOGASTRODUODENOSCOPY);  Surgeon: Slick Herron MD;  Location: [facility] [address]);  Service: Endoscopy;  Laterality: N/A;  pt has cardiology appt on 1/6/19-will call back after this appt to  schedule-tb    FLUOROSCOPIC URODYNAMIC STUDY N/A 2019    Procedure: URODYNAMIC STUDY, FLUOROSCOPIC;  Surgeon: Zena Tee MD;  Location: [facility] OR 1ST FLR;  Service: Urology;  Laterality: N/A;  1 hour    GALLBLADDER SURGERY      HYSTERECTOMY      Bess velasquez Dr. Champlain    IMPLANTATION OF PERMANENT SACRAL NERVE STIMULATOR N/A 2019    Procedure: INSERTION, NEUROSTIMULATOR, PERMANENT, SACRAL;  Surgeon: Zena Tee MD;  Location: [facility] OR 2ND FLR;  Service: Urology;  Laterality: N/A;  30 min    OOPHORECTOMY      LSO- benign cyst    OOPHORECTOMY      RSO- endo    ooptherectomy      right knee  scoped    SHOULDER SURGERY  right       Family History   Problem Relation Age of Onset    Breast cancer Mother 50        alive at 64, unilateral    Esophageal cancer Mother 63    Ovarian cancer Mother 63    Anesthesia problems Mother     Cervical cancer Maternal Grandmother         unknown age of onset,  at 80    Colon cancer Brother 40    Breast cancer Paternal Aunt         unknown age of onset, alive at 70    Breast cancer Maternal Aunt 72        alive at 72     Socioeconomic History    Marital status: Single   Tobacco Use    Smoking status: Never Smoker    Smokeless tobacco: Never Used   Substance and Sexual Activity    Alcohol use: No    Drug use: No    Sexual activity: Never     Birth control/protection: None       Allergies:  Patient has no known allergies.    Medications:  Outpatient Encounter Medications as of 2020   Medication Sig Dispense Refill    atorvastatin (LIPITOR) 40 MG tablet Take 40 mg by mouth once daily.      calcium-vitamin D3 500 mg(1,250mg) -200 unit per tablet Take 1 tablet by mouth 2 (two) times daily with meals.      ciclopirox (PENLAC) 8 % Soln Apply topically nightly. 6.6 mL 11    diclofenac sodium (VOLTAREN) 1 % Gel Apply 2 g topically 4 (four) times daily. As needed for breast pain 1 Tube 1    dicyclomine (BENTYL) 10 MG  capsule TAKE 2 CAPSULES(20 MG) BY MOUTH THREE TIMES DAILY BEFORE MEALS 180 capsule 0    docusate sodium (COLACE) 100 MG capsule Take 1 capsule (100 mg total) by mouth 2 (two) times daily. 60 capsule 0    estradiol (ESTRACE) 2 MG tablet Take 1 tablet (2 mg total) by mouth once daily. 90 tablet 3    FARXIGA 10 mg Tab TK 1 T PO QD  7    fish oil-omega-3 fatty acids 300-1,000 mg capsule Take 2 g by mouth once daily.      fluticasone propionate (FLONASE) 50 mcg/actuation nasal spray SHAKE LIQUID AND USE 2 SPRAYS(100 MCG) IN EACH NOSTRIL EVERY DAY 48 g 2    gabapentin (NEURONTIN) 300 MG capsule Take 300 mg (1 tablet) twice during the day, then three tablets (900 mg) at night before bed. 150 capsule 11    GLUCOPHAGE 500 mg tablet Take 500 mg by mouth 2 (two) times daily with meals.       hydrOXYzine HCl (ATARAX) 10 MG Tab TK 1 T PO BID PRF ITCH  3    ibuprofen (ADVIL,MOTRIN) 800 MG tablet 800 mg every 4 (four) hours as needed.   0    levothyroxine (SYNTHROID) 75 MCG tablet       magnesium oxide (MAG-OX) 400 mg (241.3 mg magnesium) tablet Take 1 tablet (400 mg total) by mouth 2 (two) times daily.  0    metoprolol tartrate (LOPRESSOR) 100 MG tablet TAKE 1 TABLET(100 MG) BY MOUTH TWICE DAILY 120 tablet 3    MULTIVIT-IRON-MIN-FOLIC ACID 3,500-18-0.4 UNIT-MG-MG ORAL CHEW Take 1 tablet by mouth once daily.       neomycin-polymyxin-hydrocortisone (CORTISPORIN) 3.5-10,000-10 mg-unit-mg/mL ophthalmic suspension INTILL 1 DROP INTO AFFECTED EYE QID  0    omeprazole (PRILOSEC OTC) 20 MG tablet Take 20 mg by mouth once daily.      pantoprazole (PROTONIX) 40 MG tablet Take 1 tablet (40 mg total) by mouth once daily. 90 tablet 0    phentermine (ADIPEX-P) 37.5 mg tablet TK 1 T PO  QAM  3    phentermine 37.5 MG capsule TK 1 C PO ONCE D IN THE MORNING      spironolactone (ALDACTONE) 25 MG tablet TK 1 T PO HS  2    topiramate (TOPAMAX) 50 MG tablet Take 2 tablets (100 mg total) by mouth every evening. 60 tablet 11     traZODone (DESYREL) 100 MG tablet TK 1 T PO HS  1    triamcinolone acetonide 0.1% (KENALOG) 0.1 % cream MIX WITH LAMISIL CREAM IN AQUAPHOR TO REACH 4OZ  AND APPLY ONCE DAILY  2    TRULICITY 1.5 mg/0.5 mL PnIj INJECT 1 PEN INTO THE SKIN Q WEEK  5    ziprasidone (GEODON) 80 MG capsule Take 80 mg by mouth 2 (two) times daily with meals.       ZOLOFT 100 mg tablet Take 200 mg by mouth once daily.       ACCU-CHEK AMAURY PLUS TEST STRP Strp USE TO TEST BLOOD GLUCOSE TID  9    ACCU-CHEK SOFTCLIX LANCETS Misc USE TO TEST BLOOD GLUCOSE TID  3    augmented betamethasone dipropionate (DIPROLENE-AF) 0.05 % ointment NATALEE TO HANDS QHS      BLOOD SUGAR DIAGNOSTIC (ACCU-CHEK AMAURY PLUS TEST STRP MISC) 1 strip by Misc.(Non-Drug; Combo Route) route 3 (three) times daily.      fluconazole (DIFLUCAN) 150 MG Tab       oxybutynin (DITROPAN) 5 MG Tab Take 1 tablet (5 mg total) by mouth every evening. 30 tablet 3    [DISCONTINUED] INV metoprolol tartrate (LOPRESSOR) 100 MG tablet Take 1 tablet (100 mg total) by mouth 2 (two) times daily. 120 tablet 3     Facility-Administered Encounter Medications as of 8/13/2020   Medication Dose Route Frequency Provider Last Rate Last Dose    onabotulinumtoxina injection 200 Units  200 Units Intramuscular Q90 Days Bello Shirley MD        onabotulinumtoxina injection 200 Units  200 Units Intramuscular Q90 Days Mena Lambert MD   200 Units at 03/05/20 0819         PHYSICAL EXAMINATION:    The patient generally appears in good health, is appropriately interactive, and is in no apparent distress.    Skin: No lesions.    Mental: Cooperative with normal affect.    Neuro: Grossly intact.    HEENT: Normal. No evidence of lymphadenopathy.    Chest:  normal inspiratory effort.    Abdomen:  Soft, non-tender. No masses or organomegaly. Bladder is not palpable. No evidence of flank discomfort. No evidence of inguinal hernia.    Extremities: No clubbing, cyanosis, or edema    Normal  external female genitalia  Urethral meatus is normal  Urethra and bladder are nontender to bimanual exam  Well supported anteriorly and posteriorly   Uterus and cervix are normal  No adnexal masses  PVR by catheterization was 40 ml    LABS:    Lab Results   Component Value Date    BUN 9 06/02/2020    CREATININE 1.1 06/02/2020     UA 1.015, pH 5, + leuk, 1000 glucose, otherwise, negative (patient is a diabetic and is on Farxiga)      IMPRESSION:    Encounter Diagnoses   Name Primary?    Urinary urgency Yes    Incomplete bladder emptying     Nocturia        PLAN:    1.  Will arrange for her to be evaluated by the rep to trouble shoot as I could not get her Smartprogramer to pair with the blue tooth device.    2.  Oxybutynin 5 mg q hs.  3.  The nocturia may be secondary to the glycosuria but cannot reprogram without being able to pair the device.    4.  I suspect the InterStim is functioning as her PVR was 40 ml and the motor threshold occurs before the sensory

## 2020-08-14 LAB — BACTERIA UR CULT: NO GROWTH

## 2020-08-19 ENCOUNTER — CLINICAL SUPPORT (OUTPATIENT)
Dept: UROLOGY | Facility: CLINIC | Age: 35
End: 2020-08-19
Payer: MEDICARE

## 2020-08-19 DIAGNOSIS — R33.9 INCOMPLETE BLADDER EMPTYING: ICD-10-CM

## 2020-08-19 DIAGNOSIS — R39.15 URINARY URGENCY: Primary | ICD-10-CM

## 2020-08-19 PROCEDURE — 99024 PR POST-OP FOLLOW-UP VISIT: ICD-10-PCS | Mod: HCNC,S$GLB,, | Performed by: UROLOGY

## 2020-08-19 PROCEDURE — 99024 POSTOP FOLLOW-UP VISIT: CPT | Mod: HCNC,S$GLB,, | Performed by: UROLOGY

## 2020-08-19 NOTE — PROGRESS NOTES
Patient met with the [employer] rep and it was found that the blue tooth antennae was non functional.  She was provided with a new system by Josselin Lewis.  No charge for the visit.

## 2020-09-01 RX ORDER — TOPIRAMATE 50 MG/1
100 TABLET, FILM COATED ORAL NIGHTLY
Qty: 60 TABLET | Refills: 11 | Status: SHIPPED | OUTPATIENT
Start: 2020-09-01 | End: 2020-12-07 | Stop reason: SDUPTHER

## 2020-09-06 ENCOUNTER — PATIENT OUTREACH (OUTPATIENT)
Dept: ADMINISTRATIVE | Facility: OTHER | Age: 35
End: 2020-09-06

## 2020-09-06 DIAGNOSIS — E11.9 TYPE 2 DIABETES MELLITUS WITHOUT COMPLICATION, UNSPECIFIED WHETHER LONG TERM INSULIN USE: Primary | ICD-10-CM

## 2020-09-06 NOTE — PROGRESS NOTES
Care Everywhere: updated  Immunization: updated  Health Maintenance: updated  Media Review: review for outside eye exam report  Legacy Review:   Order placed: diabetic eye screening photo   Upcoming appts:

## 2020-09-09 ENCOUNTER — LAB VISIT (OUTPATIENT)
Dept: LAB | Facility: OTHER | Age: 35
End: 2020-09-09
Attending: OBSTETRICS & GYNECOLOGY
Payer: MEDICARE

## 2020-09-09 ENCOUNTER — OFFICE VISIT (OUTPATIENT)
Dept: OBSTETRICS AND GYNECOLOGY | Facility: CLINIC | Age: 35
End: 2020-09-09
Attending: OBSTETRICS & GYNECOLOGY
Payer: MEDICARE

## 2020-09-09 VITALS
DIASTOLIC BLOOD PRESSURE: 70 MMHG | HEART RATE: 90 BPM | WEIGHT: 223.56 LBS | SYSTOLIC BLOOD PRESSURE: 120 MMHG | BODY MASS INDEX: 41.14 KG/M2 | HEIGHT: 62 IN

## 2020-09-09 DIAGNOSIS — Z01.419 ENCOUNTER FOR GYNECOLOGICAL EXAMINATION WITHOUT ABNORMAL FINDING: ICD-10-CM

## 2020-09-09 DIAGNOSIS — N95.1 MENOPAUSAL SYMPTOM: Primary | ICD-10-CM

## 2020-09-09 DIAGNOSIS — N95.1 MENOPAUSAL SYMPTOM: ICD-10-CM

## 2020-09-09 PROCEDURE — 3074F SYST BP LT 130 MM HG: CPT | Mod: CPTII,S$GLB,, | Performed by: OBSTETRICS & GYNECOLOGY

## 2020-09-09 PROCEDURE — G0101 CA SCREEN;PELVIC/BREAST EXAM: HCPCS | Mod: S$GLB,,, | Performed by: OBSTETRICS & GYNECOLOGY

## 2020-09-09 PROCEDURE — 82670 ASSAY OF TOTAL ESTRADIOL: CPT | Mod: HCNC

## 2020-09-09 PROCEDURE — 36415 COLL VENOUS BLD VENIPUNCTURE: CPT | Mod: HCNC

## 2020-09-09 PROCEDURE — G0101 PR CA SCREEN;PELVIC/BREAST EXAM: ICD-10-PCS | Mod: S$GLB,,, | Performed by: OBSTETRICS & GYNECOLOGY

## 2020-09-09 PROCEDURE — 3078F DIAST BP <80 MM HG: CPT | Mod: CPTII,S$GLB,, | Performed by: OBSTETRICS & GYNECOLOGY

## 2020-09-09 PROCEDURE — 3074F PR MOST RECENT SYSTOLIC BLOOD PRESSURE < 130 MM HG: ICD-10-PCS | Mod: CPTII,S$GLB,, | Performed by: OBSTETRICS & GYNECOLOGY

## 2020-09-09 PROCEDURE — 3078F PR MOST RECENT DIASTOLIC BLOOD PRESSURE < 80 MM HG: ICD-10-PCS | Mod: CPTII,S$GLB,, | Performed by: OBSTETRICS & GYNECOLOGY

## 2020-09-09 RX ORDER — OXCARBAZEPINE 150 MG/1
TABLET, FILM COATED ORAL
COMMUNITY
Start: 2020-07-29 | End: 2022-11-14

## 2020-09-09 RX ORDER — ILOPERIDONE 8 MG/1
TABLET ORAL
COMMUNITY
Start: 2020-08-20 | End: 2020-12-09

## 2020-09-09 RX ORDER — AMOXICILLIN AND CLAVULANATE POTASSIUM 875; 125 MG/1; MG/1
TABLET, FILM COATED ORAL
COMMUNITY
Start: 2020-06-29 | End: 2021-01-29

## 2020-09-09 NOTE — PROGRESS NOTES
"SUBJECTIVE:   35 y.o. female   for annual routine checkup. Patient's last menstrual period was 2012 (lmp unknown)..  She reports that she is still having bilateral breast tenderness and "lots of hot flashes.".    She rpeorts that the Estradiol is not helping.     Past Medical History:   Diagnosis Date    Blood in stool     Constipation     Cystitis     Depression     Diabetes mellitus, type 2     Dysphagia     Endometriosis     GERD (gastroesophageal reflux disease)     Headache     Hypertension     Palpitations     Premature menopause on hormone replacement therapy     Thyroid disease      Past Surgical History:   Procedure Laterality Date    BLADDER SUSPENSION      CARPAL TUNNEL RELEASE      right    CHOLECYSTECTOMY      COLONOSCOPY N/A 2016    Procedure: COLONOSCOPY;  Surgeon: Slick Herron MD;  Location: [facility] (4TH F[facility]);  Service: Endoscopy;  Laterality: N/A;  PM prep    ESOPHAGEAL MANOMETRY WITH MEASUREMENT OF IMPEDANCE N/A 2018    Procedure: MANOMETRY, ESOPHAGUS, WITH IMPEDANCE MEASUREMENT;  Surgeon: Slick Herron MD;  Location: [facility] ENDO (4TH F[facility]);  Service: Endoscopy;  Laterality: N/A;    ESOPHAGOGASTRODUODENOSCOPY N/A 2020    Procedure: EGD (ESOPHAGOGASTRODUODENOSCOPY);  Surgeon: Slick Herron MD;  Location: [facility] (4TH F[facility]);  Service: Endoscopy;  Laterality: N/A;  pt has cardiology appt on 19-will call back after this appt to schedule-tb    FLUOROSCOPIC URODYNAMIC STUDY N/A 2019    Procedure: URODYNAMIC STUDY, FLUOROSCOPIC;  Surgeon: Zena Tee MD;  Location: [facility] OR 1ST FLR;  Service: Urology;  Laterality: N/A;  1 hour    GALLBLADDER SURGERY      HYSTERECTOMY      Bess velasquez Dr. Champlain    IMPLANTATION OF PERMANENT SACRAL NERVE STIMULATOR N/A 2019    Procedure: INSERTION, NEUROSTIMULATOR, PERMANENT, SACRAL;  Surgeon: Zena Tee MD;  Location: [facility] OR 2ND FLR;  Service: Urology;  Laterality: N/A;  30 min "    OOPHORECTOMY      LSO- benign cyst    OOPHORECTOMY      RSO- endo    ooptherectomy      right knee  scoped    SHOULDER SURGERY  right     Social History     Socioeconomic History    Marital status: Single     Spouse name: Not on file    Number of children: Not on file    Years of education: Not on file    Highest education level: Not on file   Occupational History    Not on file   Social Needs    Financial resource strain: Not on file    Food insecurity     Worry: Not on file     Inability: Not on file    Transportation needs     Medical: Not on file     Non-medical: Not on file   Tobacco Use    Smoking status: Never Smoker    Smokeless tobacco: Never Used   Substance and Sexual Activity    Alcohol use: No    Drug use: No    Sexual activity: Never     Birth control/protection: None   Lifestyle    Physical activity     Days per week: Not on file     Minutes per session: Not on file    Stress: Not on file   Relationships    Social connections     Talks on phone: Not on file     Gets together: Not on file     Attends Sikh service: Not on file     Active member of club or organization: Not on file     Attends meetings of clubs or organizations: Not on file     Relationship status: Not on file   Other Topics Concern    Not on file   Social History Narrative    ** Merged History Encounter **          Family History   Problem Relation Age of Onset    Breast cancer Mother 50        alive at 64, unilateral    Esophageal cancer Mother 63    Ovarian cancer Mother 63    Anesthesia problems Mother     Cervical cancer Maternal Grandmother         unknown age of onset,  at 80    Colon cancer Brother 40    Breast cancer Paternal Aunt         unknown age of onset, alive at 70    Breast cancer Maternal Aunt 72        alive at 72    Vaginal cancer Neg Hx     Endometrial cancer Neg Hx     Crohn's disease Neg Hx     Ulcerative colitis Neg Hx     Stomach cancer Neg Hx      Irritable bowel syndrome Neg Hx     Celiac disease Neg Hx      OB History    Para Term  AB Living   0 0 0 0 0 0   SAB TAB Ectopic Multiple Live Births   0 0 0 0             Current Outpatient Medications   Medication Sig Dispense Refill    ACCU-CHEK AMAURY PLUS TEST STRP Strp USE TO TEST BLOOD GLUCOSE TID  9    ACCU-CHEK SOFTCLIX LANCETS Misc USE TO TEST BLOOD GLUCOSE TID  3    amoxicillin-clavulanate 875-125mg (AUGMENTIN) 875-125 mg per tablet TK 1 T PO Q 12 H FOR 10 DAYS      atorvastatin (LIPITOR) 40 MG tablet Take 40 mg by mouth once daily.      augmented betamethasone dipropionate (DIPROLENE-AF) 0.05 % ointment NATALEE TO HANDS QHS      BLOOD SUGAR DIAGNOSTIC (ACCU-CHEK AMAURY PLUS TEST STRP MISC) 1 strip by Misc.(Non-Drug; Combo Route) route 3 (three) times daily.      calcium-vitamin D3 500 mg(1,250mg) -200 unit per tablet Take 1 tablet by mouth 2 (two) times daily with meals.      ciclopirox (PENLAC) 8 % Soln Apply topically nightly. 6.6 mL 11    diclofenac sodium (VOLTAREN) 1 % Gel Apply 2 g topically 4 (four) times daily. As needed for breast pain 1 Tube 1    dicyclomine (BENTYL) 10 MG capsule TAKE 2 CAPSULES(20 MG) BY MOUTH THREE TIMES DAILY BEFORE MEALS 180 capsule 0    docusate sodium (COLACE) 100 MG capsule Take 1 capsule (100 mg total) by mouth 2 (two) times daily. 60 capsule 0    estradiol (ESTRACE) 2 MG tablet Take 1 tablet (2 mg total) by mouth once daily. 90 tablet 3    FANAPT 8 mg Tab TK 1 T PO BID      FARXIGA 10 mg Tab TK 1 T PO QD  7    fish oil-omega-3 fatty acids 300-1,000 mg capsule Take 2 g by mouth once daily.      fluconazole (DIFLUCAN) 150 MG Tab       fluticasone propionate (FLONASE) 50 mcg/actuation nasal spray SHAKE LIQUID AND USE 2 SPRAYS(100 MCG) IN EACH NOSTRIL EVERY DAY 48 g 2    gabapentin (NEURONTIN) 300 MG capsule Take 300 mg (1 tablet) twice during the day, then three tablets (900 mg) at night before bed. 150 capsule 11    GLUCOPHAGE 500 mg tablet  Take 500 mg by mouth 2 (two) times daily with meals.       hydrOXYzine HCl (ATARAX) 10 MG Tab TK 1 T PO BID PRF ITCH  3    levothyroxine (SYNTHROID) 75 MCG tablet       magnesium oxide (MAG-OX) 400 mg (241.3 mg magnesium) tablet Take 1 tablet (400 mg total) by mouth 2 (two) times daily.  0    metoprolol tartrate (LOPRESSOR) 100 MG tablet TAKE 1 TABLET(100 MG) BY MOUTH TWICE DAILY 120 tablet 3    MULTIVIT-IRON-MIN-FOLIC ACID 3,500-18-0.4 UNIT-MG-MG ORAL CHEW Take 1 tablet by mouth once daily.       neomycin-polymyxin-hydrocortisone (CORTISPORIN) 3.5-10,000-10 mg-unit-mg/mL ophthalmic suspension INTILL 1 DROP INTO AFFECTED EYE QID  0    omeprazole (PRILOSEC OTC) 20 MG tablet Take 20 mg by mouth once daily.      pantoprazole (PROTONIX) 40 MG tablet Take 1 tablet (40 mg total) by mouth once daily. 90 tablet 0    phentermine (ADIPEX-P) 37.5 mg tablet TK 1 T PO  QAM  3    topiramate (TOPAMAX) 50 MG tablet Take 2 tablets (100 mg total) by mouth every evening. 60 tablet 11    traZODone (DESYREL) 100 MG tablet TK 1 T PO HS  1    triamcinolone acetonide 0.1% (KENALOG) 0.1 % cream MIX WITH LAMISIL CREAM IN AQUAPHOR TO REACH 4OZ  AND APPLY ONCE DAILY  2    TRULICITY 1.5 mg/0.5 mL PnIj INJECT 1 PEN INTO THE SKIN Q WEEK  5    ziprasidone (GEODON) 80 MG capsule Take 80 mg by mouth 2 (two) times daily with meals.       ZOLOFT 100 mg tablet Take 200 mg by mouth once daily.       ibuprofen (ADVIL,MOTRIN) 800 MG tablet 800 mg every 4 (four) hours as needed.   0    OXcarbazepine (TRILEPTAL) 150 MG Tab TK 1 T PO BID      oxybutynin (DITROPAN) 5 MG Tab Take 1 tablet (5 mg total) by mouth every evening. (Patient not taking: Reported on 9/9/2020) 30 tablet 3    phentermine 37.5 MG capsule TK 1 C PO ONCE D IN THE MORNING      spironolactone (ALDACTONE) 25 MG tablet TK 1 T PO HS  2     Current Facility-Administered Medications   Medication Dose Route Frequency Provider Last Rate Last Dose    onabotulinumtoxina injection  200 Units  200 Units Intramuscular Q90 Days Bello Shirley MD        onabotulinumtoxina injection 200 Units  200 Units Intramuscular Q90 Days Mena Lambert MD   200 Units at 03/05/20 0819     Allergies: Patient has no known allergies.     The ASCVD Risk score (Roscoe ATIF Jr., et al., 2013) failed to calculate for the following reasons:    The 2013 ASCVD risk score is only valid for ages 40 to 79      ROS:  Constitutional: no weight loss, weight gain, fever, fatigue  Eyes:  No vision changes, glasses/contacts  ENT/Mouth: No ulcers, sinus problems, ears ringing, headache  Cardiovascular: No inability to lie flat, chest pain, exercise intolerance, swelling, heart palpitations  Respiratory: No wheezing, coughing blood, shortness of breath, or cough  Gastrointestinal: No diarrhea, bloody stool, nausea/vomiting, constipation, gas, hemorrhoids  Genitourinary: No blood in urine, painful urination, urgency of urination, frequency of urination, incomplete emptying, incontinence, abnormal bleeding, painful periods, heavy periods, vaginal discharge, vaginal odor, painful intercourse, sexual problems, bleeding after intercourse.  Musculoskeletal: No muscle weakness  Skin/Breast: +painful breasts, nipple discharge, masses, rash, ulcers  Neurological: No passing out, seizures, numbness, headache  Endocrine: No diabetes, hypothyroid, hyperthyroid,+ hot flashes, hair loss, abnormal hair growth, acne  Psychiatric: No depression, crying  Hematologic: No bruises, bleeding, swollen lymph nodes, anemia.      Physical Exam:   Constitutional: She is oriented to person, place, and time. She appears well-developed and well-nourished.      Neck: Normal range of motion. No tracheal deviation present. No thyromegaly present.    Cardiovascular: Exam reveals no edema.     Pulmonary/Chest: Effort normal. She exhibits no mass, no tenderness, no deformity and no retraction. Right breast exhibits no inverted nipple, no mass, no nipple  discharge, no skin change, no tenderness, presence, no bleeding and no swelling. Left breast exhibits no inverted nipple, no mass, no nipple discharge, no skin change, no tenderness, presence, no bleeding and no swelling. Breasts are symmetrical.        Abdominal: Soft. She exhibits no distension and no mass. There is no abdominal tenderness. There is no rebound and no guarding. No hernia. Hernia confirmed negative in the left inguinal area.     Genitourinary: Rectum:      No external hemorrhoid.   There is no rash, tenderness or lesion on the right labia. There is no rash, tenderness or lesion on the left labia. Uterus is absent. No no adexnal prolapse. Right adnexum displays no mass, no tenderness and no fullness. Left adnexum displays no mass, no tenderness and no fullness. No tenderness, bleeding, rectocele, cystocele or unspecified prolapse of vaginal walls in the vagina. Vaginal cuff normal.Cervix exhibits absence. negative for vaginal discharge          Musculoskeletal: Normal range of motion and moves all extremeties. No edema.       Neurological: She is alert and oriented to person, place, and time.    Skin: No rash noted. No erythema. No pallor.    Psychiatric: She has a normal mood and affect. Her behavior is normal. Judgment and thought content normal.         ASSESSMENT:   well woman  no contraindication to continue hormonal therapy  Breast tenderness  PLAN:   Mammogram  Counseled patient that I recommend an Estradiol level- will then discuss changing to a differnet form of Estradiol- she may do better with a patch  return annually or prn

## 2020-09-10 LAB — ESTRADIOL SERPL-MCNC: 34 PG/ML

## 2020-09-18 ENCOUNTER — TELEPHONE (OUTPATIENT)
Dept: NEUROLOGY | Facility: CLINIC | Age: 35
End: 2020-09-18

## 2020-09-18 NOTE — TELEPHONE ENCOUNTER
----- Message from Lencho Howard sent at 9/17/2020 11:52 AM CDT -----  Contact: Pt @[phone]  Patient calling to r/s the 9-15th appt, shelly call

## 2020-09-21 ENCOUNTER — PATIENT MESSAGE (OUTPATIENT)
Dept: OBSTETRICS AND GYNECOLOGY | Facility: CLINIC | Age: 35
End: 2020-09-21

## 2020-09-29 ENCOUNTER — PATIENT MESSAGE (OUTPATIENT)
Dept: OTHER | Facility: OTHER | Age: 35
End: 2020-09-29

## 2020-09-30 ENCOUNTER — OFFICE VISIT (OUTPATIENT)
Dept: PODIATRY | Facility: CLINIC | Age: 35
End: 2020-09-30
Payer: MEDICARE

## 2020-09-30 VITALS
HEART RATE: 93 BPM | SYSTOLIC BLOOD PRESSURE: 101 MMHG | HEIGHT: 62 IN | DIASTOLIC BLOOD PRESSURE: 73 MMHG | BODY MASS INDEX: 41.14 KG/M2 | WEIGHT: 223.56 LBS

## 2020-09-30 DIAGNOSIS — E11.42 DIABETIC POLYNEUROPATHY ASSOCIATED WITH TYPE 2 DIABETES MELLITUS: Primary | ICD-10-CM

## 2020-09-30 DIAGNOSIS — L60.9 DISEASE OF NAIL: ICD-10-CM

## 2020-09-30 PROCEDURE — 11721 PR DEBRIDEMENT OF NAILS, 6 OR MORE: ICD-10-PCS | Mod: Q9,HCNC,S$GLB, | Performed by: PODIATRIST

## 2020-09-30 PROCEDURE — 99499 UNLISTED E&M SERVICE: CPT | Mod: HCNC,S$GLB,, | Performed by: PODIATRIST

## 2020-09-30 PROCEDURE — 11721 DEBRIDE NAIL 6 OR MORE: CPT | Mod: Q9,HCNC,S$GLB, | Performed by: PODIATRIST

## 2020-09-30 PROCEDURE — 99999 PR PBB SHADOW E&M-EST. PATIENT-LVL IV: CPT | Mod: PBBFAC,HCNC,, | Performed by: PODIATRIST

## 2020-09-30 PROCEDURE — 99999 PR PBB SHADOW E&M-EST. PATIENT-LVL IV: ICD-10-PCS | Mod: PBBFAC,HCNC,, | Performed by: PODIATRIST

## 2020-09-30 PROCEDURE — 99499 NO LOS: ICD-10-PCS | Mod: HCNC,S$GLB,, | Performed by: PODIATRIST

## 2020-09-30 NOTE — PROGRESS NOTES
Subjective:      Patient ID: Maria Ines Ac is a 35 y.o. female.    Chief Complaint: Diabetic Foot Exam (Luann Raymond 06/02/20 pcp) and Foot Pain (both feet )    Maria Ines is a 35 y.o. female who presents to the clinic for evaluation and treatment of high risk feet. Maria Ines has a past medical history of Blood in stool, Constipation, Cystitis, Depression, Diabetes mellitus, type 2, Dysphagia, Endometriosis, GERD (gastroesophageal reflux disease), Headache, Hypertension, Palpitations, Premature menopause on hormone replacement therapy, and Thyroid disease. The patient's chief complaint is long, thick toenails. Pt states she has an appointment with neurology in December.  This patient has documented high risk feet requiring routine maintenance secondary to peripheral neuropathy.    PCP: Luann Raymond MD    Date Last Seen by PCP:   Chief Complaint   Patient presents with    Diabetic Foot Exam     Luann Raymond 06/02/20 pcp    Foot Pain     both feet        Current shoe gear:  Affected Foot: Tennis shoes     Unaffected Foot: Tennis shoes    Hemoglobin A1C   Date Value Ref Range Status   06/02/2020 5.2 4.0 - 5.6 % Final     Comment:     ADA Screening Guidelines:  5.7-6.4%  Consistent with prediabetes  >or=6.5%  Consistent with diabetes  High levels of fetal hemoglobin interfere with the HbA1C  assay. Heterozygous hemoglobin variants (HbS, HgC, etc)do  not significantly interfere with this assay.   However, presence of multiple variants may affect accuracy.     03/03/2020 5.4 4.0 - 5.6 % Final     Comment:     ADA Screening Guidelines:  5.7-6.4%  Consistent with prediabetes  >or=6.5%  Consistent with diabetes  High levels of fetal hemoglobin interfere with the HbA1C  assay. Heterozygous hemoglobin variants (HbS, HgC, etc)do  not significantly interfere with this assay.   However, presence of multiple variants may affect accuracy.     09/17/2019 5.1 4.0 - 5.6 % Final     Comment:     ADA Screening  Guidelines:  5.7-6.4%  Consistent with prediabetes  >or=6.5%  Consistent with diabetes  High levels of fetal hemoglobin interfere with the HbA1C  assay. Heterozygous hemoglobin variants (HbS, HgC, etc)do  not significantly interfere with this assay.   However, presence of multiple variants may affect accuracy.         Review of Systems   Constitution: Negative for chills, fever and malaise/fatigue.   HENT: Negative for hearing loss.    Cardiovascular: Negative for claudication.   Respiratory: Negative for shortness of breath.    Skin: Positive for dry skin and nail changes. Negative for flushing and rash.   Musculoskeletal: Negative for joint pain and myalgias.   Neurological: Positive for numbness, paresthesias and sensory change. Negative for loss of balance.   Psychiatric/Behavioral: Negative for altered mental status.           Objective:      Physical Exam  Vitals signs reviewed.   Cardiovascular:      Pulses:           Dorsalis pedis pulses are 2+ on the right side and 2+ on the left side.        Posterior tibial pulses are 2+ on the right side and 2+ on the left side.      Comments: No edema noted to b/L LEs  Musculoskeletal:      Comments: Adequate joint ROM noted to all lower extremity muscle groups with no pain or crepitation noted. Muscle strength is 5/5 in all groups bilaterally.     Feet:      Right foot:      Protective Sensation: 5 sites tested. 0 sites sensed.      Left foot:      Protective Sensation: 5 sites tested. 0 sites sensed.   Skin:     General: Skin is warm.      Capillary Refill: Capillary refill takes 2 to 3 seconds.      Comments: Normal skin tugor noted.   No open lesion noted b/L  Skin temp is warm to warm from proximal to distal b/L.  Webspaces clean, dry, and intact  Xerosis Bilaterally. No open lesions noted.      Neurological:      Mental Status: She is alert.      Comments: Intact gross sensation noted to b/L LEs         Nails x10 are elongated by  6-7mm's, thickened by 1-2 mm's,  dystrophic, and are yellowish in  coloration . Xerosis Bilaterally. No open lesions noted.             Assessment:       Encounter Diagnoses   Name Primary?    Diabetic polyneuropathy associated with type 2 diabetes mellitus Yes    Disease of nail          Plan:       Maria Ines was seen today for diabetic foot exam and foot pain.    Diagnoses and all orders for this visit:    Diabetic polyneuropathy associated with type 2 diabetes mellitus    Disease of nail      I counseled the patient on her conditions, their implications and medical management.      - Shoe inspection. Diabetic Foot Education. Patient reminded of the importance of good nutrition and blood sugar control to help prevent podiatric complications of diabetes. Patient instructed on proper foot hygeine. We discussed wearing proper shoe gear, daily foot inspections, never walking without protective shoe gear, never putting sharp instruments to feet, routine podiatric nail visits every 2-3 months.      - With patient's permission, nails were aggressively reduced and debrided x 10 to their soft tissue attachment mechanically and with electric , removing all offending nail and debris. Patient relates relief following the procedure. She will continue to monitor the areas daily, inspect her feet, wear protective shoe gear when ambulatory, moisturizer to maintain skin integrity and follow in this office in approximately 2-3 months, sooner p.r.n.

## 2020-09-30 NOTE — LETTER
September 30, 2020      Luann Raymond MD  [address]  [facility] [address]           [id number]  [address]  [facility] [address]  Phone: [phone]          Patient: Maria Ines Ac   MR Number: [id number]   YOB: 1985   Date of Visit: 9/30/2020       Dear Dr. Luann Raymond:    Thank you for referring Maria Ines Ac to me for evaluation. Attached you will find relevant portions of my assessment and plan of care.    If you have questions, please do not hesitate to call me. I look forward to following Maria Ines Ac along with you.    Sincerely,    Jeison Santiago, JANAK    Enclosure  CC:  No Recipients    If you would like to receive this communication electronically, please contact [email or web address] or [phone] to request more information on [facility] Link access.    For providers and/or their staff who would like to refer a patient to [facility], please contact us through our one-stop-shop provider referral line, [facility], at [phone].    If you feel you have received this communication in error or would no longer like to receive these types of communications, please e-mail [email or web address]

## 2020-10-02 ENCOUNTER — OFFICE VISIT (OUTPATIENT)
Dept: INTERNAL MEDICINE | Facility: CLINIC | Age: 35
End: 2020-10-02
Payer: MEDICARE

## 2020-10-02 ENCOUNTER — LAB VISIT (OUTPATIENT)
Dept: LAB | Facility: HOSPITAL | Age: 35
End: 2020-10-02
Attending: INTERNAL MEDICINE
Payer: MEDICARE

## 2020-10-02 ENCOUNTER — IMMUNIZATION (OUTPATIENT)
Dept: INTERNAL MEDICINE | Facility: CLINIC | Age: 35
End: 2020-10-02
Payer: MEDICARE

## 2020-10-02 DIAGNOSIS — E11.9 DIABETES MELLITUS WITHOUT COMPLICATION: ICD-10-CM

## 2020-10-02 DIAGNOSIS — E11.9 DIABETES MELLITUS WITHOUT COMPLICATION: Primary | ICD-10-CM

## 2020-10-02 DIAGNOSIS — I10 HYPERTENSION, UNSPECIFIED TYPE: ICD-10-CM

## 2020-10-02 LAB
ALBUMIN SERPL BCP-MCNC: 4.3 G/DL (ref 3.5–5.2)
ALP SERPL-CCNC: 53 U/L (ref 55–135)
ALT SERPL W/O P-5'-P-CCNC: 19 U/L (ref 10–44)
ANION GAP SERPL CALC-SCNC: 14 MMOL/L (ref 8–16)
AST SERPL-CCNC: 20 U/L (ref 10–40)
BILIRUB SERPL-MCNC: 0.2 MG/DL (ref 0.1–1)
BUN SERPL-MCNC: 8 MG/DL (ref 6–20)
CALCIUM SERPL-MCNC: 9.1 MG/DL (ref 8.7–10.5)
CHLORIDE SERPL-SCNC: 106 MMOL/L (ref 95–110)
CO2 SERPL-SCNC: 21 MMOL/L (ref 23–29)
CREAT SERPL-MCNC: 1 MG/DL (ref 0.5–1.4)
EST. GFR  (AFRICAN AMERICAN): >60 ML/MIN/1.73 M^2
EST. GFR  (NON AFRICAN AMERICAN): >60 ML/MIN/1.73 M^2
ESTIMATED AVG GLUCOSE: 105 MG/DL (ref 68–131)
GLUCOSE SERPL-MCNC: 93 MG/DL (ref 70–110)
HBA1C MFR BLD HPLC: 5.3 % (ref 4–5.6)
POTASSIUM SERPL-SCNC: 3.3 MMOL/L (ref 3.5–5.1)
PROT SERPL-MCNC: 8.2 G/DL (ref 6–8.4)
SODIUM SERPL-SCNC: 141 MMOL/L (ref 136–145)
TSH SERPL DL<=0.005 MIU/L-ACNC: 3.93 UIU/ML (ref 0.4–4)

## 2020-10-02 PROCEDURE — 99214 OFFICE O/P EST MOD 30 MIN: CPT | Mod: HCNC,S$GLB,, | Performed by: INTERNAL MEDICINE

## 2020-10-02 PROCEDURE — 99999 PR PBB SHADOW E&M-EST. PATIENT-LVL V: ICD-10-PCS | Mod: PBBFAC,HCNC,, | Performed by: INTERNAL MEDICINE

## 2020-10-02 PROCEDURE — 3079F DIAST BP 80-89 MM HG: CPT | Mod: HCNC,CPTII,S$GLB, | Performed by: INTERNAL MEDICINE

## 2020-10-02 PROCEDURE — 3074F PR MOST RECENT SYSTOLIC BLOOD PRESSURE < 130 MM HG: ICD-10-PCS | Mod: HCNC,CPTII,S$GLB, | Performed by: INTERNAL MEDICINE

## 2020-10-02 PROCEDURE — G0008 ADMIN INFLUENZA VIRUS VAC: HCPCS | Mod: HCNC,S$GLB,, | Performed by: INTERNAL MEDICINE

## 2020-10-02 PROCEDURE — 3044F PR MOST RECENT HEMOGLOBIN A1C LEVEL <7.0%: ICD-10-PCS | Mod: HCNC,CPTII,S$GLB, | Performed by: INTERNAL MEDICINE

## 2020-10-02 PROCEDURE — 3008F PR BODY MASS INDEX (BMI) DOCUMENTED: ICD-10-PCS | Mod: HCNC,CPTII,S$GLB, | Performed by: INTERNAL MEDICINE

## 2020-10-02 PROCEDURE — 99999 PR PBB SHADOW E&M-EST. PATIENT-LVL I: ICD-10-PCS | Mod: PBBFAC,HCNC,,

## 2020-10-02 PROCEDURE — 90686 IIV4 VACC NO PRSV 0.5 ML IM: CPT | Mod: HCNC,S$GLB,, | Performed by: INTERNAL MEDICINE

## 2020-10-02 PROCEDURE — 99999 PR PBB SHADOW E&M-EST. PATIENT-LVL I: CPT | Mod: PBBFAC,HCNC,,

## 2020-10-02 PROCEDURE — 36415 COLL VENOUS BLD VENIPUNCTURE: CPT | Mod: HCNC

## 2020-10-02 PROCEDURE — 80053 COMPREHEN METABOLIC PANEL: CPT | Mod: HCNC

## 2020-10-02 PROCEDURE — 99214 PR OFFICE/OUTPT VISIT, EST, LEVL IV, 30-39 MIN: ICD-10-PCS | Mod: HCNC,S$GLB,, | Performed by: INTERNAL MEDICINE

## 2020-10-02 PROCEDURE — G0008 FLU VACCINE (QUAD) GREATER THAN OR EQUAL TO 3YO PRESERVATIVE FREE IM: ICD-10-PCS | Mod: HCNC,S$GLB,, | Performed by: INTERNAL MEDICINE

## 2020-10-02 PROCEDURE — 3074F SYST BP LT 130 MM HG: CPT | Mod: HCNC,CPTII,S$GLB, | Performed by: INTERNAL MEDICINE

## 2020-10-02 PROCEDURE — 3079F PR MOST RECENT DIASTOLIC BLOOD PRESSURE 80-89 MM HG: ICD-10-PCS | Mod: HCNC,CPTII,S$GLB, | Performed by: INTERNAL MEDICINE

## 2020-10-02 PROCEDURE — 90686 FLU VACCINE (QUAD) GREATER THAN OR EQUAL TO 3YO PRESERVATIVE FREE IM: ICD-10-PCS | Mod: HCNC,S$GLB,, | Performed by: INTERNAL MEDICINE

## 2020-10-02 PROCEDURE — 84443 ASSAY THYROID STIM HORMONE: CPT | Mod: HCNC

## 2020-10-02 PROCEDURE — 83036 HEMOGLOBIN GLYCOSYLATED A1C: CPT | Mod: HCNC

## 2020-10-02 PROCEDURE — 3044F HG A1C LEVEL LT 7.0%: CPT | Mod: HCNC,CPTII,S$GLB, | Performed by: INTERNAL MEDICINE

## 2020-10-02 PROCEDURE — 99999 PR PBB SHADOW E&M-EST. PATIENT-LVL V: CPT | Mod: PBBFAC,HCNC,, | Performed by: INTERNAL MEDICINE

## 2020-10-02 PROCEDURE — 3008F BODY MASS INDEX DOCD: CPT | Mod: HCNC,CPTII,S$GLB, | Performed by: INTERNAL MEDICINE

## 2020-10-02 RX ORDER — METOPROLOL SUCCINATE 25 MG/1
TABLET, EXTENDED RELEASE ORAL
COMMUNITY
Start: 2020-09-10 | End: 2021-09-28 | Stop reason: SDUPTHER

## 2020-10-04 VITALS
DIASTOLIC BLOOD PRESSURE: 88 MMHG | WEIGHT: 210.75 LBS | HEIGHT: 62 IN | OXYGEN SATURATION: 96 % | SYSTOLIC BLOOD PRESSURE: 126 MMHG | TEMPERATURE: 99 F | BODY MASS INDEX: 38.78 KG/M2 | HEART RATE: 91 BPM

## 2020-10-05 NOTE — PROGRESS NOTES
Subjective:       Patient ID: Maria Ines Ac is a 35 y.o. female.    Chief Complaint: Hypertension    HPI  She returns for management of hypertension.  She has had hypertension for over a year.  Current treatment has included medications outlined in medication list.  She denies chest pain or shortness of breath.  No palpitations.  Denies left arm or neck pain.    Medications:  See med list    Social history:  Does not smoke, does not drink alcohol      Review of Systems   Constitutional: Negative for chills, fatigue, fever and unexpected weight change.   Respiratory: Negative for chest tightness and shortness of breath.    Cardiovascular: Negative for chest pain and palpitations.   Gastrointestinal: Negative for abdominal pain and blood in stool.   Neurological: Negative for dizziness, syncope, numbness and headaches.       Objective:      Physical Exam  HENT:      Right Ear: External ear normal.      Left Ear: External ear normal.      Nose: Nose normal.      Mouth/Throat:      Mouth: Mucous membranes are moist.      Pharynx: Oropharynx is clear.   Eyes:      Pupils: Pupils are equal, round, and reactive to light.   Neck:      Musculoskeletal: Normal range of motion.   Cardiovascular:      Rate and Rhythm: Normal rate and regular rhythm.      Heart sounds: No murmur.   Pulmonary:      Breath sounds: Normal breath sounds.   Abdominal:      General: There is no distension.      Palpations: There is no hepatomegaly or splenomegaly.      Tenderness: There is no abdominal tenderness.   Lymphadenopathy:      Cervical: No cervical adenopathy.      Upper Body:      Right upper body: No axillary adenopathy.      Left upper body: No axillary adenopathy.   Neurological:      Cranial Nerves: No cranial nerve deficit.      Sensory: No sensory deficit.      Motor: Motor function is intact.      Deep Tendon Reflexes: Reflexes are normal and symmetric.         Assessment/Plan           assessment and plan:  Hypertension:   Check CMP, A1c, TSH

## 2020-10-09 ENCOUNTER — TELEPHONE (OUTPATIENT)
Dept: INTERNAL MEDICINE | Facility: CLINIC | Age: 35
End: 2020-10-09

## 2020-10-30 RX ORDER — PANTOPRAZOLE SODIUM 40 MG/1
40 TABLET, DELAYED RELEASE ORAL DAILY
Qty: 90 TABLET | Refills: 0 | Status: SHIPPED | OUTPATIENT
Start: 2020-10-30 | End: 2021-02-02 | Stop reason: SDUPTHER

## 2020-11-14 ENCOUNTER — PATIENT OUTREACH (OUTPATIENT)
Dept: ADMINISTRATIVE | Facility: OTHER | Age: 35
End: 2020-11-14

## 2020-11-14 NOTE — PROGRESS NOTES
LINKS immunization registry updated  Care Everywhere updated  Health Maintenance updated  Chart reviewed for overdue Proactive [facility] Encounters ([facility]AN[facility]) health maintenance testing (CRS, Breast Ca, Diabetic Eye Exam)   Orders entered:N/A

## 2020-11-20 ENCOUNTER — TELEPHONE (OUTPATIENT)
Dept: UROLOGY | Facility: CLINIC | Age: 35
End: 2020-11-20

## 2020-11-20 NOTE — TELEPHONE ENCOUNTER
----- Message from Dorie Lawton sent at 11/20/2020  1:48 PM CST -----  Regarding: speak with nurse  Contact: patient  [phone]-please call patient need to cancel appointment for today waiting on a call back from the nurse thanks.

## 2020-12-07 ENCOUNTER — TELEPHONE (OUTPATIENT)
Dept: ADMINISTRATIVE | Facility: OTHER | Age: 35
End: 2020-12-07

## 2020-12-07 ENCOUNTER — OFFICE VISIT (OUTPATIENT)
Dept: PODIATRY | Facility: CLINIC | Age: 35
End: 2020-12-07
Payer: MEDICARE

## 2020-12-07 ENCOUNTER — OFFICE VISIT (OUTPATIENT)
Dept: NEUROLOGY | Facility: CLINIC | Age: 35
End: 2020-12-07
Payer: MEDICARE

## 2020-12-07 VITALS
BODY MASS INDEX: 42.23 KG/M2 | HEIGHT: 62 IN | DIASTOLIC BLOOD PRESSURE: 70 MMHG | WEIGHT: 229.5 LBS | SYSTOLIC BLOOD PRESSURE: 102 MMHG | HEART RATE: 82 BPM

## 2020-12-07 VITALS
WEIGHT: 229.5 LBS | SYSTOLIC BLOOD PRESSURE: 123 MMHG | DIASTOLIC BLOOD PRESSURE: 90 MMHG | HEIGHT: 62 IN | HEART RATE: 80 BPM | BODY MASS INDEX: 42.23 KG/M2

## 2020-12-07 DIAGNOSIS — B35.1 ONYCHOMYCOSIS DUE TO DERMATOPHYTE: ICD-10-CM

## 2020-12-07 DIAGNOSIS — M54.2 PAINFUL CERVICAL ROM: ICD-10-CM

## 2020-12-07 DIAGNOSIS — M54.81 BILATERAL OCCIPITAL NEURALGIA: ICD-10-CM

## 2020-12-07 DIAGNOSIS — G44.40 MEDICATION OVERUSE HEADACHE: ICD-10-CM

## 2020-12-07 DIAGNOSIS — H81.13 BENIGN PAROXYSMAL POSITIONAL VERTIGO DUE TO BILATERAL VESTIBULAR DISORDER: ICD-10-CM

## 2020-12-07 DIAGNOSIS — R51.9 CHRONIC DAILY HEADACHE: Primary | ICD-10-CM

## 2020-12-07 DIAGNOSIS — G47.8 SLEEP AROUSAL DISORDER: ICD-10-CM

## 2020-12-07 DIAGNOSIS — E11.42 DIABETIC POLYNEUROPATHY ASSOCIATED WITH TYPE 2 DIABETES MELLITUS: Primary | ICD-10-CM

## 2020-12-07 PROCEDURE — 99999 PR PBB SHADOW E&M-EST. PATIENT-LVL V: CPT | Mod: PBBFAC,HCNC,GC, | Performed by: STUDENT IN AN ORGANIZED HEALTH CARE EDUCATION/TRAINING PROGRAM

## 2020-12-07 PROCEDURE — 11721 PR DEBRIDEMENT OF NAILS, 6 OR MORE: ICD-10-PCS | Mod: GY,HCNC,S$GLB, | Performed by: PODIATRIST

## 2020-12-07 PROCEDURE — 99499 NO LOS: ICD-10-PCS | Mod: HCNC,S$GLB,, | Performed by: PODIATRIST

## 2020-12-07 PROCEDURE — 3074F PR MOST RECENT SYSTOLIC BLOOD PRESSURE < 130 MM HG: ICD-10-PCS | Mod: HCNC,CPTII,GC,S$GLB | Performed by: STUDENT IN AN ORGANIZED HEALTH CARE EDUCATION/TRAINING PROGRAM

## 2020-12-07 PROCEDURE — 99999 PR PBB SHADOW E&M-EST. PATIENT-LVL IV: ICD-10-PCS | Mod: PBBFAC,HCNC,, | Performed by: PODIATRIST

## 2020-12-07 PROCEDURE — 99999 PR PBB SHADOW E&M-EST. PATIENT-LVL IV: CPT | Mod: PBBFAC,HCNC,, | Performed by: PODIATRIST

## 2020-12-07 PROCEDURE — 99214 OFFICE O/P EST MOD 30 MIN: CPT | Mod: HCNC,GC,S$GLB, | Performed by: STUDENT IN AN ORGANIZED HEALTH CARE EDUCATION/TRAINING PROGRAM

## 2020-12-07 PROCEDURE — 3074F SYST BP LT 130 MM HG: CPT | Mod: HCNC,CPTII,GC,S$GLB | Performed by: STUDENT IN AN ORGANIZED HEALTH CARE EDUCATION/TRAINING PROGRAM

## 2020-12-07 PROCEDURE — 3008F BODY MASS INDEX DOCD: CPT | Mod: HCNC,CPTII,S$GLB, | Performed by: PODIATRIST

## 2020-12-07 PROCEDURE — 11721 DEBRIDE NAIL 6 OR MORE: CPT | Mod: GY,HCNC,S$GLB, | Performed by: PODIATRIST

## 2020-12-07 PROCEDURE — 1125F AMNT PAIN NOTED PAIN PRSNT: CPT | Mod: HCNC,S$GLB,, | Performed by: PODIATRIST

## 2020-12-07 PROCEDURE — 99214 PR OFFICE/OUTPT VISIT, EST, LEVL IV, 30-39 MIN: ICD-10-PCS | Mod: HCNC,GC,S$GLB, | Performed by: STUDENT IN AN ORGANIZED HEALTH CARE EDUCATION/TRAINING PROGRAM

## 2020-12-07 PROCEDURE — 99499 UNLISTED E&M SERVICE: CPT | Mod: HCNC,S$GLB,, | Performed by: PODIATRIST

## 2020-12-07 PROCEDURE — 3078F DIAST BP <80 MM HG: CPT | Mod: HCNC,CPTII,GC,S$GLB | Performed by: STUDENT IN AN ORGANIZED HEALTH CARE EDUCATION/TRAINING PROGRAM

## 2020-12-07 PROCEDURE — 1125F PR PAIN SEVERITY QUANTIFIED, PAIN PRESENT: ICD-10-PCS | Mod: HCNC,S$GLB,, | Performed by: PODIATRIST

## 2020-12-07 PROCEDURE — 99999 PR PBB SHADOW E&M-EST. PATIENT-LVL V: ICD-10-PCS | Mod: PBBFAC,HCNC,GC, | Performed by: STUDENT IN AN ORGANIZED HEALTH CARE EDUCATION/TRAINING PROGRAM

## 2020-12-07 PROCEDURE — 3078F PR MOST RECENT DIASTOLIC BLOOD PRESSURE < 80 MM HG: ICD-10-PCS | Mod: HCNC,CPTII,GC,S$GLB | Performed by: STUDENT IN AN ORGANIZED HEALTH CARE EDUCATION/TRAINING PROGRAM

## 2020-12-07 PROCEDURE — 3008F PR BODY MASS INDEX (BMI) DOCUMENTED: ICD-10-PCS | Mod: HCNC,CPTII,S$GLB, | Performed by: PODIATRIST

## 2020-12-07 RX ORDER — TOPIRAMATE 50 MG/1
100 TABLET, FILM COATED ORAL NIGHTLY
Qty: 60 TABLET | Refills: 11 | Status: SHIPPED | OUTPATIENT
Start: 2020-12-07 | End: 2024-02-21

## 2020-12-07 RX ORDER — GABAPENTIN 300 MG/1
CAPSULE ORAL
Qty: 150 CAPSULE | Refills: 11 | Status: SHIPPED | OUTPATIENT
Start: 2020-12-07 | End: 2021-05-18 | Stop reason: SDUPTHER

## 2020-12-07 NOTE — PROGRESS NOTES
Neurology Clinic  Clinic Follow-up Visit    Patient Name: Maria Ines Ac  MRN: [id number]      12/08/2020   CC: Daily Headaches, Dizziness and Neck Pain   INTERVAL HISTORY: Ms. Ac states that she is still having daily HA that last around 1 hour. They happen typically between 5-10pm prior to going to sleep. They do not affect her ability to fall asleep. She describes them as unilateral, right posterior headaches. She has sharp, stabbing pain that radiates to the right forehead and down the right base of her neck. No associated N/V/photophobia/phonophobia/no lacrimation or rhinorrhea. No Auras.  She has tried numerous medications in the past but none have successfully controlled her daily headaches. She is not on birth control, had hysterectomy in the past. She is currently taking gabapentin 300 mg twice daily and 900mg at night, along with topiramate 100mg at night and a daily Aleve. She recently attempted a two week trial of Depakote to break up migraines and decrease Aleve use, but the patient states she found on relief.     As for the patient's sleep, her current sleep cycle consists of the following: sleeping from 10pm-11am, awake 11am-2pm, sleep 2-5pm, awake 5-10pm. She still is snoring and having trouble with her sleep. She wakes up throughout the night coughing/choking. She is constantly fatigue requiring multiple hours of sleep daily. During her periods of awakefulness, patient watches TV, eats and may help with cleaning around the house. For her depression, she consistently sees a counselor. She had difficulty with scheduling the sleep clinic appointment, so we will attempt to assist her in the future with scheduling this appointment.     As for her dizziness, she is still experiencing it despite trying the Epley maneuvers at home. She reports no LOC, no falling, no loss of balance. But the room does spin around her.       Interval History 06/12/2020, taken from previous provider's clinic  note:  Patient returns to clinic for headache management.  She is not accompanied by family today.  On her last visit, she got Botox injections.  Notes no changes since this. States that she still has daily headaches, often she gets one with waking up and then later on in the evening she may have another headache.  She is taking one Aleve per day, which is improved from her previous use of up to 4 Aleve per day.  She has frontal headaches that tend to radiate throughout the head, sharp/throbbing in character, associated with dizziness, n/v.  In addition to cutting down on the OTC NSAIDs, patient is drinking only 3 Coke Zeros per day rather than several throughout the day.  Diet is balanced, she has good control of her DM.  She walks a lot during the day for exercise.  Her father, with whom she was very close, suddenly  of a heart attack about a year ago and patient is still dealing with that--sees a counselor.  Sleep has been suffering lately, notes that she has trouble falling asleep and trouble staying asleep.  Has been told she snores, unsure about whether she wakes up gasping for breath.  ESS score of 13, giving her dx of EDS.  Patient additionally has dizziness that comes on with changes in position, states that room spins when she rolls over in bed, either direction, and tends to last for a minute or two then goes away.  No recent head trauma, no change in hearing, no imbalance otherwise.  Does have some dizziness described as less intense spinning with headaches.      Current medications:  Gabapentin 300 mg bid, gabapentin 900 mg qHS, topiramate 100 mg qHS.  Previous medications/procedures tried for headaches:  Botox, Occipital nerve block, magnesium oxide 400 mg bid, hydroxyzine prn, NSAIDs, antidepressant (on per other provider), anti-hypertensive contraindicated    HPI--from initial visit 10/18/18, taken from previous provider clinical note:  Patient is a very pleasant 32 y.o. female with PMHx of HTN,  "DM II with gastroparesis, obesity, and hypothyroidism who presents to [facility] 10/21/2017 for headaches.  Patient states that she has had headaches since about 03/2017 but they have gotten much worse over past 3-4 months.  She says they start in the middle of her forehead and radiate throughout the head to the occiput.  She describes the pain as sharp.  They are relieved by Aleve after about an hour but will recur, sometimes up to 4 times per day.  She notes associated n/v and rhinorrhea with headaches. She also notes dizziness associated with the headaches.  This is described as room spinning and is associated with tinnitus but not with hearing loss, ear fullness.  Denies recent infections, does not have sensation of hearing her pulse in her ear or other intrinsic sounds. Denies photophobia, phonophobia, lacrimation, injection of the eyes with headaches.  Unable to describe any triggers.  States that she had headaches during adolescence as well but they were not this frequent and usually would just go away with OTC NSAIDs.  She is accompanied by her dad who assists with the history and he notes that she drinks several Coke Zero soft drinks during the day, stating "one every hour."  Patient acknowledges drinking several soft drinks per day.  She also does not sleep very well and gets maybe 4 hours of sleep per night with a lot of tossing and turning.  Her father notes cell phone and tablet use before bed.  She does do a lot of walking during the day for exercise which father confirms.  Patient has not had any medication changes recently and was prescribed topiramate back in March 2017 and has not been titrated up--still on 25 mg po qHS which does not seem to be preventing these headaches.    Review of Systems   Constitutional: Positive for malaise/fatigue. Negative for fever.        Weight gain   HENT: Positive for tinnitus. Negative for ear pain, hearing loss and sore throat.         No rhinorrhea, no " phonophobia   Eyes: Negative for blurred vision, double vision, photophobia, pain and discharge.   Respiratory: Negative for cough and shortness of breath.    Cardiovascular: Negative for chest pain, palpitations and leg swelling.   Gastrointestinal: Negative for abdominal pain, constipation, diarrhea, nausea and vomiting.   Musculoskeletal: Positive for neck pain. Negative for back pain and falls.   Neurological: Positive for dizziness, weakness and headaches. Negative for tingling, tremors, sensory change, speech change, focal weakness, seizures and loss of consciousness.   Psychiatric/Behavioral: Positive for depression. Negative for hallucinations and memory loss. The patient is nervous/anxious.        Past Medical History  Active Ambulatory Problems     Diagnosis Date Noted    Abdominal pain, RLQ (right lower quadrant) 11/30/2012    Dysphagia 11/30/2012    Diabetes mellitus, type II 11/30/2012    Constipation, chronic 11/30/2012    Right hip pain 02/08/2013    Intractable chronic migraine without aura and without status migrainosus 12/24/2014    Essential hypertension 09/07/2016    Palpitations 09/07/2016    Chest pain, non-cardiac 09/07/2016    Dyspnea on exertion 09/07/2016    Dysfunctional voiding of urine 03/20/2017    Severe obesity (BMI 35.0-39.9) with comorbidity 04/10/2017    Muscle spasm 04/10/2017    Mixed stress and urge urinary incontinence 10/30/2017    Gastroesophageal reflux disease without esophagitis 03/28/2018    Irritable bowel syndrome with diarrhea 03/28/2018    Migrainous vertigo 04/25/2018    Uncontrolled morning headache     Sleep arousal disorder     Hyperlipidemia 08/23/2018    GERD (gastroesophageal reflux disease) 11/05/2018    Family history of breast cancer 01/08/2019    Family hx of ovarian malignancy 02/10/2019    Weakness 05/31/2019    Painful cervical ROM 05/31/2019    Incomplete emptying of bladder 06/24/2019    Bilateral occipital neuralgia  08/18/2019    Medication overuse headache 06/12/2020    Benign paroxysmal positional vertigo due to bilateral vestibular disorder 06/12/2020    Chronic daily headache 12/07/2020     Resolved Ambulatory Problems     Diagnosis Date Noted    Blood in stool 11/30/2012    Gait abnormality 06/23/2016    Mixed urge and stress incontinence 10/10/2016    Urinary frequency 10/15/2016    Weakness of pelvic floor 10/15/2016    Mixed incontinence - s/p MUS on 1/23 01/23/2017    Spasm of muscle 03/20/2017    Vulvovaginitis 04/10/2017    Pelvic floor weakness 10/30/2017    Decreased coordination 10/30/2017    Pelvic floor dysfunction 10/30/2017    Myofascial pain 10/30/2017    Urge incontinence of urine 03/26/2019    Urge incontinence 05/23/2019     Past Medical History:   Diagnosis Date    Constipation     Cystitis     Depression     Diabetes mellitus, type 2     Dysphagia     Endometriosis     Headache     Hypertension     Premature menopause on hormone replacement therapy     Thyroid disease       Any other notable PMHx as documented in HPI    Medications    Current Outpatient Medications:     ACCU-CHEK AMAURY PLUS TEST STRP Strp, USE TO TEST BLOOD GLUCOSE TID, Disp: , Rfl: 9    ACCU-CHEK SOFTCLIX LANCETS Misc, USE TO TEST BLOOD GLUCOSE TID, Disp: , Rfl: 3    amoxicillin-clavulanate 875-125mg (AUGMENTIN) 875-125 mg per tablet, TK 1 T PO Q 12 H FOR 10 DAYS, Disp: , Rfl:     atorvastatin (LIPITOR) 40 MG tablet, Take 40 mg by mouth once daily., Disp: , Rfl:     augmented betamethasone dipropionate (DIPROLENE-AF) 0.05 % ointment, NATALEE TO HANDS QHS, Disp: , Rfl:     BLOOD SUGAR DIAGNOSTIC (ACCU-CHEK AMAURY PLUS TEST STRP MISC), 1 strip by Misc.(Non-Drug; Combo Route) route 3 (three) times daily., Disp: , Rfl:     calcium-vitamin D3 500 mg(1,250mg) -200 unit per tablet, Take 1 tablet by mouth 2 (two) times daily with meals., Disp: , Rfl:     ciclopirox (PENLAC) 8 % Soln, Apply topically nightly., Disp:  6.6 mL, Rfl: 11    diclofenac sodium (VOLTAREN) 1 % Gel, Apply 2 g topically 4 (four) times daily. As needed for breast pain, Disp: 1 Tube, Rfl: 1    dicyclomine (BENTYL) 10 MG capsule, TAKE 2 CAPSULES(20 MG) BY MOUTH THREE TIMES DAILY BEFORE MEALS, Disp: 180 capsule, Rfl: 0    docusate sodium (COLACE) 100 MG capsule, Take 1 capsule (100 mg total) by mouth 2 (two) times daily., Disp: 60 capsule, Rfl: 0    estradiol (ESTRACE) 2 MG tablet, Take 1 tablet (2 mg total) by mouth once daily., Disp: 90 tablet, Rfl: 3    estradioL (VIVELLE-DOT) 0.075 mg/24 hr, Place 1 patch onto the skin twice a week., Disp: 8 patch, Rfl: 11    FANAPT 8 mg Tab, TK 1 T PO BID, Disp: , Rfl:     FARXIGA 10 mg Tab, TK 1 T PO QD, Disp: , Rfl: 7    fish oil-omega-3 fatty acids 300-1,000 mg capsule, Take 2 g by mouth once daily., Disp: , Rfl:     fluconazole (DIFLUCAN) 150 MG Tab, , Disp: , Rfl:     fluticasone propionate (FLONASE) 50 mcg/actuation nasal spray, SHAKE LIQUID AND USE 2 SPRAYS(100 MCG) IN EACH NOSTRIL EVERY DAY, Disp: 48 g, Rfl: 2    gabapentin (NEURONTIN) 300 MG capsule, Take 300 mg (1 tablet) twice during the day, then three tablets (900 mg) at night before bed., Disp: 150 capsule, Rfl: 11    GLUCOPHAGE 500 mg tablet, Take 500 mg by mouth 2 (two) times daily with meals. , Disp: , Rfl:     hydrOXYzine HCl (ATARAX) 10 MG Tab, TK 1 T PO BID PRF ITCH, Disp: , Rfl: 3    ibuprofen (ADVIL,MOTRIN) 800 MG tablet, 800 mg every 4 (four) hours as needed. , Disp: , Rfl: 0    levothyroxine (SYNTHROID) 75 MCG tablet, , Disp: , Rfl:     magnesium oxide (MAG-OX) 400 mg (241.3 mg magnesium) tablet, Take 1 tablet (400 mg total) by mouth 2 (two) times daily., Disp: , Rfl: 0    metoprolol succinate (TOPROL-XL) 25 MG 24 hr tablet, TK 1 T PO BID, Disp: , Rfl:     metoprolol tartrate (LOPRESSOR) 100 MG tablet, TAKE 1 TABLET(100 MG) BY MOUTH TWICE DAILY, Disp: 120 tablet, Rfl: 3    MULTIVIT-IRON-MIN-FOLIC ACID 3,500-18-0.4 UNIT-MG-MG ORAL  CHEW, Take 1 tablet by mouth once daily. , Disp: , Rfl:     neomycin-polymyxin-hydrocortisone (CORTISPORIN) 3.5-10,000-10 mg-unit-mg/mL ophthalmic suspension, INTILL 1 DROP INTO AFFECTED EYE QID, Disp: , Rfl: 0    omeprazole (PRILOSEC OTC) 20 MG tablet, Take 20 mg by mouth once daily., Disp: , Rfl:     OXcarbazepine (TRILEPTAL) 150 MG Tab, TK 1 T PO BID, Disp: , Rfl:     oxybutynin (DITROPAN) 5 MG Tab, Take 1 tablet (5 mg total) by mouth every evening., Disp: 30 tablet, Rfl: 3    pantoprazole (PROTONIX) 40 MG tablet, Take 1 tablet (40 mg total) by mouth once daily., Disp: 90 tablet, Rfl: 0    phentermine (ADIPEX-P) 37.5 mg tablet, TK 1 T PO  QAM, Disp: , Rfl: 3    phentermine 37.5 MG capsule, TK 1 C PO ONCE D IN THE MORNING, Disp: , Rfl:     spironolactone (ALDACTONE) 25 MG tablet, TK 1 T PO HS, Disp: , Rfl: 2    topiramate (TOPAMAX) 50 MG tablet, Take 2 tablets (100 mg total) by mouth every evening., Disp: 60 tablet, Rfl: 11    traZODone (DESYREL) 100 MG tablet, TK 1 T PO HS, Disp: , Rfl: 1    triamcinolone acetonide 0.1% (KENALOG) 0.1 % cream, MIX WITH LAMISIL CREAM IN AQUAPHOR TO REACH 4OZ  AND APPLY ONCE DAILY, Disp: , Rfl: 2    TRULICITY 1.5 mg/0.5 mL PnIj, INJECT 1 PEN INTO THE SKIN Q WEEK, Disp: , Rfl: 5    ziprasidone (GEODON) 80 MG capsule, Take 80 mg by mouth 2 (two) times daily with meals. , Disp: , Rfl:     ZOLOFT 100 mg tablet, Take 200 mg by mouth once daily. , Disp: , Rfl:     Current Facility-Administered Medications:     onabotulinumtoxina injection 200 Units, 200 Units, Intramuscular, Q90 Days, Bello Shirley MD    onabotulinumtoxina injection 200 Units, 200 Units, Intramuscular, Q90 Days, Mena Lambert MD, 200 Units at 03/05/20 0819  Any other notable medications as documented in HPI    Allergies  Review of patient's allergies indicates:  No Known Allergies    Social History  Social Hx:   Patient lives at home with her mom--dad has recently passed away of MI.  Some  "concern for intellectual disability, though no documentation in the chart.  She is not working, often plays video games and goes on walks at home during the day.  Her mother recently went through chemotherapy and has some residual health problems. Patient has never used tobacco, EtOH, or illicits.  She is doing exceptionally well with BG control and some weight loss of a few pounds since her initial visit.  Has also weaned down on Coke Zero use as well as OTC NSAID use.    Family History  Family History   Problem Relation Age of Onset    Breast cancer Mother 50        alive at 64, unilateral    Esophageal cancer Mother 63    Ovarian cancer Mother 63    Anesthesia problems Mother     Cervical cancer Maternal Grandmother         unknown age of onset,  at 80    Colon cancer Brother 40    Breast cancer Paternal Aunt         unknown age of onset, alive at 70    Breast cancer Maternal Aunt 72        alive at 72    Vaginal cancer Neg Hx     Endometrial cancer Neg Hx     Crohn's disease Neg Hx     Ulcerative colitis Neg Hx     Stomach cancer Neg Hx     Irritable bowel syndrome Neg Hx     Celiac disease Neg Hx      Any other notable FMH as documented in HPI.    Physical Exam  /70   Pulse 82   Ht 5' 2" (1.575 m)   Wt 104.1 kg (229 lb 8 oz)   LMP 2012 (LMP Unknown) Comment: Neg UPT  BMI 41.98 kg/m²     BP Readings from Last 3 Encounters:   20 (!) 123/90   20 102/70   10/02/20 126/88       Wt Readings from Last 1 Encounters:   20 1430 104.1 kg (229 lb 8 oz)       Physical Exam  Constitutional:       General: She is not in acute distress.     Appearance: She is obese.   HENT:      Head: Normocephalic and atraumatic.   Eyes:      Extraocular Movements: Extraocular movements intact.      Pupils: Pupils are equal, round, and reactive to light.   Cardiovascular:      Rate and Rhythm: Normal rate.   Pulmonary:      Effort: Pulmonary effort is normal. No respiratory distress. " "  Musculoskeletal: Normal range of motion.   Skin:     General: Skin is warm and dry.   Neurological:      Mental Status: She is alert.   Psychiatric:         Mood and Affect: Mood normal.         Behavior: Behavior normal.         Neurological Exam:  Mental Status:  The patient is awake, alert and oriented. Language is fluent, although slow.  Fund of knowledge and attention are decreased, able to provide a medical history, but void of details often. Requires asking "yes or no" questions often to get the details of the medical history. Very kind and attentive.     Cranial Nerves:   CN II: Visual Fields: Intact to confrontation in all quadrants bilaterally; Pupils are equal, round and reactive to light bilaterally.  CN III/IV/VI: Extraocular movements are intact bilaterally in all directions. No ptosis bilaterally   CN V: Normal facial sensation in V1, V2 and V3 distributions bilaterally.   CN VII: Normal symmetric facial movement at rest, smiling and with speech in both upper and lower facial distributions   CN VIII: Hearing intact to finger rubbing on both sides   CN IX/X: Symmetric palate elevation, No uvular deviation, No dysarthria noted   CN XI: Normal/strong head turning bilaterally; Normal 5/5 shoulder shrug bilaterally   CN XII: Normal/midline tongue protrusion       Motor:   Bulk: No focal atrophy noted on visual inspection of the extremities   Tone: Normal tone without any spasticity or hypotonia noted on exam     No tremors, fasciculations or abnormal movements noted.       Strength: effort was poor during parts of the exam, but strength appeared to be intact by observation throughout the entire exam  Action/Muscle                              Left   Right   Shoulder abduction/Deltoids         5        5   Elbow Flexion/Biceps                    5        5   Elbow Extension/Triceps               5        5    strength/Interosseous            5        5   Hip Flexion/Iliopsoas                     5     "    5   Knee Flexion/Hamstrings              5        5   Knee Extension/Quadriceps          5        5   Ankle Dorsiflexion/Tibialis Ant       5        5   Ankle Planter flexion/Gastroc        5        5       Sensory:   Sensation intact to light touch in both upper and lower extremities bilaterally.  Sensation intact to vibration in both upper and lower extremities bilaterally.    Deep tendon reflexes: 2+ and symmetric in the upper and lower extremities bilaterally.    N.R. Reflex Left Right   C5 Biceps 2+ 2+    C6 Brachioradialis 2+ 2+   L4 Patellar 2+ 2+   S1 Achilles 2+ 2+       Cerebellum:   No truncal ataxia.     Gait:  Normal tandem gait, walking on toes and heels, with no imbalance.       Lab and Test Results    Lab Results   Component Value Date/Time    WBC 8.22 11/03/2019 07:06 PM    RBC 5.14 11/03/2019 07:06 PM    HGB 14.2 11/03/2019 07:06 PM    HCT 45.7 11/03/2019 07:06 PM     11/03/2019 07:06 PM    MCV 89 11/03/2019 07:06 PM    MCH 27.6 11/03/2019 07:06 PM    MCHC 31.1 (L) 11/03/2019 07:06 PM     Lab Results   Component Value Date/Time    GLU 93 10/02/2020 10:05 AM    CALCIUM 9.1 10/02/2020 10:05 AM    ALBUMIN 4.3 10/02/2020 10:05 AM    PROT 8.2 10/02/2020 10:05 AM     10/02/2020 10:05 AM    K 3.3 (L) 10/02/2020 10:05 AM    CO2 21 (L) 10/02/2020 10:05 AM     10/02/2020 10:05 AM    BUN 8 10/02/2020 10:05 AM    CREATININE 1.0 10/02/2020 10:05 AM    ALKPHOS 53 (L) 10/02/2020 10:05 AM    ALT 19 10/02/2020 10:05 AM    AST 20 10/02/2020 10:05 AM    BILITOT 0.2 10/02/2020 10:05 AM       Hemoglobin A1C   Date Value Ref Range Status   10/02/2020 5.3 4.0 - 5.6 % Final     Comment:     ADA Screening Guidelines:  5.7-6.4%  Consistent with prediabetes  >or=6.5%  Consistent with diabetes  High levels of fetal hemoglobin interfere with the HbA1C  assay. Heterozygous hemoglobin variants (HbS, HgC, etc)do  not significantly interfere with this assay.   However, presence of multiple variants may affect  accuracy.     06/02/2020 5.2 4.0 - 5.6 % Final     Comment:     ADA Screening Guidelines:  5.7-6.4%  Consistent with prediabetes  >or=6.5%  Consistent with diabetes  High levels of fetal hemoglobin interfere with the HbA1C  assay. Heterozygous hemoglobin variants (HbS, HgC, etc)do  not significantly interfere with this assay.   However, presence of multiple variants may affect accuracy.     03/03/2020 5.4 4.0 - 5.6 % Final     Comment:     ADA Screening Guidelines:  5.7-6.4%  Consistent with prediabetes  >or=6.5%  Consistent with diabetes  High levels of fetal hemoglobin interfere with the HbA1C  assay. Heterozygous hemoglobin variants (HbS, HgC, etc)do  not significantly interfere with this assay.   However, presence of multiple variants may affect accuracy.        TSH   Date Value Ref Range Status   10/02/2020 3.930 0.400 - 4.000 uIU/mL Final   07/02/2019 2.181 0.400 - 4.000 uIU/mL Final   06/30/2018 2.151 0.400 - 4.000 uIU/mL Final     Cholesterol   Date Value Ref Range Status   03/03/2020 168 120 - 199 mg/dL Final     Comment:     The National Cholesterol Education Program (NCEP) has set the  following guidelines (reference ranges) for Cholesterol:  Optimal.....................<200 mg/dL  Borderline High.............200-239 mg/dL  High........................> or = 240 mg/dL       LDL Cholesterol   Date Value Ref Range Status   03/03/2020 57.0 (L) 63.0 - 159.0 mg/dL Final     Comment:     The National Cholesterol Education Program (NCEP) has set the  following guidelines (reference values) for LDL Cholesterol:  Optimal.......................<130 mg/dL  Borderline High...............130-159 mg/dL  High..........................160-189 mg/dL  Very High.....................>190 mg/dL       HDL   Date Value Ref Range Status   03/03/2020 43 40 - 75 mg/dL Final     Comment:     The National Cholesterol Education Program (NCEP) has set the  following guidelines (reference values) for HDL  Cholesterol:  Low...............<40 mg/dL  Optimal...........>60 mg/dL       Triglycerides   Date Value Ref Range Status   2020 340 (H) 30 - 150 mg/dL Final     Comment:     The National Cholesterol Education Program (NCEP) has set the  following guidelines (reference values) for triglycerides:  Normal......................<150 mg/dL  Borderline High.............150-199 mg/dL  High........................200-499 mg/dL           Any additional Neurology Labs:  None at this time      [id number] MRI Cervical Spine without Contrast  Mild cervical spondylosis resulting in mild right neural foraminal narrowing at C4-C5 and C5-C6.  The findings are mildly progressed from the prior exam.    17 CT head w/o contrast:  No acute intracranial abnormality identified.  Previously reviewed and discussed with patient.      Assessment and Plan    Patient is a 35 y.o. female with a PMHx of HTN, DM II with gastroparesis, obesity, and hypothyroidism who presented to [facility] 2020 for follow up of headaches and dizziness. She is here on 2020 for the same problems.     Problem List Items Addressed This Visit        Neuro    Medication overuse headache    Overview     Takes Tylenol daily for Headaches.  Conducted a 2 week trial on Depakote with decreased Tylenol use, but did not see any changes.          Current Assessment & Plan     -Continue to recommend decreased use of Aleve         Chronic daily headache - Primary    Overview     Headaches - tried on topiramate, gabapentin, anti-hypertensives, NSAIDs.  Steroids and opioids contraindicated 2/2 DM with gastroparesis.  Headaches daily ( > 15 days per month), occurs prior to going to bed, but able to fall sleep.          Current Assessment & Plan     -Continue Gabapentin 300mg twice a day, 900mg at night before bed.  -Continue Topiramate 100mg at night before bed.  -Try to avoid daily use of Aleve.  -Start Physical Therapy for neck pain,  tension headaches and dizziness.  -Set up an appointment with Sleep Medicine for further investigation of possible Obstructive Sleep Apnea.          Relevant Orders    Ambulatory referral/consult to Sleep Disorders    Ambulatory referral/consult to Physical/Occupational Therapy       ENT    Benign paroxysmal positional vertigo due to bilateral vestibular disorder    Overview     -[place]-Hallpike maneuver reproduces symptoms bilaterally at previous visits  -Discussed Epley maneuvers/techniques that could be conducted at home, patient states she did not have symptom improvement.          Current Assessment & Plan     -Start Physical Therapy for neck pain, tension headaches and dizziness.            Orthopedic    Painful cervical ROM    Current Assessment & Plan     -Start Physical Therapy for neck pain, tension headaches and dizziness.         Bilateral occipital neuralgia    Overview     New issue, noted August 2019         Relevant Medications    gabapentin (NEURONTIN) 300 MG capsule       Other    Sleep arousal disorder    Overview     Polysomnography negative for ZAC as suspected initially, but concerning for sleep arousal disorder.         Relevant Orders    Ambulatory referral/consult to Sleep Disorders          Follow-up Visit: 6 months to review her sleep study results and to see how physical therapy sessions are helping her tension headaches, neck pain and dizziness.     The patient verbalizes understanding and agreement with the treatment plan. Questions were sought and answered to her stated verbal satisfaction.      Cat Casillas MD  Neurology Resident  [facility] [address] [place] [address]       This note is dictated on M*Modal Fluency Direct word recognition program. There are word recognition mistakes that are occasionally missed on review.

## 2020-12-07 NOTE — TELEPHONE ENCOUNTER
Not able to reach patient by phone to discuss Sleep Medicine appointment. Portal message will be sent.

## 2020-12-08 ENCOUNTER — TELEPHONE (OUTPATIENT)
Dept: SURGERY | Facility: CLINIC | Age: 35
End: 2020-12-08

## 2020-12-08 ENCOUNTER — TELEPHONE (OUTPATIENT)
Dept: NEUROLOGY | Facility: CLINIC | Age: 35
End: 2020-12-08

## 2020-12-08 NOTE — ASSESSMENT & PLAN NOTE
-Continue Gabapentin 300mg twice a day, 900mg at night before bed.  -Continue Topiramate 100mg at night before bed.  -Try to avoid daily use of Aleve.  -Start Physical Therapy for neck pain, tension headaches and dizziness.  -Set up an appointment with Sleep Medicine for further investigation of possible Obstructive Sleep Apnea.

## 2020-12-08 NOTE — TELEPHONE ENCOUNTER
Spoke to patients mother and left direct contact information for patient to return call in reference to call about breast pain.

## 2020-12-08 NOTE — TELEPHONE ENCOUNTER
----- Message from Cat Casillas MD sent at 12/8/2020  4:24 PM CST -----  Regarding: Sleep Clinic Appointment  [place],    Could you assist Ms. Ac in getting an appointment at the sleep clinic for possible sleep arousal disorder vs sleep apnea?    Thank you,Cat

## 2020-12-09 ENCOUNTER — OFFICE VISIT (OUTPATIENT)
Dept: OBSTETRICS AND GYNECOLOGY | Facility: CLINIC | Age: 35
End: 2020-12-09
Attending: OBSTETRICS & GYNECOLOGY
Payer: MEDICARE

## 2020-12-09 VITALS
WEIGHT: 228.38 LBS | BODY MASS INDEX: 42.03 KG/M2 | HEIGHT: 62 IN | DIASTOLIC BLOOD PRESSURE: 62 MMHG | SYSTOLIC BLOOD PRESSURE: 104 MMHG

## 2020-12-09 DIAGNOSIS — N95.1 MENOPAUSAL SYMPTOMS: Primary | ICD-10-CM

## 2020-12-09 PROCEDURE — 3078F DIAST BP <80 MM HG: CPT | Mod: CPTII,S$GLB,, | Performed by: OBSTETRICS & GYNECOLOGY

## 2020-12-09 PROCEDURE — 1126F PR PAIN SEVERITY QUANTIFIED, NO PAIN PRESENT: ICD-10-PCS | Mod: S$GLB,,, | Performed by: OBSTETRICS & GYNECOLOGY

## 2020-12-09 PROCEDURE — 1126F AMNT PAIN NOTED NONE PRSNT: CPT | Mod: S$GLB,,, | Performed by: OBSTETRICS & GYNECOLOGY

## 2020-12-09 PROCEDURE — 99213 PR OFFICE/OUTPT VISIT, EST, LEVL III, 20-29 MIN: ICD-10-PCS | Mod: S$GLB,,, | Performed by: OBSTETRICS & GYNECOLOGY

## 2020-12-09 PROCEDURE — 3008F BODY MASS INDEX DOCD: CPT | Mod: CPTII,S$GLB,, | Performed by: OBSTETRICS & GYNECOLOGY

## 2020-12-09 PROCEDURE — 99499 RISK ADDL DX/OHS AUDIT: ICD-10-PCS | Mod: S$GLB,,, | Performed by: OBSTETRICS & GYNECOLOGY

## 2020-12-09 PROCEDURE — 3074F PR MOST RECENT SYSTOLIC BLOOD PRESSURE < 130 MM HG: ICD-10-PCS | Mod: CPTII,S$GLB,, | Performed by: OBSTETRICS & GYNECOLOGY

## 2020-12-09 PROCEDURE — 99499 UNLISTED E&M SERVICE: CPT | Mod: S$GLB,,, | Performed by: OBSTETRICS & GYNECOLOGY

## 2020-12-09 PROCEDURE — 3074F SYST BP LT 130 MM HG: CPT | Mod: CPTII,S$GLB,, | Performed by: OBSTETRICS & GYNECOLOGY

## 2020-12-09 PROCEDURE — 3078F PR MOST RECENT DIASTOLIC BLOOD PRESSURE < 80 MM HG: ICD-10-PCS | Mod: CPTII,S$GLB,, | Performed by: OBSTETRICS & GYNECOLOGY

## 2020-12-09 PROCEDURE — 99213 OFFICE O/P EST LOW 20 MIN: CPT | Mod: S$GLB,,, | Performed by: OBSTETRICS & GYNECOLOGY

## 2020-12-09 PROCEDURE — 3008F PR BODY MASS INDEX (BMI) DOCUMENTED: ICD-10-PCS | Mod: CPTII,S$GLB,, | Performed by: OBSTETRICS & GYNECOLOGY

## 2020-12-09 RX ORDER — CETIRIZINE HYDROCHLORIDE 10 MG/1
CAPSULE, LIQUID FILLED ORAL
COMMUNITY
End: 2022-04-08

## 2020-12-09 RX ORDER — ILOPERIDONE 12 MG/1
TABLET ORAL
COMMUNITY
Start: 2020-11-18 | End: 2021-06-02 | Stop reason: SDUPTHER

## 2020-12-09 RX ORDER — ESTRADIOL 0.1 MG/D
1 FILM, EXTENDED RELEASE TRANSDERMAL
Qty: 8 PATCH | Refills: 11 | Status: SHIPPED | OUTPATIENT
Start: 2020-12-10 | End: 2021-05-18

## 2020-12-10 ENCOUNTER — OFFICE VISIT (OUTPATIENT)
Dept: SLEEP MEDICINE | Facility: CLINIC | Age: 35
End: 2020-12-10
Payer: MEDICARE

## 2020-12-10 DIAGNOSIS — G47.8 SLEEP AROUSAL DISORDER: ICD-10-CM

## 2020-12-10 DIAGNOSIS — R06.83 SNORING: Primary | ICD-10-CM

## 2020-12-10 DIAGNOSIS — R51.9 CHRONIC DAILY HEADACHE: ICD-10-CM

## 2020-12-10 DIAGNOSIS — E66.01 SEVERE OBESITY (BMI 35.0-39.9) WITH COMORBIDITY: ICD-10-CM

## 2020-12-10 DIAGNOSIS — R06.00 DYSPNEA, UNSPECIFIED TYPE: ICD-10-CM

## 2020-12-10 DIAGNOSIS — E11.9 TYPE 2 DIABETES MELLITUS WITHOUT COMPLICATION, WITHOUT LONG-TERM CURRENT USE OF INSULIN: ICD-10-CM

## 2020-12-10 PROCEDURE — 99203 OFFICE O/P NEW LOW 30 MIN: CPT | Mod: HCNC,95,, | Performed by: INTERNAL MEDICINE

## 2020-12-10 PROCEDURE — 3044F HG A1C LEVEL LT 7.0%: CPT | Mod: HCNC,CPTII,95, | Performed by: INTERNAL MEDICINE

## 2020-12-10 PROCEDURE — 99203 PR OFFICE/OUTPT VISIT, NEW, LEVL III, 30-44 MIN: ICD-10-PCS | Mod: HCNC,95,, | Performed by: INTERNAL MEDICINE

## 2020-12-10 PROCEDURE — 3044F PR MOST RECENT HEMOGLOBIN A1C LEVEL <7.0%: ICD-10-PCS | Mod: HCNC,CPTII,95, | Performed by: INTERNAL MEDICINE

## 2020-12-10 NOTE — LETTER
December 10, 2020      Cat Casillas MD  [address]  [facility] [address]           [id number]  [address]  [facility] [address]  Phone: [phone]          Patient: Maria Ines Ac   MR Number: [id number]   YOB: 1985   Date of Visit: 12/10/2020       Dear Dr. Cat Casillas:    Thank you for referring Maria Ines Ac to me for evaluation. Attached you will find relevant portions of my assessment and plan of care.    If you have questions, please do not hesitate to call me. I look forward to following Maria Ines Ac along with you.    Sincerely,    Meghna Alvarado MD    Enclosure  CC:  No Recipients    If you would like to receive this communication electronically, please contact [email or web address] or [phone] to request more information on [employer] access.    For providers and/or their staff who would like to refer a patient to [facility], please contact us through our one-stop-shop provider referral line, [facility], at [phone].    If you feel you have received this communication in error or would no longer like to receive these types of communications, please e-mail [email or web address]

## 2020-12-10 NOTE — PROGRESS NOTES
Subjective:       Patient ID: Maria Ines Ac is a 35 y.o. female.    Chief Complaint: No chief complaint on file.    I had the pleasure of seeing Maria Ines Ac today, who is a 35 y.o. female that presents with nocturnal shortness of breath.    Maria Ines Ac does not have a CDL.    Maria Ines Ac is not a shift worker.    Maria Ines Ac presents with nocturnal dyspnea that has been going on for 2 years    Bedtime when working ranges from NA to NA.   When not working, bedtime ranges from 2100 to 2300.   Sleep latency ranges from 2 to 3 hours.  Uses phone prior to sleep onset   Average number of awakenings is 4 and return to sleep is quick.   Wake up time when working is NA to NA.   When not working, wake up time is 1000 to 1030.   Patient does not feel rested upon awakening.    Maria Ines Ac consumes approximately 0 beverages with caffeine daily.   An average of 0 beverages with alcohol are consumed    Medications taken for sleep currently: none  Previous medications taken: melatonin     Maria Ines Ac does experience daytime sleepiness.   Naps are taken about 2-4 times weekly, usually lasting 180 to 240 minutes.  Maria Ines currently does not operate an automobile.  Maria Ines Ac NA experience drowsiness when driving.   Patient does doze off when sedentary.   Maria Ines Ac does not have auxiliary symptoms of narcolepsy including sleep onset paralysis, hypnagogic hallucinations, sleep attacks and cataplexy  ESS 16    Maria Ines Ac has a history of snoring.   Snoring is described as moderate and constant.   Apneic episodes have not been noticed during sleep.   A witness to sleep is not present.   The patient awakens with headaches.      Maria Ines Ac does not have symptoms of Restless Legs Syndrome. Nocturnal leg movements have not been noticed.   The patient does experience sleep related leg cramps.   There is not a history of  parasomnia.      Current Outpatient Medications:     ACCU-CHEK AMAURY PLUS TEST STRP Strp, USE TO TEST BLOOD GLUCOSE TID, Disp: , Rfl: 9    ACCU-CHEK SOFTCLIX LANCETS [facility], USE TO TEST BLOOD GLUCOSE TID, Disp: , Rfl: 3    amoxicillin-clavulanate 875-125mg (AUGMENTIN) 875-125 mg per tablet, TK 1 T PO Q 12 H FOR 10 DAYS, Disp: , Rfl:     atorvastatin (LIPITOR) 40 MG tablet, Take 40 mg by mouth once daily., Disp: , Rfl:     BLOOD SUGAR DIAGNOSTIC (ACCU-CHEK AMAURY PLUS TEST STRP MISC), 1 strip by Misc.(Non-Drug; Combo Route) route 3 (three) times daily., Disp: , Rfl:     calcium-vitamin D3 500 mg(1,250mg) -200 unit per tablet, Take 1 tablet by mouth 2 (two) times daily with meals., Disp: , Rfl:     cetirizine (ZYRTEC) 10 mg Cap, , Disp: , Rfl:     ciclopirox (PENLAC) 8 % Soln, Apply topically nightly., Disp: 6.6 mL, Rfl: 11    diclofenac sodium (VOLTAREN) 1 % Gel, Apply 2 g topically 4 (four) times daily. As needed for breast pain, Disp: 1 Tube, Rfl: 1    dicyclomine (BENTYL) 10 MG capsule, TAKE 2 CAPSULES(20 MG) BY MOUTH THREE TIMES DAILY BEFORE MEALS, Disp: 180 capsule, Rfl: 0    docusate sodium (COLACE) 100 MG capsule, Take 1 capsule (100 mg total) by mouth 2 (two) times daily., Disp: 60 capsule, Rfl: 0    estradioL (VIVELLE-DOT) 0.075 mg/24 hr, Place 1 patch onto the skin twice a week., Disp: 8 patch, Rfl: 11    estradioL (VIVELLE-DOT) 0.1 mg/24 hr PTSW, Place 1 patch onto the skin twice a week., Disp: 8 patch, Rfl: 11    FANAPT 12 mg Tab, TK 1 T PO BID, Disp: , Rfl:     FARXIGA 10 mg Tab, TK 1 T PO QD, Disp: , Rfl: 7    fish oil-omega-3 fatty acids 300-1,000 mg capsule, Take 2 g by mouth once daily., Disp: , Rfl:     fluticasone propionate (FLONASE) 50 mcg/actuation nasal spray, SHAKE LIQUID AND USE 2 SPRAYS(100 MCG) IN EACH NOSTRIL EVERY DAY, Disp: 48 g, Rfl: 2    gabapentin (NEURONTIN) 300 MG capsule, Take 300 mg (1 tablet) twice during the day, then three tablets (900 mg) at night before bed.,  Disp: 150 capsule, Rfl: 11    GLUCOPHAGE 500 mg tablet, Take 500 mg by mouth 2 (two) times daily with meals. , Disp: , Rfl:     hydrOXYzine HCl (ATARAX) 10 MG Tab, TK 1 T PO BID PRF ITCH, Disp: , Rfl: 3    ibuprofen (ADVIL,MOTRIN) 800 MG tablet, 800 mg every 4 (four) hours as needed. , Disp: , Rfl: 0    levothyroxine (SYNTHROID) 75 MCG tablet, , Disp: , Rfl:     mag/aluminum/sod bicarb/alginc (GAVISCON ORAL), , Disp: , Rfl:     magnesium oxide (MAG-OX) 400 mg (241.3 mg magnesium) tablet, Take 1 tablet (400 mg total) by mouth 2 (two) times daily., Disp: , Rfl: 0    metoprolol succinate (TOPROL-XL) 25 MG 24 hr tablet, TK 1 T PO BID, Disp: , Rfl:     MULTIVIT-IRON-MIN-FOLIC ACID 3,500-18-0.4 UNIT-MG-MG ORAL CHEW, Take 1 tablet by mouth once daily. , Disp: , Rfl:     omeprazole (PRILOSEC OTC) 20 MG tablet, Take 20 mg by mouth once daily., Disp: , Rfl:     OXcarbazepine (TRILEPTAL) 150 MG Tab, TK 1 T PO BID, Disp: , Rfl:     oxybutynin (DITROPAN) 5 MG Tab, Take 1 tablet (5 mg total) by mouth every evening., Disp: 30 tablet, Rfl: 3    pantoprazole (PROTONIX) 40 MG tablet, Take 1 tablet (40 mg total) by mouth once daily., Disp: 90 tablet, Rfl: 0    phentermine 37.5 MG capsule, TK 1 C PO ONCE D IN THE MORNING, Disp: , Rfl:     spironolactone (ALDACTONE) 25 MG tablet, TK 1 T PO HS, Disp: , Rfl: 2    topiramate (TOPAMAX) 50 MG tablet, Take 2 tablets (100 mg total) by mouth every evening., Disp: 60 tablet, Rfl: 11    traZODone (DESYREL) 100 MG tablet, TK 1 T PO HS, Disp: , Rfl: 1    triamcinolone acetonide 0.1% (KENALOG) 0.1 % cream, MIX WITH LAMISIL CREAM IN AQUAPHOR TO REACH 4OZ  AND APPLY ONCE DAILY, Disp: , Rfl: 2    TRULICITY 1.5 mg/0.5 mL PnIj, INJECT 1 PEN INTO THE SKIN Q WEEK, Disp: , Rfl: 5    ziprasidone (GEODON) 80 MG capsule, Take 80 mg by mouth 2 (two) times daily with meals. , Disp: , Rfl:     ZOLOFT 100 mg tablet, Take 200 mg by mouth once daily. , Disp: , Rfl:     Current  Facility-Administered Medications:     onabotulinumtoxina injection 200 Units, 200 Units, Intramuscular, Q90 Days, Bello Shirley MD    onabotulinumtoxina injection 200 Units, 200 Units, Intramuscular, Q90 Days, Mena Lambert MD, 200 Units at 03/05/20 0819     Review of patient's allergies indicates:  No Known Allergies      Past Medical History:   Diagnosis Date    Blood in stool     Constipation     Cystitis     Depression     Diabetes mellitus, type 2     Dysphagia     Endometriosis     GERD (gastroesophageal reflux disease)     Headache     Hypertension     Palpitations     Premature menopause on hormone replacement therapy     Thyroid disease        Past Surgical History:   Procedure Laterality Date    BLADDER SUSPENSION      CARPAL TUNNEL RELEASE      right    CHOLECYSTECTOMY      COLONOSCOPY N/A 8/30/2016    Procedure: COLONOSCOPY;  Surgeon: Slick Herron MD;  Location: [facility] ([address] [place] [address]);  Service: Endoscopy;  Laterality: N/A;  PM prep    ESOPHAGEAL MANOMETRY WITH MEASUREMENT OF IMPEDANCE N/A 11/5/2018    Procedure: MANOMETRY, ESOPHAGUS, WITH IMPEDANCE MEASUREMENT;  Surgeon: Slick Herron MD;  [address]  Service: Endoscopy;  Laterality: N/A;    ESOPHAGOGASTRODUODENOSCOPY N/A 2/13/2020    Procedure: EGD (ESOPHAGOGASTRODUODENOSCOPY);  Surgeon: Slick Herron MD;  [facility]  Service: Endoscopy;  Laterality: N/A;  pt has cardiology appt on 1/6/19-will call back after this appt to schedule-tb    FLUOROSCOPIC URODYNAMIC STUDY N/A 5/23/2019    Procedure: URODYNAMIC STUDY, FLUOROSCOPIC;  Surgeon: Zena Tee MD;  Location: [address];  Service: Urology;  Laterality: N/A;  1 hour    GALLBLADDER SURGERY      HYSTERECTOMY  2013    Bess velasquez Dr. Champlain    IMPLANTATION OF PERMANENT SACRAL NERVE STIMULATOR N/A 7/30/2019    Procedure: INSERTION, NEUROSTIMULATOR, PERMANENT, SACRAL;  Surgeon: Zena Tee MD;   Location: [facility] OR [address] [place] [address];  Service: Urology;  Laterality: N/A;  30 min    OOPHORECTOMY      LSO- benign cyst    OOPHORECTOMY      RSO- endo    ooptherectomy      right knee  scoped    SHOULDER SURGERY  right       Family History   Problem Relation Age of Onset    Breast cancer Mother 50        alive at 64, unilateral    Esophageal cancer Mother 63    Ovarian cancer Mother 63    Anesthesia problems Mother     Cervical cancer Maternal Grandmother         unknown age of onset,  at 80    Colon cancer Brother 40    Breast cancer Paternal Aunt         unknown age of onset, alive at 70    Breast cancer Maternal Aunt 72        alive at 72    Vaginal cancer Neg Hx     Endometrial cancer Neg Hx     Crohn's disease Neg Hx     Ulcerative colitis Neg Hx     Stomach cancer Neg Hx     Irritable bowel syndrome Neg Hx     Celiac disease Neg Hx        Social History     Socioeconomic History    Marital status: Single     Spouse name: Not on file    Number of children: Not on file    Years of education: Not on file    Highest education level: Not on file   Occupational History    Not on file   Social Needs    Financial resource strain: Not on file    Food insecurity     Worry: Not on file     Inability: Not on file    Transportation needs     Medical: Not on file     Non-medical: Not on file   Tobacco Use    Smoking status: Never Smoker    Smokeless tobacco: Never Used   Substance and Sexual Activity    Alcohol use: No    Drug use: No    Sexual activity: Never     Birth control/protection: None   Lifestyle    Physical activity     Days per week: Not on file     Minutes per session: Not on file    Stress: Not on file   Relationships    Social connections     Talks on phone: Not on file     Gets together: Not on file     Attends Druze service: Not on file     Active member of club or organization: Not on file     Attends meetings of clubs or organizations: Not on file      Relationship status: Not on file   Other Topics Concern    Not on file   Social History Narrative    ** Merged History Encounter **                Old medical records.    There were no vitals filed for this visit.           The patient was given open opportunity to ask questions and/or express concerns about treatment plan.   All questions/concerns were discussed.   Driving precautions were provided.     Two patient identifiers used prior to evaluation.    Thank you for referring Maria Ines Ac for evaluation.           Past Medical History:   Diagnosis Date    Blood in stool     Constipation     Cystitis     Depression     Diabetes mellitus, type 2     Dysphagia     Endometriosis     GERD (gastroesophageal reflux disease)     Headache     Hypertension     Palpitations     Premature menopause on hormone replacement therapy     Thyroid disease      Past Surgical History:   Procedure Laterality Date    BLADDER SUSPENSION      CARPAL TUNNEL RELEASE      right    CHOLECYSTECTOMY      COLONOSCOPY N/A 8/30/2016    Procedure: COLONOSCOPY;  Surgeon: Slick Herron MD;  Location: [facility] ([address] [place] [address]);  Service: Endoscopy;  Laterality: N/A;  PM prep    ESOPHAGEAL MANOMETRY WITH MEASUREMENT OF IMPEDANCE N/A 11/5/2018    Procedure: MANOMETRY, ESOPHAGUS, WITH IMPEDANCE MEASUREMENT;  Surgeon: Slick Herron MD;  Location: [facility] [address]);  Service: Endoscopy;  Laterality: N/A;    ESOPHAGOGASTRODUODENOSCOPY N/A 2/13/2020    Procedure: EGD (ESOPHAGOGASTRODUODENOSCOPY);  Surgeon: Slick Herron MD;  Location: [facility] [address]);  Service: Endoscopy;  Laterality: N/A;  pt has cardiology appt on 1/6/19-will call back after this appt to schedule-tb    FLUOROSCOPIC URODYNAMIC STUDY N/A 5/23/2019    Procedure: URODYNAMIC STUDY, FLUOROSCOPIC;  Surgeon: Zena Tee MD;  Location: [address];  Service: Urology;  Laterality: N/A;  1 hour    GALLBLADDER SURGERY      HYSTERECTOMY  2013    [facility],  Dr. Ashley Smith    IMPLANTATION OF PERMANENT SACRAL NERVE STIMULATOR N/A 2019    Procedure: INSERTION, NEUROSTIMULATOR, PERMANENT, SACRAL;  Surgeon: Zena Tee MD;  Location: [facility] OR [address] [place] [address];  Service: Urology;  Laterality: N/A;  30 min    OOPHORECTOMY      LSO- benign cyst    OOPHORECTOMY      RSO- endo    ooptherectomy      right knee  scoped    SHOULDER SURGERY  right     Family History   Problem Relation Age of Onset    Breast cancer Mother 50        alive at 64, unilateral    Esophageal cancer Mother 63    Ovarian cancer Mother 63    Anesthesia problems Mother     Cervical cancer Maternal Grandmother         unknown age of onset,  at 80    Colon cancer Brother 40    Breast cancer Paternal Aunt         unknown age of onset, alive at 70    Breast cancer Maternal Aunt 72        alive at 72    Vaginal cancer Neg Hx     Endometrial cancer Neg Hx     Crohn's disease Neg Hx     Ulcerative colitis Neg Hx     Stomach cancer Neg Hx     Irritable bowel syndrome Neg Hx     Celiac disease Neg Hx      Social History     Socioeconomic History    Marital status: Single     Spouse name: Not on file    Number of children: Not on file    Years of education: Not on file    Highest education level: Not on file   Occupational History    Not on file   Social Needs    Financial resource strain: Not on file    Food insecurity     Worry: Not on file     Inability: Not on file    Transportation needs     Medical: Not on file     Non-medical: Not on file   Tobacco Use    Smoking status: Never Smoker    Smokeless tobacco: Never Used   Substance and Sexual Activity    Alcohol use: No    Drug use: No    Sexual activity: Never     Birth control/protection: None   Lifestyle    Physical activity     Days per week: Not on file     Minutes per session: Not on file    Stress: Not on file   Relationships    Social connections     Talks on phone: Not on file     Gets  together: Not on file     Attends Zoroastrianism service: Not on file     Active member of club or organization: Not on file     Attends meetings of clubs or organizations: Not on file     Relationship status: Not on file   Other Topics Concern    Not on file   Social History Narrative    ** Merged History Encounter **            Current Outpatient Medications   Medication Sig Dispense Refill    ACCU-CHEK AMAURY PLUS TEST STRP Strp USE TO TEST BLOOD GLUCOSE TID  9    ACCU-CHEK SOFTCLIX LANCETS Misc USE TO TEST BLOOD GLUCOSE TID  3    amoxicillin-clavulanate 875-125mg (AUGMENTIN) 875-125 mg per tablet TK 1 T PO Q 12 H FOR 10 DAYS      atorvastatin (LIPITOR) 40 MG tablet Take 40 mg by mouth once daily.      BLOOD SUGAR DIAGNOSTIC (ACCU-CHEK AMAURY PLUS TEST STRP MISC) 1 strip by Misc.(Non-Drug; Combo Route) route 3 (three) times daily.      calcium-vitamin D3 500 mg(1,250mg) -200 unit per tablet Take 1 tablet by mouth 2 (two) times daily with meals.      cetirizine (ZYRTEC) 10 mg Cap       ciclopirox (PENLAC) 8 % Soln Apply topically nightly. 6.6 mL 11    diclofenac sodium (VOLTAREN) 1 % Gel Apply 2 g topically 4 (four) times daily. As needed for breast pain 1 Tube 1    dicyclomine (BENTYL) 10 MG capsule TAKE 2 CAPSULES(20 MG) BY MOUTH THREE TIMES DAILY BEFORE MEALS 180 capsule 0    docusate sodium (COLACE) 100 MG capsule Take 1 capsule (100 mg total) by mouth 2 (two) times daily. 60 capsule 0    estradioL (VIVELLE-DOT) 0.075 mg/24 hr Place 1 patch onto the skin twice a week. 8 patch 11    estradioL (VIVELLE-DOT) 0.1 mg/24 hr PTSW Place 1 patch onto the skin twice a week. 8 patch 11    FANAPT 12 mg Tab TK 1 T PO BID      FARXIGA 10 mg Tab TK 1 T PO QD  7    fish oil-omega-3 fatty acids 300-1,000 mg capsule Take 2 g by mouth once daily.      fluticasone propionate (FLONASE) 50 mcg/actuation nasal spray SHAKE LIQUID AND USE 2 SPRAYS(100 MCG) IN EACH NOSTRIL EVERY DAY 48 g 2    gabapentin (NEURONTIN) 300 MG  capsule Take 300 mg (1 tablet) twice during the day, then three tablets (900 mg) at night before bed. 150 capsule 11    GLUCOPHAGE 500 mg tablet Take 500 mg by mouth 2 (two) times daily with meals.       hydrOXYzine HCl (ATARAX) 10 MG Tab TK 1 T PO BID PRF ITCH  3    ibuprofen (ADVIL,MOTRIN) 800 MG tablet 800 mg every 4 (four) hours as needed.   0    levothyroxine (SYNTHROID) 75 MCG tablet       mag/aluminum/sod bicarb/alginc (GAVISCON ORAL)       magnesium oxide (MAG-OX) 400 mg (241.3 mg magnesium) tablet Take 1 tablet (400 mg total) by mouth 2 (two) times daily.  0    metoprolol succinate (TOPROL-XL) 25 MG 24 hr tablet TK 1 T PO BID      MULTIVIT-IRON-MIN-FOLIC ACID 3,500-18-0.4 UNIT-MG-MG ORAL CHEW Take 1 tablet by mouth once daily.       omeprazole (PRILOSEC OTC) 20 MG tablet Take 20 mg by mouth once daily.      OXcarbazepine (TRILEPTAL) 150 MG Tab TK 1 T PO BID      oxybutynin (DITROPAN) 5 MG Tab Take 1 tablet (5 mg total) by mouth every evening. 30 tablet 3    pantoprazole (PROTONIX) 40 MG tablet Take 1 tablet (40 mg total) by mouth once daily. 90 tablet 0    phentermine 37.5 MG capsule TK 1 C PO ONCE D IN THE MORNING      spironolactone (ALDACTONE) 25 MG tablet TK 1 T PO HS  2    topiramate (TOPAMAX) 50 MG tablet Take 2 tablets (100 mg total) by mouth every evening. 60 tablet 11    traZODone (DESYREL) 100 MG tablet TK 1 T PO HS  1    triamcinolone acetonide 0.1% (KENALOG) 0.1 % cream MIX WITH LAMISIL CREAM IN AQUAPHOR TO REACH 4OZ  AND APPLY ONCE DAILY  2    TRULICITY 1.5 mg/0.5 mL PnIj INJECT 1 PEN INTO THE SKIN Q WEEK  5    ziprasidone (GEODON) 80 MG capsule Take 80 mg by mouth 2 (two) times daily with meals.       ZOLOFT 100 mg tablet Take 200 mg by mouth once daily.        Current Facility-Administered Medications   Medication Dose Route Frequency Provider Last Rate Last Dose    onabotulinumtoxina injection 200 Units  200 Units Intramuscular Q90 Days Bello N. Van Hook, MD         onabotulinumtoxina injection 200 Units  200 Units Intramuscular Q90 Days Mena Lambert MD   200 Units at 03/05/20 0819     Review of patient's allergies indicates:  No Known Allergies    Review of Systems    Objective:      There were no vitals filed for this visit.  Physical Exam    Lab Review:   BMP:   Lab Results   Component Value Date    GLU 93 10/02/2020     10/02/2020    K 3.3 (L) 10/02/2020     10/02/2020    CO2 21 (L) 10/02/2020    BUN 8 10/02/2020    CREATININE 1.0 10/02/2020    CALCIUM 9.1 10/02/2020     Diagnostics Review: Echo: Reviewed     Assessment:       No diagnosis found.    Plan:       Due to listed symptoms, a polysomnogram is recommended and ordered.   Description of procedure given to patient.   If significant Obstructive Sleep Apnea (ZAC) is found during the initial portion of the study, therapy will be initiated with nasal Continuous Positive Airway Pressure (CPAP).   Goals of therapy were discussed, alternative treatments listed and patient agrees to this form of therapy if indicated.   The pathophysiology of ZAC was discussed.   The effects of ZAC on patient's co-morbid conditions and the increased morbidity and/or mortality associated with this condition were reviewed.   The patient was given open opportunity to ask questions and/or express concerns about treatment plan.   All questions/concerns were discussed.   Driving precautions were provided.       Thank you for referring Maria Ines Ac for evaluation.       The patient location is: home  The chief complaint leading to consultation is: dyspnea    Visit type: audiovisual    Face to Face time with patient: 18  31 minutes of total time spent on the encounter, which includes face to face time and non-face to face time preparing to see the patient (eg, review of tests), Obtaining and/or reviewing separately obtained history, Documenting clinical information in the electronic or other health record,  Independently interpreting results (not separately reported) and communicating results to the patient/family/caregiver, or Care coordination (not separately reported).         Each patient to whom he or she provides medical services by telemedicine is:  (1) informed of the relationship between the physician and patient and the respective role of any other health care provider with respect to management of the patient; and (2) notified that he or she may decline to receive medical services by telemedicine and may withdraw from such care at any time.    Notes:

## 2020-12-11 ENCOUNTER — PATIENT MESSAGE (OUTPATIENT)
Dept: OTHER | Facility: OTHER | Age: 35
End: 2020-12-11

## 2020-12-15 ENCOUNTER — CLINICAL SUPPORT (OUTPATIENT)
Dept: REHABILITATION | Facility: HOSPITAL | Age: 35
End: 2020-12-15
Attending: STUDENT IN AN ORGANIZED HEALTH CARE EDUCATION/TRAINING PROGRAM
Payer: MEDICARE

## 2020-12-15 DIAGNOSIS — R26.89 DECREASED FUNCTIONAL MOBILITY: ICD-10-CM

## 2020-12-15 DIAGNOSIS — R51.9 CHRONIC DAILY HEADACHE: ICD-10-CM

## 2020-12-15 DIAGNOSIS — R42 DIZZINESS: ICD-10-CM

## 2020-12-15 PROCEDURE — 97162 PT EVAL MOD COMPLEX 30 MIN: CPT | Mod: HCNC,PN

## 2020-12-15 NOTE — Clinical Note
Dr. Casillas,    Please sign initial evaluation and fax to [facility] [phone]. Thank you for this referral.    Fior Smith, PT, DPT

## 2020-12-16 ENCOUNTER — TELEPHONE (OUTPATIENT)
Dept: SLEEP MEDICINE | Facility: OTHER | Age: 35
End: 2020-12-16

## 2020-12-16 NOTE — PLAN OF CARE
"[facility]  Physical Therapy Neurological Rehabilitation Initial Evaluation    Name: Maria Ines Ac  Clinic Number: [id number]    Therapy Diagnosis:   Encounter Diagnoses   Name Primary?    Chronic daily headache     Dizziness     Decreased functional mobility      Physician: Cat Casillas MD    Physician Orders: PT Eval and Treat  Medical Diagnosis from Referral: R51.9 (ICD-10-CM) - Chronic daily headache  Evaluation Date: 12/15/2020  Authorization Period Expiration: 12/31/2020  Plan of Care Expiration: 2/12/2021  Visit # / Visits authorized: 1/ 1    Time In: 6:30PM  Time Out: 7:10PM  Total Billable Time: 40 minutes (1 mod eval)    Precautions: Standard    Subjective   Date of onset: 1 year ago  History of current condition - Maria Ines reports: Pt reports dizziness and headaches began about 1 year ago and have progressively gotten worse. She says that bending neck/bending over makes symptoms worse. Closing eyes in shower makes her feel "like the room is spinning." She does not endorse any easing factors.     Medical History:   Past Medical History:   Diagnosis Date    Blood in stool     Constipation     Cystitis     Depression     Diabetes mellitus, type 2     Dysphagia     Endometriosis     GERD (gastroesophageal reflux disease)     Headache     Hypertension     Palpitations     Premature menopause on hormone replacement therapy     Thyroid disease        Surgical History:   Maria Ines Ac  has a past surgical history that includes ooptherectomy; right knee (scoped); Gallbladder surgery; Shoulder surgery (right); Carpal tunnel release; Hysterectomy (2013); Oophorectomy (2005); Oophorectomy (2013); Colonoscopy (N/A, 8/30/2016); Bladder suspension; Cholecystectomy; Esophageal manometry with measurement of impedance (N/A, 11/5/2018); Fluoroscopic urodynamic study (N/A, 5/23/2019); Implantation of permanent sacral nerve stimulator (N/A, 7/30/2019); and " Esophagogastroduodenoscopy (N/A, 2/13/2020).    Medications:   Maria Ines has a current medication list which includes the following prescription(s): accu-chek luisa plus test strp, accu-chek softclix lancets, amoxicillin-clavulanate 875-125mg, atorvastatin, blood sugar diagnostic, calcium-vitamin d3, zyrtec, ciclopirox, diclofenac sodium, dicyclomine, docusate sodium, estradiol, estradiol, fanapt, farxiga, fish oil-omega-3 fatty acids, fluticasone propionate, gabapentin, glucophage, hydroxyzine hcl, ibuprofen, levothyroxine, mag/aluminum/sod bicarb/alginc, magnesium oxide, metoprolol succinate, multivit-iron-min-folic acid, omeprazole, oxcarbazepine, oxybutynin, pantoprazole, phentermine, spironolactone, topiramate, trazodone, triamcinolone acetonide 0.1%, trulicity, ziprasidone, zoloft, and metoprolol tartrate, and the following Facility-Administered Medications: onabotulinumtoxina and onabotulinumtoxina.    Allergies:   Review of patient's allergies indicates:  No Known Allergies     Imaging  -- MRI Cervical Spine w/o Contrast (5/1/2019): Mild cervical spondylosis resulting in mild right neural foraminal narrowing at C4-C5 and C5-C6.  The findings are mildly progressed from the prior exam.    Prior Therapy: Yes (for neck pain - per chart review, pt has also attended PT in the past for gait abnormality and pelvic floor weakness)  Social History: Lives with mother and brother  Falls: No   DME: No  Home Environment: Single story home, no steps to enter   Exercise Routine / History: Pt enjoys walking around the block    Family Present at time of Eval: No   Occupation: No; pt enjoys watching TV at home  Prior Level of Function: Independent  Current Level of Function: Min A from mother for dressing and some other ADLs due to dizziness    Pain:  Current 8/10, worst 10/10, best 8/10   Location: bilateral marcela's horn distribution (base of skull to frontal region)  Description: Sharp  Aggravating Factors: Neck  "movement  Easing Factors: none noted    Patient's goals: "Not to be dizzy"    Objective     History of Current Symptoms   Triggers: Flexing neck/bending over   Alleviating Factors: None noted   Description of symptoms: Room spinning   Onset: 1 year ago   Frequency: >5 times a day   Duration: > 1 hour   Positional changes: Bending/flexing neck   Limitations due to symptoms: Dressing, bathing    History of migraines: Yes    Objective   - Follows commands: 100% of time but decreased speed of processing   - Speech: Pt responds best to yes/no questions and has decreased speed of processing after direct questions asked    Mental status: alert, oriented to person, place, and time  Appearance: Casually dressed  Behavior:  calm and cooperative  Attention Span and Concentration:  Decreased    Dominant hand:  right     Posture alignment in sitting:   -- Significant forward head posture  -- Rounded shoulders/protracted scapulae bilaterally  -- Increased thoracic kyphosis         Oculomotor assessment:  Tracking/Smooth Pursuits: Intact  Saccades: Impaired (reduced speed for age)  Acuity: Intact  R/L discrimination: Intact  Visual field: Intact  Convergence: Intact  VOR: Impaired (R eye)  VCR: Impaired (dizziness with stool rotation test)  Eye surgeries/disorder: Near sighted; R astigmatism     Coordination:   - fine motor: Impaired (opposition)  - UE coordination: Impaired (LYNN)    - LE coordination:  Impaired (LYNN)    ROM:     CERVICAL SPINE  Flexion 10 degrees (80-90 deg) (increases dizziness)  Extension 11 degrees (70-80 deg) (increases dizziness)  L side bend 15 degrees, R side bend 20 degrees (20-45 degrees)  L rotation 50 degrees, R rotation 50 degrees (70-90 degrees)        MCT-SIB:  (P= Pass, F= Fail; note any sway; hold each position for 30")  Condition 1: (firm surface/feet together/eyes open) PASS (moderate sway)  Condition 2: (firm surface/feet together/eyes closed) PASS (high sway)  Condition 3: (soft surface/feet " together/eyes open) PASS (moderate sway)  Condition 4: (soft surface/feet together/eyes closed) FAIL (5 seconds)    Functional Gait Assessment:   1. Gait on level surface =  2   (3) Normal: less than 5.5 sec, no A.D., no imbalance, normal gait pattern, deviates< 6in   (2) Mild impairment: 7-5.6 sec, uses A.D., mild gait deviations, or deviates 6-10 in   (1) Moderate impairment: > 7 sec, slow speed, imbalance, deviates 10-15 in.   (0) Severe impairment: needs assist, deviates >15 in, reach/touch wall  2. Change in Gait Speed = 3   (3) Normal: smooth change w/o loss of balance or gait deviation, deviates < 6 in, significant difference between speeds   (2) Mild impairment: changes speed, but demonstrates mild gait deviations, deviates 6-10 in, OR no deviations but unable to significantly speed, OR uses A.D.   (1) Moderate impairment: minor changes to speed, OR changes speed w/ significant deviations, deviates 10-15 in, OR  Changes speed , but loses balance & recovers   (0) Severe impairment: cannot change speed, deviates >15 in, or loses balance & needs assist  3. Gait with horizontal head turns  = 2   (3) Normal: no change in gait, deviates <6 in   (2) Mild impairment: slight change in speed, deviates 6-10 in, OR uses A.D.   (1) Moderate impairment: moderate change in speed, deviates 10-15 in   (0) Severe impairment: severe disruption of gait, deviates >15in  4. Gait with vertical head turns = 2   (3) Normal: no change in gait, deviates <6 in   (2) Mild impairment: slight change in speed, deviates 6-10 in OR uses A.D.   (1) Moderate impairment: moderate change in speed, deviates 10-15 in   (0) Severe impairment: severe disruption of gait, deviates >15 in  5. Gait with pivot turns = 3   (3) Normal: performs safely in 3 sec, no LOB   (2) Mild impairment: performs in >3 sec & no LOB, OR turns safely & requires several steps to regain LOB   (1) Moderate impairment: turns slow, OR requires several small steps for balance  following turn & stop   (0) Severe impairment: cannot turn safely, needs assist  6. Step over obstacle = 3   (3) Normal: steps over 2 stacked boxes w/o change in speed or LOB   (2) Mild impairment: able to step over 1 box w/o change in speed or LOB   (1) Moderate impairment: steps over 1 box but must slow down, may require VC   (0) Severe impairment: cannot perform w/o assist  7. Gait with Narrow THERESE = 1   (3) Normal: 10 steps no staggering   (2) Mild impairment: 7-9 steps   (1) Moderate impairment: 4-7 steps   (0) Severe impairment: < 4 steps or cannot perform w/o assist  8. Gait with eyes closed = 2   (3) Normal: < 7 sec, no A.D., no LOB, normal gait pattern, deviates <6 in   (2) Mild impairment: 7.1-9 sec, mild gait deviations, deviates 6-10 in   (1) Moderate impairment: > 9 sec, abnormal pattern, LOB, deviates 10-15 in   (0) Severe impairment: cannot perform w/o assist, LOB, deviates >15in  9. Ambulating Backwards = 2   (3) Normal: no A.D., no LOB, normal gait pattern, deviates <6in   (2) Mild impairment: uses A.D., slower speed, mild gait deviations, deviates 6-10 in   (1) Moderate impairment: slow speed, abnormal gait pattern, LOB, deviates 10-15 in   (0) Severe impairment: severe gait deviations or LOB, deviates >15in  10. Steps = 2   (3) Normal: alternating feet, no rail   (2) Mild Impairment: alternating feet, uses rail   (1) Moderate impairment: step-to, uses rail   (0) Severe impairment: cannot perform safely    Score 22/30     Score:   <22/30 fall risk   <20/30 fall risk in older adults   <18/30 fall risk in Parkinsons       POSITIONAL CANAL TESTING  Looking for nystagmus (slow drift to affected side with quick correction away)    [place] (posterior / CL anterior)   Right : Negative   Left: Negative   Roll Test   Right: Negative   Left: Negative      CMS Impairment/Limitation/Restriction for FOTO Craniofacial Survey    Therapist reviewed FOTO scores for Maria Ines Ac on 12/15/2020.   FOTO  documents entered into EPIC - see Media section.    Limitation Score: 65%         TREATMENT   Treatment not initiated today; treatment suggestions for follow-up: Saccades with metronome; VOR x 1; VOR x 2; balance intervention of foam surface and/or with vision removed; manual therapy for C-Spine; cervical AROM; cervical isometrics; pec stretching; rosmery-scapular strengthening/postural activity     Home Exercises and Patient Education Provided    Education provided:   - PT plan of care  - Cervical spine and its role in dizziness and/or headache    Written Home Exercises Provided: NEXT - plan to give postural exercises for HEP    Assessment   Maria Ines is a 35 y.o. female referred to outpatient Physical Therapy with a medical diagnosis of Chronic daily headache. Patient presents with postural abnormalities, significantly reduced cervical AROM, symptom exacerbation (neck pain + headache + dizziness) with neck movement, chronic headache in marcela's horn distribution, abnormal VOR/VCR, reduced saccadic speed, balance impairment per FGA/MCTSIB, and visual dependence/reduced vestibular system strength per MCTSIB. Pt did experience dizziness with BPPV testing but no nystagmus. Pt's clinical presentation is consistent with likely cervicogenic headache and dizziness. She is appropriate for skilled physical therapy services to address listed impairments and improve quality of life/functional mobility deficits. She would benefit from program focused on postural correction, cervical ROM/strengthening, vestibular activities, balance intervention, manual therapy, and possible functional dry needling.    Patient prognosis is Good.   Patient will benefit from skilled outpatient Physical Therapy to address the deficits stated above and in the chart below, provide patient/family education, and to maximize patient's level of independence.     Plan of care discussed with patient: Yes  Patient's spiritual, cultural and educational needs  considered and patient is agreeable to the plan of care and goals as stated below:     Anticipated Barriers for therapy: Reduced processing speed    Medical Necessity is demonstrated by the following  History  Co-morbidities and personal factors that may impact the plan of care Co-morbidities:   Blood in stool   Constipation   Cystitis   Depression   Diabetes mellitus, type 2   Dysphagia   Endometriosis   GERD (gastroesophageal reflux disease)   Headache   Hypertension   Palpitations   Premature menopause on hormone replacement therapy   Thyroid disease     Personal Factors:   social background  lifestyle     high   Examination  Body Structures and Functions, activity limitations and participation restrictions that may impact the plan of care Body Regions:   head  neck  back  trunk    Body Systems:    gross symmetry  ROM  strength  gait  motor control  motor learning    Participation Restrictions:   Decreased quality of life secondary to deficits    Activity limitations:   Learning and applying knowledge  no deficits    General Tasks and Commands  undertaking multiple tasks    Communication  communicating with/receiving spoken language    Mobility  no deficits    Self care  washing oneself (bathing, drying, washing hands)  dressing  looking after one's health    Domestic Life  shopping  cooking  doing house work (cleaning house, washing dishes, laundry)  assisting others    Interactions/Relationships  no deficits    Life Areas  employment    Community and Social Life  community life  recreation and leisure         high   Clinical Presentation evolving clinical presentation with changing clinical characteristics moderate   Decision Making/ Complexity Score: moderate     Goals:  Short Term Goals: 4 weeks   1. Pt will be compliant with HEP in order to maximize PT benefits  2. Pt will improve cervical AROM by >/= 5 degrees in order to improve functional mobility for activities such as upper body dressing   3. Pt will  score >/= 24/30 on FGA with least restrictive assistive device in order to reduce risk for falls and improve postural control  4. Pt will report pain at rest as </=6/10 in order to improve comfort with leisure activity    Long Term Goals: 8 weeks   5. Pt will score </= 52% on FOTO limitation survey in order to improve self-perception of functional mobility deficits  6. Pt will improve cervical AROM by >/= 10 degrees in order to improve functional mobility for activities such as upper body dressing  7. Pt will score >/= 26/30 on FGA with least restrictive assistive device in order to reduce risk for falls and improve postural control  8. Pt will pass all portions of MCTSIB in order to improve postural control during ADLs such as bathing and ambulating on uneven terrain  9. Pt will report pain at rest as </=3/10 in order to improve comfort with leisure activity  10. Pt will begin some form of home/community fitness in order to sustain progress gained in PT      Plan   Plan of care Certification: 12/15/2020 to 2/12/2021.    Outpatient Physical Therapy 2 times weekly for 8 weeks to include the following interventions: Cervical/Lumbar Traction, Gait Training, Manual Therapy, Moist Heat/ Ice, Neuromuscular Re-ed, Orthotic Management and Training, Patient Education, Self Care, Therapeutic Activites, Therapeutic Exercise and Modalities PRN; Functional Dry Needling PRN.     NORMA GALVAN, PT

## 2020-12-17 ENCOUNTER — TELEPHONE (OUTPATIENT)
Dept: SLEEP MEDICINE | Facility: OTHER | Age: 35
End: 2020-12-17

## 2020-12-17 DIAGNOSIS — R35.1 NOCTURIA: ICD-10-CM

## 2020-12-17 RX ORDER — OXYBUTYNIN CHLORIDE 5 MG/1
5 TABLET ORAL NIGHTLY
Qty: 30 TABLET | Refills: 8 | Status: SHIPPED | OUTPATIENT
Start: 2020-12-17 | End: 2021-07-28

## 2020-12-20 PROBLEM — E11.42 DIABETIC POLYNEUROPATHY ASSOCIATED WITH TYPE 2 DIABETES MELLITUS: Status: ACTIVE | Noted: 2020-12-20

## 2020-12-21 ENCOUNTER — TELEPHONE (OUTPATIENT)
Dept: SLEEP MEDICINE | Facility: OTHER | Age: 35
End: 2020-12-21

## 2020-12-21 NOTE — PROGRESS NOTES
Subjective:      Patient ID: Maria Ines Ac is a 35 y.o. female.    Chief Complaint: Diabetic Foot Exam (pcp     Luann Raymond    10/02/20) and Foot Pain (both feet )    Maria Ines is a 35 y.o. female who presents to the clinic for evaluation and treatment of high risk feet. Maria Ines has a past medical history of Blood in stool, Constipation, Cystitis, Depression, Diabetes mellitus, type 2, Dysphagia, Endometriosis, GERD (gastroesophageal reflux disease), Headache, Hypertension, Palpitations, Premature menopause on hormone replacement therapy, and Thyroid disease. The patient's chief complaint is long, thick toenails. Pt states she has an appointment with neurology in December.  This patient has documented high risk feet requiring routine maintenance secondary to peripheral neuropathy.    PCP: Luann Raymond MD    Date Last Seen by PCP:   Chief Complaint   Patient presents with    Diabetic Foot Exam     pcp     Luann Raymond    10/02/20    Foot Pain     both feet        Current shoe gear:  Affected Foot: Tennis shoes     Unaffected Foot: Tennis shoes    Hemoglobin A1C   Date Value Ref Range Status   10/02/2020 5.3 4.0 - 5.6 % Final     Comment:     ADA Screening Guidelines:  5.7-6.4%  Consistent with prediabetes  >or=6.5%  Consistent with diabetes  High levels of fetal hemoglobin interfere with the HbA1C  assay. Heterozygous hemoglobin variants (HbS, HgC, etc)do  not significantly interfere with this assay.   However, presence of multiple variants may affect accuracy.     06/02/2020 5.2 4.0 - 5.6 % Final     Comment:     ADA Screening Guidelines:  5.7-6.4%  Consistent with prediabetes  >or=6.5%  Consistent with diabetes  High levels of fetal hemoglobin interfere with the HbA1C  assay. Heterozygous hemoglobin variants (HbS, HgC, etc)do  not significantly interfere with this assay.   However, presence of multiple variants may affect accuracy.     03/03/2020 5.4 4.0 - 5.6 % Final     Comment:     ADA  Screening Guidelines:  5.7-6.4%  Consistent with prediabetes  >or=6.5%  Consistent with diabetes  High levels of fetal hemoglobin interfere with the HbA1C  assay. Heterozygous hemoglobin variants (HbS, HgC, etc)do  not significantly interfere with this assay.   However, presence of multiple variants may affect accuracy.         Review of Systems   Constitution: Negative for chills, fever and malaise/fatigue.   HENT: Negative for hearing loss.    Cardiovascular: Negative for claudication.   Respiratory: Negative for shortness of breath.    Skin: Positive for dry skin and nail changes. Negative for flushing and rash.   Musculoskeletal: Negative for joint pain and myalgias.   Neurological: Positive for numbness, paresthesias and sensory change. Negative for loss of balance.   Psychiatric/Behavioral: Negative for altered mental status.   Allergic/Immunologic: Negative for hives.           Objective:      Physical Exam  Vitals signs reviewed.   Cardiovascular:      Pulses:           Dorsalis pedis pulses are 2+ on the right side and 2+ on the left side.        Posterior tibial pulses are 2+ on the right side and 2+ on the left side.      Comments: No edema noted to b/L LEs  Musculoskeletal:      Comments: Adequate joint ROM noted to all lower extremity muscle groups with no pain or crepitation noted. Muscle strength is 5/5 in all groups bilaterally.     Feet:      Right foot:      Protective Sensation: 5 sites tested. 0 sites sensed.      Left foot:      Protective Sensation: 5 sites tested. 0 sites sensed.   Skin:     General: Skin is warm.      Capillary Refill: Capillary refill takes 2 to 3 seconds.      Comments: Normal skin tugor noted.   No open lesion noted b/L  Skin temp is warm to warm from proximal to distal b/L.  Webspaces clean, dry, and intact  Xerosis Bilaterally. No open lesions noted.      Neurological:      Mental Status: She is alert.      Comments: Intact gross sensation noted to b/L LEs         Nails  x10 are elongated by  5-6mm's, thickened by 1-2 mm's, dystrophic, and are yellowish in  coloration . Xerosis Bilaterally. No open lesions noted.             Assessment:       Encounter Diagnoses   Name Primary?    Diabetic polyneuropathy associated with type 2 diabetes mellitus Yes    Onychomycosis due to dermatophyte          Plan:       Maria Ines was seen today for diabetic foot exam and foot pain.    Diagnoses and all orders for this visit:    Diabetic polyneuropathy associated with type 2 diabetes mellitus    Onychomycosis due to dermatophyte      I counseled the patient on her conditions, their implications and medical management.      - Shoe inspection. Diabetic Foot Education. Patient reminded of the importance of good nutrition and blood sugar control to help prevent podiatric complications of diabetes. Patient instructed on proper foot hygeine. We discussed wearing proper shoe gear, daily foot inspections, never walking without protective shoe gear, never putting sharp instruments to feet, routine podiatric nail visits every 2-3 months.      - With patient's permission, nails were aggressively reduced and debrided x 10 to their soft tissue attachment mechanically and with electric , removing all offending nail and debris. Patient relates relief following the procedure. She will continue to monitor the areas daily, inspect her feet, wear protective shoe gear when ambulatory, moisturizer to maintain skin integrity and follow in this office in approximately 2-3 months, sooner p.r.n.

## 2020-12-22 DIAGNOSIS — R51.9 CHRONIC HEADACHE: Primary | ICD-10-CM

## 2020-12-22 DIAGNOSIS — G89.29 CHRONIC HEADACHE: Primary | ICD-10-CM

## 2020-12-28 ENCOUNTER — TELEPHONE (OUTPATIENT)
Dept: SLEEP MEDICINE | Facility: OTHER | Age: 35
End: 2020-12-28

## 2021-01-05 ENCOUNTER — TELEPHONE (OUTPATIENT)
Dept: SLEEP MEDICINE | Facility: OTHER | Age: 36
End: 2021-01-05

## 2021-01-06 ENCOUNTER — TELEPHONE (OUTPATIENT)
Dept: REHABILITATION | Facility: HOSPITAL | Age: 36
End: 2021-01-06

## 2021-01-08 ENCOUNTER — CLINICAL SUPPORT (OUTPATIENT)
Dept: REHABILITATION | Facility: HOSPITAL | Age: 36
End: 2021-01-08
Attending: STUDENT IN AN ORGANIZED HEALTH CARE EDUCATION/TRAINING PROGRAM
Payer: MEDICARE

## 2021-01-08 DIAGNOSIS — R42 DIZZINESS: ICD-10-CM

## 2021-01-08 DIAGNOSIS — R26.89 DECREASED FUNCTIONAL MOBILITY: ICD-10-CM

## 2021-01-08 PROCEDURE — 97140 MANUAL THERAPY 1/> REGIONS: CPT | Mod: HCNC,PN

## 2021-01-08 PROCEDURE — 97112 NEUROMUSCULAR REEDUCATION: CPT | Mod: HCNC,PN

## 2021-01-08 PROCEDURE — 97110 THERAPEUTIC EXERCISES: CPT | Mod: HCNC,PN

## 2021-01-13 ENCOUNTER — CLINICAL SUPPORT (OUTPATIENT)
Dept: REHABILITATION | Facility: HOSPITAL | Age: 36
End: 2021-01-13
Attending: STUDENT IN AN ORGANIZED HEALTH CARE EDUCATION/TRAINING PROGRAM
Payer: MEDICARE

## 2021-01-13 DIAGNOSIS — R26.89 DECREASED FUNCTIONAL MOBILITY: ICD-10-CM

## 2021-01-13 DIAGNOSIS — R42 DIZZINESS: ICD-10-CM

## 2021-01-13 PROCEDURE — 97110 THERAPEUTIC EXERCISES: CPT | Mod: HCNC,PN

## 2021-01-13 PROCEDURE — 97112 NEUROMUSCULAR REEDUCATION: CPT | Mod: HCNC,PN

## 2021-01-13 PROCEDURE — 97140 MANUAL THERAPY 1/> REGIONS: CPT | Mod: HCNC,PN

## 2021-01-15 ENCOUNTER — TELEPHONE (OUTPATIENT)
Dept: PODIATRY | Facility: CLINIC | Age: 36
End: 2021-01-15

## 2021-01-15 ENCOUNTER — TELEPHONE (OUTPATIENT)
Dept: REHABILITATION | Facility: HOSPITAL | Age: 36
End: 2021-01-15

## 2021-01-19 ENCOUNTER — HOSPITAL ENCOUNTER (OUTPATIENT)
Dept: SLEEP MEDICINE | Facility: OTHER | Age: 36
Discharge: HOME | End: 2021-01-19
Attending: INTERNAL MEDICINE
Payer: MEDICARE

## 2021-01-19 DIAGNOSIS — R06.00 DYSPNEA, UNSPECIFIED TYPE: ICD-10-CM

## 2021-01-19 DIAGNOSIS — R51.9 CHRONIC DAILY HEADACHE: ICD-10-CM

## 2021-01-19 DIAGNOSIS — E66.01 SEVERE OBESITY (BMI 35.0-39.9) WITH COMORBIDITY: ICD-10-CM

## 2021-01-19 DIAGNOSIS — G47.8 SLEEP AROUSAL DISORDER: ICD-10-CM

## 2021-01-19 DIAGNOSIS — E11.9 TYPE 2 DIABETES MELLITUS WITHOUT COMPLICATION, WITHOUT LONG-TERM CURRENT USE OF INSULIN: ICD-10-CM

## 2021-01-19 DIAGNOSIS — R06.83 SNORING: ICD-10-CM

## 2021-01-19 DIAGNOSIS — K21.9 GASTROESOPHAGEAL REFLUX DISEASE WITHOUT ESOPHAGITIS: Primary | ICD-10-CM

## 2021-01-19 PROCEDURE — 95810 PR POLYSOMNOGRAPHY, 4 OR MORE: ICD-10-PCS | Mod: 26,52,, | Performed by: INTERNAL MEDICINE

## 2021-01-19 PROCEDURE — 95810 POLYSOM 6/> YRS 4/> PARAM: CPT | Mod: 26,52,, | Performed by: INTERNAL MEDICINE

## 2021-01-19 PROCEDURE — 95810 POLYSOM 6/> YRS 4/> PARAM: CPT

## 2021-01-21 ENCOUNTER — TELEPHONE (OUTPATIENT)
Dept: REHABILITATION | Facility: HOSPITAL | Age: 36
End: 2021-01-21

## 2021-01-27 ENCOUNTER — PATIENT OUTREACH (OUTPATIENT)
Dept: ADMINISTRATIVE | Facility: OTHER | Age: 36
End: 2021-01-27

## 2021-01-29 ENCOUNTER — OFFICE VISIT (OUTPATIENT)
Dept: UROLOGY | Facility: CLINIC | Age: 36
End: 2021-01-29
Payer: MEDICARE

## 2021-01-29 ENCOUNTER — CLINICAL SUPPORT (OUTPATIENT)
Dept: REHABILITATION | Facility: HOSPITAL | Age: 36
End: 2021-01-29
Attending: STUDENT IN AN ORGANIZED HEALTH CARE EDUCATION/TRAINING PROGRAM
Payer: MEDICARE

## 2021-01-29 VITALS
SYSTOLIC BLOOD PRESSURE: 105 MMHG | WEIGHT: 233.94 LBS | DIASTOLIC BLOOD PRESSURE: 73 MMHG | BODY MASS INDEX: 43.05 KG/M2 | HEIGHT: 62 IN | HEART RATE: 78 BPM

## 2021-01-29 DIAGNOSIS — R30.0 DYSURIA: Primary | ICD-10-CM

## 2021-01-29 DIAGNOSIS — R10.819 SUPRAPUBIC TENDERNESS: ICD-10-CM

## 2021-01-29 DIAGNOSIS — R42 DIZZINESS: ICD-10-CM

## 2021-01-29 DIAGNOSIS — R26.89 DECREASED FUNCTIONAL MOBILITY: ICD-10-CM

## 2021-01-29 PROCEDURE — 3008F BODY MASS INDEX DOCD: CPT | Mod: CPTII,S$GLB,, | Performed by: UROLOGY

## 2021-01-29 PROCEDURE — 99213 PR OFFICE/OUTPT VISIT, EST, LEVL III, 20-29 MIN: ICD-10-PCS | Mod: 25,S$GLB,, | Performed by: UROLOGY

## 2021-01-29 PROCEDURE — 1125F AMNT PAIN NOTED PAIN PRSNT: CPT | Mod: S$GLB,,, | Performed by: UROLOGY

## 2021-01-29 PROCEDURE — 99999 PR PBB SHADOW E&M-EST. PATIENT-LVL V: ICD-10-PCS | Mod: PBBFAC,,, | Performed by: UROLOGY

## 2021-01-29 PROCEDURE — 99999 PR PBB SHADOW E&M-EST. PATIENT-LVL V: CPT | Mod: PBBFAC,,, | Performed by: UROLOGY

## 2021-01-29 PROCEDURE — 3074F SYST BP LT 130 MM HG: CPT | Mod: CPTII,S$GLB,, | Performed by: UROLOGY

## 2021-01-29 PROCEDURE — 95971 PR ANALYZE NEUROSTIM,SIMPLE/PROG: ICD-10-PCS | Mod: 59,S$GLB,, | Performed by: UROLOGY

## 2021-01-29 PROCEDURE — 3008F PR BODY MASS INDEX (BMI) DOCUMENTED: ICD-10-PCS | Mod: CPTII,S$GLB,, | Performed by: UROLOGY

## 2021-01-29 PROCEDURE — 81002 URINALYSIS NONAUTO W/O SCOPE: CPT | Mod: S$GLB,,, | Performed by: UROLOGY

## 2021-01-29 PROCEDURE — 51701 INSERT BLADDER CATHETER: CPT | Mod: S$GLB,,, | Performed by: UROLOGY

## 2021-01-29 PROCEDURE — 97140 MANUAL THERAPY 1/> REGIONS: CPT | Mod: PN

## 2021-01-29 PROCEDURE — 51701 PR INSERTION OF NON-INDWELLING BLADDER CATHETERIZATION FOR RESIDUAL UR: ICD-10-PCS | Mod: S$GLB,,, | Performed by: UROLOGY

## 2021-01-29 PROCEDURE — 97112 NEUROMUSCULAR REEDUCATION: CPT | Mod: PN

## 2021-01-29 PROCEDURE — 3078F PR MOST RECENT DIASTOLIC BLOOD PRESSURE < 80 MM HG: ICD-10-PCS | Mod: CPTII,S$GLB,, | Performed by: UROLOGY

## 2021-01-29 PROCEDURE — 87086 URINE CULTURE/COLONY COUNT: CPT

## 2021-01-29 PROCEDURE — 3074F PR MOST RECENT SYSTOLIC BLOOD PRESSURE < 130 MM HG: ICD-10-PCS | Mod: CPTII,S$GLB,, | Performed by: UROLOGY

## 2021-01-29 PROCEDURE — 3078F DIAST BP <80 MM HG: CPT | Mod: CPTII,S$GLB,, | Performed by: UROLOGY

## 2021-01-29 PROCEDURE — 1125F PR PAIN SEVERITY QUANTIFIED, PAIN PRESENT: ICD-10-PCS | Mod: S$GLB,,, | Performed by: UROLOGY

## 2021-01-29 PROCEDURE — 99213 OFFICE O/P EST LOW 20 MIN: CPT | Mod: 25,S$GLB,, | Performed by: UROLOGY

## 2021-01-29 PROCEDURE — 97110 THERAPEUTIC EXERCISES: CPT | Mod: PN

## 2021-01-29 PROCEDURE — 81002 PR URINALYSIS NONAUTO W/O SCOPE: ICD-10-PCS | Mod: S$GLB,,, | Performed by: UROLOGY

## 2021-01-29 PROCEDURE — 95971 ALYS SMPL SP/PN NPGT W/PRGRM: CPT | Mod: 59,S$GLB,, | Performed by: UROLOGY

## 2021-01-29 RX ORDER — DOXYCYCLINE 100 MG/1
100 CAPSULE ORAL 2 TIMES DAILY
Qty: 14 CAPSULE | Refills: 0 | Status: SHIPPED | OUTPATIENT
Start: 2021-01-29 | End: 2021-02-05

## 2021-01-29 RX ORDER — ZOLPIDEM TARTRATE 10 MG/1
TABLET ORAL
Qty: 1 TABLET | Refills: 0 | Status: SHIPPED | OUTPATIENT
Start: 2021-01-29 | End: 2023-02-10

## 2021-01-30 LAB — BACTERIA UR CULT: NO GROWTH

## 2021-02-02 ENCOUNTER — LAB VISIT (OUTPATIENT)
Dept: LAB | Facility: HOSPITAL | Age: 36
End: 2021-02-02
Attending: INTERNAL MEDICINE
Payer: MEDICARE

## 2021-02-02 ENCOUNTER — OFFICE VISIT (OUTPATIENT)
Dept: INTERNAL MEDICINE | Facility: CLINIC | Age: 36
End: 2021-02-02
Payer: MEDICARE

## 2021-02-02 DIAGNOSIS — I10 HYPERTENSION, UNSPECIFIED TYPE: ICD-10-CM

## 2021-02-02 DIAGNOSIS — E11.9 DIABETES MELLITUS WITHOUT COMPLICATION: ICD-10-CM

## 2021-02-02 DIAGNOSIS — E11.9 DIABETES MELLITUS WITHOUT COMPLICATION: Primary | ICD-10-CM

## 2021-02-02 PROCEDURE — 3044F PR MOST RECENT HEMOGLOBIN A1C LEVEL <7.0%: ICD-10-PCS | Mod: CPTII,S$GLB,, | Performed by: INTERNAL MEDICINE

## 2021-02-02 PROCEDURE — 3078F DIAST BP <80 MM HG: CPT | Mod: CPTII,S$GLB,, | Performed by: INTERNAL MEDICINE

## 2021-02-02 PROCEDURE — 99215 PR OFFICE/OUTPT VISIT, EST, LEVL V, 40-54 MIN: ICD-10-PCS | Mod: S$GLB,,, | Performed by: INTERNAL MEDICINE

## 2021-02-02 PROCEDURE — 99215 OFFICE O/P EST HI 40 MIN: CPT | Mod: S$GLB,,, | Performed by: INTERNAL MEDICINE

## 2021-02-02 PROCEDURE — 99999 PR PBB SHADOW E&M-EST. PATIENT-LVL V: CPT | Mod: PBBFAC,,, | Performed by: INTERNAL MEDICINE

## 2021-02-02 PROCEDURE — 82043 UR ALBUMIN QUANTITATIVE: CPT

## 2021-02-02 PROCEDURE — 3044F HG A1C LEVEL LT 7.0%: CPT | Mod: CPTII,S$GLB,, | Performed by: INTERNAL MEDICINE

## 2021-02-02 PROCEDURE — 82570 ASSAY OF URINE CREATININE: CPT

## 2021-02-02 PROCEDURE — 3074F SYST BP LT 130 MM HG: CPT | Mod: CPTII,S$GLB,, | Performed by: INTERNAL MEDICINE

## 2021-02-02 PROCEDURE — 3008F BODY MASS INDEX DOCD: CPT | Mod: CPTII,S$GLB,, | Performed by: INTERNAL MEDICINE

## 2021-02-02 PROCEDURE — 3008F PR BODY MASS INDEX (BMI) DOCUMENTED: ICD-10-PCS | Mod: CPTII,S$GLB,, | Performed by: INTERNAL MEDICINE

## 2021-02-02 PROCEDURE — 99999 PR PBB SHADOW E&M-EST. PATIENT-LVL V: ICD-10-PCS | Mod: PBBFAC,,, | Performed by: INTERNAL MEDICINE

## 2021-02-02 PROCEDURE — 3074F PR MOST RECENT SYSTOLIC BLOOD PRESSURE < 130 MM HG: ICD-10-PCS | Mod: CPTII,S$GLB,, | Performed by: INTERNAL MEDICINE

## 2021-02-02 PROCEDURE — 3078F PR MOST RECENT DIASTOLIC BLOOD PRESSURE < 80 MM HG: ICD-10-PCS | Mod: CPTII,S$GLB,, | Performed by: INTERNAL MEDICINE

## 2021-02-02 RX ORDER — TRIAMCINOLONE ACETONIDE 1 MG/G
OINTMENT TOPICAL
COMMUNITY
Start: 2021-01-06 | End: 2023-02-10

## 2021-02-02 RX ORDER — ILOPERIDONE 10 MG/1
TABLET ORAL
COMMUNITY
Start: 2020-12-07 | End: 2021-06-02 | Stop reason: SDUPTHER

## 2021-02-02 RX ORDER — PANTOPRAZOLE SODIUM 40 MG/1
40 TABLET, DELAYED RELEASE ORAL DAILY
Qty: 90 TABLET | Refills: 0 | Status: SHIPPED | OUTPATIENT
Start: 2021-02-02 | End: 2021-05-05 | Stop reason: SDUPTHER

## 2021-02-02 RX ORDER — OFLOXACIN 3 MG/ML
SOLUTION/ DROPS OPHTHALMIC
COMMUNITY
Start: 2021-01-04 | End: 2023-07-25

## 2021-02-02 RX ORDER — BUPROPION HYDROCHLORIDE 150 MG/1
150 TABLET ORAL EVERY MORNING
COMMUNITY
Start: 2021-01-21

## 2021-02-03 LAB
ALBUMIN/CREAT UR: NORMAL UG/MG (ref 0–30)
CREAT UR-MCNC: 70 MG/DL (ref 15–325)
MICROALBUMIN UR DL<=1MG/L-MCNC: <2.5 UG/ML

## 2021-02-05 ENCOUNTER — CLINICAL SUPPORT (OUTPATIENT)
Dept: REHABILITATION | Facility: HOSPITAL | Age: 36
End: 2021-02-05
Attending: STUDENT IN AN ORGANIZED HEALTH CARE EDUCATION/TRAINING PROGRAM
Payer: MEDICARE

## 2021-02-05 DIAGNOSIS — R26.89 DECREASED FUNCTIONAL MOBILITY: ICD-10-CM

## 2021-02-05 DIAGNOSIS — R42 DIZZINESS: ICD-10-CM

## 2021-02-05 PROCEDURE — 97110 THERAPEUTIC EXERCISES: CPT | Mod: PN

## 2021-02-05 PROCEDURE — 97112 NEUROMUSCULAR REEDUCATION: CPT | Mod: PN

## 2021-02-05 PROCEDURE — 97140 MANUAL THERAPY 1/> REGIONS: CPT | Mod: PN

## 2021-02-06 VITALS
HEART RATE: 76 BPM | TEMPERATURE: 99 F | OXYGEN SATURATION: 95 % | BODY MASS INDEX: 42.72 KG/M2 | SYSTOLIC BLOOD PRESSURE: 100 MMHG | HEIGHT: 62 IN | DIASTOLIC BLOOD PRESSURE: 70 MMHG | WEIGHT: 232.13 LBS

## 2021-02-11 ENCOUNTER — TELEPHONE (OUTPATIENT)
Dept: SLEEP MEDICINE | Facility: OTHER | Age: 36
End: 2021-02-11

## 2021-02-19 ENCOUNTER — TELEPHONE (OUTPATIENT)
Dept: NEUROLOGY | Facility: CLINIC | Age: 36
End: 2021-02-19

## 2021-03-03 ENCOUNTER — PATIENT OUTREACH (OUTPATIENT)
Dept: ADMINISTRATIVE | Facility: OTHER | Age: 36
End: 2021-03-03

## 2021-03-08 ENCOUNTER — HOSPITAL ENCOUNTER (OUTPATIENT)
Dept: SLEEP MEDICINE | Facility: OTHER | Age: 36
Discharge: HOME | End: 2021-03-08
Attending: INTERNAL MEDICINE
Payer: MEDICARE

## 2021-03-08 DIAGNOSIS — E11.9 DIABETES MELLITUS WITHOUT COMPLICATION: ICD-10-CM

## 2021-03-08 DIAGNOSIS — T88.7XXA MEDICATION SIDE EFFECT: ICD-10-CM

## 2021-03-08 DIAGNOSIS — R06.83 SNORING: Primary | ICD-10-CM

## 2021-03-08 PROCEDURE — 95810 POLYSOM 6/> YRS 4/> PARAM: CPT

## 2021-03-08 PROCEDURE — 95810 POLYSOM 6/> YRS 4/> PARAM: CPT | Mod: 26,,, | Performed by: INTERNAL MEDICINE

## 2021-03-08 PROCEDURE — 95810 PR POLYSOMNOGRAPHY, 4 OR MORE: ICD-10-PCS | Mod: 26,,, | Performed by: INTERNAL MEDICINE

## 2021-03-16 ENCOUNTER — TELEPHONE (OUTPATIENT)
Dept: SLEEP MEDICINE | Facility: CLINIC | Age: 36
End: 2021-03-16

## 2021-03-22 ENCOUNTER — TELEPHONE (OUTPATIENT)
Dept: PODIATRY | Facility: CLINIC | Age: 36
End: 2021-03-22

## 2021-03-26 ENCOUNTER — TELEPHONE (OUTPATIENT)
Dept: SLEEP MEDICINE | Facility: CLINIC | Age: 36
End: 2021-03-26

## 2021-03-29 ENCOUNTER — IMMUNIZATION (OUTPATIENT)
Dept: PRIMARY CARE CLINIC | Facility: CLINIC | Age: 36
End: 2021-03-29
Payer: MEDICARE

## 2021-03-29 ENCOUNTER — PATIENT MESSAGE (OUTPATIENT)
Dept: SLEEP MEDICINE | Facility: CLINIC | Age: 36
End: 2021-03-29

## 2021-03-29 DIAGNOSIS — Z23 NEED FOR VACCINATION: Primary | ICD-10-CM

## 2021-03-29 PROCEDURE — 0011A PR IMMUNIZ ADMIN, SARS-COV-2 COVID-19 VACC, 100MCG/0.5ML, 1ST DOSE: ICD-10-PCS | Mod: CV19,S$GLB,, | Performed by: INTERNAL MEDICINE

## 2021-03-29 PROCEDURE — 91301 PR SARS-COV-2 COVID-19 VACCINE, NO PRSV, 100MCG/0.5ML, IM: CPT | Mod: S$GLB,,, | Performed by: INTERNAL MEDICINE

## 2021-03-29 PROCEDURE — 0011A PR IMMUNIZ ADMIN, SARS-COV-2 COVID-19 VACC, 100MCG/0.5ML, 1ST DOSE: CPT | Mod: CV19,S$GLB,, | Performed by: INTERNAL MEDICINE

## 2021-03-29 PROCEDURE — 91301 PR SARS-COV-2 COVID-19 VACCINE, NO PRSV, 100MCG/0.5ML, IM: ICD-10-PCS | Mod: S$GLB,,, | Performed by: INTERNAL MEDICINE

## 2021-03-29 RX ADMIN — Medication 0.5 ML: at 03:03

## 2021-04-07 ENCOUNTER — TELEPHONE (OUTPATIENT)
Dept: NEUROLOGY | Facility: CLINIC | Age: 36
End: 2021-04-07

## 2021-04-14 ENCOUNTER — TELEPHONE (OUTPATIENT)
Dept: GASTROENTEROLOGY | Facility: CLINIC | Age: 36
End: 2021-04-14

## 2021-04-28 ENCOUNTER — IMMUNIZATION (OUTPATIENT)
Dept: PRIMARY CARE CLINIC | Facility: CLINIC | Age: 36
End: 2021-04-28
Payer: MEDICARE

## 2021-04-28 DIAGNOSIS — Z23 NEED FOR VACCINATION: Primary | ICD-10-CM

## 2021-04-28 PROCEDURE — 0012A PR IMMUNIZ ADMIN, SARS-COV-2 COVID-19 VACC, 100MCG/0.5ML, 2ND DOSE: CPT | Mod: CV19,S$GLB,, | Performed by: INTERNAL MEDICINE

## 2021-04-28 PROCEDURE — 0012A PR IMMUNIZ ADMIN, SARS-COV-2 COVID-19 VACC, 100MCG/0.5ML, 2ND DOSE: ICD-10-PCS | Mod: CV19,S$GLB,, | Performed by: INTERNAL MEDICINE

## 2021-04-28 PROCEDURE — 91301 PR SARS-COV-2 COVID-19 VACCINE, NO PRSV, 100MCG/0.5ML, IM: CPT | Mod: S$GLB,,, | Performed by: INTERNAL MEDICINE

## 2021-04-28 PROCEDURE — 91301 PR SARS-COV-2 COVID-19 VACCINE, NO PRSV, 100MCG/0.5ML, IM: ICD-10-PCS | Mod: S$GLB,,, | Performed by: INTERNAL MEDICINE

## 2021-04-28 RX ADMIN — Medication 0.5 ML: at 05:04

## 2021-05-05 RX ORDER — PANTOPRAZOLE SODIUM 40 MG/1
40 TABLET, DELAYED RELEASE ORAL DAILY
Qty: 90 TABLET | Refills: 0 | Status: SHIPPED | OUTPATIENT
Start: 2021-05-05 | End: 2021-07-13 | Stop reason: SDUPTHER

## 2021-05-17 ENCOUNTER — PATIENT OUTREACH (OUTPATIENT)
Dept: ADMINISTRATIVE | Facility: OTHER | Age: 36
End: 2021-05-17

## 2021-05-18 ENCOUNTER — OFFICE VISIT (OUTPATIENT)
Dept: NEUROLOGY | Facility: CLINIC | Age: 36
End: 2021-05-18
Payer: MEDICARE

## 2021-05-18 VITALS
DIASTOLIC BLOOD PRESSURE: 69 MMHG | SYSTOLIC BLOOD PRESSURE: 100 MMHG | WEIGHT: 223.69 LBS | HEART RATE: 93 BPM | BODY MASS INDEX: 41.16 KG/M2 | HEIGHT: 62 IN

## 2021-05-18 DIAGNOSIS — E11.42 DIABETIC POLYNEUROPATHY ASSOCIATED WITH TYPE 2 DIABETES MELLITUS: ICD-10-CM

## 2021-05-18 DIAGNOSIS — M54.81 BILATERAL OCCIPITAL NEURALGIA: ICD-10-CM

## 2021-05-18 DIAGNOSIS — M79.18 CERVICAL MYOFASCIAL PAIN SYNDROME: ICD-10-CM

## 2021-05-18 DIAGNOSIS — G43.119 INTRACTABLE MIGRAINE WITH AURA WITHOUT STATUS MIGRAINOSUS: Primary | ICD-10-CM

## 2021-05-18 PROCEDURE — 99215 PR OFFICE/OUTPT VISIT, EST, LEVL V, 40-54 MIN: ICD-10-PCS | Mod: S$GLB,,, | Performed by: PSYCHIATRY & NEUROLOGY

## 2021-05-18 PROCEDURE — 99215 OFFICE O/P EST HI 40 MIN: CPT | Mod: S$GLB,,, | Performed by: PSYCHIATRY & NEUROLOGY

## 2021-05-18 PROCEDURE — 1125F AMNT PAIN NOTED PAIN PRSNT: CPT | Mod: S$GLB,,, | Performed by: PSYCHIATRY & NEUROLOGY

## 2021-05-18 PROCEDURE — 99999 PR PBB SHADOW E&M-EST. PATIENT-LVL III: CPT | Mod: PBBFAC,,, | Performed by: PSYCHIATRY & NEUROLOGY

## 2021-05-18 PROCEDURE — 3008F PR BODY MASS INDEX (BMI) DOCUMENTED: ICD-10-PCS | Mod: CPTII,S$GLB,, | Performed by: PSYCHIATRY & NEUROLOGY

## 2021-05-18 PROCEDURE — 99499 RISK ADDL DX/OHS AUDIT: ICD-10-PCS | Mod: S$GLB,,, | Performed by: PSYCHIATRY & NEUROLOGY

## 2021-05-18 PROCEDURE — 99499 UNLISTED E&M SERVICE: CPT | Mod: S$GLB,,, | Performed by: PSYCHIATRY & NEUROLOGY

## 2021-05-18 PROCEDURE — 99999 PR PBB SHADOW E&M-EST. PATIENT-LVL III: ICD-10-PCS | Mod: PBBFAC,,, | Performed by: PSYCHIATRY & NEUROLOGY

## 2021-05-18 PROCEDURE — 3008F BODY MASS INDEX DOCD: CPT | Mod: CPTII,S$GLB,, | Performed by: PSYCHIATRY & NEUROLOGY

## 2021-05-18 PROCEDURE — 1125F PR PAIN SEVERITY QUANTIFIED, PAIN PRESENT: ICD-10-PCS | Mod: S$GLB,,, | Performed by: PSYCHIATRY & NEUROLOGY

## 2021-05-18 RX ORDER — GABAPENTIN 300 MG/1
CAPSULE ORAL
Qty: 150 CAPSULE | Refills: 11 | Status: SHIPPED | OUTPATIENT
Start: 2021-05-18

## 2021-05-18 RX ORDER — RIMEGEPANT SULFATE 75 MG/75MG
75 TABLET, ORALLY DISINTEGRATING ORAL ONCE AS NEEDED
Qty: 8 TABLET | Refills: 2 | Status: SHIPPED | OUTPATIENT
Start: 2021-05-18 | End: 2021-05-18

## 2021-05-31 ENCOUNTER — PATIENT MESSAGE (OUTPATIENT)
Dept: PSYCHIATRY | Facility: CLINIC | Age: 36
End: 2021-05-31

## 2021-05-31 ENCOUNTER — OFFICE VISIT (OUTPATIENT)
Dept: PODIATRY | Facility: CLINIC | Age: 36
End: 2021-05-31
Payer: MEDICARE

## 2021-05-31 VITALS
HEART RATE: 90 BPM | WEIGHT: 224.19 LBS | SYSTOLIC BLOOD PRESSURE: 107 MMHG | HEIGHT: 62 IN | BODY MASS INDEX: 41.26 KG/M2 | DIASTOLIC BLOOD PRESSURE: 75 MMHG

## 2021-05-31 DIAGNOSIS — L84 CORN OR CALLUS: ICD-10-CM

## 2021-05-31 DIAGNOSIS — E11.42 DIABETIC POLYNEUROPATHY ASSOCIATED WITH TYPE 2 DIABETES MELLITUS: Primary | ICD-10-CM

## 2021-05-31 DIAGNOSIS — L60.9 DISEASE OF NAIL: ICD-10-CM

## 2021-05-31 PROCEDURE — 3008F PR BODY MASS INDEX (BMI) DOCUMENTED: ICD-10-PCS | Mod: CPTII,S$GLB,, | Performed by: PODIATRIST

## 2021-05-31 PROCEDURE — 3044F PR MOST RECENT HEMOGLOBIN A1C LEVEL <7.0%: ICD-10-PCS | Mod: CPTII,S$GLB,, | Performed by: PODIATRIST

## 2021-05-31 PROCEDURE — 99999 PR PBB SHADOW E&M-EST. PATIENT-LVL IV: ICD-10-PCS | Mod: PBBFAC,,, | Performed by: PODIATRIST

## 2021-05-31 PROCEDURE — 1125F PR PAIN SEVERITY QUANTIFIED, PAIN PRESENT: ICD-10-PCS | Mod: S$GLB,,, | Performed by: PODIATRIST

## 2021-05-31 PROCEDURE — 11721 PR DEBRIDEMENT OF NAILS, 6 OR MORE: ICD-10-PCS | Mod: Q9,59,S$GLB, | Performed by: PODIATRIST

## 2021-05-31 PROCEDURE — 99499 UNLISTED E&M SERVICE: CPT | Mod: S$GLB,,, | Performed by: PODIATRIST

## 2021-05-31 PROCEDURE — 11721 DEBRIDE NAIL 6 OR MORE: CPT | Mod: Q9,59,S$GLB, | Performed by: PODIATRIST

## 2021-05-31 PROCEDURE — 1125F AMNT PAIN NOTED PAIN PRSNT: CPT | Mod: S$GLB,,, | Performed by: PODIATRIST

## 2021-05-31 PROCEDURE — 11055 PARING/CUTG B9 HYPRKER LES 1: CPT | Mod: Q9,S$GLB,, | Performed by: PODIATRIST

## 2021-05-31 PROCEDURE — 99999 PR PBB SHADOW E&M-EST. PATIENT-LVL IV: CPT | Mod: PBBFAC,,, | Performed by: PODIATRIST

## 2021-05-31 PROCEDURE — 99499 NO LOS: ICD-10-PCS | Mod: S$GLB,,, | Performed by: PODIATRIST

## 2021-05-31 PROCEDURE — 3044F HG A1C LEVEL LT 7.0%: CPT | Mod: CPTII,S$GLB,, | Performed by: PODIATRIST

## 2021-05-31 PROCEDURE — 11055 PR TRIM HYPERKERATOTIC SKIN LESION, ONE: ICD-10-PCS | Mod: Q9,S$GLB,, | Performed by: PODIATRIST

## 2021-05-31 PROCEDURE — 3008F BODY MASS INDEX DOCD: CPT | Mod: CPTII,S$GLB,, | Performed by: PODIATRIST

## 2021-06-02 ENCOUNTER — OFFICE VISIT (OUTPATIENT)
Dept: INTERNAL MEDICINE | Facility: CLINIC | Age: 36
End: 2021-06-02
Payer: MEDICARE

## 2021-06-02 ENCOUNTER — LAB VISIT (OUTPATIENT)
Dept: LAB | Facility: HOSPITAL | Age: 36
End: 2021-06-02
Attending: INTERNAL MEDICINE
Payer: MEDICARE

## 2021-06-02 DIAGNOSIS — I10 HYPERTENSION, UNSPECIFIED TYPE: ICD-10-CM

## 2021-06-02 DIAGNOSIS — E11.9 DIABETES MELLITUS WITHOUT COMPLICATION: Primary | ICD-10-CM

## 2021-06-02 DIAGNOSIS — E11.9 DIABETES MELLITUS WITHOUT COMPLICATION: ICD-10-CM

## 2021-06-02 LAB
ALBUMIN SERPL BCP-MCNC: 3.8 G/DL (ref 3.5–5.2)
ALP SERPL-CCNC: 60 U/L (ref 55–135)
ALT SERPL W/O P-5'-P-CCNC: 15 U/L (ref 10–44)
ANION GAP SERPL CALC-SCNC: 8 MMOL/L (ref 8–16)
AST SERPL-CCNC: 15 U/L (ref 10–40)
BILIRUB SERPL-MCNC: 0.2 MG/DL (ref 0.1–1)
BUN SERPL-MCNC: 11 MG/DL (ref 6–20)
CALCIUM SERPL-MCNC: 9.3 MG/DL (ref 8.7–10.5)
CHLORIDE SERPL-SCNC: 108 MMOL/L (ref 95–110)
CHOLEST SERPL-MCNC: 163 MG/DL (ref 120–199)
CHOLEST/HDLC SERPL: 4.2 {RATIO} (ref 2–5)
CO2 SERPL-SCNC: 22 MMOL/L (ref 23–29)
CREAT SERPL-MCNC: 1.2 MG/DL (ref 0.5–1.4)
EST. GFR  (AFRICAN AMERICAN): >60 ML/MIN/1.73 M^2
EST. GFR  (NON AFRICAN AMERICAN): 57.5 ML/MIN/1.73 M^2
ESTIMATED AVG GLUCOSE: 105 MG/DL (ref 68–131)
GLUCOSE SERPL-MCNC: 83 MG/DL (ref 70–110)
HBA1C MFR BLD: 5.3 % (ref 4–5.6)
HDLC SERPL-MCNC: 39 MG/DL (ref 40–75)
HDLC SERPL: 23.9 % (ref 20–50)
LDLC SERPL CALC-MCNC: 72.2 MG/DL (ref 63–159)
NONHDLC SERPL-MCNC: 124 MG/DL
POTASSIUM SERPL-SCNC: 4.4 MMOL/L (ref 3.5–5.1)
PROT SERPL-MCNC: 7.5 G/DL (ref 6–8.4)
SODIUM SERPL-SCNC: 138 MMOL/L (ref 136–145)
TRIGL SERPL-MCNC: 259 MG/DL (ref 30–150)

## 2021-06-02 PROCEDURE — 36415 COLL VENOUS BLD VENIPUNCTURE: CPT | Performed by: INTERNAL MEDICINE

## 2021-06-02 PROCEDURE — 99999 PR PBB SHADOW E&M-EST. PATIENT-LVL V: CPT | Mod: PBBFAC,,, | Performed by: INTERNAL MEDICINE

## 2021-06-02 PROCEDURE — 3078F PR MOST RECENT DIASTOLIC BLOOD PRESSURE < 80 MM HG: ICD-10-PCS | Mod: CPTII,S$GLB,, | Performed by: INTERNAL MEDICINE

## 2021-06-02 PROCEDURE — 80053 COMPREHEN METABOLIC PANEL: CPT | Performed by: INTERNAL MEDICINE

## 2021-06-02 PROCEDURE — 99999 PR PBB SHADOW E&M-EST. PATIENT-LVL V: ICD-10-PCS | Mod: PBBFAC,,, | Performed by: INTERNAL MEDICINE

## 2021-06-02 PROCEDURE — 3044F PR MOST RECENT HEMOGLOBIN A1C LEVEL <7.0%: ICD-10-PCS | Mod: CPTII,S$GLB,, | Performed by: INTERNAL MEDICINE

## 2021-06-02 PROCEDURE — 3008F PR BODY MASS INDEX (BMI) DOCUMENTED: ICD-10-PCS | Mod: CPTII,S$GLB,, | Performed by: INTERNAL MEDICINE

## 2021-06-02 PROCEDURE — 3078F DIAST BP <80 MM HG: CPT | Mod: CPTII,S$GLB,, | Performed by: INTERNAL MEDICINE

## 2021-06-02 PROCEDURE — 3044F HG A1C LEVEL LT 7.0%: CPT | Mod: CPTII,S$GLB,, | Performed by: INTERNAL MEDICINE

## 2021-06-02 PROCEDURE — 3008F BODY MASS INDEX DOCD: CPT | Mod: CPTII,S$GLB,, | Performed by: INTERNAL MEDICINE

## 2021-06-02 PROCEDURE — 3074F SYST BP LT 130 MM HG: CPT | Mod: CPTII,S$GLB,, | Performed by: INTERNAL MEDICINE

## 2021-06-02 PROCEDURE — 3074F PR MOST RECENT SYSTOLIC BLOOD PRESSURE < 130 MM HG: ICD-10-PCS | Mod: CPTII,S$GLB,, | Performed by: INTERNAL MEDICINE

## 2021-06-02 PROCEDURE — 83036 HEMOGLOBIN GLYCOSYLATED A1C: CPT | Performed by: INTERNAL MEDICINE

## 2021-06-02 PROCEDURE — 99215 PR OFFICE/OUTPT VISIT, EST, LEVL V, 40-54 MIN: ICD-10-PCS | Mod: S$GLB,,, | Performed by: INTERNAL MEDICINE

## 2021-06-02 PROCEDURE — 80061 LIPID PANEL: CPT | Performed by: INTERNAL MEDICINE

## 2021-06-02 PROCEDURE — 99215 OFFICE O/P EST HI 40 MIN: CPT | Mod: S$GLB,,, | Performed by: INTERNAL MEDICINE

## 2021-06-05 VITALS
HEIGHT: 62 IN | SYSTOLIC BLOOD PRESSURE: 100 MMHG | DIASTOLIC BLOOD PRESSURE: 68 MMHG | TEMPERATURE: 99 F | HEART RATE: 94 BPM | BODY MASS INDEX: 40.48 KG/M2 | OXYGEN SATURATION: 97 % | WEIGHT: 220 LBS

## 2021-07-13 RX ORDER — PANTOPRAZOLE SODIUM 40 MG/1
40 TABLET, DELAYED RELEASE ORAL DAILY
Qty: 90 TABLET | Refills: 0 | Status: SHIPPED | OUTPATIENT
Start: 2021-07-13 | End: 2021-10-08

## 2021-07-14 ENCOUNTER — OFFICE VISIT (OUTPATIENT)
Dept: URGENT CARE | Facility: CLINIC | Age: 36
End: 2021-07-14
Payer: MEDICARE

## 2021-07-14 VITALS
WEIGHT: 222 LBS | TEMPERATURE: 98 F | RESPIRATION RATE: 18 BRPM | HEIGHT: 62 IN | HEART RATE: 92 BPM | OXYGEN SATURATION: 95 % | SYSTOLIC BLOOD PRESSURE: 103 MMHG | BODY MASS INDEX: 40.85 KG/M2 | DIASTOLIC BLOOD PRESSURE: 77 MMHG

## 2021-07-14 DIAGNOSIS — J00 ACUTE NASOPHARYNGITIS: Primary | ICD-10-CM

## 2021-07-14 DIAGNOSIS — J02.9 SORE THROAT: ICD-10-CM

## 2021-07-14 LAB
CTP QC/QA: YES
CTP QC/QA: YES
MOLECULAR STREP A: NEGATIVE
SARS-COV-2 RDRP RESP QL NAA+PROBE: NEGATIVE

## 2021-07-14 PROCEDURE — 87651 POCT STREP A MOLECULAR: ICD-10-PCS | Mod: QW,S$GLB,, | Performed by: NURSE PRACTITIONER

## 2021-07-14 PROCEDURE — 3008F PR BODY MASS INDEX (BMI) DOCUMENTED: ICD-10-PCS | Mod: CPTII,S$GLB,, | Performed by: NURSE PRACTITIONER

## 2021-07-14 PROCEDURE — 3008F BODY MASS INDEX DOCD: CPT | Mod: CPTII,S$GLB,, | Performed by: NURSE PRACTITIONER

## 2021-07-14 PROCEDURE — 99214 PR OFFICE/OUTPT VISIT, EST, LEVL IV, 30-39 MIN: ICD-10-PCS | Mod: S$GLB,CS,, | Performed by: NURSE PRACTITIONER

## 2021-07-14 PROCEDURE — U0002: ICD-10-PCS | Mod: QW,S$GLB,, | Performed by: NURSE PRACTITIONER

## 2021-07-14 PROCEDURE — 99214 OFFICE O/P EST MOD 30 MIN: CPT | Mod: S$GLB,CS,, | Performed by: NURSE PRACTITIONER

## 2021-07-14 PROCEDURE — U0002 COVID-19 LAB TEST NON-CDC: HCPCS | Mod: QW,S$GLB,, | Performed by: NURSE PRACTITIONER

## 2021-07-14 PROCEDURE — 87651 STREP A DNA AMP PROBE: CPT | Mod: QW,S$GLB,, | Performed by: NURSE PRACTITIONER

## 2021-07-28 ENCOUNTER — OFFICE VISIT (OUTPATIENT)
Dept: UROLOGY | Facility: CLINIC | Age: 36
End: 2021-07-28
Payer: MEDICARE

## 2021-07-28 VITALS
HEART RATE: 106 BPM | HEIGHT: 62 IN | BODY MASS INDEX: 39.02 KG/M2 | WEIGHT: 212.06 LBS | DIASTOLIC BLOOD PRESSURE: 71 MMHG | SYSTOLIC BLOOD PRESSURE: 94 MMHG

## 2021-07-28 DIAGNOSIS — N39.41 URGE INCONTINENCE: Primary | ICD-10-CM

## 2021-07-28 PROCEDURE — 99214 OFFICE O/P EST MOD 30 MIN: CPT | Mod: 25,S$GLB,, | Performed by: UROLOGY

## 2021-07-28 PROCEDURE — 95971 PR ANALYZE NEUROSTIM,SIMPLE/PROG: ICD-10-PCS | Mod: S$GLB,,, | Performed by: UROLOGY

## 2021-07-28 PROCEDURE — 3044F HG A1C LEVEL LT 7.0%: CPT | Mod: CPTII,S$GLB,, | Performed by: UROLOGY

## 2021-07-28 PROCEDURE — 3008F PR BODY MASS INDEX (BMI) DOCUMENTED: ICD-10-PCS | Mod: CPTII,S$GLB,, | Performed by: UROLOGY

## 2021-07-28 PROCEDURE — 1126F PR PAIN SEVERITY QUANTIFIED, NO PAIN PRESENT: ICD-10-PCS | Mod: CPTII,S$GLB,, | Performed by: UROLOGY

## 2021-07-28 PROCEDURE — 51701 PR INSERTION OF NON-INDWELLING BLADDER CATHETERIZATION FOR RESIDUAL UR: ICD-10-PCS | Mod: S$GLB,,, | Performed by: UROLOGY

## 2021-07-28 PROCEDURE — 3074F SYST BP LT 130 MM HG: CPT | Mod: CPTII,S$GLB,, | Performed by: UROLOGY

## 2021-07-28 PROCEDURE — 3078F PR MOST RECENT DIASTOLIC BLOOD PRESSURE < 80 MM HG: ICD-10-PCS | Mod: CPTII,S$GLB,, | Performed by: UROLOGY

## 2021-07-28 PROCEDURE — 3078F DIAST BP <80 MM HG: CPT | Mod: CPTII,S$GLB,, | Performed by: UROLOGY

## 2021-07-28 PROCEDURE — 51701 INSERT BLADDER CATHETER: CPT | Mod: S$GLB,,, | Performed by: UROLOGY

## 2021-07-28 PROCEDURE — 3044F PR MOST RECENT HEMOGLOBIN A1C LEVEL <7.0%: ICD-10-PCS | Mod: CPTII,S$GLB,, | Performed by: UROLOGY

## 2021-07-28 PROCEDURE — 3074F PR MOST RECENT SYSTOLIC BLOOD PRESSURE < 130 MM HG: ICD-10-PCS | Mod: CPTII,S$GLB,, | Performed by: UROLOGY

## 2021-07-28 PROCEDURE — 99999 PR PBB SHADOW E&M-EST. PATIENT-LVL V: ICD-10-PCS | Mod: PBBFAC,,, | Performed by: UROLOGY

## 2021-07-28 PROCEDURE — 1159F PR MEDICATION LIST DOCUMENTED IN MEDICAL RECORD: ICD-10-PCS | Mod: CPTII,S$GLB,, | Performed by: UROLOGY

## 2021-07-28 PROCEDURE — 99214 PR OFFICE/OUTPT VISIT, EST, LEVL IV, 30-39 MIN: ICD-10-PCS | Mod: 25,S$GLB,, | Performed by: UROLOGY

## 2021-07-28 PROCEDURE — 81002 URINALYSIS NONAUTO W/O SCOPE: CPT | Mod: S$GLB,,, | Performed by: UROLOGY

## 2021-07-28 PROCEDURE — 3008F BODY MASS INDEX DOCD: CPT | Mod: CPTII,S$GLB,, | Performed by: UROLOGY

## 2021-07-28 PROCEDURE — 95971 ALYS SMPL SP/PN NPGT W/PRGRM: CPT | Mod: S$GLB,,, | Performed by: UROLOGY

## 2021-07-28 PROCEDURE — 81002 PR URINALYSIS NONAUTO W/O SCOPE: ICD-10-PCS | Mod: S$GLB,,, | Performed by: UROLOGY

## 2021-07-28 PROCEDURE — 99999 PR PBB SHADOW E&M-EST. PATIENT-LVL V: CPT | Mod: PBBFAC,,, | Performed by: UROLOGY

## 2021-07-28 PROCEDURE — 87086 URINE CULTURE/COLONY COUNT: CPT | Performed by: UROLOGY

## 2021-07-28 PROCEDURE — 1126F AMNT PAIN NOTED NONE PRSNT: CPT | Mod: CPTII,S$GLB,, | Performed by: UROLOGY

## 2021-07-28 PROCEDURE — 1159F MED LIST DOCD IN RCRD: CPT | Mod: CPTII,S$GLB,, | Performed by: UROLOGY

## 2021-07-28 RX ORDER — SPIRONOLACTONE 100 MG/1
TABLET, FILM COATED ORAL
COMMUNITY
End: 2021-09-28

## 2021-07-28 RX ORDER — OXYBUTYNIN CHLORIDE 10 MG/1
10 TABLET, EXTENDED RELEASE ORAL DAILY
Qty: 30 TABLET | Refills: 11 | Status: SHIPPED | OUTPATIENT
Start: 2021-07-28 | End: 2022-05-12

## 2021-07-29 LAB — BACTERIA UR CULT: NO GROWTH

## 2021-08-04 ENCOUNTER — TELEPHONE (OUTPATIENT)
Dept: NEUROLOGY | Facility: CLINIC | Age: 36
End: 2021-08-04

## 2021-08-26 ENCOUNTER — PATIENT OUTREACH (OUTPATIENT)
Dept: ADMINISTRATIVE | Facility: OTHER | Age: 36
End: 2021-08-26

## 2021-09-01 ENCOUNTER — PATIENT MESSAGE (OUTPATIENT)
Dept: PSYCHIATRY | Facility: CLINIC | Age: 36
End: 2021-09-01

## 2021-09-02 ENCOUNTER — PATIENT MESSAGE (OUTPATIENT)
Dept: PSYCHIATRY | Facility: CLINIC | Age: 36
End: 2021-09-02

## 2021-09-02 ENCOUNTER — PATIENT MESSAGE (OUTPATIENT)
Dept: NEUROLOGY | Facility: CLINIC | Age: 36
End: 2021-09-02

## 2021-09-15 ENCOUNTER — TELEPHONE (OUTPATIENT)
Dept: PODIATRY | Facility: CLINIC | Age: 36
End: 2021-09-15

## 2021-09-16 ENCOUNTER — TELEPHONE (OUTPATIENT)
Dept: PODIATRY | Facility: CLINIC | Age: 36
End: 2021-09-16

## 2021-09-23 ENCOUNTER — OFFICE VISIT (OUTPATIENT)
Dept: INTERNAL MEDICINE | Facility: CLINIC | Age: 36
End: 2021-09-23
Payer: MEDICARE

## 2021-09-23 ENCOUNTER — LAB VISIT (OUTPATIENT)
Dept: LAB | Facility: HOSPITAL | Age: 36
End: 2021-09-23
Payer: MEDICARE

## 2021-09-23 DIAGNOSIS — K62.89 ANAL OR RECTAL PAIN: ICD-10-CM

## 2021-09-23 DIAGNOSIS — K92.1 BLOOD IN STOOL: ICD-10-CM

## 2021-09-23 DIAGNOSIS — K62.89 ANAL OR RECTAL PAIN: Primary | ICD-10-CM

## 2021-09-23 LAB
BASOPHILS # BLD AUTO: 0.07 K/UL (ref 0–0.2)
BASOPHILS NFR BLD: 0.9 % (ref 0–1.9)
DIFFERENTIAL METHOD: NORMAL
EOSINOPHIL # BLD AUTO: 0.3 K/UL (ref 0–0.5)
EOSINOPHIL NFR BLD: 4 % (ref 0–8)
ERYTHROCYTE [DISTWIDTH] IN BLOOD BY AUTOMATED COUNT: 13.3 % (ref 11.5–14.5)
HCT VFR BLD AUTO: 42.1 % (ref 37–48.5)
HGB BLD-MCNC: 13.5 G/DL (ref 12–16)
IMM GRANULOCYTES # BLD AUTO: 0.02 K/UL (ref 0–0.04)
IMM GRANULOCYTES NFR BLD AUTO: 0.2 % (ref 0–0.5)
LYMPHOCYTES # BLD AUTO: 2.7 K/UL (ref 1–4.8)
LYMPHOCYTES NFR BLD: 33.2 % (ref 18–48)
MCH RBC QN AUTO: 28.9 PG (ref 27–31)
MCHC RBC AUTO-ENTMCNC: 32.1 G/DL (ref 32–36)
MCV RBC AUTO: 90 FL (ref 82–98)
MONOCYTES # BLD AUTO: 0.8 K/UL (ref 0.3–1)
MONOCYTES NFR BLD: 9.4 % (ref 4–15)
NEUTROPHILS # BLD AUTO: 4.3 K/UL (ref 1.8–7.7)
NEUTROPHILS NFR BLD: 52.3 % (ref 38–73)
NRBC BLD-RTO: 0 /100 WBC
PLATELET # BLD AUTO: 263 K/UL (ref 150–450)
PMV BLD AUTO: 11.8 FL (ref 9.2–12.9)
RBC # BLD AUTO: 4.67 M/UL (ref 4–5.4)
WBC # BLD AUTO: 8.16 K/UL (ref 3.9–12.7)

## 2021-09-23 PROCEDURE — 36415 COLL VENOUS BLD VENIPUNCTURE: CPT | Performed by: STUDENT IN AN ORGANIZED HEALTH CARE EDUCATION/TRAINING PROGRAM

## 2021-09-23 PROCEDURE — 99213 PR OFFICE/OUTPT VISIT, EST, LEVL III, 20-29 MIN: ICD-10-PCS | Mod: HCNC,GC,S$GLB, | Performed by: STUDENT IN AN ORGANIZED HEALTH CARE EDUCATION/TRAINING PROGRAM

## 2021-09-23 PROCEDURE — 99999 PR PBB SHADOW E&M-EST. PATIENT-LVL I: ICD-10-PCS | Mod: PBBFAC,GC,, | Performed by: STUDENT IN AN ORGANIZED HEALTH CARE EDUCATION/TRAINING PROGRAM

## 2021-09-23 PROCEDURE — 99213 OFFICE O/P EST LOW 20 MIN: CPT | Mod: HCNC,GC,S$GLB, | Performed by: STUDENT IN AN ORGANIZED HEALTH CARE EDUCATION/TRAINING PROGRAM

## 2021-09-23 PROCEDURE — 85025 COMPLETE CBC W/AUTO DIFF WBC: CPT | Performed by: STUDENT IN AN ORGANIZED HEALTH CARE EDUCATION/TRAINING PROGRAM

## 2021-09-23 PROCEDURE — 99999 PR PBB SHADOW E&M-EST. PATIENT-LVL I: CPT | Mod: PBBFAC,GC,, | Performed by: STUDENT IN AN ORGANIZED HEALTH CARE EDUCATION/TRAINING PROGRAM

## 2021-09-23 RX ORDER — HYDROCORTISONE ACETATE 25 MG/1
25 SUPPOSITORY RECTAL 2 TIMES DAILY
Qty: 20 SUPPOSITORY | Refills: 0 | Status: SHIPPED | OUTPATIENT
Start: 2021-09-23 | End: 2021-10-04

## 2021-09-23 RX ORDER — SUCRALFATE 1 G/1
1 TABLET ORAL 4 TIMES DAILY
Qty: 20 TABLET | Refills: 0 | Status: SHIPPED | OUTPATIENT
Start: 2021-09-23 | End: 2022-11-14

## 2021-09-24 ENCOUNTER — OFFICE VISIT (OUTPATIENT)
Dept: SURGERY | Facility: CLINIC | Age: 36
End: 2021-09-24
Payer: MEDICARE

## 2021-09-24 VITALS
DIASTOLIC BLOOD PRESSURE: 92 MMHG | HEART RATE: 101 BPM | WEIGHT: 220.25 LBS | SYSTOLIC BLOOD PRESSURE: 137 MMHG | HEIGHT: 62 IN | BODY MASS INDEX: 40.53 KG/M2

## 2021-09-24 DIAGNOSIS — K58.0 IRRITABLE BOWEL SYNDROME WITH DIARRHEA: ICD-10-CM

## 2021-09-24 DIAGNOSIS — R10.84 GENERALIZED ABDOMINAL PAIN: ICD-10-CM

## 2021-09-24 DIAGNOSIS — K62.89 RECTAL PAIN: Primary | ICD-10-CM

## 2021-09-24 PROCEDURE — 99204 PR OFFICE/OUTPT VISIT, NEW, LEVL IV, 45-59 MIN: ICD-10-PCS | Mod: HCNC,S$GLB,, | Performed by: NURSE PRACTITIONER

## 2021-09-24 PROCEDURE — 1159F PR MEDICATION LIST DOCUMENTED IN MEDICAL RECORD: ICD-10-PCS | Mod: HCNC,CPTII,S$GLB, | Performed by: NURSE PRACTITIONER

## 2021-09-24 PROCEDURE — 3080F DIAST BP >= 90 MM HG: CPT | Mod: HCNC,CPTII,S$GLB, | Performed by: NURSE PRACTITIONER

## 2021-09-24 PROCEDURE — 3061F PR NEG MICROALBUMINURIA RESULT DOCUMENTED/REVIEW: ICD-10-PCS | Mod: HCNC,CPTII,S$GLB, | Performed by: NURSE PRACTITIONER

## 2021-09-24 PROCEDURE — 3066F NEPHROPATHY DOC TX: CPT | Mod: HCNC,CPTII,S$GLB, | Performed by: NURSE PRACTITIONER

## 2021-09-24 PROCEDURE — 99999 PR PBB SHADOW E&M-EST. PATIENT-LVL IV: CPT | Mod: PBBFAC,HCNC,, | Performed by: NURSE PRACTITIONER

## 2021-09-24 PROCEDURE — 3075F PR MOST RECENT SYSTOLIC BLOOD PRESS GE 130-139MM HG: ICD-10-PCS | Mod: HCNC,CPTII,S$GLB, | Performed by: NURSE PRACTITIONER

## 2021-09-24 PROCEDURE — 3008F PR BODY MASS INDEX (BMI) DOCUMENTED: ICD-10-PCS | Mod: HCNC,CPTII,S$GLB, | Performed by: NURSE PRACTITIONER

## 2021-09-24 PROCEDURE — 3066F PR DOCUMENTATION OF TREATMENT FOR NEPHROPATHY: ICD-10-PCS | Mod: HCNC,CPTII,S$GLB, | Performed by: NURSE PRACTITIONER

## 2021-09-24 PROCEDURE — 1159F MED LIST DOCD IN RCRD: CPT | Mod: HCNC,CPTII,S$GLB, | Performed by: NURSE PRACTITIONER

## 2021-09-24 PROCEDURE — 3008F BODY MASS INDEX DOCD: CPT | Mod: HCNC,CPTII,S$GLB, | Performed by: NURSE PRACTITIONER

## 2021-09-24 PROCEDURE — 3075F SYST BP GE 130 - 139MM HG: CPT | Mod: HCNC,CPTII,S$GLB, | Performed by: NURSE PRACTITIONER

## 2021-09-24 PROCEDURE — 3080F PR MOST RECENT DIASTOLIC BLOOD PRESSURE >= 90 MM HG: ICD-10-PCS | Mod: HCNC,CPTII,S$GLB, | Performed by: NURSE PRACTITIONER

## 2021-09-24 PROCEDURE — 99204 OFFICE O/P NEW MOD 45 MIN: CPT | Mod: HCNC,S$GLB,, | Performed by: NURSE PRACTITIONER

## 2021-09-24 PROCEDURE — 3044F HG A1C LEVEL LT 7.0%: CPT | Mod: HCNC,CPTII,S$GLB, | Performed by: NURSE PRACTITIONER

## 2021-09-24 PROCEDURE — 3061F NEG MICROALBUMINURIA REV: CPT | Mod: HCNC,CPTII,S$GLB, | Performed by: NURSE PRACTITIONER

## 2021-09-24 PROCEDURE — 99999 PR PBB SHADOW E&M-EST. PATIENT-LVL IV: ICD-10-PCS | Mod: PBBFAC,HCNC,, | Performed by: NURSE PRACTITIONER

## 2021-09-24 PROCEDURE — 3044F PR MOST RECENT HEMOGLOBIN A1C LEVEL <7.0%: ICD-10-PCS | Mod: HCNC,CPTII,S$GLB, | Performed by: NURSE PRACTITIONER

## 2021-09-24 RX ORDER — DICYCLOMINE HYDROCHLORIDE 10 MG/1
10 CAPSULE ORAL
Qty: 120 CAPSULE | Refills: 0 | Status: SHIPPED | OUTPATIENT
Start: 2021-09-24 | End: 2021-10-24

## 2021-09-27 ENCOUNTER — OFFICE VISIT (OUTPATIENT)
Dept: OBSTETRICS AND GYNECOLOGY | Facility: CLINIC | Age: 36
End: 2021-09-27
Attending: OBSTETRICS & GYNECOLOGY
Payer: MEDICARE

## 2021-09-27 ENCOUNTER — PATIENT OUTREACH (OUTPATIENT)
Dept: ADMINISTRATIVE | Facility: OTHER | Age: 36
End: 2021-09-27

## 2021-09-27 VITALS
BODY MASS INDEX: 40.33 KG/M2 | DIASTOLIC BLOOD PRESSURE: 79 MMHG | SYSTOLIC BLOOD PRESSURE: 109 MMHG | HEIGHT: 62 IN | WEIGHT: 219.13 LBS

## 2021-09-27 DIAGNOSIS — G89.29 CHRONIC PELVIC PAIN IN FEMALE: Primary | ICD-10-CM

## 2021-09-27 DIAGNOSIS — R10.2 CHRONIC PELVIC PAIN IN FEMALE: Primary | ICD-10-CM

## 2021-09-27 DIAGNOSIS — Z01.419 ENCOUNTER FOR GYNECOLOGICAL EXAMINATION WITHOUT ABNORMAL FINDING: ICD-10-CM

## 2021-09-27 PROCEDURE — 3078F DIAST BP <80 MM HG: CPT | Mod: CPTII,S$GLB,, | Performed by: OBSTETRICS & GYNECOLOGY

## 2021-09-27 PROCEDURE — 3074F PR MOST RECENT SYSTOLIC BLOOD PRESSURE < 130 MM HG: ICD-10-PCS | Mod: CPTII,S$GLB,, | Performed by: OBSTETRICS & GYNECOLOGY

## 2021-09-27 PROCEDURE — 3044F HG A1C LEVEL LT 7.0%: CPT | Mod: CPTII,S$GLB,, | Performed by: OBSTETRICS & GYNECOLOGY

## 2021-09-27 PROCEDURE — 3044F PR MOST RECENT HEMOGLOBIN A1C LEVEL <7.0%: ICD-10-PCS | Mod: CPTII,S$GLB,, | Performed by: OBSTETRICS & GYNECOLOGY

## 2021-09-27 PROCEDURE — 3078F PR MOST RECENT DIASTOLIC BLOOD PRESSURE < 80 MM HG: ICD-10-PCS | Mod: CPTII,S$GLB,, | Performed by: OBSTETRICS & GYNECOLOGY

## 2021-09-27 PROCEDURE — 1160F RVW MEDS BY RX/DR IN RCRD: CPT | Mod: CPTII,S$GLB,, | Performed by: OBSTETRICS & GYNECOLOGY

## 2021-09-27 PROCEDURE — 3061F NEG MICROALBUMINURIA REV: CPT | Mod: CPTII,S$GLB,, | Performed by: OBSTETRICS & GYNECOLOGY

## 2021-09-27 PROCEDURE — 3074F SYST BP LT 130 MM HG: CPT | Mod: CPTII,S$GLB,, | Performed by: OBSTETRICS & GYNECOLOGY

## 2021-09-27 PROCEDURE — 3066F PR DOCUMENTATION OF TREATMENT FOR NEPHROPATHY: ICD-10-PCS | Mod: CPTII,S$GLB,, | Performed by: OBSTETRICS & GYNECOLOGY

## 2021-09-27 PROCEDURE — G0101 CA SCREEN;PELVIC/BREAST EXAM: HCPCS | Mod: S$GLB,,, | Performed by: OBSTETRICS & GYNECOLOGY

## 2021-09-27 PROCEDURE — 3066F NEPHROPATHY DOC TX: CPT | Mod: CPTII,S$GLB,, | Performed by: OBSTETRICS & GYNECOLOGY

## 2021-09-27 PROCEDURE — 3061F PR NEG MICROALBUMINURIA RESULT DOCUMENTED/REVIEW: ICD-10-PCS | Mod: CPTII,S$GLB,, | Performed by: OBSTETRICS & GYNECOLOGY

## 2021-09-27 PROCEDURE — 3008F BODY MASS INDEX DOCD: CPT | Mod: CPTII,S$GLB,, | Performed by: OBSTETRICS & GYNECOLOGY

## 2021-09-27 PROCEDURE — 1160F PR REVIEW ALL MEDS BY PRESCRIBER/CLIN PHARMACIST DOCUMENTED: ICD-10-PCS | Mod: CPTII,S$GLB,, | Performed by: OBSTETRICS & GYNECOLOGY

## 2021-09-27 PROCEDURE — 3008F PR BODY MASS INDEX (BMI) DOCUMENTED: ICD-10-PCS | Mod: CPTII,S$GLB,, | Performed by: OBSTETRICS & GYNECOLOGY

## 2021-09-27 PROCEDURE — G0101 PR CA SCREEN;PELVIC/BREAST EXAM: ICD-10-PCS | Mod: S$GLB,,, | Performed by: OBSTETRICS & GYNECOLOGY

## 2021-09-27 PROCEDURE — 1159F PR MEDICATION LIST DOCUMENTED IN MEDICAL RECORD: ICD-10-PCS | Mod: CPTII,S$GLB,, | Performed by: OBSTETRICS & GYNECOLOGY

## 2021-09-27 PROCEDURE — 1159F MED LIST DOCD IN RCRD: CPT | Mod: CPTII,S$GLB,, | Performed by: OBSTETRICS & GYNECOLOGY

## 2021-09-28 ENCOUNTER — OFFICE VISIT (OUTPATIENT)
Dept: CARDIOLOGY | Facility: CLINIC | Age: 36
End: 2021-09-28
Payer: MEDICARE

## 2021-09-28 VITALS
DIASTOLIC BLOOD PRESSURE: 80 MMHG | HEART RATE: 109 BPM | SYSTOLIC BLOOD PRESSURE: 120 MMHG | HEIGHT: 62 IN | BODY MASS INDEX: 40.29 KG/M2 | WEIGHT: 218.94 LBS

## 2021-09-28 DIAGNOSIS — R00.2 PALPITATIONS: ICD-10-CM

## 2021-09-28 DIAGNOSIS — I10 ESSENTIAL HYPERTENSION: ICD-10-CM

## 2021-09-28 DIAGNOSIS — E78.1 PURE HYPERTRIGLYCERIDEMIA: ICD-10-CM

## 2021-09-28 DIAGNOSIS — R07.89 CHEST WALL PAIN: Primary | ICD-10-CM

## 2021-09-28 DIAGNOSIS — E66.01 SEVERE OBESITY (BMI 35.0-39.9) WITH COMORBIDITY: ICD-10-CM

## 2021-09-28 DIAGNOSIS — E11.9 TYPE 2 DIABETES MELLITUS WITHOUT COMPLICATION, WITHOUT LONG-TERM CURRENT USE OF INSULIN: ICD-10-CM

## 2021-09-28 PROCEDURE — 3061F NEG MICROALBUMINURIA REV: CPT | Mod: HCNC,CPTII,S$GLB, | Performed by: PHYSICIAN ASSISTANT

## 2021-09-28 PROCEDURE — 3066F NEPHROPATHY DOC TX: CPT | Mod: HCNC,CPTII,S$GLB, | Performed by: PHYSICIAN ASSISTANT

## 2021-09-28 PROCEDURE — 99214 PR OFFICE/OUTPT VISIT, EST, LEVL IV, 30-39 MIN: ICD-10-PCS | Mod: HCNC,S$GLB,, | Performed by: PHYSICIAN ASSISTANT

## 2021-09-28 PROCEDURE — 3044F PR MOST RECENT HEMOGLOBIN A1C LEVEL <7.0%: ICD-10-PCS | Mod: HCNC,CPTII,S$GLB, | Performed by: PHYSICIAN ASSISTANT

## 2021-09-28 PROCEDURE — 1160F RVW MEDS BY RX/DR IN RCRD: CPT | Mod: HCNC,CPTII,S$GLB, | Performed by: PHYSICIAN ASSISTANT

## 2021-09-28 PROCEDURE — 1159F PR MEDICATION LIST DOCUMENTED IN MEDICAL RECORD: ICD-10-PCS | Mod: HCNC,CPTII,S$GLB, | Performed by: PHYSICIAN ASSISTANT

## 2021-09-28 PROCEDURE — 3079F PR MOST RECENT DIASTOLIC BLOOD PRESSURE 80-89 MM HG: ICD-10-PCS | Mod: HCNC,CPTII,S$GLB, | Performed by: PHYSICIAN ASSISTANT

## 2021-09-28 PROCEDURE — 99999 PR PBB SHADOW E&M-EST. PATIENT-LVL III: CPT | Mod: PBBFAC,HCNC,, | Performed by: PHYSICIAN ASSISTANT

## 2021-09-28 PROCEDURE — 1159F MED LIST DOCD IN RCRD: CPT | Mod: HCNC,CPTII,S$GLB, | Performed by: PHYSICIAN ASSISTANT

## 2021-09-28 PROCEDURE — 3044F HG A1C LEVEL LT 7.0%: CPT | Mod: HCNC,CPTII,S$GLB, | Performed by: PHYSICIAN ASSISTANT

## 2021-09-28 PROCEDURE — 3066F PR DOCUMENTATION OF TREATMENT FOR NEPHROPATHY: ICD-10-PCS | Mod: HCNC,CPTII,S$GLB, | Performed by: PHYSICIAN ASSISTANT

## 2021-09-28 PROCEDURE — 99214 OFFICE O/P EST MOD 30 MIN: CPT | Mod: HCNC,S$GLB,, | Performed by: PHYSICIAN ASSISTANT

## 2021-09-28 PROCEDURE — 3074F SYST BP LT 130 MM HG: CPT | Mod: HCNC,CPTII,S$GLB, | Performed by: PHYSICIAN ASSISTANT

## 2021-09-28 PROCEDURE — 3074F PR MOST RECENT SYSTOLIC BLOOD PRESSURE < 130 MM HG: ICD-10-PCS | Mod: HCNC,CPTII,S$GLB, | Performed by: PHYSICIAN ASSISTANT

## 2021-09-28 PROCEDURE — 99999 PR PBB SHADOW E&M-EST. PATIENT-LVL III: ICD-10-PCS | Mod: PBBFAC,HCNC,, | Performed by: PHYSICIAN ASSISTANT

## 2021-09-28 PROCEDURE — 3061F PR NEG MICROALBUMINURIA RESULT DOCUMENTED/REVIEW: ICD-10-PCS | Mod: HCNC,CPTII,S$GLB, | Performed by: PHYSICIAN ASSISTANT

## 2021-09-28 PROCEDURE — 3079F DIAST BP 80-89 MM HG: CPT | Mod: HCNC,CPTII,S$GLB, | Performed by: PHYSICIAN ASSISTANT

## 2021-09-28 PROCEDURE — 1160F PR REVIEW ALL MEDS BY PRESCRIBER/CLIN PHARMACIST DOCUMENTED: ICD-10-PCS | Mod: HCNC,CPTII,S$GLB, | Performed by: PHYSICIAN ASSISTANT

## 2021-09-28 PROCEDURE — 3008F PR BODY MASS INDEX (BMI) DOCUMENTED: ICD-10-PCS | Mod: HCNC,CPTII,S$GLB, | Performed by: PHYSICIAN ASSISTANT

## 2021-09-28 PROCEDURE — 3008F BODY MASS INDEX DOCD: CPT | Mod: HCNC,CPTII,S$GLB, | Performed by: PHYSICIAN ASSISTANT

## 2021-09-28 RX ORDER — METOPROLOL SUCCINATE 50 MG/1
50 TABLET, EXTENDED RELEASE ORAL DAILY
Qty: 90 TABLET | Refills: 3 | Status: SHIPPED | OUTPATIENT
Start: 2021-09-28 | End: 2022-09-23

## 2021-09-28 RX ORDER — AMOXICILLIN 500 MG
1 CAPSULE ORAL DAILY
Qty: 90 CAPSULE | Refills: 3 | Status: SHIPPED | OUTPATIENT
Start: 2021-09-28 | End: 2024-02-21

## 2021-09-28 RX ORDER — ATORVASTATIN CALCIUM 40 MG/1
40 TABLET, FILM COATED ORAL DAILY
Qty: 90 TABLET | Refills: 3 | Status: SHIPPED | OUTPATIENT
Start: 2021-09-28 | End: 2022-06-27 | Stop reason: SDUPTHER

## 2021-10-04 ENCOUNTER — HOSPITAL ENCOUNTER (EMERGENCY)
Facility: HOSPITAL | Age: 36
Discharge: HOME | End: 2021-10-04
Attending: EMERGENCY MEDICINE
Payer: MEDICARE

## 2021-10-04 ENCOUNTER — OFFICE VISIT (OUTPATIENT)
Dept: INTERNAL MEDICINE | Facility: CLINIC | Age: 36
End: 2021-10-04
Payer: MEDICARE

## 2021-10-04 ENCOUNTER — TELEPHONE (OUTPATIENT)
Dept: PODIATRY | Facility: CLINIC | Age: 36
End: 2021-10-04

## 2021-10-04 VITALS
OXYGEN SATURATION: 97 % | HEART RATE: 100 BPM | BODY MASS INDEX: 40.12 KG/M2 | TEMPERATURE: 98 F | WEIGHT: 218 LBS | DIASTOLIC BLOOD PRESSURE: 83 MMHG | SYSTOLIC BLOOD PRESSURE: 128 MMHG | RESPIRATION RATE: 20 BRPM | HEIGHT: 62 IN

## 2021-10-04 DIAGNOSIS — R07.9 CHEST PAIN: ICD-10-CM

## 2021-10-04 DIAGNOSIS — R06.02 SOB (SHORTNESS OF BREATH): ICD-10-CM

## 2021-10-04 DIAGNOSIS — R10.2 PELVIC PAIN: ICD-10-CM

## 2021-10-04 DIAGNOSIS — R07.9 CHEST PAIN, UNSPECIFIED TYPE: ICD-10-CM

## 2021-10-04 DIAGNOSIS — I20.89 STABLE ANGINA: Primary | ICD-10-CM

## 2021-10-04 DIAGNOSIS — I10 HYPERTENSION, UNSPECIFIED TYPE: Primary | ICD-10-CM

## 2021-10-04 LAB
ALBUMIN SERPL BCP-MCNC: 4.1 G/DL (ref 3.5–5.2)
ALP SERPL-CCNC: 54 U/L (ref 55–135)
ALT SERPL W/O P-5'-P-CCNC: 24 U/L (ref 10–44)
ANION GAP SERPL CALC-SCNC: 12 MMOL/L (ref 8–16)
AST SERPL-CCNC: 21 U/L (ref 10–40)
BACTERIA #/AREA URNS AUTO: NORMAL /HPF
BACTERIA GENITAL QL WET PREP: ABNORMAL
BASOPHILS # BLD AUTO: 0.09 K/UL (ref 0–0.2)
BASOPHILS NFR BLD: 1 % (ref 0–1.9)
BILIRUB SERPL-MCNC: 0.3 MG/DL (ref 0.1–1)
BILIRUB UR QL STRIP: NEGATIVE
BNP SERPL-MCNC: <10 PG/ML (ref 0–99)
BUN SERPL-MCNC: 9 MG/DL (ref 6–20)
CALCIUM SERPL-MCNC: 9.9 MG/DL (ref 8.7–10.5)
CHLORIDE SERPL-SCNC: 104 MMOL/L (ref 95–110)
CLARITY UR REFRACT.AUTO: ABNORMAL
CLUE CELLS VAG QL WET PREP: ABNORMAL
CO2 SERPL-SCNC: 21 MMOL/L (ref 23–29)
COLOR UR AUTO: ABNORMAL
CREAT SERPL-MCNC: 0.9 MG/DL (ref 0.5–1.4)
CTP QC/QA: YES
D DIMER PPP IA.FEU-MCNC: 0.2 MG/L FEU
DIFFERENTIAL METHOD: NORMAL
EOSINOPHIL # BLD AUTO: 0.3 K/UL (ref 0–0.5)
EOSINOPHIL NFR BLD: 3.6 % (ref 0–8)
ERYTHROCYTE [DISTWIDTH] IN BLOOD BY AUTOMATED COUNT: 13.1 % (ref 11.5–14.5)
EST. GFR  (AFRICAN AMERICAN): >60 ML/MIN/1.73 M^2
EST. GFR  (NON AFRICAN AMERICAN): >60 ML/MIN/1.73 M^2
FILAMENT FUNGI VAG WET PREP-#/AREA: ABNORMAL
GLUCOSE SERPL-MCNC: 86 MG/DL (ref 70–110)
GLUCOSE UR QL STRIP: ABNORMAL
HCT VFR BLD AUTO: 42.9 % (ref 37–48.5)
HGB BLD-MCNC: 13.9 G/DL (ref 12–16)
HGB UR QL STRIP: NEGATIVE
IMM GRANULOCYTES # BLD AUTO: 0.03 K/UL (ref 0–0.04)
IMM GRANULOCYTES NFR BLD AUTO: 0.3 % (ref 0–0.5)
KETONES UR QL STRIP: NEGATIVE
LEUKOCYTE ESTERASE UR QL STRIP: NEGATIVE
LYMPHOCYTES # BLD AUTO: 2.6 K/UL (ref 1–4.8)
LYMPHOCYTES NFR BLD: 28 % (ref 18–48)
MCH RBC QN AUTO: 28.7 PG (ref 27–31)
MCHC RBC AUTO-ENTMCNC: 32.4 G/DL (ref 32–36)
MCV RBC AUTO: 89 FL (ref 82–98)
MICROSCOPIC COMMENT: NORMAL
MONOCYTES # BLD AUTO: 0.7 K/UL (ref 0.3–1)
MONOCYTES NFR BLD: 7.1 % (ref 4–15)
NEUTROPHILS # BLD AUTO: 5.6 K/UL (ref 1.8–7.7)
NEUTROPHILS NFR BLD: 60 % (ref 38–73)
NITRITE UR QL STRIP: NEGATIVE
NRBC BLD-RTO: 0 /100 WBC
PH UR STRIP: 5 [PH] (ref 5–8)
PLATELET # BLD AUTO: 238 K/UL (ref 150–450)
PMV BLD AUTO: 11.6 FL (ref 9.2–12.9)
POTASSIUM SERPL-SCNC: 4.4 MMOL/L (ref 3.5–5.1)
PROT SERPL-MCNC: 7.9 G/DL (ref 6–8.4)
PROT UR QL STRIP: NEGATIVE
RBC # BLD AUTO: 4.84 M/UL (ref 4–5.4)
RBC #/AREA URNS AUTO: 2 /HPF (ref 0–4)
SARS-COV-2 RDRP RESP QL NAA+PROBE: NEGATIVE
SODIUM SERPL-SCNC: 137 MMOL/L (ref 136–145)
SP GR UR STRIP: 1.01 (ref 1–1.03)
SPECIMEN SOURCE: ABNORMAL
SQUAMOUS #/AREA URNS AUTO: 6 /HPF
T VAGINALIS GENITAL QL WET PREP: ABNORMAL
TROPONIN I SERPL DL<=0.01 NG/ML-MCNC: <0.006 NG/ML (ref 0–0.03)
URN SPEC COLLECT METH UR: ABNORMAL
WBC # BLD AUTO: 9.35 K/UL (ref 3.9–12.7)
WBC #/AREA URNS AUTO: 0 /HPF (ref 0–5)
WBC #/AREA VAG WET PREP: ABNORMAL
YEAST GENITAL QL WET PREP: ABNORMAL
YEAST UR QL AUTO: NORMAL

## 2021-10-04 PROCEDURE — 3061F PR NEG MICROALBUMINURIA RESULT DOCUMENTED/REVIEW: ICD-10-PCS | Mod: HCNC,CPTII,S$GLB, | Performed by: INTERNAL MEDICINE

## 2021-10-04 PROCEDURE — 85025 COMPLETE CBC W/AUTO DIFF WBC: CPT | Mod: HCNC | Performed by: PHYSICIAN ASSISTANT

## 2021-10-04 PROCEDURE — 3074F PR MOST RECENT SYSTOLIC BLOOD PRESSURE < 130 MM HG: ICD-10-PCS | Mod: HCNC,CPTII,S$GLB, | Performed by: INTERNAL MEDICINE

## 2021-10-04 PROCEDURE — 93010 EKG 12-LEAD: ICD-10-PCS | Mod: HCNC,,, | Performed by: INTERNAL MEDICINE

## 2021-10-04 PROCEDURE — 3066F PR DOCUMENTATION OF TREATMENT FOR NEPHROPATHY: ICD-10-PCS | Mod: HCNC,CPTII,S$GLB, | Performed by: INTERNAL MEDICINE

## 2021-10-04 PROCEDURE — 99999 PR PBB SHADOW E&M-EST. PATIENT-LVL IV: CPT | Mod: PBBFAC,HCNC,, | Performed by: INTERNAL MEDICINE

## 2021-10-04 PROCEDURE — 3044F HG A1C LEVEL LT 7.0%: CPT | Mod: HCNC,CPTII,S$GLB, | Performed by: INTERNAL MEDICINE

## 2021-10-04 PROCEDURE — 99499 RISK ADDL DX/OHS AUDIT: ICD-10-PCS | Mod: S$GLB,,, | Performed by: INTERNAL MEDICINE

## 2021-10-04 PROCEDURE — 85379 FIBRIN DEGRADATION QUANT: CPT | Mod: HCNC | Performed by: PHYSICIAN ASSISTANT

## 2021-10-04 PROCEDURE — 93005 ELECTROCARDIOGRAM TRACING: CPT | Mod: HCNC

## 2021-10-04 PROCEDURE — 3008F PR BODY MASS INDEX (BMI) DOCUMENTED: ICD-10-PCS | Mod: HCNC,CPTII,S$GLB, | Performed by: INTERNAL MEDICINE

## 2021-10-04 PROCEDURE — 3079F PR MOST RECENT DIASTOLIC BLOOD PRESSURE 80-89 MM HG: ICD-10-PCS | Mod: HCNC,CPTII,S$GLB, | Performed by: INTERNAL MEDICINE

## 2021-10-04 PROCEDURE — 99285 PR EMERGENCY DEPT VISIT,LEVEL V: ICD-10-PCS | Mod: HCNC,CS,, | Performed by: PHYSICIAN ASSISTANT

## 2021-10-04 PROCEDURE — 3008F BODY MASS INDEX DOCD: CPT | Mod: HCNC,CPTII,S$GLB, | Performed by: INTERNAL MEDICINE

## 2021-10-04 PROCEDURE — 93010 ELECTROCARDIOGRAM REPORT: CPT | Mod: HCNC,,, | Performed by: INTERNAL MEDICINE

## 2021-10-04 PROCEDURE — 99285 EMERGENCY DEPT VISIT HI MDM: CPT | Mod: HCNC,CS,, | Performed by: PHYSICIAN ASSISTANT

## 2021-10-04 PROCEDURE — 81001 URINALYSIS AUTO W/SCOPE: CPT | Mod: HCNC | Performed by: PHYSICIAN ASSISTANT

## 2021-10-04 PROCEDURE — 99285 EMERGENCY DEPT VISIT HI MDM: CPT | Mod: 25,HCNC

## 2021-10-04 PROCEDURE — 99214 OFFICE O/P EST MOD 30 MIN: CPT | Mod: HCNC,S$GLB,, | Performed by: INTERNAL MEDICINE

## 2021-10-04 PROCEDURE — U0002 COVID-19 LAB TEST NON-CDC: HCPCS | Mod: HCNC | Performed by: EMERGENCY MEDICINE

## 2021-10-04 PROCEDURE — 99214 PR OFFICE/OUTPT VISIT, EST, LEVL IV, 30-39 MIN: ICD-10-PCS | Mod: HCNC,S$GLB,, | Performed by: INTERNAL MEDICINE

## 2021-10-04 PROCEDURE — 3074F SYST BP LT 130 MM HG: CPT | Mod: HCNC,CPTII,S$GLB, | Performed by: INTERNAL MEDICINE

## 2021-10-04 PROCEDURE — 3061F NEG MICROALBUMINURIA REV: CPT | Mod: HCNC,CPTII,S$GLB, | Performed by: INTERNAL MEDICINE

## 2021-10-04 PROCEDURE — 80053 COMPREHEN METABOLIC PANEL: CPT | Mod: HCNC | Performed by: PHYSICIAN ASSISTANT

## 2021-10-04 PROCEDURE — 84484 ASSAY OF TROPONIN QUANT: CPT | Mod: HCNC | Performed by: PHYSICIAN ASSISTANT

## 2021-10-04 PROCEDURE — 3079F DIAST BP 80-89 MM HG: CPT | Mod: HCNC,CPTII,S$GLB, | Performed by: INTERNAL MEDICINE

## 2021-10-04 PROCEDURE — 3066F NEPHROPATHY DOC TX: CPT | Mod: HCNC,CPTII,S$GLB, | Performed by: INTERNAL MEDICINE

## 2021-10-04 PROCEDURE — 99499 UNLISTED E&M SERVICE: CPT | Mod: S$GLB,,, | Performed by: INTERNAL MEDICINE

## 2021-10-04 PROCEDURE — 87210 SMEAR WET MOUNT SALINE/INK: CPT | Mod: HCNC | Performed by: PHYSICIAN ASSISTANT

## 2021-10-04 PROCEDURE — 99999 PR PBB SHADOW E&M-EST. PATIENT-LVL IV: ICD-10-PCS | Mod: PBBFAC,HCNC,, | Performed by: INTERNAL MEDICINE

## 2021-10-04 PROCEDURE — 3044F PR MOST RECENT HEMOGLOBIN A1C LEVEL <7.0%: ICD-10-PCS | Mod: HCNC,CPTII,S$GLB, | Performed by: INTERNAL MEDICINE

## 2021-10-04 PROCEDURE — 83880 ASSAY OF NATRIURETIC PEPTIDE: CPT | Mod: HCNC | Performed by: PHYSICIAN ASSISTANT

## 2021-10-04 RX ORDER — DULAGLUTIDE 3 MG/.5ML
INJECTION, SOLUTION SUBCUTANEOUS
COMMUNITY
Start: 2021-09-22 | End: 2022-11-14

## 2021-10-04 RX ORDER — METRONIDAZOLE 500 MG/1
500 TABLET ORAL
Status: DISCONTINUED | OUTPATIENT
Start: 2021-10-04 | End: 2021-10-04

## 2021-10-04 RX ORDER — CARIPRAZINE 6 MG/1
1 CAPSULE, GELATIN COATED ORAL DAILY
COMMUNITY
Start: 2021-09-15 | End: 2023-02-10

## 2021-10-04 RX ORDER — ASENAPINE 5 MG/1
TABLET SUBLINGUAL
COMMUNITY
Start: 2021-07-29 | End: 2022-11-14

## 2021-10-06 ENCOUNTER — HOSPITAL ENCOUNTER (OUTPATIENT)
Dept: RADIOLOGY | Facility: HOSPITAL | Age: 36
Discharge: HOME | End: 2021-10-06
Attending: OBSTETRICS & GYNECOLOGY
Payer: MEDICARE

## 2021-10-06 ENCOUNTER — TELEPHONE (OUTPATIENT)
Dept: PODIATRY | Facility: CLINIC | Age: 36
End: 2021-10-06

## 2021-10-06 DIAGNOSIS — G89.29 CHRONIC PELVIC PAIN IN FEMALE: ICD-10-CM

## 2021-10-06 DIAGNOSIS — R10.2 CHRONIC PELVIC PAIN IN FEMALE: ICD-10-CM

## 2021-10-06 PROCEDURE — 76856 US EXAM PELVIC COMPLETE: CPT | Mod: 26,HCNC,, | Performed by: RADIOLOGY

## 2021-10-06 PROCEDURE — 76856 US EXAM PELVIC COMPLETE: CPT | Mod: TC,HCNC

## 2021-10-06 PROCEDURE — 76856 US PELVIS COMP WITH TRANSVAG NON-OB (XPD): ICD-10-PCS | Mod: 26,HCNC,, | Performed by: RADIOLOGY

## 2021-10-06 PROCEDURE — 76830 US PELVIS COMP WITH TRANSVAG NON-OB (XPD): ICD-10-PCS | Mod: 26,HCNC,, | Performed by: RADIOLOGY

## 2021-10-06 PROCEDURE — 76830 TRANSVAGINAL US NON-OB: CPT | Mod: 26,HCNC,, | Performed by: RADIOLOGY

## 2021-10-07 ENCOUNTER — OFFICE VISIT (OUTPATIENT)
Dept: PODIATRY | Facility: CLINIC | Age: 36
End: 2021-10-07
Payer: MEDICARE

## 2021-10-07 VITALS
DIASTOLIC BLOOD PRESSURE: 80 MMHG | HEART RATE: 89 BPM | SYSTOLIC BLOOD PRESSURE: 114 MMHG | WEIGHT: 225.75 LBS | HEIGHT: 62 IN | BODY MASS INDEX: 41.54 KG/M2

## 2021-10-07 DIAGNOSIS — L84 CORN OR CALLUS: ICD-10-CM

## 2021-10-07 DIAGNOSIS — E11.42 DIABETIC POLYNEUROPATHY ASSOCIATED WITH TYPE 2 DIABETES MELLITUS: Primary | ICD-10-CM

## 2021-10-07 DIAGNOSIS — L60.9 DISEASE OF NAIL: ICD-10-CM

## 2021-10-07 PROCEDURE — 3066F NEPHROPATHY DOC TX: CPT | Mod: HCNC,CPTII,S$GLB, | Performed by: PODIATRIST

## 2021-10-07 PROCEDURE — 11721 PR DEBRIDEMENT OF NAILS, 6 OR MORE: ICD-10-PCS | Mod: 59,Q9,HCNC,S$GLB | Performed by: PODIATRIST

## 2021-10-07 PROCEDURE — 3008F BODY MASS INDEX DOCD: CPT | Mod: HCNC,CPTII,S$GLB, | Performed by: PODIATRIST

## 2021-10-07 PROCEDURE — 3079F PR MOST RECENT DIASTOLIC BLOOD PRESSURE 80-89 MM HG: ICD-10-PCS | Mod: HCNC,CPTII,S$GLB, | Performed by: PODIATRIST

## 2021-10-07 PROCEDURE — 1159F MED LIST DOCD IN RCRD: CPT | Mod: HCNC,CPTII,S$GLB, | Performed by: PODIATRIST

## 2021-10-07 PROCEDURE — 3074F SYST BP LT 130 MM HG: CPT | Mod: HCNC,CPTII,S$GLB, | Performed by: PODIATRIST

## 2021-10-07 PROCEDURE — 3061F PR NEG MICROALBUMINURIA RESULT DOCUMENTED/REVIEW: ICD-10-PCS | Mod: HCNC,CPTII,S$GLB, | Performed by: PODIATRIST

## 2021-10-07 PROCEDURE — 11721 DEBRIDE NAIL 6 OR MORE: CPT | Mod: 59,Q9,HCNC,S$GLB | Performed by: PODIATRIST

## 2021-10-07 PROCEDURE — 99999 PR PBB SHADOW E&M-EST. PATIENT-LVL IV: ICD-10-PCS | Mod: PBBFAC,HCNC,, | Performed by: PODIATRIST

## 2021-10-07 PROCEDURE — 3079F DIAST BP 80-89 MM HG: CPT | Mod: HCNC,CPTII,S$GLB, | Performed by: PODIATRIST

## 2021-10-07 PROCEDURE — 11056 PR TRIM BENIGN HYPERKERATOTIC SKIN LESION,2-4: ICD-10-PCS | Mod: Q9,HCNC,S$GLB, | Performed by: PODIATRIST

## 2021-10-07 PROCEDURE — 3061F NEG MICROALBUMINURIA REV: CPT | Mod: HCNC,CPTII,S$GLB, | Performed by: PODIATRIST

## 2021-10-07 PROCEDURE — 3008F PR BODY MASS INDEX (BMI) DOCUMENTED: ICD-10-PCS | Mod: HCNC,CPTII,S$GLB, | Performed by: PODIATRIST

## 2021-10-07 PROCEDURE — 99499 NO LOS: ICD-10-PCS | Mod: HCNC,S$GLB,, | Performed by: PODIATRIST

## 2021-10-07 PROCEDURE — 1159F PR MEDICATION LIST DOCUMENTED IN MEDICAL RECORD: ICD-10-PCS | Mod: HCNC,CPTII,S$GLB, | Performed by: PODIATRIST

## 2021-10-07 PROCEDURE — 3044F HG A1C LEVEL LT 7.0%: CPT | Mod: HCNC,CPTII,S$GLB, | Performed by: PODIATRIST

## 2021-10-07 PROCEDURE — 99999 PR PBB SHADOW E&M-EST. PATIENT-LVL IV: CPT | Mod: PBBFAC,HCNC,, | Performed by: PODIATRIST

## 2021-10-07 PROCEDURE — 3066F PR DOCUMENTATION OF TREATMENT FOR NEPHROPATHY: ICD-10-PCS | Mod: HCNC,CPTII,S$GLB, | Performed by: PODIATRIST

## 2021-10-07 PROCEDURE — 3044F PR MOST RECENT HEMOGLOBIN A1C LEVEL <7.0%: ICD-10-PCS | Mod: HCNC,CPTII,S$GLB, | Performed by: PODIATRIST

## 2021-10-07 PROCEDURE — 99499 UNLISTED E&M SERVICE: CPT | Mod: HCNC,S$GLB,, | Performed by: PODIATRIST

## 2021-10-07 PROCEDURE — 11056 PARNG/CUTG B9 HYPRKR LES 2-4: CPT | Mod: Q9,HCNC,S$GLB, | Performed by: PODIATRIST

## 2021-10-07 PROCEDURE — 3074F PR MOST RECENT SYSTOLIC BLOOD PRESSURE < 130 MM HG: ICD-10-PCS | Mod: HCNC,CPTII,S$GLB, | Performed by: PODIATRIST

## 2021-10-08 VITALS
WEIGHT: 222 LBS | HEART RATE: 105 BPM | TEMPERATURE: 99 F | SYSTOLIC BLOOD PRESSURE: 112 MMHG | HEIGHT: 62 IN | BODY MASS INDEX: 40.85 KG/M2 | DIASTOLIC BLOOD PRESSURE: 82 MMHG | OXYGEN SATURATION: 97 %

## 2021-10-28 ENCOUNTER — TELEPHONE (OUTPATIENT)
Dept: NEUROLOGY | Facility: CLINIC | Age: 36
End: 2021-10-28
Payer: MEDICARE

## 2021-11-08 ENCOUNTER — PATIENT OUTREACH (OUTPATIENT)
Dept: ADMINISTRATIVE | Facility: OTHER | Age: 36
End: 2021-11-08
Payer: MEDICARE

## 2021-11-08 DIAGNOSIS — E11.9 TYPE 2 DIABETES MELLITUS WITHOUT COMPLICATION, UNSPECIFIED WHETHER LONG TERM INSULIN USE: Primary | ICD-10-CM

## 2021-11-11 ENCOUNTER — PES CALL (OUTPATIENT)
Dept: ADMINISTRATIVE | Facility: CLINIC | Age: 36
End: 2021-11-11
Payer: MEDICARE

## 2021-11-12 ENCOUNTER — OFFICE VISIT (OUTPATIENT)
Dept: SURGERY | Facility: CLINIC | Age: 36
End: 2021-11-12
Payer: MEDICARE

## 2021-11-12 VITALS
SYSTOLIC BLOOD PRESSURE: 112 MMHG | DIASTOLIC BLOOD PRESSURE: 77 MMHG | BODY MASS INDEX: 39.96 KG/M2 | HEIGHT: 62 IN | HEART RATE: 102 BPM | WEIGHT: 217.13 LBS

## 2021-11-12 DIAGNOSIS — Z80.0 ENCOUNTER FOR COLONOSCOPY IN PATIENT WITH FAMILY HISTORY OF COLON CANCER: Primary | ICD-10-CM

## 2021-11-12 DIAGNOSIS — K62.89 RECTAL PAIN: Primary | ICD-10-CM

## 2021-11-12 DIAGNOSIS — Z12.11 ENCOUNTER FOR COLONOSCOPY IN PATIENT WITH FAMILY HISTORY OF COLON CANCER: Primary | ICD-10-CM

## 2021-11-12 DIAGNOSIS — K62.89 RECTAL PAIN: ICD-10-CM

## 2021-11-12 DIAGNOSIS — Z12.11 SCREENING FOR COLON CANCER: ICD-10-CM

## 2021-11-12 DIAGNOSIS — Z12.11 SCREEN FOR COLON CANCER: Primary | ICD-10-CM

## 2021-11-12 PROCEDURE — 99214 OFFICE O/P EST MOD 30 MIN: CPT | Mod: HCNC,S$GLB,, | Performed by: NURSE PRACTITIONER

## 2021-11-12 PROCEDURE — 3044F PR MOST RECENT HEMOGLOBIN A1C LEVEL <7.0%: ICD-10-PCS | Mod: HCNC,CPTII,S$GLB, | Performed by: NURSE PRACTITIONER

## 2021-11-12 PROCEDURE — 3061F PR NEG MICROALBUMINURIA RESULT DOCUMENTED/REVIEW: ICD-10-PCS | Mod: HCNC,CPTII,S$GLB, | Performed by: NURSE PRACTITIONER

## 2021-11-12 PROCEDURE — 3061F NEG MICROALBUMINURIA REV: CPT | Mod: HCNC,CPTII,S$GLB, | Performed by: NURSE PRACTITIONER

## 2021-11-12 PROCEDURE — 99214 PR OFFICE/OUTPT VISIT, EST, LEVL IV, 30-39 MIN: ICD-10-PCS | Mod: HCNC,S$GLB,, | Performed by: NURSE PRACTITIONER

## 2021-11-12 PROCEDURE — 3074F SYST BP LT 130 MM HG: CPT | Mod: HCNC,CPTII,S$GLB, | Performed by: NURSE PRACTITIONER

## 2021-11-12 PROCEDURE — 3078F PR MOST RECENT DIASTOLIC BLOOD PRESSURE < 80 MM HG: ICD-10-PCS | Mod: HCNC,CPTII,S$GLB, | Performed by: NURSE PRACTITIONER

## 2021-11-12 PROCEDURE — 3066F NEPHROPATHY DOC TX: CPT | Mod: HCNC,CPTII,S$GLB, | Performed by: NURSE PRACTITIONER

## 2021-11-12 PROCEDURE — 3008F BODY MASS INDEX DOCD: CPT | Mod: HCNC,CPTII,S$GLB, | Performed by: NURSE PRACTITIONER

## 2021-11-12 PROCEDURE — 3078F DIAST BP <80 MM HG: CPT | Mod: HCNC,CPTII,S$GLB, | Performed by: NURSE PRACTITIONER

## 2021-11-12 PROCEDURE — 3074F PR MOST RECENT SYSTOLIC BLOOD PRESSURE < 130 MM HG: ICD-10-PCS | Mod: HCNC,CPTII,S$GLB, | Performed by: NURSE PRACTITIONER

## 2021-11-12 PROCEDURE — 3008F PR BODY MASS INDEX (BMI) DOCUMENTED: ICD-10-PCS | Mod: HCNC,CPTII,S$GLB, | Performed by: NURSE PRACTITIONER

## 2021-11-12 PROCEDURE — 99999 PR PBB SHADOW E&M-EST. PATIENT-LVL IV: CPT | Mod: PBBFAC,HCNC,, | Performed by: NURSE PRACTITIONER

## 2021-11-12 PROCEDURE — 99999 PR PBB SHADOW E&M-EST. PATIENT-LVL IV: ICD-10-PCS | Mod: PBBFAC,HCNC,, | Performed by: NURSE PRACTITIONER

## 2021-11-12 PROCEDURE — 3066F PR DOCUMENTATION OF TREATMENT FOR NEPHROPATHY: ICD-10-PCS | Mod: HCNC,CPTII,S$GLB, | Performed by: NURSE PRACTITIONER

## 2021-11-12 PROCEDURE — 3044F HG A1C LEVEL LT 7.0%: CPT | Mod: HCNC,CPTII,S$GLB, | Performed by: NURSE PRACTITIONER

## 2021-11-12 PROCEDURE — 1159F PR MEDICATION LIST DOCUMENTED IN MEDICAL RECORD: ICD-10-PCS | Mod: HCNC,CPTII,S$GLB, | Performed by: NURSE PRACTITIONER

## 2021-11-12 PROCEDURE — 1159F MED LIST DOCD IN RCRD: CPT | Mod: HCNC,CPTII,S$GLB, | Performed by: NURSE PRACTITIONER

## 2021-11-12 RX ORDER — POLYETHYLENE GLYCOL 3350, SODIUM SULFATE ANHYDROUS, SODIUM BICARBONATE, SODIUM CHLORIDE, POTASSIUM CHLORIDE 236; 22.74; 6.74; 5.86; 2.97 G/4L; G/4L; G/4L; G/4L; G/4L
4 POWDER, FOR SOLUTION ORAL ONCE
Qty: 4000 ML | Refills: 0 | Status: SHIPPED | OUTPATIENT
Start: 2021-11-12 | End: 2021-11-12

## 2021-11-16 ENCOUNTER — TELEPHONE (OUTPATIENT)
Dept: INTERNAL MEDICINE | Facility: CLINIC | Age: 36
End: 2021-11-16
Payer: MEDICARE

## 2021-11-17 ENCOUNTER — OFFICE VISIT (OUTPATIENT)
Dept: INTERNAL MEDICINE | Facility: CLINIC | Age: 36
End: 2021-11-17
Payer: MEDICARE

## 2021-11-17 ENCOUNTER — IMMUNIZATION (OUTPATIENT)
Dept: INTERNAL MEDICINE | Facility: CLINIC | Age: 36
End: 2021-11-17
Payer: MEDICARE

## 2021-11-17 VITALS
HEIGHT: 62 IN | BODY MASS INDEX: 39.92 KG/M2 | SYSTOLIC BLOOD PRESSURE: 116 MMHG | WEIGHT: 216.94 LBS | DIASTOLIC BLOOD PRESSURE: 78 MMHG | OXYGEN SATURATION: 97 % | HEART RATE: 106 BPM

## 2021-11-17 DIAGNOSIS — I15.2 HYPERTENSION ASSOCIATED WITH DIABETES: ICD-10-CM

## 2021-11-17 DIAGNOSIS — E11.42 TYPE 2 DIABETES MELLITUS WITH DIABETIC POLYNEUROPATHY, WITHOUT LONG-TERM CURRENT USE OF INSULIN: ICD-10-CM

## 2021-11-17 DIAGNOSIS — E11.42 DIABETIC POLYNEUROPATHY ASSOCIATED WITH TYPE 2 DIABETES MELLITUS: ICD-10-CM

## 2021-11-17 DIAGNOSIS — N39.46 MIXED STRESS AND URGE URINARY INCONTINENCE: ICD-10-CM

## 2021-11-17 DIAGNOSIS — E78.5 HYPERLIPIDEMIA ASSOCIATED WITH TYPE 2 DIABETES MELLITUS: ICD-10-CM

## 2021-11-17 DIAGNOSIS — R51.9 CHRONIC DAILY HEADACHE: ICD-10-CM

## 2021-11-17 DIAGNOSIS — Z11.4 ENCOUNTER FOR SCREENING FOR HIV: ICD-10-CM

## 2021-11-17 DIAGNOSIS — Z11.59 ENCOUNTER FOR HEPATITIS C SCREENING TEST FOR LOW RISK PATIENT: ICD-10-CM

## 2021-11-17 DIAGNOSIS — E11.69 HYPERLIPIDEMIA ASSOCIATED WITH TYPE 2 DIABETES MELLITUS: ICD-10-CM

## 2021-11-17 DIAGNOSIS — Z13.31 POSITIVE DEPRESSION SCREENING: ICD-10-CM

## 2021-11-17 DIAGNOSIS — R63.4 WEIGHT LOSS, ABNORMAL: ICD-10-CM

## 2021-11-17 DIAGNOSIS — K58.0 IRRITABLE BOWEL SYNDROME WITH DIARRHEA: ICD-10-CM

## 2021-11-17 DIAGNOSIS — E11.59 HYPERTENSION ASSOCIATED WITH DIABETES: ICD-10-CM

## 2021-11-17 DIAGNOSIS — E66.01 SEVERE OBESITY (BMI 35.0-39.9) WITH COMORBIDITY: ICD-10-CM

## 2021-11-17 DIAGNOSIS — Z00.00 ENCOUNTER FOR PREVENTIVE HEALTH EXAMINATION: Primary | ICD-10-CM

## 2021-11-17 PROCEDURE — 3061F NEG MICROALBUMINURIA REV: CPT | Mod: HCNC,CPTII,S$GLB, | Performed by: NURSE PRACTITIONER

## 2021-11-17 PROCEDURE — 99999 PR PBB SHADOW E&M-EST. PATIENT-LVL III: ICD-10-PCS | Mod: PBBFAC,HCNC,, | Performed by: NURSE PRACTITIONER

## 2021-11-17 PROCEDURE — 3066F NEPHROPATHY DOC TX: CPT | Mod: HCNC,CPTII,S$GLB, | Performed by: NURSE PRACTITIONER

## 2021-11-17 PROCEDURE — 99999 PR PBB SHADOW E&M-EST. PATIENT-LVL III: CPT | Mod: PBBFAC,HCNC,, | Performed by: NURSE PRACTITIONER

## 2021-11-17 PROCEDURE — G0439 PPPS, SUBSEQ VISIT: HCPCS | Mod: HCNC,S$GLB,, | Performed by: NURSE PRACTITIONER

## 2021-11-17 PROCEDURE — 3066F PR DOCUMENTATION OF TREATMENT FOR NEPHROPATHY: ICD-10-PCS | Mod: HCNC,CPTII,S$GLB, | Performed by: NURSE PRACTITIONER

## 2021-11-17 PROCEDURE — G9919 SCRN ND POS ND PROV OF REC: HCPCS | Mod: HCNC,CPTII,S$GLB, | Performed by: NURSE PRACTITIONER

## 2021-11-17 PROCEDURE — 90686 IIV4 VACC NO PRSV 0.5 ML IM: CPT | Mod: HCNC,S$GLB,, | Performed by: INTERNAL MEDICINE

## 2021-11-17 PROCEDURE — G0008 FLU VACCINE (QUAD) GREATER THAN OR EQUAL TO 3YO PRESERVATIVE FREE IM: ICD-10-PCS | Mod: HCNC,S$GLB,, | Performed by: INTERNAL MEDICINE

## 2021-11-17 PROCEDURE — G0008 ADMIN INFLUENZA VIRUS VAC: HCPCS | Mod: HCNC,S$GLB,, | Performed by: INTERNAL MEDICINE

## 2021-11-17 PROCEDURE — 3008F BODY MASS INDEX DOCD: CPT | Mod: HCNC,CPTII,S$GLB, | Performed by: NURSE PRACTITIONER

## 2021-11-17 PROCEDURE — 3044F PR MOST RECENT HEMOGLOBIN A1C LEVEL <7.0%: ICD-10-PCS | Mod: HCNC,CPTII,S$GLB, | Performed by: NURSE PRACTITIONER

## 2021-11-17 PROCEDURE — 3078F DIAST BP <80 MM HG: CPT | Mod: HCNC,CPTII,S$GLB, | Performed by: NURSE PRACTITIONER

## 2021-11-17 PROCEDURE — 3074F PR MOST RECENT SYSTOLIC BLOOD PRESSURE < 130 MM HG: ICD-10-PCS | Mod: HCNC,CPTII,S$GLB, | Performed by: NURSE PRACTITIONER

## 2021-11-17 PROCEDURE — G0439 PR MEDICARE ANNUAL WELLNESS SUBSEQUENT VISIT: ICD-10-PCS | Mod: HCNC,S$GLB,, | Performed by: NURSE PRACTITIONER

## 2021-11-17 PROCEDURE — 3078F PR MOST RECENT DIASTOLIC BLOOD PRESSURE < 80 MM HG: ICD-10-PCS | Mod: HCNC,CPTII,S$GLB, | Performed by: NURSE PRACTITIONER

## 2021-11-17 PROCEDURE — G9919 PR SCREENING AND POSITIVE: ICD-10-PCS | Mod: HCNC,CPTII,S$GLB, | Performed by: NURSE PRACTITIONER

## 2021-11-17 PROCEDURE — 3061F PR NEG MICROALBUMINURIA RESULT DOCUMENTED/REVIEW: ICD-10-PCS | Mod: HCNC,CPTII,S$GLB, | Performed by: NURSE PRACTITIONER

## 2021-11-17 PROCEDURE — 1160F PR REVIEW ALL MEDS BY PRESCRIBER/CLIN PHARMACIST DOCUMENTED: ICD-10-PCS | Mod: HCNC,CPTII,S$GLB, | Performed by: NURSE PRACTITIONER

## 2021-11-17 PROCEDURE — 1159F PR MEDICATION LIST DOCUMENTED IN MEDICAL RECORD: ICD-10-PCS | Mod: HCNC,CPTII,S$GLB, | Performed by: NURSE PRACTITIONER

## 2021-11-17 PROCEDURE — 3044F HG A1C LEVEL LT 7.0%: CPT | Mod: HCNC,CPTII,S$GLB, | Performed by: NURSE PRACTITIONER

## 2021-11-17 PROCEDURE — 1160F RVW MEDS BY RX/DR IN RCRD: CPT | Mod: HCNC,CPTII,S$GLB, | Performed by: NURSE PRACTITIONER

## 2021-11-17 PROCEDURE — 3008F PR BODY MASS INDEX (BMI) DOCUMENTED: ICD-10-PCS | Mod: HCNC,CPTII,S$GLB, | Performed by: NURSE PRACTITIONER

## 2021-11-17 PROCEDURE — 90686 FLU VACCINE (QUAD) GREATER THAN OR EQUAL TO 3YO PRESERVATIVE FREE IM: ICD-10-PCS | Mod: HCNC,S$GLB,, | Performed by: INTERNAL MEDICINE

## 2021-11-17 PROCEDURE — 1159F MED LIST DOCD IN RCRD: CPT | Mod: HCNC,CPTII,S$GLB, | Performed by: NURSE PRACTITIONER

## 2021-11-17 PROCEDURE — 3074F SYST BP LT 130 MM HG: CPT | Mod: HCNC,CPTII,S$GLB, | Performed by: NURSE PRACTITIONER

## 2021-11-19 ENCOUNTER — IMMUNIZATION (OUTPATIENT)
Dept: INTERNAL MEDICINE | Facility: CLINIC | Age: 36
End: 2021-11-19
Payer: MEDICARE

## 2021-11-19 DIAGNOSIS — Z23 NEED FOR VACCINATION: Primary | ICD-10-CM

## 2021-11-19 PROCEDURE — 0064A COVID-19, MRNA, LNP-S, PF, 100 MCG/0.25 ML DOSE VACCINE (MODERNA BOOSTER): CPT | Mod: HCNC,CV19,PBBFAC | Performed by: INTERNAL MEDICINE

## 2021-12-14 ENCOUNTER — OFFICE VISIT (OUTPATIENT)
Dept: INTERNAL MEDICINE | Facility: CLINIC | Age: 36
End: 2021-12-14
Payer: MEDICARE

## 2021-12-14 ENCOUNTER — HOSPITAL ENCOUNTER (OUTPATIENT)
Dept: RADIOLOGY | Facility: HOSPITAL | Age: 36
Discharge: HOME | End: 2021-12-14
Attending: INTERNAL MEDICINE
Payer: MEDICARE

## 2021-12-14 DIAGNOSIS — Z00.00 PREVENTATIVE HEALTH CARE: ICD-10-CM

## 2021-12-14 DIAGNOSIS — E11.9 DIABETES MELLITUS WITHOUT COMPLICATION: ICD-10-CM

## 2021-12-14 DIAGNOSIS — E03.9 HYPOTHYROIDISM, UNSPECIFIED TYPE: ICD-10-CM

## 2021-12-14 DIAGNOSIS — M25.569 KNEE PAIN, UNSPECIFIED CHRONICITY, UNSPECIFIED LATERALITY: Primary | ICD-10-CM

## 2021-12-14 DIAGNOSIS — M25.569 KNEE PAIN, UNSPECIFIED CHRONICITY, UNSPECIFIED LATERALITY: ICD-10-CM

## 2021-12-14 PROCEDURE — 73560 XR KNEE ORTHO LEFT: ICD-10-PCS | Mod: 26,HCNC,RT, | Performed by: RADIOLOGY

## 2021-12-14 PROCEDURE — 99395 PR PREVENTIVE VISIT,EST,18-39: ICD-10-PCS | Mod: HCNC,S$GLB,, | Performed by: INTERNAL MEDICINE

## 2021-12-14 PROCEDURE — 99999 PR PBB SHADOW E&M-EST. PATIENT-LVL IV: CPT | Mod: PBBFAC,HCNC,, | Performed by: INTERNAL MEDICINE

## 2021-12-14 PROCEDURE — 99999 PR PBB SHADOW E&M-EST. PATIENT-LVL IV: ICD-10-PCS | Mod: PBBFAC,HCNC,, | Performed by: INTERNAL MEDICINE

## 2021-12-14 PROCEDURE — 3066F NEPHROPATHY DOC TX: CPT | Mod: HCNC,CPTII,S$GLB, | Performed by: INTERNAL MEDICINE

## 2021-12-14 PROCEDURE — 99395 PREV VISIT EST AGE 18-39: CPT | Mod: HCNC,S$GLB,, | Performed by: INTERNAL MEDICINE

## 2021-12-14 PROCEDURE — 73560 X-RAY EXAM OF KNEE 1 OR 2: CPT | Mod: 26,HCNC,RT, | Performed by: RADIOLOGY

## 2021-12-14 PROCEDURE — 3061F PR NEG MICROALBUMINURIA RESULT DOCUMENTED/REVIEW: ICD-10-PCS | Mod: HCNC,CPTII,S$GLB, | Performed by: INTERNAL MEDICINE

## 2021-12-14 PROCEDURE — 73560 X-RAY EXAM OF KNEE 1 OR 2: CPT | Mod: TC,HCNC,RT

## 2021-12-14 PROCEDURE — 3061F NEG MICROALBUMINURIA REV: CPT | Mod: HCNC,CPTII,S$GLB, | Performed by: INTERNAL MEDICINE

## 2021-12-14 PROCEDURE — 3066F PR DOCUMENTATION OF TREATMENT FOR NEPHROPATHY: ICD-10-PCS | Mod: HCNC,CPTII,S$GLB, | Performed by: INTERNAL MEDICINE

## 2021-12-14 PROCEDURE — 73562 X-RAY EXAM OF KNEE 3: CPT | Mod: 26,HCNC,LT, | Performed by: RADIOLOGY

## 2021-12-14 PROCEDURE — 73562 XR KNEE ORTHO LEFT: ICD-10-PCS | Mod: 26,HCNC,LT, | Performed by: RADIOLOGY

## 2021-12-14 RX ORDER — ESTRADIOL 0.1 MG/G
CREAM VAGINAL
COMMUNITY
End: 2022-11-14

## 2021-12-19 VITALS
SYSTOLIC BLOOD PRESSURE: 116 MMHG | OXYGEN SATURATION: 97 % | BODY MASS INDEX: 40.48 KG/M2 | HEIGHT: 62 IN | TEMPERATURE: 99 F | DIASTOLIC BLOOD PRESSURE: 82 MMHG | HEART RATE: 96 BPM | WEIGHT: 220 LBS

## 2021-12-22 ENCOUNTER — HOSPITAL ENCOUNTER (OUTPATIENT)
Facility: HOSPITAL | Age: 36
Discharge: HOME | End: 2021-12-22
Attending: SURGERY | Admitting: SURGERY
Payer: MEDICARE

## 2021-12-22 ENCOUNTER — ANESTHESIA (OUTPATIENT)
Dept: ENDOSCOPY | Facility: HOSPITAL | Age: 36
End: 2021-12-22
Payer: MEDICARE

## 2021-12-22 ENCOUNTER — ANESTHESIA EVENT (OUTPATIENT)
Dept: ENDOSCOPY | Facility: HOSPITAL | Age: 36
End: 2021-12-22
Payer: MEDICARE

## 2021-12-22 VITALS
OXYGEN SATURATION: 96 % | SYSTOLIC BLOOD PRESSURE: 130 MMHG | BODY MASS INDEX: 40.48 KG/M2 | TEMPERATURE: 98 F | HEIGHT: 62 IN | WEIGHT: 220 LBS | HEART RATE: 86 BPM | DIASTOLIC BLOOD PRESSURE: 81 MMHG | RESPIRATION RATE: 16 BRPM

## 2021-12-22 DIAGNOSIS — Z12.11 SCREENING FOR COLON CANCER: ICD-10-CM

## 2021-12-22 PROCEDURE — G0105 COLORECTAL SCRN; HI RISK IND: ICD-10-PCS | Mod: HCNC,,, | Performed by: SURGERY

## 2021-12-22 PROCEDURE — 37000009 HC ANESTHESIA EA ADD 15 MINS: Mod: HCNC | Performed by: SURGERY

## 2021-12-22 PROCEDURE — 25000003 PHARM REV CODE 250: Mod: HCNC | Performed by: SURGERY

## 2021-12-22 PROCEDURE — 25000003 PHARM REV CODE 250: Mod: HCNC | Performed by: STUDENT IN AN ORGANIZED HEALTH CARE EDUCATION/TRAINING PROGRAM

## 2021-12-22 PROCEDURE — G0105 COLORECTAL SCRN; HI RISK IND: HCPCS | Mod: HCNC | Performed by: SURGERY

## 2021-12-22 PROCEDURE — 63600175 PHARM REV CODE 636 W HCPCS: Mod: HCNC | Performed by: STUDENT IN AN ORGANIZED HEALTH CARE EDUCATION/TRAINING PROGRAM

## 2021-12-22 PROCEDURE — 37000008 HC ANESTHESIA 1ST 15 MINUTES: Mod: HCNC | Performed by: SURGERY

## 2021-12-22 PROCEDURE — G0105 COLORECTAL SCRN; HI RISK IND: HCPCS | Mod: HCNC,,, | Performed by: SURGERY

## 2021-12-22 PROCEDURE — E9220 PRA ENDO ANESTHESIA: ICD-10-PCS | Mod: HCNC,,, | Performed by: STUDENT IN AN ORGANIZED HEALTH CARE EDUCATION/TRAINING PROGRAM

## 2021-12-22 PROCEDURE — E9220 PRA ENDO ANESTHESIA: HCPCS | Mod: HCNC,,, | Performed by: STUDENT IN AN ORGANIZED HEALTH CARE EDUCATION/TRAINING PROGRAM

## 2021-12-22 RX ORDER — PROPOFOL 10 MG/ML
VIAL (ML) INTRAVENOUS CONTINUOUS PRN
Status: DISCONTINUED | OUTPATIENT
Start: 2021-12-22 | End: 2021-12-22

## 2021-12-22 RX ORDER — LIDOCAINE HYDROCHLORIDE 20 MG/ML
INJECTION INTRAVENOUS
Status: DISCONTINUED | OUTPATIENT
Start: 2021-12-22 | End: 2021-12-22

## 2021-12-22 RX ORDER — PROPOFOL 10 MG/ML
VIAL (ML) INTRAVENOUS
Status: DISCONTINUED | OUTPATIENT
Start: 2021-12-22 | End: 2021-12-22

## 2021-12-22 RX ORDER — SODIUM CHLORIDE 9 MG/ML
INJECTION, SOLUTION INTRAVENOUS CONTINUOUS
Status: DISCONTINUED | OUTPATIENT
Start: 2021-12-22 | End: 2021-12-22 | Stop reason: HOSPADM

## 2021-12-22 RX ADMIN — Medication 175 MCG/KG/MIN: at 01:12

## 2021-12-22 RX ADMIN — PROPOFOL 30 MG: 10 INJECTION, EMULSION INTRAVENOUS at 01:12

## 2021-12-22 RX ADMIN — SODIUM CHLORIDE: 0.9 INJECTION, SOLUTION INTRAVENOUS at 01:12

## 2021-12-22 RX ADMIN — LIDOCAINE HYDROCHLORIDE 50 MG: 20 INJECTION, SOLUTION INTRAVENOUS at 01:12

## 2021-12-22 RX ADMIN — PROPOFOL 50 MG: 10 INJECTION, EMULSION INTRAVENOUS at 01:12

## 2021-12-23 LAB — POCT GLUCOSE: 101 MG/DL (ref 70–110)

## 2022-01-04 ENCOUNTER — PATIENT OUTREACH (OUTPATIENT)
Dept: ADMINISTRATIVE | Facility: OTHER | Age: 37
End: 2022-01-04
Payer: MEDICARE

## 2022-01-19 ENCOUNTER — TELEPHONE (OUTPATIENT)
Dept: PODIATRY | Facility: CLINIC | Age: 37
End: 2022-01-19
Payer: MEDICARE

## 2022-01-19 ENCOUNTER — PATIENT MESSAGE (OUTPATIENT)
Dept: PODIATRY | Facility: CLINIC | Age: 37
End: 2022-01-19
Payer: MEDICARE

## 2022-01-19 NOTE — TELEPHONE ENCOUNTER
Message and contact information left on recorder at preferred number and via portal re need to r/s Podiatry appointment.

## 2022-01-28 ENCOUNTER — OFFICE VISIT (OUTPATIENT)
Dept: PODIATRY | Facility: CLINIC | Age: 37
End: 2022-01-28
Payer: MEDICARE

## 2022-01-28 VITALS
DIASTOLIC BLOOD PRESSURE: 80 MMHG | HEART RATE: 99 BPM | WEIGHT: 220 LBS | HEIGHT: 62 IN | SYSTOLIC BLOOD PRESSURE: 114 MMHG | BODY MASS INDEX: 40.48 KG/M2

## 2022-01-28 DIAGNOSIS — L84 CORN OR CALLUS: ICD-10-CM

## 2022-01-28 DIAGNOSIS — E11.42 DIABETIC POLYNEUROPATHY ASSOCIATED WITH TYPE 2 DIABETES MELLITUS: Primary | ICD-10-CM

## 2022-01-28 DIAGNOSIS — B35.1 ONYCHOMYCOSIS DUE TO DERMATOPHYTE: ICD-10-CM

## 2022-01-28 PROCEDURE — 99999 PR PBB SHADOW E&M-EST. PATIENT-LVL IV: ICD-10-PCS | Mod: PBBFAC,,, | Performed by: PODIATRIST

## 2022-01-28 PROCEDURE — 3008F BODY MASS INDEX DOCD: CPT | Mod: CPTII,S$GLB,, | Performed by: PODIATRIST

## 2022-01-28 PROCEDURE — 99499 UNLISTED E&M SERVICE: CPT | Mod: S$GLB,,, | Performed by: PODIATRIST

## 2022-01-28 PROCEDURE — 3079F DIAST BP 80-89 MM HG: CPT | Mod: CPTII,S$GLB,, | Performed by: PODIATRIST

## 2022-01-28 PROCEDURE — 99999 PR PBB SHADOW E&M-EST. PATIENT-LVL IV: CPT | Mod: PBBFAC,,, | Performed by: PODIATRIST

## 2022-01-28 PROCEDURE — 99499 RISK ADDL DX/OHS AUDIT: ICD-10-PCS | Mod: S$GLB,,, | Performed by: PODIATRIST

## 2022-01-28 PROCEDURE — 3074F PR MOST RECENT SYSTOLIC BLOOD PRESSURE < 130 MM HG: ICD-10-PCS | Mod: CPTII,S$GLB,, | Performed by: PODIATRIST

## 2022-01-28 PROCEDURE — 3008F PR BODY MASS INDEX (BMI) DOCUMENTED: ICD-10-PCS | Mod: CPTII,S$GLB,, | Performed by: PODIATRIST

## 2022-01-28 PROCEDURE — 3079F PR MOST RECENT DIASTOLIC BLOOD PRESSURE 80-89 MM HG: ICD-10-PCS | Mod: CPTII,S$GLB,, | Performed by: PODIATRIST

## 2022-01-28 PROCEDURE — 11721 PR DEBRIDEMENT OF NAILS, 6 OR MORE: ICD-10-PCS | Mod: 59,Q9,S$GLB, | Performed by: PODIATRIST

## 2022-01-28 PROCEDURE — 3074F SYST BP LT 130 MM HG: CPT | Mod: CPTII,S$GLB,, | Performed by: PODIATRIST

## 2022-01-28 PROCEDURE — 11056 PR TRIM BENIGN HYPERKERATOTIC SKIN LESION,2-4: ICD-10-PCS | Mod: Q9,S$GLB,, | Performed by: PODIATRIST

## 2022-01-28 PROCEDURE — 11721 DEBRIDE NAIL 6 OR MORE: CPT | Mod: 59,Q9,S$GLB, | Performed by: PODIATRIST

## 2022-01-28 PROCEDURE — 11056 PARNG/CUTG B9 HYPRKR LES 2-4: CPT | Mod: Q9,S$GLB,, | Performed by: PODIATRIST

## 2022-02-16 ENCOUNTER — OFFICE VISIT (OUTPATIENT)
Dept: INTERNAL MEDICINE | Facility: CLINIC | Age: 37
End: 2022-02-16
Payer: MEDICARE

## 2022-02-16 VITALS
WEIGHT: 226.44 LBS | SYSTOLIC BLOOD PRESSURE: 98 MMHG | HEART RATE: 63 BPM | HEIGHT: 62 IN | DIASTOLIC BLOOD PRESSURE: 66 MMHG | TEMPERATURE: 98 F | BODY MASS INDEX: 41.67 KG/M2

## 2022-02-16 DIAGNOSIS — R05.9 COUGH: ICD-10-CM

## 2022-02-16 DIAGNOSIS — R06.02 SHORTNESS OF BREATH: ICD-10-CM

## 2022-02-16 DIAGNOSIS — J06.9 VIRAL UPPER RESPIRATORY INFECTION: Primary | ICD-10-CM

## 2022-02-16 DIAGNOSIS — R19.7 DIARRHEA, UNSPECIFIED TYPE: ICD-10-CM

## 2022-02-16 LAB
INFLUENZA A, MOLECULAR: NEGATIVE
INFLUENZA B, MOLECULAR: NEGATIVE
SARS-COV-2 RNA RESP QL NAA+PROBE: NOT DETECTED
SPECIMEN SOURCE: NORMAL

## 2022-02-16 PROCEDURE — 99213 PR OFFICE/OUTPT VISIT, EST, LEVL III, 20-29 MIN: ICD-10-PCS | Mod: HCNC,GC,S$GLB, | Performed by: STUDENT IN AN ORGANIZED HEALTH CARE EDUCATION/TRAINING PROGRAM

## 2022-02-16 PROCEDURE — 99999 PR PBB SHADOW E&M-EST. PATIENT-LVL V: ICD-10-PCS | Mod: PBBFAC,HCNC,GC, | Performed by: STUDENT IN AN ORGANIZED HEALTH CARE EDUCATION/TRAINING PROGRAM

## 2022-02-16 PROCEDURE — U0003 INFECTIOUS AGENT DETECTION BY NUCLEIC ACID (DNA OR RNA); SEVERE ACUTE RESPIRATORY SYNDROME CORONAVIRUS 2 (SARS-COV-2) (CORONAVIRUS DISEASE [COVID-19]), AMPLIFIED PROBE TECHNIQUE, MAKING USE OF HIGH THROUGHPUT TECHNOLOGIES AS DESCRIBED BY CMS-2020-01-R: HCPCS | Mod: HCNC | Performed by: STUDENT IN AN ORGANIZED HEALTH CARE EDUCATION/TRAINING PROGRAM

## 2022-02-16 PROCEDURE — 87502 INFLUENZA DNA AMP PROBE: CPT | Mod: HCNC | Performed by: STUDENT IN AN ORGANIZED HEALTH CARE EDUCATION/TRAINING PROGRAM

## 2022-02-16 PROCEDURE — U0005 INFEC AGEN DETEC AMPLI PROBE: HCPCS | Performed by: STUDENT IN AN ORGANIZED HEALTH CARE EDUCATION/TRAINING PROGRAM

## 2022-02-16 PROCEDURE — 99999 PR PBB SHADOW E&M-EST. PATIENT-LVL V: CPT | Mod: PBBFAC,HCNC,GC, | Performed by: STUDENT IN AN ORGANIZED HEALTH CARE EDUCATION/TRAINING PROGRAM

## 2022-02-16 PROCEDURE — 99213 OFFICE O/P EST LOW 20 MIN: CPT | Mod: HCNC,GC,S$GLB, | Performed by: STUDENT IN AN ORGANIZED HEALTH CARE EDUCATION/TRAINING PROGRAM

## 2022-02-16 NOTE — PATIENT INSTRUCTIONS
For your symptoms (sinusitis, congestion, cough):  1. Saline nasal spray ([employer]) or nasal rinse ([employer]) at least 2-3 times/day.  2. Flonase (fluticasone) or other steroid nasal spray - twice a day for 1 week, then once a day thereafter. Use the saline spray right before the steroid spray, as the saline spray will clear the mucous membranes and allow for the steroid spray to be more effective. Do not use steroid nasal spray if you start to have nosebleeds.  3. Claritin (loratadine), Zyrtec (cetirizine), or Allegra (fexofenadine) once a day to help with congestion.   4. Mucinex (guaifenesin) every 8-12 hours with plenty of water to thin your mucus.   5. Hot soup, hot tea with honey and lemon.  6. Plenty of sleep!  7. DRINK LOTS OF WATER!    Contact us if you develop worsening ear pain on one side, fever, chills, night sweats, acute worsening of your respiratory symptoms, chest pain, or shortness of breath. If you are unable to contact the clinic at [phone], and you are concerned about your symptoms, then you should go to the emergency department.

## 2022-02-16 NOTE — PROGRESS NOTES
"Internal Medicine Clinic Note    Subjective     Chief Complaint: "sinus pain/sore throat"    History of Present Illness:  Ms. Maria Ines Ac is a 37 y.o. female with HTN, NIDDM, hypothyroidism, HLD, depression, migraines, endometriosis s/p complete hysterectomy/oophorectomy, GERD, and urinary retention s/p bladder suspension & sacral nerve stimulator implantation who presents to clinic for sinus pain & sore throat. On 2/14 she started to experience a sore throat and nasal congestion. This progressed to a cough productive of yellow sputum. She also has now developed bilateral frontal, maxillary and ear pain and diarrhea. Having 4 watery BMs per day, no bloody or black stools. Also having dyspnea on exertion, no SOB at rest or when laying down. She denies any fevers, chills or night sweats. Has been around her godson who had these exact symptoms a few days before she developed them. Denies chest pain at rest however endorses pleuritic chest pain with deep breaths. For her symptoms she has been taking Robitussin once daily. She feels like she is maintaining her oral intake well without nausea or vomiting however she does sometimes have abdominal pain after oral intake.      Review of Systems   Constitutional: Positive for malaise/fatigue. Negative for chills, fever and weight loss.   HENT: Positive for congestion, ear pain, sinus pain and sore throat.    Eyes: Negative for blurred vision and double vision.   Respiratory: Positive for cough, sputum production and shortness of breath.    Cardiovascular: Negative for chest pain, palpitations and leg swelling.   Gastrointestinal: Positive for abdominal pain and diarrhea. Negative for constipation, nausea and vomiting.   Genitourinary: Negative for dysuria.   Musculoskeletal: Negative for back pain, joint pain and neck pain.   Skin: Negative for rash.   Neurological: Negative for dizziness, loss of consciousness, weakness and headaches.   Psychiatric/Behavioral: " Negative for depression. The patient is not nervous/anxious.        PAST HISTORY:     Past Medical History:   Diagnosis Date    Blood in stool     Constipation     Cystitis     Depression     Diabetes mellitus, type 2     Dysphagia     Endometriosis     GERD (gastroesophageal reflux disease)     Headache     Hypertension     Palpitations     Premature menopause on hormone replacement therapy     Thyroid disease        Past Surgical History:   Procedure Laterality Date    BLADDER SUSPENSION      CARPAL TUNNEL RELEASE      right    CHOLECYSTECTOMY      COLONOSCOPY N/A 8/30/2016    Procedure: COLONOSCOPY;  Surgeon: Slick Herron MD;  Location: [facility] ENDO (4TH F[facility]);  Service: Endoscopy;  Laterality: N/A;  PM prep    COLONOSCOPY N/A 12/22/2021    Procedure: COLONOSCOPY. any CRS;  Surgeon: Maria Ines Jung MD;  Location: [facility] (4TH F[facility]);  Service: Endoscopy;  Laterality: N/A;  fully vaccinated -  scaral stimulator will bring remote -    12/17 m to confirm appt-rb    ESOPHAGEAL MANOMETRY WITH MEASUREMENT OF IMPEDANCE N/A 11/5/2018    Procedure: MANOMETRY, ESOPHAGUS, WITH IMPEDANCE MEASUREMENT;  Surgeon: Slick Herron MD;  Location: [facility] (4TH F[facility]);  Service: Endoscopy;  Laterality: N/A;    ESOPHAGOGASTRODUODENOSCOPY N/A 2/13/2020    Procedure: EGD (ESOPHAGOGASTRODUODENOSCOPY);  Surgeon: Slick Herron MD;  Location: [facility] (4TH F[facility]);  Service: Endoscopy;  Laterality: N/A;  pt has cardiology appt on 1/6/19-will call back after this appt to schedule-tb    FLUOROSCOPIC URODYNAMIC STUDY N/A 5/23/2019    Procedure: URODYNAMIC STUDY, FLUOROSCOPIC;  Surgeon: Zena Tee MD;  [address]  Service: Urology;  Laterality: N/A;  1 hour    GALLBLADDER SURGERY      HYSTERECTOMY  2013    Bess velasquez Dr. Champlain    IMPLANTATION OF PERMANENT SACRAL NERVE STIMULATOR N/A 7/30/2019    Procedure: INSERTION, NEUROSTIMULATOR, PERMANENT, SACRAL;  Surgeon: Zena Tee MD;   Location: [facility] OR [address] [place] [address];  Service: Urology;  Laterality: N/A;  30 min    OOPHORECTOMY      LSO- benign cyst    OOPHORECTOMY      RSO- endo    ooptherectomy      right knee  scoped    SHOULDER SURGERY  right       Family History   Problem Relation Age of Onset    Breast cancer Mother 50        alive at 64, unilateral    Esophageal cancer Mother 63    Ovarian cancer Mother 63    Anesthesia problems Mother     Cervical cancer Maternal Grandmother         unknown age of onset,  at 80    Colon cancer Brother 40    Breast cancer Paternal Aunt         unknown age of onset, alive at 70    Breast cancer Maternal Aunt 72        alive at 72    Vaginal cancer Neg Hx     Endometrial cancer Neg Hx     Crohn's disease Neg Hx     Ulcerative colitis Neg Hx     Stomach cancer Neg Hx     Irritable bowel syndrome Neg Hx     Celiac disease Neg Hx        Social History     Socioeconomic History    Marital status: Single   Tobacco Use    Smoking status: Never Smoker    Smokeless tobacco: Never Used   Substance and Sexual Activity    Alcohol use: No    Drug use: No    Sexual activity: Never     Birth control/protection: None   Social History Narrative    ** Merged History Encounter **          Social Determinants of Health     Financial Resource Strain: Low Risk     Difficulty of Paying Living Expenses: Not hard at all   Food Insecurity: No Food Insecurity    Worried About Running Out of Food in the Last Year: Never true    Ran Out of Food in the Last Year: Never true   Transportation Needs: No Transportation Needs    Lack of Transportation (Medical): No    Lack of Transportation (Non-Medical): No   Physical Activity: Sufficiently Active    Days of Exercise per Week: 3 days    Minutes of Exercise per Session: 60 min   Stress: No Stress Concern Present    Feeling of Stress : Not at all   Social Connections: Socially Isolated    Frequency of Communication with Friends and Family: Once a  week    Frequency of Social Gatherings with Friends and Family: Once a week    Attends Shinto Services: More than 4 times per year    Active Member of Clubs or Organizations: No    Attends Club or Organization Meetings: Never    Marital Status: Never    Housing Stability: Low Risk     Unable to Pay for Housing in the Last Year: No    Number of Places Lived in the Last Year: 1    Unstable Housing in the Last Year: No       MEDICATIONS & ALLERGIES:     Current Outpatient Medications on File Prior to Visit   Medication Sig    ACCU-CHEK AMAURY PLUS TEST STRP Strp USE TO TEST BLOOD GLUCOSE TID    ACCU-CHEK SOFTCLIX LANCETS Misc USE TO TEST BLOOD GLUCOSE TID    asenapine maleate (SAPHRIS) 5 mg Subl     atorvastatin (LIPITOR) 40 MG tablet Take 1 tablet (40 mg total) by mouth once daily.    BLOOD SUGAR DIAGNOSTIC (ACCU-CHEK AMAURY PLUS TEST STRP MISC) 1 strip by Misc.(Non-Drug; Combo Route) route 3 (three) times daily.    buPROPion (WELLBUTRIN XL) 150 MG TB24 tablet Take 150 mg by mouth every morning.    calcium-vitamin D3 500 mg(1,250mg) -200 unit per tablet Take 1 tablet by mouth 2 (two) times daily with meals.    cetirizine (ZYRTEC) 10 mg Cap     ciclopirox (PENLAC) 8 % Soln Apply topically nightly.    dapagliflozin propanediol (FARXIGA ORAL) Farxiga Take No date recorded No form recorded No frequency recorded No route recorded No set duration recorded No set duration amount recorded active No dosage strength recorded No dosage strength units of measure recorded    diclofenac sodium (VOLTAREN) 1 % Gel Apply 2 g topically 4 (four) times daily. As needed for breast pain    dicyclomine (BENTYL) 10 MG capsule TAKE 2 CAPSULES(20 MG) BY MOUTH THREE TIMES DAILY BEFORE MEALS    diltiazem HCl (DILTIAZEM 2% CREAM) Apply topically 3 (three) times daily. Apply topically to anal area.    docusate sodium (COLACE) 100 MG capsule Take 1 capsule (100 mg total) by mouth 2 (two) times daily.    dulaglutide  (TRULICITY SUBQ) Trulicity Take No date recorded No form recorded No frequency recorded No route recorded No set duration recorded No set duration amount recorded active No dosage strength recorded No dosage strength units of measure recorded    estradioL (ESTRACE) 0.01 % (0.1 mg/gram) vaginal cream estradiol Take No date recorded No form recorded No frequency recorded No route recorded No set duration recorded No set duration amount recorded active No dosage strength recorded No dosage strength units of measure recorded    estradioL (VIVELLE-DOT) 0.075 mg/24 hr PLACE 1 PATCH ONTO THE SKIN TWICE A WEEK    FARXIGA 10 mg Tab TK 1 T PO QD    fluticasone propionate (FLONASE) 50 mcg/actuation nasal spray SHAKE LIQUID AND USE 2 SPRAYS(100 MCG) IN EACH NOSTRIL EVERY DAY    gabapentin (NEURONTIN) 300 MG capsule Take 600 mg (two capsules) in the morning and 900mg (three capsules) at night before bed    GLUCOPHAGE 500 mg tablet Take 500 mg by mouth 2 (two) times daily with meals.     ibuprofen (ADVIL,MOTRIN) 800 MG tablet 800 mg every 4 (four) hours as needed.     levothyroxine (SYNTHROID) 75 MCG tablet     magnesium oxide (MAG-OX) 400 mg (241.3 mg magnesium) tablet Take 1 tablet (400 mg total) by mouth 2 (two) times daily.    metoprolol succinate (TOPROL-XL) 50 MG 24 hr tablet Take 1 tablet (50 mg total) by mouth once daily.    metoprolol succinate 50 mg CSpX melatonin Take No date recorded No form recorded No frequency recorded No route recorded No set duration recorded No set duration amount recorded active No dosage strength recorded No dosage strength units of measure recorded    MULTIVIT-IRON-MIN-FOLIC ACID 3,500-18-0.4 UNIT-MG-MG ORAL CHEW Take 1 tablet by mouth once daily.     ofloxacin (OCUFLOX) 0.3 % ophthalmic solution PLACE 1 DROP INTO BOTH EYES FOUR TIMES DAILY    omega-3 fatty acids/fish oil (FISH OIL-OMEGA-3 FATTY ACIDS) 300-1,000 mg capsule Take 1 capsule by mouth once daily.    omeprazole  "(PRILOSEC OTC) 20 MG tablet Take 20 mg by mouth once daily.    OXcarbazepine (TRILEPTAL) 150 MG Tab TK 1 T PO BID    oxybutynin (DITROPAN) 5 MG Tab TAKE 1 TABLET BY MOUTH EVERY EVENING    oxybutynin (DITROPAN-XL) 10 MG 24 hr tablet Take 1 tablet (10 mg total) by mouth once daily.    pantoprazole (PROTONIX) 40 MG tablet TAKE 1 TABLET(40 MG) BY MOUTH EVERY DAY    phentermine 37.5 MG capsule TK 1 C PO ONCE D IN THE MORNING    spironolactone (ALDACTONE) 25 MG tablet TK 1 T PO HS    sucralfate (CARAFATE) 1 gram tablet Take 1 tablet (1 g total) by mouth 4 (four) times daily.    topiramate (TOPAMAX) 50 MG tablet Take 2 tablets (100 mg total) by mouth every evening.    traZODone (DESYREL) 100 MG tablet TK 1 T PO HS    triamcinolone acetonide 0.1% (KENALOG) 0.1 % ointment APPLY TO THE NECK AND CHEEKS EVERY NIGHT AT BEDTIME AS DIRECTED    TRULICITY 1.5 mg/0.5 mL PnIj INJECT 1 PEN INTO THE SKIN Q WEEK    TRULICITY 3 mg/0.5 mL pen injector     VRAYLAR 6 mg Cap Take 1 capsule by mouth once daily.    ZOLOFT 100 mg tablet Take 200 mg by mouth once daily.     zolpidem (AMBIEN) 10 mg Tab 1 po for sleep study     No current facility-administered medications on file prior to visit.       Review of patient's allergies indicates:  No Known Allergies    OBJECTIVE:     Vital Signs:  Vitals:    02/16/22 1343   BP: 98/66   Pulse: 63   Temp: 97.9 °F (36.6 °C)   Weight: 102.7 kg (226 lb 6.6 oz)   Height: 5' 2" (1.575 m)       Body mass index is 41.41 kg/m².     Physical Exam  Vitals reviewed.   Constitutional:       General: She is not in acute distress.     Appearance: She is obese. She is not diaphoretic.   HENT:      Head: Normocephalic and atraumatic.      Right Ear: Tympanic membrane and ear canal normal.      Left Ear: Ear canal normal. No drainage.  No middle ear effusion. Tympanic membrane is erythematous (around the inferior periphery). Tympanic membrane is not perforated, retracted or bulging.      Nose: Congestion " and rhinorrhea present.      Mouth/Throat:      Mouth: Mucous membranes are dry.      Pharynx: Posterior oropharyngeal erythema present. No oropharyngeal exudate.      Comments: Geographic tongue  Eyes:      General: No scleral icterus.        Right eye: No discharge.         Left eye: No discharge.   Neck:      Trachea: No tracheal deviation.   Cardiovascular:      Rate and Rhythm: Normal rate and regular rhythm.      Heart sounds: Normal heart sounds. No murmur heard.      Pulmonary:      Effort: Pulmonary effort is normal. No respiratory distress.      Breath sounds: Normal breath sounds. No wheezing or rales.   Abdominal:      General: Bowel sounds are normal. There is no distension.      Palpations: Abdomen is soft.      Tenderness: There is no abdominal tenderness.   Musculoskeletal:         General: No swelling or deformity. Normal range of motion.      Cervical back: Normal range of motion and neck supple.   Skin:     General: Skin is warm and dry.      Findings: No erythema or rash.   Neurological:      Mental Status: She is alert and oriented to person, place, and time.      Cranial Nerves: No cranial nerve deficit.      Motor: No abnormal muscle tone.      Coordination: Coordination normal.      Gait: Gait is intact.   Psychiatric:         Mood and Affect: Mood and affect normal.         Laboratory  Lab Results   Component Value Date    WBC 9.35 10/04/2021    HGB 13.9 10/04/2021    HCT 42.9 10/04/2021    MCV 89 10/04/2021     10/04/2021     BMP  Lab Results   Component Value Date     10/04/2021    K 4.4 10/04/2021     10/04/2021    CO2 21 (L) 10/04/2021    BUN 9 10/04/2021    CREATININE 0.9 10/04/2021    CALCIUM 9.9 10/04/2021    ANIONGAP 12 10/04/2021    ESTGFRAFRICA >60.0 10/04/2021    EGFRNONAA >60.0 10/04/2021     Lab Results   Component Value Date    INR 0.9 01/05/2017     Lab Results   Component Value Date    HGBA1C 5.4 12/14/2021       Diagnostic Results:  Labs: Reviewed  X-Ray:  Reviewed    Health Maintenance Due   Topic Date Due    Hepatitis C Screening  Never done    HIV Screening  Never done    Diabetes Urine Screening  02/02/2022         ASSESSMENT & PLAN:   Ms. Maria Ines Ac is a 37 y.o. female with upper respiratory symptoms.     Viral upper respiratory infection        -     Given URI symptoms, SOB and diarrhea, will test for COVID. Will also test for influenza as patient is only 48 hours after symptom onset so if Flu test positive, may benefit from oseltamivir.         -     Gave instructions for OTC symptomatic care including saline & fluticasone nasal sprays, antihistamine, mucinex, and ensuring proper hydration and rest.        -     COVID-19 Routine Screening  -     Influenza A & B by Molecular        Discussed with Dr. Martin  - staff attestation to follow        Jose Manuel Barraza DO  Internal Medicine, PGY-III  [facility]  [address], [place] [id number] [phone]

## 2022-02-25 ENCOUNTER — TELEPHONE (OUTPATIENT)
Dept: INTERNAL MEDICINE | Facility: CLINIC | Age: 37
End: 2022-02-25
Payer: MEDICARE

## 2022-02-25 NOTE — TELEPHONE ENCOUNTER
----- Message from Celena Metz sent at 2/25/2022 12:26 PM CST -----  Contact: [phone]  Jad Balbuena called to advise that this pt advised that she fell a month ago and has been having left knee pain that she has been treating with Advil. Pt says the pain is sharp and constant and a 7 on the pain scale. He would like for the office to contact the pt. Please Advise

## 2022-03-04 ENCOUNTER — TELEPHONE (OUTPATIENT)
Dept: INTERNAL MEDICINE | Facility: CLINIC | Age: 37
End: 2022-03-04
Payer: MEDICARE

## 2022-03-04 RX ORDER — INFLUENZA VIRUS VACCINE 15; 15; 15; 15 UG/.5ML; UG/.5ML; UG/.5ML; UG/.5ML
SUSPENSION INTRAMUSCULAR
COMMUNITY
Start: 2021-11-17 | End: 2022-11-14

## 2022-03-04 NOTE — TELEPHONE ENCOUNTER
Attempted to call pt back regarding a fall 7 days ago that was reported to [employer] but no response at this time. Left VM to call office when available.

## 2022-03-14 NOTE — PROGRESS NOTES
Subjective:      Patient ID: Maria Ines Ac is a 37 y.o. female.    Chief Complaint: Diabetes Mellitus and Nail Care ([facility] 12/14/2021)    Maria Ines is a 37 y.o. female who presents to the clinic for evaluation and treatment of high risk feet. Maria Ines has a past medical history of Blood in stool, Constipation, Cystitis, Depression, Diabetes mellitus, type 2, Dysphagia, Endometriosis, GERD (gastroesophageal reflux disease), Headache, Hypertension, Palpitations, Premature menopause on hormone replacement therapy, and Thyroid disease. The patient's chief complaint is long, thick toenails.   This patient has documented high risk feet requiring routine maintenance secondary to peripheral neuropathy.    PCP: Luann Raymond MD    Date Last Seen by PCP:   Chief Complaint   Patient presents with    Diabetes Mellitus    Nail Care     [facility] 12/14/2021       Current shoe gear:  Affected Foot: Tennis shoes     Unaffected Foot: Tennis shoes    Hemoglobin A1C   Date Value Ref Range Status   12/14/2021 5.4 4.0 - 5.6 % Final     Comment:     ADA Screening Guidelines:  5.7-6.4%  Consistent with prediabetes  >or=6.5%  Consistent with diabetes    High levels of fetal hemoglobin interfere with the HbA1C  assay. Heterozygous hemoglobin variants (HbS, HgC, etc)do  not significantly interfere with this assay.   However, presence of multiple variants may affect accuracy.     06/02/2021 5.3 4.0 - 5.6 % Final     Comment:     ADA Screening Guidelines:  5.7-6.4%  Consistent with prediabetes  >or=6.5%  Consistent with diabetes    High levels of fetal hemoglobin interfere with the HbA1C  assay. Heterozygous hemoglobin variants (HbS, HgC, etc)do  not significantly interfere with this assay.   However, presence of multiple variants may affect accuracy.     02/02/2021 5.1 4.0 - 5.6 % Final     Comment:     ADA Screening Guidelines:  5.7-6.4%  Consistent with prediabetes  >or=6.5%  Consistent with diabetes    High levels of  fetal hemoglobin interfere with the HbA1C  assay. Heterozygous hemoglobin variants (HbS, HgC, etc)do  not significantly interfere with this assay.   However, presence of multiple variants may affect accuracy.         Review of Systems   Constitutional: Negative for chills, fever and malaise/fatigue.   HENT: Negative for hearing loss.    Cardiovascular: Negative for claudication.   Respiratory: Negative for shortness of breath.    Skin: Positive for dry skin and nail changes. Negative for flushing and rash.   Musculoskeletal: Negative for joint pain and myalgias.   Neurological: Positive for numbness, paresthesias and sensory change. Negative for loss of balance.   Psychiatric/Behavioral: Negative for altered mental status.   Allergic/Immunologic: Negative for hives.           Objective:      Physical Exam  Vitals reviewed.   Cardiovascular:      Pulses:           Dorsalis pedis pulses are 2+ on the right side and 2+ on the left side.        Posterior tibial pulses are 2+ on the right side and 2+ on the left side.      Comments: No edema noted to b/L LEs  Musculoskeletal:      Comments: Adequate joint ROM noted to all lower extremity muscle groups with no pain or crepitation noted. Muscle strength is 5/5 in all groups bilaterally.     Feet:      Right foot:      Protective Sensation: 5 sites tested. 0 sites sensed.      Left foot:      Protective Sensation: 5 sites tested. 0 sites sensed.   Skin:     General: Skin is warm.      Capillary Refill: Capillary refill takes 2 to 3 seconds.      Comments: Normal skin tugor noted.   No open lesion noted b/L  Skin temp is warm to warm from proximal to distal b/L.  Webspaces clean, dry, and intact  Xerosis Bilaterally. No open lesions noted.   HKL noted to left 5th digit x2     Neurological:      Mental Status: She is alert.      Comments: Intact gross sensation noted to b/L LEs         Nails x10 are elongated by  5-6mm's, thickened by 1-2 mm's, dystrophic, and are yellowish  in  coloration . Xerosis Bilaterally. No open lesions noted.             Assessment:       Encounter Diagnoses   Name Primary?    Diabetic polyneuropathy associated with type 2 diabetes mellitus Yes    Corn or callus     Onychomycosis due to dermatophyte          Plan:       Maria Ines was seen today for diabetes mellitus and nail care.    Diagnoses and all orders for this visit:    Diabetic polyneuropathy associated with type 2 diabetes mellitus    Corn or callus    Onychomycosis due to dermatophyte      I counseled the patient on her conditions, their implications and medical management.      - Shoe inspection. Diabetic Foot Education. Patient reminded of the importance of good nutrition and blood sugar control to help prevent podiatric complications of diabetes. Patient instructed on proper foot hygeine. We discussed wearing proper shoe gear, daily foot inspections, never walking without protective shoe gear, never putting sharp instruments to feet, routine podiatric nail visits every 2-3 months.    After cleansing with an alcohol prep pad, the about mentioned hyperkeratotic lesions were sharply debrided X 2 utilizing a #15 blade to a smooth base without incident. Pt tolerated the procedure well and reported comfort to the debarment sites. Pt will continue to use padding and moisture the callused areas.     - With patient's permission, nails were aggressively reduced and debrided x 10 to their soft tissue attachment mechanically and with electric , removing all offending nail and debris. Patient relates relief following the procedure. She will continue to monitor the areas daily, inspect her feet, wear protective shoe gear when ambulatory, moisturizer to maintain skin integrity and follow in this office in approximately 2-3 months, sooner p.r.n.

## 2022-03-25 ENCOUNTER — TELEPHONE (OUTPATIENT)
Dept: PODIATRY | Facility: CLINIC | Age: 37
End: 2022-03-25
Payer: MEDICARE

## 2022-04-08 ENCOUNTER — OFFICE VISIT (OUTPATIENT)
Dept: OTOLARYNGOLOGY | Facility: CLINIC | Age: 37
End: 2022-04-08
Payer: MEDICARE

## 2022-04-08 DIAGNOSIS — J31.0 CHRONIC RHINITIS: ICD-10-CM

## 2022-04-08 DIAGNOSIS — J01.90 ACUTE NON-RECURRENT SINUSITIS, UNSPECIFIED LOCATION: Primary | ICD-10-CM

## 2022-04-08 PROCEDURE — 99214 OFFICE O/P EST MOD 30 MIN: CPT | Mod: S$GLB,,, | Performed by: NURSE PRACTITIONER

## 2022-04-08 PROCEDURE — 99999 PR PBB SHADOW E&M-EST. PATIENT-LVL IV: CPT | Mod: PBBFAC,,, | Performed by: NURSE PRACTITIONER

## 2022-04-08 PROCEDURE — 99999 PR PBB SHADOW E&M-EST. PATIENT-LVL IV: ICD-10-PCS | Mod: PBBFAC,,, | Performed by: NURSE PRACTITIONER

## 2022-04-08 PROCEDURE — 1159F MED LIST DOCD IN RCRD: CPT | Mod: CPTII,S$GLB,, | Performed by: NURSE PRACTITIONER

## 2022-04-08 PROCEDURE — 1160F RVW MEDS BY RX/DR IN RCRD: CPT | Mod: CPTII,S$GLB,, | Performed by: NURSE PRACTITIONER

## 2022-04-08 PROCEDURE — 99214 PR OFFICE/OUTPT VISIT, EST, LEVL IV, 30-39 MIN: ICD-10-PCS | Mod: S$GLB,,, | Performed by: NURSE PRACTITIONER

## 2022-04-08 PROCEDURE — 1160F PR REVIEW ALL MEDS BY PRESCRIBER/CLIN PHARMACIST DOCUMENTED: ICD-10-PCS | Mod: CPTII,S$GLB,, | Performed by: NURSE PRACTITIONER

## 2022-04-08 PROCEDURE — 1159F PR MEDICATION LIST DOCUMENTED IN MEDICAL RECORD: ICD-10-PCS | Mod: CPTII,S$GLB,, | Performed by: NURSE PRACTITIONER

## 2022-04-08 RX ORDER — AMOXICILLIN 500 MG/1
500 TABLET, FILM COATED ORAL EVERY 12 HOURS
Qty: 20 TABLET | Refills: 0 | Status: SHIPPED | OUTPATIENT
Start: 2022-04-08 | End: 2022-04-18

## 2022-04-08 NOTE — PATIENT INSTRUCTIONS
Acute non-recurrent sinusitis, unspecified location  -     amoxicillin (AMOXIL) 500 MG Tab; Take 1 tablet (500 mg total) by mouth every 12 (twelve) hours. for 10 days    Chronic rhinitis  -  Continue Allegra - one tablet daily  -  Continue Fluticasone (Flonase) - 2 sprays each nostril daily

## 2022-04-08 NOTE — PROGRESS NOTES
"  Subjective:      Maria Ines is a 37 y.o. female who comes for follow-up of sinusitis.  Her last visit with me was on 11/19/2019.  She reports for the past week having yellow nasal drainage with associated sinus pressure, sore throat, nasal congestion and "just not feeling well." She has been using Allegra and fluticasone daily which she finds is usually helpful. She denies fever or chills, nausea or diarrhea. She denies shortness of breath or cough.         The patient's medications, allergies, past medical, surgical, social and family histories were reviewed and updated as appropriate.    A detailed review of systems was obtained with pertinent positives as per the above HPI, and otherwise negative.        Objective:     LMP 11/17/2012 (LMP Unknown) Comment: Neg UPT       Constitutional:   She is oriented to person, place, and time. Vital signs are normal. She appears well-developed and well-nourished. She appears alert. Normal speech.      Head:  Normocephalic and atraumatic.     Ears:    Right Ear: No lacerations. No drainage, swelling or tenderness. No foreign bodies. No mastoid tenderness. Tympanic membrane is not injected, not scarred, not perforated, not erythematous, not retracted and not bulging. Tympanic membrane mobility is normal. No middle ear effusion. No hemotympanum.   Left Ear: No lacerations. No drainage, swelling or tenderness. No foreign bodies. No mastoid tenderness. Tympanic membrane is not injected, not scarred, not perforated, not erythematous, not retracted and not bulging. Tympanic membrane mobility is normal.  No middle ear effusion. No hemotympanum.     Nose:  Mucosal edema and rhinorrhea present. No nose lacerations, sinus tenderness, septal deviation, nasal septal hematoma or polyps. No epistaxis.  No foreign bodies. No turbinate hypertrophy.  Right sinus exhibits no maxillary sinus tenderness and no frontal sinus tenderness. Left sinus exhibits no maxillary sinus tenderness and no " frontal sinus tenderness.         Mouth/Throat  Oropharynx clear and moist without lesions or asymmetry, normal uvula midline and lips, teeth, and gums normal. No uvula swelling, oral lesions, trismus, mucous membrane lesions or xerostomia. No oropharyngeal exudate, posterior oropharyngeal edema or posterior oropharyngeal erythema.     Neck:  Neck normal without thyromegaly masses, asymmetry, normal tracheal structure, crepitus, and tenderness and no adenopathy.     Psychiatric:   She has a normal mood and affect. Her speech is normal and behavior is normal.     Neurological:   She is alert and oriented to person, place, and time. No cranial nerve deficit.     Skin:   No abrasions, lacerations, lesions, or rashes.       Procedure    None      Data Reviewed    WBC (K/uL)   Date Value   10/04/2021 9.35     Eosinophil % (%)   Date Value   10/04/2021 3.6     Eos # (K/uL)   Date Value   10/04/2021 0.3     Platelets (K/uL)   Date Value   10/04/2021 238     Glucose (mg/dL)   Date Value   10/04/2021 86     No results found for: IGE        Assessment:     1. Acute non-recurrent sinusitis, unspecified location    2. Chronic rhinitis         Plan:         Acute non-recurrent sinusitis, unspecified location  -     amoxicillin (AMOXIL) 500 MG Tab; Take 1 tablet (500 mg total) by mouth every 12 (twelve) hours. for 10 days    Chronic rhinitis  -  Continue Allegra - one tablet daily  -  Continue Fluticasone (Flonase) - 2 sprays each nostril daily      Follow up if symptoms worsen or fail to improve.

## 2022-04-11 ENCOUNTER — TELEPHONE (OUTPATIENT)
Dept: GASTROENTEROLOGY | Facility: CLINIC | Age: 37
End: 2022-04-11
Payer: MEDICARE

## 2022-04-11 NOTE — TELEPHONE ENCOUNTER
----- Message from Lencho Howard sent at 4/8/2022  4:01 PM CDT -----  Contact: @ [phone]  Patient calling to schedule a follow-up appointment ( her acid reflux is acting up )

## 2022-04-12 ENCOUNTER — TELEPHONE (OUTPATIENT)
Dept: GASTROENTEROLOGY | Facility: CLINIC | Age: 37
End: 2022-04-12
Payer: MEDICARE

## 2022-04-12 ENCOUNTER — PATIENT OUTREACH (OUTPATIENT)
Dept: ADMINISTRATIVE | Facility: OTHER | Age: 37
End: 2022-04-12
Payer: MEDICARE

## 2022-04-12 NOTE — TELEPHONE ENCOUNTER
----- Message from Lencho Howard sent at 4/8/2022  4:01 PM CDT -----  Contact: @ [phone]  Patient calling to schedule a follow-up appointment ( her acid reflux is acting up )

## 2022-04-14 ENCOUNTER — OFFICE VISIT (OUTPATIENT)
Dept: GASTROENTEROLOGY | Facility: CLINIC | Age: 37
End: 2022-04-14
Payer: MEDICARE

## 2022-04-14 VITALS
WEIGHT: 203.94 LBS | SYSTOLIC BLOOD PRESSURE: 110 MMHG | HEART RATE: 95 BPM | DIASTOLIC BLOOD PRESSURE: 79 MMHG | HEIGHT: 62 IN | BODY MASS INDEX: 37.53 KG/M2

## 2022-04-14 DIAGNOSIS — R13.19 ESOPHAGEAL DYSPHAGIA: ICD-10-CM

## 2022-04-14 DIAGNOSIS — K21.9 GASTROESOPHAGEAL REFLUX DISEASE WITHOUT ESOPHAGITIS: Primary | ICD-10-CM

## 2022-04-14 DIAGNOSIS — K58.0 IRRITABLE BOWEL SYNDROME WITH DIARRHEA: ICD-10-CM

## 2022-04-14 PROCEDURE — 1160F RVW MEDS BY RX/DR IN RCRD: CPT | Mod: CPTII,S$GLB,, | Performed by: INTERNAL MEDICINE

## 2022-04-14 PROCEDURE — 1160F PR REVIEW ALL MEDS BY PRESCRIBER/CLIN PHARMACIST DOCUMENTED: ICD-10-PCS | Mod: CPTII,S$GLB,, | Performed by: INTERNAL MEDICINE

## 2022-04-14 PROCEDURE — 99214 OFFICE O/P EST MOD 30 MIN: CPT | Mod: S$GLB,,, | Performed by: INTERNAL MEDICINE

## 2022-04-14 PROCEDURE — 99999 PR PBB SHADOW E&M-EST. PATIENT-LVL V: CPT | Mod: PBBFAC,,, | Performed by: INTERNAL MEDICINE

## 2022-04-14 PROCEDURE — 1159F PR MEDICATION LIST DOCUMENTED IN MEDICAL RECORD: ICD-10-PCS | Mod: CPTII,S$GLB,, | Performed by: INTERNAL MEDICINE

## 2022-04-14 PROCEDURE — 3074F PR MOST RECENT SYSTOLIC BLOOD PRESSURE < 130 MM HG: ICD-10-PCS | Mod: CPTII,S$GLB,, | Performed by: INTERNAL MEDICINE

## 2022-04-14 PROCEDURE — 1159F MED LIST DOCD IN RCRD: CPT | Mod: CPTII,S$GLB,, | Performed by: INTERNAL MEDICINE

## 2022-04-14 PROCEDURE — 3078F DIAST BP <80 MM HG: CPT | Mod: CPTII,S$GLB,, | Performed by: INTERNAL MEDICINE

## 2022-04-14 PROCEDURE — 3078F PR MOST RECENT DIASTOLIC BLOOD PRESSURE < 80 MM HG: ICD-10-PCS | Mod: CPTII,S$GLB,, | Performed by: INTERNAL MEDICINE

## 2022-04-14 PROCEDURE — 3008F BODY MASS INDEX DOCD: CPT | Mod: CPTII,S$GLB,, | Performed by: INTERNAL MEDICINE

## 2022-04-14 PROCEDURE — 99214 PR OFFICE/OUTPT VISIT, EST, LEVL IV, 30-39 MIN: ICD-10-PCS | Mod: S$GLB,,, | Performed by: INTERNAL MEDICINE

## 2022-04-14 PROCEDURE — 3008F PR BODY MASS INDEX (BMI) DOCUMENTED: ICD-10-PCS | Mod: CPTII,S$GLB,, | Performed by: INTERNAL MEDICINE

## 2022-04-14 PROCEDURE — 3074F SYST BP LT 130 MM HG: CPT | Mod: CPTII,S$GLB,, | Performed by: INTERNAL MEDICINE

## 2022-04-14 PROCEDURE — 99999 PR PBB SHADOW E&M-EST. PATIENT-LVL V: ICD-10-PCS | Mod: PBBFAC,,, | Performed by: INTERNAL MEDICINE

## 2022-04-14 NOTE — PROGRESS NOTES
Subjective:       Patient ID: Maria Ines cA is a 37 y.o. female.    Chief Complaint: Abdominal Pain (Upper abdominal pain present for 1 month )    HPI     37-year-old lady that has been seen in our department numerous times over the years.  Had extensive workup to be detail.  Her complaints are similar to how she has described them in the past.  She complains of reflux.  To her that is a burning substernal sensation.  Also sometimes with regurgitation.  She has this both day and nighttime episodes.  Possibly worse at night.  Right now she is on pantoprazole in the morning and omeprazole in the evening.  She will also have a burning epigastric sensation.  She feels like the symptoms are worse than they have been in the past.  Certainly there worse with after certain foods and she tries to avoid those.  She continues to have dysphagia.  Recently she has noted that for apple in me.  Points to the supraclavicular area.  Sometimes she has to bring up the bolus for relief.  Sometimes it rinse is down.  She continues with abdominal pain.  She has 4 loose stools per day.  She is on MiraLax.  She feels like if she does not take the MiraLax she will get constipated.  Bentyl does not help with the abdominal pain.    Past workup  Colonoscopy December 21 normal  EGD February 20 normal with empiric dilation of normal esophagus  Esophageal manometry November 18 normal  EGD September 17 normal  Gastric emptying scan normal  Barium swallow October 18 normal including prompt passage of the tablet.    Past Medical History:   Diagnosis Date    Blood in stool     Constipation     Cystitis     Depression     Diabetes mellitus, type 2     Dysphagia     Endometriosis     GERD (gastroesophageal reflux disease)     Headache     Hypertension     Palpitations     Premature menopause on hormone replacement therapy     Thyroid disease        Review of patient's allergies indicates:  No Known Allergies     Family History    Problem Relation Age of Onset    Breast cancer Mother 50        alive at 64, unilateral    Esophageal cancer Mother 63    Ovarian cancer Mother 63    Anesthesia problems Mother     Cervical cancer Maternal Grandmother         unknown age of onset,  at 80    Colon cancer Brother 40    Breast cancer Paternal Aunt         unknown age of onset, alive at 70    Breast cancer Maternal Aunt 72        alive at 72    Vaginal cancer Neg Hx     Endometrial cancer Neg Hx     Crohn's disease Neg Hx     Ulcerative colitis Neg Hx     Stomach cancer Neg Hx     Irritable bowel syndrome Neg Hx     Celiac disease Neg Hx        Social History     Tobacco Use    Smoking status: Never Smoker    Smokeless tobacco: Never Used   Substance Use Topics    Alcohol use: No    Drug use: No        Review of Systems   Respiratory: Negative.    Cardiovascular: Negative.            No results found for this or any previous visit from the past 365 days.             Objective:      Physical Exam  Constitutional:       General: She is not in acute distress.     Appearance: She is well-developed.   HENT:      Head: Normocephalic and atraumatic.      Right Ear: External ear normal.      Left Ear: External ear normal.      Nose: Nose normal.      Mouth/Throat:      Pharynx: No oropharyngeal exudate.   Eyes:      General: No scleral icterus.     Conjunctiva/sclera: Conjunctivae normal.      Pupils: Pupils are equal, round, and reactive to light.   Neck:      Thyroid: No thyromegaly.   Cardiovascular:      Rate and Rhythm: Normal rate and regular rhythm.      Heart sounds: Normal heart sounds. No murmur heard.    No gallop.   Pulmonary:      Effort: Pulmonary effort is normal.      Breath sounds: Normal breath sounds. No wheezing or rales.   Abdominal:      General: Abdomen is protuberant. Bowel sounds are normal. There is no distension or abdominal bruit. There are no signs of injury.      Palpations: Abdomen is soft. There is no  shifting dullness, hepatomegaly or mass.      Tenderness: There is generalized abdominal tenderness.   Musculoskeletal:         General: No tenderness. Normal range of motion.      Cervical back: Normal range of motion and neck supple.   Lymphadenopathy:      Cervical: No cervical adenopathy.   Skin:     General: Skin is warm and dry.      Findings: No rash.   Neurological:      Mental Status: She is alert and oriented to person, place, and time.      Cranial Nerves: No cranial nerve deficit.   Psychiatric:         Behavior: Behavior normal.         Thought Content: Thought content normal.         Judgment: Judgment normal.         Assessment & Plan:       Gastroesophageal reflux disease without esophagitis  -     Case Request Endoscopy: IMPEDANCE PH STUDY, ESOPHAGEAL, 24 HOUR, IN PATIENT TAKING ACID REDUCING MEDICATION; Future    Esophageal dysphagia    Irritable bowel syndrome with diarrhea     Assessment.  One gastroesophageal reflux.  Has never had esophagitis on endoscopy.  Will recommend 24 hour impedance study while on medication.  This will hopefully give us more information about her reflux in general.  2. Dysphagia.  This has been worked up as well without any abnormality to explain the dysphagia.  Manometry, barium swallow, and endoscopy have all been normal.  No further workup needed.  All she can do is chew carefully eat slowly and avoid foods that give her problems  3. IBS in the past this has been IBS mix.  Right now she has diarrhea because the MiraLax.  She has had a prior cholecystectomy but I think Questran probably would not be tolerated well.  I advised her simply to cut back on the MiraLax to half capful every day.    Recommendations  1. Continue anti-reflux medicines as doing 2. Concentrate on anti-reflux measures like small meals and early dinner  3. 24 hour impedance study  4. Stop Bentyl  5. Cut back on MiraLax dosing    25 minutes appointment greater half time face-to-face counseling  This  note was created with voice recognition dictation technology.  There may be errors that I did not see, detect or correct.        Slick Herron MD

## 2022-04-20 DIAGNOSIS — E11.9 TYPE 2 DIABETES MELLITUS WITHOUT COMPLICATION: ICD-10-CM

## 2022-04-27 ENCOUNTER — OFFICE VISIT (OUTPATIENT)
Dept: OPTOMETRY | Facility: CLINIC | Age: 37
End: 2022-04-27
Payer: MEDICARE

## 2022-04-27 DIAGNOSIS — E11.9 TYPE 2 DIABETES MELLITUS WITHOUT RETINOPATHY: Primary | ICD-10-CM

## 2022-04-27 DIAGNOSIS — E11.9 DIABETES MELLITUS WITHOUT COMPLICATION: ICD-10-CM

## 2022-04-27 DIAGNOSIS — E11.9 TYPE 2 DIABETES MELLITUS WITHOUT COMPLICATION: ICD-10-CM

## 2022-04-27 DIAGNOSIS — H04.123 DRY EYE SYNDROME OF BOTH EYES: ICD-10-CM

## 2022-04-27 DIAGNOSIS — H52.7 REFRACTIVE ERROR: ICD-10-CM

## 2022-04-27 PROCEDURE — 1160F PR REVIEW ALL MEDS BY PRESCRIBER/CLIN PHARMACIST DOCUMENTED: ICD-10-PCS | Mod: CPTII,S$GLB,, | Performed by: OPTOMETRIST

## 2022-04-27 PROCEDURE — 92015 PR REFRACTION: ICD-10-PCS | Mod: S$GLB,,, | Performed by: OPTOMETRIST

## 2022-04-27 PROCEDURE — 99999 PR PBB SHADOW E&M-EST. PATIENT-LVL IV: ICD-10-PCS | Mod: PBBFAC,,, | Performed by: OPTOMETRIST

## 2022-04-27 PROCEDURE — 2023F DILAT RTA XM W/O RTNOPTHY: CPT | Mod: CPTII,S$GLB,, | Performed by: OPTOMETRIST

## 2022-04-27 PROCEDURE — 99499 RISK ADDL DX/OHS AUDIT: ICD-10-PCS | Mod: HCNC,S$GLB,, | Performed by: OPTOMETRIST

## 2022-04-27 PROCEDURE — 1160F RVW MEDS BY RX/DR IN RCRD: CPT | Mod: CPTII,S$GLB,, | Performed by: OPTOMETRIST

## 2022-04-27 PROCEDURE — 1159F MED LIST DOCD IN RCRD: CPT | Mod: CPTII,S$GLB,, | Performed by: OPTOMETRIST

## 2022-04-27 PROCEDURE — 99999 PR PBB SHADOW E&M-EST. PATIENT-LVL IV: CPT | Mod: PBBFAC,,, | Performed by: OPTOMETRIST

## 2022-04-27 PROCEDURE — 2023F PR DILATED RETINAL EXAM W/O EVID OF RETINOPATHY: ICD-10-PCS | Mod: CPTII,S$GLB,, | Performed by: OPTOMETRIST

## 2022-04-27 PROCEDURE — 99499 UNLISTED E&M SERVICE: CPT | Mod: HCNC,S$GLB,, | Performed by: OPTOMETRIST

## 2022-04-27 PROCEDURE — 92004 PR EYE EXAM, NEW PATIENT,COMPREHESV: ICD-10-PCS | Mod: S$GLB,,, | Performed by: OPTOMETRIST

## 2022-04-27 PROCEDURE — 92015 DETERMINE REFRACTIVE STATE: CPT | Mod: S$GLB,,, | Performed by: OPTOMETRIST

## 2022-04-27 PROCEDURE — 1159F PR MEDICATION LIST DOCUMENTED IN MEDICAL RECORD: ICD-10-PCS | Mod: CPTII,S$GLB,, | Performed by: OPTOMETRIST

## 2022-04-27 PROCEDURE — 92004 COMPRE OPH EXAM NEW PT 1/>: CPT | Mod: S$GLB,,, | Performed by: OPTOMETRIST

## 2022-05-04 DIAGNOSIS — E11.9 TYPE 2 DIABETES MELLITUS WITHOUT COMPLICATION: ICD-10-CM

## 2022-05-06 ENCOUNTER — OFFICE VISIT (OUTPATIENT)
Dept: PODIATRY | Facility: CLINIC | Age: 37
End: 2022-05-06
Payer: MEDICARE

## 2022-05-06 VITALS
HEART RATE: 84 BPM | DIASTOLIC BLOOD PRESSURE: 75 MMHG | HEIGHT: 62 IN | SYSTOLIC BLOOD PRESSURE: 108 MMHG | WEIGHT: 228.81 LBS | BODY MASS INDEX: 42.11 KG/M2

## 2022-05-06 DIAGNOSIS — E11.42 DIABETIC POLYNEUROPATHY ASSOCIATED WITH TYPE 2 DIABETES MELLITUS: Primary | ICD-10-CM

## 2022-05-06 DIAGNOSIS — B35.1 ONYCHOMYCOSIS DUE TO DERMATOPHYTE: ICD-10-CM

## 2022-05-06 DIAGNOSIS — L84 CORN OR CALLUS: ICD-10-CM

## 2022-05-06 PROCEDURE — 99499 NO LOS: ICD-10-PCS | Mod: S$GLB,,, | Performed by: PODIATRIST

## 2022-05-06 PROCEDURE — 11056 PR TRIM BENIGN HYPERKERATOTIC SKIN LESION,2-4: ICD-10-PCS | Mod: Q9,S$GLB,, | Performed by: PODIATRIST

## 2022-05-06 PROCEDURE — 1159F PR MEDICATION LIST DOCUMENTED IN MEDICAL RECORD: ICD-10-PCS | Mod: CPTII,S$GLB,, | Performed by: PODIATRIST

## 2022-05-06 PROCEDURE — 3078F PR MOST RECENT DIASTOLIC BLOOD PRESSURE < 80 MM HG: ICD-10-PCS | Mod: CPTII,S$GLB,, | Performed by: PODIATRIST

## 2022-05-06 PROCEDURE — 3074F SYST BP LT 130 MM HG: CPT | Mod: CPTII,S$GLB,, | Performed by: PODIATRIST

## 2022-05-06 PROCEDURE — 11721 DEBRIDE NAIL 6 OR MORE: CPT | Mod: Q9,59,S$GLB, | Performed by: PODIATRIST

## 2022-05-06 PROCEDURE — 3008F BODY MASS INDEX DOCD: CPT | Mod: CPTII,S$GLB,, | Performed by: PODIATRIST

## 2022-05-06 PROCEDURE — 3008F PR BODY MASS INDEX (BMI) DOCUMENTED: ICD-10-PCS | Mod: CPTII,S$GLB,, | Performed by: PODIATRIST

## 2022-05-06 PROCEDURE — 11056 PARNG/CUTG B9 HYPRKR LES 2-4: CPT | Mod: Q9,S$GLB,, | Performed by: PODIATRIST

## 2022-05-06 PROCEDURE — 3078F DIAST BP <80 MM HG: CPT | Mod: CPTII,S$GLB,, | Performed by: PODIATRIST

## 2022-05-06 PROCEDURE — 99499 UNLISTED E&M SERVICE: CPT | Mod: S$GLB,,, | Performed by: PODIATRIST

## 2022-05-06 PROCEDURE — 1159F MED LIST DOCD IN RCRD: CPT | Mod: CPTII,S$GLB,, | Performed by: PODIATRIST

## 2022-05-06 PROCEDURE — 11721 PR DEBRIDEMENT OF NAILS, 6 OR MORE: ICD-10-PCS | Mod: Q9,59,S$GLB, | Performed by: PODIATRIST

## 2022-05-06 PROCEDURE — 99999 PR PBB SHADOW E&M-EST. PATIENT-LVL IV: CPT | Mod: PBBFAC,,, | Performed by: PODIATRIST

## 2022-05-06 PROCEDURE — 3074F PR MOST RECENT SYSTOLIC BLOOD PRESSURE < 130 MM HG: ICD-10-PCS | Mod: CPTII,S$GLB,, | Performed by: PODIATRIST

## 2022-05-06 PROCEDURE — 99999 PR PBB SHADOW E&M-EST. PATIENT-LVL IV: ICD-10-PCS | Mod: PBBFAC,,, | Performed by: PODIATRIST

## 2022-05-06 NOTE — PROGRESS NOTES
Subjective:      Patient ID: Maria Ines Ac is a 37 y.o. female.    Chief Complaint: Nail Care and Diabetes Mellitus (Luann Raymond MD - 2/16/2022)    Maria Ines is a 37 y.o. female who presents to the clinic for evaluation and treatment of high risk feet. Maria Ines has a past medical history of Blood in stool, Constipation, Cystitis, Depression, Diabetes mellitus, type 2, Dysphagia, Endometriosis, GERD (gastroesophageal reflux disease), Headache, Hypertension, Palpitations, Premature menopause on hormone replacement therapy, and Thyroid disease. The patient's chief complaint is long, thick toenails.   This patient has documented high risk feet requiring routine maintenance secondary to peripheral neuropathy.    PCP: Luann Raymond MD    Date Last Seen by PCP:   Chief Complaint   Patient presents with    Nail Care    Diabetes Mellitus     Luann Raymond MD - 2/16/2022       Current shoe gear:  Affected Foot: Tennis shoes     Unaffected Foot: Tennis shoes    Hemoglobin A1C   Date Value Ref Range Status   12/14/2021 5.4 4.0 - 5.6 % Final     Comment:     ADA Screening Guidelines:  5.7-6.4%  Consistent with prediabetes  >or=6.5%  Consistent with diabetes    High levels of fetal hemoglobin interfere with the HbA1C  assay. Heterozygous hemoglobin variants (HbS, HgC, etc)do  not significantly interfere with this assay.   However, presence of multiple variants may affect accuracy.     06/02/2021 5.3 4.0 - 5.6 % Final     Comment:     ADA Screening Guidelines:  5.7-6.4%  Consistent with prediabetes  >or=6.5%  Consistent with diabetes    High levels of fetal hemoglobin interfere with the HbA1C  assay. Heterozygous hemoglobin variants (HbS, HgC, etc)do  not significantly interfere with this assay.   However, presence of multiple variants may affect accuracy.     02/02/2021 5.1 4.0 - 5.6 % Final     Comment:     ADA Screening Guidelines:  5.7-6.4%  Consistent with prediabetes  >or=6.5%  Consistent with  diabetes    High levels of fetal hemoglobin interfere with the HbA1C  assay. Heterozygous hemoglobin variants (HbS, HgC, etc)do  not significantly interfere with this assay.   However, presence of multiple variants may affect accuracy.         Review of Systems   Constitutional: Negative for chills, fever and malaise/fatigue.   HENT: Negative for hearing loss.    Cardiovascular: Negative for claudication.   Respiratory: Negative for shortness of breath.    Skin: Positive for dry skin and nail changes. Negative for flushing and rash.   Musculoskeletal: Negative for joint pain and myalgias.   Gastrointestinal: Negative for nausea and vomiting.   Neurological: Positive for numbness, paresthesias and sensory change. Negative for loss of balance.   Psychiatric/Behavioral: Negative for altered mental status.   Allergic/Immunologic: Negative for hives.           Objective:      Physical Exam  Vitals reviewed.   Cardiovascular:      Pulses:           Dorsalis pedis pulses are 2+ on the right side and 2+ on the left side.        Posterior tibial pulses are 2+ on the right side and 2+ on the left side.      Comments: No edema noted to b/L LEs  Musculoskeletal:      Comments: Adequate joint ROM noted to all lower extremity muscle groups with no pain or crepitation noted. Muscle strength is 5/5 in all groups bilaterally.     Feet:      Right foot:      Protective Sensation: 5 sites tested. 0 sites sensed.      Left foot:      Protective Sensation: 5 sites tested. 0 sites sensed.   Skin:     General: Skin is warm.      Capillary Refill: Capillary refill takes 2 to 3 seconds.      Comments: Normal skin tugor noted.   No open lesion noted b/L  Skin temp is warm to warm from proximal to distal b/L.  Webspaces clean, dry, and intact  Xerosis Bilaterally. No open lesions noted.   HKL noted to left 5th digit x2     Neurological:      Mental Status: She is alert and oriented to person, place, and time.      Comments: Intact gross  sensation noted to b/L LEs         Nails x10 are elongated by  4-5mm's, thickened by 1-2 mm's, dystrophic, and are yellowish in  coloration . Xerosis Bilaterally. No open lesions noted.             Assessment:       Encounter Diagnoses   Name Primary?    Diabetic polyneuropathy associated with type 2 diabetes mellitus Yes    Corn or callus     Onychomycosis due to dermatophyte          Plan:       Maria Ines was seen today for nail care and diabetes mellitus.    Diagnoses and all orders for this visit:    Diabetic polyneuropathy associated with type 2 diabetes mellitus    Corn or callus    Onychomycosis due to dermatophyte      I counseled the patient on her conditions, their implications and medical management.      - Shoe inspection. Diabetic Foot Education. Patient reminded of the importance of good nutrition and blood sugar control to help prevent podiatric complications of diabetes. Patient instructed on proper foot hygeine. We discussed wearing proper shoe gear, daily foot inspections, never walking without protective shoe gear, never putting sharp instruments to feet, routine podiatric nail visits every 2-3 months.    After cleansing with an alcohol prep pad, the about mentioned hyperkeratotic lesions were sharply debrided X 2 utilizing a #15 blade to a smooth base without incident. Pt tolerated the procedure well and reported comfort to the debarment sites. Pt will continue to use padding and moisture the callused areas.     - With patient's permission, nails were aggressively reduced and debrided x 10 to their soft tissue attachment mechanically and with electric , removing all offending nail and debris. Patient relates relief following the procedure. She will continue to monitor the areas daily, inspect her feet, wear protective shoe gear when ambulatory, moisturizer to maintain skin integrity and follow in this office in approximately 2-3 months, sooner p.r.n.

## 2022-05-11 DIAGNOSIS — E11.9 TYPE 2 DIABETES MELLITUS WITHOUT COMPLICATION: ICD-10-CM

## 2022-05-13 ENCOUNTER — TELEPHONE (OUTPATIENT)
Dept: SURGERY | Facility: CLINIC | Age: 37
End: 2022-05-13
Payer: MEDICARE

## 2022-05-13 NOTE — TELEPHONE ENCOUNTER
Spoke with the patient to confirm her May 16th appointment. Pt confirms. Time and location were provided.

## 2022-05-16 ENCOUNTER — OFFICE VISIT (OUTPATIENT)
Dept: SURGERY | Facility: CLINIC | Age: 37
End: 2022-05-16
Payer: MEDICARE

## 2022-05-16 VITALS
DIASTOLIC BLOOD PRESSURE: 80 MMHG | BODY MASS INDEX: 41.99 KG/M2 | WEIGHT: 228.19 LBS | HEIGHT: 62 IN | SYSTOLIC BLOOD PRESSURE: 111 MMHG | HEART RATE: 80 BPM

## 2022-05-16 DIAGNOSIS — K64.8 HEMORRHOIDS, INTERNAL, WITH BLEEDING: Primary | ICD-10-CM

## 2022-05-16 DIAGNOSIS — K62.89 ANAL OR RECTAL PAIN: ICD-10-CM

## 2022-05-16 DIAGNOSIS — K58.0 IRRITABLE BOWEL SYNDROME WITH DIARRHEA: ICD-10-CM

## 2022-05-16 PROCEDURE — 3074F PR MOST RECENT SYSTOLIC BLOOD PRESSURE < 130 MM HG: ICD-10-PCS | Mod: CPTII,S$GLB,, | Performed by: COLON & RECTAL SURGERY

## 2022-05-16 PROCEDURE — 99999 PR PBB SHADOW E&M-EST. PATIENT-LVL IV: ICD-10-PCS | Mod: PBBFAC,,, | Performed by: COLON & RECTAL SURGERY

## 2022-05-16 PROCEDURE — 3079F PR MOST RECENT DIASTOLIC BLOOD PRESSURE 80-89 MM HG: ICD-10-PCS | Mod: CPTII,S$GLB,, | Performed by: COLON & RECTAL SURGERY

## 2022-05-16 PROCEDURE — 99212 PR OFFICE/OUTPT VISIT, EST, LEVL II, 10-19 MIN: ICD-10-PCS | Mod: 25,S$GLB,, | Performed by: COLON & RECTAL SURGERY

## 2022-05-16 PROCEDURE — 1159F PR MEDICATION LIST DOCUMENTED IN MEDICAL RECORD: ICD-10-PCS | Mod: CPTII,S$GLB,, | Performed by: COLON & RECTAL SURGERY

## 2022-05-16 PROCEDURE — 46600 PR DIAG2STIC A2SCOPY: ICD-10-PCS | Mod: S$GLB,,, | Performed by: COLON & RECTAL SURGERY

## 2022-05-16 PROCEDURE — 3008F PR BODY MASS INDEX (BMI) DOCUMENTED: ICD-10-PCS | Mod: CPTII,S$GLB,, | Performed by: COLON & RECTAL SURGERY

## 2022-05-16 PROCEDURE — 1160F PR REVIEW ALL MEDS BY PRESCRIBER/CLIN PHARMACIST DOCUMENTED: ICD-10-PCS | Mod: CPTII,S$GLB,, | Performed by: COLON & RECTAL SURGERY

## 2022-05-16 PROCEDURE — 3008F BODY MASS INDEX DOCD: CPT | Mod: CPTII,S$GLB,, | Performed by: COLON & RECTAL SURGERY

## 2022-05-16 PROCEDURE — 3074F SYST BP LT 130 MM HG: CPT | Mod: CPTII,S$GLB,, | Performed by: COLON & RECTAL SURGERY

## 2022-05-16 PROCEDURE — 99999 PR PBB SHADOW E&M-EST. PATIENT-LVL IV: CPT | Mod: PBBFAC,,, | Performed by: COLON & RECTAL SURGERY

## 2022-05-16 PROCEDURE — 99212 OFFICE O/P EST SF 10 MIN: CPT | Mod: 25,S$GLB,, | Performed by: COLON & RECTAL SURGERY

## 2022-05-16 PROCEDURE — 3079F DIAST BP 80-89 MM HG: CPT | Mod: CPTII,S$GLB,, | Performed by: COLON & RECTAL SURGERY

## 2022-05-16 PROCEDURE — 1160F RVW MEDS BY RX/DR IN RCRD: CPT | Mod: CPTII,S$GLB,, | Performed by: COLON & RECTAL SURGERY

## 2022-05-16 PROCEDURE — 46600 DIAGNOSTIC ANOSCOPY SPX: CPT | Mod: S$GLB,,, | Performed by: COLON & RECTAL SURGERY

## 2022-05-16 PROCEDURE — 1159F MED LIST DOCD IN RCRD: CPT | Mod: CPTII,S$GLB,, | Performed by: COLON & RECTAL SURGERY

## 2022-05-16 NOTE — PROGRESS NOTES
Subjective:       Patient ID: Maria Ines Ac is a 37 y.o. female.    Chief Complaint: Rectal Bleeding and Rectal Pain    HPI  38 yo F here with complaints of rectal pain and bleeding.  She had previously seen 1 of our nurse practitioners in November of last year and was being treated for an anal fissure.  She has a longstanding history of multiple GI related complaints including GERD, dysphagia, and IBS with diarrhea.  She sees Dr. Herron in GI.    She reports pain both inside and outside the anus, present constantly but worse with BM's.  At first she said the pain was dull but later said it was sharp.  Poor historian - history of complaints is inconsistent.  (+significant psychiatric history.)  She claims she has tried diltiazem cream withot improvement. +BRB on toilet tissue when wiping after BM's.  She reports 4 loose watery BM's/day. +Episodes of stress fecal incontinence.   She does take Miralax for unclear reasons.   She has Interstim in place for incomplete bladder emptying.    Last colonoscopy - 12/22/21 - normal aside from perianal rash    Review of patient's allergies indicates:  No Known Allergies    Past Medical History:   Diagnosis Date    Blood in stool     Constipation     Cystitis     Depression     Diabetes mellitus, type 2     Dysphagia     Endometriosis     GERD (gastroesophageal reflux disease)     Headache     Hypertension     Palpitations     Premature menopause on hormone replacement therapy     Thyroid disease        Past Surgical History:   Procedure Laterality Date    BLADDER SUSPENSION      CARPAL TUNNEL RELEASE      right    CHOLECYSTECTOMY      COLONOSCOPY N/A 8/30/2016    Procedure: COLONOSCOPY;  Surgeon: Slick Herron MD;  Location: [facility] ([address] [place] [address]);  Service: Endoscopy;  Laterality: N/A;  PM prep    COLONOSCOPY N/A 12/22/2021    Procedure: COLONOSCOPY. any CRS;  Surgeon: Maria Ines Jung MD;  Location: [facility] ([address] [place] [address]);  Service: Endoscopy;  Laterality:  N/A;  fully vaccinated -  scaral stimulator will bring remote -    12/17 lvm to confirm appt-rb    ESOPHAGEAL MANOMETRY WITH MEASUREMENT OF IMPEDANCE N/A 11/5/2018    Procedure: MANOMETRY, ESOPHAGUS, WITH IMPEDANCE MEASUREMENT;  Surgeon: Slick Herron MD;  Location: [facility] ENDO (4TH F[facility]);  Service: Endoscopy;  Laterality: N/A;    ESOPHAGOGASTRODUODENOSCOPY N/A 2/13/2020    Procedure: EGD (ESOPHAGOGASTRODUODENOSCOPY);  Surgeon: Slick Herron MD;  Location: [facility] ENDO (4TH F[facility]);  Service: Endoscopy;  Laterality: N/A;  pt has cardiology appt on 1/6/19-will call back after this appt to schedule-tb    FLUOROSCOPIC URODYNAMIC STUDY N/A 5/23/2019    Procedure: URODYNAMIC STUDY, FLUOROSCOPIC;  Surgeon: Zena Tee MD;  Location: [facility] OR 1ST FLR;  Service: Urology;  Laterality: N/A;  1 hour    GALLBLADDER SURGERY      HYSTERECTOMY  2013    eBss velasquez Dr. Champlain    IMPLANTATION OF PERMANENT SACRAL NERVE STIMULATOR N/A 7/30/2019    Procedure: INSERTION, NEUROSTIMULATOR, PERMANENT, SACRAL;  Surgeon: Zena Tee MD;  Location: [facility] OR 2ND FLR;  Service: Urology;  Laterality: N/A;  30 min    OOPHORECTOMY  2005    [facility]O- benign cyst    OOPHORECTOMY  2013    [facility]- endo    ooptherectomy      right knee  scoped    SHOULDER SURGERY  right       Current Outpatient Medications   Medication Sig Dispense Refill    ACCU-CHEK AMAURY PLUS TEST STRP Strp USE TO TEST BLOOD GLUCOSE TID  9    ACCU-CHEK SOFTCLIX LANCETS Misc USE TO TEST BLOOD GLUCOSE TID  3    asenapine maleate (SAPHRIS) 5 mg Subl       atorvastatin (LIPITOR) 40 MG tablet Take 1 tablet (40 mg total) by mouth once daily. 90 tablet 3    BLOOD SUGAR DIAGNOSTIC (ACCU-CHEK AMAURY PLUS TEST STRP MISC) 1 strip by Misc.(Non-Drug; Combo Route) route 3 (three) times daily.      buPROPion (WELLBUTRIN XL) 150 MG TB24 tablet Take 150 mg by mouth every morning.      calcium-vitamin D3 500 mg(1,250mg) -200 unit per tablet Take 1 tablet by mouth 2 (two)  times daily with meals.      ciclopirox (PENLAC) 8 % Soln Apply topically nightly. 6.6 mL 11    dapagliflozin propanediol (FARXIGA ORAL) Farxiga Take No date recorded No form recorded No frequency recorded No route recorded No set duration recorded No set duration amount recorded active No dosage strength recorded No dosage strength units of measure recorded      diclofenac sodium (VOLTAREN) 1 % Gel Apply 2 g topically 4 (four) times daily. As needed for breast pain 1 Tube 1    dicyclomine (BENTYL) 10 MG capsule TAKE 2 CAPSULES(20 MG) BY MOUTH THREE TIMES DAILY BEFORE MEALS 180 capsule 0    diltiazem HCl (DILTIAZEM 2% CREAM) Apply topically 3 (three) times daily. Apply topically to anal area. 30 g 2    docusate sodium (COLACE) 100 MG capsule Take 1 capsule (100 mg total) by mouth 2 (two) times daily. 60 capsule 0    dulaglutide (TRULICITY SUBQ) Trulicity Take No date recorded No form recorded No frequency recorded No route recorded No set duration recorded No set duration amount recorded active No dosage strength recorded No dosage strength units of measure recorded      estradioL (ESTRACE) 0.01 % (0.1 mg/gram) vaginal cream estradiol Take No date recorded No form recorded No frequency recorded No route recorded No set duration recorded No set duration amount recorded active No dosage strength recorded No dosage strength units of measure recorded      estradioL (VIVELLE-DOT) 0.075 mg/24 hr PLACE 1 PATCH ONTO THE SKIN TWICE A WEEK 8 patch 11    FARXIGA 10 mg Tab TK 1 T PO QD  7    FLUARIX QUAD 4982-1690, PF, 60 mcg (15 mcg x 4)/0.5 mL Syrg       fluticasone propionate (FLONASE) 50 mcg/actuation nasal spray SHAKE LIQUID AND USE 2 SPRAYS(100 MCG) IN EACH NOSTRIL EVERY DAY 48 g 2    gabapentin (NEURONTIN) 300 MG capsule Take 600 mg (two capsules) in the morning and 900mg (three capsules) at night before bed 150 capsule 11    GLUCOPHAGE 500 mg tablet Take 500 mg by mouth 2 (two) times daily with meals.        ibuprofen (ADVIL,MOTRIN) 800 MG tablet 800 mg every 4 (four) hours as needed.   0    levothyroxine (SYNTHROID) 75 MCG tablet       magnesium oxide (MAG-OX) 400 mg (241.3 mg magnesium) tablet Take 1 tablet (400 mg total) by mouth 2 (two) times daily.  0    metoprolol succinate (TOPROL-XL) 50 MG 24 hr tablet Take 1 tablet (50 mg total) by mouth once daily. 90 tablet 3    metoprolol succinate 50 mg CSpX melatonin Take No date recorded No form recorded No frequency recorded No route recorded No set duration recorded No set duration amount recorded active No dosage strength recorded No dosage strength units of measure recorded      MULTIVIT-IRON-MIN-FOLIC ACID 3,500-18-0.4 UNIT-MG-MG ORAL CHEW Take 1 tablet by mouth once daily.       ofloxacin (OCUFLOX) 0.3 % ophthalmic solution PLACE 1 DROP INTO BOTH EYES FOUR TIMES DAILY      omega-3 fatty acids/fish oil (FISH OIL-OMEGA-3 FATTY ACIDS) 300-1,000 mg capsule Take 1 capsule by mouth once daily. 90 capsule 3    omeprazole (PRILOSEC OTC) 20 MG tablet Take 20 mg by mouth once daily.      OXcarbazepine (TRILEPTAL) 150 MG Tab TK 1 T PO BID      oxybutynin (DITROPAN) 5 MG Tab TAKE 1 TABLET BY MOUTH EVERY EVENING 30 tablet 8    oxybutynin (DITROPAN-XL) 10 MG 24 hr tablet TAKE 1 TABLET(10 MG) BY MOUTH EVERY DAY 30 tablet 2    pantoprazole (PROTONIX) 40 MG tablet TAKE 1 TABLET(40 MG) BY MOUTH EVERY DAY 90 tablet 0    phentermine 37.5 MG capsule TK 1 C PO ONCE D IN THE MORNING      spironolactone (ALDACTONE) 25 MG tablet TK 1 T PO HS  2    sucralfate (CARAFATE) 1 gram tablet Take 1 tablet (1 g total) by mouth 4 (four) times daily. 20 tablet 0    traZODone (DESYREL) 100 MG tablet TK 1 T PO HS  1    triamcinolone acetonide 0.1% (KENALOG) 0.1 % ointment APPLY TO THE NECK AND CHEEKS EVERY NIGHT AT BEDTIME AS DIRECTED      TRULICITY 1.5 mg/0.5 mL PnIj INJECT 1 PEN INTO THE SKIN Q WEEK  5    TRULICITY 3 mg/0.5 mL pen injector       VRAYLAR 6 mg Cap Take 1 capsule  by mouth once daily.      ZOLOFT 100 mg tablet Take 200 mg by mouth once daily.       zolpidem (AMBIEN) 10 mg Tab 1 po for sleep study 1 tablet 0    topiramate (TOPAMAX) 50 MG tablet Take 2 tablets (100 mg total) by mouth every evening. 60 tablet 11     No current facility-administered medications for this visit.       Family History   Problem Relation Age of Onset    Breast cancer Mother 50        alive at 64, unilateral    Esophageal cancer Mother 63    Ovarian cancer Mother 63    Anesthesia problems Mother     Cervical cancer Maternal Grandmother         unknown age of onset,  at 80    Colon cancer Brother 40    Breast cancer Paternal Aunt         unknown age of onset, alive at 70    Breast cancer Maternal Aunt 72        alive at 72    Vaginal cancer Neg Hx     Endometrial cancer Neg Hx     Crohn's disease Neg Hx     Ulcerative colitis Neg Hx     Stomach cancer Neg Hx     Irritable bowel syndrome Neg Hx     Celiac disease Neg Hx        Social History     Socioeconomic History    Marital status: Single   Tobacco Use    Smoking status: Never Smoker    Smokeless tobacco: Never Used   Substance and Sexual Activity    Alcohol use: No    Drug use: No    Sexual activity: Never     Birth control/protection: None   Social History Narrative    ** Merged History Encounter **          Social Determinants of Health     Financial Resource Strain: Low Risk     Difficulty of Paying Living Expenses: Not hard at all   Food Insecurity: No Food Insecurity    Worried About Running Out of Food in the Last Year: Never true    Ran Out of Food in the Last Year: Never true   Transportation Needs: No Transportation Needs    Lack of Transportation (Medical): No    Lack of Transportation (Non-Medical): No   Physical Activity: Sufficiently Active    Days of Exercise per Week: 3 days    Minutes of Exercise per Session: 60 min   Stress: No Stress Concern Present    Feeling of Stress : Not at all   Social  Connections: Socially Isolated    Frequency of Communication with Friends and Family: Once a week    Frequency of Social Gatherings with Friends and Family: Once a week    Attends Rastafarian Services: More than 4 times per year    Active Member of Clubs or Organizations: No    Attends Club or Organization Meetings: Never    Marital Status: Never    Housing Stability: Low Risk     Unable to Pay for Housing in the Last Year: No    Number of Places Lived in the Last Year: 1    Unstable Housing in the Last Year: No       Review of Systems   Constitutional: Negative for chills and fever.   HENT: Positive for trouble swallowing. Negative for congestion and sore throat.    Eyes: Negative for visual disturbance.   Respiratory: Negative for cough and shortness of breath.    Cardiovascular: Negative for chest pain and palpitations.   Gastrointestinal: Positive for anal bleeding, diarrhea and rectal pain. Negative for abdominal distention, abdominal pain, blood in stool, constipation, nausea and vomiting.        GERD   Endocrine: Negative for cold intolerance and heat intolerance.   Genitourinary: Negative for dysuria and frequency.   Musculoskeletal: Negative for arthralgias, back pain and neck pain.   Skin: Negative for rash.   Allergic/Immunologic: Negative for immunocompromised state.   Neurological: Negative for dizziness, light-headedness and headaches.   Hematological: Does not bruise/bleed easily.   Psychiatric/Behavioral: Negative for confusion. The patient is not nervous/anxious.        Objective:      Physical Exam  Constitutional:       Appearance: She is well-developed.   HENT:      Head: Normocephalic.   Pulmonary:      Effort: Pulmonary effort is normal. No respiratory distress.   Abdominal:      General: Bowel sounds are normal. There is no distension.      Palpations: Abdomen is soft. There is no mass.      Tenderness: There is no abdominal tenderness. There is no guarding or rebound.    Genitourinary:     Comments: Perineum - normal perianal skin, no mass, no fissure, no external hemorrhoids  BRUNA - good tone, no mass  Anoscopy - Grade 1 internal hemorrhoids with mild inflammatory changes    Musculoskeletal:         General: Normal range of motion.   Skin:     General: Skin is warm and dry.   Neurological:      Mental Status: She is alert and oriented to person, place, and time.           Lab Results   Component Value Date    WBC 9.35 10/04/2021    HGB 13.9 10/04/2021    HCT 42.9 10/04/2021    MCV 89 10/04/2021     10/04/2021     BMP  Lab Results   Component Value Date     10/04/2021    K 4.4 10/04/2021     10/04/2021    CO2 21 (L) 10/04/2021    BUN 9 10/04/2021    CREATININE 0.9 10/04/2021    CALCIUM 9.9 10/04/2021    ANIONGAP 12 10/04/2021    ESTGFRAFRICA >60.0 10/04/2021    EGFRNONAA >60.0 10/04/2021     CMP  Sodium   Date Value Ref Range Status   10/04/2021 137 136 - 145 mmol/L Final     Potassium   Date Value Ref Range Status   10/04/2021 4.4 3.5 - 5.1 mmol/L Final     Chloride   Date Value Ref Range Status   10/04/2021 104 95 - 110 mmol/L Final     CO2   Date Value Ref Range Status   10/04/2021 21 (L) 23 - 29 mmol/L Final     Glucose   Date Value Ref Range Status   10/04/2021 86 70 - 110 mg/dL Final     BUN   Date Value Ref Range Status   10/04/2021 9 6 - 20 mg/dL Final     Creatinine   Date Value Ref Range Status   10/04/2021 0.9 0.5 - 1.4 mg/dL Final     Calcium   Date Value Ref Range Status   10/04/2021 9.9 8.7 - 10.5 mg/dL Final     Total Protein   Date Value Ref Range Status   10/04/2021 7.9 6.0 - 8.4 g/dL Final     Albumin   Date Value Ref Range Status   10/04/2021 4.1 3.5 - 5.2 g/dL Final     Total Bilirubin   Date Value Ref Range Status   10/04/2021 0.3 0.1 - 1.0 mg/dL Final     Comment:     For infants and newborns, interpretation of results should be based  on gestational age, weight and in agreement with clinical  observations.    Premature Infant recommended  reference ranges:  Up to 24 hours.............<8.0 mg/dL  Up to 48 hours............<12.0 mg/dL  3-5 days..................<15.0 mg/dL  6-29 days.................<15.0 mg/dL       Alkaline Phosphatase   Date Value Ref Range Status   10/04/2021 54 (L) 55 - 135 U/L Final     AST   Date Value Ref Range Status   10/04/2021 21 10 - 40 U/L Final     ALT   Date Value Ref Range Status   10/04/2021 24 10 - 44 U/L Final     Anion Gap   Date Value Ref Range Status   10/04/2021 12 8 - 16 mmol/L Final     eGFR if    Date Value Ref Range Status   10/04/2021 >60.0 >60 mL/min/1.73 m^2 Final     eGFR if non    Date Value Ref Range Status   10/04/2021 >60.0 >60 mL/min/1.73 m^2 Final     Comment:     Calculation used to obtain the estimated glomerular filtration  rate (eGFR) is the CKD-EPI equation.        No results found for: CEA        Assessment:       1. Hemorrhoids, internal, with bleeding    2. Anal or rectal pain    3. Irritable bowel syndrome with diarrhea        Plan:   She does not have any evidence of an anal fissure on examination.  She has small grade 1 internal hemorrhoids which were a bit inflamed, but too small to consider banding.  These certainly could be the source of her bleeding.  I do not have a great explanation for her pain.  I have suggested steroid suppositories to reduce the hemorrhoidal inflammation.  I told to stop taking MiraLax completely.  I suggested increased fiber and fluid intake, including daily fiber supplement.    RTO 2 months if no improvement.    Malick Horton MD, FACS, FASCRS  Senior Staff Surgeon  Department of Colon & Rectal Surgery     This note was created using voice recognition software, and may contain some unrecognized transcriptional errors.

## 2022-05-18 DIAGNOSIS — E11.9 TYPE 2 DIABETES MELLITUS WITHOUT COMPLICATION: ICD-10-CM

## 2022-05-26 DIAGNOSIS — E11.9 TYPE 2 DIABETES MELLITUS WITHOUT COMPLICATION: ICD-10-CM

## 2022-05-27 ENCOUNTER — TELEPHONE (OUTPATIENT)
Dept: ENDOSCOPY | Facility: HOSPITAL | Age: 37
End: 2022-05-27
Payer: MEDICARE

## 2022-05-27 NOTE — TELEPHONE ENCOUNTER
Called patient to schedule for a 24 hour impedance pH study.  No answer. Left voicemail for patient to call endoscopy scheduling to schedule. Direct line given.

## 2022-06-02 ENCOUNTER — HOSPITAL ENCOUNTER (OUTPATIENT)
Facility: HOSPITAL | Age: 37
Discharge: HOME | End: 2022-06-02
Attending: STUDENT IN AN ORGANIZED HEALTH CARE EDUCATION/TRAINING PROGRAM | Admitting: STUDENT IN AN ORGANIZED HEALTH CARE EDUCATION/TRAINING PROGRAM
Payer: MEDICARE

## 2022-06-02 VITALS
WEIGHT: 205 LBS | TEMPERATURE: 98 F | SYSTOLIC BLOOD PRESSURE: 113 MMHG | HEART RATE: 85 BPM | BODY MASS INDEX: 37.73 KG/M2 | RESPIRATION RATE: 16 BRPM | OXYGEN SATURATION: 94 % | DIASTOLIC BLOOD PRESSURE: 73 MMHG | HEIGHT: 62 IN

## 2022-06-02 DIAGNOSIS — K21.9 GERD (GASTROESOPHAGEAL REFLUX DISEASE): ICD-10-CM

## 2022-06-02 LAB
GLUCOSE SERPL-MCNC: 100 MG/DL (ref 70–110)
POCT GLUCOSE: 100 MG/DL (ref 70–110)

## 2022-06-02 PROCEDURE — 91010 ESOPHAGUS MOTILITY STUDY: CPT | Mod: TC | Performed by: STUDENT IN AN ORGANIZED HEALTH CARE EDUCATION/TRAINING PROGRAM

## 2022-06-02 PROCEDURE — 82962 GLUCOSE BLOOD TEST: CPT | Performed by: STUDENT IN AN ORGANIZED HEALTH CARE EDUCATION/TRAINING PROGRAM

## 2022-06-02 PROCEDURE — 25000003 PHARM REV CODE 250: Performed by: STUDENT IN AN ORGANIZED HEALTH CARE EDUCATION/TRAINING PROGRAM

## 2022-06-02 RX ORDER — LIDOCAINE HYDROCHLORIDE 20 MG/ML
JELLY TOPICAL ONCE
Status: COMPLETED | OUTPATIENT
Start: 2022-06-02 | End: 2022-06-02

## 2022-06-02 RX ORDER — SODIUM CHLORIDE 9 MG/ML
INJECTION, SOLUTION INTRAVENOUS CONTINUOUS
Status: DISCONTINUED | OUTPATIENT
Start: 2022-06-02 | End: 2022-06-02 | Stop reason: HOSPADM

## 2022-06-02 RX ADMIN — SODIUM CHLORIDE: 0.9 INJECTION, SOLUTION INTRAVENOUS at 09:06

## 2022-06-02 RX ADMIN — LIDOCAINE HYDROCHLORIDE 10 ML: 20 JELLY TOPICAL at 10:06

## 2022-06-03 PROCEDURE — 91010 PR ESOPHAGEAL MOTILITY STUDY, MA2METRY: ICD-10-PCS | Mod: 26,,, | Performed by: STUDENT IN AN ORGANIZED HEALTH CARE EDUCATION/TRAINING PROGRAM

## 2022-06-03 PROCEDURE — 91010 ESOPHAGUS MOTILITY STUDY: CPT | Mod: 26,,, | Performed by: STUDENT IN AN ORGANIZED HEALTH CARE EDUCATION/TRAINING PROGRAM

## 2022-06-03 NOTE — TELEPHONE ENCOUNTER
Call to pt to schedule appt for the breast swelling and tenderness she is experiencing. Pt has taken Aleve twice a day for the past week. Advised that pt place warm compresses to breast for added comfort and to decrease amount of caffeine in her diet. Appt scheduled for pt to see Romeo Moore 4/23 at 10:30 am. Appt reminder mailed to pt.   Speaking Coherently

## 2022-06-03 NOTE — CARE UPDATE
Impedence Catheter removed from right nare; skin at Tegaderm sites clean dry and intact.  Skin after tegaderm removal without redness or swelling.

## 2022-06-03 NOTE — PROVATION PATIENT INSTRUCTIONS
Discharge Summary/Instructions after an Endoscopic Procedure  Patient Name: Maria Ines Ac  Patient MRN: [id number]  Patient YOB: 1985  Friday, Honey 3, 2022  Debbie Butler MD  Dear patient,  As a result of recent federal legislation (The Federal Cures Act), you may   receive lab or pathology results from your procedure in your [facility]   account before your physician is able to contact you. Your physician or   their representative will relay the results to you with their   recommendations at their soonest availability.  Thank you,  RESTRICTIONS:  During your procedure today, you received medications for sedation.  These   medications may affect your judgment, balance and coordination.  Therefore,   for 24 hours, you have the following restrictions:   - DO NOT drive a car, operate machinery, make legal/financial decisions,   sign important papers or drink alcohol.    ACTIVITY:  Today: no heavy lifting, straining or running due to procedural   sedation/anesthesia.  The following day: return to full activity including work.  DIET:  Eat and drink normally unless instructed otherwise.     TREATMENT FOR COMMON SIDE EFFECTS:  - Mild abdominal pain, nausea, belching, bloating or excessive gas:  rest,   eat lightly and use a heating pad.  - Sore Throat: treat with throat lozenges and/or gargle with warm salt   water.  - Because air was used during the procedure, expelling large amounts of air   from your rectum or belching is normal.  - If a bowel prep was taken, you may not have a bowel movement for 1-3 days.    This is normal.  SYMPTOMS TO WATCH FOR AND REPORT TO YOUR PHYSICIAN:  1. Abdominal pain or bloating, other than gas cramps.  2. Chest pain.  3. Back pain.  4. Signs of infection such as: chills or fever occurring within 24 hours   after the procedure.  5. Rectal bleeding, which would show as bright red, maroon, or black stools.   (A tablespoon of blood from the rectum is not serious, especially if    hemorrhoids are present.)  6. Vomiting.  7. Weakness or dizziness.  GO DIRECTLY TO THE NEAREST EMERGENCY ROOM IF YOU HAVE ANY OF THE FOLLOWING:      Difficulty breathing              Chills and/or fever over 101 F   Persistent vomiting and/or vomiting blood   Severe abdominal pain   Severe chest pain   Black, tarry stools   Bleeding- more than one tablespoon   Any other symptom or condition that you feel may need urgent attention  Your doctor recommends these additional instructions:  If any biopsies were taken, your doctors clinic will contact you in 1 to 2   weeks with any results.  - Return to referring physician.   - Continue current medications   - Will follow results of pH study  For questions, problems or results please call your physician - Debbie Butler MD at Work:  [phone].  [facility] can be reached at [phone]     IF A COMPLICATION OR EMERGENCY SITUATION ARISES AND YOU ARE UNABLE TO REACH   YOUR PHYSICIAN - GO DIRECTLY TO THE EMERGENCY ROOM.  Debbie Butler MD  6/3/2022 12:31:19 PM  This report has been verified and signed electronically.  Dear patient,  As a result of recent federal legislation (The Federal Cures Act), you may   receive lab or pathology results from your procedure in your [facility]   account before your physician is able to contact you. Your physician or   their representative will relay the results to you with their   recommendations at their soonest availability.  Thank you,  PROVATION

## 2022-06-07 PROCEDURE — 91037 ESOPH IMPED FUNCTION TEST: CPT | Mod: 26,,, | Performed by: STUDENT IN AN ORGANIZED HEALTH CARE EDUCATION/TRAINING PROGRAM

## 2022-06-07 PROCEDURE — 91037 PR GERD TST W/ NASAL IMPEDENCE ELECTROD: ICD-10-PCS | Mod: 26,,, | Performed by: STUDENT IN AN ORGANIZED HEALTH CARE EDUCATION/TRAINING PROGRAM

## 2022-06-07 NOTE — PROVATION PATIENT INSTRUCTIONS
Discharge Summary/Instructions after an Endoscopic Procedure  Patient Name: Maria Ines Ac  Patient MRN: [id number]  Patient YOB: 1985 Tuesday, June 7, 2022  Debbie Butler MD  Dear patient,  As a result of recent federal legislation (The Federal Cures Act), you may   receive lab or pathology results from your procedure in your [facility]   account before your physician is able to contact you. Your physician or   their representative will relay the results to you with their   recommendations at their soonest availability.  Thank you,  RESTRICTIONS:  During your procedure today, you received medications for sedation.  These   medications may affect your judgment, balance and coordination.  Therefore,   for 24 hours, you have the following restrictions:   - DO NOT drive a car, operate machinery, make legal/financial decisions,   sign important papers or drink alcohol.    ACTIVITY:  Today: no heavy lifting, straining or running due to procedural   sedation/anesthesia.  The following day: return to full activity including work.  DIET:  Eat and drink normally unless instructed otherwise.     TREATMENT FOR COMMON SIDE EFFECTS:  - Mild abdominal pain, nausea, belching, bloating or excessive gas:  rest,   eat lightly and use a heating pad.  - Sore Throat: treat with throat lozenges and/or gargle with warm salt   water.  - Because air was used during the procedure, expelling large amounts of air   from your rectum or belching is normal.  - If a bowel prep was taken, you may not have a bowel movement for 1-3 days.    This is normal.  SYMPTOMS TO WATCH FOR AND REPORT TO YOUR PHYSICIAN:  1. Abdominal pain or bloating, other than gas cramps.  2. Chest pain.  3. Back pain.  4. Signs of infection such as: chills or fever occurring within 24 hours   after the procedure.  5. Rectal bleeding, which would show as bright red, maroon, or black stools.   (A tablespoon of blood from the rectum is not serious, especially if    hemorrhoids are present.)  6. Vomiting.  7. Weakness or dizziness.  GO DIRECTLY TO THE NEAREST EMERGENCY ROOM IF YOU HAVE ANY OF THE FOLLOWING:      Difficulty breathing              Chills and/or fever over 101 F   Persistent vomiting and/or vomiting blood   Severe abdominal pain   Severe chest pain   Black, tarry stools   Bleeding- more than one tablespoon   Any other symptom or condition that you feel may need urgent attention  Your doctor recommends these additional instructions:  If any biopsies were taken, your doctors clinic will contact you in 1 to 2   weeks with any results.  - Return to referring physician.   - Optimize anti-reflux medication  For questions, problems or results please call your physician - Debbie Butler MD at Work:  [phone].  [facility], EMERGENCY ROOM PHONE NUMBER: [phone]  IF A COMPLICATION OR EMERGENCY SITUATION ARISES AND YOU ARE UNABLE TO REACH   YOUR PHYSICIAN - GO DIRECTLY TO THE EMERGENCY ROOM.  Debbie uBtler MD  6/7/2022 11:08:33 AM  This report has been verified and signed electronically.  Dear patient,  As a result of recent federal legislation (The Federal Cures Act), you may   receive lab or pathology results from your procedure in your [facility]   account before your physician is able to contact you. Your physician or   their representative will relay the results to you with their   recommendations at their soonest availability.  Thank you,  PROVATION

## 2022-06-08 ENCOUNTER — TELEPHONE (OUTPATIENT)
Dept: GASTROENTEROLOGY | Facility: CLINIC | Age: 37
End: 2022-06-08
Payer: MEDICARE

## 2022-06-08 ENCOUNTER — PATIENT MESSAGE (OUTPATIENT)
Dept: GASTROENTEROLOGY | Facility: CLINIC | Age: 37
End: 2022-06-08
Payer: MEDICARE

## 2022-06-08 DIAGNOSIS — E11.9 TYPE 2 DIABETES MELLITUS WITHOUT COMPLICATION: ICD-10-CM

## 2022-06-08 NOTE — TELEPHONE ENCOUNTER
----- Message from Slick Herron MD sent at 6/8/2022  8:02 AM CDT -----  She needs f/u office visit

## 2022-06-15 DIAGNOSIS — E11.9 TYPE 2 DIABETES MELLITUS WITHOUT COMPLICATION: ICD-10-CM

## 2022-06-16 ENCOUNTER — OFFICE VISIT (OUTPATIENT)
Dept: OTOLARYNGOLOGY | Facility: CLINIC | Age: 37
End: 2022-06-16
Payer: MEDICARE

## 2022-06-16 DIAGNOSIS — J31.0 CHRONIC RHINITIS: Primary | ICD-10-CM

## 2022-06-16 PROCEDURE — 99214 OFFICE O/P EST MOD 30 MIN: CPT | Mod: S$GLB,,, | Performed by: NURSE PRACTITIONER

## 2022-06-16 PROCEDURE — 1160F RVW MEDS BY RX/DR IN RCRD: CPT | Mod: CPTII,S$GLB,, | Performed by: NURSE PRACTITIONER

## 2022-06-16 PROCEDURE — 99214 PR OFFICE/OUTPT VISIT, EST, LEVL IV, 30-39 MIN: ICD-10-PCS | Mod: S$GLB,,, | Performed by: NURSE PRACTITIONER

## 2022-06-16 PROCEDURE — 1159F PR MEDICATION LIST DOCUMENTED IN MEDICAL RECORD: ICD-10-PCS | Mod: CPTII,S$GLB,, | Performed by: NURSE PRACTITIONER

## 2022-06-16 PROCEDURE — 99999 PR PBB SHADOW E&M-EST. PATIENT-LVL IV: ICD-10-PCS | Mod: PBBFAC,,, | Performed by: NURSE PRACTITIONER

## 2022-06-16 PROCEDURE — 99999 PR PBB SHADOW E&M-EST. PATIENT-LVL IV: CPT | Mod: PBBFAC,,, | Performed by: NURSE PRACTITIONER

## 2022-06-16 PROCEDURE — 1160F PR REVIEW ALL MEDS BY PRESCRIBER/CLIN PHARMACIST DOCUMENTED: ICD-10-PCS | Mod: CPTII,S$GLB,, | Performed by: NURSE PRACTITIONER

## 2022-06-16 PROCEDURE — 1159F MED LIST DOCD IN RCRD: CPT | Mod: CPTII,S$GLB,, | Performed by: NURSE PRACTITIONER

## 2022-06-16 RX ORDER — AZELASTINE 1 MG/ML
1 SPRAY, METERED NASAL 2 TIMES DAILY
Qty: 30 ML | Refills: 3 | Status: SHIPPED | OUTPATIENT
Start: 2022-06-16 | End: 2023-11-14 | Stop reason: SDUPTHER

## 2022-06-16 NOTE — PATIENT INSTRUCTIONS
Chronic rhinitis  -  Continue zyrtec - one tablet daily  -  Continue Fluticasone (Flonase) - 2 sprays each nostril daily  -  Rx azelastine (Astelin) - 1 spray each nostril twice daily

## 2022-06-16 NOTE — PROGRESS NOTES
Subjective:      Maria Ines is a 37 y.o. female who comes for follow-up of sinusitis.  Her last visit with me was on 4/8/22.  She reports having headaches and ear pain for the past week. She reports associated sneezing and nasal congestion. She has been using zyrtec and fluticasone.  She has been using Allegra and fluticasone daily which she finds is usually helpful. She denies fever or chills, nausea or diarrhea. She denies shortness of breath or cough.       The patient's medications, allergies, past medical, surgical, social and family histories were reviewed and updated as appropriate.    A detailed review of systems was obtained with pertinent positives as per the above HPI, and otherwise negative.        Objective:     LMP 11/17/2012 (LMP Unknown) Comment: Neg UPT       Constitutional:   She is oriented to person, place, and time. Vital signs are normal. She appears well-developed and well-nourished. She appears alert. Normal speech.      Head:  Normocephalic and atraumatic.     Ears:    Right Ear: No lacerations. No drainage, swelling or tenderness. No foreign bodies. No mastoid tenderness. Tympanic membrane is not injected, not scarred, not perforated, not erythematous, not retracted and not bulging. Tympanic membrane mobility is normal. No middle ear effusion. No hemotympanum.   Left Ear: No lacerations. No drainage, swelling or tenderness. No foreign bodies. No mastoid tenderness. Tympanic membrane is not injected, not scarred, not perforated, not erythematous, not retracted and not bulging. Tympanic membrane mobility is normal.  No middle ear effusion. No hemotympanum.     Nose:  Mucosal edema and rhinorrhea present. No nose lacerations, sinus tenderness, septal deviation, nasal septal hematoma or polyps. No epistaxis.  No foreign bodies. No turbinate hypertrophy.  Right sinus exhibits no maxillary sinus tenderness and no frontal sinus tenderness. Left sinus exhibits no maxillary sinus tenderness and no  frontal sinus tenderness.         Mouth/Throat  Oropharynx clear and moist without lesions or asymmetry, normal uvula midline and lips, teeth, and gums normal. No uvula swelling, oral lesions, trismus, mucous membrane lesions or xerostomia. No oropharyngeal exudate, posterior oropharyngeal edema or posterior oropharyngeal erythema.     Neck:  Neck normal without thyromegaly masses, asymmetry, normal tracheal structure, crepitus, and tenderness and no adenopathy.     Psychiatric:   She has a normal mood and affect. Her speech is normal and behavior is normal.     Neurological:   She is alert and oriented to person, place, and time. No cranial nerve deficit.     Skin:   No abrasions, lacerations, lesions, or rashes.       Procedure    None      Data Reviewed    WBC (K/uL)   Date Value   10/04/2021 9.35     Eosinophil % (%)   Date Value   10/04/2021 3.6     Eos # (K/uL)   Date Value   10/04/2021 0.3     Platelets (K/uL)   Date Value   10/04/2021 238     Glucose (mg/dL)   Date Value   10/04/2021 86     No results found for: IGE        Assessment:     1. Chronic rhinitis         Plan:       Chronic rhinitis  -  Continue zyrtec - one tablet daily  -  Continue Fluticasone (Flonase) - 2 sprays each nostril daily  -  Rx azelastine (Astelin) - 1 spray each nostril twice daily        Follow up if symptoms worsen or fail to improve.

## 2022-06-22 DIAGNOSIS — E11.9 TYPE 2 DIABETES MELLITUS WITHOUT COMPLICATION: ICD-10-CM

## 2022-06-27 RX ORDER — ATORVASTATIN CALCIUM 40 MG/1
40 TABLET, FILM COATED ORAL DAILY
Qty: 90 TABLET | Refills: 3 | Status: SHIPPED | OUTPATIENT
Start: 2022-06-27 | End: 2022-11-14

## 2022-06-29 DIAGNOSIS — E11.9 TYPE 2 DIABETES MELLITUS WITHOUT COMPLICATION: ICD-10-CM

## 2022-07-13 ENCOUNTER — OFFICE VISIT (OUTPATIENT)
Dept: INTERNAL MEDICINE | Facility: CLINIC | Age: 37
End: 2022-07-13
Payer: MEDICARE

## 2022-07-13 DIAGNOSIS — E66.01 SEVERE OBESITY (BMI 35.0-39.9) WITH COMORBIDITY: ICD-10-CM

## 2022-07-13 DIAGNOSIS — E11.9 DIABETES MELLITUS WITHOUT COMPLICATION: ICD-10-CM

## 2022-07-13 DIAGNOSIS — I20.89 STABLE ANGINA: ICD-10-CM

## 2022-07-13 DIAGNOSIS — F25.1 SCHIZOAFFECTIVE DISORDER, DEPRESSIVE TYPE: ICD-10-CM

## 2022-07-13 DIAGNOSIS — I10 HYPERTENSION, UNSPECIFIED TYPE: Primary | ICD-10-CM

## 2022-07-13 DIAGNOSIS — E11.9 TYPE 2 DIABETES MELLITUS WITHOUT COMPLICATION: ICD-10-CM

## 2022-07-13 PROCEDURE — 3079F PR MOST RECENT DIASTOLIC BLOOD PRESSURE 80-89 MM HG: ICD-10-PCS | Mod: CPTII,S$GLB,, | Performed by: INTERNAL MEDICINE

## 2022-07-13 PROCEDURE — 3044F HG A1C LEVEL LT 7.0%: CPT | Mod: CPTII,S$GLB,, | Performed by: INTERNAL MEDICINE

## 2022-07-13 PROCEDURE — 1159F MED LIST DOCD IN RCRD: CPT | Mod: CPTII,S$GLB,, | Performed by: INTERNAL MEDICINE

## 2022-07-13 PROCEDURE — 3079F DIAST BP 80-89 MM HG: CPT | Mod: CPTII,S$GLB,, | Performed by: INTERNAL MEDICINE

## 2022-07-13 PROCEDURE — 99214 PR OFFICE/OUTPT VISIT, EST, LEVL IV, 30-39 MIN: ICD-10-PCS | Mod: S$GLB,,, | Performed by: INTERNAL MEDICINE

## 2022-07-13 PROCEDURE — 3074F SYST BP LT 130 MM HG: CPT | Mod: CPTII,S$GLB,, | Performed by: INTERNAL MEDICINE

## 2022-07-13 PROCEDURE — 3074F PR MOST RECENT SYSTOLIC BLOOD PRESSURE < 130 MM HG: ICD-10-PCS | Mod: CPTII,S$GLB,, | Performed by: INTERNAL MEDICINE

## 2022-07-13 PROCEDURE — 99214 OFFICE O/P EST MOD 30 MIN: CPT | Mod: S$GLB,,, | Performed by: INTERNAL MEDICINE

## 2022-07-13 PROCEDURE — 3008F BODY MASS INDEX DOCD: CPT | Mod: CPTII,S$GLB,, | Performed by: INTERNAL MEDICINE

## 2022-07-13 PROCEDURE — 3066F PR DOCUMENTATION OF TREATMENT FOR NEPHROPATHY: ICD-10-PCS | Mod: CPTII,S$GLB,, | Performed by: INTERNAL MEDICINE

## 2022-07-13 PROCEDURE — 3008F PR BODY MASS INDEX (BMI) DOCUMENTED: ICD-10-PCS | Mod: CPTII,S$GLB,, | Performed by: INTERNAL MEDICINE

## 2022-07-13 PROCEDURE — 99999 PR PBB SHADOW E&M-EST. PATIENT-LVL V: CPT | Mod: PBBFAC,,, | Performed by: INTERNAL MEDICINE

## 2022-07-13 PROCEDURE — 3044F PR MOST RECENT HEMOGLOBIN A1C LEVEL <7.0%: ICD-10-PCS | Mod: CPTII,S$GLB,, | Performed by: INTERNAL MEDICINE

## 2022-07-13 PROCEDURE — 1159F PR MEDICATION LIST DOCUMENTED IN MEDICAL RECORD: ICD-10-PCS | Mod: CPTII,S$GLB,, | Performed by: INTERNAL MEDICINE

## 2022-07-13 PROCEDURE — 3061F PR NEG MICROALBUMINURIA RESULT DOCUMENTED/REVIEW: ICD-10-PCS | Mod: CPTII,S$GLB,, | Performed by: INTERNAL MEDICINE

## 2022-07-13 PROCEDURE — 99999 PR PBB SHADOW E&M-EST. PATIENT-LVL V: ICD-10-PCS | Mod: PBBFAC,,, | Performed by: INTERNAL MEDICINE

## 2022-07-13 PROCEDURE — 3066F NEPHROPATHY DOC TX: CPT | Mod: CPTII,S$GLB,, | Performed by: INTERNAL MEDICINE

## 2022-07-13 PROCEDURE — 3061F NEG MICROALBUMINURIA REV: CPT | Mod: CPTII,S$GLB,, | Performed by: INTERNAL MEDICINE

## 2022-07-13 RX ORDER — AMOXICILLIN 875 MG/1
875 TABLET, FILM COATED ORAL 2 TIMES DAILY
Qty: 14 TABLET | Refills: 0 | Status: SHIPPED | OUTPATIENT
Start: 2022-07-13 | End: 2022-07-20

## 2022-07-19 ENCOUNTER — LAB VISIT (OUTPATIENT)
Dept: LAB | Facility: HOSPITAL | Age: 37
End: 2022-07-19
Attending: INTERNAL MEDICINE
Payer: MEDICARE

## 2022-07-19 VITALS
TEMPERATURE: 99 F | WEIGHT: 217.13 LBS | BODY MASS INDEX: 39.96 KG/M2 | DIASTOLIC BLOOD PRESSURE: 82 MMHG | HEIGHT: 62 IN | SYSTOLIC BLOOD PRESSURE: 106 MMHG | OXYGEN SATURATION: 96 % | HEART RATE: 94 BPM

## 2022-07-19 DIAGNOSIS — I10 HYPERTENSION, UNSPECIFIED TYPE: ICD-10-CM

## 2022-07-19 DIAGNOSIS — E11.9 DIABETES MELLITUS WITHOUT COMPLICATION: ICD-10-CM

## 2022-07-19 DIAGNOSIS — Z11.59 ENCOUNTER FOR HEPATITIS C SCREENING TEST FOR LOW RISK PATIENT: ICD-10-CM

## 2022-07-19 DIAGNOSIS — Z11.4 ENCOUNTER FOR SCREENING FOR HIV: ICD-10-CM

## 2022-07-19 PROBLEM — F25.1 SCHIZOAFFECTIVE DISORDER, DEPRESSIVE TYPE: Status: ACTIVE | Noted: 2022-07-19

## 2022-07-19 PROBLEM — I20.89 STABLE ANGINA: Status: ACTIVE | Noted: 2022-07-19

## 2022-07-19 LAB
ALBUMIN SERPL BCP-MCNC: 4.5 G/DL (ref 3.5–5.2)
ALP SERPL-CCNC: 61 U/L (ref 55–135)
ALT SERPL W/O P-5'-P-CCNC: 21 U/L (ref 10–44)
ANION GAP SERPL CALC-SCNC: 7 MMOL/L (ref 8–16)
AST SERPL-CCNC: 21 U/L (ref 10–40)
BILIRUB SERPL-MCNC: 0.4 MG/DL (ref 0.1–1)
BUN SERPL-MCNC: 10 MG/DL (ref 6–20)
CALCIUM SERPL-MCNC: 9.6 MG/DL (ref 8.7–10.5)
CHLORIDE SERPL-SCNC: 105 MMOL/L (ref 95–110)
CHOLEST SERPL-MCNC: 149 MG/DL (ref 120–199)
CHOLEST/HDLC SERPL: 4 {RATIO} (ref 2–5)
CO2 SERPL-SCNC: 27 MMOL/L (ref 23–29)
CREAT SERPL-MCNC: 1.2 MG/DL (ref 0.5–1.4)
EST. GFR  (AFRICAN AMERICAN): >60 ML/MIN/1.73 M^2
EST. GFR  (NON AFRICAN AMERICAN): 57.1 ML/MIN/1.73 M^2
ESTIMATED AVG GLUCOSE: 108 MG/DL (ref 68–131)
GLUCOSE SERPL-MCNC: 97 MG/DL (ref 70–110)
HBA1C MFR BLD: 5.4 % (ref 4–5.6)
HDLC SERPL-MCNC: 37 MG/DL (ref 40–75)
HDLC SERPL: 24.8 % (ref 20–50)
LDLC SERPL CALC-MCNC: 82 MG/DL (ref 63–159)
NONHDLC SERPL-MCNC: 112 MG/DL
POTASSIUM SERPL-SCNC: 4.3 MMOL/L (ref 3.5–5.1)
PROT SERPL-MCNC: 7.8 G/DL (ref 6–8.4)
SODIUM SERPL-SCNC: 139 MMOL/L (ref 136–145)
TRIGL SERPL-MCNC: 150 MG/DL (ref 30–150)

## 2022-07-19 PROCEDURE — 87389 HIV-1 AG W/HIV-1&-2 AB AG IA: CPT | Performed by: NURSE PRACTITIONER

## 2022-07-19 PROCEDURE — 36415 COLL VENOUS BLD VENIPUNCTURE: CPT | Performed by: INTERNAL MEDICINE

## 2022-07-19 PROCEDURE — 80053 COMPREHEN METABOLIC PANEL: CPT | Performed by: INTERNAL MEDICINE

## 2022-07-19 PROCEDURE — 83036 HEMOGLOBIN GLYCOSYLATED A1C: CPT | Performed by: INTERNAL MEDICINE

## 2022-07-19 PROCEDURE — 86803 HEPATITIS C AB TEST: CPT | Performed by: NURSE PRACTITIONER

## 2022-07-19 PROCEDURE — 80061 LIPID PANEL: CPT | Performed by: INTERNAL MEDICINE

## 2022-07-20 LAB
HCV AB SERPL QL IA: NEGATIVE
HIV 1+2 AB+HIV1 P24 AG SERPL QL IA: NEGATIVE

## 2022-07-20 NOTE — PROGRESS NOTES
Subjective:       Patient ID: Maria Ines Ac is a 37 y.o. female.    Chief Complaint: Hypertension    HPI  She returns for management of hypertension.  She has had hypertension for over a year.  Current treatment has included medications outlined in medication list.  She denies chest pain or shortness of breath.  No palpitations.  Denies left arm or neck pain.  She has diabetes.  Denies polyuria, polydipsia    Past medical history: Hypertension, hyperlipidemia, diabetes, hypothyroidism, bipolar disorder, migraine headaches, status post hysterectomy, family history of colon cancer-brother.  She had a colonoscopy December 2021      Medications: Synthroid , metformin, Toprol-XL 25mg daily,Lipitor 40 mg daily, trulicity      NO KNOWN DRUG ALLERGIES      Review of Systems   Constitutional: Negative for chills, fatigue, fever and unexpected weight change.   Respiratory: Negative for chest tightness and shortness of breath.    Cardiovascular: Negative for chest pain and palpitations.   Gastrointestinal: Negative for abdominal pain and blood in stool.   Neurological: Negative for dizziness, syncope, numbness and headaches.       Objective:      Physical Exam  HENT:      Right Ear: External ear normal.      Left Ear: External ear normal.      Nose: Nose normal.      Mouth/Throat:      Mouth: Mucous membranes are moist.      Pharynx: Oropharynx is clear.   Eyes:      Pupils: Pupils are equal, round, and reactive to light.   Cardiovascular:      Rate and Rhythm: Normal rate and regular rhythm.      Heart sounds: No murmur heard.  Pulmonary:      Breath sounds: Normal breath sounds.   Chest:   Breasts:      Right: No axillary adenopathy.      Left: No axillary adenopathy.       Abdominal:      General: There is no distension.      Palpations: There is no hepatomegaly or splenomegaly.      Tenderness: There is no abdominal tenderness.   Musculoskeletal:      Cervical back: Normal range of motion.   Lymphadenopathy:       Cervical: No cervical adenopathy.      Upper Body:      Right upper body: No axillary adenopathy.      Left upper body: No axillary adenopathy.   Neurological:      Cranial Nerves: No cranial nerve deficit.      Sensory: No sensory deficit.      Motor: Motor function is intact.      Deep Tendon Reflexes: Reflexes are normal and symmetric.         Assessment/Plan       Assessment and plan:  1.  Hypertension:  Check CMP and lipid panel  2. Diabetes:  Check A1c and urine microalbumin

## 2022-07-25 ENCOUNTER — TELEPHONE (OUTPATIENT)
Dept: INTERNAL MEDICINE | Facility: CLINIC | Age: 37
End: 2022-07-25
Payer: MEDICARE

## 2022-07-25 NOTE — TELEPHONE ENCOUNTER
----- Message from Celena Metz sent at 7/25/2022  2:24 PM CDT -----  Contact: [phone]  Pt called to advise that she is having issues with her sinus, her ears, and sore throat still. She would like to speak to the nurse. Please Advise

## 2022-08-04 ENCOUNTER — OFFICE VISIT (OUTPATIENT)
Dept: GASTROENTEROLOGY | Facility: CLINIC | Age: 37
End: 2022-08-04
Payer: MEDICARE

## 2022-08-04 VITALS
DIASTOLIC BLOOD PRESSURE: 74 MMHG | BODY MASS INDEX: 40.21 KG/M2 | HEIGHT: 62 IN | SYSTOLIC BLOOD PRESSURE: 112 MMHG | WEIGHT: 218.5 LBS | HEART RATE: 83 BPM

## 2022-08-04 DIAGNOSIS — K58.0 IRRITABLE BOWEL SYNDROME WITH DIARRHEA: ICD-10-CM

## 2022-08-04 DIAGNOSIS — K21.9 GASTROESOPHAGEAL REFLUX DISEASE WITHOUT ESOPHAGITIS: Primary | ICD-10-CM

## 2022-08-04 PROCEDURE — 3078F PR MOST RECENT DIASTOLIC BLOOD PRESSURE < 80 MM HG: ICD-10-PCS | Mod: CPTII,S$GLB,, | Performed by: INTERNAL MEDICINE

## 2022-08-04 PROCEDURE — 3044F PR MOST RECENT HEMOGLOBIN A1C LEVEL <7.0%: ICD-10-PCS | Mod: CPTII,S$GLB,, | Performed by: INTERNAL MEDICINE

## 2022-08-04 PROCEDURE — 3008F BODY MASS INDEX DOCD: CPT | Mod: CPTII,S$GLB,, | Performed by: INTERNAL MEDICINE

## 2022-08-04 PROCEDURE — 3066F NEPHROPATHY DOC TX: CPT | Mod: CPTII,S$GLB,, | Performed by: INTERNAL MEDICINE

## 2022-08-04 PROCEDURE — 3061F NEG MICROALBUMINURIA REV: CPT | Mod: CPTII,S$GLB,, | Performed by: INTERNAL MEDICINE

## 2022-08-04 PROCEDURE — 3074F PR MOST RECENT SYSTOLIC BLOOD PRESSURE < 130 MM HG: ICD-10-PCS | Mod: CPTII,S$GLB,, | Performed by: INTERNAL MEDICINE

## 2022-08-04 PROCEDURE — 1159F PR MEDICATION LIST DOCUMENTED IN MEDICAL RECORD: ICD-10-PCS | Mod: CPTII,S$GLB,, | Performed by: INTERNAL MEDICINE

## 2022-08-04 PROCEDURE — 3061F PR NEG MICROALBUMINURIA RESULT DOCUMENTED/REVIEW: ICD-10-PCS | Mod: CPTII,S$GLB,, | Performed by: INTERNAL MEDICINE

## 2022-08-04 PROCEDURE — 3066F PR DOCUMENTATION OF TREATMENT FOR NEPHROPATHY: ICD-10-PCS | Mod: CPTII,S$GLB,, | Performed by: INTERNAL MEDICINE

## 2022-08-04 PROCEDURE — 99214 PR OFFICE/OUTPT VISIT, EST, LEVL IV, 30-39 MIN: ICD-10-PCS | Mod: S$GLB,,, | Performed by: INTERNAL MEDICINE

## 2022-08-04 PROCEDURE — 3008F PR BODY MASS INDEX (BMI) DOCUMENTED: ICD-10-PCS | Mod: CPTII,S$GLB,, | Performed by: INTERNAL MEDICINE

## 2022-08-04 PROCEDURE — 99999 PR PBB SHADOW E&M-EST. PATIENT-LVL IV: CPT | Mod: PBBFAC,,, | Performed by: INTERNAL MEDICINE

## 2022-08-04 PROCEDURE — 99214 OFFICE O/P EST MOD 30 MIN: CPT | Mod: S$GLB,,, | Performed by: INTERNAL MEDICINE

## 2022-08-04 PROCEDURE — 3044F HG A1C LEVEL LT 7.0%: CPT | Mod: CPTII,S$GLB,, | Performed by: INTERNAL MEDICINE

## 2022-08-04 PROCEDURE — 99999 PR PBB SHADOW E&M-EST. PATIENT-LVL IV: ICD-10-PCS | Mod: PBBFAC,,, | Performed by: INTERNAL MEDICINE

## 2022-08-04 PROCEDURE — 1159F MED LIST DOCD IN RCRD: CPT | Mod: CPTII,S$GLB,, | Performed by: INTERNAL MEDICINE

## 2022-08-04 PROCEDURE — 3074F SYST BP LT 130 MM HG: CPT | Mod: CPTII,S$GLB,, | Performed by: INTERNAL MEDICINE

## 2022-08-04 PROCEDURE — 3078F DIAST BP <80 MM HG: CPT | Mod: CPTII,S$GLB,, | Performed by: INTERNAL MEDICINE

## 2022-08-04 RX ORDER — DEXLANSOPRAZOLE 60 MG/1
60 CAPSULE, DELAYED RELEASE ORAL 2 TIMES DAILY
Qty: 60 CAPSULE | Refills: 11 | Status: SHIPPED | OUTPATIENT
Start: 2022-08-04 | End: 2023-01-12 | Stop reason: SDUPTHER

## 2022-08-04 NOTE — PROGRESS NOTES
Subjective:       Patient ID: Maria Ines Ac is a 37 y.o. female.    Chief Complaint: Abdominal Pain    HPI     Here for follow-up visit.  37-year-old woman with longstanding reflux, IBS, chronic abdominal pain.  Her last visit with me was in April.  At that time she was having persistent reflux despite PPI twice daily.  She was having diarrhea but she was on MiraLax at the time as that was preferable to the constipation that she has suffered with before    Based on that I ordered 24 hour impedance study.  The manometry was normal showing some possible regurgitation.  The it 24 hour impedance study was definitely abnormal.  Despite being on medication she had abnormal acid reflux.    I reviewed her current symptoms she continues with a variety of symptoms.  She has a sharp pain in the epigastric area.  It is there all the time.  It is present the morning before breakfast gets worse after eating.  She is taking pantoprazole every morning and Prilosec every evening but still has symptoms.  She feels like this epigastric pain is different from heartburn.  She is taking Bentyl.  She is no longer taking Carafate because that did help.  She still has diarrhea with 4 loose stools per day and she is no longer taking the MiraLax.  She complains of early satiety.  She complains of abdominal bloating.  She has had weight gain.    Social history  According to the patient and the mother she drinks a lot of whole milk every day.    Past Medical History:   Diagnosis Date    Blood in stool     Constipation     Cystitis     Depression     Diabetes mellitus, type 2     Dysphagia     Endometriosis     GERD (gastroesophageal reflux disease)     Headache     Hypertension     Palpitations     Premature menopause on hormone replacement therapy     Thyroid disease        Review of patient's allergies indicates:  No Known Allergies     Family History   Problem Relation Age of Onset    Breast cancer Mother 50         alive at 64, unilateral    Esophageal cancer Mother 63    Ovarian cancer Mother 63    Anesthesia problems Mother     Cervical cancer Maternal Grandmother         unknown age of onset,  at 80    Colon cancer Brother 40    Breast cancer Paternal Aunt         unknown age of onset, alive at 70    Breast cancer Maternal Aunt 72        alive at 72    Vaginal cancer Neg Hx     Endometrial cancer Neg Hx     Crohn's disease Neg Hx     Ulcerative colitis Neg Hx     Stomach cancer Neg Hx     Irritable bowel syndrome Neg Hx     Celiac disease Neg Hx        Social History     Tobacco Use    Smoking status: Never Smoker    Smokeless tobacco: Never Used   Substance Use Topics    Alcohol use: No    Drug use: No        Review of Systems    CMP   Lab Results   Component Value Date     2022    K 4.3 2022     2022    CO2 27 2022    GLU 97 2022    BUN 10 2022    CREATININE 1.2 2022    CALCIUM 9.6 2022    PROT 7.8 2022    ALBUMIN 4.5 2022    BILITOT 0.4 2022    ALKPHOS 61 2022    AST 21 2022    ALT 21 2022    ANIONGAP 7 (L) 2022    ESTGFRAFRICA >60.0 2022    EGFRNONAA 57.1 (A) 2022    and CBC No results found for: WBC, HGB, HCT, PLT    No results found for this or any previous visit from the past 365 days.             Objective:      Physical Exam  Abdominal:      General: Abdomen is protuberant. Bowel sounds are normal. There is no distension or abdominal bruit. There are no signs of injury.      Palpations: Abdomen is soft. There is no shifting dullness, hepatomegaly or mass.      Tenderness: There is generalized abdominal tenderness.         Assessment & Plan:       Gastroesophageal reflux disease without esophagitis  -     NM Gastric Emptying; Future; Expected date: 2022  -     dexlansoprazole (DEXILANT) 60 mg capsule; Take 1 capsule (60 mg total) by mouth 2 (two) times a day.  Dispense: 60  capsule; Refill: 11    Irritable bowel syndrome with diarrhea     Assessment.  1. Gastroesophageal reflux.  We have documented severe reflux on the impedance study.  This is concerning and going to be difficult to manage.  I do not think she would be a good candidate for surgery just because of her chronic IBS and chronic abdominal pain.  I think he to these problems might get worse after surgery but it may be at least something to keep on the table in consideration.  I am going to try to get Dexilant approved approved for twice a day dosing instead of pantoprazole and Prilosec to see if that gives any further acid relief.  I think seems reasonable since we have clearly documented abnormal acid breakthrough reflux despite the current regimen.  I also 1 again check gastric emptying scan.  She definitely has some early satiety.  If there was a component of gastroparesis that would be making reflux worse as well.  Other thoughts for reflux control include baclofen.  Will also consider Reglan if there is abnormal gastric emptying.  2. IBS diarrhea predominant.  With her drinking multiple glasses of home milk every day it could baby via factor in IBS.  There could be a component of lactose intolerance.  I encouraged her for the next month try an alternative to whole milk, something that is lactose-free.    Recommendation  1. Repeat gastric emptying scan  2. Try to get Dexilant 60 twice a day approved  3. Try a lactose-free products from milk  4. Follow-up when appointment is available    25 minutes appointment greater half time face-to-face counseling  This note was created with voice recognition dictation technology.  There may be errors that I did not see, detect or correct.      Slick Herron MD

## 2022-08-10 ENCOUNTER — TELEPHONE (OUTPATIENT)
Dept: GASTROENTEROLOGY | Facility: CLINIC | Age: 37
End: 2022-08-10
Payer: MEDICARE

## 2022-08-10 NOTE — TELEPHONE ENCOUNTER
Spoke with patient.   Aware her insurance company will not pay for twice a day dosing on Dexilant.  Aware  would like for her to take the Dexilant in the AM and an over the counter PPI in the evening.                         [facility] pharmacy aware.   Toya

## 2022-08-21 NOTE — PROGRESS NOTES
"Patient ID: Maria Ines Ac is a 34 y.o. female.    Chief Complaint: Follow-up (3 mth Follow up)        HPI:  Established patient presents today for follow-up.    Breast History:    At initial visit with me on 18, pt reported bilateral breast pain, onset 2018, upper regions of both breasts (same area on both breasts), constant, 9/10.  Stated, "I feel like my breasts have both grown since September."  Had tried Aleve without relief.  Was wearing underwire bra.    Personal history of breast cancer:  no  Personal history of breast biopsy:  no  Personal history of breast surgery:  no    Breast Imaging    [facility] [employer] [facility] and [facility] [facility]  18 report reviewed:  Breasts almost entirely fatty  BI-RADS 1  TC lifetime risk 26.62%    Interval Breast History    Patient reports above bilat breast pain is unchanged.  Has still only tried Aleve with no relief.  Still wearing bras with underwire.    Patient denies palpable breast mass, breast-related skin changes, nipple discharge and nipple retraction.    Pt has seen breast surgeon Dr. Child and medical oncologist Dr. Pond.     GYN History:  Age of menarche:  8 y/o  Uterus and ovaries:  S/p hysterectomy and BSO ("they took everything") at around 28 y/o  Menopause:  yes, surgical   HRT usage:  current user, estrogen-only, has been using for more than 10 yrs       Other Oncologic History:  Personal history of other cancer not abovementioned:  no  Personal history of genetic testing:  no     Social History:  Tobacco use:  denies  Alcohol use:  denies     Family Oncologic History:     Ashkenazi Bahai ancestry:  no      Family History   Problem Relation Age of Onset    Breast cancer Mother 50        alive at 64, unilateral    Esophageal cancer Mother 63    Ovarian cancer Mother 63    Cervical cancer Maternal Grandmother         unknown age of onset,  at 80    Colon cancer Brother 40    Breast cancer Paternal Aunt         unknown " age of onset, alive at 70    Breast cancer Maternal Aunt 72        alive at 72     History of genetic testing in relatives:  no      Review of Systems - See HPI.  Objective:   Physical Exam   Pulmonary/Chest: Effort normal. No respiratory distress. She exhibits no mass, no edema and no deformity. Right breast exhibits no inverted nipple, no mass, no nipple discharge, no skin change and no tenderness. Left breast exhibits no inverted nipple, no mass, no nipple discharge, no skin change and no tenderness. No breast swelling. Breasts are symmetrical.   Genitourinary: No breast swelling.   Lymphadenopathy:     She has no cervical adenopathy.     She has no axillary adenopathy.        Right: No supraclavicular adenopathy present.        Left: No supraclavicular adenopathy present.     Assessment:       1. Breast pain    2. At high risk for breast cancer    3. Family history of breast cancer    4. Family history of ovarian cancer          Plan:     Clinically DL today.    [facility] breast MRI due around 6/26/2019 along with next CBE.  Serum creatinine level needs to be collected within the 4 weeks preceding the breast MRI, but before the day of the breast MRI.    Offered pt referral to [facility] secondary to her increased risk of breast cancer along with menopausal symptoms.  Pt desires to proceed with this.  Referral placed.  Provided pt with Dr. Brian's office number for pt to call to set up appt.    Discussed d/c use of underwire bra.  Advised pt to try sports bra that is well supporting and compressive to help alleviate breast pain.  Not suggestive of pathology given recent normal imaging, today's normal CBE, and bilateral diffuseness of pain.  Pt to also see Dr. Brian as pain may be r/t HRT.    Pt states she has not received her genetic testing results from [facility]; nor have I.  Will check on status.    Encouraged breast awareness, including monthly breast self-exams.  Advised patient  to RTC with any interval changes or concerns.  Questions were encouraged and answered to patient's satisfaction, and patient verbalized understanding of information and agreement with the plan.   4

## 2022-08-23 ENCOUNTER — OFFICE VISIT (OUTPATIENT)
Dept: PODIATRY | Facility: CLINIC | Age: 37
End: 2022-08-23
Payer: MEDICARE

## 2022-08-23 VITALS
HEIGHT: 62 IN | HEART RATE: 91 BPM | BODY MASS INDEX: 40.21 KG/M2 | SYSTOLIC BLOOD PRESSURE: 114 MMHG | WEIGHT: 218.5 LBS | DIASTOLIC BLOOD PRESSURE: 83 MMHG

## 2022-08-23 DIAGNOSIS — B35.1 ONYCHOMYCOSIS DUE TO DERMATOPHYTE: ICD-10-CM

## 2022-08-23 DIAGNOSIS — L84 CORN OR CALLUS: ICD-10-CM

## 2022-08-23 DIAGNOSIS — E11.42 DIABETIC POLYNEUROPATHY ASSOCIATED WITH TYPE 2 DIABETES MELLITUS: Primary | ICD-10-CM

## 2022-08-23 PROCEDURE — 3074F PR MOST RECENT SYSTOLIC BLOOD PRESSURE < 130 MM HG: ICD-10-PCS | Mod: CPTII,S$GLB,, | Performed by: PODIATRIST

## 2022-08-23 PROCEDURE — 99499 UNLISTED E&M SERVICE: CPT | Mod: S$GLB,,, | Performed by: PODIATRIST

## 2022-08-23 PROCEDURE — 3079F DIAST BP 80-89 MM HG: CPT | Mod: CPTII,S$GLB,, | Performed by: PODIATRIST

## 2022-08-23 PROCEDURE — 3008F PR BODY MASS INDEX (BMI) DOCUMENTED: ICD-10-PCS | Mod: CPTII,S$GLB,, | Performed by: PODIATRIST

## 2022-08-23 PROCEDURE — 3061F NEG MICROALBUMINURIA REV: CPT | Mod: CPTII,S$GLB,, | Performed by: PODIATRIST

## 2022-08-23 PROCEDURE — 99499 NO LOS: ICD-10-PCS | Mod: S$GLB,,, | Performed by: PODIATRIST

## 2022-08-23 PROCEDURE — 3066F NEPHROPATHY DOC TX: CPT | Mod: CPTII,S$GLB,, | Performed by: PODIATRIST

## 2022-08-23 PROCEDURE — 3044F HG A1C LEVEL LT 7.0%: CPT | Mod: CPTII,S$GLB,, | Performed by: PODIATRIST

## 2022-08-23 PROCEDURE — 11721 PR DEBRIDEMENT OF NAILS, 6 OR MORE: ICD-10-PCS | Mod: Q9,59,S$GLB, | Performed by: PODIATRIST

## 2022-08-23 PROCEDURE — 3066F PR DOCUMENTATION OF TREATMENT FOR NEPHROPATHY: ICD-10-PCS | Mod: CPTII,S$GLB,, | Performed by: PODIATRIST

## 2022-08-23 PROCEDURE — 11056 PARNG/CUTG B9 HYPRKR LES 2-4: CPT | Mod: Q9,S$GLB,, | Performed by: PODIATRIST

## 2022-08-23 PROCEDURE — 11056 PR TRIM BENIGN HYPERKERATOTIC SKIN LESION,2-4: ICD-10-PCS | Mod: Q9,S$GLB,, | Performed by: PODIATRIST

## 2022-08-23 PROCEDURE — 3044F PR MOST RECENT HEMOGLOBIN A1C LEVEL <7.0%: ICD-10-PCS | Mod: CPTII,S$GLB,, | Performed by: PODIATRIST

## 2022-08-23 PROCEDURE — 3079F PR MOST RECENT DIASTOLIC BLOOD PRESSURE 80-89 MM HG: ICD-10-PCS | Mod: CPTII,S$GLB,, | Performed by: PODIATRIST

## 2022-08-23 PROCEDURE — 3074F SYST BP LT 130 MM HG: CPT | Mod: CPTII,S$GLB,, | Performed by: PODIATRIST

## 2022-08-23 PROCEDURE — 99999 PR PBB SHADOW E&M-EST. PATIENT-LVL II: ICD-10-PCS | Mod: PBBFAC,,, | Performed by: PODIATRIST

## 2022-08-23 PROCEDURE — 11721 DEBRIDE NAIL 6 OR MORE: CPT | Mod: Q9,59,S$GLB, | Performed by: PODIATRIST

## 2022-08-23 PROCEDURE — 3008F BODY MASS INDEX DOCD: CPT | Mod: CPTII,S$GLB,, | Performed by: PODIATRIST

## 2022-08-23 PROCEDURE — 99999 PR PBB SHADOW E&M-EST. PATIENT-LVL II: CPT | Mod: PBBFAC,,, | Performed by: PODIATRIST

## 2022-08-23 PROCEDURE — 3061F PR NEG MICROALBUMINURIA RESULT DOCUMENTED/REVIEW: ICD-10-PCS | Mod: CPTII,S$GLB,, | Performed by: PODIATRIST

## 2022-08-23 NOTE — PROGRESS NOTES
Subjective:      Patient ID: Maria Ines Ac is a 37 y.o. female.    Chief Complaint: Diabetes Mellitus (pcp - Luann Raymond MD - 7/13/2022) and Nail Care    Maria Ines is a 37 y.o. female who presents to the clinic for evaluation and treatment of high risk feet. Maria Ines has a past medical history of Blood in stool, Constipation, Cystitis, Depression, Diabetes mellitus, type 2, Dysphagia, Endometriosis, GERD (gastroesophageal reflux disease), Headache, Hypertension, Palpitations, Premature menopause on hormone replacement therapy, and Thyroid disease. The patient's chief complaint is long, thick toenails.   This patient has documented high risk feet requiring routine maintenance secondary to peripheral neuropathy.    PCP: Luann Raymond MD    Date Last Seen by PCP:   Chief Complaint   Patient presents with    Diabetes Mellitus     pcp - Luann Raymond MD - 7/13/2022    Nail Care       Current shoe gear:  Affected Foot: Tennis shoes     Unaffected Foot: Tennis shoes    Hemoglobin A1C   Date Value Ref Range Status   07/19/2022 5.4 4.0 - 5.6 % Final     Comment:     ADA Screening Guidelines:  5.7-6.4%  Consistent with prediabetes  >or=6.5%  Consistent with diabetes    High levels of fetal hemoglobin interfere with the HbA1C  assay. Heterozygous hemoglobin variants (HbS, HgC, etc)do  not significantly interfere with this assay.   However, presence of multiple variants may affect accuracy.     12/14/2021 5.4 4.0 - 5.6 % Final     Comment:     ADA Screening Guidelines:  5.7-6.4%  Consistent with prediabetes  >or=6.5%  Consistent with diabetes    High levels of fetal hemoglobin interfere with the HbA1C  assay. Heterozygous hemoglobin variants (HbS, HgC, etc)do  not significantly interfere with this assay.   However, presence of multiple variants may affect accuracy.     06/02/2021 5.3 4.0 - 5.6 % Final     Comment:     ADA Screening Guidelines:  5.7-6.4%  Consistent with prediabetes  >or=6.5%  Consistent  with diabetes    High levels of fetal hemoglobin interfere with the HbA1C  assay. Heterozygous hemoglobin variants (HbS, HgC, etc)do  not significantly interfere with this assay.   However, presence of multiple variants may affect accuracy.         Review of Systems   Constitutional: Negative for chills, fever and malaise/fatigue.   HENT: Negative for hearing loss.    Cardiovascular: Negative for claudication.   Respiratory: Negative for shortness of breath.    Skin: Positive for dry skin and nail changes. Negative for flushing and rash.   Musculoskeletal: Negative for joint pain and myalgias.   Gastrointestinal: Negative for nausea and vomiting.   Neurological: Positive for numbness, paresthesias and sensory change. Negative for loss of balance.   Psychiatric/Behavioral: Negative for altered mental status.   Allergic/Immunologic: Negative for hives.           Objective:      Physical Exam  Vitals reviewed.   Cardiovascular:      Pulses:           Dorsalis pedis pulses are 2+ on the right side and 2+ on the left side.        Posterior tibial pulses are 2+ on the right side and 2+ on the left side.      Comments: No edema noted to b/L LEs  Musculoskeletal:      Comments: Adequate joint ROM noted to all lower extremity muscle groups with no pain or crepitation noted. Muscle strength is 5/5 in all groups bilaterally.     Feet:      Right foot:      Protective Sensation: 5 sites tested. 0 sites sensed.      Left foot:      Protective Sensation: 5 sites tested. 0 sites sensed.   Skin:     General: Skin is warm.      Capillary Refill: Capillary refill takes 2 to 3 seconds.      Comments: Normal skin tugor noted.   No open lesion noted b/L  Skin temp is warm to warm from proximal to distal b/L.  Webspaces clean, dry, and intact  Xerosis Bilaterally. No open lesions noted.   HKL noted to left 5th digit x2     Neurological:      Mental Status: She is alert and oriented to person, place, and time.      Comments: Intact gross  sensation noted to b/L LEs         Nails x10 are elongated by  4-6mm's, thickened by 1-2 mm's, dystrophic, and are yellowish in  coloration . Xerosis Bilaterally. No open lesions noted.             Assessment:       Encounter Diagnoses   Name Primary?    Diabetic polyneuropathy associated with type 2 diabetes mellitus Yes    Onychomycosis due to dermatophyte     Corn or callus          Plan:       Maria Ines was seen today for diabetes mellitus and nail care.    Diagnoses and all orders for this visit:    Diabetic polyneuropathy associated with type 2 diabetes mellitus    Onychomycosis due to dermatophyte    Corn or callus      I counseled the patient on her conditions, their implications and medical management.      - Shoe inspection. Diabetic Foot Education. Patient reminded of the importance of good nutrition and blood sugar control to help prevent podiatric complications of diabetes. Patient instructed on proper foot hygeine. We discussed wearing proper shoe gear, daily foot inspections, never walking without protective shoe gear, never putting sharp instruments to feet, routine podiatric nail visits every 2-3 months.    After cleansing with an alcohol prep pad, the about mentioned hyperkeratotic lesions were sharply debrided X 2 utilizing a #15 blade to a smooth base without incident. Pt tolerated the procedure well and reported comfort to the debarment sites. Pt will continue to use padding and moisture the callused areas.     - With patient's permission, nails were aggressively reduced and debrided x 10 to their soft tissue attachment mechanically and with electric , removing all offending nail and debris. Patient relates relief following the procedure. She will continue to monitor the areas daily, inspect her feet, wear protective shoe gear when ambulatory, moisturizer to maintain skin integrity and follow in this office in approximately 2-3 months, sooner p.r.n.

## 2022-08-25 ENCOUNTER — HOSPITAL ENCOUNTER (OUTPATIENT)
Dept: RADIOLOGY | Facility: HOSPITAL | Age: 37
Discharge: HOME | End: 2022-08-25
Attending: INTERNAL MEDICINE
Payer: MEDICARE

## 2022-08-25 DIAGNOSIS — K21.9 GASTROESOPHAGEAL REFLUX DISEASE WITHOUT ESOPHAGITIS: ICD-10-CM

## 2022-08-25 PROCEDURE — 78264 GASTRIC EMPTYING IMG STUDY: CPT | Mod: 26,,, | Performed by: RADIOLOGY

## 2022-08-25 PROCEDURE — A9541 TC99M SULFUR COLLOID: HCPCS

## 2022-08-25 PROCEDURE — 78264 NM GASTRIC EMPTYING: ICD-10-PCS | Mod: 26,,, | Performed by: RADIOLOGY

## 2022-08-26 ENCOUNTER — OFFICE VISIT (OUTPATIENT)
Dept: UROLOGY | Facility: CLINIC | Age: 37
End: 2022-08-26
Payer: MEDICARE

## 2022-08-26 VITALS
BODY MASS INDEX: 40.12 KG/M2 | DIASTOLIC BLOOD PRESSURE: 79 MMHG | WEIGHT: 218 LBS | HEIGHT: 62 IN | SYSTOLIC BLOOD PRESSURE: 105 MMHG | HEART RATE: 82 BPM

## 2022-08-26 DIAGNOSIS — N39.41 URGE INCONTINENCE: Primary | ICD-10-CM

## 2022-08-26 PROCEDURE — 3066F NEPHROPATHY DOC TX: CPT | Mod: CPTII,S$GLB,, | Performed by: UROLOGY

## 2022-08-26 PROCEDURE — 3044F PR MOST RECENT HEMOGLOBIN A1C LEVEL <7.0%: ICD-10-PCS | Mod: CPTII,S$GLB,, | Performed by: UROLOGY

## 2022-08-26 PROCEDURE — 3078F PR MOST RECENT DIASTOLIC BLOOD PRESSURE < 80 MM HG: ICD-10-PCS | Mod: CPTII,S$GLB,, | Performed by: UROLOGY

## 2022-08-26 PROCEDURE — 99214 PR OFFICE/OUTPT VISIT, EST, LEVL IV, 30-39 MIN: ICD-10-PCS | Mod: S$GLB,,, | Performed by: UROLOGY

## 2022-08-26 PROCEDURE — 3044F HG A1C LEVEL LT 7.0%: CPT | Mod: CPTII,S$GLB,, | Performed by: UROLOGY

## 2022-08-26 PROCEDURE — 3008F BODY MASS INDEX DOCD: CPT | Mod: CPTII,S$GLB,, | Performed by: UROLOGY

## 2022-08-26 PROCEDURE — 3008F PR BODY MASS INDEX (BMI) DOCUMENTED: ICD-10-PCS | Mod: CPTII,S$GLB,, | Performed by: UROLOGY

## 2022-08-26 PROCEDURE — 95971 ALYS SMPL SP/PN NPGT W/PRGRM: CPT | Mod: S$GLB,,, | Performed by: UROLOGY

## 2022-08-26 PROCEDURE — 1159F MED LIST DOCD IN RCRD: CPT | Mod: CPTII,S$GLB,, | Performed by: UROLOGY

## 2022-08-26 PROCEDURE — 3074F SYST BP LT 130 MM HG: CPT | Mod: CPTII,S$GLB,, | Performed by: UROLOGY

## 2022-08-26 PROCEDURE — 51798 PR MEAS,POST-VOID RES,US,NON-IMAGING: ICD-10-PCS | Mod: S$GLB,,, | Performed by: UROLOGY

## 2022-08-26 PROCEDURE — 3066F PR DOCUMENTATION OF TREATMENT FOR NEPHROPATHY: ICD-10-PCS | Mod: CPTII,S$GLB,, | Performed by: UROLOGY

## 2022-08-26 PROCEDURE — 51798 US URINE CAPACITY MEASURE: CPT | Mod: S$GLB,,, | Performed by: UROLOGY

## 2022-08-26 PROCEDURE — 99214 OFFICE O/P EST MOD 30 MIN: CPT | Mod: S$GLB,,, | Performed by: UROLOGY

## 2022-08-26 PROCEDURE — 99999 PR PBB SHADOW E&M-EST. PATIENT-LVL IV: ICD-10-PCS | Mod: PBBFAC,,, | Performed by: UROLOGY

## 2022-08-26 PROCEDURE — 3078F DIAST BP <80 MM HG: CPT | Mod: CPTII,S$GLB,, | Performed by: UROLOGY

## 2022-08-26 PROCEDURE — 3061F NEG MICROALBUMINURIA REV: CPT | Mod: CPTII,S$GLB,, | Performed by: UROLOGY

## 2022-08-26 PROCEDURE — 99999 PR PBB SHADOW E&M-EST. PATIENT-LVL IV: CPT | Mod: PBBFAC,,, | Performed by: UROLOGY

## 2022-08-26 PROCEDURE — 95971 PR ANALYZE NEUROSTIM,SIMPLE/PROG: ICD-10-PCS | Mod: S$GLB,,, | Performed by: UROLOGY

## 2022-08-26 PROCEDURE — 1159F PR MEDICATION LIST DOCUMENTED IN MEDICAL RECORD: ICD-10-PCS | Mod: CPTII,S$GLB,, | Performed by: UROLOGY

## 2022-08-26 PROCEDURE — 3074F PR MOST RECENT SYSTOLIC BLOOD PRESSURE < 130 MM HG: ICD-10-PCS | Mod: CPTII,S$GLB,, | Performed by: UROLOGY

## 2022-08-26 PROCEDURE — 81002 PR URINALYSIS NONAUTO W/O SCOPE: ICD-10-PCS | Mod: S$GLB,,, | Performed by: UROLOGY

## 2022-08-26 PROCEDURE — 81002 URINALYSIS NONAUTO W/O SCOPE: CPT | Mod: S$GLB,,, | Performed by: UROLOGY

## 2022-08-26 PROCEDURE — 3061F PR NEG MICROALBUMINURIA RESULT DOCUMENTED/REVIEW: ICD-10-PCS | Mod: CPTII,S$GLB,, | Performed by: UROLOGY

## 2022-08-26 NOTE — PROGRESS NOTES
CHIEF COMPLAINT:    Mrs. Ac is a 37 y.o. female presenting for a follow up on urgency and urge incontinence  PRESENTING ILLNESS:    Maria Ines Ac is a 37 y.o. female who returns accompanied by her mother.  She states she has had urge incontinence for several months.  She had a fall 2 months ago but the worsening of the incontinence predated the fall.  She was walking and fell forward on the pavement and hurt her right knee and shoulder.  She braced her fall by putting out her arms.  She did not fall on the InterStim.      She is going through 4 pads a day.  She feels like she does not empty her bladder all the way. She is on oxybutynin 10 mg daily and 5 mg q hs.     REVIEW OF SYSTEMS:    Review of Systems   Constitutional: Negative.    HENT: Negative.    Eyes: Negative.    Respiratory: Negative.    Cardiovascular: Negative.    Gastrointestinal: Positive for constipation and heartburn.   Genitourinary: Positive for urgency.        Urge incontinence   Musculoskeletal: Positive for joint pain.   Skin: Negative.    Neurological: Negative.    Endo/Heme/Allergies:        Diabetes mellitus   Psychiatric/Behavioral: Negative.        PATIENT HISTORY:    Past Medical History:   Diagnosis Date    Blood in stool     Constipation     Cystitis     Depression     Diabetes mellitus, type 2     Dysphagia     Endometriosis     GERD (gastroesophageal reflux disease)     Headache     Hypertension     Palpitations     Premature menopause on hormone replacement therapy     Thyroid disease        Past Surgical History:   Procedure Laterality Date    24 HOUR IMPEDANCE PH MONITORING OF ESOPHAGUS IN PATIENT TAKING ACID REDUCING MEDICATIONS N/A 6/2/2022    Procedure: IMPEDANCE PH STUDY, ESOPHAGEAL, 24 HOUR, IN PATIENT TAKING ACID REDUCING MEDICATION;  Surgeon: Debbie Butler MD;  Location: [facility] ([address] [place] [address]);  Service: Endoscopy;  Laterality: N/A;  On PPI/H2 Blocker    BLADDER SUSPENSION      CARPAL TUNNEL  RELEASE      right    CHOLECYSTECTOMY      COLONOSCOPY N/A 8/30/2016    Procedure: COLONOSCOPY;  Surgeon: Slick Herron MD;  Location: [facility] ENDO (4TH F[facility]);  Service: Endoscopy;  Laterality: N/A;  PM prep    COLONOSCOPY N/A 12/22/2021    Procedure: COLONOSCOPY. any CRS;  Surgeon: Maria Ines Jung MD;  Location: [facility] ENDO (4TH F[facility]);  Service: Endoscopy;  Laterality: N/A;  fully vaccinated -  scaral stimulator will bring remote -    12/17 lvm to confirm appt-rb    ESOPHAGEAL MANOMETRY WITH MEASUREMENT OF IMPEDANCE N/A 11/5/2018    Procedure: MANOMETRY, ESOPHAGUS, WITH IMPEDANCE MEASUREMENT;  Surgeon: Slick Herron MD;  Location: [facility] ENDO (4TH F[facility]);  Service: Endoscopy;  Laterality: N/A;    ESOPHAGEAL MANOMETRY WITH MEASUREMENT OF IMPEDANCE N/A 6/2/2022    Procedure: MANOMETRY, ESOPHAGUS, WITH IMPEDANCE MEASUREMENT;  Surgeon: Debbie Butler MD;  Location: [facility] ENDO (4TH F[facility]);  Service: Endoscopy;  Laterality: N/A;  fully vaccinated, bladder stimulator, instr mailed /emailed -ml    ESOPHAGOGASTRODUODENOSCOPY N/A 2/13/2020    Procedure: EGD (ESOPHAGOGASTRODUODENOSCOPY);  Surgeon: Slick Herron MD;  Location: [facility] (4TH F[facility]);  Service: Endoscopy;  Laterality: N/A;  pt has cardiology appt on 1/6/19-will call back after this appt to schedule-tb    FLUOROSCOPIC URODYNAMIC STUDY N/A 5/23/2019    Procedure: URODYNAMIC STUDY, FLUOROSCOPIC;  Surgeon: Zena Tee MD;  Location: [facility] OR 1ST FLR;  Service: Urology;  Laterality: N/A;  1 hour    GALLBLADDER SURGERY      HYSTERECTOMY  2013    Bess velasquez Dr. Champlain    IMPLANTATION OF PERMANENT SACRAL NERVE STIMULATOR N/A 7/30/2019    Procedure: INSERTION, NEUROSTIMULATOR, PERMANENT, SACRAL;  Surgeon: Zena Tee MD;  Location: [facility] OR 2ND FLR;  Service: Urology;  Laterality: N/A;  30 min    OOPHORECTOMY  2005    [facility]- benign cyst    OOPHORECTOMY  2013    [facility]- endo    ooptherectomy      right knee  scoped    SHOULDER SURGERY  right        Family History   Problem Relation Age of Onset    Breast cancer Mother 50        alive at 64, unilateral    Esophageal cancer Mother 63    Ovarian cancer Mother 63    Anesthesia problems Mother     Cervical cancer Maternal Grandmother         unknown age of onset,  at 80    Colon cancer Brother 40    Breast cancer Paternal Aunt         unknown age of onset, alive at 70    Breast cancer Maternal Aunt 72        alive at 72     Social History     Socioeconomic History    Marital status: Single   Tobacco Use    Smoking status: Never Smoker    Smokeless tobacco: Never Used   Substance and Sexual Activity    Alcohol use: No    Drug use: No    Sexual activity: Never     Birth control/protection: None   Social History Narrative    ** Merged History Encounter **          Social Determinants of Health     Financial Resource Strain: Low Risk     Difficulty of Paying Living Expenses: Not hard at all   Food Insecurity: No Food Insecurity    Worried About Running Out of Food in the Last Year: Never true    Ran Out of Food in the Last Year: Never true   Transportation Needs: No Transportation Needs    Lack of Transportation (Medical): No    Lack of Transportation (Non-Medical): No   Physical Activity: Sufficiently Active    Days of Exercise per Week: 3 days    Minutes of Exercise per Session: 60 min   Stress: No Stress Concern Present    Feeling of Stress : Not at all   Social Connections: Socially Isolated    Frequency of Communication with Friends and Family: Once a week    Frequency of Social Gatherings with Friends and Family: Once a week    Attends Anabaptism Services: More than 4 times per year    Active Member of Clubs or Organizations: No    Attends Club or Organization Meetings: Never    Marital Status: Never    Housing Stability: Low Risk     Unable to Pay for Housing in the Last Year: No    Number of Places Lived in the Last Year: 1    Unstable Housing in the Last Year:  No       Allergies:  Patient has no known allergies.    Medications:  Outpatient Encounter Medications as of 8/26/2022   Medication Sig Dispense Refill    ACCU-CHEK AMAURY PLUS TEST STRP Strp USE TO TEST BLOOD GLUCOSE TID  9    ACCU-CHEK SOFTCLIX LANCETS Misc USE TO TEST BLOOD GLUCOSE TID  3    asenapine maleate (SAPHRIS) 5 mg Subl       atorvastatin (LIPITOR) 40 MG tablet Take 1 tablet (40 mg total) by mouth once daily. 90 tablet 3    BLOOD SUGAR DIAGNOSTIC (ACCU-CHEK AMAURY PLUS TEST STRP MISC) 1 strip by Misc.(Non-Drug; Combo Route) route 3 (three) times daily.      buPROPion (WELLBUTRIN XL) 150 MG TB24 tablet Take 150 mg by mouth every morning.      calcium-vitamin D3 500 mg(1,250mg) -200 unit per tablet Take 1 tablet by mouth 2 (two) times daily with meals.      ciclopirox (PENLAC) 8 % Soln Apply topically nightly. 6.6 mL 11    dapagliflozin propanediol (FARXIGA ORAL) Farxiga Take No date recorded No form recorded No frequency recorded No route recorded No set duration recorded No set duration amount recorded active No dosage strength recorded No dosage strength units of measure recorded      dexlansoprazole (DEXILANT) 60 mg capsule Take 1 capsule (60 mg total) by mouth 2 (two) times a day. 60 capsule 11    diclofenac sodium (VOLTAREN) 1 % Gel Apply 2 g topically 4 (four) times daily. As needed for breast pain 1 Tube 1    dicyclomine (BENTYL) 10 MG capsule TAKE 2 CAPSULES(20 MG) BY MOUTH THREE TIMES DAILY BEFORE MEALS 180 capsule 0    diltiazem HCl (DILTIAZEM 2% CREAM) Apply topically 3 (three) times daily. Apply topically to anal area. 30 g 2    docusate sodium (COLACE) 100 MG capsule Take 1 capsule (100 mg total) by mouth 2 (two) times daily. 60 capsule 0    dulaglutide (TRULICITY SUBQ) Inject 4.5 mLs as directed once a week.      estradioL (ESTRACE) 0.01 % (0.1 mg/gram) vaginal cream estradiol Take No date recorded No form recorded No frequency recorded No route recorded No set duration  recorded No set duration amount recorded active No dosage strength recorded No dosage strength units of measure recorded      estradioL (VIVELLE-DOT) 0.075 mg/24 hr PLACE 1 PATCH ONTO THE SKIN TWICE A WEEK 8 patch 11    FARXIGA 10 mg Tab TK 1 T PO QD  7    FLUARIX QUAD 0263-4204, PF, 60 mcg (15 mcg x 4)/0.5 mL Syrg       fluticasone propionate (FLONASE) 50 mcg/actuation nasal spray SHAKE LIQUID AND USE 2 SPRAYS(100 MCG) IN EACH NOSTRIL EVERY DAY 48 g 2    gabapentin (NEURONTIN) 300 MG capsule Take 600 mg (two capsules) in the morning and 900mg (three capsules) at night before bed 150 capsule 11    GLUCOPHAGE 500 mg tablet Take 500 mg by mouth 2 (two) times daily with meals.       ibuprofen (ADVIL,MOTRIN) 800 MG tablet 800 mg every 4 (four) hours as needed.   0    levothyroxine (SYNTHROID) 75 MCG tablet       magnesium oxide (MAG-OX) 400 mg (241.3 mg magnesium) tablet Take 1 tablet (400 mg total) by mouth 2 (two) times daily.  0    metoprolol succinate (TOPROL-XL) 50 MG 24 hr tablet Take 1 tablet (50 mg total) by mouth once daily. 90 tablet 3    metoprolol succinate 50 mg CSpX melatonin Take No date recorded No form recorded No frequency recorded No route recorded No set duration recorded No set duration amount recorded active No dosage strength recorded No dosage strength units of measure recorded      MULTIVIT-IRON-MIN-FOLIC ACID 3,500-18-0.4 UNIT-MG-MG ORAL CHEW Take 1 tablet by mouth once daily.       ofloxacin (OCUFLOX) 0.3 % ophthalmic solution PLACE 1 DROP INTO BOTH EYES FOUR TIMES DAILY      omega-3 fatty acids/fish oil (FISH OIL-OMEGA-3 FATTY ACIDS) 300-1,000 mg capsule Take 1 capsule by mouth once daily. 90 capsule 3    omeprazole (PRILOSEC OTC) 20 MG tablet Take 20 mg by mouth once daily.      OXcarbazepine (TRILEPTAL) 150 MG Tab TK 1 T PO BID      oxybutynin (DITROPAN) 5 MG Tab TAKE 1 TABLET BY MOUTH EVERY EVENING 30 tablet 2    oxybutynin (DITROPAN-XL) 10 MG 24 hr tablet TAKE 1  TABLET(10 MG) BY MOUTH EVERY DAY 30 tablet 2    pantoprazole (PROTONIX) 40 MG tablet TAKE 1 TABLET(40 MG) BY MOUTH EVERY DAY 90 tablet 0    phentermine 37.5 MG capsule TK 1 C PO ONCE D IN THE MORNING      spironolactone (ALDACTONE) 25 MG tablet TK 1 T PO HS  2    sucralfate (CARAFATE) 1 gram tablet Take 1 tablet (1 g total) by mouth 4 (four) times daily. 20 tablet 0    traZODone (DESYREL) 100 MG tablet TK 1 T PO HS  1    triamcinolone acetonide 0.1% (KENALOG) 0.1 % ointment APPLY TO THE NECK AND CHEEKS EVERY NIGHT AT BEDTIME AS DIRECTED      TRULICITY 1.5 mg/0.5 mL PnIj INJECT 1 PEN INTO THE SKIN Q WEEK  5    TRULICITY 3 mg/0.5 mL pen injector       VRAYLAR 6 mg Cap Take 1 capsule by mouth once daily.      ZOLOFT 100 mg tablet Take 200 mg by mouth once daily.       zolpidem (AMBIEN) 10 mg Tab 1 po for sleep study 1 tablet 0    azelastine (ASTELIN) 137 mcg (0.1 %) nasal spray 1 spray (137 mcg total) by Nasal route 2 (two) times daily. 30 mL 3    topiramate (TOPAMAX) 50 MG tablet Take 2 tablets (100 mg total) by mouth every evening. 60 tablet 11     No facility-administered encounter medications on file as of 8/26/2022.         PHYSICAL EXAMINATION:    The patient generally appears in good health, is appropriately interactive, and is in no apparent distress.    Skin: No lesions.    Mental: Cooperative with normal affect.    Neuro: Grossly intact.    HEENT: Normal. No evidence of lymphadenopathy.    Chest:  normal inspiratory effort.    Abdomen:  Soft, non-tender. No masses or organomegaly. Bladder is not palpable. No evidence of flank discomfort. No evidence of inguinal hernia.    Extremities: No clubbing, cyanosis, or edema    PVR by bladder scan was 12 ml    LABS:    Lab Results   Component Value Date    BUN 10 07/19/2022    CREATININE 1.2 07/19/2022     UA 1.005, pH 6, 500 glucose, otherwise, negative I(patient is on Farxiga)    IMPRESSION:    Encounter Diagnoses   Name Primary?    Urge incontinence  Yes       PLAN:    1.  Interrogation of the InterStim  Battery:  OK  Impedences range from 828 to 2135 Ohms, checked at 1.5 A.   She is on program 6, 3+, 1-,2-, at 3.8 A, 210 pw, 14 Hz sensation in the right cheek  Switched to Program A 1+, 2-,3-, at 3.1 A, 210 pw, 14 Hz, sensation in the vagina  2.  Follow up in 6 months    I spent 30 minutes with the patient of which more than half was spent in direct consultation with the patient in regards to our treatment and plan.

## 2022-09-13 NOTE — PATIENT INSTRUCTIONS
Maria Ines Ac     Below is a summary of your plan of care that we discussed today in the office:     Medication Changes:  1. Continue Gabapentin 300mg twice a day, 900mg at night before bed.  2. Continue Topiramate 100mg at night before bed.  3. Try to avoid daily use of Aleve.     Imaging to be Completed:  None at this time.    Additional Consult Visits:  1. Start Physical Therapy for neck pain, tension headaches and dizziness.  2. Set up an appointment with Sleep Medicine for further investigation of possible Obstructive Sleep Apnea.      I would like to see you back in the office 6 months to review your Sleep Study results and physical therapy results.     The patient verbalizes understanding and agreement with the treatment plan. Questions were sought and answered to her stated verbal satisfaction.    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  worse with shoulder elevation, better at rest.  has tried NSAIDS, activity mod.\par \par 9/6/22 - PT with [facility] 3X a week but still pain in bicep and lateral shoulder, had CSI\par 9/13/22 - had mri showing full RTC tear along with gh deg and some atorphy, still pain and weakness

## 2022-09-14 ENCOUNTER — TELEPHONE (OUTPATIENT)
Dept: GASTROENTEROLOGY | Facility: CLINIC | Age: 37
End: 2022-09-14
Payer: MEDICARE

## 2022-09-14 NOTE — TELEPHONE ENCOUNTER
----- Message from Slick Herron MD sent at 9/12/2022  6:47 AM CDT -----  Please call pt  The scan shows she has gastroparesis  Severe problem with the stomach functioning  The first step is visit with our dietician for gastroparesis diet  F/u with me later    
Message left for patient to return my call regarding scheduling a gastric emptying study.   Toya  
normal

## 2022-09-28 PROBLEM — I20.89 STABLE ANGINA: Status: RESOLVED | Noted: 2022-07-19 | Resolved: 2022-09-28

## 2022-10-03 ENCOUNTER — OFFICE VISIT (OUTPATIENT)
Dept: CARDIOLOGY | Facility: CLINIC | Age: 37
End: 2022-10-03
Payer: MEDICARE

## 2022-10-03 VITALS
BODY MASS INDEX: 39.02 KG/M2 | DIASTOLIC BLOOD PRESSURE: 76 MMHG | WEIGHT: 212.06 LBS | HEIGHT: 62 IN | SYSTOLIC BLOOD PRESSURE: 115 MMHG | HEART RATE: 101 BPM

## 2022-10-03 DIAGNOSIS — R00.2 PALPITATIONS: ICD-10-CM

## 2022-10-03 DIAGNOSIS — E11.69 HYPERLIPIDEMIA ASSOCIATED WITH TYPE 2 DIABETES MELLITUS: ICD-10-CM

## 2022-10-03 DIAGNOSIS — E66.01 SEVERE OBESITY (BMI 35.0-39.9) WITH COMORBIDITY: ICD-10-CM

## 2022-10-03 DIAGNOSIS — E11.42 TYPE 2 DIABETES MELLITUS WITH DIABETIC POLYNEUROPATHY, WITHOUT LONG-TERM CURRENT USE OF INSULIN: ICD-10-CM

## 2022-10-03 DIAGNOSIS — I15.2 HYPERTENSION ASSOCIATED WITH DIABETES: ICD-10-CM

## 2022-10-03 DIAGNOSIS — E78.5 HYPERLIPIDEMIA ASSOCIATED WITH TYPE 2 DIABETES MELLITUS: ICD-10-CM

## 2022-10-03 DIAGNOSIS — R07.89 CHEST WALL PAIN: Primary | ICD-10-CM

## 2022-10-03 DIAGNOSIS — E11.59 HYPERTENSION ASSOCIATED WITH DIABETES: ICD-10-CM

## 2022-10-03 PROCEDURE — 3066F NEPHROPATHY DOC TX: CPT | Mod: CPTII,S$GLB,, | Performed by: PHYSICIAN ASSISTANT

## 2022-10-03 PROCEDURE — 3044F PR MOST RECENT HEMOGLOBIN A1C LEVEL <7.0%: ICD-10-PCS | Mod: CPTII,S$GLB,, | Performed by: PHYSICIAN ASSISTANT

## 2022-10-03 PROCEDURE — 3078F PR MOST RECENT DIASTOLIC BLOOD PRESSURE < 80 MM HG: ICD-10-PCS | Mod: CPTII,S$GLB,, | Performed by: PHYSICIAN ASSISTANT

## 2022-10-03 PROCEDURE — 3061F NEG MICROALBUMINURIA REV: CPT | Mod: CPTII,S$GLB,, | Performed by: PHYSICIAN ASSISTANT

## 2022-10-03 PROCEDURE — 99999 PR PBB SHADOW E&M-EST. PATIENT-LVL V: ICD-10-PCS | Mod: PBBFAC,,, | Performed by: PHYSICIAN ASSISTANT

## 2022-10-03 PROCEDURE — 99214 PR OFFICE/OUTPT VISIT, EST, LEVL IV, 30-39 MIN: ICD-10-PCS | Mod: S$GLB,,, | Performed by: PHYSICIAN ASSISTANT

## 2022-10-03 PROCEDURE — 99499 UNLISTED E&M SERVICE: CPT | Mod: HCNC,S$GLB,, | Performed by: PHYSICIAN ASSISTANT

## 2022-10-03 PROCEDURE — 3008F BODY MASS INDEX DOCD: CPT | Mod: CPTII,S$GLB,, | Performed by: PHYSICIAN ASSISTANT

## 2022-10-03 PROCEDURE — 99999 PR PBB SHADOW E&M-EST. PATIENT-LVL V: CPT | Mod: PBBFAC,,, | Performed by: PHYSICIAN ASSISTANT

## 2022-10-03 PROCEDURE — 3066F PR DOCUMENTATION OF TREATMENT FOR NEPHROPATHY: ICD-10-PCS | Mod: CPTII,S$GLB,, | Performed by: PHYSICIAN ASSISTANT

## 2022-10-03 PROCEDURE — 3074F SYST BP LT 130 MM HG: CPT | Mod: CPTII,S$GLB,, | Performed by: PHYSICIAN ASSISTANT

## 2022-10-03 PROCEDURE — 93000 ELECTROCARDIOGRAM COMPLETE: CPT | Mod: S$GLB,,, | Performed by: INTERNAL MEDICINE

## 2022-10-03 PROCEDURE — 1160F PR REVIEW ALL MEDS BY PRESCRIBER/CLIN PHARMACIST DOCUMENTED: ICD-10-PCS | Mod: CPTII,S$GLB,, | Performed by: PHYSICIAN ASSISTANT

## 2022-10-03 PROCEDURE — 3044F HG A1C LEVEL LT 7.0%: CPT | Mod: CPTII,S$GLB,, | Performed by: PHYSICIAN ASSISTANT

## 2022-10-03 PROCEDURE — 3008F PR BODY MASS INDEX (BMI) DOCUMENTED: ICD-10-PCS | Mod: CPTII,S$GLB,, | Performed by: PHYSICIAN ASSISTANT

## 2022-10-03 PROCEDURE — 3078F DIAST BP <80 MM HG: CPT | Mod: CPTII,S$GLB,, | Performed by: PHYSICIAN ASSISTANT

## 2022-10-03 PROCEDURE — 1159F MED LIST DOCD IN RCRD: CPT | Mod: CPTII,S$GLB,, | Performed by: PHYSICIAN ASSISTANT

## 2022-10-03 PROCEDURE — 93000 EKG 12-LEAD: ICD-10-PCS | Mod: S$GLB,,, | Performed by: INTERNAL MEDICINE

## 2022-10-03 PROCEDURE — 3074F PR MOST RECENT SYSTOLIC BLOOD PRESSURE < 130 MM HG: ICD-10-PCS | Mod: CPTII,S$GLB,, | Performed by: PHYSICIAN ASSISTANT

## 2022-10-03 PROCEDURE — 1160F RVW MEDS BY RX/DR IN RCRD: CPT | Mod: CPTII,S$GLB,, | Performed by: PHYSICIAN ASSISTANT

## 2022-10-03 PROCEDURE — 1159F PR MEDICATION LIST DOCUMENTED IN MEDICAL RECORD: ICD-10-PCS | Mod: CPTII,S$GLB,, | Performed by: PHYSICIAN ASSISTANT

## 2022-10-03 PROCEDURE — 99214 OFFICE O/P EST MOD 30 MIN: CPT | Mod: S$GLB,,, | Performed by: PHYSICIAN ASSISTANT

## 2022-10-03 PROCEDURE — 3061F PR NEG MICROALBUMINURIA RESULT DOCUMENTED/REVIEW: ICD-10-PCS | Mod: CPTII,S$GLB,, | Performed by: PHYSICIAN ASSISTANT

## 2022-10-03 RX ORDER — METOPROLOL SUCCINATE 100 MG/1
100 TABLET, EXTENDED RELEASE ORAL DAILY
Qty: 90 TABLET | Refills: 3 | Status: SHIPPED | OUTPATIENT
Start: 2022-10-03 | End: 2023-11-20 | Stop reason: SDUPTHER

## 2022-10-03 NOTE — PATIENT INSTRUCTIONS
Discontinue Spironolactone    Increase Metoprolol to 100 mg daily     Keep a log of you blood pressure at home. Let me know if your pressure stays higher than 130/80.
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
## 2022-10-10 ENCOUNTER — TELEPHONE (OUTPATIENT)
Dept: GASTROENTEROLOGY | Facility: CLINIC | Age: 37
End: 2022-10-10
Payer: MEDICARE

## 2022-10-10 RX ORDER — MELOXICAM 15 MG/1
TABLET ORAL
Qty: 30 TABLET | OUTPATIENT
Start: 2022-10-10

## 2022-10-10 NOTE — TELEPHONE ENCOUNTER
----- Message from Lizz Mello MA sent at 10/7/2022  1:43 PM CDT -----  Regarding: FW: Appointment  Contact: [phone]  I told her you would call her next week  ----- Message -----  From: Nadia Egan  Sent: 10/7/2022   1:26 PM CDT  To: Gopal [facility]OCANEG[facility] Staff  Subject: Appointment                                      Calling in regards to scheduling an appointment as soon as possible for abdominal pains for the past two months. Please call.

## 2022-10-10 NOTE — TELEPHONE ENCOUNTER
----- Message from Nadia Egan sent at 10/10/2022  1:39 PM CDT -----  Regarding: Missed call  Contact: [phone]  Calling in regards to missed call from nurse regarding appointment. Please call and discuss

## 2022-10-10 NOTE — TELEPHONE ENCOUNTER
Spoke with patient.  Scheduled to see the dietitian on 10/18 for 3:00.   Confirmation mailed.   Toya   [Annual] : an annual visit.

## 2022-10-10 NOTE — TELEPHONE ENCOUNTER
Returned call, no answer, left message.  Patient needing to schedule an appointment with the dietitian to discuss gastroparesis.   Toya

## 2022-10-18 ENCOUNTER — NUTRITION (OUTPATIENT)
Dept: GASTROENTEROLOGY | Facility: CLINIC | Age: 37
End: 2022-10-18

## 2022-10-18 VITALS — WEIGHT: 213.31 LBS | BODY MASS INDEX: 39.01 KG/M2

## 2022-10-18 DIAGNOSIS — K31.84 GASTROPARESIS: Primary | ICD-10-CM

## 2022-10-18 PROCEDURE — 99999 PR PBB SHADOW E&M-EST. PATIENT-LVL I: CPT | Mod: PBBFAC,,,

## 2022-10-18 PROCEDURE — 99999 PR PBB SHADOW E&M-EST. PATIENT-LVL I: ICD-10-PCS | Mod: PBBFAC,,,

## 2022-10-18 NOTE — PROGRESS NOTES
GI NUTRITIONAL ASSESSMENT  Referring Provider: Dr. Slick Herron    Maria Ines Ac is a 38 y.o. female referred regarding the following problems:  Reason for Visit: Nutrition assessment and recommendations related to:   Chief Complaint   Patient presents with    gastroparesis      8/22 GES 85% retention after four hours     Manic Behavior     Mother and patient note difficulty controlling sweetened food intake and show cavities on front teeth a result of sugar intake     Type II DM - dx age 9 years ; does not check BG ; frequent    Gastroesophageal Reflux     Preference for high fat ( shekhar's, biscuits)  high sugar foods ( Fabian's peanut butter cups, honey buns) ; coca cola ( diet coke consumed throughout the day )          Medical/Surgical History  Pertinent Social History:  Social History     Tobacco Use    Smoking status: Never    Smokeless tobacco: Never   Substance Use Topics    Alcohol use: No    Drug use: No        Previous Medical History:  Past Medical History:   Diagnosis Date    Blood in stool     Constipation     Cystitis     Depression     Diabetes mellitus, type 2     Dysphagia     Endometriosis     GERD (gastroesophageal reflux disease)     Headache     Hypertension     Palpitations     Premature menopause on hormone replacement therapy     Thyroid disease        Previous Surgical History:  Past Surgical History:   Procedure Laterality Date    24 HOUR IMPEDANCE PH MONITORING OF ESOPHAGUS IN PATIENT TAKING ACID REDUCING MEDICATIONS N/A 6/2/2022    Procedure: IMPEDANCE PH STUDY, ESOPHAGEAL, 24 HOUR, IN PATIENT TAKING ACID REDUCING MEDICATION;  Surgeon: Debbie Butler MD;  Location: [address]);  Service: Endoscopy;  Laterality: N/A;  On PPI/H2 Blocker    BLADDER SUSPENSION      CARPAL TUNNEL RELEASE      right    CHOLECYSTECTOMY      COLONOSCOPY N/A 8/30/2016    Procedure: COLONOSCOPY;  Surgeon: Slick Herron MD;  Location: [address]);  Service: Endoscopy;  Laterality: N/A;   PM prep    COLONOSCOPY N/A 12/22/2021    Procedure: COLONOSCOPY. any CRS;  Surgeon: Maria Ines Jung MD;  Location: [facility] ENDO (4TH F[facility]);  Service: Endoscopy;  Laterality: N/A;  fully vaccinated -  scaral stimulator will bring remote -    12/17 lvm to confirm appt-rb    ESOPHAGEAL MANOMETRY WITH MEASUREMENT OF IMPEDANCE N/A 11/5/2018    Procedure: MANOMETRY, ESOPHAGUS, WITH IMPEDANCE MEASUREMENT;  Surgeon: Slick Herron MD;  Location: [facility] ENDO (4TH F[facility]);  Service: Endoscopy;  Laterality: N/A;    ESOPHAGEAL MANOMETRY WITH MEASUREMENT OF IMPEDANCE N/A 6/2/2022    Procedure: MANOMETRY, ESOPHAGUS, WITH IMPEDANCE MEASUREMENT;  Surgeon: Debbie Butler MD;  Location: [facility] ENDO (4TH F[facility]);  Service: Endoscopy;  Laterality: N/A;  fully vaccinated, bladder stimulator, instr mailed /emailed -ml    ESOPHAGOGASTRODUODENOSCOPY N/A 2/13/2020    Procedure: EGD (ESOPHAGOGASTRODUODENOSCOPY);  Surgeon: Slick Herron MD;  Location: [facility] JAKE (4TH F[facility]);  Service: Endoscopy;  Laterality: N/A;  pt has cardiology appt on 1/6/19-will call back after this appt to schedule-tb    FLUOROSCOPIC URODYNAMIC STUDY N/A 5/23/2019    Procedure: URODYNAMIC STUDY, FLUOROSCOPIC;  Surgeon: Zena Tee MD;  Location: [facility] OR 1ST FLR;  Service: Urology;  Laterality: N/A;  1 hour    GALLBLADDER SURGERY      HYSTERECTOMY  2013    Bess velasquez Dr. [place]amplain    IMPLANTATION OF PERMANENT SACRAL NERVE STIMULATOR N/A 7/30/2019    Procedure: INSERTION, NEUROSTIMULATOR, PERMANENT, SACRAL;  Surgeon: Zena Tee MD;  Location: [facility] OR 2ND FLR;  Service: Urology;  Laterality: N/A;  30 min    OOPHORECTOMY  2005    [facility]- benign cyst    OOPHORECTOMY  2013    [facility]- endo    ooptherectomy      REPLACEMENT OF NERVE STIMULATOR BATTERY N/A 2/28/2023    Procedure: Replacement, Battery, Neurostimulator;  Surgeon: Zena Tee MD;  Location: [facility] OR 2ND FLR;  Service: Urology;  Laterality: N/A;  45 min    right knee  scoped    SHOULDER SURGERY  right     "  Anthropometric Data Usual Weight:   has been stable  Body mass index is 39.01 kg/m².  Estimated body mass index is 39.01 kg/m² as calculated from the following:    Height as of 10/3/22: 5' 2" (1.575 m).    Weight as of this encounter: 96.8 kg (213 lb 4.8 oz).  Ht Readings from Last 1 Encounters:   03/17/23 5' 2" (1.575 m)     Weight History:  Wt Readings from Last 12 Encounters:   03/17/23 92.9 kg (204 lb 12.9 oz)   03/14/23 95.4 kg (210 lb 5.1 oz)   03/07/23 96 kg (211 lb 10.3 oz)   02/28/23 93.5 kg (206 lb 2.1 oz)   02/14/23 93.5 kg (206 lb 1.6 oz)   02/10/23 93.5 kg (206 lb 2.1 oz)   02/06/23 95.6 kg (210 lb 12.2 oz)   12/28/22 94.3 kg (208 lb)   11/21/22 94.8 kg (208 lb 15.9 oz)   11/14/22 95.1 kg (209 lb 10.5 oz)   11/14/22 94.8 kg (209 lb)   10/18/22 96.8 kg (213 lb 4.8 oz)   ]  BMI: Body mass index is 39.01 kg/m².     Meds  and Biochemical Data   Labs  No components found for: VITD  Glucose   Date Value Ref Range Status   03/14/2023 73 70 - 110 mg/dL Final   11/14/2022 91 70 - 110 mg/dL Final     BUN   Date Value Ref Range Status   03/14/2023 5 (L) 6 - 20 mg/dL Final   11/14/2022 10 6 - 20 mg/dL Final     Creatinine   Date Value Ref Range Status   03/14/2023 0.9 0.5 - 1.4 mg/dL Final   11/14/2022 0.9 0.5 - 1.4 mg/dL Final     Sodium   Date Value Ref Range Status   03/14/2023 138 136 - 145 mmol/L Final   11/14/2022 138 136 - 145 mmol/L Final     Potassium   Date Value Ref Range Status   03/14/2023 4.1 3.5 - 5.1 mmol/L Final   11/14/2022 4.4 3.5 - 5.1 mmol/L Final     No results found for: PHOS  Calcium   Date Value Ref Range Status   03/14/2023 10.4 8.7 - 10.5 mg/dL Final   11/14/2022 10.5 8.7 - 10.5 mg/dL Final     No results found for: PREALBUMIN  Total Protein   Date Value Ref Range Status   11/14/2022 7.9 6.0 - 8.4 g/dL Final   07/19/2022 7.8 6.0 - 8.4 g/dL Final     Cholesterol   Date Value Ref Range Status   07/19/2022 149 120 - 199 mg/dL Final     Comment:     The National Cholesterol Education " Program (NCEP) has set the  following guidelines (reference ranges) for Cholesterol:  Optimal.....................<200 mg/dL  Borderline High.............200-239 mg/dL  High........................> or = 240 mg/dL     06/02/2021 163 120 - 199 mg/dL Final     Comment:     The National Cholesterol Education Program (NCEP) has set the  following guidelines (reference ranges) for Cholesterol:  Optimal.....................<200 mg/dL  Borderline High.............200-239 mg/dL  High........................> or = 240 mg/dL       Hemoglobin A1C   Date Value Ref Range Status   03/14/2023 5.3 4.0 - 5.6 % Final     Comment:     ADA Screening Guidelines:  5.7-6.4%  Consistent with prediabetes  >or=6.5%  Consistent with diabetes    High levels of fetal hemoglobin interfere with the HbA1C  assay. Heterozygous hemoglobin variants (HbS, HgC, etc)do  not significantly interfere with this assay.   However, presence of multiple variants may affect accuracy.     11/14/2022 5.3 4.0 - 5.6 % Final     Comment:     ADA Screening Guidelines:  5.7-6.4%  Consistent with prediabetes  >or=6.5%  Consistent with diabetes    High levels of fetal hemoglobin interfere with the HbA1C  assay. Heterozygous hemoglobin variants (HbS, HgC, etc)do  not significantly interfere with this assay.   However, presence of multiple variants may affect accuracy.       Hemoglobin   Date Value Ref Range Status   11/14/2022 14.1 12.0 - 16.0 g/dL Final   10/04/2021 13.9 12.0 - 16.0 g/dL Final     Hematocrit   Date Value Ref Range Status   11/14/2022 44.1 37.0 - 48.5 % Final   10/04/2021 42.9 37.0 - 48.5 % Final     No results found for: IRON  No components found for: FROLATE  No results found for: [id number]  WBC   Date Value Ref Range Status   11/14/2022 6.77 3.90 - 12.70 K/uL Final   10/04/2021 9.35 3.90 - 12.70 K/uL Final     WBC   Date Value Ref Range Status   11/14/2022 6.77 3.90 - 12.70 K/uL Final   10/04/2021 9.35 3.90 - 12.70 K/uL Final     Current Outpatient  Medications   Medication Sig    ACCU-CHEK AMAURY PLUS TEST STRP Strp USE TO TEST BLOOD GLUCOSE TID    ACCU-CHEK SOFTCLIX LANCETS Misc USE TO TEST BLOOD GLUCOSE TID    atorvastatin (LIPITOR) 80 MG tablet Take 80 mg by mouth every evening.    azelastine (ASTELIN) 137 mcg (0.1 %) nasal spray 1 spray (137 mcg total) by Nasal route 2 (two) times daily.    azithromycin (Z-ANUSHKA) 250 MG tablet Take by mouth.    BLOOD SUGAR DIAGNOSTIC (ACCU-CHEK AMAURY PLUS TEST STRP MISC) 1 strip by Misc.(Non-Drug; Combo Route) route 3 (three) times daily.    buPROPion (WELLBUTRIN XL) 150 MG TB24 tablet Take 150 mg by mouth every morning.    calcium-vitamin D3 500 mg(1,250mg) -200 unit per tablet Take 1 tablet by mouth 2 (two) times daily with meals.    cetirizine (ZYRTEC) 10 MG tablet Take 1 tablet (10 mg total) by mouth once daily.    ciclopirox (PENLAC) 8 % Soln Apply topically nightly.    dexlansoprazole (DEXILANT) 60 mg capsule Take 1 capsule (60 mg total) by mouth 2 (two) times a day.    diclofenac sodium (VOLTAREN) 1 % Gel Apply 2 g topically 4 (four) times daily. As needed for breast pain    dicyclomine (BENTYL) 10 MG capsule TAKE 2 CAPSULES(20 MG) BY MOUTH THREE TIMES DAILY BEFORE MEALS    diltiazem HCl (DILTIAZEM 2% CREAM) Apply topically 3 (three) times daily. Apply topically to anal area.    docusate sodium (COLACE) 100 MG capsule Take 1 capsule (100 mg total) by mouth 2 (two) times daily.    FARXIGA 10 mg Tab TK 1 T PO QD    fluticasone propionate (FLONASE) 50 mcg/actuation nasal spray SHAKE LIQUID AND USE 2 SPRAYS(100 MCG) IN EACH NOSTRIL EVERY DAY    gabapentin (NEURONTIN) 300 MG capsule Take 600 mg (two capsules) in the morning and 900mg (three capsules) at night before bed    GLUCOPHAGE 500 mg tablet Take 500 mg by mouth 2 (two) times daily with meals.     ibuprofen (ADVIL,MOTRIN) 800 MG tablet 800 mg every 4 (four) hours as needed.     levothyroxine (SYNTHROID) 75 MCG tablet Take 75 mcg by mouth before breakfast.    magnesium  oxide (MAG-OX) 400 mg (241.3 mg magnesium) tablet Take 1 tablet (400 mg total) by mouth 2 (two) times daily.    meloxicam (MOBIC) 15 MG tablet     terese-m.blue-s.phos-phsal-hyo (URIBEL) 118-10-40.8-36 mg Cap Take 1 capsule by mouth every 6 (six) hours as needed (bladder pain).    metoprolol succinate (TOPROL-XL) 100 MG 24 hr tablet Take 1 tablet (100 mg total) by mouth once daily.    mometasone 0.1% (ELOCON) 0.1 % cream APPLY TOPICALLY TO THE RASH ON HANDS TWICE DAILY AS NEEDED FOR TWO TO THREE WEEKS. USE SPARINGLY    MULTIVIT-IRON-MIN-FOLIC ACID 3,500-18-0.4 UNIT-MG-MG ORAL CHEW Take 1 tablet by mouth once daily.     ofloxacin (OCUFLOX) 0.3 % ophthalmic solution PLACE 1 DROP INTO BOTH EYES FOUR TIMES DAILY    omega-3 fatty acids/fish oil (FISH OIL-OMEGA-3 FATTY ACIDS) 300-1,000 mg capsule Take 1 capsule by mouth once daily.    omeprazole (PRILOSEC OTC) 20 MG tablet Take 20 mg by mouth once daily.    oxyCODONE (ROXICODONE) 5 MG immediate release tablet Take 1 tablet (5 mg total) by mouth every 4 (four) hours as needed for Pain.    phentermine (ADIPEX-P) 37.5 mg tablet Take 37.5 mg by mouth.    phentermine 37.5 MG capsule TK 1 C PO ONCE D IN THE MORNING    REXULTI 2 mg Tab Take 1 tablet by mouth every evening.    topiramate (TOPAMAX) 50 MG tablet Take 2 tablets (100 mg total) by mouth every evening.    traZODone (DESYREL) 100 MG tablet TK 1 T PO HS    TRULICITY 4.5 mg/0.5 mL pen injector     ZOLOFT 100 mg tablet Take 200 mg by mouth once daily.      No current facility-administered medications for this visit.     Lab Results   Component Value Date    TSH 0.874 11/14/2022     No results found for: FERRITIN  @RESUFAST(NA,K,CL,CO2,GLU,BUN,CREATININE,  No results found for: CRP    Pertinent Medications Reviewed:     Vitamin/Supplements/Herbs: denies taking any MVI  Allergies:  Review of patient's allergies indicates:  No Known Allergies   Dietary Data  Problematic Foods: fried foods  Changes in meal patterns :    Behavioral challenges trigger seeking out of high fat foods , snacks and sweetened beverages ; impulse control will be difficult to limit or avoid high fat foods   Calorie  needs : Maintenance 1900 rosa /kg   Protein Needs; 77 grams @ .8 /kg   Fluid needs: 2400cc @25cc;kg    Nutrition Diagnosis   Maria Ines Ac is a 38 y.o. female   Referred for :   Chief Complaint   Patient presents with    gastroparesis      8/22 GES 85% retention after four hours     Manic Behavior     Mother and patient note difficulty controlling sweetened food intake and show cavities on front teeth a result of sugar intake     Type II DM - dx age 9 years ; does not check BG ; frequent    Gastroesophageal Reflux     Preference for high fat ( shekhar's, biscuits)  high sugar foods ( [employer]ian[employer] peanut butter cups, honey buns) ; coca cola ( diet coke consumed throughout the day )         Primary Problem: Altered GI function   Etiology: Related to gastroparesis   Signs/symptoms: As evidenced by patient statement of 8/22 GES 85% retention after 4 hours         Secondary Problem:  Altered BG and A1c related to increased intake of sweetened beverages and fast food        Nutrition Intervention  Gastroparesis Nutrition Intervention  Calorie Requirements:1600 kcal/day (22Kcal/kg)  Protein requirements: 70g/day (1g/kg)   DM discussion   Pathophysiology of DM  explained to patient including the macronutrient effect on gastric emptying and BG effects. Discussion of delay in gastric emptying resulting in potential for low BG 1-2 hours post meal followed by increase >3-4 hours discussed   Signs and symptoms of hypo and hyperglycemia as well as treatment discussed.   Impact of DM medication on BG discussed in conjunction with mealtime components.   Impact of physical activity on gastric emptying discussed  l.   Recommendation #1 Eat 4-6small meals daily to allow time for stomach emptying and increased PO intake    Recommendation #2Avoid high fat and  high fiber foods 2/2 slow movement form stomach and possibility for GI pain/discomfort    Recommendation #3 Add smooth, pureed and liquid foods to diet to increase PO intake 2/2 decrease transit time in stomach    Recommendation #4 Eat breakfast with lean protein and whole grain carbohydrates daily for added calories   Recommendation #5 Eat snacks 2-3x/day including fruit, vegetable and low fat dairy    Recommendation #1 Eat 4-6small meals daily to allow time for stomach emptying and increased PO intake           M = Nutrition Monitoring   Indicator 1. PO intake/weight   Indicator 2. Diet adherence /tolerance      E= Nutrition Evaluation  Goal 1. PO intake stays consistent and patient weight remains stable    Goal 2. Patient adherence to prescribed diet modifications to support medical care of condition .        Patient and/or family comprehend instructions: yes  Outcome: Verbalizes understanding    Consultation Time: 45 Minutes  F/U: Per referring provider   Communication provided to care team via [employer]     Diagnoses:  1. Gastroparesis

## 2022-11-14 ENCOUNTER — OFFICE VISIT (OUTPATIENT)
Dept: OBSTETRICS AND GYNECOLOGY | Facility: CLINIC | Age: 37
End: 2022-11-14
Attending: OBSTETRICS & GYNECOLOGY
Payer: MEDICARE

## 2022-11-14 ENCOUNTER — OFFICE VISIT (OUTPATIENT)
Dept: INTERNAL MEDICINE | Facility: CLINIC | Age: 37
End: 2022-11-14
Payer: MEDICARE

## 2022-11-14 ENCOUNTER — LAB VISIT (OUTPATIENT)
Dept: LAB | Facility: HOSPITAL | Age: 37
End: 2022-11-14
Attending: INTERNAL MEDICINE
Payer: MEDICARE

## 2022-11-14 VITALS
DIASTOLIC BLOOD PRESSURE: 75 MMHG | HEART RATE: 80 BPM | SYSTOLIC BLOOD PRESSURE: 109 MMHG | BODY MASS INDEX: 38.46 KG/M2 | HEIGHT: 62 IN | WEIGHT: 209 LBS

## 2022-11-14 DIAGNOSIS — I10 HYPERTENSION, UNSPECIFIED TYPE: Primary | ICD-10-CM

## 2022-11-14 DIAGNOSIS — E03.9 HYPOTHYROIDISM, UNSPECIFIED TYPE: ICD-10-CM

## 2022-11-14 DIAGNOSIS — E11.9 DIABETES MELLITUS WITHOUT COMPLICATION: ICD-10-CM

## 2022-11-14 DIAGNOSIS — G89.29 CHRONIC PELVIC PAIN IN FEMALE: ICD-10-CM

## 2022-11-14 DIAGNOSIS — Z12.31 ENCOUNTER FOR SCREENING MAMMOGRAM FOR MALIGNANT NEOPLASM OF BREAST: Primary | ICD-10-CM

## 2022-11-14 DIAGNOSIS — I10 HYPERTENSION, UNSPECIFIED TYPE: ICD-10-CM

## 2022-11-14 DIAGNOSIS — Z01.419 ENCOUNTER FOR GYNECOLOGICAL EXAMINATION WITHOUT ABNORMAL FINDING: ICD-10-CM

## 2022-11-14 DIAGNOSIS — R10.2 CHRONIC PELVIC PAIN IN FEMALE: ICD-10-CM

## 2022-11-14 LAB
ALBUMIN SERPL BCP-MCNC: 4.3 G/DL (ref 3.5–5.2)
ALP SERPL-CCNC: 64 U/L (ref 55–135)
ALT SERPL W/O P-5'-P-CCNC: 19 U/L (ref 10–44)
ANION GAP SERPL CALC-SCNC: 10 MMOL/L (ref 8–16)
AST SERPL-CCNC: 19 U/L (ref 10–40)
BASOPHILS # BLD AUTO: 0.06 K/UL (ref 0–0.2)
BASOPHILS NFR BLD: 0.9 % (ref 0–1.9)
BILIRUB SERPL-MCNC: 0.3 MG/DL (ref 0.1–1)
BUN SERPL-MCNC: 10 MG/DL (ref 6–20)
CALCIUM SERPL-MCNC: 10.5 MG/DL (ref 8.7–10.5)
CHLORIDE SERPL-SCNC: 101 MMOL/L (ref 95–110)
CO2 SERPL-SCNC: 27 MMOL/L (ref 23–29)
CREAT SERPL-MCNC: 0.9 MG/DL (ref 0.5–1.4)
DIFFERENTIAL METHOD: NORMAL
EOSINOPHIL # BLD AUTO: 0.3 K/UL (ref 0–0.5)
EOSINOPHIL NFR BLD: 4.4 % (ref 0–8)
ERYTHROCYTE [DISTWIDTH] IN BLOOD BY AUTOMATED COUNT: 12.5 % (ref 11.5–14.5)
EST. GFR  (NO RACE VARIABLE): >60 ML/MIN/1.73 M^2
ESTIMATED AVG GLUCOSE: 105 MG/DL (ref 68–131)
GLUCOSE SERPL-MCNC: 91 MG/DL (ref 70–110)
HBA1C MFR BLD: 5.3 % (ref 4–5.6)
HCT VFR BLD AUTO: 44.1 % (ref 37–48.5)
HGB BLD-MCNC: 14.1 G/DL (ref 12–16)
IMM GRANULOCYTES # BLD AUTO: 0.01 K/UL (ref 0–0.04)
IMM GRANULOCYTES NFR BLD AUTO: 0.1 % (ref 0–0.5)
LYMPHOCYTES # BLD AUTO: 2.3 K/UL (ref 1–4.8)
LYMPHOCYTES NFR BLD: 33.8 % (ref 18–48)
MCH RBC QN AUTO: 28.9 PG (ref 27–31)
MCHC RBC AUTO-ENTMCNC: 32 G/DL (ref 32–36)
MCV RBC AUTO: 90 FL (ref 82–98)
MONOCYTES # BLD AUTO: 0.5 K/UL (ref 0.3–1)
MONOCYTES NFR BLD: 7.4 % (ref 4–15)
NEUTROPHILS # BLD AUTO: 3.6 K/UL (ref 1.8–7.7)
NEUTROPHILS NFR BLD: 53.4 % (ref 38–73)
NRBC BLD-RTO: 0 /100 WBC
PLATELET # BLD AUTO: 256 K/UL (ref 150–450)
PMV BLD AUTO: 12.8 FL (ref 9.2–12.9)
POTASSIUM SERPL-SCNC: 4.4 MMOL/L (ref 3.5–5.1)
PROT SERPL-MCNC: 7.9 G/DL (ref 6–8.4)
RBC # BLD AUTO: 4.88 M/UL (ref 4–5.4)
SODIUM SERPL-SCNC: 138 MMOL/L (ref 136–145)
TSH SERPL DL<=0.005 MIU/L-ACNC: 0.87 UIU/ML (ref 0.4–4)
WBC # BLD AUTO: 6.77 K/UL (ref 3.9–12.7)

## 2022-11-14 PROCEDURE — 3044F PR MOST RECENT HEMOGLOBIN A1C LEVEL <7.0%: ICD-10-PCS | Mod: CPTII,S$GLB,, | Performed by: OBSTETRICS & GYNECOLOGY

## 2022-11-14 PROCEDURE — 3044F HG A1C LEVEL LT 7.0%: CPT | Mod: CPTII,S$GLB,, | Performed by: OBSTETRICS & GYNECOLOGY

## 2022-11-14 PROCEDURE — 3008F PR BODY MASS INDEX (BMI) DOCUMENTED: ICD-10-PCS | Mod: CPTII,S$GLB,, | Performed by: OBSTETRICS & GYNECOLOGY

## 2022-11-14 PROCEDURE — 3078F DIAST BP <80 MM HG: CPT | Mod: CPTII,S$GLB,, | Performed by: INTERNAL MEDICINE

## 2022-11-14 PROCEDURE — 80053 COMPREHEN METABOLIC PANEL: CPT | Performed by: INTERNAL MEDICINE

## 2022-11-14 PROCEDURE — 3008F PR BODY MASS INDEX (BMI) DOCUMENTED: ICD-10-PCS | Mod: CPTII,S$GLB,, | Performed by: INTERNAL MEDICINE

## 2022-11-14 PROCEDURE — 3074F PR MOST RECENT SYSTOLIC BLOOD PRESSURE < 130 MM HG: ICD-10-PCS | Mod: CPTII,S$GLB,, | Performed by: INTERNAL MEDICINE

## 2022-11-14 PROCEDURE — 3066F NEPHROPATHY DOC TX: CPT | Mod: CPTII,S$GLB,, | Performed by: OBSTETRICS & GYNECOLOGY

## 2022-11-14 PROCEDURE — 83036 HEMOGLOBIN GLYCOSYLATED A1C: CPT | Performed by: INTERNAL MEDICINE

## 2022-11-14 PROCEDURE — 3061F PR NEG MICROALBUMINURIA RESULT DOCUMENTED/REVIEW: ICD-10-PCS | Mod: CPTII,S$GLB,, | Performed by: INTERNAL MEDICINE

## 2022-11-14 PROCEDURE — 99999 PR PBB SHADOW E&M-EST. PATIENT-LVL V: ICD-10-PCS | Mod: PBBFAC,,, | Performed by: OBSTETRICS & GYNECOLOGY

## 2022-11-14 PROCEDURE — 3061F PR NEG MICROALBUMINURIA RESULT DOCUMENTED/REVIEW: ICD-10-PCS | Mod: CPTII,S$GLB,, | Performed by: OBSTETRICS & GYNECOLOGY

## 2022-11-14 PROCEDURE — 3066F NEPHROPATHY DOC TX: CPT | Mod: CPTII,S$GLB,, | Performed by: INTERNAL MEDICINE

## 2022-11-14 PROCEDURE — 3061F NEG MICROALBUMINURIA REV: CPT | Mod: CPTII,S$GLB,, | Performed by: OBSTETRICS & GYNECOLOGY

## 2022-11-14 PROCEDURE — 3078F PR MOST RECENT DIASTOLIC BLOOD PRESSURE < 80 MM HG: ICD-10-PCS | Mod: CPTII,S$GLB,, | Performed by: INTERNAL MEDICINE

## 2022-11-14 PROCEDURE — 3074F SYST BP LT 130 MM HG: CPT | Mod: CPTII,S$GLB,, | Performed by: INTERNAL MEDICINE

## 2022-11-14 PROCEDURE — 3074F PR MOST RECENT SYSTOLIC BLOOD PRESSURE < 130 MM HG: ICD-10-PCS | Mod: CPTII,S$GLB,, | Performed by: OBSTETRICS & GYNECOLOGY

## 2022-11-14 PROCEDURE — 3008F BODY MASS INDEX DOCD: CPT | Mod: CPTII,S$GLB,, | Performed by: OBSTETRICS & GYNECOLOGY

## 2022-11-14 PROCEDURE — 1159F MED LIST DOCD IN RCRD: CPT | Mod: CPTII,S$GLB,, | Performed by: INTERNAL MEDICINE

## 2022-11-14 PROCEDURE — 99999 PR PBB SHADOW E&M-EST. PATIENT-LVL V: CPT | Mod: PBBFAC,,, | Performed by: OBSTETRICS & GYNECOLOGY

## 2022-11-14 PROCEDURE — 3061F NEG MICROALBUMINURIA REV: CPT | Mod: CPTII,S$GLB,, | Performed by: INTERNAL MEDICINE

## 2022-11-14 PROCEDURE — 3078F PR MOST RECENT DIASTOLIC BLOOD PRESSURE < 80 MM HG: ICD-10-PCS | Mod: CPTII,S$GLB,, | Performed by: OBSTETRICS & GYNECOLOGY

## 2022-11-14 PROCEDURE — 84443 ASSAY THYROID STIM HORMONE: CPT | Performed by: INTERNAL MEDICINE

## 2022-11-14 PROCEDURE — 99214 PR OFFICE/OUTPT VISIT, EST, LEVL IV, 30-39 MIN: ICD-10-PCS | Mod: S$GLB,,, | Performed by: INTERNAL MEDICINE

## 2022-11-14 PROCEDURE — 99999 PR PBB SHADOW E&M-EST. PATIENT-LVL V: CPT | Mod: PBBFAC,,, | Performed by: INTERNAL MEDICINE

## 2022-11-14 PROCEDURE — 1159F PR MEDICATION LIST DOCUMENTED IN MEDICAL RECORD: ICD-10-PCS | Mod: CPTII,S$GLB,, | Performed by: INTERNAL MEDICINE

## 2022-11-14 PROCEDURE — 99214 OFFICE O/P EST MOD 30 MIN: CPT | Mod: S$GLB,,, | Performed by: INTERNAL MEDICINE

## 2022-11-14 PROCEDURE — 3066F PR DOCUMENTATION OF TREATMENT FOR NEPHROPATHY: ICD-10-PCS | Mod: CPTII,S$GLB,, | Performed by: INTERNAL MEDICINE

## 2022-11-14 PROCEDURE — 3008F BODY MASS INDEX DOCD: CPT | Mod: CPTII,S$GLB,, | Performed by: INTERNAL MEDICINE

## 2022-11-14 PROCEDURE — 36415 COLL VENOUS BLD VENIPUNCTURE: CPT | Performed by: INTERNAL MEDICINE

## 2022-11-14 PROCEDURE — G0101 PR CA SCREEN;PELVIC/BREAST EXAM: ICD-10-PCS | Mod: S$GLB,,, | Performed by: OBSTETRICS & GYNECOLOGY

## 2022-11-14 PROCEDURE — 99999 PR PBB SHADOW E&M-EST. PATIENT-LVL V: ICD-10-PCS | Mod: PBBFAC,,, | Performed by: INTERNAL MEDICINE

## 2022-11-14 PROCEDURE — 3044F HG A1C LEVEL LT 7.0%: CPT | Mod: CPTII,S$GLB,, | Performed by: INTERNAL MEDICINE

## 2022-11-14 PROCEDURE — 3074F SYST BP LT 130 MM HG: CPT | Mod: CPTII,S$GLB,, | Performed by: OBSTETRICS & GYNECOLOGY

## 2022-11-14 PROCEDURE — 3044F PR MOST RECENT HEMOGLOBIN A1C LEVEL <7.0%: ICD-10-PCS | Mod: CPTII,S$GLB,, | Performed by: INTERNAL MEDICINE

## 2022-11-14 PROCEDURE — 3078F DIAST BP <80 MM HG: CPT | Mod: CPTII,S$GLB,, | Performed by: OBSTETRICS & GYNECOLOGY

## 2022-11-14 PROCEDURE — G0101 CA SCREEN;PELVIC/BREAST EXAM: HCPCS | Mod: S$GLB,,, | Performed by: OBSTETRICS & GYNECOLOGY

## 2022-11-14 PROCEDURE — 85025 COMPLETE CBC W/AUTO DIFF WBC: CPT | Performed by: INTERNAL MEDICINE

## 2022-11-14 PROCEDURE — 3066F PR DOCUMENTATION OF TREATMENT FOR NEPHROPATHY: ICD-10-PCS | Mod: CPTII,S$GLB,, | Performed by: OBSTETRICS & GYNECOLOGY

## 2022-11-14 RX ORDER — ATORVASTATIN CALCIUM 80 MG/1
80 TABLET, FILM COATED ORAL NIGHTLY
COMMUNITY
Start: 2022-08-07

## 2022-11-14 RX ORDER — DULAGLUTIDE 4.5 MG/.5ML
4.5 INJECTION, SOLUTION SUBCUTANEOUS
COMMUNITY
Start: 2022-07-31

## 2022-11-14 RX ORDER — MELOXICAM 15 MG/1
15 TABLET ORAL DAILY
COMMUNITY
Start: 2022-09-06

## 2022-11-14 RX ORDER — MOMETASONE FUROATE 1 MG/G
CREAM TOPICAL
COMMUNITY
Start: 2022-08-09

## 2022-11-14 NOTE — PROGRESS NOTES
"SUBJECTIVE:   37 y.o. female   for annual routine  checkup. Patient's last menstrual period was 2012 (lmp unknown)..  She reports pelvic pain /10 "everyday , all day for 1 year." She is being treated for IBS .    She reports diarrhea 4 times per day     Past Medical History:   Diagnosis Date    Blood in stool     Constipation     Cystitis     Depression     Diabetes mellitus, type 2     Dysphagia     Endometriosis     GERD (gastroesophageal reflux disease)     Headache     Hypertension     Palpitations     Premature menopause on hormone replacement therapy     Thyroid disease      Past Surgical History:   Procedure Laterality Date    24 HOUR IMPEDANCE PH MONITORING OF ESOPHAGUS IN PATIENT TAKING ACID REDUCING MEDICATIONS N/A 2022    Procedure: IMPEDANCE PH STUDY, ESOPHAGEAL, 24 HOUR, IN PATIENT TAKING ACID REDUCING MEDICATION;  Surgeon: Debbie Butler MD;  [facility]  Service: Endoscopy;  Laterality: N/A;  On PPI/H2 Blocker    BLADDER SUSPENSION      CARPAL TUNNEL RELEASE      right    CHOLECYSTECTOMY      COLONOSCOPY N/A 2016    Procedure: COLONOSCOPY;  Surgeon: Slick Herron MD;  [facility]  Service: Endoscopy;  Laterality: N/A;  PM prep    COLONOSCOPY N/A 2021    Procedure: COLONOSCOPY. any CRS;  Surgeon: Maria Ines Jung MD;  Location: [facility] (4TH F[facility]);  Service: Endoscopy;  Laterality: N/A;  fully vaccinated -  scaral stimulator will bring remote -     lvm to confirm appt-rb    ESOPHAGEAL MANOMETRY WITH MEASUREMENT OF IMPEDANCE N/A 2018    Procedure: MANOMETRY, ESOPHAGUS, WITH IMPEDANCE MEASUREMENT;  Surgeon: Slick Herron MD;  [facility]  Service: Endoscopy;  Laterality: N/A;    ESOPHAGEAL MANOMETRY WITH MEASUREMENT OF IMPEDANCE N/A 2022    Procedure: MANOMETRY, ESOPHAGUS, WITH IMPEDANCE MEASUREMENT;  Surgeon: Debbie Butler MD;  [facility]  Service: Endoscopy;  Laterality: N/A;  " fully vaccinated, bladder stimulator, instr mailed /emailed -ml    ESOPHAGOGASTRODUODENOSCOPY N/A 2/13/2020    Procedure: EGD (ESOPHAGOGASTRODUODENOSCOPY);  Surgeon: Slick Herron MD;  Location: [facility] (4TH F[facility]);  Service: Endoscopy;  Laterality: N/A;  pt has cardiology appt on 1/6/19-will call back after this appt to schedule-tb    FLUOROSCOPIC URODYNAMIC STUDY N/A 5/23/2019    Procedure: URODYNAMIC STUDY, FLUOROSCOPIC;  Surgeon: Zena Tee MD;  Location: [facility] OR 1ST FLR;  Service: Urology;  Laterality: N/A;  1 hour    GALLBLADDER SURGERY      HYSTERECTOMY  2013    Bess velasquez Dr. Champlain    IMPLANTATION OF PERMANENT SACRAL NERVE STIMULATOR N/A 7/30/2019    Procedure: INSERTION, NEUROSTIMULATOR, PERMANENT, SACRAL;  Surgeon: Zena Tee MD;  Location: [facility] OR 2ND FLR;  Service: Urology;  Laterality: N/A;  30 min    OOPHORECTOMY  2005    [facility]O- benign cyst    OOPHORECTOMY  2013    [facility]- endo    ooptherectomy      right knee  scoped    SHOULDER SURGERY  right     Social History     Socioeconomic History    Marital status: Single   Tobacco Use    Smoking status: Never    Smokeless tobacco: Never   Substance and Sexual Activity    Alcohol use: No    Drug use: No    Sexual activity: Never     Birth control/protection: None   Social History Narrative    ** Merged History Encounter **          Social Determinants of Health     Financial Resource Strain: Low Risk     Difficulty of Paying Living Expenses: Not hard at all   Food Insecurity: No Food Insecurity    Worried About Running Out of Food in the Last Year: Never true    Ran Out of Food in the Last Year: Never true   Transportation Needs: No Transportation Needs    Lack of Transportation (Medical): No    Lack of Transportation (Non-Medical): No   Physical Activity: Sufficiently Active    Days of Exercise per Week: 3 days    Minutes of Exercise per Session: 60 min   Stress: No Stress Concern Present    Feeling of Stress : Not at all   Social  Connections: Socially Isolated    Frequency of Communication with Friends and Family: Once a week    Frequency of Social Gatherings with Friends and Family: Once a week    Attends Yazdanism Services: More than 4 times per year    Active Member of Clubs or Organizations: No    Attends Club or Organization Meetings: Never    Marital Status: Never    Housing Stability: Low Risk     Unable to Pay for Housing in the Last Year: No    Number of Places Lived in the Last Year: 1    Unstable Housing in the Last Year: No     Family History   Problem Relation Age of Onset    Cervical cancer Maternal Grandmother         unknown age of onset,  at 80    Breast cancer Mother 50        alive at 64, unilateral    Esophageal cancer Mother 63    Ovarian cancer Mother 63    Anesthesia problems Mother     Colon cancer Brother 40    Breast cancer Maternal Aunt 72        alive at 72    Breast cancer Paternal Aunt         unknown age of onset, alive at 70    Vaginal cancer Neg Hx     Endometrial cancer Neg Hx     Crohn's disease Neg Hx     Ulcerative colitis Neg Hx     Stomach cancer Neg Hx     Irritable bowel syndrome Neg Hx     Celiac disease Neg Hx     Cancer Neg Hx      OB History    Para Term  AB Living   0 0 0 0 0 0   SAB IAB Ectopic Multiple Live Births   0 0 0 0             Current Outpatient Medications   Medication Sig Dispense Refill    ACCU-CHEK AMAURY PLUS TEST STRP Strp USE TO TEST BLOOD GLUCOSE TID  9    ACCU-CHEK SOFTCLIX LANCETS Misc USE TO TEST BLOOD GLUCOSE TID  3    atorvastatin (LIPITOR) 80 MG tablet Take 80 mg by mouth once daily.      azelastine (ASTELIN) 137 mcg (0.1 %) nasal spray 1 spray (137 mcg total) by Nasal route 2 (two) times daily. 30 mL 3    BLOOD SUGAR DIAGNOSTIC (ACCU-CHEK AMAURY PLUS TEST STRP MISC) 1 strip by Misc.(Non-Drug; Combo Route) route 3 (three) times daily.      buPROPion (WELLBUTRIN XL) 150 MG TB24 tablet Take 150 mg by mouth every morning.      calcium-vitamin D3  500 mg(1,250mg) -200 unit per tablet Take 1 tablet by mouth 2 (two) times daily with meals.      ciclopirox (PENLAC) 8 % Soln Apply topically nightly. 6.6 mL 11    dexlansoprazole (DEXILANT) 60 mg capsule Take 1 capsule (60 mg total) by mouth 2 (two) times a day. 60 capsule 11    diclofenac sodium (VOLTAREN) 1 % Gel Apply 2 g topically 4 (four) times daily. As needed for breast pain 1 Tube 1    dicyclomine (BENTYL) 10 MG capsule TAKE 2 CAPSULES(20 MG) BY MOUTH THREE TIMES DAILY BEFORE MEALS 180 capsule 0    diltiazem HCl (DILTIAZEM 2% CREAM) Apply topically 3 (three) times daily. Apply topically to anal area. 30 g 2    docusate sodium (COLACE) 100 MG capsule Take 1 capsule (100 mg total) by mouth 2 (two) times daily. 60 capsule 0    FARXIGA 10 mg Tab TK 1 T PO QD  7    fluticasone propionate (FLONASE) 50 mcg/actuation nasal spray SHAKE LIQUID AND USE 2 SPRAYS(100 MCG) IN EACH NOSTRIL EVERY DAY 48 g 2    gabapentin (NEURONTIN) 300 MG capsule Take 600 mg (two capsules) in the morning and 900mg (three capsules) at night before bed 150 capsule 11    GLUCOPHAGE 500 mg tablet Take 500 mg by mouth 2 (two) times daily with meals.       ibuprofen (ADVIL,MOTRIN) 800 MG tablet 800 mg every 4 (four) hours as needed.   0    levothyroxine (SYNTHROID) 75 MCG tablet       magnesium oxide (MAG-OX) 400 mg (241.3 mg magnesium) tablet Take 1 tablet (400 mg total) by mouth 2 (two) times daily.  0    meloxicam (MOBIC) 15 MG tablet       metoprolol succinate (TOPROL-XL) 100 MG 24 hr tablet Take 1 tablet (100 mg total) by mouth once daily. 90 tablet 3    mometasone 0.1% (ELOCON) 0.1 % cream APPLY TOPICALLY TO THE RASH ON HANDS TWICE DAILY AS NEEDED FOR TWO TO THREE WEEKS. USE SPARINGLY      MULTIVIT-IRON-MIN-FOLIC ACID 3,500-18-0.4 UNIT-MG-MG ORAL CHEW Take 1 tablet by mouth once daily.       ofloxacin (OCUFLOX) 0.3 % ophthalmic solution PLACE 1 DROP INTO BOTH EYES FOUR TIMES DAILY      omega-3 fatty acids/fish oil (FISH OIL-OMEGA-3 FATTY  ACIDS) 300-1,000 mg capsule Take 1 capsule by mouth once daily. 90 capsule 3    omeprazole (PRILOSEC OTC) 20 MG tablet Take 20 mg by mouth once daily.      oxybutynin (DITROPAN-XL) 10 MG 24 hr tablet TAKE 1 TABLET(10 MG) BY MOUTH EVERY DAY 30 tablet 2    pantoprazole (PROTONIX) 40 MG tablet TAKE 1 TABLET(40 MG) BY MOUTH EVERY DAY 90 tablet 0    phentermine 37.5 MG capsule TK 1 C PO ONCE D IN THE MORNING      topiramate (TOPAMAX) 50 MG tablet Take 2 tablets (100 mg total) by mouth every evening. 60 tablet 11    traZODone (DESYREL) 100 MG tablet TK 1 T PO HS  1    triamcinolone acetonide 0.1% (KENALOG) 0.1 % ointment APPLY TO THE NECK AND CHEEKS EVERY NIGHT AT BEDTIME AS DIRECTED      TRULICITY 4.5 mg/0.5 mL pen injector       VRAYLAR 6 mg Cap Take 1 capsule by mouth once daily.      ZOLOFT 100 mg tablet Take 200 mg by mouth once daily.       zolpidem (AMBIEN) 10 mg Tab 1 po for sleep study 1 tablet 0     No current facility-administered medications for this visit.     Allergies: Patient has no known allergies.     The ASCVD Risk score (Loris DK, et al., 2019) failed to calculate for the following reasons:    The 2019 ASCVD risk score is only valid for ages 40 to 79      ROS:  Constitutional: no weight loss, weight gain, fever, fatigue  Eyes:  No vision changes, glasses/contacts  ENT/Mouth: No ulcers, sinus problems, ears ringing, headache  Cardiovascular: No inability to lie flat, chest pain, exercise intolerance, swelling, heart palpitations  Respiratory: No wheezing, coughing blood, shortness of breath, or cough  Gastrointestinal: +diarrhea, bloody stool, nausea/vomiting, constipation, gas, hemorrhoids  Genitourinary: No blood in urine, painful urination, urgency of urination, frequency of urination, incomplete emptying, incontinence, abnormal bleeding, painful periods, heavy periods, vaginal discharge, vaginal odor, painful intercourse, sexual problems, bleeding after intercourse., +pelvic pain  Musculoskeletal:  No muscle weakness  Skin/Breast: No painful breasts, nipple discharge, masses, rash, ulcers  Neurological: No passing out, seizures, numbness, headache  Endocrine:+ diabetes, +hypothyroid, hyperthyroid, hot flashes, hair loss, abnormal hair growth, acne  Psychiatric: No depression, crying  Hematologic: No bruises, bleeding, swollen lymph nodes, anemia.      Physical Exam:   Constitutional: She is oriented to person, place, and time. She appears well-developed and well-nourished.      Neck: No tracheal deviation present. No thyromegaly present.    Cardiovascular:       Exam reveals no edema.        Pulmonary/Chest: Effort normal. She exhibits no mass, no tenderness, no deformity and no retraction. Right breast exhibits no inverted nipple, no mass, no nipple discharge, no skin change, no tenderness, presence, no bleeding and no swelling. Left breast exhibits no inverted nipple, no mass, no nipple discharge, no skin change, no tenderness, presence, no bleeding and no swelling. Breasts are symmetrical.        Abdominal: Soft. She exhibits no distension and no mass. There is no abdominal tenderness. There is no rebound and no guarding. No hernia. Hernia confirmed negative in the left inguinal area.     Genitourinary: Rectum:      No external hemorrhoid.   There is no rash, tenderness or lesion on the right labia. There is no rash, tenderness or lesion on the left labia. No no adexnal prolapse. Right adnexum displays no mass, no tenderness and no fullness. Left adnexum displays no mass, no tenderness and no fullness. Vaginal cuff normal.  No  no vaginal discharge, tenderness, bleeding, rectocele, cystocele or unspecified prolapse of vaginal walls in the vagina. Cervix is absent.Uterus is absent.           Musculoskeletal: Normal range of motion and moves all extremeties. No edema.       Neurological: She is alert and oriented to person, place, and time.    Skin: No rash noted. No erythema. No pallor.    Psychiatric: She  has a normal mood and affect. Her behavior is normal. Judgment and thought content normal.       ASSESSMENT:   well woman  Pelvic pain  IBS  PLAN:   Mammogram  Pelvic ultrasound  return annually or prn

## 2022-11-15 ENCOUNTER — HOSPITAL ENCOUNTER (OUTPATIENT)
Dept: RADIOLOGY | Facility: OTHER | Age: 37
Discharge: HOME | End: 2022-11-15
Attending: OBSTETRICS & GYNECOLOGY
Payer: MEDICARE

## 2022-11-15 DIAGNOSIS — Z12.31 ENCOUNTER FOR SCREENING MAMMOGRAM FOR MALIGNANT NEOPLASM OF BREAST: ICD-10-CM

## 2022-11-15 PROCEDURE — 77067 SCR MAMMO BI INCL CAD: CPT | Mod: 26,,, | Performed by: RADIOLOGY

## 2022-11-15 PROCEDURE — 77067 MAMMO DIGITAL SCREENING BILAT WITH TOMO: ICD-10-PCS | Mod: 26,,, | Performed by: RADIOLOGY

## 2022-11-15 PROCEDURE — 77063 MAMMO DIGITAL SCREENING BILAT WITH TOMO: ICD-10-PCS | Mod: 26,,, | Performed by: RADIOLOGY

## 2022-11-15 PROCEDURE — 77063 BREAST TOMOSYNTHESIS BI: CPT | Mod: TC

## 2022-11-15 PROCEDURE — 77063 BREAST TOMOSYNTHESIS BI: CPT | Mod: 26,,, | Performed by: RADIOLOGY

## 2022-11-18 ENCOUNTER — PES CALL (OUTPATIENT)
Dept: ADMINISTRATIVE | Facility: CLINIC | Age: 37
End: 2022-11-18
Payer: MEDICARE

## 2022-11-19 VITALS
OXYGEN SATURATION: 96 % | WEIGHT: 209.69 LBS | TEMPERATURE: 99 F | HEIGHT: 62 IN | BODY MASS INDEX: 38.59 KG/M2 | HEART RATE: 75 BPM | DIASTOLIC BLOOD PRESSURE: 78 MMHG | SYSTOLIC BLOOD PRESSURE: 120 MMHG

## 2022-11-19 NOTE — PROGRESS NOTES
Subjective:       Patient ID: Maria Ines Ac is a 37 y.o. female.    Chief Complaint: Hypertension    HPI  She returns for management of hypertension.  She has had hypertension for over a year.  Current treatment has included medications outlined in medication list.  She denies chest pain or shortness of breath.  No palpitations.  Denies left arm or neck pain.  She has diabetes.  Denies polyuria, polydipsia     Medications: See med list     Social history:  Does not smoke, does not drink alcohol       Review of Systems   Constitutional:  Negative for chills, fatigue, fever and unexpected weight change.   Respiratory:  Negative for chest tightness and shortness of breath.    Cardiovascular:  Negative for chest pain and palpitations.   Gastrointestinal:  Negative for abdominal pain and blood in stool.   Neurological:  Negative for dizziness, syncope, numbness and headaches.     Objective:      Physical Exam  HENT:      Right Ear: External ear normal.      Left Ear: External ear normal.      Nose: Nose normal.      Mouth/Throat:      Mouth: Mucous membranes are moist.      Pharynx: Oropharynx is clear.   Eyes:      Pupils: Pupils are equal, round, and reactive to light.   Cardiovascular:      Rate and Rhythm: Normal rate and regular rhythm.      Heart sounds: No murmur heard.  Pulmonary:      Breath sounds: Normal breath sounds.   Abdominal:      General: There is no distension.      Palpations: There is no hepatomegaly or splenomegaly.      Tenderness: There is no abdominal tenderness.   Musculoskeletal:      Cervical back: Normal range of motion.   Lymphadenopathy:      Cervical: No cervical adenopathy.      Upper Body:      Right upper body: No axillary adenopathy.      Left upper body: No axillary adenopathy.   Neurological:      Cranial Nerves: No cranial nerve deficit.      Sensory: No sensory deficit.      Motor: Motor function is intact.      Deep Tendon Reflexes: Reflexes are normal and symmetric.        Assessment/Plan       Assessment and plan:  1.  Hypertension:  Check CMP and CBC  2.  Diabetes:  Check A1c and TSH

## 2022-11-21 ENCOUNTER — OFFICE VISIT (OUTPATIENT)
Dept: PODIATRY | Facility: CLINIC | Age: 37
End: 2022-11-21
Payer: MEDICARE

## 2022-11-21 VITALS
HEART RATE: 88 BPM | DIASTOLIC BLOOD PRESSURE: 75 MMHG | BODY MASS INDEX: 38.46 KG/M2 | WEIGHT: 209 LBS | SYSTOLIC BLOOD PRESSURE: 106 MMHG | HEIGHT: 62 IN

## 2022-11-21 DIAGNOSIS — B35.1 ONYCHOMYCOSIS DUE TO DERMATOPHYTE: ICD-10-CM

## 2022-11-21 DIAGNOSIS — E11.42 DIABETIC POLYNEUROPATHY ASSOCIATED WITH TYPE 2 DIABETES MELLITUS: Primary | ICD-10-CM

## 2022-11-21 DIAGNOSIS — L84 CORN OR CALLUS: ICD-10-CM

## 2022-11-21 PROCEDURE — 3074F SYST BP LT 130 MM HG: CPT | Mod: HCNC,CPTII,S$GLB, | Performed by: PODIATRIST

## 2022-11-21 PROCEDURE — 99499 UNLISTED E&M SERVICE: CPT | Mod: HCNC,S$GLB,, | Performed by: PODIATRIST

## 2022-11-21 PROCEDURE — 11721 PR DEBRIDEMENT OF NAILS, 6 OR MORE: ICD-10-PCS | Mod: 59,Q9,HCNC,S$GLB | Performed by: PODIATRIST

## 2022-11-21 PROCEDURE — 3008F PR BODY MASS INDEX (BMI) DOCUMENTED: ICD-10-PCS | Mod: HCNC,CPTII,S$GLB, | Performed by: PODIATRIST

## 2022-11-21 PROCEDURE — 3008F BODY MASS INDEX DOCD: CPT | Mod: HCNC,CPTII,S$GLB, | Performed by: PODIATRIST

## 2022-11-21 PROCEDURE — 11721 DEBRIDE NAIL 6 OR MORE: CPT | Mod: 59,Q9,HCNC,S$GLB | Performed by: PODIATRIST

## 2022-11-21 PROCEDURE — 99999 PR PBB SHADOW E&M-EST. PATIENT-LVL II: CPT | Mod: PBBFAC,HCNC,, | Performed by: PODIATRIST

## 2022-11-21 PROCEDURE — 3074F PR MOST RECENT SYSTOLIC BLOOD PRESSURE < 130 MM HG: ICD-10-PCS | Mod: HCNC,CPTII,S$GLB, | Performed by: PODIATRIST

## 2022-11-21 PROCEDURE — 11056 PARNG/CUTG B9 HYPRKR LES 2-4: CPT | Mod: Q9,HCNC,S$GLB, | Performed by: PODIATRIST

## 2022-11-21 PROCEDURE — 3078F DIAST BP <80 MM HG: CPT | Mod: HCNC,CPTII,S$GLB, | Performed by: PODIATRIST

## 2022-11-21 PROCEDURE — 3072F PR LOW RISK FOR RETINOPATHY: ICD-10-PCS | Mod: HCNC,CPTII,S$GLB, | Performed by: PODIATRIST

## 2022-11-21 PROCEDURE — 3078F PR MOST RECENT DIASTOLIC BLOOD PRESSURE < 80 MM HG: ICD-10-PCS | Mod: HCNC,CPTII,S$GLB, | Performed by: PODIATRIST

## 2022-11-21 PROCEDURE — 99499 NO LOS: ICD-10-PCS | Mod: HCNC,S$GLB,, | Performed by: PODIATRIST

## 2022-11-21 PROCEDURE — 3072F LOW RISK FOR RETINOPATHY: CPT | Mod: HCNC,CPTII,S$GLB, | Performed by: PODIATRIST

## 2022-11-21 PROCEDURE — 99999 PR PBB SHADOW E&M-EST. PATIENT-LVL II: ICD-10-PCS | Mod: PBBFAC,HCNC,, | Performed by: PODIATRIST

## 2022-11-21 PROCEDURE — 11056 PR TRIM BENIGN HYPERKERATOTIC SKIN LESION,2-4: ICD-10-PCS | Mod: Q9,HCNC,S$GLB, | Performed by: PODIATRIST

## 2022-11-21 RX ORDER — LUMATEPERONE 42 MG/1
1 CAPSULE ORAL DAILY
COMMUNITY
Start: 2022-09-29 | End: 2023-02-10

## 2022-11-21 RX ORDER — SPIRONOLACTONE 25 MG/1
TABLET ORAL
COMMUNITY
Start: 2022-11-07 | End: 2023-02-06

## 2022-11-28 ENCOUNTER — TELEPHONE (OUTPATIENT)
Dept: OBSTETRICS AND GYNECOLOGY | Facility: CLINIC | Age: 37
End: 2022-11-28
Payer: MEDICARE

## 2022-11-28 DIAGNOSIS — R10.2 PELVIC PAIN: Primary | ICD-10-CM

## 2022-11-28 NOTE — TELEPHONE ENCOUNTER
----- Message from Rafael Santiago sent at 11/28/2022  4:38 PM CST -----  Please call pt to schedule her u/s appt [phone]

## 2022-12-04 NOTE — PROGRESS NOTES
.  Critical Care H&P/Consult    HPI:     Ms. Lacy is a 58 year old female without known past medical history who presents to hospital due to worsening L foot infection which has been progressive over the past few weeks. She states she was at [facility] a few weeks ago where she developed a small abrasion on the underside of the Left second toe. She states it was initially painful and red.  Over the past few weeks she states it has progressed to involve the majority of the anterior portion of her foot, it has also become necrotic with intermittent clear drainage. She states she has intermittently applied topical Bacitracin, neomycin, and polymyxin B ointment. She has not sought medical care. She works as a  and states she is on her feet for multiple hours a day. She denies fevers, chills. Rigors. She states her feet have been numb/have a tingling sensation for quite some time. She has not seen a physician in almost 10 years. Upon arrival to the ER, she was hemodynamically stable, in no distress. Initial laboratory workup concerning for DKA. She was given fluids, antibiotics, and insulin. She was subsequently transferred to the MICU for further monitoring.    Past Medical History:   Diagnosis Date   • Essential (primary) hypertension       History reviewed. No pertinent surgical history.   (Not in a hospital admission)     ALLERGIES:   Allergen Reactions   • Penicillins RASH      Social History     Tobacco Use   • Smoking status: Never Smoker   • Smokeless tobacco: Not on file   Substance Use Topics   • Alcohol use: Never      History reviewed. No pertinent family history.     Review of Systems:  All systems reviewed and negative except for those mentioned in the history of present illness    Scheduled Meds:  Current Facility-Administered Medications   Medication Dose Route Frequency Provider Last Rate Last Admin   • morphine injection 4 mg  4 mg Intravenous Once Ace Torres, DO         Continuous  HPI     CC: Pt is here today for a diabetic eye exam. She states that she has   noticed a change in her vision at a distance.    MORENA: 1 year    (+) Changes in vision   (+) Pain  (+) Irritation   (+) Itching   (-) Flashes  (+) Floaters  (+) Glasses wearer  (-) CL wearer  (-) Uses eye gtts    Does patient want a refraction today? yes    (-) Eye injury  (-) Eye surgery   (-)POHx  (+)FOHx, Glaucoma    (+)DM  Hemoglobin A1C       Date                     Value               Ref Range             Status                12/14/2021               5.4                 4.0 - 5.6 %           Final                   06/02/2021               5.3                 4.0 - 5.6 %           Final                  02/02/2021               5.1                 4.0 - 5.6 %           Final                   Last edited by Betsy Kowalski, OD on 4/27/2022  2:59 PM. (History)            Assessment /Plan     For exam results, see Encounter Report.    Type 2 diabetes mellitus without retinopathy    Diabetes mellitus without complication  -     Ambulatory referral/consult to Optometry    Dry eye syndrome of both eyes    Refractive error      1-2. No retinopathy noted, OU. Continue proper BS control and annual diabetic eye exams. Monitor yearly.     3. Educated pt on findings. Recommended ATs TID-QID + martha/gel QHS for added lubrication and comfort. Discussed dry eyes are cause for intermittent blurred vision.   If symptoms worsen or dont improve, RTC. Monitor.     4. Updated SRx. Mild change from habitual. Monitor yearly.       RTC in 1 year for annual DM eye exam or sooner if needed.                   Infusions:  Current Facility-Administered Medications   Medication Dose Route Frequency Provider Last Rate Last Admin   • lactated ringers infusion   Intravenous Continuous Ace T Melissa,  mL/hr at 12/03/22 2014 New Bag at 12/03/22 2014   • insulin regular (human) (HumuLIN R) 100 units in sodium chloride 0.9% 100 mL infusion  0-40 Units/hr Intravenous Continuous Ace T Melissa, DO 8 mL/hr at 12/03/22 2056 8 Units/hr at 12/03/22 2056     PRN Meds:  Current Facility-Administered Medications   Medication Dose Route Frequency Provider Last Rate Last Admin   • dextrose 50 % injection 25 g  25 g Intravenous PRN Ace ALBA Melissa, DO       • dextrose 50 % injection 12.5 g  12.5 g Intravenous PRN Ace ALBA Torres, DO       • glucagon (GLUCAGEN) injection 1 mg  1 mg Intramuscular PRN Ace ALBA Melissa, DO       • dextrose (GLUTOSE) 40 % gel 15 g  15 g Oral PRN Ace Israelkel, DO       • dextrose (GLUTOSE) 40 % gel 30 g  30 g Oral PRN Ace Torres, DO       • potassium CHLORIDE 60 mEq in sodium chloride 0.9 % 500 mL IVPB  60 mEq Intravenous PRN Brady Patel MD        Or   • potassium CHLORIDE 40 mEq in sodium chloride 0.9 % 250 mL IVPB  40 mEq Intravenous PRN Brady Patel MD        Or   • potassium CHLORIDE 20 mEq/100mL IVPB premix  20 mEq Intravenous PRN Brady Patel MD       • sodium PHOSPHATE 15 mmol in sodium chloride 0.9 % 250 mL IVPB  15 mmol Intravenous PRN Brady Patel MD       • magnesium sulfate 2 g in 50 mL premix IVPB  2 g Intravenous Q4H PRN Brady aPtel MD       • ondansetron (ZOFRAN) injection 4 mg  4 mg Intravenous Q12H PRN Brady Patel MD       • morphine injection 2 mg  2 mg Intravenous Q4H PRN Brady Patel MD           Objective:    Vital signs in last 24 hours:  Temp:  [97.3 °F (36.3 °C)] 97.3 °F (36.3 °C)  Heart Rate:  [106-119] 106  Resp:  [18-19] 18  BP: (160-168)/(73-99) 168/99    Intake/Output last 3 shifts:  No intake/output data recorded.  Intake/Output this shift:  I/O this  shift:  In: 100 [IV Piggyback:100]  Out: -     Vent settings for last 24 hours:       Radiology:  LAST X-RAY:  === 12/03/22 ===    XR TIBIA FIBULA 2 VIEWS LEFT    - Narrative -  EXAM: XR TIBIA FIBULA 2 VIEWS LEFT, XR FOOT MIN 3 VIEWS LEFT    CLINICAL INDICATION: Left foot wound rule out gas    TECHNIQUE:  AP and lateral views of the left tibia/fibula.  AP, oblique, and lateral views of the left foot.    COMPARISON: None.    - Impression -  FINDINGS/IMPRESSION:    Left tibia/fibula colon  No acute fracture or malalignment of the tibia/fibula.  Soft tissues are  unremarkable.  No radiopaque foreign body.    Left foot:  Generalized soft tissue swelling of the foot and most pronounced at the  great toe.  Findings suggestive of cellulitis.  Small focus of gas is seen  adjacent to the 2nd proximal phalanx on AP view.  Findings are likely  artifactual given that it is only seen on one view.  No underlying osseous  erosions to suggest osteomyelitis.  No acute fracture or malalignment.  Large plantar calcaneal enthesophyte.    Electronically Signed by: JENSEN VELASQUEZ M.D.  Signed on: 12/3/2022 6:11 PM    ___________________________________________________________________________    XR FOOT MIN 3 VIEWS LEFT    Left foot:  Generalized soft tissue swelling of the foot and most pronounced at the  great toe.  Findings suggestive of cellulitis.  Small focus of gas is seen  adjacent to the 2nd proximal phalanx on AP view.  Findings are likely  artifactual given that it is only seen on one view.  No underlying osseous  erosions to suggest osteomyelitis.  No acute fracture or malalignment.  Large plantar calcaneal enthesophyte.      Exam:    Gen: appears stated age, no distress  CVS: RRR, no rubs no murmurs  Resp: CTA, no crackles no wheeze  Abdo: soft, non tender  Neuro: diminished sensation bilaterally in lower extremities   Extremities: L foot with evidence of skin necrosis/gangrene over anterior portion of L second toe extending  inferiorly, erythema throughout anterior foot,  No palpable crepitus, no tracking     Assessment/Plan:    Impression:    #DKA  #undiagnosed diabetes mellitus   #lower extremity cellulitis  #rule out underlying osteomyelitis     Plan:    - continue insulin infusion per DKA protocol  - continue fluid resuscitation with balanced crystalloids  - Q4H BMP, VBG  - aggressive K+,PO, Mg repletion  - continue vancomycin, cefepime  - clindamycin x 1  - clinically patient does not appear to have necrotizing fasciitis, however XR describes some gas adjacent to second toe - we will obtain a STAT CT lower extremity  - ID and podiatry on consult    [place]: n/a  Access: PIV  DVT ppx:  lovenox  GI ppx: n/a  Nutrition: NPO  Code status: FULL  Disposition: ICU  Social: patient states daughter is next of kin    I spent 45 minutes of critical care time personally attending to this patient's critical care needs for the aforementioned issues with complex decision making and high risk of physiologic decline. This time excludes that time spent on separately billable procedures or time spent teaching.      Date of Service: 12/3/2022

## 2022-12-07 ENCOUNTER — HOSPITAL ENCOUNTER (OUTPATIENT)
Dept: RADIOLOGY | Facility: OTHER | Age: 37
Discharge: HOME | End: 2022-12-07
Attending: OBSTETRICS & GYNECOLOGY
Payer: MEDICARE

## 2022-12-07 DIAGNOSIS — R10.2 PELVIC PAIN: ICD-10-CM

## 2022-12-07 PROCEDURE — 76830 US PELVIS COMP WITH TRANSVAG NON-OB (XPD): ICD-10-PCS | Mod: 26,,, | Performed by: RADIOLOGY

## 2022-12-07 PROCEDURE — 76830 TRANSVAGINAL US NON-OB: CPT | Mod: 26,,, | Performed by: RADIOLOGY

## 2022-12-07 PROCEDURE — 76856 US EXAM PELVIC COMPLETE: CPT | Mod: 26,,, | Performed by: RADIOLOGY

## 2022-12-07 PROCEDURE — 76856 US EXAM PELVIC COMPLETE: CPT | Mod: TC

## 2022-12-07 PROCEDURE — 76856 US PELVIS COMP WITH TRANSVAG NON-OB (XPD): ICD-10-PCS | Mod: 26,,, | Performed by: RADIOLOGY

## 2022-12-28 ENCOUNTER — OFFICE VISIT (OUTPATIENT)
Dept: URGENT CARE | Facility: CLINIC | Age: 37
End: 2022-12-28
Payer: MEDICARE

## 2022-12-28 VITALS
OXYGEN SATURATION: 97 % | HEART RATE: 81 BPM | HEIGHT: 62 IN | WEIGHT: 208 LBS | DIASTOLIC BLOOD PRESSURE: 78 MMHG | RESPIRATION RATE: 16 BRPM | SYSTOLIC BLOOD PRESSURE: 115 MMHG | TEMPERATURE: 98 F | BODY MASS INDEX: 38.28 KG/M2

## 2022-12-28 DIAGNOSIS — U07.1 COVID-19: Primary | ICD-10-CM

## 2022-12-28 DIAGNOSIS — Z20.822 ENCOUNTER FOR LABORATORY TESTING FOR COVID-19 VIRUS: ICD-10-CM

## 2022-12-28 LAB
CTP QC/QA: YES
SARS-COV-2 AG RESP QL IA.RAPID: POSITIVE

## 2022-12-28 PROCEDURE — 3008F PR BODY MASS INDEX (BMI) DOCUMENTED: ICD-10-PCS | Mod: CPTII,S$GLB,,

## 2022-12-28 PROCEDURE — 1159F PR MEDICATION LIST DOCUMENTED IN MEDICAL RECORD: ICD-10-PCS | Mod: CPTII,S$GLB,,

## 2022-12-28 PROCEDURE — 1160F RVW MEDS BY RX/DR IN RCRD: CPT | Mod: CPTII,S$GLB,,

## 2022-12-28 PROCEDURE — 3074F SYST BP LT 130 MM HG: CPT | Mod: CPTII,S$GLB,,

## 2022-12-28 PROCEDURE — 3008F BODY MASS INDEX DOCD: CPT | Mod: CPTII,S$GLB,,

## 2022-12-28 PROCEDURE — 3074F PR MOST RECENT SYSTOLIC BLOOD PRESSURE < 130 MM HG: ICD-10-PCS | Mod: CPTII,S$GLB,,

## 2022-12-28 PROCEDURE — 1160F PR REVIEW ALL MEDS BY PRESCRIBER/CLIN PHARMACIST DOCUMENTED: ICD-10-PCS | Mod: CPTII,S$GLB,,

## 2022-12-28 PROCEDURE — 3061F NEG MICROALBUMINURIA REV: CPT | Mod: CPTII,S$GLB,,

## 2022-12-28 PROCEDURE — 3066F PR DOCUMENTATION OF TREATMENT FOR NEPHROPATHY: ICD-10-PCS | Mod: CPTII,S$GLB,,

## 2022-12-28 PROCEDURE — 87811 SARS CORONAVIRUS 2 ANTIGEN POCT, MANUAL READ: ICD-10-PCS | Mod: QW,S$GLB,,

## 2022-12-28 PROCEDURE — 3044F PR MOST RECENT HEMOGLOBIN A1C LEVEL <7.0%: ICD-10-PCS | Mod: CPTII,S$GLB,,

## 2022-12-28 PROCEDURE — 3066F NEPHROPATHY DOC TX: CPT | Mod: CPTII,S$GLB,,

## 2022-12-28 PROCEDURE — 3078F PR MOST RECENT DIASTOLIC BLOOD PRESSURE < 80 MM HG: ICD-10-PCS | Mod: CPTII,S$GLB,,

## 2022-12-28 PROCEDURE — 99213 OFFICE O/P EST LOW 20 MIN: CPT | Mod: S$GLB,CS,,

## 2022-12-28 PROCEDURE — 3044F HG A1C LEVEL LT 7.0%: CPT | Mod: CPTII,S$GLB,,

## 2022-12-28 PROCEDURE — 3078F DIAST BP <80 MM HG: CPT | Mod: CPTII,S$GLB,,

## 2022-12-28 PROCEDURE — 87811 SARS-COV-2 COVID19 W/OPTIC: CPT | Mod: QW,S$GLB,,

## 2022-12-28 PROCEDURE — 3061F PR NEG MICROALBUMINURIA RESULT DOCUMENTED/REVIEW: ICD-10-PCS | Mod: CPTII,S$GLB,,

## 2022-12-28 PROCEDURE — 99213 PR OFFICE/OUTPT VISIT, EST, LEVL III, 20-29 MIN: ICD-10-PCS | Mod: S$GLB,CS,,

## 2022-12-28 PROCEDURE — 1159F MED LIST DOCD IN RCRD: CPT | Mod: CPTII,S$GLB,,

## 2022-12-28 RX ORDER — CETIRIZINE HYDROCHLORIDE 10 MG/1
10 TABLET ORAL DAILY
Qty: 30 TABLET | Refills: 0 | Status: SHIPPED | OUTPATIENT
Start: 2022-12-28 | End: 2023-11-14 | Stop reason: SDUPTHER

## 2022-12-28 RX ORDER — BENZONATATE 100 MG/1
100 CAPSULE ORAL 3 TIMES DAILY PRN
Qty: 30 CAPSULE | Refills: 0 | Status: SHIPPED | OUTPATIENT
Start: 2022-12-28 | End: 2023-01-07

## 2022-12-28 RX ORDER — PROMETHAZINE HYDROCHLORIDE AND DEXTROMETHORPHAN HYDROBROMIDE 6.25; 15 MG/5ML; MG/5ML
5 SYRUP ORAL NIGHTLY PRN
Qty: 118 ML | Refills: 0 | Status: SHIPPED | OUTPATIENT
Start: 2022-12-28 | End: 2023-01-07

## 2022-12-28 RX ORDER — FLUTICASONE PROPIONATE 50 MCG
1 SPRAY, SUSPENSION (ML) NASAL DAILY
Qty: 9.9 ML | Refills: 0 | Status: SHIPPED | OUTPATIENT
Start: 2022-12-28 | End: 2023-02-10 | Stop reason: SDUPTHER

## 2022-12-28 NOTE — PROGRESS NOTES
"Subjective:       Patient ID: Maria Ines Ac is a 37 y.o. female.    Vitals:  height is 5' 2" (1.575 m) and weight is 94.3 kg (208 lb). Her oral temperature is 97.9 °F (36.6 °C). Her blood pressure is 115/78 and her pulse is 81. Her respiration is 16 and oxygen saturation is 97%.     Chief Complaint: URI    37-year-old female with past medical history of type 2 diabetes presents with complaints of cough, sore throat for the past 3 days.  Patient states that she is been taking Robitussin for symptoms with minimal relief.  She states that the cough is productive of sputum.  No fever, chest pain or shortness of breath.    URI   This is a new problem. Episode onset: 3 days ago. The problem has been gradually worsening. There has been no fever. Associated symptoms include coughing, diarrhea, headaches, sinus pain, sneezing and a sore throat. Pertinent negatives include no abdominal pain, chest pain, congestion, dysuria, ear pain, joint pain, joint swelling, nausea, neck pain, plugged ear sensation, rash, rhinorrhea, swollen glands, vomiting or wheezing. She has tried decongestant for the symptoms. The treatment provided mild relief.     HENT:  Positive for sinus pain and sore throat. Negative for ear pain and congestion.    Neck: Negative for neck pain.   Cardiovascular:  Negative for chest pain.   Respiratory:  Positive for cough. Negative for wheezing.    Gastrointestinal:  Positive for diarrhea. Negative for abdominal pain, nausea and vomiting.   Genitourinary:  Negative for dysuria.   Skin:  Negative for rash.   Allergic/Immunologic: Positive for sneezing.   Neurological:  Positive for headaches.     Objective:      Physical Exam   Constitutional: She is oriented to person, place, and time. She appears well-developed. She is cooperative.  Non-toxic appearance. She does not appear ill. No distress.   HENT:   Head: Normocephalic and atraumatic.   Ears:   Right Ear: Hearing, tympanic membrane, external ear and " ear canal normal.   Left Ear: Hearing, tympanic membrane, external ear and ear canal normal.   Nose: Congestion present. No mucosal edema, rhinorrhea or nasal deformity. No epistaxis. Right sinus exhibits no maxillary sinus tenderness and no frontal sinus tenderness. Left sinus exhibits no maxillary sinus tenderness and no frontal sinus tenderness.   Mouth/Throat: Uvula is midline and mucous membranes are normal. No trismus in the jaw. Normal dentition. No uvula swelling. Posterior oropharyngeal erythema present. No oropharyngeal exudate.   Eyes: Conjunctivae, EOM and lids are normal. Pupils are equal, round, and reactive to light. Right eye exhibits no discharge. Left eye exhibits no discharge. No scleral icterus.   Neck: Trachea normal and phonation normal. Neck supple.   Cardiovascular: Normal rate, regular rhythm, normal heart sounds and normal pulses.   Pulmonary/Chest: Effort normal and breath sounds normal. No respiratory distress.   Abdominal: Normal appearance and bowel sounds are normal. She exhibits no distension and no mass. Soft. There is no abdominal tenderness.   Musculoskeletal: Normal range of motion.         General: No deformity. Normal range of motion.   Neurological: She is alert and oriented to person, place, and time. She exhibits normal muscle tone. Coordination normal.   Skin: Skin is warm, dry, intact, not diaphoretic and not pale.   Psychiatric: Her speech is normal and behavior is normal. Judgment and thought content normal.   Nursing note and vitals reviewed.       Results for orders placed or performed in visit on 12/28/22   SARS Coronavirus 2 Antigen, POCT Manual Read   Result Value Ref Range    SARS Coronavirus 2 Antigen Positive (A) Negative     Acceptable Yes      *Note: Due to a large number of results and/or encounters for the requested time period, some results have not been displayed. A complete set of results can be found in Results Review.       Assessment:        1. COVID-19    2. Encounter for laboratory testing for COVID-19 virus          Plan:       Discussed results with patient and proper quarantine based on CDC guidelines.   Discussed use of OTC medications for symptom control as this is a viral disease.   All ER precautions covered including but not limited to shortness of breath, intractable fever, or chest pain.  Discussed RTC if symptoms worsen, change, or persist.   Patient verbalized understanding and agreed with the plan.       COVID-19  -     cetirizine (ZYRTEC) 10 MG tablet; Take 1 tablet (10 mg total) by mouth once daily.  Dispense: 30 tablet; Refill: 0  -     promethazine-dextromethorphan (PROMETHAZINE-DM) 6.25-15 mg/5 mL Syrp; Take 5 mLs by mouth nightly as needed. Do not take maximum of 30mLs in 24hrs  Dispense: 118 mL; Refill: 0  -     benzonatate (TESSALON) 100 MG capsule; Take 1 capsule (100 mg total) by mouth 3 (three) times daily as needed for Cough.  Dispense: 30 capsule; Refill: 0  -     fluticasone propionate (FLONASE) 50 mcg/actuation nasal spray; 1 spray (50 mcg total) by Each Nostril route once daily.  Dispense: 9.9 mL; Refill: 0    Encounter for laboratory testing for COVID-19 virus  -     SARS Coronavirus 2 Antigen, POCT Manual Read

## 2023-01-12 DIAGNOSIS — K21.9 GASTROESOPHAGEAL REFLUX DISEASE WITHOUT ESOPHAGITIS: ICD-10-CM

## 2023-01-12 RX ORDER — DEXLANSOPRAZOLE 60 MG/1
60 CAPSULE, DELAYED RELEASE ORAL 2 TIMES DAILY
Qty: 60 CAPSULE | Refills: 11 | Status: SHIPPED | OUTPATIENT
Start: 2023-01-12 | End: 2024-02-07

## 2023-02-06 ENCOUNTER — OFFICE VISIT (OUTPATIENT)
Dept: CARDIOLOGY | Facility: CLINIC | Age: 38
End: 2023-02-06
Payer: MEDICARE

## 2023-02-06 VITALS
HEART RATE: 87 BPM | WEIGHT: 210.75 LBS | DIASTOLIC BLOOD PRESSURE: 68 MMHG | HEIGHT: 62 IN | BODY MASS INDEX: 38.78 KG/M2 | OXYGEN SATURATION: 97 % | SYSTOLIC BLOOD PRESSURE: 107 MMHG

## 2023-02-06 DIAGNOSIS — R00.2 PALPITATIONS: ICD-10-CM

## 2023-02-06 DIAGNOSIS — R07.89 CHEST WALL PAIN: Primary | ICD-10-CM

## 2023-02-06 DIAGNOSIS — I15.2 HYPERTENSION ASSOCIATED WITH DIABETES: ICD-10-CM

## 2023-02-06 DIAGNOSIS — E11.42 TYPE 2 DIABETES MELLITUS WITH DIABETIC POLYNEUROPATHY, WITHOUT LONG-TERM CURRENT USE OF INSULIN: ICD-10-CM

## 2023-02-06 DIAGNOSIS — E11.69 HYPERLIPIDEMIA ASSOCIATED WITH TYPE 2 DIABETES MELLITUS: ICD-10-CM

## 2023-02-06 DIAGNOSIS — E66.01 SEVERE OBESITY (BMI 35.0-39.9) WITH COMORBIDITY: ICD-10-CM

## 2023-02-06 DIAGNOSIS — E78.5 HYPERLIPIDEMIA ASSOCIATED WITH TYPE 2 DIABETES MELLITUS: ICD-10-CM

## 2023-02-06 DIAGNOSIS — E11.59 HYPERTENSION ASSOCIATED WITH DIABETES: ICD-10-CM

## 2023-02-06 PROCEDURE — 99214 PR OFFICE/OUTPT VISIT, EST, LEVL IV, 30-39 MIN: ICD-10-PCS | Mod: HCNC,S$GLB,, | Performed by: PHYSICIAN ASSISTANT

## 2023-02-06 PROCEDURE — 3074F PR MOST RECENT SYSTOLIC BLOOD PRESSURE < 130 MM HG: ICD-10-PCS | Mod: HCNC,CPTII,S$GLB, | Performed by: PHYSICIAN ASSISTANT

## 2023-02-06 PROCEDURE — 3078F DIAST BP <80 MM HG: CPT | Mod: HCNC,CPTII,S$GLB, | Performed by: PHYSICIAN ASSISTANT

## 2023-02-06 PROCEDURE — 1159F PR MEDICATION LIST DOCUMENTED IN MEDICAL RECORD: ICD-10-PCS | Mod: HCNC,CPTII,S$GLB, | Performed by: PHYSICIAN ASSISTANT

## 2023-02-06 PROCEDURE — 99999 PR PBB SHADOW E&M-EST. PATIENT-LVL V: ICD-10-PCS | Mod: PBBFAC,HCNC,, | Performed by: PHYSICIAN ASSISTANT

## 2023-02-06 PROCEDURE — 1159F MED LIST DOCD IN RCRD: CPT | Mod: HCNC,CPTII,S$GLB, | Performed by: PHYSICIAN ASSISTANT

## 2023-02-06 PROCEDURE — 3074F SYST BP LT 130 MM HG: CPT | Mod: HCNC,CPTII,S$GLB, | Performed by: PHYSICIAN ASSISTANT

## 2023-02-06 PROCEDURE — 1160F PR REVIEW ALL MEDS BY PRESCRIBER/CLIN PHARMACIST DOCUMENTED: ICD-10-PCS | Mod: HCNC,CPTII,S$GLB, | Performed by: PHYSICIAN ASSISTANT

## 2023-02-06 PROCEDURE — 99214 OFFICE O/P EST MOD 30 MIN: CPT | Mod: HCNC,S$GLB,, | Performed by: PHYSICIAN ASSISTANT

## 2023-02-06 PROCEDURE — 3072F LOW RISK FOR RETINOPATHY: CPT | Mod: HCNC,CPTII,S$GLB, | Performed by: PHYSICIAN ASSISTANT

## 2023-02-06 PROCEDURE — 3072F PR LOW RISK FOR RETINOPATHY: ICD-10-PCS | Mod: HCNC,CPTII,S$GLB, | Performed by: PHYSICIAN ASSISTANT

## 2023-02-06 PROCEDURE — 3008F BODY MASS INDEX DOCD: CPT | Mod: HCNC,CPTII,S$GLB, | Performed by: PHYSICIAN ASSISTANT

## 2023-02-06 PROCEDURE — 99999 PR PBB SHADOW E&M-EST. PATIENT-LVL V: CPT | Mod: PBBFAC,HCNC,, | Performed by: PHYSICIAN ASSISTANT

## 2023-02-06 PROCEDURE — 1160F RVW MEDS BY RX/DR IN RCRD: CPT | Mod: HCNC,CPTII,S$GLB, | Performed by: PHYSICIAN ASSISTANT

## 2023-02-06 PROCEDURE — 3008F PR BODY MASS INDEX (BMI) DOCUMENTED: ICD-10-PCS | Mod: HCNC,CPTII,S$GLB, | Performed by: PHYSICIAN ASSISTANT

## 2023-02-06 PROCEDURE — 3078F PR MOST RECENT DIASTOLIC BLOOD PRESSURE < 80 MM HG: ICD-10-PCS | Mod: HCNC,CPTII,S$GLB, | Performed by: PHYSICIAN ASSISTANT
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
## 2023-02-07 ENCOUNTER — PES CALL (OUTPATIENT)
Dept: ADMINISTRATIVE | Facility: CLINIC | Age: 38
End: 2023-02-07
Payer: MEDICARE

## 2023-02-07 DIAGNOSIS — Z00.00 ENCOUNTER FOR MEDICARE ANNUAL WELLNESS EXAM: ICD-10-CM

## 2023-02-09 DIAGNOSIS — Z00.00 ENCOUNTER FOR MEDICARE ANNUAL WELLNESS EXAM: ICD-10-CM

## 2023-02-10 ENCOUNTER — OFFICE VISIT (OUTPATIENT)
Dept: UROLOGY | Facility: CLINIC | Age: 38
End: 2023-02-10
Payer: MEDICARE

## 2023-02-10 ENCOUNTER — TELEPHONE (OUTPATIENT)
Dept: UROLOGY | Facility: CLINIC | Age: 38
End: 2023-02-10

## 2023-02-10 VITALS
DIASTOLIC BLOOD PRESSURE: 65 MMHG | SYSTOLIC BLOOD PRESSURE: 98 MMHG | HEIGHT: 62 IN | BODY MASS INDEX: 37.93 KG/M2 | HEART RATE: 95 BPM | WEIGHT: 206.13 LBS

## 2023-02-10 DIAGNOSIS — N39.41 URGE INCONTINENCE: Primary | ICD-10-CM

## 2023-02-10 DIAGNOSIS — N39.41 URGE INCONTINENCE: ICD-10-CM

## 2023-02-10 DIAGNOSIS — R39.89 BLADDER PAIN: Primary | ICD-10-CM

## 2023-02-10 PROCEDURE — 95970 PR ANALYZE NEUROSTIM,NO REPROG: ICD-10-PCS | Mod: HCNC,S$GLB,, | Performed by: UROLOGY

## 2023-02-10 PROCEDURE — 3078F PR MOST RECENT DIASTOLIC BLOOD PRESSURE < 80 MM HG: ICD-10-PCS | Mod: HCNC,CPTII,S$GLB, | Performed by: UROLOGY

## 2023-02-10 PROCEDURE — 99999 PR PBB SHADOW E&M-EST. PATIENT-LVL IV: ICD-10-PCS | Mod: PBBFAC,HCNC,, | Performed by: UROLOGY

## 2023-02-10 PROCEDURE — 99214 OFFICE O/P EST MOD 30 MIN: CPT | Mod: HCNC,S$GLB,, | Performed by: UROLOGY

## 2023-02-10 PROCEDURE — 3072F LOW RISK FOR RETINOPATHY: CPT | Mod: HCNC,CPTII,S$GLB, | Performed by: UROLOGY

## 2023-02-10 PROCEDURE — 3078F DIAST BP <80 MM HG: CPT | Mod: HCNC,CPTII,S$GLB, | Performed by: UROLOGY

## 2023-02-10 PROCEDURE — 99999 PR PBB SHADOW E&M-EST. PATIENT-LVL IV: CPT | Mod: PBBFAC,HCNC,, | Performed by: UROLOGY

## 2023-02-10 PROCEDURE — 3008F PR BODY MASS INDEX (BMI) DOCUMENTED: ICD-10-PCS | Mod: HCNC,CPTII,S$GLB, | Performed by: UROLOGY

## 2023-02-10 PROCEDURE — 3074F SYST BP LT 130 MM HG: CPT | Mod: HCNC,CPTII,S$GLB, | Performed by: UROLOGY

## 2023-02-10 PROCEDURE — 1159F MED LIST DOCD IN RCRD: CPT | Mod: HCNC,CPTII,S$GLB, | Performed by: UROLOGY

## 2023-02-10 PROCEDURE — 3072F PR LOW RISK FOR RETINOPATHY: ICD-10-PCS | Mod: HCNC,CPTII,S$GLB, | Performed by: UROLOGY

## 2023-02-10 PROCEDURE — 3074F PR MOST RECENT SYSTOLIC BLOOD PRESSURE < 130 MM HG: ICD-10-PCS | Mod: HCNC,CPTII,S$GLB, | Performed by: UROLOGY

## 2023-02-10 PROCEDURE — 99214 PR OFFICE/OUTPT VISIT, EST, LEVL IV, 30-39 MIN: ICD-10-PCS | Mod: HCNC,S$GLB,, | Performed by: UROLOGY

## 2023-02-10 PROCEDURE — 3008F BODY MASS INDEX DOCD: CPT | Mod: HCNC,CPTII,S$GLB, | Performed by: UROLOGY

## 2023-02-10 PROCEDURE — 95970 ALYS NPGT W/O PRGRMG: CPT | Mod: HCNC,S$GLB,, | Performed by: UROLOGY

## 2023-02-10 PROCEDURE — 1159F PR MEDICATION LIST DOCUMENTED IN MEDICAL RECORD: ICD-10-PCS | Mod: HCNC,CPTII,S$GLB, | Performed by: UROLOGY

## 2023-02-10 RX ORDER — METHENAMINE, SODIUM PHOSPHATE, MONOBASIC, MONOHYDRATE, PHENYL SALICYLATE, METHYLENE BLUE, AND HYOSCYAMINE SULFATE 118; 40.8; 36; 10; .12 MG/1; MG/1; MG/1; MG/1; MG/1
1 CAPSULE ORAL EVERY 6 HOURS PRN
Qty: 120 CAPSULE | Refills: 11 | Status: SHIPPED | OUTPATIENT
Start: 2023-02-10

## 2023-02-10 NOTE — H&P (VIEW-ONLY)
CHIEF COMPLAINT:    Mrs. Ac is a 38 y.o. female presenting for a follow up on medication refractory urgency and urge incontinence.      PRESENTING ILLNESS:    Maria Ines Ac is a 38 y.o. female who is presents with a history of urgency and urge incontinence who is status post SNM with InterStim.  She states that she has some bladder pain. The urinalysis today is negative for infection.  She feels the stimulation of the InterStim periodically.      REVIEW OF SYSTEMS:    Review of Systems   Constitutional: Negative.    HENT: Negative.     Eyes: Negative.    Respiratory: Negative.     Cardiovascular: Negative.    Gastrointestinal: Negative.    Genitourinary:  Positive for frequency and urgency.        Bladder pain   Musculoskeletal: Negative.    Skin: Negative.    Neurological: Negative.    Endo/Heme/Allergies: Negative.    Psychiatric/Behavioral: Negative.       PATIENT HISTORY:    Past Medical History:   Diagnosis Date    Blood in stool     Constipation     Cystitis     Depression     Diabetes mellitus, type 2     Dysphagia     Endometriosis     GERD (gastroesophageal reflux disease)     Headache     Hypertension     Palpitations     Premature menopause on hormone replacement therapy     Thyroid disease        Past Surgical History:   Procedure Laterality Date    24 HOUR IMPEDANCE PH MONITORING OF ESOPHAGUS IN PATIENT TAKING ACID REDUCING MEDICATIONS N/A 6/2/2022    Procedure: IMPEDANCE PH STUDY, ESOPHAGEAL, 24 HOUR, IN PATIENT TAKING ACID REDUCING MEDICATION;  Surgeon: Debbie Butler MD;  Location: [address]);  Service: Endoscopy;  Laterality: N/A;  On PPI/H2 Blocker    BLADDER SUSPENSION      CARPAL TUNNEL RELEASE      right    CHOLECYSTECTOMY      COLONOSCOPY N/A 8/30/2016    Procedure: COLONOSCOPY;  Surgeon: Slick Herrno MD;  Location: [address]);  Service: Endoscopy;  Laterality: N/A;  PM prep    COLONOSCOPY N/A 12/22/2021    Procedure: COLONOSCOPY. any CRS;  Surgeon: Maria Ines  MD Fabiana;  Location: [facility] ENDO (4TH F[facility]);  Service: Endoscopy;  Laterality: N/A;  fully vaccinated -  scaral stimulator will bring remote -     lvm to confirm appt-rb    ESOPHAGEAL MANOMETRY WITH MEASUREMENT OF IMPEDANCE N/A 2018    Procedure: MANOMETRY, ESOPHAGUS, WITH IMPEDANCE MEASUREMENT;  Surgeon: Slick Herron MD;  Location: [facility] ENDO (4TH F[facility]);  Service: Endoscopy;  Laterality: N/A;    ESOPHAGEAL MANOMETRY WITH MEASUREMENT OF IMPEDANCE N/A 2022    Procedure: MANOMETRY, ESOPHAGUS, WITH IMPEDANCE MEASUREMENT;  Surgeon: Debbie Butler MD;  Location: [facility] ENDO (4TH F[facility]);  Service: Endoscopy;  Laterality: N/A;  fully vaccinated, bladder stimulator, instr mailed /emailed -ml    ESOPHAGOGASTRODUODENOSCOPY N/A 2020    Procedure: EGD (ESOPHAGOGASTRODUODENOSCOPY);  Surgeon: Slick Herron MD;  Location: [facility] ENDO (4TH F[facility]);  Service: Endoscopy;  Laterality: N/A;  pt has cardiology appt on 19-will call back after this appt to schedule-tb    FLUOROSCOPIC URODYNAMIC STUDY N/A 2019    Procedure: URODYNAMIC STUDY, FLUOROSCOPIC;  Surgeon: Zena Tee MD;  Location: [facility] OR 1ST FLR;  Service: Urology;  Laterality: N/A;  1 hour    GALLBLADDER SURGERY      HYSTERECTOMY      Bess velasquez Dr. Champlain    IMPLANTATION OF PERMANENT SACRAL NERVE STIMULATOR N/A 2019    Procedure: INSERTION, NEUROSTIMULATOR, PERMANENT, SACRAL;  Surgeon: Zena Tee MD;  Location: [facility] OR 2ND FLR;  Service: Urology;  Laterality: N/A;  30 min    OOPHORECTOMY      LSO- benign cyst    OOPHORECTOMY      RSO- endo    ooptherectomy      right knee  scoped    SHOULDER SURGERY  right       Family History   Problem Relation Age of Onset    Cervical cancer Maternal Grandmother         unknown age of onset,  at 80    Breast cancer Mother 50        alive at 64, unilateral    Esophageal cancer Mother 63    Ovarian cancer Mother 63    Anesthesia problems Mother     Colon cancer Brother 40     Breast cancer Maternal Aunt 72        alive at 72    Breast cancer Paternal Aunt         unknown age of onset, alive at 70     Social History     Socioeconomic History    Marital status: Single   Tobacco Use    Smoking status: Never    Smokeless tobacco: Never   Substance and Sexual Activity    Alcohol use: No    Drug use: No    Sexual activity: Never     Birth control/protection: None   Social History Narrative    ** Merged History Encounter **            Allergies:  Patient has no known allergies.    Medications:  Outpatient Encounter Medications as of 2/10/2023   Medication Sig Dispense Refill    ACCU-CHEK AMAURY PLUS TEST STRP Strp USE TO TEST BLOOD GLUCOSE TID  9    ACCU-CHEK SOFTCLIX LANCETS Misc USE TO TEST BLOOD GLUCOSE TID  3    atorvastatin (LIPITOR) 80 MG tablet Take 80 mg by mouth once daily.      azelastine (ASTELIN) 137 mcg (0.1 %) nasal spray 1 spray (137 mcg total) by Nasal route 2 (two) times daily. 30 mL 3    BLOOD SUGAR DIAGNOSTIC (ACCU-CHEK AMAURY PLUS TEST STRP MISC) 1 strip by Misc.(Non-Drug; Combo Route) route 3 (three) times daily.      buPROPion (WELLBUTRIN XL) 150 MG TB24 tablet Take 150 mg by mouth every morning.      calcium-vitamin D3 500 mg(1,250mg) -200 unit per tablet Take 1 tablet by mouth 2 (two) times daily with meals.      cetirizine (ZYRTEC) 10 MG tablet Take 1 tablet (10 mg total) by mouth once daily. 30 tablet 0    dexlansoprazole (DEXILANT) 60 mg capsule Take 1 capsule (60 mg total) by mouth 2 (two) times a day. 60 capsule 11    diclofenac sodium (VOLTAREN) 1 % Gel Apply 2 g topically 4 (four) times daily. As needed for breast pain 1 Tube 1    dicyclomine (BENTYL) 10 MG capsule TAKE 2 CAPSULES(20 MG) BY MOUTH THREE TIMES DAILY BEFORE MEALS 180 capsule 0    docusate sodium (COLACE) 100 MG capsule Take 1 capsule (100 mg total) by mouth 2 (two) times daily. 60 capsule 0    FARXIGA 10 mg Tab TK 1 T PO QD  7    fluticasone propionate (FLONASE) 50 mcg/actuation nasal spray SHAKE  LIQUID AND USE 2 SPRAYS(100 MCG) IN EACH NOSTRIL EVERY DAY 48 g 2    gabapentin (NEURONTIN) 300 MG capsule Take 600 mg (two capsules) in the morning and 900mg (three capsules) at night before bed 150 capsule 11    GLUCOPHAGE 500 mg tablet Take 500 mg by mouth 2 (two) times daily with meals.       ibuprofen (ADVIL,MOTRIN) 800 MG tablet 800 mg every 4 (four) hours as needed.   0    levothyroxine (SYNTHROID) 75 MCG tablet       magnesium oxide (MAG-OX) 400 mg (241.3 mg magnesium) tablet Take 1 tablet (400 mg total) by mouth 2 (two) times daily.  0    meloxicam (MOBIC) 15 MG tablet       metoprolol succinate (TOPROL-XL) 100 MG 24 hr tablet Take 1 tablet (100 mg total) by mouth once daily. 90 tablet 3    mometasone 0.1% (ELOCON) 0.1 % cream APPLY TOPICALLY TO THE RASH ON HANDS TWICE DAILY AS NEEDED FOR TWO TO THREE WEEKS. USE SPARINGLY      MULTIVIT-IRON-MIN-FOLIC ACID 3,500-18-0.4 UNIT-MG-MG ORAL CHEW Take 1 tablet by mouth once daily.       ofloxacin (OCUFLOX) 0.3 % ophthalmic solution PLACE 1 DROP INTO BOTH EYES FOUR TIMES DAILY      omega-3 fatty acids/fish oil (FISH OIL-OMEGA-3 FATTY ACIDS) 300-1,000 mg capsule Take 1 capsule by mouth once daily. 90 capsule 3    omeprazole (PRILOSEC OTC) 20 MG tablet Take 20 mg by mouth once daily.      pantoprazole (PROTONIX) 40 MG tablet TAKE 1 TABLET(40 MG) BY MOUTH EVERY DAY 90 tablet 0    phentermine 37.5 MG capsule TK 1 C PO ONCE D IN THE MORNING      topiramate (TOPAMAX) 50 MG tablet Take 2 tablets (100 mg total) by mouth every evening. 60 tablet 11    traZODone (DESYREL) 100 MG tablet TK 1 T PO HS  1    TRULICITY 4.5 mg/0.5 mL pen injector       ZOLOFT 100 mg tablet Take 200 mg by mouth once daily.       ciclopirox (PENLAC) 8 % Soln Apply topically nightly. (Patient not taking: Reported on 2/10/2023) 6.6 mL 11    diltiazem HCl (DILTIAZEM 2% CREAM) Apply topically 3 (three) times daily. Apply topically to anal area. (Patient not taking: Reported on 2/10/2023) 30 g 2     [DISCONTINUED] CAPLYTA 42 mg Cap Take 1 capsule by mouth once daily.      [DISCONTINUED] fluticasone propionate (FLONASE) 50 mcg/actuation nasal spray 1 spray (50 mcg total) by Each Nostril route once daily. 9.9 mL 0    [DISCONTINUED] oxybutynin (DITROPAN-XL) 10 MG 24 hr tablet TAKE 1 TABLET(10 MG) BY MOUTH EVERY DAY (Patient not taking: Reported on 2/10/2023) 30 tablet 2    [DISCONTINUED] triamcinolone acetonide 0.1% (KENALOG) 0.1 % ointment APPLY TO THE NECK AND CHEEKS EVERY NIGHT AT BEDTIME AS DIRECTED      [DISCONTINUED] VRAYLAR 6 mg Cap Take 1 capsule by mouth once daily.      [DISCONTINUED] zolpidem (AMBIEN) 10 mg Tab 1 po for sleep study 1 tablet 0     No facility-administered encounter medications on file as of 2/10/2023.         PHYSICAL EXAMINATION:    The patient generally appears in good health, is appropriately interactive, and is in no apparent distress.    Skin: No lesions.    Mental: Cooperative with normal affect.    Neuro: Grossly intact.    HEENT: Normal. No evidence of lymphadenopathy.    Chest:  normal inspiratory effort.    Abdomen: Soft, non-tender. No masses or organomegaly. Bladder is not palpable. No evidence of flank discomfort. No evidence of inguinal hernia.    Extremities: No clubbing, cyanosis, or edema      LABS:    Lab Results   Component Value Date    BUN 10 11/14/2022    CREATININE 0.9 11/14/2022       UA 1.010, pH 6, 500 glucose, otherwise, negative. (She is on Farxiga)     IMPRESSION:    Urgency   Urge incontinence  Depleted battery for SNM     PLAN:    1. Interrogation of the InterStim  Battery:  0.5-3 months  Impedences range from 1049 to 1909 Ohms, checked at 1.5 A.   0 lead is > 4000 ohms.  She is at 3.3 mA on Program A, 210 pw, 14 Hz.      2.  Consented to exchange the battery     3.  Uribel prescribed, coupon given, for the bladder pain    I spent 30 minutes with the patient of which more than half was spent in direct consultation with the patient in regards to our  treatment and plan.

## 2023-02-10 NOTE — PROGRESS NOTES
CHIEF COMPLAINT:    Mrs. Ac is a 38 y.o. female presenting for a follow up on medication refractory urgency and urge incontinence.      PRESENTING ILLNESS:    Maria Ines Ac is a 38 y.o. female who is presents with a history of urgency and urge incontinence who is status post SNM with InterStim.  She states that she has some bladder pain. The urinalysis today is negative for infection.  She feels the stimulation of the InterStim periodically.      REVIEW OF SYSTEMS:    Review of Systems   Constitutional: Negative.    HENT: Negative.     Eyes: Negative.    Respiratory: Negative.     Cardiovascular: Negative.    Gastrointestinal: Negative.    Genitourinary:  Positive for frequency and urgency.        Bladder pain   Musculoskeletal: Negative.    Skin: Negative.    Neurological: Negative.    Endo/Heme/Allergies: Negative.    Psychiatric/Behavioral: Negative.       PATIENT HISTORY:    Past Medical History:   Diagnosis Date    Blood in stool     Constipation     Cystitis     Depression     Diabetes mellitus, type 2     Dysphagia     Endometriosis     GERD (gastroesophageal reflux disease)     Headache     Hypertension     Palpitations     Premature menopause on hormone replacement therapy     Thyroid disease        Past Surgical History:   Procedure Laterality Date    24 HOUR IMPEDANCE PH MONITORING OF ESOPHAGUS IN PATIENT TAKING ACID REDUCING MEDICATIONS N/A 6/2/2022    Procedure: IMPEDANCE PH STUDY, ESOPHAGEAL, 24 HOUR, IN PATIENT TAKING ACID REDUCING MEDICATION;  Surgeon: Debbie Butler MD;  Location: [address]);  Service: Endoscopy;  Laterality: N/A;  On PPI/H2 Blocker    BLADDER SUSPENSION      CARPAL TUNNEL RELEASE      right    CHOLECYSTECTOMY      COLONOSCOPY N/A 8/30/2016    Procedure: COLONOSCOPY;  Surgeon: Slick Herron MD;  Location: [address]);  Service: Endoscopy;  Laterality: N/A;  PM prep    COLONOSCOPY N/A 12/22/2021    Procedure: COLONOSCOPY. any CRS;  Surgeon: Maria Ines  MD Fabiana;  Location: [facility] ENDO (4TH F[facility]);  Service: Endoscopy;  Laterality: N/A;  fully vaccinated -  scaral stimulator will bring remote -     lvm to confirm appt-rb    ESOPHAGEAL MANOMETRY WITH MEASUREMENT OF IMPEDANCE N/A 2018    Procedure: MANOMETRY, ESOPHAGUS, WITH IMPEDANCE MEASUREMENT;  Surgeon: Slick Herron MD;  Location: [facility] ENDO (4TH F[facility]);  Service: Endoscopy;  Laterality: N/A;    ESOPHAGEAL MANOMETRY WITH MEASUREMENT OF IMPEDANCE N/A 2022    Procedure: MANOMETRY, ESOPHAGUS, WITH IMPEDANCE MEASUREMENT;  Surgeon: Debbie Butler MD;  Location: [facility] ENDO (4TH F[facility]);  Service: Endoscopy;  Laterality: N/A;  fully vaccinated, bladder stimulator, instr mailed /emailed -ml    ESOPHAGOGASTRODUODENOSCOPY N/A 2020    Procedure: EGD (ESOPHAGOGASTRODUODENOSCOPY);  Surgeon: Slick Herron MD;  Location: [facility] ENDO (4TH F[facility]);  Service: Endoscopy;  Laterality: N/A;  pt has cardiology appt on 19-will call back after this appt to schedule-tb    FLUOROSCOPIC URODYNAMIC STUDY N/A 2019    Procedure: URODYNAMIC STUDY, FLUOROSCOPIC;  Surgeon: Zena Tee MD;  Location: [facility] OR 1ST FLR;  Service: Urology;  Laterality: N/A;  1 hour    GALLBLADDER SURGERY      HYSTERECTOMY      Bess velasquez Dr. Champlain    IMPLANTATION OF PERMANENT SACRAL NERVE STIMULATOR N/A 2019    Procedure: INSERTION, NEUROSTIMULATOR, PERMANENT, SACRAL;  Surgeon: Zena Tee MD;  Location: [facility] OR 2ND FLR;  Service: Urology;  Laterality: N/A;  30 min    OOPHORECTOMY      LSO- benign cyst    OOPHORECTOMY      RSO- endo    ooptherectomy      right knee  scoped    SHOULDER SURGERY  right       Family History   Problem Relation Age of Onset    Cervical cancer Maternal Grandmother         unknown age of onset,  at 80    Breast cancer Mother 50        alive at 64, unilateral    Esophageal cancer Mother 63    Ovarian cancer Mother 63    Anesthesia problems Mother     Colon cancer Brother 40     Breast cancer Maternal Aunt 72        alive at 72    Breast cancer Paternal Aunt         unknown age of onset, alive at 70     Social History     Socioeconomic History    Marital status: Single   Tobacco Use    Smoking status: Never    Smokeless tobacco: Never   Substance and Sexual Activity    Alcohol use: No    Drug use: No    Sexual activity: Never     Birth control/protection: None   Social History Narrative    ** Merged History Encounter **            Allergies:  Patient has no known allergies.    Medications:  Outpatient Encounter Medications as of 2/10/2023   Medication Sig Dispense Refill    ACCU-CHEK AMAURY PLUS TEST STRP Strp USE TO TEST BLOOD GLUCOSE TID  9    ACCU-CHEK SOFTCLIX LANCETS Misc USE TO TEST BLOOD GLUCOSE TID  3    atorvastatin (LIPITOR) 80 MG tablet Take 80 mg by mouth once daily.      azelastine (ASTELIN) 137 mcg (0.1 %) nasal spray 1 spray (137 mcg total) by Nasal route 2 (two) times daily. 30 mL 3    BLOOD SUGAR DIAGNOSTIC (ACCU-CHEK AMAURY PLUS TEST STRP MISC) 1 strip by Misc.(Non-Drug; Combo Route) route 3 (three) times daily.      buPROPion (WELLBUTRIN XL) 150 MG TB24 tablet Take 150 mg by mouth every morning.      calcium-vitamin D3 500 mg(1,250mg) -200 unit per tablet Take 1 tablet by mouth 2 (two) times daily with meals.      cetirizine (ZYRTEC) 10 MG tablet Take 1 tablet (10 mg total) by mouth once daily. 30 tablet 0    dexlansoprazole (DEXILANT) 60 mg capsule Take 1 capsule (60 mg total) by mouth 2 (two) times a day. 60 capsule 11    diclofenac sodium (VOLTAREN) 1 % Gel Apply 2 g topically 4 (four) times daily. As needed for breast pain 1 Tube 1    dicyclomine (BENTYL) 10 MG capsule TAKE 2 CAPSULES(20 MG) BY MOUTH THREE TIMES DAILY BEFORE MEALS 180 capsule 0    docusate sodium (COLACE) 100 MG capsule Take 1 capsule (100 mg total) by mouth 2 (two) times daily. 60 capsule 0    FARXIGA 10 mg Tab TK 1 T PO QD  7    fluticasone propionate (FLONASE) 50 mcg/actuation nasal spray SHAKE  LIQUID AND USE 2 SPRAYS(100 MCG) IN EACH NOSTRIL EVERY DAY 48 g 2    gabapentin (NEURONTIN) 300 MG capsule Take 600 mg (two capsules) in the morning and 900mg (three capsules) at night before bed 150 capsule 11    GLUCOPHAGE 500 mg tablet Take 500 mg by mouth 2 (two) times daily with meals.       ibuprofen (ADVIL,MOTRIN) 800 MG tablet 800 mg every 4 (four) hours as needed.   0    levothyroxine (SYNTHROID) 75 MCG tablet       magnesium oxide (MAG-OX) 400 mg (241.3 mg magnesium) tablet Take 1 tablet (400 mg total) by mouth 2 (two) times daily.  0    meloxicam (MOBIC) 15 MG tablet       metoprolol succinate (TOPROL-XL) 100 MG 24 hr tablet Take 1 tablet (100 mg total) by mouth once daily. 90 tablet 3    mometasone 0.1% (ELOCON) 0.1 % cream APPLY TOPICALLY TO THE RASH ON HANDS TWICE DAILY AS NEEDED FOR TWO TO THREE WEEKS. USE SPARINGLY      MULTIVIT-IRON-MIN-FOLIC ACID 3,500-18-0.4 UNIT-MG-MG ORAL CHEW Take 1 tablet by mouth once daily.       ofloxacin (OCUFLOX) 0.3 % ophthalmic solution PLACE 1 DROP INTO BOTH EYES FOUR TIMES DAILY      omega-3 fatty acids/fish oil (FISH OIL-OMEGA-3 FATTY ACIDS) 300-1,000 mg capsule Take 1 capsule by mouth once daily. 90 capsule 3    omeprazole (PRILOSEC OTC) 20 MG tablet Take 20 mg by mouth once daily.      pantoprazole (PROTONIX) 40 MG tablet TAKE 1 TABLET(40 MG) BY MOUTH EVERY DAY 90 tablet 0    phentermine 37.5 MG capsule TK 1 C PO ONCE D IN THE MORNING      topiramate (TOPAMAX) 50 MG tablet Take 2 tablets (100 mg total) by mouth every evening. 60 tablet 11    traZODone (DESYREL) 100 MG tablet TK 1 T PO HS  1    TRULICITY 4.5 mg/0.5 mL pen injector       ZOLOFT 100 mg tablet Take 200 mg by mouth once daily.       ciclopirox (PENLAC) 8 % Soln Apply topically nightly. (Patient not taking: Reported on 2/10/2023) 6.6 mL 11    diltiazem HCl (DILTIAZEM 2% CREAM) Apply topically 3 (three) times daily. Apply topically to anal area. (Patient not taking: Reported on 2/10/2023) 30 g 2     [DISCONTINUED] CAPLYTA 42 mg Cap Take 1 capsule by mouth once daily.      [DISCONTINUED] fluticasone propionate (FLONASE) 50 mcg/actuation nasal spray 1 spray (50 mcg total) by Each Nostril route once daily. 9.9 mL 0    [DISCONTINUED] oxybutynin (DITROPAN-XL) 10 MG 24 hr tablet TAKE 1 TABLET(10 MG) BY MOUTH EVERY DAY (Patient not taking: Reported on 2/10/2023) 30 tablet 2    [DISCONTINUED] triamcinolone acetonide 0.1% (KENALOG) 0.1 % ointment APPLY TO THE NECK AND CHEEKS EVERY NIGHT AT BEDTIME AS DIRECTED      [DISCONTINUED] VRAYLAR 6 mg Cap Take 1 capsule by mouth once daily.      [DISCONTINUED] zolpidem (AMBIEN) 10 mg Tab 1 po for sleep study 1 tablet 0     No facility-administered encounter medications on file as of 2/10/2023.         PHYSICAL EXAMINATION:    The patient generally appears in good health, is appropriately interactive, and is in no apparent distress.    Skin: No lesions.    Mental: Cooperative with normal affect.    Neuro: Grossly intact.    HEENT: Normal. No evidence of lymphadenopathy.    Chest:  normal inspiratory effort.    Abdomen: Soft, non-tender. No masses or organomegaly. Bladder is not palpable. No evidence of flank discomfort. No evidence of inguinal hernia.    Extremities: No clubbing, cyanosis, or edema      LABS:    Lab Results   Component Value Date    BUN 10 11/14/2022    CREATININE 0.9 11/14/2022       UA 1.010, pH 6, 500 glucose, otherwise, negative. (She is on Farxiga)     IMPRESSION:    Urgency   Urge incontinence  Depleted battery for SNM     PLAN:    1. Interrogation of the InterStim  Battery:  0.5-3 months  Impedences range from 1049 to 1909 Ohms, checked at 1.5 A.   0 lead is > 4000 ohms.  She is at 3.3 mA on Program A, 210 pw, 14 Hz.      2.  Consented to exchange the battery     3.  Uribel prescribed, coupon given, for the bladder pain    I spent 30 minutes with the patient of which more than half was spent in direct consultation with the patient in regards to our  treatment and plan.

## 2023-02-13 ENCOUNTER — DOCUMENTATION ONLY (OUTPATIENT)
Dept: PHARMACY | Facility: CLINIC | Age: 38
End: 2023-02-13

## 2023-02-13 ENCOUNTER — TELEPHONE (OUTPATIENT)
Dept: ADMINISTRATIVE | Facility: CLINIC | Age: 38
End: 2023-02-13
Payer: MEDICARE

## 2023-02-14 ENCOUNTER — OFFICE VISIT (OUTPATIENT)
Dept: FAMILY MEDICINE | Facility: CLINIC | Age: 38
End: 2023-02-14
Payer: MEDICARE

## 2023-02-14 VITALS
BODY MASS INDEX: 37.93 KG/M2 | HEIGHT: 62 IN | OXYGEN SATURATION: 96 % | WEIGHT: 206.13 LBS | SYSTOLIC BLOOD PRESSURE: 118 MMHG | HEART RATE: 90 BPM | DIASTOLIC BLOOD PRESSURE: 60 MMHG

## 2023-02-14 DIAGNOSIS — R33.9 INCOMPLETE EMPTYING OF BLADDER: ICD-10-CM

## 2023-02-14 DIAGNOSIS — G43.109 MIGRAINOUS VERTIGO: ICD-10-CM

## 2023-02-14 DIAGNOSIS — E11.59 HYPERTENSION ASSOCIATED WITH DIABETES: ICD-10-CM

## 2023-02-14 DIAGNOSIS — H81.13 BENIGN PAROXYSMAL POSITIONAL VERTIGO DUE TO BILATERAL VESTIBULAR DISORDER: ICD-10-CM

## 2023-02-14 DIAGNOSIS — I15.2 HYPERTENSION ASSOCIATED WITH DIABETES: ICD-10-CM

## 2023-02-14 DIAGNOSIS — Z23 FLU VACCINE NEED: ICD-10-CM

## 2023-02-14 DIAGNOSIS — K21.9 GASTROESOPHAGEAL REFLUX DISEASE WITHOUT ESOPHAGITIS: ICD-10-CM

## 2023-02-14 DIAGNOSIS — N39.8 DYSFUNCTIONAL VOIDING OF URINE: ICD-10-CM

## 2023-02-14 DIAGNOSIS — R51.9 CHRONIC DAILY HEADACHE: ICD-10-CM

## 2023-02-14 DIAGNOSIS — E11.69 HYPERLIPIDEMIA ASSOCIATED WITH TYPE 2 DIABETES MELLITUS: ICD-10-CM

## 2023-02-14 DIAGNOSIS — E66.01 SEVERE OBESITY (BMI 35.0-39.9) WITH COMORBIDITY: ICD-10-CM

## 2023-02-14 DIAGNOSIS — E78.5 HYPERLIPIDEMIA ASSOCIATED WITH TYPE 2 DIABETES MELLITUS: ICD-10-CM

## 2023-02-14 DIAGNOSIS — G43.719 INTRACTABLE CHRONIC MIGRAINE WITHOUT AURA AND WITHOUT STATUS MIGRAINOSUS: ICD-10-CM

## 2023-02-14 DIAGNOSIS — Z00.00 ENCOUNTER FOR PREVENTIVE HEALTH EXAMINATION: Primary | ICD-10-CM

## 2023-02-14 DIAGNOSIS — R26.9 ABNORMALITY OF GAIT AND MOBILITY: ICD-10-CM

## 2023-02-14 DIAGNOSIS — K58.0 IRRITABLE BOWEL SYNDROME WITH DIARRHEA: ICD-10-CM

## 2023-02-14 DIAGNOSIS — M54.81 BILATERAL OCCIPITAL NEURALGIA: ICD-10-CM

## 2023-02-14 DIAGNOSIS — E11.42 TYPE 2 DIABETES MELLITUS WITH DIABETIC POLYNEUROPATHY, WITHOUT LONG-TERM CURRENT USE OF INSULIN: ICD-10-CM

## 2023-02-14 DIAGNOSIS — E03.9 HYPOTHYROIDISM, UNSPECIFIED TYPE: ICD-10-CM

## 2023-02-14 DIAGNOSIS — G47.8 SLEEP AROUSAL DISORDER: ICD-10-CM

## 2023-02-14 DIAGNOSIS — F25.1 SCHIZOAFFECTIVE DISORDER, DEPRESSIVE TYPE: ICD-10-CM

## 2023-02-14 DIAGNOSIS — E11.42 DIABETIC POLYNEUROPATHY ASSOCIATED WITH TYPE 2 DIABETES MELLITUS: ICD-10-CM

## 2023-02-14 DIAGNOSIS — N39.46 MIXED STRESS AND URGE URINARY INCONTINENCE: ICD-10-CM

## 2023-02-14 DIAGNOSIS — M79.18 CERVICAL MYOFASCIAL PAIN SYNDROME: ICD-10-CM

## 2023-02-14 DIAGNOSIS — R06.83 SNORING: ICD-10-CM

## 2023-02-14 PROCEDURE — 90686 FLU VACCINE (QUAD) GREATER THAN OR EQUAL TO 3YO PRESERVATIVE FREE IM: ICD-10-PCS | Mod: HCNC,S$GLB,,

## 2023-02-14 PROCEDURE — 3078F PR MOST RECENT DIASTOLIC BLOOD PRESSURE < 80 MM HG: ICD-10-PCS | Mod: HCNC,CPTII,S$GLB,

## 2023-02-14 PROCEDURE — G0008 FLU VACCINE (QUAD) GREATER THAN OR EQUAL TO 3YO PRESERVATIVE FREE IM: ICD-10-PCS | Mod: HCNC,S$GLB,,

## 2023-02-14 PROCEDURE — 3072F LOW RISK FOR RETINOPATHY: CPT | Mod: HCNC,CPTII,S$GLB,

## 2023-02-14 PROCEDURE — 3078F DIAST BP <80 MM HG: CPT | Mod: HCNC,CPTII,S$GLB,

## 2023-02-14 PROCEDURE — G0008 ADMIN INFLUENZA VIRUS VAC: HCPCS | Mod: HCNC,S$GLB,,

## 2023-02-14 PROCEDURE — 1159F PR MEDICATION LIST DOCUMENTED IN MEDICAL RECORD: ICD-10-PCS | Mod: HCNC,CPTII,S$GLB,

## 2023-02-14 PROCEDURE — 1159F MED LIST DOCD IN RCRD: CPT | Mod: HCNC,CPTII,S$GLB,

## 2023-02-14 PROCEDURE — 1160F PR REVIEW ALL MEDS BY PRESCRIBER/CLIN PHARMACIST DOCUMENTED: ICD-10-PCS | Mod: HCNC,CPTII,S$GLB,

## 2023-02-14 PROCEDURE — 3074F SYST BP LT 130 MM HG: CPT | Mod: HCNC,CPTII,S$GLB,

## 2023-02-14 PROCEDURE — 3008F PR BODY MASS INDEX (BMI) DOCUMENTED: ICD-10-PCS | Mod: HCNC,CPTII,S$GLB,

## 2023-02-14 PROCEDURE — 90686 IIV4 VACC NO PRSV 0.5 ML IM: CPT | Mod: HCNC,S$GLB,,

## 2023-02-14 PROCEDURE — 3008F BODY MASS INDEX DOCD: CPT | Mod: HCNC,CPTII,S$GLB,

## 2023-02-14 PROCEDURE — G0439 PR MEDICARE ANNUAL WELLNESS SUBSEQUENT VISIT: ICD-10-PCS | Mod: HCNC,S$GLB,,

## 2023-02-14 PROCEDURE — 1160F RVW MEDS BY RX/DR IN RCRD: CPT | Mod: HCNC,CPTII,S$GLB,

## 2023-02-14 PROCEDURE — 3074F PR MOST RECENT SYSTOLIC BLOOD PRESSURE < 130 MM HG: ICD-10-PCS | Mod: HCNC,CPTII,S$GLB,

## 2023-02-14 PROCEDURE — G0439 PPPS, SUBSEQ VISIT: HCPCS | Mod: HCNC,S$GLB,,

## 2023-02-14 PROCEDURE — 99999 PR PBB SHADOW E&M-EST. PATIENT-LVL V: CPT | Mod: PBBFAC,HCNC,,

## 2023-02-14 PROCEDURE — 99999 PR PBB SHADOW E&M-EST. PATIENT-LVL V: ICD-10-PCS | Mod: PBBFAC,HCNC,,

## 2023-02-14 PROCEDURE — 3072F PR LOW RISK FOR RETINOPATHY: ICD-10-PCS | Mod: HCNC,CPTII,S$GLB,

## 2023-02-14 RX ORDER — BREXPIPRAZOLE 2 MG/1
1 TABLET ORAL NIGHTLY
COMMUNITY
Start: 2023-01-28 | End: 2023-08-01

## 2023-02-14 NOTE — TELEPHONE ENCOUNTER
----- Message from Reny Luna sent at 3/16/2018  1:03 PM CDT -----  Contact: PAUL ARHTUR [[id number]  x_  1st Request  _  2nd Request  _  3rd Request        Who: PAUL ARTHUR [[id number]    Why: Requesting a call back in regards to patient stated that she broke out with a rash caused by the hormone patch and she would like to discuss that. Please call her.     Please return the call at earliest convenience. Thanks!    What Number to Call Back: [phone]       When to Expect a call back: (Within 24 hours)                              
Attempted to contact pt no answer left message for her to contact the office.  
.

## 2023-02-15 ENCOUNTER — TELEPHONE (OUTPATIENT)
Dept: PODIATRY | Facility: CLINIC | Age: 38
End: 2023-02-15
Payer: MEDICARE

## 2023-02-15 NOTE — TELEPHONE ENCOUNTER
Left message for patient to return call back at [phone] in reference to appointment being rescheduled.

## 2023-02-15 NOTE — PATIENT INSTRUCTIONS
Counseling and Referral of Other Preventative  (Italic type indicates deductible and co-insurance are waived)    Patient Name: Maria Ines Ac  Today's Date: 2/15/2023    Health Maintenance       Date Due Completion Date    Aspirin/Antiplatelet Therapy Never done ---    Pneumococcal Vaccines (Age 0-64) (2 - PCV) 09/25/2020 9/25/2019    COVID-19 Vaccine (4 - Booster for Moderna series) 01/14/2022 11/19/2021    Eye Exam 04/27/2023 4/27/2022    Override on 9/6/2016: Done    Hemoglobin A1c 05/14/2023 11/14/2022    Diabetes Urine Screening 07/19/2023 7/19/2022    Foot Exam 08/23/2023 8/23/2022    Override on 8/29/2018: Done    Override on 4/25/2016: Done    Lipid Panel 10/26/2023 10/26/2022    Mammogram 11/15/2023 11/15/2022    TETANUS VACCINE 08/17/2026 8/17/2016        Orders Placed This Encounter   Procedures    Influenza - Quadrivalent *Preferred* (6 months+) (PF)     The following information is provided to all patients.  This information is to help you find resources for any of the problems found today that may be affecting your health:                Living healthy guide: www.[facility].gov      Understanding Diabetes: www.diabetes.org      Eating healthy: www.cdc.gov/healthyweight      CDC home safety checklist: www.cdc.gov/steadi/patient.html      Agency on Aging: www.goea.[place].gov      Alcoholics anonymous (AA): www.aa.org      Physical Activity: www.rahel.nih.gov/yx3afiv      Tobacco use: www.quitwithusla.org

## 2023-02-15 NOTE — PROGRESS NOTES
"  Maria Ines Ac presented for a  Medicare AWV and comprehensive Health Risk Assessment today. Mother is present and assist with questions. The following components were reviewed and updated:    Medical history  Family History  Social history  Allergies and Current Medications  Health Risk Assessment  Health Maintenance  Care Team         ** See Completed Assessments for Annual Wellness Visit within the encounter summary.**         The following assessments were completed:  Living Situation  CAGE  Depression Screening  Timed Get Up and Go  Whisper Test  Cognitive Function Screening      Nutrition Screening  ADL Screening  PAQ Screening        Vitals:    02/14/23 1352   BP: 118/60   Pulse: 90   SpO2: 96%   Weight: 93.5 kg (206 lb 1.6 oz)   Height: 5' 2" (1.575 m)     Body mass index is 37.7 kg/m².  Physical Exam  Vitals reviewed.   Constitutional:       Appearance: Normal appearance. She is well-developed and well-groomed.   Cardiovascular:      Rate and Rhythm: Normal rate and regular rhythm.   Pulmonary:      Effort: Pulmonary effort is normal. No respiratory distress.      Breath sounds: No wheezing, rhonchi or rales.   Skin:     Coloration: Skin is not pale.   Neurological:      General: No focal deficit present.      Mental Status: She is alert and oriented to person, place, and time.   Psychiatric:         Behavior: Behavior is cooperative.             Diagnoses and health risks identified today and associated recommendations/orders:    1. Encounter for preventive health examination  2. Schizoaffective disorder, depressive type  Chronic. Stable. Just started new medication. Followed by Psychiatry.     3. Type 2 diabetes mellitus with diabetic polyneuropathy, without long-term current use of insulin  Chronic; stable on medication. Followed by Endocrinology.    4. Hypertension associated with diabetes  5. Hyperlipidemia associated with type 2 diabetes mellitus  Chronic; stable on medication. Followed by " Cardiology.    6. Diabetic polyneuropathy associated with type 2 diabetes mellitus  Chronic; stable on medication. Followed by Endocrinology.    7. Severe obesity (BMI 35.0-39.9) with comorbidity  Encouraged patient to continue to work on diet modification and exercise as tolerated. Continue medications prescribed. Followed by PCP.     8. Chronic daily headache  9. Intractable chronic migraine without aura and without status migrainosus  10. Benign paroxysmal positional vertigo due to bilateral vestibular disorder  Chronic; stable on medication. Followed by Neurology.    11. Dysfunctional voiding of urine  12. Mixed stress and urge urinary incontinence  13. Incomplete emptying of bladder  Has neurostimulator. Plans to have changed at the end of this month. Followed by Urology.     14. Hypothyroidism, unspecified type  Chronic; stable on medication. Followed by Endocrinology.    15. Migrainous vertigo  Chronic. Stable. Followed by PCP.     16. Gastroesophageal reflux disease without esophagitis  17. Irritable bowel syndrome with diarrhea  Chronic; stable on medication. Followed by Gastroenterology.     18. Cervical myofascial pain syndrome  19. Bilateral occipital neuralgia  Chronic. Stable. Followed by PCP.     20. Sleep arousal disorder  Chronic. Stable. Followed by PCP.     21. Snoring  Chronic. Stable. Followed by PCP.     22. Abnormality of gait and mobility  Use assistive devices as needed. Followed by PCP.     23. Flu vaccine need  - Influenza - Quadrivalent *Preferred* (6 months+) (PF)      Review for Opioid Screening: Patient does not have rx for Opioids.    Review for Substance Use Disorders: Patient does not use substance.    Provided Maria Ines with a 5-10 year written screening schedule and personal prevention plan. Recommendations were developed using the USPSTF age appropriate recommendations. Education, counseling, and referrals were provided as needed. After Visit Summary printed and given to patient  which includes a list of additional screenings\tests needed.    Follow up in about 1 year (around 2/14/2024) for your next annual wellness visit.    Neema Azevedo NP      Advance Care Planning     I offered to discuss advanced care planning, including how to pick a person who would make decisions for you if you were unable to make them for yourself, called a health care power of , and what kind of decisions you might make such as use of life sustaining treatments such as ventilators and tube feeding when faced with a life limiting illness recorded on a living will that they will need to know. (How you want to be cared for as you near the end of your natural life)     X Patient is interested in learning more about how to make advanced directives.  I provided them paperwork and offered to discuss this with them.

## 2023-02-18 NOTE — PROGRESS NOTES
Subjective:      Patient ID: Maria Ines Ac is a 38 y.o. female.    Chief Complaint: Diabetes Mellitus (pcp - Luann Raymond MD - 7/13/2022) and Nail Care    Maria Ines is a 38 y.o. female who presents to the clinic for evaluation and treatment of high risk feet. Maria Ines has a past medical history of Blood in stool, Constipation, Cystitis, Depression, Diabetes mellitus, type 2, Dysphagia, Endometriosis, GERD (gastroesophageal reflux disease), Headache, Hypertension, Palpitations, Premature menopause on hormone replacement therapy, and Thyroid disease. The patient's chief complaint is long, thick toenails.   This patient has documented high risk feet requiring routine maintenance secondary to peripheral neuropathy.    PCP: Luann Raymond MD    Date Last Seen by PCP:   Chief Complaint   Patient presents with    Diabetes Mellitus     pcp - Luann Raymond MD - 7/13/2022    Nail Care       Current shoe gear:  Affected Foot: Tennis shoes     Unaffected Foot: Tennis shoes    Hemoglobin A1C   Date Value Ref Range Status   11/14/2022 5.3 4.0 - 5.6 % Final     Comment:     ADA Screening Guidelines:  5.7-6.4%  Consistent with prediabetes  >or=6.5%  Consistent with diabetes    High levels of fetal hemoglobin interfere with the HbA1C  assay. Heterozygous hemoglobin variants (HbS, HgC, etc)do  not significantly interfere with this assay.   However, presence of multiple variants may affect accuracy.     07/19/2022 5.4 4.0 - 5.6 % Final     Comment:     ADA Screening Guidelines:  5.7-6.4%  Consistent with prediabetes  >or=6.5%  Consistent with diabetes    High levels of fetal hemoglobin interfere with the HbA1C  assay. Heterozygous hemoglobin variants (HbS, HgC, etc)do  not significantly interfere with this assay.   However, presence of multiple variants may affect accuracy.     12/14/2021 5.4 4.0 - 5.6 % Final     Comment:     ADA Screening Guidelines:  5.7-6.4%  Consistent with prediabetes  >or=6.5%  Consistent  with diabetes    High levels of fetal hemoglobin interfere with the HbA1C  assay. Heterozygous hemoglobin variants (HbS, HgC, etc)do  not significantly interfere with this assay.   However, presence of multiple variants may affect accuracy.         Review of Systems   Constitutional: Negative for chills, fever and malaise/fatigue.   HENT:  Negative for hearing loss.    Cardiovascular:  Negative for claudication.   Respiratory:  Negative for shortness of breath.    Skin:  Positive for color change, dry skin and nail changes. Negative for flushing and rash.   Musculoskeletal:  Negative for joint pain and myalgias.   Gastrointestinal:  Negative for nausea and vomiting.   Neurological:  Positive for numbness, paresthesias and sensory change. Negative for loss of balance.   Psychiatric/Behavioral:  Negative for altered mental status.    Allergic/Immunologic: Negative for hives.         Objective:      Physical Exam  Vitals reviewed.   Cardiovascular:      Pulses:           Dorsalis pedis pulses are 2+ on the right side and 2+ on the left side.        Posterior tibial pulses are 2+ on the right side and 2+ on the left side.      Comments: No edema noted to b/L LEs  Musculoskeletal:      Comments: Adequate joint ROM noted to all lower extremity muscle groups with no pain or crepitation noted. Muscle strength is 5/5 in all groups bilaterally.     Feet:      Right foot:      Protective Sensation: 5 sites tested.  0 sites sensed.      Left foot:      Protective Sensation: 5 sites tested.  0 sites sensed.   Skin:     General: Skin is warm.      Capillary Refill: Capillary refill takes 2 to 3 seconds.      Comments: Normal skin tugor noted.   No open lesion noted b/L  Skin temp is warm to warm from proximal to distal b/L.  Webspaces clean, dry, and intact  Xerosis Bilaterally. No open lesions noted.   HKL noted to left 5th digit x2     Neurological:      Mental Status: She is alert and oriented to person, place, and time.       Comments: Intact gross sensation noted to b/L LEs       Nails x10 are elongated by  4-7mm's, thickened by 1-2 mm's, dystrophic, and are yellowish in  coloration . Xerosis Bilaterally. No open lesions noted.             Assessment:       Encounter Diagnoses   Name Primary?    Diabetic polyneuropathy associated with type 2 diabetes mellitus Yes    Corn or callus     Onychomycosis due to dermatophyte          Plan:       Maria Ines was seen today for diabetes mellitus and nail care.    Diagnoses and all orders for this visit:    Diabetic polyneuropathy associated with type 2 diabetes mellitus    Corn or callus    Onychomycosis due to dermatophyte    I counseled the patient on her conditions, their implications and medical management.      - Shoe inspection. Diabetic Foot Education. Patient reminded of the importance of good nutrition and blood sugar control to help prevent podiatric complications of diabetes. Patient instructed on proper foot hygeine. We discussed wearing proper shoe gear, daily foot inspections, never walking without protective shoe gear, never putting sharp instruments to feet, routine podiatric nail visits every 2-3 months.    After cleansing with an alcohol prep pad, the about mentioned hyperkeratotic lesions were sharply debrided X 2 utilizing a #15 blade to a smooth base without incident. Pt tolerated the procedure well and reported comfort to the debarment sites. Pt will continue to use padding and moisture the callused areas.     - With patient's permission, nails were aggressively reduced and debrided x 10 to their soft tissue attachment mechanically and with electric , removing all offending nail and debris. Patient relates relief following the procedure. She will continue to monitor the areas daily, inspect her feet, wear protective shoe gear when ambulatory, moisturizer to maintain skin integrity and follow in this office in approximately 2-3 months, sooner p.r.n.

## 2023-02-20 ENCOUNTER — TELEPHONE (OUTPATIENT)
Dept: PODIATRY | Facility: CLINIC | Age: 38
End: 2023-02-20
Payer: MEDICARE

## 2023-02-22 ENCOUNTER — TELEPHONE (OUTPATIENT)
Dept: PODIATRY | Facility: CLINIC | Age: 38
End: 2023-02-22
Payer: MEDICARE

## 2023-02-22 NOTE — TELEPHONE ENCOUNTER
Patient was called regarding the need to reschedule their Thursday, 2/23 appointment with Dr. Santiago due to the doctor being on surgery call.    Patient was contacted via telephone.    Patient did answer after second attempt.     Appointment was rescheduled to March 7th at 2:30 pm.

## 2023-02-27 ENCOUNTER — TELEPHONE (OUTPATIENT)
Dept: UROLOGY | Facility: CLINIC | Age: 38
End: 2023-02-27
Payer: MEDICARE

## 2023-02-27 NOTE — PRE-PROCEDURE INSTRUCTIONS

## 2023-02-28 ENCOUNTER — ANESTHESIA (OUTPATIENT)
Dept: SURGERY | Facility: HOSPITAL | Age: 38
End: 2023-02-28
Payer: MEDICARE

## 2023-02-28 ENCOUNTER — ANESTHESIA EVENT (OUTPATIENT)
Dept: SURGERY | Facility: HOSPITAL | Age: 38
End: 2023-02-28
Payer: MEDICARE

## 2023-02-28 ENCOUNTER — HOSPITAL ENCOUNTER (OUTPATIENT)
Facility: HOSPITAL | Age: 38
Discharge: HOME | End: 2023-02-28
Attending: UROLOGY | Admitting: UROLOGY
Payer: MEDICARE

## 2023-02-28 VITALS
OXYGEN SATURATION: 92 % | BODY MASS INDEX: 37.93 KG/M2 | DIASTOLIC BLOOD PRESSURE: 79 MMHG | TEMPERATURE: 97 F | RESPIRATION RATE: 15 BRPM | WEIGHT: 206.13 LBS | SYSTOLIC BLOOD PRESSURE: 107 MMHG | HEIGHT: 62 IN | HEART RATE: 89 BPM

## 2023-02-28 DIAGNOSIS — R33.9 INCOMPLETE BLADDER EMPTYING: Primary | ICD-10-CM

## 2023-02-28 LAB
POCT GLUCOSE: 101 MG/DL (ref 70–110)
POCT GLUCOSE: 86 MG/DL (ref 70–110)

## 2023-02-28 PROCEDURE — 63600175 PHARM REV CODE 636 W HCPCS: Mod: HCNC | Performed by: NURSE ANESTHETIST, CERTIFIED REGISTERED

## 2023-02-28 PROCEDURE — D9220A PRA ANESTHESIA: ICD-10-PCS | Mod: HCNC,CRNA,, | Performed by: NURSE ANESTHETIST, CERTIFIED REGISTERED

## 2023-02-28 PROCEDURE — 95971 PR ANALYZE NEUROSTIM,SIMPLE/PROG: ICD-10-PCS | Mod: 59,HCNC,, | Performed by: UROLOGY

## 2023-02-28 PROCEDURE — 25000003 PHARM REV CODE 250: Mod: HCNC | Performed by: NURSE ANESTHETIST, CERTIFIED REGISTERED

## 2023-02-28 PROCEDURE — 25000003 PHARM REV CODE 250: Mod: HCNC | Performed by: UROLOGY

## 2023-02-28 PROCEDURE — 71000015 HC POSTOP RECOV 1ST HR: Mod: HCNC | Performed by: UROLOGY

## 2023-02-28 PROCEDURE — 25000003 PHARM REV CODE 250: Mod: HCNC | Performed by: STUDENT IN AN ORGANIZED HEALTH CARE EDUCATION/TRAINING PROGRAM

## 2023-02-28 PROCEDURE — 36000707: Mod: HCNC | Performed by: UROLOGY

## 2023-02-28 PROCEDURE — 64590 INS/RPL PRPH SAC/GSTR NPG/R: CPT | Mod: HCNC,,, | Performed by: UROLOGY

## 2023-02-28 PROCEDURE — 88300 SURGICAL PATH GROSS: CPT | Mod: 26,HCNC,, | Performed by: PATHOLOGY

## 2023-02-28 PROCEDURE — C1787 PATIENT PROGR, NEUROSTIM: HCPCS | Mod: HCNC | Performed by: UROLOGY

## 2023-02-28 PROCEDURE — 64590 PR IMPLANT PERIPH/GASTRIC NEUROSTIM/RECEIVER: ICD-10-PCS | Mod: HCNC,,, | Performed by: UROLOGY

## 2023-02-28 PROCEDURE — C1767 GENERATOR, NEURO NON-RECHARG: HCPCS | Mod: HCNC | Performed by: UROLOGY

## 2023-02-28 PROCEDURE — 71000044 HC DOSC ROUTINE RECOVERY FIRST HOUR: Mod: HCNC | Performed by: UROLOGY

## 2023-02-28 PROCEDURE — D9220A PRA ANESTHESIA: Mod: HCNC,ANES,, | Performed by: ANESTHESIOLOGY

## 2023-02-28 PROCEDURE — 88300 PR  SURG PATH,GROSS,LEVEL I: ICD-10-PCS | Mod: 26,HCNC,, | Performed by: PATHOLOGY

## 2023-02-28 PROCEDURE — D9220A PRA ANESTHESIA: Mod: HCNC,CRNA,, | Performed by: NURSE ANESTHETIST, CERTIFIED REGISTERED

## 2023-02-28 PROCEDURE — 88300 SURGICAL PATH GROSS: CPT | Mod: HCNC | Performed by: PATHOLOGY

## 2023-02-28 PROCEDURE — 82962 GLUCOSE BLOOD TEST: CPT | Mod: HCNC,91 | Performed by: UROLOGY

## 2023-02-28 PROCEDURE — 82962 GLUCOSE BLOOD TEST: CPT | Mod: HCNC | Performed by: UROLOGY

## 2023-02-28 PROCEDURE — 37000009 HC ANESTHESIA EA ADD 15 MINS: Mod: HCNC | Performed by: UROLOGY

## 2023-02-28 PROCEDURE — 63600175 PHARM REV CODE 636 W HCPCS: Mod: HCNC | Performed by: STUDENT IN AN ORGANIZED HEALTH CARE EDUCATION/TRAINING PROGRAM

## 2023-02-28 PROCEDURE — D9220A PRA ANESTHESIA: ICD-10-PCS | Mod: HCNC,ANES,, | Performed by: ANESTHESIOLOGY

## 2023-02-28 PROCEDURE — 95971 ALYS SMPL SP/PN NPGT W/PRGRM: CPT | Mod: 59,HCNC,, | Performed by: UROLOGY

## 2023-02-28 PROCEDURE — 37000008 HC ANESTHESIA 1ST 15 MINUTES: Mod: HCNC | Performed by: UROLOGY

## 2023-02-28 PROCEDURE — 36000706: Mod: HCNC | Performed by: UROLOGY

## 2023-02-28 DEVICE — SYS INTERSTIM X RECHARGE FREE: Type: IMPLANTABLE DEVICE | Site: BACK | Status: FUNCTIONAL

## 2023-02-28 RX ORDER — PROPOFOL 10 MG/ML
VIAL (ML) INTRAVENOUS CONTINUOUS PRN
Status: DISCONTINUED | OUTPATIENT
Start: 2023-02-28 | End: 2023-02-28

## 2023-02-28 RX ORDER — CEPHALEXIN 500 MG/1
500 CAPSULE ORAL EVERY 6 HOURS
Qty: 12 CAPSULE | Refills: 0 | Status: SHIPPED | OUTPATIENT
Start: 2023-02-28 | End: 2023-03-03

## 2023-02-28 RX ORDER — ACETAMINOPHEN 10 MG/ML
INJECTION, SOLUTION INTRAVENOUS
Status: DISCONTINUED | OUTPATIENT
Start: 2023-02-28 | End: 2023-02-28

## 2023-02-28 RX ORDER — LIDOCAINE HYDROCHLORIDE 10 MG/ML
INJECTION INFILTRATION; PERINEURAL
Status: DISCONTINUED | OUTPATIENT
Start: 2023-02-28 | End: 2023-02-28 | Stop reason: HOSPADM

## 2023-02-28 RX ORDER — FENTANYL CITRATE 50 UG/ML
25 INJECTION, SOLUTION INTRAMUSCULAR; INTRAVENOUS EVERY 5 MIN PRN
Status: CANCELLED | OUTPATIENT
Start: 2023-02-28

## 2023-02-28 RX ORDER — SODIUM CHLORIDE 0.9 % (FLUSH) 0.9 %
10 SYRINGE (ML) INJECTION
Status: CANCELLED | OUTPATIENT
Start: 2023-02-28

## 2023-02-28 RX ORDER — FENTANYL CITRATE 50 UG/ML
INJECTION, SOLUTION INTRAMUSCULAR; INTRAVENOUS
Status: DISCONTINUED | OUTPATIENT
Start: 2023-02-28 | End: 2023-02-28

## 2023-02-28 RX ORDER — PROPOFOL 10 MG/ML
VIAL (ML) INTRAVENOUS
Status: DISCONTINUED | OUTPATIENT
Start: 2023-02-28 | End: 2023-02-28

## 2023-02-28 RX ORDER — OXYCODONE HYDROCHLORIDE 5 MG/1
5 TABLET ORAL EVERY 4 HOURS PRN
Qty: 5 TABLET | Refills: 0 | Status: SHIPPED | OUTPATIENT
Start: 2023-02-28

## 2023-02-28 RX ORDER — ONDANSETRON 2 MG/ML
4 INJECTION INTRAMUSCULAR; INTRAVENOUS ONCE AS NEEDED
Status: CANCELLED | OUTPATIENT
Start: 2023-02-28 | End: 2034-07-27

## 2023-02-28 RX ORDER — KETAMINE HCL IN 0.9 % NACL 50 MG/5 ML
SYRINGE (ML) INTRAVENOUS
Status: DISCONTINUED | OUTPATIENT
Start: 2023-02-28 | End: 2023-02-28

## 2023-02-28 RX ORDER — DEXMEDETOMIDINE HYDROCHLORIDE 100 UG/ML
INJECTION, SOLUTION INTRAVENOUS
Status: DISCONTINUED | OUTPATIENT
Start: 2023-02-28 | End: 2023-02-28

## 2023-02-28 RX ORDER — MIDAZOLAM HYDROCHLORIDE 1 MG/ML
INJECTION, SOLUTION INTRAMUSCULAR; INTRAVENOUS
Status: DISCONTINUED | OUTPATIENT
Start: 2023-02-28 | End: 2023-02-28

## 2023-02-28 RX ADMIN — Medication 10 MG: at 04:02

## 2023-02-28 RX ADMIN — GLYCOPYRROLATE 0.2 MG: 0.2 INJECTION, SOLUTION INTRAMUSCULAR; INTRAVENOUS at 04:02

## 2023-02-28 RX ADMIN — DEXMEDETOMIDINE 8 MCG: 100 INJECTION, SOLUTION, CONCENTRATE INTRAVENOUS at 04:02

## 2023-02-28 RX ADMIN — FENTANYL CITRATE 50 MCG: 50 INJECTION, SOLUTION INTRAMUSCULAR; INTRAVENOUS at 04:02

## 2023-02-28 RX ADMIN — ACETAMINOPHEN 1000 MG: 10 INJECTION INTRAVENOUS at 04:02

## 2023-02-28 RX ADMIN — GENTAMICIN SULFATE 202.4 MG: 40 INJECTION, SOLUTION INTRAMUSCULAR; INTRAVENOUS at 04:02

## 2023-02-28 RX ADMIN — PROPOFOL 30 MG: 10 INJECTION, EMULSION INTRAVENOUS at 04:02

## 2023-02-28 RX ADMIN — Medication 75 MCG/KG/MIN: at 04:02

## 2023-02-28 RX ADMIN — MIDAZOLAM HYDROCHLORIDE 2 MG: 1 INJECTION, SOLUTION INTRAMUSCULAR; INTRAVENOUS at 04:02

## 2023-02-28 RX ADMIN — VANCOMYCIN HYDROCHLORIDE 1000 MG: 1 INJECTION, POWDER, LYOPHILIZED, FOR SOLUTION INTRAVENOUS at 02:02

## 2023-02-28 RX ADMIN — DEXMEDETOMIDINE 4 MCG: 100 INJECTION, SOLUTION, CONCENTRATE INTRAVENOUS at 04:02

## 2023-02-28 RX ADMIN — SODIUM CHLORIDE: 9 INJECTION, SOLUTION INTRAVENOUS at 04:02

## 2023-02-28 NOTE — PLAN OF CARE
Discharge instructions given and explained to patient and family with verbalization of understanding all instructions. Prescription given and explained next time and doses of each medication. Patients v/s stable, denies n/v and tolerating po, rates pain level tolerable, IV removed, and family at bedside for patient discharge home. Waiting on bedside delivery.

## 2023-02-28 NOTE — INTERVAL H&P NOTE
The patient has been examined and the H&P has been reviewed:    I concur with the findings and no changes have occurred since H&P was written.    Surgery risks, benefits and alternative options discussed and understood by patient/family.    Patient was assessed preoperatively, found to be appropriate in behavior. The risks, benefits, and alternatives to procedures were discussed, and questions were answered. Plan to proceed with described procedure.    UA negative for all tested components. pH 6, SG 1.015.     Patient Active Problem List    Diagnosis Date Noted    Hypothyroidism 02/14/2023    Schizoaffective disorder, depressive type 07/19/2022    Intractable migraine with aura without status migrainosus 05/18/2021    Snoring     Diabetic polyneuropathy associated with type 2 diabetes mellitus 12/20/2020    Dizziness 12/15/2020    Decreased functional mobility 12/15/2020    Chronic daily headache 12/07/2020    Medication overuse headache 06/12/2020    Benign paroxysmal positional vertigo due to bilateral vestibular disorder 06/12/2020    Bilateral occipital neuralgia 08/18/2019    Incomplete emptying of bladder 06/24/2019    Weakness 05/31/2019    Painful cervical ROM 05/31/2019    Family hx of ovarian malignancy 02/10/2019    Family history of breast cancer 01/08/2019    GERD (gastroesophageal reflux disease) 11/05/2018    Hyperlipidemia associated with type 2 diabetes mellitus 08/23/2018    Sleep arousal disorder     Uncontrolled morning headache     Migrainous vertigo 04/25/2018    Gastroesophageal reflux disease without esophagitis 03/28/2018    Irritable bowel syndrome with diarrhea 03/28/2018    Mixed stress and urge urinary incontinence 10/30/2017    Cervical myofascial pain syndrome 10/30/2017    Severe obesity (BMI 35.0-39.9) with comorbidity 04/10/2017    Muscle spasm 04/10/2017    Dysfunctional voiding of urine 03/20/2017    Hypertension associated with diabetes 09/07/2016    Palpitations 09/07/2016     Intractable chronic migraine without aura and without status migrainosus 12/24/2014    Right hip pain 02/08/2013    Abdominal pain, RLQ (right lower quadrant) 11/30/2012    Dysphagia 11/30/2012    Diabetes mellitus, type II 11/30/2012     Patient seen in holding.  No changes in clinical condition.  Proceed with planned procedure.

## 2023-02-28 NOTE — ANESTHESIA PREPROCEDURE EVALUATION
02/28/2023  Maria Ines Ac is a 38 y.o., female   Pre-operative evaluation for Procedure(s) (LRB):  Replacement, Battery, Neurostimulator (N/A)    Maria Ines Ac is a 38 y.o. female     Patient Active Problem List   Diagnosis    Abdominal pain, RLQ (right lower quadrant)    Dysphagia    Diabetes mellitus, type II    Right hip pain    Intractable chronic migraine without aura and without status migrainosus    Hypertension associated with diabetes    Palpitations    Dysfunctional voiding of urine    Severe obesity (BMI 35.0-39.9) with comorbidity    Muscle spasm    Mixed stress and urge urinary incontinence    Cervical myofascial pain syndrome    Gastroesophageal reflux disease without esophagitis    Irritable bowel syndrome with diarrhea    Migrainous vertigo    Uncontrolled morning headache    Sleep arousal disorder    Hyperlipidemia associated with type 2 diabetes mellitus    GERD (gastroesophageal reflux disease)    Family history of breast cancer    Family hx of ovarian malignancy    Weakness    Painful cervical ROM    Incomplete emptying of bladder    Bilateral occipital neuralgia    Medication overuse headache    Benign paroxysmal positional vertigo due to bilateral vestibular disorder    Chronic daily headache    Dizziness    Decreased functional mobility    Diabetic polyneuropathy associated with type 2 diabetes mellitus    Snoring    Intractable migraine with aura without status migrainosus    Schizoaffective disorder, depressive type    Hypothyroidism       Review of patient's allergies indicates:  No Known Allergies    No current facility-administered medications on file prior to encounter.     Current Outpatient Medications on File Prior to Encounter   Medication Sig Dispense Refill    ACCU-CHEK AMAURY PLUS TEST STRP Strp USE TO TEST BLOOD GLUCOSE  TID  9    ACCU-CHEK SOFTCLIX LANCETS [facility] [place] TO [place] BLOOD GLUCOSE TID  3    atorvastatin (LIPITOR) 80 MG tablet Take 80 mg by mouth every evening.      azelastine (ASTELIN) 137 mcg (0.1 %) nasal spray 1 spray (137 mcg total) by Nasal route 2 (two) times daily. 30 mL 3    BLOOD SUGAR DIAGNOSTIC (ACCU-CHEK AMAURY PLUS TEST STRP MISC) 1 strip by Misc.(Non-Drug; Combo Route) route 3 (three) times daily.      buPROPion (WELLBUTRIN XL) 150 MG TB24 tablet Take 150 mg by mouth every morning.      calcium-vitamin D3 500 mg(1,250mg) -200 unit per tablet Take 1 tablet by mouth 2 (two) times daily with meals.      cetirizine (ZYRTEC) 10 MG tablet Take 1 tablet (10 mg total) by mouth once daily. 30 tablet 0    ciclopirox (PENLAC) 8 % Soln Apply topically nightly. 6.6 mL 11    dexlansoprazole (DEXILANT) 60 mg capsule Take 1 capsule (60 mg total) by mouth 2 (two) times a day. 60 capsule 11    dicyclomine (BENTYL) 10 MG capsule TAKE 2 CAPSULES(20 MG) BY MOUTH THREE TIMES DAILY BEFORE MEALS 180 capsule 0    diltiazem HCl (DILTIAZEM 2% CREAM) Apply topically 3 (three) times daily. Apply topically to anal area. 30 g 2    FARXIGA 10 mg Tab TK 1 T PO QD  7    fluticasone propionate (FLONASE) 50 mcg/actuation nasal spray SHAKE LIQUID AND USE 2 SPRAYS(100 MCG) IN EACH NOSTRIL EVERY DAY 48 g 2    gabapentin (NEURONTIN) 300 MG capsule Take 600 mg (two capsules) in the morning and 900mg (three capsules) at night before bed 150 capsule 11    GLUCOPHAGE 500 mg tablet Take 500 mg by mouth 2 (two) times daily with meals.       levothyroxine (SYNTHROID) 75 MCG tablet Take 75 mcg by mouth before breakfast.      metoprolol succinate (TOPROL-XL) 100 MG 24 hr tablet Take 1 tablet (100 mg total) by mouth once daily. 90 tablet 3    MULTIVIT-IRON-MIN-FOLIC ACID 3,500-18-0.4 UNIT-MG-MG ORAL CHEW Take 1 tablet by mouth once daily.       omega-3 fatty acids/fish oil (FISH OIL-OMEGA-3 FATTY ACIDS) 300-1,000 mg capsule Take 1 capsule by  mouth once daily. 90 capsule 3    topiramate (TOPAMAX) 50 MG tablet Take 2 tablets (100 mg total) by mouth every evening. 60 tablet 11    traZODone (DESYREL) 100 MG tablet TK 1 T PO HS  1    ZOLOFT 100 mg tablet Take 200 mg by mouth once daily.       diclofenac sodium (VOLTAREN) 1 % Gel Apply 2 g topically 4 (four) times daily. As needed for breast pain (Patient not taking: Reported on 2/14/2023) 1 Tube 1    docusate sodium (COLACE) 100 MG capsule Take 1 capsule (100 mg total) by mouth 2 (two) times daily. (Patient not taking: Reported on 2/14/2023) 60 capsule 0    ibuprofen (ADVIL,MOTRIN) 800 MG tablet 800 mg every 4 (four) hours as needed.   0    magnesium oxide (MAG-OX) 400 mg (241.3 mg magnesium) tablet Take 1 tablet (400 mg total) by mouth 2 (two) times daily.  0    meloxicam (MOBIC) 15 MG tablet       methen-m.blue-s.phos-phsal-hyo (URIBEL) 118-10-40.8-36 mg Cap Take 1 capsule by mouth every 6 (six) hours as needed (bladder pain). 120 capsule 11    mometasone 0.1% (ELOCON) 0.1 % cream APPLY TOPICALLY TO THE RASH ON HANDS TWICE DAILY AS NEEDED FOR TWO TO THREE WEEKS. USE SPARINGLY      ofloxacin (OCUFLOX) 0.3 % ophthalmic solution PLACE 1 DROP INTO BOTH EYES FOUR TIMES DAILY      omeprazole (PRILOSEC OTC) 20 MG tablet Take 20 mg by mouth once daily.      phentermine 37.5 MG capsule TK 1 C PO ONCE D IN THE MORNING      TRULICITY 4.5 mg/0.5 mL pen injector          Past Surgical History:   Procedure Laterality Date    24 HOUR IMPEDANCE PH MONITORING OF ESOPHAGUS IN PATIENT TAKING ACID REDUCING MEDICATIONS N/A 6/2/2022    Procedure: IMPEDANCE PH STUDY, ESOPHAGEAL, 24 HOUR, IN PATIENT TAKING ACID REDUCING MEDICATION;  Surgeon: Debbie Butler MD;  Location: [address]);  Service: Endoscopy;  Laterality: N/A;  On PPI/H2 Blocker    BLADDER SUSPENSION      CARPAL TUNNEL RELEASE      right    CHOLECYSTECTOMY      COLONOSCOPY N/A 8/30/2016    Procedure: COLONOSCOPY;  Surgeon: Slick Herron  MD;  [facility]  Service: Endoscopy;  Laterality: N/A;  PM prep    COLONOSCOPY N/A 12/22/2021    Procedure: COLONOSCOPY. any CRS;  Surgeon: Maria Ines Jung MD;  Location: [facility] ([facility]);  Service: Endoscopy;  Laterality: N/A;  fully vaccinated -  scaral stimulator will bring remote -    12/17 lvm to confirm appt-rb    ESOPHAGEAL MANOMETRY WITH MEASUREMENT OF IMPEDANCE N/A 11/5/2018    Procedure: MANOMETRY, ESOPHAGUS, WITH IMPEDANCE MEASUREMENT;  Surgeon: Slick Herron MD;  [facility]  Service: Endoscopy;  Laterality: N/A;    ESOPHAGEAL MANOMETRY WITH MEASUREMENT OF IMPEDANCE N/A 6/2/2022    Procedure: MANOMETRY, ESOPHAGUS, WITH IMPEDANCE MEASUREMENT;  Surgeon: Debbie Butler MD;  [facility]  Service: Endoscopy;  Laterality: N/A;  fully vaccinated, bladder stimulator, instr mailed /emailed -ml    ESOPHAGOGASTRODUODENOSCOPY N/A 2/13/2020    Procedure: EGD (ESOPHAGOGASTRODUODENOSCOPY);  Surgeon: Slick Herron MD;  [facility]  Service: Endoscopy;  Laterality: N/A;  pt has cardiology appt on 1/6/19-will call back after this appt to schedule-tb    FLUOROSCOPIC URODYNAMIC STUDY N/A 5/23/2019    Procedure: URODYNAMIC STUDY, FLUOROSCOPIC;  Surgeon: Zena Tee MD;  Location: [facility] OR 1ST FLR;  Service: Urology;  Laterality: N/A;  1 hour    GALLBLADDER SURGERY      HYSTERECTOMY  2013    Bess velasquez Dr. Champlain    IMPLANTATION OF PERMANENT SACRAL NERVE STIMULATOR N/A 7/30/2019    Procedure: INSERTION, NEUROSTIMULATOR, PERMANENT, SACRAL;  Surgeon: Zena Tee MD;  Location: [facility] OR 2ND FLR;  Service: Urology;  Laterality: N/A;  30 min    OOPHORECTOMY  2005    [facility]- benign cyst    OOPHORECTOMY  2013    [facility]- endo    ooptherectomy      right knee  scoped    SHOULDER SURGERY  right       Pre-op Assessment    I have reviewed the Patient Summary Reports.     I have reviewed the Nursing Notes. I have reviewed the  NPO Status.   I have reviewed the Medications.     Review of Systems  Anesthesia Hx:  No problems with previous Anesthesia  History of prior surgery of interest to airway management or planning: Denies Family Hx of Anesthesia complications.   Denies Personal Hx of Anesthesia complications.   Social:  No Alcohol Use, Non-Smoker    Hematology/Oncology:  Hematology Normal   Oncology Normal     EENT/Dental:EENT/Dental Normal   Cardiovascular:   Exercise tolerance: good Hypertension hyperlipidemia    Pulmonary:  Pulmonary Normal    Renal/:  Renal/ Normal     Hepatic/GI:   GERD    Neurological:   Headaches    Endocrine:   Diabetes, type 2 Hypothyroidism  Obesity / BMI > 30  Psych:   depression          Physical Exam  General: Well nourished, Cooperative, Alert and Oriented    Airway:  Mallampati: III   Mouth Opening: Normal  TM Distance: Normal  Tongue: Normal  Neck ROM: Normal ROM    Dental:  Periodontal disease    Chest/Lungs:  Clear to auscultation, Normal Respiratory Rate    Heart:  Rate: Normal  Rhythm: Regular Rhythm        Anesthesia Plan  Type of Anesthesia, risks & benefits discussed:    Anesthesia Type: Gen ETT, Gen Natural Airway  Intra-op Monitoring Plan: Standard ASA Monitors  Post Op Pain Control Plan: multimodal analgesia and IV/PO Opioids PRN  Induction:  IV  Airway Plan: Direct  Informed Consent: Informed consent signed with the Patient and all parties understand the risks and agree with anesthesia plan.  All questions answered. Patient consented to blood products? No  ASA Score: 3  Day of Surgery Review of History & Physical: H&P Update referred to the surgeon/provider.    Ready For Surgery From Anesthesia Perspective.     .

## 2023-02-28 NOTE — BRIEF OP NOTE
Zhang Campos - Surgery ([facility])  Brief Operative Note    Surgery Date: 2/28/2023     Surgeon(s) and Role:     * Zena Tee MD - Primary     * Jamel Singh MD - Resident - Assisting        Pre-op Diagnosis:  Urge incontinence [N39.41]  Patient Active Problem List    Diagnosis Date Noted    Hypothyroidism 02/14/2023    Schizoaffective disorder, depressive type 07/19/2022    Intractable migraine with aura without status migrainosus 05/18/2021    Snoring     Diabetic polyneuropathy associated with type 2 diabetes mellitus 12/20/2020    Dizziness 12/15/2020    Decreased functional mobility 12/15/2020    Chronic daily headache 12/07/2020    Medication overuse headache 06/12/2020    Benign paroxysmal positional vertigo due to bilateral vestibular disorder 06/12/2020    Bilateral occipital neuralgia 08/18/2019    Incomplete bladder emptying 06/24/2019    Weakness 05/31/2019    Painful cervical ROM 05/31/2019    Family hx of ovarian malignancy 02/10/2019    Family history of breast cancer 01/08/2019    GERD (gastroesophageal reflux disease) 11/05/2018    Hyperlipidemia associated with type 2 diabetes mellitus 08/23/2018    Sleep arousal disorder     Uncontrolled morning headache     Migrainous vertigo 04/25/2018    Gastroesophageal reflux disease without esophagitis 03/28/2018    Irritable bowel syndrome with diarrhea 03/28/2018    Mixed stress and urge urinary incontinence 10/30/2017    Cervical myofascial pain syndrome 10/30/2017    Severe obesity (BMI 35.0-39.9) with comorbidity 04/10/2017    Muscle spasm 04/10/2017    Dysfunctional voiding of urine 03/20/2017    Hypertension associated with diabetes 09/07/2016    Palpitations 09/07/2016    Intractable chronic migraine without aura and without status migrainosus 12/24/2014    Right hip pain 02/08/2013    Abdominal pain, RLQ (right lower quadrant) 11/30/2012    Dysphagia 11/30/2012    Diabetes mellitus, type II 11/30/2012     Post-op Diagnosis:  Post-Op Diagnosis  Codes:     * Urge incontinence [N39.41]    Procedure(s) (LRB):  Replacement, Battery, Neurostimulator (N/A)    Anesthesia: Monitor Anesthesia Care    Operative Findings:   Left IPG replaced without issue  No impedance on lead testing    Estimated Blood Loss: * No values recorded between 2/28/2023  4:30 PM and 2/28/2023  5:01 PM *         Specimens:   Specimen (24h ago, onward)       Start     Ordered    02/28/23 1644  Specimen to Pathology, Surgery Gross only  Once        Comments: Pre-op Diagnosis: Urge incontinence [N39.41]Procedure(s):Replacement, Battery, Neurostimulator Number of specimens: 1Name of specimens: 1- Generator - for Gross Only     References:    Click here for ordering Quick Tip   Question Answer Comment   Procedure Type: Gross only    Specimen Class: Routine/Screening    Which provider would you like to cc? HEATHER MENESES    Release to patient Immediate        02/28/23 1644                      Discharge Note    OUTCOME: Patient tolerated treatment/procedure well without complication and is now ready for discharge.    DISPOSITION: Home or Self Care    FINAL DIAGNOSIS:    Patient Active Problem List    Diagnosis Date Noted    Hypothyroidism 02/14/2023    Schizoaffective disorder, depressive type 07/19/2022    Intractable migraine with aura without status migrainosus 05/18/2021    Snoring     Diabetic polyneuropathy associated with type 2 diabetes mellitus 12/20/2020    Dizziness 12/15/2020    Decreased functional mobility 12/15/2020    Chronic daily headache 12/07/2020    Medication overuse headache 06/12/2020    Benign paroxysmal positional vertigo due to bilateral vestibular disorder 06/12/2020    Bilateral occipital neuralgia 08/18/2019    Incomplete bladder emptying 06/24/2019    Weakness 05/31/2019    Painful cervical ROM 05/31/2019    Family hx of ovarian malignancy 02/10/2019    Family history of breast cancer 01/08/2019    GERD (gastroesophageal reflux disease) 11/05/2018     Hyperlipidemia associated with type 2 diabetes mellitus 08/23/2018    Sleep arousal disorder     Uncontrolled morning headache     Migrainous vertigo 04/25/2018    Gastroesophageal reflux disease without esophagitis 03/28/2018    Irritable bowel syndrome with diarrhea 03/28/2018    Mixed stress and urge urinary incontinence 10/30/2017    Cervical myofascial pain syndrome 10/30/2017    Severe obesity (BMI 35.0-39.9) with comorbidity 04/10/2017    Muscle spasm 04/10/2017    Dysfunctional voiding of urine 03/20/2017    Hypertension associated with diabetes 09/07/2016    Palpitations 09/07/2016    Intractable chronic migraine without aura and without status migrainosus 12/24/2014    Right hip pain 02/08/2013    Abdominal pain, RLQ (right lower quadrant) 11/30/2012    Dysphagia 11/30/2012    Diabetes mellitus, type II 11/30/2012         FOLLOWUP: In clinic    DISCHARGE INSTRUCTIONS:    Discharge Procedure Orders   Notify your health care provider if you experience any of the following:  temperature >100.4     Notify your health care provider if you experience any of the following:  persistent nausea and vomiting or diarrhea     Notify your health care provider if you experience any of the following:  severe uncontrolled pain     Notify your health care provider if you experience any of the following:  difficulty breathing or increased cough     Notify your health care provider if you experience any of the following:  severe persistent headache     Notify your health care provider if you experience any of the following:  persistent dizziness, light-headedness, or visual disturbances     Notify your health care provider if you experience any of the following:  increased confusion or weakness     As above.

## 2023-02-28 NOTE — OP NOTE
[facility]  Operative Note    Date: 02/28/2023    Pre-Op Diagnosis:   Incomplete bladder emptying, OAB with UUI  Patient Active Problem List    Diagnosis Date Noted    Hypothyroidism 02/14/2023    Schizoaffective disorder, depressive type 07/19/2022    Intractable migraine with aura without status migrainosus 05/18/2021    Snoring     Diabetic polyneuropathy associated with type 2 diabetes mellitus 12/20/2020    Dizziness 12/15/2020    Decreased functional mobility 12/15/2020    Chronic daily headache 12/07/2020    Medication overuse headache 06/12/2020    Benign paroxysmal positional vertigo due to bilateral vestibular disorder 06/12/2020    Bilateral occipital neuralgia 08/18/2019    Incomplete bladder emptying 06/24/2019    Weakness 05/31/2019    Painful cervical ROM 05/31/2019    Family hx of ovarian malignancy 02/10/2019    Family history of breast cancer 01/08/2019    GERD (gastroesophageal reflux disease) 11/05/2018    Hyperlipidemia associated with type 2 diabetes mellitus 08/23/2018    Sleep arousal disorder     Uncontrolled morning headache     Migrainous vertigo 04/25/2018    Gastroesophageal reflux disease without esophagitis 03/28/2018    Irritable bowel syndrome with diarrhea 03/28/2018    Mixed stress and urge urinary incontinence 10/30/2017    Cervical myofascial pain syndrome 10/30/2017    Severe obesity (BMI 35.0-39.9) with comorbidity 04/10/2017    Muscle spasm 04/10/2017    Dysfunctional voiding of urine 03/20/2017    Hypertension associated with diabetes 09/07/2016    Palpitations 09/07/2016    Intractable chronic migraine without aura and without status migrainosus 12/24/2014    Right hip pain 02/08/2013    Abdominal pain, RLQ (right lower quadrant) 11/30/2012    Dysphagia 11/30/2012    Diabetes mellitus, type II 11/30/2012      Post-Op Diagnosis:   Same     Procedure(s) Performed:   1.  Replacement of peripheral neurostimulator pulse generator  2.  Electronic analysis of  implanted neurostimulator pulse generator system with intraoperative or subsequent programming    Specimen(s): none    Staff Surgeon: Zena Tee MD    Assistant Surgeon: Jamel Singh MD    Anesthesia: Monitored Local Anesthesia with Sedation    Indications: Maria Ines Ac is a 38 y.o. female with OAB with UUI and incomplete bladder emptying presenting for IPG exchange.    Findings:   Lead previously placed on right side  Left IPG replaced without issue  No impedance on lead testing    Estimated Blood Loss: minimal    Drains: none    Complications:  None    Procedure in Detail:  After informed consent was obtained, the patient was transferred to the operating suite and placed in the prone position.  Anesthesia was administered.  The patient received preoperative antibiotics. The patient was then prepped and draped in the usual sterile fashion.  Timeout was performed and preoperative antibiotics were confirmed.      1% lidocaine was injected over the left hip along the previous incision.  This was then opened sharply using a 15 blade.  Bovie electrocautery and blunt dissection were used to open the hip pocket. The generator was removed and lead was freed up to the medial attachment.     The screws were exposed and loosened with a hex wrench. The old generator was then removed and passed off the field and discarded. The lead was cleansed of body fluid and dried.    The lead was inserted into the header of the neurostimulator until the blue tip was visualized at the distal window.  The single set screw was tightened.  The neurostimulator was placed into the subcutaneous pocket with the etched identification side placed upwards. The programming head was placed over the implanted neurostimulator in a sterile cover to ensure adequate lead connection and that parameters were within normal limits. Impedances were confirmed to be within normal limits (>50 and <4,000).    The wound was closed in 2 layers, using  3-0 vicryl deep dermal sutures and a running 4-0 monocryl subcuticular superficially. Steristips were applied.      The patient tolerated the procedure well and was transferred to the recovery room in stable condition.      Disposition:  The patient will follow up with Dr. Tee in 2 weeks.  Prescriptions were given for keflex, norco.  The patient will meet with the [employer] rep in the recovery room.      MD FATOUMATA Whiting was present for the entire case and agree with the above note.

## 2023-02-28 NOTE — ANESTHESIA POSTPROCEDURE EVALUATION
Anesthesia Post Evaluation    Patient: Maria Ines Ac    Procedure(s) Performed: Procedure(s) (LRB):  Replacement, Battery, Neurostimulator (N/A)    Final Anesthesia Type: general      Patient location during evaluation: [facility]  Patient participation: Yes- Able to Participate  Level of consciousness: awake and alert and oriented  Post-procedure vital signs: reviewed and stable  Pain management: adequate  Airway patency: patent    PONV status at discharge: No PONV  Anesthetic complications: no      Cardiovascular status: blood pressure returned to baseline and hemodynamically stable  Respiratory status: unassisted, spontaneous ventilation and room air  Hydration status: euvolemic  Follow-up not needed.          Vitals Value Taken Time   /79 02/28/23 1738   Temp 36.2 °C (97.2 °F) 02/28/23 1738   Pulse 89 02/28/23 1738   Resp 15 02/28/23 1738   SpO2 92 % 02/28/23 1738         No case tracking events are documented in the log.      Pain/Mac Score: Mac Score: 10 (2/28/2023  5:38 PM)

## 2023-02-28 NOTE — TRANSFER OF CARE
"Anesthesia Transfer of Care Note    Patient: Maria Ines Ac    Procedure(s) Performed: Procedure(s) (LRB):  Replacement, Battery, Neurostimulator (N/A)    Patient location: [facility]    Anesthesia Type: general    Transport from OR: Transported from OR on 6-10 L/min O2 by face mask with adequate spontaneous ventilation    Post pain: adequate analgesia    Post assessment: no apparent anesthetic complications    Post vital signs: stable    Level of consciousness: awake and alert    Nausea/Vomiting: no nausea/vomiting    Complications: none    Transfer of care protocol was followed      Last vitals:   Visit Vitals  /79   Pulse 79   Temp (P) 36.4 °C (97.5 °F) (Oral)   Resp 18   Ht 5' 2" (1.575 m)   Wt 93.5 kg (206 lb 2.1 oz)   LMP 11/17/2012 (LMP Unknown)   SpO2 95%   Breastfeeding No   BMI 37.70 kg/m²     "

## 2023-02-28 NOTE — PATIENT INSTRUCTIONS
Interstim Post-Operative Care    You play an important role in your own recovery. You will be given a daily diary to document the  effectiveness of the Interstim implant.     Caring for Your Wound   After surgery you will have a dressing over the surgical site.  Do not remove or change the dressing. If your dressing becomes saturated or undone, you  may reinforce it with more tape and/or gauze but you should call the office to be evaluated.    Post Surgical Pain Management   It is normal to experience some discomfort post operatively, however the stimulation should not  be painful.   Take Tylenol 650mg 1-2 tabs every 4-6 hours as needed if you feel tenderness from surgical  incision (Do not to exceed 4000mg daily) or Motrin 200mg 1-2 tabs every 4-6 hours.  Warning: Do not take additional Tylenol while taking Percocet or Vicodin. All of these  products contain Acetaminophen, which can be toxic if taken in excess.    Stimulation   Stimulation is the pulling or tingling sensation felt in the pelvic area (near the vagina, scrotum  or anal area.) It should not be painful. If it is painful or you feel the stimulation sensation move  down the legs decrease the stimulation and call the office and speak to a nurse.   During the trial period (stage 1) you may notice that the Interstim device has improved your  continence which means it is working and on the correct setting. If you are having episodes of  incontinence you will need to increase your device setting by moving the dial up slowly.    Activity   Avoid heavy lifting or strenuous activity for 14 days after your surgery.   Frontal Showers or Sponge bath only for two weeks after each surgery. Avoid immersion in any  water until after your post-op appointment.    Symptoms to Report   Severe or worsening pain, unrelieved by pain medications.   Fever, greater than 101.5ºF, chills or sweats   Painful or problems urinating   Any problems with the device    If you  experience any of the above symptoms, call the [facility] clinic at [phone] during business hours on weekdays. If after hours or on weekends, call [phone] and ask to speak with the urology resident on call.    You may also call the [employer] Patient Help Line for specific help troubleshooting your device.  For help with the temporary implant Call [phone].  For help with the permanent implant Call [phone].

## 2023-03-03 ENCOUNTER — TELEPHONE (OUTPATIENT)
Dept: UROLOGY | Facility: CLINIC | Age: 38
End: 2023-03-03
Payer: MEDICARE

## 2023-03-06 NOTE — TELEPHONE ENCOUNTER
----- Message from Hilary Francisco sent at 3/6/2023 12:00 PM CST -----  Maria Ines Ac calling regarding Appointment Access  for a post-op f/u to have her stitches removed, call back [phone]

## 2023-03-07 ENCOUNTER — OFFICE VISIT (OUTPATIENT)
Dept: PODIATRY | Facility: CLINIC | Age: 38
End: 2023-03-07
Payer: MEDICARE

## 2023-03-07 VITALS
SYSTOLIC BLOOD PRESSURE: 119 MMHG | WEIGHT: 211.63 LBS | HEIGHT: 62 IN | HEART RATE: 79 BPM | DIASTOLIC BLOOD PRESSURE: 77 MMHG | BODY MASS INDEX: 38.94 KG/M2

## 2023-03-07 DIAGNOSIS — B35.1 ONYCHOMYCOSIS DUE TO DERMATOPHYTE: ICD-10-CM

## 2023-03-07 DIAGNOSIS — E11.42 DIABETIC POLYNEUROPATHY ASSOCIATED WITH TYPE 2 DIABETES MELLITUS: Primary | ICD-10-CM

## 2023-03-07 DIAGNOSIS — L84 CORN OR CALLUS: ICD-10-CM

## 2023-03-07 LAB
FINAL PATHOLOGIC DIAGNOSIS: NORMAL
GROSS: NORMAL
Lab: NORMAL

## 2023-03-07 PROCEDURE — 11721 PR DEBRIDEMENT OF NAILS, 6 OR MORE: ICD-10-PCS | Mod: Q9,59,HCNC,S$GLB | Performed by: PODIATRIST

## 2023-03-07 PROCEDURE — 99499 UNLISTED E&M SERVICE: CPT | Mod: HCNC,S$GLB,, | Performed by: PODIATRIST

## 2023-03-07 PROCEDURE — 3072F PR LOW RISK FOR RETINOPATHY: ICD-10-PCS | Mod: HCNC,CPTII,S$GLB, | Performed by: PODIATRIST

## 2023-03-07 PROCEDURE — 99499 NO LOS: ICD-10-PCS | Mod: HCNC,S$GLB,, | Performed by: PODIATRIST

## 2023-03-07 PROCEDURE — 11056 PR TRIM BENIGN HYPERKERATOTIC SKIN LESION,2-4: ICD-10-PCS | Mod: Q9,HCNC,S$GLB, | Performed by: PODIATRIST

## 2023-03-07 PROCEDURE — 99999 PR PBB SHADOW E&M-EST. PATIENT-LVL II: ICD-10-PCS | Mod: PBBFAC,HCNC,, | Performed by: PODIATRIST

## 2023-03-07 PROCEDURE — 3074F SYST BP LT 130 MM HG: CPT | Mod: HCNC,CPTII,S$GLB, | Performed by: PODIATRIST

## 2023-03-07 PROCEDURE — 3008F PR BODY MASS INDEX (BMI) DOCUMENTED: ICD-10-PCS | Mod: HCNC,CPTII,S$GLB, | Performed by: PODIATRIST

## 2023-03-07 PROCEDURE — 99999 PR PBB SHADOW E&M-EST. PATIENT-LVL II: CPT | Mod: PBBFAC,HCNC,, | Performed by: PODIATRIST

## 2023-03-07 PROCEDURE — 3074F PR MOST RECENT SYSTOLIC BLOOD PRESSURE < 130 MM HG: ICD-10-PCS | Mod: HCNC,CPTII,S$GLB, | Performed by: PODIATRIST

## 2023-03-07 PROCEDURE — 3072F LOW RISK FOR RETINOPATHY: CPT | Mod: HCNC,CPTII,S$GLB, | Performed by: PODIATRIST

## 2023-03-07 PROCEDURE — 3078F PR MOST RECENT DIASTOLIC BLOOD PRESSURE < 80 MM HG: ICD-10-PCS | Mod: HCNC,CPTII,S$GLB, | Performed by: PODIATRIST

## 2023-03-07 PROCEDURE — 3008F BODY MASS INDEX DOCD: CPT | Mod: HCNC,CPTII,S$GLB, | Performed by: PODIATRIST

## 2023-03-07 PROCEDURE — 3078F DIAST BP <80 MM HG: CPT | Mod: HCNC,CPTII,S$GLB, | Performed by: PODIATRIST

## 2023-03-07 PROCEDURE — 11721 DEBRIDE NAIL 6 OR MORE: CPT | Mod: Q9,59,HCNC,S$GLB | Performed by: PODIATRIST

## 2023-03-07 PROCEDURE — 11056 PARNG/CUTG B9 HYPRKR LES 2-4: CPT | Mod: Q9,HCNC,S$GLB, | Performed by: PODIATRIST

## 2023-03-07 RX ORDER — PHENTERMINE HYDROCHLORIDE 37.5 MG/1
37.5 TABLET ORAL
COMMUNITY
Start: 2023-01-17

## 2023-03-07 RX ORDER — AZITHROMYCIN 250 MG/1
TABLET, FILM COATED ORAL
COMMUNITY
Start: 2023-01-17 | End: 2023-07-14 | Stop reason: SDUPTHER

## 2023-03-07 NOTE — PROGRESS NOTES
Subjective:      Patient ID: Maria Ines Ac is a 38 y.o. female.    Chief Complaint: Diabetes Mellitus (PCP -  Luann Raymond MD - ) and Nail Care    Maria Ines is a 38 y.o. female who presents to the clinic for evaluation and treatment of high risk feet. Maria Ines has a past medical history of Blood in stool, Constipation, Cystitis, Depression, Diabetes mellitus, type 2, Dysphagia, Endometriosis, GERD (gastroesophageal reflux disease), Headache, Hypertension, Palpitations, Premature menopause on hormone replacement therapy, and Thyroid disease. The patient's chief complaint is long, thick toenails.   This patient has documented high risk feet requiring routine maintenance secondary to peripheral neuropathy.    PCP: Luann Raymond MD    Date Last Seen by PCP:   Chief Complaint   Patient presents with    Diabetes Mellitus     PCP -  Luann Raymond MD -     Nail Care       Current shoe gear:  Affected Foot: Tennis shoes     Unaffected Foot: Tennis shoes    Hemoglobin A1C   Date Value Ref Range Status   11/14/2022 5.3 4.0 - 5.6 % Final     Comment:     ADA Screening Guidelines:  5.7-6.4%  Consistent with prediabetes  >or=6.5%  Consistent with diabetes    High levels of fetal hemoglobin interfere with the HbA1C  assay. Heterozygous hemoglobin variants (HbS, HgC, etc)do  not significantly interfere with this assay.   However, presence of multiple variants may affect accuracy.     07/19/2022 5.4 4.0 - 5.6 % Final     Comment:     ADA Screening Guidelines:  5.7-6.4%  Consistent with prediabetes  >or=6.5%  Consistent with diabetes    High levels of fetal hemoglobin interfere with the HbA1C  assay. Heterozygous hemoglobin variants (HbS, HgC, etc)do  not significantly interfere with this assay.   However, presence of multiple variants may affect accuracy.     12/14/2021 5.4 4.0 - 5.6 % Final     Comment:     ADA Screening Guidelines:  5.7-6.4%  Consistent with prediabetes  >or=6.5%  Consistent with  diabetes    High levels of fetal hemoglobin interfere with the HbA1C  assay. Heterozygous hemoglobin variants (HbS, HgC, etc)do  not significantly interfere with this assay.   However, presence of multiple variants may affect accuracy.         Review of Systems   Constitutional: Negative for chills, fever and malaise/fatigue.   HENT:  Negative for hearing loss.    Cardiovascular:  Negative for claudication.   Respiratory:  Negative for shortness of breath.    Skin:  Positive for color change, dry skin and nail changes. Negative for flushing and rash.   Musculoskeletal:  Negative for joint pain and myalgias.   Gastrointestinal:  Negative for nausea and vomiting.   Neurological:  Positive for numbness, paresthesias and sensory change. Negative for loss of balance.   Psychiatric/Behavioral:  Negative for altered mental status.    Allergic/Immunologic: Negative for hives.         Objective:      Physical Exam  Vitals reviewed.   Cardiovascular:      Pulses:           Dorsalis pedis pulses are 2+ on the right side and 2+ on the left side.        Posterior tibial pulses are 2+ on the right side and 2+ on the left side.      Comments: No edema noted to b/L LEs  Musculoskeletal:      Comments: Adequate joint ROM noted to all lower extremity muscle groups with no pain or crepitation noted. Muscle strength is 5/5 in all groups bilaterally.     Feet:      Right foot:      Protective Sensation: 5 sites tested.  0 sites sensed.      Left foot:      Protective Sensation: 5 sites tested.  0 sites sensed.   Skin:     General: Skin is warm and dry.      Capillary Refill: Capillary refill takes 2 to 3 seconds.      Comments: Normal skin tugor noted.   No open lesion noted b/L  Skin temp is warm to warm from proximal to distal b/L.  Webspaces clean, dry, and intact  Xerosis Bilaterally. No open lesions noted.   HKL noted to left 5th digit x2     Neurological:      Mental Status: She is alert and oriented to person, place, and time.       Comments: Intact gross sensation noted to b/L LEs       Nails x10 are elongated by  4-5mm's, thickened by 1-2 mm's, dystrophic, and are yellowish in  coloration . Xerosis Bilaterally. No open lesions noted.             Assessment:       Encounter Diagnoses   Name Primary?    Diabetic polyneuropathy associated with type 2 diabetes mellitus Yes    Onychomycosis due to dermatophyte     Corn or callus          Plan:       Maria Ines was seen today for diabetes mellitus and nail care.    Diagnoses and all orders for this visit:    Diabetic polyneuropathy associated with type 2 diabetes mellitus    Onychomycosis due to dermatophyte    Corn or callus    I counseled the patient on her conditions, their implications and medical management.      - Shoe inspection. Diabetic Foot Education. Patient reminded of the importance of good nutrition and blood sugar control to help prevent podiatric complications of diabetes. Patient instructed on proper foot hygeine. We discussed wearing proper shoe gear, daily foot inspections, never walking without protective shoe gear, never putting sharp instruments to feet, routine podiatric nail visits every 2-3 months.    After cleansing with an alcohol prep pad, the about mentioned hyperkeratotic lesions were sharply debrided X 2 utilizing a #15 blade to a smooth base without incident. Pt tolerated the procedure well and reported comfort to the debarment sites. Pt will continue to use padding and moisture the callused areas.     - With patient's permission, nails were aggressively reduced and debrided x 10 to their soft tissue attachment mechanically and with electric , removing all offending nail and debris. Patient relates relief following the procedure. She will continue to monitor the areas daily, inspect her feet, wear protective shoe gear when ambulatory, moisturizer to maintain skin integrity and follow in this office in approximately 2-3 months, sooner p.r.n.

## 2023-03-14 ENCOUNTER — LAB VISIT (OUTPATIENT)
Dept: LAB | Facility: HOSPITAL | Age: 38
End: 2023-03-14
Attending: INTERNAL MEDICINE
Payer: MEDICARE

## 2023-03-14 ENCOUNTER — OFFICE VISIT (OUTPATIENT)
Dept: INTERNAL MEDICINE | Facility: CLINIC | Age: 38
End: 2023-03-14
Payer: MEDICARE

## 2023-03-14 DIAGNOSIS — E11.9 DIABETES MELLITUS WITHOUT COMPLICATION: ICD-10-CM

## 2023-03-14 DIAGNOSIS — I10 HYPERTENSION, UNSPECIFIED TYPE: Primary | ICD-10-CM

## 2023-03-14 DIAGNOSIS — I10 HYPERTENSION, UNSPECIFIED TYPE: ICD-10-CM

## 2023-03-14 DIAGNOSIS — Z00.00 PREVENTATIVE HEALTH CARE: ICD-10-CM

## 2023-03-14 LAB
ESTIMATED AVG GLUCOSE: 105 MG/DL (ref 68–131)
HBA1C MFR BLD: 5.3 % (ref 4–5.6)

## 2023-03-14 PROCEDURE — 3079F PR MOST RECENT DIASTOLIC BLOOD PRESSURE 80-89 MM HG: ICD-10-PCS | Mod: HCNC,CPTII,S$GLB, | Performed by: INTERNAL MEDICINE

## 2023-03-14 PROCEDURE — 80048 BASIC METABOLIC PNL TOTAL CA: CPT | Mod: HCNC | Performed by: INTERNAL MEDICINE

## 2023-03-14 PROCEDURE — 3008F PR BODY MASS INDEX (BMI) DOCUMENTED: ICD-10-PCS | Mod: HCNC,CPTII,S$GLB, | Performed by: INTERNAL MEDICINE

## 2023-03-14 PROCEDURE — 3044F PR MOST RECENT HEMOGLOBIN A1C LEVEL <7.0%: ICD-10-PCS | Mod: HCNC,CPTII,S$GLB, | Performed by: INTERNAL MEDICINE

## 2023-03-14 PROCEDURE — 99999 PR PBB SHADOW E&M-EST. PATIENT-LVL V: ICD-10-PCS | Mod: PBBFAC,HCNC,, | Performed by: INTERNAL MEDICINE

## 2023-03-14 PROCEDURE — 99396 PR PREVENTIVE VISIT,EST,40-64: ICD-10-PCS | Mod: HCNC,S$GLB,, | Performed by: INTERNAL MEDICINE

## 2023-03-14 PROCEDURE — 99999 PR PBB SHADOW E&M-EST. PATIENT-LVL V: CPT | Mod: PBBFAC,HCNC,, | Performed by: INTERNAL MEDICINE

## 2023-03-14 PROCEDURE — 3079F DIAST BP 80-89 MM HG: CPT | Mod: HCNC,CPTII,S$GLB, | Performed by: INTERNAL MEDICINE

## 2023-03-14 PROCEDURE — 36415 COLL VENOUS BLD VENIPUNCTURE: CPT | Mod: HCNC | Performed by: INTERNAL MEDICINE

## 2023-03-14 PROCEDURE — 3008F BODY MASS INDEX DOCD: CPT | Mod: HCNC,CPTII,S$GLB, | Performed by: INTERNAL MEDICINE

## 2023-03-14 PROCEDURE — 3072F LOW RISK FOR RETINOPATHY: CPT | Mod: HCNC,CPTII,S$GLB, | Performed by: INTERNAL MEDICINE

## 2023-03-14 PROCEDURE — 99396 PREV VISIT EST AGE 40-64: CPT | Mod: HCNC,S$GLB,, | Performed by: INTERNAL MEDICINE

## 2023-03-14 PROCEDURE — 3074F PR MOST RECENT SYSTOLIC BLOOD PRESSURE < 130 MM HG: ICD-10-PCS | Mod: HCNC,CPTII,S$GLB, | Performed by: INTERNAL MEDICINE

## 2023-03-14 PROCEDURE — 3072F PR LOW RISK FOR RETINOPATHY: ICD-10-PCS | Mod: HCNC,CPTII,S$GLB, | Performed by: INTERNAL MEDICINE

## 2023-03-14 PROCEDURE — 83036 HEMOGLOBIN GLYCOSYLATED A1C: CPT | Mod: HCNC | Performed by: INTERNAL MEDICINE

## 2023-03-14 PROCEDURE — 3074F SYST BP LT 130 MM HG: CPT | Mod: HCNC,CPTII,S$GLB, | Performed by: INTERNAL MEDICINE

## 2023-03-14 PROCEDURE — 3044F HG A1C LEVEL LT 7.0%: CPT | Mod: HCNC,CPTII,S$GLB, | Performed by: INTERNAL MEDICINE

## 2023-03-15 LAB
ANION GAP SERPL CALC-SCNC: 9 MMOL/L (ref 8–16)
BUN SERPL-MCNC: 5 MG/DL (ref 6–20)
CALCIUM SERPL-MCNC: 10.4 MG/DL (ref 8.7–10.5)
CHLORIDE SERPL-SCNC: 104 MMOL/L (ref 95–110)
CO2 SERPL-SCNC: 25 MMOL/L (ref 23–29)
CREAT SERPL-MCNC: 0.9 MG/DL (ref 0.5–1.4)
EST. GFR  (NO RACE VARIABLE): >60 ML/MIN/1.73 M^2
GLUCOSE SERPL-MCNC: 73 MG/DL (ref 70–110)
POTASSIUM SERPL-SCNC: 4.1 MMOL/L (ref 3.5–5.1)
SODIUM SERPL-SCNC: 138 MMOL/L (ref 136–145)

## 2023-03-17 ENCOUNTER — OFFICE VISIT (OUTPATIENT)
Dept: UROLOGY | Facility: CLINIC | Age: 38
End: 2023-03-17
Payer: MEDICARE

## 2023-03-17 VITALS
HEART RATE: 101 BPM | WEIGHT: 204.81 LBS | BODY MASS INDEX: 37.69 KG/M2 | DIASTOLIC BLOOD PRESSURE: 73 MMHG | HEIGHT: 62 IN | SYSTOLIC BLOOD PRESSURE: 101 MMHG

## 2023-03-17 DIAGNOSIS — Z98.890 POST-OPERATIVE STATE: Primary | ICD-10-CM

## 2023-03-17 PROCEDURE — 99999 PR PBB SHADOW E&M-EST. PATIENT-LVL IV: ICD-10-PCS | Mod: PBBFAC,HCNC,, | Performed by: UROLOGY

## 2023-03-17 PROCEDURE — 3074F PR MOST RECENT SYSTOLIC BLOOD PRESSURE < 130 MM HG: ICD-10-PCS | Mod: HCNC,CPTII,S$GLB, | Performed by: UROLOGY

## 2023-03-17 PROCEDURE — 3072F LOW RISK FOR RETINOPATHY: CPT | Mod: HCNC,CPTII,S$GLB, | Performed by: UROLOGY

## 2023-03-17 PROCEDURE — 1159F MED LIST DOCD IN RCRD: CPT | Mod: HCNC,CPTII,S$GLB, | Performed by: UROLOGY

## 2023-03-17 PROCEDURE — 3072F PR LOW RISK FOR RETINOPATHY: ICD-10-PCS | Mod: HCNC,CPTII,S$GLB, | Performed by: UROLOGY

## 2023-03-17 PROCEDURE — 3008F BODY MASS INDEX DOCD: CPT | Mod: HCNC,CPTII,S$GLB, | Performed by: UROLOGY

## 2023-03-17 PROCEDURE — 3044F HG A1C LEVEL LT 7.0%: CPT | Mod: HCNC,CPTII,S$GLB, | Performed by: UROLOGY

## 2023-03-17 PROCEDURE — 3074F SYST BP LT 130 MM HG: CPT | Mod: HCNC,CPTII,S$GLB, | Performed by: UROLOGY

## 2023-03-17 PROCEDURE — 1159F PR MEDICATION LIST DOCUMENTED IN MEDICAL RECORD: ICD-10-PCS | Mod: HCNC,CPTII,S$GLB, | Performed by: UROLOGY

## 2023-03-17 PROCEDURE — 99999 PR PBB SHADOW E&M-EST. PATIENT-LVL IV: CPT | Mod: PBBFAC,HCNC,, | Performed by: UROLOGY

## 2023-03-17 PROCEDURE — 3078F PR MOST RECENT DIASTOLIC BLOOD PRESSURE < 80 MM HG: ICD-10-PCS | Mod: HCNC,CPTII,S$GLB, | Performed by: UROLOGY

## 2023-03-17 PROCEDURE — 3008F PR BODY MASS INDEX (BMI) DOCUMENTED: ICD-10-PCS | Mod: HCNC,CPTII,S$GLB, | Performed by: UROLOGY

## 2023-03-17 PROCEDURE — 99024 PR POST-OP FOLLOW-UP VISIT: ICD-10-PCS | Mod: HCNC,S$GLB,, | Performed by: UROLOGY

## 2023-03-17 PROCEDURE — 3044F PR MOST RECENT HEMOGLOBIN A1C LEVEL <7.0%: ICD-10-PCS | Mod: HCNC,CPTII,S$GLB, | Performed by: UROLOGY

## 2023-03-17 PROCEDURE — 3078F DIAST BP <80 MM HG: CPT | Mod: HCNC,CPTII,S$GLB, | Performed by: UROLOGY

## 2023-03-17 PROCEDURE — 99024 POSTOP FOLLOW-UP VISIT: CPT | Mod: HCNC,S$GLB,, | Performed by: UROLOGY

## 2023-03-17 NOTE — PROGRESS NOTES
CHIEF COMPLAINT:    Mrs. Ac is a 38 y.o. female presenting for a post op visit, following exchange of the IPG on 2/28/2023 InterSti.  PRESENTING ILLNESS:    Maria Ines Ac is a 38 y.o. female who returns for follow up.  She states that the InterStim is working well for her.  She is pleased, no pain at the site, no wound issues.       Past Surgical History:   Procedure Laterality Date    24 HOUR IMPEDANCE PH MONITORING OF ESOPHAGUS IN PATIENT TAKING ACID REDUCING MEDICATIONS N/A 6/2/2022    Procedure: IMPEDANCE PH STUDY, ESOPHAGEAL, 24 HOUR, IN PATIENT TAKING ACID REDUCING MEDICATION;  Surgeon: Debbie Butler MD;  [facility]  Service: Endoscopy;  Laterality: N/A;  On PPI/H2 Blocker    BLADDER SUSPENSION      CARPAL TUNNEL RELEASE      right    CHOLECYSTECTOMY      COLONOSCOPY N/A 8/30/2016    Procedure: COLONOSCOPY;  Surgeon: Slick Herron MD;  [facility]  Service: Endoscopy;  Laterality: N/A;  PM prep    COLONOSCOPY N/A 12/22/2021    Procedure: COLONOSCOPY. any CRS;  Surgeon: Maria Ines Jung MD;  Location: [facility] (4TH F[facility]);  Service: Endoscopy;  Laterality: N/A;  fully vaccinated -sm  scaral stimulator will bring remote -    12/17 lvm to confirm appt-rb    ESOPHAGEAL MANOMETRY WITH MEASUREMENT OF IMPEDANCE N/A 11/5/2018    Procedure: MANOMETRY, ESOPHAGUS, WITH IMPEDANCE MEASUREMENT;  Surgeon: Slick Herron MD;  [facility]  Service: Endoscopy;  Laterality: N/A;    ESOPHAGEAL MANOMETRY WITH MEASUREMENT OF IMPEDANCE N/A 6/2/2022    Procedure: MANOMETRY, ESOPHAGUS, WITH IMPEDANCE MEASUREMENT;  Surgeon: Debbie Butler MD;  [facility]  Service: Endoscopy;  Laterality: N/A;  fully vaccinated, bladder stimulator, instr mailed /emailed -ml    ESOPHAGOGASTRODUODENOSCOPY N/A 2/13/2020    Procedure: EGD (ESOPHAGOGASTRODUODENOSCOPY);  Surgeon: Slick Herron MD;  [facility]  Service: Endoscopy;  Laterality:  N/A;  pt has cardiology appt on 1/6/19-will call back after this appt to schedule-tb    FLUOROSCOPIC URODYNAMIC STUDY N/A 5/23/2019    Procedure: URODYNAMIC STUDY, FLUOROSCOPIC;  Surgeon: Zena Tee MD;  Location: [facility] OR 1ST FLR;  Service: Urology;  Laterality: N/A;  1 hour    GALLBLADDER SURGERY      HYSTERECTOMY  2013    Bess velasquez Dr. Champlain    IMPLANTATION OF PERMANENT SACRAL NERVE STIMULATOR N/A 7/30/2019    Procedure: INSERTION, NEUROSTIMULATOR, PERMANENT, SACRAL;  Surgeon: Zena Tee MD;  Location: [facility] OR 2ND FLR;  Service: Urology;  Laterality: N/A;  30 min    OOPHORECTOMY  2005    LSO- benign cyst    OOPHORECTOMY  2013    RSO- endo    ooptherectomy      REPLACEMENT OF NERVE STIMULATOR BATTERY N/A 2/28/2023    Procedure: Replacement, Battery, Neurostimulator;  Surgeon: Zena Tee MD;  Location: [facility] OR 2ND FLR;  Service: Urology;  Laterality: N/A;  45 min    right knee  scoped    SHOULDER SURGERY  right       Allergies:  Patient has no known allergies.    Medications:  Outpatient Encounter Medications as of 3/17/2023   Medication Sig Dispense Refill    ACCU-CHEK AMAURY PLUS TEST STRP Strp USE TO TEST BLOOD GLUCOSE TID  9    ACCU-CHEK SOFTCLIX LANCETS Misc USE TO TEST BLOOD GLUCOSE TID  3    atorvastatin (LIPITOR) 80 MG tablet Take 80 mg by mouth every evening.      azithromycin (Z-ANUSHKA) 250 MG tablet Take by mouth.      BLOOD SUGAR DIAGNOSTIC (ACCU-CHEK AMAURY PLUS TEST STRP MISC) 1 strip by Misc.(Non-Drug; Combo Route) route 3 (three) times daily.      buPROPion (WELLBUTRIN XL) 150 MG TB24 tablet Take 150 mg by mouth every morning.      calcium-vitamin D3 500 mg(1,250mg) -200 unit per tablet Take 1 tablet by mouth 2 (two) times daily with meals.      ciclopirox (PENLAC) 8 % Soln Apply topically nightly. 6.6 mL 11    dexlansoprazole (DEXILANT) 60 mg capsule Take 1 capsule (60 mg total) by mouth 2 (two) times a day. 60 capsule 11    diclofenac sodium (VOLTAREN) 1 % Gel Apply 2  g topically 4 (four) times daily. As needed for breast pain 1 Tube 1    dicyclomine (BENTYL) 10 MG capsule TAKE 2 CAPSULES(20 MG) BY MOUTH THREE TIMES DAILY BEFORE MEALS 180 capsule 0    diltiazem HCl (DILTIAZEM 2% CREAM) Apply topically 3 (three) times daily. Apply topically to anal area. 30 g 2    docusate sodium (COLACE) 100 MG capsule Take 1 capsule (100 mg total) by mouth 2 (two) times daily. 60 capsule 0    FARXIGA 10 mg Tab TK 1 T PO QD  7    fluticasone propionate (FLONASE) 50 mcg/actuation nasal spray SHAKE LIQUID AND USE 2 SPRAYS(100 MCG) IN EACH NOSTRIL EVERY DAY 48 g 2    gabapentin (NEURONTIN) 300 MG capsule Take 600 mg (two capsules) in the morning and 900mg (three capsules) at night before bed 150 capsule 11    GLUCOPHAGE 500 mg tablet Take 500 mg by mouth 2 (two) times daily with meals.       ibuprofen (ADVIL,MOTRIN) 800 MG tablet 800 mg every 4 (four) hours as needed.   0    levothyroxine (SYNTHROID) 75 MCG tablet Take 75 mcg by mouth before breakfast.      magnesium oxide (MAG-OX) 400 mg (241.3 mg magnesium) tablet Take 1 tablet (400 mg total) by mouth 2 (two) times daily.  0    meloxicam (MOBIC) 15 MG tablet       methen-m.blue-s.phos-phsal-hyo (URIBEL) 118-10-40.8-36 mg Cap Take 1 capsule by mouth every 6 (six) hours as needed (bladder pain). 120 capsule 11    metoprolol succinate (TOPROL-XL) 100 MG 24 hr tablet Take 1 tablet (100 mg total) by mouth once daily. 90 tablet 3    mometasone 0.1% (ELOCON) 0.1 % cream APPLY TOPICALLY TO THE RASH ON HANDS TWICE DAILY AS NEEDED FOR TWO TO THREE WEEKS. USE SPARINGLY      MULTIVIT-IRON-MIN-FOLIC ACID 3,500-18-0.4 UNIT-MG-MG ORAL CHEW Take 1 tablet by mouth once daily.       ofloxacin (OCUFLOX) 0.3 % ophthalmic solution PLACE 1 DROP INTO BOTH EYES FOUR TIMES DAILY      omeprazole (PRILOSEC OTC) 20 MG tablet Take 20 mg by mouth once daily.      oxyCODONE (ROXICODONE) 5 MG immediate release tablet Take 1 tablet (5 mg total) by mouth every 4 (four) hours as  needed for Pain. 5 tablet 0    phentermine (ADIPEX-P) 37.5 mg tablet Take 37.5 mg by mouth.      phentermine 37.5 MG capsule TK 1 C PO ONCE D IN THE MORNING      REXULTI 2 mg Tab Take 1 tablet by mouth every evening.      traZODone (DESYREL) 100 MG tablet TK 1 T PO HS  1    TRULICITY 4.5 mg/0.5 mL pen injector       ZOLOFT 100 mg tablet Take 200 mg by mouth once daily.       azelastine (ASTELIN) 137 mcg (0.1 %) nasal spray 1 spray (137 mcg total) by Nasal route 2 (two) times daily. 30 mL 3    cetirizine (ZYRTEC) 10 MG tablet Take 1 tablet (10 mg total) by mouth once daily. 30 tablet 0    omega-3 fatty acids/fish oil (FISH OIL-OMEGA-3 FATTY ACIDS) 300-1,000 mg capsule Take 1 capsule by mouth once daily. 90 capsule 3    topiramate (TOPAMAX) 50 MG tablet Take 2 tablets (100 mg total) by mouth every evening. 60 tablet 11     No facility-administered encounter medications on file as of 3/17/2023.         PHYSICAL EXAMINATION:    The patient generally appears in good health, is appropriately interactive, and is in no apparent distress.    Skin: No lesions.    Mental: Cooperative with normal affect.    Neuro: Grossly intact.    HEENT: Normal. No evidence of lymphadenopathy.    Chest:  normal inspiratory effort.    Extremities: No clubbing, cyanosis, or edema    Back:  healing well.  No erythema, edema or exudate.  Wound is smooth (no sutures protruding)   LABS:    UA 1.015, pH 5, 250 glucose, otherwise, negative    IMPRESSION:    Post op state    PLAN:    1.  The patient forgot the smart  today  2.  Follow up in 6 months

## 2023-03-19 VITALS
BODY MASS INDEX: 38.7 KG/M2 | HEIGHT: 62 IN | WEIGHT: 210.31 LBS | TEMPERATURE: 99 F | SYSTOLIC BLOOD PRESSURE: 122 MMHG | OXYGEN SATURATION: 96 % | HEART RATE: 88 BPM | DIASTOLIC BLOOD PRESSURE: 84 MMHG

## 2023-03-19 NOTE — PROGRESS NOTES
Subjective:       Patient ID: Maria Ines Ac is a 38 y.o. female.    Chief Complaint: Annual Exam    HPI  She is here for annual exam     Past medical history: Hypertension, hyperlipidemia, diabetes, hypothyroidism, bipolar disorder, migraine headaches, status post hysterectomy, family history of colon cancer-brother.  She had a colonoscopy December 2021      Medications: Synthroid , metformin, Toprol-XL ,Lipitor 80 mg daily, trulicity      NO KNOWN DRUG ALLERGIES    Review of Systems   Constitutional:  Negative for chills, fatigue, fever and unexpected weight change.   Respiratory:  Negative for chest tightness and shortness of breath.    Cardiovascular:  Negative for chest pain and palpitations.   Gastrointestinal:  Negative for abdominal pain and blood in stool.   Neurological:  Negative for dizziness, syncope, numbness and headaches.     Objective:      Physical Exam  HENT:      Right Ear: External ear normal.      Left Ear: External ear normal.      Nose: Nose normal.      Mouth/Throat:      Mouth: Mucous membranes are moist.      Pharynx: Oropharynx is clear.   Eyes:      Pupils: Pupils are equal, round, and reactive to light.   Cardiovascular:      Rate and Rhythm: Normal rate and regular rhythm.      Heart sounds: No murmur heard.  Pulmonary:      Breath sounds: Normal breath sounds.   Abdominal:      General: There is no distension.      Palpations: There is no hepatomegaly or splenomegaly.      Tenderness: There is no abdominal tenderness.   Musculoskeletal:      Cervical back: Normal range of motion.   Lymphadenopathy:      Cervical: No cervical adenopathy.      Upper Body:      Right upper body: No axillary adenopathy.      Left upper body: No axillary adenopathy.   Neurological:      Cranial Nerves: No cranial nerve deficit.      Sensory: No sensory deficit.      Motor: Motor function is intact.      Deep Tendon Reflexes: Reflexes are normal and symmetric.       Assessment/Plan       Assessment  and plan:  Annual exam.  Check BMP and A1c

## 2023-05-10 ENCOUNTER — OFFICE VISIT (OUTPATIENT)
Dept: UROGYNECOLOGY | Facility: CLINIC | Age: 38
End: 2023-05-10
Payer: MEDICARE

## 2023-05-10 VITALS
HEIGHT: 62 IN | SYSTOLIC BLOOD PRESSURE: 116 MMHG | BODY MASS INDEX: 38.25 KG/M2 | WEIGHT: 207.88 LBS | DIASTOLIC BLOOD PRESSURE: 90 MMHG

## 2023-05-10 DIAGNOSIS — R10.2 VAGINAL PAIN: Primary | ICD-10-CM

## 2023-05-10 DIAGNOSIS — N95.2 VAGINAL ATROPHY: ICD-10-CM

## 2023-05-10 DIAGNOSIS — M62.89 PELVIC FLOOR TENSION: ICD-10-CM

## 2023-05-10 DIAGNOSIS — N89.8 VAGINAL DISCHARGE: ICD-10-CM

## 2023-05-10 PROCEDURE — 3072F PR LOW RISK FOR RETINOPATHY: ICD-10-PCS | Mod: CPTII,S$GLB,, | Performed by: NURSE PRACTITIONER

## 2023-05-10 PROCEDURE — 3044F HG A1C LEVEL LT 7.0%: CPT | Mod: CPTII,S$GLB,, | Performed by: NURSE PRACTITIONER

## 2023-05-10 PROCEDURE — 1159F MED LIST DOCD IN RCRD: CPT | Mod: CPTII,S$GLB,, | Performed by: NURSE PRACTITIONER

## 2023-05-10 PROCEDURE — 3080F PR MOST RECENT DIASTOLIC BLOOD PRESSURE >= 90 MM HG: ICD-10-PCS | Mod: CPTII,S$GLB,, | Performed by: NURSE PRACTITIONER

## 2023-05-10 PROCEDURE — 1160F PR REVIEW ALL MEDS BY PRESCRIBER/CLIN PHARMACIST DOCUMENTED: ICD-10-PCS | Mod: CPTII,S$GLB,, | Performed by: NURSE PRACTITIONER

## 2023-05-10 PROCEDURE — 3044F PR MOST RECENT HEMOGLOBIN A1C LEVEL <7.0%: ICD-10-PCS | Mod: CPTII,S$GLB,, | Performed by: NURSE PRACTITIONER

## 2023-05-10 PROCEDURE — 1160F RVW MEDS BY RX/DR IN RCRD: CPT | Mod: CPTII,S$GLB,, | Performed by: NURSE PRACTITIONER

## 2023-05-10 PROCEDURE — 3080F DIAST BP >= 90 MM HG: CPT | Mod: CPTII,S$GLB,, | Performed by: NURSE PRACTITIONER

## 2023-05-10 PROCEDURE — 81514 NFCT DS BV&VAGINITIS DNA ALG: CPT | Performed by: NURSE PRACTITIONER

## 2023-05-10 PROCEDURE — 3008F PR BODY MASS INDEX (BMI) DOCUMENTED: ICD-10-PCS | Mod: CPTII,S$GLB,, | Performed by: NURSE PRACTITIONER

## 2023-05-10 PROCEDURE — 99999 PR PBB SHADOW E&M-EST. PATIENT-LVL V: CPT | Mod: PBBFAC,,, | Performed by: NURSE PRACTITIONER

## 2023-05-10 PROCEDURE — 3008F BODY MASS INDEX DOCD: CPT | Mod: CPTII,S$GLB,, | Performed by: NURSE PRACTITIONER

## 2023-05-10 PROCEDURE — 99213 PR OFFICE/OUTPT VISIT, EST, LEVL III, 20-29 MIN: ICD-10-PCS | Mod: S$GLB,,, | Performed by: NURSE PRACTITIONER

## 2023-05-10 PROCEDURE — 3074F SYST BP LT 130 MM HG: CPT | Mod: CPTII,S$GLB,, | Performed by: NURSE PRACTITIONER

## 2023-05-10 PROCEDURE — 3072F LOW RISK FOR RETINOPATHY: CPT | Mod: CPTII,S$GLB,, | Performed by: NURSE PRACTITIONER

## 2023-05-10 PROCEDURE — 1159F PR MEDICATION LIST DOCUMENTED IN MEDICAL RECORD: ICD-10-PCS | Mod: CPTII,S$GLB,, | Performed by: NURSE PRACTITIONER

## 2023-05-10 PROCEDURE — 3074F PR MOST RECENT SYSTOLIC BLOOD PRESSURE < 130 MM HG: ICD-10-PCS | Mod: CPTII,S$GLB,, | Performed by: NURSE PRACTITIONER

## 2023-05-10 PROCEDURE — 99213 OFFICE O/P EST LOW 20 MIN: CPT | Mod: S$GLB,,, | Performed by: NURSE PRACTITIONER

## 2023-05-10 PROCEDURE — 99999 PR PBB SHADOW E&M-EST. PATIENT-LVL V: ICD-10-PCS | Mod: PBBFAC,,, | Performed by: NURSE PRACTITIONER

## 2023-05-10 RX ORDER — ESTRADIOL 0.1 MG/G
1 CREAM VAGINAL DAILY
Qty: 42.5 G | Refills: 3 | Status: SHIPPED | OUTPATIENT
Start: 2023-05-10 | End: 2023-12-18 | Stop reason: SDUPTHER

## 2023-05-10 NOTE — PATIENT INSTRUCTIONS
Vaginal pain  --affirm today  --start pelvic floor PT with  BEATRIZ Scott: (p) [phone].    --use vaginal estrogen cream twice weekly    RTC 5 months for follow up

## 2023-05-10 NOTE — PROGRESS NOTES
Urogyn follow up  05/10/2023  .  [facility] [address] [address]  [address]  [facility] [id number]    Maria Ines Ac  [id number]  1985      Maria Ines Ac is a 38 y.o. here for a urogyn follow up.    02/28/2023--interstim replaced by Dr. Tee    Changes from last visit:  Reports interstim is working well for her.   Voiding every hour during the day- using 4 pads/ day with moderate wetness  Complains of vaginal pain x 3 months.  Taking uribel twice daily    Past Medical History:   Diagnosis Date    Blood in stool     Constipation     Cystitis     Depression     Diabetes mellitus, type 2     Dysphagia     Endometriosis     GERD (gastroesophageal reflux disease)     Headache     Hypertension     Palpitations     Premature menopause on hormone replacement therapy     Thyroid disease        Past Surgical History:   Procedure Laterality Date    24 HOUR IMPEDANCE PH MONITORING OF ESOPHAGUS IN PATIENT TAKING ACID REDUCING MEDICATIONS N/A 6/2/2022    Procedure: IMPEDANCE PH STUDY, ESOPHAGEAL, 24 HOUR, IN PATIENT TAKING ACID REDUCING MEDICATION;  Surgeon: Debbie Butler MD;  [facility]  Service: Endoscopy;  Laterality: N/A;  On PPI/H2 Blocker    BLADDER SUSPENSION      CARPAL TUNNEL RELEASE      right    CHOLECYSTECTOMY      COLONOSCOPY N/A 8/30/2016    Procedure: COLONOSCOPY;  Surgeon: Slick Herron MD;  Location: [facility] (4TH F[facility]);  Service: Endoscopy;  Laterality: N/A;  PM prep    COLONOSCOPY N/A 12/22/2021    Procedure: COLONOSCOPY. any CRS;  Surgeon: Maria Ines Jung MD;  Location: [facility] (4TH F[facility]);  Service: Endoscopy;  Laterality: N/A;  fully vaccinated -  scaral stimulator will bring remote -    12/17 lvm to confirm appt-rb    ESOPHAGEAL MANOMETRY WITH MEASUREMENT OF IMPEDANCE N/A 11/5/2018    Procedure: MANOMETRY, ESOPHAGUS, WITH IMPEDANCE MEASUREMENT;  Surgeon: Slick Herron MD;  Location: [facility] (4TH F[facility]);  Service: Endoscopy;  Laterality: N/A;     ESOPHAGEAL MANOMETRY WITH MEASUREMENT OF IMPEDANCE N/A 2022    Procedure: MANOMETRY, ESOPHAGUS, WITH IMPEDANCE MEASUREMENT;  Surgeon: Debbie Butler MD;  Location: [facility] ENDO (4TH F[facility]);  Service: Endoscopy;  Laterality: N/A;  fully vaccinated, bladder stimulator, instr mailed /emailed -ml    ESOPHAGOGASTRODUODENOSCOPY N/A 2020    Procedure: EGD (ESOPHAGOGASTRODUODENOSCOPY);  Surgeon: Slick Herron MD;  Location: [facility] ENDO (4TH F[facility]);  Service: Endoscopy;  Laterality: N/A;  pt has cardiology appt on 19-will call back after this appt to schedule-tb    FLUOROSCOPIC URODYNAMIC STUDY N/A 2019    Procedure: URODYNAMIC STUDY, FLUOROSCOPIC;  Surgeon: Zena Tee MD;  Location: [facility] OR 1ST FLR;  Service: Urology;  Laterality: N/A;  1 hour    GALLBLADDER SURGERY      HYSTERECTOMY      Bess velasquez Dr. Champlain    IMPLANTATION OF PERMANENT SACRAL NERVE STIMULATOR N/A 2019    Procedure: INSERTION, NEUROSTIMULATOR, PERMANENT, SACRAL;  Surgeon: Zena Tee MD;  Location: [facility] OR 2ND FLR;  Service: Urology;  Laterality: N/A;  30 min    OOPHORECTOMY      LSO- benign cyst    OOPHORECTOMY      RSO- endo    ooptherectomy      REPLACEMENT OF NERVE STIMULATOR BATTERY N/A 2023    Procedure: Replacement, Battery, Neurostimulator;  Surgeon: Zena Tee MD;  Location: [facility] OR 2ND FLR;  Service: Urology;  Laterality: N/A;  45 min    right knee  scoped    SHOULDER SURGERY  right       Family History   Problem Relation Age of Onset    Cervical cancer Maternal Grandmother         unknown age of onset,  at 80    Breast cancer Mother 50        alive at 64, unilateral    Esophageal cancer Mother 63    Ovarian cancer Mother 63    Anesthesia problems Mother     Colon cancer Brother 40    Breast cancer Maternal Aunt 72        alive at 72    Breast cancer Paternal Aunt         unknown age of onset, alive at 70    Vaginal cancer Neg Hx     Endometrial cancer Neg Hx     Crohn's  disease Neg Hx     Ulcerative colitis Neg Hx     Stomach cancer Neg Hx     Irritable bowel syndrome Neg Hx     Celiac disease Neg Hx     Cancer Neg Hx        Social History     Socioeconomic History    Marital status: Single   Tobacco Use    Smoking status: Never    Smokeless tobacco: Never   Substance and Sexual Activity    Alcohol use: No    Drug use: No    Sexual activity: Never     Birth control/protection: None   Social History Narrative    ** Merged History Encounter **          Social Determinants of Health     Financial Resource Strain: Low Risk     Difficulty of Paying Living Expenses: Not hard at all   Food Insecurity: No Food Insecurity    Worried About Running Out of Food in the Last Year: Never true    Ran Out of Food in the Last Year: Never true   Transportation Needs: No Transportation Needs    Lack of Transportation (Medical): No    Lack of Transportation (Non-Medical): No   Physical Activity: Sufficiently Active    Days of Exercise per Week: 3 days    Minutes of Exercise per Session: 60 min   Stress: Stress Concern Present    Feeling of Stress : Very much   Social Connections: Socially Isolated    Frequency of Communication with Friends and Family: Once a week    Frequency of Social Gatherings with Friends and Family: Once a week    Attends Christian Services: Never    Active Member of Clubs or Organizations: No    Attends Club or Organization Meetings: Never    Marital Status: Never    Housing Stability: Low Risk     Unable to Pay for Housing in the Last Year: No    Number of Places Lived in the Last Year: 1    Unstable Housing in the Last Year: No       Current Outpatient Medications   Medication Sig Dispense Refill    ACCU-CHEK AMAURY PLUS TEST STRP Strp USE TO TEST BLOOD GLUCOSE TID  9    ACCU-CHEK SOFTCLIX LANCETS Misc USE TO TEST BLOOD GLUCOSE TID  3    atorvastatin (LIPITOR) 80 MG tablet Take 80 mg by mouth every evening.      azithromycin (Z-ANUSHKA) 250 MG tablet Take by mouth.      BLOOD  SUGAR DIAGNOSTIC (ACCU-CHEK AMAURY PLUS TEST STRP MISC) 1 strip by Misc.(Non-Drug; Combo Route) route 3 (three) times daily.      buPROPion (WELLBUTRIN XL) 150 MG TB24 tablet Take 150 mg by mouth every morning.      calcium-vitamin D3 500 mg(1,250mg) -200 unit per tablet Take 1 tablet by mouth 2 (two) times daily with meals.      ciclopirox (PENLAC) 8 % Soln Apply topically nightly. 6.6 mL 11    dexlansoprazole (DEXILANT) 60 mg capsule Take 1 capsule (60 mg total) by mouth 2 (two) times a day. 60 capsule 11    diclofenac sodium (VOLTAREN) 1 % Gel Apply 2 g topically 4 (four) times daily. As needed for breast pain 1 Tube 1    dicyclomine (BENTYL) 10 MG capsule TAKE 2 CAPSULES(20 MG) BY MOUTH THREE TIMES DAILY BEFORE MEALS 180 capsule 0    diltiazem HCl (DILTIAZEM 2% CREAM) Apply topically 3 (three) times daily. Apply topically to anal area. 30 g 2    docusate sodium (COLACE) 100 MG capsule Take 1 capsule (100 mg total) by mouth 2 (two) times daily. 60 capsule 0    FARXIGA 10 mg Tab TK 1 T PO QD  7    fluticasone propionate (FLONASE) 50 mcg/actuation nasal spray SHAKE LIQUID AND USE 2 SPRAYS(100 MCG) IN EACH NOSTRIL EVERY DAY 48 g 2    gabapentin (NEURONTIN) 300 MG capsule Take 600 mg (two capsules) in the morning and 900mg (three capsules) at night before bed 150 capsule 11    GLUCOPHAGE 500 mg tablet Take 500 mg by mouth 2 (two) times daily with meals.       ibuprofen (ADVIL,MOTRIN) 800 MG tablet 800 mg every 4 (four) hours as needed.   0    levothyroxine (SYNTHROID) 75 MCG tablet Take 75 mcg by mouth before breakfast.      magnesium oxide (MAG-OX) 400 mg (241.3 mg magnesium) tablet Take 1 tablet (400 mg total) by mouth 2 (two) times daily.  0    meloxicam (MOBIC) 15 MG tablet       methen-m.blue-s.phos-phsal-hyo (URIBEL) 118-10-40.8-36 mg Cap Take 1 capsule by mouth every 6 (six) hours as needed (bladder pain). 120 capsule 11    metoprolol succinate (TOPROL-XL) 100 MG 24 hr tablet Take 1 tablet (100 mg total) by  mouth once daily. 90 tablet 3    mometasone 0.1% (ELOCON) 0.1 % cream APPLY TOPICALLY TO THE RASH ON HANDS TWICE DAILY AS NEEDED FOR TWO TO THREE WEEKS. USE SPARINGLY      MULTIVIT-IRON-MIN-FOLIC ACID 3,500-18-0.4 UNIT-MG-MG ORAL CHEW Take 1 tablet by mouth once daily.       ofloxacin (OCUFLOX) 0.3 % ophthalmic solution PLACE 1 DROP INTO BOTH EYES FOUR TIMES DAILY      omeprazole (PRILOSEC OTC) 20 MG tablet Take 20 mg by mouth once daily.      oxyCODONE (ROXICODONE) 5 MG immediate release tablet Take 1 tablet (5 mg total) by mouth every 4 (four) hours as needed for Pain. 5 tablet 0    phentermine (ADIPEX-P) 37.5 mg tablet Take 37.5 mg by mouth.      phentermine 37.5 MG capsule TK 1 C PO ONCE D IN THE MORNING      REXULTI 2 mg Tab Take 1 tablet by mouth every evening.      traZODone (DESYREL) 100 MG tablet TK 1 T PO HS  1    TRULICITY 4.5 mg/0.5 mL pen injector       ZOLOFT 100 mg tablet Take 200 mg by mouth once daily.       azelastine (ASTELIN) 137 mcg (0.1 %) nasal spray 1 spray (137 mcg total) by Nasal route 2 (two) times daily. 30 mL 3    cetirizine (ZYRTEC) 10 MG tablet Take 1 tablet (10 mg total) by mouth once daily. 30 tablet 0    estradioL (ESTRACE) 0.01 % (0.1 mg/gram) vaginal cream Place 1 g vaginally once daily. 42.5 g 3    nystatin 577381 unit vaginal suppository Place 1 suppository (100,000 Units total) vaginally nightly. This compounded medication expires in 30 days for 14 doses 14 suppository 0    omega-3 fatty acids/fish oil (FISH OIL-OMEGA-3 FATTY ACIDS) 300-1,000 mg capsule Take 1 capsule by mouth once daily. 90 capsule 3    topiramate (TOPAMAX) 50 MG tablet Take 2 tablets (100 mg total) by mouth every evening. 60 tablet 11     No current facility-administered medications for this visit.       Review of patient's allergies indicates:  No Known Allergies    Well woman:  Pap:post hysterectomy  Mammo:11/2022 normal  Colonoscopy:12/2021 normal repeat in 5 years  Dexa:n/a    ROS:  As per HPI.   "    Exam  BP (!) 116/90   Ht 5' 2" (1.575 m)   Wt 94.3 kg (207 lb 14.4 oz)   LMP 11/17/2012 (LMP Unknown) Comment: Neg UPT  BMI 38.03 kg/m²   General: alert and oriented, no acute distress  Respiratory: normal respiratory effort  Abd: soft, non-tender, non-distended    Pelvic  Ext. Genitalia: normal external genitalia. Normal bartholin's and skeens glands  Vagina: + (mild) atrophy. Normal vaginal mucosa without lesions. Thin white discharge noted.   Non-tender bladder base without palpable mass.  +TTP in bilateral levator ani  Cervix: absent  Uterus:  absent   Urethra: no masses or tenderness  Urethral meatus: no lesions, caruncle or prolapse.      Impression  1. Vaginal pain  Vaginosis Screen by DNA Probe      2. Pelvic floor tension  Ambulatory referral/consult to Physical/Occupational Therapy      3. Vaginal atrophy  estradioL (ESTRACE) 0.01 % (0.1 mg/gram) vaginal cream      4. Vaginal discharge          We reviewed the above issues and discussed options for short-term versus long-term management of her problems.   Plan:    Vaginal pain  --affirm today  --start pelvic floor PT with  BEATRIZ Scott: (p) [phone].    --use vaginal estrogen cream twice weekly    RTC 5 months for follow up        I spent a total of 20 minutes on the day of the visit.  This includes face to face time and non-face to face time preparing to see the patient (eg, review of tests), obtaining and/or reviewing separately obtained history, documenting clinical information in the electronic or other health record, independently interpreting results and communicating results to the patient/family/caregiver, or care coordinator.       Julianna Gomez, KIRIT-BC [facility]  Division of Female Pelvic Medicine and Reconstructive Surgery  Department of Obstetrics & Gynecology    "

## 2023-05-17 ENCOUNTER — PATIENT MESSAGE (OUTPATIENT)
Dept: UROGYNECOLOGY | Facility: CLINIC | Age: 38
End: 2023-05-17
Payer: MEDICARE

## 2023-05-17 DIAGNOSIS — B37.9 CANDIDA GLABRATA INFECTION: Primary | ICD-10-CM

## 2023-06-07 ENCOUNTER — OFFICE VISIT (OUTPATIENT)
Dept: PODIATRY | Facility: CLINIC | Age: 38
End: 2023-06-07
Payer: MEDICARE

## 2023-06-07 VITALS
DIASTOLIC BLOOD PRESSURE: 74 MMHG | BODY MASS INDEX: 40.05 KG/M2 | HEIGHT: 62 IN | SYSTOLIC BLOOD PRESSURE: 109 MMHG | HEART RATE: 104 BPM | WEIGHT: 217.63 LBS

## 2023-06-07 DIAGNOSIS — B35.1 ONYCHOMYCOSIS DUE TO DERMATOPHYTE: ICD-10-CM

## 2023-06-07 DIAGNOSIS — L84 CORN OR CALLUS: ICD-10-CM

## 2023-06-07 DIAGNOSIS — E11.42 DIABETIC POLYNEUROPATHY ASSOCIATED WITH TYPE 2 DIABETES MELLITUS: Primary | ICD-10-CM

## 2023-06-07 PROCEDURE — 99499 NO LOS: ICD-10-PCS | Mod: S$GLB,,, | Performed by: PODIATRIST

## 2023-06-07 PROCEDURE — 11056 PR TRIM BENIGN HYPERKERATOTIC SKIN LESION,2-4: ICD-10-PCS | Mod: Q9,59,S$GLB, | Performed by: PODIATRIST

## 2023-06-07 PROCEDURE — 11721 DEBRIDE NAIL 6 OR MORE: CPT | Mod: Q9,S$GLB,, | Performed by: PODIATRIST

## 2023-06-07 PROCEDURE — 99999 PR PBB SHADOW E&M-EST. PATIENT-LVL II: CPT | Mod: PBBFAC,,, | Performed by: PODIATRIST

## 2023-06-07 PROCEDURE — 99499 UNLISTED E&M SERVICE: CPT | Mod: S$GLB,,, | Performed by: PODIATRIST

## 2023-06-07 PROCEDURE — 11721 PR DEBRIDEMENT OF NAILS, 6 OR MORE: ICD-10-PCS | Mod: Q9,S$GLB,, | Performed by: PODIATRIST

## 2023-06-07 PROCEDURE — 11056 PARNG/CUTG B9 HYPRKR LES 2-4: CPT | Mod: Q9,59,S$GLB, | Performed by: PODIATRIST

## 2023-06-07 PROCEDURE — 99999 PR PBB SHADOW E&M-EST. PATIENT-LVL II: ICD-10-PCS | Mod: PBBFAC,,, | Performed by: PODIATRIST

## 2023-06-07 NOTE — PROGRESS NOTES
Subjective:      Patient ID: Maria Ines Ac is a 38 y.o. female.    Chief Complaint: Nail Care (DM last PCP visit on 03/14/2023 Luann Raymond MD) and Diabetic Foot Exam    Maria Ines is a 38 y.o. female who presents to the clinic for evaluation and treatment of high risk feet. Maria Ines has a past medical history of Blood in stool, Constipation, Cystitis, Depression, Diabetes mellitus, type 2, Dysphagia, Endometriosis, GERD (gastroesophageal reflux disease), Headache, Hypertension, Palpitations, Premature menopause on hormone replacement therapy, and Thyroid disease. The patient's chief complaint is long, thick toenails.   This patient has documented high risk feet requiring routine maintenance secondary to peripheral neuropathy.    PCP: Luann Raymond MD    Date Last Seen by PCP:   Chief Complaint   Patient presents with    Nail Care     DM last PCP visit on 03/14/2023 Luann Raymond MD    Diabetic Foot Exam       Current shoe gear:  Affected Foot: Tennis shoes     Unaffected Foot: Tennis shoes    Hemoglobin A1C   Date Value Ref Range Status   03/14/2023 5.3 4.0 - 5.6 % Final     Comment:     ADA Screening Guidelines:  5.7-6.4%  Consistent with prediabetes  >or=6.5%  Consistent with diabetes    High levels of fetal hemoglobin interfere with the HbA1C  assay. Heterozygous hemoglobin variants (HbS, HgC, etc)do  not significantly interfere with this assay.   However, presence of multiple variants may affect accuracy.     11/14/2022 5.3 4.0 - 5.6 % Final     Comment:     ADA Screening Guidelines:  5.7-6.4%  Consistent with prediabetes  >or=6.5%  Consistent with diabetes    High levels of fetal hemoglobin interfere with the HbA1C  assay. Heterozygous hemoglobin variants (HbS, HgC, etc)do  not significantly interfere with this assay.   However, presence of multiple variants may affect accuracy.     07/19/2022 5.4 4.0 - 5.6 % Final     Comment:     ADA Screening Guidelines:  5.7-6.4%  Consistent with  prediabetes  >or=6.5%  Consistent with diabetes    High levels of fetal hemoglobin interfere with the HbA1C  assay. Heterozygous hemoglobin variants (HbS, HgC, etc)do  not significantly interfere with this assay.   However, presence of multiple variants may affect accuracy.         Review of Systems   Constitutional: Negative for chills, fever and malaise/fatigue.   HENT:  Negative for hearing loss.    Cardiovascular:  Negative for claudication.   Respiratory:  Negative for shortness of breath.    Skin:  Positive for color change, dry skin and nail changes. Negative for flushing and rash.   Musculoskeletal:  Negative for joint pain and myalgias.   Gastrointestinal:  Negative for nausea and vomiting.   Neurological:  Positive for numbness, paresthesias and sensory change. Negative for loss of balance.   Psychiatric/Behavioral:  Negative for altered mental status.    Allergic/Immunologic: Negative for hives.         Objective:      Physical Exam  Vitals reviewed.   Cardiovascular:      Pulses:           Dorsalis pedis pulses are 2+ on the right side and 2+ on the left side.        Posterior tibial pulses are 2+ on the right side and 2+ on the left side.      Comments: No edema noted to b/L LEs  Musculoskeletal:      Comments: Adequate joint ROM noted to all lower extremity muscle groups with no pain or crepitation noted. Muscle strength is 5/5 in all groups bilaterally.     Feet:      Right foot:      Protective Sensation: 5 sites tested.  0 sites sensed.      Left foot:      Protective Sensation: 5 sites tested.  0 sites sensed.   Skin:     General: Skin is warm and dry.      Capillary Refill: Capillary refill takes 2 to 3 seconds.      Comments: Normal skin tugor noted.   No open lesion noted b/L  Skin temp is warm to warm from proximal to distal b/L.  Webspaces clean, dry, and intact  Xerosis Bilaterally. No open lesions noted.   HKL noted to left 5th digit x2     Neurological:      Mental Status: She is alert and  oriented to person, place, and time.      Comments: Intact gross sensation noted to b/L LEs       Nails x10 are elongated by  4-6mm's, thickened by 1-2 mm's, dystrophic, and are yellowish in  coloration . Xerosis Bilaterally. No open lesions noted.             Assessment:       Encounter Diagnoses   Name Primary?    Diabetic polyneuropathy associated with type 2 diabetes mellitus Yes    Onychomycosis due to dermatophyte     Corn or callus          Plan:       Maria Ines was seen today for nail care and diabetic foot exam.    Diagnoses and all orders for this visit:    Diabetic polyneuropathy associated with type 2 diabetes mellitus    Onychomycosis due to dermatophyte    Corn or callus      I counseled the patient on her conditions, their implications and medical management.      - Shoe inspection. Diabetic Foot Education. Patient reminded of the importance of good nutrition and blood sugar control to help prevent podiatric complications of diabetes. Patient instructed on proper foot hygeine. We discussed wearing proper shoe gear, daily foot inspections, never walking without protective shoe gear, never putting sharp instruments to feet, routine podiatric nail visits every 2-3 months.    After cleansing with an alcohol prep pad, the about mentioned hyperkeratotic lesions were sharply debrided X 2 utilizing a #15 blade to a smooth base without incident. Pt tolerated the procedure well and reported comfort to the debarment sites. Pt will continue to use padding and moisture the callused areas.     - With patient's permission, nails were aggressively reduced and debrided x 10 to their soft tissue attachment mechanically and with electric , removing all offending nail and debris. Patient relates relief following the procedure. She will continue to monitor the areas daily, inspect her feet, wear protective shoe gear when ambulatory, moisturizer to maintain skin integrity and follow in this office in approximately  2-3 months, sooner p.r.n.

## 2023-06-12 PROBLEM — R52 PAIN: Status: ACTIVE | Noted: 2023-06-12

## 2023-06-12 PROBLEM — N81.89 WEAKNESS OF PELVIC FLOOR: Status: ACTIVE | Noted: 2023-06-12

## 2023-06-16 NOTE — PROGRESS NOTES
Outpatient Speech Language Pathology   Peds Speech Language Treatment Note  [facility]     Patient Name: Ned Valencia  : 2019  MRN: [id number]  SLP Assessment/Plan - 23 1004          SLP SUBJECTIVE:   35 y.o. female   Presents today to discuss hormones. Patient's last menstrual period was 2012 (lmp unknown)..  She reports that she has less hot flashes on the patch. She reports hot flashes during the day have decrease significantly. She is still having night sweats. .    She was seen in high-risk breast clinic and her Tyrer Cuzick is actually less than 20%.  She should have a routine screening mammogram at age 38    Past Medical History:   Diagnosis Date    Blood in stool     Constipation     Cystitis     Depression     Diabetes mellitus, type 2     Dysphagia     Endometriosis     GERD (gastroesophageal reflux disease)     Headache     Hypertension     Palpitations     Premature menopause on hormone replacement therapy     Thyroid disease      Past Surgical History:   Procedure Laterality Date    BLADDER SUSPENSION      CARPAL TUNNEL RELEASE      right    CHOLECYSTECTOMY      COLONOSCOPY N/A 2016    Procedure: COLONOSCOPY;  Surgeon: Slick Herron MD;  [facility]  Service: Endoscopy;  Laterality: N/A;  PM prep    ESOPHAGEAL MANOMETRY WITH MEASUREMENT OF IMPEDANCE N/A 2018    Procedure: MANOMETRY, ESOPHAGUS, WITH IMPEDANCE MEASUREMENT;  Surgeon: Slick Herron MD;  Location: [facility] (4TH F[facility]);  Service: Endoscopy;  Laterality: N/A;    ESOPHAGOGASTRODUODENOSCOPY N/A 2020    Procedure: EGD (ESOPHAGOGASTRODUODENOSCOPY);  Surgeon: Slick Herron MD;  Location: [facility] ([facility] F[facility]);  Service: Endoscopy;  Laterality: N/A;  pt has cardiology appt on 19-will call back after this appt to schedule-tb    FLUOROSCOPIC URODYNAMIC STUDY N/A 2019    Procedure: URODYNAMIC STUDY, FLUOROSCOPIC;  Surgeon: Zena Tee MD;  [address]  Service: Urology;  Laterality: N/A;  1 hour    GALLBLADDER SURGERY      HYSTERECTOMY      Bess velasquez Dr. Champlain    IMPLANTATION OF PERMANENT SACRAL NERVE STIMULATOR N/A  yes -AB    Planned CPT's? SLP INDIVIDUAL SPEECH THERAPY: 62770  -AB    Plan Comments Next treatment session to focus on production /s/ blends and /l/ at word level, and /r/ at isolation.  -AB              User Key  (r) = Recorded By, (t) = Taken By, (c) = Cosigned By      Initials Name Provider Type    Georgia Rae SLP Speech and Language Pathologist                                     SLP 2019    Procedure: INSERTION, NEUROSTIMULATOR, PERMANENT, SACRAL;  Surgeon: Zena Tee MD;  Location: [facility] OR [address] [place] [address];  Service: Urology;  Laterality: N/A;  30 min    OOPHORECTOMY      LSO- benign cyst    OOPHORECTOMY      RSO- endo    ooptherectomy      right knee  scoped    SHOULDER SURGERY  right     Social History     Socioeconomic History    Marital status: Single     Spouse name: Not on file    Number of children: Not on file    Years of education: Not on file    Highest education level: Not on file   Occupational History    Not on file   Social Needs    Financial resource strain: Not on file    Food insecurity     Worry: Not on file     Inability: Not on file    Transportation needs     Medical: Not on file     Non-medical: Not on file   Tobacco Use    Smoking status: Never Smoker    Smokeless tobacco: Never Used   Substance and Sexual Activity    Alcohol use: No    Drug use: No    Sexual activity: Never     Birth control/protection: None   Lifestyle    Physical activity     Days per week: Not on file     Minutes per session: Not on file    Stress: Not on file   Relationships    Social connections     Talks on phone: Not on file     Gets together: Not on file     Attends Latter day service: Not on file     Active member of club or organization: Not on file     Attends meetings of clubs or organizations: Not on file     Relationship status: Not on file   Other Topics Concern    Not on file   Social History Narrative    ** Merged History Encounter **          Family History   Problem Relation Age of Onset    Breast cancer Mother 50        alive at 64, unilateral    Esophageal cancer Mother 63    Ovarian cancer Mother 63    Anesthesia problems Mother     Cervical cancer Maternal Grandmother         unknown age of onset,  at 80    Colon cancer Brother 40    Breast cancer Paternal Aunt         unknown age of onset, alive at 70    Breast cancer Maternal Aunt  72        alive at 72    Vaginal cancer Neg Hx     Endometrial cancer Neg Hx     Crohn's disease Neg Hx     Ulcerative colitis Neg Hx     Stomach cancer Neg Hx     Irritable bowel syndrome Neg Hx     Celiac disease Neg Hx      OB History    Para Term  AB Living   0 0 0 0 0 0   SAB TAB Ectopic Multiple Live Births   0 0 0 0             Current Outpatient Medications   Medication Sig Dispense Refill    ACCU-CHEK AMAURY PLUS TEST STRP Strp USE TO TEST BLOOD GLUCOSE TID  9    ACCU-CHEK SOFTCLIX LANCETS Misc USE TO TEST BLOOD GLUCOSE TID  3    amoxicillin-clavulanate 875-125mg (AUGMENTIN) 875-125 mg per tablet TK 1 T PO Q 12 H FOR 10 DAYS      atorvastatin (LIPITOR) 40 MG tablet Take 40 mg by mouth once daily.      BLOOD SUGAR DIAGNOSTIC (ACCU-CHEK AMAURY PLUS TEST STRP MISC) 1 strip by Misc.(Non-Drug; Combo Route) route 3 (three) times daily.      calcium-vitamin D3 500 mg(1,250mg) -200 unit per tablet Take 1 tablet by mouth 2 (two) times daily with meals.      cetirizine (ZYRTEC) 10 mg Cap       ciclopirox (PENLAC) 8 % Soln Apply topically nightly. 6.6 mL 11    diclofenac sodium (VOLTAREN) 1 % Gel Apply 2 g topically 4 (four) times daily. As needed for breast pain 1 Tube 1    dicyclomine (BENTYL) 10 MG capsule TAKE 2 CAPSULES(20 MG) BY MOUTH THREE TIMES DAILY BEFORE MEALS 180 capsule 0    docusate sodium (COLACE) 100 MG capsule Take 1 capsule (100 mg total) by mouth 2 (two) times daily. 60 capsule 0    estradioL (VIVELLE-DOT) 0.075 mg/24 hr Place 1 patch onto the skin twice a week. 8 patch 11    FANAPT 12 mg Tab TK 1 T PO BID      FARXIGA 10 mg Tab TK 1 T PO QD  7    fish oil-omega-3 fatty acids 300-1,000 mg capsule Take 2 g by mouth once daily.      fluticasone propionate (FLONASE) 50 mcg/actuation nasal spray SHAKE LIQUID AND USE 2 SPRAYS(100 MCG) IN EACH NOSTRIL EVERY DAY 48 g 2    gabapentin (NEURONTIN) 300 MG capsule Take 300 mg (1 tablet) twice during the day, then three  SLP Start Time: 1004  SLP Stop Time: 1044  SLP Time Calculation (min): 40 min    Therapy Charges for Today       Code Description Service Date Service Provider Modifiers Qty    56721361278  ST TREATMENT SPEECH 3 6/16/2023 Georgia Guthrie SLP GN 1                       JENNIFER Whiting  6/16/2023   tablets (900 mg) at night before bed. 150 capsule 11    GLUCOPHAGE 500 mg tablet Take 500 mg by mouth 2 (two) times daily with meals.       hydrOXYzine HCl (ATARAX) 10 MG Tab TK 1 T PO BID PRF ITCH  3    ibuprofen (ADVIL,MOTRIN) 800 MG tablet 800 mg every 4 (four) hours as needed.   0    levothyroxine (SYNTHROID) 75 MCG tablet       mag/aluminum/sod bicarb/alginc (GAVISCON ORAL)       magnesium oxide (MAG-OX) 400 mg (241.3 mg magnesium) tablet Take 1 tablet (400 mg total) by mouth 2 (two) times daily.  0    metoprolol succinate (TOPROL-XL) 25 MG 24 hr tablet TK 1 T PO BID      MULTIVIT-IRON-MIN-FOLIC ACID 3,500-18-0.4 UNIT-MG-MG ORAL CHEW Take 1 tablet by mouth once daily.       omeprazole (PRILOSEC OTC) 20 MG tablet Take 20 mg by mouth once daily.      OXcarbazepine (TRILEPTAL) 150 MG Tab TK 1 T PO BID      oxybutynin (DITROPAN) 5 MG Tab Take 1 tablet (5 mg total) by mouth every evening. 30 tablet 3    pantoprazole (PROTONIX) 40 MG tablet Take 1 tablet (40 mg total) by mouth once daily. 90 tablet 0    phentermine 37.5 MG capsule TK 1 C PO ONCE D IN THE MORNING      spironolactone (ALDACTONE) 25 MG tablet TK 1 T PO HS  2    topiramate (TOPAMAX) 50 MG tablet Take 2 tablets (100 mg total) by mouth every evening. 60 tablet 11    traZODone (DESYREL) 100 MG tablet TK 1 T PO HS  1    triamcinolone acetonide 0.1% (KENALOG) 0.1 % cream MIX WITH LAMISIL CREAM IN AQUAPHOR TO REACH 4OZ  AND APPLY ONCE DAILY  2    TRULICITY 1.5 mg/0.5 mL PnIj INJECT 1 PEN INTO THE SKIN Q WEEK  5    ziprasidone (GEODON) 80 MG capsule Take 80 mg by mouth 2 (two) times daily with meals.       ZOLOFT 100 mg tablet Take 200 mg by mouth once daily.        Current Facility-Administered Medications   Medication Dose Route Frequency Provider Last Rate Last Dose    onabotulinumtoxina injection 200 Units  200 Units Intramuscular Q90 Days Bello Shirley MD        onabotulinumtoxina injection 200 Units  200 Units Intramuscular Q90  Days Mena Lambert MD   200 Units at 03/05/20 0819     Allergies: Patient has no known allergies.     The ASCVD Risk score ([employer] ATIF Jr., et al., 2013) failed to calculate for the following reasons:    The 2013 ASCVD risk score is only valid for ages 40 to 79      ROS:  Constitutional: no weight loss, weight gain, fever, fatigue    Genitourinary: No blood in urine, painful urination, urgency of urination, frequency of urination, incomplete emptying, incontinence, abnormal bleeding, painful periods, heavy periods, vaginal discharge, vaginal odor, painful intercourse, sexual problems, bleeding after intercourse.  Musculoskeletal: No muscle weakness  Skin/Breast: No painful breasts, nipple discharge, masses, rash, ulcers  Neurological: No passing out, seizures, numbness, headache  Endocrine: + diabetes, hypothyroid, hyperthyroid, +hot flashes, hair loss, abnormal hair growth, acne  Psychiatric: + depression, crying,   Hematologic: No bruises, bleeding, swollen lymph nodes, anemia.      Physical Exam  deferred    ASSESSMENT:   Menopausal symptoms  Diabetes  Depression    PLAN:   Counseled patient that since she is doing well on the patch and that she is no longer at high risk for breast cancer would recommend that we increase her Vivelle-Dot dosing to 0.1 mg twice weekly- she voiced understanding

## 2023-06-21 DIAGNOSIS — E11.9 TYPE 2 DIABETES MELLITUS WITHOUT COMPLICATION, UNSPECIFIED WHETHER LONG TERM INSULIN USE: ICD-10-CM

## 2023-06-27 ENCOUNTER — PATIENT MESSAGE (OUTPATIENT)
Dept: ADMINISTRATIVE | Facility: HOSPITAL | Age: 38
End: 2023-06-27
Payer: MEDICARE

## 2023-07-12 ENCOUNTER — TELEPHONE (OUTPATIENT)
Dept: UROLOGY | Facility: CLINIC | Age: 38
End: 2023-07-12
Payer: MEDICARE

## 2023-07-12 NOTE — TELEPHONE ENCOUNTER
Message left on the patient's VM.    MIRIAN Banda      ----- Message from Shasha Gunderson sent at 7/12/2023  3:39 PM CDT -----  Regarding: Appointment Request Tomorrow  Contact: [phone]  Pt is calling to speak to someone in the office; asking to be seen tomorrow. No available appts in Epic. Pt is willing to see any provider that is available tomorrow. Please call soon. Thanks.    Communication Preference:[phone]    Additional Information:  Urinary Leakage

## 2023-07-14 ENCOUNTER — OFFICE VISIT (OUTPATIENT)
Dept: INTERNAL MEDICINE | Facility: CLINIC | Age: 38
End: 2023-07-14
Payer: MEDICARE

## 2023-07-14 DIAGNOSIS — E11.9 DIABETES MELLITUS WITHOUT COMPLICATION: ICD-10-CM

## 2023-07-14 DIAGNOSIS — I10 HYPERTENSION, UNSPECIFIED TYPE: Primary | ICD-10-CM

## 2023-07-14 PROCEDURE — 1159F MED LIST DOCD IN RCRD: CPT | Mod: HCNC,CPTII,S$GLB, | Performed by: INTERNAL MEDICINE

## 2023-07-14 PROCEDURE — 3044F PR MOST RECENT HEMOGLOBIN A1C LEVEL <7.0%: ICD-10-PCS | Mod: HCNC,CPTII,S$GLB, | Performed by: INTERNAL MEDICINE

## 2023-07-14 PROCEDURE — 3044F HG A1C LEVEL LT 7.0%: CPT | Mod: HCNC,CPTII,S$GLB, | Performed by: INTERNAL MEDICINE

## 2023-07-14 PROCEDURE — 3074F SYST BP LT 130 MM HG: CPT | Mod: HCNC,CPTII,S$GLB, | Performed by: INTERNAL MEDICINE

## 2023-07-14 PROCEDURE — 3072F LOW RISK FOR RETINOPATHY: CPT | Mod: HCNC,CPTII,S$GLB, | Performed by: INTERNAL MEDICINE

## 2023-07-14 PROCEDURE — 99214 OFFICE O/P EST MOD 30 MIN: CPT | Mod: HCNC,S$GLB,, | Performed by: INTERNAL MEDICINE

## 2023-07-14 PROCEDURE — 3078F DIAST BP <80 MM HG: CPT | Mod: HCNC,CPTII,S$GLB, | Performed by: INTERNAL MEDICINE

## 2023-07-14 PROCEDURE — 3078F PR MOST RECENT DIASTOLIC BLOOD PRESSURE < 80 MM HG: ICD-10-PCS | Mod: HCNC,CPTII,S$GLB, | Performed by: INTERNAL MEDICINE

## 2023-07-14 PROCEDURE — 99999 PR PBB SHADOW E&M-EST. PATIENT-LVL V: CPT | Mod: PBBFAC,HCNC,, | Performed by: INTERNAL MEDICINE

## 2023-07-14 PROCEDURE — 3074F PR MOST RECENT SYSTOLIC BLOOD PRESSURE < 130 MM HG: ICD-10-PCS | Mod: HCNC,CPTII,S$GLB, | Performed by: INTERNAL MEDICINE

## 2023-07-14 PROCEDURE — 3008F PR BODY MASS INDEX (BMI) DOCUMENTED: ICD-10-PCS | Mod: HCNC,CPTII,S$GLB, | Performed by: INTERNAL MEDICINE

## 2023-07-14 PROCEDURE — 3072F PR LOW RISK FOR RETINOPATHY: ICD-10-PCS | Mod: HCNC,CPTII,S$GLB, | Performed by: INTERNAL MEDICINE

## 2023-07-14 PROCEDURE — 3008F BODY MASS INDEX DOCD: CPT | Mod: HCNC,CPTII,S$GLB, | Performed by: INTERNAL MEDICINE

## 2023-07-14 PROCEDURE — 99214 PR OFFICE/OUTPT VISIT, EST, LEVL IV, 30-39 MIN: ICD-10-PCS | Mod: HCNC,S$GLB,, | Performed by: INTERNAL MEDICINE

## 2023-07-14 PROCEDURE — 1159F PR MEDICATION LIST DOCUMENTED IN MEDICAL RECORD: ICD-10-PCS | Mod: HCNC,CPTII,S$GLB, | Performed by: INTERNAL MEDICINE

## 2023-07-14 PROCEDURE — 99999 PR PBB SHADOW E&M-EST. PATIENT-LVL V: ICD-10-PCS | Mod: PBBFAC,HCNC,, | Performed by: INTERNAL MEDICINE

## 2023-07-14 RX ORDER — AMOXICILLIN 875 MG/1
875 TABLET, FILM COATED ORAL 2 TIMES DAILY
Qty: 14 TABLET | Refills: 0 | Status: SHIPPED | OUTPATIENT
Start: 2023-07-14 | End: 2023-07-21

## 2023-07-16 VITALS
SYSTOLIC BLOOD PRESSURE: 110 MMHG | DIASTOLIC BLOOD PRESSURE: 72 MMHG | HEIGHT: 62 IN | TEMPERATURE: 99 F | BODY MASS INDEX: 39.84 KG/M2 | WEIGHT: 216.5 LBS | OXYGEN SATURATION: 96 % | HEART RATE: 96 BPM

## 2023-07-16 NOTE — PROGRESS NOTES
Subjective:       Patient ID: Maria Ines Ac is a 38 y.o. female.    Chief Complaint: Hypertension    HPI  She returns for management of hypertension.  She has had hypertension for over a year.  Current treatment has included medications outlined in medication list.  She denies chest pain or shortness of breath.  No palpitations.  Denies left arm or neck pain.  She has diabetes.  Denies polyuria, polydipsia     Medications:  See medication list     Social history:  Does not smoke, does not drink alcohol       Review of Systems   Constitutional:  Negative for chills, fatigue, fever and unexpected weight change.   Respiratory:  Negative for chest tightness and shortness of breath.    Cardiovascular:  Negative for chest pain and palpitations.   Gastrointestinal:  Negative for abdominal pain and blood in stool.   Neurological:  Negative for dizziness, syncope, numbness and headaches.     Objective:      Physical Exam  HENT:      Right Ear: External ear normal.      Left Ear: External ear normal.      Nose: Nose normal.      Mouth/Throat:      Mouth: Mucous membranes are moist.      Pharynx: Oropharynx is clear.   Eyes:      Pupils: Pupils are equal, round, and reactive to light.   Cardiovascular:      Rate and Rhythm: Normal rate and regular rhythm.      Heart sounds: No murmur heard.  Pulmonary:      Breath sounds: Normal breath sounds.   Abdominal:      General: There is no distension.      Palpations: There is no hepatomegaly or splenomegaly.      Tenderness: There is no abdominal tenderness.   Musculoskeletal:      Cervical back: Normal range of motion.   Lymphadenopathy:      Cervical: No cervical adenopathy.      Upper Body:      Right upper body: No axillary adenopathy.      Left upper body: No axillary adenopathy.   Neurological:      Cranial Nerves: No cranial nerve deficit.      Sensory: No sensory deficit.      Motor: Motor function is intact.      Deep Tendon Reflexes: Reflexes are normal and  symmetric.       Assessment/Plan       Assessment and plan:  1.  Hypertension:  Controlled.  Continue Toprol.  Check CMP and lipid panel  2.  Diabetes:  Check A1c and urine microalbumin

## 2023-07-17 ENCOUNTER — LAB VISIT (OUTPATIENT)
Dept: LAB | Facility: HOSPITAL | Age: 38
End: 2023-07-17
Attending: INTERNAL MEDICINE
Payer: MEDICARE

## 2023-07-17 DIAGNOSIS — I10 HYPERTENSION, UNSPECIFIED TYPE: ICD-10-CM

## 2023-07-17 DIAGNOSIS — E11.9 DIABETES MELLITUS WITHOUT COMPLICATION: ICD-10-CM

## 2023-07-17 LAB
ALBUMIN SERPL BCP-MCNC: 3.8 G/DL (ref 3.5–5.2)
ALP SERPL-CCNC: 56 U/L (ref 55–135)
ALT SERPL W/O P-5'-P-CCNC: 49 U/L (ref 10–44)
ANION GAP SERPL CALC-SCNC: 11 MMOL/L (ref 8–16)
AST SERPL-CCNC: 58 U/L (ref 10–40)
BILIRUB SERPL-MCNC: 0.3 MG/DL (ref 0.1–1)
BUN SERPL-MCNC: 7 MG/DL (ref 6–20)
CALCIUM SERPL-MCNC: 9.4 MG/DL (ref 8.7–10.5)
CHLORIDE SERPL-SCNC: 108 MMOL/L (ref 95–110)
CHOLEST SERPL-MCNC: 181 MG/DL (ref 120–199)
CHOLEST/HDLC SERPL: 4.6 {RATIO} (ref 2–5)
CO2 SERPL-SCNC: 24 MMOL/L (ref 23–29)
CREAT SERPL-MCNC: 0.9 MG/DL (ref 0.5–1.4)
EST. GFR  (NO RACE VARIABLE): >60 ML/MIN/1.73 M^2
ESTIMATED AVG GLUCOSE: 100 MG/DL (ref 68–131)
GLUCOSE SERPL-MCNC: 105 MG/DL (ref 70–110)
HBA1C MFR BLD: 5.1 % (ref 4–5.6)
HDLC SERPL-MCNC: 39 MG/DL (ref 40–75)
HDLC SERPL: 21.5 % (ref 20–50)
LDLC SERPL CALC-MCNC: 90.2 MG/DL (ref 63–159)
NONHDLC SERPL-MCNC: 142 MG/DL
POTASSIUM SERPL-SCNC: 3.9 MMOL/L (ref 3.5–5.1)
PROT SERPL-MCNC: 7.2 G/DL (ref 6–8.4)
SODIUM SERPL-SCNC: 143 MMOL/L (ref 136–145)
TRIGL SERPL-MCNC: 259 MG/DL (ref 30–150)

## 2023-07-17 PROCEDURE — 83036 HEMOGLOBIN GLYCOSYLATED A1C: CPT | Mod: HCNC | Performed by: INTERNAL MEDICINE

## 2023-07-17 PROCEDURE — 80053 COMPREHEN METABOLIC PANEL: CPT | Mod: HCNC | Performed by: INTERNAL MEDICINE

## 2023-07-17 PROCEDURE — 36415 COLL VENOUS BLD VENIPUNCTURE: CPT | Mod: HCNC | Performed by: INTERNAL MEDICINE

## 2023-07-17 PROCEDURE — 80061 LIPID PANEL: CPT | Mod: HCNC | Performed by: INTERNAL MEDICINE

## 2023-07-19 ENCOUNTER — TELEPHONE (OUTPATIENT)
Dept: UROLOGY | Facility: CLINIC | Age: 38
End: 2023-07-19
Payer: MEDICARE

## 2023-07-19 NOTE — TELEPHONE ENCOUNTER
Appt scheduled. Instructed to reach out to Dr. Tee's office to discuss interstim.    ----- Message from Jaciel Foy sent at 7/19/2023  3:10 PM CDT -----  Regarding: appt  Contact: [phone]  Pt is calling to schedule an appt with the provider and would like to speak with the nurse. Please call

## 2023-07-25 ENCOUNTER — OFFICE VISIT (OUTPATIENT)
Dept: OPTOMETRY | Facility: CLINIC | Age: 38
End: 2023-07-25
Payer: MEDICARE

## 2023-07-25 DIAGNOSIS — H10.13 CONJUNCTIVITIS, ALLERGIC, BILATERAL: ICD-10-CM

## 2023-07-25 DIAGNOSIS — E11.9 TYPE 2 DIABETES MELLITUS WITHOUT RETINOPATHY: ICD-10-CM

## 2023-07-25 DIAGNOSIS — H04.123 DRY EYE SYNDROME OF BOTH EYES: ICD-10-CM

## 2023-07-25 DIAGNOSIS — H52.7 REFRACTIVE ERROR: Primary | ICD-10-CM

## 2023-07-25 DIAGNOSIS — E11.59 HYPERTENSION ASSOCIATED WITH DIABETES: ICD-10-CM

## 2023-07-25 DIAGNOSIS — F25.1 SCHIZOAFFECTIVE DISORDER, DEPRESSIVE TYPE: ICD-10-CM

## 2023-07-25 DIAGNOSIS — I15.2 HYPERTENSION ASSOCIATED WITH DIABETES: ICD-10-CM

## 2023-07-25 DIAGNOSIS — E11.9 DIABETES MELLITUS WITHOUT COMPLICATION: ICD-10-CM

## 2023-07-25 PROCEDURE — 3061F NEG MICROALBUMINURIA REV: CPT | Mod: HCNC,CPTII,S$GLB, | Performed by: OPTOMETRIST

## 2023-07-25 PROCEDURE — 3061F PR NEG MICROALBUMINURIA RESULT DOCUMENTED/REVIEW: ICD-10-PCS | Mod: HCNC,CPTII,S$GLB, | Performed by: OPTOMETRIST

## 2023-07-25 PROCEDURE — 92015 PR REFRACTION: ICD-10-PCS | Mod: HCNC,S$GLB,, | Performed by: OPTOMETRIST

## 2023-07-25 PROCEDURE — 3066F NEPHROPATHY DOC TX: CPT | Mod: HCNC,CPTII,S$GLB, | Performed by: OPTOMETRIST

## 2023-07-25 PROCEDURE — 92015 DETERMINE REFRACTIVE STATE: CPT | Mod: HCNC,S$GLB,, | Performed by: OPTOMETRIST

## 2023-07-25 PROCEDURE — 1159F MED LIST DOCD IN RCRD: CPT | Mod: HCNC,CPTII,S$GLB, | Performed by: OPTOMETRIST

## 2023-07-25 PROCEDURE — 1159F PR MEDICATION LIST DOCUMENTED IN MEDICAL RECORD: ICD-10-PCS | Mod: HCNC,CPTII,S$GLB, | Performed by: OPTOMETRIST

## 2023-07-25 PROCEDURE — 3044F PR MOST RECENT HEMOGLOBIN A1C LEVEL <7.0%: ICD-10-PCS | Mod: HCNC,CPTII,S$GLB, | Performed by: OPTOMETRIST

## 2023-07-25 PROCEDURE — 92014 PR EYE EXAM, EST PATIENT,COMPREHESV: ICD-10-PCS | Mod: HCNC,S$GLB,, | Performed by: OPTOMETRIST

## 2023-07-25 PROCEDURE — 3066F PR DOCUMENTATION OF TREATMENT FOR NEPHROPATHY: ICD-10-PCS | Mod: HCNC,CPTII,S$GLB, | Performed by: OPTOMETRIST

## 2023-07-25 PROCEDURE — 2023F PR DILATED RETINAL EXAM W/O EVID OF RETINOPATHY: ICD-10-PCS | Mod: HCNC,CPTII,S$GLB, | Performed by: OPTOMETRIST

## 2023-07-25 PROCEDURE — 3044F HG A1C LEVEL LT 7.0%: CPT | Mod: HCNC,CPTII,S$GLB, | Performed by: OPTOMETRIST

## 2023-07-25 PROCEDURE — 99999 PR PBB SHADOW E&M-EST. PATIENT-LVL III: CPT | Mod: PBBFAC,HCNC,, | Performed by: OPTOMETRIST

## 2023-07-25 PROCEDURE — 2023F DILAT RTA XM W/O RTNOPTHY: CPT | Mod: HCNC,CPTII,S$GLB, | Performed by: OPTOMETRIST

## 2023-07-25 PROCEDURE — 92014 COMPRE OPH EXAM EST PT 1/>: CPT | Mod: HCNC,S$GLB,, | Performed by: OPTOMETRIST

## 2023-07-25 PROCEDURE — 99999 PR PBB SHADOW E&M-EST. PATIENT-LVL III: ICD-10-PCS | Mod: PBBFAC,HCNC,, | Performed by: OPTOMETRIST

## 2023-07-25 NOTE — PROGRESS NOTES
HPI    DLS: 4/27/22    No eyedrops  No eye surgery     Pt here for diabetic eye exam. Pt states flashes and floaters OU x 1 year.    Pt states eye pain OU (8 on scale) x 2 years. Pt states itching, tearing   and burning OU x 2 weeks.  Pt states she has been getting headaches x 2   years.     Hemoglobin A1C       Date                     Value               Ref Range             Status                07/17/2023               5.1                 4.0 - 5.6 %           Final                         03/14/2023               5.3                 4.0 - 5.6 %           Final                       11/14/2022               5.3                 4.0 - 5.6 %           Final                    Last edited by Drea Eastman MA on 7/25/2023  2:44 PM.            Assessment /Plan     For exam results, see Encounter Report.    Refractive error   Rx specs    Diabetes mellitus without complication  Type 2 diabetes mellitus without retinopathy  Hypertension associated with diabetes   No retinopathy, monitor yearly    Schizoaffective disorder, depressive type    Dry eye syndrome of both eyes   Start IVIZIA BID    Conjunctivitis, allergic, bilateral  Start Pataday QHS             RTC 1 year

## 2023-08-01 ENCOUNTER — TELEPHONE (OUTPATIENT)
Dept: INTERNAL MEDICINE | Facility: CLINIC | Age: 38
End: 2023-08-01
Payer: MEDICARE

## 2023-08-01 ENCOUNTER — OFFICE VISIT (OUTPATIENT)
Dept: INTERNAL MEDICINE | Facility: CLINIC | Age: 38
End: 2023-08-01
Payer: MEDICARE

## 2023-08-01 VITALS
OXYGEN SATURATION: 95 % | DIASTOLIC BLOOD PRESSURE: 85 MMHG | HEIGHT: 62 IN | HEART RATE: 85 BPM | WEIGHT: 216.94 LBS | BODY MASS INDEX: 39.92 KG/M2 | SYSTOLIC BLOOD PRESSURE: 110 MMHG

## 2023-08-01 DIAGNOSIS — J06.9 URI WITH COUGH AND CONGESTION: Primary | ICD-10-CM

## 2023-08-01 PROCEDURE — 1160F PR REVIEW ALL MEDS BY PRESCRIBER/CLIN PHARMACIST DOCUMENTED: ICD-10-PCS | Mod: HCNC,CPTII,S$GLB, | Performed by: INTERNAL MEDICINE

## 2023-08-01 PROCEDURE — 3072F LOW RISK FOR RETINOPATHY: CPT | Mod: HCNC,CPTII,S$GLB, | Performed by: INTERNAL MEDICINE

## 2023-08-01 PROCEDURE — 3061F NEG MICROALBUMINURIA REV: CPT | Mod: HCNC,CPTII,S$GLB, | Performed by: INTERNAL MEDICINE

## 2023-08-01 PROCEDURE — 99999 PR PBB SHADOW E&M-EST. PATIENT-LVL V: ICD-10-PCS | Mod: PBBFAC,HCNC,, | Performed by: INTERNAL MEDICINE

## 2023-08-01 PROCEDURE — 3079F PR MOST RECENT DIASTOLIC BLOOD PRESSURE 80-89 MM HG: ICD-10-PCS | Mod: HCNC,CPTII,S$GLB, | Performed by: INTERNAL MEDICINE

## 2023-08-01 PROCEDURE — 3044F HG A1C LEVEL LT 7.0%: CPT | Mod: HCNC,CPTII,S$GLB, | Performed by: INTERNAL MEDICINE

## 2023-08-01 PROCEDURE — 99999 PR PBB SHADOW E&M-EST. PATIENT-LVL V: CPT | Mod: PBBFAC,HCNC,, | Performed by: INTERNAL MEDICINE

## 2023-08-01 PROCEDURE — 3044F PR MOST RECENT HEMOGLOBIN A1C LEVEL <7.0%: ICD-10-PCS | Mod: HCNC,CPTII,S$GLB, | Performed by: INTERNAL MEDICINE

## 2023-08-01 PROCEDURE — 3066F PR DOCUMENTATION OF TREATMENT FOR NEPHROPATHY: ICD-10-PCS | Mod: HCNC,CPTII,S$GLB, | Performed by: INTERNAL MEDICINE

## 2023-08-01 PROCEDURE — 1160F RVW MEDS BY RX/DR IN RCRD: CPT | Mod: HCNC,CPTII,S$GLB, | Performed by: INTERNAL MEDICINE

## 2023-08-01 PROCEDURE — 3066F NEPHROPATHY DOC TX: CPT | Mod: HCNC,CPTII,S$GLB, | Performed by: INTERNAL MEDICINE

## 2023-08-01 PROCEDURE — 99213 OFFICE O/P EST LOW 20 MIN: CPT | Mod: HCNC,S$GLB,, | Performed by: INTERNAL MEDICINE

## 2023-08-01 PROCEDURE — 3072F PR LOW RISK FOR RETINOPATHY: ICD-10-PCS | Mod: HCNC,CPTII,S$GLB, | Performed by: INTERNAL MEDICINE

## 2023-08-01 PROCEDURE — 3074F SYST BP LT 130 MM HG: CPT | Mod: HCNC,CPTII,S$GLB, | Performed by: INTERNAL MEDICINE

## 2023-08-01 PROCEDURE — 3074F PR MOST RECENT SYSTOLIC BLOOD PRESSURE < 130 MM HG: ICD-10-PCS | Mod: HCNC,CPTII,S$GLB, | Performed by: INTERNAL MEDICINE

## 2023-08-01 PROCEDURE — 3061F PR NEG MICROALBUMINURIA RESULT DOCUMENTED/REVIEW: ICD-10-PCS | Mod: HCNC,CPTII,S$GLB, | Performed by: INTERNAL MEDICINE

## 2023-08-01 PROCEDURE — 3008F PR BODY MASS INDEX (BMI) DOCUMENTED: ICD-10-PCS | Mod: HCNC,CPTII,S$GLB, | Performed by: INTERNAL MEDICINE

## 2023-08-01 PROCEDURE — 3079F DIAST BP 80-89 MM HG: CPT | Mod: HCNC,CPTII,S$GLB, | Performed by: INTERNAL MEDICINE

## 2023-08-01 PROCEDURE — 1159F MED LIST DOCD IN RCRD: CPT | Mod: HCNC,CPTII,S$GLB, | Performed by: INTERNAL MEDICINE

## 2023-08-01 PROCEDURE — 3008F BODY MASS INDEX DOCD: CPT | Mod: HCNC,CPTII,S$GLB, | Performed by: INTERNAL MEDICINE

## 2023-08-01 PROCEDURE — 1159F PR MEDICATION LIST DOCUMENTED IN MEDICAL RECORD: ICD-10-PCS | Mod: HCNC,CPTII,S$GLB, | Performed by: INTERNAL MEDICINE

## 2023-08-01 PROCEDURE — 99213 PR OFFICE/OUTPT VISIT, EST, LEVL III, 20-29 MIN: ICD-10-PCS | Mod: HCNC,S$GLB,, | Performed by: INTERNAL MEDICINE

## 2023-08-01 RX ORDER — INFLUENZA VIRUS VACCINE 15; 15; 15; 15 UG/.5ML; UG/.5ML; UG/.5ML; UG/.5ML
SUSPENSION INTRAMUSCULAR
COMMUNITY
Start: 2023-02-14 | End: 2023-11-21 | Stop reason: ALTCHOICE

## 2023-08-01 RX ORDER — CHLORHEXIDINE GLUCONATE ORAL RINSE 1.2 MG/ML
15 SOLUTION DENTAL DAILY
COMMUNITY
Start: 2023-07-05 | End: 2024-03-15

## 2023-08-01 RX ORDER — BREXPIPRAZOLE 4 MG/1
1 TABLET ORAL NIGHTLY
COMMUNITY
Start: 2023-07-12

## 2023-08-01 RX ORDER — SPIRONOLACTONE 25 MG/1
TABLET ORAL
COMMUNITY
Start: 2023-02-11 | End: 2024-02-06

## 2023-08-01 NOTE — TELEPHONE ENCOUNTER
----- Message from Kiara Marsh sent at 8/1/2023  1:49 PM CDT -----  Contact: [phone]  [email or web address]     Patient is calling for Medical Advice regarding:Ear Pain and Sore Throat    How long has patient had these symptoms:07/14/2023    Pharmacy name and phone#:  [id number] - [place] [address] AT [facility] & [address]  [place] [address]  Phone: [phone] Fax: [phone]            Would like response via [employer]:  call     Comments:

## 2023-08-01 NOTE — PATIENT INSTRUCTIONS
Take mucinex D by mouth 2x/day for 10 days.  Increase Flonase 1 spray 2x/day for 10 days.  Continue throat lozenges as needed for pain.

## 2023-08-01 NOTE — PROGRESS NOTES
Subjective:       Patient ID: Maria Ines Ac is a 38 y.o. female.    Chief Complaint: Pain (Ears ) and Sore Throat      Maria Ines Ac is 38 y.o. female who presents for sore throat 8/10 and ear pain 8/10 X 2 weeks.  No treatment.  Eating make sore throat worse.  Pt takes zyrtec 10 mg po daily and flonase 1 spray per nostril daily for allergic rhinitis.      Review of Systems   Constitutional:  Negative for chills and fever.   HENT:  Positive for postnasal drip, rhinorrhea, sinus pressure/congestion and sore throat.    Respiratory:  Positive for cough (productive yellow sputum). Negative for shortness of breath.    Gastrointestinal:  Positive for diarrhea (4x/day for 2 weeks). Negative for abdominal pain, constipation, nausea and vomiting.         Objective:      Physical Exam  Vitals reviewed.   Constitutional:       General: She is awake.      Appearance: Normal appearance.   HENT:      Head: Normocephalic and atraumatic.      Right Ear: Tympanic membrane, ear canal and external ear normal.      Left Ear: Tympanic membrane, ear canal and external ear normal.      Nose:      Right Turbinates: Swollen.      Left Turbinates: Swollen.      Right Sinus: No maxillary sinus tenderness or frontal sinus tenderness.      Left Sinus: No maxillary sinus tenderness or frontal sinus tenderness.      Mouth/Throat:      Mouth: Mucous membranes are moist.      Pharynx: Oropharynx is clear. Posterior oropharyngeal erythema present. No oropharyngeal exudate.      Tonsils: No tonsillar exudate.   Eyes:      Extraocular Movements: Extraocular movements intact.      Conjunctiva/sclera: Conjunctivae normal.      Pupils: Pupils are equal, round, and reactive to light.   Cardiovascular:      Rate and Rhythm: Normal rate and regular rhythm.      Heart sounds: No murmur heard.     No friction rub. No gallop.   Pulmonary:      Effort: Pulmonary effort is normal.      Breath sounds: Normal breath sounds.   Abdominal:       General: Bowel sounds are normal. There is no distension.      Tenderness: There is no abdominal tenderness. There is no guarding or rebound.   Musculoskeletal:      Right lower leg: No edema.      Left lower leg: No edema.   Lymphadenopathy:      Head:      Right side of head: No submental, submandibular or tonsillar adenopathy.      Left side of head: No submental, submandibular or tonsillar adenopathy.      Cervical: No cervical adenopathy (but TTP).   Neurological:      Mental Status: She is alert and oriented to person, place, and time.   Psychiatric:         Mood and Affect: Mood normal.         Behavior: Behavior is cooperative.         Assessment:       1. URI with cough and congestion        Plan:     Pt with URI most likely viral in etiology.  Rapid strep and COVID 19 negative.  Take mucinex D by mouth 2x/day for 10 days.  Increase Flonase 1 spray 2x/day for 10 days.  Continue throat lozenges as needed for pain.  Patient was advised to follow up if symptom are not improving or worsened.    Maria Ines was seen today for pain and sore throat.    Diagnoses and all orders for this visit:    URI with cough and congestion  -     POCT COVID-19 Rapid Screening  -     POCT Rapid Strep A

## 2023-08-07 PROBLEM — Z74.09 IMPAIRED FUNCTIONAL MOBILITY, BALANCE, GAIT, AND ENDURANCE: Status: ACTIVE | Noted: 2023-08-07

## 2023-08-07 PROBLEM — M25.671 DECREASED RANGE OF MOTION OF RIGHT ANKLE: Status: ACTIVE | Noted: 2023-08-07

## 2023-08-07 PROBLEM — R29.898 DECREASED STRENGTH OF LOWER EXTREMITY: Status: ACTIVE | Noted: 2023-08-07

## 2023-08-28 PROBLEM — R52 PAIN: Status: RESOLVED | Noted: 2023-06-12 | Resolved: 2023-08-28

## 2023-08-28 PROBLEM — N81.89 WEAKNESS OF PELVIC FLOOR: Status: RESOLVED | Noted: 2023-06-12 | Resolved: 2023-08-28

## 2023-08-29 ENCOUNTER — LAB VISIT (OUTPATIENT)
Dept: LAB | Facility: OTHER | Age: 38
End: 2023-08-29
Attending: INTERNAL MEDICINE
Payer: MEDICARE

## 2023-08-29 ENCOUNTER — OFFICE VISIT (OUTPATIENT)
Dept: UROGYNECOLOGY | Facility: CLINIC | Age: 38
End: 2023-08-29
Payer: MEDICARE

## 2023-08-29 VITALS
BODY MASS INDEX: 40.29 KG/M2 | DIASTOLIC BLOOD PRESSURE: 80 MMHG | WEIGHT: 218.94 LBS | SYSTOLIC BLOOD PRESSURE: 120 MMHG | HEIGHT: 62 IN

## 2023-08-29 DIAGNOSIS — R10.2 VAGINAL PAIN: ICD-10-CM

## 2023-08-29 DIAGNOSIS — M62.89 PELVIC FLOOR TENSION: ICD-10-CM

## 2023-08-29 DIAGNOSIS — N95.2 VAGINAL ATROPHY: ICD-10-CM

## 2023-08-29 DIAGNOSIS — N95.2 VAGINAL ATROPHY: Primary | ICD-10-CM

## 2023-08-29 LAB
ESTRADIOL SERPL-MCNC: 10 PG/ML
FSH SERPL-ACNC: 30.53 MIU/ML
LH SERPL-ACNC: 16.1 MIU/ML

## 2023-08-29 PROCEDURE — 99999 PR PBB SHADOW E&M-EST. PATIENT-LVL IV: CPT | Mod: PBBFAC,HCNC,, | Performed by: NURSE PRACTITIONER

## 2023-08-29 PROCEDURE — 99999 PR PBB SHADOW E&M-EST. PATIENT-LVL IV: ICD-10-PCS | Mod: PBBFAC,HCNC,, | Performed by: NURSE PRACTITIONER

## 2023-08-29 PROCEDURE — 3066F NEPHROPATHY DOC TX: CPT | Mod: HCNC,CPTII,S$GLB, | Performed by: NURSE PRACTITIONER

## 2023-08-29 PROCEDURE — 1160F PR REVIEW ALL MEDS BY PRESCRIBER/CLIN PHARMACIST DOCUMENTED: ICD-10-PCS | Mod: HCNC,CPTII,S$GLB, | Performed by: NURSE PRACTITIONER

## 2023-08-29 PROCEDURE — 99213 PR OFFICE/OUTPT VISIT, EST, LEVL III, 20-29 MIN: ICD-10-PCS | Mod: HCNC,S$GLB,, | Performed by: NURSE PRACTITIONER

## 2023-08-29 PROCEDURE — 3044F HG A1C LEVEL LT 7.0%: CPT | Mod: HCNC,CPTII,S$GLB, | Performed by: NURSE PRACTITIONER

## 2023-08-29 PROCEDURE — 3074F PR MOST RECENT SYSTOLIC BLOOD PRESSURE < 130 MM HG: ICD-10-PCS | Mod: HCNC,CPTII,S$GLB, | Performed by: NURSE PRACTITIONER

## 2023-08-29 PROCEDURE — 3079F DIAST BP 80-89 MM HG: CPT | Mod: HCNC,CPTII,S$GLB, | Performed by: NURSE PRACTITIONER

## 2023-08-29 PROCEDURE — 83002 ASSAY OF GONADOTROPIN (LH): CPT | Mod: HCNC | Performed by: NURSE PRACTITIONER

## 2023-08-29 PROCEDURE — 3008F BODY MASS INDEX DOCD: CPT | Mod: HCNC,CPTII,S$GLB, | Performed by: NURSE PRACTITIONER

## 2023-08-29 PROCEDURE — 99213 OFFICE O/P EST LOW 20 MIN: CPT | Mod: HCNC,S$GLB,, | Performed by: NURSE PRACTITIONER

## 2023-08-29 PROCEDURE — 3074F SYST BP LT 130 MM HG: CPT | Mod: HCNC,CPTII,S$GLB, | Performed by: NURSE PRACTITIONER

## 2023-08-29 PROCEDURE — 3008F PR BODY MASS INDEX (BMI) DOCUMENTED: ICD-10-PCS | Mod: HCNC,CPTII,S$GLB, | Performed by: NURSE PRACTITIONER

## 2023-08-29 PROCEDURE — 3066F PR DOCUMENTATION OF TREATMENT FOR NEPHROPATHY: ICD-10-PCS | Mod: HCNC,CPTII,S$GLB, | Performed by: NURSE PRACTITIONER

## 2023-08-29 PROCEDURE — 36415 COLL VENOUS BLD VENIPUNCTURE: CPT | Mod: HCNC | Performed by: NURSE PRACTITIONER

## 2023-08-29 PROCEDURE — 1160F RVW MEDS BY RX/DR IN RCRD: CPT | Mod: HCNC,CPTII,S$GLB, | Performed by: NURSE PRACTITIONER

## 2023-08-29 PROCEDURE — 3044F PR MOST RECENT HEMOGLOBIN A1C LEVEL <7.0%: ICD-10-PCS | Mod: HCNC,CPTII,S$GLB, | Performed by: NURSE PRACTITIONER

## 2023-08-29 PROCEDURE — 1159F PR MEDICATION LIST DOCUMENTED IN MEDICAL RECORD: ICD-10-PCS | Mod: HCNC,CPTII,S$GLB, | Performed by: NURSE PRACTITIONER

## 2023-08-29 PROCEDURE — 82670 ASSAY OF TOTAL ESTRADIOL: CPT | Mod: HCNC | Performed by: NURSE PRACTITIONER

## 2023-08-29 PROCEDURE — 3061F NEG MICROALBUMINURIA REV: CPT | Mod: HCNC,CPTII,S$GLB, | Performed by: NURSE PRACTITIONER

## 2023-08-29 PROCEDURE — 83001 ASSAY OF GONADOTROPIN (FSH): CPT | Mod: HCNC | Performed by: NURSE PRACTITIONER

## 2023-08-29 PROCEDURE — 3079F PR MOST RECENT DIASTOLIC BLOOD PRESSURE 80-89 MM HG: ICD-10-PCS | Mod: HCNC,CPTII,S$GLB, | Performed by: NURSE PRACTITIONER

## 2023-08-29 PROCEDURE — 3061F PR NEG MICROALBUMINURIA RESULT DOCUMENTED/REVIEW: ICD-10-PCS | Mod: HCNC,CPTII,S$GLB, | Performed by: NURSE PRACTITIONER

## 2023-08-29 PROCEDURE — 1159F MED LIST DOCD IN RCRD: CPT | Mod: HCNC,CPTII,S$GLB, | Performed by: NURSE PRACTITIONER

## 2023-08-29 NOTE — PROGRESS NOTES
Urogyn follow up  08/29/2023  .  [facility]  [address] [id number]    Maria Ines Ac  [id number]  1985      Maria Ines Ac is a 38 y.o. here for a urogyn follow up for vaginal pressure.    02/28/2023--interstim replaced by Dr. Tee    05/10/2023  Reports interstim is working well for her.   Voiding every hour during the day- using 4 pads/ day with moderate wetness  Complains of vaginal pain x 3 months.  Taking uribel twice daily    Changes since last visit:  Complains of vaginal pressure-- states she was told by PT that she had prolapse    Past Medical History:   Diagnosis Date    Blood in stool     Constipation     Cystitis     Depression     Diabetes mellitus, type 2     Dysphagia     Endometriosis     GERD (gastroesophageal reflux disease)     Headache     Hypertension     Palpitations     Premature menopause on hormone replacement therapy     Thyroid disease        Past Surgical History:   Procedure Laterality Date    24 HOUR IMPEDANCE PH MONITORING OF ESOPHAGUS IN PATIENT TAKING ACID REDUCING MEDICATIONS N/A 6/2/2022    Procedure: IMPEDANCE PH STUDY, ESOPHAGEAL, 24 HOUR, IN PATIENT TAKING ACID REDUCING MEDICATION;  Surgeon: Debbie Butler MD;  Location: [facility] ([address] [place] [address]);  Service: Endoscopy;  Laterality: N/A;  On PPI/H2 Blocker    BLADDER SUSPENSION      CARPAL TUNNEL RELEASE      right    CHOLECYSTECTOMY      COLONOSCOPY N/A 8/30/2016    Procedure: COLONOSCOPY;  Surgeon: Slick Herron MD;  Location: [address]);  Service: Endoscopy;  Laterality: N/A;  PM prep    COLONOSCOPY N/A 12/22/2021    Procedure: COLONOSCOPY. any CRS;  Surgeon: Maria Ines Jung MD;  Location: [facility] ([address] [place] [address]);  Service: Endoscopy;  Laterality: N/A;  fully vaccinated -  scaral stimulator will bring remote -    12/17 lvm to confirm appt-rb    ESOPHAGEAL MANOMETRY WITH MEASUREMENT OF IMPEDANCE N/A 11/5/2018    Procedure: MANOMETRY, ESOPHAGUS, WITH IMPEDANCE  MEASUREMENT;  Surgeon: Slick Herron MD;  Location: [facility] ENDO (4TH F[facility]);  Service: Endoscopy;  Laterality: N/A;    ESOPHAGEAL MANOMETRY WITH MEASUREMENT OF IMPEDANCE N/A 2022    Procedure: MANOMETRY, ESOPHAGUS, WITH IMPEDANCE MEASUREMENT;  Surgeon: Debbie Butler MD;  Location: [facility] ENDO (4TH F[facility]);  Service: Endoscopy;  Laterality: N/A;  fully vaccinated, bladder stimulator, instr mailed /emailed -ml    ESOPHAGOGASTRODUODENOSCOPY N/A 2020    Procedure: EGD (ESOPHAGOGASTRODUODENOSCOPY);  Surgeon: Slick Herron MD;  Location: [facility] ENDO (4TH F[facility]);  Service: Endoscopy;  Laterality: N/A;  pt has cardiology appt on 19-will call back after this appt to schedule-tb    FLUOROSCOPIC URODYNAMIC STUDY N/A 2019    Procedure: URODYNAMIC STUDY, FLUOROSCOPIC;  Surgeon: Zena Tee MD;  Location: [facility] OR 1ST FLR;  Service: Urology;  Laterality: N/A;  1 hour    GALLBLADDER SURGERY      HYSTERECTOMY      Bess velasquez Dr. Champlain    IMPLANTATION OF PERMANENT SACRAL NERVE STIMULATOR N/A 2019    Procedure: INSERTION, NEUROSTIMULATOR, PERMANENT, SACRAL;  Surgeon: Zena Tee MD;  Location: [facility] OR 2ND FLR;  Service: Urology;  Laterality: N/A;  30 min    OOPHORECTOMY      LSO- benign cyst    OOPHORECTOMY      RSO- endo    ooptherectomy      REPLACEMENT OF NERVE STIMULATOR BATTERY N/A 2023    Procedure: Replacement, Battery, Neurostimulator;  Surgeon: Zena Tee MD;  Location: [facility] OR 2ND FLR;  Service: Urology;  Laterality: N/A;  45 min    right knee  scoped    SHOULDER SURGERY  right       Family History   Problem Relation Age of Onset    Cervical cancer Maternal Grandmother         unknown age of onset,  at 80    Breast cancer Mother 50        alive at 64, unilateral    Esophageal cancer Mother 63    Ovarian cancer Mother 63    Anesthesia problems Mother     Colon cancer Brother 40    Breast cancer Maternal Aunt 72        alive at 72    Breast cancer Paternal  Aunt         unknown age of onset, alive at 70    Vaginal cancer Neg Hx     Endometrial cancer Neg Hx     Crohn's disease Neg Hx     Ulcerative colitis Neg Hx     Stomach cancer Neg Hx     Irritable bowel syndrome Neg Hx     Celiac disease Neg Hx     Cancer Neg Hx        Social History     Socioeconomic History    Marital status: Single   Tobacco Use    Smoking status: Never    Smokeless tobacco: Never   Substance and Sexual Activity    Alcohol use: No    Drug use: No    Sexual activity: Never     Birth control/protection: None   Social History Narrative    ** Merged History Encounter **          Social Determinants of Health     Financial Resource Strain: Low Risk  (2/14/2023)    Overall Financial Resource Strain (CARDIA)     Difficulty of Paying Living Expenses: Not hard at all   Food Insecurity: No Food Insecurity (2/14/2023)    Hunger Vital Sign     Worried About Running Out of Food in the Last Year: Never true     Ran Out of Food in the Last Year: Never true   Transportation Needs: No Transportation Needs (2/14/2023)    PRAPARE - Transportation     Lack of Transportation (Medical): No     Lack of Transportation (Non-Medical): No   Physical Activity: Sufficiently Active (2/14/2023)    Exercise Vital Sign     Days of Exercise per Week: 3 days     Minutes of Exercise per Session: 60 min   Stress: Stress Concern Present (2/14/2023)    [employer] - Occupational Stress Questionnaire     Feeling of Stress : Very much   Social Connections: Socially Isolated (2/14/2023)    Social Connection and Isolation Panel [NHANES]     Frequency of Communication with Friends and Family: Once a week     Frequency of Social Gatherings with Friends and Family: Once a week     Attends Anabaptism Services: Never     Active Member of Clubs or Organizations: No     Attends Club or Organization Meetings: Never     Marital Status: Never    Housing Stability: Low Risk  (2/14/2023)    Housing Stability Vital  Sign     Unable to Pay for Housing in the Last Year: No     Number of Places Lived in the Last Year: 1     Unstable Housing in the Last Year: No       Current Outpatient Medications   Medication Sig Dispense Refill    ACCU-CHEK AMAURY PLUS TEST STRP Strp USE TO TEST BLOOD GLUCOSE TID  9    ACCU-CHEK SOFTCLIX LANCETS Misc USE TO TEST BLOOD GLUCOSE TID  3    atorvastatin (LIPITOR) 80 MG tablet Take 80 mg by mouth every evening.      BLOOD SUGAR DIAGNOSTIC (ACCU-CHEK AMAURY PLUS TEST STRP MISC) 1 strip by Misc.(Non-Drug; Combo Route) route 3 (three) times daily.      buPROPion (WELLBUTRIN XL) 150 MG TB24 tablet Take 150 mg by mouth every morning.      calcium-vitamin D3 500 mg(1,250mg) -200 unit per tablet Take 1 tablet by mouth 2 (two) times daily with meals.      chlorhexidine (PERIDEX) 0.12 % solution       ciclopirox (PENLAC) 8 % Soln Apply topically nightly. 6.6 mL 11    dexlansoprazole (DEXILANT) 60 mg capsule Take 1 capsule (60 mg total) by mouth 2 (two) times a day. 60 capsule 11    diclofenac sodium (VOLTAREN) 1 % Gel Apply 2 g topically 4 (four) times daily. As needed for breast pain 1 Tube 1    dicyclomine (BENTYL) 10 MG capsule TAKE 2 CAPSULES(20 MG) BY MOUTH THREE TIMES DAILY BEFORE MEALS 180 capsule 0    diltiazem HCl (DILTIAZEM 2% CREAM) Apply topically 3 (three) times daily. Apply topically to anal area. 30 g 2    estradioL (ESTRACE) 0.01 % (0.1 mg/gram) vaginal cream Place 1 g vaginally once daily. 42.5 g 3    FARXIGA 10 mg Tab TK 1 T PO QD  7    FLUARIX QUAD 7239-1419, PF, 60 mcg (15 mcg x 4)/0.5 mL Syrg       fluticasone propionate (FLONASE) 50 mcg/actuation nasal spray SHAKE LIQUID AND USE 2 SPRAYS(100 MCG) IN EACH NOSTRIL EVERY DAY 48 g 2    gabapentin (NEURONTIN) 300 MG capsule Take 600 mg (two capsules) in the morning and 900mg (three capsules) at night before bed 150 capsule 11    GLUCOPHAGE 500 mg tablet Take 500 mg by mouth 2 (two) times daily with meals.       ibuprofen (ADVIL,MOTRIN) 800 MG  tablet 800 mg every 4 (four) hours as needed.   0    levothyroxine (SYNTHROID) 75 MCG tablet Take 75 mcg by mouth before breakfast.      magnesium oxide (MAG-OX) 400 mg (241.3 mg magnesium) tablet Take 1 tablet (400 mg total) by mouth 2 (two) times daily.  0    meloxicam (MOBIC) 15 MG tablet       methen-m.blue-s.phos-phsal-hyo (URIBEL) 118-10-40.8-36 mg Cap Take 1 capsule by mouth every 6 (six) hours as needed (bladder pain). 120 capsule 11    metoprolol succinate (TOPROL-XL) 100 MG 24 hr tablet Take 1 tablet (100 mg total) by mouth once daily. 90 tablet 3    mometasone 0.1% (ELOCON) 0.1 % cream APPLY TOPICALLY TO THE RASH ON HANDS TWICE DAILY AS NEEDED FOR TWO TO THREE WEEKS. USE SPARINGLY      MULTIVIT-IRON-MIN-FOLIC ACID 3,500-18-0.4 UNIT-MG-MG ORAL CHEW Take 1 tablet by mouth once daily.       omeprazole (PRILOSEC OTC) 20 MG tablet Take 20 mg by mouth once daily.      phentermine (ADIPEX-P) 37.5 mg tablet Take 37.5 mg by mouth.      phentermine 37.5 MG capsule TK 1 C PO ONCE D IN THE MORNING      REXULTI 4 mg Tab Take 1 tablet by mouth.      spironolactone (ALDACTONE) 25 MG tablet Take by mouth.      traZODone (DESYREL) 100 MG tablet TK 1 T PO HS  1    TRULICITY 4.5 mg/0.5 mL pen injector       ZOLOFT 100 mg tablet Take 200 mg by mouth once daily.       azelastine (ASTELIN) 137 mcg (0.1 %) nasal spray 1 spray (137 mcg total) by Nasal route 2 (two) times daily. 30 mL 3    cetirizine (ZYRTEC) 10 MG tablet Take 1 tablet (10 mg total) by mouth once daily. 30 tablet 0    docusate sodium (COLACE) 100 MG capsule Take 1 capsule (100 mg total) by mouth 2 (two) times daily. 60 capsule 0    omega-3 fatty acids/fish oil (FISH OIL-OMEGA-3 FATTY ACIDS) 300-1,000 mg capsule Take 1 capsule by mouth once daily. 90 capsule 3    oxyCODONE (ROXICODONE) 5 MG immediate release tablet Take 1 tablet (5 mg total) by mouth every 4 (four) hours as needed for Pain. 5 tablet 0    prasterone, dhea, (INTRAROSA) 6.5 mg Inst Place 6.5 mg  "vaginally every evening. 30 each 11    topiramate (TOPAMAX) 50 MG tablet Take 2 tablets (100 mg total) by mouth every evening. 60 tablet 11     No current facility-administered medications for this visit.       Review of patient's allergies indicates:  No Known Allergies    Well woman:  Pap:post hysterectomy  Mammo:11/2022 normal  Colonoscopy:12/2021 normal repeat in 5 years  Dexa:n/a    ROS:  As per HPI.      Exam  /80 (BP Location: Left arm, Patient Position: Standing, BP Method: Large (Manual))   Ht 5' 2" (1.575 m)   Wt 99.3 kg (218 lb 14.7 oz)   LMP 11/17/2012 (LMP Unknown) Comment: Neg UPT  BMI 40.04 kg/m²   General: alert and oriented, no acute distress  Respiratory: normal respiratory effort  Abd: soft, non-tender, non-distended    Pelvic  Ext. Genitalia: normal external genitalia. Normal bartholin's and skeens glands  Vagina: + ++ atrophy. Normal vaginal mucosa without lesions. Thin white discharge noted.   Non-tender bladder base without palpable mass.  +TTP in bilateral levator ani  Cervix: absent  Uterus:  absent   Urethra: no masses or tenderness  Urethral meatus: no lesions, caruncle or prolapse.    POP-Q:  Aa -3; Ba -3; C -7; Ap -2; Bp -2; D n/a.  Genital hiatus 3, perineal body 2 total vaginal length 8.  POP-Q:  Aa -2; Ba -2; C -7; Ap -2; Bp -2; D n/a.  Genital hiatus 3, perineal body 2 total vaginal length 8. standing          Impression  1. Vaginal atrophy  prasterone, dhea, (INTRAROSA) 6.5 mg Inst    FOLLICLE STIMULATING HORMONE    LUTEINIZING HORMONE    ESTRADIOL      2. Vaginal pain        3. Pelvic floor tension            We reviewed the above issues and discussed options for short-term versus long-term management of her problems.   Plan:    Vaginal pain  --trial intrarosa nightly  --fsh/lh/estadiol today    RTC 2 months for follow up        I spent a total of 20 minutes on the day of the visit.  This includes face to face time and non-face to face time preparing to see the patient " (eg, review of tests), obtaining and/or reviewing separately obtained history, documenting clinical information in the electronic or other health record, independently interpreting results and communicating results to the patient/family/caregiver, or care coordinator.       Julianna Gomez, TRACEY-BC [facility]  Division of Female Pelvic Medicine and Reconstructive Surgery  Department of Obstetrics & Gynecology

## 2023-09-11 ENCOUNTER — OFFICE VISIT (OUTPATIENT)
Dept: PODIATRY | Facility: CLINIC | Age: 38
End: 2023-09-11
Payer: MEDICARE

## 2023-09-11 VITALS
WEIGHT: 218.94 LBS | HEIGHT: 62 IN | BODY MASS INDEX: 40.29 KG/M2 | DIASTOLIC BLOOD PRESSURE: 72 MMHG | HEART RATE: 59 BPM | SYSTOLIC BLOOD PRESSURE: 105 MMHG

## 2023-09-11 DIAGNOSIS — L84 CORN OR CALLUS: ICD-10-CM

## 2023-09-11 DIAGNOSIS — E11.42 DIABETIC POLYNEUROPATHY ASSOCIATED WITH TYPE 2 DIABETES MELLITUS: Primary | ICD-10-CM

## 2023-09-11 DIAGNOSIS — B35.1 ONYCHOMYCOSIS DUE TO DERMATOPHYTE: ICD-10-CM

## 2023-09-11 PROCEDURE — 99999 PR PBB SHADOW E&M-EST. PATIENT-LVL IV: ICD-10-PCS | Mod: PBBFAC,HCNC,, | Performed by: PODIATRIST

## 2023-09-11 PROCEDURE — 11721 DEBRIDE NAIL 6 OR MORE: CPT | Mod: 59,Q9,HCNC,S$GLB | Performed by: PODIATRIST

## 2023-09-11 PROCEDURE — 99999 PR PBB SHADOW E&M-EST. PATIENT-LVL IV: CPT | Mod: PBBFAC,HCNC,, | Performed by: PODIATRIST

## 2023-09-11 PROCEDURE — 11721 PR DEBRIDEMENT OF NAILS, 6 OR MORE: ICD-10-PCS | Mod: 59,Q9,HCNC,S$GLB | Performed by: PODIATRIST

## 2023-09-11 PROCEDURE — 11056 PR TRIM BENIGN HYPERKERATOTIC SKIN LESION,2-4: ICD-10-PCS | Mod: Q9,HCNC,S$GLB, | Performed by: PODIATRIST

## 2023-09-11 PROCEDURE — 99499 UNLISTED E&M SERVICE: CPT | Mod: HCNC,S$GLB,, | Performed by: PODIATRIST

## 2023-09-11 PROCEDURE — 99499 NO LOS: ICD-10-PCS | Mod: HCNC,S$GLB,, | Performed by: PODIATRIST

## 2023-09-11 PROCEDURE — 11056 PARNG/CUTG B9 HYPRKR LES 2-4: CPT | Mod: Q9,HCNC,S$GLB, | Performed by: PODIATRIST

## 2023-09-11 NOTE — PROGRESS NOTES
Subjective:      Patient ID: Maria Ines Ac is a 38 y.o. female.    Chief Complaint: Diabetes Mellitus (Pcp - Luann Raymond MD - 7/14/2023) and Nail Care    Maria Ines is a 38 y.o. female who presents to the clinic for evaluation and treatment of high risk feet. Maria Ines has a past medical history of Blood in stool, Constipation, Cystitis, Depression, Diabetes mellitus, type 2, Dysphagia, Endometriosis, GERD (gastroesophageal reflux disease), Headache, Hypertension, Palpitations, Premature menopause on hormone replacement therapy, and Thyroid disease. The patient's chief complaint is long, thick toenails. Pt is under the care of ortho for an ankle sprain.    This patient has documented high risk feet requiring routine maintenance secondary to peripheral neuropathy.    PCP: Luann Raymond MD    Date Last Seen by PCP:   Chief Complaint   Patient presents with    Diabetes Mellitus     Pcp - Luann Raymond MD - 7/14/2023    Nail Care       Current shoe gear:  Affected Foot: Camwalker boots not treating      Unaffected Foot: Tennis shoes    Hemoglobin A1C   Date Value Ref Range Status   07/17/2023 5.1 4.0 - 5.6 % Final     Comment:     ADA Screening Guidelines:  5.7-6.4%  Consistent with prediabetes  >or=6.5%  Consistent with diabetes    High levels of fetal hemoglobin interfere with the HbA1C  assay. Heterozygous hemoglobin variants (HbS, HgC, etc)do  not significantly interfere with this assay.   However, presence of multiple variants may affect accuracy.     03/14/2023 5.3 4.0 - 5.6 % Final     Comment:     ADA Screening Guidelines:  5.7-6.4%  Consistent with prediabetes  >or=6.5%  Consistent with diabetes    High levels of fetal hemoglobin interfere with the HbA1C  assay. Heterozygous hemoglobin variants (HbS, HgC, etc)do  not significantly interfere with this assay.   However, presence of multiple variants may affect accuracy.     11/14/2022 5.3 4.0 - 5.6 % Final     Comment:     ADA Screening  Guidelines:  5.7-6.4%  Consistent with prediabetes  >or=6.5%  Consistent with diabetes    High levels of fetal hemoglobin interfere with the HbA1C  assay. Heterozygous hemoglobin variants (HbS, HgC, etc)do  not significantly interfere with this assay.   However, presence of multiple variants may affect accuracy.         Review of Systems   Constitutional: Negative for chills, fever and malaise/fatigue.   HENT:  Negative for hearing loss.    Cardiovascular:  Negative for claudication.   Respiratory:  Negative for shortness of breath.    Skin:  Positive for color change, dry skin and nail changes. Negative for flushing and rash.   Musculoskeletal:  Negative for joint pain and myalgias.   Gastrointestinal:  Negative for nausea and vomiting.   Neurological:  Positive for numbness, paresthesias and sensory change. Negative for loss of balance.   Psychiatric/Behavioral:  Negative for altered mental status.    Allergic/Immunologic: Negative for hives.           Objective:      Physical Exam  Vitals reviewed.   Cardiovascular:      Pulses:           Dorsalis pedis pulses are 2+ on the right side and 2+ on the left side.        Posterior tibial pulses are 2+ on the right side and 2+ on the left side.      Comments: No edema noted to b/L LEs  Musculoskeletal:      Comments: Adequate joint ROM noted to all lower extremity muscle groups with no pain or crepitation noted. Muscle strength is 5/5 in all groups bilaterally.     Feet:      Right foot:      Protective Sensation: 5 sites tested.  0 sites sensed.      Left foot:      Protective Sensation: 5 sites tested.  0 sites sensed.   Skin:     General: Skin is warm and dry.      Capillary Refill: Capillary refill takes 2 to 3 seconds.      Comments: Normal skin tugor noted.   No open lesion noted b/L  Skin temp is warm to warm from proximal to distal b/L.  Webspaces clean, dry, and intact  Xerosis Bilaterally. No open lesions noted.   HKL noted to left 5th digit x2      Neurological:      Mental Status: She is alert and oriented to person, place, and time.      Comments: Intact gross sensation noted to b/L LEs         Nails x10 are elongated by  5-6mm's, thickened by 1-2 mm's, dystrophic, and are yellowish in  coloration . Xerosis Bilaterally. No open lesions noted.             Assessment:       Encounter Diagnoses   Name Primary?    Diabetic polyneuropathy associated with type 2 diabetes mellitus Yes    Onychomycosis due to dermatophyte     Corn or callus          Plan:       Maria Ines was seen today for diabetes mellitus and nail care.    Diagnoses and all orders for this visit:    Diabetic polyneuropathy associated with type 2 diabetes mellitus    Onychomycosis due to dermatophyte    Corn or callus      I counseled the patient on her conditions, their implications and medical management.      - Shoe inspection. Diabetic Foot Education. Patient reminded of the importance of good nutrition and blood sugar control to help prevent podiatric complications of diabetes. Patient instructed on proper foot hygeine. We discussed wearing proper shoe gear, daily foot inspections, never walking without protective shoe gear, never putting sharp instruments to feet, routine podiatric nail visits every 2-3 months.    After cleansing with an alcohol prep pad, the about mentioned hyperkeratotic lesions were sharply debrided X 2 utilizing a #15 blade to a smooth base without incident. Pt tolerated the procedure well and reported comfort to the debarment sites. Pt will continue to use padding and moisture the callused areas.     - With patient's permission, nails were aggressively reduced and debrided x 10 to their soft tissue attachment mechanically and with electric , removing all offending nail and debris. Patient relates relief following the procedure. She will continue to monitor the areas daily, inspect her feet, wear protective shoe gear when ambulatory, moisturizer to maintain skin  integrity and follow in this office in approximately 2-3 months, sooner p.r.n.

## 2023-10-02 ENCOUNTER — TELEPHONE (OUTPATIENT)
Dept: OBSTETRICS AND GYNECOLOGY | Facility: CLINIC | Age: 38
End: 2023-10-02
Payer: MEDICARE

## 2023-10-02 NOTE — TELEPHONE ENCOUNTER
----- Message from Mar Samuel MA sent at 10/2/2023 10:34 AM CDT -----  Pt would like to schedule her annual # [phone]

## 2023-10-03 ENCOUNTER — TELEPHONE (OUTPATIENT)
Dept: OBSTETRICS AND GYNECOLOGY | Facility: CLINIC | Age: 38
End: 2023-10-03
Payer: MEDICARE

## 2023-10-03 NOTE — TELEPHONE ENCOUNTER
----- Message from Mar Samuel MA sent at 10/3/2023  2:04 PM CDT -----   Pt returning your call #[phone]

## 2023-10-06 ENCOUNTER — OFFICE VISIT (OUTPATIENT)
Dept: UROLOGY | Facility: CLINIC | Age: 38
End: 2023-10-06
Payer: MEDICARE

## 2023-10-06 VITALS
DIASTOLIC BLOOD PRESSURE: 69 MMHG | WEIGHT: 218.94 LBS | HEIGHT: 62 IN | SYSTOLIC BLOOD PRESSURE: 115 MMHG | BODY MASS INDEX: 40.29 KG/M2 | HEART RATE: 86 BPM

## 2023-10-06 DIAGNOSIS — N39.41 URGE INCONTINENCE: Primary | ICD-10-CM

## 2023-10-06 PROCEDURE — 95971 PR ANALYZE NEUROSTIM,SIMPLE/PROG: ICD-10-PCS | Mod: HCNC,S$GLB,, | Performed by: UROLOGY

## 2023-10-06 PROCEDURE — 1159F MED LIST DOCD IN RCRD: CPT | Mod: HCNC,CPTII,S$GLB, | Performed by: UROLOGY

## 2023-10-06 PROCEDURE — 3044F HG A1C LEVEL LT 7.0%: CPT | Mod: HCNC,CPTII,S$GLB, | Performed by: UROLOGY

## 2023-10-06 PROCEDURE — 99999 PR PBB SHADOW E&M-EST. PATIENT-LVL IV: ICD-10-PCS | Mod: PBBFAC,HCNC,, | Performed by: UROLOGY

## 2023-10-06 PROCEDURE — 3008F PR BODY MASS INDEX (BMI) DOCUMENTED: ICD-10-PCS | Mod: HCNC,CPTII,S$GLB, | Performed by: UROLOGY

## 2023-10-06 PROCEDURE — 1159F PR MEDICATION LIST DOCUMENTED IN MEDICAL RECORD: ICD-10-PCS | Mod: HCNC,CPTII,S$GLB, | Performed by: UROLOGY

## 2023-10-06 PROCEDURE — 3078F DIAST BP <80 MM HG: CPT | Mod: HCNC,CPTII,S$GLB, | Performed by: UROLOGY

## 2023-10-06 PROCEDURE — 81002 URINALYSIS NONAUTO W/O SCOPE: CPT | Mod: HCNC,S$GLB,, | Performed by: UROLOGY

## 2023-10-06 PROCEDURE — 3074F SYST BP LT 130 MM HG: CPT | Mod: HCNC,CPTII,S$GLB, | Performed by: UROLOGY

## 2023-10-06 PROCEDURE — 3061F PR NEG MICROALBUMINURIA RESULT DOCUMENTED/REVIEW: ICD-10-PCS | Mod: HCNC,CPTII,S$GLB, | Performed by: UROLOGY

## 2023-10-06 PROCEDURE — 3008F BODY MASS INDEX DOCD: CPT | Mod: HCNC,CPTII,S$GLB, | Performed by: UROLOGY

## 2023-10-06 PROCEDURE — 3044F PR MOST RECENT HEMOGLOBIN A1C LEVEL <7.0%: ICD-10-PCS | Mod: HCNC,CPTII,S$GLB, | Performed by: UROLOGY

## 2023-10-06 PROCEDURE — 3066F PR DOCUMENTATION OF TREATMENT FOR NEPHROPATHY: ICD-10-PCS | Mod: HCNC,CPTII,S$GLB, | Performed by: UROLOGY

## 2023-10-06 PROCEDURE — 99214 PR OFFICE/OUTPT VISIT, EST, LEVL IV, 30-39 MIN: ICD-10-PCS | Mod: HCNC,S$GLB,, | Performed by: UROLOGY

## 2023-10-06 PROCEDURE — 3078F PR MOST RECENT DIASTOLIC BLOOD PRESSURE < 80 MM HG: ICD-10-PCS | Mod: HCNC,CPTII,S$GLB, | Performed by: UROLOGY

## 2023-10-06 PROCEDURE — 3061F NEG MICROALBUMINURIA REV: CPT | Mod: HCNC,CPTII,S$GLB, | Performed by: UROLOGY

## 2023-10-06 PROCEDURE — 3074F PR MOST RECENT SYSTOLIC BLOOD PRESSURE < 130 MM HG: ICD-10-PCS | Mod: HCNC,CPTII,S$GLB, | Performed by: UROLOGY

## 2023-10-06 PROCEDURE — 81002 PR URINALYSIS NONAUTO W/O SCOPE: ICD-10-PCS | Mod: HCNC,S$GLB,, | Performed by: UROLOGY

## 2023-10-06 PROCEDURE — 3066F NEPHROPATHY DOC TX: CPT | Mod: HCNC,CPTII,S$GLB, | Performed by: UROLOGY

## 2023-10-06 PROCEDURE — 99214 OFFICE O/P EST MOD 30 MIN: CPT | Mod: HCNC,S$GLB,, | Performed by: UROLOGY

## 2023-10-06 PROCEDURE — 95971 ALYS SMPL SP/PN NPGT W/PRGRM: CPT | Mod: HCNC,S$GLB,, | Performed by: UROLOGY

## 2023-10-06 PROCEDURE — 99999 PR PBB SHADOW E&M-EST. PATIENT-LVL IV: CPT | Mod: PBBFAC,HCNC,, | Performed by: UROLOGY

## 2023-10-06 NOTE — PROGRESS NOTES
CHIEF COMPLAINT:    Mrs. Ac is a 38 y.o. female presenting for a follow up on urgency, frequency status post SNM.    PRESENTING ILLNESS:    Maria Ines Ac is a 38 y.o. female who presents with a history of refractory urgency, frequency and urge incontinence.  She states she is not feeling the stimulation and it does not seem to be working as well.     REVIEW OF SYSTEMS:    Review of Systems   Constitutional: Negative.    HENT: Negative.     Eyes: Negative.    Respiratory: Negative.     Cardiovascular: Negative.    Gastrointestinal: Negative.    Genitourinary:  Positive for frequency and urgency.   Musculoskeletal: Negative.    Skin: Negative.    Neurological: Negative.    Endo/Heme/Allergies: Negative.    Psychiatric/Behavioral: Negative.         PATIENT HISTORY:    Past Medical History:   Diagnosis Date    Blood in stool     Constipation     Cystitis     Depression     Diabetes mellitus, type 2     Dysphagia     Endometriosis     GERD (gastroesophageal reflux disease)     Headache     Hypertension     Palpitations     Premature menopause on hormone replacement therapy     Thyroid disease        Past Surgical History:   Procedure Laterality Date    24 HOUR IMPEDANCE PH MONITORING OF ESOPHAGUS IN PATIENT TAKING ACID REDUCING MEDICATIONS N/A 6/2/2022    Procedure: IMPEDANCE PH STUDY, ESOPHAGEAL, 24 HOUR, IN PATIENT TAKING ACID REDUCING MEDICATION;  Surgeon: Debbie Butler MD;  Location: [address]);  Service: Endoscopy;  Laterality: N/A;  On PPI/H2 Blocker    BLADDER SUSPENSION      CARPAL TUNNEL RELEASE      right    CHOLECYSTECTOMY      COLONOSCOPY N/A 8/30/2016    Procedure: COLONOSCOPY;  Surgeon: Slick Herron MD;  Location: [address]);  Service: Endoscopy;  Laterality: N/A;  PM prep    COLONOSCOPY N/A 12/22/2021    Procedure: COLONOSCOPY. any CRS;  Surgeon: Maria Ines Jung MD;  Location: [address]);  Service: Endoscopy;  Laterality: N/A;  fully vaccinated -sm  scaral  stimulator will bring remote -     [facility] to confirm appt-rb    ESOPHAGEAL MANOMETRY WITH MEASUREMENT OF IMPEDANCE N/A 2018    Procedure: MANOMETRY, ESOPHAGUS, WITH IMPEDANCE MEASUREMENT;  Surgeon: Slick Herron MD;  Location: [facility] ENDO (4TH F[facility]);  Service: Endoscopy;  Laterality: N/A;    ESOPHAGEAL MANOMETRY WITH MEASUREMENT OF IMPEDANCE N/A 2022    Procedure: MANOMETRY, ESOPHAGUS, WITH IMPEDANCE MEASUREMENT;  Surgeon: Debbie Butler MD;  Location: [facility] ENDO (4TH F[facility]);  Service: Endoscopy;  Laterality: N/A;  fully vaccinated, bladder stimulator, instr mailed /emailed -ml    ESOPHAGOGASTRODUODENOSCOPY N/A 2020    Procedure: EGD (ESOPHAGOGASTRODUODENOSCOPY);  Surgeon: Silck Herron MD;  Location: [facility] ENDO (4TH F[facility]);  Service: Endoscopy;  Laterality: N/A;  pt has cardiology appt on 19-will call back after this appt to schedule-tb    FLUOROSCOPIC URODYNAMIC STUDY N/A 2019    Procedure: URODYNAMIC STUDY, FLUOROSCOPIC;  Surgeon: Zena Tee MD;  Location: [facility] OR 1ST FLR;  Service: Urology;  Laterality: N/A;  1 hour    GALLBLADDER SURGERY      HYSTERECTOMY      Bess velasquez Dr. Champlain    IMPLANTATION OF PERMANENT SACRAL NERVE STIMULATOR N/A 2019    Procedure: INSERTION, NEUROSTIMULATOR, PERMANENT, SACRAL;  Surgeon: Zena Tee MD;  Location: [facility] OR 2ND FLR;  Service: Urology;  Laterality: N/A;  30 min    OOPHORECTOMY      LSO- benign cyst    OOPHORECTOMY      RSO- endo    ooptherectomy      REPLACEMENT OF NERVE STIMULATOR BATTERY N/A 2023    Procedure: Replacement, Battery, Neurostimulator;  Surgeon: Zena Tee MD;  Location: [facility] OR 2ND FLR;  Service: Urology;  Laterality: N/A;  45 min    right knee  scoped    SHOULDER SURGERY  right       Family History   Problem Relation Age of Onset    Cervical cancer Maternal Grandmother         unknown age of onset,  at 80    Breast cancer Mother 50        alive at 64, unilateral     Esophageal cancer Mother 63    Ovarian cancer Mother 63    Anesthesia problems Mother     Colon cancer Brother 40    Breast cancer Maternal Aunt 72        alive at 72    Breast cancer Paternal Aunt         unknown age of onset, alive at 70     Social History     Socioeconomic History    Marital status: Single   Tobacco Use    Smoking status: Never    Smokeless tobacco: Never   Substance and Sexual Activity    Alcohol use: No    Drug use: No    Sexual activity: Never     Birth control/protection: None   Social History Narrative    ** Merged History Encounter **          Social Determinants of Health     Financial Resource Strain: Low Risk  (2/14/2023)    Overall Financial Resource Strain (CARDIA)     Difficulty of Paying Living Expenses: Not hard at all   Food Insecurity: No Food Insecurity (2/14/2023)    Hunger Vital Sign     Worried About Running Out of Food in the Last Year: Never true     Ran Out of Food in the Last Year: Never true   Transportation Needs: No Transportation Needs (2/14/2023)    PRAPARE - Transportation     Lack of Transportation (Medical): No     Lack of Transportation (Non-Medical): No   Physical Activity: Sufficiently Active (2/14/2023)    Exercise Vital Sign     Days of Exercise per Week: 3 days     Minutes of Exercise per Session: 60 min   Stress: Stress Concern Present (2/14/2023)    [employer] - Occupational Stress Questionnaire     Feeling of Stress : Very much   Social Connections: Socially Isolated (2/14/2023)    Social Connection and Isolation Panel [NHANES]     Frequency of Communication with Friends and Family: Once a week     Frequency of Social Gatherings with Friends and Family: Once a week     Attends Confucianism Services: Never     Active Member of Clubs or Organizations: No     Attends Club or Organization Meetings: Never     Marital Status: Never    Housing Stability: Low Risk  (2/14/2023)    Housing Stability Vital Sign     Unable to Pay for  Housing in the Last Year: No     Number of Places Lived in the Last Year: 1     Unstable Housing in the Last Year: No       Allergies:  Patient has no known allergies.    Medications:  Outpatient Encounter Medications as of 10/6/2023   Medication Sig Dispense Refill    ACCU-CHEK AMAURY PLUS TEST STRP Strp USE TO TEST BLOOD GLUCOSE TID  9    ACCU-CHEK SOFTCLIX LANCETS Misc USE TO TEST BLOOD GLUCOSE TID  3    atorvastatin (LIPITOR) 80 MG tablet Take 80 mg by mouth every evening.      BLOOD SUGAR DIAGNOSTIC (ACCU-CHEK AMAURY PLUS TEST STRP MISC) 1 strip by Misc.(Non-Drug; Combo Route) route 3 (three) times daily.      buPROPion (WELLBUTRIN XL) 150 MG TB24 tablet Take 150 mg by mouth every morning.      calcium-vitamin D3 500 mg(1,250mg) -200 unit per tablet Take 1 tablet by mouth 2 (two) times daily with meals.      chlorhexidine (PERIDEX) 0.12 % solution       ciclopirox (PENLAC) 8 % Soln Apply topically nightly. 6.6 mL 11    dexlansoprazole (DEXILANT) 60 mg capsule Take 1 capsule (60 mg total) by mouth 2 (two) times a day. 60 capsule 11    diclofenac sodium (VOLTAREN) 1 % Gel Apply 2 g topically 4 (four) times daily. As needed for breast pain 1 Tube 1    dicyclomine (BENTYL) 10 MG capsule TAKE 2 CAPSULES(20 MG) BY MOUTH THREE TIMES DAILY BEFORE MEALS 180 capsule 0    diltiazem HCl (DILTIAZEM 2% CREAM) Apply topically 3 (three) times daily. Apply topically to anal area. 30 g 2    docusate sodium (COLACE) 100 MG capsule Take 1 capsule (100 mg total) by mouth 2 (two) times daily. 60 capsule 0    estradioL (ESTRACE) 0.01 % (0.1 mg/gram) vaginal cream Place 1 g vaginally once daily. 42.5 g 3    FARXIGA 10 mg Tab TK 1 T PO QD  7    FLUARIX QUAD 4543-2154, PF, 60 mcg (15 mcg x 4)/0.5 mL Syrg       fluticasone propionate (FLONASE) 50 mcg/actuation nasal spray SHAKE LIQUID AND USE 2 SPRAYS(100 MCG) IN EACH NOSTRIL EVERY DAY 48 g 2    gabapentin (NEURONTIN) 300 MG capsule Take 600 mg (two capsules) in the morning and 900mg  (three capsules) at night before bed 150 capsule 11    GLUCOPHAGE 500 mg tablet Take 500 mg by mouth 2 (two) times daily with meals.       ibuprofen (ADVIL,MOTRIN) 800 MG tablet 800 mg every 4 (four) hours as needed.   0    levothyroxine (SYNTHROID) 75 MCG tablet Take 75 mcg by mouth before breakfast.      magnesium oxide (MAG-OX) 400 mg (241.3 mg magnesium) tablet Take 1 tablet (400 mg total) by mouth 2 (two) times daily.  0    meloxicam (MOBIC) 15 MG tablet       methen-m.blue-s.phos-phsal-hyo (URIBEL) 118-10-40.8-36 mg Cap Take 1 capsule by mouth every 6 (six) hours as needed (bladder pain). 120 capsule 11    mometasone 0.1% (ELOCON) 0.1 % cream APPLY TOPICALLY TO THE RASH ON HANDS TWICE DAILY AS NEEDED FOR TWO TO THREE WEEKS. USE SPARINGLY      MULTIVIT-IRON-MIN-FOLIC ACID 3,500-18-0.4 UNIT-MG-MG ORAL CHEW Take 1 tablet by mouth once daily.       omeprazole (PRILOSEC OTC) 20 MG tablet Take 20 mg by mouth once daily.      oxyCODONE (ROXICODONE) 5 MG immediate release tablet Take 1 tablet (5 mg total) by mouth every 4 (four) hours as needed for Pain. 5 tablet 0    phentermine (ADIPEX-P) 37.5 mg tablet Take 37.5 mg by mouth.      phentermine 37.5 MG capsule TK 1 C PO ONCE D IN THE MORNING      prasterone, dhea, (INTRAROSA) 6.5 mg Inst Place 6.5 mg vaginally every evening. 30 each 11    REXULTI 4 mg Tab Take 1 tablet by mouth.      spironolactone (ALDACTONE) 25 MG tablet Take by mouth.      traZODone (DESYREL) 100 MG tablet TK 1 T PO HS  1    TRULICITY 4.5 mg/0.5 mL pen injector       ZOLOFT 100 mg tablet Take 200 mg by mouth once daily.       azelastine (ASTELIN) 137 mcg (0.1 %) nasal spray 1 spray (137 mcg total) by Nasal route 2 (two) times daily. 30 mL 3    cetirizine (ZYRTEC) 10 MG tablet Take 1 tablet (10 mg total) by mouth once daily. 30 tablet 0    metoprolol succinate (TOPROL-XL) 100 MG 24 hr tablet Take 1 tablet (100 mg total) by mouth once daily. 90 tablet 3    omega-3 fatty acids/fish oil (FISH  OIL-OMEGA-3 FATTY ACIDS) 300-1,000 mg capsule Take 1 capsule by mouth once daily. 90 capsule 3    topiramate (TOPAMAX) 50 MG tablet Take 2 tablets (100 mg total) by mouth every evening. 60 tablet 11     No facility-administered encounter medications on file as of 10/6/2023.         PHYSICAL EXAMINATION:    The patient generally appears in good health, is appropriately interactive, and is in no apparent distress.    Skin: No lesions.    Mental: Cooperative with normal affect.    Neuro: Grossly intact.    HEENT: Normal. No evidence of lymphadenopathy.    Chest:  normal inspiratory effort.    Abdomen: Soft, non-tender. No masses or organomegaly. Bladder is not palpable. No evidence of flank discomfort. No evidence of inguinal hernia.    Extremities: No clubbing, cyanosis, or edema      LABS:    Lab Results   Component Value Date    BUN 7 07/17/2023    CREATININE 0.9 07/17/2023       UA 1.010, pH 5, 500 glucose, (on Farxiga)  otherwise, negative.     IMPRESSION:    Urgency, urge incontinence    PLAN:    1.    1. Interrogation of the InterStim  Battery: OK  Impedences range from 821 to 1587 Ohms, checked at 1.5 A.    She is at 3.3 mA on Program 7, 0+, 1-,2 - ,210 pw, 14 Hz.  changed to program 6 3+, 1-2-, 2.5 mA, 14 Hz, 210 pw.  Kept on program 6.      2.  Follow up in 6 months    I spent 30 minutes with the patient of which more than half was spent in direct consultation with the patient in regards to our treatment and plan.

## 2023-10-13 ENCOUNTER — OFFICE VISIT (OUTPATIENT)
Dept: INTERNAL MEDICINE | Facility: CLINIC | Age: 38
End: 2023-10-13
Payer: MEDICARE

## 2023-10-13 VITALS
SYSTOLIC BLOOD PRESSURE: 120 MMHG | DIASTOLIC BLOOD PRESSURE: 72 MMHG | WEIGHT: 186.5 LBS | HEART RATE: 80 BPM | BODY MASS INDEX: 34.32 KG/M2 | OXYGEN SATURATION: 93 % | HEIGHT: 62 IN

## 2023-10-13 DIAGNOSIS — J06.9 URI WITH COUGH AND CONGESTION: Primary | ICD-10-CM

## 2023-10-13 LAB
CTP QC/QA: YES
SARS-COV-2 RDRP RESP QL NAA+PROBE: NEGATIVE

## 2023-10-13 PROCEDURE — 99214 PR OFFICE/OUTPT VISIT, EST, LEVL IV, 30-39 MIN: ICD-10-PCS | Mod: HCNC,S$GLB,, | Performed by: INTERNAL MEDICINE

## 2023-10-13 PROCEDURE — 3008F BODY MASS INDEX DOCD: CPT | Mod: HCNC,CPTII,S$GLB, | Performed by: INTERNAL MEDICINE

## 2023-10-13 PROCEDURE — 3061F NEG MICROALBUMINURIA REV: CPT | Mod: HCNC,CPTII,S$GLB, | Performed by: INTERNAL MEDICINE

## 2023-10-13 PROCEDURE — 3066F PR DOCUMENTATION OF TREATMENT FOR NEPHROPATHY: ICD-10-PCS | Mod: HCNC,CPTII,S$GLB, | Performed by: INTERNAL MEDICINE

## 2023-10-13 PROCEDURE — 99214 OFFICE O/P EST MOD 30 MIN: CPT | Mod: HCNC,S$GLB,, | Performed by: INTERNAL MEDICINE

## 2023-10-13 PROCEDURE — 99999 PR PBB SHADOW E&M-EST. PATIENT-LVL V: ICD-10-PCS | Mod: PBBFAC,HCNC,, | Performed by: INTERNAL MEDICINE

## 2023-10-13 PROCEDURE — 87635 SARS-COV-2 COVID-19 AMP PRB: CPT | Mod: QW,HCNC,S$GLB, | Performed by: INTERNAL MEDICINE

## 2023-10-13 PROCEDURE — 1160F RVW MEDS BY RX/DR IN RCRD: CPT | Mod: HCNC,CPTII,S$GLB, | Performed by: INTERNAL MEDICINE

## 2023-10-13 PROCEDURE — 3044F HG A1C LEVEL LT 7.0%: CPT | Mod: HCNC,CPTII,S$GLB, | Performed by: INTERNAL MEDICINE

## 2023-10-13 PROCEDURE — 1159F MED LIST DOCD IN RCRD: CPT | Mod: HCNC,CPTII,S$GLB, | Performed by: INTERNAL MEDICINE

## 2023-10-13 PROCEDURE — 1160F PR REVIEW ALL MEDS BY PRESCRIBER/CLIN PHARMACIST DOCUMENTED: ICD-10-PCS | Mod: HCNC,CPTII,S$GLB, | Performed by: INTERNAL MEDICINE

## 2023-10-13 PROCEDURE — 1159F PR MEDICATION LIST DOCUMENTED IN MEDICAL RECORD: ICD-10-PCS | Mod: HCNC,CPTII,S$GLB, | Performed by: INTERNAL MEDICINE

## 2023-10-13 PROCEDURE — 3074F PR MOST RECENT SYSTOLIC BLOOD PRESSURE < 130 MM HG: ICD-10-PCS | Mod: HCNC,CPTII,S$GLB, | Performed by: INTERNAL MEDICINE

## 2023-10-13 PROCEDURE — 87635: ICD-10-PCS | Mod: QW,HCNC,S$GLB, | Performed by: INTERNAL MEDICINE

## 2023-10-13 PROCEDURE — 99999 PR PBB SHADOW E&M-EST. PATIENT-LVL V: CPT | Mod: PBBFAC,HCNC,, | Performed by: INTERNAL MEDICINE

## 2023-10-13 PROCEDURE — 3074F SYST BP LT 130 MM HG: CPT | Mod: HCNC,CPTII,S$GLB, | Performed by: INTERNAL MEDICINE

## 2023-10-13 PROCEDURE — 3061F PR NEG MICROALBUMINURIA RESULT DOCUMENTED/REVIEW: ICD-10-PCS | Mod: HCNC,CPTII,S$GLB, | Performed by: INTERNAL MEDICINE

## 2023-10-13 PROCEDURE — 3008F PR BODY MASS INDEX (BMI) DOCUMENTED: ICD-10-PCS | Mod: HCNC,CPTII,S$GLB, | Performed by: INTERNAL MEDICINE

## 2023-10-13 PROCEDURE — 3066F NEPHROPATHY DOC TX: CPT | Mod: HCNC,CPTII,S$GLB, | Performed by: INTERNAL MEDICINE

## 2023-10-13 PROCEDURE — 3078F DIAST BP <80 MM HG: CPT | Mod: HCNC,CPTII,S$GLB, | Performed by: INTERNAL MEDICINE

## 2023-10-13 PROCEDURE — 3078F PR MOST RECENT DIASTOLIC BLOOD PRESSURE < 80 MM HG: ICD-10-PCS | Mod: HCNC,CPTII,S$GLB, | Performed by: INTERNAL MEDICINE

## 2023-10-13 PROCEDURE — 3044F PR MOST RECENT HEMOGLOBIN A1C LEVEL <7.0%: ICD-10-PCS | Mod: HCNC,CPTII,S$GLB, | Performed by: INTERNAL MEDICINE

## 2023-10-13 RX ORDER — BENZONATATE 200 MG/1
200 CAPSULE ORAL 3 TIMES DAILY PRN
Qty: 30 CAPSULE | Refills: 0 | Status: SHIPPED | OUTPATIENT
Start: 2023-10-13 | End: 2023-11-14 | Stop reason: SDUPTHER

## 2023-10-13 NOTE — PROGRESS NOTES
"    CHIEF COMPLAINT     Chief Complaint   Patient presents with    Otalgia    Sore Throat       HPI     Maria Ines Ac is a 38 y.o. female here today for     Four days congestion postnasal drip cough drainage sore throat ear fullness.  No fever.  He is taking Tylenol doing marginally helpful.  Reports her mom was sick with similar illness few days ago.    History of COVID-19 last December 2022 had 3 immunizations        Personally Reviewed Patient's Medical, surgical, family and social hx. Changes updated in [facility].  Care Team updated in Epic    Review of Systems:  Review of Systems   Constitutional:  Negative for fatigue.   HENT:  Positive for postnasal drip and sore throat.    Respiratory:  Positive for cough. Negative for wheezing.    Hematological:  Positive for adenopathy.       Health Maintenance:   Reviewed with patient  Due for the following:      PHYSICAL EXAM     /72 (BP Location: Right arm, Patient Position: Sitting, BP Method: Large (Manual))   Pulse 80   Ht 5' 2" (1.575 m)   Wt 84.6 kg (186 lb 8.2 oz)   LMP 11/17/2012 (LMP Unknown) Comment: Neg UPT  SpO2 (!) 93%   BMI 34.11 kg/m²     Gen: Well Appearing, NAD  HEENT: PERR, EOMI  Neck: FROM, no thyromegaly, +cervical adenopathy  CVD: RRR, no M/R/G  Pulm: Normal work of breathing, CTAB, no wheezing  Abd:  Soft, NT, ND non TTP, no mass  MSK: no LE edema  Neuro: A&Ox3, gait normal, speech normal  Mood; Mood normal, behavior normal, thought process linear       LABS     Lab Results   Component Value Date    WBC 6.77 11/14/2022    HGB 14.1 11/14/2022    HCT 44.1 11/14/2022    MCV 90 11/14/2022     11/14/2022         Chemistry        Component Value Date/Time     07/17/2023 1133    K 3.9 07/17/2023 1133     07/17/2023 1133    CO2 24 07/17/2023 1133    BUN 7 07/17/2023 1133    CREATININE 0.9 07/17/2023 1133     07/17/2023 1133        Component Value Date/Time    CALCIUM 9.4 07/17/2023 1133    ALKPHOS 56 07/17/2023 1133 "    AST 58 (H) 07/17/2023 1133    ALT 49 (H) 07/17/2023 1133    BILITOT 0.3 07/17/2023 1133    ESTGFRAFRICA >60.0 07/19/2022 1539    EGFRNONAA 57.1 (A) 07/19/2022 1539          ASSESSMENT     1. URI with cough and congestion  POCT COVID-19 Rapid Screening    benzonatate (TESSALON) 200 MG capsule              Plan     Maria Ines Ac is a 38 y.o. female with  1. URI with cough and congestion  - POCT COVID-19 Rapid Screening  - benzonatate (TESSALON) 200 MG capsule; Take 1 capsule (200 mg total) by mouth 3 (three) times daily as needed for Cough.  Dispense: 30 capsule; Refill: 0  Patient with sx consistent w/URI without any red flag sx or RF for more severe infection at this time. Recommend symptomatic relief as below. Recommend presenting for reevaluation for sx that do not improve after 10 days or improvement followed by subsequent worsening.  For myalgias: tylenol or NSAIDS  For congestion: intranasal decongestant such as afrin or sudafed  For drainage and postnasal drip: 1st generation antihistamine such as benadryl, doxylamine or chlorpheniramine  Can use sinus rinse and elevated head of bed to reduce nighttime sx.         Abhishek Wheat MD

## 2023-10-13 NOTE — PATIENT INSTRUCTIONS
Patient with sx consistent w/URI without any red flag sx or RF for more severe infection at this time. Recommend symptomatic relief as below. Recommend presenting for reevaluation for sx that do not improve after 10 days or improvement followed by subsequent worsening.  For myalgias: tylenol or NSAIDS  For congestion: intranasal decongestant such as afrin   For drainage and postnasal drip: 1st generation antihistamine such as benadryl, doxylamine or chlorpheniramine  Can use sinus rinse and elevated head of bed to reduce nighttime sx.

## 2023-10-18 PROBLEM — G89.29 CHRONIC PAIN OF RIGHT ANKLE: Status: ACTIVE | Noted: 2023-10-18

## 2023-10-18 PROBLEM — M25.571 CHRONIC PAIN OF RIGHT ANKLE: Status: ACTIVE | Noted: 2023-10-18

## 2023-11-02 ENCOUNTER — OFFICE VISIT (OUTPATIENT)
Dept: UROGYNECOLOGY | Facility: CLINIC | Age: 38
End: 2023-11-02
Payer: MEDICARE

## 2023-11-02 ENCOUNTER — TELEPHONE (OUTPATIENT)
Dept: GASTROENTEROLOGY | Facility: CLINIC | Age: 38
End: 2023-11-02
Payer: MEDICARE

## 2023-11-02 VITALS
DIASTOLIC BLOOD PRESSURE: 63 MMHG | BODY MASS INDEX: 41.22 KG/M2 | SYSTOLIC BLOOD PRESSURE: 114 MMHG | HEIGHT: 62 IN | HEART RATE: 83 BPM | WEIGHT: 224 LBS

## 2023-11-02 DIAGNOSIS — M62.89 PELVIC FLOOR TENSION: ICD-10-CM

## 2023-11-02 DIAGNOSIS — N95.2 VAGINAL ATROPHY: ICD-10-CM

## 2023-11-02 DIAGNOSIS — R39.89 URETHRAL PAIN: Primary | ICD-10-CM

## 2023-11-02 PROCEDURE — 99999 PR PBB SHADOW E&M-EST. PATIENT-LVL V: ICD-10-PCS | Mod: PBBFAC,HCNC,, | Performed by: NURSE PRACTITIONER

## 2023-11-02 PROCEDURE — 1159F MED LIST DOCD IN RCRD: CPT | Mod: HCNC,CPTII,S$GLB, | Performed by: NURSE PRACTITIONER

## 2023-11-02 PROCEDURE — 3074F SYST BP LT 130 MM HG: CPT | Mod: HCNC,CPTII,S$GLB, | Performed by: NURSE PRACTITIONER

## 2023-11-02 PROCEDURE — 99214 OFFICE O/P EST MOD 30 MIN: CPT | Mod: HCNC,S$GLB,, | Performed by: NURSE PRACTITIONER

## 2023-11-02 PROCEDURE — 99999 PR PBB SHADOW E&M-EST. PATIENT-LVL V: CPT | Mod: PBBFAC,HCNC,, | Performed by: NURSE PRACTITIONER

## 2023-11-02 PROCEDURE — 3066F PR DOCUMENTATION OF TREATMENT FOR NEPHROPATHY: ICD-10-PCS | Mod: HCNC,CPTII,S$GLB, | Performed by: NURSE PRACTITIONER

## 2023-11-02 PROCEDURE — 3074F PR MOST RECENT SYSTOLIC BLOOD PRESSURE < 130 MM HG: ICD-10-PCS | Mod: HCNC,CPTII,S$GLB, | Performed by: NURSE PRACTITIONER

## 2023-11-02 PROCEDURE — 1159F PR MEDICATION LIST DOCUMENTED IN MEDICAL RECORD: ICD-10-PCS | Mod: HCNC,CPTII,S$GLB, | Performed by: NURSE PRACTITIONER

## 2023-11-02 PROCEDURE — 3061F NEG MICROALBUMINURIA REV: CPT | Mod: HCNC,CPTII,S$GLB, | Performed by: NURSE PRACTITIONER

## 2023-11-02 PROCEDURE — 3066F NEPHROPATHY DOC TX: CPT | Mod: HCNC,CPTII,S$GLB, | Performed by: NURSE PRACTITIONER

## 2023-11-02 PROCEDURE — 3044F PR MOST RECENT HEMOGLOBIN A1C LEVEL <7.0%: ICD-10-PCS | Mod: HCNC,CPTII,S$GLB, | Performed by: NURSE PRACTITIONER

## 2023-11-02 PROCEDURE — 3078F DIAST BP <80 MM HG: CPT | Mod: HCNC,CPTII,S$GLB, | Performed by: NURSE PRACTITIONER

## 2023-11-02 PROCEDURE — 1160F RVW MEDS BY RX/DR IN RCRD: CPT | Mod: HCNC,CPTII,S$GLB, | Performed by: NURSE PRACTITIONER

## 2023-11-02 PROCEDURE — 3008F PR BODY MASS INDEX (BMI) DOCUMENTED: ICD-10-PCS | Mod: HCNC,CPTII,S$GLB, | Performed by: NURSE PRACTITIONER

## 2023-11-02 PROCEDURE — 3078F PR MOST RECENT DIASTOLIC BLOOD PRESSURE < 80 MM HG: ICD-10-PCS | Mod: HCNC,CPTII,S$GLB, | Performed by: NURSE PRACTITIONER

## 2023-11-02 PROCEDURE — 3008F BODY MASS INDEX DOCD: CPT | Mod: HCNC,CPTII,S$GLB, | Performed by: NURSE PRACTITIONER

## 2023-11-02 PROCEDURE — 3044F HG A1C LEVEL LT 7.0%: CPT | Mod: HCNC,CPTII,S$GLB, | Performed by: NURSE PRACTITIONER

## 2023-11-02 PROCEDURE — 1160F PR REVIEW ALL MEDS BY PRESCRIBER/CLIN PHARMACIST DOCUMENTED: ICD-10-PCS | Mod: HCNC,CPTII,S$GLB, | Performed by: NURSE PRACTITIONER

## 2023-11-02 PROCEDURE — 99214 PR OFFICE/OUTPT VISIT, EST, LEVL IV, 30-39 MIN: ICD-10-PCS | Mod: HCNC,S$GLB,, | Performed by: NURSE PRACTITIONER

## 2023-11-02 PROCEDURE — 3061F PR NEG MICROALBUMINURIA RESULT DOCUMENTED/REVIEW: ICD-10-PCS | Mod: HCNC,CPTII,S$GLB, | Performed by: NURSE PRACTITIONER

## 2023-11-02 RX ORDER — AMITRIPTYLINE HYDROCHLORIDE 10 MG/1
10 TABLET, FILM COATED ORAL NIGHTLY
Qty: 30 TABLET | Refills: 11 | Status: SHIPPED | OUTPATIENT
Start: 2023-11-02 | End: 2024-11-01

## 2023-11-02 NOTE — TELEPHONE ENCOUNTER
----- Message from Regine Graves sent at 11/2/2023 12:08 PM CDT -----  Regarding: Appt requested  Contact: [phone]  Hi, pt called to request a call back to get scheduled to see Dr Hreron. Pls call the pt at [phone] to spk with the pt.

## 2023-11-02 NOTE — TELEPHONE ENCOUNTER
Spoke with patient.   Scheduled to see Dr.James Herron on 11/21 for 1:30.   Confirmation mailed.   Toya

## 2023-11-02 NOTE — PATIENT INSTRUCTIONS
Vaginal pain  --continue vaginal estrogen cream 3 times weekly    Urethral pain  --trial amitriptyline 10 mg nightly    Urinary incontinence/ enuresis  --Empty bladder every 3 hours.  Empty well: wait a minute, lean forward on toilet.    --Avoid dietary irritants (see sheet).  Keep diary x 3  days  --KEGELS: do 10 in AM and 10 in PM, holding each x 10 seconds.  When you feel urge to go, STOP, KEGEL, and when urge has passed, then go to bathroom.  Consider PT in future.    --interstim in place--followed by urology       Irregular bowel habits.   --continue fiber supplement  --follow up with Dr. Herron    RTC 4 weeks for follow up

## 2023-11-02 NOTE — Clinical Note
Dr. Herron,  She has been having persistent diarrhea.  Would you (or someone in your practice) mind seeing her again in clinic?  Thanks, Julianna

## 2023-11-02 NOTE — PROGRESS NOTES
Urogyn follow up  11/02/2023  .  [facility] - UROGYNECOLOGY  [address] [address]    Maria Ines Ac  [id number]  1985      Maria Ines Ac is a 38 y.o. here for a urogyn follow up for vaginal pressure.    02/28/2023--interstim replaced by Dr. Tee    05/10/2023  Reports interstim is working well for her.   Voiding every hour during the day- using 4 pads/ day with moderate wetness  Complains of vaginal pain x 3 months.  Taking uribel twice daily    08/29/2023  Complains of vaginal pressure-- states she was told by PT that she had prolapse    Changes since last visit:  Went to pelvic floor PT--last visit 08/09/2023 Hold for now until follow up with MD as pt making no gains and in fact having worse effects at times.  Voiding 4 times during the day  Using 4 pads/ day--severe wetness  Nocturia 4/ night-- + enuresis  Using vaginal estrogen cream daily   + diarrhea--taking metamucil 2 tablespoons daily      Past Medical History:   Diagnosis Date    Blood in stool     Constipation     Cystitis     Depression     Diabetes mellitus, type 2     Dysphagia     Endometriosis     GERD (gastroesophageal reflux disease)     Headache     Hypertension     Palpitations     Premature menopause on hormone replacement therapy     Thyroid disease        Past Surgical History:   Procedure Laterality Date    24 HOUR IMPEDANCE PH MONITORING OF ESOPHAGUS IN PATIENT TAKING ACID REDUCING MEDICATIONS N/A 6/2/2022    Procedure: IMPEDANCE PH STUDY, ESOPHAGEAL, 24 HOUR, IN PATIENT TAKING ACID REDUCING MEDICATION;  Surgeon: Debbie Butler MD;  Location: [address]);  Service: Endoscopy;  Laterality: N/A;  On PPI/H2 Blocker    BLADDER SUSPENSION      CARPAL TUNNEL RELEASE      right    CHOLECYSTECTOMY      COLONOSCOPY N/A 8/30/2016    Procedure: COLONOSCOPY;  Surgeon: Slick Herron MD;  Location: [facility] ([address] [place] [address]);  Service: Endoscopy;  Laterality: N/A;  PM prep    COLONOSCOPY N/A  12/22/2021    Procedure: COLONOSCOPY. any CRS;  Surgeon: Maria Ines Jung MD;  Location: [facility] ENDO (4TH F[facility]);  Service: Endoscopy;  Laterality: N/A;  fully vaccinated -  scaral stimulator will bring remote -    12/17 lvm to confirm appt-rb    ESOPHAGEAL MANOMETRY WITH MEASUREMENT OF IMPEDANCE N/A 11/5/2018    Procedure: MANOMETRY, ESOPHAGUS, WITH IMPEDANCE MEASUREMENT;  Surgeon: Slick Herron MD;  Location: [facility] (4TH F[facility]);  Service: Endoscopy;  Laterality: N/A;    ESOPHAGEAL MANOMETRY WITH MEASUREMENT OF IMPEDANCE N/A 6/2/2022    Procedure: MANOMETRY, ESOPHAGUS, WITH IMPEDANCE MEASUREMENT;  Surgeon: Debbie Butler MD;  Location: [facility] (4TH F[facility]);  Service: Endoscopy;  Laterality: N/A;  fully vaccinated, bladder stimulator, instr mailed /emailed -ml    ESOPHAGOGASTRODUODENOSCOPY N/A 2/13/2020    Procedure: EGD (ESOPHAGOGASTRODUODENOSCOPY);  Surgeon: Slick Herron MD;  Location: [facility] (4TH F[facility]);  Service: Endoscopy;  Laterality: N/A;  pt has cardiology appt on 1/6/19-will call back after this appt to schedule-tb    FLUOROSCOPIC URODYNAMIC STUDY N/A 5/23/2019    Procedure: URODYNAMIC STUDY, FLUOROSCOPIC;  Surgeon: Zena Tee MD;  Location: [facility] OR 1ST FLR;  Service: Urology;  Laterality: N/A;  1 hour    GALLBLADDER SURGERY      HYSTERECTOMY  2013    Bess velasquez Dr. Champlain    IMPLANTATION OF PERMANENT SACRAL NERVE STIMULATOR N/A 7/30/2019    Procedure: INSERTION, NEUROSTIMULATOR, PERMANENT, SACRAL;  Surgeon: Zena Tee MD;  Location: [facility] OR 2ND FLR;  Service: Urology;  Laterality: N/A;  30 min    OOPHORECTOMY  2005    [facility]O- benign cyst    OOPHORECTOMY  2013    [facility]- endo    ooptherectomy      REPLACEMENT OF NERVE STIMULATOR BATTERY N/A 2/28/2023    Procedure: Replacement, Battery, Neurostimulator;  Surgeon: Zena Tee MD;  Location: [facility] OR 2ND FLR;  Service: Urology;  Laterality: N/A;  45 min    right knee  scoped    SHOULDER SURGERY  right       Family History   Problem  Relation Age of Onset    Cervical cancer Maternal Grandmother         unknown age of onset,  at 80    Breast cancer Mother 50        alive at 64, unilateral    Esophageal cancer Mother 63    Ovarian cancer Mother 63    Anesthesia problems Mother     Colon cancer Brother 40    Breast cancer Maternal Aunt 72        alive at 72    Breast cancer Paternal Aunt         unknown age of onset, alive at 70    Vaginal cancer Neg Hx     Endometrial cancer Neg Hx     Crohn's disease Neg Hx     Ulcerative colitis Neg Hx     Stomach cancer Neg Hx     Irritable bowel syndrome Neg Hx     Celiac disease Neg Hx     Cancer Neg Hx        Social History     Socioeconomic History    Marital status: Single   Tobacco Use    Smoking status: Never    Smokeless tobacco: Never   Substance and Sexual Activity    Alcohol use: No    Drug use: No    Sexual activity: Never     Birth control/protection: None   Social History Narrative    ** Merged History Encounter **          Social Determinants of Health     Financial Resource Strain: Low Risk  (2023)    Overall Financial Resource Strain (CARDIA)     Difficulty of Paying Living Expenses: Not hard at all   Food Insecurity: No Food Insecurity (2023)    Hunger Vital Sign     Worried About Running Out of Food in the Last Year: Never true     Ran Out of Food in the Last Year: Never true   Transportation Needs: No Transportation Needs (2023)    PRAPARE - Transportation     Lack of Transportation (Medical): No     Lack of Transportation (Non-Medical): No   Physical Activity: Sufficiently Active (2023)    Exercise Vital Sign     Days of Exercise per Week: 3 days     Minutes of Exercise per Session: 60 min   Stress: Stress Concern Present (2023)    [place] [employer] - Occupational Stress Questionnaire     Feeling of Stress : Very much   Social Connections: Socially Isolated (2023)    Social Connection and Isolation Panel [NHANES]     Frequency of  Communication with Friends and Family: Once a week     Frequency of Social Gatherings with Friends and Family: Once a week     Attends Nondenominational Services: Never     Active Member of Clubs or Organizations: No     Attends Club or Organization Meetings: Never     Marital Status: Never    Housing Stability: Low Risk  (2/14/2023)    Housing Stability Vital Sign     Unable to Pay for Housing in the Last Year: No     Number of Places Lived in the Last Year: 1     Unstable Housing in the Last Year: No       Current Outpatient Medications   Medication Sig Dispense Refill    ACCU-CHEK AMAURY PLUS TEST STRP Strp USE TO TEST BLOOD GLUCOSE TID  9    ACCU-CHEK SOFTCLIX LANCETS Misc USE TO TEST BLOOD GLUCOSE TID  3    atorvastatin (LIPITOR) 80 MG tablet Take 80 mg by mouth every evening.      BLOOD SUGAR DIAGNOSTIC (ACCU-CHEK AMAURY PLUS TEST STRP MISC) 1 strip by Misc.(Non-Drug; Combo Route) route 3 (three) times daily.      buPROPion (WELLBUTRIN XL) 150 MG TB24 tablet Take 150 mg by mouth every morning.      calcium-vitamin D3 500 mg(1,250mg) -200 unit per tablet Take 1 tablet by mouth 2 (two) times daily with meals.      chlorhexidine (PERIDEX) 0.12 % solution       ciclopirox (PENLAC) 8 % Soln Apply topically nightly. 6.6 mL 11    dexlansoprazole (DEXILANT) 60 mg capsule Take 1 capsule (60 mg total) by mouth 2 (two) times a day. 60 capsule 11    diclofenac sodium (VOLTAREN) 1 % Gel Apply 2 g topically 4 (four) times daily. As needed for breast pain 1 Tube 1    dicyclomine (BENTYL) 10 MG capsule TAKE 2 CAPSULES(20 MG) BY MOUTH THREE TIMES DAILY BEFORE MEALS 180 capsule 0    diltiazem HCl (DILTIAZEM 2% CREAM) Apply topically 3 (three) times daily. Apply topically to anal area. 30 g 2    docusate sodium (COLACE) 100 MG capsule Take 1 capsule (100 mg total) by mouth 2 (two) times daily. 60 capsule 0    estradioL (ESTRACE) 0.01 % (0.1 mg/gram) vaginal cream Place 1 g vaginally once daily. 42.5 g 3    FARXIGA 10 mg Tab TK 1 T  PO QD  7    FLUARIX QUAD 8268-6474, PF, 60 mcg (15 mcg x 4)/0.5 mL Syrg       fluticasone propionate (FLONASE) 50 mcg/actuation nasal spray SHAKE LIQUID AND USE 2 SPRAYS(100 MCG) IN EACH NOSTRIL EVERY DAY 48 g 2    gabapentin (NEURONTIN) 300 MG capsule Take 600 mg (two capsules) in the morning and 900mg (three capsules) at night before bed 150 capsule 11    GLUCOPHAGE 500 mg tablet Take 500 mg by mouth 2 (two) times daily with meals.       ibuprofen (ADVIL,MOTRIN) 800 MG tablet 800 mg every 4 (four) hours as needed.   0    levothyroxine (SYNTHROID) 75 MCG tablet Take 75 mcg by mouth before breakfast.      magnesium oxide (MAG-OX) 400 mg (241.3 mg magnesium) tablet Take 1 tablet (400 mg total) by mouth 2 (two) times daily.  0    meloxicam (MOBIC) 15 MG tablet       methen-m.blue-s.phos-phsal-hyo (URIBEL) 118-10-40.8-36 mg Cap Take 1 capsule by mouth every 6 (six) hours as needed (bladder pain). 120 capsule 11    mometasone 0.1% (ELOCON) 0.1 % cream APPLY TOPICALLY TO THE RASH ON HANDS TWICE DAILY AS NEEDED FOR TWO TO THREE WEEKS. USE SPARINGLY      MULTIVIT-IRON-MIN-FOLIC ACID 3,500-18-0.4 UNIT-MG-MG ORAL CHEW Take 1 tablet by mouth once daily.       omeprazole (PRILOSEC OTC) 20 MG tablet Take 20 mg by mouth once daily.      oxyCODONE (ROXICODONE) 5 MG immediate release tablet Take 1 tablet (5 mg total) by mouth every 4 (four) hours as needed for Pain. 5 tablet 0    phentermine (ADIPEX-P) 37.5 mg tablet Take 37.5 mg by mouth.      phentermine 37.5 MG capsule TK 1 C PO ONCE D IN THE MORNING      REXULTI 4 mg Tab Take 1 tablet by mouth.      spironolactone (ALDACTONE) 25 MG tablet Take by mouth.      traZODone (DESYREL) 100 MG tablet TK 1 T PO HS  1    TRULICITY 4.5 mg/0.5 mL pen injector       ZOLOFT 100 mg tablet Take 200 mg by mouth once daily.       amitriptyline (ELAVIL) 10 MG tablet Take 1 tablet (10 mg total) by mouth nightly. 30 tablet 11    azelastine (ASTELIN) 137 mcg (0.1 %) nasal spray 1 spray (137 mcg  "total) by Nasal route 2 (two) times daily. 30 mL 3    cetirizine (ZYRTEC) 10 MG tablet Take 1 tablet (10 mg total) by mouth once daily. 30 tablet 0    metoprolol succinate (TOPROL-XL) 100 MG 24 hr tablet Take 1 tablet (100 mg total) by mouth once daily. 90 tablet 3    omega-3 fatty acids/fish oil (FISH OIL-OMEGA-3 FATTY ACIDS) 300-1,000 mg capsule Take 1 capsule by mouth once daily. 90 capsule 3    topiramate (TOPAMAX) 50 MG tablet Take 2 tablets (100 mg total) by mouth every evening. 60 tablet 11     No current facility-administered medications for this visit.       Review of patient's allergies indicates:  No Known Allergies    Well woman:  Pap:post hysterectomy  Mammo:11/2022 normal  Colonoscopy:12/2021 normal repeat in 5 years  Dexa:n/a    ROS:  As per HPI.      Exam  /63   Pulse 83   Ht 5' 2" (1.575 m)   Wt 101.6 kg (223 lb 15.8 oz)   LMP 11/17/2012 (LMP Unknown) Comment: Neg UPT  BMI 40.97 kg/m²   General: alert and oriented, no acute distress  Respiratory: normal respiratory effort  Abd: soft, non-tender, non-distended    Pelvic  Ext. Genitalia: normal external genitalia. Normal bartholin's and skeens glands  Vagina: + atrophy. Normal vaginal mucosa without lesions. Thin white discharge noted.   Non-tender bladder base without palpable mass.  +TTP in bilateral levator ani  Cervix: absent  Uterus:  absent   Urethra: no masses or tenderness  Urethral meatus: no lesions, caruncle or prolapse.    POP-Q:  Aa -3; Ba -3; C -7; Ap -2; Bp -2; D n/a.  Genital hiatus 3, perineal body 2 total vaginal length 8.            Impression  1. Urethral pain  amitriptyline (ELAVIL) 10 MG tablet      2. Vaginal atrophy        3. Pelvic floor tension              We reviewed the above issues and discussed options for short-term versus long-term management of her problems.   Plan:    Vaginal pain  --continue vaginal estrogen cream 3 times weekly    Urethral pain  --trial amitriptyline 10 mg nightly    Urinary " incontinence/ enuresis  --Empty bladder every 3 hours.  Empty well: wait a minute, lean forward on toilet.    --Avoid dietary irritants (see sheet).  Keep diary x 3  days  --KEGELS: do 10 in AM and 10 in PM, holding each x 10 seconds.  When you feel urge to go, STOP, KEGEL, and when urge has passed, then go to bathroom.  Consider PT in future.    --interstim in place--followed by urology       Irregular bowel habits.   --continue fiber supplement  --follow up with Dr. Herron    RTC 4 weeks for follow up              I spent a total of 30 minutes on the day of the visit.  This includes face to face time and non-face to face time preparing to see the patient (eg, review of tests), obtaining and/or reviewing separately obtained history, documenting clinical information in the electronic or other health record, independently interpreting results and communicating results to the patient/family/caregiver, or care coordinator.       Julianna Gomez, TRACEY-BC  [facility]  Division of Female Pelvic Medicine and Reconstructive Surgery  Department of Obstetrics & Gynecology

## 2023-11-03 ENCOUNTER — OFFICE VISIT (OUTPATIENT)
Dept: INTERNAL MEDICINE | Facility: CLINIC | Age: 38
End: 2023-11-03
Payer: MEDICARE

## 2023-11-03 ENCOUNTER — HOSPITAL ENCOUNTER (OUTPATIENT)
Dept: RADIOLOGY | Facility: HOSPITAL | Age: 38
Discharge: HOME | End: 2023-11-03
Attending: STUDENT IN AN ORGANIZED HEALTH CARE EDUCATION/TRAINING PROGRAM
Payer: MEDICARE

## 2023-11-03 VITALS
HEART RATE: 76 BPM | OXYGEN SATURATION: 97 % | DIASTOLIC BLOOD PRESSURE: 74 MMHG | SYSTOLIC BLOOD PRESSURE: 120 MMHG | BODY MASS INDEX: 40.9 KG/M2 | WEIGHT: 222.25 LBS | HEIGHT: 62 IN

## 2023-11-03 DIAGNOSIS — R05.2 SUBACUTE COUGH: ICD-10-CM

## 2023-11-03 DIAGNOSIS — Z87.09 HISTORY OF COPD: ICD-10-CM

## 2023-11-03 DIAGNOSIS — R05.8 POST-VIRAL COUGH SYNDROME: ICD-10-CM

## 2023-11-03 DIAGNOSIS — R05.8 POST-VIRAL COUGH SYNDROME: Primary | ICD-10-CM

## 2023-11-03 PROCEDURE — 1159F PR MEDICATION LIST DOCUMENTED IN MEDICAL RECORD: ICD-10-PCS | Mod: HCNC,CPTII,S$GLB, | Performed by: STUDENT IN AN ORGANIZED HEALTH CARE EDUCATION/TRAINING PROGRAM

## 2023-11-03 PROCEDURE — 3066F PR DOCUMENTATION OF TREATMENT FOR NEPHROPATHY: ICD-10-PCS | Mod: HCNC,CPTII,S$GLB, | Performed by: STUDENT IN AN ORGANIZED HEALTH CARE EDUCATION/TRAINING PROGRAM

## 2023-11-03 PROCEDURE — 3044F PR MOST RECENT HEMOGLOBIN A1C LEVEL <7.0%: ICD-10-PCS | Mod: HCNC,CPTII,S$GLB, | Performed by: STUDENT IN AN ORGANIZED HEALTH CARE EDUCATION/TRAINING PROGRAM

## 2023-11-03 PROCEDURE — 99214 PR OFFICE/OUTPT VISIT, EST, LEVL IV, 30-39 MIN: ICD-10-PCS | Mod: HCNC,S$GLB,, | Performed by: STUDENT IN AN ORGANIZED HEALTH CARE EDUCATION/TRAINING PROGRAM

## 2023-11-03 PROCEDURE — 3078F PR MOST RECENT DIASTOLIC BLOOD PRESSURE < 80 MM HG: ICD-10-PCS | Mod: HCNC,CPTII,S$GLB, | Performed by: STUDENT IN AN ORGANIZED HEALTH CARE EDUCATION/TRAINING PROGRAM

## 2023-11-03 PROCEDURE — 1159F MED LIST DOCD IN RCRD: CPT | Mod: HCNC,CPTII,S$GLB, | Performed by: STUDENT IN AN ORGANIZED HEALTH CARE EDUCATION/TRAINING PROGRAM

## 2023-11-03 PROCEDURE — 99214 OFFICE O/P EST MOD 30 MIN: CPT | Mod: HCNC,S$GLB,, | Performed by: STUDENT IN AN ORGANIZED HEALTH CARE EDUCATION/TRAINING PROGRAM

## 2023-11-03 PROCEDURE — 3074F SYST BP LT 130 MM HG: CPT | Mod: HCNC,CPTII,S$GLB, | Performed by: STUDENT IN AN ORGANIZED HEALTH CARE EDUCATION/TRAINING PROGRAM

## 2023-11-03 PROCEDURE — 3008F BODY MASS INDEX DOCD: CPT | Mod: HCNC,CPTII,S$GLB, | Performed by: STUDENT IN AN ORGANIZED HEALTH CARE EDUCATION/TRAINING PROGRAM

## 2023-11-03 PROCEDURE — 3074F PR MOST RECENT SYSTOLIC BLOOD PRESSURE < 130 MM HG: ICD-10-PCS | Mod: HCNC,CPTII,S$GLB, | Performed by: STUDENT IN AN ORGANIZED HEALTH CARE EDUCATION/TRAINING PROGRAM

## 2023-11-03 PROCEDURE — 3078F DIAST BP <80 MM HG: CPT | Mod: HCNC,CPTII,S$GLB, | Performed by: STUDENT IN AN ORGANIZED HEALTH CARE EDUCATION/TRAINING PROGRAM

## 2023-11-03 PROCEDURE — 1160F RVW MEDS BY RX/DR IN RCRD: CPT | Mod: HCNC,CPTII,S$GLB, | Performed by: STUDENT IN AN ORGANIZED HEALTH CARE EDUCATION/TRAINING PROGRAM

## 2023-11-03 PROCEDURE — 99999 PR PBB SHADOW E&M-EST. PATIENT-LVL V: CPT | Mod: PBBFAC,HCNC,, | Performed by: STUDENT IN AN ORGANIZED HEALTH CARE EDUCATION/TRAINING PROGRAM

## 2023-11-03 PROCEDURE — 3044F HG A1C LEVEL LT 7.0%: CPT | Mod: HCNC,CPTII,S$GLB, | Performed by: STUDENT IN AN ORGANIZED HEALTH CARE EDUCATION/TRAINING PROGRAM

## 2023-11-03 PROCEDURE — 3061F PR NEG MICROALBUMINURIA RESULT DOCUMENTED/REVIEW: ICD-10-PCS | Mod: HCNC,CPTII,S$GLB, | Performed by: STUDENT IN AN ORGANIZED HEALTH CARE EDUCATION/TRAINING PROGRAM

## 2023-11-03 PROCEDURE — 99999 PR PBB SHADOW E&M-EST. PATIENT-LVL V: ICD-10-PCS | Mod: PBBFAC,HCNC,, | Performed by: STUDENT IN AN ORGANIZED HEALTH CARE EDUCATION/TRAINING PROGRAM

## 2023-11-03 PROCEDURE — 3008F PR BODY MASS INDEX (BMI) DOCUMENTED: ICD-10-PCS | Mod: HCNC,CPTII,S$GLB, | Performed by: STUDENT IN AN ORGANIZED HEALTH CARE EDUCATION/TRAINING PROGRAM

## 2023-11-03 PROCEDURE — 1160F PR REVIEW ALL MEDS BY PRESCRIBER/CLIN PHARMACIST DOCUMENTED: ICD-10-PCS | Mod: HCNC,CPTII,S$GLB, | Performed by: STUDENT IN AN ORGANIZED HEALTH CARE EDUCATION/TRAINING PROGRAM

## 2023-11-03 PROCEDURE — 71046 X-RAY EXAM CHEST 2 VIEWS: CPT | Mod: 26,HCNC,, | Performed by: RADIOLOGY

## 2023-11-03 PROCEDURE — 3061F NEG MICROALBUMINURIA REV: CPT | Mod: HCNC,CPTII,S$GLB, | Performed by: STUDENT IN AN ORGANIZED HEALTH CARE EDUCATION/TRAINING PROGRAM

## 2023-11-03 PROCEDURE — 3066F NEPHROPATHY DOC TX: CPT | Mod: HCNC,CPTII,S$GLB, | Performed by: STUDENT IN AN ORGANIZED HEALTH CARE EDUCATION/TRAINING PROGRAM

## 2023-11-03 PROCEDURE — 71046 X-RAY EXAM CHEST 2 VIEWS: CPT | Mod: TC,HCNC

## 2023-11-03 PROCEDURE — 71046 XR CHEST PA AND LATERAL: ICD-10-PCS | Mod: 26,HCNC,, | Performed by: RADIOLOGY

## 2023-11-03 RX ORDER — ALBUTEROL SULFATE 90 UG/1
2 AEROSOL, METERED RESPIRATORY (INHALATION) EVERY 6 HOURS PRN
Qty: 18 G | Refills: 0 | Status: SHIPPED | OUTPATIENT
Start: 2023-11-03 | End: 2024-11-02

## 2023-11-03 RX ORDER — BENZONATATE 100 MG/1
100 CAPSULE ORAL 3 TIMES DAILY PRN
Qty: 30 CAPSULE | Refills: 1 | Status: SHIPPED | OUTPATIENT
Start: 2023-11-03 | End: 2023-11-23

## 2023-11-03 RX ORDER — IPRATROPIUM BROMIDE 21 UG/1
2 SPRAY, METERED NASAL 3 TIMES DAILY
Qty: 30 ML | Refills: 1 | Status: SHIPPED | OUTPATIENT
Start: 2023-11-03 | End: 2024-03-15

## 2023-11-03 NOTE — PATIENT INSTRUCTIONS
Your post-viral cough may last for 12 weeks or more. If it lasts longer than this, or if it acutely worsens, please return for further evaluation. In the meantime, I recommend intranasal medicine such as ipratropium (which has been prescribed) to reduce post-nasal drip and cough. You have also been prescribed tessalon for coughing episodes and an albuterol inhaler for shortness of breath.    An x-ray and a pulmonary function test has been ordered - you can schedule these at check out today.

## 2023-11-03 NOTE — PROGRESS NOTES
[facility]    CHIEF COMPLAINT:   Chief Complaint   Patient presents with    Nasal Congestion    Sore Throat    Otalgia       HISTORY OF PRESENT ILLNESS: Maria Ines Ac is a 38 y.o. female who presents here today for re-evaluation of congestion, rhinorrhea, sore throat, and ear pain. She is accompanied by her mother. Patient was evaluated by Dr. Wheat on 10/13/2023 for similar symptoms. Symptoms first started about 4 days prior to this evaluation. COVID testing at that time was negative. She was diagnosed with URI and prescribed tessalon for her symptoms. Since then her symptoms have constant - neither improving nor worsening. Patient denies coughing, wheezing, shortness of breath, chest pain, orthopnea, fever, chills, nausea, vomiting, diarrhea. Patient's mother has had similar symptoms, otherwise no sick contacts. Patient is taking tylenol which is minimally helpful. Tessalon had helped but she ran out of the medicine. Patient does report a history of COPD reportedly diagnosed [place] with a pulmonary function test a few years ago. She denies starting medication or following up with pulmonology for this.          PHYSICAL EXAM:    /74 (BP Location: Right arm, Patient Position: Sitting, BP Method: Medium (Manual))   Pulse 76   Ht 5' 2" (1.575 m)   Wt 100.8 kg (222 lb 3.6 oz)   LMP 11/17/2012 (LMP Unknown) Comment: Neg UPT  SpO2 97%   BMI 40.65 kg/m²     Physical Exam  Vitals and nursing note reviewed.   Constitutional:       General: She is not in acute distress.     Appearance: Normal appearance. She is not ill-appearing, toxic-appearing or diaphoretic.   HENT:      Head: Normocephalic and atraumatic.      Right Ear: Tympanic membrane, ear canal and external ear normal.      Left Ear: Tympanic membrane, ear canal and external ear normal.      Nose: Nose normal.      Mouth/Throat:      Mouth: Mucous membranes are moist.      Pharynx: Oropharynx is clear. Posterior " oropharyngeal erythema present. No oropharyngeal exudate.   Eyes:      Extraocular Movements: Extraocular movements intact.      Conjunctiva/sclera: Conjunctivae normal.      Pupils: Pupils are equal, round, and reactive to light.   Cardiovascular:      Rate and Rhythm: Normal rate and regular rhythm.      Heart sounds: Normal heart sounds. No murmur heard.  Pulmonary:      Effort: Pulmonary effort is normal. No respiratory distress.      Breath sounds: Normal breath sounds. No stridor. No wheezing, rhonchi or rales.   Musculoskeletal:         General: No deformity. Normal range of motion.      Cervical back: Neck supple. No tenderness.   Lymphadenopathy:      Cervical: No cervical adenopathy.   Skin:     Findings: No lesion or rash.   Neurological:      General: No focal deficit present.      Mental Status: She is alert.      Motor: No weakness.   Psychiatric:         Mood and Affect: Mood normal.         Behavior: Behavior normal.         Thought Content: Thought content normal.         Judgment: Judgment normal.             ASSESSMENT & PLAN:    Maria Ines was seen today for nasal congestion, sore throat and otalgia.    Diagnoses and all orders for this visit:    Post-viral cough syndrome  Subacute cough  Patient presenting with persistent cough and post-nasal drip following viral URI.  On exam, vitals are within normal limits. Patient is non-ill appearing and in no acute distress. Normal respiratory rate and effort. Lungs clear to ausculation without wheezing, rales, or rhonchi.  No congestion or rhinorrhea appreciated. Very minimal oropharyngeal erythema but no edema or exudate.  Discussed likely post-viral cough which can last several weeks.   Prescribed daily ipratropium + PRN benzonatate for symptom relief.   Return precautions discussed.   -     ipratropium (ATROVENT) 21 mcg (0.03 %) nasal spray; 2 sprays by Each Nostril route 3 (three) times daily.  -     benzonatate (TESSALON) 100 MG capsule; Take 1 capsule  (100 mg total) by mouth 3 (three) times daily as needed for Cough.    History of COPD  Patient reports a history of COPD reportedly diagnosed at [facility] a few years ago by PFT though patient denies being told to start medicine or f/u with pulmonology after this.  I question this diagnosis given that there is nothing in her [facility] records to suggest she has pulmonary disease and patient is low risk for COPD at age 38 and no prior tobacco use.   Updated CXR and PFT ordered to confirm diagnosis.   Albuterol rescue inhaler given post-viral cough as detailed above.   -     Complete PFT w/ bronchodilator; Future  -     X-Ray Chest PA And Lateral; Future; Expected date: 11/03/2023  -     albuterol (VENTOLIN HFA) 90 mcg/actuation inhaler; Inhale 2 puffs into the lungs every 6 (six) hours as needed for Wheezing. Rescue      I spent a total of 31 minutes on the day of the visit.  This includes face to face time and non-face to face time preparing to see the patient (eg, review of tests), obtaining and/or reviewing separately obtained history, documenting clinical information in the electronic or other health record, independently interpreting results and communicating results to the patient/family/caregiver, or care coordinator.        Zenobia Beltre MD  [facility]

## 2023-11-07 ENCOUNTER — TELEPHONE (OUTPATIENT)
Dept: GASTROENTEROLOGY | Facility: CLINIC | Age: 38
End: 2023-11-07
Payer: MEDICARE

## 2023-11-07 NOTE — TELEPHONE ENCOUNTER
----- Message from Slick Herron MD sent at 11/4/2023 11:18 AM CDT -----  Can have f/u appt in gi clinic when available time  ----- Message -----  From: Julianna Gomez NP  Sent: 11/2/2023   5:47 PM CDT  To: MD Dr. Gopal Hutton,   She has been having persistent diarrhea.  Would you (or someone in your practice) mind seeing her again in clinic?    Thanks,  Julianna

## 2023-11-08 ENCOUNTER — PATIENT MESSAGE (OUTPATIENT)
Dept: INTERNAL MEDICINE | Facility: CLINIC | Age: 38
End: 2023-11-08
Payer: MEDICARE

## 2023-11-08 ENCOUNTER — HOSPITAL ENCOUNTER (OUTPATIENT)
Dept: PULMONOLOGY | Facility: CLINIC | Age: 38
Discharge: HOME | End: 2023-11-08
Payer: MEDICARE

## 2023-11-08 DIAGNOSIS — R05.2 SUBACUTE COUGH: ICD-10-CM

## 2023-11-08 DIAGNOSIS — R05.8 POST-VIRAL COUGH SYNDROME: ICD-10-CM

## 2023-11-08 DIAGNOSIS — R94.2 ABNORMAL PFT: Primary | ICD-10-CM

## 2023-11-08 DIAGNOSIS — Z87.09 HISTORY OF COPD: ICD-10-CM

## 2023-11-08 LAB
DLCO SINGLE BREATH LLN: 19.94
DLCO SINGLE BREATH PRE REF: 60.6 %
DLCO SINGLE BREATH REF: 25.67
DLCOC SBVA LLN: 3.64
DLCOC SBVA REF: 5.2
DLCOC SINGLE BREATH LLN: 19.94
DLCOC SINGLE BREATH REF: 25.67
DLCOCSBVAULN: 6.76
DLCOCSINGLEBREATHULN: 31.4
DLCOSINGLEBREATHULN: 31.4
DLCOVA LLN: 3.64
DLCOVA PRE REF: 97.2 %
DLCOVA PRE: 5.05 ML/(MIN*MMHG*L) (ref 3.64–6.76)
DLCOVA REF: 5.2
DLCOVAULN: 6.76
FEF 25 75 LLN: 1.9
FEF 25 75 PRE REF: 95.5 %
FEF 25 75 REF: 3.15
FET100 CHG: 4.4 %
FEV05 LLN: 1.27
FEV05 REF: 2.12
FEV1 CHG: 5.2 %
FEV1 FVC LLN: 71
FEV1 FVC PRE REF: 105.5 %
FEV1 FVC REF: 82
FEV1 LLN: 2.39
FEV1 PRE REF: 71.6 %
FEV1 REF: 3
FEV1 VOL CHG: 0.11
FVC CHG: 5.5 %
FVC LLN: 2.92
FVC PRE REF: 67.5 %
FVC REF: 3.67
FVC VOL CHG: 0.14
IVC PRE: 2.55 L (ref 2.92–4.44)
IVC SINGLE BREATH LLN: 2.92
IVC SINGLE BREATH PRE REF: 69.5 %
IVC SINGLE BREATH REF: 3.67
IVCSINGLEBREATHULN: 4.44
PEF LLN: 5.26
PEF PRE REF: 81.7 %
PEF REF: 6.97
PHYSICIAN COMMENT: ABNORMAL
POST FEF 25 75: 3.14 L/S (ref 1.9–4.66)
POST FET 100: 6.22 SEC
POST FEV1 FVC: 86.48 % (ref 71.15–91.22)
POST FEV1: 2.26 L (ref 2.39–3.59)
POST FEV5: 1.92 L (ref 1.27–2.98)
POST FVC: 2.61 L (ref 2.92–4.44)
POST PEF: 6.05 L/S (ref 5.26–8.68)
PRE DLCO: 15.56 ML/(MIN*MMHG) (ref 19.94–31.4)
PRE FEF 25 75: 3 L/S (ref 1.9–4.66)
PRE FET 100: 5.95 SEC
PRE FEV05 REF: 86 %
PRE FEV1 FVC: 86.71 % (ref 71.15–91.22)
PRE FEV1: 2.15 L (ref 2.39–3.59)
PRE FEV5: 1.82 L (ref 1.27–2.98)
PRE FVC: 2.48 L (ref 2.92–4.44)
PRE PEF: 5.7 L/S (ref 5.26–8.68)
VA PRE: 3.08 L (ref 4.79–4.79)
VA SINGLE BREATH LLN: 4.79
VA SINGLE BREATH PRE REF: 64.4 %
VA SINGLE BREATH REF: 4.79
VASINGLEBREATHULN: 4.79

## 2023-11-08 PROCEDURE — 94729 DIFFUSING CAPACITY: CPT | Mod: HCNC,S$GLB,, | Performed by: INTERNAL MEDICINE

## 2023-11-08 PROCEDURE — 94060 PR EVAL OF BRONCHOSPASM: ICD-10-PCS | Mod: HCNC,S$GLB,, | Performed by: INTERNAL MEDICINE

## 2023-11-08 PROCEDURE — 94060 EVALUATION OF WHEEZING: CPT | Mod: HCNC,S$GLB,, | Performed by: INTERNAL MEDICINE

## 2023-11-08 PROCEDURE — 94729 PR C02/MEMBANE DIFFUSE CAPACITY: ICD-10-PCS | Mod: HCNC,S$GLB,, | Performed by: INTERNAL MEDICINE

## 2023-11-10 ENCOUNTER — OFFICE VISIT (OUTPATIENT)
Dept: PULMONOLOGY | Facility: CLINIC | Age: 38
End: 2023-11-10
Payer: MEDICARE

## 2023-11-10 VITALS
WEIGHT: 222 LBS | BODY MASS INDEX: 40.85 KG/M2 | HEART RATE: 84 BPM | SYSTOLIC BLOOD PRESSURE: 118 MMHG | DIASTOLIC BLOOD PRESSURE: 78 MMHG | OXYGEN SATURATION: 99 % | HEIGHT: 62 IN

## 2023-11-10 DIAGNOSIS — R05.8 POST-VIRAL COUGH SYNDROME: ICD-10-CM

## 2023-11-10 DIAGNOSIS — G47.8 SLEEP AROUSAL DISORDER: ICD-10-CM

## 2023-11-10 DIAGNOSIS — R06.83 SNORING: ICD-10-CM

## 2023-11-10 DIAGNOSIS — R94.2 ABNORMAL PFT: ICD-10-CM

## 2023-11-10 DIAGNOSIS — R51.9 UNCONTROLLED MORNING HEADACHE: Primary | ICD-10-CM

## 2023-11-10 PROCEDURE — 3078F PR MOST RECENT DIASTOLIC BLOOD PRESSURE < 80 MM HG: ICD-10-PCS | Mod: HCNC,CPTII,GC,S$GLB | Performed by: STUDENT IN AN ORGANIZED HEALTH CARE EDUCATION/TRAINING PROGRAM

## 2023-11-10 PROCEDURE — 3066F PR DOCUMENTATION OF TREATMENT FOR NEPHROPATHY: ICD-10-PCS | Mod: HCNC,CPTII,GC,S$GLB | Performed by: STUDENT IN AN ORGANIZED HEALTH CARE EDUCATION/TRAINING PROGRAM

## 2023-11-10 PROCEDURE — 99999 PR PBB SHADOW E&M-EST. PATIENT-LVL V: ICD-10-PCS | Mod: PBBFAC,HCNC,GC, | Performed by: STUDENT IN AN ORGANIZED HEALTH CARE EDUCATION/TRAINING PROGRAM

## 2023-11-10 PROCEDURE — 3044F PR MOST RECENT HEMOGLOBIN A1C LEVEL <7.0%: ICD-10-PCS | Mod: HCNC,CPTII,GC,S$GLB | Performed by: STUDENT IN AN ORGANIZED HEALTH CARE EDUCATION/TRAINING PROGRAM

## 2023-11-10 PROCEDURE — 3078F DIAST BP <80 MM HG: CPT | Mod: HCNC,CPTII,GC,S$GLB | Performed by: STUDENT IN AN ORGANIZED HEALTH CARE EDUCATION/TRAINING PROGRAM

## 2023-11-10 PROCEDURE — 99999 PR PBB SHADOW E&M-EST. PATIENT-LVL V: CPT | Mod: PBBFAC,HCNC,GC, | Performed by: STUDENT IN AN ORGANIZED HEALTH CARE EDUCATION/TRAINING PROGRAM

## 2023-11-10 PROCEDURE — 3008F PR BODY MASS INDEX (BMI) DOCUMENTED: ICD-10-PCS | Mod: HCNC,CPTII,GC,S$GLB | Performed by: STUDENT IN AN ORGANIZED HEALTH CARE EDUCATION/TRAINING PROGRAM

## 2023-11-10 PROCEDURE — 3061F PR NEG MICROALBUMINURIA RESULT DOCUMENTED/REVIEW: ICD-10-PCS | Mod: HCNC,CPTII,GC,S$GLB | Performed by: STUDENT IN AN ORGANIZED HEALTH CARE EDUCATION/TRAINING PROGRAM

## 2023-11-10 PROCEDURE — 3008F BODY MASS INDEX DOCD: CPT | Mod: HCNC,CPTII,GC,S$GLB | Performed by: STUDENT IN AN ORGANIZED HEALTH CARE EDUCATION/TRAINING PROGRAM

## 2023-11-10 PROCEDURE — 3044F HG A1C LEVEL LT 7.0%: CPT | Mod: HCNC,CPTII,GC,S$GLB | Performed by: STUDENT IN AN ORGANIZED HEALTH CARE EDUCATION/TRAINING PROGRAM

## 2023-11-10 PROCEDURE — 3061F NEG MICROALBUMINURIA REV: CPT | Mod: HCNC,CPTII,GC,S$GLB | Performed by: STUDENT IN AN ORGANIZED HEALTH CARE EDUCATION/TRAINING PROGRAM

## 2023-11-10 PROCEDURE — 3066F NEPHROPATHY DOC TX: CPT | Mod: HCNC,CPTII,GC,S$GLB | Performed by: STUDENT IN AN ORGANIZED HEALTH CARE EDUCATION/TRAINING PROGRAM

## 2023-11-10 PROCEDURE — 99204 OFFICE O/P NEW MOD 45 MIN: CPT | Mod: HCNC,GC,S$GLB, | Performed by: STUDENT IN AN ORGANIZED HEALTH CARE EDUCATION/TRAINING PROGRAM

## 2023-11-10 PROCEDURE — 3074F PR MOST RECENT SYSTOLIC BLOOD PRESSURE < 130 MM HG: ICD-10-PCS | Mod: HCNC,CPTII,GC,S$GLB | Performed by: STUDENT IN AN ORGANIZED HEALTH CARE EDUCATION/TRAINING PROGRAM

## 2023-11-10 PROCEDURE — 99204 PR OFFICE/OUTPT VISIT, NEW, LEVL IV, 45-59 MIN: ICD-10-PCS | Mod: HCNC,GC,S$GLB, | Performed by: STUDENT IN AN ORGANIZED HEALTH CARE EDUCATION/TRAINING PROGRAM

## 2023-11-10 PROCEDURE — 3074F SYST BP LT 130 MM HG: CPT | Mod: HCNC,CPTII,GC,S$GLB | Performed by: STUDENT IN AN ORGANIZED HEALTH CARE EDUCATION/TRAINING PROGRAM

## 2023-11-10 RX ORDER — FLUTICASONE PROPIONATE AND SALMETEROL 250; 50 UG/1; UG/1
1 POWDER RESPIRATORY (INHALATION) 2 TIMES DAILY
Qty: 60 EACH | Refills: 11 | Status: SHIPPED | OUTPATIENT
Start: 2023-11-10 | End: 2024-11-09

## 2023-11-10 NOTE — PROGRESS NOTES
Pulmonary Clinic Note        HPI:     38F presenting to pulmonary clinic with her mom for recent cough and wheeze. PMHx significant for HTN, HLD, DM II, polyneuropathy, hypothyroidism, GERD, IBS-D, BPPV, schizoaffective disorder, chronic migraines, BMI 40. Seen twice in urgent care setting, first mid-October, for congestion, rhinorrhea, sore throat, and ear pain. COVID testing negative. Dx with URI and prescribed tessalon for her symptoms, which have resolved except cough, and with new feeling of hearing herself wheeze. Patient does report a history of COPD reportedly diagnosed at [facility] with a pulmonary function test a few years ago. She denies starting medication or following up with pulmonology for this.      She asserts that she has heard herself wheezing at night when she reclines, and that she has had cough since recent viral illness. Her mother (who was sick at the same time) asserts that she can hear her making gasping stridorous noises intermittently during her sleep.     STOPBANG score high at 6 points.     O2 sat 99% on RA    PFTs:  FEV1/FVC: 87 (LLN 71)  FEV1: 71% pred  FVC: 68% pred  DLCO: 61% pred, 16 (LLN 20)  DLCO/VA: 97% pred  Improvement with bronchodilator, but not meeting clinical significance.      Smoking: never  Other substances: no  Travel: none contributory  Incarceration/homelessness: nil  Occupational/environmental exposures: none relevant  Birds: no   Recent illness: yes, viral URI  B-Symptoms: no  Autoimmune symptoms: no  Note: hx of endometriosis       Past Medical History:   Diagnosis Date    Blood in stool     Constipation     Cystitis     Depression     Diabetes mellitus, type 2     Dysphagia     Endometriosis     GERD (gastroesophageal reflux disease)     Headache     Hypertension     Palpitations     Premature menopause on hormone replacement therapy     Thyroid disease      Past Surgical History:   Procedure Laterality Date    24 HOUR IMPEDANCE PH MONITORING OF ESOPHAGUS  IN PATIENT TAKING ACID REDUCING MEDICATIONS N/A 6/2/2022    Procedure: IMPEDANCE PH STUDY, ESOPHAGEAL, 24 HOUR, IN PATIENT TAKING ACID REDUCING MEDICATION;  Surgeon: Debbie Butler MD;  Location: [facility] (4TH F[facility]);  Service: Endoscopy;  Laterality: N/A;  On PPI/H2 Blocker    BLADDER SUSPENSION      CARPAL TUNNEL RELEASE      right    CHOLECYSTECTOMY      COLONOSCOPY N/A 8/30/2016    Procedure: COLONOSCOPY;  Surgeon: Slick Herron MD;  Location: [facility] RON (4TH F[facility]);  Service: Endoscopy;  Laterality: N/A;  PM prep    COLONOSCOPY N/A 12/22/2021    Procedure: COLONOSCOPY. any CRS;  Surgeon: Maria Ines Jung MD;  Location: [facility] ENDO (4TH F[facility]);  Service: Endoscopy;  Laterality: N/A;  fully vaccinated -  scaral stimulator will bring remote -    12/17 lvm to confirm appt-rb    ESOPHAGEAL MANOMETRY WITH MEASUREMENT OF IMPEDANCE N/A 11/5/2018    Procedure: MANOMETRY, ESOPHAGUS, WITH IMPEDANCE MEASUREMENT;  Surgeon: Slick Herron MD;  Location: [facility] (4TH F[facility]);  Service: Endoscopy;  Laterality: N/A;    ESOPHAGEAL MANOMETRY WITH MEASUREMENT OF IMPEDANCE N/A 6/2/2022    Procedure: MANOMETRY, ESOPHAGUS, WITH IMPEDANCE MEASUREMENT;  Surgeon: Debbie Butler MD;  Location: [facility] (4TH F[facility]);  Service: Endoscopy;  Laterality: N/A;  fully vaccinated, bladder stimulator, instr mailed /emailed -ml    ESOPHAGOGASTRODUODENOSCOPY N/A 2/13/2020    Procedure: EGD (ESOPHAGOGASTRODUODENOSCOPY);  Surgeon: Slick Herron MD;  Location: [facility] (4TH F[facility]);  Service: Endoscopy;  Laterality: N/A;  pt has cardiology appt on 1/6/19-will call back after this appt to schedule-tb    FLUOROSCOPIC URODYNAMIC STUDY N/A 5/23/2019    Procedure: URODYNAMIC STUDY, FLUOROSCOPIC;  Surgeon: Zena Tee MD;  [address]  Service: Urology;  Laterality: N/A;  1 hour    GALLBLADDER SURGERY      HYSTERECTOMY  2013    [facility], Dr. Ashley Smith    IMPLANTATION OF PERMANENT SACRAL NERVE STIMULATOR N/A 7/30/2019    Procedure:  INSERTION, NEUROSTIMULATOR, PERMANENT, SACRAL;  Surgeon: Zena Tee MD;  Location: [facility] OR [address] [place] [address];  Service: Urology;  Laterality: N/A;  30 min    OOPHORECTOMY  2005    [facility]- benign cyst    OOPHORECTOMY  2013    [facility]- endo    ooptherectomy      REPLACEMENT OF NERVE STIMULATOR BATTERY N/A 2/28/2023    Procedure: Replacement, Battery, Neurostimulator;  Surgeon: Zena Tee MD;  Location: [facility] OR [address] [place] [address];  Service: Urology;  Laterality: N/A;  45 min    right knee  scoped    SHOULDER SURGERY  right       Social History:   Social History     Socioeconomic History    Marital status: Single   Tobacco Use    Smoking status: Never    Smokeless tobacco: Never   Substance and Sexual Activity    Alcohol use: No    Drug use: No    Sexual activity: Never     Birth control/protection: None   Social History Narrative    ** Merged History Encounter **          Social Determinants of Health     Financial Resource Strain: Low Risk  (2/14/2023)    Overall Financial Resource Strain (CARDIA)     Difficulty of Paying Living Expenses: Not hard at all   Food Insecurity: No Food Insecurity (2/14/2023)    Hunger Vital Sign     Worried About Running Out of Food in the Last Year: Never true     Ran Out of Food in the Last Year: Never true   Transportation Needs: No Transportation Needs (2/14/2023)    PRAPARE - Transportation     Lack of Transportation (Medical): No     Lack of Transportation (Non-Medical): No   Physical Activity: Sufficiently Active (2/14/2023)    Exercise Vital Sign     Days of Exercise per Week: 3 days     Minutes of Exercise per Session: 60 min   Stress: Stress Concern Present (2/14/2023)    [employer] [place] [employer] - Occupational Stress Questionnaire     Feeling of Stress : Very much   Social Connections: Socially Isolated (2/14/2023)    Social Connection and Isolation Panel [NHANES]     Frequency of Communication with Friends and Family: Once a week     Frequency of Social Gatherings with  Friends and Family: Once a week     Attends Gnosticist Services: Never     Active Member of Clubs or Organizations: No     Attends Club or Organization Meetings: Never     Marital Status: Never    Housing Stability: Low Risk  (2023)    Housing Stability Vital Sign     Unable to Pay for Housing in the Last Year: No     Number of Places Lived in the Last Year: 1     Unstable Housing in the Last Year: No     Family History   Problem Relation Age of Onset    Cervical cancer Maternal Grandmother         unknown age of onset,  at 80    Breast cancer Mother 50        alive at 64, unilateral    Esophageal cancer Mother 63    Ovarian cancer Mother 63    Anesthesia problems Mother     Colon cancer Brother 40    Breast cancer Maternal Aunt 72        alive at 72    Breast cancer Paternal Aunt         unknown age of onset, alive at 70    Vaginal cancer Neg Hx     Endometrial cancer Neg Hx     Crohn's disease Neg Hx     Ulcerative colitis Neg Hx     Stomach cancer Neg Hx     Irritable bowel syndrome Neg Hx     Celiac disease Neg Hx     Cancer Neg Hx        Family/Social History: Reviewed and updated.     Drug Allergies:   Review of patient's allergies indicates:  No Known Allergies    Scheduled Medications:      PRN Medications:       Review of Systems:   A comprehensive 12-point review of systems was performed, and is negative except for those items mentioned above in the HPI section of this note.     Vital Signs:    Vitals:    11/10/23 1605   BP: 118/78   Pulse: 84        Physical Exam  Constitutional:       Appearance: Normal appearance. She is obese. She is not ill-appearing or toxic-appearing.   HENT:      Head: Normocephalic and atraumatic.      Nose: Congestion present.      Mouth/Throat:      Mouth: Mucous membranes are moist.      Pharynx: Oropharynx is clear.   Eyes:      Extraocular Movements: Extraocular movements intact.      Pupils: Pupils are equal, round, and reactive to light.   Cardiovascular:       Rate and Rhythm: Normal rate and regular rhythm.      Heart sounds: Normal heart sounds.   Pulmonary:      Effort: Pulmonary effort is normal.      Breath sounds: Normal breath sounds.   Abdominal:      General: There is no distension.      Tenderness: There is no abdominal tenderness.   Musculoskeletal:         General: Normal range of motion.      Cervical back: Normal range of motion and neck supple.   Skin:     General: Skin is warm and dry.   Neurological:      General: No focal deficit present.      Mental Status: She is alert and oriented to person, place, and time.   Psychiatric:         Mood and Affect: Mood normal.         Behavior: Behavior normal.                Plan:       Problem List Items Addressed This Visit          Neuro    Uncontrolled morning headache - Primary    Current Assessment & Plan     Possibly side effect of ZAC- referred to sleep clinic.            Pulmonary    Post-viral cough syndrome    Current Assessment & Plan     PFTs without obstruction, but with some response (albeit not meeting significance) to albuterol.  Discussed with patient and family; suspect reactive airways after viral illness.  Will send advair, use BID until sx resolve and then can use PRN if needed.  Sleep clinic referral for sleep disordered breathing.            Other    Sleep arousal disorder    Overview     Polysomnography negative for ZAC as suspected initially, but concerning for sleep arousal disorder.         Current Assessment & Plan     Repeat visit to sleep clinic. Might have ZAC on repeat exam at this time, and needs follow-up in any case.         Snoring    Current Assessment & Plan     Witnessed apneas and gasping for air during sleep by parent          Other Visit Diagnoses       Abnormal PFT                  Dolly Lambert MD  [facility], PGYV

## 2023-11-10 NOTE — PROGRESS NOTES
Pt with recent viral URI with persistent cough over the last week and a half. Helped by tessalon pearles. Has an albuterol and was started on atrovent nasal spray last week by primary care. CXR clear. PFTs showing possible restriction, however patient was unable to perform lung volumes. Mildly decreased DLCO. Possible reactive airway disease brought on by URI. Has concerning subjective symptoms of ZAC including snoring, possible apnea events.   - trial of ICS/LABA  - referral to sleep medicine     Antonio Avalos MD  [facility]

## 2023-11-14 ENCOUNTER — OFFICE VISIT (OUTPATIENT)
Dept: INTERNAL MEDICINE | Facility: CLINIC | Age: 38
End: 2023-11-14
Payer: MEDICARE

## 2023-11-14 ENCOUNTER — LAB VISIT (OUTPATIENT)
Dept: LAB | Facility: HOSPITAL | Age: 38
End: 2023-11-14
Attending: INTERNAL MEDICINE
Payer: MEDICARE

## 2023-11-14 DIAGNOSIS — G47.30 SLEEP APNEA, UNSPECIFIED TYPE: ICD-10-CM

## 2023-11-14 DIAGNOSIS — I10 HYPERTENSION, UNSPECIFIED TYPE: Primary | ICD-10-CM

## 2023-11-14 DIAGNOSIS — E11.9 DIABETES MELLITUS WITHOUT COMPLICATION: ICD-10-CM

## 2023-11-14 DIAGNOSIS — I10 HYPERTENSION, UNSPECIFIED TYPE: ICD-10-CM

## 2023-11-14 DIAGNOSIS — Z12.31 SCREENING MAMMOGRAM, ENCOUNTER FOR: ICD-10-CM

## 2023-11-14 LAB
ALBUMIN SERPL BCP-MCNC: 4.3 G/DL (ref 3.5–5.2)
ALP SERPL-CCNC: 76 U/L (ref 55–135)
ALT SERPL W/O P-5'-P-CCNC: 54 U/L (ref 10–44)
ANION GAP SERPL CALC-SCNC: 10 MMOL/L (ref 8–16)
AST SERPL-CCNC: 48 U/L (ref 10–40)
BASOPHILS # BLD AUTO: 0.07 K/UL (ref 0–0.2)
BASOPHILS NFR BLD: 0.9 % (ref 0–1.9)
BILIRUB SERPL-MCNC: 0.3 MG/DL (ref 0.1–1)
BUN SERPL-MCNC: 10 MG/DL (ref 6–20)
CALCIUM SERPL-MCNC: 10.1 MG/DL (ref 8.7–10.5)
CHLORIDE SERPL-SCNC: 102 MMOL/L (ref 95–110)
CO2 SERPL-SCNC: 26 MMOL/L (ref 23–29)
CREAT SERPL-MCNC: 0.9 MG/DL (ref 0.5–1.4)
DIFFERENTIAL METHOD: NORMAL
EOSINOPHIL # BLD AUTO: 0.3 K/UL (ref 0–0.5)
EOSINOPHIL NFR BLD: 4.3 % (ref 0–8)
ERYTHROCYTE [DISTWIDTH] IN BLOOD BY AUTOMATED COUNT: 12.5 % (ref 11.5–14.5)
EST. GFR  (NO RACE VARIABLE): >60 ML/MIN/1.73 M^2
ESTIMATED AVG GLUCOSE: 117 MG/DL (ref 68–131)
GLUCOSE SERPL-MCNC: 79 MG/DL (ref 70–110)
HBA1C MFR BLD: 5.7 % (ref 4–5.6)
HCT VFR BLD AUTO: 41.8 % (ref 37–48.5)
HGB BLD-MCNC: 13.6 G/DL (ref 12–16)
IMM GRANULOCYTES # BLD AUTO: 0.02 K/UL (ref 0–0.04)
IMM GRANULOCYTES NFR BLD AUTO: 0.3 % (ref 0–0.5)
LYMPHOCYTES # BLD AUTO: 3.1 K/UL (ref 1–4.8)
LYMPHOCYTES NFR BLD: 41.3 % (ref 18–48)
MCH RBC QN AUTO: 29.1 PG (ref 27–31)
MCHC RBC AUTO-ENTMCNC: 32.5 G/DL (ref 32–36)
MCV RBC AUTO: 90 FL (ref 82–98)
MONOCYTES # BLD AUTO: 0.6 K/UL (ref 0.3–1)
MONOCYTES NFR BLD: 7.2 % (ref 4–15)
NEUTROPHILS # BLD AUTO: 3.5 K/UL (ref 1.8–7.7)
NEUTROPHILS NFR BLD: 46 % (ref 38–73)
NRBC BLD-RTO: 0 /100 WBC
PLATELET # BLD AUTO: 260 K/UL (ref 150–450)
PMV BLD AUTO: 11.8 FL (ref 9.2–12.9)
POTASSIUM SERPL-SCNC: 4.3 MMOL/L (ref 3.5–5.1)
PROT SERPL-MCNC: 8.1 G/DL (ref 6–8.4)
RBC # BLD AUTO: 4.67 M/UL (ref 4–5.4)
SODIUM SERPL-SCNC: 138 MMOL/L (ref 136–145)
TSH SERPL DL<=0.005 MIU/L-ACNC: 1.82 UIU/ML (ref 0.4–4)
WBC # BLD AUTO: 7.61 K/UL (ref 3.9–12.7)

## 2023-11-14 PROCEDURE — 83036 HEMOGLOBIN GLYCOSYLATED A1C: CPT | Mod: HCNC | Performed by: INTERNAL MEDICINE

## 2023-11-14 PROCEDURE — 3066F PR DOCUMENTATION OF TREATMENT FOR NEPHROPATHY: ICD-10-PCS | Mod: HCNC,CPTII,S$GLB, | Performed by: INTERNAL MEDICINE

## 2023-11-14 PROCEDURE — 3008F PR BODY MASS INDEX (BMI) DOCUMENTED: ICD-10-PCS | Mod: HCNC,CPTII,S$GLB, | Performed by: INTERNAL MEDICINE

## 2023-11-14 PROCEDURE — 3078F DIAST BP <80 MM HG: CPT | Mod: HCNC,CPTII,S$GLB, | Performed by: INTERNAL MEDICINE

## 2023-11-14 PROCEDURE — 99999 PR PBB SHADOW E&M-EST. PATIENT-LVL V: ICD-10-PCS | Mod: PBBFAC,HCNC,, | Performed by: INTERNAL MEDICINE

## 2023-11-14 PROCEDURE — 3044F HG A1C LEVEL LT 7.0%: CPT | Mod: HCNC,CPTII,S$GLB, | Performed by: INTERNAL MEDICINE

## 2023-11-14 PROCEDURE — 85025 COMPLETE CBC W/AUTO DIFF WBC: CPT | Mod: HCNC | Performed by: INTERNAL MEDICINE

## 2023-11-14 PROCEDURE — 3078F PR MOST RECENT DIASTOLIC BLOOD PRESSURE < 80 MM HG: ICD-10-PCS | Mod: HCNC,CPTII,S$GLB, | Performed by: INTERNAL MEDICINE

## 2023-11-14 PROCEDURE — 3044F PR MOST RECENT HEMOGLOBIN A1C LEVEL <7.0%: ICD-10-PCS | Mod: HCNC,CPTII,S$GLB, | Performed by: INTERNAL MEDICINE

## 2023-11-14 PROCEDURE — 99214 OFFICE O/P EST MOD 30 MIN: CPT | Mod: HCNC,S$GLB,, | Performed by: INTERNAL MEDICINE

## 2023-11-14 PROCEDURE — 3066F NEPHROPATHY DOC TX: CPT | Mod: HCNC,CPTII,S$GLB, | Performed by: INTERNAL MEDICINE

## 2023-11-14 PROCEDURE — 80053 COMPREHEN METABOLIC PANEL: CPT | Mod: HCNC | Performed by: INTERNAL MEDICINE

## 2023-11-14 PROCEDURE — 3061F PR NEG MICROALBUMINURIA RESULT DOCUMENTED/REVIEW: ICD-10-PCS | Mod: HCNC,CPTII,S$GLB, | Performed by: INTERNAL MEDICINE

## 2023-11-14 PROCEDURE — 36415 COLL VENOUS BLD VENIPUNCTURE: CPT | Mod: HCNC | Performed by: INTERNAL MEDICINE

## 2023-11-14 PROCEDURE — 99999 PR PBB SHADOW E&M-EST. PATIENT-LVL V: CPT | Mod: PBBFAC,HCNC,, | Performed by: INTERNAL MEDICINE

## 2023-11-14 PROCEDURE — 3074F SYST BP LT 130 MM HG: CPT | Mod: HCNC,CPTII,S$GLB, | Performed by: INTERNAL MEDICINE

## 2023-11-14 PROCEDURE — 3074F PR MOST RECENT SYSTOLIC BLOOD PRESSURE < 130 MM HG: ICD-10-PCS | Mod: HCNC,CPTII,S$GLB, | Performed by: INTERNAL MEDICINE

## 2023-11-14 PROCEDURE — 99214 PR OFFICE/OUTPT VISIT, EST, LEVL IV, 30-39 MIN: ICD-10-PCS | Mod: HCNC,S$GLB,, | Performed by: INTERNAL MEDICINE

## 2023-11-14 PROCEDURE — 3061F NEG MICROALBUMINURIA REV: CPT | Mod: HCNC,CPTII,S$GLB, | Performed by: INTERNAL MEDICINE

## 2023-11-14 PROCEDURE — 3008F BODY MASS INDEX DOCD: CPT | Mod: HCNC,CPTII,S$GLB, | Performed by: INTERNAL MEDICINE

## 2023-11-14 PROCEDURE — 84443 ASSAY THYROID STIM HORMONE: CPT | Mod: HCNC | Performed by: INTERNAL MEDICINE

## 2023-11-14 RX ORDER — AMOXICILLIN 875 MG/1
875 TABLET, FILM COATED ORAL 2 TIMES DAILY
Qty: 14 TABLET | Refills: 0 | Status: SHIPPED | OUTPATIENT
Start: 2023-11-14 | End: 2023-11-21

## 2023-11-15 ENCOUNTER — OFFICE VISIT (OUTPATIENT)
Dept: SLEEP MEDICINE | Facility: CLINIC | Age: 38
End: 2023-11-15
Payer: MEDICARE

## 2023-11-15 VITALS
HEIGHT: 62 IN | HEART RATE: 84 BPM | WEIGHT: 219.81 LBS | DIASTOLIC BLOOD PRESSURE: 72 MMHG | SYSTOLIC BLOOD PRESSURE: 105 MMHG | BODY MASS INDEX: 40.45 KG/M2

## 2023-11-15 DIAGNOSIS — R06.81 WITNESSED EPISODE OF APNEA: ICD-10-CM

## 2023-11-15 DIAGNOSIS — R06.83 SNORING: ICD-10-CM

## 2023-11-15 DIAGNOSIS — F51.09 OTHER INSOMNIA NOT DUE TO A SUBSTANCE OR KNOWN PHYSIOLOGICAL CONDITION: ICD-10-CM

## 2023-11-15 DIAGNOSIS — G47.10 HYPERSOMNOLENCE: Primary | ICD-10-CM

## 2023-11-15 DIAGNOSIS — G47.30 SLEEP APNEA, UNSPECIFIED TYPE: ICD-10-CM

## 2023-11-15 DIAGNOSIS — R35.1 NOCTURIA: ICD-10-CM

## 2023-11-15 PROCEDURE — 99999 PR PBB SHADOW E&M-EST. PATIENT-LVL V: CPT | Mod: PBBFAC,HCNC,, | Performed by: PHYSICIAN ASSISTANT

## 2023-11-15 PROCEDURE — 1160F RVW MEDS BY RX/DR IN RCRD: CPT | Mod: HCNC,CPTII,S$GLB, | Performed by: PHYSICIAN ASSISTANT

## 2023-11-15 PROCEDURE — 1159F PR MEDICATION LIST DOCUMENTED IN MEDICAL RECORD: ICD-10-PCS | Mod: HCNC,CPTII,S$GLB, | Performed by: PHYSICIAN ASSISTANT

## 2023-11-15 PROCEDURE — 3066F NEPHROPATHY DOC TX: CPT | Mod: HCNC,CPTII,S$GLB, | Performed by: PHYSICIAN ASSISTANT

## 2023-11-15 PROCEDURE — 3044F PR MOST RECENT HEMOGLOBIN A1C LEVEL <7.0%: ICD-10-PCS | Mod: HCNC,CPTII,S$GLB, | Performed by: PHYSICIAN ASSISTANT

## 2023-11-15 PROCEDURE — 1159F MED LIST DOCD IN RCRD: CPT | Mod: HCNC,CPTII,S$GLB, | Performed by: PHYSICIAN ASSISTANT

## 2023-11-15 PROCEDURE — 1160F PR REVIEW ALL MEDS BY PRESCRIBER/CLIN PHARMACIST DOCUMENTED: ICD-10-PCS | Mod: HCNC,CPTII,S$GLB, | Performed by: PHYSICIAN ASSISTANT

## 2023-11-15 PROCEDURE — 3008F PR BODY MASS INDEX (BMI) DOCUMENTED: ICD-10-PCS | Mod: HCNC,CPTII,S$GLB, | Performed by: PHYSICIAN ASSISTANT

## 2023-11-15 PROCEDURE — 3078F DIAST BP <80 MM HG: CPT | Mod: HCNC,CPTII,S$GLB, | Performed by: PHYSICIAN ASSISTANT

## 2023-11-15 PROCEDURE — 3074F PR MOST RECENT SYSTOLIC BLOOD PRESSURE < 130 MM HG: ICD-10-PCS | Mod: HCNC,CPTII,S$GLB, | Performed by: PHYSICIAN ASSISTANT

## 2023-11-15 PROCEDURE — 3008F BODY MASS INDEX DOCD: CPT | Mod: HCNC,CPTII,S$GLB, | Performed by: PHYSICIAN ASSISTANT

## 2023-11-15 PROCEDURE — 3078F PR MOST RECENT DIASTOLIC BLOOD PRESSURE < 80 MM HG: ICD-10-PCS | Mod: HCNC,CPTII,S$GLB, | Performed by: PHYSICIAN ASSISTANT

## 2023-11-15 PROCEDURE — 3044F HG A1C LEVEL LT 7.0%: CPT | Mod: HCNC,CPTII,S$GLB, | Performed by: PHYSICIAN ASSISTANT

## 2023-11-15 PROCEDURE — 3061F PR NEG MICROALBUMINURIA RESULT DOCUMENTED/REVIEW: ICD-10-PCS | Mod: HCNC,CPTII,S$GLB, | Performed by: PHYSICIAN ASSISTANT

## 2023-11-15 PROCEDURE — 3066F PR DOCUMENTATION OF TREATMENT FOR NEPHROPATHY: ICD-10-PCS | Mod: HCNC,CPTII,S$GLB, | Performed by: PHYSICIAN ASSISTANT

## 2023-11-15 PROCEDURE — 99999 PR PBB SHADOW E&M-EST. PATIENT-LVL V: ICD-10-PCS | Mod: PBBFAC,HCNC,, | Performed by: PHYSICIAN ASSISTANT

## 2023-11-15 PROCEDURE — 3074F SYST BP LT 130 MM HG: CPT | Mod: HCNC,CPTII,S$GLB, | Performed by: PHYSICIAN ASSISTANT

## 2023-11-15 PROCEDURE — 99214 OFFICE O/P EST MOD 30 MIN: CPT | Mod: HCNC,S$GLB,, | Performed by: PHYSICIAN ASSISTANT

## 2023-11-15 PROCEDURE — 99214 PR OFFICE/OUTPT VISIT, EST, LEVL IV, 30-39 MIN: ICD-10-PCS | Mod: HCNC,S$GLB,, | Performed by: PHYSICIAN ASSISTANT

## 2023-11-15 PROCEDURE — 3061F NEG MICROALBUMINURIA REV: CPT | Mod: HCNC,CPTII,S$GLB, | Performed by: PHYSICIAN ASSISTANT

## 2023-11-15 NOTE — PROGRESS NOTES
Referred by Luann Raymond MD     ESTABLISHED PATIENT VISIT  New to me. Previously followed by dr Alvarado. Last visit 12/10/20    Maria Ines Ac  is a pleasant 38 y.o. female  with PMH significant for HTN, HLD, DM II, polyneuropathy, hypothyroidism, GERD, IBS-D, BPPV, schizoaffective disorder, chronic migraines, BMI 40+ who presents for sleep evaluation following referral from PCP        Reports trouble sleeping for many years. More recently sx worsening. Worsening snoring with witnessed gasping in sleep, poor sleep with frequent wakings, non-restorative sleep, daytime sleepiness.  States PCP recommended Re-evaluation for ZAC, which is why she presents today          Past Medical History:   Diagnosis Date    Blood in stool     Constipation     Cystitis     Depression     Diabetes mellitus, type 2     Dysphagia     Endometriosis     GERD (gastroesophageal reflux disease)     Headache     Hypertension     Palpitations     Premature menopause on hormone replacement therapy     Thyroid disease      Patient Active Problem List   Diagnosis    Abdominal pain, RLQ (right lower quadrant)    Dysphagia    Diabetes mellitus, type II    Right hip pain    Intractable chronic migraine without aura and without status migrainosus    Hypertension associated with diabetes    Palpitations    Dysfunctional voiding of urine    Severe obesity (BMI 35.0-39.9) with comorbidity    Muscle spasm    Mixed stress and urge urinary incontinence    Cervical myofascial pain syndrome    Gastroesophageal reflux disease without esophagitis    Irritable bowel syndrome with diarrhea    Migrainous vertigo    Uncontrolled morning headache    Sleep arousal disorder    Hyperlipidemia associated with type 2 diabetes mellitus    GERD (gastroesophageal reflux disease)    Family history of breast cancer    Family hx of ovarian malignancy    Weakness    Painful cervical ROM    Incomplete bladder emptying    Bilateral occipital neuralgia    Medication  overuse headache    Benign paroxysmal positional vertigo due to bilateral vestibular disorder    Chronic daily headache    Dizziness    Decreased functional mobility    Diabetic polyneuropathy associated with type 2 diabetes mellitus    Snoring    Intractable migraine with aura without status migrainosus    Schizoaffective disorder, depressive type    Hypothyroidism    Decreased range of motion of right ankle    Impaired functional mobility, balance, gait, and endurance    Decreased strength of lower extremity    Chronic pain of right ankle    Post-viral cough syndrome       Current Outpatient Medications:     ACCU-CHEK AMAURY PLUS TEST STRP Strp, USE TO TEST BLOOD GLUCOSE TID, Disp: , Rfl: 9    ACCU-CHEK SOFTCLIX LANCETS Misc, USE TO TEST BLOOD GLUCOSE TID, Disp: , Rfl: 3    albuterol (VENTOLIN HFA) 90 mcg/actuation inhaler, Inhale 2 puffs into the lungs every 6 (six) hours as needed for Wheezing. Rescue, Disp: 18 g, Rfl: 0    amitriptyline (ELAVIL) 10 MG tablet, Take 1 tablet (10 mg total) by mouth nightly., Disp: 30 tablet, Rfl: 11    amoxicillin (AMOXIL) 875 MG tablet, Take 1 tablet (875 mg total) by mouth 2 (two) times a day. for 7 days, Disp: 14 tablet, Rfl: 0    atorvastatin (LIPITOR) 80 MG tablet, Take 80 mg by mouth every evening., Disp: , Rfl:     benzonatate (TESSALON) 100 MG capsule, Take 1 capsule (100 mg total) by mouth 3 (three) times daily as needed for Cough., Disp: 30 capsule, Rfl: 1    BLOOD SUGAR DIAGNOSTIC (ACCU-CHEK AMAURY PLUS TEST STRP MISC), 1 strip by Misc.(Non-Drug; Combo Route) route 3 (three) times daily., Disp: , Rfl:     buPROPion (WELLBUTRIN XL) 150 MG TB24 tablet, Take 150 mg by mouth every morning., Disp: , Rfl:     chlorhexidine (PERIDEX) 0.12 % solution, , Disp: , Rfl:     ciclopirox (PENLAC) 8 % Soln, Apply topically nightly., Disp: 6.6 mL, Rfl: 11    dexlansoprazole (DEXILANT) 60 mg capsule, Take 1 capsule (60 mg total) by mouth 2 (two) times a day., Disp: 60 capsule, Rfl: 11     diclofenac sodium (VOLTAREN) 1 % Gel, Apply 2 g topically 4 (four) times daily. As needed for breast pain, Disp: 1 Tube, Rfl: 1    dicyclomine (BENTYL) 10 MG capsule, TAKE 2 CAPSULES(20 MG) BY MOUTH THREE TIMES DAILY BEFORE MEALS, Disp: 180 capsule, Rfl: 0    diltiazem HCl (DILTIAZEM 2% CREAM), Apply topically 3 (three) times daily. Apply topically to anal area., Disp: 30 g, Rfl: 2    docusate sodium (COLACE) 100 MG capsule, Take 1 capsule (100 mg total) by mouth 2 (two) times daily., Disp: 60 capsule, Rfl: 0    estradioL (ESTRACE) 0.01 % (0.1 mg/gram) vaginal cream, Place 1 g vaginally once daily., Disp: 42.5 g, Rfl: 3    FARXIGA 10 mg Tab, TK 1 T PO QD, Disp: , Rfl: 7    FLUARIX QUAD 4364-6633, PF, 60 mcg (15 mcg x 4)/0.5 mL Syrg, , Disp: , Rfl:     fluticasone propionate (FLONASE) 50 mcg/actuation nasal spray, SHAKE LIQUID AND USE 2 SPRAYS(100 MCG) IN EACH NOSTRIL EVERY DAY, Disp: 48 g, Rfl: 2    fluticasone-salmeterol diskus inhaler 250-50 mcg, Inhale 1 puff into the lungs 2 (two) times daily. Controller, Disp: 60 each, Rfl: 11    gabapentin (NEURONTIN) 300 MG capsule, Take 600 mg (two capsules) in the morning and 900mg (three capsules) at night before bed, Disp: 150 capsule, Rfl: 11    GLUCOPHAGE 500 mg tablet, Take 500 mg by mouth 2 (two) times daily with meals. , Disp: , Rfl:     ibuprofen (ADVIL,MOTRIN) 800 MG tablet, 800 mg every 4 (four) hours as needed. , Disp: , Rfl: 0    ipratropium (ATROVENT) 21 mcg (0.03 %) nasal spray, 2 sprays by Each Nostril route 3 (three) times daily., Disp: 30 mL, Rfl: 1    levothyroxine (SYNTHROID) 75 MCG tablet, Take 75 mcg by mouth before breakfast., Disp: , Rfl:     meloxicam (MOBIC) 15 MG tablet, , Disp: , Rfl:     methen-m.blue-s.phos-phsal-hyo (URIBEL) 118-10-40.8-36 mg Cap, Take 1 capsule by mouth every 6 (six) hours as needed (bladder pain)., Disp: 120 capsule, Rfl: 11    metoprolol succinate (TOPROL-XL) 100 MG 24 hr tablet, Take 1 tablet (100 mg total) by mouth once  daily., Disp: 90 tablet, Rfl: 3    mometasone 0.1% (ELOCON) 0.1 % cream, APPLY TOPICALLY TO THE RASH ON HANDS TWICE DAILY AS NEEDED FOR TWO TO THREE WEEKS. USE SPARINGLY, Disp: , Rfl:     omega-3 fatty acids/fish oil (FISH OIL-OMEGA-3 FATTY ACIDS) 300-1,000 mg capsule, Take 1 capsule by mouth once daily., Disp: 90 capsule, Rfl: 3    omeprazole (PRILOSEC OTC) 20 MG tablet, Take 20 mg by mouth once daily., Disp: , Rfl:     oxyCODONE (ROXICODONE) 5 MG immediate release tablet, Take 1 tablet (5 mg total) by mouth every 4 (four) hours as needed for Pain., Disp: 5 tablet, Rfl: 0    phentermine (ADIPEX-P) 37.5 mg tablet, Take 37.5 mg by mouth., Disp: , Rfl:     phentermine 37.5 MG capsule, TK 1 C PO ONCE D IN THE MORNING, Disp: , Rfl:     REXULTI 4 mg Tab, Take 1 tablet by mouth., Disp: , Rfl:     spironolactone (ALDACTONE) 25 MG tablet, Take by mouth., Disp: , Rfl:     topiramate (TOPAMAX) 50 MG tablet, Take 2 tablets (100 mg total) by mouth every evening., Disp: 60 tablet, Rfl: 11    traZODone (DESYREL) 100 MG tablet, TK 1 T PO HS, Disp: , Rfl: 1    TRULICITY 4.5 mg/0.5 mL pen injector, , Disp: , Rfl:     ZOLOFT 100 mg tablet, Take 200 mg by mouth once daily. , Disp: , Rfl:      There were no vitals filed for this visit.    Physical Exam:    GEN:   Well-appearing  Psych:  Appropriate affect, demonstrates insight  SKIN:  No rash on the face or bridge of the nose      LABS:   Lab Results   Component Value Date    HGB 13.6 11/14/2023    CO2 26 11/14/2023       RECORDS REVIEWED PREVIOUSLY:    PSG 6/14/18: AHI 0  PSG 1/19/21: AHI 0  PSG 3/8/21: AHI 2.2 (REM AHI 9.2)    ASSESSMENT     [place] Sleepiness Scale:  Sitting and reading:   3  Watching TV:    3  Passenger in a car x 1 hr:  3  Sitting quietly after lunch:  3  Lying down to rest in PM:  3  Sitting, inactive in public:  1  Sitting+ talking to someone:  1  Stopped in traffic:   0  Total    17/24     PROBLEM DESCRIPTION/ Sx on Presentation Interval Hx STATUS   sx ZAC   +  snoring, denies witnessed apneas  + wakes feeling un-refreshed Sx worsening New to me   Daytime Sx   + sleepiness when inactive   Naps 3-4 x weekly  ESS 16/24 on intake (reviewed from 12/10/20) Sx worsening New to me   Insomnia   Trouble falling asleep: difficult (uses phone in bed)  Maintenance:         wakes frequently, return to sleep is quick  Prior hypnotics:        Current hypnotics: trazodone 50mg PRN   Persists New to me   Nocturia   x 2-3 per sleep period Persists  New to me   Other issues:     PLAN     -recommend sleep testing to re-assess for ZAC  -PSG ordered  -consider MSLT if PSG negative and hypersomnolence persists   -discussed trial therapy if ZAC present and the patient is open to a trial of CPAP therapy  -discussed ZAC and CPAP with patient in detail, including possible complications of untreated ZAC like heart attack/stroke  -advised on strict driving precautions; advised never to drive drowsy    Advised on plan of care. Answered all patient questions. Patient verbalized understanding and voiced agreement with plan of care.       RTC if dx of ZAC made and CPAP ordered, will need follow up 31-90 days after receiving machine for compliance      The patient was given open opportunity to ask questions and/or express concerns about treatment plan. All questions/concerns were discussed.     Two patient identifiers used prior to evaluation.

## 2023-11-16 ENCOUNTER — TELEPHONE (OUTPATIENT)
Dept: SLEEP MEDICINE | Facility: OTHER | Age: 38
End: 2023-11-16
Payer: MEDICARE

## 2023-11-17 NOTE — ASSESSMENT & PLAN NOTE
PFTs without obstruction, but with some response (albeit not meeting significance) to albuterol.  Discussed with patient and family; suspect reactive airways after viral illness.  Will send advair, use BID until sx resolve and then can use PRN if needed.  Sleep clinic referral for sleep disordered breathing.

## 2023-11-17 NOTE — ASSESSMENT & PLAN NOTE
Repeat visit to sleep clinic. Might have ZAC on repeat exam at this time, and needs follow-up in any case.

## 2023-11-18 VITALS
HEIGHT: 62 IN | TEMPERATURE: 99 F | OXYGEN SATURATION: 95 % | HEART RATE: 77 BPM | SYSTOLIC BLOOD PRESSURE: 116 MMHG | WEIGHT: 219.81 LBS | BODY MASS INDEX: 40.45 KG/M2 | DIASTOLIC BLOOD PRESSURE: 72 MMHG

## 2023-11-19 NOTE — PROGRESS NOTES
Subjective:       Patient ID: Maria Ines Ac is a 38 y.o. female.    Chief Complaint: Hypertension    HPI  She returns for management of hypertension.  She has had hypertension for over a year.  Current treatment has included medications outlined in medication list.  She denies chest pain or shortness of breath.  No palpitations.  Denies left arm or neck pain.  She has diabetes.  Denies polyuria, polydipsia       Past medical history: Hypertension, hyperlipidemia, diabetes, hypothyroidism, bipolar disorder, migraine headaches, status post hysterectomy, family history of colon cancer-brother.  She had a colonoscopy December 2021      Medications: Synthroid , metformin, Toprol-XL ,Lipitor 80 mg daily, trulicity      NO KNOWN DRUG ALLERGIES        Review of Systems   Constitutional:  Negative for chills, fatigue, fever and unexpected weight change.   Respiratory:  Negative for chest tightness and shortness of breath.    Cardiovascular:  Negative for chest pain and palpitations.   Gastrointestinal:  Negative for abdominal pain and blood in stool.   Neurological:  Negative for dizziness, syncope, numbness and headaches.       Objective:      Physical Exam  HENT:      Right Ear: External ear normal.      Left Ear: External ear normal.      Nose: Nose normal.      Mouth/Throat:      Mouth: Mucous membranes are moist.      Pharynx: Oropharynx is clear.   Eyes:      Pupils: Pupils are equal, round, and reactive to light.   Cardiovascular:      Rate and Rhythm: Normal rate and regular rhythm.      Heart sounds: No murmur heard.  Pulmonary:      Breath sounds: Normal breath sounds.   Abdominal:      General: There is no distension.      Palpations: There is no hepatomegaly or splenomegaly.      Tenderness: There is no abdominal tenderness.   Musculoskeletal:      Cervical back: Normal range of motion.   Lymphadenopathy:      Cervical: No cervical adenopathy.      Upper Body:      Right upper body: No axillary  adenopathy.      Left upper body: No axillary adenopathy.   Neurological:      Cranial Nerves: No cranial nerve deficit.      Sensory: No sensory deficit.      Motor: Motor function is intact.      Deep Tendon Reflexes: Reflexes are normal and symmetric.         Assessment/Plan       Assessment and plan:  1.  Hypertension:  Controlled.  Continue Toprol.  Check CMP, CBC  2.  Diabetes:  Check A1c and TSH

## 2023-11-20 RX ORDER — METOPROLOL SUCCINATE 100 MG/1
100 TABLET, EXTENDED RELEASE ORAL DAILY
Qty: 90 TABLET | Refills: 3 | Status: SHIPPED | OUTPATIENT
Start: 2023-11-20 | End: 2024-11-19

## 2023-11-21 ENCOUNTER — TELEPHONE (OUTPATIENT)
Dept: GASTROENTEROLOGY | Facility: CLINIC | Age: 38
End: 2023-11-21

## 2023-11-21 ENCOUNTER — OFFICE VISIT (OUTPATIENT)
Dept: GASTROENTEROLOGY | Facility: CLINIC | Age: 38
End: 2023-11-21
Payer: MEDICARE

## 2023-11-21 VITALS
BODY MASS INDEX: 40.33 KG/M2 | WEIGHT: 219.13 LBS | DIASTOLIC BLOOD PRESSURE: 79 MMHG | SYSTOLIC BLOOD PRESSURE: 115 MMHG | HEART RATE: 87 BPM | HEIGHT: 62 IN

## 2023-11-21 DIAGNOSIS — K21.9 GASTROESOPHAGEAL REFLUX DISEASE WITHOUT ESOPHAGITIS: ICD-10-CM

## 2023-11-21 DIAGNOSIS — K31.84 GASTROPARESIS: Primary | ICD-10-CM

## 2023-11-21 DIAGNOSIS — K58.0 IRRITABLE BOWEL SYNDROME WITH DIARRHEA: ICD-10-CM

## 2023-11-21 PROCEDURE — 1160F PR REVIEW ALL MEDS BY PRESCRIBER/CLIN PHARMACIST DOCUMENTED: ICD-10-PCS | Mod: HCNC,CPTII,S$GLB, | Performed by: INTERNAL MEDICINE

## 2023-11-21 PROCEDURE — 1159F PR MEDICATION LIST DOCUMENTED IN MEDICAL RECORD: ICD-10-PCS | Mod: HCNC,CPTII,S$GLB, | Performed by: INTERNAL MEDICINE

## 2023-11-21 PROCEDURE — 3066F PR DOCUMENTATION OF TREATMENT FOR NEPHROPATHY: ICD-10-PCS | Mod: HCNC,CPTII,S$GLB, | Performed by: INTERNAL MEDICINE

## 2023-11-21 PROCEDURE — 3074F SYST BP LT 130 MM HG: CPT | Mod: HCNC,CPTII,S$GLB, | Performed by: INTERNAL MEDICINE

## 2023-11-21 PROCEDURE — 3074F PR MOST RECENT SYSTOLIC BLOOD PRESSURE < 130 MM HG: ICD-10-PCS | Mod: HCNC,CPTII,S$GLB, | Performed by: INTERNAL MEDICINE

## 2023-11-21 PROCEDURE — 3061F NEG MICROALBUMINURIA REV: CPT | Mod: HCNC,CPTII,S$GLB, | Performed by: INTERNAL MEDICINE

## 2023-11-21 PROCEDURE — 3044F HG A1C LEVEL LT 7.0%: CPT | Mod: HCNC,CPTII,S$GLB, | Performed by: INTERNAL MEDICINE

## 2023-11-21 PROCEDURE — 1159F MED LIST DOCD IN RCRD: CPT | Mod: HCNC,CPTII,S$GLB, | Performed by: INTERNAL MEDICINE

## 2023-11-21 PROCEDURE — 3066F NEPHROPATHY DOC TX: CPT | Mod: HCNC,CPTII,S$GLB, | Performed by: INTERNAL MEDICINE

## 2023-11-21 PROCEDURE — 3078F PR MOST RECENT DIASTOLIC BLOOD PRESSURE < 80 MM HG: ICD-10-PCS | Mod: HCNC,CPTII,S$GLB, | Performed by: INTERNAL MEDICINE

## 2023-11-21 PROCEDURE — 99999 PR PBB SHADOW E&M-EST. PATIENT-LVL V: ICD-10-PCS | Mod: PBBFAC,HCNC,, | Performed by: INTERNAL MEDICINE

## 2023-11-21 PROCEDURE — 3061F PR NEG MICROALBUMINURIA RESULT DOCUMENTED/REVIEW: ICD-10-PCS | Mod: HCNC,CPTII,S$GLB, | Performed by: INTERNAL MEDICINE

## 2023-11-21 PROCEDURE — 99214 OFFICE O/P EST MOD 30 MIN: CPT | Mod: HCNC,S$GLB,, | Performed by: INTERNAL MEDICINE

## 2023-11-21 PROCEDURE — 3008F BODY MASS INDEX DOCD: CPT | Mod: HCNC,CPTII,S$GLB, | Performed by: INTERNAL MEDICINE

## 2023-11-21 PROCEDURE — 3078F DIAST BP <80 MM HG: CPT | Mod: HCNC,CPTII,S$GLB, | Performed by: INTERNAL MEDICINE

## 2023-11-21 PROCEDURE — 99214 PR OFFICE/OUTPT VISIT, EST, LEVL IV, 30-39 MIN: ICD-10-PCS | Mod: HCNC,S$GLB,, | Performed by: INTERNAL MEDICINE

## 2023-11-21 PROCEDURE — 99999 PR PBB SHADOW E&M-EST. PATIENT-LVL V: CPT | Mod: PBBFAC,HCNC,, | Performed by: INTERNAL MEDICINE

## 2023-11-21 PROCEDURE — 3044F PR MOST RECENT HEMOGLOBIN A1C LEVEL <7.0%: ICD-10-PCS | Mod: HCNC,CPTII,S$GLB, | Performed by: INTERNAL MEDICINE

## 2023-11-21 PROCEDURE — 3008F PR BODY MASS INDEX (BMI) DOCUMENTED: ICD-10-PCS | Mod: HCNC,CPTII,S$GLB, | Performed by: INTERNAL MEDICINE

## 2023-11-21 PROCEDURE — 1160F RVW MEDS BY RX/DR IN RCRD: CPT | Mod: HCNC,CPTII,S$GLB, | Performed by: INTERNAL MEDICINE

## 2023-11-21 NOTE — PROGRESS NOTES
Subjective:       Patient ID: Maria Ines Ac is a 38 y.o. female.    Chief Complaint: Diarrhea    Diarrhea     Follow-up visit.  38-year-old woman.  I have seen in the past for a number of years and in fact the complaint right now, her chief complaint being diarrhea is very similar to 2012 and 2016.  On those visits that prompted a colonoscopy with biopsies that was in 2016.  And also prompted a variety of stool tests which were all negative.  My last visit with her about a year ago she was still having the IBS D but the main problem then was early satiety.  That led to a gastric emptying scan which confirmed gastroparesis.    Right now the diarrhea still her main complaint.  She describes typically 4 bowel movements a day.  All the stools are watery.  Mostly in the morning.  Occasionally wakes her up at night.  Associated with lower abdominal pain.  This is similar to the past.  She says she is taking Bentyl.  But it does not look like it has been refilled since 2019.  She continues with early satiety.  Reflux is doing fairly well right now seems controlled on the current regimen.  Of note she is status post cholecystectomy    Social history  Three meals a day which are small   Has cut back quite a bit and essentially does not drink much milk at all now    Past Medical History:   Diagnosis Date    Blood in stool     Constipation     Cystitis     Depression     Diabetes mellitus, type 2     Dysphagia     Endometriosis     GERD (gastroesophageal reflux disease)     Headache     Hypertension     Palpitations     Premature menopause on hormone replacement therapy     Thyroid disease        Review of patient's allergies indicates:  No Known Allergies     Family History   Problem Relation Age of Onset    Cervical cancer Maternal Grandmother         unknown age of onset,  at 80    Breast cancer Mother 50        alive at 64, unilateral    Esophageal cancer Mother 63    Ovarian cancer Mother 63    Anesthesia  problems Mother     Colon cancer Brother 40    Breast cancer Maternal Aunt 72        alive at 72    Breast cancer Paternal Aunt         unknown age of onset, alive at 70    Vaginal cancer Neg Hx     Endometrial cancer Neg Hx     Crohn's disease Neg Hx     Ulcerative colitis Neg Hx     Stomach cancer Neg Hx     Irritable bowel syndrome Neg Hx     Celiac disease Neg Hx     Cancer Neg Hx        Social History     Tobacco Use    Smoking status: Never    Smokeless tobacco: Never   Substance Use Topics    Alcohol use: No    Drug use: No        Review of Systems   Gastrointestinal:  Positive for diarrhea.       CMP   Lab Results   Component Value Date     11/14/2023     07/17/2023    K 4.3 11/14/2023    K 3.9 07/17/2023     11/14/2023     07/17/2023    CO2 26 11/14/2023    CO2 24 07/17/2023    GLU 79 11/14/2023     07/17/2023    BUN 10 11/14/2023    BUN 7 07/17/2023    CREATININE 0.9 11/14/2023    CREATININE 0.9 07/17/2023    CALCIUM 10.1 11/14/2023    CALCIUM 9.4 07/17/2023    PROT 8.1 11/14/2023    PROT 7.2 07/17/2023    ALBUMIN 4.3 11/14/2023    ALBUMIN 3.8 07/17/2023    BILITOT 0.3 11/14/2023    BILITOT 0.3 07/17/2023    ALKPHOS 76 11/14/2023    ALKPHOS 56 07/17/2023    AST 48 (H) 11/14/2023    AST 58 (H) 07/17/2023    ALT 54 (H) 11/14/2023    ALT 49 (H) 07/17/2023    ANIONGAP 10 11/14/2023    ANIONGAP 11 07/17/2023    and CBC   Lab Results   Component Value Date    WBC 7.61 11/14/2023    HGB 13.6 11/14/2023    HCT 41.8 11/14/2023     11/14/2023       No results found for this or any previous visit from the past 365 days.             Objective:      Physical Exam  Abdominal:      General: Abdomen is protuberant. Bowel sounds are normal. There is no distension. There are no signs of injury.      Palpations: Abdomen is soft. There is no shifting dullness, hepatomegaly or mass.      Tenderness: There is abdominal tenderness in the right lower quadrant and left lower quadrant.          Assessment & Plan:       Gastroparesis    Gastroesophageal reflux disease without esophagitis    Irritable bowel syndrome with diarrhea     Assessment.  1. Gastroparesis.  I explained that this may be a factor in the fullness and making reflux worse.  However I would not recommend treating with Reglan at this time.  I think it is reasonable to talk to our dietitian about gastroparesis diet 1st  2. Gastroesophageal reflux.  This has been confirmed with impedance study that she does have severe reflux but she does not have esophagitis.  And I am pleased that the symptoms seem managed with the current regimen  3. IBS with diarrhea I feel comfortable with this is the diagnosis it has been present for a long time and we have certainly done evaluation in the past to rule out microscopic colitis IBD etc..  Unfortunately because she is had a cholecystectomy I can not use Viberzi.  It is certainly possible that the prior cholecystectomy is contributing to a bile acid induced diarrhea.  I recommended that we try Colestid either 1 or 2 a day in the middle of the day  temporally from the other medications that she takes I have asked her to correspond with me as to the result.  I do not think for instance we need a repeat colonoscopy or to repeat any stool studies if this is ineffective then we may consider cholestyramine    This note was created with voice recognition dictation technology.  There may be errors that I did not see, detect or correct.      Slick Herron MD

## 2023-11-21 NOTE — PROGRESS NOTES
"GENERAL GI PATIENT INTAKE:    COVID symptoms in the last 7 days (runny nose, sore throat, congestion, cough, fever): No  PCP: Luann Raymond  If not PCP-  number given to establish [phone]: N/A    ALLERGIES REVIEWED:  Yes    CHIEF COMPLAINT:    Chief Complaint   Patient presents with    Diarrhea       VITAL SIGNS:  /79   Pulse 87   Ht 5' 2" (1.575 m)   Wt 99.4 kg (219 lb 2.2 oz)   LMP 11/17/2012 (LMP Unknown) Comment: Neg UPT  BMI 40.08 kg/m²      Change in medical, surgical, family or social history: No      REVIEWED MEDICATION LIST RECONCILED INCLUDING ABOVE MEDS:  Yes     " What Type Of Note Output Would You Prefer (Optional)?: Bullet Format What Is The Reason For Today's Visit?: Full Body Skin Examination with No Concerns What Is The Reason For Today's Visit? (Being Monitored For X): concerning skin lesions on an annual basis

## 2023-11-22 ENCOUNTER — TELEPHONE (OUTPATIENT)
Dept: GASTROENTEROLOGY | Facility: CLINIC | Age: 38
End: 2023-11-22
Payer: MEDICARE

## 2023-11-22 ENCOUNTER — HOSPITAL ENCOUNTER (OUTPATIENT)
Dept: SLEEP MEDICINE | Facility: OTHER | Age: 38
Discharge: HOME | End: 2023-11-22
Attending: PHYSICIAN ASSISTANT
Payer: MEDICARE

## 2023-11-22 DIAGNOSIS — G47.30 SLEEP APNEA, UNSPECIFIED TYPE: ICD-10-CM

## 2023-11-22 DIAGNOSIS — R06.81 WITNESSED EPISODE OF APNEA: ICD-10-CM

## 2023-11-22 DIAGNOSIS — R06.83 SNORING: ICD-10-CM

## 2023-11-22 DIAGNOSIS — R35.1 NOCTURIA: ICD-10-CM

## 2023-11-22 DIAGNOSIS — G47.10 HYPERSOMNOLENCE: ICD-10-CM

## 2023-11-22 DIAGNOSIS — F51.09 OTHER INSOMNIA NOT DUE TO A SUBSTANCE OR KNOWN PHYSIOLOGICAL CONDITION: ICD-10-CM

## 2023-11-22 PROCEDURE — 95810 POLYSOM 6/> YRS 4/> PARAM: CPT | Mod: HCNC

## 2023-11-22 NOTE — TELEPHONE ENCOUNTER
Message left for patient to return my call regarding an appointment with the dietitian for gastroparesis.   marito

## 2023-11-23 PROBLEM — G47.30 SLEEP APNEA: Status: ACTIVE | Noted: 2023-11-23

## 2023-11-23 NOTE — PROGRESS NOTES
PSG performed on 38 year old female, Maria Ines Ac. Procedure explained to pt prior to the start of study. All questions were addressed. All disposable equipment was used when applicable. End of study instructions given to pt.

## 2023-11-24 NOTE — PROCEDURES
"  [facility]    Polysomnography Interpretation Report    Patient Name:  Maria Ines Ac  MRN#:  [id number]  :  1985  Study Date:  2023  Referring Provider:  GUILLE Collins    Indications for Polysomnography:  The patient is a 38 year old Female who is 5' 2" and weighs 219.0 lbs.  Her BMI equals 40.3.  [place] was - and Neck Circumference was -.  A full night polysomnogram was performed to evaluate for -.    Polysomnogram Data  A full night polysomnogram recorded the standard physiologic parameters including EEG, EOG, EMG, EKG, nasal and oral airflow.  Respiratory parameters of chest and abdominal movements were recorded with [place] motion transducers.  Oxygen saturation was recorded by pulse oximetry.    Sleep Architecture  The total recording time of the polysomnogram was 458.1 minutes.  The total sleep time was 304.0 minutes.  The patient spent 6.6% of total sleep time in Stage N1, 68.3% in Stage N2, 14.8% in Stages N3, and 10.4% in REM.  Sleep latency was 23.1 minutes.  REM latency was 402.5 minutes.  Sleep Efficiency was 66.4%.  Wake after sleep onset was 130.5 minutes.    Respiratory Events  The polysomnogram revealed a presence of - obstructive, 2 central, and - mixed apneas resulting in a Total Apnea index of 0.4 events per hour.  There were 31 hypopneas resulting in a Total Hypopnea index of 6.1 events per hour.  The combined Apnea/Hypopnea index was 6.5 events per hour.  There were a total of 2 RERA events resulting in a Respiratory Disturbance Index (RDI) of 6.9 events per hour.     Mean oxygen saturation was 90.8%.  The lowest oxygen saturation during sleep was 83.0%.  Time spent ?88% oxygen saturation was 16.0 minutes (3.5%).      Limb Activity  There were - limb movements recorded.      Cardiac:  single lead EKG revealed normal sinus rhythm     Oxygenation:  Hypoxemia was seen only in relation to obstructive events.    Impression:  -obstructive sleep apnea " "(REM-predominant with RAHI = 32 versus NRAHI = 3)  -Sleep-related hypoxemia (G47.36)    Recommendations:    -treatment for ZAC seen in this study should be considered  -follow-up overnight oximetry to ensure resolution of hypoxemia is also recommended  -the patient has follow up with Sleep Medicine          Apolinar Graves MD    (This Sleep Study was interpreted by a Board Certified Sleep Specialist who conducted an epoch-by-epoch review of the entire raw data recording.)  (The indication for this sleep study was reviewed and deemed appropriate by [facility] Practice Parameters or other reasons by a Board Certified Sleep Specialist.)  [facility]    Diagnostic PSG Report    Patient Name: Maria Ines Ac Study Date: 11/22/2023   YOB: 1985 MRN #: [id number]   Age: 38 year JERONIMO #: [id number]   Sex: Female Referring Provider: GUILLE Collins   Height: 5' 2" Recording Tech: Regina Magana RRT SDS   Weight: 219.0 lbs Scoring Tech: Tato Fontaine RRT RPSGT   BMI: 40.3 Interpreting Physician: -   ESS: - Neck Circumference: -     Study Overview    Lights Off: 09:35:05 PM  Count Index   Lights On: 05:13:08 AM Awakenings: 16 3.2   Time in Bed: 458.1 min. Arousals: 34 6.7   Total Sleep Time: 304.0 min. Apneas & Hypopneas: 33 6.5    Sleep Efficiency: 66.4% Limb Movements: - -   Sleep Latency: 23.1 min. Snores: - -   Wake After Sleep Onset: 130.5 min. Desaturations: 27 5.3    REM Latency from Sleep Onset: 402.5 min. Minimum SpO2 TST: 83.0%        Sleep Architecture   % of Time in Bed  Stages Time (mins) % Sleep Time   Wake 154.5    Stage N1 20.0 6.6%   Stage N2 207.5 68.3%   Stage N3 45.0 14.8%   REM 31.5 10.4%         Arousal Summary     NREM REM Sleep Index   Respiratory Arousals 2 2 4 0.8   PLM Arousals - - - -   Isolated Limb Movement Arousals - - - -   Spontaneous Arousals 27 3 30 5.9   Total 29 5 34 6.7       Limb Movement Summary     Count Index   Isolated Limb Movements - -   Periodic Limb " Movements (PLMs) - -   Total Limb Movements - -         Respiratory Summary     By Sleep Stage By Body Position Total    NREM REM Supine Non-Supine    Time (min) 272.5 31.5 267.0 37.0 304.0           Obstructive Apnea - - - - -   Mixed Apnea - - - - -   Central Apnea - 2 2 - 2   Central Apnea Index - 3.8 - - -   Total Apneas - 2 2 - 2   Total Apnea Index - 3.8 0.4 - 0.4           Hypopnea 14 17 31 - 31   Hypopnea Index 3.1 32.4 7.0 - 6.1           Apnea & Hypopnea 14 19 33 - 33   Apnea & Hypopnea Index 3.1 36.2 7.4 - 6.5           RERAs 1 1 2 - 2   RERA Index 0.2 1.9 0.4 - 0.4           RDI 3.3 38.1 7.9 - 6.9     Scoring Criteria: Hypopneas scored at Choose an item.% desaturation criteria.    Respiratory Event Durations     Apnea Hypopnea    NREM REM NREM REM   Average (seconds) - 9.6 14.5 13.8   Maximum (seconds) - 9.6 28.7 35.5       Oxygen Saturation Summary     Wake NREM REM TST TIB   Average SpO2 92.0% 90.3% 89.4% 90.2% 90.8%   Minimum SpO2 85.0% 86.0% 83.0% 83.0% 83.0%   Maximum SpO2 96.0% 95.0% 94.0% 95.0% 96.0%       Oxygen Saturation Distribution    Range (%) Time in range (min) Time in range (%)   90.0 - 100.0 261.1 57.8%   80.0 - 90.0 190.5 42.2%   70.0 - 80.0 - -   60.0 - 70.0 - -   50.0 - 60.0 - -   0.0 - 50.0 - -   Time Spent ?88% SpO2    Range (%) Time in range (min) Time in range (%)   0.0 - 88.0 16.0 3.5%          Count Index   Desaturations 27 5.3      Cardiac Summary     Wake NREM REM Sleep Total   Average Pulse Rate (BPM) 73.1 68.9 69.9 69.0 70.3   Minimum Pulse Rate (BPM) 65.0 61.0 63.0 61.0 61.0   Maximum Pulse Rate (BPM) 205.0 82.0 76.0 82.0 205.0     Pulse Rate Distribution    Range (bpm) Time in range (min) Time in range (%)   0.0 - 40.0 - -   40.0 - 60.0 - -   60.0 - 80.0 450.5 99.7%   80.0 - 100.0 1.2 0.3%   100.0 - 120.0 0.1 0.0%   120.0 - 140.0 - -   140.0 - 200.0 - -     EtCO2 Summary    Stage Min (mmHg) Average (mmHg) Max (mmHg)   Wake - - -   NREM(1+2+3) - - -   REM - - -     Range  (mmHg) Time in range (min) Time in range (%)   20.0 - 40.0 - -   40.0 - 50.0 - -   50.0 - 55.0 - -   55.0 - 100.0 - -   Excluded data <20.0 & >100.0 458.5 100.0%     TcCO2 Summary    Stage Min (mmHg) Average (mmHg) Max (mmHg)   Wake - - -   NREM(1+2+3) - - -   REM - - -     Range (mmHg) Time in range (min) Time in range (%)   20.0 - 40.0 - -   40.0 - 50.0 - -   50.0 - 55.0 - -   55.0 - 100.0 - -   Excluded data <20.0 & >100.0 458.5 100.0%     Comments    -

## 2023-11-25 PROCEDURE — 95810 PR POLYSOMNOGRAPHY, 4 OR MORE: ICD-10-PCS | Mod: 26,HCNC,, | Performed by: INTERNAL MEDICINE

## 2023-11-25 PROCEDURE — 95810 POLYSOM 6/> YRS 4/> PARAM: CPT | Mod: 26,HCNC,, | Performed by: INTERNAL MEDICINE

## 2023-11-27 ENCOUNTER — PATIENT MESSAGE (OUTPATIENT)
Dept: SLEEP MEDICINE | Facility: CLINIC | Age: 38
End: 2023-11-27
Payer: MEDICARE

## 2023-11-27 DIAGNOSIS — G47.33 OSA (OBSTRUCTIVE SLEEP APNEA): Primary | ICD-10-CM

## 2023-11-30 ENCOUNTER — OFFICE VISIT (OUTPATIENT)
Dept: UROGYNECOLOGY | Facility: CLINIC | Age: 38
End: 2023-11-30
Payer: MEDICARE

## 2023-11-30 VITALS
WEIGHT: 220.25 LBS | HEART RATE: 76 BPM | SYSTOLIC BLOOD PRESSURE: 125 MMHG | HEIGHT: 62 IN | DIASTOLIC BLOOD PRESSURE: 82 MMHG | BODY MASS INDEX: 40.53 KG/M2

## 2023-11-30 DIAGNOSIS — R39.89 URETHRAL PAIN: Primary | ICD-10-CM

## 2023-11-30 DIAGNOSIS — N95.2 VAGINAL ATROPHY: ICD-10-CM

## 2023-11-30 DIAGNOSIS — N39.41 URGE INCONTINENCE: ICD-10-CM

## 2023-11-30 DIAGNOSIS — R35.0 URINARY FREQUENCY: ICD-10-CM

## 2023-11-30 PROCEDURE — 99999 PR PBB SHADOW E&M-EST. PATIENT-LVL V: CPT | Mod: PBBFAC,HCNC,, | Performed by: NURSE PRACTITIONER

## 2023-11-30 PROCEDURE — 1159F PR MEDICATION LIST DOCUMENTED IN MEDICAL RECORD: ICD-10-PCS | Mod: HCNC,CPTII,S$GLB, | Performed by: NURSE PRACTITIONER

## 2023-11-30 PROCEDURE — 99999 PR PBB SHADOW E&M-EST. PATIENT-LVL V: ICD-10-PCS | Mod: PBBFAC,HCNC,, | Performed by: NURSE PRACTITIONER

## 2023-11-30 PROCEDURE — 1160F PR REVIEW ALL MEDS BY PRESCRIBER/CLIN PHARMACIST DOCUMENTED: ICD-10-PCS | Mod: HCNC,CPTII,S$GLB, | Performed by: NURSE PRACTITIONER

## 2023-11-30 PROCEDURE — 3074F PR MOST RECENT SYSTOLIC BLOOD PRESSURE < 130 MM HG: ICD-10-PCS | Mod: HCNC,CPTII,S$GLB, | Performed by: NURSE PRACTITIONER

## 2023-11-30 PROCEDURE — 1160F RVW MEDS BY RX/DR IN RCRD: CPT | Mod: HCNC,CPTII,S$GLB, | Performed by: NURSE PRACTITIONER

## 2023-11-30 PROCEDURE — 99213 OFFICE O/P EST LOW 20 MIN: CPT | Mod: HCNC,S$GLB,, | Performed by: NURSE PRACTITIONER

## 2023-11-30 PROCEDURE — 3079F DIAST BP 80-89 MM HG: CPT | Mod: HCNC,CPTII,S$GLB, | Performed by: NURSE PRACTITIONER

## 2023-11-30 PROCEDURE — 3044F PR MOST RECENT HEMOGLOBIN A1C LEVEL <7.0%: ICD-10-PCS | Mod: HCNC,CPTII,S$GLB, | Performed by: NURSE PRACTITIONER

## 2023-11-30 PROCEDURE — 3044F HG A1C LEVEL LT 7.0%: CPT | Mod: HCNC,CPTII,S$GLB, | Performed by: NURSE PRACTITIONER

## 2023-11-30 PROCEDURE — 3066F PR DOCUMENTATION OF TREATMENT FOR NEPHROPATHY: ICD-10-PCS | Mod: HCNC,CPTII,S$GLB, | Performed by: NURSE PRACTITIONER

## 2023-11-30 PROCEDURE — 99213 PR OFFICE/OUTPT VISIT, EST, LEVL III, 20-29 MIN: ICD-10-PCS | Mod: HCNC,S$GLB,, | Performed by: NURSE PRACTITIONER

## 2023-11-30 PROCEDURE — 3079F PR MOST RECENT DIASTOLIC BLOOD PRESSURE 80-89 MM HG: ICD-10-PCS | Mod: HCNC,CPTII,S$GLB, | Performed by: NURSE PRACTITIONER

## 2023-11-30 PROCEDURE — 3008F PR BODY MASS INDEX (BMI) DOCUMENTED: ICD-10-PCS | Mod: HCNC,CPTII,S$GLB, | Performed by: NURSE PRACTITIONER

## 2023-11-30 PROCEDURE — 3061F NEG MICROALBUMINURIA REV: CPT | Mod: HCNC,CPTII,S$GLB, | Performed by: NURSE PRACTITIONER

## 2023-11-30 PROCEDURE — 3008F BODY MASS INDEX DOCD: CPT | Mod: HCNC,CPTII,S$GLB, | Performed by: NURSE PRACTITIONER

## 2023-11-30 PROCEDURE — 3074F SYST BP LT 130 MM HG: CPT | Mod: HCNC,CPTII,S$GLB, | Performed by: NURSE PRACTITIONER

## 2023-11-30 PROCEDURE — 1159F MED LIST DOCD IN RCRD: CPT | Mod: HCNC,CPTII,S$GLB, | Performed by: NURSE PRACTITIONER

## 2023-11-30 PROCEDURE — 3061F PR NEG MICROALBUMINURIA RESULT DOCUMENTED/REVIEW: ICD-10-PCS | Mod: HCNC,CPTII,S$GLB, | Performed by: NURSE PRACTITIONER

## 2023-11-30 PROCEDURE — 3066F NEPHROPATHY DOC TX: CPT | Mod: HCNC,CPTII,S$GLB, | Performed by: NURSE PRACTITIONER

## 2023-11-30 NOTE — PATIENT INSTRUCTIONS
Vaginal pain  --continue vaginal estrogen cream 3 times weekly    Urethral pain  --continue amitriptyline 10 mg nightly    Urinary incontinence/ enuresis  --Empty bladder every 3 hours.  Empty well: wait a minute, lean forward on toilet.    --Avoid dietary irritants (see sheet).  Keep diary x 3  days  --KEGELS: do 10 in AM and 10 in PM, holding each x 10 seconds.  When you feel urge to go, STOP, KEGEL, and when urge has passed, then go to bathroom.  Consider PT in future.    --interstim in place--followed by urology       Irregular bowel habits.   --continue fiber supplement  --follow up with Dr. Herron    [facility] for cystoscopy

## 2023-11-30 NOTE — PROGRESS NOTES
Urogyn follow up  11/02/2023  .  [facility] - UROGYNECOLOGY  [address] [address]    Maria Ines Ac  [id number]  1985      Maria Ines Ac is a 38 y.o. here for a urogyn follow up for vaginal pain near rectum    02/28/2023--interstim replaced by Dr. Tee    05/10/2023  Reports interstim is working well for her.   Voiding every hour during the day- using 4 pads/ day with moderate wetness  Complains of vaginal pain x 3 months.  Taking uribel twice daily    08/29/2023  Complains of vaginal pressure-- states she was told by PT that she had prolapse    11/02/2023  Went to pelvic floor PT--last visit 08/09/2023 Hold for now until follow up with MD as pt making no gains and in fact having worse effects at times.  Voiding 4 times during the day  Using 4 pads/ day--severe wetness  Nocturia 4/ night-- + enuresis  Using vaginal estrogen cream daily   + diarrhea--taking metamucil 2 tablespoons daily    Changes since last visit:  Patient came with bladder diaries-- documented she drinks 8 ounces every hour (all 24) with 4-6 voids/ hours (even during hours of sleep). Not sure is a great historian. Documented approximately 100+ voids ./ day  Reports 4 pads with severe wetness  Using vaginal estrogen cream twice weekly  Taking amitriptyline 10 mg nightly-- does not feel like it has helped vaginal pain.       Past Medical History:   Diagnosis Date    Blood in stool     Constipation     Cystitis     Depression     Diabetes mellitus, type 2     Dysphagia     Endometriosis     GERD (gastroesophageal reflux disease)     Headache     Hypertension     Palpitations     Premature menopause on hormone replacement therapy     Thyroid disease        Past Surgical History:   Procedure Laterality Date    24 HOUR IMPEDANCE PH MONITORING OF ESOPHAGUS IN PATIENT TAKING ACID REDUCING MEDICATIONS N/A 6/2/2022    Procedure: IMPEDANCE PH STUDY, ESOPHAGEAL, 24 HOUR, IN PATIENT TAKING ACID REDUCING  MEDICATION;  Surgeon: Debbie Butler MD;  Location: [facility] (4TH F[facility]);  Service: Endoscopy;  Laterality: N/A;  On PPI/H2 Blocker    BLADDER SUSPENSION      CARPAL TUNNEL RELEASE      right    CHOLECYSTECTOMY      COLONOSCOPY N/A 8/30/2016    Procedure: COLONOSCOPY;  Surgeon: Slick Herron MD;  Location: [facility] ENDO (4TH F[facility]);  Service: Endoscopy;  Laterality: N/A;  PM prep    COLONOSCOPY N/A 12/22/2021    Procedure: COLONOSCOPY. any CRS;  Surgeon: Maria Ines Jung MD;  Location: [facility] ENDO (4TH F[facility]);  Service: Endoscopy;  Laterality: N/A;  fully vaccinated -  scaral stimulator will bring remote -    12/17 lvm to confirm appt-rb    ESOPHAGEAL MANOMETRY WITH MEASUREMENT OF IMPEDANCE N/A 11/5/2018    Procedure: MANOMETRY, ESOPHAGUS, WITH IMPEDANCE MEASUREMENT;  Surgeon: Slick Herron MD;  Location: [facility] ENDO (4TH F[facility]);  Service: Endoscopy;  Laterality: N/A;    ESOPHAGEAL MANOMETRY WITH MEASUREMENT OF IMPEDANCE N/A 6/2/2022    Procedure: MANOMETRY, ESOPHAGUS, WITH IMPEDANCE MEASUREMENT;  Surgeon: Debbie Butler MD;  Location: [facility] (4TH F[facility]);  Service: Endoscopy;  Laterality: N/A;  fully vaccinated, bladder stimulator, instr mailed /emailed -ml    ESOPHAGOGASTRODUODENOSCOPY N/A 2/13/2020    Procedure: EGD (ESOPHAGOGASTRODUODENOSCOPY);  Surgeon: Slick Herron MD;  Location: [facility] (4TH F[facility]);  Service: Endoscopy;  Laterality: N/A;  pt has cardiology appt on 1/6/19-will call back after this appt to schedule-tb    FLUOROSCOPIC URODYNAMIC STUDY N/A 5/23/2019    Procedure: URODYNAMIC STUDY, FLUOROSCOPIC;  Surgeon: Zena Tee MD;  Location: [facility] OR 1ST FLR;  Service: Urology;  Laterality: N/A;  1 hour    GALLBLADDER SURGERY      HYSTERECTOMY  2013    Bess velasquez Dr. Champlain    IMPLANTATION OF PERMANENT SACRAL NERVE STIMULATOR N/A 7/30/2019    Procedure: INSERTION, NEUROSTIMULATOR, PERMANENT, SACRAL;  Surgeon: Zena Tee MD;  Location: [facility] OR 2ND FLR;  Service: Urology;  Laterality: N/A;   30 min    OOPHORECTOMY      LSO- benign cyst    OOPHORECTOMY      RSO- endo    ooptherectomy      REPLACEMENT OF NERVE STIMULATOR BATTERY N/A 2023    Procedure: Replacement, Battery, Neurostimulator;  Surgeon: Zena Tee MD;  Location: [facility] OR [address] [place] [address];  Service: Urology;  Laterality: N/A;  45 min    right knee  scoped    SHOULDER SURGERY  right       Family History   Problem Relation Age of Onset    Cervical cancer Maternal Grandmother         unknown age of onset,  at 80    Breast cancer Mother 50        alive at 64, unilateral    Esophageal cancer Mother 63    Ovarian cancer Mother 63    Anesthesia problems Mother     Colon cancer Brother 40    Breast cancer Maternal Aunt 72        alive at 72    Breast cancer Paternal Aunt         unknown age of onset, alive at 70    Vaginal cancer Neg Hx     Endometrial cancer Neg Hx     Crohn's disease Neg Hx     Ulcerative colitis Neg Hx     Stomach cancer Neg Hx     Irritable bowel syndrome Neg Hx     Celiac disease Neg Hx     Cancer Neg Hx        Social History     Socioeconomic History    Marital status: Single   Tobacco Use    Smoking status: Never    Smokeless tobacco: Never   Substance and Sexual Activity    Alcohol use: No    Drug use: No    Sexual activity: Never     Birth control/protection: None   Social History Narrative    ** Merged History Encounter **          Social Determinants of Health     Financial Resource Strain: Low Risk  (2023)    Overall Financial Resource Strain (CARDIA)     Difficulty of Paying Living Expenses: Not hard at all   Food Insecurity: No Food Insecurity (2023)    Hunger Vital Sign     Worried About Running Out of Food in the Last Year: Never true     Ran Out of Food in the Last Year: Never true   Transportation Needs: No Transportation Needs (2023)    PRAPARE - Transportation     Lack of Transportation (Medical): No     Lack of Transportation (Non-Medical): No   Physical Activity: Sufficiently  Active (2/14/2023)    Exercise Vital Sign     Days of Exercise per Week: 3 days     Minutes of Exercise per Session: 60 min   Stress: Stress Concern Present (2/14/2023)    [employer] [place] [employer] - Occupational Stress Questionnaire     Feeling of Stress : Very much   Social Connections: Socially Isolated (2/14/2023)    Social Connection and Isolation Panel [NHANES]     Frequency of Communication with Friends and Family: Once a week     Frequency of Social Gatherings with Friends and Family: Once a week     Attends Restorationism Services: Never     Active Member of Clubs or Organizations: No     Attends Club or Organization Meetings: Never     Marital Status: Never    Housing Stability: Low Risk  (2/14/2023)    Housing Stability Vital Sign     Unable to Pay for Housing in the Last Year: No     Number of Places Lived in the Last Year: 1     Unstable Housing in the Last Year: No       Current Outpatient Medications   Medication Sig Dispense Refill    ACCU-CHEK AMAURY PLUS TEST STRP Strp USE TO TEST BLOOD GLUCOSE TID  9    ACCU-CHEK SOFTCLIX LANCETS Misc USE TO TEST BLOOD GLUCOSE TID  3    albuterol (VENTOLIN HFA) 90 mcg/actuation inhaler Inhale 2 puffs into the lungs every 6 (six) hours as needed for Wheezing. Rescue 18 g 0    amitriptyline (ELAVIL) 10 MG tablet Take 1 tablet (10 mg total) by mouth nightly. 30 tablet 11    atorvastatin (LIPITOR) 80 MG tablet Take 80 mg by mouth every evening.      BLOOD SUGAR DIAGNOSTIC (ACCU-CHEK AMAURY PLUS TEST STRP MISC) 1 strip by Misc.(Non-Drug; Combo Route) route 3 (three) times daily.      buPROPion (WELLBUTRIN XL) 150 MG TB24 tablet Take 150 mg by mouth every morning.      chlorhexidine (PERIDEX) 0.12 % solution       ciclopirox (PENLAC) 8 % Soln Apply topically nightly. 6.6 mL 11    dexlansoprazole (DEXILANT) 60 mg capsule Take 1 capsule (60 mg total) by mouth 2 (two) times a day. 60 capsule 11    diclofenac sodium (VOLTAREN) 1 % Gel Apply 2 g topically 4  (four) times daily. As needed for breast pain 1 Tube 1    dicyclomine (BENTYL) 10 MG capsule TAKE 2 CAPSULES(20 MG) BY MOUTH THREE TIMES DAILY BEFORE MEALS 180 capsule 0    diltiazem HCl (DILTIAZEM 2% CREAM) Apply topically 3 (three) times daily. Apply topically to anal area. 30 g 2    FARXIGA 10 mg Tab TK 1 T PO QD  7    fluticasone propionate (FLONASE) 50 mcg/actuation nasal spray SHAKE LIQUID AND USE 2 SPRAYS(100 MCG) IN EACH NOSTRIL EVERY DAY 48 g 2    fluticasone-salmeterol diskus inhaler 250-50 mcg Inhale 1 puff into the lungs 2 (two) times daily. Controller 60 each 11    gabapentin (NEURONTIN) 300 MG capsule Take 600 mg (two capsules) in the morning and 900mg (three capsules) at night before bed 150 capsule 11    GLUCOPHAGE 500 mg tablet Take 500 mg by mouth 2 (two) times daily with meals.       ibuprofen (ADVIL,MOTRIN) 800 MG tablet 800 mg every 4 (four) hours as needed.   0    ipratropium (ATROVENT) 21 mcg (0.03 %) nasal spray 2 sprays by Each Nostril route 3 (three) times daily. 30 mL 1    levothyroxine (SYNTHROID) 75 MCG tablet Take 75 mcg by mouth before breakfast.      meloxicam (MOBIC) 15 MG tablet       methen-m.blue-s.phos-phsal-hyo (URIBEL) 118-10-40.8-36 mg Cap Take 1 capsule by mouth every 6 (six) hours as needed (bladder pain). (Patient not taking: Reported on 12/21/2023) 120 capsule 11    metoprolol succinate (TOPROL-XL) 100 MG 24 hr tablet Take 1 tablet (100 mg total) by mouth once daily. 90 tablet 3    mometasone 0.1% (ELOCON) 0.1 % cream APPLY TOPICALLY TO THE RASH ON HANDS TWICE DAILY AS NEEDED FOR TWO TO THREE WEEKS. USE SPARINGLY      omeprazole (PRILOSEC OTC) 20 MG tablet Take 20 mg by mouth once daily.      oxyCODONE (ROXICODONE) 5 MG immediate release tablet Take 1 tablet (5 mg total) by mouth every 4 (four) hours as needed for Pain. 5 tablet 0    phentermine (ADIPEX-P) 37.5 mg tablet Take 37.5 mg by mouth.      phentermine 37.5 MG capsule TK 1 C PO ONCE D IN THE MORNING      REXULTI 4  "mg Tab Take 1 tablet by mouth.      spironolactone (ALDACTONE) 25 MG tablet Take by mouth.      traZODone (DESYREL) 100 MG tablet TK 1 T PO HS  1    TRULICITY 4.5 mg/0.5 mL pen injector       ZOLOFT 100 mg tablet Take 200 mg by mouth once daily.       estradioL (ESTRACE) 0.01 % (0.1 mg/gram) vaginal cream Place 1 g vaginally twice a week. 42.5 g 3    omega-3 fatty acids/fish oil (FISH OIL-OMEGA-3 FATTY ACIDS) 300-1,000 mg capsule Take 1 capsule by mouth once daily. 90 capsule 3    topiramate (TOPAMAX) 50 MG tablet Take 2 tablets (100 mg total) by mouth every evening. 60 tablet 11     No current facility-administered medications for this visit.       Review of patient's allergies indicates:  No Known Allergies    Well woman:  Pap:post hysterectomy  Mammo:11/2022 normal--ordered  Colonoscopy:12/2021 normal repeat in 5 years  Dexa:n/a    ROS:  As per HPI.      Exam  /82 (BP Location: Left arm, Patient Position: Sitting, BP Method: Medium (Automatic))   Pulse 76   Ht 5' 2" (1.575 m)   Wt 99.9 kg (220 lb 3.8 oz)   LMP 11/17/2012 (LMP Unknown) Comment: Neg UPT  BMI 40.28 kg/m²   General: alert and oriented, no acute distress  Respiratory: normal respiratory effort  Abd: soft, non-tender, non-distended    Pelvic  Ext. Genitalia: normal external genitalia. Normal bartholin's and skeens glands  Vagina: + atrophy. Normal vaginal mucosa without lesions. Thin white discharge noted.   Non-tender bladder base without palpable mass.  +TTP in bilateral levator ani  Cervix: absent  Uterus:  absent   Urethra: no masses or tenderness  Urethral meatus: no lesions, caruncle or prolapse.    Impression  1. Urethral pain  Cystoscopy      2. Vaginal atrophy        3. Urge incontinence        4. Urinary frequency                We reviewed the above issues and discussed options for short-term versus long-term management of her problems.   Plan:    Vaginal pain  --continue vaginal estrogen cream 3 times weekly    Urethral " pain  --continue amitriptyline 10 mg nightly    Urinary incontinence/ enuresis  --Empty bladder every 3 hours.  Empty well: wait a minute, lean forward on toilet.    --Avoid dietary irritants (see sheet).  Keep diary x 3  days  --KEGELS: do 10 in AM and 10 in PM, holding each x 10 seconds.  When you feel urge to go, STOP, KEGEL, and when urge has passed, then go to bathroom.  Consider PT in future.    --interstim in place--followed by urology       Irregular bowel habits.   --continue fiber supplement  --follow up with Dr. Herron    [facility] for cystoscopy              I spent a total of 20 minutes on the day of the visit.  This includes face to face time and non-face to face time preparing to see the patient (eg, review of tests), obtaining and/or reviewing separately obtained history, documenting clinical information in the electronic or other health record, independently interpreting results and communicating results to the patient/family/caregiver, or care coordinator.       Julianna Gomez, FNP-BC  [facility]  Division of Female Pelvic Medicine and Reconstructive Surgery  Department of Obstetrics & Gynecology

## 2023-12-04 ENCOUNTER — TELEPHONE (OUTPATIENT)
Dept: GASTROENTEROLOGY | Facility: CLINIC | Age: 38
End: 2023-12-04
Payer: MEDICARE

## 2023-12-04 NOTE — TELEPHONE ENCOUNTER
----- Message from Hilary Francisco sent at 12/4/2023  1:49 PM CST -----  Maria Ines Ac calling regarding Patient Advice (message) for missed call from Lizz about the medication bot working, , call back to inform [phone]

## 2023-12-04 NOTE — TELEPHONE ENCOUNTER
----- Message from Jae Frazier sent at 12/1/2023 10:25 AM CST -----  Regarding: adv  Contact: @[phone]  Pt calling to let provider know new medicine isnt working and needs another alternative ... Pls call and [phone]

## 2023-12-06 NOTE — TELEPHONE ENCOUNTER
Spoke with patient.  Aware how to adjust her medication.  Will call back if she does not find it is helping.   Toya

## 2023-12-13 ENCOUNTER — HOSPITAL ENCOUNTER (OUTPATIENT)
Dept: RADIOLOGY | Facility: HOSPITAL | Age: 38
Discharge: HOME | End: 2023-12-13
Attending: INTERNAL MEDICINE
Payer: MEDICARE

## 2023-12-13 VITALS — WEIGHT: 219 LBS | BODY MASS INDEX: 40.06 KG/M2

## 2023-12-13 DIAGNOSIS — Z12.31 SCREENING MAMMOGRAM, ENCOUNTER FOR: ICD-10-CM

## 2023-12-13 PROCEDURE — 77067 SCR MAMMO BI INCL CAD: CPT | Mod: 26,HCNC,, | Performed by: RADIOLOGY

## 2023-12-13 PROCEDURE — 77067 SCR MAMMO BI INCL CAD: CPT | Mod: TC,HCNC

## 2023-12-13 PROCEDURE — 77063 BREAST TOMOSYNTHESIS BI: CPT | Mod: 26,HCNC,, | Performed by: RADIOLOGY

## 2023-12-13 PROCEDURE — 77067 MAMMO DIGITAL SCREENING BILAT WITH TOMO: ICD-10-PCS | Mod: 26,HCNC,, | Performed by: RADIOLOGY

## 2023-12-13 PROCEDURE — 77063 MAMMO DIGITAL SCREENING BILAT WITH TOMO: ICD-10-PCS | Mod: 26,HCNC,, | Performed by: RADIOLOGY

## 2023-12-18 ENCOUNTER — OFFICE VISIT (OUTPATIENT)
Dept: OBSTETRICS AND GYNECOLOGY | Facility: CLINIC | Age: 38
End: 2023-12-18
Attending: OBSTETRICS & GYNECOLOGY
Payer: MEDICARE

## 2023-12-18 VITALS
BODY MASS INDEX: 39.68 KG/M2 | DIASTOLIC BLOOD PRESSURE: 78 MMHG | WEIGHT: 215.63 LBS | HEIGHT: 62 IN | HEART RATE: 94 BPM | SYSTOLIC BLOOD PRESSURE: 117 MMHG

## 2023-12-18 DIAGNOSIS — N95.2 VAGINAL ATROPHY: ICD-10-CM

## 2023-12-18 DIAGNOSIS — Z01.419 ENCOUNTER FOR GYNECOLOGICAL EXAMINATION WITHOUT ABNORMAL FINDING: Primary | ICD-10-CM

## 2023-12-18 PROCEDURE — 3008F BODY MASS INDEX DOCD: CPT | Mod: HCNC,CPTII,S$GLB, | Performed by: OBSTETRICS & GYNECOLOGY

## 2023-12-18 PROCEDURE — 3066F NEPHROPATHY DOC TX: CPT | Mod: HCNC,CPTII,S$GLB, | Performed by: OBSTETRICS & GYNECOLOGY

## 2023-12-18 PROCEDURE — G0101 PR CA SCREEN;PELVIC/BREAST EXAM: ICD-10-PCS | Mod: GZ,HCNC,S$GLB, | Performed by: OBSTETRICS & GYNECOLOGY

## 2023-12-18 PROCEDURE — 3078F PR MOST RECENT DIASTOLIC BLOOD PRESSURE < 80 MM HG: ICD-10-PCS | Mod: HCNC,CPTII,S$GLB, | Performed by: OBSTETRICS & GYNECOLOGY

## 2023-12-18 PROCEDURE — G0101 CA SCREEN;PELVIC/BREAST EXAM: HCPCS | Mod: GZ,HCNC,S$GLB, | Performed by: OBSTETRICS & GYNECOLOGY

## 2023-12-18 PROCEDURE — 3066F PR DOCUMENTATION OF TREATMENT FOR NEPHROPATHY: ICD-10-PCS | Mod: HCNC,CPTII,S$GLB, | Performed by: OBSTETRICS & GYNECOLOGY

## 2023-12-18 PROCEDURE — 3061F NEG MICROALBUMINURIA REV: CPT | Mod: HCNC,CPTII,S$GLB, | Performed by: OBSTETRICS & GYNECOLOGY

## 2023-12-18 PROCEDURE — 3074F SYST BP LT 130 MM HG: CPT | Mod: HCNC,CPTII,S$GLB, | Performed by: OBSTETRICS & GYNECOLOGY

## 2023-12-18 PROCEDURE — 3078F DIAST BP <80 MM HG: CPT | Mod: HCNC,CPTII,S$GLB, | Performed by: OBSTETRICS & GYNECOLOGY

## 2023-12-18 PROCEDURE — 99999 PR PBB SHADOW E&M-EST. PATIENT-LVL IV: ICD-10-PCS | Mod: PBBFAC,HCNC,, | Performed by: OBSTETRICS & GYNECOLOGY

## 2023-12-18 PROCEDURE — 3044F HG A1C LEVEL LT 7.0%: CPT | Mod: HCNC,CPTII,S$GLB, | Performed by: OBSTETRICS & GYNECOLOGY

## 2023-12-18 PROCEDURE — 3061F PR NEG MICROALBUMINURIA RESULT DOCUMENTED/REVIEW: ICD-10-PCS | Mod: HCNC,CPTII,S$GLB, | Performed by: OBSTETRICS & GYNECOLOGY

## 2023-12-18 PROCEDURE — 99999 PR PBB SHADOW E&M-EST. PATIENT-LVL IV: CPT | Mod: PBBFAC,HCNC,, | Performed by: OBSTETRICS & GYNECOLOGY

## 2023-12-18 PROCEDURE — 3044F PR MOST RECENT HEMOGLOBIN A1C LEVEL <7.0%: ICD-10-PCS | Mod: HCNC,CPTII,S$GLB, | Performed by: OBSTETRICS & GYNECOLOGY

## 2023-12-18 PROCEDURE — 3008F PR BODY MASS INDEX (BMI) DOCUMENTED: ICD-10-PCS | Mod: HCNC,CPTII,S$GLB, | Performed by: OBSTETRICS & GYNECOLOGY

## 2023-12-18 PROCEDURE — 3074F PR MOST RECENT SYSTOLIC BLOOD PRESSURE < 130 MM HG: ICD-10-PCS | Mod: HCNC,CPTII,S$GLB, | Performed by: OBSTETRICS & GYNECOLOGY

## 2023-12-18 RX ORDER — ESTRADIOL 0.1 MG/G
1 CREAM VAGINAL
Qty: 42.5 G | Refills: 3 | Status: SHIPPED | OUTPATIENT
Start: 2023-12-18

## 2023-12-18 NOTE — PROGRESS NOTES
SUBJECTIVE:   38 y.o. female   for annual routine checkup. Patient's last menstrual period was 2012 (lmp unknown)..  She had a hysterectomy in . She continues. To have breast pain off and on- mammogram was normal.         Past Medical History:   Diagnosis Date    Blood in stool     Constipation     Cystitis     Depression     Diabetes mellitus, type 2     Dysphagia     Endometriosis     GERD (gastroesophageal reflux disease)     Headache     Hypertension     Palpitations     Premature menopause on hormone replacement therapy     Thyroid disease      Past Surgical History:   Procedure Laterality Date    24 HOUR IMPEDANCE PH MONITORING OF ESOPHAGUS IN PATIENT TAKING ACID REDUCING MEDICATIONS N/A 2022    Procedure: IMPEDANCE PH STUDY, ESOPHAGEAL, 24 HOUR, IN PATIENT TAKING ACID REDUCING MEDICATION;  Surgeon: Debbie Butler MD;  Location: [facility] ENDO (4TH F[facility]);  Service: Endoscopy;  Laterality: N/A;  On PPI/H2 Blocker    BLADDER SUSPENSION      CARPAL TUNNEL RELEASE      right    CHOLECYSTECTOMY      COLONOSCOPY N/A 2016    Procedure: COLONOSCOPY;  Surgeon: Slick Herron MD;  Location: [facility] ENDO (4TH F[facility]);  Service: Endoscopy;  Laterality: N/A;  PM prep    COLONOSCOPY N/A 2021    Procedure: COLONOSCOPY. any CRS;  Surgeon: Maria Ines Jung MD;  Location: [facility] ENDO (4TH F[facility]);  Service: Endoscopy;  Laterality: N/A;  fully vaccinated -  scaral stimulator will bring remote -     lvm to confirm appt-rb    ESOPHAGEAL MANOMETRY WITH MEASUREMENT OF IMPEDANCE N/A 2018    Procedure: MANOMETRY, ESOPHAGUS, WITH IMPEDANCE MEASUREMENT;  Surgeon: Slick Herron MD;  Location: [facility] ENDO (4TH F[facility]);  Service: Endoscopy;  Laterality: N/A;    ESOPHAGEAL MANOMETRY WITH MEASUREMENT OF IMPEDANCE N/A 2022    Procedure: MANOMETRY, ESOPHAGUS, WITH IMPEDANCE MEASUREMENT;  Surgeon: Debbie Butler MD;  Location: [facility] ENDO (4TH F[facility]);  Service: Endoscopy;  Laterality: N/A;  fully vaccinated, bladder  stimulator, instr mailed /emailed -ml    ESOPHAGOGASTRODUODENOSCOPY N/A 2/13/2020    Procedure: EGD (ESOPHAGOGASTRODUODENOSCOPY);  Surgeon: Slick Herron MD;  Location: [facility] (4TH F[facility]);  Service: Endoscopy;  Laterality: N/A;  pt has cardiology appt on 1/6/19-will call back after this appt to schedule-tb    FLUOROSCOPIC URODYNAMIC STUDY N/A 5/23/2019    Procedure: URODYNAMIC STUDY, FLUOROSCOPIC;  Surgeon: Zena Tee MD;  Location: [facility] OR 1ST FLR;  Service: Urology;  Laterality: N/A;  1 hour    GALLBLADDER SURGERY      HYSTERECTOMY  2013    Bess velasquez Dr. Champlain    IMPLANTATION OF PERMANENT SACRAL NERVE STIMULATOR N/A 7/30/2019    Procedure: INSERTION, NEUROSTIMULATOR, PERMANENT, SACRAL;  Surgeon: Zena Tee MD;  Location: [facility] OR 2ND FLR;  Service: Urology;  Laterality: N/A;  30 min    OOPHORECTOMY  2005    [facility]- benign cyst    OOPHORECTOMY  2013    [facility]- endo    ooptherectomy      REPLACEMENT OF NERVE STIMULATOR BATTERY N/A 2/28/2023    Procedure: Replacement, Battery, Neurostimulator;  Surgeon: Zena Tee MD;  Location: [facility] OR 2ND FLR;  Service: Urology;  Laterality: N/A;  45 min    right knee  scoped    SHOULDER SURGERY  right     Social History     Socioeconomic History    Marital status: Single   Tobacco Use    Smoking status: Never    Smokeless tobacco: Never   Substance and Sexual Activity    Alcohol use: No    Drug use: No    Sexual activity: Never     Birth control/protection: None   Social History Narrative    ** Merged History Encounter **          Social Determinants of Health     Financial Resource Strain: Low Risk  (2/14/2023)    Overall Financial Resource Strain (CARDIA)     Difficulty of Paying Living Expenses: Not hard at all   Food Insecurity: No Food Insecurity (2/14/2023)    Hunger Vital Sign     Worried About Running Out of Food in the Last Year: Never true     Ran Out of Food in the Last Year: Never true   Transportation Needs: No Transportation Needs  (2023)    PRAPARE - Transportation     Lack of Transportation (Medical): No     Lack of Transportation (Non-Medical): No   Physical Activity: Sufficiently Active (2023)    Exercise Vital Sign     Days of Exercise per Week: 3 days     Minutes of Exercise per Session: 60 min   Stress: Stress Concern Present (2023)    [employer] - Occupational Stress Questionnaire     Feeling of Stress : Very much   Social Connections: Socially Isolated (2023)    Social Connection and Isolation Panel [NHANES]     Frequency of Communication with Friends and Family: Once a week     Frequency of Social Gatherings with Friends and Family: Once a week     Attends [employer] Services: Never     Active Member of Clubs or Organizations: No     Attends Club or Organization Meetings: Never     Marital Status: Never    Housing Stability: Low Risk  (2023)    Housing Stability Vital Sign     Unable to Pay for Housing in the Last Year: No     Number of Places Lived in the Last Year: 1     Unstable Housing in the Last Year: No     Family History   Problem Relation Age of Onset    Cervical cancer Maternal Grandmother         unknown age of onset,  at 80    Breast cancer Mother 50        alive at 64, unilateral    Esophageal cancer Mother 63    Ovarian cancer Mother 63    Anesthesia problems Mother     Colon cancer Brother 40    Breast cancer Maternal Aunt 72        alive at 72    Breast cancer Paternal Aunt         unknown age of onset, alive at 70    Vaginal cancer Neg Hx     Endometrial cancer Neg Hx     Crohn's disease Neg Hx     Ulcerative colitis Neg Hx     Stomach cancer Neg Hx     Irritable bowel syndrome Neg Hx     Celiac disease Neg Hx     Cancer Neg Hx      OB History    Para Term  AB Living   0 0 0 0 0 0   SAB IAB Ectopic Multiple Live Births   0 0 0 0             Current Outpatient Medications   Medication Sig Dispense Refill    ACCU-CHEK AMAURY PLUS TEST  STRP Strp USE TO TEST BLOOD GLUCOSE TID  9    ACCU-CHEK SOFTCLIX LANCETS Misc USE TO TEST BLOOD GLUCOSE TID  3    albuterol (VENTOLIN HFA) 90 mcg/actuation inhaler Inhale 2 puffs into the lungs every 6 (six) hours as needed for Wheezing. Rescue 18 g 0    amitriptyline (ELAVIL) 10 MG tablet Take 1 tablet (10 mg total) by mouth nightly. 30 tablet 11    atorvastatin (LIPITOR) 80 MG tablet Take 80 mg by mouth every evening.      BLOOD SUGAR DIAGNOSTIC (ACCU-CHEK AMAURY PLUS TEST STRP MISC) 1 strip by Misc.(Non-Drug; Combo Route) route 3 (three) times daily.      buPROPion (WELLBUTRIN XL) 150 MG TB24 tablet Take 150 mg by mouth every morning.      chlorhexidine (PERIDEX) 0.12 % solution       ciclopirox (PENLAC) 8 % Soln Apply topically nightly. 6.6 mL 11    dexlansoprazole (DEXILANT) 60 mg capsule Take 1 capsule (60 mg total) by mouth 2 (two) times a day. 60 capsule 11    diclofenac sodium (VOLTAREN) 1 % Gel Apply 2 g topically 4 (four) times daily. As needed for breast pain 1 Tube 1    dicyclomine (BENTYL) 10 MG capsule TAKE 2 CAPSULES(20 MG) BY MOUTH THREE TIMES DAILY BEFORE MEALS 180 capsule 0    diltiazem HCl (DILTIAZEM 2% CREAM) Apply topically 3 (three) times daily. Apply topically to anal area. 30 g 2    FARXIGA 10 mg Tab TK 1 T PO QD  7    fluticasone propionate (FLONASE) 50 mcg/actuation nasal spray SHAKE LIQUID AND USE 2 SPRAYS(100 MCG) IN EACH NOSTRIL EVERY DAY 48 g 2    fluticasone-salmeterol diskus inhaler 250-50 mcg Inhale 1 puff into the lungs 2 (two) times daily. Controller 60 each 11    gabapentin (NEURONTIN) 300 MG capsule Take 600 mg (two capsules) in the morning and 900mg (three capsules) at night before bed 150 capsule 11    GLUCOPHAGE 500 mg tablet Take 500 mg by mouth 2 (two) times daily with meals.       ibuprofen (ADVIL,MOTRIN) 800 MG tablet 800 mg every 4 (four) hours as needed.   0    ipratropium (ATROVENT) 21 mcg (0.03 %) nasal spray 2 sprays by Each Nostril route 3 (three) times daily. 30 mL  1    levothyroxine (SYNTHROID) 75 MCG tablet Take 75 mcg by mouth before breakfast.      meloxicam (MOBIC) 15 MG tablet       samuelJyotsnablue-s.phos-phsal-hyo (URIBEL) 118-10-40.8-36 mg Cap Take 1 capsule by mouth every 6 (six) hours as needed (bladder pain). 120 capsule 11    metoprolol succinate (TOPROL-XL) 100 MG 24 hr tablet Take 1 tablet (100 mg total) by mouth once daily. 90 tablet 3    mometasone 0.1% (ELOCON) 0.1 % cream APPLY TOPICALLY TO THE RASH ON HANDS TWICE DAILY AS NEEDED FOR TWO TO THREE WEEKS. USE SPARINGLY      omeprazole (PRILOSEC OTC) 20 MG tablet Take 20 mg by mouth once daily.      oxyCODONE (ROXICODONE) 5 MG immediate release tablet Take 1 tablet (5 mg total) by mouth every 4 (four) hours as needed for Pain. 5 tablet 0    phentermine (ADIPEX-P) 37.5 mg tablet Take 37.5 mg by mouth.      phentermine 37.5 MG capsule TK 1 C PO ONCE D IN THE MORNING      REXULTI 4 mg Tab Take 1 tablet by mouth.      spironolactone (ALDACTONE) 25 MG tablet Take by mouth.      traZODone (DESYREL) 100 MG tablet TK 1 T PO HS  1    TRULICITY 4.5 mg/0.5 mL pen injector       ZOLOFT 100 mg tablet Take 200 mg by mouth once daily.       estradioL (ESTRACE) 0.01 % (0.1 mg/gram) vaginal cream Place 1 g vaginally twice a week. 42.5 g 3    omega-3 fatty acids/fish oil (FISH OIL-OMEGA-3 FATTY ACIDS) 300-1,000 mg capsule Take 1 capsule by mouth once daily. 90 capsule 3    topiramate (TOPAMAX) 50 MG tablet Take 2 tablets (100 mg total) by mouth every evening. 60 tablet 11     No current facility-administered medications for this visit.     Allergies: Patient has no known allergies.     The 10-year ASCVD risk score (Rohit ELIZONDO, et al., 2019) is: 2%    Values used to calculate the score:      Age: 38 years      Sex: Female      Is Non- : No      Diabetic: Yes      Tobacco smoker: No      Systolic Blood Pressure: 117 mmHg      Is BP treated: Yes      HDL Cholesterol: 39 mg/dL      Total Cholesterol: 181  mg/dL      ROS:  Constitutional: no weight loss, weight gain, fever, fatigue  Eyes:  No vision changes, glasses/contacts  ENT/Mouth: No ulcers, sinus problems, ears ringing, headache  Cardiovascular: No inability to lie flat, chest pain, exercise intolerance, swelling, heart palpitations  Respiratory: No wheezing, coughing blood, shortness of breath, or cough  Gastrointestinal: No diarrhea, bloody stool, nausea/vomiting, constipation, gas, hemorrhoids  Genitourinary: No blood in urine, painful urination, urgency of urination, frequency of urination, incomplete emptying, incontinence, abnormal bleeding, painful periods, heavy periods, vaginal discharge, vaginal odor, painful intercourse, sexual problems, bleeding after intercourse.  Musculoskeletal: No muscle weakness  Skin/Breast: No painful breasts, nipple discharge, masses, rash, ulcers  Neurological: No passing out, seizures, numbness, headache  Endocrine: + diabetes, +hypothyroid, hyperthyroid, hot flashes, hair loss, abnormal hair growth, acne  Psychiatric: No depression, crying  Hematologic: No bruises, bleeding, swollen lymph nodes, anemia.      Physical Exam:   Constitutional: She is oriented to person, place, and time. She appears well-developed and well-nourished.      Neck: No tracheal deviation present. No thyromegaly present.    Cardiovascular:       Exam reveals no edema.        Pulmonary/Chest: Effort normal. She exhibits no mass, no tenderness, no deformity and no retraction. Right breast exhibits no inverted nipple, no mass, no nipple discharge, no skin change, no tenderness, presence, no bleeding and no swelling. Left breast exhibits no inverted nipple, no mass, no nipple discharge, no skin change, no tenderness, presence, no bleeding and no swelling. Breasts are symmetrical.        Abdominal: Soft. She exhibits no distension and no mass. There is no abdominal tenderness. There is no rebound and no guarding. No hernia. Hernia confirmed negative in  the left inguinal area.     Genitourinary: Rectum:      No external hemorrhoid.   There is no rash, tenderness or lesion on the right labia. There is no rash, tenderness or lesion on the left labia. No no adexnal prolapse. Right adnexum displays no mass, no tenderness and no fullness. Left adnexum displays no mass, no tenderness and no fullness. No vaginal discharge, tenderness, bleeding, rectocele, cystocele or prolapse of vaginal walls in the vagina. Cervix is absent.Uterus is absent.           Musculoskeletal: Normal range of motion and moves all extremeties. No edema.       Neurological: She is alert and oriented to person, place, and time.    Skin: No rash noted. No erythema. No pallor.    Psychiatric: She has a normal mood and affect. Her behavior is normal. Judgment and thought content normal.         ASSESSMENT:   well woman    PLAN:   Mammogram  Continue vaginal estrogen  return annually or prn

## 2023-12-21 ENCOUNTER — OFFICE VISIT (OUTPATIENT)
Dept: UROGYNECOLOGY | Facility: CLINIC | Age: 38
End: 2023-12-21
Payer: MEDICARE

## 2023-12-21 VITALS
WEIGHT: 215.38 LBS | BODY MASS INDEX: 39.63 KG/M2 | HEIGHT: 62 IN | DIASTOLIC BLOOD PRESSURE: 84 MMHG | SYSTOLIC BLOOD PRESSURE: 120 MMHG

## 2023-12-21 DIAGNOSIS — R10.2 VAGINAL PAIN: Primary | ICD-10-CM

## 2023-12-21 DIAGNOSIS — R39.89 URETHRAL PAIN: ICD-10-CM

## 2023-12-21 LAB
BILIRUB SERPL-MCNC: NORMAL MG/DL
BLOOD URINE, POC: NORMAL
CLARITY, POC UA: CLEAR
COLOR, POC UA: NORMAL
GLUCOSE UR QL STRIP: 250
KETONES UR QL STRIP: NORMAL
LEUKOCYTE ESTERASE URINE, POC: NORMAL
NITRITE, POC UA: NORMAL
PH, POC UA: 5
PROTEIN, POC: NORMAL
SPECIFIC GRAVITY, POC UA: 1
UROBILINOGEN, POC UA: NORMAL

## 2023-12-21 PROCEDURE — 81002 POCT URINE DIPSTICK WITHOUT MICROSCOPE: ICD-10-PCS | Mod: HCNC,S$GLB,, | Performed by: OBSTETRICS & GYNECOLOGY

## 2023-12-21 PROCEDURE — 99499 UNLISTED E&M SERVICE: CPT | Mod: HCNC,S$GLB,, | Performed by: OBSTETRICS & GYNECOLOGY

## 2023-12-21 PROCEDURE — 99999 PR PBB SHADOW E&M-EST. PATIENT-LVL V: CPT | Mod: PBBFAC,HCNC,,

## 2023-12-21 PROCEDURE — 99999 PR PBB SHADOW E&M-EST. PATIENT-LVL V: ICD-10-PCS | Mod: PBBFAC,HCNC,,

## 2023-12-21 PROCEDURE — 81002 URINALYSIS NONAUTO W/O SCOPE: CPT | Mod: HCNC,S$GLB,, | Performed by: OBSTETRICS & GYNECOLOGY

## 2023-12-21 PROCEDURE — 52000 PR CYSTOURETHROSCOPY: ICD-10-PCS | Mod: HCNC,S$GLB,, | Performed by: OBSTETRICS & GYNECOLOGY

## 2023-12-21 PROCEDURE — 99499 NO LOS: ICD-10-PCS | Mod: HCNC,S$GLB,, | Performed by: OBSTETRICS & GYNECOLOGY

## 2023-12-21 PROCEDURE — 52000 CYSTOURETHROSCOPY: CPT | Mod: HCNC,S$GLB,, | Performed by: OBSTETRICS & GYNECOLOGY

## 2023-12-21 RX ORDER — CIPROFLOXACIN 500 MG/1
500 TABLET ORAL
Status: COMPLETED | OUTPATIENT
Start: 2023-12-21 | End: 2023-12-21

## 2023-12-21 RX ORDER — LIDOCAINE HYDROCHLORIDE 20 MG/ML
JELLY TOPICAL ONCE
Status: COMPLETED | OUTPATIENT
Start: 2023-12-21 | End: 2023-12-21

## 2023-12-21 RX ADMIN — CIPROFLOXACIN 500 MG: 500 TABLET ORAL at 08:12

## 2023-12-21 RX ADMIN — LIDOCAINE HYDROCHLORIDE 5 ML: 20 JELLY TOPICAL at 08:12

## 2023-12-21 NOTE — PROGRESS NOTES
Title of Operation:   Cystourethroscopy     INDICATIONS:  38 y.o. Y O F  has a past medical history of Blood in stool, Constipation, Cystitis, Depression, Diabetes mellitus, type 2, Dysphagia, Endometriosis, GERD (gastroesophageal reflux disease), Headache, Hypertension, Palpitations, Premature menopause on hormone replacement therapy, and Thyroid disease.    PREOPERATIVE DIAGNOSIS  Urethral pain   Vaginal pain     POSTOPERATIVE DIAGNOSIS:   Urethral pain   Vaginal pain    Anesthesia:   2% Xylocaine gel.    Specimen (Bacteriological, Pathological or other):   None.     Prosthetic Device/Implant:   None.     Surgeons Narrative:     After informed consent was obtained, the patient was placed in the lithotomy position. The urethral meatus was prepped with Betadine and 10 cubic centimeters of 2% Xylocaine gel were introduced into the urethra. A flexible cystourethroscope was introduced into the bladder. The bladder was distended with approximately 300 cubic centimeters of sterile water. A systematic survey was performed in which the bladder was surveyed using multiple sequential passes in a clockwise fashion from the bladder dome to the bladder base to the urethrovesical junction trabeculations. The trigone and ureteral orifices were observed. The scope was then flipped back on itself, and the urethrovesical junction was viewed. A vaginal examining finger was then placed with pressure suburethrally at the urethrovesical junction as the telescope was withdrawn in order to perform positive pressure urethroscopy.  Standard maneuvers of cough, squeeze and Valsalva were performed. The telescope was then completely withdrawn.     Findings: Urethroscopy:  Normal.  Cystoscopy:  Normal bladder mucosa, bilateral ureteral flow was noted.      Assessment: Essentially normal cystourethroscopy to abnormality to explain her urethral pain.      Plan:   Recommend use of uribel as needed   She has seen Dr. Tee in Oct with  interrogation performed - Will help make an appointment with Dr. Tee for follow up if she continues to be unable to feel the device.

## 2024-01-10 ENCOUNTER — OFFICE VISIT (OUTPATIENT)
Dept: PODIATRY | Facility: CLINIC | Age: 39
End: 2024-01-10
Payer: MEDICARE

## 2024-01-10 VITALS
BODY MASS INDEX: 40.69 KG/M2 | HEART RATE: 139 BPM | WEIGHT: 221.13 LBS | SYSTOLIC BLOOD PRESSURE: 106 MMHG | HEIGHT: 62 IN | DIASTOLIC BLOOD PRESSURE: 69 MMHG

## 2024-01-10 DIAGNOSIS — B35.1 ONYCHOMYCOSIS DUE TO DERMATOPHYTE: ICD-10-CM

## 2024-01-10 DIAGNOSIS — E11.42 DIABETIC POLYNEUROPATHY ASSOCIATED WITH TYPE 2 DIABETES MELLITUS: Primary | ICD-10-CM

## 2024-01-10 PROCEDURE — 99999 PR PBB SHADOW E&M-EST. PATIENT-LVL IV: CPT | Mod: PBBFAC,HCNC,, | Performed by: PODIATRIST

## 2024-01-10 PROCEDURE — 11721 DEBRIDE NAIL 6 OR MORE: CPT | Mod: Q9,HCNC,S$GLB, | Performed by: PODIATRIST

## 2024-01-10 PROCEDURE — 99499 UNLISTED E&M SERVICE: CPT | Mod: HCNC,S$GLB,, | Performed by: PODIATRIST

## 2024-01-10 NOTE — PROGRESS NOTES
Subjective:      Patient ID: Maria Ines Ac is a 38 y.o. female.    Chief Complaint: Diabetes Mellitus    Maria Ines is a 38 y.o. female who presents to the clinic for evaluation and treatment of high risk feet. Maria Ines has a past medical history of Blood in stool, Constipation, Cystitis, Depression, Diabetes mellitus, type 2, Dysphagia, Endometriosis, GERD (gastroesophageal reflux disease), Headache, Hypertension, Palpitations, Premature menopause on hormone replacement therapy, and Thyroid disease. The patient's chief complaint is long, thick toenails. Pt is under the care of ortho for an ankle sprain.    This patient has documented high risk feet requiring routine maintenance secondary to peripheral neuropathy.    PCP: Luann Raymond MD    Date Last Seen by PCP:   Chief Complaint   Patient presents with    Diabetes Mellitus       Current shoe gear:  Affected Foot: Tennis shoes      Unaffected Foot: Tennis shoes    Hemoglobin A1C   Date Value Ref Range Status   11/14/2023 5.7 (H) 4.0 - 5.6 % Final     Comment:     ADA Screening Guidelines:  5.7-6.4%  Consistent with prediabetes  >or=6.5%  Consistent with diabetes    High levels of fetal hemoglobin interfere with the HbA1C  assay. Heterozygous hemoglobin variants (HbS, HgC, etc)do  not significantly interfere with this assay.   However, presence of multiple variants may affect accuracy.     07/17/2023 5.1 4.0 - 5.6 % Final     Comment:     ADA Screening Guidelines:  5.7-6.4%  Consistent with prediabetes  >or=6.5%  Consistent with diabetes    High levels of fetal hemoglobin interfere with the HbA1C  assay. Heterozygous hemoglobin variants (HbS, HgC, etc)do  not significantly interfere with this assay.   However, presence of multiple variants may affect accuracy.     03/14/2023 5.3 4.0 - 5.6 % Final     Comment:     ADA Screening Guidelines:  5.7-6.4%  Consistent with prediabetes  >or=6.5%  Consistent with diabetes    High levels of fetal hemoglobin  interfere with the HbA1C  assay. Heterozygous hemoglobin variants (HbS, HgC, etc)do  not significantly interfere with this assay.   However, presence of multiple variants may affect accuracy.         Review of Systems   Constitutional: Negative for chills, fever and malaise/fatigue.   HENT:  Negative for hearing loss.    Cardiovascular:  Negative for claudication.   Respiratory:  Negative for shortness of breath.    Skin:  Positive for color change, dry skin and nail changes. Negative for flushing and rash.   Musculoskeletal:  Negative for joint pain and myalgias.   Gastrointestinal:  Negative for nausea and vomiting.   Neurological:  Positive for numbness, paresthesias and sensory change. Negative for loss of balance.   Psychiatric/Behavioral:  Negative for altered mental status.    Allergic/Immunologic: Negative for hives.           Objective:      Physical Exam  Vitals reviewed.   Cardiovascular:      Pulses:           Dorsalis pedis pulses are 2+ on the right side and 2+ on the left side.        Posterior tibial pulses are 2+ on the right side and 2+ on the left side.      Comments: No edema noted to b/L LEs  Musculoskeletal:      Comments: Adequate joint ROM noted to all lower extremity muscle groups with no pain or crepitation noted. Muscle strength is 5/5 in all groups bilaterally.     Feet:      Right foot:      Protective Sensation: 5 sites tested.  0 sites sensed.      Left foot:      Protective Sensation: 5 sites tested.  0 sites sensed.   Skin:     General: Skin is warm and dry.      Capillary Refill: Capillary refill takes 2 to 3 seconds.      Comments: Normal skin tugor noted.   No open lesion noted b/L  Skin temp is warm to warm from proximal to distal b/L.  Webspaces clean, dry, and intact  Xerosis Bilaterally. No open lesions noted.   HKL noted to left 5th digit x2, minimal buildup     Neurological:      Mental Status: She is alert and oriented to person, place, and time.      Comments: Intact gross  sensation noted to b/L LEs         Nails x10 are elongated by  5-7mm's, thickened by 1-2 mm's, dystrophic, and are yellowish in  coloration . Xerosis Bilaterally. No open lesions noted.             Assessment:       Encounter Diagnoses   Name Primary?    Diabetic polyneuropathy associated with type 2 diabetes mellitus Yes    Onychomycosis due to dermatophyte          Plan:       Maria Ines was seen today for diabetes mellitus.    Diagnoses and all orders for this visit:    Diabetic polyneuropathy associated with type 2 diabetes mellitus    Onychomycosis due to dermatophyte      I counseled the patient on her conditions, their implications and medical management.      - Shoe inspection. Diabetic Foot Education. Patient reminded of the importance of good nutrition and blood sugar control to help prevent podiatric complications of diabetes. Patient instructed on proper foot hygeine. We discussed wearing proper shoe gear, daily foot inspections, never walking without protective shoe gear, never putting sharp instruments to feet, routine podiatric nail visits every 2-3 months.        - With patient's permission, nails were aggressively reduced and debrided x 10 to their soft tissue attachment mechanically and with electric , removing all offending nail and debris. Patient relates relief following the procedure. She will continue to monitor the areas daily, inspect her feet, wear protective shoe gear when ambulatory, moisturizer to maintain skin integrity and follow in this office in approximately 2-3 months, sooner p.r.n.

## 2024-01-18 ENCOUNTER — OFFICE VISIT (OUTPATIENT)
Dept: SLEEP MEDICINE | Facility: CLINIC | Age: 39
End: 2024-01-18
Payer: MEDICARE

## 2024-01-18 VITALS
DIASTOLIC BLOOD PRESSURE: 83 MMHG | HEIGHT: 62 IN | HEART RATE: 81 BPM | BODY MASS INDEX: 40.67 KG/M2 | WEIGHT: 221 LBS | SYSTOLIC BLOOD PRESSURE: 125 MMHG

## 2024-01-18 DIAGNOSIS — Z78.9 INTOLERANCE OF CONTINUOUS POSITIVE AIRWAY PRESSURE (CPAP) VENTILATION: ICD-10-CM

## 2024-01-18 DIAGNOSIS — G47.33 OSA (OBSTRUCTIVE SLEEP APNEA): Primary | ICD-10-CM

## 2024-01-18 PROCEDURE — 3072F LOW RISK FOR RETINOPATHY: CPT | Mod: HCNC,CPTII,S$GLB, | Performed by: PHYSICIAN ASSISTANT

## 2024-01-18 PROCEDURE — 99999 PR PBB SHADOW E&M-EST. PATIENT-LVL IV: CPT | Mod: PBBFAC,HCNC,, | Performed by: PHYSICIAN ASSISTANT

## 2024-01-18 PROCEDURE — 3008F BODY MASS INDEX DOCD: CPT | Mod: HCNC,CPTII,S$GLB, | Performed by: PHYSICIAN ASSISTANT

## 2024-01-18 PROCEDURE — 99214 OFFICE O/P EST MOD 30 MIN: CPT | Mod: HCNC,S$GLB,, | Performed by: PHYSICIAN ASSISTANT

## 2024-01-18 PROCEDURE — 1159F MED LIST DOCD IN RCRD: CPT | Mod: HCNC,CPTII,S$GLB, | Performed by: PHYSICIAN ASSISTANT

## 2024-01-18 PROCEDURE — 3074F SYST BP LT 130 MM HG: CPT | Mod: HCNC,CPTII,S$GLB, | Performed by: PHYSICIAN ASSISTANT

## 2024-01-18 PROCEDURE — 3079F DIAST BP 80-89 MM HG: CPT | Mod: HCNC,CPTII,S$GLB, | Performed by: PHYSICIAN ASSISTANT

## 2024-01-18 PROCEDURE — 1160F RVW MEDS BY RX/DR IN RCRD: CPT | Mod: HCNC,CPTII,S$GLB, | Performed by: PHYSICIAN ASSISTANT

## 2024-01-18 NOTE — PROGRESS NOTES
Referred by Luann Raymond MD     ESTABLISHED PATIENT VISIT    Maria Ines Ac  is a pleasant 38 y.o. female  with PMH significant for HTN, HLD, DM II, polyneuropathy, hypothyroidism, GERD, IBS-D, BPPV, schizoaffective disorder, chronic migraines, BMI 40+, ZAC      Here today for: CPAP follow-up     PLAN last visit 11/15/23:   -recommend sleep testing to re-assess for ZAC  -PSG ordered  -consider MSLT if PSG negative and hypersomnolence persists   -discussed trial therapy if ZAC present and the patient is open to a trial of CPAP therapy  -discussed ZAC and CPAP with patient in detail, including possible complications of untreated ZAC like heart attack/stroke  -advised on strict driving precautions; advised never to drive drowsy      Since last visit:   Reports using nightly, but still having difficulty adjusting to mask/pressure. Usually takes off subconsciously during the night due to discomfort.       PAP history   Problems Trying to get used of mask/pressure   Mask FFM   Pressure 6-12cwp   DME HME   Machine age Air[employer]ense 10 1/8/24   Download 1/18/24: 9/9 x 4hrs, 6-12cwp (6/6.3/6.4), leak (16.1/27.3/88.1), AHI 0.2         Past Medical History:   Diagnosis Date    Blood in stool     Constipation     Cystitis     Depression     Diabetes mellitus, type 2     Dysphagia     Endometriosis     GERD (gastroesophageal reflux disease)     Headache     Hypertension     Palpitations     Premature menopause on hormone replacement therapy     Thyroid disease      Patient Active Problem List   Diagnosis    Abdominal pain, RLQ (right lower quadrant)    Dysphagia    Diabetes mellitus, type II    Right hip pain    Intractable chronic migraine without aura and without status migrainosus    Hypertension associated with diabetes    Palpitations    Dysfunctional voiding of urine    Severe obesity (BMI 35.0-39.9) with comorbidity    Muscle spasm    Mixed stress and urge urinary incontinence    Cervical myofascial pain syndrome     Gastroesophageal reflux disease without esophagitis    Irritable bowel syndrome with diarrhea    Migrainous vertigo    Uncontrolled morning headache    Sleep arousal disorder    Hyperlipidemia associated with type 2 diabetes mellitus    GERD (gastroesophageal reflux disease)    Family history of breast cancer    Family hx of ovarian malignancy    Weakness    Painful cervical ROM    Incomplete bladder emptying    Bilateral occipital neuralgia    Medication overuse headache    Benign paroxysmal positional vertigo due to bilateral vestibular disorder    Chronic daily headache    Dizziness    Decreased functional mobility    Diabetic polyneuropathy associated with type 2 diabetes mellitus    Snoring    Intractable migraine with aura without status migrainosus    Schizoaffective disorder, depressive type    Hypothyroidism    Decreased range of motion of right ankle    Impaired functional mobility, balance, gait, and endurance    Decreased strength of lower extremity    Chronic pain of right ankle    Post-viral cough syndrome    Gastroparesis    Sleep apnea       Current Outpatient Medications:     ACCU-CHEK AMAURY PLUS TEST STRP Strp, USE TO TEST BLOOD GLUCOSE TID, Disp: , Rfl: 9    ACCU-CHEK SOFTCLIX LANCETS Misc, USE TO TEST BLOOD GLUCOSE TID, Disp: , Rfl: 3    albuterol (VENTOLIN HFA) 90 mcg/actuation inhaler, Inhale 2 puffs into the lungs every 6 (six) hours as needed for Wheezing. Rescue, Disp: 18 g, Rfl: 0    amitriptyline (ELAVIL) 10 MG tablet, Take 1 tablet (10 mg total) by mouth nightly., Disp: 30 tablet, Rfl: 11    atorvastatin (LIPITOR) 80 MG tablet, Take 80 mg by mouth every evening., Disp: , Rfl:     BLOOD SUGAR DIAGNOSTIC (ACCU-CHEK AMAURY PLUS TEST STRP MISC), 1 strip by Misc.(Non-Drug; Combo Route) route 3 (three) times daily., Disp: , Rfl:     buPROPion (WELLBUTRIN XL) 150 MG TB24 tablet, Take 150 mg by mouth every morning., Disp: , Rfl:     chlorhexidine (PERIDEX) 0.12 % solution, , Disp: , Rfl:      ciclopirox (PENLAC) 8 % Soln, Apply topically nightly., Disp: 6.6 mL, Rfl: 11    dexlansoprazole (DEXILANT) 60 mg capsule, Take 1 capsule (60 mg total) by mouth 2 (two) times a day., Disp: 60 capsule, Rfl: 11    diclofenac sodium (VOLTAREN) 1 % Gel, Apply 2 g topically 4 (four) times daily. As needed for breast pain, Disp: 1 Tube, Rfl: 1    dicyclomine (BENTYL) 10 MG capsule, TAKE 2 CAPSULES(20 MG) BY MOUTH THREE TIMES DAILY BEFORE MEALS, Disp: 180 capsule, Rfl: 0    diltiazem HCl (DILTIAZEM 2% CREAM), Apply topically 3 (three) times daily. Apply topically to anal area., Disp: 30 g, Rfl: 2    estradioL (ESTRACE) 0.01 % (0.1 mg/gram) vaginal cream, Place 1 g vaginally twice a week., Disp: 42.5 g, Rfl: 3    FARXIGA 10 mg Tab, TK 1 T PO QD, Disp: , Rfl: 7    fluticasone propionate (FLONASE) 50 mcg/actuation nasal spray, SHAKE LIQUID AND USE 2 SPRAYS(100 MCG) IN EACH NOSTRIL EVERY DAY, Disp: 48 g, Rfl: 2    fluticasone-salmeterol diskus inhaler 250-50 mcg, Inhale 1 puff into the lungs 2 (two) times daily. Controller, Disp: 60 each, Rfl: 11    gabapentin (NEURONTIN) 300 MG capsule, Take 600 mg (two capsules) in the morning and 900mg (three capsules) at night before bed, Disp: 150 capsule, Rfl: 11    GLUCOPHAGE 500 mg tablet, Take 500 mg by mouth 2 (two) times daily with meals. , Disp: , Rfl:     ibuprofen (ADVIL,MOTRIN) 800 MG tablet, 800 mg every 4 (four) hours as needed. , Disp: , Rfl: 0    ipratropium (ATROVENT) 21 mcg (0.03 %) nasal spray, 2 sprays by Each Nostril route 3 (three) times daily., Disp: 30 mL, Rfl: 1    levothyroxine (SYNTHROID) 75 MCG tablet, Take 75 mcg by mouth before breakfast., Disp: , Rfl:     meloxicam (MOBIC) 15 MG tablet, , Disp: , Rfl:     methen-m.blue-s.phos-phsal-hyo (URIBEL) 118-10-40.8-36 mg Cap, Take 1 capsule by mouth every 6 (six) hours as needed (bladder pain)., Disp: 120 capsule, Rfl: 11    metoprolol succinate (TOPROL-XL) 100 MG 24 hr tablet, Take 1 tablet (100 mg total) by mouth  "once daily., Disp: 90 tablet, Rfl: 3    mometasone 0.1% (ELOCON) 0.1 % cream, APPLY TOPICALLY TO THE RASH ON HANDS TWICE DAILY AS NEEDED FOR TWO TO THREE WEEKS. USE SPARINGLY, Disp: , Rfl:     omega-3 fatty acids/fish oil (FISH OIL-OMEGA-3 FATTY ACIDS) 300-1,000 mg capsule, Take 1 capsule by mouth once daily., Disp: 90 capsule, Rfl: 3    omeprazole (PRILOSEC OTC) 20 MG tablet, Take 20 mg by mouth once daily., Disp: , Rfl:     oxyCODONE (ROXICODONE) 5 MG immediate release tablet, Take 1 tablet (5 mg total) by mouth every 4 (four) hours as needed for Pain., Disp: 5 tablet, Rfl: 0    phentermine (ADIPEX-P) 37.5 mg tablet, Take 37.5 mg by mouth., Disp: , Rfl:     phentermine 37.5 MG capsule, TK 1 C PO ONCE D IN THE MORNING, Disp: , Rfl:     REXULTI 4 mg Tab, Take 1 tablet by mouth., Disp: , Rfl:     spironolactone (ALDACTONE) 25 MG tablet, Take by mouth., Disp: , Rfl:     topiramate (TOPAMAX) 50 MG tablet, Take 2 tablets (100 mg total) by mouth every evening., Disp: 60 tablet, Rfl: 11    traZODone (DESYREL) 100 MG tablet, TK 1 T PO HS, Disp: , Rfl: 1    TRULICITY 4.5 mg/0.5 mL pen injector, , Disp: , Rfl:     ZOLOFT 100 mg tablet, Take 200 mg by mouth once daily. , Disp: , Rfl:        Vitals:    01/18/24 0828   BP: 125/83   BP Location: Right arm   Patient Position: Sitting   BP Method: Medium (Automatic)   Pulse: 81   Weight: 100.2 kg (221 lb)   Height: 5' 2" (1.575 m)     Physical Exam:    GEN:   Well-appearing  Psych:  Appropriate affect, demonstrates insight  SKIN:  No rash on the face or bridge of the nose      LABS:   Lab Results   Component Value Date    HGB 13.6 11/14/2023    CO2 26 11/14/2023         RECORDS REVIEWED:    PSG 6/14/18: AHI 0  PSG 1/19/21: AHI 0  PSG 3/8/21: AHI 2.2 (REM AHI 9.2)  PSG 11.22.23: AHI 6.5, RAHI 32 vs 3, hypoxemia, possible OHS    Previous sleep notes: 12/10/20, 11/15/23    CPAP interrogation 1/18/24: 9/9 x 4hrs, 6-12cwp (6/6.3/6.4), leak (16.1/27.3/88.1), AHI 0.2    ASSESSMENT    ESS " 16/24 Today      PROBLEM DESCRIPTION/ Sx on Presentation Interval Hx STATUS   Mild ZAC    + snoring, denies witnessed apneas  + wakes feeling un-refreshed Working on usage  Not yet controlled   Daytime Sx    + sleepiness when inactive   Naps 3-4 x weekly  ESS 16/24 on intake (reviewed from 12/10/20) Still quite sleepy Persists    Insomnia    Trouble falling asleep: difficult (uses phone in bed)  Maintenance:         wakes frequently, return to sleep is quick  Prior hypnotics:        Current hypnotics: trazodone 50mg PRN    Persists persists   Nocturia    x 2-3 per sleep period Persists  persists   Other issues:      PLAN     -using and benefiting from CPAP therapy when able to tolerate  -recommended owkring in nightly usage  -advised >6hrs 100% of nights with minimum usage of >4hrs 70% of nights recommended  -adjusted pressures 4-10cwp to see if this improves conformability  -consider PAP titration   -needs oximetry on PAP once she is able to tolerate  -discussed ZAC and CPAP with patient in detail, including possible complications of untreated ZAC like heart attack/stroke  -advised on strict driving precautions; advised never to drive drowsy    Advised on plan of care. Answered all patient questions. Patient verbalized understanding and voiced agreement with plan of care.        RTC 6-8 weeks or as needed     The patient was given open opportunity to ask questions and/or express concerns about treatment plan. All questions/concerns were discussed.     Two patient identifiers used prior to evaluation.

## 2024-02-06 ENCOUNTER — OFFICE VISIT (OUTPATIENT)
Dept: CARDIOLOGY | Facility: CLINIC | Age: 39
End: 2024-02-06
Payer: MEDICARE

## 2024-02-06 VITALS
BODY MASS INDEX: 40.67 KG/M2 | HEART RATE: 86 BPM | SYSTOLIC BLOOD PRESSURE: 110 MMHG | WEIGHT: 221 LBS | OXYGEN SATURATION: 93 % | DIASTOLIC BLOOD PRESSURE: 76 MMHG | HEIGHT: 62 IN

## 2024-02-06 DIAGNOSIS — R06.09 DYSPNEA ON EXERTION: Primary | ICD-10-CM

## 2024-02-06 DIAGNOSIS — E78.5 HYPERLIPIDEMIA ASSOCIATED WITH TYPE 2 DIABETES MELLITUS: ICD-10-CM

## 2024-02-06 DIAGNOSIS — E11.42 TYPE 2 DIABETES MELLITUS WITH DIABETIC POLYNEUROPATHY, WITHOUT LONG-TERM CURRENT USE OF INSULIN: ICD-10-CM

## 2024-02-06 DIAGNOSIS — E66.01 SEVERE OBESITY (BMI 35.0-39.9) WITH COMORBIDITY: ICD-10-CM

## 2024-02-06 DIAGNOSIS — I15.2 HYPERTENSION ASSOCIATED WITH DIABETES: ICD-10-CM

## 2024-02-06 DIAGNOSIS — E11.59 HYPERTENSION ASSOCIATED WITH DIABETES: ICD-10-CM

## 2024-02-06 DIAGNOSIS — E11.69 HYPERLIPIDEMIA ASSOCIATED WITH TYPE 2 DIABETES MELLITUS: ICD-10-CM

## 2024-02-06 PROCEDURE — 3074F SYST BP LT 130 MM HG: CPT | Mod: HCNC,CPTII,S$GLB, | Performed by: PHYSICIAN ASSISTANT

## 2024-02-06 PROCEDURE — 1160F RVW MEDS BY RX/DR IN RCRD: CPT | Mod: HCNC,CPTII,S$GLB, | Performed by: PHYSICIAN ASSISTANT

## 2024-02-06 PROCEDURE — 3078F DIAST BP <80 MM HG: CPT | Mod: HCNC,CPTII,S$GLB, | Performed by: PHYSICIAN ASSISTANT

## 2024-02-06 PROCEDURE — 93000 ELECTROCARDIOGRAM COMPLETE: CPT | Mod: HCNC,S$GLB,, | Performed by: INTERNAL MEDICINE

## 2024-02-06 PROCEDURE — 3072F LOW RISK FOR RETINOPATHY: CPT | Mod: HCNC,CPTII,S$GLB, | Performed by: PHYSICIAN ASSISTANT

## 2024-02-06 PROCEDURE — 1159F MED LIST DOCD IN RCRD: CPT | Mod: HCNC,CPTII,S$GLB, | Performed by: PHYSICIAN ASSISTANT

## 2024-02-06 PROCEDURE — 3008F BODY MASS INDEX DOCD: CPT | Mod: HCNC,CPTII,S$GLB, | Performed by: PHYSICIAN ASSISTANT

## 2024-02-06 PROCEDURE — 99214 OFFICE O/P EST MOD 30 MIN: CPT | Mod: HCNC,S$GLB,, | Performed by: PHYSICIAN ASSISTANT

## 2024-02-06 PROCEDURE — 99999 PR PBB SHADOW E&M-EST. PATIENT-LVL V: CPT | Mod: PBBFAC,HCNC,, | Performed by: PHYSICIAN ASSISTANT
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
## 2024-02-07 ENCOUNTER — TELEPHONE (OUTPATIENT)
Dept: GASTROENTEROLOGY | Facility: CLINIC | Age: 39
End: 2024-02-07
Payer: MEDICARE

## 2024-02-07 DIAGNOSIS — R00.2 PALPITATIONS: Primary | ICD-10-CM

## 2024-02-07 DIAGNOSIS — R07.89 CHEST WALL PAIN: Primary | ICD-10-CM

## 2024-02-07 DIAGNOSIS — K21.9 GASTROESOPHAGEAL REFLUX DISEASE WITHOUT ESOPHAGITIS: ICD-10-CM

## 2024-02-07 LAB
OHS QRS DURATION: 108 MS
OHS QTC CALCULATION: 478 MS

## 2024-02-07 RX ORDER — DEXLANSOPRAZOLE 60 MG/1
CAPSULE, DELAYED RELEASE ORAL
Qty: 60 CAPSULE | Refills: 11 | Status: SHIPPED | OUTPATIENT
Start: 2024-02-07

## 2024-02-07 NOTE — TELEPHONE ENCOUNTER
----- Message from Veronika Hernandez sent at 2/7/2024 12:57 PM CST -----  Regarding: Rx concerns  Contact: @ [phone]  Navid with [facility] is calling because the insurance does not cover the pts dexlansoprazole (DEXILANT) 60 mg capsule. Navid stated the the omeprazole is covered at no cost. Please call the pharmacy to discuss further        [id number]  [place]  [place] [address]  Phone: [phone] Fax: [phone]

## 2024-02-19 ENCOUNTER — OFFICE VISIT (OUTPATIENT)
Dept: INTERNAL MEDICINE | Facility: CLINIC | Age: 39
End: 2024-02-19
Payer: MEDICARE

## 2024-02-19 VITALS
HEIGHT: 62 IN | HEART RATE: 84 BPM | WEIGHT: 214.06 LBS | TEMPERATURE: 98 F | SYSTOLIC BLOOD PRESSURE: 120 MMHG | BODY MASS INDEX: 39.39 KG/M2 | DIASTOLIC BLOOD PRESSURE: 74 MMHG | OXYGEN SATURATION: 98 %

## 2024-02-19 DIAGNOSIS — R05.9 COUGH, UNSPECIFIED TYPE: Primary | ICD-10-CM

## 2024-02-19 DIAGNOSIS — J32.9 SINUSITIS, UNSPECIFIED CHRONICITY, UNSPECIFIED LOCATION: ICD-10-CM

## 2024-02-19 DIAGNOSIS — J45.20 MILD INTERMITTENT ASTHMA, UNSPECIFIED WHETHER COMPLICATED: ICD-10-CM

## 2024-02-19 PROCEDURE — 3074F SYST BP LT 130 MM HG: CPT | Mod: HCNC,CPTII,S$GLB, | Performed by: INTERNAL MEDICINE

## 2024-02-19 PROCEDURE — 3078F DIAST BP <80 MM HG: CPT | Mod: HCNC,CPTII,S$GLB, | Performed by: INTERNAL MEDICINE

## 2024-02-19 PROCEDURE — 3072F LOW RISK FOR RETINOPATHY: CPT | Mod: HCNC,CPTII,S$GLB, | Performed by: INTERNAL MEDICINE

## 2024-02-19 PROCEDURE — 99213 OFFICE O/P EST LOW 20 MIN: CPT | Mod: HCNC,S$GLB,, | Performed by: INTERNAL MEDICINE

## 2024-02-19 PROCEDURE — 99999 PR PBB SHADOW E&M-EST. PATIENT-LVL V: CPT | Mod: PBBFAC,HCNC,, | Performed by: INTERNAL MEDICINE

## 2024-02-19 PROCEDURE — 1160F RVW MEDS BY RX/DR IN RCRD: CPT | Mod: HCNC,CPTII,S$GLB, | Performed by: INTERNAL MEDICINE

## 2024-02-19 PROCEDURE — 3008F BODY MASS INDEX DOCD: CPT | Mod: HCNC,CPTII,S$GLB, | Performed by: INTERNAL MEDICINE

## 2024-02-19 PROCEDURE — 1159F MED LIST DOCD IN RCRD: CPT | Mod: HCNC,CPTII,S$GLB, | Performed by: INTERNAL MEDICINE

## 2024-02-19 RX ORDER — AMOXICILLIN 500 MG/1
500 TABLET, FILM COATED ORAL 2 TIMES DAILY
Qty: 14 TABLET | Refills: 0 | Status: SHIPPED | OUTPATIENT
Start: 2024-02-19 | End: 2024-03-15 | Stop reason: ALTCHOICE

## 2024-02-19 RX ORDER — BENZONATATE 100 MG/1
100 CAPSULE ORAL 3 TIMES DAILY PRN
Qty: 30 CAPSULE | Refills: 1 | Status: SHIPPED | OUTPATIENT
Start: 2024-02-19 | End: 2024-03-15

## 2024-02-19 NOTE — PROGRESS NOTES
"Subjective:       Patient ID: Maria Ines Ac is a 39 y.o. female.    Chief Complaint: Cough (Runny nose and sore throat ) and Nasal Congestion  This is a 39-year-old who presents today with upper respiratory symptoms been going on for more than a week she denies any fevers or flu-like symptoms and reports symptoms started with sore throat cough and are now progressing to increased nasal congestion she has had some wheezing on occasion has been using her inhaler.  She has had ear discomfort and facial pressure along with her cough more difficulty at night with her coughs.    Cough  Associated symptoms include wheezing.     Review of Systems   Constitutional:  Positive for fatigue.   HENT:  Positive for congestion.    Respiratory:  Positive for cough and wheezing.        Objective:    Blood pressure 120/74, pulse 84, temperature 98.1 °F (36.7 °C), height 5' 2" (1.575 m), weight 97.1 kg (214 lb 1.1 oz), last menstrual period 11/17/2012, SpO2 98 %.  Temp 98.5  Physical Exam  Constitutional:       General: She is not in acute distress.  HENT:      Head: Normocephalic.      Comments: Sinusitis       Mouth/Throat:      Pharynx: Oropharynx is clear.   Eyes:      General: No scleral icterus.  Cardiovascular:      Rate and Rhythm: Normal rate and regular rhythm.      Heart sounds: Normal heart sounds. No murmur heard.     No friction rub. No gallop.      Comments: Rare wheeze   Pulmonary:      Effort: Pulmonary effort is normal. No respiratory distress.      Breath sounds: Normal breath sounds.   Abdominal:      General: Bowel sounds are normal.      Palpations: Abdomen is soft. There is no mass.      Tenderness: There is no abdominal tenderness.   Musculoskeletal:      Cervical back: Neck supple.   Skin:     Findings: No erythema.   Neurological:      Mental Status: She is alert.         Assessment:       1. Cough, unspecified type    2. Sinusitis, unspecified chronicity, unspecified location    3. Mild intermittent " asthma, unspecified whether complicated        Plan:       Maria Ines was seen today for cough and nasal congestion.    Diagnoses and all orders for this visit:    Cough, unspecified type  Discussed she can continue Robitussin or Mucinex  -     amoxicillin (AMOXIL) 500 MG Tab; Take 1 tablet (500 mg total) by mouth 2 (two) times daily.  -     benzonatate (TESSALON) 100 MG capsule; Take 1 capsule (100 mg total) by mouth 3 (three) times daily as needed for Cough.    Sinusitis, unspecified chronicity, unspecified location  Will treat with amoxicillin    Mild intermittent asthma, unspecified whether complicated  She will continue her inhalers and monitor for signs of worsening    Follow up with the PCP as recommended rest hydration discussed

## 2024-02-20 ENCOUNTER — TELEPHONE (OUTPATIENT)
Dept: ADMINISTRATIVE | Facility: CLINIC | Age: 39
End: 2024-02-20
Payer: MEDICARE

## 2024-02-21 ENCOUNTER — OFFICE VISIT (OUTPATIENT)
Dept: UROLOGY | Facility: CLINIC | Age: 39
End: 2024-02-21
Payer: MEDICARE

## 2024-02-21 ENCOUNTER — OFFICE VISIT (OUTPATIENT)
Dept: FAMILY MEDICINE | Facility: CLINIC | Age: 39
End: 2024-02-21
Payer: MEDICARE

## 2024-02-21 VITALS
SYSTOLIC BLOOD PRESSURE: 120 MMHG | DIASTOLIC BLOOD PRESSURE: 60 MMHG | OXYGEN SATURATION: 96 % | WEIGHT: 215.94 LBS | BODY MASS INDEX: 39.74 KG/M2 | HEART RATE: 88 BPM | HEIGHT: 62 IN

## 2024-02-21 VITALS
WEIGHT: 215.19 LBS | DIASTOLIC BLOOD PRESSURE: 76 MMHG | HEART RATE: 76 BPM | BODY MASS INDEX: 39.35 KG/M2 | SYSTOLIC BLOOD PRESSURE: 111 MMHG

## 2024-02-21 DIAGNOSIS — G43.719 INTRACTABLE CHRONIC MIGRAINE WITHOUT AURA AND WITHOUT STATUS MIGRAINOSUS: ICD-10-CM

## 2024-02-21 DIAGNOSIS — E66.01 SEVERE OBESITY (BMI 35.0-39.9) WITH COMORBIDITY: ICD-10-CM

## 2024-02-21 DIAGNOSIS — E11.69 HYPERLIPIDEMIA ASSOCIATED WITH TYPE 2 DIABETES MELLITUS: ICD-10-CM

## 2024-02-21 DIAGNOSIS — N39.8 DYSFUNCTIONAL VOIDING OF URINE: ICD-10-CM

## 2024-02-21 DIAGNOSIS — R00.2 PALPITATIONS: ICD-10-CM

## 2024-02-21 DIAGNOSIS — M79.18 CERVICAL MYOFASCIAL PAIN SYNDROME: ICD-10-CM

## 2024-02-21 DIAGNOSIS — E11.42 DIABETIC POLYNEUROPATHY ASSOCIATED WITH TYPE 2 DIABETES MELLITUS: ICD-10-CM

## 2024-02-21 DIAGNOSIS — R26.9 ABNORMALITY OF GAIT AND MOBILITY: ICD-10-CM

## 2024-02-21 DIAGNOSIS — I15.2 HYPERTENSION ASSOCIATED WITH DIABETES: ICD-10-CM

## 2024-02-21 DIAGNOSIS — Z74.09 IMPAIRED FUNCTIONAL MOBILITY, BALANCE, GAIT, AND ENDURANCE: ICD-10-CM

## 2024-02-21 DIAGNOSIS — M54.81 BILATERAL OCCIPITAL NEURALGIA: ICD-10-CM

## 2024-02-21 DIAGNOSIS — G43.109 MIGRAINOUS VERTIGO: ICD-10-CM

## 2024-02-21 DIAGNOSIS — E11.42 TYPE 2 DIABETES MELLITUS WITH DIABETIC POLYNEUROPATHY, WITHOUT LONG-TERM CURRENT USE OF INSULIN: ICD-10-CM

## 2024-02-21 DIAGNOSIS — K21.9 GASTROESOPHAGEAL REFLUX DISEASE WITHOUT ESOPHAGITIS: ICD-10-CM

## 2024-02-21 DIAGNOSIS — Z00.00 ENCOUNTER FOR PREVENTIVE HEALTH EXAMINATION: ICD-10-CM

## 2024-02-21 DIAGNOSIS — N39.41 URGE INCONTINENCE: Primary | ICD-10-CM

## 2024-02-21 DIAGNOSIS — E78.5 HYPERLIPIDEMIA ASSOCIATED WITH TYPE 2 DIABETES MELLITUS: ICD-10-CM

## 2024-02-21 DIAGNOSIS — R51.9 CHRONIC DAILY HEADACHE: ICD-10-CM

## 2024-02-21 DIAGNOSIS — H81.13 BENIGN PAROXYSMAL POSITIONAL VERTIGO DUE TO BILATERAL VESTIBULAR DISORDER: ICD-10-CM

## 2024-02-21 DIAGNOSIS — E03.9 HYPOTHYROIDISM, UNSPECIFIED TYPE: ICD-10-CM

## 2024-02-21 DIAGNOSIS — G47.30 SLEEP APNEA, UNSPECIFIED TYPE: ICD-10-CM

## 2024-02-21 DIAGNOSIS — K58.0 IRRITABLE BOWEL SYNDROME WITH DIARRHEA: ICD-10-CM

## 2024-02-21 DIAGNOSIS — F25.1 SCHIZOAFFECTIVE DISORDER, DEPRESSIVE TYPE: ICD-10-CM

## 2024-02-21 DIAGNOSIS — R33.9 INCOMPLETE BLADDER EMPTYING: ICD-10-CM

## 2024-02-21 DIAGNOSIS — E11.59 HYPERTENSION ASSOCIATED WITH DIABETES: ICD-10-CM

## 2024-02-21 DIAGNOSIS — Z23 NEED FOR PNEUMOCOCCAL VACCINATION: ICD-10-CM

## 2024-02-21 DIAGNOSIS — Z00.00 ENCOUNTER FOR MEDICARE ANNUAL WELLNESS EXAM: Primary | ICD-10-CM

## 2024-02-21 PROBLEM — R53.1 WEAKNESS: Status: RESOLVED | Noted: 2019-05-31 | Resolved: 2024-02-21

## 2024-02-21 PROCEDURE — 3072F LOW RISK FOR RETINOPATHY: CPT | Mod: HCNC,CPTII,S$GLB,

## 2024-02-21 PROCEDURE — 3078F DIAST BP <80 MM HG: CPT | Mod: HCNC,CPTII,S$GLB,

## 2024-02-21 PROCEDURE — 1160F RVW MEDS BY RX/DR IN RCRD: CPT | Mod: HCNC,CPTII,S$GLB,

## 2024-02-21 PROCEDURE — 1159F MED LIST DOCD IN RCRD: CPT | Mod: HCNC,CPTII,S$GLB,

## 2024-02-21 PROCEDURE — 99999 PR PBB SHADOW E&M-EST. PATIENT-LVL IV: CPT | Mod: PBBFAC,,, | Performed by: UROLOGY

## 2024-02-21 PROCEDURE — 3074F SYST BP LT 130 MM HG: CPT | Mod: HCNC,CPTII,S$GLB,

## 2024-02-21 PROCEDURE — G0009 ADMIN PNEUMOCOCCAL VACCINE: HCPCS | Mod: HCNC,S$GLB,,

## 2024-02-21 PROCEDURE — 3074F SYST BP LT 130 MM HG: CPT | Mod: CPTII,S$GLB,, | Performed by: UROLOGY

## 2024-02-21 PROCEDURE — G0439 PPPS, SUBSEQ VISIT: HCPCS | Mod: HCNC,S$GLB,,

## 2024-02-21 PROCEDURE — 1159F MED LIST DOCD IN RCRD: CPT | Mod: CPTII,S$GLB,, | Performed by: UROLOGY

## 2024-02-21 PROCEDURE — 90677 PCV20 VACCINE IM: CPT | Mod: HCNC,S$GLB,,

## 2024-02-21 PROCEDURE — 95971 ALYS SMPL SP/PN NPGT W/PRGRM: CPT | Mod: S$GLB,,, | Performed by: UROLOGY

## 2024-02-21 PROCEDURE — 3008F BODY MASS INDEX DOCD: CPT | Mod: CPTII,S$GLB,, | Performed by: UROLOGY

## 2024-02-21 PROCEDURE — G9919 SCRN ND POS ND PROV OF REC: HCPCS | Mod: HCNC,CPTII,S$GLB,

## 2024-02-21 PROCEDURE — 3078F DIAST BP <80 MM HG: CPT | Mod: CPTII,S$GLB,, | Performed by: UROLOGY

## 2024-02-21 PROCEDURE — 51798 US URINE CAPACITY MEASURE: CPT | Mod: S$GLB,,, | Performed by: UROLOGY

## 2024-02-21 PROCEDURE — 99999 PR PBB SHADOW E&M-EST. PATIENT-LVL V: CPT | Mod: PBBFAC,HCNC,,

## 2024-02-21 PROCEDURE — 81002 URINALYSIS NONAUTO W/O SCOPE: CPT | Mod: S$GLB,,, | Performed by: UROLOGY

## 2024-02-21 PROCEDURE — 99214 OFFICE O/P EST MOD 30 MIN: CPT | Mod: S$GLB,,, | Performed by: UROLOGY

## 2024-02-21 PROCEDURE — 3072F LOW RISK FOR RETINOPATHY: CPT | Mod: CPTII,S$GLB,, | Performed by: UROLOGY

## 2024-02-21 RX ORDER — MIRABEGRON 50 MG/1
50 TABLET, FILM COATED, EXTENDED RELEASE ORAL DAILY
Qty: 30 TABLET | Refills: 11 | Status: SHIPPED | OUTPATIENT
Start: 2024-02-21 | End: 2025-02-20

## 2024-02-21 NOTE — PATIENT INSTRUCTIONS
Counseling and Referral of Other Preventative  (Italic type indicates deductible and co-insurance are waived)    Patient Name: Maria Ines Ac  Today's Date: 2/21/2024    Health Maintenance       Date Due Completion Date    Pneumococcal Vaccines (Age 0-64) (2 of 2 - PCV) 09/25/2020 9/25/2019    COVID-19 Vaccine (4 - 2023-24 season) 09/01/2023 11/19/2021    Influenza Vaccine (1) 06/30/2024 (Originally 9/1/2023) 2/14/2023    Hemoglobin A1c 05/14/2024 11/14/2023    Diabetes Urine Screening 07/17/2024 7/17/2023    Eye Exam 07/25/2024 7/25/2023    Mammogram 12/13/2024 12/13/2023    Lipid Panel 01/09/2025 1/9/2024    Foot Exam 01/10/2025 1/10/2024    Override on 8/29/2018: Done    Override on 4/25/2016: Done    TETANUS VACCINE 08/17/2026 8/17/2016        Orders Placed This Encounter   Procedures    (In Office Administered) Pneumococcal Conjugate Vaccine (20 Valent) (IM) (Preferred)     The following information is provided to all patients.  This information is to help you find resources for any of the problems found today that may be affecting your health:                  Living healthy guide: www.[facility].[place].gov      Understanding Diabetes: www.diabetes.org      Eating healthy: www.cdc.gov/healthyweight      CDC home safety checklist: www.cdc.gov/steadi/patient.html      Agency on Aging: www.goea.[place].[facility]      Alcoholics anonymous (AA): www.aa.org      Physical Activity: www.rahel.nih.gov/ul0febs      Tobacco use: www.quitwithusla.org

## 2024-02-21 NOTE — PROGRESS NOTES
CHIEF COMPLAINT:    Mrs. Ac is a 39 y.o. female presenting for a follow up on OAB treated with InterStim    PRESENTING ILLNESS:    Maria Ines Ac is a 39 y.o. female who presents with a history of OAB, gastroparesis, DM, returns for follow up.  She states she has deterioration of her symptoms such that she is wearing a pull up and changes it 4 times a day.  She has nocturia x 4 and she can no longer feel the stimulation.  She denies any gross hematuria or recurrent UTI.    She states nothing has occurred, no falls.  Her HgA1c is 5.7% on 11/14/2023  No new diagnoses or surgeries.      REVIEW OF SYSTEMS:    Review of Systems   Constitutional: Negative.    HENT: Negative.     Eyes: Negative.    Respiratory: Negative.     Cardiovascular: Negative.    Gastrointestinal:  Positive for heartburn.   Genitourinary:  Positive for frequency and urgency.        Urge incontinence and nocturia   Musculoskeletal: Negative.    Skin: Negative.    Neurological: Negative.    Endo/Heme/Allergies: Negative.    Psychiatric/Behavioral: Negative.         PATIENT HISTORY:    Past Medical History:   Diagnosis Date    Blood in stool     Constipation     Cystitis     Depression     Diabetes mellitus, type 2     Dysphagia     Endometriosis     GERD (gastroesophageal reflux disease)     Headache     Hypertension     Palpitations     Premature menopause on hormone replacement therapy     Thyroid disease        Past Surgical History:   Procedure Laterality Date    24 HOUR IMPEDANCE PH MONITORING OF ESOPHAGUS IN PATIENT TAKING ACID REDUCING MEDICATIONS N/A 6/2/2022    Procedure: IMPEDANCE PH STUDY, ESOPHAGEAL, 24 HOUR, IN PATIENT TAKING ACID REDUCING MEDICATION;  Surgeon: Debbie Butler MD;  Location: [facility] ([address] [place] [address]);  Service: Endoscopy;  Laterality: N/A;  On PPI/H2 Blocker    BLADDER SUSPENSION      CARPAL TUNNEL RELEASE      right    CHOLECYSTECTOMY      COLONOSCOPY N/A 8/30/2016    Procedure: COLONOSCOPY;  Surgeon: Slick BOCANEGRA  MD Gopal;  [address]  Service: Endoscopy;  Laterality: N/A;  PM prep    COLONOSCOPY N/A 12/22/2021    Procedure: COLONOSCOPY. any CRS;  Surgeon: Maria Ines Jung MD;  [facility]  Service: Endoscopy;  Laterality: N/A;  fully vaccinated -  scaral stimulator will bring remote -    12/17 lvm to confirm appt-rb    ESOPHAGEAL MANOMETRY WITH MEASUREMENT OF IMPEDANCE N/A 11/5/2018    Procedure: MANOMETRY, ESOPHAGUS, WITH IMPEDANCE MEASUREMENT;  Surgeon: Slick Herron MD;  Location: [facility] (4TH F[facility]);  Service: Endoscopy;  Laterality: N/A;    ESOPHAGEAL MANOMETRY WITH MEASUREMENT OF IMPEDANCE N/A 6/2/2022    Procedure: MANOMETRY, ESOPHAGUS, WITH IMPEDANCE MEASUREMENT;  Surgeon: Debbie Butler MD;  [facility]  Service: Endoscopy;  Laterality: N/A;  fully vaccinated, bladder stimulator, instr mailed /emailed -ml    ESOPHAGOGASTRODUODENOSCOPY N/A 2/13/2020    Procedure: EGD (ESOPHAGOGASTRODUODENOSCOPY);  Surgeon: Slick Herron MD;  Location: [facility] ([address] [place] [address]);  Service: Endoscopy;  Laterality: N/A;  pt has cardiology appt on 1/6/19-will call back after this appt to schedule-tb    FLUOROSCOPIC URODYNAMIC STUDY N/A 5/23/2019    Procedure: URODYNAMIC STUDY, FLUOROSCOPIC;  Surgeon: Zena Tee MD;  [facility];  Service: Urology;  Laterality: N/A;  1 hour    GALLBLADDER SURGERY      HYSTERECTOMY  2013    Bess velasquez Dr. Champlain    IMPLANTATION OF PERMANENT SACRAL NERVE STIMULATOR N/A 7/30/2019    Procedure: INSERTION, NEUROSTIMULATOR, PERMANENT, SACRAL;  Surgeon: Zena Tee MD;  Location: [facility] OR 2ND FLR;  Service: Urology;  Laterality: N/A;  30 min    OOPHORECTOMY  2005    [facility]- benign cyst    OOPHORECTOMY  2013    [facility]- endo    ooptherectomy      REPLACEMENT OF NERVE STIMULATOR BATTERY N/A 2/28/2023    Procedure: Replacement, Battery, Neurostimulator;  Surgeon: Zena Tee MD;  Location: [facility] OR 2ND FLR;  Service:  Urology;  Laterality: N/A;  45 min    right knee  scoped    SHOULDER SURGERY  right       Family History   Problem Relation Age of Onset    Cervical cancer Maternal Grandmother         unknown age of onset,  at 80    Breast cancer Mother 50        alive at 64, unilateral    Esophageal cancer Mother 63    Ovarian cancer Mother 63    Anesthesia problems Mother     Colon cancer Brother 40    Breast cancer Maternal Aunt 72        alive at 72    Breast cancer Paternal Aunt         unknown age of onset, alive at 70     Social History     Socioeconomic History    Marital status: Single   Tobacco Use    Smoking status: Never    Smokeless tobacco: Never   Substance and Sexual Activity    Alcohol use: No    Drug use: No    Sexual activity: Never     Birth control/protection: None   Social History Narrative    ** Merged History Encounter **          Social Determinants of Health     Financial Resource Strain: Low Risk  (2023)    Overall Financial Resource Strain (CARDIA)     Difficulty of Paying Living Expenses: Not hard at all   Food Insecurity: No Food Insecurity (2023)    Hunger Vital Sign     Worried About Running Out of Food in the Last Year: Never true     Ran Out of Food in the Last Year: Never true   Transportation Needs: No Transportation Needs (2023)    PRAPARE - Transportation     Lack of Transportation (Medical): No     Lack of Transportation (Non-Medical): No   Physical Activity: Sufficiently Active (2023)    Exercise Vital Sign     Days of Exercise per Week: 3 days     Minutes of Exercise per Session: 60 min   Stress: Stress Concern Present (2023)    [employer] - Occupational Stress Questionnaire     Feeling of Stress : Very much   Social Connections: Socially Isolated (2023)    Social Connection and Isolation Panel [NHANES]     Frequency of Communication with Friends and Family: Once a week     Frequency of Social Gatherings with Friends and  Family: Once a week     Attends Sabianism Services: Never     Active Member of Clubs or Organizations: No     Attends Club or Organization Meetings: Never     Marital Status: Never    Housing Stability: Low Risk  (2/14/2023)    Housing Stability Vital Sign     Unable to Pay for Housing in the Last Year: No     Number of Places Lived in the Last Year: 1     Unstable Housing in the Last Year: No       Allergies:  Patient has no known allergies.    Medications:  Outpatient Encounter Medications as of 2/21/2024   Medication Sig Dispense Refill    ACCU-CHEK AMAURY PLUS TEST STRP Strp USE TO TEST BLOOD GLUCOSE TID  9    ACCU-CHEK SOFTCLIX LANCETS Misc USE TO TEST BLOOD GLUCOSE TID  3    albuterol (VENTOLIN HFA) 90 mcg/actuation inhaler Inhale 2 puffs into the lungs every 6 (six) hours as needed for Wheezing. Rescue 18 g 0    amitriptyline (ELAVIL) 10 MG tablet Take 1 tablet (10 mg total) by mouth nightly. 30 tablet 11    amoxicillin (AMOXIL) 500 MG Tab Take 1 tablet (500 mg total) by mouth 2 (two) times daily. 14 tablet 0    atorvastatin (LIPITOR) 80 MG tablet Take 80 mg by mouth every evening.      benzonatate (TESSALON) 100 MG capsule Take 1 capsule (100 mg total) by mouth 3 (three) times daily as needed for Cough. 30 capsule 1    BLOOD SUGAR DIAGNOSTIC (ACCU-CHEK AMAURY PLUS TEST STRP MISC) 1 strip by Misc.(Non-Drug; Combo Route) route 3 (three) times daily.      buPROPion (WELLBUTRIN XL) 150 MG TB24 tablet Take 150 mg by mouth every morning.      chlorhexidine (PERIDEX) 0.12 % solution       ciclopirox (PENLAC) 8 % Soln Apply topically nightly. 6.6 mL 11    dexlansoprazole (DEXILANT) 60 mg capsule TAKE 1 CAPSULE(60 MG) BY MOUTH TWICE DAILY 60 capsule 11    diclofenac sodium (VOLTAREN) 1 % Gel Apply 2 g topically 4 (four) times daily. As needed for breast pain 1 Tube 1    dicyclomine (BENTYL) 10 MG capsule TAKE 2 CAPSULES(20 MG) BY MOUTH THREE TIMES DAILY BEFORE MEALS 180 capsule 0    diltiazem HCl (DILTIAZEM 2%  CREAM) Apply topically 3 (three) times daily. Apply topically to anal area. 30 g 2    estradioL (ESTRACE) 0.01 % (0.1 mg/gram) vaginal cream Place 1 g vaginally twice a week. 42.5 g 3    FARXIGA 10 mg Tab TK 1 T PO QD  7    fluticasone propionate (FLONASE) 50 mcg/actuation nasal spray SHAKE LIQUID AND USE 2 SPRAYS(100 MCG) IN EACH NOSTRIL EVERY DAY 48 g 2    fluticasone-salmeterol diskus inhaler 250-50 mcg Inhale 1 puff into the lungs 2 (two) times daily. Controller 60 each 11    gabapentin (NEURONTIN) 300 MG capsule Take 600 mg (two capsules) in the morning and 900mg (three capsules) at night before bed 150 capsule 11    GLUCOPHAGE 500 mg tablet Take 500 mg by mouth 2 (two) times daily with meals.       ibuprofen (ADVIL,MOTRIN) 800 MG tablet 800 mg every 4 (four) hours as needed.   0    ipratropium (ATROVENT) 21 mcg (0.03 %) nasal spray 2 sprays by Each Nostril route 3 (three) times daily. 30 mL 1    levothyroxine (SYNTHROID) 75 MCG tablet Take 75 mcg by mouth before breakfast.      meloxicam (MOBIC) 15 MG tablet       methen-m.blue-s.phos-phsal-hyo (URIBEL) 118-10-40.8-36 mg Cap Take 1 capsule by mouth every 6 (six) hours as needed (bladder pain). 120 capsule 11    metoprolol succinate (TOPROL-XL) 100 MG 24 hr tablet Take 1 tablet (100 mg total) by mouth once daily. 90 tablet 3    mometasone 0.1% (ELOCON) 0.1 % cream APPLY TOPICALLY TO THE RASH ON HANDS TWICE DAILY AS NEEDED FOR TWO TO THREE WEEKS. USE SPARINGLY      omega-3 fatty acids/fish oil (FISH OIL-OMEGA-3 FATTY ACIDS) 300-1,000 mg capsule Take 1 capsule by mouth once daily. 90 capsule 3    omeprazole (PRILOSEC OTC) 20 MG tablet Take 20 mg by mouth once daily.      oxyCODONE (ROXICODONE) 5 MG immediate release tablet Take 1 tablet (5 mg total) by mouth every 4 (four) hours as needed for Pain. 5 tablet 0    phentermine (ADIPEX-P) 37.5 mg tablet Take 37.5 mg by mouth.      phentermine 37.5 MG capsule TK 1 C PO ONCE D IN THE MORNING      REXULTI 4 mg Tab Take  1 tablet by mouth.      topiramate (TOPAMAX) 50 MG tablet Take 2 tablets (100 mg total) by mouth every evening. 60 tablet 11    traZODone (DESYREL) 100 MG tablet TK 1 T PO HS  1    TRULICITY 4.5 mg/0.5 mL pen injector       ZOLOFT 100 mg tablet Take 200 mg by mouth once daily.        No facility-administered encounter medications on file as of 2/21/2024.         PHYSICAL EXAMINATION:    The patient generally appears in good health, is appropriately interactive, and is in no apparent distress.    Skin: No lesions.    Mental: Cooperative with normal affect.    Neuro: Grossly intact.    HEENT: Normal. No evidence of lymphadenopathy.    Chest:  normal inspiratory effort.    Abdomen: Soft, non-tender. No masses or organomegaly. Bladder is not palpable. No evidence of flank discomfort. No evidence of inguinal hernia.    Extremities: No clubbing, cyanosis, or edema    PVR by bladder scan was 0 ml    LABS:    Lab Results   Component Value Date    BUN 10 11/14/2023    CREATININE 0.9 11/14/2023       UA 1.020, pH 5, otherwise, negative.     IMPRESSION:        PLAN:    1. Interrogation of the InterStim  Battery: OK  Impedences range from 735 to 1557 Ohms, checked at 1.5 A.   Patient was on program 6 3+, 1-2-, 2.5 mA, 14 Hz, 210 pw. Went up to 4 mA and she still felt no stimulation    Tested program 7 but she had a sensation at lower amp on 5    Changed to Program 5  3+ 0-1-, at 2.3 mA, 14 Hz, 210 pw, feels it vaginally and it is comfortable.    Left on program 5     2.  Added Myrbetriq to her therapy.  She has gastroparesis and cannot take an antimuscarinic    3.  Follow up in 1 month.     I spent 30 minutes with the patient of which more than half was spent in direct consultation with the patient in regards to our treatment and plan.

## 2024-02-21 NOTE — PROGRESS NOTES
"      Maria Ines Ac presented for a  Medicare AWV and comprehensive Health Risk Assessment today. The following components were reviewed and updated:    Medical history  Family History  Social history  Allergies and Current Medications  Health Risk Assessment  Health Maintenance  Care Team         ** See Completed Assessments for Annual Wellness Visit within the encounter summary.**         The following assessments were completed:  Living Situation  CAGE  Depression Screening  Timed Get Up and Go  Whisper Test  Cognitive Function Screening    Nutrition Screening  ADL Screening  PAQ Screening        Vitals:    02/21/24 1309   BP: 120/60   BP Location: Left arm   Patient Position: Sitting   BP Method: Large (Manual)   Pulse: 88   SpO2: 96%   Weight: 98 kg (215 lb 15.1 oz)   Height: 5' 2" (1.575 m)     Body mass index is 39.5 kg/m².  Physical Exam  Vitals reviewed.   Constitutional:       Appearance: Normal appearance. She is well-developed and well-groomed.   Cardiovascular:      Rate and Rhythm: Normal rate and regular rhythm.   Pulmonary:      Effort: Pulmonary effort is normal. No respiratory distress.      Breath sounds: No wheezing, rhonchi or rales.   Skin:     Coloration: Skin is not pale.   Neurological:      General: No focal deficit present.      Mental Status: She is alert and oriented to person, place, and time.   Psychiatric:         Attention and Perception: Attention normal.         Mood and Affect: Mood normal.         Speech: Speech normal.         Behavior: Behavior normal. Behavior is cooperative.         Thought Content: Thought content normal.         Judgment: Judgment normal.               Diagnoses and health risks identified today and associated recommendations/orders:    1. Encounter for Medicare annual wellness exam  - Ambulatory Referral/Consult to Enhanced Annual Wellness Visit (eAWV)    2. Encounter for preventive health examination  Screenings performed, as noted above. Personal " preventative testing needs reviewed.     3. Schizoaffective disorder, depressive type  Chronic. Stable. Followed by Psychiatry.     4. Hyperlipidemia associated with type 2 diabetes mellitus  5. Hypertension associated with diabetes  Chronic; stable on medication. Followed by PCP.    6. Diabetic polyneuropathy associated with type 2 diabetes mellitus  Chronic; stable on medication. Followed by PCP.    7. Type 2 diabetes mellitus with diabetic polyneuropathy, without long-term current use of insulin  Chronic; stable on medication. Followed by PCP.    8. Severe obesity (BMI 35.0-39.9) with comorbidity  Work on diet modification and increase in physical activity as tolerated.   Followed by PCP.     9. Intractable chronic migraine without aura and without status migrainosus  10. Chronic daily headache  11. Migrainous vertigo  12. Benign paroxysmal positional vertigo due to bilateral vestibular disorder  Chronic; stable on medication. Followed by Neurology.     13. Palpitations  Controlled. Followed by PCP.     14. Dysfunctional voiding of urine  15. Incomplete bladder emptying  Chronic; stable on medication. Followed by UroGyn.     16. Hypothyroidism, unspecified type  Chronic; stable on medication. Followed by PCP.    17. Gastroesophageal reflux disease without esophagitis  18. Irritable bowel syndrome with diarrhea  Chronic; stable on medication. Followed by GI.     19. Bilateral occipital neuralgia  20. Cervical myofascial pain syndrome  Chronic; stable on medication. Followed by Neurology.     21. Sleep apnea, unspecified type  Chronic. Stable. Followed by PCP.     22. Impaired functional mobility, balance, gait, and endurance  23. Abnormality of gait and mobility  Ambulating without difficulty. Use ambulatory assistive devices as needed.     24. Need for pneumococcal vaccination  - (In Office Administered) Pneumococcal Conjugate Vaccine (20 Valent) (IM) (Preferred)      Review for Opioid Screening: Patient does not  have rx for Opioids.    Review for Substance Use Disorders: Patient does not use substance.    Provided Maria Ines with a 5-10 year written screening schedule and personal prevention plan. Recommendations were developed using the USPSTF age appropriate recommendations. Education, counseling, and referrals were provided as needed. After Visit Summary printed and given to patient which includes a list of additional screenings\tests needed.    Follow up in about 1 year (around 2/21/2025) for your next annual wellness visit.    Neema Azevedo NP      Advance Care Planning     I offered to discuss advanced care planning, including how to pick a person who would make decisions for you if you were unable to make them for yourself, called a health care power of , and what kind of decisions you might make such as use of life sustaining treatments such as ventilators and tube feeding when faced with a life limiting illness recorded on a living will that they will need to know. (How you want to be cared for as you near the end of your natural life)     X  Patient is unwilling to engage in a discussion regarding advance directives at this time.

## 2024-02-29 ENCOUNTER — HOSPITAL ENCOUNTER (OUTPATIENT)
Dept: CARDIOLOGY | Facility: HOSPITAL | Age: 39
Discharge: HOME | End: 2024-02-29
Attending: PHYSICIAN ASSISTANT
Payer: MEDICARE

## 2024-02-29 ENCOUNTER — OFFICE VISIT (OUTPATIENT)
Dept: SLEEP MEDICINE | Facility: CLINIC | Age: 39
End: 2024-02-29
Payer: MEDICARE

## 2024-02-29 VITALS — HEIGHT: 62 IN | WEIGHT: 216.06 LBS | BODY MASS INDEX: 39.76 KG/M2

## 2024-02-29 VITALS — WEIGHT: 215 LBS | BODY MASS INDEX: 39.56 KG/M2 | HEIGHT: 62 IN

## 2024-02-29 DIAGNOSIS — R06.09 DYSPNEA ON EXERTION: ICD-10-CM

## 2024-02-29 DIAGNOSIS — Z78.9 INTOLERANCE OF CONTINUOUS POSITIVE AIRWAY PRESSURE (CPAP) VENTILATION: ICD-10-CM

## 2024-02-29 DIAGNOSIS — G47.33 OSA (OBSTRUCTIVE SLEEP APNEA): Primary | ICD-10-CM

## 2024-02-29 DIAGNOSIS — G47.10 HYPERSOMNOLENCE: ICD-10-CM

## 2024-02-29 DIAGNOSIS — G47.34 SLEEP-RELATED HYPOXIA: ICD-10-CM

## 2024-02-29 LAB
ASCENDING AORTA: 2.54 CM
BSA FOR ECHO PROCEDURE: 2.07 M2
CV ECHO LV RWT: 0.38 CM
CV STRESS BASE HR: 75 BPM
DIASTOLIC BLOOD PRESSURE: 85 MMHG
DOP CALC LVOT AREA: 2.8 CM2
DOP CALC LVOT DIAMETER: 1.88 CM
DOP CALC LVOT PEAK VEL: 0.89 M/S
DOP CALC LVOT STROKE VOLUME: 52.11 CM3
DOP CALCLVOT PEAK VEL VTI: 18.78 CM
E WAVE DECELERATION TIME: 237.16 MSEC
E/A RATIO: 0.72
E/E' RATIO: 5.76 M/S
ECHO LV POSTERIOR WALL: 0.84 CM (ref 0.6–1.1)
EJECTION FRACTION: 66 %
FRACTIONAL SHORTENING: 27 % (ref 28–44)
INTERVENTRICULAR SEPTUM: 0.58 CM (ref 0.6–1.1)
IVRT: 111.32 MSEC
LA MAJOR: 5.47 CM
LA MINOR: 5.5 CM
LA WIDTH: 3.84 CM
LEFT ATRIUM SIZE: 3.84 CM
LEFT ATRIUM VOLUME INDEX MOD: 23.9 ML/M2
LEFT ATRIUM VOLUME INDEX: 34.9 ML/M2
LEFT ATRIUM VOLUME MOD: 47.05 CM3
LEFT ATRIUM VOLUME: 68.75 CM3
LEFT INTERNAL DIMENSION IN SYSTOLE: 3.21 CM (ref 2.1–4)
LEFT VENTRICLE DIASTOLIC VOLUME INDEX: 43.99 ML/M2
LEFT VENTRICLE DIASTOLIC VOLUME: 86.66 ML
LEFT VENTRICLE MASS INDEX: 47 G/M2
LEFT VENTRICLE SYSTOLIC VOLUME INDEX: 21 ML/M2
LEFT VENTRICLE SYSTOLIC VOLUME: 41.35 ML
LEFT VENTRICULAR INTERNAL DIMENSION IN DIASTOLE: 4.38 CM (ref 3.5–6)
LEFT VENTRICULAR MASS: 93.02 G
LV LATERAL E/E' RATIO: 4.45 M/S
LV SEPTAL E/E' RATIO: 8.17 M/S
MV A" WAVE DURATION": 8.28 MSEC
MV PEAK A VEL: 0.68 M/S
MV PEAK E VEL: 0.49 M/S
MV STENOSIS PRESSURE HALF TIME: 68.78 MS
MV VALVE AREA P 1/2 METHOD: 3.2 CM2
OHS CV CPX 1 MINUTE RECOVERY HEART RATE: 97 BPM
OHS CV CPX 85 PERCENT MAX PREDICTED HEART RATE MALE: 152
OHS CV CPX ESTIMATED METS: 7
OHS CV CPX MAX PREDICTED HEART RATE: 179
OHS CV CPX PATIENT IS FEMALE: 1
OHS CV CPX PATIENT IS MALE: 0
OHS CV CPX PEAK DIASTOLIC BLOOD PRESSURE: 71 MMHG
OHS CV CPX PEAK HEAR RATE: 120 BPM
OHS CV CPX PEAK RATE PRESSURE PRODUCT: ABNORMAL
OHS CV CPX PEAK SYSTOLIC BLOOD PRESSURE: 145 MMHG
OHS CV CPX PERCENT MAX PREDICTED HEART RATE ACHIEVED: 71
OHS CV CPX RATE PRESSURE PRODUCT PRESENTING: ABNORMAL
PISA TR MAX VEL: 1.89 M/S
POST STRESS EJECTION FRACTION: 65 %
PULM VEIN S/D RATIO: 1.11
PV PEAK D VEL: 0.35 M/S
PV PEAK S VEL: 0.39 M/S
RA MAJOR: 4.86 CM
RA PRESSURE ESTIMATED: 3 MMHG
RA WIDTH: 3.37 CM
RIGHT VENTRICULAR END-DIASTOLIC DIMENSION: 3.08 CM
RV TB RVSP: 5 MMHG
SINUS: 2.58 CM
STJ: 2.38 CM
STRESS ECHO POST EXERCISE DUR MIN: 4 MINUTES
STRESS ECHO POST EXERCISE DUR SEC: 35 SECONDS
SYSTOLIC BLOOD PRESSURE: 139 MMHG
TDI LATERAL: 0.11 M/S
TDI SEPTAL: 0.06 M/S
TDI: 0.09 M/S
TR MAX PG: 14 MMHG
TRICUSPID ANNULAR PLANE SYSTOLIC EXCURSION: 2.36 CM
TV REST PULMONARY ARTERY PRESSURE: 17 MMHG
Z-SCORE OF LEFT VENTRICULAR DIMENSION IN END DIASTOLE: -2.57
Z-SCORE OF LEFT VENTRICULAR DIMENSION IN END SYSTOLE: -0.64

## 2024-02-29 PROCEDURE — 3072F LOW RISK FOR RETINOPATHY: CPT | Mod: HCNC,CPTII,S$GLB, | Performed by: PHYSICIAN ASSISTANT

## 2024-02-29 PROCEDURE — 99214 OFFICE O/P EST MOD 30 MIN: CPT | Mod: HCNC,S$GLB,, | Performed by: PHYSICIAN ASSISTANT

## 2024-02-29 PROCEDURE — 93351 STRESS TTE COMPLETE: CPT | Mod: 26,HCNC,, | Performed by: INTERNAL MEDICINE

## 2024-02-29 PROCEDURE — 3008F BODY MASS INDEX DOCD: CPT | Mod: HCNC,CPTII,S$GLB, | Performed by: PHYSICIAN ASSISTANT

## 2024-02-29 PROCEDURE — 93352 ADMIN ECG CONTRAST AGENT: CPT | Mod: HCNC,,, | Performed by: INTERNAL MEDICINE

## 2024-02-29 PROCEDURE — 1160F RVW MEDS BY RX/DR IN RCRD: CPT | Mod: HCNC,CPTII,S$GLB, | Performed by: PHYSICIAN ASSISTANT

## 2024-02-29 PROCEDURE — 99999 PR PBB SHADOW E&M-EST. PATIENT-LVL IV: CPT | Mod: PBBFAC,HCNC,, | Performed by: PHYSICIAN ASSISTANT

## 2024-02-29 PROCEDURE — 93351 STRESS TTE COMPLETE: CPT | Mod: HCNC

## 2024-02-29 PROCEDURE — 1159F MED LIST DOCD IN RCRD: CPT | Mod: HCNC,CPTII,S$GLB, | Performed by: PHYSICIAN ASSISTANT

## 2024-02-29 PROCEDURE — 25500020 PHARM REV CODE 255: Mod: HCNC | Performed by: PHYSICIAN ASSISTANT

## 2024-02-29 RX ADMIN — HUMAN ALBUMIN MICROSPHERES AND PERFLUTREN 0.66 MG: 10; .22 INJECTION, SOLUTION INTRAVENOUS at 03:02

## 2024-02-29 NOTE — PROGRESS NOTES
Referred by No ref. provider found     ESTABLISHED PATIENT VISIT    Maria Ines Ac  is a pleasant 39 y.o. female  with PMH significant for HTN, HLD, DM II, polyneuropathy, hypothyroidism, GERD, IBS-D, BPPV, schizoaffective disorder, chronic migraines, BMI 40+, ZAC        Here today for: CPAP follow-up     PLAN last visit 1/18/24:   -using and benefiting from CPAP therapy when able to tolerate  -recommended owkring in nightly usage  -advised >6hrs 100% of nights with minimum usage of >4hrs 70% of nights recommended  -adjusted pressures 4-10cwp to see if this improves conformability  -consider PAP titration   -needs oximetry on PAP once she is able to tolerate  -discussed ZAC and CPAP with patient in detail, including possible complications of untreated ZAC like heart attack/stroke  -advised on strict driving precautions; advised never to drive drowsy      Since last visit:   Still having difficulty breathing out against CPAP pressures even with the pressure adjustment. Feels more comfortable in mask in general, but takes off in the night due to difficulty breathing.       PAP history   Problems    Mask FFM   Pressure 4-10cwp   DME HME   Machine age AirSense 10 1/8/24   Download 2/29/24: 17/30 x 4hrs 2mins, 4-10cwp (4/4.3/4.6), leak (7.6/18/93.8), AHI 0.1       Past Medical History:   Diagnosis Date    Blood in stool     Constipation     Cystitis     Depression     Diabetes mellitus, type 2     Dysphagia     Endometriosis     GERD (gastroesophageal reflux disease)     Headache     Hypertension     Palpitations     Premature menopause on hormone replacement therapy     Thyroid disease     Weakness 05/31/2019     Patient Active Problem List   Diagnosis    Abdominal pain, RLQ (right lower quadrant)    Dysphagia    Diabetes mellitus, type II    Right hip pain    Intractable chronic migraine without aura and without status migrainosus    Hypertension associated with diabetes    Palpitations    Dysfunctional voiding  of urine    Severe obesity (BMI 35.0-39.9) with comorbidity    Muscle spasm    Mixed stress and urge urinary incontinence    Cervical myofascial pain syndrome    Gastroesophageal reflux disease without esophagitis    Irritable bowel syndrome with diarrhea    Migrainous vertigo    Uncontrolled morning headache    Sleep arousal disorder    Hyperlipidemia associated with type 2 diabetes mellitus    GERD (gastroesophageal reflux disease)    Family history of breast cancer    Family hx of ovarian malignancy    Painful cervical ROM    Incomplete bladder emptying    Bilateral occipital neuralgia    Medication overuse headache    Benign paroxysmal positional vertigo due to bilateral vestibular disorder    Chronic daily headache    Dizziness    Decreased functional mobility    Diabetic polyneuropathy associated with type 2 diabetes mellitus    Snoring    Intractable migraine with aura without status migrainosus    Schizoaffective disorder, depressive type    Hypothyroidism    Decreased range of motion of right ankle    Impaired functional mobility, balance, gait, and endurance    Decreased strength of lower extremity    Chronic pain of right ankle    Post-viral cough syndrome    Gastroparesis    Sleep apnea       Current Outpatient Medications:     ACCU-CHEK AMAURY PLUS TEST STRP Strp, USE TO TEST BLOOD GLUCOSE TID, Disp: , Rfl: 9    ACCU-CHEK SOFTCLIX LANCETS Misc, USE TO TEST BLOOD GLUCOSE TID, Disp: , Rfl: 3    albuterol (VENTOLIN HFA) 90 mcg/actuation inhaler, Inhale 2 puffs into the lungs every 6 (six) hours as needed for Wheezing. Rescue, Disp: 18 g, Rfl: 0    amitriptyline (ELAVIL) 10 MG tablet, Take 1 tablet (10 mg total) by mouth nightly., Disp: 30 tablet, Rfl: 11    amoxicillin (AMOXIL) 500 MG Tab, Take 1 tablet (500 mg total) by mouth 2 (two) times daily., Disp: 14 tablet, Rfl: 0    atorvastatin (LIPITOR) 80 MG tablet, Take 80 mg by mouth every evening., Disp: , Rfl:     benzonatate (TESSALON) 100 MG capsule, Take  1 capsule (100 mg total) by mouth 3 (three) times daily as needed for Cough., Disp: 30 capsule, Rfl: 1    BLOOD SUGAR DIAGNOSTIC (ACCU-CHEK AMAURY PLUS TEST STRP MISC), 1 strip by Misc.(Non-Drug; Combo Route) route 3 (three) times daily., Disp: , Rfl:     buPROPion (WELLBUTRIN XL) 150 MG TB24 tablet, Take 150 mg by mouth every morning., Disp: , Rfl:     chlorhexidine (PERIDEX) 0.12 % solution, Use as directed 15 mLs in the mouth or throat once daily., Disp: , Rfl:     ciclopirox (PENLAC) 8 % Soln, Apply topically nightly., Disp: 6.6 mL, Rfl: 11    dexlansoprazole (DEXILANT) 60 mg capsule, TAKE 1 CAPSULE(60 MG) BY MOUTH TWICE DAILY, Disp: 60 capsule, Rfl: 11    diclofenac sodium (VOLTAREN) 1 % Gel, Apply 2 g topically 4 (four) times daily. As needed for breast pain, Disp: 1 Tube, Rfl: 1    dicyclomine (BENTYL) 10 MG capsule, TAKE 2 CAPSULES(20 MG) BY MOUTH THREE TIMES DAILY BEFORE MEALS, Disp: 180 capsule, Rfl: 0    diltiazem HCl (DILTIAZEM 2% CREAM), Apply topically 3 (three) times daily. Apply topically to anal area., Disp: 30 g, Rfl: 2    estradioL (ESTRACE) 0.01 % (0.1 mg/gram) vaginal cream, Place 1 g vaginally twice a week., Disp: 42.5 g, Rfl: 3    FARXIGA 10 mg Tab, Take 10 mg by mouth Daily., Disp: , Rfl: 7    fluticasone propionate (FLONASE) 50 mcg/actuation nasal spray, SHAKE LIQUID AND USE 2 SPRAYS(100 MCG) IN EACH NOSTRIL EVERY DAY, Disp: 48 g, Rfl: 2    fluticasone-salmeterol diskus inhaler 250-50 mcg, Inhale 1 puff into the lungs 2 (two) times daily. Controller, Disp: 60 each, Rfl: 11    gabapentin (NEURONTIN) 300 MG capsule, Take 600 mg (two capsules) in the morning and 900mg (three capsules) at night before bed (Patient not taking: Reported on 2/21/2024), Disp: 150 capsule, Rfl: 11    GLUCOPHAGE 500 mg tablet, Take 500 mg by mouth 2 (two) times daily with meals. , Disp: , Rfl:     ibuprofen (ADVIL,MOTRIN) 800 MG tablet, 800 mg every 4 (four) hours as needed. , Disp: , Rfl: 0    ipratropium (ATROVENT)  "21 mcg (0.03 %) nasal spray, 2 sprays by Each Nostril route 3 (three) times daily., Disp: 30 mL, Rfl: 1    levothyroxine (SYNTHROID) 75 MCG tablet, Take 75 mcg by mouth before breakfast., Disp: , Rfl:     meloxicam (MOBIC) 15 MG tablet, Take 15 mg by mouth once daily., Disp: , Rfl:     methen-mJyotsnablue-s.phos-phsal-hyo (URIBEL) 118-10-40.8-36 mg Cap, Take 1 capsule by mouth every 6 (six) hours as needed (bladder pain)., Disp: 120 capsule, Rfl: 11    metoprolol succinate (TOPROL-XL) 100 MG 24 hr tablet, Take 1 tablet (100 mg total) by mouth once daily., Disp: 90 tablet, Rfl: 3    mirabegron (MYRBETRIQ) 50 mg Tb24, Take 1 tablet (50 mg total) by mouth once daily., Disp: 30 tablet, Rfl: 11    mometasone 0.1% (ELOCON) 0.1 % cream, APPLY TOPICALLY TO THE RASH ON HANDS TWICE DAILY AS NEEDED FOR TWO TO THREE WEEKS. USE SPARINGLY, Disp: , Rfl:     omega-3 fatty acids/fish oil (FISH OIL-OMEGA-3 FATTY ACIDS) 300-1,000 mg capsule, Take 1 capsule by mouth once daily., Disp: 90 capsule, Rfl: 3    oxyCODONE (ROXICODONE) 5 MG immediate release tablet, Take 1 tablet (5 mg total) by mouth every 4 (four) hours as needed for Pain., Disp: 5 tablet, Rfl: 0    phentermine (ADIPEX-P) 37.5 mg tablet, Take 37.5 mg by mouth., Disp: , Rfl:     phentermine 37.5 MG capsule, TK 1 C PO ONCE D IN THE MORNING, Disp: , Rfl:     REXULTI 4 mg Tab, Take 1 tablet by mouth every evening., Disp: , Rfl:     topiramate (TOPAMAX) 50 MG tablet, Take 2 tablets (100 mg total) by mouth every evening., Disp: 60 tablet, Rfl: 11    traZODone (DESYREL) 100 MG tablet, TK 1 T PO HS, Disp: , Rfl: 1    TRULICITY 4.5 mg/0.5 mL pen injector, Inject 4.5 mg into the skin every 7 days., Disp: , Rfl:     ZOLOFT 100 mg tablet, Take 200 mg by mouth once daily. , Disp: , Rfl:        Vitals:    02/29/24 1001   Weight: 98 kg (216 lb 0.8 oz)   Height: 5' 2" (1.575 m)     Physical Exam:    GEN:   Well-appearing  Psych:  Appropriate affect, demonstrates insight  SKIN:  No rash on the " face or bridge of the nose      LABS:   Lab Results   Component Value Date    HGB 13.6 11/14/2023    CO2 26 11/14/2023         RECORDS REVIEWED:    PSG 6/14/18: AHI 0  PSG 1/19/21: AHI 0  PSG 3/8/21: AHI 2.2 (REM AHI 9.2)  PSG 11.22.23: AHI 6.5, RAHI 32 vs 3, hypoxemia, possible OHS     Previous sleep notes: 12/10/20, 11/15/23, 1/18/24    CPAP interrogation 2/29/24: 17/30 x 4hrs 2mins, 4-10cwp (4/4.3/4.6), leak (7.6/18/93.8), AHI 0.1    ASSESSMENT    ESS 16/24 Today    PROBLEM DESCRIPTION/ Sx on Presentation Interval Hx STATUS   Mild ZAC    + snoring, denies witnessed apneas  + wakes feeling un-refreshed Working on usage, still having pressure intolerance  Not yet controlled   Daytime Sx    + sleepiness when inactive   Naps 3-4 x weekly  ESS 16/24 on intake (reviewed from 12/10/20) Still quite sleepy Persists    Insomnia    Trouble falling asleep: difficult (uses phone in bed)  Maintenance:         wakes frequently, return to sleep is quick  Prior hypnotics:        Current hypnotics: trazodone 50mg PRN    Persists persists   Nocturia    x 2-3 per sleep period Persists  persists   Other issues:    PLAN     -using and benefiting from CPAP therapy when able to tolerate it  -recommended BiPAP trial due to persistent pressure intolerance on CPAP  -BiPAP and supplies ordered  -needs oximetry on PAP once she is able to tolerate  -discussed ZAC and CPAP with patient in detail, including possible complications of untreated ZAC like heart attack/stroke  -advised on strict driving precautions; advised never to drive drowsy    Advised on plan of care. Answered all patient questions. Patient verbalized understanding and voiced agreement with plan of care.     RTC  will need follow up 31-90 days after receiving BiPAP machine for compliance      The patient was given open opportunity to ask questions and/or express concerns about treatment plan. All questions/concerns were discussed.     Two patient identifiers used prior to  evaluation.

## 2024-02-29 NOTE — NURSING
Patient identified by 2 identifiers. Allergies reviewed, procedure explained and pt verbalized understanding.  22 g IV placed to Lt FA, flushed w/ 10cc NS pre & post contrast administration.  3cc Optison administered by sonographer, echo images obtained.  Pt tolerated procedure well.  IV D/C'ed, preasure dsg applied.  Pt D/C'ed to home.

## 2024-03-15 ENCOUNTER — LAB VISIT (OUTPATIENT)
Dept: LAB | Facility: HOSPITAL | Age: 39
End: 2024-03-15
Attending: INTERNAL MEDICINE
Payer: MEDICARE

## 2024-03-15 ENCOUNTER — OFFICE VISIT (OUTPATIENT)
Dept: INTERNAL MEDICINE | Facility: CLINIC | Age: 39
End: 2024-03-15
Payer: MEDICARE

## 2024-03-15 DIAGNOSIS — I10 HYPERTENSION, UNSPECIFIED TYPE: ICD-10-CM

## 2024-03-15 DIAGNOSIS — E11.9 DIABETES MELLITUS WITHOUT COMPLICATION: ICD-10-CM

## 2024-03-15 DIAGNOSIS — I10 HYPERTENSION, UNSPECIFIED TYPE: Primary | ICD-10-CM

## 2024-03-15 DIAGNOSIS — Z00.00 PREVENTATIVE HEALTH CARE: ICD-10-CM

## 2024-03-15 LAB
ALBUMIN SERPL BCP-MCNC: 4.2 G/DL (ref 3.5–5.2)
ALP SERPL-CCNC: 76 U/L (ref 55–135)
ALT SERPL W/O P-5'-P-CCNC: 35 U/L (ref 10–44)
ANION GAP SERPL CALC-SCNC: 10 MMOL/L (ref 8–16)
AST SERPL-CCNC: 33 U/L (ref 10–40)
BILIRUB SERPL-MCNC: 0.4 MG/DL (ref 0.1–1)
BUN SERPL-MCNC: 5 MG/DL (ref 6–20)
CALCIUM SERPL-MCNC: 9.7 MG/DL (ref 8.7–10.5)
CHLORIDE SERPL-SCNC: 106 MMOL/L (ref 95–110)
CO2 SERPL-SCNC: 26 MMOL/L (ref 23–29)
CREAT SERPL-MCNC: 0.8 MG/DL (ref 0.5–1.4)
EST. GFR  (NO RACE VARIABLE): >60 ML/MIN/1.73 M^2
ESTIMATED AVG GLUCOSE: 114 MG/DL (ref 68–131)
GLUCOSE SERPL-MCNC: 97 MG/DL (ref 70–110)
HBA1C MFR BLD: 5.6 % (ref 4–5.6)
POTASSIUM SERPL-SCNC: 3.5 MMOL/L (ref 3.5–5.1)
PROT SERPL-MCNC: 7.6 G/DL (ref 6–8.4)
SODIUM SERPL-SCNC: 142 MMOL/L (ref 136–145)

## 2024-03-15 PROCEDURE — 1159F MED LIST DOCD IN RCRD: CPT | Mod: HCNC,CPTII,S$GLB, | Performed by: INTERNAL MEDICINE

## 2024-03-15 PROCEDURE — 99999 PR PBB SHADOW E&M-EST. PATIENT-LVL V: CPT | Mod: PBBFAC,HCNC,, | Performed by: INTERNAL MEDICINE

## 2024-03-15 PROCEDURE — 80053 COMPREHEN METABOLIC PANEL: CPT | Mod: HCNC | Performed by: INTERNAL MEDICINE

## 2024-03-15 PROCEDURE — 3072F LOW RISK FOR RETINOPATHY: CPT | Mod: HCNC,CPTII,S$GLB, | Performed by: INTERNAL MEDICINE

## 2024-03-15 PROCEDURE — 3008F BODY MASS INDEX DOCD: CPT | Mod: HCNC,CPTII,S$GLB, | Performed by: INTERNAL MEDICINE

## 2024-03-15 PROCEDURE — 3074F SYST BP LT 130 MM HG: CPT | Mod: HCNC,CPTII,S$GLB, | Performed by: INTERNAL MEDICINE

## 2024-03-15 PROCEDURE — 83036 HEMOGLOBIN GLYCOSYLATED A1C: CPT | Mod: HCNC | Performed by: INTERNAL MEDICINE

## 2024-03-15 PROCEDURE — 36415 COLL VENOUS BLD VENIPUNCTURE: CPT | Mod: HCNC | Performed by: INTERNAL MEDICINE

## 2024-03-15 PROCEDURE — 3044F HG A1C LEVEL LT 7.0%: CPT | Mod: HCNC,CPTII,S$GLB, | Performed by: INTERNAL MEDICINE

## 2024-03-15 PROCEDURE — 3079F DIAST BP 80-89 MM HG: CPT | Mod: HCNC,CPTII,S$GLB, | Performed by: INTERNAL MEDICINE

## 2024-03-15 PROCEDURE — 99396 PREV VISIT EST AGE 40-64: CPT | Mod: HCNC,S$GLB,, | Performed by: INTERNAL MEDICINE

## 2024-03-15 RX ORDER — AMOXICILLIN 875 MG/1
875 TABLET, FILM COATED ORAL 2 TIMES DAILY
Qty: 14 TABLET | Refills: 0 | Status: SHIPPED | OUTPATIENT
Start: 2024-03-15 | End: 2024-03-22

## 2024-03-20 VITALS
HEART RATE: 89 BPM | OXYGEN SATURATION: 97 % | DIASTOLIC BLOOD PRESSURE: 82 MMHG | HEIGHT: 62 IN | TEMPERATURE: 99 F | BODY MASS INDEX: 39.31 KG/M2 | WEIGHT: 213.63 LBS | SYSTOLIC BLOOD PRESSURE: 116 MMHG

## 2024-03-20 NOTE — PROGRESS NOTES
Subjective:       Patient ID: Maria Ines Ac is a 39 y.o. female.    Chief Complaint: Annual Exam    HPI  She is here for annual exam    Past medical history: Hypertension, hyperlipidemia, diabetes, hypothyroidism, bipolar disorder, migraine headaches, status post hysterectomy, family history of colon cancer-brother.  She had a colonoscopy December 2021      Medications: Synthroid , metformin, Toprol-XL ,Lipitor 80 mg daily, trulicity      NO KNOWN DRUG ALLERGIES        Review of Systems   Constitutional:  Negative for chills, fatigue, fever and unexpected weight change.   Respiratory:  Negative for chest tightness and shortness of breath.    Cardiovascular:  Negative for chest pain and palpitations.   Gastrointestinal:  Negative for abdominal pain and blood in stool.   Neurological:  Negative for dizziness, syncope, numbness and headaches.       Objective:      Physical Exam  HENT:      Right Ear: External ear normal.      Left Ear: External ear normal.      Nose: Nose normal.      Mouth/Throat:      Mouth: Mucous membranes are moist.      Pharynx: Oropharynx is clear.   Eyes:      Pupils: Pupils are equal, round, and reactive to light.   Cardiovascular:      Rate and Rhythm: Normal rate and regular rhythm.      Heart sounds: No murmur heard.  Pulmonary:      Breath sounds: Normal breath sounds.   Abdominal:      General: There is no distension.      Palpations: There is no hepatomegaly or splenomegaly.      Tenderness: There is no abdominal tenderness.   Musculoskeletal:      Cervical back: Normal range of motion.   Lymphadenopathy:      Cervical: No cervical adenopathy.      Upper Body:      Right upper body: No axillary adenopathy.      Left upper body: No axillary adenopathy.   Neurological:      Cranial Nerves: No cranial nerve deficit.      Sensory: No sensory deficit.      Motor: Motor function is intact.      Deep Tendon Reflexes: Reflexes are normal and symmetric.         Assessment/Plan        Annual exam:  Check CMP and A1c

## 2024-04-05 ENCOUNTER — TELEPHONE (OUTPATIENT)
Dept: PODIATRY | Facility: CLINIC | Age: 39
End: 2024-04-05
Payer: MEDICARE

## 2024-04-11 ENCOUNTER — HOSPITAL ENCOUNTER (EMERGENCY)
Facility: HOSPITAL | Age: 39
Discharge: HOME | End: 2024-04-11
Attending: EMERGENCY MEDICINE
Payer: MEDICARE

## 2024-04-11 VITALS
WEIGHT: 213 LBS | TEMPERATURE: 98 F | OXYGEN SATURATION: 97 % | DIASTOLIC BLOOD PRESSURE: 74 MMHG | RESPIRATION RATE: 18 BRPM | HEART RATE: 81 BPM | SYSTOLIC BLOOD PRESSURE: 122 MMHG | HEIGHT: 62 IN | BODY MASS INDEX: 39.2 KG/M2

## 2024-04-11 DIAGNOSIS — M20.019 MALLET FINGER, UNSPECIFIED LATERALITY: Primary | ICD-10-CM

## 2024-04-11 DIAGNOSIS — S69.90XA FINGER INJURY, UNSPECIFIED LATERALITY, INITIAL ENCOUNTER: ICD-10-CM

## 2024-04-11 PROCEDURE — 99283 EMERGENCY DEPT VISIT LOW MDM: CPT | Mod: 25

## 2024-04-11 PROCEDURE — 25000003 PHARM REV CODE 250: Performed by: EMERGENCY MEDICINE

## 2024-04-11 PROCEDURE — 29130 APPL FINGER SPLINT STATIC: CPT | Mod: F1

## 2024-04-11 RX ORDER — ACETAMINOPHEN 500 MG
1000 TABLET ORAL
Status: COMPLETED | OUTPATIENT
Start: 2024-04-11 | End: 2024-04-11

## 2024-04-11 RX ADMIN — ACETAMINOPHEN 1000 MG: 500 TABLET ORAL at 07:04

## 2024-04-11 NOTE — FIRST PROVIDER EVALUATION
Medical screening examination initiated.  I have conducted a focused provider triage encounter, findings are as follows:    Brief history of present illness:  L index finger pain, jammed it in car door today.  9/10 pain, had not taken any meds for it.    There were no vitals filed for this visit.    Pertinent physical exam:  NAD, well appearing, L index finger with some tenderness, but good ROM and no sig redness or swelling. No tenderness proximal to the finger        Brief workup plan:  Tylenol, XR finger.    Preliminary workup initiated; this workup will be continued and followed by the physician or advanced practice provider that is assigned to the patient when roomed.

## 2024-04-12 NOTE — DISCHARGE INSTRUCTIONS
Thank you for coming to our Emergency Department today!     -keep splint on for the next 2 weeks while your finger heals  -follow-up with the primary care doctor to ensure proper healing  -Follow-up with primary care doctor within 3-7 days to discuss your recent ER visit and any additional concerns that you may have.    Return to the Emergency Department for symptoms including but not limited to: persistence or worsening of symptoms, shortness of breath or chest pain, inability to drink without vomiting, passing out/fainting/ loss of consciousness, or if you have other concerns.

## 2024-04-12 NOTE — ED PROVIDER NOTES
Encounter Date: 4/11/2024     Source of History:   Patient and medical record, without language barrier or      Chief complaint:  Finger Injury (Patient states she slammed her left pointer finger in a car door today. )    HPI:  Maria Ines Ac is a 39 y.o. female with history of GERD and hypertension presenting with chief complaint of finger injury.  Patient slammed her left index finger in the  side door just prior to arrival.  She notes mild pain most pronounced over her D IP.  She denies any abrasions or lacerations.  She has no numbness or paresthesias.    This is the extent to the patients complaints today here in the emergency department.    ROS: As per HPI and below:  ROS    Review of patient's allergies indicates:  No Known Allergies  PMH:  As per HPI and below:  Past Medical History:   Diagnosis Date    Blood in stool     Constipation     Cystitis     Depression     Diabetes mellitus, type 2     Dysphagia     Endometriosis     GERD (gastroesophageal reflux disease)     Headache     Hypertension     Palpitations     Premature menopause on hormone replacement therapy     Thyroid disease     Weakness 05/31/2019     Past Surgical History:   Procedure Laterality Date    24 HOUR IMPEDANCE PH MONITORING OF ESOPHAGUS IN PATIENT TAKING ACID REDUCING MEDICATIONS N/A 6/2/2022    Procedure: IMPEDANCE PH STUDY, ESOPHAGEAL, 24 HOUR, IN PATIENT TAKING ACID REDUCING MEDICATION;  Surgeon: Debbie Butler MD;  Location: [facility] ([address] [place] [address]);  Service: Endoscopy;  Laterality: N/A;  On PPI/H2 Blocker    BLADDER SUSPENSION      CARPAL TUNNEL RELEASE      right    CHOLECYSTECTOMY      COLONOSCOPY N/A 8/30/2016    Procedure: COLONOSCOPY;  Surgeon: Slick Herron MD;  Location: [address]);  Service: Endoscopy;  Laterality: N/A;  PM prep    COLONOSCOPY N/A 12/22/2021    Procedure: COLONOSCOPY. any CRS;  Surgeon: Maria Ines Jung MD;  Location: [facility] ([address] [place] [address]);  Service: Endoscopy;  Laterality: N/A;   fully vaccinated -  scaral stimulator will bring remote -    12/17 lvm to confirm appt-rb    ESOPHAGEAL MANOMETRY WITH MEASUREMENT OF IMPEDANCE N/A 11/5/2018    Procedure: MANOMETRY, ESOPHAGUS, WITH IMPEDANCE MEASUREMENT;  Surgeon: Slick Herron MD;  Location: [facility] ENDO (4TH F[facility]);  Service: Endoscopy;  Laterality: N/A;    ESOPHAGEAL MANOMETRY WITH MEASUREMENT OF IMPEDANCE N/A 6/2/2022    Procedure: MANOMETRY, ESOPHAGUS, WITH IMPEDANCE MEASUREMENT;  Surgeon: Debbie Butler MD;  Location: [facility] ENDO (4TH F[facility]);  Service: Endoscopy;  Laterality: N/A;  fully vaccinated, bladder stimulator, instr mailed /emailed -ml    ESOPHAGOGASTRODUODENOSCOPY N/A 2/13/2020    Procedure: EGD (ESOPHAGOGASTRODUODENOSCOPY);  Surgeon: Slick Herron MD;  Location: [facility] ENDO (4TH F[facility]);  Service: Endoscopy;  Laterality: N/A;  pt has cardiology appt on 1/6/19-will call back after this appt to schedule-tb    FLUOROSCOPIC URODYNAMIC STUDY N/A 5/23/2019    Procedure: URODYNAMIC STUDY, FLUOROSCOPIC;  Surgeon: Zena Tee MD;  Location: [facility] OR 1ST FLR;  Service: Urology;  Laterality: N/A;  1 hour    GALLBLADDER SURGERY      HYSTERECTOMY  2013    Bess velasquez Dr. Champlain    IMPLANTATION OF PERMANENT SACRAL NERVE STIMULATOR N/A 7/30/2019    Procedure: INSERTION, NEUROSTIMULATOR, PERMANENT, SACRAL;  Surgeon: Zena Tee MD;  Location: [facility] OR 2ND FLR;  Service: Urology;  Laterality: N/A;  30 min    OOPHORECTOMY  2005    [facility]- benign cyst    OOPHORECTOMY  2013    [facility]- endo    ooptherectomy      REPLACEMENT OF NERVE STIMULATOR BATTERY N/A 2/28/2023    Procedure: Replacement, Battery, Neurostimulator;  Surgeon: Zena Tee MD;  Location: [facility] OR 2ND FLR;  Service: Urology;  Laterality: N/A;  45 min    right knee  scoped    SHOULDER SURGERY  right     Social History     Tobacco Use    Smoking status: Never    Smokeless tobacco: Never   Substance Use Topics    Alcohol use: No    Drug use: No     Vitals:    /74 (BP  "Location: Left arm, Patient Position: Sitting)   Pulse 81   Temp 97.8 °F (36.6 °C) (Oral)   Resp 18   Ht 5' 2" (1.575 m)   Wt 96.6 kg (213 lb)   LMP 11/17/2012 (LMP Unknown) Comment: Neg UPT  SpO2 97%   Breastfeeding No   BMI 38.96 kg/m²     Physical Exam  Vitals and nursing note reviewed.   Constitutional:       General: She is not in acute distress.     Appearance: Normal appearance. She is not toxic-appearing or diaphoretic.   HENT:      Head: Normocephalic and atraumatic.      Right Ear: External ear normal.      Left Ear: External ear normal.   Eyes:      General: No scleral icterus.     Conjunctiva/sclera: Conjunctivae normal.   Cardiovascular:      Rate and Rhythm: Normal rate and regular rhythm.   Pulmonary:      Effort: Pulmonary effort is normal. No respiratory distress.      Breath sounds: No stridor.   Abdominal:      General: Abdomen is flat. There is no distension.   Musculoskeletal:         General: No swelling.      Cervical back: Normal range of motion and neck supple.      Comments: Left index finger TTP, neurovascular intact, no abrasions or lacerations   Skin:     General: Skin is dry.      Coloration: Skin is not jaundiced.   Neurological:      Mental Status: She is alert and oriented to person, place, and time. Mental status is at baseline.   Psychiatric:         Mood and Affect: Mood normal.         Behavior: Behavior normal.       Procedures    Laboratory Studies:  Labs that have been ordered have been independently reviewed and interpreted by myself.  Labs Reviewed - No data to display  Imaging Results              X-Ray Finger 2 or More Views Left (Final result)  Result time 04/11/24 19:33:55      Final result by Jason Canada MD (04/11/24 19:33:55)                   Impression:      1. No convincing acute displaced fracture or dislocation of the visualized digits noting edema about the 2nd digit extending to the dorsal aspect of the hand.      Electronically signed by:  " MD Nancie  Date:    04/11/2024  Time:    19:33               Narrative:    EXAMINATION:  XR FINGER 2 OR MORE VIEWS LEFT    CLINICAL HISTORY:  L index finger pain. jammed in car door.;    FINDINGS:  Three views left fingers.    There is edema about the 2nd digit. There are degenerative changes of the PIP and D IP joints.  No convincing acute displaced fracture or dislocation of the 2nd digit. There is edema about the dorsal aspect of the hand.  Undulating contour involving the palmar aspect of the middle phalanx of the 2nd digit may reflect sequela of prior injury.                                    Imaging (independently interpreted by me):  No fracture or dislocations    I decided to obtain the patient's medical records.  Summary of Medical Records:      Medications   acetaminophen tablet 1,000 mg (1,000 mg Oral Given 4/11/24 1935)     MDM:    39 y.o. female with finger injury    Differential Dx:  Differential includes but is not limited to contusion, mallet finger, doubt for    ED Management:  Will obtain plain films to assess for fractures dislocations.  Overall patient has an exam that is consistent with mallet finger.  Will place patient in finger splint and discharged home with orthopedics supplies.  Discussed results including any incidental findings, diagnosis, and treatment plan with patient; advised close follow-up with PCP. Reviewed strict return precautions. Patient confirms understanding and ability to comply. Patient was given the opportunity to ask questions prior to discharge and all questions answered.       Medical Decision Making          Diagnostic Impression:    Final diagnoses:  [M20.019] Mallet finger, unspecified laterality (Primary)  [S69.90XA] Finger injury, unspecified laterality, initial encounter     ED Disposition Condition    Discharge Stable          ED Prescriptions    None       Follow-up Information       Follow up With Specialties Details Why Contact Info    Luann Raymond,  MD Internal Medicine   [address]  [facility] [address]  [phone]      [facility] Emergency Dept Emergency Medicine Go to  As needed, If symptoms worsen [address]  [facility] [id number] [phone]              Attending Attestation:   Physician Attestation Statement for Resident:  As the supervising MD   Physician Attestation Statement: I have personally seen and examined this patient.   I agree with the above history.  -:   As the supervising MD I agree with the above PE.     As the supervising MD I agree with the above treatment, course, plan, and disposition.                     Manpreet Back MD  Resident  04/11/24 2100       Camila Vargas MD  04/11/24 6827     Denies daily eye drop use

## 2024-04-19 ENCOUNTER — OFFICE VISIT (OUTPATIENT)
Dept: INTERNAL MEDICINE | Facility: CLINIC | Age: 39
End: 2024-04-19
Payer: MEDICARE

## 2024-04-19 DIAGNOSIS — I10 HYPERTENSION, UNSPECIFIED TYPE: ICD-10-CM

## 2024-04-19 DIAGNOSIS — M79.643 PAIN OF HAND, UNSPECIFIED LATERALITY: Primary | ICD-10-CM

## 2024-04-19 PROCEDURE — 99999 PR PBB SHADOW E&M-EST. PATIENT-LVL V: CPT | Mod: PBBFAC,,, | Performed by: INTERNAL MEDICINE

## 2024-04-19 PROCEDURE — 99214 OFFICE O/P EST MOD 30 MIN: CPT | Mod: S$GLB,,, | Performed by: INTERNAL MEDICINE

## 2024-04-19 PROCEDURE — 3079F DIAST BP 80-89 MM HG: CPT | Mod: CPTII,S$GLB,, | Performed by: INTERNAL MEDICINE

## 2024-04-19 PROCEDURE — 3044F HG A1C LEVEL LT 7.0%: CPT | Mod: CPTII,S$GLB,, | Performed by: INTERNAL MEDICINE

## 2024-04-19 PROCEDURE — 3074F SYST BP LT 130 MM HG: CPT | Mod: CPTII,S$GLB,, | Performed by: INTERNAL MEDICINE

## 2024-04-19 PROCEDURE — 3008F BODY MASS INDEX DOCD: CPT | Mod: CPTII,S$GLB,, | Performed by: INTERNAL MEDICINE

## 2024-04-19 PROCEDURE — 3072F LOW RISK FOR RETINOPATHY: CPT | Mod: CPTII,S$GLB,, | Performed by: INTERNAL MEDICINE

## 2024-04-23 DIAGNOSIS — M79.645 FINGER PAIN, LEFT: Primary | ICD-10-CM

## 2024-04-24 ENCOUNTER — OFFICE VISIT (OUTPATIENT)
Dept: ORTHOPEDICS | Facility: CLINIC | Age: 39
End: 2024-04-24
Payer: MEDICARE

## 2024-04-24 ENCOUNTER — HOSPITAL ENCOUNTER (OUTPATIENT)
Dept: RADIOLOGY | Facility: OTHER | Age: 39
Discharge: HOME | End: 2024-04-24
Attending: ORTHOPAEDIC SURGERY
Payer: MEDICARE

## 2024-04-24 ENCOUNTER — TELEPHONE (OUTPATIENT)
Dept: ORTHOPEDICS | Facility: CLINIC | Age: 39
End: 2024-04-24
Payer: MEDICARE

## 2024-04-24 DIAGNOSIS — M79.645 FINGER PAIN, LEFT: Primary | ICD-10-CM

## 2024-04-24 DIAGNOSIS — M79.645 FINGER PAIN, LEFT: ICD-10-CM

## 2024-04-24 DIAGNOSIS — M20.012 MALLET DEFORMITY OF LEFT INDEX FINGER: ICD-10-CM

## 2024-04-24 PROCEDURE — 3072F LOW RISK FOR RETINOPATHY: CPT | Mod: HCNC,CPTII,S$GLB, | Performed by: ORTHOPAEDIC SURGERY

## 2024-04-24 PROCEDURE — 3044F HG A1C LEVEL LT 7.0%: CPT | Mod: HCNC,CPTII,S$GLB, | Performed by: ORTHOPAEDIC SURGERY

## 2024-04-24 PROCEDURE — 99203 OFFICE O/P NEW LOW 30 MIN: CPT | Mod: HCNC,S$GLB,, | Performed by: ORTHOPAEDIC SURGERY

## 2024-04-24 PROCEDURE — 73140 X-RAY EXAM OF FINGER(S): CPT | Mod: TC,FY,LT

## 2024-04-24 PROCEDURE — 99999 PR PBB SHADOW E&M-EST. PATIENT-LVL IV: CPT | Mod: PBBFAC,HCNC,, | Performed by: ORTHOPAEDIC SURGERY

## 2024-04-24 PROCEDURE — 1159F MED LIST DOCD IN RCRD: CPT | Mod: HCNC,CPTII,S$GLB, | Performed by: ORTHOPAEDIC SURGERY

## 2024-04-24 PROCEDURE — 1160F RVW MEDS BY RX/DR IN RCRD: CPT | Mod: HCNC,CPTII,S$GLB, | Performed by: ORTHOPAEDIC SURGERY

## 2024-04-24 PROCEDURE — 73140 X-RAY EXAM OF FINGER(S): CPT | Mod: 26,LT,, | Performed by: RADIOLOGY
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
## 2024-04-25 ENCOUNTER — TELEPHONE (OUTPATIENT)
Dept: PODIATRY | Facility: CLINIC | Age: 39
End: 2024-04-25
Payer: MEDICARE

## 2024-04-25 VITALS
TEMPERATURE: 99 F | HEART RATE: 83 BPM | SYSTOLIC BLOOD PRESSURE: 112 MMHG | DIASTOLIC BLOOD PRESSURE: 82 MMHG | HEIGHT: 62 IN | WEIGHT: 212.31 LBS | OXYGEN SATURATION: 95 % | BODY MASS INDEX: 39.07 KG/M2

## 2024-04-25 NOTE — PROGRESS NOTES
Subjective:       Patient ID: Maria Ines Ac is a 39 y.o. female.    Chief Complaint: Hypertension    HPI  She returns for management of hypertension.  She has had hypertension for over a year.  Current treatment has included medications outlined in medication list.  She denies chest pain or shortness of breath.  No palpitations.  Denies left arm or neck pain.  She is here for emergency room follow-up.  Please see emergency department records     Medications:  See medication list     Social history:  Does not smoke, does not drink alcohol       Review of Systems   Constitutional:  Negative for chills, fatigue, fever and unexpected weight change.   Respiratory:  Negative for chest tightness and shortness of breath.    Cardiovascular:  Negative for chest pain and palpitations.   Gastrointestinal:  Negative for abdominal pain and blood in stool.   Neurological:  Negative for dizziness, syncope, numbness and headaches.       Objective:      Physical Exam  HENT:      Right Ear: External ear normal.      Left Ear: External ear normal.      Nose: Nose normal.      Mouth/Throat:      Mouth: Mucous membranes are moist.      Pharynx: Oropharynx is clear.   Eyes:      Pupils: Pupils are equal, round, and reactive to light.   Cardiovascular:      Rate and Rhythm: Normal rate and regular rhythm.      Heart sounds: No murmur heard.  Pulmonary:      Breath sounds: Normal breath sounds.   Abdominal:      General: There is no distension.      Palpations: There is no hepatomegaly or splenomegaly.      Tenderness: There is no abdominal tenderness.   Musculoskeletal:      Cervical back: Normal range of motion.   Lymphadenopathy:      Cervical: No cervical adenopathy.      Upper Body:      Right upper body: No axillary adenopathy.      Left upper body: No axillary adenopathy.   Neurological:      Cranial Nerves: No cranial nerve deficit.      Sensory: No sensory deficit.      Motor: Motor function is intact.      Deep Tendon  Reflexes: Reflexes are normal and symmetric.         Assessment/Plan       Assessment and plan:  1.  Hypertension:  Controlled.  Continue Toprol   2. Finger pain:  Schedule orthopedics appointment  3.  She was here for urgent care only appointment.  She will return to clinic for a physical

## 2024-04-26 ENCOUNTER — OFFICE VISIT (OUTPATIENT)
Dept: PODIATRY | Facility: CLINIC | Age: 39
End: 2024-04-26
Payer: MEDICARE

## 2024-04-26 VITALS
SYSTOLIC BLOOD PRESSURE: 117 MMHG | HEIGHT: 62 IN | BODY MASS INDEX: 39.23 KG/M2 | WEIGHT: 213.19 LBS | DIASTOLIC BLOOD PRESSURE: 81 MMHG | HEART RATE: 80 BPM

## 2024-04-26 DIAGNOSIS — E11.42 DIABETIC POLYNEUROPATHY ASSOCIATED WITH TYPE 2 DIABETES MELLITUS: Primary | ICD-10-CM

## 2024-04-26 DIAGNOSIS — B35.1 ONYCHOMYCOSIS DUE TO DERMATOPHYTE: ICD-10-CM

## 2024-04-26 DIAGNOSIS — L84 CORN OR CALLUS: ICD-10-CM

## 2024-04-26 PROCEDURE — 11721 DEBRIDE NAIL 6 OR MORE: CPT | Mod: Q9,59,HCNC,S$GLB | Performed by: PODIATRIST

## 2024-04-26 PROCEDURE — 99999 PR PBB SHADOW E&M-EST. PATIENT-LVL IV: CPT | Mod: PBBFAC,HCNC,, | Performed by: PODIATRIST

## 2024-04-26 PROCEDURE — 11056 PARNG/CUTG B9 HYPRKR LES 2-4: CPT | Mod: Q9,HCNC,S$GLB, | Performed by: PODIATRIST

## 2024-04-26 PROCEDURE — 99499 UNLISTED E&M SERVICE: CPT | Mod: HCNC,S$GLB,, | Performed by: PODIATRIST

## 2024-05-03 ENCOUNTER — PATIENT MESSAGE (OUTPATIENT)
Dept: PODIATRY | Facility: CLINIC | Age: 39
End: 2024-05-03
Payer: MEDICARE

## 2024-05-03 ENCOUNTER — TELEPHONE (OUTPATIENT)
Dept: PODIATRY | Facility: CLINIC | Age: 39
End: 2024-05-03
Payer: MEDICARE

## 2024-05-03 NOTE — TELEPHONE ENCOUNTER
Left voice message appointment has been canceled for 05/16/2024 Please give office a call at [phone] to help reschedule.

## 2024-05-07 ENCOUNTER — OFFICE VISIT (OUTPATIENT)
Dept: INTERNAL MEDICINE | Facility: CLINIC | Age: 39
End: 2024-05-07
Payer: MEDICARE

## 2024-05-07 VITALS
OXYGEN SATURATION: 89 % | DIASTOLIC BLOOD PRESSURE: 60 MMHG | WEIGHT: 213.19 LBS | HEART RATE: 82 BPM | SYSTOLIC BLOOD PRESSURE: 110 MMHG | BODY MASS INDEX: 39.23 KG/M2 | HEIGHT: 62 IN

## 2024-05-07 DIAGNOSIS — L29.9 PRURITUS: ICD-10-CM

## 2024-05-07 DIAGNOSIS — L98.9 SKIN LESION OF BACK: Primary | ICD-10-CM

## 2024-05-07 PROCEDURE — 99999 PR PBB SHADOW E&M-EST. PATIENT-LVL V: CPT | Mod: PBBFAC,,,

## 2024-05-07 PROCEDURE — 3072F LOW RISK FOR RETINOPATHY: CPT | Mod: HCNC,CPTII,S$GLB,

## 2024-05-07 PROCEDURE — 3074F SYST BP LT 130 MM HG: CPT | Mod: HCNC,CPTII,S$GLB,

## 2024-05-07 PROCEDURE — 3044F HG A1C LEVEL LT 7.0%: CPT | Mod: HCNC,CPTII,S$GLB,

## 2024-05-07 PROCEDURE — 3078F DIAST BP <80 MM HG: CPT | Mod: HCNC,CPTII,S$GLB,

## 2024-05-07 PROCEDURE — 99213 OFFICE O/P EST LOW 20 MIN: CPT | Mod: HCNC,S$GLB,,

## 2024-05-07 PROCEDURE — 1159F MED LIST DOCD IN RCRD: CPT | Mod: HCNC,CPTII,S$GLB,

## 2024-05-07 PROCEDURE — 1160F RVW MEDS BY RX/DR IN RCRD: CPT | Mod: HCNC,CPTII,S$GLB,

## 2024-05-07 PROCEDURE — 3008F BODY MASS INDEX DOCD: CPT | Mod: HCNC,CPTII,S$GLB,

## 2024-05-07 RX ORDER — TRIAMCINOLONE ACETONIDE 5 MG/G
CREAM TOPICAL 2 TIMES DAILY
Qty: 15 G | Refills: 0 | Status: SHIPPED | OUTPATIENT
Start: 2024-05-07

## 2024-05-07 NOTE — PROGRESS NOTES
INTERNAL MEDICINE PROGRESS/URGENT CARE NOTE    Patient: Maria Ines Ac  : 1985  MRN: [id number]  PCP: Luann Raymond MD    CHIEF COMPLAINT     Chief Complaint   Patient presents with    Mass       HPI     Maria Ines is a 39 y.o. female with migraines, T2DM with polyneuropathy, schizoaffective disorder, BPPV, HTN, HLD, hypothyroidism, GERD who presents for an urgent visit today with c/o a bump on her back that she noticed yesterday. Itchy and painful. Has been scratching. Has not applied anything to this lesion. Constant, 8/10 pain. Denies injuries/trauma.             Past Medical History:  Past Medical History:   Diagnosis Date    Blood in stool     Constipation     Cystitis     Depression     Diabetes mellitus, type 2     Dysphagia     Endometriosis     GERD (gastroesophageal reflux disease)     Headache     Hypertension     Palpitations     Premature menopause on hormone replacement therapy     Thyroid disease     Weakness 2019        Past Surgical History:  Past Surgical History:   Procedure Laterality Date    24 HOUR IMPEDANCE PH MONITORING OF ESOPHAGUS IN PATIENT TAKING ACID REDUCING MEDICATIONS N/A 2022    Procedure: IMPEDANCE PH STUDY, ESOPHAGEAL, 24 HOUR, IN PATIENT TAKING ACID REDUCING MEDICATION;  Surgeon: Debbie Butler MD;  Location: [facility] ([address] [place] [address]);  Service: Endoscopy;  Laterality: N/A;  On PPI/H2 Blocker    BLADDER SUSPENSION      CARPAL TUNNEL RELEASE      right    CHOLECYSTECTOMY      COLONOSCOPY N/A 2016    Procedure: COLONOSCOPY;  Surgeon: Slick Herron MD;  Location: [address]);  Service: Endoscopy;  Laterality: N/A;  PM prep    COLONOSCOPY N/A 2021    Procedure: COLONOSCOPY. any CRS;  Surgeon: Maria Ines Jung MD;  Location: [facility] ([address] [place] [address]);  Service: Endoscopy;  Laterality: N/A;  fully vaccinated -  scaral stimulator will bring remote -     lvm to confirm appt-rb    ESOPHAGEAL MANOMETRY WITH MEASUREMENT OF IMPEDANCE N/A 2018     Procedure: MANOMETRY, ESOPHAGUS, WITH IMPEDANCE MEASUREMENT;  Surgeon: Slick eHrron MD;  Location: [facility] ENDO (4TH F[facility]);  Service: Endoscopy;  Laterality: N/A;    ESOPHAGEAL MANOMETRY WITH MEASUREMENT OF IMPEDANCE N/A 6/2/2022    Procedure: MANOMETRY, ESOPHAGUS, WITH IMPEDANCE MEASUREMENT;  Surgeon: Debbie Butler MD;  Location: [facility] ENDO (4TH F[facility]);  Service: Endoscopy;  Laterality: N/A;  fully vaccinated, bladder stimulator, instr mailed /emailed -ml    ESOPHAGOGASTRODUODENOSCOPY N/A 2/13/2020    Procedure: EGD (ESOPHAGOGASTRODUODENOSCOPY);  Surgeon: Slick Herron MD;  Location: [facility] ENDO (4TH F[facility]);  Service: Endoscopy;  Laterality: N/A;  pt has cardiology appt on 1/6/19-will call back after this appt to schedule-tb    FLUOROSCOPIC URODYNAMIC STUDY N/A 5/23/2019    Procedure: URODYNAMIC STUDY, FLUOROSCOPIC;  Surgeon: Zena Tee MD;  Location: [facility] OR 1ST FLR;  Service: Urology;  Laterality: N/A;  1 hour    GALLBLADDER SURGERY      HYSTERECTOMY  2013    Bess velasquez Dr. [place]    IMPLANTATION OF PERMANENT SACRAL NERVE STIMULATOR N/A 7/30/2019    Procedure: INSERTION, NEUROSTIMULATOR, PERMANENT, SACRAL;  Surgeon: Zena Tee MD;  Location: [facility] OR 2ND FLR;  Service: Urology;  Laterality: N/A;  30 min    OOPHORECTOMY  2005    [facility]- benign cyst    OOPHORECTOMY  2013    [facility]- endo    ooptherectomy      REPLACEMENT OF NERVE STIMULATOR BATTERY N/A 2/28/2023    Procedure: Replacement, Battery, Neurostimulator;  Surgeon: Zena Tee MD;  Location: [facility] OR 2ND FLR;  Service: Urology;  Laterality: N/A;  45 min    right knee  scoped    SHOULDER SURGERY  right        Allergies:  Review of patient's allergies indicates:  No Known Allergies    Home Medications:    Current Outpatient Medications:     ACCU-CHEK AMAURY PLUS TEST STRP Strp, USE TO TEST BLOOD GLUCOSE TID, Disp: , Rfl: 9    ACCU-CHEK SOFTCLIX LANCETS Misc, USE TO TEST BLOOD GLUCOSE TID, Disp: , Rfl: 3    albuterol (VENTOLIN HFA) 90  mcg/actuation inhaler, Inhale 2 puffs into the lungs every 6 (six) hours as needed for Wheezing. Rescue, Disp: 18 g, Rfl: 0    amitriptyline (ELAVIL) 10 MG tablet, Take 1 tablet (10 mg total) by mouth nightly., Disp: 30 tablet, Rfl: 11    atorvastatin (LIPITOR) 80 MG tablet, Take 80 mg by mouth every evening., Disp: , Rfl:     BLOOD SUGAR DIAGNOSTIC (ACCU-CHEK AMAURY PLUS TEST STRP MISC), 1 strip by Misc.(Non-Drug; Combo Route) route 3 (three) times daily., Disp: , Rfl:     buPROPion (WELLBUTRIN XL) 150 MG TB24 tablet, Take 150 mg by mouth every morning., Disp: , Rfl:     ciclopirox (PENLAC) 8 % Soln, Apply topically nightly., Disp: 6.6 mL, Rfl: 11    dexlansoprazole (DEXILANT) 60 mg capsule, TAKE 1 CAPSULE(60 MG) BY MOUTH TWICE DAILY, Disp: 60 capsule, Rfl: 11    diclofenac sodium (VOLTAREN) 1 % Gel, Apply 2 g topically 4 (four) times daily. As needed for breast pain, Disp: 1 Tube, Rfl: 1    dicyclomine (BENTYL) 10 MG capsule, TAKE 2 CAPSULES(20 MG) BY MOUTH THREE TIMES DAILY BEFORE MEALS, Disp: 180 capsule, Rfl: 0    diltiazem HCl (DILTIAZEM 2% CREAM), Apply topically 3 (three) times daily. Apply topically to anal area., Disp: 30 g, Rfl: 2    estradioL (ESTRACE) 0.01 % (0.1 mg/gram) vaginal cream, Place 1 g vaginally twice a week., Disp: 42.5 g, Rfl: 3    FARXIGA 10 mg Tab, Take 10 mg by mouth Daily., Disp: , Rfl: 7    fluticasone propionate (FLONASE) 50 mcg/actuation nasal spray, SHAKE LIQUID AND USE 2 SPRAYS(100 MCG) IN EACH NOSTRIL EVERY DAY, Disp: 48 g, Rfl: 2    fluticasone-salmeterol diskus inhaler 250-50 mcg, Inhale 1 puff into the lungs 2 (two) times daily. Controller, Disp: 60 each, Rfl: 11    gabapentin (NEURONTIN) 300 MG capsule, Take 600 mg (two capsules) in the morning and 900mg (three capsules) at night before bed, Disp: 150 capsule, Rfl: 11    GLUCOPHAGE 500 mg tablet, Take 500 mg by mouth 2 (two) times daily with meals. , Disp: , Rfl:     levothyroxine (SYNTHROID) 75 MCG tablet, Take 75 mcg by mouth  before breakfast., Disp: , Rfl:     meloxicam (MOBIC) 15 MG tablet, Take 15 mg by mouth once daily., Disp: , Rfl:     terese-m.blue-s.phos-phsal-hyo (URIBEL) 118-10-40.8-36 mg Cap, Take 1 capsule by mouth every 6 (six) hours as needed (bladder pain)., Disp: 120 capsule, Rfl: 11    metoprolol succinate (TOPROL-XL) 100 MG 24 hr tablet, Take 1 tablet (100 mg total) by mouth once daily., Disp: 90 tablet, Rfl: 3    mirabegron (MYRBETRIQ) 50 mg Tb24, Take 1 tablet (50 mg total) by mouth once daily., Disp: 30 tablet, Rfl: 11    mometasone 0.1% (ELOCON) 0.1 % cream, APPLY TOPICALLY TO THE RASH ON HANDS TWICE DAILY AS NEEDED FOR TWO TO THREE WEEKS. USE SPARINGLY, Disp: , Rfl:     phentermine (ADIPEX-P) 37.5 mg tablet, Take 37.5 mg by mouth., Disp: , Rfl:     phentermine 37.5 MG capsule, TK 1 C PO ONCE D IN THE MORNING, Disp: , Rfl:     REXULTI 4 mg Tab, Take 1 tablet by mouth every evening., Disp: , Rfl:     traZODone (DESYREL) 100 MG tablet, TK 1 T PO HS, Disp: , Rfl: 1    TRULICITY 4.5 mg/0.5 mL pen injector, Inject 4.5 mg into the skin every 7 days., Disp: , Rfl:     ZOLOFT 100 mg tablet, Take 200 mg by mouth once daily. , Disp: , Rfl:     omega-3 fatty acids/fish oil (FISH OIL-OMEGA-3 FATTY ACIDS) 300-1,000 mg capsule, Take 1 capsule by mouth once daily., Disp: 90 capsule, Rfl: 3    oxyCODONE (ROXICODONE) 5 MG immediate release tablet, Take 1 tablet (5 mg total) by mouth every 4 (four) hours as needed for Pain. (Patient not taking: Reported on 5/7/2024), Disp: 5 tablet, Rfl: 0    topiramate (TOPAMAX) 50 MG tablet, Take 2 tablets (100 mg total) by mouth every evening., Disp: 60 tablet, Rfl: 11    triamcinolone acetonide 0.5% (KENALOG) 0.5 % Crea, Apply topically 2 (two) times daily., Disp: 15 g, Rfl: 0     Review of Systems:  Review of Systems   Constitutional:  Negative for fever.   Respiratory:  Negative for chest tightness and shortness of breath.    Cardiovascular:  Negative for chest pain.   Gastrointestinal:   "Negative for abdominal pain, blood in stool, diarrhea, nausea and vomiting.   Musculoskeletal:  Negative for myalgias.   Skin:         Bump on back   Neurological:  Negative for dizziness, weakness and headaches.   Psychiatric/Behavioral:  Negative for suicidal ideas.          PHYSICAL EXAM     Vitals:    05/07/24 1353   BP: 110/60   BP Location: Right arm   Patient Position: Sitting   BP Method: Large (Manual)   Pulse: 82   SpO2: (!) 89%   Weight: 96.7 kg (213 lb 3 oz)   Height: 5' 2" (1.575 m)      Body mass index is 38.99 kg/m².     Physical Exam  Constitutional:       Appearance: Normal appearance.   HENT:      Head: Normocephalic.   Eyes:      Extraocular Movements: Extraocular movements intact.      Pupils: Pupils are equal, round, and reactive to light.   Cardiovascular:      Rate and Rhythm: Normal rate and regular rhythm.   Pulmonary:      Effort: Pulmonary effort is normal. No respiratory distress.      Breath sounds: Normal breath sounds.   Abdominal:      General: Bowel sounds are normal.      Palpations: Abdomen is soft.   Musculoskeletal:         General: Normal range of motion.      Cervical back: Normal range of motion.   Skin:     General: Skin is warm and dry.      Findings: Lesion present.      Comments: Small raised lesion noted to left upper posterior torso. Some petechiae surrounding lesion? Nttp. No swelling, erythema, warmth.    Neurological:      General: No focal deficit present.      Mental Status: She is alert and oriented to person, place, and time.         LABS     Lab Results   Component Value Date    HGBA1C 5.6 03/15/2024     CMP  Sodium   Date Value Ref Range Status   03/15/2024 142 136 - 145 mmol/L Final     Potassium   Date Value Ref Range Status   03/15/2024 3.5 3.5 - 5.1 mmol/L Final     Chloride   Date Value Ref Range Status   03/15/2024 106 95 - 110 mmol/L Final     CO2   Date Value Ref Range Status   03/15/2024 26 23 - 29 mmol/L Final     Glucose   Date Value Ref Range Status "   03/15/2024 97 70 - 110 mg/dL Final     BUN   Date Value Ref Range Status   03/15/2024 5 (L) 6 - 20 mg/dL Final     Creatinine   Date Value Ref Range Status   03/15/2024 0.8 0.5 - 1.4 mg/dL Final     Calcium   Date Value Ref Range Status   03/15/2024 9.7 8.7 - 10.5 mg/dL Final     Total Protein   Date Value Ref Range Status   03/15/2024 7.6 6.0 - 8.4 g/dL Final     Albumin   Date Value Ref Range Status   03/15/2024 4.2 3.5 - 5.2 g/dL Final     Total Bilirubin   Date Value Ref Range Status   03/15/2024 0.4 0.1 - 1.0 mg/dL Final     Comment:     For infants and newborns, interpretation of results should be based  on gestational age, weight and in agreement with clinical  observations.    Premature Infant recommended reference ranges:  Up to 24 hours.............<8.0 mg/dL  Up to 48 hours............<12.0 mg/dL  3-5 days..................<15.0 mg/dL  6-29 days.................<15.0 mg/dL       Alkaline Phosphatase   Date Value Ref Range Status   03/15/2024 76 55 - 135 U/L Final     AST   Date Value Ref Range Status   03/15/2024 33 10 - 40 U/L Final     ALT   Date Value Ref Range Status   03/15/2024 35 10 - 44 U/L Final     Anion Gap   Date Value Ref Range Status   03/15/2024 10 8 - 16 mmol/L Final     eGFR if    Date Value Ref Range Status   07/19/2022 >60.0 >60 mL/min/1.73 m^2 Final     eGFR if non    Date Value Ref Range Status   07/19/2022 57.1 (A) >60 mL/min/1.73 m^2 Final     Comment:     Calculation used to obtain the estimated glomerular filtration  rate (eGFR) is the CKD-EPI equation.        Lab Results   Component Value Date    WBC 7.61 11/14/2023    HGB 13.6 11/14/2023    HCT 41.8 11/14/2023    MCV 90 11/14/2023     11/14/2023     Lab Results   Component Value Date    CHOL 181 07/17/2023    CHOL 149 07/19/2022    CHOL 163 06/02/2021     Lab Results   Component Value Date    HDL 39 (L) 07/17/2023    HDL 37 (L) 07/19/2022    HDL 39 (L) 06/02/2021     Lab Results    Component Value Date    LDLCALC 90.2 07/17/2023    LDLCALC 82.0 07/19/2022    LDLCALC 72.2 06/02/2021     Lab Results   Component Value Date    TRIG 259 (H) 07/17/2023    TRIG 150 07/19/2022    TRIG 259 (H) 06/02/2021     Lab Results   Component Value Date    CHOLHDL 21.5 07/17/2023    CHOLHDL 24.8 07/19/2022    CHOLHDL 23.9 06/02/2021     Lab Results   Component Value Date    TSH 1.821 11/14/2023       ASSESSMENT & PLAN       1. Skin lesion of back  Comments:  Stable. Will treat itch with trimacinolone. Referral placed to primary care derm for next week    2. Pruritus  Comments:  Stable. As above  Orders:  -     triamcinolone acetonide 0.5% (KENALOG) 0.5 % Crea; Apply topically 2 (two) times daily.  Dispense: 15 g; Refill: 0         Follow up if symptoms worsen or fail to improve.    Patient was counseled on when to seek emergent care. Patient's plan/treatment was discussed including medications and possible side effects. Verbalized understanding of all instructions.            TOBIAS Rutledge, FNP-C  Department of Internal Medicine  [facility]

## 2024-05-15 ENCOUNTER — OFFICE VISIT (OUTPATIENT)
Dept: DERMATOLOGY | Facility: CLINIC | Age: 39
End: 2024-05-15
Payer: MEDICARE

## 2024-05-15 DIAGNOSIS — D48.5 NEOPLASM OF UNCERTAIN BEHAVIOR OF SKIN: Primary | ICD-10-CM

## 2024-05-15 PROCEDURE — 88305 TISSUE EXAM BY PATHOLOGIST: CPT | Mod: 26,HCNC,, | Performed by: DERMATOLOGY

## 2024-05-15 PROCEDURE — 99499 UNLISTED E&M SERVICE: CPT | Mod: HCNC,S$GLB,, | Performed by: DERMATOLOGY

## 2024-05-15 PROCEDURE — 99999 PR PBB SHADOW E&M-EST. PATIENT-LVL IV: CPT | Mod: PBBFAC,HCNC,, | Performed by: DERMATOLOGY

## 2024-05-15 PROCEDURE — 11102 TANGNTL BX SKIN SINGLE LES: CPT | Mod: HCNC,S$GLB,, | Performed by: DERMATOLOGY

## 2024-05-15 PROCEDURE — 88305 TISSUE EXAM BY PATHOLOGIST: CPT | Mod: HCNC | Performed by: DERMATOLOGY

## 2024-05-15 NOTE — PROGRESS NOTES
Subjective:      Patient ID:  Maria Ines cA is a 39 y.o. female who presents for   Chief Complaint   Patient presents with    Lesion     Lesion - Initial  Affected locations: back  Duration: 2 weeks  Signs / symptoms: itching and pain  Treatments tried: TAC 0.5% cream BID x 1 week per pcp.  Improvement on treatment: no relief      Review of Systems   Skin:  Positive for itching.   Hematologic/Lymphatic: Does not bruise/bleed easily.       Objective:   Physical Exam   Constitutional: She appears well-developed and well-nourished. No distress.   Neurological: She is alert and oriented to person, place, and time. She is not disoriented.   Psychiatric: She has a normal mood and affect.        Diagram Legend     Erythematous scaling macule/papule c/w actinic keratosis       Vascular papule c/w angioma      Pigmented verrucoid papule/plaque c/w seborrheic keratosis      Yellow umbilicated papule c/w sebaceous hyperplasia      Irregularly shaped tan macule c/w lentigo     1-2 mm smooth white papules consistent with Milia      Movable subcutaneous cyst with punctum c/w epidermal inclusion cyst      Subcutaneous movable cyst c/w pilar cyst      Firm pink to brown papule c/w dermatofibroma      Pedunculated fleshy papule(s) c/w skin tag(s)      Evenly pigmented macule c/w junctional nevus     Mildly variegated pigmented, slightly irregular-bordered macule c/w mildly atypical nevus      Flesh colored to evenly pigmented papule c/w intradermal nevus       Pink pearly papule/plaque c/w basal cell carcinoma      Erythematous hyperkeratotic cursted plaque c/w SCC      Surgical scar with no sign of skin cancer recurrence      Open and closed comedones      Inflammatory papules and pustules      Verrucoid papule consistent consistent with wart     Erythematous eczematous patches and plaques     Dystrophic onycholytic nail with subungual debris c/w onychomycosis     Umbilicated papule    Erythematous-base heme-crusted tan  verrucoid plaque consistent with inflamed seborrheic keratosis     Erythematous Silvery Scaling Plaque c/w Psoriasis     See annotation        Photo last week      Assessment / Plan:        Neoplasm of uncertain behavior of skin    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 All questions were answered. Verbal consent was obtained.    Site: back  Indication: clinically suspicious lesion; rule out malignancy  Prep: Alcohol  Anesthesia: 1% lidocaine plain, less than 2 mL  Shave biopsy performed  Hemostasis with application of aluminum chloride and electrodesiccation  Dressed with petrolatum and bandaid  Specimen placed in formalin to be submitted to pathology    Routine care instructions given  Followup: per path    Follow up if symptoms worsen or fail to improve.

## 2024-05-17 LAB
FINAL PATHOLOGIC DIAGNOSIS: NORMAL
GROSS: NORMAL
Lab: NORMAL
MICROSCOPIC EXAM: NORMAL

## 2024-06-10 ENCOUNTER — PATIENT MESSAGE (OUTPATIENT)
Dept: INTERNAL MEDICINE | Facility: CLINIC | Age: 39
End: 2024-06-10
Payer: MEDICARE

## 2024-06-10 ENCOUNTER — TELEPHONE (OUTPATIENT)
Dept: UROLOGY | Facility: CLINIC | Age: 39
End: 2024-06-10

## 2024-06-10 ENCOUNTER — OFFICE VISIT (OUTPATIENT)
Dept: UROLOGY | Facility: CLINIC | Age: 39
End: 2024-06-10
Payer: MEDICARE

## 2024-06-10 VITALS
SYSTOLIC BLOOD PRESSURE: 115 MMHG | HEART RATE: 86 BPM | HEIGHT: 62 IN | WEIGHT: 215.38 LBS | DIASTOLIC BLOOD PRESSURE: 77 MMHG | BODY MASS INDEX: 39.63 KG/M2

## 2024-06-10 DIAGNOSIS — N39.46 MIXED INCONTINENCE: Primary | ICD-10-CM

## 2024-06-10 PROCEDURE — 99215 OFFICE O/P EST HI 40 MIN: CPT | Mod: S$GLB,,, | Performed by: UROLOGY

## 2024-06-10 PROCEDURE — 3074F SYST BP LT 130 MM HG: CPT | Mod: CPTII,S$GLB,, | Performed by: UROLOGY

## 2024-06-10 PROCEDURE — 3072F LOW RISK FOR RETINOPATHY: CPT | Mod: CPTII,S$GLB,, | Performed by: UROLOGY

## 2024-06-10 PROCEDURE — 3078F DIAST BP <80 MM HG: CPT | Mod: CPTII,S$GLB,, | Performed by: UROLOGY

## 2024-06-10 PROCEDURE — 1159F MED LIST DOCD IN RCRD: CPT | Mod: CPTII,S$GLB,, | Performed by: UROLOGY

## 2024-06-10 PROCEDURE — 3044F HG A1C LEVEL LT 7.0%: CPT | Mod: CPTII,S$GLB,, | Performed by: UROLOGY

## 2024-06-10 PROCEDURE — 51798 US URINE CAPACITY MEASURE: CPT | Mod: S$GLB,,, | Performed by: UROLOGY

## 2024-06-10 PROCEDURE — 99999 PR PBB SHADOW E&M-EST. PATIENT-LVL IV: CPT | Mod: PBBFAC,,, | Performed by: UROLOGY

## 2024-06-10 PROCEDURE — 81002 URINALYSIS NONAUTO W/O SCOPE: CPT | Mod: S$GLB,,, | Performed by: UROLOGY

## 2024-06-10 PROCEDURE — 3008F BODY MASS INDEX DOCD: CPT | Mod: CPTII,S$GLB,, | Performed by: UROLOGY

## 2024-06-10 PROCEDURE — 95971 ALYS SMPL SP/PN NPGT W/PRGRM: CPT | Mod: S$GLB,,, | Performed by: UROLOGY

## 2024-06-10 NOTE — H&P (VIEW-ONLY)
CHIEF COMPLAINT:    Mrs. Ac is a 39 y.o. female presenting for a follow up on mixed urinary incontinence.    PRESENTING ILLNESS:    Maria Ines Ac is a 39 y.o. female who presents with a history of  OAB, gastroparesis, DM, returns for follow up on mixed incontinence.  She is status post retropubic sling with Dr. Ventura on 1/23/2017.  She has an interStim placed in 2019 and then the battery was replaced in 2023.  She returns stating that she has not felt the stimulation for the past month and her control is such that she wakes up 6 times a night, has to change a pull up.  She states she also has stress incontinence in addition to the urge incontinence.     REVIEW OF SYSTEMS:    Review of Systems   Constitutional: Negative.    HENT: Negative.     Eyes: Negative.    Respiratory: Negative.     Cardiovascular: Negative.    Gastrointestinal: Negative.    Genitourinary:  Positive for frequency.        Nocturia, mixed incontinence   Skin: Negative.    Neurological: Negative.    Endo/Heme/Allergies:         Diabetic   Psychiatric/Behavioral: Negative.         PATIENT HISTORY:    Past Medical History:   Diagnosis Date    Blood in stool     Constipation     Cystitis     Depression     Diabetes mellitus, type 2     Dysphagia     Endometriosis     GERD (gastroesophageal reflux disease)     Headache     Hypertension     Palpitations     Premature menopause on hormone replacement therapy     Thyroid disease     Weakness 05/31/2019       Past Surgical History:   Procedure Laterality Date    24 HOUR IMPEDANCE PH MONITORING OF ESOPHAGUS IN PATIENT TAKING ACID REDUCING MEDICATIONS N/A 6/2/2022    Procedure: IMPEDANCE PH STUDY, ESOPHAGEAL, 24 HOUR, IN PATIENT TAKING ACID REDUCING MEDICATION;  Surgeon: Debbie Butler MD;  Location: [facility] ([address] [place] [address]);  Service: Endoscopy;  Laterality: N/A;  On PPI/H2 Blocker    BLADDER SUSPENSION      CARPAL TUNNEL RELEASE      right    CHOLECYSTECTOMY      COLONOSCOPY N/A 8/30/2016     Procedure: COLONOSCOPY;  Surgeon: Slick Herron MD;  Location: [facility] (4TH F[facility]);  Service: Endoscopy;  Laterality: N/A;  PM prep    COLONOSCOPY N/A 12/22/2021    Procedure: COLONOSCOPY. any CRS;  Surgeon: Maria Ines Jung MD;  Location: [facility] ENDO (4TH F[facility]);  Service: Endoscopy;  Laterality: N/A;  fully vaccinated -  scaral stimulator will bring remote -    12/17 lvm to confirm appt-rb    ESOPHAGEAL MANOMETRY WITH MEASUREMENT OF IMPEDANCE N/A 11/5/2018    Procedure: MANOMETRY, ESOPHAGUS, WITH IMPEDANCE MEASUREMENT;  Surgeon: Slick Herron MD;  Location: [facility] ENDO (4TH F[facility]);  Service: Endoscopy;  Laterality: N/A;    ESOPHAGEAL MANOMETRY WITH MEASUREMENT OF IMPEDANCE N/A 6/2/2022    Procedure: MANOMETRY, ESOPHAGUS, WITH IMPEDANCE MEASUREMENT;  Surgeon: Debbie Butler MD;  Location: [facility] (4TH F[facility]);  Service: Endoscopy;  Laterality: N/A;  fully vaccinated, bladder stimulator, instr mailed /emailed -ml    ESOPHAGOGASTRODUODENOSCOPY N/A 2/13/2020    Procedure: EGD (ESOPHAGOGASTRODUODENOSCOPY);  Surgeon: Slick Herron MD;  [facility]  Service: Endoscopy;  Laterality: N/A;  pt has cardiology appt on 1/6/19-will call back after this appt to schedule-tb    FLUOROSCOPIC URODYNAMIC STUDY N/A 5/23/2019    Procedure: URODYNAMIC STUDY, FLUOROSCOPIC;  Surgeon: Zena Tee MD;  Location: [facility] OR 1ST FLR;  Service: Urology;  Laterality: N/A;  1 hour    GALLBLADDER SURGERY      HYSTERECTOMY  2013    Bess velasquez Dr. Champlain    IMPLANTATION OF PERMANENT SACRAL NERVE STIMULATOR N/A 7/30/2019    Procedure: INSERTION, NEUROSTIMULATOR, PERMANENT, SACRAL;  Surgeon: Zena Tee MD;  Location: [facility] OR 2ND FLR;  Service: Urology;  Laterality: N/A;  30 min    OOPHORECTOMY  2005    LSO- benign cyst    OOPHORECTOMY  2013    [facility]O- endo    ooptherectomy      REPLACEMENT OF NERVE STIMULATOR BATTERY N/A 2/28/2023    Procedure: Replacement, Battery, Neurostimulator;  Surgeon: Zena Tee  MD;  Location: [facility] OR [address] [place] [address];  Service: Urology;  Laterality: N/A;  45 min    right knee  scoped    SHOULDER SURGERY  right       Family History   Problem Relation Name Age of Onset    Cervical cancer Maternal Grandmother          unknown age of onset,  at 80    Breast cancer Mother  50        alive at 64, unilateral    Esophageal cancer Mother  63    Ovarian cancer Mother  63    Anesthesia problems Mother      Colon cancer Brother  40    Breast cancer Maternal Aunt  72        alive at 72    Breast cancer Paternal Aunt          unknown age of onset, alive at 70     Social History     Socioeconomic History    Marital status: Single   Tobacco Use    Smoking status: Never    Smokeless tobacco: Never   Substance and Sexual Activity    Alcohol use: No    Drug use: No    Sexual activity: Never     Birth control/protection: None   Social History Narrative    ** Merged History Encounter **          Social Determinants of Health     Financial Resource Strain: Low Risk  (2024)    Overall Financial Resource Strain (CARDIA)     Difficulty of Paying Living Expenses: Not hard at all   Food Insecurity: No Food Insecurity (2024)    Hunger Vital Sign     Worried About Running Out of Food in the Last Year: Never true     Ran Out of Food in the Last Year: Never true   Transportation Needs: No Transportation Needs (2024)    PRAPARE - Transportation     Lack of Transportation (Medical): No     Lack of Transportation (Non-Medical): No   Physical Activity: Sufficiently Active (2024)    Exercise Vital Sign     Days of Exercise per Week: 3 days     Minutes of Exercise per Session: 60 min   Stress: Stress Concern Present (2024)    [employer] [place] [employer] - Occupational Stress Questionnaire     Feeling of Stress : Very much   Housing Stability: Low Risk  (2024)    Housing Stability Vital Sign     Unable to Pay for Housing in the Last Year: No     Number of Places Lived in the Last Year:  1     Unstable Housing in the Last Year: No       Allergies:  Patient has no known allergies.    Medications:  Outpatient Encounter Medications as of 6/10/2024   Medication Sig Dispense Refill    ACCU-CHEK AMAURY PLUS TEST STRP Strp USE TO TEST BLOOD GLUCOSE TID  9    ACCU-CHEK SOFTCLIX LANCETS Misc USE TO TEST BLOOD GLUCOSE TID  3    albuterol (VENTOLIN HFA) 90 mcg/actuation inhaler Inhale 2 puffs into the lungs every 6 (six) hours as needed for Wheezing. Rescue 18 g 0    amitriptyline (ELAVIL) 10 MG tablet Take 1 tablet (10 mg total) by mouth nightly. 30 tablet 11    atorvastatin (LIPITOR) 80 MG tablet Take 80 mg by mouth every evening.      BLOOD SUGAR DIAGNOSTIC (ACCU-CHEK AMAURY PLUS TEST STRP MISC) 1 strip by Misc.(Non-Drug; Combo Route) route 3 (three) times daily.      buPROPion (WELLBUTRIN XL) 150 MG TB24 tablet Take 150 mg by mouth every morning.      ciclopirox (PENLAC) 8 % Soln Apply topically nightly. 6.6 mL 11    dexlansoprazole (DEXILANT) 60 mg capsule TAKE 1 CAPSULE(60 MG) BY MOUTH TWICE DAILY 60 capsule 11    diclofenac sodium (VOLTAREN) 1 % Gel Apply 2 g topically 4 (four) times daily. As needed for breast pain 1 Tube 1    dicyclomine (BENTYL) 10 MG capsule TAKE 2 CAPSULES(20 MG) BY MOUTH THREE TIMES DAILY BEFORE MEALS 180 capsule 0    diltiazem HCl (DILTIAZEM 2% CREAM) Apply topically 3 (three) times daily. Apply topically to anal area. 30 g 2    estradioL (ESTRACE) 0.01 % (0.1 mg/gram) vaginal cream Place 1 g vaginally twice a week. 42.5 g 3    FARXIGA 10 mg Tab Take 10 mg by mouth Daily.  7    fluticasone propionate (FLONASE) 50 mcg/actuation nasal spray SHAKE LIQUID AND USE 2 SPRAYS(100 MCG) IN EACH NOSTRIL EVERY DAY 48 g 2    fluticasone-salmeterol diskus inhaler 250-50 mcg Inhale 1 puff into the lungs 2 (two) times daily. Controller 60 each 11    gabapentin (NEURONTIN) 300 MG capsule Take 600 mg (two capsules) in the morning and 900mg (three capsules) at night before bed 150 capsule 11     GLUCOPHAGE 500 mg tablet Take 500 mg by mouth 2 (two) times daily with meals.       levothyroxine (SYNTHROID) 75 MCG tablet Take 75 mcg by mouth before breakfast.      meloxicam (MOBIC) 15 MG tablet Take 15 mg by mouth once daily.      methen-m.blue-s.phos-phsal-hyo (URIBEL) 118-10-40.8-36 mg Cap Take 1 capsule by mouth every 6 (six) hours as needed (bladder pain). 120 capsule 11    metoprolol succinate (TOPROL-XL) 100 MG 24 hr tablet Take 1 tablet (100 mg total) by mouth once daily. 90 tablet 3    mirabegron (MYRBETRIQ) 50 mg Tb24 Take 1 tablet (50 mg total) by mouth once daily. 30 tablet 11    mometasone 0.1% (ELOCON) 0.1 % cream APPLY TOPICALLY TO THE RASH ON HANDS TWICE DAILY AS NEEDED FOR TWO TO THREE WEEKS. USE SPARINGLY      omega-3 fatty acids/fish oil (FISH OIL-OMEGA-3 FATTY ACIDS) 300-1,000 mg capsule Take 1 capsule by mouth once daily. 90 capsule 3    oxyCODONE (ROXICODONE) 5 MG immediate release tablet Take 1 tablet (5 mg total) by mouth every 4 (four) hours as needed for Pain. 5 tablet 0    phentermine (ADIPEX-P) 37.5 mg tablet Take 37.5 mg by mouth.      phentermine 37.5 MG capsule TK 1 C PO ONCE D IN THE MORNING      REXULTI 4 mg Tab Take 1 tablet by mouth every evening.      topiramate (TOPAMAX) 50 MG tablet Take 2 tablets (100 mg total) by mouth every evening. 60 tablet 11    traZODone (DESYREL) 100 MG tablet TK 1 T PO HS  1    triamcinolone acetonide 0.5% (KENALOG) 0.5 % Crea Apply topically 2 (two) times daily. 15 g 0    TRULICITY 4.5 mg/0.5 mL pen injector Inject 4.5 mg into the skin every 7 days.      ZOLOFT 100 mg tablet Take 200 mg by mouth once daily.        No facility-administered encounter medications on file as of 6/10/2024.         PHYSICAL EXAMINATION:    The patient generally appears in good health, is appropriately interactive, and is in no apparent distress.    Skin: No lesions.    Mental: Cooperative with normal affect.    Neuro: Grossly intact.    HEENT: Normal. No evidence of  lymphadenopathy.    Chest:  normal inspiratory effort.    Abdomen: Soft, non-tender. No masses or organomegaly. Bladder is not palpable. No evidence of flank discomfort. No evidence of inguinal hernia.    Extremities: No clubbing, cyanosis, or edema    NOTE:  the exam was carried out with a nurse chaperone present  Normal external female genitalia  Urethral meatus is normal  Urethra and bladder are nontender to bimanual exam  Well supported anteriorly and posteriorly   Uterus and cervix are normal  No adnexal masses  Stress incontinence witnessed  PVR by bladder scan was 26 ml     LABS:    Lab Results   Component Value Date    BUN 5 (L) 03/15/2024    CREATININE 0.8 03/15/2024     Last HgA1c on 3/15/2024 was 5.6%    UA 1.010, pH 5, 1000 glucose (On Farxiga) otherwise, negative.     IMPRESSION:    Mixed incontinence    PLAN:     Battery: OK  Impedences range from 735 to 1557 Ohms, checked at 1.5 A.   Program 5  3+ 0-1-, at 2.3 mA, 14 Hz, 210 pw, increased to 2.7 mA feels it butt cheek and it is comfortable.  Checked programs 6, 7 and she had sensation in the butt cheek.  She found that program 5 was the most comfortable.    2.  Consented for cystoscopy, urethral bulking agent.  If her stress component is such that any urge may be compounded.  She empties so is reasonable to do the injection.  Information from [facility] as well as the [employer] brochure was provided.     I spent 40 minutes with the patient of which more than half was spent in direct consultation with the patient in regards to our treatment and plan.

## 2024-06-10 NOTE — PROGRESS NOTES
CHIEF COMPLAINT:    Mrs. Ac is a 39 y.o. female presenting for a follow up on mixed urinary incontinence.    PRESENTING ILLNESS:    Maria Ines Ac is a 39 y.o. female who presents with a history of  OAB, gastroparesis, DM, returns for follow up on mixed incontinence.  She is status post retropubic sling with Dr. Ventura on 1/23/2017.  She has an interStim placed in 2019 and then the battery was replaced in 2023.  She returns stating that she has not felt the stimulation for the past month and her control is such that she wakes up 6 times a night, has to change a pull up.  She states she also has stress incontinence in addition to the urge incontinence.     REVIEW OF SYSTEMS:    Review of Systems   Constitutional: Negative.    HENT: Negative.     Eyes: Negative.    Respiratory: Negative.     Cardiovascular: Negative.    Gastrointestinal: Negative.    Genitourinary:  Positive for frequency.        Nocturia, mixed incontinence   Skin: Negative.    Neurological: Negative.    Endo/Heme/Allergies:         Diabetic   Psychiatric/Behavioral: Negative.         PATIENT HISTORY:    Past Medical History:   Diagnosis Date    Blood in stool     Constipation     Cystitis     Depression     Diabetes mellitus, type 2     Dysphagia     Endometriosis     GERD (gastroesophageal reflux disease)     Headache     Hypertension     Palpitations     Premature menopause on hormone replacement therapy     Thyroid disease     Weakness 05/31/2019       Past Surgical History:   Procedure Laterality Date    24 HOUR IMPEDANCE PH MONITORING OF ESOPHAGUS IN PATIENT TAKING ACID REDUCING MEDICATIONS N/A 6/2/2022    Procedure: IMPEDANCE PH STUDY, ESOPHAGEAL, 24 HOUR, IN PATIENT TAKING ACID REDUCING MEDICATION;  Surgeon: Debbie Butler MD;  Location: [facility] ([address] [place] [address]);  Service: Endoscopy;  Laterality: N/A;  On PPI/H2 Blocker    BLADDER SUSPENSION      CARPAL TUNNEL RELEASE      right    CHOLECYSTECTOMY      COLONOSCOPY N/A 8/30/2016     Procedure: COLONOSCOPY;  Surgeon: Slick Herron MD;  Location: [facility] (4TH F[facility]);  Service: Endoscopy;  Laterality: N/A;  PM prep    COLONOSCOPY N/A 12/22/2021    Procedure: COLONOSCOPY. any CRS;  Surgeon: Maria Ines Jung MD;  Location: [facility] ENDO (4TH F[facility]);  Service: Endoscopy;  Laterality: N/A;  fully vaccinated -  scaral stimulator will bring remote -    12/17 lvm to confirm appt-rb    ESOPHAGEAL MANOMETRY WITH MEASUREMENT OF IMPEDANCE N/A 11/5/2018    Procedure: MANOMETRY, ESOPHAGUS, WITH IMPEDANCE MEASUREMENT;  Surgeon: Slick Herron MD;  Location: [facility] ENDO (4TH F[facility]);  Service: Endoscopy;  Laterality: N/A;    ESOPHAGEAL MANOMETRY WITH MEASUREMENT OF IMPEDANCE N/A 6/2/2022    Procedure: MANOMETRY, ESOPHAGUS, WITH IMPEDANCE MEASUREMENT;  Surgeon: Debbie Butler MD;  Location: [facility] (4TH F[facility]);  Service: Endoscopy;  Laterality: N/A;  fully vaccinated, bladder stimulator, instr mailed /emailed -ml    ESOPHAGOGASTRODUODENOSCOPY N/A 2/13/2020    Procedure: EGD (ESOPHAGOGASTRODUODENOSCOPY);  Surgeon: Slick Herron MD;  [facility]  Service: Endoscopy;  Laterality: N/A;  pt has cardiology appt on 1/6/19-will call back after this appt to schedule-tb    FLUOROSCOPIC URODYNAMIC STUDY N/A 5/23/2019    Procedure: URODYNAMIC STUDY, FLUOROSCOPIC;  Surgeon: Zena Tee MD;  Location: [facility] OR 1ST FLR;  Service: Urology;  Laterality: N/A;  1 hour    GALLBLADDER SURGERY      HYSTERECTOMY  2013    Bess velasquez Dr. Champlain    IMPLANTATION OF PERMANENT SACRAL NERVE STIMULATOR N/A 7/30/2019    Procedure: INSERTION, NEUROSTIMULATOR, PERMANENT, SACRAL;  Surgeon: Zena Tee MD;  Location: [facility] OR 2ND FLR;  Service: Urology;  Laterality: N/A;  30 min    OOPHORECTOMY  2005    LSO- benign cyst    OOPHORECTOMY  2013    [facility]O- endo    ooptherectomy      REPLACEMENT OF NERVE STIMULATOR BATTERY N/A 2/28/2023    Procedure: Replacement, Battery, Neurostimulator;  Surgeon: Zena Tee  MD;  Location: [facility] OR [address] [place] [address];  Service: Urology;  Laterality: N/A;  45 min    right knee  scoped    SHOULDER SURGERY  right       Family History   Problem Relation Name Age of Onset    Cervical cancer Maternal Grandmother          unknown age of onset,  at 80    Breast cancer Mother  50        alive at 64, unilateral    Esophageal cancer Mother  63    Ovarian cancer Mother  63    Anesthesia problems Mother      Colon cancer Brother  40    Breast cancer Maternal Aunt  72        alive at 72    Breast cancer Paternal Aunt          unknown age of onset, alive at 70     Social History     Socioeconomic History    Marital status: Single   Tobacco Use    Smoking status: Never    Smokeless tobacco: Never   Substance and Sexual Activity    Alcohol use: No    Drug use: No    Sexual activity: Never     Birth control/protection: None   Social History Narrative    ** Merged History Encounter **          Social Determinants of Health     Financial Resource Strain: Low Risk  (2024)    Overall Financial Resource Strain (CARDIA)     Difficulty of Paying Living Expenses: Not hard at all   Food Insecurity: No Food Insecurity (2024)    Hunger Vital Sign     Worried About Running Out of Food in the Last Year: Never true     Ran Out of Food in the Last Year: Never true   Transportation Needs: No Transportation Needs (2024)    PRAPARE - Transportation     Lack of Transportation (Medical): No     Lack of Transportation (Non-Medical): No   Physical Activity: Sufficiently Active (2024)    Exercise Vital Sign     Days of Exercise per Week: 3 days     Minutes of Exercise per Session: 60 min   Stress: Stress Concern Present (2024)    [employer] [place] [employer] - Occupational Stress Questionnaire     Feeling of Stress : Very much   Housing Stability: Low Risk  (2024)    Housing Stability Vital Sign     Unable to Pay for Housing in the Last Year: No     Number of Places Lived in the Last Year:  1     Unstable Housing in the Last Year: No       Allergies:  Patient has no known allergies.    Medications:  Outpatient Encounter Medications as of 6/10/2024   Medication Sig Dispense Refill    ACCU-CHEK AMAURY PLUS TEST STRP Strp USE TO TEST BLOOD GLUCOSE TID  9    ACCU-CHEK SOFTCLIX LANCETS Misc USE TO TEST BLOOD GLUCOSE TID  3    albuterol (VENTOLIN HFA) 90 mcg/actuation inhaler Inhale 2 puffs into the lungs every 6 (six) hours as needed for Wheezing. Rescue 18 g 0    amitriptyline (ELAVIL) 10 MG tablet Take 1 tablet (10 mg total) by mouth nightly. 30 tablet 11    atorvastatin (LIPITOR) 80 MG tablet Take 80 mg by mouth every evening.      BLOOD SUGAR DIAGNOSTIC (ACCU-CHEK AMAURY PLUS TEST STRP MISC) 1 strip by Misc.(Non-Drug; Combo Route) route 3 (three) times daily.      buPROPion (WELLBUTRIN XL) 150 MG TB24 tablet Take 150 mg by mouth every morning.      ciclopirox (PENLAC) 8 % Soln Apply topically nightly. 6.6 mL 11    dexlansoprazole (DEXILANT) 60 mg capsule TAKE 1 CAPSULE(60 MG) BY MOUTH TWICE DAILY 60 capsule 11    diclofenac sodium (VOLTAREN) 1 % Gel Apply 2 g topically 4 (four) times daily. As needed for breast pain 1 Tube 1    dicyclomine (BENTYL) 10 MG capsule TAKE 2 CAPSULES(20 MG) BY MOUTH THREE TIMES DAILY BEFORE MEALS 180 capsule 0    diltiazem HCl (DILTIAZEM 2% CREAM) Apply topically 3 (three) times daily. Apply topically to anal area. 30 g 2    estradioL (ESTRACE) 0.01 % (0.1 mg/gram) vaginal cream Place 1 g vaginally twice a week. 42.5 g 3    FARXIGA 10 mg Tab Take 10 mg by mouth Daily.  7    fluticasone propionate (FLONASE) 50 mcg/actuation nasal spray SHAKE LIQUID AND USE 2 SPRAYS(100 MCG) IN EACH NOSTRIL EVERY DAY 48 g 2    fluticasone-salmeterol diskus inhaler 250-50 mcg Inhale 1 puff into the lungs 2 (two) times daily. Controller 60 each 11    gabapentin (NEURONTIN) 300 MG capsule Take 600 mg (two capsules) in the morning and 900mg (three capsules) at night before bed 150 capsule 11     GLUCOPHAGE 500 mg tablet Take 500 mg by mouth 2 (two) times daily with meals.       levothyroxine (SYNTHROID) 75 MCG tablet Take 75 mcg by mouth before breakfast.      meloxicam (MOBIC) 15 MG tablet Take 15 mg by mouth once daily.      methen-m.blue-s.phos-phsal-hyo (URIBEL) 118-10-40.8-36 mg Cap Take 1 capsule by mouth every 6 (six) hours as needed (bladder pain). 120 capsule 11    metoprolol succinate (TOPROL-XL) 100 MG 24 hr tablet Take 1 tablet (100 mg total) by mouth once daily. 90 tablet 3    mirabegron (MYRBETRIQ) 50 mg Tb24 Take 1 tablet (50 mg total) by mouth once daily. 30 tablet 11    mometasone 0.1% (ELOCON) 0.1 % cream APPLY TOPICALLY TO THE RASH ON HANDS TWICE DAILY AS NEEDED FOR TWO TO THREE WEEKS. USE SPARINGLY      omega-3 fatty acids/fish oil (FISH OIL-OMEGA-3 FATTY ACIDS) 300-1,000 mg capsule Take 1 capsule by mouth once daily. 90 capsule 3    oxyCODONE (ROXICODONE) 5 MG immediate release tablet Take 1 tablet (5 mg total) by mouth every 4 (four) hours as needed for Pain. 5 tablet 0    phentermine (ADIPEX-P) 37.5 mg tablet Take 37.5 mg by mouth.      phentermine 37.5 MG capsule TK 1 C PO ONCE D IN THE MORNING      REXULTI 4 mg Tab Take 1 tablet by mouth every evening.      topiramate (TOPAMAX) 50 MG tablet Take 2 tablets (100 mg total) by mouth every evening. 60 tablet 11    traZODone (DESYREL) 100 MG tablet TK 1 T PO HS  1    triamcinolone acetonide 0.5% (KENALOG) 0.5 % Crea Apply topically 2 (two) times daily. 15 g 0    TRULICITY 4.5 mg/0.5 mL pen injector Inject 4.5 mg into the skin every 7 days.      ZOLOFT 100 mg tablet Take 200 mg by mouth once daily.        No facility-administered encounter medications on file as of 6/10/2024.         PHYSICAL EXAMINATION:    The patient generally appears in good health, is appropriately interactive, and is in no apparent distress.    Skin: No lesions.    Mental: Cooperative with normal affect.    Neuro: Grossly intact.    HEENT: Normal. No evidence of  lymphadenopathy.    Chest:  normal inspiratory effort.    Abdomen: Soft, non-tender. No masses or organomegaly. Bladder is not palpable. No evidence of flank discomfort. No evidence of inguinal hernia.    Extremities: No clubbing, cyanosis, or edema    NOTE:  the exam was carried out with a nurse chaperone present  Normal external female genitalia  Urethral meatus is normal  Urethra and bladder are nontender to bimanual exam  Well supported anteriorly and posteriorly   Uterus and cervix are normal  No adnexal masses  Stress incontinence witnessed  PVR by bladder scan was 26 ml     LABS:    Lab Results   Component Value Date    BUN 5 (L) 03/15/2024    CREATININE 0.8 03/15/2024     Last HgA1c on 3/15/2024 was 5.6%    UA 1.010, pH 5, 1000 glucose (On Farxiga) otherwise, negative.     IMPRESSION:    Mixed incontinence    PLAN:     Battery: OK  Impedences range from 735 to 1557 Ohms, checked at 1.5 A.   Program 5  3+ 0-1-, at 2.3 mA, 14 Hz, 210 pw, increased to 2.7 mA feels it butt cheek and it is comfortable.  Checked programs 6, 7 and she had sensation in the butt cheek.  She found that program 5 was the most comfortable.    2.  Consented for cystoscopy, urethral bulking agent.  If her stress component is such that any urge may be compounded.  She empties so is reasonable to do the injection.  Information from [facility] as well as the [employer] brochure was provided.     I spent 40 minutes with the patient of which more than half was spent in direct consultation with the patient in regards to our treatment and plan.

## 2024-06-11 ENCOUNTER — TELEPHONE (OUTPATIENT)
Dept: ORTHOPEDICS | Facility: CLINIC | Age: 39
End: 2024-06-11
Payer: MEDICARE

## 2024-06-11 DIAGNOSIS — M79.645 FINGER PAIN, LEFT: Primary | ICD-10-CM

## 2024-06-11 NOTE — TELEPHONE ENCOUNTER
Hello, this is a message to notify you regarding your xray appointment at [facility] Location - 1st Floor [address] [facility], Please park in [address] [place] and use [place] elevators 45 minutes prior to your appointment time for X-ray. After your X-ray please proceed to 9th Floor [address] for Appointment on 6/12/24 with Dr. Hess for x-rays.     Please arrive a little early to check in prior to your appointment approximately at 10:30am.     Xray does run behind sometimes, we do appreciate your patience in advance. If you would like to get your xray before your appointment please reschedule your xray at a location near you at your convenience through the [facility].    *Please arrive at your informed time above, if you are more than 15 Minutes late to your appointment with Dr. Hess we will have to reschedule your appointment. This will allow you to be seen in a timely manor and be conscious to other patients being seen that same day*     Please respond to this message to confirm you received this notification.

## 2024-06-12 ENCOUNTER — OFFICE VISIT (OUTPATIENT)
Dept: ORTHOPEDICS | Facility: CLINIC | Age: 39
End: 2024-06-12
Payer: MEDICARE

## 2024-06-12 ENCOUNTER — HOSPITAL ENCOUNTER (OUTPATIENT)
Dept: RADIOLOGY | Facility: OTHER | Age: 39
Discharge: HOME | End: 2024-06-12
Attending: ORTHOPAEDIC SURGERY
Payer: MEDICARE

## 2024-06-12 DIAGNOSIS — M79.645 FINGER PAIN, LEFT: ICD-10-CM

## 2024-06-12 DIAGNOSIS — M20.012 MALLET DEFORMITY OF LEFT INDEX FINGER: Primary | ICD-10-CM

## 2024-06-12 PROCEDURE — 99999 PR PBB SHADOW E&M-EST. PATIENT-LVL IV: CPT | Mod: PBBFAC,HCNC,, | Performed by: ORTHOPAEDIC SURGERY

## 2024-06-12 PROCEDURE — 73140 X-RAY EXAM OF FINGER(S): CPT | Mod: 26,HCNC,LT, | Performed by: RADIOLOGY

## 2024-06-12 PROCEDURE — 73140 X-RAY EXAM OF FINGER(S): CPT | Mod: TC,HCNC,FY,LT

## 2024-06-12 NOTE — PRE-PROCEDURE INSTRUCTIONS
Spoke with patient on phone and instructed patient to hold trulicity for 7 days, farxiga for 3 days, meloxicam for 7 days and any over the counter vitamins or supplements for 7 days prior to procedure scheduled on 6/19. Patient verb understanding and denies any additional questions at this time.

## 2024-06-12 NOTE — PROGRESS NOTES
Mraia Ines Ac presents for follow up evaluation of   Encounter Diagnoses   Name Primary?    Mallet deformity of left index finger Yes    Finger pain, left      History of Present Illness     DOI: 4.10.24; hit finger on car door  Patient is about 8wks s/p left index finger mallet deformity. Pain today 9/10 dorsal DIP joint of left index finger.   Vitals:    06/12/24 1057   PainSc:   9   PainLoc: Hand       PE:    AA&O x 4.  NAD  HEENT:  NCAT, sclera nonicteric  Lungs:  Respirations are equal and unlabored.  CV:  2+ bilateral upper and lower extremity pulses.  MSK:   Physical Exam      Right index finger 10 degree extensor lag, able to actively extend, otherwise bilateral ROM hand/wrist/elbow full    Diagnostic studies and other clinical records review:  Results       X-rays AP, lateral and oblique left index finger hand taken today are independently reviewed by me and shows small osteophyte dorsal DIP joint consistent with remote trauma.    Assessment/Plan:   Encounter Diagnoses   Name Primary?    Mallet deformity of left index finger Yes    Finger pain, left      Assessment & Plan  The patient and I had a thorough discussion today. We discussed the working diagnosis as well as several other potential alternative diagnoses. Treatment options were discussed, both conservative and surgical. Conservative treatment options would include things such as activity modifications, workplace modifications, a period of rest, oral vs topical OTC and prescription anti-inflammatory medications, occupational therapy, splinting/bracing, immobilization, corticosteroid injections, and others. Surgical options were discussed as well.     1. Wean splint to nighttime wear only for the next month  2. Follow up as needed           Nikia Hess MD    Please be aware that this note has been generated with the assistance of [facility] voice-to-text.  Please excuse any spelling or grammatical errors.    This note was generated with the  assistance of ambient listening technology. Verbal consent was obtained by the patient and accompanying visitor(s) for the recording of patient appointment to facilitate this note. I attest to having reviewed and edited the generated note for accuracy, though some syntax or spelling errors may persist. Please contact the author of this note for any clarification.

## 2024-06-14 NOTE — PROGRESS NOTES
Subjective:      Patient ID: Maria Ines Ac is a 39 y.o. female.    Chief Complaint: Diabetic Foot Exam (4/19/24 - Luann Raymond MD, PCP) and Numbness (B/L)    Maria Ines is a 39 y.o. female who presents to the clinic for evaluation and treatment of high risk feet. Maria Ines has a past medical history of Blood in stool, Constipation, Cystitis, Depression, Diabetes mellitus, type 2, Dysphagia, Endometriosis, GERD (gastroesophageal reflux disease), Headache, Hypertension, Palpitations, Premature menopause on hormone replacement therapy, Thyroid disease, and Weakness (05/31/2019). The patient's chief complaint is long, thick toenails.     This patient has documented high risk feet requiring routine maintenance secondary to peripheral neuropathy.    PCP: Luann Raymond MD    Date Last Seen by PCP:   Chief Complaint   Patient presents with    Diabetic Foot Exam     4/19/24 - Luann Raymond MD, PCP    Numbness     B/L       Current shoe gear:  Affected Foot: Tennis shoes      Unaffected Foot: Tennis shoes    Hemoglobin A1C   Date Value Ref Range Status   03/15/2024 5.6 4.0 - 5.6 % Final     Comment:     ADA Screening Guidelines:  5.7-6.4%  Consistent with prediabetes  >or=6.5%  Consistent with diabetes    High levels of fetal hemoglobin interfere with the HbA1C  assay. Heterozygous hemoglobin variants (HbS, HgC, etc)do  not significantly interfere with this assay.   However, presence of multiple variants may affect accuracy.     11/14/2023 5.7 (H) 4.0 - 5.6 % Final     Comment:     ADA Screening Guidelines:  5.7-6.4%  Consistent with prediabetes  >or=6.5%  Consistent with diabetes    High levels of fetal hemoglobin interfere with the HbA1C  assay. Heterozygous hemoglobin variants (HbS, HgC, etc)do  not significantly interfere with this assay.   However, presence of multiple variants may affect accuracy.     07/17/2023 5.1 4.0 - 5.6 % Final     Comment:     ADA Screening Guidelines:  5.7-6.4%  Consistent  with prediabetes  >or=6.5%  Consistent with diabetes    High levels of fetal hemoglobin interfere with the HbA1C  assay. Heterozygous hemoglobin variants (HbS, HgC, etc)do  not significantly interfere with this assay.   However, presence of multiple variants may affect accuracy.         Review of Systems   Constitutional: Negative for chills, fever and malaise/fatigue.   HENT:  Negative for hearing loss.    Cardiovascular:  Negative for claudication.   Respiratory:  Negative for shortness of breath.    Skin:  Positive for color change, dry skin and nail changes. Negative for flushing and rash.   Musculoskeletal:  Negative for joint pain and myalgias.   Gastrointestinal:  Negative for nausea and vomiting.   Neurological:  Positive for numbness, paresthesias and sensory change. Negative for loss of balance.   Psychiatric/Behavioral:  Negative for altered mental status.    Allergic/Immunologic: Negative for hives.           Objective:      Physical Exam  Vitals reviewed.   Cardiovascular:      Pulses:           Dorsalis pedis pulses are 2+ on the right side and 2+ on the left side.        Posterior tibial pulses are 2+ on the right side and 2+ on the left side.      Comments: No edema noted to b/L LEs  Musculoskeletal:      Comments: Adequate joint ROM noted to all lower extremity muscle groups with no pain or crepitation noted. Muscle strength is 5/5 in all groups bilaterally.     Feet:      Right foot:      Protective Sensation: 5 sites tested.  0 sites sensed.      Left foot:      Protective Sensation: 5 sites tested.  0 sites sensed.   Skin:     General: Skin is warm and dry.      Capillary Refill: Capillary refill takes 2 to 3 seconds.      Comments: Normal skin tugor noted.   No open lesion noted b/L  Skin temp is warm to warm from proximal to distal b/L.  Webspaces clean, dry, and intact  Xerosis Bilaterally. No open lesions noted.   HKL noted to left 5th digit x2   Neurological:      Mental Status: She is alert  and oriented to person, place, and time.      Comments: Intact gross sensation noted to b/L LEs         Nails x10 are elongated by  5-8mm's, thickened by 1-2 mm's, dystrophic, and are yellowish in  coloration . Xerosis Bilaterally. No open lesions noted.             Assessment:       Encounter Diagnoses   Name Primary?    Diabetic polyneuropathy associated with type 2 diabetes mellitus Yes    Onychomycosis due to dermatophyte     Corn or callus          Plan:       Maria Ines was seen today for diabetic foot exam and numbness.    Diagnoses and all orders for this visit:    Diabetic polyneuropathy associated with type 2 diabetes mellitus    Onychomycosis due to dermatophyte    Corn or callus      I counseled the patient on her conditions, their implications and medical management.      - Shoe inspection. Diabetic Foot Education. Patient reminded of the importance of good nutrition and blood sugar control to help prevent podiatric complications of diabetes. Patient instructed on proper foot hygeine. We discussed wearing proper shoe gear, daily foot inspections, never walking without protective shoe gear, never putting sharp instruments to feet, routine podiatric nail visits every 2-3 months.      After cleansing with an alcohol prep pad, the about mentioned hyperkeratotic lesions were sharply debrided X 2 utilizing a #15 blade to a smooth base without incident. Pt tolerated the procedure well and reported comfort to the debarment sites. Pt will continue to use padding and moisture the callused areas.    - With patient's permission, nails were aggressively reduced and debrided x 10 to their soft tissue attachment mechanically and with electric , removing all offending nail and debris. Patient relates relief following the procedure. She will continue to monitor the areas daily, inspect her feet, wear protective shoe gear when ambulatory, moisturizer to maintain skin integrity and follow in this office in  approximately 2-3 months, sooner p.r.n.

## 2024-06-18 NOTE — PRE-PROCEDURE INSTRUCTIONS
Patient was informed of pre-procedure instructions and arrival time. Pt verbalized understanding and is to be accompanied by mother Ruthie    Dear Maria Ines ,     You are scheduled for a procedure with Dr. Tee on 6/19/2024. Your scheduled arrival time is 8:20 am.  This arrival time is roughly 2 hours before your anticipated procedure time to allow sufficient time for pre-op.  Please wear comfortable clothing  This procedure will take place at the [facility] at the corner of [facility] and [facility].  [facility] [place] [facility] to [facility]. The address is:     [address] [place] [address].  [place] [id number]     After entering the building, proceed to the second floor and check in with registration. You should take any medications that you routinely take for blood pressure (other than those listed below), heart medications, thyroid, cholesterol, etc.      If you wear contact lenses, please wear glasses to your procedure.     Your fasting instructions are as follow:     Nothing to eat after midnight the evening before your surgery.   You may drink clear liquids up until 2 hours prior to your arrival time.      You MUST have a responsible adult to bring you home.     The evening before and morning of your procedure, please hold the following medications:  Aspirin and Aspirin-containing products (Goody's powder, Excedrin)  NSAIDs (Advil, Ibuprofen, Aleve, Diclofenac)  Vitamins/Supplements  Herbal remedies/Teas  Stimulants (Adderall, Vyvanse, Adipex)  Diabetic medication (Please bring with you day of procedure) Glucophage  Prescription creams/gels/lotions        *May take Tylenol        The evening before and morning of your procedure, take a shower using antibacterial soap (ex: Hibiclens or Dial antibacterial soap). DO NOT apply deodorant, lotion, cologne, or anything else to the skin. Do not wear jewelry or bring any valuables with you. If you wear dentures please bring your  case with you or leave them at home. Use and bring any inhalers that you may have.     If you have any procedure-specific questions, please call your surgeon's office. Any other questions, don't hesitate to call at [phone]     Thanks,  Pre-Admit Testing  Anesthesia Dept [facility] .

## 2024-06-19 ENCOUNTER — ANESTHESIA (OUTPATIENT)
Dept: SURGERY | Facility: HOSPITAL | Age: 39
End: 2024-06-19
Payer: MEDICARE

## 2024-06-19 ENCOUNTER — HOSPITAL ENCOUNTER (OUTPATIENT)
Facility: HOSPITAL | Age: 39
Discharge: HOME | End: 2024-06-19
Attending: UROLOGY | Admitting: UROLOGY
Payer: MEDICARE

## 2024-06-19 ENCOUNTER — ANESTHESIA EVENT (OUTPATIENT)
Dept: SURGERY | Facility: HOSPITAL | Age: 39
End: 2024-06-19
Payer: MEDICARE

## 2024-06-19 VITALS
TEMPERATURE: 98 F | OXYGEN SATURATION: 94 % | DIASTOLIC BLOOD PRESSURE: 70 MMHG | SYSTOLIC BLOOD PRESSURE: 107 MMHG | HEART RATE: 71 BPM | RESPIRATION RATE: 16 BRPM

## 2024-06-19 DIAGNOSIS — N39.46 MIXED INCONTINENCE: Primary | ICD-10-CM

## 2024-06-19 LAB — POCT GLUCOSE: 101 MG/DL (ref 70–110)

## 2024-06-19 PROCEDURE — 82962 GLUCOSE BLOOD TEST: CPT | Performed by: UROLOGY

## 2024-06-19 PROCEDURE — 25000003 PHARM REV CODE 250: Performed by: NURSE ANESTHETIST, CERTIFIED REGISTERED

## 2024-06-19 PROCEDURE — 94761 N-INVAS EAR/PLS OXIMETRY MLT: CPT

## 2024-06-19 PROCEDURE — 37000008 HC ANESTHESIA 1ST 15 MINUTES: Performed by: UROLOGY

## 2024-06-19 PROCEDURE — 37000009 HC ANESTHESIA EA ADD 15 MINS: Performed by: UROLOGY

## 2024-06-19 PROCEDURE — 71000015 HC POSTOP RECOV 1ST HR: Performed by: UROLOGY

## 2024-06-19 PROCEDURE — 36000706: Performed by: UROLOGY

## 2024-06-19 PROCEDURE — 63600175 PHARM REV CODE 636 W HCPCS: Performed by: NURSE ANESTHETIST, CERTIFIED REGISTERED

## 2024-06-19 PROCEDURE — 71000033 HC RECOVERY, INTIAL HOUR: Performed by: UROLOGY

## 2024-06-19 PROCEDURE — L8606 SYNTHETIC IMPLNT URINARY 1ML: HCPCS | Performed by: UROLOGY

## 2024-06-19 PROCEDURE — 25000003 PHARM REV CODE 250: Performed by: ANESTHESIOLOGY

## 2024-06-19 PROCEDURE — 36000707: Performed by: UROLOGY

## 2024-06-19 PROCEDURE — 99900035 HC TECH TIME PER 15 MIN (STAT)

## 2024-06-19 PROCEDURE — 51715 ENDOSCOPIC INJECTION/IMPLANT: CPT | Mod: HCNC,,, | Performed by: UROLOGY

## 2024-06-19 DEVICE — AGENT BULK INJ CLLGN COAPTITE: Type: IMPLANTABLE DEVICE | Site: URETHRA | Status: FUNCTIONAL

## 2024-06-19 RX ORDER — CEFAZOLIN SODIUM 1 G/3ML
INJECTION, POWDER, FOR SOLUTION INTRAMUSCULAR; INTRAVENOUS
Status: DISCONTINUED | OUTPATIENT
Start: 2024-06-19 | End: 2024-06-19

## 2024-06-19 RX ORDER — OXYCODONE HYDROCHLORIDE 5 MG/1
5 TABLET ORAL
Status: DISCONTINUED | OUTPATIENT
Start: 2024-06-19 | End: 2024-06-19 | Stop reason: HOSPADM

## 2024-06-19 RX ORDER — HYDROMORPHONE HYDROCHLORIDE 1 MG/ML
0.5 INJECTION, SOLUTION INTRAMUSCULAR; INTRAVENOUS; SUBCUTANEOUS EVERY 5 MIN PRN
Status: DISCONTINUED | OUTPATIENT
Start: 2024-06-19 | End: 2024-06-19 | Stop reason: HOSPADM

## 2024-06-19 RX ORDER — PROPOFOL 10 MG/ML
VIAL (ML) INTRAVENOUS
Status: DISCONTINUED | OUTPATIENT
Start: 2024-06-19 | End: 2024-06-19

## 2024-06-19 RX ORDER — MIDAZOLAM HYDROCHLORIDE 1 MG/ML
INJECTION INTRAMUSCULAR; INTRAVENOUS
Status: DISCONTINUED | OUTPATIENT
Start: 2024-06-19 | End: 2024-06-19

## 2024-06-19 RX ORDER — SULFAMETHOXAZOLE AND TRIMETHOPRIM 800; 160 MG/1; MG/1
1 TABLET ORAL 2 TIMES DAILY
Qty: 6 TABLET | Refills: 0 | Status: SHIPPED | OUTPATIENT
Start: 2024-06-19 | End: 2024-06-22

## 2024-06-19 RX ORDER — FENTANYL CITRATE 50 UG/ML
INJECTION, SOLUTION INTRAMUSCULAR; INTRAVENOUS
Status: DISCONTINUED | OUTPATIENT
Start: 2024-06-19 | End: 2024-06-19

## 2024-06-19 RX ORDER — LIDOCAINE HYDROCHLORIDE 20 MG/ML
INJECTION INTRAVENOUS
Status: DISCONTINUED | OUTPATIENT
Start: 2024-06-19 | End: 2024-06-19

## 2024-06-19 RX ORDER — ONDANSETRON HYDROCHLORIDE 2 MG/ML
4 INJECTION, SOLUTION INTRAVENOUS DAILY PRN
Status: DISCONTINUED | OUTPATIENT
Start: 2024-06-19 | End: 2024-06-19 | Stop reason: HOSPADM

## 2024-06-19 RX ADMIN — PROPOFOL 70 MG: 10 INJECTION, EMULSION INTRAVENOUS at 11:06

## 2024-06-19 RX ADMIN — OXYCODONE 5 MG: 5 TABLET ORAL at 12:06

## 2024-06-19 RX ADMIN — SODIUM CHLORIDE: 0.9 INJECTION, SOLUTION INTRAVENOUS at 11:06

## 2024-06-19 RX ADMIN — MIDAZOLAM HYDROCHLORIDE 2 MG: 1 INJECTION, SOLUTION INTRAMUSCULAR; INTRAVENOUS at 11:06

## 2024-06-19 RX ADMIN — FENTANYL CITRATE 25 MCG: 50 INJECTION, SOLUTION INTRAMUSCULAR; INTRAVENOUS at 11:06

## 2024-06-19 RX ADMIN — CEFAZOLIN 2 G: 330 INJECTION, POWDER, FOR SOLUTION INTRAMUSCULAR; INTRAVENOUS at 11:06

## 2024-06-19 RX ADMIN — LIDOCAINE HYDROCHLORIDE 50 MG: 20 INJECTION INTRAVENOUS at 11:06

## 2024-06-19 NOTE — DISCHARGE INSTRUCTIONS
Post Cystoscopy Instructions  Do not strain to have a bowel movement  No strenuous exercise x 7 days  No driving while you are on narcotic pain medications or if your cheng  catheter is in place    You can expect:  To pass stone fragments if you had a stone procedure  Have pain when you void from your stent if you have a stent in place  See blood in your urine if you have a stent in place    If you have a catheter, please return to the ER if your catheter stops draining or you are having abdominal pain.    Call the doctor if:  Temperature is greater than 101F  Persistent vomiting and inability to keep food down  Inability to void if you do not have a catheter\

## 2024-06-19 NOTE — PLAN OF CARE
Pt in preop bay 42, VSS, and IV inserted. Pt denies any open wounds on body or the use of any weight loss injections. Pt needs updated H&P, anesthesia consent, otherwise ready to roll.  Procedural consents verified with pt.

## 2024-06-19 NOTE — BRIEF OP NOTE
[facility]  Brief Operative Note    Surgery Date: 6/19/2024     Surgeons and Role:     * Zena Tee MD - Primary    Assisting Surgeon: None    Pre-op Diagnosis:  Mixed incontinence [N39.46]    Post-op Diagnosis:  Post-Op Diagnosis Codes:     * Mixed incontinence [N39.46]    Procedure(s) (LRB):  CYSTOSCOPY, WITH PERIURETHRAL BULKING AGENT INJECTION (N/A)    Anesthesia: Local MAC    Operative Findings:   1 cc Bulkamid injected with good coaptation    Estimated Blood Loss: * No values recorded between 6/19/2024 11:52 AM and 6/19/2024 12:01 PM *         Specimens:   Specimen (24h ago, onward)      None          I was present for the entire case and agree with the above note.      Discharge Note    OUTCOME: Patient tolerated treatment/procedure well without complication and is now ready for discharge.    DISPOSITION: Home or Self Care    FINAL DIAGNOSIS:  Mixed incontinence    FOLLOWUP: In clinic    DISCHARGE INSTRUCTIONS:    Discharge Procedure Orders   No driving until:   Order Comments: No driving while taking opioid pain medications.     Notify your health care provider if you experience any of the following:  temperature >100.4     Notify your health care provider if you experience any of the following:  persistent nausea and vomiting or diarrhea     Notify your health care provider if you experience any of the following:  severe uncontrolled pain     Notify your health care provider if you experience any of the following:  difficulty breathing or increased cough     Notify your health care provider if you experience any of the following:  severe persistent headache     Notify your health care provider if you experience any of the following:  persistent dizziness, light-headedness, or visual disturbances     Notify your health care provider if you experience any of the following:  increased confusion or weakness     Activity as tolerated     As above.

## 2024-06-19 NOTE — TRANSFER OF CARE
Anesthesia Transfer of Care Note    Patient: Maria Ines Ac    Procedure(s) Performed: Procedure(s) (LRB):  CYSTOSCOPY, WITH PERIURETHRAL BULKING AGENT INJECTION (N/A)    Patient location: PACU    Anesthesia Type: general    Transport from OR: Transported from OR on 6-10 L/min O2 by face mask with adequate spontaneous ventilation    Post pain: adequate analgesia    Post assessment: no apparent anesthetic complications    Post vital signs: stable    Level of consciousness: awake    Nausea/Vomiting: no nausea/vomiting    Complications: none    Transfer of care protocol was followed      Last vitals: Visit Vitals  /80   Pulse 81   Temp 36.7 °C (98.1 °F) (Temporal)   Resp 16   LMP 11/17/2012 (LMP Unknown)   SpO2 95%   Breastfeeding No

## 2024-06-19 NOTE — INTERVAL H&P NOTE
The patient has been examined and the H&P has been reviewed:    I concur with the findings and no changes have occurred since H&P was written.    Surgery risks, benefits and alternative options discussed and understood by patient/family.    UA - SG 1.015, pH 6, negative for all other tested components.    Patient was assessed preoperatively, found to be appropriate in behavior. The risks, benefits, and alternatives to procedures were discussed, and questions were answered. Plan to proceed with described procedure.    Patient Active Problem List    Diagnosis Date Noted    Sleep apnea 11/23/2023    Gastroparesis 11/21/2023    Post-viral cough syndrome 11/08/2023    Chronic pain of right ankle 10/18/2023    Decreased range of motion of right ankle 08/07/2023    Impaired functional mobility, balance, gait, and endurance 08/07/2023    Decreased strength of lower extremity 08/07/2023    Hypothyroidism 02/14/2023    Schizoaffective disorder, depressive type 07/19/2022    Intractable migraine with aura without status migrainosus 05/18/2021    Snoring     Diabetic polyneuropathy associated with type 2 diabetes mellitus 12/20/2020    Dizziness 12/15/2020    Decreased functional mobility 12/15/2020    Chronic daily headache 12/07/2020    Medication overuse headache 06/12/2020    Benign paroxysmal positional vertigo due to bilateral vestibular disorder 06/12/2020    Bilateral occipital neuralgia 08/18/2019    Incomplete bladder emptying 06/24/2019    Painful cervical ROM 05/31/2019    Family hx of ovarian malignancy 02/10/2019    Family history of breast cancer 01/08/2019    GERD (gastroesophageal reflux disease) 11/05/2018    Hyperlipidemia associated with type 2 diabetes mellitus 08/23/2018    Sleep arousal disorder     Uncontrolled morning headache     Migrainous vertigo 04/25/2018    Gastroesophageal reflux disease without esophagitis 03/28/2018    Irritable bowel syndrome with diarrhea 03/28/2018    Mixed stress and urge  urinary incontinence 10/30/2017    Cervical myofascial pain syndrome 10/30/2017    Severe obesity (BMI 35.0-39.9) with comorbidity 04/10/2017    Muscle spasm 04/10/2017    Dysfunctional voiding of urine 03/20/2017    Hypertension associated with diabetes 09/07/2016    Palpitations 09/07/2016    Intractable chronic migraine without aura and without status migrainosus 12/24/2014    Right hip pain 02/08/2013    Abdominal pain, RLQ (right lower quadrant) 11/30/2012    Dysphagia 11/30/2012    Diabetes mellitus, type II 11/30/2012     Patient seen in holding.  No changes in clinical condition.  Proceed with planned procedure.

## 2024-06-19 NOTE — ANESTHESIA PREPROCEDURE EVALUATION
06/19/2024    Maria Ines Ac is a 39 y.o., female.    Past Medical History:   Diagnosis Date    Blood in stool     Constipation     Cystitis     Depression     Diabetes mellitus, type 2     Dysphagia     Endometriosis     GERD (gastroesophageal reflux disease)     Headache     Hypertension     Palpitations     Premature menopause on hormone replacement therapy     Thyroid disease     Weakness 05/31/2019     Past Surgical History:   Procedure Laterality Date    24 HOUR IMPEDANCE PH MONITORING OF ESOPHAGUS IN PATIENT TAKING ACID REDUCING MEDICATIONS N/A 6/2/2022    Procedure: IMPEDANCE PH STUDY, ESOPHAGEAL, 24 HOUR, IN PATIENT TAKING ACID REDUCING MEDICATION;  Surgeon: Debbie Butler MD;  Location: [facility] ([address] [place] [address]);  Service: Endoscopy;  Laterality: N/A;  On PPI/H2 Blocker    BLADDER SUSPENSION      CARPAL TUNNEL RELEASE      right    CHOLECYSTECTOMY      COLONOSCOPY N/A 8/30/2016    Procedure: COLONOSCOPY;  Surgeon: Slick Herron MD;  [facility]  Service: Endoscopy;  Laterality: N/A;  PM prep    COLONOSCOPY N/A 12/22/2021    Procedure: COLONOSCOPY. any CRS;  Surgeon: Maria Ines Jung MD;  [facility]  Service: Endoscopy;  Laterality: N/A;  fully vaccinated -sm  scaral stimulator will bring remote -    12/17 lvm to confirm appt-rb    ESOPHAGEAL MANOMETRY WITH MEASUREMENT OF IMPEDANCE N/A 11/5/2018    Procedure: MANOMETRY, ESOPHAGUS, WITH IMPEDANCE MEASUREMENT;  Surgeon: Slick Herron MD;  [facility]  Service: Endoscopy;  Laterality: N/A;    ESOPHAGEAL MANOMETRY WITH MEASUREMENT OF IMPEDANCE N/A 6/2/2022    Procedure: MANOMETRY, ESOPHAGUS, WITH IMPEDANCE MEASUREMENT;  Surgeon: Debbie Butler MD;  Location: [facility] ([address] [place] [address]);  Service: Endoscopy;  Laterality: N/A;  fully vaccinated, bladder stimulator, instr mailed /emailed -ml     ESOPHAGOGASTRODUODENOSCOPY N/A 2/13/2020    Procedure: EGD (ESOPHAGOGASTRODUODENOSCOPY);  Surgeon: Slick Herron MD;  Location: [facility] (4TH F[facility]);  Service: Endoscopy;  Laterality: N/A;  pt has cardiology appt on 1/6/19-will call back after this appt to schedule-tb    FLUOROSCOPIC URODYNAMIC STUDY N/A 5/23/2019    Procedure: URODYNAMIC STUDY, FLUOROSCOPIC;  Surgeon: Zena Tee MD;  Location: [facility] OR 1ST FLR;  Service: Urology;  Laterality: N/A;  1 hour    GALLBLADDER SURGERY      HYSTERECTOMY  2013    Bess velasquez Dr. [place]am[place]    IMPLANTATION OF PERMANENT SACRAL NERVE STIMULATOR N/A 7/30/2019    Procedure: INSERTION, NEUROSTIMULATOR, PERMANENT, SACRAL;  Surgeon: Zena Tee MD;  Location: [facility] OR 2ND FLR;  Service: Urology;  Laterality: N/A;  30 min    OOPHORECTOMY  2005    [facility]O- benign cyst    OOPHORECTOMY  2013    [facility]- endo    ooptherectomy      REPLACEMENT OF NERVE STIMULATOR BATTERY N/A 2/28/2023    Procedure: Replacement, Battery, Neurostimulator;  Surgeon: Zena Tee MD;  Location: [facility] OR 2ND FLR;  Service: Urology;  Laterality: N/A;  45 min    right knee  scoped    SHOULDER SURGERY  right           Pre-op Assessment    I have reviewed the Patient Summary Reports.     I have reviewed the Nursing Notes. I have reviewed the NPO Status.      Review of Systems  Anesthesia Hx:   History of prior surgery of interest to airway management or planning:            Denies Personal Hx of Anesthesia complications.                    Cardiovascular:     Hypertension                                  Hypertension         Pulmonary:        Sleep Apnea     Obstructive Sleep Apnea (ZAC).           Hepatic/GI:     GERD      Gerd          Neurological:    Neuromuscular Disease,  Headaches      Dx of Headaches                         Neuromuscular Disease   Endocrine:  Diabetes Hypothyroidism   Diabetes                   Hypothyroidism        Obesity / BMI > 30  Psych:  Psychiatric History                   Physical Exam  General: Well nourished    Airway:  Mallampati: II   Mouth Opening: Normal        Anesthesia Plan  Type of Anesthesia, risks & benefits discussed:    Anesthesia Type: Gen Natural Airway  Informed Consent: Informed consent signed with the Patient and all parties understand the risks and agree with anesthesia plan.  All questions answered.   ASA Score: 3    Ready For Surgery From Anesthesia Perspective.     .

## 2024-06-19 NOTE — ANESTHESIA POSTPROCEDURE EVALUATION
Anesthesia Post Evaluation    Patient: Maria Ines Ac    Procedure(s) Performed: Procedure(s) (LRB):  CYSTOSCOPY, WITH PERIURETHRAL BULKING AGENT INJECTION (N/A)    Final Anesthesia Type: general      Patient location during evaluation: PACU  Patient participation: Yes- Able to Participate  Level of consciousness: awake and alert  Post-procedure vital signs: reviewed and stable  Pain management: adequate  Airway patency: patent    PONV status at discharge: No PONV  Anesthetic complications: no      Cardiovascular status: blood pressure returned to baseline  Respiratory status: unassisted  Hydration status: euvolemic                Vitals Value Taken Time   /59 06/19/24 1217   Temp 36.7 °C (98 °F) 06/19/24 1208   Pulse 72 06/19/24 1228   Resp 20 06/19/24 1228   SpO2 93 % 06/19/24 1228   Vitals shown include unfiled device data.      No case tracking events are documented in the log.      Pain/Mac Score: Mac Score: 10 (6/19/2024 12:08 PM)

## 2024-06-19 NOTE — OP NOTE
[facility] [place]  Operative Note    Date: 06/19/2024    Pre-Op Diagnosis: JACINDA, mixed urinary incontinence  Patient Active Problem List    Diagnosis Date Noted    Sleep apnea 11/23/2023    Gastroparesis 11/21/2023    Post-viral cough syndrome 11/08/2023    Chronic pain of right ankle 10/18/2023    Decreased range of motion of right ankle 08/07/2023    Impaired functional mobility, balance, gait, and endurance 08/07/2023    Decreased strength of lower extremity 08/07/2023    Hypothyroidism 02/14/2023    Schizoaffective disorder, depressive type 07/19/2022    Intractable migraine with aura without status migrainosus 05/18/2021    Snoring     Diabetic polyneuropathy associated with type 2 diabetes mellitus 12/20/2020    Dizziness 12/15/2020    Decreased functional mobility 12/15/2020    Chronic daily headache 12/07/2020    Medication overuse headache 06/12/2020    Benign paroxysmal positional vertigo due to bilateral vestibular disorder 06/12/2020    Bilateral occipital neuralgia 08/18/2019    Incomplete bladder emptying 06/24/2019    Painful cervical ROM 05/31/2019    Family hx of ovarian malignancy 02/10/2019    Family history of breast cancer 01/08/2019    GERD (gastroesophageal reflux disease) 11/05/2018    Hyperlipidemia associated with type 2 diabetes mellitus 08/23/2018    Sleep arousal disorder     Uncontrolled morning headache     Migrainous vertigo 04/25/2018    Gastroesophageal reflux disease without esophagitis 03/28/2018    Irritable bowel syndrome with diarrhea 03/28/2018    Mixed stress and urge urinary incontinence 10/30/2017    Cervical myofascial pain syndrome 10/30/2017    Severe obesity (BMI 35.0-39.9) with comorbidity 04/10/2017    Muscle spasm 04/10/2017    Dysfunctional voiding of urine 03/20/2017    Mixed incontinence - s/p MUS on 1/23 01/23/2017    Hypertension associated with diabetes 09/07/2016    Palpitations 09/07/2016    Intractable chronic migraine without aura and without  status migrainosus 12/24/2014    Right hip pain 02/08/2013    Abdominal pain, RLQ (right lower quadrant) 11/30/2012    Dysphagia 11/30/2012    Diabetes mellitus, type II 11/30/2012       Post-Op Diagnosis: same    Procedure(s) Performed:   1.  Cystoscopy with transurethral injection of urethral bulking agent (Bulkamid)    Specimen(s): none    Staff Surgeon: Zena Tee MD    Assistant Surgeon: Jamel Singh MD    Anesthesia: Monitored Local Anesthesia with Sedation    Indications: Maria Ines Ac is a 39 y.o. female with mixed urinary incontinence with a component of bothersome stress urinary incontinence.      Findings: 1 cc injected into urethra with good coaptation     Estimated Blood Loss: min    Drains: none    Procedure in Detail: After informed consent was obtained, the patient was taken to the cystoscopy suite and placed in the supine position. SCDs were applied and working. Anesthesia was administered. Once the patient was adequately sedated she was placed in dorsal lithotomy position and prepped and draped in the usual sterile fashion. Preoperative timeout was performed, and preoperative antibiotics were confirmed.    The short 0 degree cystoscope lens in the disposable Bulkamid injection sheath was inserted into the urethra and advanced into the urinary bladder. Formal cystoscopy revealed normal bladder mucosa. The ureteral orifices were in the normal anatomic position bilaterally, effluxing clear urine. The rigid needle was inserted into the scope and the scope was backed out into the urethra. Keeping the needle parallel to the urethra, the needle was inserted into the submucosa. Bulkamid was injected at the 5 and 7 o'clock regions transurethrally. This was repeated at the 12 o'clock position. Good coaptation of the urethra was seen after injection. The scope was removed from the bladder.    The patient tolerated the procedure well and was transferred to recovery in stable condition.        Plan:  The patient will void prior to discharge. She was given prescriptions for bactrim.  She will follow up with Dr. Tee in 2 weeks.      Jamel Singh MD    I was present for the entire case and agree with the above note.

## 2024-07-02 ENCOUNTER — TELEPHONE (OUTPATIENT)
Dept: UROLOGY | Facility: CLINIC | Age: 39
End: 2024-07-02

## 2024-07-02 ENCOUNTER — OFFICE VISIT (OUTPATIENT)
Dept: UROLOGY | Facility: CLINIC | Age: 39
End: 2024-07-02
Payer: MEDICARE

## 2024-07-02 VITALS
BODY MASS INDEX: 38.94 KG/M2 | DIASTOLIC BLOOD PRESSURE: 76 MMHG | WEIGHT: 211.63 LBS | HEIGHT: 62 IN | SYSTOLIC BLOOD PRESSURE: 116 MMHG | HEART RATE: 83 BPM

## 2024-07-02 DIAGNOSIS — N39.46 MIXED INCONTINENCE: Primary | ICD-10-CM

## 2024-07-02 DIAGNOSIS — Z98.890 POST-OPERATIVE STATE: ICD-10-CM

## 2024-07-02 PROCEDURE — 3072F LOW RISK FOR RETINOPATHY: CPT | Mod: CPTII,S$GLB,, | Performed by: UROLOGY

## 2024-07-02 PROCEDURE — 99024 POSTOP FOLLOW-UP VISIT: CPT | Mod: S$GLB,,, | Performed by: UROLOGY

## 2024-07-02 PROCEDURE — 3044F HG A1C LEVEL LT 7.0%: CPT | Mod: CPTII,S$GLB,, | Performed by: UROLOGY

## 2024-07-02 PROCEDURE — 1159F MED LIST DOCD IN RCRD: CPT | Mod: CPTII,S$GLB,, | Performed by: UROLOGY

## 2024-07-02 PROCEDURE — 3078F DIAST BP <80 MM HG: CPT | Mod: CPTII,S$GLB,, | Performed by: UROLOGY

## 2024-07-02 PROCEDURE — 99999 PR PBB SHADOW E&M-EST. PATIENT-LVL IV: CPT | Mod: PBBFAC,,, | Performed by: UROLOGY

## 2024-07-02 PROCEDURE — 3074F SYST BP LT 130 MM HG: CPT | Mod: CPTII,S$GLB,, | Performed by: UROLOGY

## 2024-07-02 NOTE — PROGRESS NOTES
CHIEF COMPLAINT:    Mrs. Ac is a 39 y.o. female presenting for a post op visit, following cystoscopy transurethral injection of urethral bulking agent (Bulkamid) on 6/19/2024.    PRESENTING ILLNESS:    Maria Ines Ac is a 39 y.o. female who returns for follow up.  She states she is better but still wearing 4 pads a day.  She does not feel like they are as wet as they used to be but she still has stress incontinence, along with the urge incontinence.       Past Surgical History:   Procedure Laterality Date    24 HOUR IMPEDANCE PH MONITORING OF ESOPHAGUS IN PATIENT TAKING ACID REDUCING MEDICATIONS N/A 6/2/2022    Procedure: IMPEDANCE PH STUDY, ESOPHAGEAL, 24 HOUR, IN PATIENT TAKING ACID REDUCING MEDICATION;  Surgeon: Debbie Butler MD;  [facility]  Service: Endoscopy;  Laterality: N/A;  On PPI/H2 Blocker    BLADDER SUSPENSION      CARPAL TUNNEL RELEASE      right    CHOLECYSTECTOMY      COLONOSCOPY N/A 8/30/2016    Procedure: COLONOSCOPY;  Surgeon: Slick Herron MD;  Location: [facility] (4TH F[facility]);  Service: Endoscopy;  Laterality: N/A;  PM prep    COLONOSCOPY N/A 12/22/2021    Procedure: COLONOSCOPY. any CRS;  Surgeon: Maria Ines Jung MD;  Location: [facility] (4TH F[facility]);  Service: Endoscopy;  Laterality: N/A;  fully vaccinated -  scaral stimulator will bring remote -    12/17 lvm to confirm appt-rb    CYSTOSCOPY WITH INJECTION OF PERIURETHRAL BULKING AGENT N/A 6/19/2024    Procedure: CYSTOSCOPY, WITH PERIURETHRAL BULKING AGENT INJECTION;  Surgeon: Zena Tee MD;  Location: [facility];  Service: Urology;  Laterality: N/A;  45 minutes    ESOPHAGEAL MANOMETRY WITH MEASUREMENT OF IMPEDANCE N/A 11/5/2018    Procedure: MANOMETRY, ESOPHAGUS, WITH IMPEDANCE MEASUREMENT;  Surgeon: Slick Herron MD;  [facility]  Service: Endoscopy;  Laterality: N/A;    ESOPHAGEAL MANOMETRY WITH MEASUREMENT OF IMPEDANCE N/A 6/2/2022    Procedure: MANOMETRY, ESOPHAGUS, WITH IMPEDANCE  MEASUREMENT;  Surgeon: Debbie Butler MD;  Location: [facility] (4TH F[facility]);  Service: Endoscopy;  Laterality: N/A;  fully vaccinated, bladder stimulator, instr mailed /emailed -ml    ESOPHAGOGASTRODUODENOSCOPY N/A 2/13/2020    Procedure: EGD (ESOPHAGOGASTRODUODENOSCOPY);  Surgeon: Slick Herron MD;  Location: [facility] (4TH F[facility]);  Service: Endoscopy;  Laterality: N/A;  pt has cardiology appt on 1/6/19-will call back after this appt to schedule-tb    FLUOROSCOPIC URODYNAMIC STUDY N/A 5/23/2019    Procedure: URODYNAMIC STUDY, FLUOROSCOPIC;  Surgeon: Zena Tee MD;  Location: [facility] OR 1ST FLR;  Service: Urology;  Laterality: N/A;  1 hour    GALLBLADDER SURGERY      HYSTERECTOMY  2013    Bess velasquez Dr. Champlain    IMPLANTATION OF PERMANENT SACRAL NERVE STIMULATOR N/A 7/30/2019    Procedure: INSERTION, NEUROSTIMULATOR, PERMANENT, SACRAL;  Surgeon: Zena Tee MD;  Location: [facility] OR 2ND FLR;  Service: Urology;  Laterality: N/A;  30 min    OOPHORECTOMY  2005    [facility]- benign cyst    OOPHORECTOMY  2013    [facility]- endo    ooptherectomy      REPLACEMENT OF NERVE STIMULATOR BATTERY N/A 2/28/2023    Procedure: Replacement, Battery, Neurostimulator;  Surgeon: Zena Tee MD;  [facility];  Service: Urology;  Laterality: N/A;  45 min    right knee  scoped    SHOULDER SURGERY  right       Allergies:  Patient has no known allergies.    Medications:  Outpatient Encounter Medications as of 7/2/2024   Medication Sig Dispense Refill    ACCU-CHEK AMAURY PLUS TEST STRP Strp USE TO TEST BLOOD GLUCOSE TID  9    ACCU-CHEK SOFTCLIX LANCETS Misc USE TO TEST BLOOD GLUCOSE TID  3    albuterol (VENTOLIN HFA) 90 mcg/actuation inhaler Inhale 2 puffs into the lungs every 6 (six) hours as needed for Wheezing. Rescue 18 g 0    amitriptyline (ELAVIL) 10 MG tablet Take 1 tablet (10 mg total) by mouth nightly. 30 tablet 11    atorvastatin (LIPITOR) 80 MG tablet Take 80 mg by mouth every evening.      BLOOD SUGAR  DIAGNOSTIC (ACCU-CHEK AMAURY PLUS TEST STRP MISC) 1 strip by Misc.(Non-Drug; Combo Route) route 3 (three) times daily.      buPROPion (WELLBUTRIN XL) 150 MG TB24 tablet Take 150 mg by mouth every morning.      ciclopirox (PENLAC) 8 % Soln Apply topically nightly. 6.6 mL 11    dexlansoprazole (DEXILANT) 60 mg capsule TAKE 1 CAPSULE(60 MG) BY MOUTH TWICE DAILY 60 capsule 11    diclofenac sodium (VOLTAREN) 1 % Gel Apply 2 g topically 4 (four) times daily. As needed for breast pain 1 Tube 1    dicyclomine (BENTYL) 10 MG capsule TAKE 2 CAPSULES(20 MG) BY MOUTH THREE TIMES DAILY BEFORE MEALS 180 capsule 0    diltiazem HCl (DILTIAZEM 2% CREAM) Apply topically 3 (three) times daily. Apply topically to anal area. 30 g 2    estradioL (ESTRACE) 0.01 % (0.1 mg/gram) vaginal cream Place 1 g vaginally twice a week. 42.5 g 3    FARXIGA 10 mg Tab Take 10 mg by mouth Daily.  7    fluticasone propionate (FLONASE) 50 mcg/actuation nasal spray SHAKE LIQUID AND USE 2 SPRAYS(100 MCG) IN EACH NOSTRIL EVERY DAY 48 g 2    fluticasone-salmeterol diskus inhaler 250-50 mcg Inhale 1 puff into the lungs 2 (two) times daily. Controller 60 each 11    gabapentin (NEURONTIN) 300 MG capsule Take 600 mg (two capsules) in the morning and 900mg (three capsules) at night before bed 150 capsule 11    GLUCOPHAGE 500 mg tablet Take 500 mg by mouth 2 (two) times daily with meals.       levothyroxine (SYNTHROID) 75 MCG tablet Take 75 mcg by mouth before breakfast.      meloxicam (MOBIC) 15 MG tablet Take 15 mg by mouth once daily.      methen-m.blue-s.phos-phsal-hyo (URIBEL) 118-10-40.8-36 mg Cap Take 1 capsule by mouth every 6 (six) hours as needed (bladder pain). 120 capsule 11    metoprolol succinate (TOPROL-XL) 100 MG 24 hr tablet Take 1 tablet (100 mg total) by mouth once daily. 90 tablet 3    mirabegron (MYRBETRIQ) 50 mg Tb24 Take 1 tablet (50 mg total) by mouth once daily. 30 tablet 11    mometasone 0.1% (ELOCON) 0.1 % cream APPLY TOPICALLY TO THE RASH  ON HANDS TWICE DAILY AS NEEDED FOR TWO TO THREE WEEKS. USE SPARINGLY      omega-3 fatty acids/fish oil (FISH OIL-OMEGA-3 FATTY ACIDS) 300-1,000 mg capsule Take 1 capsule by mouth once daily. 90 capsule 3    oxyCODONE (ROXICODONE) 5 MG immediate release tablet Take 1 tablet (5 mg total) by mouth every 4 (four) hours as needed for Pain. 5 tablet 0    phentermine (ADIPEX-P) 37.5 mg tablet Take 37.5 mg by mouth.      phentermine 37.5 MG capsule TK 1 C PO ONCE D IN THE MORNING      REXULTI 4 mg Tab Take 1 tablet by mouth every evening.      [] sulfamethoxazole-trimethoprim 800-160mg (BACTRIM DS) 800-160 mg Tab Take 1 tablet by mouth 2 (two) times daily. for 3 days 6 tablet 0    topiramate (TOPAMAX) 50 MG tablet Take 2 tablets (100 mg total) by mouth every evening. 60 tablet 11    traZODone (DESYREL) 100 MG tablet TK 1 T PO HS  1    triamcinolone acetonide 0.5% (KENALOG) 0.5 % Crea Apply topically 2 (two) times daily. 15 g 0    TRULICITY 4.5 mg/0.5 mL pen injector Inject 4.5 mg into the skin every 7 days.      ZOLOFT 100 mg tablet Take 200 mg by mouth once daily.        No facility-administered encounter medications on file as of 2024.         PHYSICAL EXAMINATION:    The patient generally appears in good health, is appropriately interactive, and is in no apparent distress.    Skin: No lesions.    Mental: Cooperative with normal affect.    Neuro: Grossly intact.    HEENT: Normal. No evidence of lymphadenopathy.    Chest:  normal inspiratory effort.    Abdomen:  Soft, non-tender. No masses or organomegaly. Bladder is not palpable. No evidence of flank discomfort. No evidence of inguinal hernia.    Extremities: No clubbing, cyanosis, or edema    PVR by bladder scan 0 ml    LABS:    UA 1.010, pH 5, otherwise, negative    IMPRESSION:    Mixed incontinence  Post op state    PLAN:    1.  Consented for another injection Bulkamid.  Plan to do after 4 weeks from the last injection to give the urethral lining a chance to  strengthen.    2.  If this next one does not improve her symptoms markedly, consider sling.

## 2024-07-22 ENCOUNTER — PATIENT MESSAGE (OUTPATIENT)
Dept: PODIATRY | Facility: CLINIC | Age: 39
End: 2024-07-22
Payer: MEDICARE

## 2024-07-23 ENCOUNTER — TELEPHONE (OUTPATIENT)
Dept: UROLOGY | Facility: HOSPITAL | Age: 39
End: 2024-07-23
Payer: MEDICARE

## 2024-07-23 NOTE — TELEPHONE ENCOUNTER
Called the patient and confirmed about the Farxiga. The recommendation is to hold it for 3 days prior to surgery.  The risk is DKA.    I will reschedule her for 1 week.    Told her not to come in tomorrow.  This works out for her schedule.

## 2024-07-23 NOTE — TELEPHONE ENCOUNTER
----- Message from Alana Banuelos LPN sent at 7/23/2024 11:10 AM CDT -----  Regarding: FW: Farxiga  My apologies, patient reports last dose was 7/22.  ----- Message -----  From: Alana Banuelos LPN  Sent: 7/23/2024  11:07 AM CDT  To: Zena Tee MD; Randal Freitas Staff  Subject: Farxiga                                          Patient reports her last dose of Farxiga was 7/21. Spoke with anesthesia who ok'd patient to continue with procedure from an anesthesia standpoint.

## 2024-07-24 ENCOUNTER — TELEPHONE (OUTPATIENT)
Dept: UROLOGY | Facility: CLINIC | Age: 39
End: 2024-07-24
Payer: MEDICARE

## 2024-07-24 NOTE — TELEPHONE ENCOUNTER
7/24/24 called patient again this afternoon to reschedule patients surgical procedure once again no answer left voicemail for patient to call back.

## 2024-07-24 NOTE — TELEPHONE ENCOUNTER
7/24/24 @ 10:10 am called patient to reschedule surgical procedure with Dr. Tee no answer left voicemail for patient to call back.

## 2024-07-25 ENCOUNTER — OFFICE VISIT (OUTPATIENT)
Dept: PODIATRY | Facility: CLINIC | Age: 39
End: 2024-07-25
Payer: MEDICARE

## 2024-07-25 VITALS
HEART RATE: 84 BPM | WEIGHT: 213.88 LBS | DIASTOLIC BLOOD PRESSURE: 75 MMHG | SYSTOLIC BLOOD PRESSURE: 119 MMHG | HEIGHT: 62 IN | BODY MASS INDEX: 39.36 KG/M2

## 2024-07-25 DIAGNOSIS — L84 CORN OR CALLUS: ICD-10-CM

## 2024-07-25 DIAGNOSIS — E11.42 DIABETIC POLYNEUROPATHY ASSOCIATED WITH TYPE 2 DIABETES MELLITUS: Primary | ICD-10-CM

## 2024-07-25 DIAGNOSIS — B35.1 ONYCHOMYCOSIS DUE TO DERMATOPHYTE: ICD-10-CM

## 2024-07-25 PROCEDURE — 99999 PR PBB SHADOW E&M-EST. PATIENT-LVL IV: CPT | Mod: PBBFAC,HCNC,, | Performed by: PODIATRIST

## 2024-07-25 NOTE — PROGRESS NOTES
Subjective:      Patient ID: Maria Ines Ac is a 39 y.o. female.    Chief Complaint: Diabetic Foot Exam (4/19/24 - Luann Raymond MD, PCP/) and Foot Pain (B/L, stabbing pain that is constant since March)    Maria Ines is a 39 y.o. female who presents to the clinic for evaluation and treatment of high risk feet. Maria Ines has a past medical history of Blood in stool, Constipation, Cystitis, Depression, Diabetes mellitus, type 2, Dysphagia, Endometriosis, GERD (gastroesophageal reflux disease), Headache, Hypertension, Palpitations, Premature menopause on hormone replacement therapy, Thyroid disease, and Weakness (05/31/2019). The patient's chief complaint is long, thick toenails.     This patient has documented high risk feet requiring routine maintenance secondary to peripheral neuropathy.    PCP: Luann Raymond MD    Date Last Seen by PCP:   Chief Complaint   Patient presents with    Diabetic Foot Exam     4/19/24 - Luann Raymond MD, PCP      Foot Pain     B/L, stabbing pain that is constant since March       Current shoe gear:  Affected Foot: Tennis shoes      Unaffected Foot: Tennis shoes    Hemoglobin A1C   Date Value Ref Range Status   03/15/2024 5.6 4.0 - 5.6 % Final     Comment:     ADA Screening Guidelines:  5.7-6.4%  Consistent with prediabetes  >or=6.5%  Consistent with diabetes    High levels of fetal hemoglobin interfere with the HbA1C  assay. Heterozygous hemoglobin variants (HbS, HgC, etc)do  not significantly interfere with this assay.   However, presence of multiple variants may affect accuracy.     11/14/2023 5.7 (H) 4.0 - 5.6 % Final     Comment:     ADA Screening Guidelines:  5.7-6.4%  Consistent with prediabetes  >or=6.5%  Consistent with diabetes    High levels of fetal hemoglobin interfere with the HbA1C  assay. Heterozygous hemoglobin variants (HbS, HgC, etc)do  not significantly interfere with this assay.   However, presence of multiple variants may affect accuracy.      07/17/2023 5.1 4.0 - 5.6 % Final     Comment:     ADA Screening Guidelines:  5.7-6.4%  Consistent with prediabetes  >or=6.5%  Consistent with diabetes    High levels of fetal hemoglobin interfere with the HbA1C  assay. Heterozygous hemoglobin variants (HbS, HgC, etc)do  not significantly interfere with this assay.   However, presence of multiple variants may affect accuracy.         Review of Systems   Constitutional: Negative for chills, fever and malaise/fatigue.   HENT:  Negative for hearing loss.    Cardiovascular:  Negative for claudication.   Respiratory:  Negative for shortness of breath.    Skin:  Positive for color change, dry skin and nail changes. Negative for flushing and rash.   Musculoskeletal:  Negative for joint pain and myalgias.   Gastrointestinal:  Negative for nausea and vomiting.   Neurological:  Positive for numbness, paresthesias and sensory change. Negative for loss of balance.   Psychiatric/Behavioral:  Negative for altered mental status.    Allergic/Immunologic: Negative for hives.           Objective:      Physical Exam  Vitals reviewed.   Cardiovascular:      Pulses:           Dorsalis pedis pulses are 2+ on the right side and 2+ on the left side.        Posterior tibial pulses are 2+ on the right side and 2+ on the left side.      Comments: No edema noted to b/L LEs  Musculoskeletal:      Comments: Adequate joint ROM noted to all lower extremity muscle groups with no pain or crepitation noted. Muscle strength is 5/5 in all groups bilaterally.     Feet:      Right foot:      Protective Sensation: 5 sites tested.  0 sites sensed.      Left foot:      Protective Sensation: 5 sites tested.  0 sites sensed.   Skin:     General: Skin is warm and dry.      Capillary Refill: Capillary refill takes 2 to 3 seconds.      Comments: Normal skin tugor noted.   No open lesion noted b/L  Skin temp is warm to warm from proximal to distal b/L.  Webspaces clean, dry, and intact  Xerosis Bilaterally. No  open lesions noted.   HKL noted to left 5th digit x2   Neurological:      Mental Status: She is alert and oriented to person, place, and time.      Comments: Intact gross sensation noted to b/L LEs         Nails x10 are elongated by  6-7mm's, thickened by 1-2 mm's, dystrophic, and are yellowish in  coloration . Xerosis Bilaterally. No open lesions noted.             Assessment:       Encounter Diagnoses   Name Primary?    Diabetic polyneuropathy associated with type 2 diabetes mellitus Yes    Onychomycosis due to dermatophyte     Corn or callus          Plan:       Maria Ines was seen today for diabetic foot exam and foot pain.    Diagnoses and all orders for this visit:    Diabetic polyneuropathy associated with type 2 diabetes mellitus    Onychomycosis due to dermatophyte    Corn or callus      I counseled the patient on her conditions, their implications and medical management.      - Shoe inspection. Diabetic Foot Education. Patient reminded of the importance of good nutrition and blood sugar control to help prevent podiatric complications of diabetes. Patient instructed on proper foot hygeine. We discussed wearing proper shoe gear, daily foot inspections, never walking without protective shoe gear, never putting sharp instruments to feet, routine podiatric nail visits every 2-3 months.      After cleansing with an alcohol prep pad, the about mentioned hyperkeratotic lesions were sharply debrided X 2 utilizing a #15 blade to a smooth base without incident. Pt tolerated the procedure well and reported comfort to the debarment sites. Pt will continue to use padding and moisture the callused areas.    - With patient's permission, nails were aggressively reduced and debrided x 10 to their soft tissue attachment mechanically and with electric , removing all offending nail and debris. Patient relates relief following the procedure. She will continue to monitor the areas daily, inspect her feet, wear protective  shoe gear when ambulatory, moisturizer to maintain skin integrity and follow in this office in approximately 2-3 months, sooner p.r.n.

## 2024-07-29 ENCOUNTER — TELEPHONE (OUTPATIENT)
Dept: UROLOGY | Facility: CLINIC | Age: 39
End: 2024-07-29
Payer: MEDICARE

## 2024-07-29 DIAGNOSIS — N39.46 MIXED INCONTINENCE: Primary | ICD-10-CM

## 2024-07-29 NOTE — TELEPHONE ENCOUNTER
7/29/24 @ 2:25 pm spoke to Ms. Spann about rescheduling surgical procedure with Dr. Tee patients stated the August 7th date is good to go and she will be scheduled for that date.

## 2024-07-31 DIAGNOSIS — E11.9 TYPE 2 DIABETES MELLITUS WITHOUT COMPLICATION, UNSPECIFIED WHETHER LONG TERM INSULIN USE: ICD-10-CM

## 2024-08-01 NOTE — PRE-PROCEDURE INSTRUCTIONS
Contacted patient with medication instructions. Patient verb understanding to hold farxiga for 3 days, trulicity and meloxicam for 7 days prior to procedure.

## 2024-08-06 NOTE — PRE-PROCEDURE INSTRUCTIONS
Patient was informed of pre-procedure instructions and arrival time. Pt verbalized understanding and is to be accompanied by mother.    Patient denies taking any over-the-counter vitamins, supplements, nsaids or aspirin products for 7 days.    Patient denies taking GLP-1 injection for 7 days.     Patient reports last dose of Farxiga was 7/24/2024.    Dear Maria Ines ,     You are scheduled for a procedure with Dr. Tee on 8/7/2024. Your scheduled arrival time is 6:00 am.  This arrival time is roughly 2 hours before your anticipated procedure time to allow sufficient time for pre-op.  Please wear comfortable clothing  This procedure will take place at the [facility] at the corner of [facility] and [facility].  [facility] [place] [facility]. The address is:     [address] [place] [address].  T[place] Haskins [id number]     After entering the building, proceed to the second floor and check in with registration.      Your fasting instructions are as follows:     Nothing to eat after Midnight the evening before your surgery.   You may drink clear liquids up until 2 hours prior to your arrival time.      You MUST have a responsible adult to bring you home.     The evening before and morning of your procedure please hold (do not take) the following medications:  -Aspirin and Aspirin-containing products (Goody's powder, Excedrin)  -NSAIDs (Advil, Ibuprofen, Aleve, Diclofenac)  -Vitamins/Supplements   -Herbal remedies/Teas  -Stimulants (Adderall, Vyvanse, Adipex)  -Diabetic medication (Please bring with you day of procedure)  -Prescription creams/gels/lotions        *May take Tylenol if needed         The evening before and morning of your procedure, take a shower using antibacterial soap (ex: Hibiclens or Dial antibacterial soap). DO NOT apply deodorant, lotion, cologne, or anything else to the skin. Do not wear jewelry or bring any valuables with you.  .     Please do not wear contact  lenses the day of your procedure.   Bring any inhalers that you may need.     If you have any procedure-specific questions, please call your surgeon's office. Any other questions, don't hesitate to call at [phone]     Thanks,  Pre-Admit Testing  Anesthesia Dept [facility] access hospital

## 2024-08-07 ENCOUNTER — HOSPITAL ENCOUNTER (OUTPATIENT)
Facility: HOSPITAL | Age: 39
Discharge: HOME | End: 2024-08-07
Attending: UROLOGY | Admitting: UROLOGY
Payer: MEDICARE

## 2024-08-07 ENCOUNTER — ANESTHESIA (OUTPATIENT)
Dept: SURGERY | Facility: HOSPITAL | Age: 39
End: 2024-08-07
Payer: MEDICARE

## 2024-08-07 ENCOUNTER — ANESTHESIA EVENT (OUTPATIENT)
Dept: SURGERY | Facility: HOSPITAL | Age: 39
End: 2024-08-07
Payer: MEDICARE

## 2024-08-07 VITALS
RESPIRATION RATE: 16 BRPM | HEART RATE: 77 BPM | SYSTOLIC BLOOD PRESSURE: 120 MMHG | DIASTOLIC BLOOD PRESSURE: 68 MMHG | TEMPERATURE: 98 F | OXYGEN SATURATION: 96 %

## 2024-08-07 DIAGNOSIS — N39.3 SUI (STRESS URINARY INCONTINENCE, FEMALE): Primary | ICD-10-CM

## 2024-08-07 LAB
B-HCG UR QL: NEGATIVE
CTP QC/QA: YES
POCT GLUCOSE: 104 MG/DL (ref 70–110)

## 2024-08-07 PROCEDURE — 37000008 HC ANESTHESIA 1ST 15 MINUTES: Performed by: UROLOGY

## 2024-08-07 PROCEDURE — 37000009 HC ANESTHESIA EA ADD 15 MINS: Performed by: UROLOGY

## 2024-08-07 PROCEDURE — 25000003 PHARM REV CODE 250: Performed by: NURSE ANESTHETIST, CERTIFIED REGISTERED

## 2024-08-07 PROCEDURE — 82962 GLUCOSE BLOOD TEST: CPT | Performed by: UROLOGY

## 2024-08-07 PROCEDURE — 99900035 HC TECH TIME PER 15 MIN (STAT)

## 2024-08-07 PROCEDURE — L8606 SYNTHETIC IMPLNT URINARY 1ML: HCPCS | Performed by: UROLOGY

## 2024-08-07 PROCEDURE — 51715 ENDOSCOPIC INJECTION/IMPLANT: CPT | Mod: ,,, | Performed by: UROLOGY

## 2024-08-07 PROCEDURE — 36000706: Performed by: UROLOGY

## 2024-08-07 PROCEDURE — 81025 URINE PREGNANCY TEST: CPT | Performed by: UROLOGY

## 2024-08-07 PROCEDURE — 36000707: Performed by: UROLOGY

## 2024-08-07 PROCEDURE — 25000003 PHARM REV CODE 250

## 2024-08-07 PROCEDURE — 71000033 HC RECOVERY, INTIAL HOUR: Performed by: UROLOGY

## 2024-08-07 PROCEDURE — 63600175 PHARM REV CODE 636 W HCPCS: Performed by: NURSE ANESTHETIST, CERTIFIED REGISTERED

## 2024-08-07 PROCEDURE — 71000015 HC POSTOP RECOV 1ST HR: Performed by: UROLOGY

## 2024-08-07 PROCEDURE — 94761 N-INVAS EAR/PLS OXIMETRY MLT: CPT

## 2024-08-07 DEVICE — SYS BULKAMID URETH BULKING 1ML: Type: IMPLANTABLE DEVICE | Site: URETHRA | Status: FUNCTIONAL

## 2024-08-07 RX ORDER — HYDROMORPHONE HYDROCHLORIDE 1 MG/ML
0.2 INJECTION, SOLUTION INTRAMUSCULAR; INTRAVENOUS; SUBCUTANEOUS EVERY 5 MIN PRN
Status: DISCONTINUED | OUTPATIENT
Start: 2024-08-07 | End: 2024-08-07 | Stop reason: HOSPADM

## 2024-08-07 RX ORDER — FENTANYL CITRATE 50 UG/ML
INJECTION, SOLUTION INTRAMUSCULAR; INTRAVENOUS
Status: DISCONTINUED | OUTPATIENT
Start: 2024-08-07 | End: 2024-08-07

## 2024-08-07 RX ORDER — SODIUM CHLORIDE 9 MG/ML
INJECTION, SOLUTION INTRAVENOUS CONTINUOUS
Status: DISCONTINUED | OUTPATIENT
Start: 2024-08-07 | End: 2024-08-07 | Stop reason: HOSPADM

## 2024-08-07 RX ORDER — CEFAZOLIN SODIUM 1 G/3ML
INJECTION, POWDER, FOR SOLUTION INTRAMUSCULAR; INTRAVENOUS
Status: DISCONTINUED | OUTPATIENT
Start: 2024-08-07 | End: 2024-08-07

## 2024-08-07 RX ORDER — ONDANSETRON HYDROCHLORIDE 2 MG/ML
4 INJECTION, SOLUTION INTRAVENOUS DAILY PRN
Status: DISCONTINUED | OUTPATIENT
Start: 2024-08-07 | End: 2024-08-07 | Stop reason: HOSPADM

## 2024-08-07 RX ORDER — LIDOCAINE HYDROCHLORIDE 20 MG/ML
INJECTION INTRAVENOUS
Status: DISCONTINUED | OUTPATIENT
Start: 2024-08-07 | End: 2024-08-07

## 2024-08-07 RX ORDER — LIDOCAINE HYDROCHLORIDE 10 MG/ML
1 INJECTION, SOLUTION EPIDURAL; INFILTRATION; INTRACAUDAL; PERINEURAL ONCE AS NEEDED
Status: DISCONTINUED | OUTPATIENT
Start: 2024-08-07 | End: 2024-08-07 | Stop reason: HOSPADM

## 2024-08-07 RX ORDER — OXYCODONE AND ACETAMINOPHEN 5; 325 MG/1; MG/1
1 TABLET ORAL
Status: DISCONTINUED | OUTPATIENT
Start: 2024-08-07 | End: 2024-08-07 | Stop reason: HOSPADM

## 2024-08-07 RX ORDER — PROPOFOL 10 MG/ML
VIAL (ML) INTRAVENOUS CONTINUOUS PRN
Status: DISCONTINUED | OUTPATIENT
Start: 2024-08-07 | End: 2024-08-07

## 2024-08-07 RX ORDER — PROCHLORPERAZINE EDISYLATE 5 MG/ML
5 INJECTION INTRAMUSCULAR; INTRAVENOUS EVERY 30 MIN PRN
Status: DISCONTINUED | OUTPATIENT
Start: 2024-08-07 | End: 2024-08-07 | Stop reason: HOSPADM

## 2024-08-07 RX ORDER — FENTANYL CITRATE 50 UG/ML
25 INJECTION, SOLUTION INTRAMUSCULAR; INTRAVENOUS EVERY 5 MIN PRN
Status: DISCONTINUED | OUTPATIENT
Start: 2024-08-07 | End: 2024-08-07 | Stop reason: HOSPADM

## 2024-08-07 RX ORDER — MIDAZOLAM HYDROCHLORIDE 1 MG/ML
INJECTION, SOLUTION INTRAMUSCULAR; INTRAVENOUS
Status: DISCONTINUED | OUTPATIENT
Start: 2024-08-07 | End: 2024-08-07

## 2024-08-07 RX ORDER — ACETAMINOPHEN 10 MG/ML
INJECTION, SOLUTION INTRAVENOUS
Status: DISCONTINUED | OUTPATIENT
Start: 2024-08-07 | End: 2024-08-07

## 2024-08-07 RX ORDER — DEXMEDETOMIDINE HYDROCHLORIDE 100 UG/ML
INJECTION, SOLUTION INTRAVENOUS
Status: DISCONTINUED | OUTPATIENT
Start: 2024-08-07 | End: 2024-08-07

## 2024-08-07 RX ORDER — GLUCAGON 1 MG
1 KIT INJECTION
Status: DISCONTINUED | OUTPATIENT
Start: 2024-08-07 | End: 2024-08-07 | Stop reason: HOSPADM

## 2024-08-07 RX ADMIN — DEXMEDETOMIDINE HYDROCHLORIDE 12 MCG: 100 INJECTION, SOLUTION INTRAVENOUS at 08:08

## 2024-08-07 RX ADMIN — FENTANYL CITRATE 25 MCG: 50 INJECTION, SOLUTION INTRAMUSCULAR; INTRAVENOUS at 08:08

## 2024-08-07 RX ADMIN — SODIUM CHLORIDE: 0.9 INJECTION, SOLUTION INTRAVENOUS at 07:08

## 2024-08-07 RX ADMIN — CEFAZOLIN 2 G: 330 INJECTION, POWDER, FOR SOLUTION INTRAMUSCULAR; INTRAVENOUS at 08:08

## 2024-08-07 RX ADMIN — MIDAZOLAM HYDROCHLORIDE 2 MG: 1 INJECTION, SOLUTION INTRAMUSCULAR; INTRAVENOUS at 08:08

## 2024-08-07 RX ADMIN — ACETAMINOPHEN 1000 MG: 10 INJECTION INTRAVENOUS at 08:08

## 2024-08-07 RX ADMIN — GLYCOPYRROLATE 0.1 MCG: 0.2 INJECTION, SOLUTION INTRAMUSCULAR; INTRAVENOUS at 08:08

## 2024-08-07 RX ADMIN — LIDOCAINE HYDROCHLORIDE 20 MG: 20 INJECTION INTRAVENOUS at 08:08

## 2024-08-07 RX ADMIN — PROPOFOL 150 MCG/KG/MIN: 10 INJECTION, EMULSION INTRAVENOUS at 08:08

## 2024-08-07 NOTE — OP NOTE
[facility]  Operative Note    Date: 08/07/2024    Pre-Op Diagnosis: JACINDA, ISD    Post-Op Diagnosis: same    Procedure(s) Performed:   1.  Cystoscopy with transurethral injection of urethral bulking agent (Bulkamid) 1 cc used    Specimen(s): none    IV Fluids:  300 ml crystalloid    Pre Op Abx:  2 g Ancef IV     Staff Surgeon: Zena Tee MD    Assistant Surgeon: Jae Beard MD    Anesthesia: General mask inhalational anesthesia    Indications: Maria Ines Ac is a 39 y.o. female with history of mixed incontinence.  She also has a history of OAB, gastoparesis, DM.  Her urge incontinence is managed with an InterStim, she has had a retropubic sling in the past with Dr. Ventura.  She has had bulking agent x 1, that worked well at first but the efficacy waned.  She returns today for another bulking agent.        Findings: good coaptation of the urethra    Estimated Blood Loss: min    Drains: none    Procedure in Detail: After informed consent was obtained, the patient was taken to the cystoscopy suite and placed in the supine position. SCDs were applied and working. Anesthesia was administered. Once the patient was adequately sedated she was placed in dorsal lithotomy position and prepped and draped in the usual sterile fashion. Preoperative timeout was performed, and preoperative antibiotics were confirmed.    The short 0 degree cystoscope lens in the disposable Bulkamid injection sheath was inserted into the urethra and advanced into the urinary bladder. Formal cystoscopy revealed normal bladder mucosa. The ureteral orifices were in the normal anatomic position bilaterally, effluxing clear urine. The rigid needle was inserted into the scope and the scope was backed out into the urethra. Keeping the needle parallel to the urethra, the needle was inserted into the submucosa. Bulkamid was injected at the 5 and 7 o'clock regions transurethrally. Good coaptation of the urethra was  seen after injection. A total of 1 cc was used.  The scope was removed from the bladder.    The patient tolerated the procedure well and was transferred to recovery in stable condition.     Plan:  The patient will void prior to discharge.  She will follow up with me in 2 weeks.      Zena Tee MD

## 2024-08-07 NOTE — DISCHARGE SUMMARY
[facility]  Discharge Note  Short Stay    Procedure(s) (LRB):  CYSTOSCOPY, WITH PERIURETHRAL BULKING AGENT INJECTION (N/A)      OUTCOME: Patient tolerated treatment/procedure well without complication and is now ready for discharge.    DISPOSITION: Home or Self Care    FINAL DIAGNOSIS:  JACINDA (stress urinary incontinence, female)    FOLLOWUP: In clinic    DISCHARGE INSTRUCTIONS:    Discharge Procedure Orders   No dressing needed     Notify your health care provider if you experience any of the following:  temperature >100.4     Notify your health care provider if you experience any of the following:  persistent nausea and vomiting or diarrhea     Notify your health care provider if you experience any of the following:  severe uncontrolled pain     Notify your health care provider if you experience any of the following:  difficulty breathing or increased cough     Notify your health care provider if you experience any of the following:  severe persistent headache     Notify your health care provider if you experience any of the following:  worsening rash     Notify your health care provider if you experience any of the following:  persistent dizziness, light-headedness, or visual disturbances     Activity as tolerated        TIME SPENT ON DISCHARGE: 15 minutes

## 2024-08-07 NOTE — H&P
CHIEF COMPLAINT:    Mrs. Ac is a 39 y.o. female presenting for a post op visit, following cystoscopy transurethral injection of urethral bulking agent (Bulkamid) on 6/19/2024.    PRESENTING ILLNESS:    Maria Ines Ac is a 39 y.o. female who returns for follow up.  She states she is better but still wearing 4 pads a day.  She does not feel like they are as wet as they used to be but she still has stress incontinence, along with the urge incontinence.       Past Surgical History:   Procedure Laterality Date    24 HOUR IMPEDANCE PH MONITORING OF ESOPHAGUS IN PATIENT TAKING ACID REDUCING MEDICATIONS N/A 6/2/2022    Procedure: IMPEDANCE PH STUDY, ESOPHAGEAL, 24 HOUR, IN PATIENT TAKING ACID REDUCING MEDICATION;  Surgeon: Debbie Butler MD;  [facility]  Service: Endoscopy;  Laterality: N/A;  On PPI/H2 Blocker    BLADDER SUSPENSION      CARPAL TUNNEL RELEASE      right    CHOLECYSTECTOMY      COLONOSCOPY N/A 8/30/2016    Procedure: COLONOSCOPY;  Surgeon: Slick Herron MD;  Location: [facility] ENDO ([facility] F[facility]);  Service: Endoscopy;  Laterality: N/A;  PM prep    COLONOSCOPY N/A 12/22/2021    Procedure: COLONOSCOPY. any CRS;  Surgeon: Maria Ines Jung MD;  Location: [facility] ENDO (4TH F[facility]);  Service: Endoscopy;  Laterality: N/A;  fully vaccinated -  scaral stimulator will bring remote -    12/17 lvm to confirm appt-rb    CYSTOSCOPY WITH INJECTION OF PERIURETHRAL BULKING AGENT N/A 6/19/2024    Procedure: CYSTOSCOPY, WITH PERIURETHRAL BULKING AGENT INJECTION;  Surgeon: Zena Tee MD;  Location: [facility] OR;  Service: Urology;  Laterality: N/A;  45 minutes    CYSTOSCOPY WITH INJECTION OF PERIURETHRAL BULKING AGENT N/A 7/24/2024    Procedure: CYSTOSCOPY, WITH PERIURETHRAL BULKING AGENT INJECTION;  Surgeon: Zena Tee MD;  Location: [facility] OR;  Service: Urology;  Laterality: N/A;  45 minutes    ESOPHAGEAL MANOMETRY WITH MEASUREMENT OF IMPEDANCE N/A 11/5/2018    Procedure: MANOMETRY, ESOPHAGUS,  WITH IMPEDANCE MEASUREMENT;  Surgeon: Slick Herron MD;  Location: [facility] ENDO (4TH F[facility]);  Service: Endoscopy;  Laterality: N/A;    ESOPHAGEAL MANOMETRY WITH MEASUREMENT OF IMPEDANCE N/A 6/2/2022    Procedure: MANOMETRY, ESOPHAGUS, WITH IMPEDANCE MEASUREMENT;  Surgeon: Debbie Butler MD;  Location: [facility] ENDO (4TH F[facility]);  Service: Endoscopy;  Laterality: N/A;  fully vaccinated, bladder stimulator, instr mailed /emailed -ml    ESOPHAGOGASTRODUODENOSCOPY N/A 2/13/2020    Procedure: EGD (ESOPHAGOGASTRODUODENOSCOPY);  Surgeon: Slick Herron MD;  Location: [facility] ENDO (4TH F[facility]);  Service: Endoscopy;  Laterality: N/A;  pt has cardiology appt on 1/6/19-will call back after this appt to schedule-tb    FLUOROSCOPIC URODYNAMIC STUDY N/A 5/23/2019    Procedure: URODYNAMIC STUDY, FLUOROSCOPIC;  Surgeon: Zena Tee MD;  Location: [facility] OR 1ST FLR;  Service: Urology;  Laterality: N/A;  1 hour    GALLBLADDER SURGERY      HYSTERECTOMY  2013    Bess velasquez Dr. Champlain    IMPLANTATION OF PERMANENT SACRAL NERVE STIMULATOR N/A 7/30/2019    Procedure: INSERTION, NEUROSTIMULATOR, PERMANENT, SACRAL;  Surgeon: Zena Tee MD;  Location: [facility] OR 2ND FLR;  Service: Urology;  Laterality: N/A;  30 min    OOPHORECTOMY  2005    LSO- benign cyst    OOPHORECTOMY  2013    RSO- endo    ooptherectomy      REPLACEMENT OF NERVE STIMULATOR BATTERY N/A 2/28/2023    Procedure: Replacement, Battery, Neurostimulator;  Surgeon: Zena Tee MD;  Location: [facility] OR 2ND FLR;  Service: Urology;  Laterality: N/A;  45 min    right knee  scoped    SHOULDER SURGERY  right       Allergies:  Patient has no known allergies.    Medications:  Outpatient Encounter Medications as of 7/2/2024   Medication Sig Dispense Refill    ACCU-CHEK AMAURY PLUS TEST STRP Strp USE TO TEST BLOOD GLUCOSE TID  9    ACCU-CHEK SOFTCLIX LANCETS Misc USE TO TEST BLOOD GLUCOSE TID  3    albuterol (VENTOLIN HFA) 90 mcg/actuation inhaler Inhale 2 puffs into the lungs every  6 (six) hours as needed for Wheezing. Rescue 18 g 0    amitriptyline (ELAVIL) 10 MG tablet Take 1 tablet (10 mg total) by mouth nightly. 30 tablet 11    atorvastatin (LIPITOR) 80 MG tablet Take 80 mg by mouth every evening.      BLOOD SUGAR DIAGNOSTIC (ACCU-CHEK AMAURY PLUS TEST STRP MISC) 1 strip by Misc.(Non-Drug; Combo Route) route 3 (three) times daily.      buPROPion (WELLBUTRIN XL) 150 MG TB24 tablet Take 150 mg by mouth every morning.      ciclopirox (PENLAC) 8 % Soln Apply topically nightly. 6.6 mL 11    dexlansoprazole (DEXILANT) 60 mg capsule TAKE 1 CAPSULE(60 MG) BY MOUTH TWICE DAILY 60 capsule 11    diclofenac sodium (VOLTAREN) 1 % Gel Apply 2 g topically 4 (four) times daily. As needed for breast pain 1 Tube 1    dicyclomine (BENTYL) 10 MG capsule TAKE 2 CAPSULES(20 MG) BY MOUTH THREE TIMES DAILY BEFORE MEALS 180 capsule 0    diltiazem HCl (DILTIAZEM 2% CREAM) Apply topically 3 (three) times daily. Apply topically to anal area. 30 g 2    estradioL (ESTRACE) 0.01 % (0.1 mg/gram) vaginal cream Place 1 g vaginally twice a week. 42.5 g 3    FARXIGA 10 mg Tab Take 10 mg by mouth Daily.  7    fluticasone propionate (FLONASE) 50 mcg/actuation nasal spray SHAKE LIQUID AND USE 2 SPRAYS(100 MCG) IN EACH NOSTRIL EVERY DAY 48 g 2    fluticasone-salmeterol diskus inhaler 250-50 mcg Inhale 1 puff into the lungs 2 (two) times daily. Controller 60 each 11    gabapentin (NEURONTIN) 300 MG capsule Take 600 mg (two capsules) in the morning and 900mg (three capsules) at night before bed 150 capsule 11    GLUCOPHAGE 500 mg tablet Take 500 mg by mouth 2 (two) times daily with meals.       levothyroxine (SYNTHROID) 75 MCG tablet Take 75 mcg by mouth before breakfast.      meloxicam (MOBIC) 15 MG tablet Take 15 mg by mouth once daily.      methen-m.blue-s.phos-phsal-hyo (URIBEL) 118-10-40.8-36 mg Cap Take 1 capsule by mouth every 6 (six) hours as needed (bladder pain). 120 capsule 11    metoprolol succinate (TOPROL-XL) 100 MG  24 hr tablet Take 1 tablet (100 mg total) by mouth once daily. 90 tablet 3    mirabegron (MYRBETRIQ) 50 mg Tb24 Take 1 tablet (50 mg total) by mouth once daily. 30 tablet 11    mometasone 0.1% (ELOCON) 0.1 % cream APPLY TOPICALLY TO THE RASH ON HANDS TWICE DAILY AS NEEDED FOR TWO TO THREE WEEKS. USE SPARINGLY      omega-3 fatty acids/fish oil (FISH OIL-OMEGA-3 FATTY ACIDS) 300-1,000 mg capsule Take 1 capsule by mouth once daily. 90 capsule 3    oxyCODONE (ROXICODONE) 5 MG immediate release tablet Take 1 tablet (5 mg total) by mouth every 4 (four) hours as needed for Pain. 5 tablet 0    phentermine (ADIPEX-P) 37.5 mg tablet Take 37.5 mg by mouth.      phentermine 37.5 MG capsule TK 1 C PO ONCE D IN THE MORNING      REXULTI 4 mg Tab Take 1 tablet by mouth every evening.      [] sulfamethoxazole-trimethoprim 800-160mg (BACTRIM DS) 800-160 mg Tab Take 1 tablet by mouth 2 (two) times daily. for 3 days 6 tablet 0    topiramate (TOPAMAX) 50 MG tablet Take 2 tablets (100 mg total) by mouth every evening. 60 tablet 11    traZODone (DESYREL) 100 MG tablet TK 1 T PO HS  1    triamcinolone acetonide 0.5% (KENALOG) 0.5 % Crea Apply topically 2 (two) times daily. 15 g 0    TRULICITY 4.5 mg/0.5 mL pen injector Inject 4.5 mg into the skin every 7 days.      ZOLOFT 100 mg tablet Take 200 mg by mouth once daily.        No facility-administered encounter medications on file as of 2024.         PHYSICAL EXAMINATION:    The patient generally appears in good health, is appropriately interactive, and is in no apparent distress.    Skin: No lesions.    Mental: Cooperative with normal affect.    Neuro: Grossly intact.    HEENT: Normal. No evidence of lymphadenopathy.    Chest:  normal inspiratory effort.    Abdomen:  Soft, non-tender. No masses or organomegaly. Bladder is not palpable. No evidence of flank discomfort. No evidence of inguinal hernia.    Extremities: No clubbing, cyanosis, or edema    PVR by bladder scan 0  ml    LABS:    UA 1.010, pH 5, otherwise, negative    IMPRESSION:    Mixed incontinence  Post op state    PLAN:    1.  Consented for another injection Bulkamid.        Patient seen in holding.  No changes in clinical condition.  Proceed with planned procedure.

## 2024-08-07 NOTE — DISCHARGE INSTRUCTIONS
Post Cystoscopy Instructions  Do not strain to have a bowel movement  No strenuous exercise x 7 days  No driving while you are on narcotic pain medications or if your cheng  catheter is in place    You can expect:  To pass stone fragments if you had a stone procedure  Have pain when you void from your stent if you have a stent in place  See blood in your urine if you have a stent in place    If you have a catheter, please return to the ER if your catheter stops draining or you are having abdominal pain.    Call the doctor if:  Temperature is greater than 101F  Persistent vomiting and inability to keep food down  Inability to void if you do not have a catheter

## 2024-08-07 NOTE — PLAN OF CARE
Pt in preop bay 40, VSS,and IV inserted. Pt denies any open wounds on body or the use of any weight loss injections. Pt needs updated H&P, otherwise ready to roll.    Procedural consents verified with pt.

## 2024-08-12 ENCOUNTER — OFFICE VISIT (OUTPATIENT)
Dept: UROLOGY | Facility: CLINIC | Age: 39
End: 2024-08-12
Payer: MEDICARE

## 2024-08-12 VITALS
DIASTOLIC BLOOD PRESSURE: 73 MMHG | HEART RATE: 78 BPM | WEIGHT: 212.94 LBS | BODY MASS INDEX: 38.95 KG/M2 | SYSTOLIC BLOOD PRESSURE: 107 MMHG

## 2024-08-12 DIAGNOSIS — N39.46 MIXED INCONTINENCE URGE AND STRESS: Primary | ICD-10-CM

## 2024-08-12 PROCEDURE — 99024 POSTOP FOLLOW-UP VISIT: CPT | Mod: S$GLB,,, | Performed by: UROLOGY

## 2024-08-12 PROCEDURE — 3072F LOW RISK FOR RETINOPATHY: CPT | Mod: CPTII,S$GLB,, | Performed by: UROLOGY

## 2024-08-12 PROCEDURE — 51798 US URINE CAPACITY MEASURE: CPT | Mod: S$GLB,,, | Performed by: UROLOGY

## 2024-08-12 PROCEDURE — 1159F MED LIST DOCD IN RCRD: CPT | Mod: CPTII,S$GLB,, | Performed by: UROLOGY

## 2024-08-12 PROCEDURE — 3044F HG A1C LEVEL LT 7.0%: CPT | Mod: CPTII,S$GLB,, | Performed by: UROLOGY

## 2024-08-12 PROCEDURE — 81002 URINALYSIS NONAUTO W/O SCOPE: CPT | Mod: S$GLB,,, | Performed by: UROLOGY

## 2024-08-12 PROCEDURE — 3074F SYST BP LT 130 MM HG: CPT | Mod: CPTII,S$GLB,, | Performed by: UROLOGY

## 2024-08-12 PROCEDURE — 99999 PR PBB SHADOW E&M-EST. PATIENT-LVL IV: CPT | Mod: PBBFAC,,, | Performed by: UROLOGY

## 2024-08-12 PROCEDURE — 3078F DIAST BP <80 MM HG: CPT | Mod: CPTII,S$GLB,, | Performed by: UROLOGY

## 2024-08-12 RX ORDER — LIDOCAINE HYDROCHLORIDE 20 MG/ML
JELLY TOPICAL ONCE
OUTPATIENT
Start: 2024-08-12 | End: 2024-08-12

## 2024-08-12 RX ORDER — DOXYCYCLINE HYCLATE 100 MG
100 TABLET ORAL ONCE
OUTPATIENT
Start: 2024-08-12 | End: 2024-08-12

## 2024-08-12 NOTE — PROGRESS NOTES
CHIEF COMPLAINT:    Mrs. Ac is a 39 y.o. female presenting for a post op visit, following cystoscopy, injection urethral bulking agent  on 8/7/2024.    PRESENTING ILLNESS:    Maria Ines Ac is a 39 y.o. female who returns for follow up.  She states that she does not see an improvement of symptoms.  She still has urinary incontinence.  She is still going through 4 pads a day.      Past surgical history:    1/23/2017 retropubic sling placed by Dr. Ventura   7/2/2019  Stage I SNM (InterStim)with me for incomplete bladder emptying  7/30/2019 Stage II SNM (InterStim)same.  2/28/2023 replaced IPG for InterStim  6/19/2024 cystoscopy with urethral bulking agent worked at first, but then had incontinence   8/7/2024 cystoscopy with urethral bulking agent.  No improvement        Past Surgical History:   Procedure Laterality Date    24 HOUR IMPEDANCE PH MONITORING OF ESOPHAGUS IN PATIENT TAKING ACID REDUCING MEDICATIONS N/A 6/2/2022    Procedure: IMPEDANCE PH STUDY, ESOPHAGEAL, 24 HOUR, IN PATIENT TAKING ACID REDUCING MEDICATION;  Surgeon: Debbie Butler MD;  Location: [facility] ([address] [place] [address]);  Service: Endoscopy;  Laterality: N/A;  On PPI/H2 Blocker    BLADDER SUSPENSION      CARPAL TUNNEL RELEASE      right    CHOLECYSTECTOMY      COLONOSCOPY N/A 8/30/2016    Procedure: COLONOSCOPY;  Surgeon: Slick Herron MD;  [facility]  Service: Endoscopy;  Laterality: N/A;  PM prep    COLONOSCOPY N/A 12/22/2021    Procedure: COLONOSCOPY. any CRS;  Surgeon: Maria Ines Jung MD;  [facility]  Service: Endoscopy;  Laterality: N/A;  fully vaccinated -  scaral stimulator will bring remote -    12/17 lvm to confirm appt-rb    CYSTOSCOPY WITH INJECTION OF PERIURETHRAL BULKING AGENT N/A 6/19/2024    Procedure: CYSTOSCOPY, WITH PERIURETHRAL BULKING AGENT INJECTION;  Surgeon: Zena Tee MD;  Location: [facility] OR;  Service: Urology;  Laterality: N/A;  45 minutes    CYSTOSCOPY WITH INJECTION  OF PERIURETHRAL BULKING AGENT N/A 7/24/2024    Procedure: CYSTOSCOPY, WITH PERIURETHRAL BULKING AGENT INJECTION;  Surgeon: Zena Tee MD;  Location: [facility] OR;  Service: Urology;  Laterality: N/A;  45 minutes    CYSTOSCOPY WITH INJECTION OF PERIURETHRAL BULKING AGENT N/A 8/7/2024    Procedure: CYSTOSCOPY, WITH PERIURETHRAL BULKING AGENT INJECTION;  Surgeon: Zena Tee MD;  Location: [facility] OR;  Service: Urology;  Laterality: N/A;  45 minutes    ESOPHAGEAL MANOMETRY WITH MEASUREMENT OF IMPEDANCE N/A 11/5/2018    Procedure: MANOMETRY, ESOPHAGUS, WITH IMPEDANCE MEASUREMENT;  Surgeon: Slick Herron MD;  Location: [facility] ENDO (4TH F[facility]);  Service: Endoscopy;  Laterality: N/A;    ESOPHAGEAL MANOMETRY WITH MEASUREMENT OF IMPEDANCE N/A 6/2/2022    Procedure: MANOMETRY, ESOPHAGUS, WITH IMPEDANCE MEASUREMENT;  Surgeon: Debbie Butler MD;  Location: [facility] ENDO (4TH F[facility]);  Service: Endoscopy;  Laterality: N/A;  fully vaccinated, bladder stimulator, instr mailed /emailed -ml    ESOPHAGOGASTRODUODENOSCOPY N/A 2/13/2020    Procedure: EGD (ESOPHAGOGASTRODUODENOSCOPY);  Surgeon: Slick Herron MD;  Location: [facility] (4TH F[facility]);  Service: Endoscopy;  Laterality: N/A;  pt has cardiology appt on 1/6/19-will call back after this appt to schedule-tb    FLUOROSCOPIC URODYNAMIC STUDY N/A 5/23/2019    Procedure: URODYNAMIC STUDY, FLUOROSCOPIC;  Surgeon: Zena Tee MD;  Location: [facility] 1ST FLR;  Service: Urology;  Laterality: N/A;  1 hour    GALLBLADDER SURGERY      HYSTERECTOMY  2013    Bess velasquez Dr. Champlain    IMPLANTATION OF PERMANENT SACRAL NERVE STIMULATOR N/A 7/30/2019    Procedure: INSERTION, NEUROSTIMULATOR, PERMANENT, SACRAL;  Surgeon: Zena Tee MD;  Location: [facility] 2ND FLR;  Service: Urology;  Laterality: N/A;  30 min    OOPHORECTOMY  2005    LSO- benign cyst    OOPHORECTOMY  2013    RSO- endo    ooptherectomy      REPLACEMENT OF NERVE STIMULATOR BATTERY N/A 2/28/2023    Procedure:  Replacement, Battery, Neurostimulator;  Surgeon: Zena Tee MD;  Location: [facility] OR [address] [place] [address];  Service: Urology;  Laterality: N/A;  45 min    right knee  scoped    SHOULDER SURGERY  right       Allergies:  Patient has no known allergies.    Medications:  Outpatient Encounter Medications as of 8/12/2024   Medication Sig Dispense Refill    ACCU-CHEK AMAURY PLUS TEST STRP Strp USE TO TEST BLOOD GLUCOSE TID  9    ACCU-CHEK SOFTCLIX LANCETS Misc USE TO TEST BLOOD GLUCOSE TID  3    albuterol (VENTOLIN HFA) 90 mcg/actuation inhaler Inhale 2 puffs into the lungs every 6 (six) hours as needed for Wheezing. Rescue 18 g 0    amitriptyline (ELAVIL) 10 MG tablet Take 1 tablet (10 mg total) by mouth nightly. 30 tablet 11    atorvastatin (LIPITOR) 80 MG tablet Take 80 mg by mouth every evening.      BLOOD SUGAR DIAGNOSTIC (ACCU-CHEK AMAURY PLUS TEST STRP MISC) 1 strip by Misc.(Non-Drug; Combo Route) route 3 (three) times daily.      buPROPion (WELLBUTRIN XL) 150 MG TB24 tablet Take 150 mg by mouth every morning.      ciclopirox (PENLAC) 8 % Soln Apply topically nightly. 6.6 mL 11    dexlansoprazole (DEXILANT) 60 mg capsule TAKE 1 CAPSULE(60 MG) BY MOUTH TWICE DAILY 60 capsule 11    diclofenac sodium (VOLTAREN) 1 % Gel Apply 2 g topically 4 (four) times daily. As needed for breast pain 1 Tube 1    dicyclomine (BENTYL) 10 MG capsule TAKE 2 CAPSULES(20 MG) BY MOUTH THREE TIMES DAILY BEFORE MEALS 180 capsule 0    diltiazem HCl (DILTIAZEM 2% CREAM) Apply topically 3 (three) times daily. Apply topically to anal area. 30 g 2    estradioL (ESTRACE) 0.01 % (0.1 mg/gram) vaginal cream Place 1 g vaginally twice a week. 42.5 g 3    FARXIGA 10 mg Tab Take 10 mg by mouth Daily.  7    fluticasone propionate (FLONASE) 50 mcg/actuation nasal spray SHAKE LIQUID AND USE 2 SPRAYS(100 MCG) IN EACH NOSTRIL EVERY DAY 48 g 2    fluticasone-salmeterol diskus inhaler 250-50 mcg Inhale 1 puff into the lungs 2 (two) times daily. Controller 60 each 11     gabapentin (NEURONTIN) 300 MG capsule Take 600 mg (two capsules) in the morning and 900mg (three capsules) at night before bed 150 capsule 11    GLUCOPHAGE 500 mg tablet Take 500 mg by mouth 2 (two) times daily with meals.       levothyroxine (SYNTHROID) 75 MCG tablet Take 75 mcg by mouth before breakfast.      meloxicam (MOBIC) 15 MG tablet Take 15 mg by mouth once daily.      methen-m.blue-s.phos-phsal-hyo (URIBEL) 118-10-40.8-36 mg Cap Take 1 capsule by mouth every 6 (six) hours as needed (bladder pain). 120 capsule 11    metoprolol succinate (TOPROL-XL) 100 MG 24 hr tablet Take 1 tablet (100 mg total) by mouth once daily. 90 tablet 3    mirabegron (MYRBETRIQ) 50 mg Tb24 Take 1 tablet (50 mg total) by mouth once daily. 30 tablet 11    mometasone 0.1% (ELOCON) 0.1 % cream APPLY TOPICALLY TO THE RASH ON HANDS TWICE DAILY AS NEEDED FOR TWO TO THREE WEEKS. USE SPARINGLY      omega-3 fatty acids/fish oil (FISH OIL-OMEGA-3 FATTY ACIDS) 300-1,000 mg capsule Take 1 capsule by mouth once daily. 90 capsule 3    oxyCODONE (ROXICODONE) 5 MG immediate release tablet Take 1 tablet (5 mg total) by mouth every 4 (four) hours as needed for Pain. 5 tablet 0    phentermine (ADIPEX-P) 37.5 mg tablet Take 37.5 mg by mouth.      phentermine 37.5 MG capsule TK 1 C PO ONCE D IN THE MORNING      REXULTI 4 mg Tab Take 1 tablet by mouth every evening.      topiramate (TOPAMAX) 50 MG tablet Take 2 tablets (100 mg total) by mouth every evening. 60 tablet 11    traZODone (DESYREL) 100 MG tablet TK 1 T PO HS  1    triamcinolone acetonide 0.5% (KENALOG) 0.5 % Crea Apply topically 2 (two) times daily. 15 g 0    TRULICITY 4.5 mg/0.5 mL pen injector Inject 4.5 mg into the skin every 7 days.      ZOLOFT 100 mg tablet Take 200 mg by mouth once daily.        No facility-administered encounter medications on file as of 8/12/2024.         PHYSICAL EXAMINATION:    The patient generally appears in good health, is appropriately interactive, and is in no  apparent distress.    Skin: No lesions.    Mental: Cooperative with normal affect.    Neuro: Grossly intact.    HEENT: Normal. No evidence of lymphadenopathy.    Chest:  normal inspiratory effort.    Extremities: No clubbing, cyanosis, or edema    PVR by bladder scan was 0 ml    LABS:    UA 1.015, pH 6, 1000 glucose (Farxiga) otherwise, negative    IMPRESSION:    Post op state    PLAN:    1.  SUDS to evaluate the incontinence  2.  She did not have the controller with her from [employer].  Will give her another appointment in 1-2 weeks for reprogramming ahead of the SUDS

## 2024-08-14 ENCOUNTER — OFFICE VISIT (OUTPATIENT)
Dept: SLEEP MEDICINE | Facility: CLINIC | Age: 39
End: 2024-08-14
Payer: MEDICARE

## 2024-08-14 VITALS
BODY MASS INDEX: 38.93 KG/M2 | DIASTOLIC BLOOD PRESSURE: 72 MMHG | HEART RATE: 82 BPM | SYSTOLIC BLOOD PRESSURE: 107 MMHG | WEIGHT: 212.88 LBS

## 2024-08-14 DIAGNOSIS — G47.33 OSA (OBSTRUCTIVE SLEEP APNEA): Primary | ICD-10-CM

## 2024-08-14 DIAGNOSIS — Z78.9 INTOLERANCE OF CONTINUOUS POSITIVE AIRWAY PRESSURE (CPAP) VENTILATION: ICD-10-CM

## 2024-08-14 DIAGNOSIS — G47.10 HYPERSOMNOLENCE: ICD-10-CM

## 2024-08-14 DIAGNOSIS — G47.34 SLEEP-RELATED HYPOXIA: ICD-10-CM

## 2024-08-14 PROCEDURE — 3044F HG A1C LEVEL LT 7.0%: CPT | Mod: HCNC,CPTII,S$GLB, | Performed by: PHYSICIAN ASSISTANT

## 2024-08-14 PROCEDURE — 99214 OFFICE O/P EST MOD 30 MIN: CPT | Mod: HCNC,S$GLB,, | Performed by: PHYSICIAN ASSISTANT

## 2024-08-14 PROCEDURE — 99999 PR PBB SHADOW E&M-EST. PATIENT-LVL IV: CPT | Mod: PBBFAC,HCNC,, | Performed by: PHYSICIAN ASSISTANT

## 2024-08-14 PROCEDURE — 3072F LOW RISK FOR RETINOPATHY: CPT | Mod: HCNC,CPTII,S$GLB, | Performed by: PHYSICIAN ASSISTANT

## 2024-08-14 PROCEDURE — 3008F BODY MASS INDEX DOCD: CPT | Mod: HCNC,CPTII,S$GLB, | Performed by: PHYSICIAN ASSISTANT

## 2024-08-14 PROCEDURE — 3074F SYST BP LT 130 MM HG: CPT | Mod: HCNC,CPTII,S$GLB, | Performed by: PHYSICIAN ASSISTANT

## 2024-08-14 PROCEDURE — 1159F MED LIST DOCD IN RCRD: CPT | Mod: HCNC,CPTII,S$GLB, | Performed by: PHYSICIAN ASSISTANT

## 2024-08-14 PROCEDURE — 1160F RVW MEDS BY RX/DR IN RCRD: CPT | Mod: HCNC,CPTII,S$GLB, | Performed by: PHYSICIAN ASSISTANT

## 2024-08-14 PROCEDURE — 3078F DIAST BP <80 MM HG: CPT | Mod: HCNC,CPTII,S$GLB, | Performed by: PHYSICIAN ASSISTANT

## 2024-08-14 NOTE — PROGRESS NOTES
Referred by No ref. provider found     ESTABLISHED PATIENT VISIT    Maria Ines Ac  is a pleasant 39 y.o. female  with PMH significant for HTN, HLD, DM II, polyneuropathy, hypothyroidism, GERD, IBS-D, BPPV, schizoaffective disorder, chronic migraines, BMI 40+, ZAC      Here today for: PAP follow-up     PLAN last visit 2/29/24:   -using and benefiting from CPAP therapy when able to tolerate it  -recommended BiPAP trial due to persistent pressure intolerance on CPAP  -BiPAP and supplies ordered  -needs oximetry on PAP once she is able to tolerate  -discussed ZAC and CPAP with patient in detail, including possible complications of untreated ZAC like heart attack/stroke  -advised on strict driving precautions; advised never to drive drowsy      Since last visit:   Reports still attempting nightly usage of CPAP. Reports having much better time breathing on BiPAP than CPAP, reports feels she can breath more comfortable and pressures are easily tolerate. States current issue is mask fitting/mask leak. States this often prevents her from wearing, or causes her to take off the machine in the middle of the night.    PAP history   Problems    Mask FFM   Pressure Min EPAP 4cwp, Max IPAP 8cwp, PS 4   DME HME   Machine age AirCurve 10 VAuto   Download 8/14/24: 17/30 x 3.9hours, Min EPAP 4cwp, Max IPAP 8cwp, PS 4, leak 10, AHI 0       Past Medical History:   Diagnosis Date    Blood in stool     Constipation     Cystitis     Depression     Diabetes mellitus, type 2     Dysphagia     Endometriosis     GERD (gastroesophageal reflux disease)     Headache     Hypertension     Palpitations     Premature menopause on hormone replacement therapy     Thyroid disease     Weakness 05/31/2019     Patient Active Problem List   Diagnosis    Abdominal pain, RLQ (right lower quadrant)    Dysphagia    Diabetes mellitus, type II    Right hip pain    Intractable chronic migraine without aura and without status migrainosus    Hypertension  associated with diabetes    Palpitations    Mixed incontinence - s/p MUS on 1/23    Dysfunctional voiding of urine    Severe obesity (BMI 35.0-39.9) with comorbidity    Muscle spasm    Mixed stress and urge urinary incontinence    Cervical myofascial pain syndrome    Gastroesophageal reflux disease without esophagitis    Irritable bowel syndrome with diarrhea    Migrainous vertigo    Uncontrolled morning headache    Sleep arousal disorder    Hyperlipidemia associated with type 2 diabetes mellitus    GERD (gastroesophageal reflux disease)    Family history of breast cancer    Family hx of ovarian malignancy    Painful cervical ROM    Incomplete bladder emptying    Bilateral occipital neuralgia    Medication overuse headache    Benign paroxysmal positional vertigo due to bilateral vestibular disorder    Chronic daily headache    Dizziness    Decreased functional mobility    Diabetic polyneuropathy associated with type 2 diabetes mellitus    Snoring    Intractable migraine with aura without status migrainosus    Schizoaffective disorder, depressive type    Hypothyroidism    Decreased range of motion of right ankle    Impaired functional mobility, balance, gait, and endurance    Decreased strength of lower extremity    Chronic pain of right ankle    Post-viral cough syndrome    Gastroparesis    Sleep apnea    JACINDA (stress urinary incontinence, female)       Current Outpatient Medications:     ACCU-CHEK AMAURY PLUS TEST STRP Strp, USE TO TEST BLOOD GLUCOSE TID, Disp: , Rfl: 9    ACCU-CHEK SOFTCLIX LANCETS Misc, USE TO TEST BLOOD GLUCOSE TID, Disp: , Rfl: 3    albuterol (VENTOLIN HFA) 90 mcg/actuation inhaler, Inhale 2 puffs into the lungs every 6 (six) hours as needed for Wheezing. Rescue, Disp: 18 g, Rfl: 0    amitriptyline (ELAVIL) 10 MG tablet, Take 1 tablet (10 mg total) by mouth nightly., Disp: 30 tablet, Rfl: 11    atorvastatin (LIPITOR) 80 MG tablet, Take 80 mg by mouth every evening., Disp: , Rfl:     BLOOD SUGAR  DIAGNOSTIC (ACCU-CHEK AMAURY PLUS TEST STRP MISC), 1 strip by Misc.(Non-Drug; Combo Route) route 3 (three) times daily., Disp: , Rfl:     buPROPion (WELLBUTRIN XL) 150 MG TB24 tablet, Take 150 mg by mouth every morning., Disp: , Rfl:     ciclopirox (PENLAC) 8 % Soln, Apply topically nightly., Disp: 6.6 mL, Rfl: 11    dexlansoprazole (DEXILANT) 60 mg capsule, TAKE 1 CAPSULE(60 MG) BY MOUTH TWICE DAILY, Disp: 60 capsule, Rfl: 11    diclofenac sodium (VOLTAREN) 1 % Gel, Apply 2 g topically 4 (four) times daily. As needed for breast pain, Disp: 1 Tube, Rfl: 1    dicyclomine (BENTYL) 10 MG capsule, TAKE 2 CAPSULES(20 MG) BY MOUTH THREE TIMES DAILY BEFORE MEALS, Disp: 180 capsule, Rfl: 0    diltiazem HCl (DILTIAZEM 2% CREAM), Apply topically 3 (three) times daily. Apply topically to anal area., Disp: 30 g, Rfl: 2    estradioL (ESTRACE) 0.01 % (0.1 mg/gram) vaginal cream, Place 1 g vaginally twice a week., Disp: 42.5 g, Rfl: 3    FARXIGA 10 mg Tab, Take 10 mg by mouth Daily., Disp: , Rfl: 7    fluticasone propionate (FLONASE) 50 mcg/actuation nasal spray, SHAKE LIQUID AND USE 2 SPRAYS(100 MCG) IN EACH NOSTRIL EVERY DAY, Disp: 48 g, Rfl: 2    fluticasone-salmeterol diskus inhaler 250-50 mcg, Inhale 1 puff into the lungs 2 (two) times daily. Controller, Disp: 60 each, Rfl: 11    gabapentin (NEURONTIN) 300 MG capsule, Take 600 mg (two capsules) in the morning and 900mg (three capsules) at night before bed, Disp: 150 capsule, Rfl: 11    GLUCOPHAGE 500 mg tablet, Take 500 mg by mouth 2 (two) times daily with meals. , Disp: , Rfl:     levothyroxine (SYNTHROID) 75 MCG tablet, Take 75 mcg by mouth before breakfast., Disp: , Rfl:     meloxicam (MOBIC) 15 MG tablet, Take 15 mg by mouth once daily., Disp: , Rfl:     methen-m.blue-s.phos-phsal-hyo (URIBEL) 118-10-40.8-36 mg Cap, Take 1 capsule by mouth every 6 (six) hours as needed (bladder pain)., Disp: 120 capsule, Rfl: 11    metoprolol succinate (TOPROL-XL) 100 MG 24 hr tablet, Take  1 tablet (100 mg total) by mouth once daily., Disp: 90 tablet, Rfl: 3    mirabegron (MYRBETRIQ) 50 mg Tb24, Take 1 tablet (50 mg total) by mouth once daily., Disp: 30 tablet, Rfl: 11    mometasone 0.1% (ELOCON) 0.1 % cream, APPLY TOPICALLY TO THE RASH ON HANDS TWICE DAILY AS NEEDED FOR TWO TO THREE WEEKS. USE SPARINGLY, Disp: , Rfl:     oxyCODONE (ROXICODONE) 5 MG immediate release tablet, Take 1 tablet (5 mg total) by mouth every 4 (four) hours as needed for Pain., Disp: 5 tablet, Rfl: 0    phentermine (ADIPEX-P) 37.5 mg tablet, Take 37.5 mg by mouth., Disp: , Rfl:     phentermine 37.5 MG capsule, TK 1 C PO ONCE D IN THE MORNING, Disp: , Rfl:     REXULTI 4 mg Tab, Take 1 tablet by mouth every evening., Disp: , Rfl:     traZODone (DESYREL) 100 MG tablet, TK 1 T PO HS, Disp: , Rfl: 1    triamcinolone acetonide 0.5% (KENALOG) 0.5 % Crea, Apply topically 2 (two) times daily., Disp: 15 g, Rfl: 0    TRULICITY 4.5 mg/0.5 mL pen injector, Inject 4.5 mg into the skin every 7 days., Disp: , Rfl:     ZOLOFT 100 mg tablet, Take 200 mg by mouth once daily. , Disp: , Rfl:     omega-3 fatty acids/fish oil (FISH OIL-OMEGA-3 FATTY ACIDS) 300-1,000 mg capsule, Take 1 capsule by mouth once daily., Disp: 90 capsule, Rfl: 3    topiramate (TOPAMAX) 50 MG tablet, Take 2 tablets (100 mg total) by mouth every evening., Disp: 60 tablet, Rfl: 11       Vitals:    08/14/24 1340   Weight: 96.6 kg (212 lb 13.7 oz)     Physical Exam:    GEN:   Well-appearing  Psych:  Appropriate affect, demonstrates insight  SKIN:  No rash on the face or bridge of the nose      LABS:   Lab Results   Component Value Date    CO2 26 03/15/2024         RECORDS REVIEWED:    PSG 6/14/18: AHI 0  PSG 1/19/21: AHI 0  PSG 3/8/21: AHI 2.2 (REM AHI 9.2)  PSG 11/22/23: AHI 6.5, RAHI 32 vs 3, hypoxemia, possible OHS     Previous sleep notes: 12/10/20, 11/15/23, 1/18/24, 2/29/24    BiPAP interrogation: 8/14/24: 17/30 x 3.9hours, Min EPAP 4cwp, Max IPAP 8cwp, PS 4, leak 10, AHI  0    ASSESSMENT    PROBLEM DESCRIPTION/ Sx on Presentation Interval Hx STATUS   Mild ZAC    + snoring, denies witnessed apneas  + wakes feeling un-refreshed Working on usage, having mask fit/mask leak issues affecting usage Not yet fully controlled   Daytime Sx    + sleepiness when inactive   Naps 3-4 x weekly  ESS 16/24 on intake (reviewed from 12/10/20) Still quite sleepy persists    Insomnia    Trouble falling asleep: difficult (uses phone in bed)  Maintenance:         wakes frequently, return to sleep is quick  Prior hypnotics:        Current hypnotics: trazodone 50mg PRN    Persists persists   Nocturia    x 2-3 per sleep period persists persists   Other issues:    PLAN     -using and benefiting from BiPAP therapy when able to tolerate it  -continue BiPAP nightly  -discussed goals of treatment 7-9hours nightly with usage of >6hrs 100% of nights and minimal usage >4hrs 70% of nights recommended  -BiPAP supplies ordered with mask fitting  -oximetry on PAP to determine if resolution of sleep related hypoxemia achieved on PAP  -discussed ZAC with patient in detail, including possible complications of untreated ZAC like heart attack/stroke  -advised on strict driving precautions; advised never to drive drowsy  -discussed insomnia and sleep hygiene with patient  -recommended CBTi (gold standard), patient is open; will refer to [facility] (please call [phone] to schedule)    Advised on plan of care. Answered all patient questions. Patient verbalized understanding and voiced agreement with plan of care.     RTC 6 months or as needed     The patient was given open opportunity to ask questions and/or express concerns about treatment plan. All questions/concerns were discussed.     Two patient identifiers used prior to evaluation.

## 2024-08-16 ENCOUNTER — PATIENT MESSAGE (OUTPATIENT)
Dept: PSYCHIATRY | Facility: CLINIC | Age: 39
End: 2024-08-16
Payer: MEDICARE

## 2024-08-26 ENCOUNTER — OFFICE VISIT (OUTPATIENT)
Dept: UROLOGY | Facility: CLINIC | Age: 39
End: 2024-08-26
Payer: MEDICARE

## 2024-08-26 VITALS
WEIGHT: 213.38 LBS | BODY MASS INDEX: 39.03 KG/M2 | HEART RATE: 76 BPM | SYSTOLIC BLOOD PRESSURE: 125 MMHG | DIASTOLIC BLOOD PRESSURE: 69 MMHG

## 2024-08-26 DIAGNOSIS — N39.46 MIXED INCONTINENCE URGE AND STRESS: Primary | ICD-10-CM

## 2024-08-26 PROCEDURE — 3008F BODY MASS INDEX DOCD: CPT | Mod: CPTII,S$GLB,, | Performed by: UROLOGY

## 2024-08-26 PROCEDURE — 95971 ALYS SMPL SP/PN NPGT W/PRGRM: CPT | Mod: S$GLB,,, | Performed by: UROLOGY

## 2024-08-26 PROCEDURE — 3074F SYST BP LT 130 MM HG: CPT | Mod: CPTII,S$GLB,, | Performed by: UROLOGY

## 2024-08-26 PROCEDURE — 99214 OFFICE O/P EST MOD 30 MIN: CPT | Mod: 25,S$GLB,, | Performed by: UROLOGY

## 2024-08-26 PROCEDURE — 81002 URINALYSIS NONAUTO W/O SCOPE: CPT | Mod: S$GLB,,, | Performed by: UROLOGY

## 2024-08-26 PROCEDURE — 3072F LOW RISK FOR RETINOPATHY: CPT | Mod: CPTII,S$GLB,, | Performed by: UROLOGY

## 2024-08-26 PROCEDURE — 99999 PR PBB SHADOW E&M-EST. PATIENT-LVL IV: CPT | Mod: PBBFAC,,, | Performed by: UROLOGY

## 2024-08-26 PROCEDURE — 1159F MED LIST DOCD IN RCRD: CPT | Mod: CPTII,S$GLB,, | Performed by: UROLOGY

## 2024-08-26 PROCEDURE — 3044F HG A1C LEVEL LT 7.0%: CPT | Mod: CPTII,S$GLB,, | Performed by: UROLOGY

## 2024-08-26 PROCEDURE — 3078F DIAST BP <80 MM HG: CPT | Mod: CPTII,S$GLB,, | Performed by: UROLOGY

## 2024-08-26 NOTE — PROGRESS NOTES
CHIEF COMPLAINT:    Mrs. Ac is a 39 y.o. female presenting for a follow up to reprogram InterStim    PRESENTING ILLNESS:    Maria Ines Ac is a 39 y.o. female who presents with a history of mixed incontinence who is status post the following procedures:    Past surgical history:    1/23/2017 retropubic sling placed by Dr. Ventura   7/2/2019  Stage I SNM (InterStim)with me for incomplete bladder emptying  7/30/2019 Stage II SNM (InterStim)same.  2/28/2023 replaced IPG for InterStim  6/19/2024 cystoscopy with urethral bulking agent worked at first, but then had incontinence   8/7/2024 cystoscopy with urethral bulking agent.  No improvement    Because she has ongoing incontinence, she is scheduled for SUDS, but I asked her to come in to see if the InterStim might be reprogrammed.    REVIEW OF SYSTEMS:    Review of Systems   Constitutional: Negative.    HENT: Negative.     Eyes: Negative.    Respiratory: Negative.     Cardiovascular: Negative.    Gastrointestinal: Negative.    Genitourinary:  Positive for frequency and urgency.        Mixed incontinence   Musculoskeletal: Negative.    Skin: Negative.    Neurological: Negative.    Endo/Heme/Allergies: Negative.    Psychiatric/Behavioral: Negative.         PATIENT HISTORY:    Past Medical History:   Diagnosis Date    Blood in stool     Constipation     Cystitis     Depression     Diabetes mellitus, type 2     Dysphagia     Endometriosis     GERD (gastroesophageal reflux disease)     Headache     Hypertension     Palpitations     Premature menopause on hormone replacement therapy     Thyroid disease     Weakness 05/31/2019       Past Surgical History:   Procedure Laterality Date    24 HOUR IMPEDANCE PH MONITORING OF ESOPHAGUS IN PATIENT TAKING ACID REDUCING MEDICATIONS N/A 6/2/2022    Procedure: IMPEDANCE PH STUDY, ESOPHAGEAL, 24 HOUR, IN PATIENT TAKING ACID REDUCING MEDICATION;  Surgeon: Debbie Butler MD;  Location: [facility] ([address] [place] [address]);  Service:  Endoscopy;  Laterality: N/A;  On PPI/H2 Blocker    BLADDER SUSPENSION      CARPAL TUNNEL RELEASE      right    CHOLECYSTECTOMY      COLONOSCOPY N/A 8/30/2016    Procedure: COLONOSCOPY;  Surgeon: Slick Herron MD;  Location: [facility] (4TH F[facility]);  Service: Endoscopy;  Laterality: N/A;  PM prep    COLONOSCOPY N/A 12/22/2021    Procedure: COLONOSCOPY. any CRS;  Surgeon: Maria Ines Jung MD;  Location: [facility] (4TH F[facility]);  Service: Endoscopy;  Laterality: N/A;  fully vaccinated -sm  scaral stimulator will bring remote - sm   12/17 lvm to confirm appt-rb    CYSTOSCOPY WITH INJECTION OF PERIURETHRAL BULKING AGENT N/A 6/19/2024    Procedure: CYSTOSCOPY, WITH PERIURETHRAL BULKING AGENT INJECTION;  Surgeon: Zena Tee MD;  Location: [facility] OR;  Service: Urology;  Laterality: N/A;  45 minutes    CYSTOSCOPY WITH INJECTION OF PERIURETHRAL BULKING AGENT N/A 7/24/2024    Procedure: CYSTOSCOPY, WITH PERIURETHRAL BULKING AGENT INJECTION;  Surgeon: Zena Tee MD;  Location: [facility] OR;  Service: Urology;  Laterality: N/A;  45 minutes    CYSTOSCOPY WITH INJECTION OF PERIURETHRAL BULKING AGENT N/A 8/7/2024    Procedure: CYSTOSCOPY, WITH PERIURETHRAL BULKING AGENT INJECTION;  Surgeon: Zena Tee MD;  Location: [facility] OR;  Service: Urology;  Laterality: N/A;  45 minutes    ESOPHAGEAL MANOMETRY WITH MEASUREMENT OF IMPEDANCE N/A 11/5/2018    Procedure: MANOMETRY, ESOPHAGUS, WITH IMPEDANCE MEASUREMENT;  Surgeon: Slick Herron MD;  Location: [facility] (4TH F[facility]);  Service: Endoscopy;  Laterality: N/A;    ESOPHAGEAL MANOMETRY WITH MEASUREMENT OF IMPEDANCE N/A 6/2/2022    Procedure: MANOMETRY, ESOPHAGUS, WITH IMPEDANCE MEASUREMENT;  Surgeon: Debbie Butler MD;  Location: [facility] (4TH F[facility]);  Service: Endoscopy;  Laterality: N/A;  fully vaccinated, bladder stimulator, instr mailed /emailed -ml    ESOPHAGOGASTRODUODENOSCOPY N/A 2/13/2020    Procedure: EGD (ESOPHAGOGASTRODUODENOSCOPY);  Surgeon: Slick Herron MD;  Location:  [facility] (4TH F[facility]);  Service: Endoscopy;  Laterality: N/A;  pt has cardiology appt on 19-will call back after this appt to schedule-tb    FLUOROSCOPIC URODYNAMIC STUDY N/A 2019    Procedure: URODYNAMIC STUDY, FLUOROSCOPIC;  Surgeon: Zena Tee MD;  Location: [facility] OR 1ST FLR;  Service: Urology;  Laterality: N/A;  1 hour    GALLBLADDER SURGERY      HYSTERECTOMY      Bess velasquez Dr. Champlain    IMPLANTATION OF PERMANENT SACRAL NERVE STIMULATOR N/A 2019    Procedure: INSERTION, NEUROSTIMULATOR, PERMANENT, SACRAL;  Surgeon: Zena Tee MD;  Location: [facility] OR 2ND FLR;  Service: Urology;  Laterality: N/A;  30 min    OOPHORECTOMY      LSO- benign cyst    OOPHORECTOMY      RSO- endo    ooptherectomy      REPLACEMENT OF NERVE STIMULATOR BATTERY N/A 2023    Procedure: Replacement, Battery, Neurostimulator;  Surgeon: Zena Tee MD;  Location: [facility] OR 2ND FLR;  Service: Urology;  Laterality: N/A;  45 min    right knee  scoped    SHOULDER SURGERY  right       Family History   Problem Relation Name Age of Onset    Cervical cancer Maternal Grandmother          unknown age of onset,  at 80    Breast cancer Mother  50        alive at 64, unilateral    Esophageal cancer Mother  63    Ovarian cancer Mother  63    Anesthesia problems Mother      Colon cancer Brother  40    Breast cancer Maternal Aunt  72        alive at 72    Breast cancer Paternal Aunt          unknown age of onset, alive at 70     Social History     Socioeconomic History    Marital status: Single   Tobacco Use    Smoking status: Never    Smokeless tobacco: Never   Substance and Sexual Activity    Alcohol use: No    Drug use: No    Sexual activity: Never     Birth control/protection: None   Social History Narrative    ** Merged History Encounter **          Social Determinants of Health     Financial Resource Strain: Low Risk  (2024)    Overall Financial Resource Strain (CARDIA)     Difficulty of  Paying Living Expenses: Not hard at all   Food Insecurity: No Food Insecurity (2/21/2024)    Hunger Vital Sign     Worried About Running Out of Food in the Last Year: Never true     Ran Out of Food in the Last Year: Never true   Transportation Needs: No Transportation Needs (2/21/2024)    PRAPARE - Transportation     Lack of Transportation (Medical): No     Lack of Transportation (Non-Medical): No   Physical Activity: Sufficiently Active (2/21/2024)    Exercise Vital Sign     Days of Exercise per Week: 3 days     Minutes of Exercise per Session: 60 min   Stress: Stress Concern Present (2/21/2024)    [employer] - Occupational Stress Questionnaire     Feeling of Stress : Very much   Housing Stability: Low Risk  (2/21/2024)    Housing Stability Vital Sign     Unable to Pay for Housing in the Last Year: No     Number of Places Lived in the Last Year: 1     Unstable Housing in the Last Year: No       Allergies:  Patient has no known allergies.    Medications:  Outpatient Encounter Medications as of 8/26/2024   Medication Sig Dispense Refill    ACCU-CHEK AMAURY PLUS TEST STRP Strp USE TO TEST BLOOD GLUCOSE TID  9    ACCU-CHEK SOFTCLIX LANCETS Misc USE TO TEST BLOOD GLUCOSE TID  3    albuterol (VENTOLIN HFA) 90 mcg/actuation inhaler Inhale 2 puffs into the lungs every 6 (six) hours as needed for Wheezing. Rescue 18 g 0    amitriptyline (ELAVIL) 10 MG tablet Take 1 tablet (10 mg total) by mouth nightly. 30 tablet 11    atorvastatin (LIPITOR) 80 MG tablet Take 80 mg by mouth every evening.      BLOOD SUGAR DIAGNOSTIC (ACCU-CHEK AMAURY PLUS TEST STRP MISC) 1 strip by Misc.(Non-Drug; Combo Route) route 3 (three) times daily.      buPROPion (WELLBUTRIN XL) 150 MG TB24 tablet Take 150 mg by mouth every morning.      ciclopirox (PENLAC) 8 % Soln Apply topically nightly. 6.6 mL 11    dexlansoprazole (DEXILANT) 60 mg capsule TAKE 1 CAPSULE(60 MG) BY MOUTH TWICE DAILY 60 capsule 11    diclofenac sodium  (VOLTAREN) 1 % Gel Apply 2 g topically 4 (four) times daily. As needed for breast pain 1 Tube 1    dicyclomine (BENTYL) 10 MG capsule TAKE 2 CAPSULES(20 MG) BY MOUTH THREE TIMES DAILY BEFORE MEALS 180 capsule 0    diltiazem HCl (DILTIAZEM 2% CREAM) Apply topically 3 (three) times daily. Apply topically to anal area. 30 g 2    estradioL (ESTRACE) 0.01 % (0.1 mg/gram) vaginal cream Place 1 g vaginally twice a week. 42.5 g 3    FARXIGA 10 mg Tab Take 10 mg by mouth Daily.  7    fluticasone propionate (FLONASE) 50 mcg/actuation nasal spray SHAKE LIQUID AND USE 2 SPRAYS(100 MCG) IN EACH NOSTRIL EVERY DAY 48 g 2    fluticasone-salmeterol diskus inhaler 250-50 mcg Inhale 1 puff into the lungs 2 (two) times daily. Controller 60 each 11    gabapentin (NEURONTIN) 300 MG capsule Take 600 mg (two capsules) in the morning and 900mg (three capsules) at night before bed 150 capsule 11    GLUCOPHAGE 500 mg tablet Take 500 mg by mouth 2 (two) times daily with meals.       levothyroxine (SYNTHROID) 75 MCG tablet Take 75 mcg by mouth before breakfast.      meloxicam (MOBIC) 15 MG tablet Take 15 mg by mouth once daily.      methen-m.blue-s.phos-phsal-hyo (URIBEL) 118-10-40.8-36 mg Cap Take 1 capsule by mouth every 6 (six) hours as needed (bladder pain). 120 capsule 11    metoprolol succinate (TOPROL-XL) 100 MG 24 hr tablet Take 1 tablet (100 mg total) by mouth once daily. 90 tablet 3    mirabegron (MYRBETRIQ) 50 mg Tb24 Take 1 tablet (50 mg total) by mouth once daily. 30 tablet 11    mometasone 0.1% (ELOCON) 0.1 % cream APPLY TOPICALLY TO THE RASH ON HANDS TWICE DAILY AS NEEDED FOR TWO TO THREE WEEKS. USE SPARINGLY      omega-3 fatty acids/fish oil (FISH OIL-OMEGA-3 FATTY ACIDS) 300-1,000 mg capsule Take 1 capsule by mouth once daily. 90 capsule 3    oxyCODONE (ROXICODONE) 5 MG immediate release tablet Take 1 tablet (5 mg total) by mouth every 4 (four) hours as needed for Pain. 5 tablet 0    phentermine (ADIPEX-P) 37.5 mg tablet Take  37.5 mg by mouth.      phentermine 37.5 MG capsule TK 1 C PO ONCE D IN THE MORNING      REXULTI 4 mg Tab Take 1 tablet by mouth every evening.      topiramate (TOPAMAX) 50 MG tablet Take 2 tablets (100 mg total) by mouth every evening. 60 tablet 11    traZODone (DESYREL) 100 MG tablet TK 1 T PO HS  1    triamcinolone acetonide 0.5% (KENALOG) 0.5 % Crea Apply topically 2 (two) times daily. 15 g 0    TRULICITY 4.5 mg/0.5 mL pen injector Inject 4.5 mg into the skin every 7 days.      ZOLOFT 100 mg tablet Take 200 mg by mouth once daily.        No facility-administered encounter medications on file as of 8/26/2024.         PHYSICAL EXAMINATION:    The patient generally appears in good health, is appropriately interactive, and is in no apparent distress.    Skin: No lesions.    Mental: Cooperative with normal affect.    Neuro: Grossly intact.    HEENT: Normal. No evidence of lymphadenopathy.    Chest:  normal inspiratory effort.    Extremities: No clubbing, cyanosis, or edema      LABS:    Lab Results   Component Value Date    BUN 5 (L) 03/15/2024    CREATININE 0.8 03/15/2024       UA 1.010, pH 5, 1000 glucose, otherwise, negative.  (Not on a Na/glucose cotransporter)    Accucheck 97 mg/dL    IMPRESSION:    Mixed incontinence    PLAN:    Battery: OK  Impedences range from 735 to 1557 Ohms, checked at 1.5 A.   Program 5  3+ 0-1-, at 2.7 mA, 14 Hz, 210 pw, increased to 3.0 mA feels it butt cheek and it is comfortable.  Checked programs 6, 7 and she had sensation in the butt cheek.  She found that program 7 was the most comfortable.  Program 7 is 0+, 2-,3-, 3.0 mA, 14 Hz, 210 pw.      2.  Return for SUDS    I spent 30 minutes with the patient of which more than half was spent in direct consultation with the patient in regards to our treatment and plan.

## 2024-08-28 RX ORDER — METOPROLOL SUCCINATE 100 MG/1
100 TABLET, EXTENDED RELEASE ORAL
Qty: 90 TABLET | Refills: 3 | Status: SHIPPED | OUTPATIENT
Start: 2024-08-28

## 2024-09-09 ENCOUNTER — TELEPHONE (OUTPATIENT)
Dept: UROLOGY | Facility: CLINIC | Age: 39
End: 2024-09-09
Payer: MEDICARE

## 2024-09-09 NOTE — TELEPHONE ENCOUNTER
----- Message from Beatris Marcano sent at 9/9/2024 10:34 AM CDT -----  Regarding: call back  Contact: [phone]  Pt returning call please call to discuss further

## 2024-09-17 ENCOUNTER — OFFICE VISIT (OUTPATIENT)
Dept: INTERNAL MEDICINE | Facility: CLINIC | Age: 39
End: 2024-09-17
Payer: MEDICARE

## 2024-09-17 DIAGNOSIS — E11.9 DIABETES MELLITUS WITHOUT COMPLICATION: ICD-10-CM

## 2024-09-17 DIAGNOSIS — I10 HYPERTENSION, UNSPECIFIED TYPE: Primary | ICD-10-CM

## 2024-09-17 PROCEDURE — 99214 OFFICE O/P EST MOD 30 MIN: CPT | Mod: HCNC,S$GLB,, | Performed by: INTERNAL MEDICINE

## 2024-09-17 PROCEDURE — 3008F BODY MASS INDEX DOCD: CPT | Mod: HCNC,CPTII,S$GLB, | Performed by: INTERNAL MEDICINE

## 2024-09-17 PROCEDURE — 3074F SYST BP LT 130 MM HG: CPT | Mod: HCNC,CPTII,S$GLB, | Performed by: INTERNAL MEDICINE

## 2024-09-17 PROCEDURE — 3044F HG A1C LEVEL LT 7.0%: CPT | Mod: HCNC,CPTII,S$GLB, | Performed by: INTERNAL MEDICINE

## 2024-09-17 PROCEDURE — 1159F MED LIST DOCD IN RCRD: CPT | Mod: HCNC,CPTII,S$GLB, | Performed by: INTERNAL MEDICINE

## 2024-09-17 PROCEDURE — G2211 COMPLEX E/M VISIT ADD ON: HCPCS | Mod: HCNC,S$GLB,, | Performed by: INTERNAL MEDICINE

## 2024-09-17 PROCEDURE — 3078F DIAST BP <80 MM HG: CPT | Mod: HCNC,CPTII,S$GLB, | Performed by: INTERNAL MEDICINE

## 2024-09-17 PROCEDURE — 99999 PR PBB SHADOW E&M-EST. PATIENT-LVL V: CPT | Mod: PBBFAC,HCNC,, | Performed by: INTERNAL MEDICINE

## 2024-09-17 PROCEDURE — 3072F LOW RISK FOR RETINOPATHY: CPT | Mod: HCNC,CPTII,S$GLB, | Performed by: INTERNAL MEDICINE

## 2024-09-19 ENCOUNTER — LAB VISIT (OUTPATIENT)
Dept: LAB | Facility: HOSPITAL | Age: 39
End: 2024-09-19
Attending: INTERNAL MEDICINE
Payer: MEDICARE

## 2024-09-19 DIAGNOSIS — E11.9 DIABETES MELLITUS WITHOUT COMPLICATION: ICD-10-CM

## 2024-09-19 DIAGNOSIS — I10 HYPERTENSION, UNSPECIFIED TYPE: ICD-10-CM

## 2024-09-19 LAB
ALBUMIN SERPL BCP-MCNC: 4.2 G/DL (ref 3.5–5.2)
ALP SERPL-CCNC: 67 U/L (ref 55–135)
ALT SERPL W/O P-5'-P-CCNC: 37 U/L (ref 10–44)
ANION GAP SERPL CALC-SCNC: 11 MMOL/L (ref 8–16)
AST SERPL-CCNC: 45 U/L (ref 10–40)
BASOPHILS # BLD AUTO: 0.07 K/UL (ref 0–0.2)
BASOPHILS NFR BLD: 0.9 % (ref 0–1.9)
BILIRUB SERPL-MCNC: 0.5 MG/DL (ref 0.1–1)
BUN SERPL-MCNC: 9 MG/DL (ref 6–20)
CALCIUM SERPL-MCNC: 10.2 MG/DL (ref 8.7–10.5)
CHLORIDE SERPL-SCNC: 105 MMOL/L (ref 95–110)
CHOLEST SERPL-MCNC: 169 MG/DL (ref 120–199)
CHOLEST/HDLC SERPL: 4.4 {RATIO} (ref 2–5)
CO2 SERPL-SCNC: 22 MMOL/L (ref 23–29)
CREAT SERPL-MCNC: 1 MG/DL (ref 0.5–1.4)
DIFFERENTIAL METHOD BLD: NORMAL
EOSINOPHIL # BLD AUTO: 0.3 K/UL (ref 0–0.5)
EOSINOPHIL NFR BLD: 3.9 % (ref 0–8)
ERYTHROCYTE [DISTWIDTH] IN BLOOD BY AUTOMATED COUNT: 12.9 % (ref 11.5–14.5)
EST. GFR  (NO RACE VARIABLE): >60 ML/MIN/1.73 M^2
ESTIMATED AVG GLUCOSE: 105 MG/DL (ref 68–131)
GLUCOSE SERPL-MCNC: 111 MG/DL (ref 70–110)
HBA1C MFR BLD: 5.3 % (ref 4–5.6)
HCT VFR BLD AUTO: 43.2 % (ref 37–48.5)
HDLC SERPL-MCNC: 38 MG/DL (ref 40–75)
HDLC SERPL: 22.5 % (ref 20–50)
HGB BLD-MCNC: 14.2 G/DL (ref 12–16)
IMM GRANULOCYTES # BLD AUTO: 0.02 K/UL (ref 0–0.04)
IMM GRANULOCYTES NFR BLD AUTO: 0.2 % (ref 0–0.5)
LDLC SERPL CALC-MCNC: 75.6 MG/DL (ref 63–159)
LYMPHOCYTES # BLD AUTO: 2.9 K/UL (ref 1–4.8)
LYMPHOCYTES NFR BLD: 35.6 % (ref 18–48)
MCH RBC QN AUTO: 28.8 PG (ref 27–31)
MCHC RBC AUTO-ENTMCNC: 32.9 G/DL (ref 32–36)
MCV RBC AUTO: 88 FL (ref 82–98)
MONOCYTES # BLD AUTO: 0.6 K/UL (ref 0.3–1)
MONOCYTES NFR BLD: 7.8 % (ref 4–15)
NEUTROPHILS # BLD AUTO: 4.2 K/UL (ref 1.8–7.7)
NEUTROPHILS NFR BLD: 51.6 % (ref 38–73)
NONHDLC SERPL-MCNC: 131 MG/DL
NRBC BLD-RTO: 0 /100 WBC
PLATELET # BLD AUTO: 252 K/UL (ref 150–450)
PMV BLD AUTO: 12.8 FL (ref 9.2–12.9)
POTASSIUM SERPL-SCNC: 3.7 MMOL/L (ref 3.5–5.1)
PROT SERPL-MCNC: 8 G/DL (ref 6–8.4)
RBC # BLD AUTO: 4.93 M/UL (ref 4–5.4)
SODIUM SERPL-SCNC: 138 MMOL/L (ref 136–145)
TRIGL SERPL-MCNC: 277 MG/DL (ref 30–150)
TSH SERPL DL<=0.005 MIU/L-ACNC: 1.96 UIU/ML (ref 0.4–4)
WBC # BLD AUTO: 8.11 K/UL (ref 3.9–12.7)

## 2024-09-19 PROCEDURE — 83036 HEMOGLOBIN GLYCOSYLATED A1C: CPT | Mod: HCNC | Performed by: INTERNAL MEDICINE

## 2024-09-19 PROCEDURE — 84443 ASSAY THYROID STIM HORMONE: CPT | Mod: HCNC | Performed by: INTERNAL MEDICINE

## 2024-09-19 PROCEDURE — 85025 COMPLETE CBC W/AUTO DIFF WBC: CPT | Mod: HCNC | Performed by: INTERNAL MEDICINE

## 2024-09-19 PROCEDURE — 36415 COLL VENOUS BLD VENIPUNCTURE: CPT | Mod: HCNC | Performed by: INTERNAL MEDICINE

## 2024-09-19 PROCEDURE — 80053 COMPREHEN METABOLIC PANEL: CPT | Mod: HCNC | Performed by: INTERNAL MEDICINE

## 2024-09-19 PROCEDURE — 80061 LIPID PANEL: CPT | Mod: HCNC | Performed by: INTERNAL MEDICINE

## 2024-09-20 VITALS
BODY MASS INDEX: 38.54 KG/M2 | HEART RATE: 87 BPM | DIASTOLIC BLOOD PRESSURE: 64 MMHG | TEMPERATURE: 99 F | SYSTOLIC BLOOD PRESSURE: 118 MMHG | HEIGHT: 62 IN | WEIGHT: 209.44 LBS

## 2024-09-20 NOTE — PROGRESS NOTES
Subjective:       Patient ID: Maria Ines Ac is a 39 y.o. female.    Chief Complaint: Hypertension    HPI  She returns for management of hypertension.  She has had hypertension for over a year.  Current treatment has included medications outlined in medication list.  She denies chest pain or shortness of breath.  No palpitations.  Denies left arm or neck pain.  She has diabetes.  Denies polyuria, polydipsia     Past medical history: Hypertension, hyperlipidemia, diabetes, hypothyroidism, bipolar disorder, migraine headaches, status post hysterectomy, family history of colon cancer-brother.  She had a colonoscopy December 2021      Medications: Synthroid , metformin, Toprol-XL ,Lipitor 80 mg daily, trulicity      NO KNOWN DRUG ALLERGIES        Review of Systems   Constitutional:  Negative for chills, fatigue, fever and unexpected weight change.   Respiratory:  Negative for chest tightness and shortness of breath.    Cardiovascular:  Negative for chest pain and palpitations.   Gastrointestinal:  Negative for abdominal pain and blood in stool.   Neurological:  Negative for dizziness, syncope, numbness and headaches.       Objective:      Physical Exam  HENT:      Right Ear: External ear normal.      Left Ear: External ear normal.      Nose: Nose normal.      Mouth/Throat:      Mouth: Mucous membranes are moist.      Pharynx: Oropharynx is clear.   Eyes:      Pupils: Pupils are equal, round, and reactive to light.   Cardiovascular:      Rate and Rhythm: Normal rate and regular rhythm.      Heart sounds: No murmur heard.  Pulmonary:      Breath sounds: Normal breath sounds.   Abdominal:      General: There is no distension.      Palpations: There is no hepatomegaly or splenomegaly.      Tenderness: There is no abdominal tenderness.   Musculoskeletal:      Cervical back: Normal range of motion.   Lymphadenopathy:      Cervical: No cervical adenopathy.      Upper Body:      Right upper body: No axillary adenopathy.       Left upper body: No axillary adenopathy.   Neurological:      Cranial Nerves: No cranial nerve deficit.      Sensory: No sensory deficit.      Motor: Motor function is intact.      Deep Tendon Reflexes: Reflexes are normal and symmetric.         Assessment/Plan       Assessment and plan: 1.  Hypertension: Controlled.  Continue Toprol.  Check CMP, lipid panel, CBC, TSH  2.  Diabetes: Check A1c and urine microalbumin   3.  She sees a psychiatrist outside of [facility]    Visit today included increased complexity associated with the care of the episodic problem hypertension addressed and managing the longitudinal care of the patient due to the serious and/or complex managed problem(s) hypertension, diabetes.

## 2024-09-26 ENCOUNTER — PROCEDURE VISIT (OUTPATIENT)
Facility: CLINIC | Age: 39
End: 2024-09-26
Payer: MEDICARE

## 2024-09-26 VITALS
BODY MASS INDEX: 38.75 KG/M2 | SYSTOLIC BLOOD PRESSURE: 117 MMHG | HEART RATE: 81 BPM | RESPIRATION RATE: 18 BRPM | TEMPERATURE: 98 F | WEIGHT: 211.88 LBS | DIASTOLIC BLOOD PRESSURE: 82 MMHG

## 2024-09-26 DIAGNOSIS — N39.46 MIXED INCONTINENCE URGE AND STRESS: ICD-10-CM

## 2024-09-26 DIAGNOSIS — N32.81 OAB (OVERACTIVE BLADDER): Primary | ICD-10-CM

## 2024-09-26 PROCEDURE — 51741 ELECTRO-UROFLOWMETRY FIRST: CPT | Mod: 26,51,S$GLB, | Performed by: UROLOGY

## 2024-09-26 PROCEDURE — 51728 CYSTOMETROGRAM W/VP: CPT | Mod: 26,S$GLB,, | Performed by: UROLOGY

## 2024-09-26 PROCEDURE — 51784 ANAL/URINARY MUSCLE STUDY: CPT | Mod: 26,51,S$GLB, | Performed by: UROLOGY

## 2024-09-26 PROCEDURE — 51797 INTRAABDOMINAL PRESSURE TEST: CPT | Mod: 26,S$GLB,, | Performed by: UROLOGY

## 2024-09-26 RX ORDER — DOXYCYCLINE HYCLATE 100 MG
100 TABLET ORAL ONCE
Status: COMPLETED | OUTPATIENT
Start: 2024-09-26 | End: 2024-09-26

## 2024-09-26 RX ORDER — LIDOCAINE HYDROCHLORIDE 20 MG/ML
JELLY TOPICAL ONCE
Status: SHIPPED | OUTPATIENT
Start: 2024-09-26

## 2024-09-26 RX ORDER — VIBEGRON 75 MG/1
75 TABLET, FILM COATED ORAL DAILY
Qty: 30 TABLET | Refills: 11 | Status: SHIPPED | OUTPATIENT
Start: 2024-09-26

## 2024-09-26 RX ADMIN — Medication 100 MG: at 01:09

## 2024-09-26 NOTE — PATIENT INSTRUCTIONS
SIMPLE URODYNAMIC STUDY (SUDS) DISCHARGE INSTRUCTIONS    You have had a procedure that will require time to properly heal. Follow the instructions you have been given on how to care for yourself once you are home. Below is additional information to help in your recovery.    ACTIVITY  There are no restrictions in activity. Start doing again the things you did before the procedure.  You may experience a slight burning sensation. You may notice a small amount of blood in your urine. This will clear up within a day. Call the clinic if this continues beyond 48 hours.     DIET  Continue your normal diet. You may eat the same foods you ate before your procedure.  Drink plenty of fluids during the first 24-48 hours following your procedure.     MEDICATIONS  Resume all other previous medications from your prescribing physician.  Continue any pre-procedure antibiotics until they are all gone.     SIGNS AND SYMPTOMS TO REPORT TO THE DOCTOR  Chills or fever greater than 101° F within 24 hours of procedure.  Changes in urination, such as increased bleeding, foul smell, cloudy urine, or painful urination.  Call your doctor with any questions or concerns.     For any emergency situation, call 911 immediately or go to your nearest emergency room.     [facility]  [phone]

## 2024-10-01 PROBLEM — N39.3 SUI (STRESS URINARY INCONTINENCE, FEMALE): Status: RESOLVED | Noted: 2024-08-07 | Resolved: 2024-10-01

## 2024-10-02 NOTE — PROCEDURES
Procedures    Urodynamic Report    Indication:  mixed incontinence    Patient was taken to the Urodynamic Suite where a time out was performed.  She was asked to perform a free uroflow.  Next, the patient was prepped and the urinary residual was drained with a 14 Fr catheter.  A 7 Fr dual lumen catheter was placed to measure intravesical pressures.  A 10 Fr balloon manometer was placed into the rectum for abdominal pressure measurements.  Patch EMG electrodes were placed on the perineum.  The patient was connected to the [employer] Urodynamic machine, using a multichannel technique, the data were interpreted.  The bladder was filled with sterile water at room temperature at a rate of 30 ml/min.  Patient is filled to urgency.  Filling is performed with the patient in the seated position.  Abdominal leak point pressures are checked at 1st desire, then serially at 50cc increments first with Valsalva then with coughing.  The patient was then asked to sit and void for a pressure flow study.    The following are the results of the study:  1.  Uroflow       Q max:  28.2 ml/sec       Voided volume:  221 ml       Pattern of the curve:  continuous    2.  PVR:  46 ml    3.  CMG       Sensation:         First Desire:  67 ml         Normal Desire:  129 ml         Strong Desire:  187 ml         Urgency:  227 ml       Capacity:  243 ml       Abnormal Contractions:  none       Compliance:  normal    4.  Abdominal Leak Point Pressure:  no stress incontinence    5.  EMG:  normal guarding reflex, strain pattern with voiding    6.  Voiding phase       Q max:  18 ml/sec       P det at Q max:  33 cm H2O       Pattern of the curve:  intermittent and biphasic       Voided volume:  193 ml       PVR:  50 ml    7.  Analysis:  increased sensation, decreased capacity, no stress incontinence    8.  Recommendations:       a.  She already has an SNM in place       b.  We discussed botox but there is a chance she would have to self cath       c.   Gemtesa prescribed as she has significant gastroparesis.         D.  Follow up in 1 month.

## 2024-10-04 ENCOUNTER — NURSE TRIAGE (OUTPATIENT)
Dept: ADMINISTRATIVE | Facility: CLINIC | Age: 39
End: 2024-10-04
Payer: MEDICARE

## 2024-10-04 NOTE — TELEPHONE ENCOUNTER
Call transfer received from [facility] due to patient's c/o severe abdominal pain, diarrhea and chronic chest pain.     Care advice given per Abdominal Pain - Adult Guideline. Patient advised to Go to [facility] and to have another person drive her to the facility. Patient also advised to contact the [facility] for any worsening symptoms. Patient states understanding of dare advice.       Reason for Disposition   SEVERE abdominal pain (e.g., excruciating)    Additional Information   Negative: Passed out (e.g., fainted, lost consciousness, blacked out and was not responding)   Negative: Shock suspected (e.g., cold/pale/clammy skin, too weak to stand, low BP, rapid pulse)   Negative: Sounds like a life-threatening emergency to the triager   Negative: Followed an abdomen (stomach) injury   Negative: Chest pain   Negative: Abdominal pain and pregnant < 20 weeks   Negative: Abdominal pain and pregnant 20 or more weeks   Pain is mainly in upper abdomen (if needed ask: 'is it mainly above the belly button?')   Negative: SEVERE difficulty breathing (e.g., struggling for each breath, speaks in single words)   Negative: Shock suspected (e.g., cold/pale/clammy skin, too weak to stand, low BP, rapid pulse)   Negative: Difficult to awaken or acting confused (e.g., disoriented, slurred speech)   Negative: Passed out (e.g., fainted, lost consciousness, blacked out and was not responding)   Negative: Visible sweat on face or sweat is dripping down   Negative: Sounds like a life-threatening emergency to the triager   Negative: Followed an abdomen (stomach) injury   Negative: Chest pain   Negative: Abdominal pain and pregnant < 20 weeks   Negative: Abdominal pain and pregnant 20 or more weeks   Negative: Abdomen bloating or swelling are main symptoms    Protocols used: Abdominal Pain - Female-A-[place], Abdominal Pain - Upper-A-[place]

## 2024-10-17 ENCOUNTER — TELEPHONE (OUTPATIENT)
Dept: OPTOMETRY | Facility: CLINIC | Age: 39
End: 2024-10-17
Payer: MEDICARE

## 2024-10-24 ENCOUNTER — TELEPHONE (OUTPATIENT)
Dept: UROLOGY | Facility: CLINIC | Age: 39
End: 2024-10-24

## 2024-10-24 ENCOUNTER — OFFICE VISIT (OUTPATIENT)
Dept: UROLOGY | Facility: CLINIC | Age: 39
End: 2024-10-24
Payer: MEDICARE

## 2024-10-24 VITALS
WEIGHT: 210.13 LBS | BODY MASS INDEX: 38.43 KG/M2 | SYSTOLIC BLOOD PRESSURE: 108 MMHG | DIASTOLIC BLOOD PRESSURE: 76 MMHG | HEART RATE: 71 BPM

## 2024-10-24 DIAGNOSIS — N32.81 OAB (OVERACTIVE BLADDER): Primary | ICD-10-CM

## 2024-10-24 PROCEDURE — 99214 OFFICE O/P EST MOD 30 MIN: CPT | Mod: S$GLB,,, | Performed by: UROLOGY

## 2024-10-24 PROCEDURE — 3074F SYST BP LT 130 MM HG: CPT | Mod: CPTII,S$GLB,, | Performed by: UROLOGY

## 2024-10-24 PROCEDURE — 3008F BODY MASS INDEX DOCD: CPT | Mod: CPTII,S$GLB,, | Performed by: UROLOGY

## 2024-10-24 PROCEDURE — 3072F LOW RISK FOR RETINOPATHY: CPT | Mod: CPTII,S$GLB,, | Performed by: UROLOGY

## 2024-10-24 PROCEDURE — 1159F MED LIST DOCD IN RCRD: CPT | Mod: CPTII,S$GLB,, | Performed by: UROLOGY

## 2024-10-24 PROCEDURE — 99999 PR PBB SHADOW E&M-EST. PATIENT-LVL IV: CPT | Mod: PBBFAC,,, | Performed by: UROLOGY

## 2024-10-24 PROCEDURE — 3044F HG A1C LEVEL LT 7.0%: CPT | Mod: CPTII,S$GLB,, | Performed by: UROLOGY

## 2024-10-24 PROCEDURE — 3078F DIAST BP <80 MM HG: CPT | Mod: CPTII,S$GLB,, | Performed by: UROLOGY

## 2024-10-24 NOTE — H&P (VIEW-ONLY)
CHIEF COMPLAINT:    Mrs. Ac is a 39 y.o. female presenting for a follow up after SUDS  PRESENTING ILLNESS:    Maria Ines Ac is a 39 y.o. female who presents for follow up after being placed on Gemtesa.  She underwent SUDS on 9/26/2024 and she was found to have increased sensation with a decreased capacity (243 ml) and was not found to have stress incontinence.  She strains to void.  We had discussed the possibility of needing to self cath with Botox injection.  She is ready to schedule the Botox as she states that the Gemtesa did nothing for her incontinence.  Still has to wear pull ups.      Past surgical history:    1/23/2017 retropubic sling placed by Dr. Ventura   7/2/2019  Stage I SNM (InterStim)with me for incomplete bladder emptying  7/30/2019 Stage II SNM (InterStim)same.  2/28/2023 replaced IPG for InterStim  6/19/2024 cystoscopy with urethral bulking agent worked at first, but then had incontinence   8/7/2024 cystoscopy with urethral bulking agent.  No improvement    REVIEW OF SYSTEMS:    Review of Systems   Constitutional: Negative.    HENT: Negative.     Eyes: Negative.    Respiratory: Negative.     Cardiovascular: Negative.    Gastrointestinal: Negative.    Genitourinary:  Positive for urgency.        Urge incontinence   Musculoskeletal: Negative.    Skin: Negative.    Neurological: Negative.    Endo/Heme/Allergies: Negative.    Psychiatric/Behavioral: Negative.         PATIENT HISTORY:    Past Medical History:   Diagnosis Date    Blood in stool     Constipation     Cystitis     Depression     Diabetes mellitus, type 2     Dysphagia     Endometriosis     GERD (gastroesophageal reflux disease)     Headache     Hypertension     Palpitations     Premature menopause on hormone replacement therapy     Thyroid disease     Weakness 05/31/2019       Past Surgical History:   Procedure Laterality Date    24 HOUR IMPEDANCE PH MONITORING OF ESOPHAGUS IN PATIENT TAKING ACID REDUCING MEDICATIONS N/A  6/2/2022    Procedure: IMPEDANCE PH STUDY, ESOPHAGEAL, 24 HOUR, IN PATIENT TAKING ACID REDUCING MEDICATION;  Surgeon: Debbie Butler MD;  Location: [facility] ENDO (4TH F[facility]);  Service: Endoscopy;  Laterality: N/A;  On PPI/H2 Blocker    BLADDER SUSPENSION      CARPAL TUNNEL RELEASE      right    CHOLECYSTECTOMY      COLONOSCOPY N/A 8/30/2016    Procedure: COLONOSCOPY;  Surgeon: Slick Herron MD;  Location: [facility] ENDO (4TH F[facility]);  Service: Endoscopy;  Laterality: N/A;  PM prep    COLONOSCOPY N/A 12/22/2021    Procedure: COLONOSCOPY. any CRS;  Surgeon: Maria Ines Jung MD;  Location: [facility] ENDO (4TH F[facility]);  Service: Endoscopy;  Laterality: N/A;  fully vaccinated -  scaral stimulator will bring remote - sm   12/17 lvm to confirm appt-rb    CYSTOSCOPY WITH INJECTION OF PERIURETHRAL BULKING AGENT N/A 6/19/2024    Procedure: CYSTOSCOPY, WITH PERIURETHRAL BULKING AGENT INJECTION;  Surgeon: Zena Tee MD;  Location: [facility] OR;  Service: Urology;  Laterality: N/A;  45 minutes    CYSTOSCOPY WITH INJECTION OF PERIURETHRAL BULKING AGENT N/A 7/24/2024    Procedure: CYSTOSCOPY, WITH PERIURETHRAL BULKING AGENT INJECTION;  Surgeon: Zena Tee MD;  Location: [facility] OR;  Service: Urology;  Laterality: N/A;  45 minutes    CYSTOSCOPY WITH INJECTION OF PERIURETHRAL BULKING AGENT N/A 8/7/2024    Procedure: CYSTOSCOPY, WITH PERIURETHRAL BULKING AGENT INJECTION;  Surgeon: Zena Tee MD;  Location: [facility] OR;  Service: Urology;  Laterality: N/A;  45 minutes    ESOPHAGEAL MANOMETRY WITH MEASUREMENT OF IMPEDANCE N/A 11/5/2018    Procedure: MANOMETRY, ESOPHAGUS, WITH IMPEDANCE MEASUREMENT;  Surgeon: Slick Herron MD;  Location: [facility] ENDO (4TH F[facility]);  Service: Endoscopy;  Laterality: N/A;    ESOPHAGEAL MANOMETRY WITH MEASUREMENT OF IMPEDANCE N/A 6/2/2022    Procedure: MANOMETRY, ESOPHAGUS, WITH IMPEDANCE MEASUREMENT;  Surgeon: Debbie Butler MD;  Location: [facility] ENDO (4TH F[facility]);  Service: Endoscopy;  Laterality: N/A;  fully vaccinated,  bladder stimulator, instr mailed /emailed -ml    ESOPHAGOGASTRODUODENOSCOPY N/A 2020    Procedure: EGD (ESOPHAGOGASTRODUODENOSCOPY);  Surgeon: Slick Herron MD;  Location: [facility] (4TH F[facility]);  Service: Endoscopy;  Laterality: N/A;  pt has cardiology appt on 19-will call back after this appt to schedule-tb    FLUOROSCOPIC URODYNAMIC STUDY N/A 2019    Procedure: URODYNAMIC STUDY, FLUOROSCOPIC;  Surgeon: Zena Tee MD;  Location: [facility] OR 1ST FLR;  Service: Urology;  Laterality: N/A;  1 hour    GALLBLADDER SURGERY      HYSTERECTOMY      Bess velasquez Dr. Champlain    IMPLANTATION OF PERMANENT SACRAL NERVE STIMULATOR N/A 2019    Procedure: INSERTION, NEUROSTIMULATOR, PERMANENT, SACRAL;  Surgeon: Zena Tee MD;  Location: [facility] OR 2ND FLR;  Service: Urology;  Laterality: N/A;  30 min    OOPHORECTOMY      LSO- benign cyst    OOPHORECTOMY      RSO- endo    ooptherectomy      REPLACEMENT OF NERVE STIMULATOR BATTERY N/A 2023    Procedure: Replacement, Battery, Neurostimulator;  Surgeon: Zena Tee MD;  Location: [facility] OR 2ND FLR;  Service: Urology;  Laterality: N/A;  45 min    right knee  scoped    SHOULDER SURGERY  right       Family History   Problem Relation Name Age of Onset    Cervical cancer Maternal Grandmother          unknown age of onset,  at 80    Breast cancer Mother  50        alive at 64, unilateral    Esophageal cancer Mother  63    Ovarian cancer Mother  63    Anesthesia problems Mother      Colon cancer Brother  40    Breast cancer Maternal Aunt  72        alive at 72    Breast cancer Paternal Aunt          unknown age of onset, alive at 70     Social History     Socioeconomic History    Marital status: Single   Tobacco Use    Smoking status: Never    Smokeless tobacco: Never   Substance and Sexual Activity    Alcohol use: No    Drug use: No    Sexual activity: Never     Birth control/protection: None   Social History Narrative    **  Merged History Encounter **          Social Drivers of Health     Financial Resource Strain: Low Risk  (2/21/2024)    Overall Financial Resource Strain (CARDIA)     Difficulty of Paying Living Expenses: Not hard at all   Food Insecurity: No Food Insecurity (2/21/2024)    Hunger Vital Sign     Worried About Running Out of Food in the Last Year: Never true     Ran Out of Food in the Last Year: Never true   Transportation Needs: No Transportation Needs (2/21/2024)    PRAPARE - Transportation     Lack of Transportation (Medical): No     Lack of Transportation (Non-Medical): No   Physical Activity: Sufficiently Active (2/21/2024)    Exercise Vital Sign     Days of Exercise per Week: 3 days     Minutes of Exercise per Session: 60 min   Stress: Stress Concern Present (2/21/2024)    [place] [employer] - Occupational Stress Questionnaire     Feeling of Stress : Very much   Housing Stability: Low Risk  (2/21/2024)    Housing Stability Vital Sign     Unable to Pay for Housing in the Last Year: No     Number of Places Lived in the Last Year: 1     Unstable Housing in the Last Year: No       Allergies:  Patient has no known allergies.    Medications:  Outpatient Encounter Medications as of 10/24/2024   Medication Sig Dispense Refill    ACCU-CHEK AMAURY PLUS TEST STRP Strp USE TO TEST BLOOD GLUCOSE TID  9    ACCU-CHEK SOFTCLIX LANCETS Misc USE TO TEST BLOOD GLUCOSE TID  3    albuterol (VENTOLIN HFA) 90 mcg/actuation inhaler Inhale 2 puffs into the lungs every 6 (six) hours as needed for Wheezing. Rescue 18 g 0    amitriptyline (ELAVIL) 10 MG tablet Take 1 tablet (10 mg total) by mouth nightly. 30 tablet 11    atorvastatin (LIPITOR) 80 MG tablet Take 80 mg by mouth every evening.      BLOOD SUGAR DIAGNOSTIC (ACCU-CHEK AMAURY PLUS TEST STRP MISC) 1 strip by Misc.(Non-Drug; Combo Route) route 3 (three) times daily.      buPROPion (WELLBUTRIN XL) 150 MG TB24 tablet Take 150 mg by mouth every morning.       ciclopirox (PENLAC) 8 % Soln Apply topically nightly. 6.6 mL 11    dexlansoprazole (DEXILANT) 60 mg capsule TAKE 1 CAPSULE(60 MG) BY MOUTH TWICE DAILY 60 capsule 11    diclofenac sodium (VOLTAREN) 1 % Gel Apply 2 g topically 4 (four) times daily. As needed for breast pain 1 Tube 1    dicyclomine (BENTYL) 10 MG capsule TAKE 2 CAPSULES(20 MG) BY MOUTH THREE TIMES DAILY BEFORE MEALS 180 capsule 0    diltiazem HCl (DILTIAZEM 2% CREAM) Apply topically 3 (three) times daily. Apply topically to anal area. 30 g 2    estradioL (ESTRACE) 0.01 % (0.1 mg/gram) vaginal cream Place 1 g vaginally twice a week. 42.5 g 3    FARXIGA 10 mg Tab Take 10 mg by mouth Daily.  7    fluticasone propionate (FLONASE) 50 mcg/actuation nasal spray SHAKE LIQUID AND USE 2 SPRAYS(100 MCG) IN EACH NOSTRIL EVERY DAY 48 g 2    fluticasone-salmeterol diskus inhaler 250-50 mcg Inhale 1 puff into the lungs 2 (two) times daily. Controller 60 each 11    gabapentin (NEURONTIN) 300 MG capsule Take 600 mg (two capsules) in the morning and 900mg (three capsules) at night before bed 150 capsule 11    GLUCOPHAGE 500 mg tablet Take 500 mg by mouth 2 (two) times daily with meals.       levothyroxine (SYNTHROID) 75 MCG tablet Take 75 mcg by mouth before breakfast.      meloxicam (MOBIC) 15 MG tablet Take 15 mg by mouth once daily.      methen-m.blue-s.phos-phsal-hyo (URIBEL) 118-10-40.8-36 mg Cap Take 1 capsule by mouth every 6 (six) hours as needed (bladder pain). 120 capsule 11    metoprolol succinate (TOPROL-XL) 100 MG 24 hr tablet TAKE 1 TABLET(100 MG) BY MOUTH EVERY DAY 90 tablet 3    mometasone 0.1% (ELOCON) 0.1 % cream APPLY TOPICALLY TO THE RASH ON HANDS TWICE DAILY AS NEEDED FOR TWO TO THREE WEEKS. USE SPARINGLY      omega-3 fatty acids/fish oil (FISH OIL-OMEGA-3 FATTY ACIDS) 300-1,000 mg capsule Take 1 capsule by mouth once daily. 90 capsule 3    oxyCODONE (ROXICODONE) 5 MG immediate release tablet Take 1 tablet (5 mg total) by mouth every 4 (four) hours  as needed for Pain. 5 tablet 0    phentermine (ADIPEX-P) 37.5 mg tablet Take 37.5 mg by mouth.      phentermine 37.5 MG capsule TK 1 C PO ONCE D IN THE MORNING      REXULTI 4 mg Tab Take 1 tablet by mouth every evening.      topiramate (TOPAMAX) 50 MG tablet Take 2 tablets (100 mg total) by mouth every evening. 60 tablet 11    traZODone (DESYREL) 100 MG tablet TK 1 T PO HS  1    triamcinolone acetonide 0.5% (KENALOG) 0.5 % Crea Apply topically 2 (two) times daily. 15 g 0    TRULICITY 4.5 mg/0.5 mL pen injector Inject 4.5 mg into the skin every 7 days.      vibegron (GEMTESA) 75 mg Tab Take 1 tablet (75 mg total) by mouth once daily. 30 tablet 11    ZOLOFT 100 mg tablet Take 200 mg by mouth once daily.        Facility-Administered Encounter Medications as of 10/24/2024   Medication Dose Route Frequency Provider Last Rate Last Admin    LIDOcaine HCl 2% urojet   Urethral Once              PHYSICAL EXAMINATION:    The patient generally appears in good health, is appropriately interactive, and is in no apparent distress.    Skin: No lesions.    Mental: Cooperative with normal affect.    Neuro: Grossly intact.    HEENT: Normal. No evidence of lymphadenopathy.    Chest:  normal inspiratory effort.    Abdomen: Soft, non-tender. No masses or organomegaly. Bladder is not palpable. No evidence of flank discomfort. No evidence of inguinal hernia.    Extremities: No clubbing, cyanosis, or edema      LABS:    Lab Results   Component Value Date    BUN 9 09/19/2024    CREATININE 1.0 09/19/2024       UA 1.015, pH 5, 200 glucose (on Farxiga), otherwise, negative.     IMPRESSION:    Medication refractory OAB    PLAN:    1.  For cystoscopy Botox injection of the bladder.  Plan 100 U.   2.  Consent signed  3.  Discontinue Gemtesa since it is ineffective  4.  Also discontinuing Uribel due to history of erosive esophagitis    I spent 30 minutes with the patient of which more than half was spent in direct consultation with the patient in  regards to our treatment and plan.

## 2024-10-24 NOTE — PROGRESS NOTES
CHIEF COMPLAINT:    Mrs. Ac is a 39 y.o. female presenting for a follow up after SUDS  PRESENTING ILLNESS:    Maria Ines Ac is a 39 y.o. female who presents for follow up after being placed on Gemtesa.  She underwent SUDS on 9/26/2024 and she was found to have increased sensation with a decreased capacity (243 ml) and was not found to have stress incontinence.  She strains to void.  We had discussed the possibility of needing to self cath with Botox injection.  She is ready to schedule the Botox as she states that the Gemtesa did nothing for her incontinence.  Still has to wear pull ups.      Past surgical history:    1/23/2017 retropubic sling placed by Dr. Ventura   7/2/2019  Stage I SNM (InterStim)with me for incomplete bladder emptying  7/30/2019 Stage II SNM (InterStim)same.  2/28/2023 replaced IPG for InterStim  6/19/2024 cystoscopy with urethral bulking agent worked at first, but then had incontinence   8/7/2024 cystoscopy with urethral bulking agent.  No improvement    REVIEW OF SYSTEMS:    Review of Systems   Constitutional: Negative.    HENT: Negative.     Eyes: Negative.    Respiratory: Negative.     Cardiovascular: Negative.    Gastrointestinal: Negative.    Genitourinary:  Positive for urgency.        Urge incontinence   Musculoskeletal: Negative.    Skin: Negative.    Neurological: Negative.    Endo/Heme/Allergies: Negative.    Psychiatric/Behavioral: Negative.         PATIENT HISTORY:    Past Medical History:   Diagnosis Date    Blood in stool     Constipation     Cystitis     Depression     Diabetes mellitus, type 2     Dysphagia     Endometriosis     GERD (gastroesophageal reflux disease)     Headache     Hypertension     Palpitations     Premature menopause on hormone replacement therapy     Thyroid disease     Weakness 05/31/2019       Past Surgical History:   Procedure Laterality Date    24 HOUR IMPEDANCE PH MONITORING OF ESOPHAGUS IN PATIENT TAKING ACID REDUCING MEDICATIONS N/A  6/2/2022    Procedure: IMPEDANCE PH STUDY, ESOPHAGEAL, 24 HOUR, IN PATIENT TAKING ACID REDUCING MEDICATION;  Surgeon: Debbie Butler MD;  Location: [facility] (4TH F[facility]);  Service: Endoscopy;  Laterality: N/A;  On PPI/H2 Blocker    BLADDER SUSPENSION      CARPAL TUNNEL RELEASE      right    CHOLECYSTECTOMY      COLONOSCOPY N/A 8/30/2016    Procedure: COLONOSCOPY;  Surgeon: Slick Herron MD;  Location: [facility] (4TH F[facility]);  Service: Endoscopy;  Laterality: N/A;  PM prep    COLONOSCOPY N/A 12/22/2021    Procedure: COLONOSCOPY. any CRS;  Surgeon: Maria Ines Jung MD;  Location: [facility] (4TH F[facility]);  Service: Endoscopy;  Laterality: N/A;  fully vaccinated -  scaral stimulator will bring remote - sm   12/17 lvm to confirm appt-rb    CYSTOSCOPY WITH INJECTION OF PERIURETHRAL BULKING AGENT N/A 6/19/2024    Procedure: CYSTOSCOPY, WITH PERIURETHRAL BULKING AGENT INJECTION;  Surgeon: Zena Tee MD;  Location: [facility] OR;  Service: Urology;  Laterality: N/A;  45 minutes    CYSTOSCOPY WITH INJECTION OF PERIURETHRAL BULKING AGENT N/A 7/24/2024    Procedure: CYSTOSCOPY, WITH PERIURETHRAL BULKING AGENT INJECTION;  Surgeon: Zena Tee MD;  Location: [facility] OR;  Service: Urology;  Laterality: N/A;  45 minutes    CYSTOSCOPY WITH INJECTION OF PERIURETHRAL BULKING AGENT N/A 8/7/2024    Procedure: CYSTOSCOPY, WITH PERIURETHRAL BULKING AGENT INJECTION;  Surgeon: Zena Tee MD;  Location: [facility] OR;  Service: Urology;  Laterality: N/A;  45 minutes    ESOPHAGEAL MANOMETRY WITH MEASUREMENT OF IMPEDANCE N/A 11/5/2018    Procedure: MANOMETRY, ESOPHAGUS, WITH IMPEDANCE MEASUREMENT;  Surgeon: Slick Herron MD;  Location: [facility] (4TH F[facility]);  Service: Endoscopy;  Laterality: N/A;    ESOPHAGEAL MANOMETRY WITH MEASUREMENT OF IMPEDANCE N/A 6/2/2022    Procedure: MANOMETRY, ESOPHAGUS, WITH IMPEDANCE MEASUREMENT;  Surgeon: Debbie Butler MD;  Location: [facility] (4TH F[facility]);  Service: Endoscopy;  Laterality: N/A;  fully vaccinated,  bladder stimulator, instr mailed /emailed -ml    ESOPHAGOGASTRODUODENOSCOPY N/A 2020    Procedure: EGD (ESOPHAGOGASTRODUODENOSCOPY);  Surgeon: Slick Herron MD;  Location: [facility] (4TH F[facility]);  Service: Endoscopy;  Laterality: N/A;  pt has cardiology appt on 19-will call back after this appt to schedule-tb    FLUOROSCOPIC URODYNAMIC STUDY N/A 2019    Procedure: URODYNAMIC STUDY, FLUOROSCOPIC;  Surgeon: Zena Tee MD;  Location: [facility] OR 1ST FLR;  Service: Urology;  Laterality: N/A;  1 hour    GALLBLADDER SURGERY      HYSTERECTOMY      Bess velasquez Dr. Champlain    IMPLANTATION OF PERMANENT SACRAL NERVE STIMULATOR N/A 2019    Procedure: INSERTION, NEUROSTIMULATOR, PERMANENT, SACRAL;  Surgeon: Zena Tee MD;  Location: [facility] OR 2ND FLR;  Service: Urology;  Laterality: N/A;  30 min    OOPHORECTOMY      LSO- benign cyst    OOPHORECTOMY      RSO- endo    ooptherectomy      REPLACEMENT OF NERVE STIMULATOR BATTERY N/A 2023    Procedure: Replacement, Battery, Neurostimulator;  Surgeon: Zena Tee MD;  Location: [facility] OR 2ND FLR;  Service: Urology;  Laterality: N/A;  45 min    right knee  scoped    SHOULDER SURGERY  right       Family History   Problem Relation Name Age of Onset    Cervical cancer Maternal Grandmother          unknown age of onset,  at 80    Breast cancer Mother  50        alive at 64, unilateral    Esophageal cancer Mother  63    Ovarian cancer Mother  63    Anesthesia problems Mother      Colon cancer Brother  40    Breast cancer Maternal Aunt  72        alive at 72    Breast cancer Paternal Aunt          unknown age of onset, alive at 70     Social History     Socioeconomic History    Marital status: Single   Tobacco Use    Smoking status: Never    Smokeless tobacco: Never   Substance and Sexual Activity    Alcohol use: No    Drug use: No    Sexual activity: Never     Birth control/protection: None   Social History Narrative    **  Merged History Encounter **          Social Drivers of Health     Financial Resource Strain: Low Risk  (2/21/2024)    Overall Financial Resource Strain (CARDIA)     Difficulty of Paying Living Expenses: Not hard at all   Food Insecurity: No Food Insecurity (2/21/2024)    Hunger Vital Sign     Worried About Running Out of Food in the Last Year: Never true     Ran Out of Food in the Last Year: Never true   Transportation Needs: No Transportation Needs (2/21/2024)    PRAPARE - Transportation     Lack of Transportation (Medical): No     Lack of Transportation (Non-Medical): No   Physical Activity: Sufficiently Active (2/21/2024)    Exercise Vital Sign     Days of Exercise per Week: 3 days     Minutes of Exercise per Session: 60 min   Stress: Stress Concern Present (2/21/2024)    [facility] - Occupational Stress Questionnaire     Feeling of Stress : Very much   Housing Stability: Low Risk  (2/21/2024)    Housing Stability Vital Sign     Unable to Pay for Housing in the Last Year: No     Number of Places Lived in the Last Year: 1     Unstable Housing in the Last Year: No       Allergies:  Patient has no known allergies.    Medications:  Outpatient Encounter Medications as of 10/24/2024   Medication Sig Dispense Refill    ACCU-CHEK AMAURY PLUS TEST STRP Strp USE TO TEST BLOOD GLUCOSE TID  9    ACCU-CHEK SOFTCLIX LANCETS Misc USE TO TEST BLOOD GLUCOSE TID  3    albuterol (VENTOLIN HFA) 90 mcg/actuation inhaler Inhale 2 puffs into the lungs every 6 (six) hours as needed for Wheezing. Rescue 18 g 0    amitriptyline (ELAVIL) 10 MG tablet Take 1 tablet (10 mg total) by mouth nightly. 30 tablet 11    atorvastatin (LIPITOR) 80 MG tablet Take 80 mg by mouth every evening.      BLOOD SUGAR DIAGNOSTIC (ACCU-CHEK AMAURY PLUS TEST STRP MISC) 1 strip by Misc.(Non-Drug; Combo Route) route 3 (three) times daily.      buPROPion (WELLBUTRIN XL) 150 MG TB24 tablet Take 150 mg by mouth every morning.       ciclopirox (PENLAC) 8 % Soln Apply topically nightly. 6.6 mL 11    dexlansoprazole (DEXILANT) 60 mg capsule TAKE 1 CAPSULE(60 MG) BY MOUTH TWICE DAILY 60 capsule 11    diclofenac sodium (VOLTAREN) 1 % Gel Apply 2 g topically 4 (four) times daily. As needed for breast pain 1 Tube 1    dicyclomine (BENTYL) 10 MG capsule TAKE 2 CAPSULES(20 MG) BY MOUTH THREE TIMES DAILY BEFORE MEALS 180 capsule 0    diltiazem HCl (DILTIAZEM 2% CREAM) Apply topically 3 (three) times daily. Apply topically to anal area. 30 g 2    estradioL (ESTRACE) 0.01 % (0.1 mg/gram) vaginal cream Place 1 g vaginally twice a week. 42.5 g 3    FARXIGA 10 mg Tab Take 10 mg by mouth Daily.  7    fluticasone propionate (FLONASE) 50 mcg/actuation nasal spray SHAKE LIQUID AND USE 2 SPRAYS(100 MCG) IN EACH NOSTRIL EVERY DAY 48 g 2    fluticasone-salmeterol diskus inhaler 250-50 mcg Inhale 1 puff into the lungs 2 (two) times daily. Controller 60 each 11    gabapentin (NEURONTIN) 300 MG capsule Take 600 mg (two capsules) in the morning and 900mg (three capsules) at night before bed 150 capsule 11    GLUCOPHAGE 500 mg tablet Take 500 mg by mouth 2 (two) times daily with meals.       levothyroxine (SYNTHROID) 75 MCG tablet Take 75 mcg by mouth before breakfast.      meloxicam (MOBIC) 15 MG tablet Take 15 mg by mouth once daily.      methen-m.blue-s.phos-phsal-hyo (URIBEL) 118-10-40.8-36 mg Cap Take 1 capsule by mouth every 6 (six) hours as needed (bladder pain). 120 capsule 11    metoprolol succinate (TOPROL-XL) 100 MG 24 hr tablet TAKE 1 TABLET(100 MG) BY MOUTH EVERY DAY 90 tablet 3    mometasone 0.1% (ELOCON) 0.1 % cream APPLY TOPICALLY TO THE RASH ON HANDS TWICE DAILY AS NEEDED FOR TWO TO THREE WEEKS. USE SPARINGLY      omega-3 fatty acids/fish oil (FISH OIL-OMEGA-3 FATTY ACIDS) 300-1,000 mg capsule Take 1 capsule by mouth once daily. 90 capsule 3    oxyCODONE (ROXICODONE) 5 MG immediate release tablet Take 1 tablet (5 mg total) by mouth every 4 (four) hours  as needed for Pain. 5 tablet 0    phentermine (ADIPEX-P) 37.5 mg tablet Take 37.5 mg by mouth.      phentermine 37.5 MG capsule TK 1 C PO ONCE D IN THE MORNING      REXULTI 4 mg Tab Take 1 tablet by mouth every evening.      topiramate (TOPAMAX) 50 MG tablet Take 2 tablets (100 mg total) by mouth every evening. 60 tablet 11    traZODone (DESYREL) 100 MG tablet TK 1 T PO HS  1    triamcinolone acetonide 0.5% (KENALOG) 0.5 % Crea Apply topically 2 (two) times daily. 15 g 0    TRULICITY 4.5 mg/0.5 mL pen injector Inject 4.5 mg into the skin every 7 days.      vibegron (GEMTESA) 75 mg Tab Take 1 tablet (75 mg total) by mouth once daily. 30 tablet 11    ZOLOFT 100 mg tablet Take 200 mg by mouth once daily.        Facility-Administered Encounter Medications as of 10/24/2024   Medication Dose Route Frequency Provider Last Rate Last Admin    LIDOcaine HCl 2% urojet   Urethral Once              PHYSICAL EXAMINATION:    The patient generally appears in good health, is appropriately interactive, and is in no apparent distress.    Skin: No lesions.    Mental: Cooperative with normal affect.    Neuro: Grossly intact.    HEENT: Normal. No evidence of lymphadenopathy.    Chest:  normal inspiratory effort.    Abdomen: Soft, non-tender. No masses or organomegaly. Bladder is not palpable. No evidence of flank discomfort. No evidence of inguinal hernia.    Extremities: No clubbing, cyanosis, or edema      LABS:    Lab Results   Component Value Date    BUN 9 09/19/2024    CREATININE 1.0 09/19/2024       UA 1.015, pH 5, 200 glucose (on Farxiga), otherwise, negative.     IMPRESSION:    Medication refractory OAB    PLAN:    1.  For cystoscopy Botox injection of the bladder.  Plan 100 U.   2.  Consent signed  3.  Discontinue Gemtesa since it is ineffective  4.  Also discontinuing Uribel due to history of erosive esophagitis    I spent 30 minutes with the patient of which more than half was spent in direct consultation with the patient in  regards to our treatment and plan.

## 2024-10-28 NOTE — PRE-PROCEDURE INSTRUCTIONS
Pt reviewed by RAQUEL ROSS. OK to proceed at [facility], pending medication review.    Lvm requesting a call back to review meds.

## 2024-10-29 NOTE — PRE-PROCEDURE INSTRUCTIONS
The following was discussed with pt via phone and sent to pt portal. Pt verbalized understanding. Pt to be accompanied by her mother.    Dear Maria Ines ,    You are scheduled for a procedure with Dr. Tee on 10/30/2024. Your scheduled arrival time is 11:35am.  This arrival time is roughly 2 hours before your anticipated procedure time to allow sufficient time for pre-op.  Please wear comfortable clothes.  This procedure will take place at the [facility] at the corner of [address] and [address].  [facility] [place] [address].  The address is:    [address]  [place] [id number]    After entering the building, you will proceed to the second floor where you can check in with registration. You should take any medications that you routinely take for blood pressure (other than those listed below), heart medications, thyroid, cholesterol, etc.     If you wear contact lenses, please wear glasses to your procedure.    Your fasting instructions are as follow:  Nothing to eat after midnight the evening before your surgery. Anesthesia encourages you to drink clear liquids up until 2 hours prior to your arrival time to ensure that you are adequately hydrated. You MUST have a responsible adult to bring you home.      The evening before and morning of your procedure, please hold the following medications:  -Aspirin and Aspirin-containing products (Goody's powder, Excedrin)  -NSAIDs (Advil, Ibuprofen, Aleve, Diclofenac)  -Vitamins/Supplements  -Herbal remedies/Teas  -Stimulants (Adderall, Vyvanse, Adipex)  -Diabetic medication (Please bring with you day of procedure)  -Prescription creams/gels/lotions  -CONTINUE HOLDING TRULICITY  -CONTINUE HOLDING FARXIGA  -GLUCOPHAGE - DO NOT TAKE TONIGHT OR IN THE MORNING  -PHENTERMINE      -May take Tylenol      The evening before and morning of your procedure, take a shower using antibacterial soap (ex: Hibiclens or Dial  antibacterial soap). DO NOT apply deodorant, lotion, cologne, or anything else to the skin. Wear loose, comfortable fitting clothing. Do not wear jewelry or bring any valuables with you. If you wear dentures or contacts, please bring your case with you or leave them at home. Use and bring any inhalers that you may have.    If you have any procedure-specific questions, please call your surgeon's office. Any other questions, don't hesitate to call at [phone].    Thanks,  VANDANA Spann  Pre-Admit Testing  Anesthesia Dept Formerly [facility]

## 2024-10-30 ENCOUNTER — ANESTHESIA (OUTPATIENT)
Dept: SURGERY | Facility: HOSPITAL | Age: 39
End: 2024-10-30
Payer: MEDICARE

## 2024-10-30 ENCOUNTER — TELEPHONE (OUTPATIENT)
Dept: UROLOGY | Facility: HOSPITAL | Age: 39
End: 2024-10-30
Payer: MEDICARE

## 2024-10-30 ENCOUNTER — ANESTHESIA EVENT (OUTPATIENT)
Dept: SURGERY | Facility: HOSPITAL | Age: 39
End: 2024-10-30
Payer: MEDICARE

## 2024-10-30 ENCOUNTER — HOSPITAL ENCOUNTER (OUTPATIENT)
Facility: HOSPITAL | Age: 39
Discharge: HOME | End: 2024-10-30
Attending: UROLOGY | Admitting: UROLOGY
Payer: MEDICARE

## 2024-10-30 VITALS
TEMPERATURE: 98 F | OXYGEN SATURATION: 95 % | HEART RATE: 113 BPM | RESPIRATION RATE: 18 BRPM | WEIGHT: 213 LBS | SYSTOLIC BLOOD PRESSURE: 101 MMHG | DIASTOLIC BLOOD PRESSURE: 75 MMHG | BODY MASS INDEX: 39.2 KG/M2 | HEIGHT: 62 IN

## 2024-10-30 DIAGNOSIS — N32.81 OVERACTIVE BLADDER: ICD-10-CM

## 2024-10-30 LAB
BILIRUBIN, UA POC OHS: NEGATIVE
BLOOD, UA POC OHS: NEGATIVE
CLARITY, UA POC OHS: CLEAR
COLOR, UA POC OHS: YELLOW
GLUCOSE, UA POC OHS: NEGATIVE
KETONES, UA POC OHS: NEGATIVE
LEUKOCYTES, UA POC OHS: NEGATIVE
NITRITE, UA POC OHS: NEGATIVE
PH, UA POC OHS: 5.5
POCT GLUCOSE: 113 MG/DL (ref 70–110)
PROTEIN, UA POC OHS: NEGATIVE
SPECIFIC GRAVITY, UA POC OHS: >=1.03
UROBILINOGEN, UA POC OHS: 0.2

## 2024-10-30 PROCEDURE — 37000008 HC ANESTHESIA 1ST 15 MINUTES: Performed by: UROLOGY

## 2024-10-30 PROCEDURE — 36000706: Performed by: UROLOGY

## 2024-10-30 PROCEDURE — 37000009 HC ANESTHESIA EA ADD 15 MINS: Performed by: UROLOGY

## 2024-10-30 PROCEDURE — A4216 STERILE WATER/SALINE, 10 ML: HCPCS | Performed by: NURSE ANESTHETIST, CERTIFIED REGISTERED

## 2024-10-30 PROCEDURE — 25000003 PHARM REV CODE 250: Performed by: UROLOGY

## 2024-10-30 PROCEDURE — 63600175 PHARM REV CODE 636 W HCPCS: Performed by: UROLOGY

## 2024-10-30 PROCEDURE — 63600175 PHARM REV CODE 636 W HCPCS: Mod: JZ,JG | Performed by: UROLOGY

## 2024-10-30 PROCEDURE — 52287 CYSTOSCOPY CHEMODENERVATION: CPT | Mod: ,,, | Performed by: UROLOGY

## 2024-10-30 PROCEDURE — 36000707: Performed by: UROLOGY

## 2024-10-30 PROCEDURE — 71000033 HC RECOVERY, INTIAL HOUR: Performed by: UROLOGY

## 2024-10-30 PROCEDURE — 63600175 PHARM REV CODE 636 W HCPCS: Performed by: NURSE ANESTHETIST, CERTIFIED REGISTERED

## 2024-10-30 PROCEDURE — 25000003 PHARM REV CODE 250: Performed by: NURSE ANESTHETIST, CERTIFIED REGISTERED

## 2024-10-30 PROCEDURE — 71000015 HC POSTOP RECOV 1ST HR: Performed by: UROLOGY

## 2024-10-30 PROCEDURE — 82962 GLUCOSE BLOOD TEST: CPT | Performed by: UROLOGY

## 2024-10-30 PROCEDURE — 94761 N-INVAS EAR/PLS OXIMETRY MLT: CPT

## 2024-10-30 PROCEDURE — 99900035 HC TECH TIME PER 15 MIN (STAT)

## 2024-10-30 RX ORDER — DEXMEDETOMIDINE HYDROCHLORIDE 100 UG/ML
INJECTION, SOLUTION INTRAVENOUS
Status: DISCONTINUED | OUTPATIENT
Start: 2024-10-30 | End: 2024-10-30

## 2024-10-30 RX ORDER — MIDAZOLAM HYDROCHLORIDE 1 MG/ML
INJECTION INTRAMUSCULAR; INTRAVENOUS
Status: DISCONTINUED | OUTPATIENT
Start: 2024-10-30 | End: 2024-10-30

## 2024-10-30 RX ORDER — CEFAZOLIN 2 G/1
2 INJECTION, POWDER, FOR SOLUTION INTRAMUSCULAR; INTRAVENOUS ONCE
Status: COMPLETED | OUTPATIENT
Start: 2024-10-30 | End: 2024-10-30

## 2024-10-30 RX ORDER — HYDROMORPHONE HYDROCHLORIDE 1 MG/ML
0.2 INJECTION, SOLUTION INTRAMUSCULAR; INTRAVENOUS; SUBCUTANEOUS EVERY 5 MIN PRN
Status: DISCONTINUED | OUTPATIENT
Start: 2024-10-30 | End: 2024-10-30 | Stop reason: HOSPADM

## 2024-10-30 RX ORDER — GLUCAGON 1 MG
1 KIT INJECTION
Status: DISCONTINUED | OUTPATIENT
Start: 2024-10-30 | End: 2024-10-30 | Stop reason: HOSPADM

## 2024-10-30 RX ORDER — OXYCODONE HYDROCHLORIDE 5 MG/1
10 TABLET ORAL EVERY 4 HOURS PRN
Status: DISCONTINUED | OUTPATIENT
Start: 2024-10-30 | End: 2024-10-30 | Stop reason: HOSPADM

## 2024-10-30 RX ORDER — SODIUM CHLORIDE 0.9 % (FLUSH) 0.9 %
SYRINGE (ML) INJECTION
Status: DISCONTINUED | OUTPATIENT
Start: 2024-10-30 | End: 2024-10-30

## 2024-10-30 RX ORDER — LIDOCAINE HYDROCHLORIDE 20 MG/ML
JELLY TOPICAL
Status: DISCONTINUED | OUTPATIENT
Start: 2024-10-30 | End: 2024-10-30 | Stop reason: HOSPADM

## 2024-10-30 RX ORDER — MEPERIDINE HYDROCHLORIDE 50 MG/ML
12.5 INJECTION INTRAMUSCULAR; INTRAVENOUS; SUBCUTANEOUS ONCE AS NEEDED
Status: DISCONTINUED | OUTPATIENT
Start: 2024-10-30 | End: 2024-10-30 | Stop reason: HOSPADM

## 2024-10-30 RX ORDER — CEPHALEXIN 500 MG/1
500 CAPSULE ORAL 3 TIMES DAILY
Qty: 9 CAPSULE | Refills: 0 | Status: SHIPPED | OUTPATIENT
Start: 2024-10-30 | End: 2024-11-02

## 2024-10-30 RX ORDER — PROCHLORPERAZINE EDISYLATE 5 MG/ML
5 INJECTION INTRAMUSCULAR; INTRAVENOUS EVERY 30 MIN PRN
Status: DISCONTINUED | OUTPATIENT
Start: 2024-10-30 | End: 2024-10-30 | Stop reason: HOSPADM

## 2024-10-30 RX ORDER — OXYCODONE HYDROCHLORIDE 5 MG/1
5 TABLET ORAL
Status: DISCONTINUED | OUTPATIENT
Start: 2024-10-30 | End: 2024-10-30 | Stop reason: HOSPADM

## 2024-10-30 RX ORDER — LIDOCAINE HYDROCHLORIDE 10 MG/ML
1 INJECTION, SOLUTION EPIDURAL; INFILTRATION; INTRACAUDAL; PERINEURAL ONCE AS NEEDED
Status: DISCONTINUED | OUTPATIENT
Start: 2024-10-30 | End: 2024-10-30 | Stop reason: HOSPADM

## 2024-10-30 RX ORDER — PROPOFOL 10 MG/ML
VIAL (ML) INTRAVENOUS
Status: DISCONTINUED | OUTPATIENT
Start: 2024-10-30 | End: 2024-10-30

## 2024-10-30 RX ORDER — ONDANSETRON HYDROCHLORIDE 2 MG/ML
4 INJECTION, SOLUTION INTRAVENOUS DAILY PRN
Status: DISCONTINUED | OUTPATIENT
Start: 2024-10-30 | End: 2024-10-30 | Stop reason: HOSPADM

## 2024-10-30 RX ORDER — LIDOCAINE HYDROCHLORIDE 20 MG/ML
INJECTION INTRAVENOUS
Status: DISCONTINUED | OUTPATIENT
Start: 2024-10-30 | End: 2024-10-30

## 2024-10-30 RX ORDER — SEMAGLUTIDE 0.68 MG/ML
INJECTION, SOLUTION SUBCUTANEOUS
COMMUNITY
Start: 2024-10-01 | End: 2025-03-30

## 2024-10-30 RX ADMIN — PROPOFOL 50 MG: 10 INJECTION, EMULSION INTRAVENOUS at 12:10

## 2024-10-30 RX ADMIN — DEXMEDETOMIDINE HYDROCHLORIDE 16 MCG: 100 INJECTION, SOLUTION INTRAVENOUS at 12:10

## 2024-10-30 RX ADMIN — MIDAZOLAM HYDROCHLORIDE 2 MG: 1 INJECTION, SOLUTION INTRAMUSCULAR; INTRAVENOUS at 12:10

## 2024-10-30 RX ADMIN — CEFAZOLIN 2 G: 2 INJECTION, POWDER, FOR SOLUTION INTRAMUSCULAR; INTRAVENOUS at 12:10

## 2024-10-30 RX ADMIN — SODIUM CHLORIDE 10 ML: 9 INJECTION, SOLUTION INTRAMUSCULAR; INTRAVENOUS; SUBCUTANEOUS at 01:10

## 2024-10-30 RX ADMIN — GLYCOPYRROLATE 0.2 MG: 0.2 INJECTION, SOLUTION INTRAMUSCULAR; INTRAVENOUS at 12:10

## 2024-10-30 RX ADMIN — LIDOCAINE HYDROCHLORIDE 100 MG: 20 INJECTION INTRAVENOUS at 12:10

## 2024-10-30 RX ADMIN — PROPOFOL 100 MCG/KG/MIN: 10 INJECTION, EMULSION INTRAVENOUS at 12:10

## 2024-10-30 NOTE — PLAN OF CARE
Pt arrived to recovery dosc via stretcher per OR team. Bedside report received. Pt attached to bedside monitor. VSS. Pt _sedated__ post procedure. Pt on _6___L of oxygen via _simple face mask__; oxygen sats _100___%. Pt IV access saline locked. Will continue to monitor.

## 2024-10-30 NOTE — DISCHARGE INSTRUCTIONS
ACTIVITY  There are no restrictions in activity. Start doing again the things you did before the procedure.  You may experience a slight burning sensation. You may notice a small amount of blood in your urine. This will clear up within a day. Call the clinic if this continues beyond 48 hours.     DIET  Continue your normal diet. You may eat the same foods you ate before your procedure.  Drink plenty of fluids during the first 24-48 hours following your procedure.     MEDICATIONS  Resume all other previous medications from your prescribing physician.  Continue any pre-procedure antibiotics until they are all gone.     SIGNS AND SYMPTOMS TO REPORT TO THE DOCTOR  Chills or fever greater than 101° F within 24 hours of procedure.  Changes in urination, such as increased bleeding, foul smell, cloudy urine, or painful urination.  Call your doctor with any questions or concerns.     For any emergency situation, call 911 immediately or go to your nearest emergency room.

## 2024-10-30 NOTE — INTERVAL H&P NOTE
The patient has been examined and the H&P has been reviewed:    I concur with the findings and no changes have occurred since H&P was written.    Anesthesia/Surgery risks, benefits and alternative options discussed and understood by patient/family.    UA today 1.030, pH 5.5, 0.2 urobilinogen, otherwise, negative.     Proceed with planned procedure.       There are no hospital problems to display for this patient.

## 2024-10-30 NOTE — OP NOTE
[facility]  Operative Note    Date: 10/30/2024    Pre-Op Diagnosis: Overactive bladder    Post-Op Diagnosis: same    Procedure(s) Performed:   1.  Cystoscopy with bladder botox injection    Specimen(s): none    Staff Surgeon: Zena Tee MD    Anesthesia: General mask inhalational anesthesia    Indications: Maria Ines Ac is a 39 y.o. female with OAB status post SNM placement with ongoing incontinence.  She underwent urethral bulking agent.  When that did not help with the incontinence, urodynamics were performed and she was found to have a small bladder capacity, (capacity was 243 ml) and she emptied well.  She tried Gemtesa but it was not effective (she had been tried on several different medications previously.) she then consented to Botox injection of the bladder.     Findings: bladder was without lesions.  100 U botox injected throughout the bladder.  She has a small cystocele just behind the bladder neck.      Estimated Blood Loss: min    Antibiotics:  Ancef 2 g IV    Procedure in Detail:  After informed consent was obtained the patient was brought to the cystoscopy suite and placed in the supine position.  SCDs were applied and working.  Anesthesia was administered.  When the patient was adequately sedated she was placed in the dorsal lithotomy position and prepped and draped in the usual sterile fashion.      A rigid cystoscope in a 22 Fr sheath was introduced into the patients's bladder via the urethra.  This passed easily.  Formal cystoscopy was performed which revealed the ureteral orifices in their normal anatomic position bilaterally.  The bladder was noted to be free of any lesions.    100 units of botox was injected into the detrusor muscle throughout the bladder.  Good wheals were raised.  The patient's bladder was drained and the cystoscope was removed.     The patient tolerated the procedure well and was transferred to the recovery room in stable condition.       Disposition:  The patient will follow up with Dr. Tee in 2 weeks. She is wiling to self-cath should she have any issues with retention.      Zena Tee MD

## 2024-10-30 NOTE — DISCHARGE SUMMARY
[facility]  Discharge Note  Short Stay    Procedure(s) (LRB):  CYSTOSCOPY,WITH BOTULINUM TOXIN INJECTION (N/A)      OUTCOME: Patient tolerated treatment/procedure well without complication and is now ready for discharge.    DISPOSITION: Home or Self Care    FINAL DIAGNOSIS: overactive bladder    FOLLOWUP: In clinic in 2 weeks.     DISCHARGE INSTRUCTIONS:       ACTIVITY  There are no restrictions in activity. Start doing again the things you did before the procedure.  You may experience a slight burning sensation. You may notice a small amount of blood in your urine. This will clear up within a day. Call the clinic if this continues beyond 48 hours.     DIET  Continue your normal diet. You may eat the same foods you ate before your procedure.  Drink plenty of fluids during the first 24-48 hours following your procedure.     MEDICATIONS  Resume all other previous medications from your prescribing physician.  Continue any pre-procedure antibiotics until they are all gone.     SIGNS AND SYMPTOMS TO REPORT TO THE DOCTOR  Chills or fever greater than 101° F within 24 hours of procedure.  Changes in urination, such as increased bleeding, foul smell, cloudy urine, or painful urination.  Call your doctor with any questions or concerns.     For any emergency situation, call 911 immediately or go to your nearest emergency room.         TIME SPENT ON DISCHARGE: 10 minutes

## 2024-11-11 ENCOUNTER — OFFICE VISIT (OUTPATIENT)
Dept: UROLOGY | Facility: CLINIC | Age: 39
End: 2024-11-11
Payer: MEDICARE

## 2024-11-11 VITALS
BODY MASS INDEX: 38.35 KG/M2 | HEART RATE: 106 BPM | DIASTOLIC BLOOD PRESSURE: 77 MMHG | WEIGHT: 209.69 LBS | SYSTOLIC BLOOD PRESSURE: 111 MMHG

## 2024-11-11 DIAGNOSIS — Z98.890 POST-OPERATIVE STATE: Primary | ICD-10-CM

## 2024-11-11 DIAGNOSIS — N32.81 OAB (OVERACTIVE BLADDER): ICD-10-CM

## 2024-11-11 PROCEDURE — 81002 URINALYSIS NONAUTO W/O SCOPE: CPT | Mod: S$GLB,,, | Performed by: UROLOGY

## 2024-11-11 PROCEDURE — 3044F HG A1C LEVEL LT 7.0%: CPT | Mod: CPTII,S$GLB,, | Performed by: UROLOGY

## 2024-11-11 PROCEDURE — 3078F DIAST BP <80 MM HG: CPT | Mod: CPTII,S$GLB,, | Performed by: UROLOGY

## 2024-11-11 PROCEDURE — 99999 PR PBB SHADOW E&M-EST. PATIENT-LVL IV: CPT | Mod: PBBFAC,,, | Performed by: UROLOGY

## 2024-11-11 PROCEDURE — 3072F LOW RISK FOR RETINOPATHY: CPT | Mod: CPTII,S$GLB,, | Performed by: UROLOGY

## 2024-11-11 PROCEDURE — 51701 INSERT BLADDER CATHETER: CPT | Mod: S$GLB,,, | Performed by: UROLOGY

## 2024-11-11 PROCEDURE — 3074F SYST BP LT 130 MM HG: CPT | Mod: CPTII,S$GLB,, | Performed by: UROLOGY

## 2024-11-11 PROCEDURE — 99024 POSTOP FOLLOW-UP VISIT: CPT | Mod: S$GLB,,, | Performed by: UROLOGY

## 2024-11-11 PROCEDURE — 1159F MED LIST DOCD IN RCRD: CPT | Mod: CPTII,S$GLB,, | Performed by: UROLOGY

## 2024-11-11 NOTE — PROGRESS NOTES
Instructed Ms. Ac on Self-catherization to which she responded well and was successful on her second attempt.  Stressed the importance of hand-washing, the correct way to wipe and clean the perineal area and the importance of not touching the exposed catheter.  I also explained to her how bacteria are introduced into the urethra by attempting multiple times with the same catheter.  She voiced understanding through the whole procedure and after the successful attempt stated she didn't need to try further and was comfortable with the process of CIC.  I encouraged her to call us should she have any questions to which she said she will.

## 2024-11-11 NOTE — PROGRESS NOTES
CHIEF COMPLAINT:    Mrs. Ac is a 39 y.o. female presenting for a post op visit, following cystoscopy Botox injection of the bladder on 10/30/2024.  PRESENTING ILLNESS:    Maria Ines Ac is a 39 y.o. female who returns stating that she is no better.  She has lower abdominal pain, has been incontinent and feels like she cannot empty her bladder.        Past Surgical History:   Procedure Laterality Date    24 HOUR IMPEDANCE PH MONITORING OF ESOPHAGUS IN PATIENT TAKING ACID REDUCING MEDICATIONS N/A 6/2/2022    Procedure: IMPEDANCE PH STUDY, ESOPHAGEAL, 24 HOUR, IN PATIENT TAKING ACID REDUCING MEDICATION;  Surgeon: Debbie Butler MD;  [facility]  Service: Endoscopy;  Laterality: N/A;  On PPI/H2 Blocker    BLADDER SUSPENSION      CARPAL TUNNEL RELEASE      right    CHOLECYSTECTOMY      COLONOSCOPY N/A 8/30/2016    Procedure: COLONOSCOPY;  Surgeon: Slick Herron MD;  [facility]  Service: Endoscopy;  Laterality: N/A;  PM prep    COLONOSCOPY N/A 12/22/2021    Procedure: COLONOSCOPY. any CRS;  Surgeon: Maria Ines Jung MD;  Location: [facility] ENDO (4TH F[facility]);  Service: Endoscopy;  Laterality: N/A;  fully vaccinated -  scaral stimulator will bring remote -    12/17 lvm to confirm appt-rb    CYSTOSCOPY WITH INJECTION OF PERIURETHRAL BULKING AGENT N/A 6/19/2024    Procedure: CYSTOSCOPY, WITH PERIURETHRAL BULKING AGENT INJECTION;  Surgeon: Zena Tee MD;  Location: [facility] OR;  Service: Urology;  Laterality: N/A;  45 minutes    CYSTOSCOPY WITH INJECTION OF PERIURETHRAL BULKING AGENT N/A 7/24/2024    Procedure: CYSTOSCOPY, WITH PERIURETHRAL BULKING AGENT INJECTION;  Surgeon: Zena Tee MD;  Location: [facility] OR;  Service: Urology;  Laterality: N/A;  45 minutes    CYSTOSCOPY WITH INJECTION OF PERIURETHRAL BULKING AGENT N/A 8/7/2024    Procedure: CYSTOSCOPY, WITH PERIURETHRAL BULKING AGENT INJECTION;  Surgeon: Zena Tee MD;  Location: [facility] OR;  Service: Urology;   Laterality: N/A;  45 minutes    CYSTOSCOPY,WITH BOTULINUM TOXIN INJECTION N/A 10/30/2024    Procedure: CYSTOSCOPY,WITH BOTULINUM TOXIN INJECTION;  Surgeon: Zena Tee MD;  Location: [facility];  Service: Urology;  Laterality: N/A;  100 units    ESOPHAGEAL MANOMETRY WITH MEASUREMENT OF IMPEDANCE N/A 11/5/2018    Procedure: MANOMETRY, ESOPHAGUS, WITH IMPEDANCE MEASUREMENT;  Surgeon: Slick Herron MD;  Location: [facility] ENDO (4TH F[facility]);  Service: Endoscopy;  Laterality: N/A;    ESOPHAGEAL MANOMETRY WITH MEASUREMENT OF IMPEDANCE N/A 6/2/2022    Procedure: MANOMETRY, ESOPHAGUS, WITH IMPEDANCE MEASUREMENT;  Surgeon: Debbie Butler MD;  Location: [facility] ENDO (4TH F[facility]);  Service: Endoscopy;  Laterality: N/A;  fully vaccinated, bladder stimulator, instr mailed /emailed -ml    ESOPHAGOGASTRODUODENOSCOPY N/A 2/13/2020    Procedure: EGD (ESOPHAGOGASTRODUODENOSCOPY);  Surgeon: Slick Herron MD;  Location: [facility] (4TH F[facility]);  Service: Endoscopy;  Laterality: N/A;  pt has cardiology appt on 1/6/19-will call back after this appt to schedule-tb    FLUOROSCOPIC URODYNAMIC STUDY N/A 5/23/2019    Procedure: URODYNAMIC STUDY, FLUOROSCOPIC;  Surgeon: Zena Tee MD;  Location: [facility] 1ST FLR;  Service: Urology;  Laterality: N/A;  1 hour    GALLBLADDER SURGERY      HYSTERECTOMY  2013    Bess velasquez Dr. Champlain    IMPLANTATION OF PERMANENT SACRAL NERVE STIMULATOR N/A 7/30/2019    Procedure: INSERTION, NEUROSTIMULATOR, PERMANENT, SACRAL;  Surgeon: Zena Tee MD;  Location: [facility] OR 2ND FLR;  Service: Urology;  Laterality: N/A;  30 min    OOPHORECTOMY  2005    [facility]- benign cyst    OOPHORECTOMY  2013    [facility]- endo    ooptherectomy      REPLACEMENT OF NERVE STIMULATOR BATTERY N/A 2/28/2023    Procedure: Replacement, Battery, Neurostimulator;  Surgeon: Zena Tee MD;  Location: [facility] OR 2ND FLR;  Service: Urology;  Laterality: N/A;  45 min    right knee  scoped    SHOULDER SURGERY  right       Allergies:  Patient  has no known allergies.    Medications:  Outpatient Encounter Medications as of 2024   Medication Sig Dispense Refill    ACCU-CHEK AMAURY PLUS TEST STRP Strp USE TO TEST BLOOD GLUCOSE TID  9    ACCU-CHEK SOFTCLIX LANCETS Misc USE TO TEST BLOOD GLUCOSE TID  3    albuterol (VENTOLIN HFA) 90 mcg/actuation inhaler Inhale 2 puffs into the lungs every 6 (six) hours as needed for Wheezing. Rescue 18 g 0    amitriptyline (ELAVIL) 10 MG tablet Take 1 tablet (10 mg total) by mouth nightly. 30 tablet 11    atorvastatin (LIPITOR) 80 MG tablet Take 80 mg by mouth every evening.      BLOOD SUGAR DIAGNOSTIC (ACCU-CHEK AMAURY PLUS TEST STRP MISC) 1 strip by Misc.(Non-Drug; Combo Route) route 3 (three) times daily.      buPROPion (WELLBUTRIN XL) 150 MG TB24 tablet Take 150 mg by mouth every morning.      [] cephALEXin (KEFLEX) 500 MG capsule Take 1 capsule (500 mg total) by mouth 3 (three) times daily. for 3 days 9 capsule 0    ciclopirox (PENLAC) 8 % Soln Apply topically nightly. 6.6 mL 11    dexlansoprazole (DEXILANT) 60 mg capsule TAKE 1 CAPSULE(60 MG) BY MOUTH TWICE DAILY 60 capsule 11    diclofenac sodium (VOLTAREN) 1 % Gel Apply 2 g topically 4 (four) times daily. As needed for breast pain 1 Tube 1    dicyclomine (BENTYL) 10 MG capsule TAKE 2 CAPSULES(20 MG) BY MOUTH THREE TIMES DAILY BEFORE MEALS 180 capsule 0    diltiazem HCl (DILTIAZEM 2% CREAM) Apply topically 3 (three) times daily. Apply topically to anal area. 30 g 2    estradioL (ESTRACE) 0.01 % (0.1 mg/gram) vaginal cream Place 1 g vaginally twice a week. 42.5 g 3    FARXIGA 10 mg Tab Take 10 mg by mouth Daily.  7    fluticasone propionate (FLONASE) 50 mcg/actuation nasal spray SHAKE LIQUID AND USE 2 SPRAYS(100 MCG) IN EACH NOSTRIL EVERY DAY 48 g 2    fluticasone-salmeterol diskus inhaler 250-50 mcg Inhale 1 puff into the lungs 2 (two) times daily. Controller 60 each 11    gabapentin (NEURONTIN) 300 MG capsule Take 600 mg (two capsules) in the morning and  900mg (three capsules) at night before bed 150 capsule 11    GLUCOPHAGE 500 mg tablet Take 500 mg by mouth 2 (two) times daily with meals.       levothyroxine (SYNTHROID) 75 MCG tablet Take 75 mcg by mouth before breakfast.      meloxicam (MOBIC) 15 MG tablet Take 15 mg by mouth once daily.      metoprolol succinate (TOPROL-XL) 100 MG 24 hr tablet TAKE 1 TABLET(100 MG) BY MOUTH EVERY DAY 90 tablet 3    mometasone 0.1% (ELOCON) 0.1 % cream APPLY TOPICALLY TO THE RASH ON HANDS TWICE DAILY AS NEEDED FOR TWO TO THREE WEEKS. USE SPARINGLY      omega-3 fatty acids/fish oil (FISH OIL-OMEGA-3 FATTY ACIDS) 300-1,000 mg capsule Take 1 capsule by mouth once daily. 90 capsule 3    oxyCODONE (ROXICODONE) 5 MG immediate release tablet Take 1 tablet (5 mg total) by mouth every 4 (four) hours as needed for Pain. 5 tablet 0    OZEMPIC 0.25 mg or 0.5 mg (2 mg/3 mL) pen injector 0.5MG Subcutaneous WEEKLY for 30 days      phentermine (ADIPEX-P) 37.5 mg tablet Take 37.5 mg by mouth.      phentermine 37.5 MG capsule TK 1 C PO ONCE D IN THE MORNING      REXULTI 4 mg Tab Take 1 tablet by mouth every evening.      topiramate (TOPAMAX) 50 MG tablet Take 2 tablets (100 mg total) by mouth every evening. 60 tablet 11    traZODone (DESYREL) 100 MG tablet TK 1 T PO HS  1    triamcinolone acetonide 0.5% (KENALOG) 0.5 % Crea Apply topically 2 (two) times daily. 15 g 0    TRULICITY 4.5 mg/0.5 mL pen injector Inject 4.5 mg into the skin every 7 days.      ZOLOFT 100 mg tablet Take 200 mg by mouth once daily.        Facility-Administered Encounter Medications as of 11/11/2024   Medication Dose Route Frequency Provider Last Rate Last Admin    LIDOcaine HCl 2% urojet   Urethral Once              PHYSICAL EXAMINATION:    The patient generally appears in good health, is appropriately interactive, and is in no apparent distress.    Skin: No lesions.    Mental: Cooperative with normal affect.    Neuro: Grossly intact.    HEENT: Normal. No evidence of  lymphadenopathy.    Chest:  normal inspiratory effort.    Abdomen:  Soft, non-tender. No masses or organomegaly. Bladder is not palpable. No evidence of flank discomfort. No evidence of inguinal hernia.    Extremities: No clubbing, cyanosis, or edema    NOTE:  the exam was carried out with a nurse chaperone present  Normal external female genitalia  Urethral meatus is normal  Urethra and bladder are nontender to bimanual exam  Well supported anteriorly and posteriorly   Uterus and cervix are normal  No adnexal masses  PVR by catheterization was 600 ml    LABS:    UA 1.015, pH 5, tr protein, 1000 glucose, (On Farxiga) otherwise, negative    IMPRESSION:    Post op state    PLAN:    1.  Discussed CIC.  She is willing to do so.  Will start with 5 times a day.    2.  Follow up in 2 weeks to see how she is doing with the CIC.

## 2024-11-22 ENCOUNTER — TELEPHONE (OUTPATIENT)
Dept: PODIATRY | Facility: CLINIC | Age: 39
End: 2024-11-22
Payer: MEDICARE

## 2024-11-22 NOTE — TELEPHONE ENCOUNTER
Call pt. To confirm appointment with Dr. Fairchild on 11/25/2024. Pt. Did not answer, I left detailed voicemail with date and time.

## 2024-11-25 ENCOUNTER — OFFICE VISIT (OUTPATIENT)
Dept: PODIATRY | Facility: CLINIC | Age: 39
End: 2024-11-25
Payer: MEDICARE

## 2024-11-25 VITALS
HEART RATE: 101 BPM | HEIGHT: 62 IN | DIASTOLIC BLOOD PRESSURE: 83 MMHG | BODY MASS INDEX: 37.81 KG/M2 | WEIGHT: 205.5 LBS | SYSTOLIC BLOOD PRESSURE: 128 MMHG

## 2024-11-25 DIAGNOSIS — L84 CORN OR CALLUS: ICD-10-CM

## 2024-11-25 DIAGNOSIS — E11.42 DIABETIC POLYNEUROPATHY ASSOCIATED WITH TYPE 2 DIABETES MELLITUS: Primary | ICD-10-CM

## 2024-11-25 DIAGNOSIS — B35.1 ONYCHOMYCOSIS DUE TO DERMATOPHYTE: ICD-10-CM

## 2024-11-25 PROCEDURE — 11721 DEBRIDE NAIL 6 OR MORE: CPT | Mod: Q9,59,HCNC,S$GLB | Performed by: PODIATRIST

## 2024-11-25 PROCEDURE — 11056 PARNG/CUTG B9 HYPRKR LES 2-4: CPT | Mod: Q9,HCNC,S$GLB, | Performed by: PODIATRIST

## 2024-11-25 PROCEDURE — 99999 PR PBB SHADOW E&M-EST. PATIENT-LVL IV: CPT | Mod: PBBFAC,HCNC,, | Performed by: PODIATRIST

## 2024-11-25 PROCEDURE — 99499 UNLISTED E&M SERVICE: CPT | Mod: HCNC,S$GLB,, | Performed by: PODIATRIST

## 2024-11-25 RX ORDER — VIBEGRON 75 MG/1
TABLET, FILM COATED ORAL
COMMUNITY
Start: 2024-10-29

## 2024-11-25 NOTE — PROGRESS NOTES
Subjective:      Patient ID: Maria Ines Ac is a 39 y.o. female.    Chief Complaint: Diabetic Foot Exam (9/17/24 - Luann Raymond MD, PCP)    Maria Ines is a 39 y.o. female who presents to the clinic for evaluation and treatment of high risk feet. Maria Ines has a past medical history of Blood in stool, Constipation, Cystitis, Depression, Diabetes mellitus, type 2, Dysphagia, Endometriosis, GERD (gastroesophageal reflux disease), Headache, Hypertension, Palpitations, Premature menopause on hormone replacement therapy, Thyroid disease, and Weakness (05/31/2019). The patient's chief complaint is long, thick toenails.     This patient has documented high risk feet requiring routine maintenance secondary to peripheral neuropathy.    PCP: Luann Raymond MD    Date Last Seen by PCP:   Chief Complaint   Patient presents with    Diabetic Foot Exam     9/17/24 - Luann Raymond MD, PCP       Current shoe gear:  Affected Foot: Tennis shoes      Unaffected Foot: Tennis shoes    Hemoglobin A1C   Date Value Ref Range Status   09/19/2024 5.3 4.0 - 5.6 % Final     Comment:     ADA Screening Guidelines:  5.7-6.4%  Consistent with prediabetes  >or=6.5%  Consistent with diabetes    High levels of fetal hemoglobin interfere with the HbA1C  assay. Heterozygous hemoglobin variants (HbS, HgC, etc)do  not significantly interfere with this assay.   However, presence of multiple variants may affect accuracy.     03/15/2024 5.6 4.0 - 5.6 % Final     Comment:     ADA Screening Guidelines:  5.7-6.4%  Consistent with prediabetes  >or=6.5%  Consistent with diabetes    High levels of fetal hemoglobin interfere with the HbA1C  assay. Heterozygous hemoglobin variants (HbS, HgC, etc)do  not significantly interfere with this assay.   However, presence of multiple variants may affect accuracy.     11/14/2023 5.7 (H) 4.0 - 5.6 % Final     Comment:     ADA Screening Guidelines:  5.7-6.4%  Consistent with prediabetes  >or=6.5%  Consistent  with diabetes    High levels of fetal hemoglobin interfere with the HbA1C  assay. Heterozygous hemoglobin variants (HbS, HgC, etc)do  not significantly interfere with this assay.   However, presence of multiple variants may affect accuracy.         Review of Systems   Constitutional: Negative for chills, fever and malaise/fatigue.   HENT:  Negative for hearing loss.    Cardiovascular:  Negative for claudication.   Respiratory:  Negative for shortness of breath.    Skin:  Positive for color change, dry skin and nail changes. Negative for flushing and rash.   Musculoskeletal:  Negative for joint pain and myalgias.   Gastrointestinal:  Negative for nausea and vomiting.   Neurological:  Positive for numbness, paresthesias and sensory change. Negative for loss of balance.   Psychiatric/Behavioral:  Negative for altered mental status.    Allergic/Immunologic: Negative for hives.           Objective:      Physical Exam  Vitals reviewed.   Cardiovascular:      Pulses:           Dorsalis pedis pulses are 2+ on the right side and 2+ on the left side.        Posterior tibial pulses are 2+ on the right side and 2+ on the left side.      Comments: No edema noted to b/L LEs  Musculoskeletal:      Comments: Adequate joint ROM noted to all lower extremity muscle groups with no pain or crepitation noted. Muscle strength is 5/5 in all groups bilaterally.     Feet:      Right foot:      Protective Sensation: 5 sites tested.  0 sites sensed.      Left foot:      Protective Sensation: 5 sites tested.  0 sites sensed.   Skin:     General: Skin is warm and dry.      Capillary Refill: Capillary refill takes 2 to 3 seconds.      Comments: Normal skin tugor noted.   No open lesion noted b/L  Skin temp is warm to warm from proximal to distal b/L.  Webspaces clean, dry, and intact  Xerosis Bilaterally. No open lesions noted.   HKL noted to left 5th digit x2   Neurological:      Mental Status: She is alert and oriented to person, place, and  time.      Comments: Intact gross sensation noted to b/L LEs         Nails x10 are elongated by  5-8mm's, thickened by 1-2 mm's, dystrophic, and are yellowish in  coloration . Xerosis Bilaterally. No open lesions noted.             Assessment:       Encounter Diagnoses   Name Primary?    Diabetic polyneuropathy associated with type 2 diabetes mellitus Yes    Onychomycosis due to dermatophyte     Corn or callus          Plan:       Maria Ines was seen today for diabetic foot exam.    Diagnoses and all orders for this visit:    Diabetic polyneuropathy associated with type 2 diabetes mellitus    Onychomycosis due to dermatophyte    Corn or callus      I counseled the patient on her conditions, their implications and medical management.      - Shoe inspection. Diabetic Foot Education. Patient reminded of the importance of good nutrition and blood sugar control to help prevent podiatric complications of diabetes. Patient instructed on proper foot hygeine. We discussed wearing proper shoe gear, daily foot inspections, never walking without protective shoe gear, never putting sharp instruments to feet, routine podiatric nail visits every 2-3 months.      After cleansing with an alcohol prep pad, the about mentioned hyperkeratotic lesions were sharply debrided X 2 utilizing a #15 blade to a smooth base without incident. Pt tolerated the procedure well and reported comfort to the debarment sites. Pt will continue to use padding and moisture the callused areas.    - With patient's permission, nails were aggressively reduced and debrided x 10 to their soft tissue attachment mechanically and with electric , removing all offending nail and debris. Patient relates relief following the procedure. She will continue to monitor the areas daily, inspect her feet, wear protective shoe gear when ambulatory, moisturizer to maintain skin integrity and follow in this office in approximately 2-3 months, sooner p.r.n.

## 2024-11-27 ENCOUNTER — TELEPHONE (OUTPATIENT)
Dept: UROLOGY | Facility: CLINIC | Age: 39
End: 2024-11-27
Payer: MEDICARE

## 2024-11-27 NOTE — TELEPHONE ENCOUNTER
11/27/24 @ 2:15 pm called patient to R/S gricel with Dr. Tee for a later time same day no answer left message for patient to call back

## 2024-11-29 ENCOUNTER — TELEPHONE (OUTPATIENT)
Dept: UROLOGY | Facility: CLINIC | Age: 39
End: 2024-11-29
Payer: MEDICARE

## 2024-11-29 NOTE — TELEPHONE ENCOUNTER
Called pt twice and left two voicemails informing that her appt needs to be r/s to the afternoon. Informed pt to call back at [phone]

## 2024-12-02 ENCOUNTER — OFFICE VISIT (OUTPATIENT)
Dept: UROLOGY | Facility: CLINIC | Age: 39
End: 2024-12-02
Payer: MEDICARE

## 2024-12-02 VITALS
HEART RATE: 89 BPM | BODY MASS INDEX: 38.31 KG/M2 | SYSTOLIC BLOOD PRESSURE: 123 MMHG | WEIGHT: 209.44 LBS | DIASTOLIC BLOOD PRESSURE: 83 MMHG

## 2024-12-02 DIAGNOSIS — R82.90 ABNORMAL URINALYSIS: ICD-10-CM

## 2024-12-02 DIAGNOSIS — N32.81 OAB (OVERACTIVE BLADDER): Primary | ICD-10-CM

## 2024-12-02 DIAGNOSIS — N30.90 BLADDER INFECTION: ICD-10-CM

## 2024-12-02 PROCEDURE — 51701 INSERT BLADDER CATHETER: CPT | Mod: S$GLB,,, | Performed by: UROLOGY

## 2024-12-02 PROCEDURE — 81002 URINALYSIS NONAUTO W/O SCOPE: CPT | Mod: S$GLB,,, | Performed by: UROLOGY

## 2024-12-02 PROCEDURE — 87086 URINE CULTURE/COLONY COUNT: CPT | Mod: HCNC | Performed by: UROLOGY

## 2024-12-02 PROCEDURE — 1159F MED LIST DOCD IN RCRD: CPT | Mod: CPTII,S$GLB,, | Performed by: UROLOGY

## 2024-12-02 PROCEDURE — 3074F SYST BP LT 130 MM HG: CPT | Mod: CPTII,S$GLB,, | Performed by: UROLOGY

## 2024-12-02 PROCEDURE — 99214 OFFICE O/P EST MOD 30 MIN: CPT | Mod: 25,S$GLB,, | Performed by: UROLOGY

## 2024-12-02 PROCEDURE — 3072F LOW RISK FOR RETINOPATHY: CPT | Mod: CPTII,S$GLB,, | Performed by: UROLOGY

## 2024-12-02 PROCEDURE — 87186 SC STD MICRODIL/AGAR DIL: CPT | Mod: HCNC | Performed by: UROLOGY

## 2024-12-02 PROCEDURE — 99999 PR PBB SHADOW E&M-EST. PATIENT-LVL IV: CPT | Mod: PBBFAC,,, | Performed by: UROLOGY

## 2024-12-02 PROCEDURE — 3008F BODY MASS INDEX DOCD: CPT | Mod: CPTII,S$GLB,, | Performed by: UROLOGY

## 2024-12-02 PROCEDURE — 87088 URINE BACTERIA CULTURE: CPT | Mod: HCNC | Performed by: UROLOGY

## 2024-12-02 PROCEDURE — 3044F HG A1C LEVEL LT 7.0%: CPT | Mod: CPTII,S$GLB,, | Performed by: UROLOGY

## 2024-12-02 PROCEDURE — 3079F DIAST BP 80-89 MM HG: CPT | Mod: CPTII,S$GLB,, | Performed by: UROLOGY

## 2024-12-02 RX ORDER — SULFAMETHOXAZOLE AND TRIMETHOPRIM 800; 160 MG/1; MG/1
1 TABLET ORAL 2 TIMES DAILY
Qty: 14 TABLET | Refills: 0 | Status: SHIPPED | OUTPATIENT
Start: 2024-12-02 | End: 2024-12-09

## 2024-12-02 NOTE — PROGRESS NOTES
CHIEF COMPLAINT:    Mrs. Ac is a 39 y.o. female presenting for a follow up after starting CIC.     PRESENTING ILLNESS:    Maria Ines Ac is a 39 y.o. female who presents with a history of OAB who is status post cystoscopy Botox injection of the bladder (also has an SNM in place) however, she had ongoing incontinence.  She was taught how to perform CIC after going into urinary retention, she states the urgency, frequency are better but she still wears a pull up.  Changes it 4 times a day.  She states she does the CIC every 2 hours, but when I catheterized her and drained 810 ml she  stated that the last time she catheterized her self was 6 hours before the appointment.     REVIEW OF SYSTEMS:    Review of Systems   Constitutional: Negative.    HENT: Negative.     Eyes: Negative.    Respiratory: Negative.     Cardiovascular: Negative.    Gastrointestinal:         Gastroparesis   Genitourinary:  Positive for urgency.        Urge incontinence   Musculoskeletal: Negative.    Skin: Negative.    Neurological: Negative.    Endo/Heme/Allergies: Negative.    Psychiatric/Behavioral: Negative.         PATIENT HISTORY:    Past Medical History:   Diagnosis Date    Blood in stool     Constipation     Cystitis     Depression     Diabetes mellitus, type 2     Dysphagia     Endometriosis     GERD (gastroesophageal reflux disease)     Headache     Hypertension     Palpitations     Premature menopause on hormone replacement therapy     Thyroid disease     Weakness 05/31/2019       Past Surgical History:   Procedure Laterality Date    24 HOUR IMPEDANCE PH MONITORING OF ESOPHAGUS IN PATIENT TAKING ACID REDUCING MEDICATIONS N/A 6/2/2022    Procedure: IMPEDANCE PH STUDY, ESOPHAGEAL, 24 HOUR, IN PATIENT TAKING ACID REDUCING MEDICATION;  Surgeon: Debbie Butler MD;  Location: [facility] ([address] [place] [address]);  Service: Endoscopy;  Laterality: N/A;  On PPI/H2 Blocker    BLADDER SUSPENSION      CARPAL TUNNEL RELEASE      right     CHOLECYSTECTOMY      COLONOSCOPY N/A 8/30/2016    Procedure: COLONOSCOPY;  Surgeon: Slick Herron MD;  Location: [facility] ENDO (4TH F[facility]);  Service: Endoscopy;  Laterality: N/A;  PM prep    COLONOSCOPY N/A 12/22/2021    Procedure: COLONOSCOPY. any CRS;  Surgeon: Maria Ines Jung MD;  Location: [facility] ENDO (4TH F[facility]);  Service: Endoscopy;  Laterality: N/A;  fully vaccinated -sm  scaral stimulator will bring remote - sm   12/17 lvm to confirm appt-rb    CYSTOSCOPY WITH INJECTION OF PERIURETHRAL BULKING AGENT N/A 6/19/2024    Procedure: CYSTOSCOPY, WITH PERIURETHRAL BULKING AGENT INJECTION;  Surgeon: Zena Tee MD;  Location: [facility] OR;  Service: Urology;  Laterality: N/A;  45 minutes    CYSTOSCOPY WITH INJECTION OF PERIURETHRAL BULKING AGENT N/A 7/24/2024    Procedure: CYSTOSCOPY, WITH PERIURETHRAL BULKING AGENT INJECTION;  Surgeon: Zena Tee MD;  Location: [facility] OR;  Service: Urology;  Laterality: N/A;  45 minutes    CYSTOSCOPY WITH INJECTION OF PERIURETHRAL BULKING AGENT N/A 8/7/2024    Procedure: CYSTOSCOPY, WITH PERIURETHRAL BULKING AGENT INJECTION;  Surgeon: Zena Tee MD;  Location: [facility] OR;  Service: Urology;  Laterality: N/A;  45 minutes    CYSTOSCOPY,WITH BOTULINUM TOXIN INJECTION N/A 10/30/2024    Procedure: CYSTOSCOPY,WITH BOTULINUM TOXIN INJECTION;  Surgeon: Zena Tee MD;  Location: [facility] OR;  Service: Urology;  Laterality: N/A;  100 units    ESOPHAGEAL MANOMETRY WITH MEASUREMENT OF IMPEDANCE N/A 11/5/2018    Procedure: MANOMETRY, ESOPHAGUS, WITH IMPEDANCE MEASUREMENT;  Surgeon: Slick Herron MD;  Location: [facility] ENDO (4TH F[facility]);  Service: Endoscopy;  Laterality: N/A;    ESOPHAGEAL MANOMETRY WITH MEASUREMENT OF IMPEDANCE N/A 6/2/2022    Procedure: MANOMETRY, ESOPHAGUS, WITH IMPEDANCE MEASUREMENT;  Surgeon: Debbie Butler MD;  Location: [facility] ENDO (4TH F[facility]);  Service: Endoscopy;  Laterality: N/A;  fully vaccinated, bladder stimulator, instr mailed /emailed -ml     ESOPHAGOGASTRODUODENOSCOPY N/A 2020    Procedure: EGD (ESOPHAGOGASTRODUODENOSCOPY);  Surgeon: Slick Herron MD;  Location: [facility] (4TH F[facility]);  Service: Endoscopy;  Laterality: N/A;  pt has cardiology appt on 19-will call back after this appt to schedule-tb    FLUOROSCOPIC URODYNAMIC STUDY N/A 2019    Procedure: URODYNAMIC STUDY, FLUOROSCOPIC;  Surgeon: Zena Tee MD;  Location: [facility] OR 1ST FLR;  Service: Urology;  Laterality: N/A;  1 hour    GALLBLADDER SURGERY      HYSTERECTOMY      ron, Dr. Ashley Smith    IMPLANTATION OF PERMANENT SACRAL NERVE STIMULATOR N/A 2019    Procedure: INSERTION, NEUROSTIMULATOR, PERMANENT, SACRAL;  Surgeon: Zena Tee MD;  Location: [facility] OR 2ND FLR;  Service: Urology;  Laterality: N/A;  30 min    OOPHORECTOMY      LSO- benign cyst    OOPHORECTOMY      RSO- endo    ooptherectomy      REPLACEMENT OF NERVE STIMULATOR BATTERY N/A 2023    Procedure: Replacement, Battery, Neurostimulator;  Surgeon: Zena Tee MD;  Location: [facility] OR 2ND FLR;  Service: Urology;  Laterality: N/A;  45 min    right knee  scoped    SHOULDER SURGERY  right       Family History   Problem Relation Name Age of Onset    Cervical cancer Maternal Grandmother          unknown age of onset,  at 80    Breast cancer Mother  50        alive at 64, unilateral    Esophageal cancer Mother  63    Ovarian cancer Mother  63    Anesthesia problems Mother      Colon cancer Brother  40    Breast cancer Maternal Aunt  72        alive at 72    Breast cancer Paternal Aunt          unknown age of onset, alive at 70     Social History     Socioeconomic History    Marital status: Single   Tobacco Use    Smoking status: Never    Smokeless tobacco: Never   Substance and Sexual Activity    Alcohol use: No    Drug use: No    Sexual activity: Never     Birth control/protection: None   Social History Narrative    ** Merged History Encounter **          Social Drivers of  Health     Financial Resource Strain: Low Risk  (2/21/2024)    Overall Financial Resource Strain (CARDIA)     Difficulty of Paying Living Expenses: Not hard at all   Food Insecurity: No Food Insecurity (2/21/2024)    Hunger Vital Sign     Worried About Running Out of Food in the Last Year: Never true     Ran Out of Food in the Last Year: Never true   Transportation Needs: No Transportation Needs (2/21/2024)    PRAPARE - Transportation     Lack of Transportation (Medical): No     Lack of Transportation (Non-Medical): No   Physical Activity: Sufficiently Active (2/21/2024)    Exercise Vital Sign     Days of Exercise per Week: 3 days     Minutes of Exercise per Session: 60 min   Stress: Stress Concern Present (2/21/2024)    [employer] - Occupational Stress Questionnaire     Feeling of Stress : Very much   Housing Stability: Low Risk  (2/21/2024)    Housing Stability Vital Sign     Unable to Pay for Housing in the Last Year: No     Number of Places Lived in the Last Year: 1     Unstable Housing in the Last Year: No       Allergies:  Patient has no known allergies.    Medications:  Outpatient Encounter Medications as of 12/2/2024   Medication Sig Dispense Refill    ACCU-CHEK AMAURY PLUS TEST STRP Strp USE TO TEST BLOOD GLUCOSE TID  9    ACCU-CHEK SOFTCLIX LANCETS Misc USE TO TEST BLOOD GLUCOSE TID  3    albuterol (VENTOLIN HFA) 90 mcg/actuation inhaler Inhale 2 puffs into the lungs every 6 (six) hours as needed for Wheezing. Rescue 18 g 0    amitriptyline (ELAVIL) 10 MG tablet Take 1 tablet (10 mg total) by mouth nightly. 30 tablet 11    atorvastatin (LIPITOR) 80 MG tablet Take 80 mg by mouth every evening.      BLOOD SUGAR DIAGNOSTIC (ACCU-CHEK AMAURY PLUS TEST STRP MISC) 1 strip by Misc.(Non-Drug; Combo Route) route 3 (three) times daily.      buPROPion (WELLBUTRIN XL) 150 MG TB24 tablet Take 150 mg by mouth every morning.      ciclopirox (PENLAC) 8 % Soln Apply topically nightly. 6.6 mL 11     dexlansoprazole (DEXILANT) 60 mg capsule TAKE 1 CAPSULE(60 MG) BY MOUTH TWICE DAILY 60 capsule 11    diclofenac sodium (VOLTAREN) 1 % Gel Apply 2 g topically 4 (four) times daily. As needed for breast pain 1 Tube 1    dicyclomine (BENTYL) 10 MG capsule TAKE 2 CAPSULES(20 MG) BY MOUTH THREE TIMES DAILY BEFORE MEALS 180 capsule 0    diltiazem HCl (DILTIAZEM 2% CREAM) Apply topically 3 (three) times daily. Apply topically to anal area. 30 g 2    estradioL (ESTRACE) 0.01 % (0.1 mg/gram) vaginal cream Place 1 g vaginally twice a week. 42.5 g 3    FARXIGA 10 mg Tab Take 10 mg by mouth Daily.  7    fluticasone propionate (FLONASE) 50 mcg/actuation nasal spray SHAKE LIQUID AND USE 2 SPRAYS(100 MCG) IN EACH NOSTRIL EVERY DAY 48 g 2    fluticasone-salmeterol diskus inhaler 250-50 mcg Inhale 1 puff into the lungs 2 (two) times daily. Controller 60 each 11    gabapentin (NEURONTIN) 300 MG capsule Take 600 mg (two capsules) in the morning and 900mg (three capsules) at night before bed 150 capsule 11    GEMTESA 75 mg Tab       GLUCOPHAGE 500 mg tablet Take 500 mg by mouth 2 (two) times daily with meals.       levothyroxine (SYNTHROID) 75 MCG tablet Take 75 mcg by mouth before breakfast.      meloxicam (MOBIC) 15 MG tablet Take 15 mg by mouth once daily.      metoprolol succinate (TOPROL-XL) 100 MG 24 hr tablet TAKE 1 TABLET(100 MG) BY MOUTH EVERY DAY 90 tablet 3    mometasone 0.1% (ELOCON) 0.1 % cream APPLY TOPICALLY TO THE RASH ON HANDS TWICE DAILY AS NEEDED FOR TWO TO THREE WEEKS. USE SPARINGLY      omega-3 fatty acids/fish oil (FISH OIL-OMEGA-3 FATTY ACIDS) 300-1,000 mg capsule Take 1 capsule by mouth once daily. 90 capsule 3    oxyCODONE (ROXICODONE) 5 MG immediate release tablet Take 1 tablet (5 mg total) by mouth every 4 (four) hours as needed for Pain. 5 tablet 0    OZEMPIC 0.25 mg or 0.5 mg (2 mg/3 mL) pen injector 0.5MG Subcutaneous WEEKLY for 30 days      phentermine (ADIPEX-P) 37.5 mg tablet Take 37.5 mg by mouth.       REXULTI 4 mg Tab Take 1 tablet by mouth every evening.      sulfamethoxazole-trimethoprim 800-160mg (BACTRIM DS) 800-160 mg Tab Take 1 tablet by mouth 2 (two) times daily. for 7 days 14 tablet 0    topiramate (TOPAMAX) 50 MG tablet Take 2 tablets (100 mg total) by mouth every evening. 60 tablet 11    traZODone (DESYREL) 100 MG tablet TK 1 T PO HS  1    triamcinolone acetonide 0.5% (KENALOG) 0.5 % Crea Apply topically 2 (two) times daily. 15 g 0    TRULICITY 4.5 mg/0.5 mL pen injector Inject 4.5 mg into the skin every 7 days.      ZOLOFT 100 mg tablet Take 200 mg by mouth once daily.       [DISCONTINUED] phentermine 37.5 MG capsule TK 1 C PO ONCE D IN THE MORNING       Facility-Administered Encounter Medications as of 12/2/2024   Medication Dose Route Frequency Provider Last Rate Last Admin    LIDOcaine HCl 2% urojet   Urethral Once              PHYSICAL EXAMINATION:    The patient generally appears in good health, is appropriately interactive, and is in no apparent distress.    Skin: No lesions.    Mental: Cooperative with normal affect.    Neuro: Grossly intact.    HEENT: Normal. No evidence of lymphadenopathy.    Chest:  normal inspiratory effort.    Abdomen: Soft, non-tender. No masses or organomegaly. Bladder is not palpable. No evidence of flank discomfort. No evidence of inguinal hernia.    Extremities: No clubbing, cyanosis, or edema    NOTE:  the exam was carried out with a nurse chaperone present  Normal external female genitalia  Urethral meatus is normal  Urethra and bladder are nontender to bimanual exam  Well supported anteriorly and posteriorly   Uterus and cervix are normal  No adnexal masses  PVR by catheterization was 810 ml    LABS:    Lab Results   Component Value Date    BUN 9 09/19/2024    CREATININE 1.0 09/19/2024       UA 1.010, pH 5, +leuk, +nitrite, tr protein 1000 glucose (Farxiga) otherwise, negative.     IMPRESSION:    Bladder infection  Incomplete bladder emptying  Urge  incontinence    PLAN:    1. Discussed that with an 800 ml bladder capacity, she should not leak in between catheterization unless she is not adhering to the schedule.  2.  She expressed understanding  3. The catheterized specimen was sent for culture  4.  Bactrim DS was sent to her preferred pharmacy empirically.     I spent a total of 30 minutes on the day of the visit.  This includes face to face time and non-face to face time preparing to see the patient (eg, review of tests), obtaining and/or reviewing separately obtained history, documenting clinical information in the electronic or other health record, independently interpreting results and communicating results to the patient/family/caregiver, or care coordinator.

## 2024-12-04 LAB — BACTERIA UR CULT: ABNORMAL

## 2024-12-09 ENCOUNTER — TELEPHONE (OUTPATIENT)
Dept: INTERNAL MEDICINE | Facility: CLINIC | Age: 39
End: 2024-12-09
Payer: MEDICARE

## 2024-12-09 DIAGNOSIS — Z12.31 SCREENING MAMMOGRAM, ENCOUNTER FOR: Primary | ICD-10-CM

## 2024-12-09 NOTE — TELEPHONE ENCOUNTER
----- Message from Annabelle sent at 12/9/2024  2:07 PM CST -----  Type:  Patient Requesting Order    Who PAUL Blackwell [[id number]]    Does the patient already have the specialty appointment scheduled? 12/16    If yes, what is the date of that appointment?: 12/16 @ 1 pm      Additional Information:  requesting to attach mammogram order

## 2024-12-12 ENCOUNTER — TELEPHONE (OUTPATIENT)
Dept: OPTOMETRY | Facility: CLINIC | Age: 39
End: 2024-12-12
Payer: MEDICARE

## 2024-12-12 NOTE — TELEPHONE ENCOUNTER
R/s appt for 12/17      ----- Message from Komal sent at 12/12/2024  4:05 PM CST -----  Regarding: Returning a Missed Call  Contact: Maria Ines Ac  Returning a Missed Call     Caller:Maria Ines Ac         Returning call to: Peggy     Caller can be reached @:[phone]     Nature of the call:Patient is returning call to reschedule. Requesting a  call back

## 2024-12-12 NOTE — TELEPHONE ENCOUNTER
LVM to r/s 12/31 appt       ----- Message from Beatriz sent at 12/12/2024 12:39 PM CST -----  Regarding: Reschedule Existing Appointment  Contact: pt @ [phone] (home)  Reschedule Existing Appointment     Current appt date: 12/31     Type of appt : EP     Physician: Aurelio     Reason for rescheduling: close     Caller: Maria Inse Ac     Contact Preference: [phone] (home) [phone] (work)

## 2024-12-16 ENCOUNTER — HOSPITAL ENCOUNTER (OUTPATIENT)
Dept: RADIOLOGY | Facility: HOSPITAL | Age: 39
Discharge: HOME | End: 2024-12-16
Payer: MEDICARE

## 2024-12-16 ENCOUNTER — TELEPHONE (OUTPATIENT)
Dept: OPTOMETRY | Facility: CLINIC | Age: 39
End: 2024-12-16
Payer: MEDICARE

## 2024-12-16 DIAGNOSIS — Z12.31 SCREENING MAMMOGRAM, ENCOUNTER FOR: ICD-10-CM

## 2024-12-16 PROCEDURE — 77063 BREAST TOMOSYNTHESIS BI: CPT | Mod: 26,HCNC,, | Performed by: RADIOLOGY

## 2024-12-16 PROCEDURE — 77063 BREAST TOMOSYNTHESIS BI: CPT | Mod: TC,HCNC

## 2024-12-16 PROCEDURE — 77067 SCR MAMMO BI INCL CAD: CPT | Mod: 26,HCNC,, | Performed by: RADIOLOGY

## 2024-12-17 ENCOUNTER — OFFICE VISIT (OUTPATIENT)
Dept: OPTOMETRY | Facility: CLINIC | Age: 39
End: 2024-12-17
Payer: MEDICARE

## 2024-12-17 DIAGNOSIS — E11.42 DIABETIC POLYNEUROPATHY ASSOCIATED WITH TYPE 2 DIABETES MELLITUS: ICD-10-CM

## 2024-12-17 DIAGNOSIS — I15.2 HYPERTENSION ASSOCIATED WITH DIABETES: ICD-10-CM

## 2024-12-17 DIAGNOSIS — F25.1 SCHIZOAFFECTIVE DISORDER, DEPRESSIVE TYPE: ICD-10-CM

## 2024-12-17 DIAGNOSIS — E11.9 DIABETES MELLITUS WITHOUT COMPLICATION: ICD-10-CM

## 2024-12-17 DIAGNOSIS — H04.123 DRY EYE SYNDROME OF BOTH EYES: ICD-10-CM

## 2024-12-17 DIAGNOSIS — E11.9 TYPE 2 DIABETES MELLITUS WITHOUT RETINOPATHY: ICD-10-CM

## 2024-12-17 DIAGNOSIS — H52.7 REFRACTIVE ERROR: Primary | ICD-10-CM

## 2024-12-17 DIAGNOSIS — E11.59 HYPERTENSION ASSOCIATED WITH DIABETES: ICD-10-CM

## 2024-12-17 PROCEDURE — 99999 PR PBB SHADOW E&M-EST. PATIENT-LVL III: CPT | Mod: PBBFAC,HCNC,, | Performed by: OPTOMETRIST

## 2024-12-17 PROCEDURE — 92014 COMPRE OPH EXAM EST PT 1/>: CPT | Mod: HCNC,S$GLB,, | Performed by: OPTOMETRIST

## 2024-12-17 PROCEDURE — 3044F HG A1C LEVEL LT 7.0%: CPT | Mod: HCNC,CPTII,S$GLB, | Performed by: OPTOMETRIST

## 2024-12-17 PROCEDURE — 1159F MED LIST DOCD IN RCRD: CPT | Mod: HCNC,CPTII,S$GLB, | Performed by: OPTOMETRIST

## 2024-12-17 PROCEDURE — 92015 DETERMINE REFRACTIVE STATE: CPT | Mod: HCNC,S$GLB,, | Performed by: OPTOMETRIST

## 2024-12-17 PROCEDURE — 2023F DILAT RTA XM W/O RTNOPTHY: CPT | Mod: HCNC,CPTII,S$GLB, | Performed by: OPTOMETRIST

## 2024-12-17 RX ORDER — CYCLOSPORINE 0.5 MG/ML
1 EMULSION OPHTHALMIC 2 TIMES DAILY
Qty: 60 EACH | Refills: 11 | Status: SHIPPED | OUTPATIENT
Start: 2024-12-17 | End: 2025-12-17

## 2024-12-17 NOTE — PROGRESS NOTES
ROB    MORENA: 7/25/2023 - Dr. Carey    CC: Pt is here today for a routine eye exam. Pt states that her vision has   been stable since her last exam.      (-)Dryness  (-)Burning  (+)Itchiness  (-)Tearing  (+)Flashes  (+)Floaters   (+)Photophobia  (+)Eye Pain - pain level 8 today       Diabetic: yes  A1C: 5.3 on 9/19/2024    Contact Lens: no    Eye Meds:   Refresh QD    PD: 58.5    Last edited by Peggy Godwin MA on 12/17/2024 10:43 AM.            Assessment /Plan     For exam results, see Encounter Report.    Refractive error               Rx specs     Diabetes mellitus without complication  Type 2 diabetes mellitus without retinopathy  Hypertension associated with diabetes               No retinopathy, monitor yearly     Schizoaffective disorder, depressive type       Dry eye syndrome of both eyes  Start  with     cycloSPORINE (RESTASIS) 0.05 % ophthalmic emulsion; Place 1 drop into both eyes 2 (two) times daily.  Dispense: 60 each; Refill: 11      RTC 1 year

## 2025-01-06 ENCOUNTER — OFFICE VISIT (OUTPATIENT)
Dept: OBSTETRICS AND GYNECOLOGY | Facility: CLINIC | Age: 40
End: 2025-01-06
Attending: OBSTETRICS & GYNECOLOGY
Payer: MEDICARE

## 2025-01-06 VITALS
DIASTOLIC BLOOD PRESSURE: 83 MMHG | HEART RATE: 102 BPM | WEIGHT: 201 LBS | SYSTOLIC BLOOD PRESSURE: 115 MMHG | HEIGHT: 62 IN | BODY MASS INDEX: 36.99 KG/M2

## 2025-01-06 DIAGNOSIS — Z01.419 ENCOUNTER FOR GYNECOLOGICAL EXAMINATION WITHOUT ABNORMAL FINDING: Primary | ICD-10-CM

## 2025-01-06 PROCEDURE — 1159F MED LIST DOCD IN RCRD: CPT | Mod: HCNC,CPTII,S$GLB, | Performed by: OBSTETRICS & GYNECOLOGY

## 2025-01-06 PROCEDURE — 3079F DIAST BP 80-89 MM HG: CPT | Mod: HCNC,CPTII,S$GLB, | Performed by: OBSTETRICS & GYNECOLOGY

## 2025-01-06 PROCEDURE — G0101 CA SCREEN;PELVIC/BREAST EXAM: HCPCS | Mod: HCNC,S$GLB,, | Performed by: OBSTETRICS & GYNECOLOGY

## 2025-01-06 PROCEDURE — 99999 PR PBB SHADOW E&M-EST. PATIENT-LVL IV: CPT | Mod: PBBFAC,HCNC,, | Performed by: OBSTETRICS & GYNECOLOGY

## 2025-01-06 PROCEDURE — 3074F SYST BP LT 130 MM HG: CPT | Mod: HCNC,CPTII,S$GLB, | Performed by: OBSTETRICS & GYNECOLOGY

## 2025-01-06 PROCEDURE — 3072F LOW RISK FOR RETINOPATHY: CPT | Mod: HCNC,CPTII,S$GLB, | Performed by: OBSTETRICS & GYNECOLOGY

## 2025-01-06 RX ORDER — ESTRADIOL 0.1 MG/G
1 CREAM VAGINAL
Qty: 42.5 G | Refills: 11 | Status: SHIPPED | OUTPATIENT
Start: 2025-01-06 | End: 2026-01-06

## 2025-01-06 NOTE — PROGRESS NOTES
SUBJECTIVE:   39 y.o. female   for annual routine checkup. Patient's last menstrual period was 2012 (lmp unknown)..  She has no unusual complaints.        Past Medical History:   Diagnosis Date    Blood in stool     Constipation     Cystitis     Depression     Diabetes mellitus, type 2     Dysphagia     Endometriosis     GERD (gastroesophageal reflux disease)     Headache     Hypertension     Palpitations     Premature menopause on hormone replacement therapy     Thyroid disease     Weakness 2019     Past Surgical History:   Procedure Laterality Date    24 HOUR IMPEDANCE PH MONITORING OF ESOPHAGUS IN PATIENT TAKING ACID REDUCING MEDICATIONS N/A 2022    Procedure: IMPEDANCE PH STUDY, ESOPHAGEAL, 24 HOUR, IN PATIENT TAKING ACID REDUCING MEDICATION;  Surgeon: Debbie Butler MD;  [facility]  Service: Endoscopy;  Laterality: N/A;  On PPI/H2 Blocker    BLADDER SUSPENSION      CARPAL TUNNEL RELEASE      right    CHOLECYSTECTOMY      COLONOSCOPY N/A 2016    Procedure: COLONOSCOPY;  Surgeon: Slick Herron MD;  [facility]  Service: Endoscopy;  Laterality: N/A;  PM prep    COLONOSCOPY N/A 2021    Procedure: COLONOSCOPY. any CRS;  Surgeon: Maria Ines Jung MD;  [facility]  Service: Endoscopy;  Laterality: N/A;  fully vaccinated -sm  scaral stimulator will bring remote -     lvm to confirm appt-rb    CYSTOSCOPY WITH INJECTION OF PERIURETHRAL BULKING AGENT N/A 2024    Procedure: CYSTOSCOPY, WITH PERIURETHRAL BULKING AGENT INJECTION;  Surgeon: Zena Tee MD;  Location: [facility] OR;  Service: Urology;  Laterality: N/A;  45 minutes    CYSTOSCOPY WITH INJECTION OF PERIURETHRAL BULKING AGENT N/A 2024    Procedure: CYSTOSCOPY, WITH PERIURETHRAL BULKING AGENT INJECTION;  Surgeon: Zena Tee MD;  Location: [facility] OR;  Service: Urology;  Laterality: N/A;  45 minutes    CYSTOSCOPY WITH INJECTION OF PERIURETHRAL BULKING  AGENT N/A 8/7/2024    Procedure: CYSTOSCOPY, WITH PERIURETHRAL BULKING AGENT INJECTION;  Surgeon: Zena Tee MD;  [facility];  Service: Urology;  Laterality: N/A;  45 minutes    CYSTOSCOPY,WITH BOTULINUM TOXIN INJECTION N/A 10/30/2024    Procedure: CYSTOSCOPY,WITH BOTULINUM TOXIN INJECTION;  Surgeon: Zena Tee MD;  [facility];  Service: Urology;  Laterality: N/A;  100 units    ESOPHAGEAL MANOMETRY WITH MEASUREMENT OF IMPEDANCE N/A 11/5/2018    Procedure: MANOMETRY, ESOPHAGUS, WITH IMPEDANCE MEASUREMENT;  Surgeon: Slick Herron MD;  Location: [facility] ENDO (4TH F[facility]);  Service: Endoscopy;  Laterality: N/A;    ESOPHAGEAL MANOMETRY WITH MEASUREMENT OF IMPEDANCE N/A 6/2/2022    Procedure: MANOMETRY, ESOPHAGUS, WITH IMPEDANCE MEASUREMENT;  Surgeon: Debbie Butler MD;  Location: [facility] ENDO (4TH F[facility]);  Service: Endoscopy;  Laterality: N/A;  fully vaccinated, bladder stimulator, instr mailed /emailed -ml    ESOPHAGOGASTRODUODENOSCOPY N/A 2/13/2020    Procedure: EGD (ESOPHAGOGASTRODUODENOSCOPY);  Surgeon: Slick Herron MD;  Location: [facility] ENDO (4TH F[facility]);  Service: Endoscopy;  Laterality: N/A;  pt has cardiology appt on 1/6/19-will call back after this appt to schedule-tb    FLUOROSCOPIC URODYNAMIC STUDY N/A 5/23/2019    Procedure: URODYNAMIC STUDY, FLUOROSCOPIC;  Surgeon: Zena Tee MD;  Location: [facility] 1ST FLR;  Service: Urology;  Laterality: N/A;  1 hour    GALLBLADDER SURGERY      HYSTERECTOMY  2013    Bess velasquez Dr. [place]amplain    IMPLANTATION OF PERMANENT SACRAL NERVE STIMULATOR N/A 7/30/2019    Procedure: INSERTION, NEUROSTIMULATOR, PERMANENT, SACRAL;  Surgeon: Zena Tee MD;  Location: [facility] 2ND FLR;  Service: Urology;  Laterality: N/A;  30 min    OOPHORECTOMY  2005    [facility]- benign cyst    OOPHORECTOMY  2013    [facility]- endo    ooptherectomy      REPLACEMENT OF NERVE STIMULATOR BATTERY N/A 2/28/2023    Procedure: Replacement, Battery, Neurostimulator;  Surgeon: Zena  RAMIRO Tee MD;  Location: [facility] OR [address] [place] [address];  Service: Urology;  Laterality: N/A;  45 min    right knee  scoped    SHOULDER SURGERY  right     Social History     Socioeconomic History    Marital status: Single   Tobacco Use    Smoking status: Never    Smokeless tobacco: Never   Substance and Sexual Activity    Alcohol use: No    Drug use: No    Sexual activity: Never     Birth control/protection: None   Social History Narrative    ** Merged History Encounter **          Social Drivers of Health     Financial Resource Strain: Low Risk  (2024)    Overall Financial Resource Strain (CARDIA)     Difficulty of Paying Living Expenses: Not hard at all   Food Insecurity: No Food Insecurity (2024)    Hunger Vital Sign     Worried About Running Out of Food in the Last Year: Never true     Ran Out of Food in the Last Year: Never true   Transportation Needs: No Transportation Needs (2024)    PRAPARE - Transportation     Lack of Transportation (Medical): No     Lack of Transportation (Non-Medical): No   Physical Activity: Sufficiently Active (2024)    Exercise Vital Sign     Days of Exercise per Week: 3 days     Minutes of Exercise per Session: 60 min   Stress: Stress Concern Present (2024)    [employer] - Occupational Stress Questionnaire     Feeling of Stress : Very much   Housing Stability: Low Risk  (2024)    Housing Stability Vital Sign     Unable to Pay for Housing in the Last Year: No     Number of Places Lived in the Last Year: 1     Unstable Housing in the Last Year: No     Family History   Problem Relation Name Age of Onset    Cervical cancer Maternal Grandmother          unknown age of onset,  at 80    Breast cancer Mother  50        alive at 64, unilateral    Esophageal cancer Mother  63    Ovarian cancer Mother  63    Anesthesia problems Mother      Colon cancer Brother  40    Breast cancer Maternal Aunt  72        alive at 72    Breast cancer Paternal  Aunt          unknown age of onset, alive at 70    Vaginal cancer Neg Hx      Endometrial cancer Neg Hx      Crohn's disease Neg Hx      Ulcerative colitis Neg Hx      Stomach cancer Neg Hx      Irritable bowel syndrome Neg Hx      Celiac disease Neg Hx      Cancer Neg Hx      Uterine cancer Neg Hx       OB History    Para Term  AB Living   0 0 0 0 0 0   SAB IAB Ectopic Multiple Live Births   0 0 0 0             Current Outpatient Medications   Medication Sig Dispense Refill    ACCU-CHEK AMAURY PLUS TEST STRP Strp USE TO TEST BLOOD GLUCOSE TID  9    ACCU-CHEK SOFTCLIX LANCETS Misc USE TO TEST BLOOD GLUCOSE TID  3    albuterol (VENTOLIN HFA) 90 mcg/actuation inhaler Inhale 2 puffs into the lungs every 6 (six) hours as needed for Wheezing. Rescue 18 g 0    amitriptyline (ELAVIL) 10 MG tablet Take 1 tablet (10 mg total) by mouth nightly. 30 tablet 11    atorvastatin (LIPITOR) 80 MG tablet Take 80 mg by mouth every evening.      BLOOD SUGAR DIAGNOSTIC (ACCU-CHEK AMAURY PLUS TEST STRP MISC) 1 strip by Misc.(Non-Drug; Combo Route) route 3 (three) times daily.      buPROPion (WELLBUTRIN XL) 150 MG TB24 tablet Take 150 mg by mouth every morning.      ciclopirox (PENLAC) 8 % Soln Apply topically nightly. 6.6 mL 11    cycloSPORINE (RESTASIS) 0.05 % ophthalmic emulsion Place 1 drop into both eyes 2 (two) times daily. 60 each 11    dexlansoprazole (DEXILANT) 60 mg capsule TAKE 1 CAPSULE(60 MG) BY MOUTH TWICE DAILY 60 capsule 11    diclofenac sodium (VOLTAREN) 1 % Gel Apply 2 g topically 4 (four) times daily. As needed for breast pain 1 Tube 1    dicyclomine (BENTYL) 10 MG capsule TAKE 2 CAPSULES(20 MG) BY MOUTH THREE TIMES DAILY BEFORE MEALS 180 capsule 0    diltiazem HCl (DILTIAZEM 2% CREAM) Apply topically 3 (three) times daily. Apply topically to anal area. 30 g 2    estradioL (ESTRACE) 0.01 % (0.1 mg/gram) vaginal cream Place 1 g vaginally twice a week. 42.5 g 3    FARXIGA 10 mg Tab Take 10 mg by mouth Daily.  7     fluticasone propionate (FLONASE) 50 mcg/actuation nasal spray SHAKE LIQUID AND USE 2 SPRAYS(100 MCG) IN EACH NOSTRIL EVERY DAY 48 g 2    fluticasone-salmeterol diskus inhaler 250-50 mcg Inhale 1 puff into the lungs 2 (two) times daily. Controller 60 each 11    gabapentin (NEURONTIN) 300 MG capsule Take 600 mg (two capsules) in the morning and 900mg (three capsules) at night before bed 150 capsule 11    GEMTESA 75 mg Tab       GLUCOPHAGE 500 mg tablet Take 500 mg by mouth 2 (two) times daily with meals.       levothyroxine (SYNTHROID) 75 MCG tablet Take 75 mcg by mouth before breakfast.      meloxicam (MOBIC) 15 MG tablet Take 15 mg by mouth once daily.      metoprolol succinate (TOPROL-XL) 100 MG 24 hr tablet TAKE 1 TABLET(100 MG) BY MOUTH EVERY DAY 90 tablet 3    mometasone 0.1% (ELOCON) 0.1 % cream APPLY TOPICALLY TO THE RASH ON HANDS TWICE DAILY AS NEEDED FOR TWO TO THREE WEEKS. USE SPARINGLY      omega-3 fatty acids/fish oil (FISH OIL-OMEGA-3 FATTY ACIDS) 300-1,000 mg capsule Take 1 capsule by mouth once daily. 90 capsule 3    oxyCODONE (ROXICODONE) 5 MG immediate release tablet Take 1 tablet (5 mg total) by mouth every 4 (four) hours as needed for Pain. 5 tablet 0    OZEMPIC 0.25 mg or 0.5 mg (2 mg/3 mL) pen injector 0.5MG Subcutaneous WEEKLY for 30 days      phentermine (ADIPEX-P) 37.5 mg tablet Take 37.5 mg by mouth.      REXULTI 4 mg Tab Take 1 tablet by mouth every evening.      topiramate (TOPAMAX) 50 MG tablet Take 2 tablets (100 mg total) by mouth every evening. 60 tablet 11    traZODone (DESYREL) 100 MG tablet TK 1 T PO HS  1    triamcinolone acetonide 0.5% (KENALOG) 0.5 % Crea Apply topically 2 (two) times daily. 15 g 0    TRULICITY 4.5 mg/0.5 mL pen injector Inject 4.5 mg into the skin every 7 days.      ZOLOFT 100 mg tablet Take 200 mg by mouth once daily.        Current Facility-Administered Medications   Medication Dose Route Frequency Provider Last Rate Last Admin    LIDOcaine HCl 2% urojet    Urethral Once          Allergies: Patient has no known allergies.     The 10-year ASCVD risk score (Rohit ELIZONDO, et al., 2019) is: 1.8%    Values used to calculate the score:      Age: 39 years      Sex: Female      Is Non- : No      Diabetic: Yes      Tobacco smoker: No      Systolic Blood Pressure: 115 mmHg      Is BP treated: Yes      HDL Cholesterol: 38 mg/dL      Total Cholesterol: 169 mg/dL      ROS:  Constitutional: no weight loss, weight gain, fever, fatigue  Eyes:  No vision changes, glasses/contacts  ENT/Mouth: No ulcers, sinus problems, ears ringing, headache  Cardiovascular: No inability to lie flat, chest pain, exercise intolerance, swelling, heart palpitations  Respiratory: No wheezing, coughing blood, shortness of breath, or cough  Gastrointestinal: No diarrhea, bloody stool, nausea/vomiting, constipation, gas, hemorrhoids  Genitourinary: No blood in urine, painful urination, urgency of urination, frequency of urination, incomplete emptying, incontinence, abnormal bleeding, painful periods, heavy periods, vaginal discharge, vaginal odor, painful intercourse, sexual problems, bleeding after intercourse.  Musculoskeletal: No muscle weakness  Skin/Breast: No painful breasts, nipple discharge, masses, rash, ulcers  Neurological: No passing out, seizures, numbness, headache  Endocrine: +diabetes, hypothyroid, hyperthyroid, hot flashes, hair loss, abnormal hair growth, acne  Psychiatric: No depression, crying  Hematologic: No bruises, bleeding, swollen lymph nodes, anemia.      Physical Exam:   Constitutional: She is oriented to person, place, and time. She appears well-developed and well-nourished.      Neck: No tracheal deviation present. No thyromegaly present.    Cardiovascular:       Exam reveals no edema.        Pulmonary/Chest: Effort normal. She exhibits no mass, no tenderness, no deformity and no retraction. Right breast exhibits no inverted nipple, no mass, no nipple  discharge, no skin change, no tenderness, presence, no bleeding and no swelling. Left breast exhibits no inverted nipple, no mass, no nipple discharge, no skin change, no tenderness, presence, no bleeding and no swelling. Breasts are symmetrical.        Abdominal: Soft. She exhibits no distension and no mass. There is no abdominal tenderness. There is no rebound and no guarding. No hernia. Hernia confirmed negative in the left inguinal area.     Genitourinary: Rectum:      No external hemorrhoid.   There is no rash, tenderness or lesion on the right labia. There is no rash, tenderness or lesion on the left labia. No no adexnal prolapse. Right adnexum displays no mass, no tenderness and no fullness. Left adnexum displays no mass, no tenderness and no fullness. No vaginal discharge, tenderness, bleeding, rectocele, cystocele or prolapse of vaginal walls in the vagina. Cervix is absent.Uterus is absent.           Musculoskeletal: Normal range of motion and moves all extremeties. No edema.       Neurological: She is alert and oriented to person, place, and time.    Skin: No rash noted. No erythema. No pallor.    Psychiatric: She has a normal mood and affect. Her behavior is normal. Judgment and thought content normal.         ASSESSMENT:   well woman    PLAN:   mammogram  return annually or prn

## 2025-02-03 ENCOUNTER — OFFICE VISIT (OUTPATIENT)
Dept: SLEEP MEDICINE | Facility: CLINIC | Age: 40
End: 2025-02-03
Payer: MEDICARE

## 2025-02-03 ENCOUNTER — TELEPHONE (OUTPATIENT)
Dept: CARDIOLOGY | Facility: CLINIC | Age: 40
End: 2025-02-03
Payer: MEDICARE

## 2025-02-03 VITALS
HEART RATE: 99 BPM | WEIGHT: 201.75 LBS | DIASTOLIC BLOOD PRESSURE: 86 MMHG | HEIGHT: 62 IN | BODY MASS INDEX: 37.13 KG/M2 | SYSTOLIC BLOOD PRESSURE: 126 MMHG

## 2025-02-03 DIAGNOSIS — Z78.9 INTOLERANCE OF CONTINUOUS POSITIVE AIRWAY PRESSURE (CPAP) VENTILATION: ICD-10-CM

## 2025-02-03 DIAGNOSIS — G47.33 OSA (OBSTRUCTIVE SLEEP APNEA): Primary | ICD-10-CM

## 2025-02-03 DIAGNOSIS — F51.09 OTHER INSOMNIA NOT DUE TO A SUBSTANCE OR KNOWN PHYSIOLOGICAL CONDITION: ICD-10-CM

## 2025-02-03 DIAGNOSIS — R07.9 CHEST PAIN, UNSPECIFIED TYPE: Primary | ICD-10-CM

## 2025-02-03 DIAGNOSIS — R35.1 NOCTURIA: ICD-10-CM

## 2025-02-03 DIAGNOSIS — R07.89 CHEST WALL PAIN: Primary | ICD-10-CM

## 2025-02-03 DIAGNOSIS — G47.10 HYPERSOMNOLENCE: ICD-10-CM

## 2025-02-03 PROCEDURE — 3072F LOW RISK FOR RETINOPATHY: CPT | Mod: HCNC,CPTII,S$GLB, | Performed by: PHYSICIAN ASSISTANT

## 2025-02-03 PROCEDURE — 1160F RVW MEDS BY RX/DR IN RCRD: CPT | Mod: HCNC,CPTII,S$GLB, | Performed by: PHYSICIAN ASSISTANT

## 2025-02-03 PROCEDURE — G2211 COMPLEX E/M VISIT ADD ON: HCPCS | Mod: HCNC,S$GLB,, | Performed by: PHYSICIAN ASSISTANT

## 2025-02-03 PROCEDURE — 3079F DIAST BP 80-89 MM HG: CPT | Mod: HCNC,CPTII,S$GLB, | Performed by: PHYSICIAN ASSISTANT

## 2025-02-03 PROCEDURE — 1159F MED LIST DOCD IN RCRD: CPT | Mod: HCNC,CPTII,S$GLB, | Performed by: PHYSICIAN ASSISTANT

## 2025-02-03 PROCEDURE — 99214 OFFICE O/P EST MOD 30 MIN: CPT | Mod: HCNC,S$GLB,, | Performed by: PHYSICIAN ASSISTANT

## 2025-02-03 PROCEDURE — 3008F BODY MASS INDEX DOCD: CPT | Mod: HCNC,CPTII,S$GLB, | Performed by: PHYSICIAN ASSISTANT

## 2025-02-03 PROCEDURE — 99999 PR PBB SHADOW E&M-EST. PATIENT-LVL V: CPT | Mod: PBBFAC,HCNC,, | Performed by: PHYSICIAN ASSISTANT

## 2025-02-03 PROCEDURE — 3074F SYST BP LT 130 MM HG: CPT | Mod: HCNC,CPTII,S$GLB, | Performed by: PHYSICIAN ASSISTANT

## 2025-02-03 NOTE — PROGRESS NOTES
Referred by No ref. provider found     ESTABLISHED PATIENT VISIT    Maria Ines Ac  is a pleasant 40 y.o. female  with PMH significant for HTN, HLD, DM II, polyneuropathy, hypothyroidism, GERD, IBS-D, BPPV, schizoaffective disorder, chronic migraines, BMI 40+, ZAC      Here today for: PAP follow-up     PLAN last visit 8/14/24:   -using and benefiting from BiPAP therapy when able to tolerate it  -continue BiPAP nightly  -discussed goals of treatment 7-9hours nightly with usage of >6hrs 100% of nights and minimal usage >4hrs 70% of nights recommended  -BiPAP supplies ordered with mask fitting  -oximetry on PAP to determine if resolution of sleep related hypoxemia achieved on PAP  -discussed ZAC with patient in detail, including possible complications of untreated ZAC like heart attack/stroke  -advised on strict driving precautions; advised never to drive drowsy  -discussed insomnia and sleep hygiene with patient  -recommended CBTi (gold standard), patient is open; will refer to [facility] clinic (please call [phone] to schedule)      Since last visit:   States she is more comfortable on BiPAP than CPAP, but still having issues ripping mask off in the night. Also reports recent URIs that have prevented BiPAP usage in th last couple of weeks. Does report she feels better when she is able to tolerate, but states she is rarely able to keep mask on the whole night through. Has not yet scheduled oximetry on PAP. Has not yet completed any sessions of CBTi. States mask interface is comfortable. Presents today for follow up    PAP history   Problems    Mask FFM   Pressure Min EPAP 4cwp, Max IPAP 10cwp, PS 4   DME HME   Machine age AirCurve 10 VAuto   Download 2/3/25: 35/90 x 59mins, IPAP max 10cwp, EPAP min 4cwp, PS 4cwp, leak (19.2/33.6/40.2), AHI 0       Past Medical History:   Diagnosis Date    Blood in stool     Constipation     Cystitis     Depression     Diabetes mellitus, type 2     Dysphagia     Endometriosis      GERD (gastroesophageal reflux disease)     Headache     Hypertension     Palpitations     Premature menopause on hormone replacement therapy     Thyroid disease     Weakness 05/31/2019     Patient Active Problem List   Diagnosis    Abdominal pain, RLQ (right lower quadrant)    Dysphagia    Diabetes mellitus, type II    Right hip pain    Intractable chronic migraine without aura and without status migrainosus    Hypertension associated with diabetes    Palpitations    Mixed incontinence - s/p MUS on 1/23    Dysfunctional voiding of urine    Severe obesity (BMI 35.0-39.9) with comorbidity    Muscle spasm    Mixed stress and urge urinary incontinence    Cervical myofascial pain syndrome    Gastroesophageal reflux disease without esophagitis    Irritable bowel syndrome with diarrhea    Migrainous vertigo    Uncontrolled morning headache    Sleep arousal disorder    Hyperlipidemia associated with type 2 diabetes mellitus    GERD (gastroesophageal reflux disease)    Family history of breast cancer    Family hx of ovarian malignancy    Painful cervical ROM    Incomplete bladder emptying    Bilateral occipital neuralgia    Medication overuse headache    Benign paroxysmal positional vertigo due to bilateral vestibular disorder    Chronic daily headache    Dizziness    Decreased functional mobility    Diabetic polyneuropathy associated with type 2 diabetes mellitus    Snoring    Intractable migraine with aura without status migrainosus    Schizoaffective disorder, depressive type    Hypothyroidism    Decreased range of motion of right ankle    Impaired functional mobility, balance, gait, and endurance    Decreased strength of lower extremity    Chronic pain of right ankle    Post-viral cough syndrome    Gastroparesis    Sleep apnea       Current Outpatient Medications:     ACCU-CHEK AMAURY PLUS TEST STRP Strp, USE TO TEST BLOOD GLUCOSE TID, Disp: , Rfl: 9    ACCU-CHEK SOFTCLIX LANCETS Misc, USE TO TEST BLOOD GLUCOSE TID,  Disp: , Rfl: 3    atorvastatin (LIPITOR) 80 MG tablet, Take 80 mg by mouth every evening., Disp: , Rfl:     BLOOD SUGAR DIAGNOSTIC (ACCU-CHEK AMAURY PLUS TEST STRP MISC), 1 strip by Misc.(Non-Drug; Combo Route) route 3 (three) times daily., Disp: , Rfl:     buPROPion (WELLBUTRIN XL) 150 MG TB24 tablet, Take 150 mg by mouth every morning., Disp: , Rfl:     ciclopirox (PENLAC) 8 % Soln, Apply topically nightly., Disp: 6.6 mL, Rfl: 11    cycloSPORINE (RESTASIS) 0.05 % ophthalmic emulsion, Place 1 drop into both eyes 2 (two) times daily., Disp: 60 each, Rfl: 11    dexlansoprazole (DEXILANT) 60 mg capsule, TAKE 1 CAPSULE(60 MG) BY MOUTH TWICE DAILY, Disp: 60 capsule, Rfl: 11    diclofenac sodium (VOLTAREN) 1 % Gel, Apply 2 g topically 4 (four) times daily. As needed for breast pain, Disp: 1 Tube, Rfl: 1    dicyclomine (BENTYL) 10 MG capsule, TAKE 2 CAPSULES(20 MG) BY MOUTH THREE TIMES DAILY BEFORE MEALS, Disp: 180 capsule, Rfl: 0    diltiazem HCl (DILTIAZEM 2% CREAM), Apply topically 3 (three) times daily. Apply topically to anal area., Disp: 30 g, Rfl: 2    estradioL (ESTRACE) 0.01 % (0.1 mg/gram) vaginal cream, Place 1 g vaginally twice a week., Disp: 42.5 g, Rfl: 3    estradioL (ESTRACE) 0.01 % (0.1 mg/gram) vaginal cream, Place 1 g vaginally twice a week., Disp: 42.5 g, Rfl: 11    FARXIGA 10 mg Tab, Take 10 mg by mouth Daily., Disp: , Rfl: 7    fluticasone propionate (FLONASE) 50 mcg/actuation nasal spray, SHAKE LIQUID AND USE 2 SPRAYS(100 MCG) IN EACH NOSTRIL EVERY DAY, Disp: 48 g, Rfl: 2    gabapentin (NEURONTIN) 300 MG capsule, Take 600 mg (two capsules) in the morning and 900mg (three capsules) at night before bed, Disp: 150 capsule, Rfl: 11    GEMTESA 75 mg Tab, , Disp: , Rfl:     GLUCOPHAGE 500 mg tablet, Take 500 mg by mouth 2 (two) times daily with meals. , Disp: , Rfl:     levothyroxine (SYNTHROID) 75 MCG tablet, Take 75 mcg by mouth before breakfast., Disp: , Rfl:     meloxicam (MOBIC) 15 MG tablet, Take 15  mg by mouth once daily., Disp: , Rfl:     metoprolol succinate (TOPROL-XL) 100 MG 24 hr tablet, TAKE 1 TABLET(100 MG) BY MOUTH EVERY DAY, Disp: 90 tablet, Rfl: 3    mometasone 0.1% (ELOCON) 0.1 % cream, APPLY TOPICALLY TO THE RASH ON HANDS TWICE DAILY AS NEEDED FOR TWO TO THREE WEEKS. USE SPARINGLY, Disp: , Rfl:     oxyCODONE (ROXICODONE) 5 MG immediate release tablet, Take 1 tablet (5 mg total) by mouth every 4 (four) hours as needed for Pain., Disp: 5 tablet, Rfl: 0    OZEMPIC 0.25 mg or 0.5 mg (2 mg/3 mL) pen injector, 0.5MG Subcutaneous WEEKLY for 30 days, Disp: , Rfl:     phentermine (ADIPEX-P) 37.5 mg tablet, Take 37.5 mg by mouth., Disp: , Rfl:     REXULTI 4 mg Tab, Take 1 tablet by mouth every evening., Disp: , Rfl:     traZODone (DESYREL) 100 MG tablet, TK 1 T PO HS, Disp: , Rfl: 1    triamcinolone acetonide 0.5% (KENALOG) 0.5 % Crea, Apply topically 2 (two) times daily., Disp: 15 g, Rfl: 0    TRULICITY 4.5 mg/0.5 mL pen injector, Inject 4.5 mg into the skin every 7 days., Disp: , Rfl:     ZOLOFT 100 mg tablet, Take 200 mg by mouth once daily. , Disp: , Rfl:     albuterol (VENTOLIN HFA) 90 mcg/actuation inhaler, Inhale 2 puffs into the lungs every 6 (six) hours as needed for Wheezing. Rescue, Disp: 18 g, Rfl: 0    amitriptyline (ELAVIL) 10 MG tablet, Take 1 tablet (10 mg total) by mouth nightly., Disp: 30 tablet, Rfl: 11    fluticasone-salmeterol diskus inhaler 250-50 mcg, Inhale 1 puff into the lungs 2 (two) times daily. Controller, Disp: 60 each, Rfl: 11    omega-3 fatty acids/fish oil (FISH OIL-OMEGA-3 FATTY ACIDS) 300-1,000 mg capsule, Take 1 capsule by mouth once daily., Disp: 90 capsule, Rfl: 3    topiramate (TOPAMAX) 50 MG tablet, Take 2 tablets (100 mg total) by mouth every evening., Disp: 60 tablet, Rfl: 11    Current Facility-Administered Medications:     LIDOcaine HCl 2% urojet, , Urethral, Once,        Vitals:    02/03/25 1442   BP: 126/86   BP Location: Left arm   Patient Position: Sitting  "  Pulse: 99   Weight: 91.5 kg (201 lb 11.5 oz)   Height: 5' 2" (1.575 m)     Physical Exam:    GEN:   Well-appearing  Psych:  Appropriate affect, demonstrates insight  SKIN:  No rash on the face or bridge of the nose      LABS:   Lab Results   Component Value Date    HGB 14.2 09/19/2024    CO2 22 (L) 09/19/2024         RECORDS REVIEWED:    PSG 6/14/18: AHI 0  PSG 1/19/21: AHI 0  PSG 3/8/21: AHI 2.2 (REM AHI 9.2)  PSG 11/22/23: AHI 6.5, RAHI 32 vs 3, hypoxemia, possible OHS     Previous sleep notes: 12/10/20, 11/15/23, 1/18/24, 2/29/24, 8/14/24    BiPAP interrogation: 2/3/25: 35/90 x 59mins, IPAP max 10cwp, EPAP min 4cwp, PS 4cwp, leak (19.2/33.6/40.2), AHI 0    ASSESSMENT    PROBLEM DESCRIPTION/ Sx on Presentation Interval Hx STATUS   Mild ZAC    + snoring, denies witnessed apneas  + wakes feeling un-refreshed Working on usage, having pressure intolerance impacting usage (also recent URI) Not yet controlled   Daytime Sx    + sleepiness when inactive   Naps 3-4 x weekly  ESS 16/24 on intake (reviewed from 12/10/20) Still quite sleepy persists    Insomnia    Trouble falling asleep: difficult (uses phone in bed)  Maintenance:         wakes frequently, return to sleep is quick  Prior hypnotics:        Current hypnotics: trazodone 50mg PRN    Persists persists   Nocturia    x 2-3 per sleep period persists persists   Other issues:    PLAN     -using and benefiting from BiPAP therapy when able to tolerate it  -continue BiPAP nightly  -discussed goals of treatment 7-9hours nightly with usage of >6hrs 100% of nights and minimal usage >4hrs 70% of nights recommended  -recommended BiPAP titration given pressure intolerance despite comfortable interface, patient is open  -BiPAP titration ordered  -BiPAP supplies ordered   -discussed ZAC with patient in detail, including possible complications of untreated ZAC like heart attack/stroke  -advised on strict driving precautions; advised never to drive drowsy  -discussed insomnia and " sleep hygiene with patient  -recommended CBTi (gold standard), patient is open; will refer to [facility] clinic    Advised on plan of care. Answered all patient questions. Patient verbalized understanding and voiced agreement with plan of care.     RTC  following PCP titration      The patient was given open opportunity to ask questions and/or express concerns about treatment plan. All questions/concerns were discussed.     Two patient identifiers used prior to evaluation.

## 2025-02-04 ENCOUNTER — OFFICE VISIT (OUTPATIENT)
Dept: CARDIOLOGY | Facility: CLINIC | Age: 40
End: 2025-02-04
Payer: MEDICARE

## 2025-02-04 VITALS
BODY MASS INDEX: 37.13 KG/M2 | HEART RATE: 110 BPM | WEIGHT: 201.75 LBS | DIASTOLIC BLOOD PRESSURE: 78 MMHG | OXYGEN SATURATION: 96 % | SYSTOLIC BLOOD PRESSURE: 117 MMHG | HEIGHT: 62 IN

## 2025-02-04 DIAGNOSIS — R07.9 CHEST PAIN, UNSPECIFIED TYPE: ICD-10-CM

## 2025-02-04 DIAGNOSIS — E66.01 SEVERE OBESITY (BMI 35.0-39.9) WITH COMORBIDITY: ICD-10-CM

## 2025-02-04 DIAGNOSIS — E11.59 HYPERTENSION ASSOCIATED WITH DIABETES: ICD-10-CM

## 2025-02-04 DIAGNOSIS — R00.2 PALPITATIONS: ICD-10-CM

## 2025-02-04 DIAGNOSIS — E11.69 HYPERLIPIDEMIA ASSOCIATED WITH TYPE 2 DIABETES MELLITUS: ICD-10-CM

## 2025-02-04 DIAGNOSIS — R06.02 SHORTNESS OF BREATH: ICD-10-CM

## 2025-02-04 DIAGNOSIS — R07.9 CHRONIC CHEST PAIN: Primary | ICD-10-CM

## 2025-02-04 DIAGNOSIS — I15.2 HYPERTENSION ASSOCIATED WITH DIABETES: ICD-10-CM

## 2025-02-04 DIAGNOSIS — G47.30 SLEEP APNEA, UNSPECIFIED TYPE: ICD-10-CM

## 2025-02-04 DIAGNOSIS — E11.42 TYPE 2 DIABETES MELLITUS WITH DIABETIC POLYNEUROPATHY, WITHOUT LONG-TERM CURRENT USE OF INSULIN: ICD-10-CM

## 2025-02-04 DIAGNOSIS — G89.29 CHRONIC CHEST PAIN: Primary | ICD-10-CM

## 2025-02-04 DIAGNOSIS — E78.5 HYPERLIPIDEMIA ASSOCIATED WITH TYPE 2 DIABETES MELLITUS: ICD-10-CM

## 2025-02-04 PROCEDURE — 3078F DIAST BP <80 MM HG: CPT | Mod: HCNC,CPTII,S$GLB, | Performed by: PHYSICIAN ASSISTANT

## 2025-02-04 PROCEDURE — 1159F MED LIST DOCD IN RCRD: CPT | Mod: HCNC,CPTII,S$GLB, | Performed by: PHYSICIAN ASSISTANT

## 2025-02-04 PROCEDURE — 93000 ELECTROCARDIOGRAM COMPLETE: CPT | Mod: HCNC,S$GLB,, | Performed by: INTERNAL MEDICINE

## 2025-02-04 PROCEDURE — 3072F LOW RISK FOR RETINOPATHY: CPT | Mod: HCNC,CPTII,S$GLB, | Performed by: PHYSICIAN ASSISTANT

## 2025-02-04 PROCEDURE — 1160F RVW MEDS BY RX/DR IN RCRD: CPT | Mod: HCNC,CPTII,S$GLB, | Performed by: PHYSICIAN ASSISTANT

## 2025-02-04 PROCEDURE — 3074F SYST BP LT 130 MM HG: CPT | Mod: HCNC,CPTII,S$GLB, | Performed by: PHYSICIAN ASSISTANT

## 2025-02-04 PROCEDURE — 99214 OFFICE O/P EST MOD 30 MIN: CPT | Mod: HCNC,S$GLB,, | Performed by: PHYSICIAN ASSISTANT

## 2025-02-04 PROCEDURE — 99999 PR PBB SHADOW E&M-EST. PATIENT-LVL V: CPT | Mod: PBBFAC,HCNC,, | Performed by: PHYSICIAN ASSISTANT

## 2025-02-04 PROCEDURE — 3008F BODY MASS INDEX DOCD: CPT | Mod: HCNC,CPTII,S$GLB, | Performed by: PHYSICIAN ASSISTANT

## 2025-02-04 RX ORDER — METOPROLOL SUCCINATE 200 MG/1
200 TABLET, EXTENDED RELEASE ORAL DAILY
Qty: 90 TABLET | Refills: 3 | Status: SHIPPED | OUTPATIENT
Start: 2025-02-04 | End: 2026-01-30
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
## 2025-02-05 LAB
OHS QRS DURATION: 114 MS
OHS QTC CALCULATION: 523 MS

## 2025-02-13 RX ORDER — CYCLOSPORINE 0.5 MG/ML
1 EMULSION OPHTHALMIC 2 TIMES DAILY
Qty: 60 EACH | Refills: 11 | Status: SHIPPED | OUTPATIENT
Start: 2025-02-13 | End: 2026-02-13

## 2025-02-14 ENCOUNTER — HOSPITAL ENCOUNTER (OUTPATIENT)
Dept: SLEEP MEDICINE | Facility: OTHER | Age: 40
Discharge: HOME | End: 2025-02-14
Attending: PHYSICIAN ASSISTANT
Payer: MEDICARE

## 2025-02-14 ENCOUNTER — TELEPHONE (OUTPATIENT)
Dept: SLEEP MEDICINE | Facility: OTHER | Age: 40
End: 2025-02-14
Payer: MEDICARE

## 2025-02-14 DIAGNOSIS — Z78.9 INTOLERANCE OF CONTINUOUS POSITIVE AIRWAY PRESSURE (CPAP) VENTILATION: ICD-10-CM

## 2025-02-14 DIAGNOSIS — G47.33 OSA (OBSTRUCTIVE SLEEP APNEA): ICD-10-CM

## 2025-02-14 DIAGNOSIS — G47.10 HYPERSOMNOLENCE: ICD-10-CM

## 2025-02-14 DIAGNOSIS — F51.09 OTHER INSOMNIA NOT DUE TO A SUBSTANCE OR KNOWN PHYSIOLOGICAL CONDITION: ICD-10-CM

## 2025-02-14 DIAGNOSIS — R35.1 NOCTURIA: ICD-10-CM

## 2025-02-14 PROCEDURE — 95811 POLYSOM 6/>YRS CPAP 4/> PARM: CPT | Mod: HCNC

## 2025-02-15 PROBLEM — G47.33 OSA (OBSTRUCTIVE SLEEP APNEA): Status: ACTIVE | Noted: 2023-11-23

## 2025-02-15 NOTE — PROGRESS NOTES
An overnight BiPAP titration sleep study was performed on Maria Ines Ac. The following was explained to the pt prior to the study: the time to bed and wake time, the set-up process timeframe and the purpose of each sensor, the reason (if sensors fall off) the technician will need to enter the room during the night, the possibility of the tech fitting a PAP mask on pt for treatment in the middle of the night, and how to call out for assistance during the night. A post-study letter was handed to the pt in the morning.     Mask used:   Small F20 ResMed FFM

## 2025-02-17 NOTE — PROCEDURES
"[facility]    Titration Interpretation Report    Patient Name:  Maria Ines Ac  MRN#:  [id number]  :  1985  Study Date:  2025  Referring Provider:  Michael Crooks    The patient is a 40 year old Female who is 5' 2" and weighs 201.0 lbs.  Her BMI equals 37.0.  A full night PAP titration was performed.    Polysomnogram Data  A full night polysomnogram recorded the standard physiologic parameters including EEG, EOG, EMG, EKG, nasal and oral airflow.  Respiratory parameters of chest and abdominal movements were recorded with (RIP) Respiratory Inductance Plethsmography.  Oxygen saturation was recorded by pulse oximetry.    Titration Summary  The patient was titrated at pressures ranging from 8/4/0* cm/H20 with supplemental oxygen at - up to 12/8/0* cm/H20 with supplemental oxygen at -.  The last pressure used in the study was 10/6/0* cm/H20 with supplemental oxygen at -.    Sleep Architecture  The total recording time of the polysomnogram was 452.1 minutes.  The total sleep time was 237.5 minutes.  The patient spent 6.1% of total sleep time in Stage N1, 86.3% in Stage N2, 7.6% in Stages N3, and 0.0% in REM.  Sleep latency was 50.2 minutes.  REM latency was - minutes.  Sleep Efficiency was 52.5%.  Total wake time was 214.5 minutes for a total wake percentage of 23.1%.  Wake after Sleep Onset was 164.0 minutes.    Respiratory Summary  The polysomnogram revealed a presence of - obstructive, 1 central, and - mixed apneas resulting in Total Apnea index of 0.3 events per hour.  There were - hypopneas resulting in Total Hypopnea index of - events per hour.  The combined Apnea/Hypopnea index was 0.3 events per hour.  There were a total of 1 RERA events resulting in a Respiratory Disturbance Index (RDI) of 0.5 events per hour.     Mean oxygen saturation was 93.1%.  The lowest oxygen saturation during sleep was 88.0%.  Time spent <=88% oxygen saturation was 0.1 minutes (-).    End Tidal CO2 " during sleep ranged from - to - mmHg. End Tidal CO2 was greater than 50 mmHg for - minutes and greater than 55 mmHg for - minutes.  Transcutaneous CO2 during sleep ranged from - to - mmHg. Transcutaneous CO2 was greater than 50 mmHg for - minutes and greater than 55 mmHg for - minutes.    Limb Movement Activity  There were - limb movements recorded.  Of this total, - were classified as PLMs.  Of the PLMs, - were associated with arousals.  The Limb Movement index was - per hour while the PLM index was - per hour and PLM with arousals index was - per hour.    Cardiac: single lead EKG revealed normal sinus rhythm with frequent PVCs      Mask: Small F20 ResMed FFM   BiPAP = 8/4 cwp was partially effective in supine position in stage N2 sleep  BiPAP = 12/8 cwp was largely effective in supine position in stage N2 sleep  with occasional breakthrough obstructive events   Limited REM sleep was seen during the titration   The mask used in this study worked well  The patients response to the study:  "The mask and pressure was comfortable."    Oxygenation:  No significant hypoxemia was observed     Impression:  -obstructive sleep apnea  -no REM sleep recorded    Recommendations:  -initial BiPAP auto settings EPAP min = 8 cwp, PS = 4 cwp, and IPAP max = 14 cwp.   -EPAP max should be increased to 9 cwp or higher depending on patient tolerance.   -the patient has follow up with Sleep Medicine        Apolinar Graves MD    (This Sleep Study was interpreted by a Board Certified Sleep Specialist who conducted an epoch-by-epoch review of the entire raw data recording.)  (The indication for this sleep study was reviewed and deemed appropriate by AASM Practice Parameters or other reasons by a Board Certified Sleep Specialist.)        [facility]     Titration Report     Patient Name: Maria Ines Ac Study Date: 2/14/2025   YOB: 1985 MRN #: [id number]   Age: 40 year JERONIMO #: [id number]   Sex: Female  "Referring Provider: Michael Crooks   Height: 5' 2" Recording Tech: Donovan Castro RPSGT   Weight: 201.0 lbs Scoring Tech: Tato Fontaine RRT RPSGT   BMI: 37.0 Interpreting Physician: -   ESS: - Neck Circumference: -      Study Overview     Lights Off: 09:27:35 PM   Count Index   Lights On: 04:59:38 AM Awakenings: 17 4.3   Time in Bed: 452.1 min. Arousals: 26 6.6   Total Sleep Time: 237.5 min. Apneas & Hypopneas: 1 0.3    Sleep Efficiency: 52.5% Limb Movements: - -   Sleep Latency: 50.2 min. Snores: - -   Wake After Sleep Onset: 164.0 min. Desaturations: 3 0.8    REM Latency from Sleep Onset: - min. Minimum SpO2 TST: 88.0%       Sleep Architecture                                                                                                                           % of Time in Bed     Stages Time (mins) % Sleep Time   Wake 214.5     Stage N1 14.5 6.1%   Stage N2 205.0 86.3%   Stage N3 18.0 7.6%   REM 0.0 0.0%         Arousal Summary       NREM REM Sleep Index   Respiratory Arousals 1 - 1 0.3   PLM Arousals - - - -   Isolated Limb Movement Arousals - - - -   Spontaneous Arousals 25 - 25 6.3   Total 26 - 26 6.6         Limb Movement Summary       Count Index   Isolated Limb Movements - -   Periodic Limb Movements (PLMs) - -   Total Limb Movements - -       Respiratory Summary       By Sleep Stage By Body Position Total    NREM REM Supine Non-Supine    Time (min) 237.5 0.0 224.5 13.0 237.5                 Obstructive Apnea - - - - -   Mixed Apnea - - - - -   Central Apnea 1 - 1 - 1   Central Apnea Index 0.3 - 0.3 - 0.3   Total Apneas 1 - 1 - 1   Total Apnea Index 0.3 - 0.3 - 0.3                 Total Hypopnea - - - - -   Total Hypopnea Index - - - - -                 Apnea & Hypopnea 1 - 1 - 1   Apnea & Hypopnea Index 0.3 - 0.3 - 0.3                 RERAs 1 - 1 - 1   RERA Index 0.3 - 0.3 - 0.3                 RDI 0.5 - 0.5 - 0.5      Scoring Criteria: Hypopneas scored at 4% desaturation criteria.     Respiratory " Event Durations       Apnea Hypopnea    NREM REM NREM REM   Average (seconds) 11.2 - - -   Maximum (seconds) 11.2 - - -         Oxygen Saturation Summary       Wake NREM REM TST Total   Average SpO2 93.7% 92.5% - 92.5% 93.1%   Minimum SpO2 88.0% 88.0% - 88.0% 88.0%   Maximum SpO2 98.0% 98.0% - 98.0% 98.0%      Oxygen Saturation Distribution     Range (%) Time in range (min) Time in range (%)    90.0 - 100.0 444.2 99.3%   80.0 - 90.0 3.1 0.7%   70.0 - 80.0 - -   60.0 - 70.0 - -   50.0 - 60.0 - -   0.0 - 50.0 - -   Time Spent <=88% SpO2     Range (%) Time in range (min) Time in range (%)   0.0 - 88.0 0.1 0.0%              Count Index   Desaturations 3 0.8           Cardiac Summary       Wake NREM REM Sleep Total   Average Pulse Rate (BPM) 70.9 66.0 - 66.0 68.3   Minimum Pulse Rate (BPM) 39.0 38.0 - 38.0 38.0   Maximum Pulse Rate (BPM) 89.0 79.0 - 79.0 89.0      Pulse Rate Distribution     Range (bpm) Time in range (min) Time in range (%)   0.0 - 40.0 2.2 0.5%   40.0 - 60.0 34.1 7.6%   60.0 - 80.0 405.0 90.4%   80.0 - 100.0 6.5 1.4%   100.0 - 120.0 - -   120.0 - 140.0 - -   140.0 - 200.0 - -      EtCO2 Summary     Stage Min (mmHg) Average (mmHg) Max (mmHg)   Wake - - -   NREM(1+2+3) - - -   REM - - -      Range (mmHg) Time in range (min) Time in range (%)   20.0 - 40.0 - -   40.0 - 50.0 - -   50.0 - 55.0 - -   55.0 - 100.0 - -      TcCO2 Summary     Stage Min (mmHg) Average (mmHg) Max (mmHg)   Wake - - -   NREM(1+2+3) - - -   REM - - -      Range (mmHg) Time in range (min) Time in range (%)   20.0 - 40.0 - -   40.0 - 50.0 - -   50.0 - 55.0 - -   55.0 - 100.0 - -   Excluded data <20.0 & >65.0 452.5 100.0%      Comments     -         Titration Summary     PAP Device PAP Level O2 Level Time (min) TST (min) NREM (min) REM (min) Wake (min) Sleep Eff% OA# CA# MA# Hyp# AHI RERA RDI Min SpO2 SpO2 <=88% (min) Ar. Index   - Off - 0.5 0.0 0.0 0.0 0.0 0.0%             BiLevel 8/4/0 - 148.5 59.0 59.0 0.0 89.5 39.7% - - - - - - -  91.0  0.0 12.2   BiLevel 9/5/0 - 27.5 27.5 27.5 0.0 0.0 100.0% - - - - - - - 90.0  0.0 -   BiLevel 10/6/0 - 105.5 33.0 33.0 0.0 72.5 31.3% - - - - - - - 90.0  0.0 1.8   BiLevel 11/7/0 - 125.5 93.5 93.5 0.0 32.0 74.5% - 1 - - 0.6 - 0.6 88.0  0.0 5.1   BiLevel 12/8/0 - 45.0 24.5 24.5 0.0 20.5 54.4% - - - - - 1 2.4 91.0  0.0 12.2

## 2025-02-19 ENCOUNTER — TELEPHONE (OUTPATIENT)
Dept: DERMATOLOGY | Facility: CLINIC | Age: 40
End: 2025-02-19
Payer: MEDICARE

## 2025-02-25 ENCOUNTER — OFFICE VISIT (OUTPATIENT)
Dept: PODIATRY | Facility: CLINIC | Age: 40
End: 2025-02-25
Payer: MEDICARE

## 2025-02-25 VITALS
SYSTOLIC BLOOD PRESSURE: 123 MMHG | WEIGHT: 207.69 LBS | HEIGHT: 62 IN | HEART RATE: 107 BPM | DIASTOLIC BLOOD PRESSURE: 75 MMHG | BODY MASS INDEX: 38.22 KG/M2

## 2025-02-25 DIAGNOSIS — L84 CORN OR CALLUS: ICD-10-CM

## 2025-02-25 DIAGNOSIS — B35.1 ONYCHOMYCOSIS DUE TO DERMATOPHYTE: ICD-10-CM

## 2025-02-25 DIAGNOSIS — E11.42 DIABETIC POLYNEUROPATHY ASSOCIATED WITH TYPE 2 DIABETES MELLITUS: Primary | ICD-10-CM

## 2025-02-25 PROCEDURE — 99999 PR PBB SHADOW E&M-EST. PATIENT-LVL IV: CPT | Mod: PBBFAC,HCNC,, | Performed by: PODIATRIST

## 2025-02-25 PROCEDURE — 95811 POLYSOM 6/>YRS CPAP 4/> PARM: CPT | Mod: 26,HCNC,, | Performed by: INTERNAL MEDICINE

## 2025-02-25 NOTE — PROGRESS NOTES
Subjective:      Patient ID: Maria Ines Ac is a 40 y.o. female.    Chief Complaint: Diabetic Foot Exam (9/17/24 - Luann Raymond MD, PCP, f/u on 3/17/2025) and Foot Pain (Sharp, pins and needles)    Maria Ines is a 40 y.o. female who presents to the clinic for evaluation and treatment of high risk feet. Maria Ines has a past medical history of Blood in stool, Constipation, Cystitis, Depression, Diabetes mellitus, type 2, Dysphagia, Endometriosis, GERD (gastroesophageal reflux disease), Headache, Hypertension, Palpitations, Premature menopause on hormone replacement therapy, Thyroid disease, and Weakness (05/31/2019). The patient's chief complaint is long, thick toenails.     This patient has documented high risk feet requiring routine maintenance secondary to peripheral neuropathy.    PCP: Luann Raymond MD    Date Last Seen by PCP:   Chief Complaint   Patient presents with    Diabetic Foot Exam     9/17/24 - Luann Raymond MD, PCP, f/u on 3/17/2025    Foot Pain     Sharp, pins and needles       Current shoe gear:  Affected Foot: Tennis shoes      Unaffected Foot: Tennis shoes    Hemoglobin A1C   Date Value Ref Range Status   09/19/2024 5.3 4.0 - 5.6 % Final     Comment:     ADA Screening Guidelines:  5.7-6.4%  Consistent with prediabetes  >or=6.5%  Consistent with diabetes    High levels of fetal hemoglobin interfere with the HbA1C  assay. Heterozygous hemoglobin variants (HbS, HgC, etc)do  not significantly interfere with this assay.   However, presence of multiple variants may affect accuracy.     03/15/2024 5.6 4.0 - 5.6 % Final     Comment:     ADA Screening Guidelines:  5.7-6.4%  Consistent with prediabetes  >or=6.5%  Consistent with diabetes    High levels of fetal hemoglobin interfere with the HbA1C  assay. Heterozygous hemoglobin variants (HbS, HgC, etc)do  not significantly interfere with this assay.   However, presence of multiple variants may affect accuracy.     11/14/2023 5.7 (H) 4.0 -  5.6 % Final     Comment:     ADA Screening Guidelines:  5.7-6.4%  Consistent with prediabetes  >or=6.5%  Consistent with diabetes    High levels of fetal hemoglobin interfere with the HbA1C  assay. Heterozygous hemoglobin variants (HbS, HgC, etc)do  not significantly interfere with this assay.   However, presence of multiple variants may affect accuracy.         Review of Systems   Constitutional: Negative for chills, fever and malaise/fatigue.   HENT:  Negative for hearing loss.    Cardiovascular:  Negative for claudication.   Respiratory:  Negative for shortness of breath.    Skin:  Positive for color change, dry skin and nail changes. Negative for flushing and rash.   Musculoskeletal:  Negative for joint pain and myalgias.   Gastrointestinal:  Negative for nausea and vomiting.   Neurological:  Positive for numbness, paresthesias and sensory change. Negative for loss of balance.   Psychiatric/Behavioral:  Negative for altered mental status.    Allergic/Immunologic: Negative for hives.           Objective:      Physical Exam  Vitals reviewed.   Cardiovascular:      Pulses:           Dorsalis pedis pulses are 2+ on the right side and 2+ on the left side.        Posterior tibial pulses are 2+ on the right side and 2+ on the left side.      Comments: No edema noted to b/L LEs  Musculoskeletal:      Comments: Adequate joint ROM noted to all lower extremity muscle groups with no pain or crepitation noted. Muscle strength is 5/5 in all groups bilaterally.     Feet:      Right foot:      Protective Sensation: 5 sites tested.  0 sites sensed.      Left foot:      Protective Sensation: 5 sites tested.  0 sites sensed.   Skin:     General: Skin is warm and dry.      Capillary Refill: Capillary refill takes 2 to 3 seconds.      Comments: Normal skin tugor noted.   No open lesion noted b/L  Skin temp is warm to warm from proximal to distal b/L.  Webspaces clean, dry, and intact  Xerosis Bilaterally. No open lesions noted.    HKL noted to left 5th digit x2   Neurological:      Mental Status: She is alert and oriented to person, place, and time.      Comments: Intact gross sensation noted to b/L LEs         Nails x10 are elongated by  8-9mm's, thickened by 1-2 mm's, dystrophic, and are yellowish in  coloration . Xerosis Bilaterally. No open lesions noted.             Assessment:       Encounter Diagnoses   Name Primary?    Diabetic polyneuropathy associated with type 2 diabetes mellitus Yes    Onychomycosis due to dermatophyte     Corn or callus          Plan:       Maria Ines was seen today for diabetic foot exam and foot pain.    Diagnoses and all orders for this visit:    Diabetic polyneuropathy associated with type 2 diabetes mellitus    Onychomycosis due to dermatophyte    Corn or callus      I counseled the patient on her conditions, their implications and medical management.      - Shoe inspection. Diabetic Foot Education. Patient reminded of the importance of good nutrition and blood sugar control to help prevent podiatric complications of diabetes. Patient instructed on proper foot hygeine. We discussed wearing proper shoe gear, daily foot inspections, never walking without protective shoe gear, never putting sharp instruments to feet, routine podiatric nail visits every 2-3 months.      After cleansing with an alcohol prep pad, the about mentioned hyperkeratotic lesions were sharply debrided X 2 utilizing a #15 blade to a smooth base without incident. Pt tolerated the procedure well and reported comfort to the debarment sites. Pt will continue to use padding and moisture the callused areas.    - With patient's permission, nails were aggressively reduced and debrided x 10 to their soft tissue attachment mechanically and with electric , removing all offending nail and debris. Patient relates relief following the procedure. She will continue to monitor the areas daily, inspect her feet, wear protective shoe gear when  ambulatory, moisturizer to maintain skin integrity and follow in this office in approximately 2-3 months, sooner p.r.n.

## 2025-02-27 ENCOUNTER — PATIENT MESSAGE (OUTPATIENT)
Dept: SLEEP MEDICINE | Facility: CLINIC | Age: 40
End: 2025-02-27
Payer: MEDICARE

## 2025-03-17 ENCOUNTER — OFFICE VISIT (OUTPATIENT)
Dept: INTERNAL MEDICINE | Facility: CLINIC | Age: 40
End: 2025-03-17
Payer: MEDICARE

## 2025-03-17 ENCOUNTER — LAB VISIT (OUTPATIENT)
Dept: LAB | Facility: HOSPITAL | Age: 40
End: 2025-03-17
Attending: INTERNAL MEDICINE
Payer: MEDICARE

## 2025-03-17 DIAGNOSIS — E11.9 DIABETES MELLITUS WITHOUT COMPLICATION: ICD-10-CM

## 2025-03-17 DIAGNOSIS — I10 HYPERTENSION, UNSPECIFIED TYPE: ICD-10-CM

## 2025-03-17 DIAGNOSIS — F25.1 SCHIZOAFFECTIVE DISORDER, DEPRESSIVE TYPE: ICD-10-CM

## 2025-03-17 DIAGNOSIS — I10 HYPERTENSION, UNSPECIFIED TYPE: Primary | ICD-10-CM

## 2025-03-17 DIAGNOSIS — Z00.00 PREVENTATIVE HEALTH CARE: ICD-10-CM

## 2025-03-17 PROCEDURE — 83036 HEMOGLOBIN GLYCOSYLATED A1C: CPT | Mod: HCNC | Performed by: INTERNAL MEDICINE

## 2025-03-17 PROCEDURE — 3066F NEPHROPATHY DOC TX: CPT | Mod: HCNC,CPTII,S$GLB, | Performed by: INTERNAL MEDICINE

## 2025-03-17 PROCEDURE — 80053 COMPREHEN METABOLIC PANEL: CPT | Mod: HCNC | Performed by: INTERNAL MEDICINE

## 2025-03-17 PROCEDURE — 99999 PR PBB SHADOW E&M-EST. PATIENT-LVL V: CPT | Mod: PBBFAC,HCNC,, | Performed by: INTERNAL MEDICINE

## 2025-03-17 PROCEDURE — 3079F DIAST BP 80-89 MM HG: CPT | Mod: HCNC,CPTII,S$GLB, | Performed by: INTERNAL MEDICINE

## 2025-03-17 PROCEDURE — 99396 PREV VISIT EST AGE 40-64: CPT | Mod: HCNC,S$GLB,, | Performed by: INTERNAL MEDICINE

## 2025-03-17 PROCEDURE — 1159F MED LIST DOCD IN RCRD: CPT | Mod: HCNC,CPTII,S$GLB, | Performed by: INTERNAL MEDICINE

## 2025-03-17 PROCEDURE — 3074F SYST BP LT 130 MM HG: CPT | Mod: HCNC,CPTII,S$GLB, | Performed by: INTERNAL MEDICINE

## 2025-03-17 PROCEDURE — 3044F HG A1C LEVEL LT 7.0%: CPT | Mod: HCNC,CPTII,S$GLB, | Performed by: INTERNAL MEDICINE

## 2025-03-17 PROCEDURE — 3008F BODY MASS INDEX DOCD: CPT | Mod: HCNC,CPTII,S$GLB, | Performed by: INTERNAL MEDICINE

## 2025-03-17 PROCEDURE — 3072F LOW RISK FOR RETINOPATHY: CPT | Mod: HCNC,CPTII,S$GLB, | Performed by: INTERNAL MEDICINE

## 2025-03-17 PROCEDURE — 3061F NEG MICROALBUMINURIA REV: CPT | Mod: HCNC,CPTII,S$GLB, | Performed by: INTERNAL MEDICINE

## 2025-03-18 ENCOUNTER — LAB VISIT (OUTPATIENT)
Dept: LAB | Facility: HOSPITAL | Age: 40
End: 2025-03-18
Attending: INTERNAL MEDICINE
Payer: MEDICARE

## 2025-03-18 DIAGNOSIS — E11.9 DIABETES MELLITUS WITHOUT COMPLICATION: ICD-10-CM

## 2025-03-18 LAB
ALBUMIN SERPL BCP-MCNC: 4.2 G/DL (ref 3.5–5.2)
ALBUMIN/CREAT UR: 7.6 UG/MG (ref 0–30)
ALP SERPL-CCNC: 60 U/L (ref 40–150)
ALT SERPL W/O P-5'-P-CCNC: 35 U/L (ref 10–44)
ANION GAP SERPL CALC-SCNC: 15 MMOL/L (ref 8–16)
AST SERPL-CCNC: 35 U/L (ref 10–40)
BILIRUB SERPL-MCNC: 0.3 MG/DL (ref 0.1–1)
BUN SERPL-MCNC: 11 MG/DL (ref 6–20)
CALCIUM SERPL-MCNC: 9.8 MG/DL (ref 8.7–10.5)
CHLORIDE SERPL-SCNC: 105 MMOL/L (ref 95–110)
CO2 SERPL-SCNC: 19 MMOL/L (ref 23–29)
CREAT SERPL-MCNC: 0.9 MG/DL (ref 0.5–1.4)
CREAT UR-MCNC: 132 MG/DL (ref 15–325)
EST. GFR  (NO RACE VARIABLE): >60 ML/MIN/1.73 M^2
ESTIMATED AVG GLUCOSE: 108 MG/DL (ref 68–131)
GLUCOSE SERPL-MCNC: 93 MG/DL (ref 70–110)
HBA1C MFR BLD: 5.4 % (ref 4–5.6)
MICROALBUMIN UR DL<=1MG/L-MCNC: 10 UG/ML
POTASSIUM SERPL-SCNC: 3.9 MMOL/L (ref 3.5–5.1)
PROT SERPL-MCNC: 7.8 G/DL (ref 6–8.4)
SODIUM SERPL-SCNC: 139 MMOL/L (ref 136–145)

## 2025-03-18 PROCEDURE — 82570 ASSAY OF URINE CREATININE: CPT | Mod: HCNC | Performed by: INTERNAL MEDICINE

## 2025-03-19 VITALS
WEIGHT: 204.56 LBS | BODY MASS INDEX: 37.64 KG/M2 | SYSTOLIC BLOOD PRESSURE: 104 MMHG | OXYGEN SATURATION: 95 % | HEIGHT: 62 IN | DIASTOLIC BLOOD PRESSURE: 86 MMHG | HEART RATE: 82 BPM | TEMPERATURE: 99 F

## 2025-03-19 NOTE — PROGRESS NOTES
Subjective:       Patient ID: Maria Ines Ac is a 40 y.o. female.    Chief Complaint: Annual Exam    HPI  She is here for annual exam     Past medical history: Hypertension, hyperlipidemia, diabetes, hypothyroidism, bipolar disorder, migraine headaches, status post hysterectomy, family history of colon cancer-brother.  She had a colonoscopy December 2021      Medications: Synthroid , metformin, Toprol-XL ,Lipitor 80 mg daily, ozempic      NO KNOWN DRUG ALLERGIES      Review of Systems   Constitutional:  Negative for chills, fatigue, fever and unexpected weight change.   Respiratory:  Negative for chest tightness and shortness of breath.    Cardiovascular:  Negative for chest pain and palpitations.   Gastrointestinal:  Negative for abdominal pain and blood in stool.   Neurological:  Negative for dizziness, syncope, numbness and headaches.       Objective:      Physical Exam  HENT:      Right Ear: External ear normal.      Left Ear: External ear normal.      Nose: Nose normal.      Mouth/Throat:      Mouth: Mucous membranes are moist.      Pharynx: Oropharynx is clear.   Eyes:      Pupils: Pupils are equal, round, and reactive to light.   Cardiovascular:      Rate and Rhythm: Normal rate and regular rhythm.      Heart sounds: No murmur heard.  Pulmonary:      Breath sounds: Normal breath sounds.   Abdominal:      General: There is no distension.      Palpations: There is no hepatomegaly or splenomegaly.      Tenderness: There is no abdominal tenderness.   Musculoskeletal:      Cervical back: Normal range of motion.   Lymphadenopathy:      Cervical: No cervical adenopathy.      Upper Body:      Right upper body: No axillary adenopathy.      Left upper body: No axillary adenopathy.   Neurological:      Cranial Nerves: No cranial nerve deficit.      Sensory: No sensory deficit.      Motor: Motor function is intact.      Deep Tendon Reflexes: Reflexes are normal and symmetric.         Assessment/Plan       Annual  exam: Check CMP, A1c urine microalbumin

## 2025-03-22 ENCOUNTER — RESULTS FOLLOW-UP (OUTPATIENT)
Dept: INTERNAL MEDICINE | Facility: CLINIC | Age: 40
End: 2025-03-22

## 2025-03-26 ENCOUNTER — OFFICE VISIT (OUTPATIENT)
Dept: DERMATOLOGY | Facility: CLINIC | Age: 40
End: 2025-03-26
Payer: MEDICARE

## 2025-03-26 DIAGNOSIS — L24.9 IRRITANT HAND DERMATITIS: Primary | ICD-10-CM

## 2025-03-26 PROCEDURE — 3066F NEPHROPATHY DOC TX: CPT | Mod: HCNC,CPTII,S$GLB, | Performed by: DERMATOLOGY

## 2025-03-26 PROCEDURE — 1159F MED LIST DOCD IN RCRD: CPT | Mod: HCNC,CPTII,S$GLB, | Performed by: DERMATOLOGY

## 2025-03-26 PROCEDURE — 3061F NEG MICROALBUMINURIA REV: CPT | Mod: HCNC,CPTII,S$GLB, | Performed by: DERMATOLOGY

## 2025-03-26 PROCEDURE — 3072F LOW RISK FOR RETINOPATHY: CPT | Mod: HCNC,CPTII,S$GLB, | Performed by: DERMATOLOGY

## 2025-03-26 PROCEDURE — 3044F HG A1C LEVEL LT 7.0%: CPT | Mod: HCNC,CPTII,S$GLB, | Performed by: DERMATOLOGY

## 2025-03-26 PROCEDURE — 99212 OFFICE O/P EST SF 10 MIN: CPT | Mod: HCNC,S$GLB,, | Performed by: DERMATOLOGY

## 2025-03-26 PROCEDURE — 1160F RVW MEDS BY RX/DR IN RCRD: CPT | Mod: HCNC,CPTII,S$GLB, | Performed by: DERMATOLOGY

## 2025-03-26 PROCEDURE — 99999 PR PBB SHADOW E&M-EST. PATIENT-LVL IV: CPT | Mod: PBBFAC,HCNC,, | Performed by: DERMATOLOGY

## 2025-03-26 RX ORDER — TRIAMCINOLONE ACETONIDE 1 MG/G
CREAM TOPICAL
Qty: 80 G | Refills: 0 | Status: SHIPPED | OUTPATIENT
Start: 2025-03-26

## 2025-03-26 NOTE — PROGRESS NOTES
Subjective:      Patient ID:  Maria Ines Ac is a 40 y.o. female who presents for   Chief Complaint   Patient presents with    Rash     Patient is here for rash on both hands.    Last seen: 5/15/2024. Patient had a shave bx (Traumatized Hemangioma).    Washes hands 10-20 x a day; doesn't like them dirty    Rash - Initial  Affected locations: left hand and right hand  Duration: 1 month  Signs / symptoms: redness, itching and bleeding  Severity: severe  Timing: constant  Aggravated by: nothing  Relieving factors/Treatments tried: nothing    Review of Systems   Skin:  Positive for itching, rash and activity-related sunscreen use. Negative for daily sunscreen use, recent sunburn and wears hat.   Hematologic/Lymphatic: Bruises/bleeds easily.       Objective:   Physical Exam   Constitutional: She appears well-developed and well-nourished. No distress.   Neurological: She is alert and oriented to person, place, and time. She is not disoriented.   Psychiatric: She has a normal mood and affect.   Skin:   Areas Examined (abnormalities noted in diagram):   Nails and Digits Inspection Performed           Diagram Legend     Erythematous scaling macule/papule c/w actinic keratosis       Vascular papule c/w angioma      Pigmented verrucoid papule/plaque c/w seborrheic keratosis      Yellow umbilicated papule c/w sebaceous hyperplasia      Irregularly shaped tan macule c/w lentigo     1-2 mm smooth white papules consistent with Milia      Movable subcutaneous cyst with punctum c/w epidermal inclusion cyst      Subcutaneous movable cyst c/w pilar cyst      Firm pink to brown papule c/w dermatofibroma      Pedunculated fleshy papule(s) c/w skin tag(s)      Evenly pigmented macule c/w junctional nevus     Mildly variegated pigmented, slightly irregular-bordered macule c/w mildly atypical nevus      Flesh colored to evenly pigmented papule c/w intradermal nevus       Pink pearly papule/plaque c/w basal cell carcinoma       Erythematous hyperkeratotic cursted plaque c/w SCC      Surgical scar with no sign of skin cancer recurrence      Open and closed comedones      Inflammatory papules and pustules      Verrucoid papule consistent consistent with wart     Erythematous eczematous patches and plaques     Dystrophic onycholytic nail with subungual debris c/w onychomycosis     Umbilicated papule    Erythematous-base heme-crusted tan verrucoid plaque consistent with inflamed seborrheic keratosis     Erythematous Silvery Scaling Plaque c/w Psoriasis     See annotation            Assessment / Plan:        Irritant/asteatotic hand dermatitis, secondary to frequent hand washing    The diagnosis, options, risks, benefits and alternatives were discussed with the patient.    Sufficient time was available for questions, and all questions were answered to the her satisfaction.    Decrease frequency of hand washing    Trial Cetaphil cleanser rather than soap    Trial EtOH hand  with moisturizers    Rx TAC 0.1% cream bid prn; rx sent to pharmacy    Followup if fails to clear

## 2025-04-08 ENCOUNTER — PATIENT MESSAGE (OUTPATIENT)
Dept: SLEEP MEDICINE | Facility: CLINIC | Age: 40
End: 2025-04-08
Payer: MEDICARE

## 2025-04-11 ENCOUNTER — PATIENT MESSAGE (OUTPATIENT)
Dept: DERMATOLOGY | Facility: CLINIC | Age: 40
End: 2025-04-11
Payer: MEDICARE

## 2025-04-11 ENCOUNTER — TELEPHONE (OUTPATIENT)
Dept: DERMATOLOGY | Facility: CLINIC | Age: 40
End: 2025-04-11
Payer: MEDICARE

## 2025-04-11 NOTE — TELEPHONE ENCOUNTER
Sent message through portal.    ----- Message from Med Assistant Zimmerman sent at 4/10/2025 12:01 PM CDT -----  Regarding: FW: Appt  Contact: [phone]    ----- Message -----  From: Kayden Mcbride  Sent: 4/8/2025  10:08 AM CDT  To: [facility] Staff  Subject: Appt                                             Type:  Appt Who Called: pt Symptoms (please be specific): eczema on the pt hand How long has patient had these symptoms:  2 weeksPharmacy name and phone #:  [id number] - [place], [place] - [address] [facility] [facility] AT [facility] & [address] [address]Ohfgg: [phone] Fax: [phone] Would the patient rather a call back or a response via [facility]? [facility] Call Back Number: [phone]

## 2025-04-23 NOTE — TELEPHONE ENCOUNTER
----- Message from Heidi Carson sent at 3/7/2018  3:31 PM CST -----  Contact: pt  _ x 1st Request  _  2nd Request  _  3rd Request      Who:pt    Why: pt states that she is finished taking the oxybutynin and ready to start the new medicine,please advise     What Number to Call Back: [phone]    When to Expect a call back: (Before the end of the day)   -- if call after 3:00 call back will be tomorrow.     Render Risk Assessment In Note?: no Detail Level: Detailed Comment: Longstanding psoriasis and UC, on Humira via GI. Psoriasis has not been well controlled on Humira even with the addition of multiple topicals. She tried samples of Otezla ~10/2023-10/2024 which cleared her skin very nicely but was not covered as she is on Humira (insurance will not approve 2 biologics for this). She also had some GI SE so stopped and psoriasis is returning keaton on ears and suprapubic area. Vtama has not helped. I recommended she speak with her GI about considering Stelara or Skyrizi.\\n\\nShe went to the ER and then optho for watery eyes where they prescribed topical steroid/antibiotic/antifungal combo which has helped.

## 2025-05-23 ENCOUNTER — TELEPHONE (OUTPATIENT)
Dept: PODIATRY | Facility: CLINIC | Age: 40
End: 2025-05-23
Payer: MEDICARE

## 2025-05-23 NOTE — TELEPHONE ENCOUNTER
Left  for pt with callback number of [phone] in regards to r/s 5/26 appt. Sent portal message. R/s to first available and mailed appt reminder.

## 2025-06-18 ENCOUNTER — TELEPHONE (OUTPATIENT)
Dept: UROLOGY | Facility: CLINIC | Age: 40
End: 2025-06-18
Payer: MEDICARE

## 2025-06-18 NOTE — TELEPHONE ENCOUNTER
Copied from CRM #[id number]. Topic: Appointments - Appointment Rescheduling  >> 2025 12:59 PM Merle wrote:  Pt is returning a missed call from someone in the office and is asking for a return call back soon. Thanks.     Reason for call:   MISS CALL   Patient's DX:     Patient requesting call back or [facility] [phone]

## 2025-06-27 NOTE — TELEPHONE ENCOUNTER
Copied from CRM #[id number]. Topic: Appointments - Appointment Access  >> Jun 27, 2025  2:47 PM Regine wrote:  Type:  Patient Returning Call    Who Called:The pt   Who Left Message for Patient:Ana Gale LPN   Does the patient know what this is regarding?:Rescheduling appt   Would the patient rather a call back or a response via [facility]? Call back   Best Call Back Number:[phone]  Additional Information: Thank you

## 2025-06-30 ENCOUNTER — OFFICE VISIT (OUTPATIENT)
Dept: UROLOGY | Facility: CLINIC | Age: 40
End: 2025-06-30
Payer: MEDICARE

## 2025-06-30 VITALS
BODY MASS INDEX: 36.49 KG/M2 | HEART RATE: 89 BPM | WEIGHT: 199.5 LBS | DIASTOLIC BLOOD PRESSURE: 74 MMHG | SYSTOLIC BLOOD PRESSURE: 108 MMHG

## 2025-06-30 DIAGNOSIS — N30.90 BLADDER INFECTION: ICD-10-CM

## 2025-06-30 DIAGNOSIS — R82.90 ABNORMAL URINALYSIS: Primary | ICD-10-CM

## 2025-06-30 LAB
BILIRUBIN, POC UA: NEGATIVE
BLOOD, POC UA: NEGATIVE
CLARITY, UA: CLEAR
COLOR, UA: YELLOW
GLUCOSE, POC UA: ABNORMAL
KETONES, POC UA: NEGATIVE
LEUKOCYTE EST, POC UA: NEGATIVE
NITRITE, POC UA: POSITIVE
PH UR STRIP: 6 [PH] (ref 5–8)
PROTEIN, POC UA: NEGATIVE
SPECIFIC GRAVITY, POC UA: 1.01 (ref 1–1.03)
UROBILINOGEN, POC UA: 0.2 E.U./DL

## 2025-06-30 PROCEDURE — 3008F BODY MASS INDEX DOCD: CPT | Mod: CPTII,S$GLB,, | Performed by: UROLOGY

## 2025-06-30 PROCEDURE — 3061F NEG MICROALBUMINURIA REV: CPT | Mod: CPTII,S$GLB,, | Performed by: UROLOGY

## 2025-06-30 PROCEDURE — 1159F MED LIST DOCD IN RCRD: CPT | Mod: CPTII,S$GLB,, | Performed by: UROLOGY

## 2025-06-30 PROCEDURE — 95971 ALYS SMPL SP/PN NPGT W/PRGRM: CPT | Mod: S$GLB,,, | Performed by: UROLOGY

## 2025-06-30 PROCEDURE — 87186 SC STD MICRODIL/AGAR DIL: CPT | Mod: HCNC | Performed by: UROLOGY

## 2025-06-30 PROCEDURE — 3074F SYST BP LT 130 MM HG: CPT | Mod: CPTII,S$GLB,, | Performed by: UROLOGY

## 2025-06-30 PROCEDURE — 81002 URINALYSIS NONAUTO W/O SCOPE: CPT | Mod: S$GLB,,, | Performed by: UROLOGY

## 2025-06-30 PROCEDURE — 99999 PR PBB SHADOW E&M-EST. PATIENT-LVL IV: CPT | Mod: PBBFAC,,, | Performed by: UROLOGY

## 2025-06-30 PROCEDURE — 3078F DIAST BP <80 MM HG: CPT | Mod: CPTII,S$GLB,, | Performed by: UROLOGY

## 2025-06-30 PROCEDURE — 3066F NEPHROPATHY DOC TX: CPT | Mod: CPTII,S$GLB,, | Performed by: UROLOGY

## 2025-06-30 PROCEDURE — 3044F HG A1C LEVEL LT 7.0%: CPT | Mod: CPTII,S$GLB,, | Performed by: UROLOGY

## 2025-06-30 PROCEDURE — 99215 OFFICE O/P EST HI 40 MIN: CPT | Mod: 25,S$GLB,, | Performed by: UROLOGY

## 2025-06-30 PROCEDURE — 51701 INSERT BLADDER CATHETER: CPT | Mod: S$GLB,,, | Performed by: UROLOGY

## 2025-06-30 PROCEDURE — 3072F LOW RISK FOR RETINOPATHY: CPT | Mod: CPTII,S$GLB,, | Performed by: UROLOGY

## 2025-06-30 RX ORDER — SULFAMETHOXAZOLE AND TRIMETHOPRIM 800; 160 MG/1; MG/1
1 TABLET ORAL 2 TIMES DAILY
Qty: 14 TABLET | Refills: 0 | Status: SHIPPED | OUTPATIENT
Start: 2025-06-30 | End: 2025-07-07

## 2025-06-30 NOTE — PROGRESS NOTES
History of Present Illness    CHIEF COMPLAINT:  Ms. Ac presents today for follow up on urge incontinence.      GENITOURINARY:  She reports significant improvement in bladder emptying since December with resolution of previously noted incomplete emptying. She currently has a bladder infection contributing to bladder irritability. She uses Estrace cream and Gemtesa as prescribed. Her InterStim device was replaced in 2023, and she reports no sensation from the device.    SLEEP APNEA:  She has been diagnosed with sleep apnea and reports consistent nightly use of CPAP machine for treatment.    DIABETES:  Her diabetes is well-controlled with HbA1C of 5.4% in March 2025      ROS:  General: -fever, -chills, -fatigue, -weight gain, -weight loss  Eyes: -vision changes, -redness, -discharge  ENT: -ear pain, -nasal congestion, -sore throat  Cardiovascular: -chest pain, -palpitations, -lower extremity edema  Respiratory: -cough, -shortness of breath, +apnea  Gastrointestinal: -abdominal pain, -nausea, -vomiting, -diarrhea, -constipation, -blood in stool  Genitourinary: -dysuria, -hematuria, -frequency  Musculoskeletal: -joint pain, -muscle pain  Skin: -rash, -lesion  Neurological: -headache, -dizziness, -numbness, -tingling  Psychiatric: -anxiety, -depression, -sleep difficulty  Female Genitourinary: +urinary incontinence, +urgency             Past Medical History:   Diagnosis Date    Blood in stool     Constipation     Cystitis     Depression     Diabetes mellitus, type 2     Dysphagia     Endometriosis     GERD (gastroesophageal reflux disease)     Headache     Hypertension     Palpitations     Premature menopause on hormone replacement therapy     Thyroid disease     Weakness 05/31/2019       Past Surgical History:   Procedure Laterality Date    24 HOUR IMPEDANCE PH MONITORING OF ESOPHAGUS IN PATIENT TAKING ACID REDUCING MEDICATIONS N/A 6/2/2022    Procedure: IMPEDANCE PH STUDY, ESOPHAGEAL, 24 HOUR, IN PATIENT TAKING  ACID REDUCING MEDICATION;  Surgeon: Debbie Butler MD;  Location: [facility] (4TH F[facility]);  Service: Endoscopy;  Laterality: N/A;  On PPI/H2 Blocker    BLADDER SUSPENSION      CARPAL TUNNEL RELEASE      right    CHOLECYSTECTOMY      COLONOSCOPY N/A 8/30/2016    Procedure: COLONOSCOPY;  Surgeon: Slick Herron MD;  Location: [facility] ENDO (4TH F[facility]);  Service: Endoscopy;  Laterality: N/A;  PM prep    COLONOSCOPY N/A 12/22/2021    Procedure: COLONOSCOPY. any CRS;  Surgeon: Maria Ines Jung MD;  Location: [facility] ENDO (4TH F[facility]);  Service: Endoscopy;  Laterality: N/A;  fully vaccinated -sm  scaral stimulator will bring remote - sm   12/17 lvm to confirm appt-rb    CYSTOSCOPY WITH INJECTION OF PERIURETHRAL BULKING AGENT N/A 6/19/2024    Procedure: CYSTOSCOPY, WITH PERIURETHRAL BULKING AGENT INJECTION;  Surgeon: Zena Tee MD;  Location: [facility] OR;  Service: Urology;  Laterality: N/A;  45 minutes    CYSTOSCOPY WITH INJECTION OF PERIURETHRAL BULKING AGENT N/A 7/24/2024    Procedure: CYSTOSCOPY, WITH PERIURETHRAL BULKING AGENT INJECTION;  Surgeon: Zena Tee MD;  Location: [facility] OR;  Service: Urology;  Laterality: N/A;  45 minutes    CYSTOSCOPY WITH INJECTION OF PERIURETHRAL BULKING AGENT N/A 8/7/2024    Procedure: CYSTOSCOPY, WITH PERIURETHRAL BULKING AGENT INJECTION;  Surgeon: Zena Tee MD;  Location: [facility] OR;  Service: Urology;  Laterality: N/A;  45 minutes    CYSTOSCOPY,WITH BOTULINUM TOXIN INJECTION N/A 10/30/2024    Procedure: CYSTOSCOPY,WITH BOTULINUM TOXIN INJECTION;  Surgeon: Zena Tee MD;  Location: [facility] OR;  Service: Urology;  Laterality: N/A;  100 units    ESOPHAGEAL MANOMETRY WITH MEASUREMENT OF IMPEDANCE N/A 11/5/2018    Procedure: MANOMETRY, ESOPHAGUS, WITH IMPEDANCE MEASUREMENT;  Surgeon: Slick Herron MD;  Location: [facility] ENDO (4TH F[facility]);  Service: Endoscopy;  Laterality: N/A;    ESOPHAGEAL MANOMETRY WITH MEASUREMENT OF IMPEDANCE N/A 6/2/2022    Procedure: MANOMETRY, ESOPHAGUS, WITH  IMPEDANCE MEASUREMENT;  Surgeon: Debbie Butler MD;  Location: [facility] ENDO (4TH F[facility]);  Service: Endoscopy;  Laterality: N/A;  fully vaccinated, bladder stimulator, instr mailed /emailed -ml    ESOPHAGOGASTRODUODENOSCOPY N/A 2020    Procedure: EGD (ESOPHAGOGASTRODUODENOSCOPY);  Surgeon: Slick Herron MD;  Location: [facility] ENDO (4TH F[facility]);  Service: Endoscopy;  Laterality: N/A;  pt has cardiology appt on 19-will call back after this appt to schedule-tb    FLUOROSCOPIC URODYNAMIC STUDY N/A 2019    Procedure: URODYNAMIC STUDY, FLUOROSCOPIC;  Surgeon: Zena Tee MD;  Location: [facility] OR 1ST FLR;  Service: Urology;  Laterality: N/A;  1 hour    GALLBLADDER SURGERY      HYSTERECTOMY      Bess velasquez Dr. Champlain    IMPLANTATION OF PERMANENT SACRAL NERVE STIMULATOR N/A 2019    Procedure: INSERTION, NEUROSTIMULATOR, PERMANENT, SACRAL;  Surgeon: Zena Tee MD;  Location: [facility] OR 2ND FLR;  Service: Urology;  Laterality: N/A;  30 min    OOPHORECTOMY      LSO- benign cyst    OOPHORECTOMY      RSO- endo    ooptherectomy      REPLACEMENT OF NERVE STIMULATOR BATTERY N/A 2023    Procedure: Replacement, Battery, Neurostimulator;  Surgeon: Zena Tee MD;  Location: [facility] OR 2ND FLR;  Service: Urology;  Laterality: N/A;  45 min    right knee  scoped    SHOULDER SURGERY  right       Family History   Problem Relation Name Age of Onset    Cervical cancer Maternal Grandmother          unknown age of onset,  at 80    Breast cancer Mother  50        alive at 64, unilateral    Esophageal cancer Mother  63    Ovarian cancer Mother  63    Anesthesia problems Mother      Colon cancer Brother  40    Breast cancer Maternal Aunt  72        alive at 72    Breast cancer Paternal Aunt          unknown age of onset, alive at 70     Social History[1]    Allergies:  Patient has no known allergies.    Medications:  Encounter Medications[2]      PHYSICAL EXAMINATION:    The patient  generally appears in good health, is appropriately interactive, and is in no apparent distress.    Skin: No lesions.    Mental: Cooperative with normal affect.    Neuro: Grossly intact.    HEENT: Normal. No evidence of lymphadenopathy.    Chest:  normal inspiratory effort.    Abdomen: Soft, non-tender. No masses or organomegaly. Bladder is not palpable. No evidence of flank discomfort. No evidence of inguinal hernia.    Extremities: No clubbing, cyanosis, or edema    She had a PVR of 50 ml     LABS:    Lab Results   Component Value Date    BUN 11 03/17/2025    CREATININE 0.9 03/17/2025       UA 1.015, pH 6.0, nitrite +, otherwise, negative.       Assessment & Plan    R82.90 Abnormal urinalysis  N30.90 Bladder infection    OVERACTIVE BLADDER:    - Bladder emptying improved since December, now emptying completely.  - she was likely not able to catheterize consistently.  My nurse, Ana, worked with her and saw that the patient would attempt to cath but did not cannulate the urethra easily.   - Evaluated current treatments: estrace cream and Gemtesa.    SNM:  - Adjusted Interstim device settings to address urgency incontinence.  - interrogated the device 0+,2-3-  she is at 3.0 mA, 210 ms, 25 Hz.  impedences check out at [phone] ohms.  Battery is OK.  Decreased to 14 Hz, and increased to 3.1 she felt the stimulation in the right buttocks.    - Discussed potential impact of sleep apnea on bladder function.  - Continue Gemtesa and estrogen cream.  - Follow up in 1 month to reassess bladder symptoms.    BLADDER INFECTION:  - Diagnosed bladder infection, likely causing bladder irritability.  - The catheterized specimen was sent for culture  - Bactrim DS was sent empirically  - Diabetes well-controlled: HbA1c 5.4% in March.       I spent a total of 40 minutes on the day of the visit.This includes face to face time and non-face to face time preparing to see the patient (eg, review of tests), obtaining and/or reviewing  separately obtained history, documenting clinical information in the electronic or other health record, independently interpreting results and communicating results to the patient/family/caregiver, or care coordinator.    Visit complexity today is associated with medical care services that are part of the ongoing care related to the single serious and/or complex condition of Overactive bladder (OAB). A longitudinal relationship exists or is being developed between the patient and this practitioner for the care of this condition.     This note was generated with the assistance of ambient listening technology. Verbal consent was obtained by the patient and accompanying visitor(s) for the recording of patient appointment to facilitate this note. I attest to having reviewed and edited the generated note for accuracy, though some syntax or spelling errors may persist. Please contact the author of this note for any clarification.             [1]   Social History  Socioeconomic History    Marital status: Single   Tobacco Use    Smoking status: Never    Smokeless tobacco: Never   Substance and Sexual Activity    Alcohol use: No    Drug use: No    Sexual activity: Never     Birth control/protection: None   Social History Narrative    ** Merged History Encounter **          Social Drivers of Health     Financial Resource Strain: Low Risk  (2/21/2024)    Overall Financial Resource Strain (CARDIA)     Difficulty of Paying Living Expenses: Not hard at all   Food Insecurity: No Food Insecurity (2/21/2024)    Hunger Vital Sign     Worried About Running Out of Food in the Last Year: Never true     Ran Out of Food in the Last Year: Never true   Transportation Needs: No Transportation Needs (2/21/2024)    PRAPARE - Transportation     Lack of Transportation (Medical): No     Lack of Transportation (Non-Medical): No   Physical Activity: Sufficiently Active (2/21/2024)    Exercise Vital Sign     Days of Exercise per Week: 3 days      Minutes of Exercise per Session: 60 min   Stress: Stress Concern Present (2/21/2024)    [employer] [place] - Occupational Stress Questionnaire     Feeling of Stress : Very much   Housing Stability: Low Risk  (2/21/2024)    Housing Stability Vital Sign     Unable to Pay for Housing in the Last Year: No     Number of Places Lived in the Last Year: 1     Unstable Housing in the Last Year: No   [2]   Outpatient Encounter Medications as of 6/30/2025   Medication Sig Dispense Refill    ACCU-CHEK AMAURY PLUS TEST STRP Strp USE TO TEST BLOOD GLUCOSE TID  9    ACCU-CHEK SOFTCLIX LANCETS Misc USE TO TEST BLOOD GLUCOSE TID  3    albuterol (VENTOLIN HFA) 90 mcg/actuation inhaler Inhale 2 puffs into the lungs every 6 (six) hours as needed for Wheezing. Rescue 18 g 0    amitriptyline (ELAVIL) 10 MG tablet Take 1 tablet (10 mg total) by mouth nightly. 30 tablet 11    atorvastatin (LIPITOR) 80 MG tablet Take 80 mg by mouth every evening.      BLOOD SUGAR DIAGNOSTIC (ACCU-CHEK AMAURY PLUS TEST STRP MISC) 1 strip by Misc.(Non-Drug; Combo Route) route 3 (three) times daily.      buPROPion (WELLBUTRIN XL) 150 MG TB24 tablet Take 150 mg by mouth every morning.      ciclopirox (PENLAC) 8 % Soln Apply topically nightly. 6.6 mL 11    cycloSPORINE (RESTASIS) 0.05 % ophthalmic emulsion Place 1 drop into both eyes 2 (two) times daily. 60 each 11    cycloSPORINE (RESTASIS) 0.05 % ophthalmic emulsion Place 1 drop into both eyes 2 (two) times daily. 60 each 11    dexlansoprazole (DEXILANT) 60 mg capsule TAKE 1 CAPSULE(60 MG) BY MOUTH TWICE DAILY 60 capsule 11    diclofenac sodium (VOLTAREN) 1 % Gel Apply 2 g topically 4 (four) times daily. As needed for breast pain 1 Tube 1    dicyclomine (BENTYL) 10 MG capsule TAKE 2 CAPSULES(20 MG) BY MOUTH THREE TIMES DAILY BEFORE MEALS 180 capsule 0    diltiazem HCl (DILTIAZEM 2% CREAM) Apply topically 3 (three) times daily. Apply topically to anal area. 30 g 2    estradioL (ESTRACE) 0.01  % (0.1 mg/gram) vaginal cream Place 1 g vaginally twice a week. 42.5 g 3    estradioL (ESTRACE) 0.01 % (0.1 mg/gram) vaginal cream Place 1 g vaginally twice a week. 42.5 g 11    FARXIGA 10 mg Tab Take 10 mg by mouth Daily.  7    fluticasone propionate (FLONASE) 50 mcg/actuation nasal spray SHAKE LIQUID AND USE 2 SPRAYS(100 MCG) IN EACH NOSTRIL EVERY DAY 48 g 2    fluticasone-salmeterol diskus inhaler 250-50 mcg Inhale 1 puff into the lungs 2 (two) times daily. Controller 60 each 11    gabapentin (NEURONTIN) 300 MG capsule Take 600 mg (two capsules) in the morning and 900mg (three capsules) at night before bed 150 capsule 11    GEMTESA 75 mg Tab       GLUCOPHAGE 500 mg tablet Take 500 mg by mouth 2 (two) times daily with meals.       levothyroxine (SYNTHROID) 75 MCG tablet Take 75 mcg by mouth before breakfast.      meloxicam (MOBIC) 15 MG tablet Take 15 mg by mouth once daily.      metoprolol succinate (TOPROL-XL) 200 MG 24 hr tablet Take 1 tablet (200 mg total) by mouth once daily. 90 tablet 3    mometasone 0.1% (ELOCON) 0.1 % cream APPLY TOPICALLY TO THE RASH ON HANDS TWICE DAILY AS NEEDED FOR TWO TO THREE WEEKS. USE SPARINGLY      omega-3 fatty acids/fish oil (FISH OIL-OMEGA-3 FATTY ACIDS) 300-1,000 mg capsule Take 1 capsule by mouth once daily. 90 capsule 3    oxyCODONE (ROXICODONE) 5 MG immediate release tablet Take 1 tablet (5 mg total) by mouth every 4 (four) hours as needed for Pain. 5 tablet 0    phentermine (ADIPEX-P) 37.5 mg tablet Take 37.5 mg by mouth.      REXULTI 4 mg Tab Take 1 tablet by mouth every evening.      sulfamethoxazole-trimethoprim 800-160mg (BACTRIM DS) 800-160 mg Tab Take 1 tablet by mouth 2 (two) times daily. for 7 days 14 tablet 0    topiramate (TOPAMAX) 50 MG tablet Take 2 tablets (100 mg total) by mouth every evening. 60 tablet 11    traZODone (DESYREL) 100 MG tablet TK 1 T PO HS  1    triamcinolone acetonide 0.1% (KENALOG) 0.1 % cream Apply sparingly BID prn to affected areas on  hands 80 g 0    TRULICITY 4.5 mg/0.5 mL pen injector Inject 4.5 mg into the skin every 7 days.      ZOLOFT 100 mg tablet Take 200 mg by mouth once daily.        Facility-Administered Encounter Medications as of 6/30/2025   Medication Dose Route Frequency Provider Last Rate Last Admin    LIDOcaine HCl 2% urojet   Urethral Once

## 2025-07-03 LAB — BACTERIA UR CULT: ABNORMAL

## 2025-07-07 ENCOUNTER — RESULTS FOLLOW-UP (OUTPATIENT)
Dept: UROLOGY | Facility: CLINIC | Age: 40
End: 2025-07-07

## 2025-07-13 DIAGNOSIS — Z87.09 HISTORY OF COPD: ICD-10-CM

## 2025-07-13 DIAGNOSIS — R05.8 POST-VIRAL COUGH SYNDROME: ICD-10-CM

## 2025-07-13 DIAGNOSIS — R05.2 SUBACUTE COUGH: ICD-10-CM

## 2025-07-13 NOTE — TELEPHONE ENCOUNTER
No care due was identified.  [facility] Embedded Care Due Messages. Reference number: [id number].   7/13/2025 5:39:03 PM CDT   No

## 2025-07-14 RX ORDER — ALBUTEROL SULFATE 90 UG/1
2 INHALANT RESPIRATORY (INHALATION) EVERY 6 HOURS PRN
Qty: 18 G | Refills: 6 | Status: SHIPPED | OUTPATIENT
Start: 2025-07-14 | End: 2026-07-14

## 2025-07-15 ENCOUNTER — PATIENT MESSAGE (OUTPATIENT)
Dept: PODIATRY | Facility: CLINIC | Age: 40
End: 2025-07-15
Payer: MEDICARE

## 2025-08-04 ENCOUNTER — OFFICE VISIT (OUTPATIENT)
Dept: UROLOGY | Facility: CLINIC | Age: 40
End: 2025-08-04
Payer: MEDICARE

## 2025-08-04 VITALS
WEIGHT: 194.69 LBS | BODY MASS INDEX: 35.6 KG/M2 | HEART RATE: 88 BPM | SYSTOLIC BLOOD PRESSURE: 107 MMHG | DIASTOLIC BLOOD PRESSURE: 74 MMHG

## 2025-08-04 DIAGNOSIS — N39.46 MIXED INCONTINENCE URGE AND STRESS: Primary | ICD-10-CM

## 2025-08-04 LAB
BILIRUBIN, POC UA: NEGATIVE
BLOOD, POC UA: NEGATIVE
CLARITY, UA: CLEAR
COLOR, UA: YELLOW
GLUCOSE, POC UA: ABNORMAL
KETONES, POC UA: NEGATIVE
LEUKOCYTE EST, POC UA: NEGATIVE
NITRITE, POC UA: NEGATIVE
PH UR STRIP: 6 [PH] (ref 5–8)
PROTEIN, POC UA: NEGATIVE
SPECIFIC GRAVITY, POC UA: <=1.005 (ref 1–1.03)
UROBILINOGEN, POC UA: 0.2 E.U./DL

## 2025-08-04 PROCEDURE — 99214 OFFICE O/P EST MOD 30 MIN: CPT | Mod: S$GLB,,, | Performed by: UROLOGY

## 2025-08-04 PROCEDURE — 3061F NEG MICROALBUMINURIA REV: CPT | Mod: CPTII,S$GLB,, | Performed by: UROLOGY

## 2025-08-04 PROCEDURE — 3078F DIAST BP <80 MM HG: CPT | Mod: CPTII,S$GLB,, | Performed by: UROLOGY

## 2025-08-04 PROCEDURE — 3066F NEPHROPATHY DOC TX: CPT | Mod: CPTII,S$GLB,, | Performed by: UROLOGY

## 2025-08-04 PROCEDURE — 3074F SYST BP LT 130 MM HG: CPT | Mod: CPTII,S$GLB,, | Performed by: UROLOGY

## 2025-08-04 PROCEDURE — 99999 PR PBB SHADOW E&M-EST. PATIENT-LVL IV: CPT | Mod: PBBFAC,,, | Performed by: UROLOGY

## 2025-08-04 PROCEDURE — 1159F MED LIST DOCD IN RCRD: CPT | Mod: CPTII,S$GLB,, | Performed by: UROLOGY

## 2025-08-04 PROCEDURE — 3008F BODY MASS INDEX DOCD: CPT | Mod: CPTII,S$GLB,, | Performed by: UROLOGY

## 2025-08-04 PROCEDURE — G2211 COMPLEX E/M VISIT ADD ON: HCPCS | Mod: S$GLB,,, | Performed by: UROLOGY

## 2025-08-04 PROCEDURE — 3044F HG A1C LEVEL LT 7.0%: CPT | Mod: CPTII,S$GLB,, | Performed by: UROLOGY

## 2025-08-04 PROCEDURE — 3072F LOW RISK FOR RETINOPATHY: CPT | Mod: CPTII,S$GLB,, | Performed by: UROLOGY

## 2025-08-04 NOTE — PROGRESS NOTES
History of Present Illness    CHIEF COMPLAINT:  Ms. Ac presents today for follow up on urge incontinence.  At her last visit on 6/30/25 we altered her Interstim device settings.     She reports no improvement of her urinary symptoms. She has been wearing 5 depends during the day and 5 depends at night. She states these are completely soaked prior to changing. States she has been urinating on her own roughly 5x/day. She admits to incomplete bladder emptying. She denies any hematuria or dysuria. She was diagnosed with a urinary tract infection on 6/30 and completed culture sensitive antibiotics at that time.     She has been diagnosed with sleep apnea and reports consistent nightly use of CPAP machine for treatment.      ROS:  General: -fever, -chills, -fatigue, -weight gain, -weight loss  Eyes: -vision changes, -redness, -discharge  ENT: -ear pain, -nasal congestion, -sore throat  Cardiovascular: -chest pain, -palpitations, -lower extremity edema  Respiratory: -cough, -shortness of breath, +apnea  Gastrointestinal: -abdominal pain, -nausea, -vomiting, -diarrhea, -constipation, -blood in stool  Genitourinary: -dysuria, -hematuria, -frequency  Musculoskeletal: -joint pain, -muscle pain  Skin: -rash, -lesion  Neurological: -headache, -dizziness, -numbness, -tingling  Psychiatric: -anxiety, -depression, -sleep difficulty  Female Genitourinary: +urinary incontinence, +urgency             Past Medical History:   Diagnosis Date    Blood in stool     Constipation     Cystitis     Depression     Diabetes mellitus, type 2     Dysphagia     Endometriosis     GERD (gastroesophageal reflux disease)     Headache     Hypertension     Palpitations     Premature menopause on hormone replacement therapy     Thyroid disease     Weakness 05/31/2019       Past Surgical History:   Procedure Laterality Date    24 HOUR IMPEDANCE PH MONITORING OF ESOPHAGUS IN PATIENT TAKING ACID REDUCING MEDICATIONS N/A 6/2/2022    Procedure: IMPEDANCE  PH STUDY, ESOPHAGEAL, 24 HOUR, IN PATIENT TAKING ACID REDUCING MEDICATION;  Surgeon: Debbie Butler MD;  Location: [facility] ENDO (4TH F[facility]);  Service: Endoscopy;  Laterality: N/A;  On PPI/H2 Blocker    BLADDER SUSPENSION      CARPAL TUNNEL RELEASE      right    CHOLECYSTECTOMY      COLONOSCOPY N/A 8/30/2016    Procedure: COLONOSCOPY;  Surgeon: Slick Herron MD;  Location: [facility] ENDO (4TH F[facility]);  Service: Endoscopy;  Laterality: N/A;  PM prep    COLONOSCOPY N/A 12/22/2021    Procedure: COLONOSCOPY. any CRS;  Surgeon: Maria Ines Jung MD;  Location: [facility] ENDO (4TH F[facility]);  Service: Endoscopy;  Laterality: N/A;  fully vaccinated -sm  scaral stimulator will bring remote - sm   12/17 lvm to confirm appt-rb    CYSTOSCOPY WITH INJECTION OF PERIURETHRAL BULKING AGENT N/A 6/19/2024    Procedure: CYSTOSCOPY, WITH PERIURETHRAL BULKING AGENT INJECTION;  Surgeon: Zena Tee MD;  Location: [facility] OR;  Service: Urology;  Laterality: N/A;  45 minutes    CYSTOSCOPY WITH INJECTION OF PERIURETHRAL BULKING AGENT N/A 7/24/2024    Procedure: CYSTOSCOPY, WITH PERIURETHRAL BULKING AGENT INJECTION;  Surgeon: Zena Tee MD;  Location: [facility] OR;  Service: Urology;  Laterality: N/A;  45 minutes    CYSTOSCOPY WITH INJECTION OF PERIURETHRAL BULKING AGENT N/A 8/7/2024    Procedure: CYSTOSCOPY, WITH PERIURETHRAL BULKING AGENT INJECTION;  Surgeon: Zena Tee MD;  Location: [facility] OR;  Service: Urology;  Laterality: N/A;  45 minutes    CYSTOSCOPY,WITH BOTULINUM TOXIN INJECTION N/A 10/30/2024    Procedure: CYSTOSCOPY,WITH BOTULINUM TOXIN INJECTION;  Surgeon: Zena Tee MD;  Location: [facility] OR;  Service: Urology;  Laterality: N/A;  100 units    ESOPHAGEAL MANOMETRY WITH MEASUREMENT OF IMPEDANCE N/A 11/5/2018    Procedure: MANOMETRY, ESOPHAGUS, WITH IMPEDANCE MEASUREMENT;  Surgeon: Slick Herron MD;  Location: [facility] ENDO (4TH F[facility]);  Service: Endoscopy;  Laterality: N/A;    ESOPHAGEAL MANOMETRY WITH MEASUREMENT OF IMPEDANCE N/A 6/2/2022     Procedure: MANOMETRY, ESOPHAGUS, WITH IMPEDANCE MEASUREMENT;  Surgeon: Debbie Butler MD;  Location: [facility] ENDO (4TH F[facility]);  Service: Endoscopy;  Laterality: N/A;  fully vaccinated, bladder stimulator, instr mailed /emailed -ml    ESOPHAGOGASTRODUODENOSCOPY N/A 2020    Procedure: EGD (ESOPHAGOGASTRODUODENOSCOPY);  Surgeon: Slick Herron MD;  Location: [facility] ENDO (4TH F[facility]);  Service: Endoscopy;  Laterality: N/A;  pt has cardiology appt on 19-will call back after this appt to schedule-tb    FLUOROSCOPIC URODYNAMIC STUDY N/A 2019    Procedure: URODYNAMIC STUDY, FLUOROSCOPIC;  Surgeon: Zena Tee MD;  Location: [facility] OR 1ST FLR;  Service: Urology;  Laterality: N/A;  1 hour    GALLBLADDER SURGERY      HYSTERECTOMY      Bess velasquez Dr. Champlain    IMPLANTATION OF PERMANENT SACRAL NERVE STIMULATOR N/A 2019    Procedure: INSERTION, NEUROSTIMULATOR, PERMANENT, SACRAL;  Surgeon: Zena Tee MD;  Location: [facility] OR 2ND FLR;  Service: Urology;  Laterality: N/A;  30 min    OOPHORECTOMY      LSO- benign cyst    OOPHORECTOMY      RSO- endo    ooptherectomy      REPLACEMENT OF NERVE STIMULATOR BATTERY N/A 2023    Procedure: Replacement, Battery, Neurostimulator;  Surgeon: Zena Tee MD;  Location: [facility] OR 2ND FLR;  Service: Urology;  Laterality: N/A;  45 min    right knee  scoped    SHOULDER SURGERY  right       Family History   Problem Relation Name Age of Onset    Cervical cancer Maternal Grandmother          unknown age of onset,  at 80    Breast cancer Mother  50        alive at 64, unilateral    Esophageal cancer Mother  63    Ovarian cancer Mother  63    Anesthesia problems Mother      Colon cancer Brother  40    Breast cancer Maternal Aunt  72        alive at 72    Breast cancer Paternal Aunt          unknown age of onset, alive at 70     Social History[1]    Allergies:  Patient has no known allergies.    Medications:  Encounter  Medications[2]      PHYSICAL EXAMINATION:    The patient generally appears in good health, is appropriately interactive, and is in no apparent distress.    Skin: No lesions.    Mental: Cooperative with normal affect.    Neuro: Grossly intact.    HEENT: Normal. No evidence of lymphadenopathy.    Chest:  normal inspiratory effort.    Abdomen: Soft, non-tender. No masses or organomegaly. Bladder is not palpable. No evidence of flank discomfort. No evidence of inguinal hernia.    Extremities: No clubbing, cyanosis, or edema      LABS:    Lab Results   Component Value Date    BUN 11 03/17/2025    CREATININE 0.9 03/17/2025       UA 1.005, pH 6.0, otherwise, negative.   She had a PVR of 66 ml       Assessment & Plan      OVERACTIVE BLADDER:    - Provided patient with a voiding diary and gave instructions on documentation. Discussed that prior intervention have been ineffective and this will give us a better idea of her voiding habits.   - Continue current treatments: estrace cream and Gemtesa.      RTC in 1 month to reassess bladder symptoms    Chris Joel MD, PGY-2    Patient was seen and because it is hard to pin down her symptoms decision was made to have her perform an intake output diary with the toilet hat to better assess her symptoms and how we can best address incontinence.    I spent a total of 30 minutes on the day of the visit.  This includes face to face time and non-face to face time preparing to see the patient (eg, review of tests), obtaining and/or reviewing separately obtained history, documenting clinical information in the electronic or other health record, independently interpreting results and communicating results to the patient/family/caregiver, or care coordinator.    Visit complexity today is associated with medical care services that are part of the ongoing care related to the single serious and/or complex condition of Overactive bladder (OAB). A longitudinal relationship exists or is being  developed between the patient and this practitioner for the care of this condition.          [1]   Social History  Socioeconomic History    Marital status: Single   Tobacco Use    Smoking status: Never    Smokeless tobacco: Never   Substance and Sexual Activity    Alcohol use: No    Drug use: No    Sexual activity: Never     Birth control/protection: None   Social History Narrative    ** Merged History Encounter **          Social Drivers of Health     Financial Resource Strain: Medium Risk (7/9/2025)    Overall Financial Resource Strain (CARDIA)     Difficulty of Paying Living Expenses: Somewhat hard   Food Insecurity: Food Insecurity Present (7/9/2025)    Hunger Vital Sign     Worried About Running Out of Food in the Last Year: Often true     Ran Out of Food in the Last Year: Sometimes true   Transportation Needs: No Transportation Needs (7/9/2025)    PRAPARE - Transportation     Lack of Transportation (Medical): No     Lack of Transportation (Non-Medical): No   Physical Activity: Insufficiently Active (7/9/2025)    Exercise Vital Sign     Days of Exercise per Week: 1 day     Minutes of Exercise per Session: 10 min   Stress: Stress Concern Present (7/9/2025)    [employer] - Occupational Stress Questionnaire     Feeling of Stress : Rather much   Housing Stability: Low Risk  (7/9/2025)    Housing Stability Vital Sign     Unable to Pay for Housing in the Last Year: No     Number of Times Moved in the Last Year: 0     Homeless in the Last Year: No   [2]   Outpatient Encounter Medications as of 8/4/2025   Medication Sig Dispense Refill    ACCU-CHEK AMAURY PLUS TEST STRP Strp USE TO TEST BLOOD GLUCOSE TID  9    ACCU-CHEK SOFTCLIX LANCETS Misc USE TO TEST BLOOD GLUCOSE TID  3    albuterol (VENTOLIN HFA) 90 mcg/actuation inhaler Inhale 2 puffs into the lungs every 6 (six) hours as needed for Wheezing. Rescue 18 g 6    amitriptyline (ELAVIL) 10 MG tablet Take 1 tablet (10 mg total) by mouth  nightly. 30 tablet 11    atorvastatin (LIPITOR) 80 MG tablet Take 80 mg by mouth every evening.      BLOOD SUGAR DIAGNOSTIC (ACCU-CHEK AMAURY PLUS TEST STRP MISC) 1 strip by Misc.(Non-Drug; Combo Route) route 3 (three) times daily.      buPROPion (WELLBUTRIN XL) 150 MG TB24 tablet Take 150 mg by mouth every morning.      ciclopirox (PENLAC) 8 % Soln Apply topically nightly. 6.6 mL 11    cycloSPORINE (RESTASIS) 0.05 % ophthalmic emulsion Place 1 drop into both eyes 2 (two) times daily. 60 each 11    cycloSPORINE (RESTASIS) 0.05 % ophthalmic emulsion Place 1 drop into both eyes 2 (two) times daily. 60 each 11    dexlansoprazole (DEXILANT) 60 mg capsule TAKE 1 CAPSULE(60 MG) BY MOUTH TWICE DAILY 60 capsule 11    diclofenac sodium (VOLTAREN) 1 % Gel Apply 2 g topically 4 (four) times daily. As needed for breast pain 1 Tube 1    dicyclomine (BENTYL) 10 MG capsule TAKE 2 CAPSULES(20 MG) BY MOUTH THREE TIMES DAILY BEFORE MEALS 180 capsule 0    diltiazem HCl (DILTIAZEM 2% CREAM) Apply topically 3 (three) times daily. Apply topically to anal area. 30 g 2    estradioL (ESTRACE) 0.01 % (0.1 mg/gram) vaginal cream Place 1 g vaginally twice a week. 42.5 g 3    estradioL (ESTRACE) 0.01 % (0.1 mg/gram) vaginal cream Place 1 g vaginally twice a week. 42.5 g 11    FARXIGA 10 mg Tab Take 10 mg by mouth Daily.  7    fluticasone propionate (FLONASE) 50 mcg/actuation nasal spray SHAKE LIQUID AND USE 2 SPRAYS(100 MCG) IN EACH NOSTRIL EVERY DAY 48 g 2    fluticasone-salmeterol diskus inhaler 250-50 mcg Inhale 1 puff into the lungs 2 (two) times daily. Controller 60 each 11    gabapentin (NEURONTIN) 300 MG capsule Take 600 mg (two capsules) in the morning and 900mg (three capsules) at night before bed 150 capsule 11    GEMTESA 75 mg Tab       GLUCOPHAGE 500 mg tablet Take 500 mg by mouth 2 (two) times daily with meals.       levothyroxine (SYNTHROID) 75 MCG tablet Take 75 mcg by mouth before breakfast.      meloxicam (MOBIC) 15 MG tablet  Take 15 mg by mouth once daily.      metoprolol succinate (TOPROL-XL) 200 MG 24 hr tablet Take 1 tablet (200 mg total) by mouth once daily. 90 tablet 3    mometasone 0.1% (ELOCON) 0.1 % cream APPLY TOPICALLY TO THE RASH ON HANDS TWICE DAILY AS NEEDED FOR TWO TO THREE WEEKS. USE SPARINGLY      omega-3 fatty acids/fish oil (FISH OIL-OMEGA-3 FATTY ACIDS) 300-1,000 mg capsule Take 1 capsule by mouth once daily. 90 capsule 3    oxyCODONE (ROXICODONE) 5 MG immediate release tablet Take 1 tablet (5 mg total) by mouth every 4 (four) hours as needed for Pain. 5 tablet 0    phentermine (ADIPEX-P) 37.5 mg tablet Take 37.5 mg by mouth.      REXULTI 4 mg Tab Take 1 tablet by mouth every evening.      [] sulfamethoxazole-trimethoprim 800-160mg (BACTRIM DS) 800-160 mg Tab Take 1 tablet by mouth 2 (two) times daily. for 7 days 14 tablet 0    topiramate (TOPAMAX) 50 MG tablet Take 2 tablets (100 mg total) by mouth every evening. 60 tablet 11    traZODone (DESYREL) 100 MG tablet TK 1 T PO HS  1    triamcinolone acetonide 0.1% (KENALOG) 0.1 % cream Apply sparingly BID prn to affected areas on hands 80 g 0    TRULICITY 4.5 mg/0.5 mL pen injector Inject 4.5 mg into the skin every 7 days.      ZOLOFT 100 mg tablet Take 200 mg by mouth once daily.       [DISCONTINUED] albuterol (VENTOLIN HFA) 90 mcg/actuation inhaler Inhale 2 puffs into the lungs every 6 (six) hours as needed for Wheezing. Rescue 18 g 0     Facility-Administered Encounter Medications as of 2025   Medication Dose Route Frequency Provider Last Rate Last Admin    LIDOcaine HCl 2% urojet   Urethral Once

## 2025-08-19 ENCOUNTER — PATIENT MESSAGE (OUTPATIENT)
Dept: UROLOGY | Facility: CLINIC | Age: 40
End: 2025-08-19
Payer: MEDICARE

## 2025-08-26 ENCOUNTER — OFFICE VISIT (OUTPATIENT)
Dept: SLEEP MEDICINE | Facility: CLINIC | Age: 40
End: 2025-08-26
Payer: MEDICARE

## 2025-08-26 ENCOUNTER — PATIENT MESSAGE (OUTPATIENT)
Dept: UROLOGY | Facility: CLINIC | Age: 40
End: 2025-08-26
Payer: MEDICARE

## 2025-08-26 VITALS
SYSTOLIC BLOOD PRESSURE: 111 MMHG | BODY MASS INDEX: 35.57 KG/M2 | HEIGHT: 62 IN | HEART RATE: 85 BPM | DIASTOLIC BLOOD PRESSURE: 77 MMHG | WEIGHT: 193.31 LBS

## 2025-08-26 DIAGNOSIS — G47.10 HYPERSOMNOLENCE: ICD-10-CM

## 2025-08-26 DIAGNOSIS — F51.09 OTHER INSOMNIA NOT DUE TO A SUBSTANCE OR KNOWN PHYSIOLOGICAL CONDITION: ICD-10-CM

## 2025-08-26 DIAGNOSIS — Z78.9 INTOLERANCE OF CONTINUOUS POSITIVE AIRWAY PRESSURE (CPAP) VENTILATION: ICD-10-CM

## 2025-08-26 DIAGNOSIS — R35.1 NOCTURIA: ICD-10-CM

## 2025-08-26 DIAGNOSIS — G47.33 OSA (OBSTRUCTIVE SLEEP APNEA): Primary | ICD-10-CM

## 2025-08-26 PROCEDURE — 3078F DIAST BP <80 MM HG: CPT | Mod: CPTII,HCNC,S$GLB, | Performed by: PHYSICIAN ASSISTANT

## 2025-08-26 PROCEDURE — 3008F BODY MASS INDEX DOCD: CPT | Mod: CPTII,HCNC,S$GLB, | Performed by: PHYSICIAN ASSISTANT

## 2025-08-26 PROCEDURE — G2211 COMPLEX E/M VISIT ADD ON: HCPCS | Mod: HCNC,S$GLB,, | Performed by: PHYSICIAN ASSISTANT

## 2025-08-26 PROCEDURE — 1159F MED LIST DOCD IN RCRD: CPT | Mod: CPTII,HCNC,S$GLB, | Performed by: PHYSICIAN ASSISTANT

## 2025-08-26 PROCEDURE — 1160F RVW MEDS BY RX/DR IN RCRD: CPT | Mod: CPTII,HCNC,S$GLB, | Performed by: PHYSICIAN ASSISTANT

## 2025-08-26 PROCEDURE — 3074F SYST BP LT 130 MM HG: CPT | Mod: CPTII,HCNC,S$GLB, | Performed by: PHYSICIAN ASSISTANT

## 2025-08-26 PROCEDURE — 99214 OFFICE O/P EST MOD 30 MIN: CPT | Mod: HCNC,S$GLB,, | Performed by: PHYSICIAN ASSISTANT

## 2025-08-26 PROCEDURE — 3066F NEPHROPATHY DOC TX: CPT | Mod: CPTII,HCNC,S$GLB, | Performed by: PHYSICIAN ASSISTANT

## 2025-08-26 PROCEDURE — 3044F HG A1C LEVEL LT 7.0%: CPT | Mod: CPTII,HCNC,S$GLB, | Performed by: PHYSICIAN ASSISTANT

## 2025-08-26 PROCEDURE — 3061F NEG MICROALBUMINURIA REV: CPT | Mod: CPTII,HCNC,S$GLB, | Performed by: PHYSICIAN ASSISTANT

## 2025-08-26 PROCEDURE — 3072F LOW RISK FOR RETINOPATHY: CPT | Mod: CPTII,HCNC,S$GLB, | Performed by: PHYSICIAN ASSISTANT

## 2025-08-26 PROCEDURE — 99999 PR PBB SHADOW E&M-EST. PATIENT-LVL IV: CPT | Mod: PBBFAC,HCNC,, | Performed by: PHYSICIAN ASSISTANT

## 2025-09-04 ENCOUNTER — TELEPHONE (OUTPATIENT)
Dept: SLEEP MEDICINE | Facility: OTHER | Age: 40
End: 2025-09-04
Payer: MEDICARE

## (undated) DEVICE — TOWEL OR XRAY BLUE 17X26IN

## (undated) DEVICE — CONTAINER SPECIMEN OR STER 4OZ

## (undated) DEVICE — DRAPE LAP T SHT W/ INSTR PAD

## (undated) DEVICE — SYR SALINE  FLUSH PREFIL 10ML

## (undated) DEVICE — GLOVE BIOGEL SKINSENSE PI 6.0

## (undated) DEVICE — SUT VICRYL 3-0 27 SH

## (undated) DEVICE — SYR ORAL CLEAR PLSTC 10ML

## (undated) DEVICE — PACK CYSTOSCOPY III SIRUS

## (undated) DEVICE — SYR B-D DISP CONTROL 10CC100/C

## (undated) DEVICE — PACK CYSTOSCOPY II ECLIPSE

## (undated) DEVICE — SEE MEDLINE ITEM 157148

## (undated) DEVICE — DRAPE STERI INSTRUMENT 1018

## (undated) DEVICE — CABLE TEST MINI J HOOK

## (undated) DEVICE — TRAY CYSTO BASIN OMC

## (undated) DEVICE — PAD CURAD NONADH 3X4IN

## (undated) DEVICE — TRAY FOLEY 16FR INFECTION CONT

## (undated) DEVICE — ELECTRODE REM PLYHSV RETURN 9

## (undated) DEVICE — DRAPE INCISE IOBAN 2 23X17IN

## (undated) DEVICE — SUT VICRYL CTD 2-0 GI 27 SH

## (undated) DEVICE — SUT PROLENE 3-0 SH DA 36 BL

## (undated) DEVICE — DRAPE C-ARMOR EQUIPMENT COVER

## (undated) DEVICE — TIP YANKAUERS BULB NO VENT

## (undated) DEVICE — DRAPE INCISE IOBAN 2 13X13IN

## (undated) DEVICE — SOL IRRI STRL WATER 1000ML

## (undated) DEVICE — TRAY MINOR GEN SURG

## (undated) DEVICE — KIT INTRODUCER W/2 9CM FRN NDL

## (undated) DEVICE — BAG URO DRAIN

## (undated) DEVICE — DRESSING TRANS 4X4 TEGADERM

## (undated) DEVICE — DRESSING ANTIMICROBIAL 1 INCH

## (undated) DEVICE — COVERS PROBE NR-48 STERILE

## (undated) DEVICE — TAPE SILK 3IN

## (undated) DEVICE — TOWEL OR DISP STRL BLUE 4/PK

## (undated) DEVICE — HANDSET INTERSTIM X COMM

## (undated) DEVICE — COVER PROBE NL STRL 3.6X96IN

## (undated) DEVICE — DRAPE ABDOMINAL TIBURON 14X11

## (undated) DEVICE — TRAY MINOR GEN SURG OMC

## (undated) DEVICE — SOL WATER STRL IRR 1000ML

## (undated) DEVICE — COVER LIGHT HANDLE 80/CA

## (undated) DEVICE — NDL WILLIAMS CYSTOSCOPIC

## (undated) DEVICE — SEE MEDLINE ITEM 146417

## (undated) DEVICE — DRAPE C-ARM ELAS CLIP 42X120IN

## (undated) DEVICE — DRESSING TEGADERM 2X2 3/4

## (undated) DEVICE — SUT MCRYL PLUS 4-0 PS2 27IN

## (undated) DEVICE — SYR AEROSET 1ML

## (undated) DEVICE — SCREENER ISTIM EXT NEROSTIMLTR

## (undated) DEVICE — BATTERY SIZE 9V

## (undated) DEVICE — DRESSING TELFA STRL 4X3 LF

## (undated) DEVICE — GOWN SURGICAL X-LARGE

## (undated) DEVICE — PROGRAMMER INTERSTIM HANDSET

## (undated) DEVICE — SOL STERILE WATER INJ 1000ML

## (undated) DEVICE — ELECTRODE NEEDLE 2.8IN

## (undated) DEVICE — CLOSURE SKIN STERI STRIP 1/2X4

## (undated) DEVICE — WATER STERILE 500ML NOT INTEND

## (undated) DEVICE — SUT PROLENE RB-1 BL MONO

## (undated) DEVICE — SEE MEDLINE ITEM 157128

## (undated) DEVICE — KIT PROBE COVER WITH GEL

## (undated) DEVICE — DRAPE C ARM 42 X 120 10/BX

## (undated) DEVICE — NDL BLUNT FILL 18G 1.5IN

## (undated) DEVICE — BLADE SURG #15 CARBON STEEL

## (undated) DEVICE — DRESSING TRANS 2X2 TEGADERM

## (undated) DEVICE — TRAY CATH FOL SIL URIMTR 16FR

## (undated) DEVICE — STERILE WATER 1000ML BOTTLE

## (undated) DEVICE — NDL SPINAL SPINOCAN 22GX3.5

## (undated) DEVICE — DRAPE STERI-DRAPE 83X125 IOBAN

## (undated) DEVICE — SYR 10CC LUER LOCK

## (undated) DEVICE — NDL INJETAK ADJ TIP 35CM

## (undated) DEVICE — CABLE TWIST LOCK 64CM

## (undated) DEVICE — DRAPE HALF SURGICAL 40X58IN